# Patient Record
Sex: FEMALE | Race: BLACK OR AFRICAN AMERICAN | Employment: OTHER | URBAN - METROPOLITAN AREA
[De-identification: names, ages, dates, MRNs, and addresses within clinical notes are randomized per-mention and may not be internally consistent; named-entity substitution may affect disease eponyms.]

---

## 2017-01-03 RX ORDER — FUROSEMIDE 40 MG/1
TABLET ORAL
Qty: 90 TAB | Refills: 11 | Status: SHIPPED | OUTPATIENT
Start: 2017-01-03 | End: 2017-01-10 | Stop reason: SDUPTHER

## 2017-01-10 RX ORDER — ALLOPURINOL 300 MG/1
TABLET ORAL
Qty: 90 TAB | Refills: 3 | Status: SHIPPED | OUTPATIENT
Start: 2017-01-10 | End: 2018-01-23 | Stop reason: SDUPTHER

## 2017-01-10 RX ORDER — VALSARTAN 40 MG/1
TABLET ORAL
Qty: 90 TAB | Refills: 3 | Status: SHIPPED | OUTPATIENT
Start: 2017-01-10 | End: 2017-02-06

## 2017-01-10 RX ORDER — CARVEDILOL 6.25 MG/1
TABLET ORAL
Qty: 180 TAB | Refills: 3 | Status: SHIPPED | OUTPATIENT
Start: 2017-01-10 | End: 2017-02-07 | Stop reason: SDUPTHER

## 2017-01-10 RX ORDER — FLUTICASONE PROPIONATE AND SALMETEROL 250; 50 UG/1; UG/1
1 POWDER RESPIRATORY (INHALATION) 2 TIMES DAILY
Qty: 3 INHALER | Refills: 3 | Status: SHIPPED | OUTPATIENT
Start: 2017-01-10 | End: 2018-04-11 | Stop reason: SDUPTHER

## 2017-01-10 RX ORDER — ATORVASTATIN CALCIUM 10 MG/1
TABLET, FILM COATED ORAL
Qty: 90 TAB | Refills: 3 | Status: SHIPPED | OUTPATIENT
Start: 2017-01-10 | End: 2018-01-09 | Stop reason: SDUPTHER

## 2017-01-10 RX ORDER — FUROSEMIDE 40 MG/1
TABLET ORAL
Qty: 135 TAB | Refills: 3 | Status: SHIPPED | OUTPATIENT
Start: 2017-01-10 | End: 2017-01-18 | Stop reason: SDUPTHER

## 2017-01-10 RX ORDER — POTASSIUM CHLORIDE 750 MG/1
TABLET, FILM COATED, EXTENDED RELEASE ORAL
Qty: 90 TAB | Refills: 3 | Status: SHIPPED | OUTPATIENT
Start: 2017-01-10 | End: 2017-02-01

## 2017-01-19 RX ORDER — FUROSEMIDE 40 MG/1
TABLET ORAL
Qty: 270 TAB | Refills: 3 | Status: SHIPPED | OUTPATIENT
Start: 2017-01-19 | End: 2017-02-02

## 2017-01-24 ENCOUNTER — OFFICE VISIT (OUTPATIENT)
Dept: INTERNAL MEDICINE CLINIC | Age: 82
End: 2017-01-24

## 2017-01-24 VITALS
WEIGHT: 134.8 LBS | BODY MASS INDEX: 24.8 KG/M2 | SYSTOLIC BLOOD PRESSURE: 97 MMHG | OXYGEN SATURATION: 94 % | RESPIRATION RATE: 18 BRPM | TEMPERATURE: 94 F | HEIGHT: 62 IN | DIASTOLIC BLOOD PRESSURE: 64 MMHG | HEART RATE: 79 BPM

## 2017-01-24 DIAGNOSIS — R25.2 CRAMPING OF HANDS: ICD-10-CM

## 2017-01-24 DIAGNOSIS — R53.83 FATIGUE, UNSPECIFIED TYPE: ICD-10-CM

## 2017-01-24 DIAGNOSIS — R60.9 EDEMA, UNSPECIFIED TYPE: ICD-10-CM

## 2017-01-24 DIAGNOSIS — I50.22 CHRONIC SYSTOLIC CONGESTIVE HEART FAILURE (HCC): ICD-10-CM

## 2017-01-24 DIAGNOSIS — J45.909 REACTIVE AIRWAY DISEASE, UNSPECIFIED ASTHMA SEVERITY, UNCOMPLICATED: ICD-10-CM

## 2017-01-24 DIAGNOSIS — R53.81 PHYSICAL DECONDITIONING: ICD-10-CM

## 2017-01-24 DIAGNOSIS — I42.0 CARDIOMYOPATHY, DILATED, NONISCHEMIC (HCC): Primary | Chronic | ICD-10-CM

## 2017-01-24 DIAGNOSIS — I10 ESSENTIAL HYPERTENSION: ICD-10-CM

## 2017-01-24 DIAGNOSIS — E78.2 MIXED HYPERLIPIDEMIA: ICD-10-CM

## 2017-01-24 NOTE — MR AVS SNAPSHOT
Visit Information Date & Time Provider Department Dept. Phone Encounter #  
 1/24/2017 10:00 AM Bandar Garcia MD [facility] [phone] [id number] Follow-up Instructions Return in about 4 weeks (around 2/21/2017). Your Appointments 2/13/2017 11:15 AM  
6 MONTH with Kaci Elmore MD  
[facility] Gustavo Gupta) Appt Note: .; .  
 [address]  
[phone] [address]  
  
    
  
 1/25/2017  8:00 AM  
REMOTE OFFICE VISIT with [facility] Gustavo Gupta) Appt Note: NOT AN OFFICE VISIT - REMOTE BSC ICD  
 [address]  
[phone] [address] Upcoming Health Maintenance Date Due DTaP/Tdap/Td series (1 - Tdap) 3/17/1946 MEDICARE YEARLY EXAM 4/19/2017 GLAUCOMA SCREENING Q2Y 12/18/2017 Allergies as of 1/24/2017  Review Complete On: 1/24/2017 By: Davie Yan Severity Noted Reaction Type Reactions Codeine  05/21/2012   Side Effect Other (comments) \"made me disoriented\" per pt Current Immunizations  Reviewed on 6/29/2015 Name Date Influenza Vaccine 10/4/2014 Influenza Vaccine Split 10/22/2011 Pneumococcal Vaccine (Unspecified Type) 5/22/2007 Not reviewed this visit You Were Diagnosed With   
  
 Codes Comments Cardiomyopathy, dilated, nonischemic (HCC)    -  Primary ICD-10-CM: I42.9 ICD-9-CM: 425.4 Edema, unspecified type     ICD-10-CM: R60.9 ICD-9-CM: 256. 3 Chronic systolic congestive heart failure (HCC)     ICD-10-CM: I50.22 ICD-9-CM: 428.22, 428.0 Essential hypertension     ICD-10-CM: I10 
ICD-9-CM: 401.9 Mixed hyperlipidemia     ICD-10-CM: E78.2 ICD-9-CM: 272.2  Reactive airway disease, unspecified asthma severity, uncomplicated ICD-10-CM: L89.026 ICD-9-CM: 493.90 Fatigue, unspecified type     ICD-10-CM: R53.83 ICD-9-CM: 780.79 Cramping of hands     ICD-10-CM: R25.2 ICD-9-CM: 729.82 Vitals BP Pulse Temp Resp Height(growth percentile) Weight(growth percentile) 97/64 (BP 1 Location: Right arm, BP Patient Position: Sitting) 79 (!) 94 °F (34.4 °C) 18 5' 2\" (1.575 m) 134 lb 12.8 oz (61.1 kg) SpO2 BMI OB Status Smoking Status 94% 24.66 kg/m2 Postmenopausal Never Smoker BMI and BSA Data Body Mass Index Body Surface Area  
 24.66 kg/m 2 1.63 m 2 Preferred Pharmacy Pharmacy Name Phone [phone] Your Updated Medication List  
  
   
This list is accurate as of: 1/24/17 11:55 AM.  Always use your most recent med list.  
  
  
  
  
 albuterol 2.5 mg /3 mL (0.083 %) nebulizer solution Commonly known as:  PROVENTIL VENTOLIN  
3 mL by Nebulization route every four (4) hours as needed for Wheezing. allopurinol 300 mg tablet Commonly known as:  ZYLOPRIM  
TAKE ONE (1) TABLET(S) DAILY  
  
 atorvastatin 10 mg tablet Commonly known as:  LIPITOR  
TAKE ONE (1) TABLET(S) DAILY  
  
 carvedilol 6.25 mg tablet Commonly known as:  COREG  
TAKE ONE (1) TABLET(S) TWIC E DAILY WITH FOOD  
  
 co-enzyme Q-10 100 mg capsule Commonly known as:  CO Q-10 Take 100 mg by mouth daily. fluticasone-salmeterol 250-50 mcg/dose diskus inhaler Commonly known as:  ADVAIR Take 1 Puff by inhalation two (2) times a day. furosemide 40 mg tablet Commonly known as:  LASIX Take 1 and a half tablet bid HYDROcodone-acetaminophen 5-325 mg per tablet Commonly known as:  [address] Take 1 Tab by mouth every four (4) hours as needed. Max Daily Amount: 6 Tabs. IMBRUVICA 140 mg capsule Generic drug:  ibrutinib Take 3 Caps by mouth daily. loratadine 10 mg tablet Commonly known as:  Fabiola [place]  
 Take 10 mg by mouth.  
  
 multivitamin tablet Commonly known as:  ONE A DAY Take 1 Tab by mouth daily. OXYGEN-AIR DELIVERY SYSTEMS  
by Does Not Apply route. PERICO JET NEBULIZER BOWL  
by Does Not Apply route. potassium chloride SR 10 mEq tablet Commonly known as:  KLOR-CON 10  
TAKE 1 TABLET BY MOUTH JER Y. TYLENOL EXTRA STRENGTH 500 mg tablet Generic drug:  acetaminophen Take 500 mg by mouth every six (6) hours as needed for Pain.  
  
 valsartan 40 mg tablet Commonly known as:  DIOVAN  
TAKE ONE (1) TABLET(S) ONCE DAILY We Performed the Following BNP [id number] CPT(R)] CBC WITH AUTOMATED DIFF [46252 CPT(R)] METABOLIC PANEL, BASIC [74748 CPT(R)] Follow-up Instructions Return in about 4 weeks (around 2/21/2017). Introducing [facility]! Dear Padma Do: Thank you for requesting a [employer] account. Our records indicate that you have previously registered for a [employer] account but its currently inactive. Please call our [employer] support line at [phone]. Additional Information If you have questions, please visit the Frequently Asked Questions section of the [employer] website at [email or web address] Remember, [employer] is NOT to be used for urgent needs. For medical emergencies, dial 911. Now available from your iPhone and Android! Please provide this summary of care documentation to your next provider. Your primary care clinician is listed as Gail Dey. If you have any questions after today's visit, please call [phone].

## 2017-01-24 NOTE — PROGRESS NOTES
1. Have you been to the ER, urgent care clinic since your last visit? Hospitalized since your last visit? No    2. Have you seen or consulted any other health care providers outside of the [address] [place] [address] since your last visit? Include any pap smears or colon screening.  No

## 2017-01-24 NOTE — PROGRESS NOTES
[address] [facility]  [place] [address]  [address] [facility] [address]  Phone:  [phone]  Fax: [phone]       Chief Complaint   Patient presents with    Hypertension    Ankle swelling     patient complain of swelling in her right ankle patient states that she has been experiencing the swelling for 5 days patient states that she is not in any pain patient states that she doesn't remeber hitting her foot or ankle on anything   . SUBJECTIVE:    Lloyd Ibarra is a 80 y.o. female comes in for return visit stating that she has done well except noting a bit of swelling of her lower extremities. She has no other associated symptoms, specifically no dyspnea on exertion, orthopnea or PND. Her weight has been flat over the last six weeks. Most recently she went to [facility] to see her son and remained there for six weeks which is a bit longer than she likes. She is now deconditioned because she has not been walking as much as she did prior to leaving. She notes occasional cramping of her hands. Her gout has been reasonably stable. Current Outpatient Prescriptions   Medication Sig Dispense Refill    furosemide (LASIX) 40 mg tablet Take 1 and a half tablet bid 270 Tab 3    allopurinol (ZYLOPRIM) 300 mg tablet TAKE ONE (1) TABLET(S) DAILY 90 Tab 3    atorvastatin (LIPITOR) 10 mg tablet TAKE ONE (1) TABLET(S) DAILY 90 Tab 3    carvedilol (COREG) 6.25 mg tablet TAKE ONE (1) TABLET(S) TWIC E DAILY WITH FOOD 180 Tab 3    fluticasone-salmeterol (ADVAIR) 250-50 mcg/dose diskus inhaler Take 1 Puff by inhalation two (2) times a day. 3 Inhaler 3    potassium chloride SR (KLOR-CON 10) 10 mEq tablet TAKE 1 TABLET BY MOUTH JER Y. 90 Tab 3    valsartan (DIOVAN) 40 mg tablet TAKE ONE (1) TABLET(S) ONCE DAILY 90 Tab 3    loratadine (CLARITIN) 10 mg tablet Take 10 mg by mouth.  co-enzyme Q-10 (CO Q-10) 100 mg capsule Take 100 mg by mouth daily.       OXYGEN-AIR DELIVERY SYSTEMS by Does Not Apply route.  albuterol (PROVENTIL VENTOLIN) 2.5 mg /3 mL (0.083 %) nebulizer solution 3 mL by Nebulization route every four (4) hours as needed for Wheezing. 24 Each 11    NEBULIZER ACCESSORIES (PERICO JET NEBULIZER BOWL) by Does Not Apply route.  ibrutinib (IMBRUVICA) 140 mg cap Take 3 Caps by mouth daily.  acetaminophen (TYLENOL EXTRA STRENGTH) 500 mg tablet Take 500 mg by mouth every six (6) hours as needed for Pain.  multivitamin (ONE A DAY) tablet Take 1 Tab by mouth daily.  HYDROcodone-acetaminophen (NORCO) 5-325 mg per tablet Take 1 Tab by mouth every four (4) hours as needed. Max Daily Amount: 6 Tabs.  36 Tab 0     Past Medical History   Diagnosis Date    Asthma     Cancer ([facility] [address]      lymphoma    Congestive heart failure, unspecified     Heart failure (HCC)     Hypertension     Other ill-defined conditions(799.89)      heart murmur    Presence of biventricular automatic cardioverter/defibrillator (AICD) 7/2/2015     [email or web address] biventricular AICD implant    Stomach ulcer      Past Surgical History   Procedure Laterality Date    Hx hysterectomy      Hx other surgical       lymph node surgery    Hx tonsillectomy      Pr sinus surgery proc unlisted       Nasal polyps removed    Hx heent       cataracts removed    Pr egd transoral biopsy single/multiple  5/23/2012          Hx colonoscopy       Allergies   Allergen Reactions    Codeine Other (comments)     \"made me disoriented\" per pt         REVIEW OF SYSTEMS:  General: negative for - chills or fever  ENT: negative for - headaches, nasal congestion or tinnitus  Respiratory: negative for - cough, hemoptysis, shortness of breath or wheezing  Cardiovascular : negative for - chest pain, edema, palpitations or shortness of breath  Gastrointestinal: negative for - abdominal pain, blood in stools, heartburn or nausea/vomiting  Genito-Urinary: no dysuria, trouble voiding, or hematuria  Musculoskeletal: negative for - gait disturbance, joint pain, joint stiffness or joint swelling  Neurological: no TIA or stroke symptoms  Hematologic: no bruises, no bleeding, no swollen glands  Integument: no lumps, mole changes, nail changes or rash  Endocrine: no malaise/lethargy or unexpected weight changes      Social History     Social History    Marital status:      Spouse name: N/A    Number of children: 1    Years of education: 16+     Occupational History    retired teacher      Social History Main Topics    Smoking status: Never Smoker    Smokeless tobacco: Never Used    Alcohol use No    Drug use: No    Sexual activity: Not Asked     Other Topics Concern    None     Social History Narrative     Family History   Problem Relation Age of Onset    Heart Disease Mother     Stroke Father        OBJECTIVE:    Visit Vitals    BP 97/64 (BP 1 Location: Right arm, BP Patient Position: Sitting)    Pulse 79    Temp (!) 94 °F (34.4 °C)    Resp 18    Ht 5' 2\" (1.575 m)    Wt 134 lb 12.8 oz (61.1 kg)    SpO2 94%    BMI 24.66 kg/m2     CONSTITUTIONAL: well , well nourished, appears age appropriate  EYES: perrla, eom intact  ENMT:moist mucous membranes, pharynx clear  NECK: supple. Thyroid normal, no JVD  RESPIRATORY: Chest: clear to ascultation and percussion   CARDIOVASCULAR: Heart: regular rate and rhythm  GASTROINTESTINAL: Abdomen: soft, bowel sounds active  HEMATOLOGIC: no pathological lymph nodes palpated  MUSCULOSKELETAL: Extremities: trace edema, pulse 1+   INTEGUMENT: No unusual rashes or suspicious skin lesions noted. Nails appear normal.  NEUROLOGIC: non-focal exam   MENTAL STATUS: alert and oriented, appropriate affect      ASSESSMENT:  1. Cardiomyopathy, dilated, nonischemic ([address]    2. Edema, unspecified type    3. Chronic systolic congestive heart failure ([address]    4. Essential hypertension    5. Mixed hyperlipidemia    6.  Reactive airway disease, unspecified asthma severity, uncomplicated    7. Fatigue, unspecified type    8. Cramping of hands    9. Physical deconditioning        PLAN:    1. There is no overt sign of congestive heart failure. I am somewhat leery about increasing her diuretic at this point. If she develops any suggestive symptoms of heart failure she will call and appropriate adjustments will be made. 2. Her blood pressure is 95/70 which is a great pressure for her. This is acceptable given her low output state. 3. For the cramping in her hands she will use tonic water. 4. She will continue her statin as prescribed as is the case with her gout medication. 5. She is deconditioned and I will attempt to get physical therapy back in a home setting. Follow-up Disposition:  Return in about 4 weeks (around 2/21/2017).       Theodore Avitia MD

## 2017-01-25 ENCOUNTER — OFFICE VISIT (OUTPATIENT)
Dept: CARDIOLOGY CLINIC | Age: 82
End: 2017-01-25

## 2017-01-25 DIAGNOSIS — I44.7 LBBB (LEFT BUNDLE BRANCH BLOCK): ICD-10-CM

## 2017-01-25 DIAGNOSIS — I42.9 CARDIOMYOPATHY (HCC): ICD-10-CM

## 2017-01-25 DIAGNOSIS — I50.22 CHRONIC SYSTOLIC CONGESTIVE HEART FAILURE (HCC): ICD-10-CM

## 2017-01-25 DIAGNOSIS — Z95.810 PRESENCE OF BIVENTRICULAR AUTOMATIC CARDIOVERTER/DEFIBRILLATOR (AICD): Primary | ICD-10-CM

## 2017-01-25 LAB
BASOPHILS # BLD AUTO: 0.1 X10E3/UL (ref 0–0.2)
BASOPHILS NFR BLD AUTO: 2 %
BNP SERPL-MCNC: 291.1 PG/ML (ref 0–100)
BUN SERPL-MCNC: 29 MG/DL (ref 10–36)
BUN/CREAT SERPL: 20 (ref 11–26)
CALCIUM SERPL-MCNC: 8.8 MG/DL (ref 8.7–10.3)
CHLORIDE SERPL-SCNC: 103 MMOL/L (ref 96–106)
CO2 SERPL-SCNC: 24 MMOL/L (ref 18–29)
CREAT SERPL-MCNC: 1.47 MG/DL (ref 0.57–1)
EOSINOPHIL # BLD AUTO: 0.1 X10E3/UL (ref 0–0.4)
EOSINOPHIL NFR BLD AUTO: 3 %
ERYTHROCYTE [DISTWIDTH] IN BLOOD BY AUTOMATED COUNT: 16.4 % (ref 12.3–15.4)
GLUCOSE SERPL-MCNC: 94 MG/DL (ref 65–99)
HCT VFR BLD AUTO: 35.1 % (ref 34–46.6)
HGB BLD-MCNC: 11.7 G/DL (ref 11.1–15.9)
IMMATURE CELLS, 115398: ABNORMAL
LYMPHOCYTES # BLD AUTO: 1.7 X10E3/UL (ref 0.7–3.1)
LYMPHOCYTES NFR BLD AUTO: 44 %
MCH RBC QN AUTO: 31.3 PG (ref 26.6–33)
MCHC RBC AUTO-ENTMCNC: 33.3 G/DL (ref 31.5–35.7)
MCV RBC AUTO: 94 FL (ref 79–97)
METAMYELOCYTES NFR BLD: 11 %
MONOCYTES # BLD AUTO: 0.5 X10E3/UL (ref 0.1–0.9)
MONOCYTES NFR BLD AUTO: 13 %
MORPHOLOGY BLD-IMP: ABNORMAL
NEUTROPHILS # BLD AUTO: 1.1 X10E3/UL (ref 1.4–7)
NEUTROPHILS NFR BLD AUTO: 25 %
NEUTS BAND NFR BLD AUTO: 2 %
PLATELET # BLD AUTO: 145 X10E3/UL (ref 150–379)
POTASSIUM SERPL-SCNC: 4.2 MMOL/L (ref 3.5–5.2)
RBC # BLD AUTO: 3.74 X10E6/UL (ref 3.77–5.28)
SODIUM SERPL-SCNC: 142 MMOL/L (ref 134–144)
WBC # BLD AUTO: 3.9 X10E3/UL (ref 3.4–10.8)

## 2017-01-29 ENCOUNTER — HOME HEALTH ADMISSION (OUTPATIENT)
Dept: HOME HEALTH SERVICES | Facility: HOME HEALTH | Age: 82
End: 2017-01-29

## 2017-01-29 PROBLEM — R53.81 PHYSICAL DECONDITIONING: Status: ACTIVE | Noted: 2017-01-29

## 2017-02-01 ENCOUNTER — APPOINTMENT (OUTPATIENT)
Dept: GENERAL RADIOLOGY | Age: 82
DRG: 641 | End: 2017-02-01
Attending: EMERGENCY MEDICINE
Payer: MEDICARE

## 2017-02-01 ENCOUNTER — HOSPITAL ENCOUNTER (INPATIENT)
Age: 82
LOS: 2 days | Discharge: HOME | DRG: 641 | End: 2017-02-06
Attending: EMERGENCY MEDICINE | Admitting: INTERNAL MEDICINE
Payer: MEDICARE

## 2017-02-01 DIAGNOSIS — I95.1 ORTHOSTATIC HYPOTENSION: Primary | ICD-10-CM

## 2017-02-01 DIAGNOSIS — R06.09 DYSPNEA ON EXERTION: ICD-10-CM

## 2017-02-01 LAB
ALBUMIN SERPL BCP-MCNC: 2.6 G/DL (ref 3.5–5)
ALBUMIN/GLOB SERPL: 0.7 {RATIO} (ref 1.1–2.2)
ALP SERPL-CCNC: 68 U/L (ref 45–117)
ALT SERPL-CCNC: 9 U/L (ref 12–78)
ANION GAP BLD CALC-SCNC: 9 MMOL/L (ref 5–15)
APPEARANCE UR: CLEAR
AST SERPL W P-5'-P-CCNC: 20 U/L (ref 15–37)
BACTERIA URNS QL MICRO: NEGATIVE /HPF
BASOPHILS # BLD AUTO: 0 K/UL
BASOPHILS # BLD: 0 %
BILIRUB SERPL-MCNC: 0.6 MG/DL (ref 0.2–1)
BILIRUB UR QL: NEGATIVE
BNP SERPL-MCNC: 4851 PG/ML (ref 0–450)
BUN SERPL-MCNC: 30 MG/DL (ref 6–20)
BUN/CREAT SERPL: 22 (ref 12–20)
CALCIUM SERPL-MCNC: 8.1 MG/DL (ref 8.5–10.1)
CHLORIDE SERPL-SCNC: 100 MMOL/L (ref 97–108)
CK MB CFR SERPL CALC: NORMAL % (ref 0–2.5)
CK MB SERPL-MCNC: <1 NG/ML (ref 5–25)
CK SERPL-CCNC: 60 U/L (ref 26–192)
CO2 SERPL-SCNC: 28 MMOL/L (ref 21–32)
COLOR UR: ABNORMAL
CREAT SERPL-MCNC: 1.36 MG/DL (ref 0.55–1.02)
D DIMER PPP FEU-MCNC: 3.08 MG/L FEU (ref 0–0.65)
DIFFERENTIAL METHOD BLD: ABNORMAL
EOSINOPHIL # BLD: 0 K/UL
EOSINOPHIL NFR BLD: 1 %
EPITH CASTS URNS QL MICRO: ABNORMAL /LPF
ERYTHROCYTE [DISTWIDTH] IN BLOOD BY AUTOMATED COUNT: 15.4 % (ref 11.5–14.5)
FLUAV AG NPH QL IA: NEGATIVE
FLUBV AG NOSE QL IA: NEGATIVE
GLOBULIN SER CALC-MCNC: 3.5 G/DL (ref 2–4)
GLUCOSE SERPL-MCNC: 104 MG/DL (ref 65–100)
GLUCOSE UR STRIP.AUTO-MCNC: NEGATIVE MG/DL
HCT VFR BLD AUTO: 32.4 % (ref 35–47)
HGB BLD-MCNC: 10.9 G/DL (ref 11.5–16)
HGB UR QL STRIP: ABNORMAL
HYALINE CASTS URNS QL MICRO: ABNORMAL /LPF (ref 0–5)
KETONES UR QL STRIP.AUTO: NEGATIVE MG/DL
LEUKOCYTE ESTERASE UR QL STRIP.AUTO: NEGATIVE
LYMPHOCYTES # BLD AUTO: 51 %
LYMPHOCYTES # BLD: 2 K/UL
MAGNESIUM SERPL-MCNC: 2.6 MG/DL (ref 1.6–2.4)
MCH RBC QN AUTO: 31.1 PG (ref 26–34)
MCHC RBC AUTO-ENTMCNC: 33.6 G/DL (ref 30–36.5)
MCV RBC AUTO: 92.3 FL (ref 80–99)
MONOCYTES # BLD: 0.3 K/UL
MONOCYTES NFR BLD AUTO: 7 %
MYELOCYTES NFR BLD MANUAL: 1 %
NEUTS BAND NFR BLD MANUAL: 12 %
NEUTS SEG # BLD: 1.6 K/UL
NEUTS SEG NFR BLD AUTO: 28 %
NITRITE UR QL STRIP.AUTO: NEGATIVE
PH UR STRIP: 7.5 [PH] (ref 5–8)
PLATELET # BLD AUTO: 144 K/UL (ref 150–400)
POTASSIUM SERPL-SCNC: 4.3 MMOL/L (ref 3.5–5.1)
PROT SERPL-MCNC: 6.1 G/DL (ref 6.4–8.2)
PROT UR STRIP-MCNC: NEGATIVE MG/DL
RBC # BLD AUTO: 3.51 M/UL (ref 3.8–5.2)
RBC #/AREA URNS HPF: ABNORMAL /HPF (ref 0–5)
RBC MORPH BLD: ABNORMAL
SODIUM SERPL-SCNC: 137 MMOL/L (ref 136–145)
SP GR UR REFRACTOMETRY: 1.01 (ref 1–1.03)
TROPONIN I SERPL-MCNC: <0.04 NG/ML
UA: UC IF INDICATED,UAUC: ABNORMAL
UROBILINOGEN UR QL STRIP.AUTO: 1 EU/DL (ref 0.2–1)
WBC # BLD AUTO: 3.9 K/UL (ref 3.6–11)
WBC MORPH BLD: ABNORMAL
WBC URNS QL MICRO: ABNORMAL /HPF (ref 0–4)

## 2017-02-01 PROCEDURE — 96360 HYDRATION IV INFUSION INIT: CPT

## 2017-02-01 PROCEDURE — 83735 ASSAY OF MAGNESIUM: CPT | Performed by: EMERGENCY MEDICINE

## 2017-02-01 PROCEDURE — 96361 HYDRATE IV INFUSION ADD-ON: CPT

## 2017-02-01 PROCEDURE — 80053 COMPREHEN METABOLIC PANEL: CPT | Performed by: EMERGENCY MEDICINE

## 2017-02-01 PROCEDURE — 74011250637 HC RX REV CODE- 250/637: Performed by: EMERGENCY MEDICINE

## 2017-02-01 PROCEDURE — 81001 URINALYSIS AUTO W/SCOPE: CPT | Performed by: EMERGENCY MEDICINE

## 2017-02-01 PROCEDURE — 74011250636 HC RX REV CODE- 250/636: Performed by: EMERGENCY MEDICINE

## 2017-02-01 PROCEDURE — 85379 FIBRIN DEGRADATION QUANT: CPT | Performed by: EMERGENCY MEDICINE

## 2017-02-01 PROCEDURE — 93005 ELECTROCARDIOGRAM TRACING: CPT

## 2017-02-01 PROCEDURE — 99285 EMERGENCY DEPT VISIT HI MDM: CPT

## 2017-02-01 PROCEDURE — 36415 COLL VENOUS BLD VENIPUNCTURE: CPT | Performed by: EMERGENCY MEDICINE

## 2017-02-01 PROCEDURE — 87804 INFLUENZA ASSAY W/OPTIC: CPT | Performed by: EMERGENCY MEDICINE

## 2017-02-01 PROCEDURE — 71020 XR CHEST PA LAT: CPT

## 2017-02-01 PROCEDURE — 82550 ASSAY OF CK (CPK): CPT | Performed by: EMERGENCY MEDICINE

## 2017-02-01 PROCEDURE — 96372 THER/PROPH/DIAG INJ SC/IM: CPT

## 2017-02-01 PROCEDURE — 85025 COMPLETE CBC W/AUTO DIFF WBC: CPT | Performed by: EMERGENCY MEDICINE

## 2017-02-01 PROCEDURE — 84484 ASSAY OF TROPONIN QUANT: CPT | Performed by: EMERGENCY MEDICINE

## 2017-02-01 PROCEDURE — 83880 ASSAY OF NATRIURETIC PEPTIDE: CPT | Performed by: EMERGENCY MEDICINE

## 2017-02-01 RX ORDER — SODIUM CHLORIDE 0.9 % (FLUSH) 0.9 %
5-10 SYRINGE (ML) INJECTION EVERY 8 HOURS
Status: DISCONTINUED | OUTPATIENT
Start: 2017-02-01 | End: 2017-02-02

## 2017-02-01 RX ORDER — ACETAMINOPHEN 500 MG
500 TABLET ORAL
Status: COMPLETED | OUTPATIENT
Start: 2017-02-01 | End: 2017-02-01

## 2017-02-01 RX ORDER — SODIUM CHLORIDE 0.9 % (FLUSH) 0.9 %
5-10 SYRINGE (ML) INJECTION AS NEEDED
Status: DISCONTINUED | OUTPATIENT
Start: 2017-02-01 | End: 2017-02-02

## 2017-02-01 RX ORDER — ENOXAPARIN SODIUM 100 MG/ML
60 INJECTION SUBCUTANEOUS
Status: COMPLETED | OUTPATIENT
Start: 2017-02-01 | End: 2017-02-02

## 2017-02-01 RX ADMIN — SODIUM CHLORIDE 250 ML: 900 INJECTION, SOLUTION INTRAVENOUS at 19:37

## 2017-02-01 RX ADMIN — ACETAMINOPHEN 500 MG: 500 TABLET, FILM COATED ORAL at 23:55

## 2017-02-01 NOTE — Clinical Note
Patient Class[de-identified] Observation [475] Type of Bed: Telemetry [19] Reason for Observation: generalized fatigue Admitting Physician: Altaf Landry, [address] Attending Physician: Linnea Lindsey [[id number]] Diagnosis: generalized fatigue with malaise

## 2017-02-01 NOTE — IP AVS SNAPSHOT
[address] [facility] 
[phone] Patient: Alice Burton MRN: [id number] You are allergic to the following Allergen Reactions Codeine Other (comments) \"made me disoriented\" per pt Recent Documentation Height Weight Breastfeeding? BMI OB Status Smoking Status 1.575 m 63.5 kg No 25.6 kg/m2 Postmenopausal Never Smoker Emergency Contacts Name Discharge Info Relation Home Work Mobile Taye Campbell  Child [2] [id number] [place]  Other Relative [6] [phone] [place] nena Quintana   [phone] Seema Campbell  Child [2]   [phone] Declined,Declined DECLINED CAREGIVER [4] About your hospitalization You were admitted on:  February 2, 2017 You last received care in the:  [facility] 3 RENAL MEDICINE You were discharged on:  February 6, 2017 Unit phone number:  [phone] Why you were hospitalized Your primary diagnosis was:  Acute On Chronic Systolic (Congestive) Heart Failure (Hcc) Your diagnoses also included:  Anemia, Cardiomyopathy, Dilated, Nonischemic (Hcc), Fatigue, Lymphoma (Hcc), Lbbb (Left Bundle Branch Block), Giovanni (Obstructive Sleep Apnea), Nhl (Non-Hodgkin's Lymphoma) (Hcc), Presence Of Biventricular Automatic Cardioverter/Defibrillator (Aicd), Reactive Airway Disease, Sob (Shortness Of Breath), Acute Respiratory Insufficiency (Hcc), Htn (Hypertension), Pud (Peptic Ulcer Disease), Gastroesophageal Reflux Disease With Esophagitis, Thoracic Aortic Aneurysm Without Rupture (Hcc), Chf (Congestive Heart Failure) (Hcc) Providers Seen During Your Hospitalizations Provider Role Specialty Primary office phone Frank Herr MD Attending Provider Emergency Medicine [phone] Brigette Aguiar MD Attending Provider Internal Medicine [phone] Your Primary Care Physician (PCP) Primary Care Physician Office Phone Office Fax [address] [facility] [phone] Follow-up Information Follow up With Details Comments Contact Info Brianna Campos MD   [id number] [address] [address] 
[phone] Your Appointments Tuesday February 21, 2017  9:45 AM EST  
ESTABLISHED PATIENT with Hue Breaux MD  
[facility]) [address]  
[phone] Tuesday February 28, 2017 10:00 AM EST Any with Brianna Campos MD  
[address] [place] [address] and Primary Care [address] [facility]) Blaire Hensley [phone] Current Discharge Medication List  
  
CONTINUE these medications which have CHANGED Dose & Instructions Dispensing Information Comments Morning Noon Evening Bedtime  
 furosemide 40 mg tablet Commonly known as:  LASIX What changed:  how much to take Your next dose is: Today, Tomorrow Other:  _________ Dose:  40 mg Take 1 Tab by mouth two (2) times a day. Quantity:  60 Tab Refills:  11 CONTINUE these medications which have NOT CHANGED Dose & Instructions Dispensing Information Comments Morning Noon Evening Bedtime  
 albuterol 2.5 mg /3 mL (0.083 %) nebulizer solution Commonly known as:  PROVENTIL VENTOLIN Your next dose is: Today, Tomorrow Other:  _________ Dose:  2.5 mg  
3 mL by Nebulization route every four (4) hours as needed for Wheezing. Quantity:  24 Each Refills:  11  
     
   
   
   
  
 allopurinol 300 mg tablet Commonly known as:  Ana Lilia Marty Your next dose is: Today, Tomorrow Other:  _________ TAKE ONE (1) TABLET(S) DAILY Quantity:  90 Tab Refills:  3  
     
   
   
   
  
 atorvastatin 10 mg tablet Commonly known as:  LIPITOR Your next dose is: Today, Tomorrow Other:  _________ TAKE ONE (1) TABLET(S) DAILY Quantity:  90 Tab Refills:  3  
     
   
   
   
  
 carvedilol 6.25 mg tablet Commonly known as:  Phoebe Breed Your next dose is: Today, Tomorrow Other:  _________ TAKE ONE (1) TABLET(S) TWIC E DAILY WITH FOOD Quantity:  180 Tab Refills:  3  
     
   
   
   
  
 co-enzyme Q-10 100 mg capsule Commonly known as:  CO Q-10 Your next dose is: Today, Tomorrow Other:  _________ Dose:  100 mg Take 100 mg by mouth daily. Refills:  0  
     
   
   
   
  
 fluticasone-salmeterol 250-50 mcg/dose diskus inhaler Commonly known as:  ADVAIR Your next dose is: Today, Tomorrow Other:  _________ Dose:  1 Puff Take 1 Puff by inhalation two (2) times a day. Quantity:  3 Inhaler Refills:  3 IMBRUVICA 140 mg capsule Generic drug:  ibrutinib Your next dose is: Today, Tomorrow Other:  _________ Dose:  420 mg Take 420 mg by mouth five (5) days a week. 140 mg cap, takes 3 caps five days a week (none on Sunday or Wednesday) Refills:  0  
     
   
   
   
  
 loratadine 10 mg tablet Commonly known as:  Gab Cruise Your next dose is: Today, Tomorrow Other:  _________ Dose:  10 mg Take 10 mg by mouth daily as needed. Refills:  0  
     
   
   
   
  
 multivitamin tablet Commonly known as:  ONE A DAY Your next dose is: Today, Tomorrow Other:  _________ Dose:  1 Tab Take 1 Tab by mouth daily. Refills:  0 OXYGEN-AIR DELIVERY SYSTEMS Your next dose is: Today, Tomorrow Other:  _________  
   
   
 by Does Not Apply route. Refills:  0 PERICO JET NEBULIZER BOWL Your next dose is: Today, Tomorrow Other:  _________ by Does Not Apply route. Refills:  0  
     
   
   
   
  
 potassium chloride SR 10 mEq tablet Commonly known as:  KLOR-CON 10 Your next dose is: Today, Tomorrow Other:  _________ Dose:  10 mEq Take 10 mEq by mouth daily. Refills:  0  
     
   
   
   
  
 TYLENOL EXTRA STRENGTH 500 mg tablet Generic drug:  acetaminophen Your next dose is: Today, Tomorrow Other:  _________ Dose:  500 mg Take 500 mg by mouth every six (6) hours as needed for Pain. Refills:  0 STOP taking these medications   
 valsartan 40 mg tablet Commonly known as:  DIOVAN Where to Get Your Medications Information on where to get these meds will be given to you by the nurse or doctor. ! Ask your nurse or doctor about these medications  
  furosemide 40 mg tablet Discharge Instructions General Discharge Instructions Patient ID: 
Abdon Palacio [id number] 
14 y.o. 
3/17/1925 Patient Instructions The following personal items were collected during your admission and were returned to you. Take Home Medications What to do at [facility] Recommended diet: Cardiac Diet Recommended activity: Activity as tolerated Follow-up with Shanti Aponte MD  in 1 week. Information obtained by : 
I understand that if any problems occur once I am at home I am to contact my physician. I understand and acknowledge receipt of the instructions indicated above. Physician's or R.N.'s Signature                                                                  Date/Time Patient or Representative Signature                                                          Date/Time Discharge Instructions Attachments/References HEART FAILURE (ENGLISH) HEART FAILURE ZONES: GENERAL INFO (ENGLISH) HEART FAILURE: AVOIDING TRIGGERS (ENGLISH) HEART FAILURE: LIMITING SODIUM AND FLUIDS (ENGLISH) Discharge Orders None Introducing [facility]! Dear Dulce Maria Holliday: Thank you for requesting a [employer] account. Our records indicate that you already have an active [employer] account. You can access your account anytime at [email or web address] Did you know that you can access your hospital and ER discharge instructions at any time in [employer]? You can also review all of your test results from your hospital stay or ER visit. Additional Information If you have questions, please visit the Frequently Asked Questions section of the [employer] website at [email or web address] Remember, [employer] is NOT to be used for urgent needs. For medical emergencies, dial 911. Now available from your iPhone and Android! General Information Please provide this summary of care documentation to your next provider. Patient Signature:  ____________________________________________________________ Date:  ____________________________________________________________  
  
Surya End Provider Signature:  ____________________________________________________________ Date:  ____________________________________________________________ More Information Heart Failure: Care Instructions Your Care Instructions Heart failure occurs when your heart does not pump as much blood as the body needs. Failure does not mean that the heart has stopped pumping but rather that it is not pumping as well as it should. Over time, this causes fluid buildup in your lungs and other parts of your body.  Fluid buildup can cause shortness of breath, fatigue, swollen ankles, and other problems. By taking medicines regularly, reducing sodium (salt) in your diet, checking your weight every day, and making lifestyle changes, you can feel better and live longer. Follow-up care is a key part of your treatment and safety. Be sure to make and go to all appointments, and call your doctor if you are having problems. It's also a good idea to know your test results and keep a list of the medicines you take. How can you care for yourself at home? Medicines · Be safe with medicines. Take your medicines exactly as prescribed. Call your doctor if you think you are having a problem with your medicine. · Do not take any vitamins, over-the-counter medicine, or herbal products without talking to your doctor first. Thomes Manners not take ibuprofen (Advil or Motrin) and naproxen (Aleve) without talking to your doctor first. They could make your heart failure worse. · You may be taking some of the following medicine. ¨ Beta-blockers can slow heart rate, decrease blood pressure, and improve your condition. Taking a beta-blocker may lower your chance of needing to be hospitalized. ¨ Angiotensin-converting enzyme inhibitors (ACEIs) reduce the heart's workload, lower blood pressure, and reduce swelling. Taking an ACEI may lower your chance of needing to be hospitalized again. ¨ Angiotensin II receptor blockers (ARBs) work like ACEIs. Your doctor may prescribe them instead of ACEIs. ¨ Diuretics, also called water pills, reduce swelling. ¨ Potassium supplements replace this important mineral, which is sometimes lost with diuretics. ¨ Aspirin and other blood thinners prevent blood clots, which can cause a stroke or heart attack. You will get more details on the specific medicines your doctor prescribes. Diet · Your doctor may suggest that you limit sodium to 2,000 milligrams (mg) a day or less.  That is less than 1 teaspoon of salt a day, including all the salt you eat in cooking or in packaged foods. People get most of their sodium from processed foods. Fast food and restaurant meals also tend to be very high in sodium. · Ask your doctor how much liquid you can drink each day. You may have to limit liquids. Weight · Weigh yourself without clothing at the same time each day. Record your weight. Call your doctor if you gain more than 3 pounds in 2 to 3 days. A sudden weight gain may mean that your heart failure is getting worse. Activity level · Start light exercise (if your doctor says it is okay). Even if you can only do a small amount, exercise will help you get stronger, have more energy, and manage your weight and your stress. Walking is an easy way to get exercise. Start out by walking a little more than you did before. Bit by bit, increase the amount you walk. · When you exercise, watch for signs that your heart is working too hard. You are pushing yourself too hard if you cannot talk while you are exercising. If you become short of breath or dizzy or have chest pain, stop, sit down, and rest. 
· If you feel \"wiped out\" the day after you exercise, walk slower or for a shorter distance until you can work up to a better pace. · Get enough rest at night. Sleeping with 1 or 2 pillows under your upper body and head may help you breathe easier. Lifestyle changes · Do not smoke. Smoking can make a heart condition worse. If you need help quitting, talk to your doctor about stop-smoking programs and medicines. These can increase your chances of quitting for good. Quitting smoking may be the most important step you can take to protect your heart. · Limit alcohol to 2 drinks a day for men and 1 drink a day for women. Too much alcohol can cause health problems. · Avoid getting sick from colds and the flu. Get a pneumococcal vaccine shot.  If you have had one before, ask your doctor whether you need another dose. Get a flu shot each year. If you must be around people with colds or the flu, wash your hands often. When should you call for help? Call 911 if you have symptoms of sudden heart failure such as: 
· You have severe trouble breathing. · You cough up pink, foamy mucus. · You have a new irregular or rapid heartbeat. Call your doctor now or seek immediate medical care if: 
· You have new or increased shortness of breath. · You are dizzy or lightheaded, or you feel like you may faint. · You have sudden weight gain, such as 3 pounds or more in 2 to 3 days. · You have increased swelling in your legs, ankles, or feet. · You are suddenly so tired or weak that you cannot do your usual activities. Watch closely for changes in your health, and be sure to contact your doctor if: 
· You develop new symptoms. Where can you learn more? Go to http://camelia-bar.info/. Enter F631 in the search box to learn more about \"Heart Failure: Care Instructions. \" Current as of: January 27, 2016 Content Version: 11.1 © 6796-2459 [employer]. Care instructions adapted under license by [employer] (which disclaims liability or warranty for this information). If you have questions about a medical condition or this instruction, always ask your healthcare professional. [address] any warranty or liability for your use of this information. Learning About Heart Failure Zones What are heart failure zones? Heart failure zones give you an easy way to see changes in your heart failure symptoms. They also tell you when you need to get help. Check every day to see which zone you are in. Green zone. You are doing well. This is where you want to be. · Your weight is stable. This means it is not going up or down. · You breathe easily. · You are sleeping well. You are able to lie flat without shortness of breath. · You can do your usual activities. Yellow zone. Be careful. Your symptoms are changing. Call your doctor. · You have new or increased shortness of breath. · You are dizzy or lightheaded, or you feel like you may faint. · You have sudden weight gain, such as 3 pounds or more in 2 to 3 days. · You have increased swelling in your legs, ankles, or feet. · You are so tired or weak that you cannot do your usual activities. · You are not sleeping well. Shortness of breath wakes you up at night. You need extra pillows. Your doctor's name: ____________________________________________________________ Your doctor's contact information: _________________________________________________ Red zone. This is an emergency. Call 911. You have symptoms of sudden heart failure, such as: 
· You have severe trouble breathing. · You cough up pink, foamy mucus. · You have a new irregular or fast heartbeat. You have symptoms of a heart attack. These may include: · Chest pain or pressure, or a strange feeling in the chest. 
· Sweating. · Shortness of breath. · Nausea or vomiting. · Pain, pressure, or a strange feeling in the back, neck, jaw, or upper belly or in one or both shoulders or arms. · Lightheadedness or sudden weakness. · A fast or irregular heartbeat. If you have symptoms of a heart attack: After you call 911, the  may tell you to chew 1 adult-strength or 2 to 4 low-dose aspirin. Wait for an ambulance. Do not try to drive yourself. Follow-up care is a key part of your treatment and safety. Be sure to make and go to all appointments, and call your doctor if you are having problems. It's also a good idea to know your test results and keep a list of the medicines you take. Where can you learn more? Go to http://sunita.info/. Enter T174 in the search box to learn more about \"Learning About Heart Failure Zones. \" Current as of: January 27, 2016 Content Version: 11.1 © 8370-1740 [employer]. Care instructions adapted under license by [employer] (which disclaims liability or warranty for this information). If you have questions about a medical condition or this instruction, always ask your healthcare professional. [address] any warranty or liability for your use of this information. Avoiding Triggers With Heart Failure: Care Instructions Your Care Instructions Triggers are anything that make your heart failure flare up. A flare-up is also called \"sudden heart failure\" or \"acute heart failure. \" When you have a flare-up, fluid builds up in your lungs, and you have problems breathing. You might need to go to the hospital. By watching for changes in your condition and avoiding triggers, you can prevent heart failure flare-ups. Follow-up care is a key part of your treatment and safety. Be sure to make and go to all appointments, and call your doctor if you are having problems. It's also a good idea to know your test results and keep a list of the medicines you take. How can you care for yourself at home? Watch for changes in your weight and condition · Weigh yourself without clothing at the same time each day. Record your weight. Call your doctor if you gain 3 pounds or more in 2 to 3 days. A sudden weight gain may mean that your heart failure is getting worse. · Keep a daily record of your symptoms. Write down any changes in how you feel, such as new shortness of breath, cough, or problems eating. Also record if your ankles are more swollen than usual and if you have to urinate in the night more often. Note anything that you ate or did that could have triggered these changes. Limit sodium Sodium causes your body to hold on to water, making it harder for your heart to pump. People get most of their sodium from processed foods. Fast food and restaurant meals also tend to be very high in sodium. · Your doctor may suggest that you limit sodium to 2,000 milligrams (mg) a day or less. That is less than 1 teaspoon of salt a day, including all the salt you eat in cooking or in packaged foods. · Read food labels on cans and food packages. They tell you how much sodium you get in one serving. Check the serving size. If you eat more than one serving, you are getting more sodium. · Be aware that sodium can come in forms other than salt, including monosodium glutamate (MSG), sodium citrate, and sodium bicarbonate (baking soda). MSG is often added to Asian food. You can sometimes ask for food without MSG or salt. · Slowly reducing salt will help you adjust to the taste. Take the salt shaker off the table. · Flavor your food with garlic, lemon juice, onion, vinegar, herbs, and spices instead of salt. Do not use soy sauce, steak sauce, onion salt, garlic salt, mustard, or ketchup on your food, unless it is labeled \"low-sodium\" or \"low-salt. \" 
· Make your own salad dressings, sauces, and ketchup without adding salt. · Use fresh or frozen ingredients, instead of canned ones, whenever you can. Choose low-sodium canned goods. · Eat less processed food and food from restaurants, including fast food. Exercise as directed Moderate, regular exercise is very good for your heart. It improves your blood flow and helps control your weight. But too much exercise can stress your heart and cause a heart failure flare-up. · Check with your doctor before you start an exercise program. 
· Walking is an easy way to get exercise. Start out slowly. Gradually increase the length and pace of your walk. Swimming, riding a bike, and using a treadmill are also good forms of exercise. · When you exercise, watch for signs that your heart is working too hard. You are pushing yourself too hard if you cannot talk while you are exercising.  If you become short of breath or dizzy or have chest pain, stop, sit down, and rest. 
 · Do not exercise when you do not feel well. Take medicines correctly · Take your medicines exactly as prescribed. Call your doctor if you think you are having a problem with your medicine. · Make a list of all the medicines you take. Include those prescribed to you by other doctors and any over-the-counter medicines, vitamins, or supplements you take. Take this list with you when you go to any doctor. · Take your medicines at the same time every day. It may help you to post a list of all the medicines you take every day and what time of day you take them. · Make taking your medicine as simple as you can. Plan times to take your medicines when you are doing other things, such as eating a meal or getting ready for bed. This will make it easier to remember to take your medicines. · Get organized. Use helpful tools, such as daily or weekly pill containers. When should you call for help? Call 911 if you have symptoms of sudden heart failure such as: 
· You have severe trouble breathing. · You cough up pink, foamy mucus. · You have a new irregular or rapid heartbeat. Call your doctor now or seek immediate medical care if: 
· You have new or increased shortness of breath. · You are dizzy or lightheaded, or you feel like you may faint. · You have sudden weight gain, such as 3 pounds or more in 2 to 3 days. · You have increased swelling in your legs, ankles, or feet. · You are suddenly so tired or weak that you cannot do your usual activities. Watch closely for changes in your health, and be sure to contact your doctor if you develop new symptoms. Where can you learn more? Go to http://camelia-bar.info/. Enter P254 in the search box to learn more about \"Avoiding Triggers With Heart Failure: Care Instructions. \" Current as of: April 27, 2016 Content Version: 11.1 © 1250-7303 [employer].  Care instructions adapted under license by [facility] [address] (which disclaims liability or warranty for this information). If you have questions about a medical condition or this instruction, always ask your healthcare professional. [address] any warranty or liability for your use of this information. Limiting Sodium and Fluids With Heart Failure: Care Instructions Your Care Instructions Sodium causes your body to keep extra water, making it harder for your heart to pump. By limiting sodium, you will feel better and lower your risk of having to go to the hospital. 
People get most of their sodium from processed foods. Fast food and restaurant meals also tend to be very high in sodium. Your doctor may suggest that you limit sodium to 2,000 milligrams (mg) a day or less. That is less than 1 teaspoon of salt a day, including all the salt you eat in cooked or packaged foods. Usually, you have to limit the amount of liquids you drink only if your heart failure is severe. Limiting sodium alone often is enough to help your body get rid of extra fluids. However, your doctor may tell you to limit your fluid intake to a set amount each day. Follow-up care is a key part of your treatment and safety. Be sure to make and go to all appointments, and call your doctor if you are having problems. It's also a good idea to know your test results and keep a list of the medicines you take. How can you care for yourself at home? Read food labels · Read food labels on cans and food packages. The labels tell you how much sodium is in each serving. Make sure that you look at the serving size. If you eat more than the serving size, you have eaten more sodium than is listed for one serving. · Food labels also tell you the Percent Daily Value. If the Percent Daily Value says 50%, it means that you will get at least 50% of all the sodium you need for the entire day in one serving.  Choose products with low Percent Daily Values for sodium. · Be aware that sodium can come in forms other than salt, including monosodium glutamate (MSG), sodium citrate, and sodium bicarbonate (baking soda). MSG is often added to Asian food. You can sometimes ask for food without MSG or salt. Buy low-sodium foods · Buy foods that are labeled \"unsalted\" (no salt added), \"sodium-free\" (less than 5 mg of sodium per serving), or \"low-sodium\" (less than 140 mg of sodium per serving). A food labeled \"light sodium\" has less than half of the full-sodium version of that food. Foods labeled \"reduced-sodium\" may still have too much sodium. · Buy fresh vegetables or plain, frozen vegetables. Buy low-sodium versions of canned vegetables, soups, and other canned goods. Prepare low-sodium meals · Use less salt each day when cooking. Reducing salt in this way will help you adjust to the taste. Do not add salt after cooking. Take the salt shaker off the table. · Flavor your food with garlic, lemon juice, onion, vinegar, herbs, and spices instead of salt. Do not use soy sauce, steak sauce, onion salt, garlic salt, mustard, or ketchup on your food. · Make your own salad dressings, sauces, and ketchup without adding salt. · Use less salt (or none) when recipes call for it. You can often use half the salt a recipe calls for without losing flavor. Other dishes like rice, pasta, and grains do not need added salt. · Rinse canned vegetables. This removes somebut not allof the salt. · Avoid water that has a naturally high sodium content or that has been treated with water softeners, which add sodium. Call your local water company to find out the sodium content of your water supply. If you buy bottled water, read the label and choose a sodium-free brand. Avoid high-sodium foods, such as: 
· Smoked, cured, salted, and canned meat, fish, and poultry. · Ham, geronimo, hot dogs, and luncheon meats. · Regular, hard, and processed cheese and regular peanut butter. · Crackers with salted tops. · Frozen prepared meals. · Canned and dried soups, broths, and bouillon, unless labeled sodium-free or low-sodium. · Canned vegetables, unless labeled sodium-free or low-sodium. · Salted snack foods such as chips and pretzels. · [place] fries, pizza, tacos, and other fast foods. · Pickles, olives, ketchup, and other condiments, especially soy sauce, unless labeled sodium-free or low-sodium. If you cannot cook for yourself · Have family members or friends help you, or have someone cook low-sodium meals. · Check with your local senior nutrition program to find out where meals are served and whether they offer a low-sodium option. You can often find these programs through your local health department or hospital. 
· Have meals delivered to your home. Most [facility] have a Meals on [employer]. These programs provide one hot meal a day for older adults, delivered to their homes. Ask whether these meals are low-sodium. Let them know that you are on a low-sodium diet. Limiting fluid intake · Find a method that works for you. You might simply write down how much you drink every time you do. Some people keep a container filled with the amount of fluid allowed for that day. If they drink from a source other than the container, then they pour out that amount. · Measure your regular drinking glasses to find out how much fluid each one holds. Once you know this, you will not have to measure every time. · Besides water, milk, juices, and other drinks, some foods have a lot of fluid. Count any foods that will melt (such as ice cream or gelatin dessert) or liquid foods (such as soup) as part of your fluid intake for the day. Where can you learn more? Go to http://camelia-bar.info/. Enter A166 in the search box to learn more about \"Limiting Sodium and Fluids With Heart Failure: Care Instructions. \" 
 Current as of: January 27, 2016 Content Version: 11.1 © 6282-8193 [employer]. Care instructions adapted under license by [employer] (which disclaims liability or warranty for this information). If you have questions about a medical condition or this instruction, always ask your healthcare professional. [address] any warranty or liability for your use of this information.

## 2017-02-02 PROBLEM — R06.89 ACUTE RESPIRATORY INSUFFICIENCY: Status: ACTIVE | Noted: 2017-02-02

## 2017-02-02 PROBLEM — I71.20 THORACIC AORTIC ANEURYSM WITHOUT RUPTURE: Status: ACTIVE | Noted: 2017-02-02

## 2017-02-02 PROBLEM — K21.00 GASTROESOPHAGEAL REFLUX DISEASE WITH ESOPHAGITIS: Status: ACTIVE | Noted: 2017-02-02

## 2017-02-02 PROBLEM — R06.02 SOB (SHORTNESS OF BREATH): Status: ACTIVE | Noted: 2017-02-02

## 2017-02-02 PROBLEM — K27.9 PUD (PEPTIC ULCER DISEASE): Status: ACTIVE | Noted: 2017-02-02

## 2017-02-02 LAB
ATRIAL RATE: 80 BPM
BNP SERPL-MCNC: 415 PG/ML (ref 0–100)
CALCULATED P AXIS, ECG09: -14 DEGREES
CALCULATED R AXIS, ECG10: -138 DEGREES
CALCULATED T AXIS, ECG11: 35 DEGREES
DIAGNOSIS, 93000: NORMAL
P-R INTERVAL, ECG05: 148 MS
PATH REV BLD -IMP: NORMAL
Q-T INTERVAL, ECG07: 440 MS
QRS DURATION, ECG06: 168 MS
QTC CALCULATION (BEZET), ECG08: 507 MS
VENTRICULAR RATE, ECG03: 80 BPM

## 2017-02-02 PROCEDURE — 74011250637 HC RX REV CODE- 250/637: Performed by: INTERNAL MEDICINE

## 2017-02-02 PROCEDURE — 99218 HC RM OBSERVATION: CPT

## 2017-02-02 PROCEDURE — 74011000258 HC RX REV CODE- 258: Performed by: INTERNAL MEDICINE

## 2017-02-02 PROCEDURE — 74011250636 HC RX REV CODE- 250/636: Performed by: EMERGENCY MEDICINE

## 2017-02-02 PROCEDURE — 74011250636 HC RX REV CODE- 250/636: Performed by: INTERNAL MEDICINE

## 2017-02-02 PROCEDURE — 36415 COLL VENOUS BLD VENIPUNCTURE: CPT | Performed by: INTERNAL MEDICINE

## 2017-02-02 PROCEDURE — 83880 ASSAY OF NATRIURETIC PEPTIDE: CPT | Performed by: INTERNAL MEDICINE

## 2017-02-02 RX ORDER — ONDANSETRON 2 MG/ML
4 INJECTION INTRAMUSCULAR; INTRAVENOUS
Status: DISCONTINUED | OUTPATIENT
Start: 2017-02-02 | End: 2017-02-06 | Stop reason: HOSPADM

## 2017-02-02 RX ORDER — POTASSIUM CHLORIDE 750 MG/1
10 TABLET, FILM COATED, EXTENDED RELEASE ORAL DAILY
COMMUNITY
End: 2017-02-21 | Stop reason: ALTCHOICE

## 2017-02-02 RX ORDER — CARVEDILOL 6.25 MG/1
6.25 TABLET ORAL 2 TIMES DAILY WITH MEALS
Status: DISCONTINUED | OUTPATIENT
Start: 2017-02-02 | End: 2017-02-02

## 2017-02-02 RX ORDER — ATORVASTATIN CALCIUM 10 MG/1
10 TABLET, FILM COATED ORAL
Status: DISCONTINUED | OUTPATIENT
Start: 2017-02-02 | End: 2017-02-06 | Stop reason: HOSPADM

## 2017-02-02 RX ORDER — ACETAMINOPHEN 500 MG
500 TABLET ORAL
Status: DISCONTINUED | OUTPATIENT
Start: 2017-02-02 | End: 2017-02-02

## 2017-02-02 RX ORDER — FLUTICASONE PROPIONATE AND SALMETEROL 250; 50 UG/1; UG/1
1 POWDER RESPIRATORY (INHALATION) 2 TIMES DAILY
Status: DISCONTINUED | OUTPATIENT
Start: 2017-02-02 | End: 2017-02-06 | Stop reason: HOSPADM

## 2017-02-02 RX ORDER — SODIUM CHLORIDE 9 MG/ML
40 INJECTION, SOLUTION INTRAVENOUS CONTINUOUS
Status: DISCONTINUED | OUTPATIENT
Start: 2017-02-02 | End: 2017-02-03

## 2017-02-02 RX ORDER — SODIUM CHLORIDE 0.9 % (FLUSH) 0.9 %
5-10 SYRINGE (ML) INJECTION AS NEEDED
Status: DISCONTINUED | OUTPATIENT
Start: 2017-02-02 | End: 2017-02-06 | Stop reason: HOSPADM

## 2017-02-02 RX ORDER — ACETAMINOPHEN 325 MG/1
650 TABLET ORAL
Status: DISCONTINUED | OUTPATIENT
Start: 2017-02-02 | End: 2017-02-06 | Stop reason: HOSPADM

## 2017-02-02 RX ORDER — FUROSEMIDE 40 MG/1
60 TABLET ORAL 2 TIMES DAILY
Status: ON HOLD | COMMUNITY
End: 2017-02-06

## 2017-02-02 RX ORDER — ALBUTEROL SULFATE 0.83 MG/ML
2.5 SOLUTION RESPIRATORY (INHALATION)
Status: DISCONTINUED | OUTPATIENT
Start: 2017-02-02 | End: 2017-02-06 | Stop reason: HOSPADM

## 2017-02-02 RX ORDER — CARVEDILOL 3.12 MG/1
3.12 TABLET ORAL 2 TIMES DAILY WITH MEALS
Status: DISCONTINUED | OUTPATIENT
Start: 2017-02-03 | End: 2017-02-05

## 2017-02-02 RX ORDER — FLUTICASONE PROPIONATE AND SALMETEROL 250; 50 UG/1; UG/1
1 POWDER RESPIRATORY (INHALATION) 2 TIMES DAILY
Status: DISCONTINUED | OUTPATIENT
Start: 2017-02-02 | End: 2017-02-02

## 2017-02-02 RX ORDER — SODIUM CHLORIDE 0.9 % (FLUSH) 0.9 %
5-10 SYRINGE (ML) INJECTION EVERY 8 HOURS
Status: DISCONTINUED | OUTPATIENT
Start: 2017-02-02 | End: 2017-02-04

## 2017-02-02 RX ORDER — SODIUM CHLORIDE 450 MG/100ML
50 INJECTION, SOLUTION INTRAVENOUS CONTINUOUS
Status: DISCONTINUED | OUTPATIENT
Start: 2017-02-02 | End: 2017-02-02

## 2017-02-02 RX ORDER — ALLOPURINOL 300 MG/1
300 TABLET ORAL DAILY
Status: DISCONTINUED | OUTPATIENT
Start: 2017-02-02 | End: 2017-02-06 | Stop reason: HOSPADM

## 2017-02-02 RX ORDER — HEPARIN SODIUM 5000 [USP'U]/ML
5000 INJECTION, SOLUTION INTRAVENOUS; SUBCUTANEOUS EVERY 12 HOURS
Status: DISCONTINUED | OUTPATIENT
Start: 2017-02-03 | End: 2017-02-06 | Stop reason: HOSPADM

## 2017-02-02 RX ADMIN — ENOXAPARIN SODIUM 60 MG: 60 INJECTION SUBCUTANEOUS at 00:36

## 2017-02-02 RX ADMIN — SODIUM CHLORIDE 50 ML/HR: 450 INJECTION, SOLUTION INTRAVENOUS at 08:59

## 2017-02-02 RX ADMIN — ALLOPURINOL 300 MG: 300 TABLET ORAL at 16:11

## 2017-02-02 RX ADMIN — Medication 10 ML: at 13:24

## 2017-02-02 RX ADMIN — ATORVASTATIN CALCIUM 10 MG: 10 TABLET, FILM COATED ORAL at 21:01

## 2017-02-02 RX ADMIN — SODIUM CHLORIDE 75 ML/HR: 900 INJECTION, SOLUTION INTRAVENOUS at 13:23

## 2017-02-02 RX ADMIN — Medication 10 ML: at 21:01

## 2017-02-02 NOTE — PROGRESS NOTES
TRANSFER - IN REPORT:    Verbal report received from Julissa(name) on [facility]  being received from ER(unit) for routine progression of care      Report consisted of patients Situation, Background, Assessment and   Recommendations(SBAR). Information from the following report(s) Kardex, ED Summary, Intake/Output and Recent Results was reviewed with the receiving nurse. Opportunity for questions and clarification was provided. Assessment completed upon patients arrival to unit and care assumed.

## 2017-02-02 NOTE — ED NOTES
Assumed care of patient for shift change. Bedside verbal report received from [place], [address] [place] [address]. Patient alert and oriented x 4 and c/o being \"tired\". Paced rhythm on cardiac monitor with PACs and PVCs. O2 sats 97% on RA. VSS. Patient conversing on telephone. Call bell in reach. Will continue to monitor.

## 2017-02-02 NOTE — PROGRESS NOTES
Patient has two orders for continuous fluids. Patient just came from ER. Called ER doctor to clarify the order. He asked to called Dr. Whit Kirk. Called Dr. Keila Alcaraz office, no one picked up the phone so left him the voice message. Until than will follow Dr. Whit Kirk order for continuous fluids.  1/2 NS @ 50

## 2017-02-02 NOTE — ED NOTES
Bedside and Verbal shift change report given to Ralf Horta Aaron  (oncoming nurse) by Kandace Nation RN (offgoing nurse). Report included the following information SBAR, Kardex, ED Summary, Intake/Output, MAR and Recent Results.

## 2017-02-02 NOTE — ED NOTES
Pharmacist, Lisa Guardado, attempting to procure patient's chemotherapy medication at this time. Patient resting in position of comfort in NAD. VSS. PIV LAC intact. Bed assigned. Will call report.

## 2017-02-02 NOTE — ED NOTES
Bedside shift change report given to Patricia Kim (oncoming nurse) by Roland Perez (offgoing nurse). Report included the following information SBAR and ED Summary.

## 2017-02-02 NOTE — ED NOTES
Patient stating IV painful at this time. Suggested to remove IV and place another but patient refused.

## 2017-02-02 NOTE — ED NOTES
Bedside shift change report given to Agueda Conley  (oncoming nurse) by Leslie Duran (offgoing nurse). Report included the following information SBAR and ED Summary.

## 2017-02-02 NOTE — PROGRESS NOTES
Primary Nurse Lawson Rob RN and Portillo Small RN performed a dual skin assessment on this patient No impairment noted  Kimani score is 23

## 2017-02-02 NOTE — ED PROVIDER NOTES
HPI Comments: Agueda Sanz is a 80 y.o. female, pmhx significant for HTN, stomach ulcer, asthma, CHF, and lymphoma, who presents via EMS to the ED c/o gradually worsening SOB with exertion x 2 days. Pt states that she first started feeling bad \"over the holidays\" and has noticed she has been unable to walk on a treadmill, which she notes she does everyday. Pt additionally complains of a subjective fever and some leg swelling which has since subsided. Of note, pt traveled from [place] to [address] [place] [address] ~3 weeks ago. She denies symptoms after the trip. She denies any pain currently in the ED. Of note, pt is usually compliant with her medications, however she has not taken her HTN medication tonight. Pt specifically denies a productive cough, dizziness, blood in her stool, abdominal pain, or any history of kidney disease. PCP: Jesus Castillo MD    PSHx: Significant for hysterectmoy, tonsillectomy, nasal polyps removed  Social Hx: -tobacco, -EtOH, -Illicit Drugs    There are no other complaints, changes, or physical findings at this time. The history is provided by the patient and a relative.         Past Medical History:   Diagnosis Date    Asthma     Cancer ([facility] [address]      lymphoma    Congestive heart failure, unspecified     Heart failure (HCC)     Hypertension     Other ill-defined conditions(799.89)      heart murmur    Presence of biventricular automatic cardioverter/defibrillator (AICD) 7/2/2015     [employer] biventricular AICD implant    Stomach ulcer        Past Surgical History:   Procedure Laterality Date    Hx hysterectomy      Hx other surgical       lymph node surgery    Hx tonsillectomy      Pr sinus surgery proc unlisted       Nasal polyps removed    Hx heent       cataracts removed    Pr egd transoral biopsy single/multiple  5/23/2012          Hx colonoscopy           Family History:   Problem Relation Age of Onset    Heart Disease Mother     Stroke Father        Social History     Social History    Marital status:      Spouse name: N/A    Number of children: 1    Years of education: 16+     Occupational History    retired teacher      Social History Main Topics    Smoking status: Never Smoker    Smokeless tobacco: Never Used    Alcohol use No    Drug use: No    Sexual activity: Not on file     Other Topics Concern    Not on file     Social History Narrative         ALLERGIES: Codeine    Review of Systems   Constitutional: Positive for fever (subjective). HENT: Negative. Eyes: Negative. Respiratory: Positive for shortness of breath. Negative for cough. Cardiovascular: Positive for leg swelling (see HPI). Gastrointestinal: Negative. Negative for abdominal pain and blood in stool. Genitourinary: Negative. Musculoskeletal: Negative. Skin: Negative. Neurological: Negative. Negative for dizziness. Psychiatric/Behavioral: Negative. All other systems reviewed and are negative. Patient Vitals for the past 12 hrs:   Temp Pulse Resp BP SpO2   02/01/17 2122 - 77 - 95/58 -   02/01/17 2045 - 80 25 90/56 95 %   02/01/17 2030 - 86 17 93/60 97 %   02/01/17 1952 - - - - 97 %   02/01/17 1951 - - - 94/58 -   02/01/17 1859 98.8 °F (37.1 °C) 80 18 95/60 97 %       Physical Exam   Constitutional: She is oriented to person, place, and time. She appears well-developed and well-nourished. No distress. HENT:   Head: Normocephalic and atraumatic. Eyes: Conjunctivae are normal. Pupils are equal, round, and reactive to light. Neck: Normal range of motion. Neck supple. No JVD present. Cardiovascular: Normal rate, regular rhythm and normal pulses. PMI is not displaced. Exam reveals no gallop and no friction rub. No murmur heard. Pulmonary/Chest: Effort normal and breath sounds normal. No stridor. No respiratory distress. She has no decreased breath sounds. She has no wheezes. She has no rales. She exhibits no tenderness. Abdominal: Soft.  Bowel sounds are normal. She exhibits no distension and no mass. There is no tenderness. There is no rebound and no guarding. Musculoskeletal: Normal range of motion. Neurological: She is alert and oriented to person, place, and time. She has normal reflexes. She displays normal reflexes. No cranial nerve deficit. Coordination normal.   Skin: Skin is warm and dry. No rash noted. She is not diaphoretic. No erythema. No pallor. Nursing note and vitals reviewed. MDM  Number of Diagnoses or Management Options  Dyspnea on exertion:   Orthostatic hypotension:   Diagnosis management comments: DDx: PE, CHF, dehydration, adverse drug reaction, pneumonia    Significant cardio myopathy to the ER complaining of SOB. Also orthostatic by vitals. Will admit to hospitalist. Check D Dimer and possibly consider VQ scan. Amount and/or Complexity of Data Reviewed  Clinical lab tests: ordered and reviewed  Tests in the radiology section of CPT®: ordered and reviewed  Tests in the medicine section of CPT®: reviewed and ordered  Obtain history from someone other than the patient: yes (Cousin)  Review and summarize past medical records: yes  Discuss the patient with other providers: yes (Admitting PCP  )  Independent visualization of images, tracings, or specimens: yes    Risk of Complications, Morbidity, and/or Mortality  Presenting problems: moderate  Diagnostic procedures: moderate  Management options: moderate    Patient Progress  Patient progress: stable    ED Course       Procedures    EKG interpretation: 7:26 PMG  Rhythm: atrial-sesed ventricular paced rhythm; and regular .  Rate (approx.): 80; Axis: normal; AZ interval: normal; QRS interval: normal ; ST/T wave: normal  Written by Elaine Charles, ED Scribe, as dictated by Brandi Peacock MD.     CONSULT NOTE:   9:21 PM  Brandi Peaocck MD spoke with Dr. Tiffanie Peters,   Specialty: Admitting PCPs  Discussed pt's hx, disposition, and available diagnostic and imaging results. Reviewed care plans. Consultant agrees with plans as outlined. .   Written by ALEXANDER Tena, as dictated by Cesar Domínguez MD.    11:53 PM Dr Carmen Tobin recommends treating elevated d-dimer and will order V/Q in am       LABORATORY TESTS:  Recent Results (from the past 12 hour(s))   EKG, 12 LEAD, INITIAL    Collection Time: 02/01/17  7:26 PM   Result Value Ref Range    Ventricular Rate 80 BPM    Atrial Rate 80 BPM    P-R Interval 148 ms    QRS Duration 168 ms    Q-T Interval 440 ms    QTC Calculation (Bezet) 507 ms    Calculated P Axis -14 degrees    Calculated R Axis -138 degrees    Calculated T Axis 35 degrees    Diagnosis       Atrial-sensed ventricular-paced rhythm  When compared with ECG of 14-NOV-2015 08:37,  Vent. rate has increased BY   5 BPM     CBC WITH AUTOMATED DIFF    Collection Time: 02/01/17  7:35 PM   Result Value Ref Range    WBC 3.9 3.6 - 11.0 K/uL    RBC 3.51 (L) 3.80 - 5.20 M/uL    HGB 10.9 (L) 11.5 - 16.0 g/dL    HCT 32.4 (L) 35.0 - 47.0 %    MCV 92.3 80.0 - 99.0 FL    MCH 31.1 26.0 - 34.0 PG    MCHC 33.6 30.0 - 36.5 g/dL    RDW 15.4 (H) 11.5 - 14.5 %    PLATELET 177 (L) 957 - 400 K/uL    NEUTROPHILS 28 %    BAND NEUTROPHILS 12 %    LYMPHOCYTES 51 %    MONOCYTES 7 %    EOSINOPHILS 1 %    BASOPHILS 0 %    MYELOCYTES 1 %    ABS. NEUTROPHILS 1.6 K/UL    ABS. LYMPHOCYTES 2.0 K/UL    ABS. MONOCYTES 0.3 K/UL    ABS. EOSINOPHILS 0.0 K/UL    ABS.  BASOPHILS 0.0 K/UL    DF MANUAL      RBC COMMENTS NORMOCYTIC, NORMOCHROMIC      WBC COMMENTS PELGER/HUET/LIKE CELLS     METABOLIC PANEL, COMPREHENSIVE    Collection Time: 02/01/17  7:35 PM   Result Value Ref Range    Sodium 137 136 - 145 mmol/L    Potassium 4.3 3.5 - 5.1 mmol/L    Chloride 100 97 - 108 mmol/L    CO2 28 21 - 32 mmol/L    Anion gap 9 5 - 15 mmol/L    Glucose 104 (H) 65 - 100 mg/dL    BUN 30 (H) 6 - 20 MG/DL    Creatinine 1.36 (H) 0.55 - 1.02 MG/DL    BUN/Creatinine ratio 22 (H) 12 - 20      GFR est AA 44 (L) >60 ml/min/1.73m2    GFR est non-AA 36 (L) >60 ml/min/1.73m2    Calcium 8.1 (L) 8.5 - 10.1 MG/DL    Bilirubin, total 0.6 0.2 - 1.0 MG/DL    ALT (SGPT) 9 (L) 12 - 78 U/L    AST (SGOT) 20 15 - 37 U/L    Alk.  phosphatase 68 45 - 117 U/L    Protein, total 6.1 (L) 6.4 - 8.2 g/dL    Albumin 2.6 (L) 3.5 - 5.0 g/dL    Globulin 3.5 2.0 - 4.0 g/dL    A-G Ratio 0.7 (L) 1.1 - 2.2     INFLUENZA A & B AG (RAPID TEST)    Collection Time: 02/01/17  7:35 PM   Result Value Ref Range    Influenza A Antigen NEGATIVE  NEG      Influenza B Antigen NEGATIVE  NEG     PRO-BNP    Collection Time: 02/01/17  7:35 PM   Result Value Ref Range    NT pro-BNP 4851 (H) 0 - 450 PG/ML   MAGNESIUM    Collection Time: 02/01/17  7:35 PM   Result Value Ref Range    Magnesium 2.6 (H) 1.6 - 2.4 mg/dL   TROPONIN I    Collection Time: 02/01/17  7:35 PM   Result Value Ref Range    Troponin-I, Qt. <0.04 <0.05 ng/mL   CK W/ CKMB & INDEX    Collection Time: 02/01/17  7:35 PM   Result Value Ref Range    CK 60 26 - 192 U/L    CK - MB <1.0 <3.6 NG/ML    CK-MB Index CANNOT BE CALCULATED 0 - 2.5     D DIMER    Collection Time: 02/01/17  7:35 PM   Result Value Ref Range    D-dimer 3.08 (H) 0.00 - 0.65 mg/L FEU   URINALYSIS W/ REFLEX CULTURE    Collection Time: 02/01/17  8:18 PM   Result Value Ref Range    Color YELLOW/STRAW      Appearance CLEAR CLEAR      Specific gravity 1.011 1.003 - 1.030      pH (UA) 7.5 5.0 - 8.0      Protein NEGATIVE  NEG mg/dL    Glucose NEGATIVE  NEG mg/dL    Ketone NEGATIVE  NEG mg/dL    Bilirubin NEGATIVE  NEG      Blood MODERATE (A) NEG      Urobilinogen 1.0 0.2 - 1.0 EU/dL    Nitrites NEGATIVE  NEG      Leukocyte Esterase NEGATIVE  NEG      WBC 0-4 0 - 4 /hpf    RBC 10-20 0 - 5 /hpf    Epithelial cells FEW FEW /lpf    Bacteria NEGATIVE  NEG /hpf    UA:UC IF INDICATED CULTURE NOT INDICATED BY UA RESULT CNI      Hyaline cast 0-2 0 - 5 /lpf       IMAGING RESULTS:  CXR Results  (Last 48 hours)               02/01/17 1923  XR CHEST PA LAT Final result    Impression:  IMPRESSION:       Resolved right lung pneumonia. Minimal dependent changes at the lung bases. Cardiomegaly and aortic prominence. No acute intrathoracic disease. Narrative:  CLINICAL HISTORY: Dyspnea    INDICATION: Dyspnea       COMPARISON: 11/14/2015       FINDINGS:    PA and lateral views of the chest are obtained. The cardiopericardial silhouette is within normal limits. There is no pleural   effusion, pneumothorax or focal consolidation present. Thyromegaly. Enlarged   thoracic aorta with unfolding of the thoracic aorta. Cardiac pacer with pacer   leads in unchanged position. The right lung pneumonia has improved. MEDICATIONS GIVEN:  Medications   sodium chloride (NS) flush 5-10 mL (not administered)   sodium chloride (NS) flush 5-10 mL (not administered)   sodium chloride 0.9 % bolus infusion 250 mL (0 mL IntraVENous IV Completed 2/1/17 2037)       IMPRESSION:  1. Orthostatic hypotension    2. Dyspnea on exertion        PLAN: Admit to admitting PCP. Admission Note:  10:48 PM  Patient is being admitted to the hospital by Dr. Estrella Jorgensen. The results of their tests and reasons for their admission have been discussed with them and/or available family. They convey agreement and understanding for the need to be admitted and for their admission diagnosis. Written by Suri Mckay, ED Scribe, as dictated by Nkiolas Donovan MD.    Attestation: This note is prepared by Suri Mckay, acting as Scribe for Nikolas Donovan MD.    Nikolas Donovan MD: The scribe's documentation has been prepared under my direction and personally reviewed by me in its entirety. I confirm that the note above accurately reflects all work, treatment, procedures, and medical decision making performed by me.

## 2017-02-02 NOTE — ED NOTES
Assumed care of patient from [facility]  , introduced self to patient as primary nurse at this time. Updated patient on plan of care at this time. Pt has no other questions at this time. Bed in lowest position and locked. Side rails up x2 and call bell within reach.

## 2017-02-02 NOTE — PROGRESS NOTES
Pharmacy Medication Reconciliation     Recommendations/Findings: The following amendments were made to the patient's active medication list on file at [facility]:   1) Additions: potassium    2) Deletions: none    3) Changes: lasix (60 mg twice daily), imbruvica (140 mg cap, takes 3 caps five times a week - none on Sunday or Wednesday)    4) Imbruvica (not carried by [facility], have arranged for caretaker to deliver this medication for inpatient use, should be to hospital late afternoon/early evening of 2/2)    -Clarified PTA med list with medication list provided by assisted living facility  [facility] ([phone]). PTA medication list was corrected to the following:     Prior to Admission Medications   Prescriptions Last Dose Informant Patient Reported? Taking? NEBULIZER ACCESSORIES (PERICO JET NEBULIZER BOWL)   Yes No   Sig: by Does Not Apply route. OXYGEN-AIR DELIVERY SYSTEMS   Yes Yes   Sig: by Does Not Apply route. acetaminophen (TYLENOL EXTRA STRENGTH) 500 mg tablet   Yes No   Sig: Take 500 mg by mouth every six (6) hours as needed for Pain. albuterol (PROVENTIL VENTOLIN) 2.5 mg /3 mL (0.083 %) nebulizer solution   No Yes   Sig: 3 mL by Nebulization route every four (4) hours as needed for Wheezing. allopurinol (ZYLOPRIM) 300 mg tablet   No Yes   Sig: TAKE ONE (1) TABLET(S) DAILY   atorvastatin (LIPITOR) 10 mg tablet   No Yes   Sig: TAKE ONE (1) TABLET(S) DAILY   carvedilol (COREG) 6.25 mg tablet   No Yes   Sig: TAKE ONE (1) TABLET(S) TWIC E DAILY WITH FOOD   co-enzyme Q-10 (CO Q-10) 100 mg capsule   Yes Yes   Sig: Take 100 mg by mouth daily. fluticasone-salmeterol (ADVAIR) 250-50 mcg/dose diskus inhaler   No Yes   Sig: Take 1 Puff by inhalation two (2) times a day. furosemide (LASIX) 40 mg tablet   Yes Yes   Sig: Take 60 mg by mouth two (2) times a day. ibrutinib (IMBRUVICA) 140 mg cap 1/31/2017  Yes Yes   Sig: Take 420 mg by mouth five (5) days a week.  140 mg cap, takes 3 caps five days a week (none on Sunday or Wednesday)   loratadine (CLARITIN) 10 mg tablet   Yes Yes   Sig: Take 10 mg by mouth daily as needed. multivitamin (ONE A DAY) tablet   Yes Yes   Sig: Take 1 Tab by mouth daily. potassium chloride SR (KLOR-CON 10) 10 mEq tablet   Yes Yes   Sig: Take 10 mEq by mouth daily.    valsartan (DIOVAN) 40 mg tablet   No Yes   Sig: TAKE ONE (1) TABLET(S) ONCE DAILY      Facility-Administered Medications: None          Thank you,  Gael Nair, PHARMD

## 2017-02-02 NOTE — ED NOTES
Discussed discrepancy between maintenance fluids with Dr. Jane Monsivais who responded that Dr. Phelps Bending order for 0.45% NS infusion should be followed.

## 2017-02-02 NOTE — ED TRIAGE NOTES
Assumed care of patient. Pt arrived to ED via EMS with c/o increasing SOB x 1 day. Pt states that this morning she \"felt SOB but it doesn't feel bad now\". Patient denies cp/SOB/dizziness at this moment. Patient afebrile. Patient hypotensive on arrival. Pt reports recent plane trip to [place]. Patient in no apparent distress at this time. Rails up x 2. Call bell within reach. Plan of care discussed. Wheels locked and bed low.

## 2017-02-02 NOTE — ED NOTES
Patient at end of bed calling out. Patient states her arm hurts, no one is helping her, and she has been calling out for 30 minutes. Patient placed back in bed and Call bell given.

## 2017-02-02 NOTE — ED NOTES
TRANSFER - OUT REPORT:    Verbal report given to MAXIMUS Cartagena on Darryl Check  being transferred to Renal unit for routine progression of care       Report consisted of patients Situation, Background, Assessment and   Recommendations(SBAR). Information from the following report(s) SBAR, MAR, I/O, ED Summary, Cardiac Rhythm Paced, Recent Results was reviewed with the receiving nurse. Lines:   Peripheral IV 02/01/17 Left Forearm (Active)   Site Assessment Clean, dry, & intact 2/1/2017 11:45 PM   Phlebitis Assessment 0 2/1/2017 11:45 PM   Infiltration Assessment 0 2/1/2017 11:45 PM   Dressing Status Clean, dry, & intact 2/1/2017 11:45 PM        Opportunity for questions and clarification was provided.       Patient transported with:   Monitor  Registered Nurse

## 2017-02-03 ENCOUNTER — DOCUMENTATION ONLY (OUTPATIENT)
Dept: INTERNAL MEDICINE CLINIC | Age: 82
End: 2017-02-03

## 2017-02-03 LAB
ANION GAP BLD CALC-SCNC: 11 MMOL/L (ref 5–15)
BASOPHILS # BLD AUTO: 0 K/UL (ref 0–0.1)
BASOPHILS # BLD: 0 % (ref 0–1)
BLASTS NFR BLD: 0 %
BNP SERPL-MCNC: 5673 PG/ML (ref 0–450)
BUN SERPL-MCNC: 29 MG/DL (ref 6–20)
BUN/CREAT SERPL: 23 (ref 12–20)
CALCIUM SERPL-MCNC: 7.7 MG/DL (ref 8.5–10.1)
CHLORIDE SERPL-SCNC: 101 MMOL/L (ref 97–108)
CO2 SERPL-SCNC: 25 MMOL/L (ref 21–32)
CREAT SERPL-MCNC: 1.25 MG/DL (ref 0.55–1.02)
DIFFERENTIAL METHOD BLD: ABNORMAL
EOSINOPHIL # BLD: 0 K/UL (ref 0–0.4)
EOSINOPHIL NFR BLD: 0 % (ref 0–7)
ERYTHROCYTE [DISTWIDTH] IN BLOOD BY AUTOMATED COUNT: 15.1 % (ref 11.5–14.5)
GLUCOSE SERPL-MCNC: 95 MG/DL (ref 65–100)
HCT VFR BLD AUTO: 30.3 % (ref 35–47)
HGB BLD-MCNC: 10.3 G/DL (ref 11.5–16)
LYMPHOCYTES # BLD AUTO: 40 % (ref 12–49)
LYMPHOCYTES # BLD: 1.6 K/UL (ref 0.8–3.5)
MAGNESIUM SERPL-MCNC: 2.4 MG/DL (ref 1.6–2.4)
MANUAL DIFFERENTIAL PERFORMED BLD QL: ABNORMAL
MCH RBC QN AUTO: 31.1 PG (ref 26–34)
MCHC RBC AUTO-ENTMCNC: 34 G/DL (ref 30–36.5)
MCV RBC AUTO: 91.5 FL (ref 80–99)
METAMYELOCYTES NFR BLD MANUAL: 1 %
MONOCYTES # BLD: 0.4 K/UL (ref 0–1)
MONOCYTES NFR BLD AUTO: 10 % (ref 5–13)
MYELOCYTES NFR BLD MANUAL: 1 %
NEUTS BAND NFR BLD MANUAL: 6 % (ref 0–6)
NEUTS SEG # BLD: 2 K/UL (ref 1.8–8)
NEUTS SEG NFR BLD AUTO: 42 % (ref 32–75)
NRBC # BLD: 0 K/UL (ref 0–0.01)
NRBC BLD-RTO: 0 PER 100 WBC
PHOSPHATE SERPL-MCNC: 2.2 MG/DL (ref 2.6–4.7)
PLATELET # BLD AUTO: 159 K/UL (ref 150–400)
POTASSIUM SERPL-SCNC: 4.1 MMOL/L (ref 3.5–5.1)
PROMYELOCYTES NFR BLD MANUAL: 0 %
RBC # BLD AUTO: 3.31 M/UL (ref 3.8–5.2)
RBC MORPH BLD: ABNORMAL
SODIUM SERPL-SCNC: 137 MMOL/L (ref 136–145)
WBC # BLD AUTO: 4.1 K/UL (ref 3.6–11)
WBC MORPH BLD: ABNORMAL
WBC OTHER # BLD MANUAL: 0 10*3/UL

## 2017-02-03 PROCEDURE — 83735 ASSAY OF MAGNESIUM: CPT | Performed by: INTERNAL MEDICINE

## 2017-02-03 PROCEDURE — G8978 MOBILITY CURRENT STATUS: HCPCS | Performed by: PHYSICAL THERAPIST

## 2017-02-03 PROCEDURE — 99218 HC RM OBSERVATION: CPT

## 2017-02-03 PROCEDURE — 84100 ASSAY OF PHOSPHORUS: CPT | Performed by: INTERNAL MEDICINE

## 2017-02-03 PROCEDURE — 36415 COLL VENOUS BLD VENIPUNCTURE: CPT | Performed by: INTERNAL MEDICINE

## 2017-02-03 PROCEDURE — 74011250636 HC RX REV CODE- 250/636: Performed by: INTERNAL MEDICINE

## 2017-02-03 PROCEDURE — 93306 TTE W/DOPPLER COMPLETE: CPT

## 2017-02-03 PROCEDURE — G8980 MOBILITY D/C STATUS: HCPCS | Performed by: PHYSICAL THERAPIST

## 2017-02-03 PROCEDURE — 85027 COMPLETE CBC AUTOMATED: CPT | Performed by: INTERNAL MEDICINE

## 2017-02-03 PROCEDURE — 83880 ASSAY OF NATRIURETIC PEPTIDE: CPT | Performed by: INTERNAL MEDICINE

## 2017-02-03 PROCEDURE — 80048 BASIC METABOLIC PNL TOTAL CA: CPT | Performed by: INTERNAL MEDICINE

## 2017-02-03 PROCEDURE — G8979 MOBILITY GOAL STATUS: HCPCS | Performed by: PHYSICAL THERAPIST

## 2017-02-03 PROCEDURE — 97161 PT EVAL LOW COMPLEX 20 MIN: CPT | Performed by: PHYSICAL THERAPIST

## 2017-02-03 PROCEDURE — 74011250637 HC RX REV CODE- 250/637: Performed by: INTERNAL MEDICINE

## 2017-02-03 RX ADMIN — FLUTICASONE PROPIONATE AND SALMETEROL 1 PUFF: 50; 250 POWDER RESPIRATORY (INHALATION) at 21:43

## 2017-02-03 RX ADMIN — Medication 10 ML: at 05:49

## 2017-02-03 RX ADMIN — FLUTICASONE PROPIONATE AND SALMETEROL 1 PUFF: 50; 250 POWDER RESPIRATORY (INHALATION) at 08:34

## 2017-02-03 RX ADMIN — HEPARIN SODIUM 5000 UNITS: 5000 INJECTION, SOLUTION INTRAVENOUS; SUBCUTANEOUS at 03:31

## 2017-02-03 RX ADMIN — FLUTICASONE PROPIONATE AND SALMETEROL 1 PUFF: 50; 250 POWDER RESPIRATORY (INHALATION) at 00:14

## 2017-02-03 RX ADMIN — ACETAMINOPHEN 650 MG: 325 TABLET, FILM COATED ORAL at 01:57

## 2017-02-03 RX ADMIN — Medication 5 ML: at 21:41

## 2017-02-03 RX ADMIN — ALLOPURINOL 300 MG: 300 TABLET ORAL at 09:00

## 2017-02-03 RX ADMIN — Medication 5 ML: at 14:00

## 2017-02-03 RX ADMIN — HEPARIN SODIUM 5000 UNITS: 5000 INJECTION, SOLUTION INTRAVENOUS; SUBCUTANEOUS at 15:00

## 2017-02-03 RX ADMIN — ATORVASTATIN CALCIUM 10 MG: 10 TABLET, FILM COATED ORAL at 21:41

## 2017-02-03 NOTE — PROGRESS NOTES
General Daily Progress Note    Admit Date: 2/1/2017    Subjective:     Patient c/o fatigue. Current Facility-Administered Medications   Medication Dose Route Frequency    atorvastatin (LIPITOR) tablet 10 mg  10 mg Oral QHS    ibrutinib (IMBRUVICA) chemo capsule 420 mg  420 mg Oral DAILY    acetaminophen (TYLENOL) tablet 650 mg  650 mg Oral Q4H PRN    ondansetron (ZOFRAN) injection 4 mg  4 mg IntraVENous Q4H PRN    [START ON 2/3/2017] heparin (porcine) injection 5,000 Units  5,000 Units SubCUTAneous Q12H    0.9% sodium chloride infusion  50 mL/hr IntraVENous CONTINUOUS    sodium chloride (NS) flush 5-10 mL  5-10 mL IntraVENous Q8H    sodium chloride (NS) flush 5-10 mL  5-10 mL IntraVENous PRN    albuterol (PROVENTIL VENTOLIN) nebulizer solution 2.5 mg  2.5 mg Nebulization Q4H PRN    allopurinol (ZYLOPRIM) tablet 300 mg  300 mg Oral DAILY    fluticasone-salmeterol (ADVAIR) 250mcg-50mcg/puff  1 Puff Inhalation BID    [START ON 2/3/2017] carvedilol (COREG) tablet 3.125 mg  3.125 mg Oral BID WITH MEALS        Review of Systems  A comprehensive review of systems was negative.     Objective:     Patient Vitals for the past 24 hrs:   BP Temp Pulse Resp SpO2 Weight   02/02/17 2057 - - - - - 134 lb 9.6 oz (61.1 kg)   02/02/17 1936 105/63 98.4 °F (36.9 °C) 74 16 95 % -   02/02/17 1455 95/68 98.7 °F (37.1 °C) 77 18 96 % -   02/02/17 1200 102/59 - 83 25 96 % -   02/02/17 1001 100/57 - 85 28 95 % -   02/02/17 0845 111/75 - 89 24 96 % -   02/02/17 0730 115/63 - 84 29 95 % -   02/02/17 0645 108/75 - 77 19 97 % -   02/02/17 0630 106/69 - 78 29 97 % -   02/02/17 0615 113/78 - 77 21 97 % -   02/02/17 0600 112/74 - 76 14 92 % -   02/02/17 0545 100/64 - 70 25 93 % -   02/02/17 0530 95/64 - 71 23 95 % -   02/02/17 0515 97/62 - 80 16 97 % -   02/02/17 0430 96/56 - 71 17 96 % -   02/02/17 0400 (!) 87/57 - 75 20 94 % -   02/02/17 0300 (!) 88/60 - 78 26 94 % -   02/02/17 0245 98/53 - 77 23 96 % -   02/02/17 0030 125/55 - 82 23 95 % -   02/02/17 0015 105/59 - 86 14 94 % -        02/01 0701 - 02/02 1900  In: 120 [P.O.:120]  Out: -     Physical Exam:   Visit Vitals    /63 (BP 1 Location: Right arm, BP Patient Position: At rest)    Pulse 74    Temp 98.4 °F (36.9 °C)    Resp 16    Ht 5' 2\" (1.575 m)    Wt 134 lb 9.6 oz (61.1 kg)    SpO2 95%    Breastfeeding No    BMI 24.62 kg/m2     General appearance: alert, cooperative, no distress, appears stated age  Neck: supple, symmetrical, trachea midline, no adenopathy, thyroid: not enlarged, symmetric, no tenderness/mass/nodules, no carotid bruit and no JVD  Lungs: clear to auscultation bilaterally  Heart: regular rate and rhythm, S1, S2 normal, no murmur, click, rub or gallop  Extremities: extremities normal, atraumatic, no cyanosis or edema  Neurologic: Grossly normal        Data Review   Recent Results (from the past 24 hour(s))   BNP    Collection Time: 02/02/17  8:57 AM   Result Value Ref Range     (H) 0 - 100 pg/mL           Assessment:     Principal Problem:    Acute on chronic systolic (congestive) heart failure (HCC) (10/8/2015)    Active Problems:    Cardiomyopathy, dilated, 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 (8/4/2012)      NHL (non-Hodgkin's lymphoma) (HCC) (8/4/2012)      DILAN (obstructive sleep apnea) (8/4/2012)      Anemia (11/8/2014)      Fatigue (3/10/2015)      Reactive airway disease (3/23/2015)      HTN (hypertension) (6/29/2015)      LBBB (left bundle branch block) (7/1/2015)      Presence of biventricular automatic cardioverter/defibrillator (AICD) (7/2/2015)      Overview: [employer] biventricular AICD implant      Lymphoma ([facility] [address] (11/17/2015)      SOB (shortness of breath) (2/2/2017)      Acute respiratory insufficiency (HCC) (2/2/2017)      PUD (peptic ulcer disease) (2/2/2017)      Gastroesophageal reflux disease with esophagitis (2/2/2017)      Thoracic aortic aneurysm without rupture ([address] (2/2/2017)        Plan: 1. Pt has a low output state and appears a bit dry. Will gingerly hydrate carefully watching for failure. 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 Will have her oncologist review.

## 2017-02-03 NOTE — PROGRESS NOTES
General Daily Progress Note    Admit Date: 2/1/2017    Subjective:     Patient feels better. Current Facility-Administered Medications   Medication Dose Route Frequency    atorvastatin (LIPITOR) tablet 10 mg  10 mg Oral QHS    acetaminophen (TYLENOL) tablet 650 mg  650 mg Oral Q4H PRN    ondansetron (ZOFRAN) injection 4 mg  4 mg IntraVENous Q4H PRN    heparin (porcine) injection 5,000 Units  5,000 Units SubCUTAneous Q12H    0.9% sodium chloride infusion  40 mL/hr IntraVENous CONTINUOUS    sodium chloride (NS) flush 5-10 mL  5-10 mL IntraVENous Q8H    sodium chloride (NS) flush 5-10 mL  5-10 mL IntraVENous PRN    albuterol (PROVENTIL VENTOLIN) nebulizer solution 2.5 mg  2.5 mg Nebulization Q4H PRN    allopurinol (ZYLOPRIM) tablet 300 mg  300 mg Oral DAILY    fluticasone-salmeterol (ADVAIR) 250mcg-50mcg/puff  1 Puff Inhalation BID    carvedilol (COREG) tablet 3.125 mg  3.125 mg Oral BID WITH MEALS    ibrutinib (IMBRUVICA) chemo capsule 420 mg  420 mg Oral Once per day on Mon Tue Thu Fri Sat        Review of Systems  A comprehensive review of systems was negative.     Objective:     Patient Vitals for the past 24 hrs:   BP Temp Pulse Resp SpO2 Weight   02/03/17 0152 93/63 100.4 °F (38 °C) 88 16 95 % -   02/02/17 2312 135/76 98.3 °F (36.8 °C) 75 16 95 % -   02/02/17 2057 - - - - - 134 lb 9.6 oz (61.1 kg)   02/02/17 1936 105/63 98.4 °F (36.9 °C) 74 16 95 % -   02/02/17 1455 95/68 98.7 °F (37.1 °C) 77 18 96 % -   02/02/17 1200 102/59 - 83 25 96 % -   02/02/17 1001 100/57 - 85 28 95 % -   02/02/17 0845 111/75 - 89 24 96 % -        02/01 1901 - 02/03 0700  In: 120 [P.O.:120]  Out: -     Physical Exam:   Visit Vitals    BP 93/63 (BP 1 Location: Right arm, BP Patient Position: At rest)    Pulse 88    Temp 100.4 °F (38 °C)    Resp 16    Ht 5' 2\" (1.575 m)    Wt 134 lb 9.6 oz (61.1 kg)    SpO2 95%    Breastfeeding No    BMI 24.62 kg/m2     General appearance: alert, cooperative, no distress, appears stated age  Neck: supple, symmetrical, trachea midline, no adenopathy, thyroid: not enlarged, symmetric, no tenderness/mass/nodules and no JVD  Lungs: clear to auscultation bilaterally  Heart: regular rate and rhythm, S1, S2 normal, no murmur, click, rub or gallop  Abdomen: soft, non-tender.  Bowel sounds normal. No masses,  no organomegaly  Extremities: extremities normal, atraumatic, no cyanosis or edema  Neurologic: Grossly normal        Data Review   Recent Results (from the past 24 hour(s))   BNP    Collection Time: 02/02/17  8:57 AM   Result Value Ref Range     (H) 0 - 150 pg/mL   METABOLIC PANEL, BASIC    Collection Time: 02/03/17  1:51 AM   Result Value Ref Range    Sodium 137 136 - 145 mmol/L    Potassium 4.1 3.5 - 5.1 mmol/L    Chloride 101 97 - 108 mmol/L    CO2 25 21 - 32 mmol/L    Anion gap 11 5 - 15 mmol/L    Glucose 95 65 - 100 mg/dL    BUN 29 (H) 6 - 20 MG/DL    Creatinine 1.25 (H) 0.55 - 1.02 MG/DL    BUN/Creatinine ratio 23 (H) 12 - 20      GFR est AA 49 (L) >60 ml/min/1.73m2    GFR est non-AA 40 (L) >60 ml/min/1.73m2    Calcium 7.7 (L) 8.5 - 10.1 MG/DL   MAGNESIUM    Collection Time: 02/03/17  1:51 AM   Result Value Ref Range    Magnesium 2.4 1.6 - 2.4 mg/dL   PHOSPHORUS    Collection Time: 02/03/17  1:51 AM   Result Value Ref Range    Phosphorus 2.2 (L) 2.6 - 4.7 MG/DL   PRO-BNP    Collection Time: 02/03/17  1:51 AM   Result Value Ref Range    NT pro-BNP 5673 (H) 0 - 450 PG/ML   CBC WITH MANUAL DIFF    Collection Time: 02/03/17  1:51 AM   Result Value Ref Range    WBC 4.1 3.6 - 11.0 K/uL    RBC 3.31 (L) 3.80 - 5.20 M/uL    HGB 10.3 (L) 11.5 - 16.0 g/dL    HCT 30.3 (L) 35.0 - 47.0 %    MCV 91.5 80.0 - 99.0 FL    MCH 31.1 26.0 - 34.0 PG    MCHC 34.0 30.0 - 36.5 g/dL    RDW 15.1 (H) 11.5 - 14.5 %    PLATELET 275 933 - 819 K/uL    NEUTROPHILS 42 32 - 75 %    BAND NEUTROPHILS 6 0 - 6 %    LYMPHOCYTES 40 12 - 49 %    MONOCYTES 10 5 - 13 %    EOSINOPHILS 0 0 - 7 %    BASOPHILS 0 0 - 1 % METAMYELOCYTES 1 %    MYELOCYTES 1 (H) 0 %    PROMYELOCYTES 0 0 %    BLASTS 0 0 %    OTHER CELL 0 0      ABS. NEUTROPHILS 2.0 1.8 - 8.0 K/UL    ABS. LYMPHOCYTES 1.6 0.8 - 3.5 K/UL    ABS. MONOCYTES 0.4 0.0 - 1.0 K/UL    ABS. EOSINOPHILS 0.0 0.0 - 0.4 K/UL    ABS. 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 (8/4/2012)      NHL (non-Hodgkin's lymphoma) ([facility] [address] (8/4/2012)      DILAN (obstructive sleep apnea) (8/4/2012)      Anemia (11/8/2014)      Fatigue (3/10/2015)      Reactive airway disease (3/23/2015)      HTN (hypertension) (6/29/2015)      LBBB (left bundle branch block) (7/1/2015)      Presence of biventricular automatic cardioverter/defibrillator (AICD) (7/2/2015)      Overview: [employer] biventricular AICD implant      Lymphoma ([facility] [address] (11/17/2015)      SOB (shortness of breath) (2/2/2017)      Acute respiratory insufficiency (HCC) (2/2/2017)      PUD (peptic ulcer disease) (2/2/2017)      Gastroesophageal reflux disease with esophagitis (2/2/2017)      Thoracic aortic aneurysm without rupture ([facility] [address] (2/2/2017)        Plan: 1. She feels much better from. volume depletion with low output state from hydration. .Duration of repletion per . As expected gradual increase in BNP but no overt sign of failure. 2.Oncologic aspect will be addressed on an OP basis since not urgent.

## 2017-02-03 NOTE — PROGRESS NOTES
[address] [place] [address]       [phone]      Progress Note    Name: Alec Snow    3/17/1925  [id number]  2/3/2017 10:05 AM    Admit Date: 2/1/2017  Admit Diagnosis: generalized fatigue with malaise  Primary Care Eduardo Hutchison MD  Attending Provider: Luana Gonsalez MD  [facility]  Consulting Cardiologist: Yodit Lorenzo MD      Subjective:     Alec Snow is feeling much better. Sitting up in chair and smiling--much different from [facility] and fatigued appeared last evening. .     Review of Symptoms:    A comprehensive review of systems was negative except for that written in the HPI.     Current Facility-Administered Medications   Medication Dose Route Frequency    atorvastatin (LIPITOR) tablet 10 mg  10 mg Oral QHS    acetaminophen (TYLENOL) tablet 650 mg  650 mg Oral Q4H PRN    ondansetron (ZOFRAN) injection 4 mg  4 mg IntraVENous Q4H PRN    heparin (porcine) injection 5,000 Units  5,000 Units SubCUTAneous Q12H    0.9% sodium chloride infusion  40 mL/hr IntraVENous CONTINUOUS    sodium chloride (NS) flush 5-10 mL  5-10 mL IntraVENous Q8H    sodium chloride (NS) flush 5-10 mL  5-10 mL IntraVENous PRN    albuterol (PROVENTIL VENTOLIN) nebulizer solution 2.5 mg  2.5 mg Nebulization Q4H PRN    allopurinol (ZYLOPRIM) tablet 300 mg  300 mg Oral DAILY    fluticasone-salmeterol (ADVAIR) 250mcg-50mcg/puff  1 Puff Inhalation BID    carvedilol (COREG) tablet 3.125 mg  3.125 mg Oral BID WITH MEALS    ibrutinib (IMBRUVICA) chemo capsule 420 mg  420 mg Oral Once per day on Mon Tue Thu Fri Sat        Objective:      Visit Vitals    BP 96/59    Pulse 70    Temp 97.6 °F (36.4 °C)    Resp 16    Ht 5' 2\" (1.575 m)    Wt 134 lb 9.6 oz (61.1 kg)    SpO2 97%    Breastfeeding No    BMI 24.62 kg/m2       Physical Exam:    Visit Vitals    BP 96/59    Pulse 70    Temp 97.6 °F (36.4 °C)    Resp 16    Ht 5' 2\" (1.575 m)    Wt 134 lb 9.6 oz (61.1 kg)    SpO2 97%    Breastfeeding No    BMI 24.62 kg/m2     General Appearance:  Well developed, well nourished,alert and oriented x 3, and individual in no acute distress. Ears/Nose/Mouth/Throat:   Hearing grossly normal.         Neck: Supple. Chest:   Lungs clear to auscultation bilaterally. Cardiovascular:  Regular rate and rhythm, S1, S2 normal, no murmur. Abdomen:   Soft, non-tender, bowel sounds are active. Extremities: No edema bilaterally. Skin: Warm and dry.                Data Review:   Labs:    Recent Results (from the past 24 hour(s))   METABOLIC PANEL, BASIC    Collection Time: 02/03/17  1:51 AM   Result Value Ref Range    Sodium 137 136 - 145 mmol/L    Potassium 4.1 3.5 - 5.1 mmol/L    Chloride 101 97 - 108 mmol/L    CO2 25 21 - 32 mmol/L    Anion gap 11 5 - 15 mmol/L    Glucose 95 65 - 100 mg/dL    BUN 29 (H) 6 - 20 MG/DL    Creatinine 1.25 (H) 0.55 - 1.02 MG/DL    BUN/Creatinine ratio 23 (H) 12 - 20      GFR est AA 49 (L) >60 ml/min/1.73m2    GFR est non-AA 40 (L) >60 ml/min/1.73m2    Calcium 7.7 (L) 8.5 - 10.1 MG/DL   MAGNESIUM    Collection Time: 02/03/17  1:51 AM   Result Value Ref Range    Magnesium 2.4 1.6 - 2.4 mg/dL   PHOSPHORUS    Collection Time: 02/03/17  1:51 AM   Result Value Ref Range    Phosphorus 2.2 (L) 2.6 - 4.7 MG/DL   PRO-BNP    Collection Time: 02/03/17  1:51 AM   Result Value Ref Range    NT pro-BNP 5673 (H) 0 - 450 PG/ML   CBC WITH MANUAL DIFF    Collection Time: 02/03/17  1:51 AM   Result Value Ref Range    WBC 4.1 3.6 - 11.0 K/uL    RBC 3.31 (L) 3.80 - 5.20 M/uL    HGB 10.3 (L) 11.5 - 16.0 g/dL    HCT 30.3 (L) 35.0 - 47.0 %    MCV 91.5 80.0 - 99.0 FL    MCH 31.1 26.0 - 34.0 PG    MCHC 34.0 30.0 - 36.5 g/dL    RDW 15.1 (H) 11.5 - 14.5 %    PLATELET 564 693 - 230 K/uL    NEUTROPHILS 42 32 - 75 %    BAND NEUTROPHILS 6 0 - 6 %    LYMPHOCYTES 40 12 - 49 %    MONOCYTES 10 5 - 13 %    EOSINOPHILS 0 0 - 7 %    BASOPHILS 0 0 - 1 %    METAMYELOCYTES 1 %    MYELOCYTES 1 (H) 0 %    PROMYELOCYTES 0 0 %    BLASTS 0 0 %    OTHER CELL 0 0      ABS. NEUTROPHILS 2.0 1.8 - 8.0 K/UL    ABS. LYMPHOCYTES 1.6 0.8 - 3.5 K/UL    ABS. MONOCYTES 0.4 0.0 - 1.0 K/UL    ABS. EOSINOPHILS 0.0 0.0 - 0.4 K/UL    ABS. BASOPHILS 0.0 0.0 - 0.1 K/UL    DF MANUAL      RBC COMMENTS NORMOCYTIC, NORMOCHROMIC      WBC COMMENTS TOXIC GRANULATION      NRBC 0.0 0  WBC    ABSOLUTE NRBC 0.00 0.00 - 0.01 K/uL    DIFFERENTIAL MANUAL DIFFERENTIAL ORDERED         Telemetry: A-sensed V-pacing      Intake/Output Summary (Last 24 hours) at 02/03/17 1005  Last data filed at 02/02/17 1500   Gross per 24 hour   Intake              120 ml   Output                0 ml   Net              120 ml         Assessment:     Principal Problem:    Acute on chronic systolic (congestive) heart failure ([facility] [address] (10/8/2015)    Active Problems:    Cardiomyopathy, dilated, 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 (8/4/2012)      NHL (non-Hodgkin's lymphoma) ([address] (8/4/2012)      DILAN (obstructive sleep apnea) (8/4/2012)      Anemia (11/8/2014)      Fatigue (3/10/2015)      Reactive airway disease (3/23/2015)      HTN (hypertension) (6/29/2015)      LBBB (left bundle branch block) (7/1/2015)      Presence of biventricular automatic cardioverter/defibrillator (AICD) (7/2/2015)      Overview: [employer] biventricular AICD implant      Lymphoma ([address] (11/17/2015)      SOB (shortness of breath) (2/2/2017)      Acute respiratory insufficiency ([address] (2/2/2017)      PUD (peptic ulcer disease) (2/2/2017)      Gastroesophageal reflux disease with esophagitis (2/2/2017)      Thoracic aortic aneurysm without rupture ([address] (2/2/2017)        Plan:     Principal Problem:      Acute on chronic systolic (congestive) heart failure (HCC) (10/8/2015)--quite subtle. Takes only minimal change in intravascular volume and hence LV volume to precipitate acute decompensation of CSHF. Is feeling better.  SOB resolved ironically with gentle hydration suggesting slight hypovolemia in setting of severe cardiomyopathy/low CO syndrome. Pro-BNP slightly worse though likely due to gentle volume hydration. Will now KVO IV fluids. Await echo. If no change then no further evaluation or med change needed. Home in am?    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 Fatigue (3/10/2015)--better. HTN (hypertension) (6/29/2015)--now relatively hypotensive. Coreg dose reduced. May need to stop if BP continues in 's. Now 828 mmHg systolic. Presence of biventricular automatic cardioverter/defibrillator (AICD) (7/2/2015)--noted. Overview: [employer] biventricular AICD implant      SOB (shortness of breath) (2/2/2017)--resolved it seems. Acute respiratory insufficiency (HCC) (2/2/2017)--resolved. Thoracic aortic aneurysm without rupture ([address] (2/2/2017)--stable. Echo today.     Signed by: Vianca Voss MD

## 2017-02-03 NOTE — PROGRESS NOTES
Heart Failure Care Coordinator visited the patient in room. Provided introduction to self, and explanation of the Care Coordinator role. Patient was provided a copy of the [facility] letter.  Patient made aware of contact information should any questions arise before or after discharge.        Contact Information:    Mahlon Libman RN  BSN   Heart Failure Care Coordinator  O [phone]  [email or web address]

## 2017-02-03 NOTE — PROGRESS NOTES
Letter of Status Determination: Upgrade from Observation to Inpatient  Status           Pt Name:  Abdon Palacio   MR#  [id number]   [facility]#   [id number]   [address]  3252/01  @ [facility]   Admit date  2/1/2017  6:42 PM   Current Attending Physician  Shanti Aponte MD   Insurance  Payor: Griselda Reed / Plan: [place] MEDICARE PART A & B / Product Type: Medicare /       Principal diagnosis  Acute on chronic systolic (congestive) heart failure [facility])      Clinicals  80 y.o. y.o  female hospitalized with above diagnosis  She complained of feeling unwell with increasing and persistent shortness of breath. 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 She is noted to have blood pressure ranging in the high 70- through low 90s. It is expected that her complex clinical history , advanced age poses high risk of deterioration and she is expected to stay here in the hospital through two midnights. [facility]   Does NOT apply     STATUS DETERMINATION  This patient is at above jeff risk of deterioration based on documented presenting clinical data, comorbid conditions, high risk of adverse events and current acute care course. Ms. Abdon Palacio now meets Inpatient Admission status criteria in accordance with CMS regulation Section 43 .3. Specifically, due to medical necessity the patient's stay now exceeds Two Midnights. It is our judgment that this patient's hospitalization status should be upgraded from  OBSERVATION to INPATIENT status.                 Additional comments         Debbie Haley MD MPH FACP     Physician Advisor    [address]     [facility], [place] [address] [facility]  [phone]

## 2017-02-03 NOTE — PROGRESS NOTES
Bedside and Verbal shift change report given to Milo Howard (oncoming nurse) by Mart Mccarthy RN (offgoing nurse). Report included the following information Kardex, MAR and Recent Results. Zone Phone for oncoming shift:    Shift Summary:Resting quietly at this time    LDAs               Peripheral IV 02/01/17 Left Forearm (Active)   Site Assessment Clean, dry, & intact 2/2/2017  3:00 PM   Phlebitis Assessment 0 2/2/2017  3:00 PM   Infiltration Assessment 0 2/2/2017  3:00 PM   Dressing Status Clean, dry, & intact 2/2/2017  3:00 PM   Dressing Type Tape;Transparent 2/2/2017  3:00 PM   Hub Color/Line Status Blue;Capped 2/2/2017  3:00 PM                        Intake & Output   Date 02/02/17 0700 - 02/03/17 0659 02/03/17 0700 - 02/04/17 0659   Shift 6944-0161 1389-1166 24 Hour Total 7777-9102 1065-5662 24 Hour Total   I  N  T  A  K  E   P.O. 120  120         P. O. 120  120       Shift Total  (mL/kg) 120  (2)  120  (2)      O  U  T  P  U  T   Shift Total  (mL/kg)           120      Weight (kg) 61.2 61.1 61.1 61.1 61.1 61.1      Last Bowel Movement Last Bowel Movement Date: 02/01/17   Glucose Checks [] N/A  [] AC/HS  [] Q6  Concerns:   Nutrition Active Orders   Diet    DIET CARDIAC Regular       Consults []PT  []OT  []Speech  []Case Management   Cardiac Monitoring []N/A [x]Yes Expires:

## 2017-02-03 NOTE — PROGRESS NOTES
Pt admitted as observation-letter on chart. Pt lives in [place], has Medicare//generic, PCP Dr Arvind Garvey, full code, and anticipates d/c to home when stable without d/c needs. She declines HHC. Has an appointment with Dr Lili Vazquez 2/21 and Dr Arvind Garvey 2/28. Care Management Interventions  PCP Verified by CM:  Yes  Palliative Care Consult (Criteria: CHF and RRAT>21): No  Reason for No Palliative Care Consult: Patient declined palliative services at this time  Mode of Transport at Discharge: BLS  Transition of Care Consult (CM Consult): Discharge Planning  Current Support Network: Lives Alone  Confirm Follow Up Transport: Family  Plan discussed with Pt/Family/Caregiver: Yes  Freedom of Choice Offered: Yes  Discharge Location  Discharge Placement: Home

## 2017-02-03 NOTE — CONSULTS
[id number] [phone]          Name: Kenisha Melvin    3/17/1925  [id number]  2/2/2017 8:22 PM    Admit Date: 2/1/2017  Admit Diagnosis: generalized fatigue with malaise,SOB  Primary Care Danie Beauchamp MD  Attending Provider: Edie Rico MD  Primary Cardiologist:Soy KELSEY [facility]  Consulting Cardiologist:SAME    CC/REASON FOR CONSULT: SOB    Subjective:     Kenisha Melvin is a 80 y.o. 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 As such, has not been able to walk on treadmill. Denies chest pain,orthopnea,PND, wheezing, syncope,edema. CXR reveals no acute changes. EKG reveals NSR without acute changes. BNP mildly elevated to 451. Troponin normal.      Review of Symptoms:    A comprehensive review of systems was negative except for that written in the HPI. Previous treatment/evaluation includes echocardiogram, electrophysiology study, exercise treadmill test and cardiac catheterization .   Cardiac risk factors: dyslipidemia, hypertension, stress, post-menopausal.    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 I42.9    NHL (non-Hodgkin's lymphoma) (HCC) C85.90    DILAN (obstructive sleep apnea) G47.33    Rhinitis J31.0    Hypotension I95.9    Cardiomyopathy (HCC) I42.9    Anemia D64.9    Fatigue R53.83    Reactive airway disease P07.102    Systolic CHF, acute on chronic (HCC) I50.23    Acute respiratory failure with hypoxia (HCC) J96.01    Upper respiratory infection J06.9    HTN (hypertension) I10    Hyperlipidemia E78.5    Gout M10.9    LBBB (left bundle branch block) I44.7    Presence of biventricular automatic cardioverter/defibrillator (AICD) Z95.810    Acute on chronic systolic (congestive) heart failure (HCC) I50.23    Lymphoma (HCC) C85.90    Mild mitral regurgitation I34.0    Cramping of hands R25.2    Physical deconditioning R53.81    SOB (shortness of breath) R06.02    Acute respiratory insufficiency (HCC) R06.89    PUD (peptic ulcer disease) K27.9    Gastroesophageal reflux disease with esophagitis K21.0    Thoracic aortic aneurysm without rupture (HCC) I71.2     Past Medical History   Diagnosis Date    Asthma     Cancer ([facility])      lymphoma    Congestive heart failure, unspecified     Heart failure (HCC)     Hypertension     Other ill-defined conditions(799.89)      heart murmur    Presence of biventricular automatic cardioverter/defibrillator (AICD) 7/2/2015     [employer] biventricular AICD implant    Stomach ulcer      Past Surgical History   Procedure Laterality Date    Hx hysterectomy      Hx other surgical       lymph node surgery    Hx tonsillectomy      Pr sinus surgery proc unlisted       Nasal polyps removed    Hx heent       cataracts removed    Pr egd transoral biopsy single/multiple  5/23/2012          Hx colonoscopy       Current Facility-Administered Medications   Medication Dose Route Frequency    atorvastatin (LIPITOR) tablet 10 mg  10 mg Oral QHS    ibrutinib (IMBRUVICA) chemo capsule 420 mg  420 mg Oral DAILY    acetaminophen (TYLENOL) tablet 650 mg  650 mg Oral Q4H PRN    ondansetron (ZOFRAN) injection 4 mg  4 mg IntraVENous Q4H PRN    [START ON 2/3/2017] heparin (porcine) injection 5,000 Units  5,000 Units SubCUTAneous Q12H    0.9% sodium chloride infusion  75 mL/hr IntraVENous CONTINUOUS    sodium chloride (NS) flush 5-10 mL  5-10 mL IntraVENous Q8H    sodium chloride (NS) flush 5-10 mL  5-10 mL IntraVENous PRN    0.45% sodium chloride infusion  50 mL/hr IntraVENous CONTINUOUS    albuterol (PROVENTIL VENTOLIN) nebulizer solution 2.5 mg  2.5 mg Nebulization Q4H PRN    allopurinol (ZYLOPRIM) tablet 300 mg  300 mg Oral DAILY    carvedilol (COREG) tablet 6.25 mg  6.25 mg Oral BID WITH MEALS    fluticasone-salmeterol (ADVAIR) 250mcg-50mcg/puff  1 Puff Inhalation BID     Allergies   Allergen Reactions    Codeine Other (comments)     \"made me disoriented\" per pt      Family History   Problem Relation Age of Onset    Heart Disease Mother     Stroke Father       Social History     Social History    Marital status:      Spouse name: N/A    Number of children: 1    Years of education: 16+     Occupational History    retired teacher      Social History Main Topics    Smoking status: Never Smoker    Smokeless tobacco: Never Used    Alcohol use No    Drug use: No    Sexual activity: Not Asked     Other Topics Concern    None     Social History Narrative              Objective:      Physical Exam  Vitals:    02/02/17 1001 02/02/17 1200 02/02/17 1455 02/02/17 1936   BP: 100/57 102/59 95/68 105/63   Pulse: 85 83 77 74   Resp: 28 25 18 16   Temp:   98.7 °F (37.1 °C) 98.4 °F (36.9 °C)   SpO2: 95% 96% 96% 95%   Weight:       Height:            Visit Vitals    /63 (BP 1 Location: Right arm, BP Patient Position: At rest)    Pulse 74    Temp 98.4 °F (36.9 °C)    Resp 16    Ht 5' 2\" (1.575 m)    Wt 135 lb (61.2 kg)    SpO2 95%    Breastfeeding No    BMI 24.69 kg/m2     General Appearance:  Well developed, well nourished,alert and oriented x 3, and individual in no acute distress. Ears/Nose/Mouth/Throat:   Hearing grossly normal.         Neck: Supple. Chest:   Lungs clear to auscultation bilaterally. Cardiovascular:  Regular rate and rhythm, S1, S2 normal, no murmur. Abdomen:   Soft, non-tender, bowel sounds are active. Extremities: No edema bilaterally. Skin: Warm and dry.                Telemetry: [facility] pacing  ECG: A-sensed ventricular pacing  Echocardiogram: Not done    Data Review:   Labs:    Recent Results (from the past 24 hour(s))   BNP    Collection Time: 02/02/17  8:57 AM   Result Value Ref Range     (H) 0 - 100 pg/mL       Intake/Output Summary (Last 24 hours) at 02/02/17 2022  Last data filed at 02/02/17 1500   Gross per 24 hour   Intake              120 ml   Output                0 ml   Net              120 ml        Assessment:     Assessment:       Principal Problem:    Acute on chronic systolic (congestive) heart failure ([facility] [address] (10/8/2015)    Active Problems:    Cardiomyopathy, dilated, 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 (8/4/2012)      NHL (non-Hodgkin's lymphoma) ([facility] [address] (8/4/2012)      DILAN (obstructive sleep apnea) (8/4/2012)      Anemia (11/8/2014)      Fatigue (3/10/2015)      Reactive airway disease (3/23/2015)      HTN (hypertension) (6/29/2015)      LBBB (left bundle branch block) (7/1/2015)      Presence of biventricular automatic cardioverter/defibrillator (AICD) (7/2/2015)      Overview: [employer] biventricular AICD implant      Lymphoma ([facility] [address] (11/17/2015)      SOB (shortness of breath) (2/2/2017)      Acute respiratory insufficiency (HCC) (2/2/2017)      PUD (peptic ulcer disease) (2/2/2017)      Gastroesophageal reflux disease with esophagitis (2/2/2017)      Thoracic aortic aneurysm without rupture ([facility] [address] (2/2/2017)         Plan:     Principal Problem:      Acute on chronic systolic (congestive) heart failure (HCC) (10/8/2015)--subtle HF. Does not appear to be \"dry\". If so then also subtle in setting of NICM/ mild decompensation CSHF. Believe patient for 5 yrs has been tenuously at the peak of early steep decline down the [place]. Seems to have settled into low-cardiac output syndrome state. SBP running low not likely to tolerate further titration of preload and afterload vasodilating rx. In fact, will decrease Coreg dose now. Has BiV/AICD implanted.  Consider [facility] enrollment. Don't believe she has had [facility] evaluation. Will discuss. Don't see other options available except limited medical rx and comfort measures. Will probably not tolerate gentle hydration nor gentle diuresis well but worth a try--gentle hydration with diuretic close by. CKD will be an obstacle. Echo ordered. 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 NHL (non-Hodgkin's lymphoma) ([facility] [address] (8/4/2012)--? Element of immature cells      DILAN (obstructive sleep apnea) (8/4/2012)--hx of. Anemia (11/8/2014)--likely contributing to SOB. CKD worse? Fatigue (3/10/2015)--multifactorial.      Reactive airway disease (3/23/2015)--active and contributing to SOB. Ivone Line rx per Dr Veronica Pizano      HTN (hypertension) (6/29/2015)--relatively low now. Rx readjusted. Presence of biventricular automatic cardioverter/defibrillator (AICD) (7/2/2015)--no upgrade needed. Overview: [employer] biventricular AICD implant      Lymphoma ([facility] [address] (11/17/2015)--? Conversion to immature cells. SOB (shortness of breath) (2/2/2017)--due to CSDHF decompensation      PUD (peptic ulcer disease) (2/2/2017)--r/o acute bleed      Gastroesophageal reflux disease with esophagitis (2/2/2017)--on rx. Thoracic aortic aneurysm without rupture ([facility] [address] (2/2/2017)--apparently stable. Thank you very much for this referral. We appreciate the opportunity to participate in this patient's care. We will follow along with above stated plan.     MD Victorina Blanco MD

## 2017-02-03 NOTE — PROGRESS NOTES
Problem: Mobility Impaired (Adult and Pediatric)  Goal: *Acute Goals and Plan of Care (Insert Text)  PHYSICAL THERAPY EVALUATION/DISCHARGE  Patient: Marcos Ackerman (89 y.o. female)  Date: 2/3/2017  Primary Diagnosis: generalized fatigue with malaise  generalized fatigue with malaise        Precautions: falls     ASSESSMENT :  Based on the objective data described below, the patient presents with ability to perform all basic functional mobility skills at ambulatory level, including stairs, with stand by assistance to independence. Pt demonstrated occasional path deviations while ambulating, but no LOB that required external assistance. Standardized balance assessment indicated low risk for falls. Skilled physical therapy in the acute care setting is not indicated at this time. However, given that pt lives alone and only has companions for 4 hours, 5 days/week, recommend home health PT evaluation to assess safety in the home. PLAN :  Discharge Recommendations: Home Health  Further Equipment Recommendations for Discharge: none       SUBJECTIVE:   Patient stated I feel better but I'm still not doing everything that I want to do.       OBJECTIVE DATA SUMMARY:   HISTORY:    Past Medical History   Diagnosis Date    Asthma      Cancer ([facility] [address]         lymphoma    Congestive heart failure, unspecified      Heart failure ([address]      Hypertension      Other ill-defined conditions(799.89)         heart murmur    Presence of biventricular automatic cardioverter/defibrillator (AICD) 7/2/2015       [employer] biventricular AICD implant    Stomach ulcer       Past Surgical History   Procedure Laterality Date    Hx hysterectomy        Hx other surgical           lymph node surgery    Hx tonsillectomy        Pr sinus surgery proc unlisted           Nasal polyps removed    Hx heent           cataracts removed    Pr egd transoral biopsy single/multiple   5/23/2012            Hx colonoscopy         Prior Level of Function/Home Situation: Pt lives alone. Ambulated independently without AD, even walking 2 miles/day on the treadmill. Has  to assist with meals and bathing from 10-2. 5 days/week.   Personal factors and/or comorbidities impacting plan of care:      Home Situation  Home Environment: Private residence  # Steps to Enter: 4  Rails to Enter: Yes  Hand Rails : Left  One/Two Story Residence: One story  Living Alone: Yes  Support Systems: Other (comments), Friends \ neighbors, Family member(s) ( care)  Patient Expects to be Discharged to[de-identified] Private residence  Current DME Used/Available at Home: Cane, straight, Oxygen, portable     EXAMINATION/PRESENTATION/DECISION MAKING:   Critical Behavior:  Neurologic State: Alert, Appropriate for age  Orientation Level: Oriented X4  Cognition: Appropriate decision making, Appropriate for age attention/concentration, Appropriate safety awareness, Follows commands  Safety/Judgement: Awareness of environment, Insight into deficits  Skin:  intact  Strength:    Strength: Generally decreased, functional  Tone & Sensation:   Tone: Normal  Sensation: Intact  Range Of Motion:  AROM: Generally decreased, functional (decreased R shoulder elevation to about 80 degrees)  Coordination:  Coordination: Within functional limits     Functional Mobility:  Bed Mobility:  Supine to Sit: Independent  Transfers:  Sit to Stand: Independent  Stand to Sit: Independent  Balance:   Sitting: Intact  Standing: Impaired  Standing - Static: Good  Standing - Dynamic : Good (higher level standing balance deficits)  Ambulation/Gait Training:  Distance (ft): 300 Feet (ft)  Assistive Device: Gait belt  Ambulation - Level of Assistance: Stand-by asssistance  Gait Abnormalities: Path deviations  Base of Support: Narrowed  Speed/Lawanda: Pace decreased (<100 feet/min)  Step Length: Left shortened;Right shortened                            Stairs:  Number of Stairs Trained: 4 (x 2)  Stairs - Level of Assistance: Stand-by asssistance              Rail Use:  (right, then left)                Functional Measure:  Slaughter Balance Test:      Sitting to Standin  Standing Unsupported: 4  Sitting with Back Unsupported: 4  Standing to Sittin  Transfers: 4  Standing Unsupported with Eyes Closed: 3  Standing Unsupported with Feet Together: 4  Reach Forward with Outstretched Arm: 3   Object: 4  Turn to Look Over Shoulders: 4  Turn 360 Degrees: 2  Alternate Foot on Step/Stool: 2  Standing Unsupported One Foot in Front: 4  Stand on One Le  Total: 47             56=Maximum possible score;   0-20=High fall risk  21-40=Moderate fall risk   41-56=Low fall risk      Slaughter Balance Test and G-code impairment scale:  Percentage of Impairment CH     0%    CI     1-19% CJ     20-39% CK     40-59% CL     60-79% CM     80-99% CN      100%   Slaughter   Score 0-56 56 45-55 34-44 23-33 12-22 1-11 0            G codes: In compliance with CMSs Claims Based Outcome Reporting, the following G-code set was chosen for this patient based on their primary functional limitation being treated: The outcome measure chosen to determine the severity of the functional limitation was the Slaughter balance assessment with a score of 47/56 which was correlated with the impairment scale.       · Mobility - Walking and Moving Around:               - CURRENT STATUS:    CI - 1%-19% impaired, limited or restricted               - GOAL STATUS:           CI - 1%-19% impaired, limited or restricted               - D/C STATUS:                       CI - 1%-19% impaired, limited or restricted         Physical Therapy Evaluation Charge Determination   History Examination Presentation Decision-Making   MEDIUM  Complexity : 1-2 comorbidities / personal factors will impact the outcome/ POC  LOW Complexity : 1-2 Standardized tests and measures addressing body structure, function, activity limitation and / or participation in recreation  LOW Complexity : Stable, uncomplicated  LOW Complexity : FOTO score of       Based on the above components, the patient evaluation is determined to be of the following complexity level: LOW      Pain:  Pain Scale 1: Numeric (0 - 10)  Pain Intensity 1: 0     Activity Tolerance:   O2 sats after ambulating on RA briefly dropped to 92% (<10 seconds) before stabilizing at 94%  Please refer to the flowsheet for vital signs taken during this treatment. After treatment:   [X]   Patient left in no apparent distress sitting up in chair  [ ]   Patient left in no apparent distress in bed  [X]   Call bell left within reach  [X]   Nursing notified  [X]   Caregiver present  [ ]   Bed alarm activated      COMMUNICATION/EDUCATION:   Communication/Collaboration:  [X]   Fall prevention education was provided and the patient/caregiver indicated understanding. [X]   Patient/family have participated as able and agree with findings and recommendations. [ ]   Patient is unable to participate in plan of care at this time.   Findings and recommendations were discussed with: Registered Nurse     Thank you for this referral.  Mauricio Bibles Meter, PT   Time Calculation: 26 mins

## 2017-02-04 PROBLEM — I50.9 CHF (CONGESTIVE HEART FAILURE) (HCC): Status: ACTIVE | Noted: 2017-02-04

## 2017-02-04 LAB
ANION GAP BLD CALC-SCNC: 8 MMOL/L (ref 5–15)
BNP SERPL-MCNC: 177 PG/ML (ref 0–100)
BUN SERPL-MCNC: 37 MG/DL (ref 6–20)
BUN/CREAT SERPL: 29 (ref 12–20)
CALCIUM SERPL-MCNC: 8 MG/DL (ref 8.5–10.1)
CHLORIDE SERPL-SCNC: 101 MMOL/L (ref 97–108)
CO2 SERPL-SCNC: 25 MMOL/L (ref 21–32)
CREAT SERPL-MCNC: 1.29 MG/DL (ref 0.55–1.02)
GLUCOSE SERPL-MCNC: 96 MG/DL (ref 65–100)
POTASSIUM SERPL-SCNC: 3.9 MMOL/L (ref 3.5–5.1)
SODIUM SERPL-SCNC: 134 MMOL/L (ref 136–145)

## 2017-02-04 PROCEDURE — 74011250637 HC RX REV CODE- 250/637: Performed by: INTERNAL MEDICINE

## 2017-02-04 PROCEDURE — 83880 ASSAY OF NATRIURETIC PEPTIDE: CPT | Performed by: INTERNAL MEDICINE

## 2017-02-04 PROCEDURE — 99218 HC RM OBSERVATION: CPT

## 2017-02-04 PROCEDURE — 74011250636 HC RX REV CODE- 250/636: Performed by: INTERNAL MEDICINE

## 2017-02-04 PROCEDURE — 65660000000 HC RM CCU STEPDOWN

## 2017-02-04 PROCEDURE — 80048 BASIC METABOLIC PNL TOTAL CA: CPT | Performed by: INTERNAL MEDICINE

## 2017-02-04 PROCEDURE — 36415 COLL VENOUS BLD VENIPUNCTURE: CPT | Performed by: INTERNAL MEDICINE

## 2017-02-04 RX ADMIN — CARVEDILOL 3.12 MG: 3.12 TABLET, FILM COATED ORAL at 07:56

## 2017-02-04 RX ADMIN — FLUTICASONE PROPIONATE AND SALMETEROL 1 PUFF: 50; 250 POWDER RESPIRATORY (INHALATION) at 21:04

## 2017-02-04 RX ADMIN — HEPARIN SODIUM 5000 UNITS: 5000 INJECTION, SOLUTION INTRAVENOUS; SUBCUTANEOUS at 04:30

## 2017-02-04 RX ADMIN — HEPARIN SODIUM 5000 UNITS: 5000 INJECTION, SOLUTION INTRAVENOUS; SUBCUTANEOUS at 15:58

## 2017-02-04 RX ADMIN — Medication 5 ML: at 06:02

## 2017-02-04 RX ADMIN — ALLOPURINOL 300 MG: 300 TABLET ORAL at 08:00

## 2017-02-04 RX ADMIN — ATORVASTATIN CALCIUM 10 MG: 10 TABLET, FILM COATED ORAL at 21:04

## 2017-02-04 RX ADMIN — FLUTICASONE PROPIONATE AND SALMETEROL 1 PUFF: 50; 250 POWDER RESPIRATORY (INHALATION) at 08:00

## 2017-02-04 NOTE — PROGRESS NOTES
Cardiology Progress Note            [address] [place] [address], [address] [facility]  [phone]    2/4/2017 10:08 AM    Admit Date: 2/1/2017    Admit Diagnosis: generalized fatigue with malaise;generalized fatigue with m*    Subjective:     Agueda Sanz   denies chest pain    Visit Vitals    /65 (BP 1 Location: Left arm, BP Patient Position: Supine)    Pulse 70    Temp 97.7 °F (36.5 °C)    Resp 16    Ht 5' 2\" (1.575 m)    Wt 140 lb 3.2 oz (63.6 kg)    SpO2 95%    Breastfeeding No    BMI 25.64 kg/m2     Current Facility-Administered Medications   Medication Dose Route Frequency    atorvastatin (LIPITOR) tablet 10 mg  10 mg Oral QHS    acetaminophen (TYLENOL) tablet 650 mg  650 mg Oral Q4H PRN    ondansetron (ZOFRAN) injection 4 mg  4 mg IntraVENous Q4H PRN    heparin (porcine) injection 5,000 Units  5,000 Units SubCUTAneous Q12H    sodium chloride (NS) flush 5-10 mL  5-10 mL IntraVENous Q8H    sodium chloride (NS) flush 5-10 mL  5-10 mL IntraVENous PRN    albuterol (PROVENTIL VENTOLIN) nebulizer solution 2.5 mg  2.5 mg Nebulization Q4H PRN    allopurinol (ZYLOPRIM) tablet 300 mg  300 mg Oral DAILY    fluticasone-salmeterol (ADVAIR) 250mcg-50mcg/puff  1 Puff Inhalation BID    carvedilol (COREG) tablet 3.125 mg  3.125 mg Oral BID WITH MEALS    ibrutinib (IMBRUVICA) chemo capsule 420 mg  420 mg Oral Once per day on Mon Tue Thu Fri Sat         Objective:      Visit Vitals    /65 (BP 1 Location: Left arm, BP Patient Position: Supine)    Pulse 70    Temp 97.7 °F (36.5 °C)    Resp 16    Ht 5' 2\" (1.575 m)    Wt 140 lb 3.2 oz (63.6 kg)    SpO2 95%    Breastfeeding No    BMI 25.64 kg/m2       Physical Exam:  Abdomen: soft, non-tender  Extremities: extremities normal  Heart: regular rate and rhythm  Lungs: clear to auscultation bilaterally  Pulses: 2+ and symmetric    Data Review:   Labs:    Recent Labs      02/03/17   0151  02/01/17   1935   WBC  4.1  3.9   HGB  10.3*  10.9*   HCT 30.3*  32.4*   PLT  159  144*     Recent Labs      02/04/17   0432  02/03/17   0151  02/01/17   1935   NA  134*  137  137   K  3.9  4.1  4.3   CL  101  101  100   CO2  25  25  28   GLU  96  95  104*   BUN  37*  29*  30*   CREA  1.29*  1.25*  1.36*   CA  8.0*  7.7*  8.1*   MG   --   2.4  2.6*   PHOS   --   2.2*   --    ALB   --    --   2.6*   TBILI   --    --   0.6   SGOT   --    --   20   ALT   --    --   9*       Recent Labs      02/01/17   1935   TROIQ  <0.04   CPK  60   CKMB  <1.0       No intake or output data in the 24 hours ending 02/04/17 1008     Telemetry: v paced    Assessment:     Principal Problem:    Acute on chronic systolic (congestive) heart failure ([facility] [address] (10/8/2015)    Active Problems:    Cardiomyopathy, dilated, 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 (8/4/2012)      NHL (non-Hodgkin's lymphoma) ([facility] [address] (8/4/2012)      DILAN (obstructive sleep apnea) (8/4/2012)      Anemia (11/8/2014)      Fatigue (3/10/2015)      Reactive airway disease (3/23/2015)      HTN (hypertension) (6/29/2015)      LBBB (left bundle branch block) (7/1/2015)      Presence of biventricular automatic cardioverter/defibrillator (AICD) (7/2/2015)      Overview: [employer] biventricular AICD implant      Lymphoma ([facility] [address] (11/17/2015)      SOB (shortness of breath) (2/2/2017)      Acute respiratory insufficiency (HCC) (2/2/2017)      PUD (peptic ulcer disease) (2/2/2017)      Gastroesophageal reflux disease with esophagitis (2/2/2017)      Thoracic aortic aneurysm without rupture ([address] (2/2/2017)        Plan:     Paola Oppenheim is doing well. Her bp has been much better since yesterday evening on low dose coreg.  Ok for [employer] from a cardiac standpoint    Patricia Hale MD, [facility]    2/4/2017

## 2017-02-04 NOTE — PROGRESS NOTES
Bedside and Verbal shift change report given to Elisa FRANCE (oncoming nurse) by Cecilia Mays RN   (offgoing nurse). Report included the following information SBAR, Kardex and Recent Results. Zone Phone for oncoming shift:   [phone]    Shift Summary: up ad chong in room all day, down for ECHO    LDAs               Peripheral IV 02/01/17 Left Forearm (Active)   Site Assessment Clean, dry, & intact 2/3/2017  3:47 PM   Phlebitis Assessment 0 2/3/2017  3:47 PM   Infiltration Assessment 0 2/3/2017  3:47 PM   Dressing Status Clean, dry, & intact 2/3/2017  3:47 PM   Dressing Type Tape;Transparent 2/3/2017  3:47 PM   Hub Color/Line Status Blue;Capped 2/3/2017  3:47 PM                        Intake & Output   Date 02/02/17 1900 - 02/03/17 0659 02/03/17 0700 - 02/04/17 0659   Shift 1337-9703 24 Hour Total 8679-0592 3507-7121 24 Hour Total   I  N  T  A  K  E   P.O.  120         P. O.  120       Shift Total  (mL/kg)  120  (2)      O  U  T  P  U  T   Shift Total  (mL/kg)        NET  120      Weight (kg) 61.1 61.1 61.1 61.1 61.1      Last Bowel Movement Last Bowel Movement Date: 02/01/17   Glucose Checks [x] N/A  [] AC/HS  [] Q6  Concerns:   Nutrition Active Orders   Diet    DIET CARDIAC Regular       Consults []PT  []OT  []Speech  [x]Case Management   Cardiac Monitoring []N/A [x]Yes Expires:

## 2017-02-04 NOTE — PROGRESS NOTES
Hospital Progress Note    NAME:  Agueda Sanz   :   3/17/1925   MRN:  [id number]     Date/Time:  2017 11:43 AM    Plan:   1. Encourage activity  2.  Watch renal fct  Risk of Deterioration: Low  []           Moderate  [x]           High  []                 Assessment:   Principal Problem:    Acute on chronic systolic (congestive) heart failure ([facility] (10/8/2015) more comensated -will ck bnp and renal fct in am    Active Problems:    Cardiomyopathy, dilated, 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% since  (2012)      NHL (non-Hodgkin's lymphoma) ([facility] (2012)      DILAN (obstructive sleep apnea) (2012)      Anemia (2014)      Fatigue (3/10/2015)      Reactive airway disease (3/23/2015)      HTN (hypertension) (2015)      LBBB (left bundle branch block) (2015)      Presence of biventricular automatic cardioverter/defibrillator (AICD) (2015)      Overview: [employer] biventricular AICD implant      Lymphoma ([facility] (2015)      SOB (shortness of breath) (2017)      Acute respiratory insufficiency (HCC) (2017)      PUD (peptic ulcer disease) (2017)      Gastroesophageal reflux disease with esophagitis (2017)      Thoracic aortic aneurysm without rupture ([facility] (2017)      CHF (congestive heart failure) ([facility] (2017)          Subjective:     C/o of fatigue with activity  11 Point Review of Systems:   Negative except no cp    []            Unable to obtain ROS due to:       []            mental status change []            sedated []            intubated     Social History   Substance Use Topics    Smoking status: Never Smoker    Smokeless tobacco: Never Used    Alcohol use No     Medications reviewed:  Current Facility-Administered Medications   Medication Dose Route Frequency    atorvastatin (LIPITOR) tablet 10 mg  10 mg Oral QHS    acetaminophen (TYLENOL) tablet 650 mg  650 mg Oral Q4H PRN    ondansetron (ZOFRAN) injection 4 mg  4 mg IntraVENous Q4H PRN    heparin (porcine) injection 5,000 Units  5,000 Units SubCUTAneous Q12H    sodium chloride (NS) flush 5-10 mL  5-10 mL IntraVENous Q8H    sodium chloride (NS) flush 5-10 mL  5-10 mL IntraVENous PRN    albuterol (PROVENTIL VENTOLIN) nebulizer solution 2.5 mg  2.5 mg Nebulization Q4H PRN    allopurinol (ZYLOPRIM) tablet 300 mg  300 mg Oral DAILY    fluticasone-salmeterol (ADVAIR) 250mcg-50mcg/puff  1 Puff Inhalation BID    carvedilol (COREG) tablet 3.125 mg  3.125 mg Oral BID WITH MEALS    ibrutinib (IMBRUVICA) chemo capsule 420 mg  420 mg Oral Once per day on Mon Tue Thu Fri Sat        Objective:   Vitals:  Visit Vitals    /65 (BP 1 Location: Left arm, BP Patient Position: Supine)    Pulse 70    Temp 97.7 °F (36.5 °C)    Resp 16    Ht 5' 2\" (1.575 m)    Wt 140 lb 3.2 oz (63.6 kg)    SpO2 95%    Breastfeeding No    BMI 25.64 kg/m2     Temp (24hrs), Av.1 °F (36.7 °C), Min:97.7 °F (36.5 °C), Max:98.6 °F (37 °C)      O2 Device: Room air    Last 24hr Input/Output:  No intake or output data in the 24 hours ending 17 1143     PHYSICAL EXAM:  General:    Alert, cooperative, no distress, appears stated age. Head:   Normocephalic, without obvious abnormality, atraumatic. Eyes:   Conjunctivae/corneas clear. PERRLA. Lungs:   Clear to auscultation bilaterally. No Wheezing or Rhonchi. No rales. Chest wall:  No tenderness or deformity. No Accessory muscle use. Heart:   Regular rate and rhythm,  no murmur, rub or gallop. Abdomen:   Soft, non-tender. Not distended.   Bowel sounds normal. No masses        Lab Data Reviewed:    Recent Labs      17   01517   1935   WBC  4.1  3.9   HGB  10.3*  10.9*   HCT  30.3*  32.4*   PLT  159  144*     Recent Labs      17   0432  0217   NA  134*  137  137   K  3.9  4.1  4.3   CL  101  101  100   CO2  25  25  28   GLU  96  95  104*   BUN  37*  29*  30*   CREA  1.29*  1.25*  1.36*   CA  8.0*  7.7*  8.1*   MG   -- 2.4  2.6*   PHOS   --   2.2*   --    ALB   --    --   2.6*   TBILI   --    --   0.6   SGOT   --    --   20   ALT   --    --   9*         ___________________________________________________  ___________________________________________________    Attending Physician: Lang Arango MD

## 2017-02-04 NOTE — CARDIO/PULMONARY
Cardiopulmonary Rehab Nursing Entry:    Pt is CHF Bundle Pt    Chart reviewed and pt visited. Pt 79 yo admitted with viral URI, chronic systolic HF, old echo 54-04%, new echo pending. Never smoker. CardioPulm Rehab: Chart reviewed and pt visited for CHF teaching. The CHF teaching packet was provided. The pt also received information regarding the low sodium diet including the \"Salty Six\" hand-outs. Instruction given on s/s of CHF, checking weight every am and calling MD if weight is up 2-3 lbs in a day or 5 lbs in a week (or as directed by the physician), fluid/Na restrictions, s/s of worsening CHF and when to call MD.  Discussed the CHF \"zones\" and subsequent actions with pt. Reviewed activity as tolerated with frequent rest periods as needed, taking medications as prescribed, and the importance of follow up visits with physician. Smoking history assessed, never smoker. Pt verbalized that she avoids added table salt, checks her weight daily, uses a pillbox for medication compliance and normally walks on her treadmill every day. Encouraged patient to verbalize concerns/questions. Pt verbalized understanding.

## 2017-02-05 LAB
ANION GAP BLD CALC-SCNC: 7 MMOL/L (ref 5–15)
BASOPHILS # BLD AUTO: 0 K/UL (ref 0–0.1)
BASOPHILS # BLD: 0 % (ref 0–1)
BNP SERPL-MCNC: 192 PG/ML (ref 0–100)
BUN SERPL-MCNC: 36 MG/DL (ref 6–20)
BUN/CREAT SERPL: 30 (ref 12–20)
CALCIUM SERPL-MCNC: 8 MG/DL (ref 8.5–10.1)
CHLORIDE SERPL-SCNC: 106 MMOL/L (ref 97–108)
CO2 SERPL-SCNC: 24 MMOL/L (ref 21–32)
CREAT SERPL-MCNC: 1.19 MG/DL (ref 0.55–1.02)
DIFFERENTIAL METHOD BLD: ABNORMAL
EOSINOPHIL # BLD: 0 K/UL (ref 0–0.4)
EOSINOPHIL NFR BLD: 1 % (ref 0–7)
ERYTHROCYTE [DISTWIDTH] IN BLOOD BY AUTOMATED COUNT: 15.2 % (ref 11.5–14.5)
GLUCOSE SERPL-MCNC: 96 MG/DL (ref 65–100)
HCT VFR BLD AUTO: 28.3 % (ref 35–47)
HGB BLD-MCNC: 9.6 G/DL (ref 11.5–16)
LYMPHOCYTES # BLD AUTO: 46 % (ref 12–49)
LYMPHOCYTES # BLD: 1.7 K/UL (ref 0.8–3.5)
MCH RBC QN AUTO: 30.7 PG (ref 26–34)
MCHC RBC AUTO-ENTMCNC: 33.9 G/DL (ref 30–36.5)
MCV RBC AUTO: 90.4 FL (ref 80–99)
METAMYELOCYTES NFR BLD MANUAL: 11 %
MONOCYTES # BLD: 0.2 K/UL (ref 0–1)
MONOCYTES NFR BLD AUTO: 5 % (ref 5–13)
NEUTS BAND NFR BLD MANUAL: 11 % (ref 0–6)
NEUTS SEG # BLD: 1.4 K/UL (ref 1.8–8)
NEUTS SEG NFR BLD AUTO: 26 % (ref 32–75)
PLATELET # BLD AUTO: 186 K/UL (ref 150–400)
PLATELET COMMENTS,PCOM: ABNORMAL
POTASSIUM SERPL-SCNC: 4.1 MMOL/L (ref 3.5–5.1)
RBC # BLD AUTO: 3.13 M/UL (ref 3.8–5.2)
RBC MORPH BLD: ABNORMAL
SODIUM SERPL-SCNC: 137 MMOL/L (ref 136–145)
WBC # BLD AUTO: 3.7 K/UL (ref 3.6–11)
WBC MORPH BLD: ABNORMAL

## 2017-02-05 PROCEDURE — 65660000000 HC RM CCU STEPDOWN

## 2017-02-05 PROCEDURE — 74011250636 HC RX REV CODE- 250/636: Performed by: INTERNAL MEDICINE

## 2017-02-05 PROCEDURE — 85025 COMPLETE CBC W/AUTO DIFF WBC: CPT | Performed by: INTERNAL MEDICINE

## 2017-02-05 PROCEDURE — 83880 ASSAY OF NATRIURETIC PEPTIDE: CPT | Performed by: INTERNAL MEDICINE

## 2017-02-05 PROCEDURE — 36415 COLL VENOUS BLD VENIPUNCTURE: CPT | Performed by: INTERNAL MEDICINE

## 2017-02-05 PROCEDURE — 80048 BASIC METABOLIC PNL TOTAL CA: CPT | Performed by: INTERNAL MEDICINE

## 2017-02-05 PROCEDURE — 74011250637 HC RX REV CODE- 250/637: Performed by: INTERNAL MEDICINE

## 2017-02-05 RX ORDER — BISACODYL 5 MG
10 TABLET, DELAYED RELEASE (ENTERIC COATED) ORAL DAILY PRN
Status: DISCONTINUED | OUTPATIENT
Start: 2017-02-05 | End: 2017-02-06 | Stop reason: HOSPADM

## 2017-02-05 RX ORDER — POLYETHYLENE GLYCOL 3350 17 G/17G
17 POWDER, FOR SOLUTION ORAL 2 TIMES DAILY
Status: DISCONTINUED | OUTPATIENT
Start: 2017-02-05 | End: 2017-02-06 | Stop reason: HOSPADM

## 2017-02-05 RX ADMIN — FLUTICASONE PROPIONATE AND SALMETEROL 1 PUFF: 50; 250 POWDER RESPIRATORY (INHALATION) at 21:26

## 2017-02-05 RX ADMIN — HEPARIN SODIUM 5000 UNITS: 5000 INJECTION, SOLUTION INTRAVENOUS; SUBCUTANEOUS at 03:49

## 2017-02-05 RX ADMIN — HEPARIN SODIUM 5000 UNITS: 5000 INJECTION, SOLUTION INTRAVENOUS; SUBCUTANEOUS at 18:20

## 2017-02-05 RX ADMIN — ATORVASTATIN CALCIUM 10 MG: 10 TABLET, FILM COATED ORAL at 21:22

## 2017-02-05 RX ADMIN — FLUTICASONE PROPIONATE AND SALMETEROL 1 PUFF: 50; 250 POWDER RESPIRATORY (INHALATION) at 08:46

## 2017-02-05 RX ADMIN — ALLOPURINOL 300 MG: 300 TABLET ORAL at 08:43

## 2017-02-05 NOTE — PROGRESS NOTES
Bedside shift change report given to Gris Currie (oncoming nurse) by Ray Fitzgerald (offgoing nurse). Report included the following information SBAR, Kardex, Intake/Output, MAR and Recent Results. Zone Phone for oncoming shift:   [phone]    Shift Summary: Pt rested quietly through night. No c/o pain. LDAs                                   Intake & Output   Date 02/04/17 0700 - 02/05/17 0659 02/05/17 0700 - 02/06/17 0659   Shift 0700-1859 1900-0659 24 Hour Total 6374-7448 6427-0407 24 Hour Total   I  N  T  A  K  E   P.O.  240 240         P. O.  240 240       Shift Total  (mL/kg)  240  (3.8) 240  (3.8)      O  U  T  P  U  T   Urine  (mL/kg/hr)            Urine Occurrence(s)  1 x 1 x       Shift Total  (mL/kg)         NET  240 240      Weight (kg) 63.6 63.5 63.5 63.5 63.5 63.5      Last Bowel Movement Last Bowel Movement Date: 02/04/17   Glucose Checks [x] N/A  [] AC/HS  [] Q6  Concerns:   Nutrition Active Orders   Diet    DIET CARDIAC Regular       Consults []PT  []OT  []Speech  [x]Case Management   Cardiac Monitoring []N/A [x]Yes Expires:48 hrs

## 2017-02-05 NOTE — PROGRESS NOTES
Cardiology Progress Note            [address] [address]  [phone]    2/5/2017 11:32 AM    Admit Date: 2/1/2017    Admit Diagnosis: generalized fatigue with malaise;generalized fatigue with m*    Subjective:     Melania Cash   denies chest pain.     Visit Vitals    /58 (BP 1 Location: Left arm, BP Patient Position: Sitting)    Pulse 88    Temp 97.4 °F (36.3 °C)    Resp 18    Ht 5' 2\" (1.575 m)    Wt 140 lb (63.5 kg)    SpO2 98%    Breastfeeding No    BMI 25.61 kg/m2     Current Facility-Administered Medications   Medication Dose Route Frequency    atorvastatin (LIPITOR) tablet 10 mg  10 mg Oral QHS    acetaminophen (TYLENOL) tablet 650 mg  650 mg Oral Q4H PRN    ondansetron (ZOFRAN) injection 4 mg  4 mg IntraVENous Q4H PRN    heparin (porcine) injection 5,000 Units  5,000 Units SubCUTAneous Q12H    sodium chloride (NS) flush 5-10 mL  5-10 mL IntraVENous PRN    albuterol (PROVENTIL VENTOLIN) nebulizer solution 2.5 mg  2.5 mg Nebulization Q4H PRN    allopurinol (ZYLOPRIM) tablet 300 mg  300 mg Oral DAILY    fluticasone-salmeterol (ADVAIR) 250mcg-50mcg/puff  1 Puff Inhalation BID    ibrutinib (IMBRUVICA) chemo capsule 420 mg  420 mg Oral Once per day on Mon Tue Thu Fri Sat         Objective:      Visit Vitals    /58 (BP 1 Location: Left arm, BP Patient Position: Sitting)    Pulse 88    Temp 97.4 °F (36.3 °C)    Resp 18    Ht 5' 2\" (1.575 m)    Wt 140 lb (63.5 kg)    SpO2 98%    Breastfeeding No    BMI 25.61 kg/m2       Physical Exam:  Abdomen: soft, non-tender  Extremities: extremities normal  Heart: regular rate and rhythm  Lungs: clear to auscultation bilaterally  Pulses: 2+ and symmetric    Data Review:   Labs:    Recent Labs      02/05/17   0350  02/03/17   0151   WBC  3.7  4.1   HGB  9.6*  10.3*   HCT  28.3*  30.3*   PLT  186  159     Recent Labs      02/05/17   0350  02/04/17   0432  02/03/17   0151   NA  137  134*  137   K  4.1  3.9  4.1 CL  106  101  101   CO2  24  25  25   GLU  96  96  95   BUN  36*  37*  29*   CREA  1.19*  1.29*  1.25*   CA  8.0*  8.0*  7.7*   MG   --    --   2.4   PHOS   --    --   2.2*       No results for input(s): TROIQ, CPK, CKMB in the last 72 hours. Intake/Output Summary (Last 24 hours) at 02/05/17 1132  Last data filed at 02/05/17 1639   Gross per 24 hour   Intake              240 ml   Output                0 ml   Net              240 ml        Telemetry: paced    Echo - lev 20-25    Assessment:     Principal Problem:    Acute on chronic systolic (congestive) heart failure ([address] (10/8/2015)    Active Problems:    Cardiomyopathy, dilated, 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 (8/4/2012)      NHL (non-Hodgkin's lymphoma) ([address] (8/4/2012)      DILAN (obstructive sleep apnea) (8/4/2012)      Anemia (11/8/2014)      Fatigue (3/10/2015)      Reactive airway disease (3/23/2015)      HTN (hypertension) (6/29/2015)      LBBB (left bundle branch block) (7/1/2015)      Presence of biventricular automatic cardioverter/defibrillator (AICD) (7/2/2015)      Overview: [employer] biventricular AICD implant      Lymphoma ([address] (11/17/2015)      SOB (shortness of breath) (2/2/2017)      Acute respiratory insufficiency ([address] (2/2/2017)      PUD (peptic ulcer disease) (2/2/2017)      Gastroesophageal reflux disease with esophagitis (2/2/2017)      Thoracic aortic aneurysm without rupture ([address] (2/2/2017)      CHF (congestive heart failure) ([address] (2/4/2017)        Plan:     Hanna More 's lvef is relatively unchanged. She is having hypotension on coreg. Will dc coreg. Profound cardiomyopathy with poor prognosis. Encourage ambulation.  F/u with Dr Alfredo Stanley in 1-2 weeks    Marina Salazar MD, [facility]    2/5/2017

## 2017-02-05 NOTE — PROGRESS NOTES
Hospital Progress Note    NAME:  Jasen Yap   :   3/17/1925   MRN:  [id number]     Date/Time:  2017 11:43 AM    Plan:   1. Encourage activity  2.  Titrate coreg d/t hypotension  Risk of Deterioration: Low  []           Moderate  [x]           High  []                 Assessment:   Principal Problem:    Acute on chronic systolic (congestive) heart failure ([facility] (10/8/2015) more comensated -will ck bnp and renal fct in am    Active Problems:    Cardiomyopathy, dilated, 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% since  (2012)      NHL (non-Hodgkin's lymphoma) ([facility] (2012)      DILAN (obstructive sleep apnea) (2012)      Anemia (2014)      Fatigue (3/10/2015)      Reactive airway disease (3/23/2015)      HTN (hypertension) (2015)      LBBB (left bundle branch block) (2015)      Presence of biventricular automatic cardioverter/defibrillator (AICD) (2015)      Overview: [employer] biventricular AICD implant      Lymphoma ([facility] (2015)      SOB (shortness of breath) (2017)      Acute respiratory insufficiency (HCC) (2017)      PUD (peptic ulcer disease) (2017)      Gastroesophageal reflux disease with esophagitis (2017)      Thoracic aortic aneurysm without rupture ([facility] (2017)      CHF (congestive heart failure) ([facility] (2017)          Subjective:     C/o of fatigue and jst feels tired  11 Point Review of Systems:   Negative except no cp    []            Unable to obtain ROS due to:       []            mental status change []            sedated []            intubated     Social History   Substance Use Topics    Smoking status: Never Smoker    Smokeless tobacco: Never Used    Alcohol use No     Medications reviewed:  Current Facility-Administered Medications   Medication Dose Route Frequency    atorvastatin (LIPITOR) tablet 10 mg  10 mg Oral QHS    acetaminophen (TYLENOL) tablet 650 mg  650 mg Oral Q4H PRN    ondansetron (ZOFRAN) injection 4 mg  4 mg IntraVENous Q4H PRN    heparin (porcine) injection 5,000 Units  5,000 Units SubCUTAneous Q12H    sodium chloride (NS) flush 5-10 mL  5-10 mL IntraVENous PRN    albuterol (PROVENTIL VENTOLIN) nebulizer solution 2.5 mg  2.5 mg Nebulization Q4H PRN    allopurinol (ZYLOPRIM) tablet 300 mg  300 mg Oral DAILY    fluticasone-salmeterol (ADVAIR) 250mcg-50mcg/puff  1 Puff Inhalation BID    carvedilol (COREG) tablet 3.125 mg  3.125 mg Oral BID WITH MEALS    ibrutinib (IMBRUVICA) chemo capsule 420 mg  420 mg Oral Once per day on Mon Tue Thu Fri Sat        Objective:   Vitals:  Visit Vitals    BP (!) 85/58    Pulse 78    Temp 98.1 °F (36.7 °C)    Resp 18    Ht 5' 2\" (1.575 m)    Wt 140 lb (63.5 kg)    SpO2 97%    Breastfeeding No    BMI 25.61 kg/m2     Temp (24hrs), Av °F (36.7 °C), Min:97.8 °F (36.6 °C), Max:98.1 °F (36.7 °C)      O2 Device: Room air    Last 24hr Input/Output:    Intake/Output Summary (Last 24 hours) at 17 0724  Last data filed at 17 4610   Gross per 24 hour   Intake              240 ml   Output                0 ml   Net              240 ml        PHYSICAL EXAM:  General:    Alert, cooperative, no distress, appears stated age. Head:   Normocephalic, without obvious abnormality, atraumatic. Eyes:   Conjunctivae/corneas clear. PERRLA. Lungs:   Clear to auscultation bilaterally. No Wheezing or Rhonchi. No rales. Chest wall:  No tenderness or deformity. No Accessory muscle use. Heart:   Regular rate and rhythm,  no murmur, rub or gallop. Abdomen:   Soft, non-tender. Not distended.   Bowel sounds normal. No masses        Lab Data Reviewed:    Recent Labs      17   0350  17   0151   WBC  3.7  4.1   HGB  9.6*  10.3*   HCT  28.3*  30.3*   PLT  186  159     Recent Labs      17   0350  17   0432  17   0151   NA  137  134*  137   K  4.1  3.9  4.1   CL  106  101  101   CO2  24  25  25   GLU  96  96  95   BUN  36*  37*  29*   CREA  1.19*  1.29*  1.25* CA  8.0*  8.0*  7.7*   MG   --    --   2.4   PHOS   --    --   2.2*         ___________________________________________________  ___________________________________________________    Attending Physician: Ansley Christie MD

## 2017-02-06 VITALS
SYSTOLIC BLOOD PRESSURE: 104 MMHG | OXYGEN SATURATION: 96 % | HEIGHT: 62 IN | HEART RATE: 70 BPM | RESPIRATION RATE: 20 BRPM | BODY MASS INDEX: 25.76 KG/M2 | TEMPERATURE: 97.3 F | WEIGHT: 139.99 LBS | DIASTOLIC BLOOD PRESSURE: 65 MMHG

## 2017-02-06 PROCEDURE — 74011250637 HC RX REV CODE- 250/637: Performed by: INTERNAL MEDICINE

## 2017-02-06 RX ORDER — FUROSEMIDE 40 MG/1
40 TABLET ORAL 2 TIMES DAILY
Qty: 60 TAB | Refills: 11 | Status: SHIPPED | OUTPATIENT
Start: 2017-02-06 | End: 2017-02-17 | Stop reason: SDUPTHER

## 2017-02-06 RX ADMIN — ALLOPURINOL 300 MG: 300 TABLET ORAL at 10:15

## 2017-02-06 RX ADMIN — FLUTICASONE PROPIONATE AND SALMETEROL 1 PUFF: 50; 250 POWDER RESPIRATORY (INHALATION) at 10:18

## 2017-02-06 NOTE — DISCHARGE INSTRUCTIONS
General Discharge Instructions    Patient ID:  Dion Contreras  [id number]  80 y.o.  3/17/1925    Patient Instructions        The following personal items were collected during your admission and were returned to you. Take Home Medications           What to do at Home    Recommended diet: Cardiac Diet    Recommended activity: Activity as tolerated    Follow-up with Maria Fernanda Moe MD  in 1 week. Information obtained by :  I understand that if any problems occur once I am at home I am to contact my physician. I understand and acknowledge receipt of the instructions indicated above.                                                                                                                                            Physician's or R.N.'s Signature                                                                  Date/Time                                                                                                                                              Patient or Representative Signature                                                          Date/Time

## 2017-02-06 NOTE — PROGRESS NOTES
Pharmacy Medication Reconciliation      Recommendations/Findings: The following amendments were made to the patient's active medication list on file at [facility]:   1) Additions: None     2) Deletions:Furosemide - May need to DC KCl if patient has a reduced furosemide dose. 3) Changes: None       -Clarified PTA med list with medication list provided by assisted living facility  [facility] ([phone]). PTA medication list was corrected to the following:      Prior to Admission Medications   Prescriptions Last Dose Informant Patient Reported? Taking? NEBULIZER ACCESSORIES (PERICO JET NEBULIZER BOWL)     Yes No   Sig: by Does Not Apply route. OXYGEN-AIR DELIVERY SYSTEMS     Yes Yes   Sig: by Does Not Apply route. acetaminophen (TYLENOL EXTRA STRENGTH) 500 mg tablet     Yes No   Sig: Take 500 mg by mouth every six (6) hours as needed for Pain. albuterol (PROVENTIL VENTOLIN) 2.5 mg /3 mL (0.083 %) nebulizer solution     No Yes   Sig: 3 mL by Nebulization route every four (4) hours as needed for Wheezing. allopurinol (ZYLOPRIM) 300 mg tablet     No Yes   Sig: TAKE ONE (1) TABLET(S) DAILY   atorvastatin (LIPITOR) 10 mg tablet     No Yes   Sig: TAKE ONE (1) TABLET(S) DAILY   carvedilol (COREG) 6.25 mg tablet     No Yes   Sig: TAKE ONE (1) TABLET(S) TWIC E DAILY WITH FOOD   co-enzyme Q-10 (CO Q-10) 100 mg capsule     Yes Yes   Sig: Take 100 mg by mouth daily. fluticasone-salmeterol (ADVAIR) 250-50 mcg/dose diskus inhaler     No Yes   Sig: Take 1 Puff by inhalation two (2) times a day. furosemide (LASIX) 40 mg tablet     Yes Yes   Sig: Take 60 mg by mouth two (2) times a day. ibrutinib (IMBRUVICA) 140 mg cap 1/31/2017   Yes Yes   Sig: Take 420 mg by mouth five (5) days a week. 140 mg cap, takes 3 caps five days a week (none on Sunday or Wednesday)   loratadine (CLARITIN) 10 mg tablet     Yes Yes   Sig: Take 10 mg by mouth daily as needed.    multivitamin (ONE A DAY) tablet     Yes Yes   Sig: Take 1 Tab by mouth daily. potassium chloride SR (KLOR-CON 10) 10 mEq tablet     Yes Yes   Sig: Take 10 mEq by mouth daily.    valsartan (DIOVAN) 40 mg tablet     No Yes   Sig: TAKE ONE (1) TABLET(S) ONCE DAILY        Thank you,    Anastasiia Odell, PharmD, BCPS

## 2017-02-06 NOTE — PROGRESS NOTES
Bedside and Verbal shift change report given to Edgar Dey  (oncoming nurse) by Quillian Apley RN (offgoing nurse). Report included the following information SBAR, Kardex, Intake/Output, MAR, Recent Results and Med Rec Status.

## 2017-02-06 NOTE — PROGRESS NOTES
Pt refused  Blood draw,  Staff attempted a second time but she still refused.  Body weight  not done

## 2017-02-06 NOTE — DISCHARGE SUMMARY
[address] SUMMARY       Name:  Jany Warren   MR#:  [id number]   :  1925   Account #:  [de-identified]        Date of Adm:  2017       HISTORY OF PRESENT ILLNESS: The patient is a 80-year-old lady   who was doing well up until several days to a week prior to admission   when note was made of increasing weakness and fatigue to the point   where it is difficult for her to ambulate. She presented to the   emergency room and the most striking thing was a worsening prerenal   azotemia. Superimposed on this is a low output state related to severe   nonischemic congestive cardiomyopathy. It was felt that she was   probably volume depleted. In view of this, it was elected to admit her   for further evaluation and care. Additionally, she had some early forms on her differential, but no   blasts. She does have a history of lymphoma. PAST MEDICAL HISTORY, SOCIAL HISTORY, REVIEW OF   SYSTEMS, FAMILY HISTORY, PHYSICAL EXAMINATION:   As in admitting H and P.    LABORATORY VALUES: Initial hemoglobin was 10.3, white count 4.1,   MCV was 91.5, platelet count 419 K with the following differential: 42   segs, 6 bands, 40 lymphocytes, 10 monocytes. She additionally had 1   metamyelocyte and myelocyte. Abnormalities on the comprehensive   profile limited to BUN and creatinine 37 and 1.29 respectively. The   initial BNP was 415, white count with a ProBNP 4851, repeat 5673. At   time of discharge, the BNP was 192. Urinalysis 10-20 RBCs per high-  powered field. RADIOGRAPHS: Chest x-ray, cardiomegaly and aortic prominence   only. Echocardiogram revealed an ejection fraction of 20-25%. The right   ventricle was moderately dilated, a slight reduction in systolic function,   the right atrium moderately dilated, moderate to severe mitral   regurgitation. Moderate aortic regurgitation.     CONSULTATIONS: One consultation with Dr. Raghu Jackson. His   comments will be elaborated on in the hospital course. HOSPITAL COURSE: The patient was admitted and was hydrated for   the first 36 hours. With this, she felt significantly better. This confirmed   the fact that she is volume depletion with a component superimposed   on her low output state. She had no further intense weakness as noted   during her admission the remainder of the hospital stay. She did   develop mild hypotension, although she was asymptomatic at the time   with systolic pressures in the 80 range, which did indeed increase to   100 range prior to discharge. Specifically, it was as low as 85/58. One additional factor was the early forms noted on her differential   which might well represent an early leukemic transformation of her   lymphoma. Because this was not felt to be part of the current   problems, she will follow up with her medical oncologist as an   outpatient. FINAL DIAGNOSES   1. Volume depletion. 2. Severe congestive nonischemic cardiomyopathy. 3. Compensated congestive heart failure. 4. Low output state. 5. 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 6. Reactive airway disease. DISPOSITION: The patient will be discharged home ambulatory on a   regular, no-added salt diet. DISCHARGE MEDICATIONS INCLUDE THE FOLLOWING   1. Acetaminophen 650 q.4h. p.r.n.   2. Albuterol 2 puffs q.4h. p.r.n. and p.r.n. use of nebulizer. 3. Allopurinol 300 mg daily. 4. Atorvastatin 10 mg daily. 5. Carvedilol 6.25 mg b.i.d.   6. Advair 1 puff b.i.d.   7. Furosemide will be started 48 hours after discharge at 40 mg q.a.m.   8. Imbruvica 420 mg, to be addressed by Dr. Stanford Hair, 5 days a week. 9. Loratadine 10 mg daily. 10. Multivitamins. 11. Potassium chloride 10 mEq daily. 12. Valsartan will be held for now. She was formerly getting 40 mg   daily. She will return to the office in 1 week.      She will follow up with Dr. Stanford Hair within the next week, also. GILBERT Roth MD      LAB / NB   D:  02/06/2017   08:27   T:  02/06/2017   09:48   Job #:  [id number]

## 2017-02-06 NOTE — PROGRESS NOTES
Pt discharged to home, instructions reviewed with pt and pt's family,  copies given. Pt's telemetry removed, opportunity for questions provided. PT taken home by family.

## 2017-02-06 NOTE — PROGRESS NOTES
Bedside shift change report given to Hawa Castellon RN (oncoming nurse) by Sandra Jeronimo RN (offgoing nurse). Report included the following information SBAR, Kardex and Cardiac Rhythm . [facility] Phone for oncoming shift:   [phone]    Shift Summary: n/a    LDAs                                   Intake & Output   Date 02/04/17 1900 - 02/05/17 0659 02/05/17 0700 - 02/06/17 0659   Shift 5857-3304 24 Hour Total 8340-5361 8418-5038 24 Hour Total   I  N  T  A  K  E   P.O. 240 240 720  720      P. O. 240 240 720  720    Shift Total  (mL/kg) 240  (3.8) 240  (3.8) 720  (11.3)  720  (11.3)   O  U  T  P  U  T   Urine  (mL/kg/hr)           Urine Occurrence(s) 1 x 1 x 2 x  2 x    Shift Total  (mL/kg)         240 720  720   Weight (kg) 63.5 63.5 63.5 63.5 63.5      Last Bowel Movement Last Bowel Movement Date: 02/04/17   Glucose Checks [x] N/A  [] AC/HS  [] Q6  Concerns:   Nutrition Active Orders   Diet    DIET CARDIAC Regular       Consults []PT  []OT  []Speech  []Case Management   Cardiac Monitoring [x]N/A [x]Yes Expires:

## 2017-02-07 ENCOUNTER — OFFICE VISIT (OUTPATIENT)
Dept: CARDIOLOGY CLINIC | Age: 82
End: 2017-02-07

## 2017-02-07 ENCOUNTER — TELEPHONE (OUTPATIENT)
Dept: CARDIOLOGY CLINIC | Age: 82
End: 2017-02-07

## 2017-02-07 VITALS
SYSTOLIC BLOOD PRESSURE: 116 MMHG | WEIGHT: 137.4 LBS | BODY MASS INDEX: 25.28 KG/M2 | DIASTOLIC BLOOD PRESSURE: 71 MMHG | HEART RATE: 79 BPM | OXYGEN SATURATION: 97 % | HEIGHT: 62 IN

## 2017-02-07 DIAGNOSIS — Z95.810 PRESENCE OF BIVENTRICULAR AUTOMATIC CARDIOVERTER/DEFIBRILLATOR (AICD): ICD-10-CM

## 2017-02-07 DIAGNOSIS — I42.0 CARDIOMYOPATHY, DILATED, NONISCHEMIC (HCC): ICD-10-CM

## 2017-02-07 DIAGNOSIS — I10 ESSENTIAL HYPERTENSION: ICD-10-CM

## 2017-02-07 DIAGNOSIS — I50.22 CHRONIC SYSTOLIC (CONGESTIVE) HEART FAILURE (HCC): Primary | ICD-10-CM

## 2017-02-07 DIAGNOSIS — E78.2 MIXED HYPERLIPIDEMIA: ICD-10-CM

## 2017-02-07 RX ORDER — CARVEDILOL 3.12 MG/1
TABLET ORAL
Qty: 90 TAB | Refills: 3 | Status: SHIPPED | OUTPATIENT
Start: 2017-02-07 | End: 2017-02-20 | Stop reason: SDUPTHER

## 2017-02-07 RX ORDER — VALSARTAN 40 MG/1
TABLET ORAL
Refills: 3 | COMMUNITY
Start: 2017-01-20 | End: 2017-02-21 | Stop reason: ALTCHOICE

## 2017-02-07 NOTE — TELEPHONE ENCOUNTER
Called patient regarding scheduling her new patient appointment. Since there are conflicts with other doctors visits. She is scheduled for Tuesday February 14th at 930am.    Patient is aware of our location.

## 2017-02-07 NOTE — PROGRESS NOTES
[facility]   [address] [facility], [facility] [address]                                          NEW PATIENT HPI/FOLLOW-UP      NAME:  Jaleel Arroyo   :   3/17/1925   MRN:   [id number]   PCP:  Clarisa Arteaga MD           Subjective: The patient is a 80y.o. year old female  who returns for a routine follow-up. Since the last visit, patient was hospitalized at [facility] last week for acute on chronic HF sx with low BP and fragile/low cardiac output syndrome. Pt responded to gentle hydration and BP meds were decreased by half. Pt reports she is doing well but this morning c/o mild SOB and a fluttering sensation in her lower left chest. Palpitations resolved quickly. She denies chest pain, LE edema or JENKINS. She is sleeping with two pillows and O2 at night. She reports she can sometimes sleep flat but not since recent hospitalization. However, she states she sleeps comfortably and does not wake suddenly dyspneic. Denies change in exercise tolerance, medication intolerance, wheezing, syncope, dizziness or light headedness. Doing satisfactorily. Family is requesting a new prescription for Coreg 3.125 mg tablet as the 6.25 tablet they have is difficult to cut in half. Review of Systems  General: Pt denies excessive weight gain or loss. Pt is able to conduct ADL's. Respiratory: +shortness of breath, Denies JENKINS, wheezing or stridor.   Cardiovascular: +palpitations, Denies precordial pain,edema or PND  Gastrointestinal: Denies poor appetite, indigestion, abdominal pain or blood in stool  Peripheral vascular: Denies claudication, leg cramps  Neuropsychiatric: Denies paresthesias,tingling,numbness,anxiety,depression,fatigue  Musculoskeletal: Denies pain,tenderness, soreness,swelling      Past Medical History   Diagnosis Date    Asthma     Cancer ([facility] [address] [address]      lymphoma    Congestive heart failure, unspecified     Heart failure ([facility]     Hypertension     Other ill-defined conditions(799.89)      heart murmur    Presence of biventricular automatic cardioverter/defibrillator (AICD) 7/2/2015     [employer] biventricular AICD implant    Stomach ulcer      Patient Active Problem List    Diagnosis Date Noted    CHF (congestive heart failure) ([facility] 02/04/2017    SOB (shortness of breath) 02/02/2017    Acute respiratory insufficiency ([address] 02/02/2017    PUD (peptic ulcer disease) 02/02/2017    Gastroesophageal reflux disease with esophagitis 02/02/2017    Thoracic aortic aneurysm without rupture ([address] 02/02/2017    Physical deconditioning 01/29/2017    Cramping of hands 01/24/2017    Mild mitral regurgitation 01/22/2016    Lymphoma ([address] 11/17/2015    Acute on chronic systolic (congestive) heart failure ([address] 10/08/2015    Presence of biventricular automatic cardioverter/defibrillator (AICD) 07/02/2015    LBBB (left bundle branch block) 25/28/9222    Systolic CHF, acute on chronic ([address] 06/29/2015    Acute respiratory failure with hypoxia ([address] 06/29/2015    Upper respiratory infection 06/29/2015    HTN (hypertension) 06/29/2015    Hyperlipidemia 06/29/2015    Gout 06/29/2015    Reactive airway disease 03/23/2015    Fatigue 03/10/2015    Cardiomyopathy ([address] 11/08/2014    Anemia 11/08/2014    Hypotension 11/04/2014    Rhinitis 09/04/2014    Pulmonary edema 08/04/2012    Cardiomyopathy, dilated, 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 08/04/2012    NHL (non-Hodgkin's lymphoma) ([address] 08/04/2012    DILAN (obstructive sleep apnea) 08/04/2012    Abdominal pain 05/22/2012     Class: Acute    Weight loss 05/22/2012     Class: Acute      Past Surgical History   Procedure Laterality Date    Hx hysterectomy      Hx other surgical       lymph node surgery    Hx tonsillectomy      Pr sinus surgery proc unlisted       Nasal polyps removed    Hx heent       cataracts removed    Pr egd transoral biopsy single/multiple  5/23/2012          Hx colonoscopy Allergies   Allergen Reactions    Codeine Other (comments)     \"made me disoriented\" per pt      Family History   Problem Relation Age of Onset    Heart Disease Mother     Stroke Father       Social History     Social History    Marital status:      Spouse name: N/A    Number of children: 1    Years of education: 16+     Occupational History    retired teacher      Social History Main Topics    Smoking status: Never Smoker    Smokeless tobacco: Never Used    Alcohol use No    Drug use: No    Sexual activity: Not on file     Other Topics Concern    Not on file     Social History Narrative      Current Outpatient Prescriptions   Medication Sig    potassium chloride SR (KLOR-CON 10) 10 mEq tablet Take 10 mEq by mouth daily.  allopurinol (ZYLOPRIM) 300 mg tablet TAKE ONE (1) TABLET(S) DAILY    atorvastatin (LIPITOR) 10 mg tablet TAKE ONE (1) TABLET(S) DAILY    carvedilol (COREG) 6.25 mg tablet TAKE ONE (1) TABLET(S) TWIC E DAILY WITH FOOD    fluticasone-salmeterol (ADVAIR) 250-50 mcg/dose diskus inhaler Take 1 Puff by inhalation two (2) times a day.  loratadine (CLARITIN) 10 mg tablet Take 10 mg by mouth daily as needed.  co-enzyme Q-10 (CO Q-10) 100 mg capsule Take 100 mg by mouth daily.  OXYGEN-AIR DELIVERY SYSTEMS by Does Not Apply route.  albuterol (PROVENTIL VENTOLIN) 2.5 mg /3 mL (0.083 %) nebulizer solution 3 mL by Nebulization route every four (4) hours as needed for Wheezing.  ibrutinib (IMBRUVICA) 140 mg cap Take 420 mg by mouth five (5) days a week. 140 mg cap, takes 3 caps five days a week (none on Sunday or Wednesday)    acetaminophen (TYLENOL EXTRA STRENGTH) 500 mg tablet Take 500 mg by mouth every six (6) hours as needed for Pain.  multivitamin (ONE A DAY) tablet Take 1 Tab by mouth daily.  valsartan (DIOVAN) 40 mg tablet TAKE ONE TABLET BY MOUTH EVERY DAY    furosemide (LASIX) 40 mg tablet Take 1 Tab by mouth two (2) times a day.      No current facility-administered medications for this visit. I have reviewed the MAs notes, vitals, problem list, allergy list, medical history, family medical, social history and medications. Objective:     Physical Exam:     Vitals:    02/07/17 0959   BP: 116/71   Pulse: 79   SpO2: 97%   Weight: 137 lb 6.4 oz (62.3 kg)   Height: 5' 2\" (1.575 m)    Body mass index is 25.13 kg/(m^2). General: Well developed, in no acute distress. Pleasant affect. HEENT: No carotid bruits, no JVD, trach is midline. Heart:  Normal S1/S2 negative S3 or S4. Regular, no murmur, gallop or rub.   Respiratory: Clear bilaterally, no wheezing or rales  Abdomen:   Soft, non-tender, bowel sounds are active.   Extremities:  Scant BL ankle edema, normal cap refill, no cyanosis. Neuro: A&Ox3, speech clear, gait stable. Skin: Skin color is normal. No rashes or lesions. No diaphoresis. Vascular: 2+ pulses symmetric in all extremities      Data Review:       Cardiographics:    EKG: V-paced. Cardiology Labs:    Results for orders placed or performed during the hospital encounter of 02/01/17   EKG, 12 LEAD, INITIAL   Result Value Ref Range    Ventricular Rate 80 BPM    Atrial Rate 80 BPM    P-R Interval 148 ms    QRS Duration 168 ms    Q-T Interval 440 ms    QTC Calculation (Bezet) 507 ms    Calculated P Axis -14 degrees    Calculated R Axis -138 degrees    Calculated T Axis 35 degrees    Diagnosis       Atrial-sensed ventricular-paced rhythm  When compared with ECG of 14-NOV-2015 08:37,  Vent.  rate has increased BY   5 BPM  Confirmed by Tho Garcia ([id number]) on 2/2/2017 10:48:57 AM         Lab Results   Component Value Date/Time    Cholesterol, total 172 08/04/2012 03:15 PM    HDL Cholesterol 38 08/04/2012 03:15 PM    LDL, calculated 118.8 08/04/2012 03:15 PM    Triglyceride 76 08/04/2012 03:15 PM    CHOL/HDL Ratio 4.5 08/04/2012 03:15 PM       Lab Results   Component Value Date/Time    Sodium 137 02/05/2017 03:50 AM    Potassium 4.1 02/05/2017 03:50 AM    Chloride 106 02/05/2017 03:50 AM    CO2 24 02/05/2017 03:50 AM    Anion gap 7 02/05/2017 03:50 AM    Glucose 96 02/05/2017 03:50 AM    BUN 36 02/05/2017 03:50 AM    Creatinine 1.19 02/05/2017 03:50 AM    BUN/Creatinine ratio 30 02/05/2017 03:50 AM    GFR est AA 52 02/05/2017 03:50 AM    GFR est non-AA 43 02/05/2017 03:50 AM    Calcium 8.0 02/05/2017 03:50 AM    Bilirubin, total 0.6 02/01/2017 07:35 PM    AST (SGOT) 20 02/01/2017 07:35 PM    Alk. phosphatase 68 02/01/2017 07:35 PM    Protein, total 6.1 02/01/2017 07:35 PM    Albumin 2.6 02/01/2017 07:35 PM    Globulin 3.5 02/01/2017 07:35 PM    A-G Ratio 0.7 02/01/2017 07:35 PM    ALT (SGPT) 9 02/01/2017 07:35 PM          Assessment:       ICD-10-CM ICD-9-CM    1. Essential hypertension I10 401.9 AMB POC EKG ROUTINE W/ 12 LEADS, INTER & REP   2. Systolic CHF, acute on chronic (HCC) I50.23 428.23 AMB POC EKG ROUTINE W/ 12 LEADS, INTER & REP     428.0    3. SOB (shortness of breath) R06.02 786.05 AMB POC EKG ROUTINE W/ 12 LEADS, INTER & REP   4. 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 Palpitations R00.2 785.1          Discussion: Patient presents at this time stable from a cardiac perspective. Systolic heart failure compensated. Pleased with present status. Plan: 1. Continue same meds. Lipid profile and labs followed by Dr. Alyssa Rodriguez. -- New Rx for 3.125 mg Coreg tablet ordered per pt request    2. Encouraged to exercise to tolerance and follow low fat, low cholesterol, low sodium predominantly Plant-based (consider Mediterranean) diet. Call with questions or concerns. Will follow up any test results by phone and/or f/u here in office if needed. Phyllis Riser 3.Follow up: with heart failure clinic--scheduled-- and with us in 3 months    I have discussed the diagnosis with the patient and the intended plan as seen in the above orders.   The patient has received an after-visit summary and questions were answered concerning future plans. I have discussed any concerning medication side effects and warnings with the patient as well. Patient seen and examined. All pertinent data reviewed. I have reviewed detailed note as outlined by Hoda Alicea. Case discussed with Nursing/medical assistant staff and Hoda Alicea. Patient s/p hospitalization for AOCSHF and dehydration combined. Was gentle hydrated and diuresed. Feeling better. Here with Son and daughter-in-law from [place]. Will refer to [facility] for additional comments and Rx. Patient states she is 81 yo and is aware of her mortaility. Not interested in bold options--comfort options only. . Plans as outlined. Yung Castaneda PA-C  2/7/2017     Isaac BIRCH.  [address] [facility]

## 2017-02-08 ENCOUNTER — PATIENT OUTREACH (OUTPATIENT)
Dept: INTERNAL MEDICINE CLINIC | Age: 82
End: 2017-02-08

## 2017-02-08 NOTE — PROGRESS NOTES
[address] Sarah Discharge Follow-Up      Date/Time:  2017 1:50 PM    Patient listed on discharge Daily  report on 17. Patient discharged from [facility] for Generalized Fatigue and malaise  RRAT score: High Risk            26       Total Score        3 Relationship with PCP    4 More than 1 Admission in calendar year    19 Charlson Comorbidity Score        Criteria that do not apply:    Patient Living Status    Patient Length of Stay > 5    Patient Insurance is Medicare, Medicaid or Self Pay       High    Medical History:     Past Medical History   Diagnosis Date    Asthma     Cancer ([facility]      lymphoma    Congestive heart failure, unspecified     Heart failure ([facility]     Hypertension     Other ill-defined conditions(799.89)      heart murmur    Presence of biventricular automatic cardioverter/defibrillator (AICD) 2015     [employer] biventricular AICD implant    Stomach ulcer        Nurse Navigator(NN) contacted the patient by telephone to perform post hospital  discharge assessment. Verified  and address with patient as identifiers. Provided introduction to self, and explanation of the Nurses Navigator role. Diet:   Patient reports: Regular Diet    Activity:    Ad chong    Medication:   Performed medication reconciliation with patients DIL, and she verbalized  understanding of administration of home medications. There were no barriers to obtaining medications identified at this time. Support system:  Patient lives alone but has extended family and Spiritism members near by, son and DIL live in [place]. Advised patient has ADT, and also has a caregiver 7 days per week which prepares meals and helps with medications. Patient is still very active. Patient drives,and exercises on her treadmill. Discharge Instructions :  Reviewed discharge instructions with patients DIL, she  verbalizes understanding of discharge instructions and follow-up care. Red Flags:  Swelling  Shortness of breath  Chest pain  Fever> 100 more than a few days  Increased bruising   Incision site drainage               PCP/Specialist follow up: Patient scheduled to follow up with Debbie William MD on 2/13/17. Reviewed red flags with patient JEAN-PAUL, and she verbalizes understanding,  given an opportunity to ask questions. No other clinical/social/functional needs noted. The patients DIL agrees to contact the PCP office for questions related to their healthcare. The patients DIL expressed thanks, offered no additional questions and ended the call.

## 2017-02-13 ENCOUNTER — TELEPHONE (OUTPATIENT)
Dept: CARDIOLOGY CLINIC | Age: 82
End: 2017-02-13

## 2017-02-13 ENCOUNTER — OFFICE VISIT (OUTPATIENT)
Dept: INTERNAL MEDICINE CLINIC | Age: 82
End: 2017-02-13

## 2017-02-13 VITALS
OXYGEN SATURATION: 96 % | SYSTOLIC BLOOD PRESSURE: 114 MMHG | HEART RATE: 70 BPM | DIASTOLIC BLOOD PRESSURE: 70 MMHG | HEIGHT: 62 IN | BODY MASS INDEX: 25.69 KG/M2 | TEMPERATURE: 97.6 F | WEIGHT: 139.6 LBS

## 2017-02-13 DIAGNOSIS — I50.23 SYSTOLIC CHF, ACUTE ON CHRONIC (HCC): ICD-10-CM

## 2017-02-13 DIAGNOSIS — J30.0 VASOMOTOR RHINITIS: ICD-10-CM

## 2017-02-13 DIAGNOSIS — I42.0 CARDIOMYOPATHY, DILATED, NONISCHEMIC (HCC): Primary | Chronic | ICD-10-CM

## 2017-02-13 DIAGNOSIS — C85.90 NON-HODGKIN'S LYMPHOMA, UNSPECIFIED BODY REGION, UNSPECIFIED NON-HODGKIN LYMPHOMA TYPE (HCC): ICD-10-CM

## 2017-02-13 NOTE — TELEPHONE ENCOUNTER
Left voice message for patient reminding her of scheduled new patient appointment for Tuesday February 14th.

## 2017-02-13 NOTE — PROGRESS NOTES
[address] [facility]  [facility]  [address] [facility] [address]  Phone:  [phone]  Fax: [phone]       Chief Complaint   Patient presents with   [facility] Follow Up      Follow for CHF 2/1/17   . SUBJECTIVE:    Mendel Hill is a 80 y.o. female comes in for return visit having been hospitalized most recently for volume depletion with low output state. She was given IV's for a day and improved significantly. At the time of discharge her blood pressure reached a low in the 80's but began to maintain itself in the high 90's. I discharged her on Furosemide 40 mg. Her prior dose was 60 mg b.i.d. She was well compensated with the prior dose before the most recent events. The other change made was a reduction in her Carvedilol to 3.125 mg b.i.d.      I was also concerned because she had early forms on her differential.  She saw Dr. Porter Escobedo but this information was not conveyed to her. Specifically she had metamyelocyte and myelocytes but no blasts were noted. Her exertional tolerance has improved significantly since being home. She also has nasal congestion with frequent clearing of the throat but this is significantly better. Current Outpatient Prescriptions   Medication Sig Dispense Refill    allopurinol (ZYLOPRIM) 300 mg tablet TAKE ONE (1) TABLET(S) DAILY 90 Tab 3    atorvastatin (LIPITOR) 10 mg tablet TAKE ONE (1) TABLET(S) DAILY 90 Tab 3    fluticasone-salmeterol (ADVAIR) 250-50 mcg/dose diskus inhaler Take 1 Puff by inhalation two (2) times a day. 3 Inhaler 3    loratadine (CLARITIN) 10 mg tablet Take 10 mg by mouth daily as needed.  co-enzyme Q-10 (CO Q-10) 100 mg capsule Take 100 mg by mouth daily.  OXYGEN-AIR DELIVERY SYSTEMS by Does Not Apply route.  ibrutinib (IMBRUVICA) 140 mg cap Take 420 mg by mouth five (5) days a week.  140 mg cap, takes 3 caps five days a week (none on Sunday or Wednesday)      acetaminophen (TYLENOL EXTRA STRENGTH) 500 mg tablet Take 500 mg by mouth every six (6) hours as needed for Pain.  multivitamin (ONE A DAY) tablet Take 1 Tab by mouth daily.  furosemide (LASIX) 40 mg tablet Take 1 Tab by mouth daily. Take an additional 40 mg tab PO for a 2-4 lbs weight gain. Indications: PERIPHERAL EDEMA DUE TO CHRONIC HEART FAILURE 60 Tab 11    carvedilol (COREG) 3.125 mg tablet TAKE ONE (1) TABLET(S) TWIC E DAILY WITH FOOD  Indications: Chronic Heart Failure 180 Tab 3    albuterol (PROVENTIL VENTOLIN) 2.5 mg /3 mL (0.083 %) nebulizer solution 3 mL by Nebulization route every four (4) hours as needed for Wheezing. 100 Each 11    amoxicillin (AMOXIL) 500 mg capsule TAKE ONE CAPSULE BY MOUTH THREE TIMES A DAY  1    sacubitril-valsartan (ENTRESTO) 24 mg/26 mg tablet Take 1 Tab by mouth two (2) times a day. 60 Tab 2    sacubitril-valsartan (ENTRESTO) 24 mg/26 mg tablet Take 1 Tab by mouth two (2) times a day.  28 Tab 0     Past Medical History:   Diagnosis Date    Asthma     [facility] [address]     lymphoma    Congestive heart failure, unspecified     Heart failure (HCC)     Hypertension     Other ill-defined conditions(799.89)     heart murmur    Presence of biventricular automatic cardioverter/defibrillator (AICD) 7/2/2015    [employer] biventricular AICD implant    Stomach ulcer      Past Surgical History:   Procedure Laterality Date    HX COLONOSCOPY      HX HEENT      cataracts removed    HX HYSTERECTOMY      HX OTHER SURGICAL      lymph node surgery    HX TONSILLECTOMY      ME EGD TRANSORAL BIOPSY SINGLE/MULTIPLE  5/23/2012         SINUS SURGERY PROC UNLISTED      Nasal polyps removed     Allergies   Allergen Reactions    Codeine Other (comments)     \"made me disoriented\" per pt         REVIEW OF SYSTEMS:  General: negative for - chills or fever  ENT: negative for - headaches, nasal congestion or tinnitus  Respiratory: negative for - cough, hemoptysis, shortness of breath or wheezing  Cardiovascular : negative for - chest pain, edema, palpitations or shortness of breath  Gastrointestinal: negative for - abdominal pain, blood in stools, heartburn or nausea/vomiting  Genito-Urinary: no dysuria, trouble voiding, or hematuria  Musculoskeletal: negative for - gait disturbance, joint pain, joint stiffness or joint swelling  Neurological: no TIA or stroke symptoms  Hematologic: no bruises, no bleeding, no swollen glands  Integument: no lumps, mole changes, nail changes or rash  Endocrine: no malaise/lethargy or unexpected weight changes      Social History     Social History    Marital status:      Spouse name: N/A    Number of children: 1    Years of education: 16+     Occupational History    retired teacher      Social History Main Topics    Smoking status: Never Smoker    Smokeless tobacco: Never Used    Alcohol use No    Drug use: No    Sexual activity: No     Other Topics Concern    None     Social History Narrative     Family History   Problem Relation Age of Onset    Heart Disease Mother     Stroke Father        OBJECTIVE:    Visit Vitals    /70 (BP 1 Location: Left arm, BP Patient Position: Sitting)    Pulse 70    Temp 97.6 °F (36.4 °C) (Oral)    Ht 5' 2\" (1.575 m)    Wt 139 lb 9.6 oz (63.3 kg)    SpO2 96%    BMI 25.53 kg/m2     CONSTITUTIONAL: well , well nourished, appears age appropriate  EYES: perrla, eom intact  ENMT:moist mucous membranes, pharynx clear  NECK: supple. Thyroid normal,noJVD  RESPIRATORY: Chest: clear to ascultation and percussion   CARDIOVASCULAR: Heart: regular rate and rhythm  GASTROINTESTINAL: Abdomen: soft, bowel sounds active  HEMATOLOGIC: no pathological lymph nodes palpated  MUSCULOSKELETAL: Extremities: no edema, pulse 1+   INTEGUMENT: No unusual rashes or suspicious skin lesions noted. Nails appear normal.  NEUROLOGIC: non-focal exam   MENTAL STATUS: alert and oriented, appropriate affect      ASSESSMENT:  1.  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 Systolic CHF, acute on chronic (HCC)    3. Non-Hodgkin's lymphoma, unspecified body region, unspecified non-Hodgkin lymphoma type ([facility]    4. Vasomotor rhinitis        PLAN:    1. She appears to be in mild congestive heart failure today. She has neck veins and trace to 1+ edema which is new for the latter although there is an orthostatic component to this. I will resume her Furosemide at 40 mg b.i.d.   2. Her blood pressure today is 98/60. This is more than adequate to accommodate the change hopefully. 3. The information regarding her early forms is important for Dr. Gabriela Morales to have. This will be sent to her. 4. Her scratchy throat probably represents postnasal drip, nonviral.  Symptomatic treatment only. Follow-up Disposition:  Return in about 3 weeks (around 3/6/2017).       Adriane Potter MD

## 2017-02-13 NOTE — MR AVS SNAPSHOT
Visit Information Date & Time Provider Department Dept. Phone Encounter #  
 2/13/2017 10:45 AM Heather Plunkett [facility] [phone] [id number] Follow-up Instructions Return in about 3 weeks (around 3/6/2017). Your Appointments 2/14/2017  9:30 AM  
New Patient with Jorge Alberto Scott MD  
[address] [facility] ([facility]) [facility] [id number] [place] [address]  
  
    
 2/21/2017  9:45 AM  
ESTABLISHED PATIENT with Srinivas Wick MD  
[facility]) Appt Note: 6 MO. F/U  
 [address] [facility]  
[phone] [address]  
  
    
 2/28/2017 10:00 AM  
Any with Barbara Espinosa MD  
[address] [facility]) Appt Note: 1 month follow up  
 [address] [facility]  
  
   
 [address] [address]  
  
    
 5/9/2017 10:45 AM  
ESTABLISHED PATIENT with Srinivas Wick MD  
[facility]) Appt Note: 3 MO. F/U  
 [address] [place] [address]  
[phone] 4/26/2017  8:00 AM  
REMOTE OFFICE VISIT with [facility]) Appt Note: NOT AN OFFICE VISIT - REMOTE BSC ICD  
 [id number] [facility]  
[phone] [address] [facility] Upcoming Health Maintenance Date Due  
 MEDICARE YEARLY EXAM 4/19/2017 GLAUCOMA SCREENING Q2Y 12/18/2017 DTaP/Tdap/Td series (2 - Td) 2/13/2027 Allergies as of 2/13/2017  Review Complete On: 2/13/2017 By: Tung Blanca Severity Noted Reaction Type Reactions Codeine  05/21/2012   Side Effect Other (comments) \"made me disoriented\" per pt Current Immunizations  Reviewed on 6/29/2015 Name Date Influenza Vaccine 10/1/2016, 10/4/2014 Influenza Vaccine Split 10/22/2011 Pneumococcal Vaccine (Unspecified Type) 5/22/2007 Not reviewed this visit You Were Diagnosed With   
  
 Codes Comments Cardiomyopathy, dilated, nonischemic (HCC)    -  Primary ICD-10-CM: I42.9 ICD-9-CM: 425.4 Systolic CHF, acute on chronic (HCC)     ICD-10-CM: P52.02 ICD-9-CM: 428.23, 428.0 Non-Hodgkin's lymphoma, unspecified body region, unspecified non-Hodgkin lymphoma type ([facility]     ICD-10-CM: C85.90 ICD-9-CM: 202.80 Vasomotor rhinitis     ICD-10-CM: J30.0 ICD-9-CM: 477.9 Vitals BP Pulse Temp Height(growth percentile) Weight(growth percentile) SpO2  
 114/70 (BP 1 Location: Left arm, BP Patient Position: Sitting) 70 97.6 °F (36.4 °C) (Oral) 5' 2\" (1.575 m) 139 lb 9.6 oz (63.3 kg) 96% BMI OB Status Smoking Status 25.53 kg/m2 Postmenopausal Never Smoker Vitals History BMI and BSA Data Body Mass Index Body Surface Area 25.53 kg/m 2 1.66 m 2 Preferred Pharmacy Pharmacy Name Phone [address] [place] [phone] Your Updated Medication List  
  
   
This list is accurate as of: 2/13/17 12:44 PM.  Always use your most recent med list.  
  
  
  
  
 albuterol 2.5 mg /3 mL (0.083 %) nebulizer solution Commonly known as:  PROVENTIL VENTOLIN  
3 mL by Nebulization route every four (4) hours as needed for Wheezing. allopurinol 300 mg tablet Commonly known as:  ZYLOPRIM  
TAKE ONE (1) TABLET(S) DAILY  
  
 atorvastatin 10 mg tablet Commonly known as:  LIPITOR  
TAKE ONE (1) TABLET(S) DAILY  
  
 carvedilol 3.125 mg tablet Commonly known as:  COREG  
TAKE ONE (1) TABLET(S) TWIC E DAILY WITH FOOD  Indications: Chronic Heart Failure co-enzyme Q-10 100 mg capsule Commonly known as:  CO Q-10 Take 100 mg by mouth daily. fluticasone-salmeterol 250-50 mcg/dose diskus inhaler Commonly known as:  ADVAIR Take 1 Puff by inhalation two (2) times a day. furosemide 40 mg tablet Commonly known as:  LASIX Take 1 Tab by mouth two (2) times a day. IMBRUVICA 140 mg capsule Generic drug:  ibrutinib Take 420 mg by mouth five (5) days a week. 140 mg cap, takes 3 caps five days a week (none on Sunday or Wednesday)  
  
 loratadine 10 mg tablet Commonly known as:  Portia Orozco Take 10 mg by mouth daily as needed. multivitamin tablet Commonly known as:  ONE A DAY Take 1 Tab by mouth daily. OXYGEN-AIR DELIVERY SYSTEMS  
by Does Not Apply route. potassium chloride SR 10 mEq tablet Commonly known as:  KLOR-CON 10 Take 10 mEq by mouth daily. TYLENOL EXTRA STRENGTH 500 mg tablet Generic drug:  acetaminophen Take 500 mg by mouth every six (6) hours as needed for Pain.  
  
 valsartan 40 mg tablet Commonly known as:  DIOVAN  
TAKE ONE TABLET BY MOUTH EVERY DAY We Performed the Following METABOLIC PANEL, BASIC [86472 CPT(R)] Follow-up Instructions Return in about 3 weeks (around 3/6/2017). Introducing [facility] & HEALTH SERVICES! Dear Berny Panda: Thank you for requesting a [employer] account. Our records indicate that you already have an active [employer] account. You can access your account anytime at [email or web address] Did you know that you can access your hospital and ER discharge instructions at any time in [employer]? You can also review all of your test results from your hospital stay or ER visit. Additional Information If you have questions, please visit the Frequently Asked Questions section of the [employer] website at [email or web address] Remember, [employer] is NOT to be used for urgent needs. For medical emergencies, dial 911. Now available from your iPhone and Android! Please provide this summary of care documentation to your next provider. Your primary care clinician is listed as Gail Dey. If you have any questions after today's visit, please call [phone].

## 2017-02-14 ENCOUNTER — TELEPHONE (OUTPATIENT)
Dept: CARDIOLOGY CLINIC | Age: 82
End: 2017-02-14

## 2017-02-14 ENCOUNTER — OFFICE VISIT (OUTPATIENT)
Dept: CARDIOLOGY CLINIC | Age: 82
End: 2017-02-14

## 2017-02-14 VITALS
HEART RATE: 70 BPM | TEMPERATURE: 97.8 F | HEIGHT: 62 IN | WEIGHT: 138.8 LBS | OXYGEN SATURATION: 94 % | SYSTOLIC BLOOD PRESSURE: 106 MMHG | DIASTOLIC BLOOD PRESSURE: 70 MMHG | BODY MASS INDEX: 25.54 KG/M2 | RESPIRATION RATE: 18 BRPM

## 2017-02-14 DIAGNOSIS — I42.9 CARDIOMYOPATHY (HCC): ICD-10-CM

## 2017-02-14 DIAGNOSIS — I42.0 CARDIOMYOPATHY, DILATED, NONISCHEMIC (HCC): Primary | Chronic | ICD-10-CM

## 2017-02-14 LAB
BUN SERPL-MCNC: 23 MG/DL (ref 10–36)
BUN/CREAT SERPL: 23 (ref 11–26)
CALCIUM SERPL-MCNC: 8.8 MG/DL (ref 8.7–10.3)
CHLORIDE SERPL-SCNC: 104 MMOL/L (ref 96–106)
CO2 SERPL-SCNC: 23 MMOL/L (ref 18–29)
CREAT SERPL-MCNC: 0.98 MG/DL (ref 0.57–1)
GLUCOSE SERPL-MCNC: 86 MG/DL (ref 65–99)
POTASSIUM SERPL-SCNC: 4.7 MMOL/L (ref 3.5–5.2)
SODIUM SERPL-SCNC: 141 MMOL/L (ref 134–144)

## 2017-02-14 RX ORDER — AMOXICILLIN 500 MG/1
CAPSULE ORAL
Refills: 1 | COMMUNITY
Start: 2017-01-30 | End: 2017-04-20

## 2017-02-14 NOTE — PATIENT INSTRUCTIONS
1. Stop taking the potassium pill. 2. Keep fluid restriction under 2 liters that includes your tea, coffee, water, juice and ice. 3. Monitor salt intake- no table salt, monitor sodium  Through food labels  4. Start Entresto 24/26 mg twice daily. This medication can increase potassium, please refer to the list of foods rich in potassium. 5. Also monitor BP with this medication- monitor for signs for dizziness and lightheadedness. 6. We will need labs in 2 weeks. 7. Follow up in 3-4 weeks.

## 2017-02-14 NOTE — LETTER
2/14/2017 4:17 PM 
 
Patient:  Ector Willingham YOB: 1925 Date of Visit: 2/14/2017 Dear Barbara Espinosa MD 
[facility] [address] [place] [address] VIA In Basket Sriniavs Wick MD 
[address] [address] VIA In Basket 
 : Thank you for referring Ms. Kelleen Goodell to me for evaluation/treatment. Below are the relevant portions of my assessment and plan of care. HISTORY OF PRESENT ILLNESS 
 
HPI Ector Willingham is a 80 y.o. Female with a history of HTN, NICM (EF of 20-25% by Echo 2/3/17) s/p BiVICD 2015, hyperlipidemia, LBBB, gout, GERD, PUD, DILAN, Non-Hodkin's lymphoma and Reactive airway disease. She presents to the [facility] as a new patient for a consult. She was recently admitted to [facility] for ADHF. She was previously active and walked in the [facility] annually until 2013. She continues to travel and spent 6 weeks in [place] with her son and daughter-in-law. Prior to hospital admission, she noticed increased fatigue, edema, and dyspnea with exertion. She denies recurrent edema since hospital discharge on 2/4/17. However, she continues to complain of fatigue and dyspnea with moderate exertion. Past Medical History Diagnosis Date  Asthma  Cancer ([facility] lymphoma  Congestive heart failure, unspecified  Heart failure ([address]  Hypertension  Other ill-defined conditions(799.89)   
  heart murmur  Presence of biventricular automatic cardioverter/defibrillator (AICD) 7/2/2015 [employer] biventricular AICD implant  Stomach ulcer Past Surgical History Procedure Laterality Date  Hx hysterectomy  Hx other surgical    
  lymph node surgery  Hx tonsillectomy  Pr sinus surgery proc unlisted Nasal polyps removed  Hx heent    
  cataracts removed  Pr egd transoral biopsy single/multiple  5/23/2012  Hx colonoscopy Family History Problem Relation Age of Onset  Heart Disease Mother  Stroke Father Social History Social History  Marital status:  Spouse name: N/A  
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  
 Drug use: No  
 Sexual activity: No  
 
Other Topics Concern  Not on file Social History Narrative Current Outpatient Prescriptions on File Prior to Visit Medication Sig Dispense Refill  carvedilol (COREG) 3.125 mg tablet TAKE ONE (1) TABLET(S) TWIC E DAILY WITH FOOD  Indications: Chronic Heart Failure 90 Tab 3  furosemide (LASIX) 40 mg tablet Take 1 Tab by mouth two (2) times a day. 60 Tab 11  
 allopurinol (ZYLOPRIM) 300 mg tablet TAKE ONE (1) TABLET(S) DAILY 90 Tab 3  
 atorvastatin (LIPITOR) 10 mg tablet TAKE ONE (1) TABLET(S) DAILY 90 Tab 3  
 fluticasone-salmeterol (ADVAIR) 250-50 mcg/dose diskus inhaler Take 1 Puff by inhalation two (2) times a day. 3 Inhaler 3  
 loratadine (CLARITIN) 10 mg tablet Take 10 mg by mouth daily as needed.  co-enzyme Q-10 (CO Q-10) 100 mg capsule Take 100 mg by mouth daily.  OXYGEN-AIR DELIVERY SYSTEMS by Does Not Apply route.  albuterol (PROVENTIL VENTOLIN) 2.5 mg /3 mL (0.083 %) nebulizer solution 3 mL by Nebulization route every four (4) hours as needed for Wheezing. 24 Each 11  
 ibrutinib (IMBRUVICA) 140 mg cap Take 420 mg by mouth five (5) days a week. 140 mg cap, takes 3 caps five days a week (none on Sunday or Wednesday)  acetaminophen (TYLENOL EXTRA STRENGTH) 500 mg tablet Take 500 mg by mouth every six (6) hours as needed for Pain.  multivitamin (ONE A DAY) tablet Take 1 Tab by mouth daily.  valsartan (DIOVAN) 40 mg tablet TAKE ONE TABLET BY MOUTH EVERY DAY  3  
 potassium chloride SR (KLOR-CON 10) 10 mEq tablet Take 10 mEq by mouth daily. No current facility-administered medications on file prior to visit. Review of Systems Constitutional: Positive for malaise/fatigue. HENT: Negative. Eyes: Negative. Cardiovascular: Positive for leg swelling. Gastrointestinal: Negative. Genitourinary: Negative. Musculoskeletal: Negative. Skin: Negative. Neurological: Negative. Endo/Heme/Allergies: Negative. Psychiatric/Behavioral: Negative. Physical Exam  
Constitutional: She is oriented to person, place, and time. She appears well-developed and well-nourished. HENT:  
Head: Normocephalic and atraumatic. Eyes: EOM are normal. Pupils are equal, round, and reactive to light. Neck: Normal range of motion. Neck supple. Cardiovascular: Normal rate and regular rhythm. Exam reveals gallop. Murmur heard. Pulmonary/Chest: Effort normal and breath sounds normal.  
Abdominal: Soft. Bowel sounds are normal.  
Musculoskeletal: Normal range of motion. She exhibits edema. Neurological: She is alert and oriented to person, place, and time. Skin: Skin is warm and dry. Psychiatric: She has a normal mood and affect. Wt Readings from Last 3 Encounters:  
02/14/17 138 lb 12.8 oz (63 kg) 02/13/17 139 lb 9.6 oz (63.3 kg) 02/07/17 137 lb 6.4 oz (62.3 kg) Lab Results Component Value Date/Time Sodium 141 02/13/2017 12:35 PM  
 Potassium 4.7 02/13/2017 12:35 PM  
 Chloride 104 02/13/2017 12:35 PM  
 CO2 23 02/13/2017 12:35 PM  
 Anion gap 7 02/05/2017 03:50 AM  
 Glucose 86 02/13/2017 12:35 PM  
 BUN 23 02/13/2017 12:35 PM  
 Creatinine 0.98 02/13/2017 12:35 PM  
 BUN/Creatinine ratio 23 02/13/2017 12:35 PM  
 GFR est AA 58 02/13/2017 12:35 PM  
 GFR est non-AA 51 02/13/2017 12:35 PM  
 Calcium 8.8 02/13/2017 12:35 PM  
 
Lab Results Component Value Date/Time  WBC 3.7 02/05/2017 03:50 AM  
 HGB 9.6 02/05/2017 03:50 AM  
 HCT 28.3 02/05/2017 03:50 AM  
 PLATELET 610 36/88/6758 03:50 AM  
 MCV 90.4 02/05/2017 03:50 AM  
 
Visit Vitals  /70 (BP 1 Location: Left arm, BP Patient Position: Sitting)  Pulse 70  Temp 97.8 °F (36.6 °C) (Oral)  Resp 18  Ht 5' 2\" (1.575 m)  Wt 138 lb 12.8 oz (63 kg)  SpO2 94%  BMI 25.39 kg/m2 ASSESSMENT and PLAN 
 
NICM HFrEF 20-25% Stage C, NYHA class IIIb 1. Start Entresto 24/26 mg, 1 tablet  twice daily - samples given 2. Stop valsartan (diovan) 3. Stop K- dur - provided a list of high potassium foods to avoid 4. Continue with GDMT therapy - Coreg and lasix 5. Labs in one week with Dr Neymar Chaves. 6. Follow up in 3-4 weeks. Non-pharmacological intervention Reinforced heart failure management at home with monitoring fluid intake. Restricting fluid intake to less than two liters including water, ice, tea and coffee. Monitor salt/sodium intake. Food sources rich in potassium given to patient to avoid excessive intake of potassium rich foods while on Entresto to avoid hyperkalemia. Patient also counseled to monitor for symptoms of dizziness and lightheadedness. Monitor for hypotension. VT - s/p AICD No recent shocks DILAN - resolved No longer uses CPAP therapy Compliant with nocturnal home oxygen HLD - stable On lipitor and coenzyme Q10 Thank you for letting us see her with you, Valery Yun M.D. [facility] [address] 
[address] [address] Dept: [phone] Dept Fax: [phone] Loc: [phone] Loc Fax: [phone] 
24 hour VAD/HF Pager: [phone] If you have questions, please do not hesitate to call me. I look forward to following Ms. John Love along with you. Sincerely, Scottie Rogers MD

## 2017-02-14 NOTE — MR AVS SNAPSHOT
Visit Information Date & Time Provider Department Dept. Phone Encounter #  
 2/14/2017  9:30 AM Luis Bates MD [address] [id number] Your Appointments 2/21/2017  9:45 AM  
ESTABLISHED PATIENT with Junior Viveros MD  
[facility]) Appt Note: 6 MO. F/U  
 [address]  
[phone] [address]  
  
    
 2/28/2017 10:00 AM  
Any with Hyacinth Hammans, MD  
[address] [place] [address] and Primary Care [address]) Appt Note: 1 month follow up  
 [address] [place] [address]  
[phone]  
  
   
 [address] [place] [address]  
  
    
 3/14/2017  9:30 AM  
Follow Up with Luis Bates MD  
12[address] [facility] ([address]) [place] [address]  
[phone]  
  
   
 [address] [place] [address]  
  
    
 5/9/2017 10:45 AM  
ESTABLISHED PATIENT with Junior Viveros MD  
[facility]) Appt Note: 3 MO. F/U  
 [address]  
[phone] 4/26/2017  8:00 AM  
REMOTE OFFICE VISIT with [facility] [address]) Appt Note: NOT AN OFFICE VISIT - REMOTE BSC ICD  
 [address] [facility]  
[phone] [address] [facility] Upcoming Health Maintenance Date Due  
 MEDICARE YEARLY EXAM 4/19/2017 GLAUCOMA SCREENING Q2Y 12/18/2017 DTaP/Tdap/Td series (2 - Td) 2/13/2027 Allergies as of 2/14/2017  Review Complete On: 2/14/2017 By: Mariano Caceres LPN Severity Noted Reaction Type Reactions Codeine  05/21/2012   Side Effect Other (comments) \"made me disoriented\" per pt Current Immunizations  Reviewed on 6/29/2015 Name Date Influenza Vaccine 10/1/2016, 10/4/2014 Influenza Vaccine Split 10/22/2011 Pneumococcal Vaccine (Unspecified Type) 5/22/2007 Not reviewed this visit You Were Diagnosed With   
  
 Codes Comments Cardiomyopathy, dilated, nonischemic (HCC)    -  Primary ICD-10-CM: I42.9 ICD-9-CM: 425.4 Cardiomyopathy ([facility]     ICD-10-CM: I42.9 ICD-9-CM: 425.4 Vitals BP Pulse Temp Resp Height(growth percentile) Weight(growth percentile) 106/70 (BP 1 Location: Left arm, BP Patient Position: Sitting) 70 97.8 °F (36.6 °C) (Oral) 18 5' 2\" (1.575 m) 138 lb 12.8 oz (63 kg) SpO2 BMI OB Status Smoking Status 94% 25.39 kg/m2 Postmenopausal Never Smoker Vitals History BMI and BSA Data Body Mass Index Body Surface Area  
 25.39 kg/m 2 1.66 m 2 Preferred Pharmacy Pharmacy Name Phone [address] Your Updated Medication List  
  
   
This list is accurate as of: 2/14/17  3:59 PM.  Always use your most recent med list.  
  
  
  
  
 albuterol 2.5 mg /3 mL (0.083 %) nebulizer solution Commonly known as:  PROVENTIL VENTOLIN  
3 mL by Nebulization route every four (4) hours as needed for Wheezing. allopurinol 300 mg tablet Commonly known as:  ZYLOPRIM  
TAKE ONE (1) TABLET(S) DAILY  
  
 amoxicillin 500 mg capsule Commonly known as:  AMOXIL TAKE ONE CAPSULE BY MOUTH THREE TIMES A DAY  
  
 atorvastatin 10 mg tablet Commonly known as:  LIPITOR  
TAKE ONE (1) TABLET(S) DAILY  
  
 carvedilol 3.125 mg tablet Commonly known as:  COREG  
TAKE ONE (1) TABLET(S) TWIC E DAILY WITH FOOD  Indications: Chronic Heart Failure  
  
 co-enzyme Q-10 100 mg capsule Commonly known as:  CO Q-10 Take 100 mg by mouth daily. fluticasone-salmeterol 250-50 mcg/dose diskus inhaler Commonly known as:  ADVAIR  
 Take 1 Puff by inhalation two (2) times a day. furosemide 40 mg tablet Commonly known as:  LASIX Take 1 Tab by mouth two (2) times a day. IMBRUVICA 140 mg capsule Generic drug:  ibrutinib Take 420 mg by mouth five (5) days a week. 140 mg cap, takes 3 caps five days a week (none on Sunday or Wednesday)  
  
 loratadine 10 mg tablet Commonly known as:  Moran Fleeting Take 10 mg by mouth daily as needed. multivitamin tablet Commonly known as:  ONE A DAY Take 1 Tab by mouth daily. OXYGEN-AIR DELIVERY SYSTEMS  
by Does Not Apply route. potassium chloride SR 10 mEq tablet Commonly known as:  KLOR-CON 10 Take 10 mEq by mouth daily. * sacubitril-valsartan 24-26 mg tablet Commonly known as:  ENTRESTO Take 1 Tab by mouth two (2) times a day. * sacubitril-valsartan 24-26 mg tablet Commonly known as:  ENTRESTO Take 1 Tab by mouth two (2) times a day. TYLENOL EXTRA STRENGTH 500 mg tablet Generic drug:  acetaminophen Take 500 mg by mouth every six (6) hours as needed for Pain.  
  
 valsartan 40 mg tablet Commonly known as:  DIOVAN  
TAKE ONE TABLET BY MOUTH EVERY DAY  
  
 * Notice: This list has 2 medication(s) that are the same as other medications prescribed for you. Read the directions carefully, and ask your doctor or other care provider to review them with you. Prescriptions Sent to Pharmacy Refills  
 sacubitril-valsartan (ENTRESTO) 24 mg/26 mg tablet 2 Sig: Take 1 Tab by mouth two (2) times a day. Class: Normal  
 Pharmacy: [facility] [id number]  #: [phone] Route: Oral  
  
We Performed the Following EKG, 12 LEAD, INITIAL [22588 CPT(R)] Patient Instructions 1. Stop taking the potassium pill. 2. Keep fluid restriction under 2 liters that includes your tea, coffee, water, juice and ice. 3. Monitor salt intake- no table salt, monitor sodium  Through food labels 4. Start Entresto 24/26 mg twice daily. This medication can increase potassium, please refer to the list of foods rich in potassium. 5. Also monitor BP with this medication- monitor for signs for dizziness and lightheadedness. 6. We will need labs in 2 weeks. 7. Follow up in 3-4 weeks. Introducing [facility] & HEALTH SERVICES! Dear Dru Drake: Thank you for requesting a [employer] account. Our records indicate that you already have an active [employer] account. You can access your account anytime at [email or web address] Did you know that you can access your hospital and ER discharge instructions at any time in UZwan? You can also review all of your test results from your hospital stay or ER visit. Additional Information If you have questions, please visit the Frequently Asked Questions section of the [employer]wan website at [email or web address] Remember, UZwan is NOT to be used for urgent needs. For medical emergencies, dial 911. Now available from your iPhone and Android! Please provide this summary of care documentation to your next provider. Your primary care clinician is listed as Gail Dey. If you have any questions after today's visit, please call [phone].

## 2017-02-14 NOTE — COMMUNICATION BODY
HISTORY OF PRESENT ILLNESS    HPI  Crystal Arguello is a 80 y.o. Female with a history of HTN, NICM (EF of 20-25% by Echo 2/3/17) s/p BiVICD 2015, hyperlipidemia, LBBB, gout, GERD, PUD, DILAN, Non-Hodkin's lymphoma and Reactive airway disease. She presents to the [facility] as a new patient for a consult. She was recently admitted to [facility] for ADHF. She was previously active and walked in the 10 annually until 2013. She continues to travel and spent 6 weeks in [place] with her son and daughter-in-law. Prior to hospital admission, she noticed increased fatigue, edema, and dyspnea with exertion. She denies recurrent edema since hospital discharge on 2/4/17. However, she continues to complain of fatigue and dyspnea with moderate exertion.     Past Medical History   Diagnosis Date    Asthma     Cancer ([facility] [address]      lymphoma    Congestive heart failure, unspecified     Heart failure (HCC)     Hypertension     Other ill-defined conditions(799.89)      heart murmur    Presence of biventricular automatic cardioverter/defibrillator (AICD) 7/2/2015     [employer] biventricular AICD implant    Stomach ulcer      Past Surgical History   Procedure Laterality Date    Hx hysterectomy      Hx other surgical       lymph node surgery    Hx tonsillectomy      Pr sinus surgery proc unlisted       Nasal polyps removed    Hx heent       cataracts removed    Pr egd transoral biopsy single/multiple  5/23/2012          Hx colonoscopy       Family History   Problem Relation Age of Onset    Heart Disease Mother     Stroke Father      Social History     Social History    Marital status:      Spouse name: N/A    Number of children: 1    Years of education: 16+     Occupational History    retired teacher      Social History Main Topics    Smoking status: Never Smoker    Smokeless tobacco: Never Used    Alcohol use No    Drug use: No    Sexual activity: No     Other Topics Concern    Not on file     Social History Narrative     Current Outpatient Prescriptions on File Prior to Visit   Medication Sig Dispense Refill    carvedilol (COREG) 3.125 mg tablet TAKE ONE (1) TABLET(S) TWIC E DAILY WITH FOOD  Indications: Chronic Heart Failure 90 Tab 3    furosemide (LASIX) 40 mg tablet Take 1 Tab by mouth two (2) times a day. 60 Tab 11    allopurinol (ZYLOPRIM) 300 mg tablet TAKE ONE (1) TABLET(S) DAILY 90 Tab 3    atorvastatin (LIPITOR) 10 mg tablet TAKE ONE (1) TABLET(S) DAILY 90 Tab 3    fluticasone-salmeterol (ADVAIR) 250-50 mcg/dose diskus inhaler Take 1 Puff by inhalation two (2) times a day. 3 Inhaler 3    loratadine (CLARITIN) 10 mg tablet Take 10 mg by mouth daily as needed.  co-enzyme Q-10 (CO Q-10) 100 mg capsule Take 100 mg by mouth daily.  OXYGEN-AIR DELIVERY SYSTEMS by Does Not Apply route.  albuterol (PROVENTIL VENTOLIN) 2.5 mg /3 mL (0.083 %) nebulizer solution 3 mL by Nebulization route every four (4) hours as needed for Wheezing. 24 Each 11    ibrutinib (IMBRUVICA) 140 mg cap Take 420 mg by mouth five (5) days a week. 140 mg cap, takes 3 caps five days a week (none on Sunday or Wednesday)      acetaminophen (TYLENOL EXTRA STRENGTH) 500 mg tablet Take 500 mg by mouth every six (6) hours as needed for Pain.  multivitamin (ONE A DAY) tablet Take 1 Tab by mouth daily.  valsartan (DIOVAN) 40 mg tablet TAKE ONE TABLET BY MOUTH EVERY DAY  3    potassium chloride SR (KLOR-CON 10) 10 mEq tablet Take 10 mEq by mouth daily. No current facility-administered medications on file prior to visit. Review of Systems   Constitutional: Positive for malaise/fatigue. HENT: Negative. Eyes: Negative. Cardiovascular: Positive for leg swelling. Gastrointestinal: Negative. Genitourinary: Negative. Musculoskeletal: Negative. Skin: Negative. Neurological: Negative. Endo/Heme/Allergies: Negative. Psychiatric/Behavioral: Negative.         Physical Exam Constitutional: She is oriented to person, place, and time. She appears well-developed and well-nourished. HENT:   Head: Normocephalic and atraumatic. Eyes: EOM are normal. Pupils are equal, round, and reactive to light. Neck: Normal range of motion. Neck supple. Cardiovascular: Normal rate and regular rhythm. Exam reveals gallop. Murmur heard. Pulmonary/Chest: Effort normal and breath sounds normal.   Abdominal: Soft. Bowel sounds are normal.   Musculoskeletal: Normal range of motion. She exhibits edema. Neurological: She is alert and oriented to person, place, and time. Skin: Skin is warm and dry. Psychiatric: She has a normal mood and affect. Wt Readings from Last 3 Encounters:   02/14/17 138 lb 12.8 oz (63 kg)   02/13/17 139 lb 9.6 oz (63.3 kg)   02/07/17 137 lb 6.4 oz (62.3 kg)     Lab Results   Component Value Date/Time    Sodium 141 02/13/2017 12:35 PM    Potassium 4.7 02/13/2017 12:35 PM    Chloride 104 02/13/2017 12:35 PM    CO2 23 02/13/2017 12:35 PM    Anion gap 7 02/05/2017 03:50 AM    Glucose 86 02/13/2017 12:35 PM    BUN 23 02/13/2017 12:35 PM    Creatinine 0.98 02/13/2017 12:35 PM    BUN/Creatinine ratio 23 02/13/2017 12:35 PM    GFR est AA 58 02/13/2017 12:35 PM    GFR est non-AA 51 02/13/2017 12:35 PM    Calcium 8.8 02/13/2017 12:35 PM     Lab Results   Component Value Date/Time    WBC 3.7 02/05/2017 03:50 AM    HGB 9.6 02/05/2017 03:50 AM    HCT 28.3 02/05/2017 03:50 AM    PLATELET 489 39/44/0805 03:50 AM    MCV 90.4 02/05/2017 03:50 AM     Visit Vitals    /70 (BP 1 Location: Left arm, BP Patient Position: Sitting)    Pulse 70    Temp 97.8 °F (36.6 °C) (Oral)    Resp 18    Ht 5' 2\" (1.575 m)    Wt 138 lb 12.8 oz (63 kg)    SpO2 94%    BMI 25.39 kg/m2       ASSESSMENT and PLAN    NICM HFrEF 20-25% Stage C, NYHA class IIIb  1. Start Entresto 24/26 mg, 1 tablet  twice daily - samples given  2. Stop valsartan (diovan)  3.  Stop K- dur - provided a list of high potassium foods to avoid  4. Continue with GDMT therapy - Coreg and lasix  5. Labs in one week with Dr Gary Mercedes. 6. Follow up in 3-4 weeks. Non-pharmacological intervention       Reinforced heart failure management at home with monitoring fluid intake. Restricting fluid intake to less than two liters including water, ice, tea and coffee. Monitor salt/sodium intake. Food sources rich in potassium given to patient to avoid excessive intake of potassium rich foods while on Entresto to avoid hyperkalemia. Patient also counseled to monitor for symptoms of dizziness and lightheadedness. Monitor for hypotension. VT - s/p AICD  No recent shocks    DILAN - resolved  No longer uses CPAP therapy  Compliant with nocturnal home oxygen    HLD - stable  On lipitor and coenzyme Q10      Thank you for letting us see her with you,    Valery Ramirez M.D.  [address] [address]  Dept: [phone]  Dept Fax: [phone]: [phone] Fax: [phone]  24 hour VAD/HF Pager: [phone]

## 2017-02-14 NOTE — TELEPHONE ENCOUNTER
Please call patients daughter in call Reed at # [phone]    She wants to discuss medications that were prescribed today

## 2017-02-14 NOTE — PROGRESS NOTES
HISTORY OF PRESENT ILLNESS    HPI  Loc Diego is a 80 y.o. Female with a history of HTN, NICM (EF of 20-25% by Echo 2/3/17) s/p BiVICD 2015, hyperlipidemia, LBBB, gout, GERD, PUD, DILAN, Non-Hodkin's lymphoma and Reactive airway disease. She presents to the [facility] as a new patient for a consult. She was recently admitted to [facility] for ADHF. She was previously active and walked in the 10 annually until 2013. She continues to travel and spent 6 weeks in [place] with her son and daughter-in-law. Prior to hospital admission, she noticed increased fatigue, edema, and dyspnea with exertion. She denies recurrent edema since hospital discharge on 2/4/17. However, she continues to complain of fatigue and dyspnea with moderate exertion.     Past Medical History   Diagnosis Date    Asthma     Cancer ([facility] [address]      lymphoma    Congestive heart failure, unspecified     Heart failure (HCC)     Hypertension     Other ill-defined conditions(799.89)      heart murmur    Presence of biventricular automatic cardioverter/defibrillator (AICD) 7/2/2015     [employer] biventricular AICD implant    Stomach ulcer      Past Surgical History   Procedure Laterality Date    Hx hysterectomy      Hx other surgical       lymph node surgery    Hx tonsillectomy      Pr sinus surgery proc unlisted       Nasal polyps removed    Hx heent       cataracts removed    Pr egd transoral biopsy single/multiple  5/23/2012          Hx colonoscopy       Family History   Problem Relation Age of Onset    Heart Disease Mother     Stroke Father      Social History     Social History    Marital status:      Spouse name: N/A    Number of children: 1    Years of education: 16+     Occupational History    retired teacher      Social History Main Topics    Smoking status: Never Smoker    Smokeless tobacco: Never Used    Alcohol use No    Drug use: No    Sexual activity: No     Other Topics Concern    Not on file     Social History Narrative     Current Outpatient Prescriptions on File Prior to Visit   Medication Sig Dispense Refill    carvedilol (COREG) 3.125 mg tablet TAKE ONE (1) TABLET(S) TWIC E DAILY WITH FOOD  Indications: Chronic Heart Failure 90 Tab 3    furosemide (LASIX) 40 mg tablet Take 1 Tab by mouth two (2) times a day. 60 Tab 11    allopurinol (ZYLOPRIM) 300 mg tablet TAKE ONE (1) TABLET(S) DAILY 90 Tab 3    atorvastatin (LIPITOR) 10 mg tablet TAKE ONE (1) TABLET(S) DAILY 90 Tab 3    fluticasone-salmeterol (ADVAIR) 250-50 mcg/dose diskus inhaler Take 1 Puff by inhalation two (2) times a day. 3 Inhaler 3    loratadine (CLARITIN) 10 mg tablet Take 10 mg by mouth daily as needed.  co-enzyme Q-10 (CO Q-10) 100 mg capsule Take 100 mg by mouth daily.  OXYGEN-AIR DELIVERY SYSTEMS by Does Not Apply route.  albuterol (PROVENTIL VENTOLIN) 2.5 mg /3 mL (0.083 %) nebulizer solution 3 mL by Nebulization route every four (4) hours as needed for Wheezing. 24 Each 11    ibrutinib (IMBRUVICA) 140 mg cap Take 420 mg by mouth five (5) days a week. 140 mg cap, takes 3 caps five days a week (none on Sunday or Wednesday)      acetaminophen (TYLENOL EXTRA STRENGTH) 500 mg tablet Take 500 mg by mouth every six (6) hours as needed for Pain.  multivitamin (ONE A DAY) tablet Take 1 Tab by mouth daily.  valsartan (DIOVAN) 40 mg tablet TAKE ONE TABLET BY MOUTH EVERY DAY  3    potassium chloride SR (KLOR-CON 10) 10 mEq tablet Take 10 mEq by mouth daily. No current facility-administered medications on file prior to visit. Review of Systems   Constitutional: Positive for malaise/fatigue. HENT: Negative. Eyes: Negative. Cardiovascular: Positive for leg swelling. Gastrointestinal: Negative. Genitourinary: Negative. Musculoskeletal: Negative. Skin: Negative. Neurological: Negative. Endo/Heme/Allergies: Negative. Psychiatric/Behavioral: Negative.         Physical Exam Constitutional: She is oriented to person, place, and time. She appears well-developed and well-nourished. HENT:   Head: Normocephalic and atraumatic. Eyes: EOM are normal. Pupils are equal, round, and reactive to light. Neck: Normal range of motion. Neck supple. Cardiovascular: Normal rate and regular rhythm. Exam reveals gallop. Murmur heard. Pulmonary/Chest: Effort normal and breath sounds normal.   Abdominal: Soft. Bowel sounds are normal.   Musculoskeletal: Normal range of motion. She exhibits edema. Neurological: She is alert and oriented to person, place, and time. Skin: Skin is warm and dry. Psychiatric: She has a normal mood and affect. Wt Readings from Last 3 Encounters:   02/14/17 138 lb 12.8 oz (63 kg)   02/13/17 139 lb 9.6 oz (63.3 kg)   02/07/17 137 lb 6.4 oz (62.3 kg)     Lab Results   Component Value Date/Time    Sodium 141 02/13/2017 12:35 PM    Potassium 4.7 02/13/2017 12:35 PM    Chloride 104 02/13/2017 12:35 PM    CO2 23 02/13/2017 12:35 PM    Anion gap 7 02/05/2017 03:50 AM    Glucose 86 02/13/2017 12:35 PM    BUN 23 02/13/2017 12:35 PM    Creatinine 0.98 02/13/2017 12:35 PM    BUN/Creatinine ratio 23 02/13/2017 12:35 PM    GFR est AA 58 02/13/2017 12:35 PM    GFR est non-AA 51 02/13/2017 12:35 PM    Calcium 8.8 02/13/2017 12:35 PM     Lab Results   Component Value Date/Time    WBC 3.7 02/05/2017 03:50 AM    HGB 9.6 02/05/2017 03:50 AM    HCT 28.3 02/05/2017 03:50 AM    PLATELET 589 51/01/2235 03:50 AM    MCV 90.4 02/05/2017 03:50 AM     Visit Vitals    /70 (BP 1 Location: Left arm, BP Patient Position: Sitting)    Pulse 70    Temp 97.8 °F (36.6 °C) (Oral)    Resp 18    Ht 5' 2\" (1.575 m)    Wt 138 lb 12.8 oz (63 kg)    SpO2 94%    BMI 25.39 kg/m2       ASSESSMENT and PLAN    NICM HFrEF 20-25% Stage C, NYHA class IIIb  1. Start Entresto 24/26 mg, 1 tablet  twice daily - samples given  2. Stop valsartan (diovan)  3.  Stop K- dur. - provided a list of high potassium foods to avoid  4. Continue with GDMT therapy - Coreg and lasix  5. Labs in one week with Dr Neymar Chaves. 6. Follow up in 3-4 weeks. Non-pharmacological intervention       Reinforced heart failure management at home with monitoring fluid intake. Restricting fluid intake to less than two liters including water, ice, tea and coffee. Monitor salt/sodium intake. Food sources rich in potassium given to patient to avoid excessive intake of potassium rich foods while on Entresto to avoid hyperkalemia. Patient also counseled to monitor for symptoms of dizziness and lightheadedness. Monitor for hypotension. VT - s/p AICD  No recent shocks    DILAN - resolved  No longer uses CPAP therapy  Compliant with nocturnal home oxygen    HLD - stable  On lipitor and coenzyme Q10      Thank you for letting us see her with you,    Valery Yun M.D.  [place] [address]  Dept: [phone]  Dept Fax: [phone]: [phone] Fax: [phone]  24 hour VAD/HF Pager: [phone]

## 2017-02-15 ENCOUNTER — TELEPHONE (OUTPATIENT)
Dept: CARDIOLOGY CLINIC | Age: 82
End: 2017-02-15

## 2017-02-15 NOTE — TELEPHONE ENCOUNTER
Shreya with  [employer] is calling asking if the patient is to take Lasix 40 mg once a day or twice a day. Please advise.

## 2017-02-15 NOTE — TELEPHONE ENCOUNTER
Lacie Solomon was informed patient is to take Lasix 40 mg twice daily. Plan of care signed and faxed to [phone] with medication changes listed.

## 2017-02-15 NOTE — TELEPHONE ENCOUNTER
Spoke with patients daughter, Shyam Carney (HIPAA verified). Shyam Carney reports the patient does not want to make the recommended medication changes from her office visit yesterday until her PCP (Dr. Joaquin Austin) is in agreement with the plan. Shyam Carney states the patient has a home health nurse that has put a call in to Dr. Joaquin Austin to review the patients new medication plan.

## 2017-02-17 ENCOUNTER — TELEPHONE (OUTPATIENT)
Dept: CARDIOLOGY CLINIC | Age: 82
End: 2017-02-17

## 2017-02-17 RX ORDER — FUROSEMIDE 40 MG/1
40 TABLET ORAL DAILY
Qty: 60 TAB | Refills: 11
Start: 2017-02-17 | End: 2017-02-21 | Stop reason: SDUPTHER

## 2017-02-17 NOTE — TELEPHONE ENCOUNTER
Shreya with [facility] called to report patients blood pressure was checked twice today at 81/49. Patient started taking Entresto 24-26 mg yesterday morning. Star Porter reports the patient denies dizziness or any other sx. Star Porter can be reached at [phone].

## 2017-02-17 NOTE — TELEPHONE ENCOUNTER
Returned call to Akshat Gongora, with [facility]. Advised her to have patient reduce furosemide from 40 mg PO BID to 40 mg PO daily in an effort to normalize BP. Per Shreya's report, last BP was 100/53, and she remains asymptomatic. Asked her to notify [facility] if Ms. Campbell gains any weight, develops edema or dyspnea with decreased diuretic dose. She verbalizes understanding.

## 2017-02-20 DIAGNOSIS — I42.0 CARDIOMYOPATHY, DILATED, NONISCHEMIC (HCC): ICD-10-CM

## 2017-02-20 DIAGNOSIS — I10 ESSENTIAL HYPERTENSION: ICD-10-CM

## 2017-02-20 RX ORDER — ALBUTEROL SULFATE 0.83 MG/ML
2.5 SOLUTION RESPIRATORY (INHALATION)
Qty: 100 EACH | Refills: 11 | Status: SHIPPED | OUTPATIENT
Start: 2017-02-20 | End: 2018-05-15 | Stop reason: SDUPTHER

## 2017-02-20 RX ORDER — CARVEDILOL 3.12 MG/1
TABLET ORAL
Qty: 180 TAB | Refills: 3 | Status: SHIPPED | OUTPATIENT
Start: 2017-02-20 | End: 2018-04-03 | Stop reason: SDUPTHER

## 2017-02-21 ENCOUNTER — OFFICE VISIT (OUTPATIENT)
Dept: CARDIOLOGY CLINIC | Age: 82
End: 2017-02-21

## 2017-02-21 ENCOUNTER — TELEPHONE (OUTPATIENT)
Dept: CARDIOLOGY CLINIC | Age: 82
End: 2017-02-21

## 2017-02-21 VITALS
OXYGEN SATURATION: 95 % | HEART RATE: 66 BPM | DIASTOLIC BLOOD PRESSURE: 69 MMHG | BODY MASS INDEX: 24.44 KG/M2 | WEIGHT: 132.8 LBS | HEIGHT: 62 IN | SYSTOLIC BLOOD PRESSURE: 112 MMHG

## 2017-02-21 DIAGNOSIS — I50.42 CHRONIC COMBINED SYSTOLIC AND DIASTOLIC CONGESTIVE HEART FAILURE (HCC): Primary | ICD-10-CM

## 2017-02-21 DIAGNOSIS — E78.2 MIXED HYPERLIPIDEMIA: ICD-10-CM

## 2017-02-21 DIAGNOSIS — I42.0 CARDIOMYOPATHY, DILATED, NONISCHEMIC (HCC): ICD-10-CM

## 2017-02-21 DIAGNOSIS — I10 ESSENTIAL HYPERTENSION: ICD-10-CM

## 2017-02-21 DIAGNOSIS — Z95.810 PRESENCE OF BIVENTRICULAR AUTOMATIC CARDIOVERTER/DEFIBRILLATOR (AICD): ICD-10-CM

## 2017-02-21 RX ORDER — FUROSEMIDE 40 MG/1
40 TABLET ORAL DAILY
Qty: 60 TAB | Refills: 11 | Status: SHIPPED | OUTPATIENT
Start: 2017-02-21 | End: 2017-03-16 | Stop reason: SDUPTHER

## 2017-02-21 NOTE — PATIENT INSTRUCTIONS
-- Please get labs drawn before return visit with Dr. Kaitlin Goldsmith  -- We will see you for a follow up in 3 months       Limiting Sodium and Fluids With Heart Failure: Care Instructions  Your Care Instructions  Sodium causes your body to keep extra water, making it harder for your heart to pump. By limiting sodium, you will feel better and lower your risk of having to go to the hospital.  People get most of their sodium from processed foods. Fast food and restaurant meals also tend to be very high in sodium. Your doctor may suggest that you limit sodium to 2,000 milligrams (mg) a day or less. That is less than 1 teaspoon of salt a day, including all the salt you eat in cooked or packaged foods. Usually, you have to limit the amount of liquids you drink only if your heart failure is severe. Limiting sodium alone often is enough to help your body get rid of extra fluids. However, your doctor may tell you to limit your fluid intake to a set amount each day. Follow-up care is a key part of your treatment and safety. Be sure to make and go to all appointments, and call your doctor if you are having problems. It's also a good idea to know your test results and keep a list of the medicines you take. How can you care for yourself at home? Read food labels  · Read food labels on cans and food packages. The labels tell you how much sodium is in each serving. Make sure that you look at the serving size. If you eat more than the serving size, you have eaten more sodium than is listed for one serving. · Food labels also tell you the Percent Daily Value. If the Percent Daily Value says 50%, it means that you will get at least 50% of all the sodium you need for the entire day in one serving. Choose products with low Percent Daily Values for sodium. · Be aware that sodium can come in forms other than salt, including monosodium glutamate (MSG), sodium citrate, and sodium bicarbonate (baking soda). MSG is often added to Asian food. You can sometimes ask for food without MSG or salt. Buy low-sodium foods  · Buy foods that are labeled \"unsalted\" (no salt added), \"sodium-free\" (less than 5 mg of sodium per serving), or \"low-sodium\" (less than 140 mg of sodium per serving). A food labeled \"light sodium\" has less than half of the full-sodium version of that food. Foods labeled \"reduced-sodium\" may still have too much sodium. · Buy fresh vegetables or plain, frozen vegetables. Buy low-sodium versions of canned vegetables, soups, and other canned goods. Prepare low-sodium meals  · Use less salt each day when cooking. Reducing salt in this way will help you adjust to the taste. Do not add salt after cooking. Take the salt shaker off the table. · Flavor your food with garlic, lemon juice, onion, vinegar, herbs, and spices instead of salt. Do not use soy sauce, steak sauce, onion salt, garlic salt, mustard, or ketchup on your food. · Make your own salad dressings, sauces, and ketchup without adding salt. · Use less salt (or none) when recipes call for it. You can often use half the salt a recipe calls for without losing flavor. Other dishes like rice, pasta, and grains do not need added salt. · Rinse canned vegetables. This removes some--but not all--of the salt. · Avoid water that has a naturally high sodium content or that has been treated with water softeners, which add sodium. Call your local water company to find out the sodium content of your water supply. If you buy bottled water, read the label and choose a sodium-free brand. Avoid high-sodium foods, such as:  · Smoked, cured, salted, and canned meat, fish, and poultry. · Ham, geronimo, hot dogs, and luncheon meats. · Regular, hard, and processed cheese and regular peanut butter. · Crackers with salted tops. · Frozen prepared meals. · Canned and dried soups, broths, and bouillon, unless labeled sodium-free or low-sodium.   · Canned vegetables, unless labeled sodium-free or low-sodium. · Salted snack foods such as chips and pretzels. · [place] fries, pizza, tacos, and other fast foods. · Pickles, olives, ketchup, and other condiments, especially soy sauce, unless labeled sodium-free or low-sodium. If you cannot cook for yourself  · Have family members or friends help you, or have someone cook low-sodium meals. · Check with your local senior nutrition program to find out where meals are served and whether they offer a low-sodium option. You can often find these programs through your local health department or hospital.  · Have meals delivered to your home. Most [facility] have a Meals on [employer]. These programs provide one hot meal a day for older adults, delivered to their homes. Ask whether these meals are low-sodium. Let them know that you are on a low-sodium diet. Limiting fluid intake  · Find a method that works for you. You might simply write down how much you drink every time you do. Some people keep a container filled with the amount of fluid allowed for that day. If they drink from a source other than the container, then they pour out that amount. · Measure your regular drinking glasses to find out how much fluid each one holds. Once you know this, you will not have to measure every time. · Besides water, milk, juices, and other drinks, some foods have a lot of fluid. Count any foods that will melt (such as ice cream or gelatin dessert) or liquid foods (such as soup) as part of your fluid intake for the day. Where can you learn more? Go to http://camelia-bar.info/. Enter A166 in the search box to learn more about \"Limiting Sodium and Fluids With Heart Failure: Care Instructions. \"  Current as of: January 27, 2016  Content Version: 11.1  © 5054-2503 [employer], [employer]. Care instructions adapted under license by [employer] (which disclaims liability or warranty for this information).  If you have questions about a medical condition or this instruction, always ask your healthcare professional. [place] [address] any warranty or liability for your use of this information.

## 2017-02-21 NOTE — PROGRESS NOTES
[facility]   [address] [facility], [address] [place] [address] Road                                          NEW PATIENT HPI/FOLLOW-UP      NAME:  Ni Burnett   :   3/17/1925   MRN:   [id number]   PCP:  Jose Juan Leonard MD           Subjective: The patient is a 80y.o. year old female h/o AOCHF (EF 20-25%), NIDCM, s/p AICD, HTN, DILAN, Non-Hodgkin's Lymphoma,  who returns for a routine follow-up. Since the last visit, patient reports visit with Dr. Ami Vega at [facility]. She reports no new symptoms but has concerns over differing medication directions between PCP and [facility]. Pt has been started on Entresto for Advanced HF, is unsure what directions to follow for Lasix. She reports scant ankle edema; otherwise denies change in exercise tolerance, chest pain, medication intolerance, palpitations, shortness of breath, PND/orthopnea wheezing, sputum, syncope, dizziness or light headedness. Doing satisfactorily. Review of Systems  General: Pt denies excessive weight gain or loss. Pt is able to conduct ADL's. Respiratory: Denies shortness of breath, JENKINS, wheezing or stridor.   Cardiovascular: +edema Denies precordial pain, palpitations, or PND  Gastrointestinal: Denies poor appetite, indigestion, abdominal pain or blood in stool  Peripheral vascular: Denies claudication, leg cramps  Neuropsychiatric: Denies paresthesias,tingling,numbness,anxiety,depression,fatigue  Musculoskeletal: Denies pain,tenderness, soreness,swelling      Past Medical History   Diagnosis Date    Asthma     Cancer ([address]      lymphoma    Congestive heart failure, unspecified     Heart failure ([address]     Hypertension     Other ill-defined conditions(799.89)      heart murmur    Presence of biventricular automatic cardioverter/defibrillator (AICD) 2015     [employer] biventricular AICD implant    Stomach ulcer      Patient Active Problem List    Diagnosis Date Noted    CHF (congestive heart failure) ([address] 02/04/2017    SOB (shortness of breath) 02/02/2017    Acute respiratory insufficiency ([address] 02/02/2017    PUD (peptic ulcer disease) 02/02/2017    Gastroesophageal reflux disease with esophagitis 02/02/2017    Thoracic aortic aneurysm without rupture ([address] 02/02/2017    Physical deconditioning 01/29/2017    Cramping of hands 01/24/2017    Mild mitral regurgitation 01/22/2016    Lymphoma ([address] 11/17/2015    Chronic systolic (congestive) heart failure ([address] 10/08/2015    Presence of biventricular automatic cardioverter/defibrillator (AICD) 07/02/2015    LBBB (left bundle branch block) 97/84/7026    Systolic CHF, acute on chronic ([address] 06/29/2015    Acute respiratory failure with hypoxia ([facility] 06/29/2015    Upper respiratory infection 06/29/2015    HTN (hypertension) 06/29/2015    Hyperlipidemia 06/29/2015    Gout 06/29/2015    Reactive airway disease 03/23/2015    Fatigue 03/10/2015    Cardiomyopathy ([address] 11/08/2014    Anemia 11/08/2014    Hypotension 11/04/2014    Rhinitis 09/04/2014    Pulmonary edema 08/04/2012    Cardiomyopathy, dilated, 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 08/04/2012    NHL (non-Hodgkin's lymphoma) ([facility] 08/04/2012    DILAN (obstructive sleep apnea) 08/04/2012    Abdominal pain 05/22/2012     Class: Acute    Weight loss 05/22/2012     Class: Acute      Past Surgical History   Procedure Laterality Date    Hx hysterectomy      Hx other surgical       lymph node surgery    Hx tonsillectomy      Pr sinus surgery proc unlisted       Nasal polyps removed    Hx heent       cataracts removed    Pr egd transoral biopsy single/multiple  5/23/2012          Hx colonoscopy       Allergies   Allergen Reactions    Codeine Other (comments)     \"made me disoriented\" per pt      Family History   Problem Relation Age of Onset    Heart Disease Mother     Stroke Father       Social History     Social History    Marital status:      Spouse name: N/A    Number of children: 1    Years of education: 16+     Occupational History    retired teacher      Social History Main Topics    Smoking status: Never Smoker    Smokeless tobacco: Never Used    Alcohol use No    Drug use: No    Sexual activity: No     Other Topics Concern    Not on file     Social History Narrative      Current Outpatient Prescriptions   Medication Sig    carvedilol (COREG) 3.125 mg tablet TAKE ONE (1) TABLET(S) TWIC E DAILY WITH FOOD  Indications: Chronic Heart Failure    albuterol (PROVENTIL VENTOLIN) 2.5 mg /3 mL (0.083 %) nebulizer solution 3 mL by Nebulization route every four (4) hours as needed for Wheezing.  furosemide (LASIX) 40 mg tablet Take 1 Tab by mouth daily.  amoxicillin (AMOXIL) 500 mg capsule TAKE ONE CAPSULE BY MOUTH THREE TIMES A DAY    allopurinol (ZYLOPRIM) 300 mg tablet TAKE ONE (1) TABLET(S) DAILY    atorvastatin (LIPITOR) 10 mg tablet TAKE ONE (1) TABLET(S) DAILY    fluticasone-salmeterol (ADVAIR) 250-50 mcg/dose diskus inhaler Take 1 Puff by inhalation two (2) times a day.  loratadine (CLARITIN) 10 mg tablet Take 10 mg by mouth daily as needed.  co-enzyme Q-10 (CO Q-10) 100 mg capsule Take 100 mg by mouth daily.  OXYGEN-AIR DELIVERY SYSTEMS by Does Not Apply route.  ibrutinib (IMBRUVICA) 140 mg cap Take 420 mg by mouth five (5) days a week. 140 mg cap, takes 3 caps five days a week (none on Sunday or Wednesday)    acetaminophen (TYLENOL EXTRA STRENGTH) 500 mg tablet Take 500 mg by mouth every six (6) hours as needed for Pain.  multivitamin (ONE A DAY) tablet Take 1 Tab by mouth daily.  sacubitril-valsartan (ENTRESTO) 24 mg/26 mg tablet Take 1 Tab by mouth two (2) times a day.  sacubitril-valsartan (ENTRESTO) 24 mg/26 mg tablet Take 1 Tab by mouth two (2) times a day. No current facility-administered medications for this visit.          I have reviewed the MAs notes, vitals, problem list, allergy list, medical history, family medical, social history and medications. Objective:     Physical Exam:     Vitals:    02/21/17 0959   BP: 112/69   Pulse: 66   SpO2: 95%   Weight: 132 lb 12.8 oz (60.2 kg)   Height: 5' 2\" (1.575 m)    Body mass index is 24.29 kg/(m^2). General: Well developed, in no acute distress. HEENT: No carotid bruits, no JVD, trach is midline. Heart:  Normal S1/S2 negative S3 or S4. Regular, no murmur, gallop or rub.   Respiratory: Clear bilaterally, no wheezing or rales  Abdomen:   Soft, non-tender, bowel sounds are active.   Extremities:  No edema, normal cap refill, no cyanosis. Neuro: A&Ox3, speech clear, gait stable. Skin: Skin color is normal. No rashes or lesions. No diaphoresis. Vascular: 2+ pulses symmetric in all extremities        Data Review:       Cardiology Labs:    Results for orders placed or performed during the hospital encounter of 02/01/17   EKG, 12 LEAD, INITIAL   Result Value Ref Range    Ventricular Rate 80 BPM    Atrial Rate 80 BPM    P-R Interval 148 ms    QRS Duration 168 ms    Q-T Interval 440 ms    QTC Calculation (Bezet) 507 ms    Calculated P Axis -14 degrees    Calculated R Axis -138 degrees    Calculated T Axis 35 degrees    Diagnosis       Atrial-sensed ventricular-paced rhythm  When compared with ECG of 14-NOV-2015 08:37,  Vent.  rate has increased BY   5 BPM  Confirmed by Kristan Salter ([id number]) on 2/2/2017 10:48:57 AM         Lab Results   Component Value Date/Time    Cholesterol, total 172 08/04/2012 03:15 PM    HDL Cholesterol 38 08/04/2012 03:15 PM    LDL, calculated 118.8 08/04/2012 03:15 PM    Triglyceride 76 08/04/2012 03:15 PM    CHOL/HDL Ratio 4.5 08/04/2012 03:15 PM       Lab Results   Component Value Date/Time    Sodium 141 02/13/2017 12:35 PM    Potassium 4.7 02/13/2017 12:35 PM    Chloride 104 02/13/2017 12:35 PM    CO2 23 02/13/2017 12:35 PM    Anion gap 7 02/05/2017 03:50 AM    Glucose 86 02/13/2017 12:35 PM    BUN 23 02/13/2017 12:35 PM    Creatinine 0.98 02/13/2017 12:35 PM BUN/Creatinine ratio 23 02/13/2017 12:35 PM    GFR est AA 58 02/13/2017 12:35 PM    GFR est non-AA 51 02/13/2017 12:35 PM    Calcium 8.8 02/13/2017 12:35 PM    Bilirubin, total 0.6 02/01/2017 07:35 PM    AST (SGOT) 20 02/01/2017 07:35 PM    Alk. phosphatase 68 02/01/2017 07:35 PM    Protein, total 6.1 02/01/2017 07:35 PM    Albumin 2.6 02/01/2017 07:35 PM    Globulin 3.5 02/01/2017 07:35 PM    A-G Ratio 0.7 02/01/2017 07:35 PM    ALT (SGPT) 9 02/01/2017 07:35 PM      ECHO 2/2017    SUMMARY:  Left ventricle: The ventricle was moderately to severely dilated. Systolic  function was markedly reduced. Ejection fraction was estimated in the  range of 20 % to 25 %. Suboptimal endocardial visualization limits wall  motion analysis. There was diffuse hypokinesis. Wall thickness was at the  upper limits of normal.    Right ventricle: The ventricle was moderately dilated. Systolic function  was reduced. Left atrium: The atrium was mildly to moderately dilated. Right atrium: The atrium was mildly to moderately dilated. Mitral valve: There was moderate to severe regurgitation. Aortic valve: There was moderate regurgitation. Pericardium: A trivial pericardial effusion was identified. INDICATIONS: Congestive heart failure. PROCEDURE: This was a routine study. The study included complete 2D  imaging, M-mode, complete spectral Doppler, and color Doppler. Systolic  blood pressure was 93 mmHg, at the start of the study. Diastolic blood  pressure was 63 mmHg, at the start of the study. Image quality was  adequate. LEFT VENTRICLE: The ventricle was moderately to severely dilated. Systolic  function was markedly reduced. Ejection fraction was estimated in the  range of 20 % to 25 %. Suboptimal endocardial visualization limits wall  motion analysis. There was diffuse hypokinesis. Wall thickness was at the  upper limits of normal.    RIGHT VENTRICLE: The ventricle was moderately dilated.  Systolic function  was reduced. Wall thickness was normal. A pacing wire was present. LEFT ATRIUM: The atrium was mildly to moderately dilated. RIGHT ATRIUM: The atrium was mildly to moderately dilated. A pacing wire  was present. MITRAL VALVE: Normal valve structure. There was normal leaflet separation. DOPPLER: There was no evidence for stenosis. There was moderate to severe  regurgitation. AORTIC VALVE: The valve was probably trileaflet. Leaflets exhibited mildly  increased thickness, calcification, and mildly reduced cuspal separation. DOPPLER: There was no stenosis. There was moderate regurgitation. TRICUSPID VALVE: Normal valve structure. There was normal leaflet  separation. DOPPLER: There was no evidence for tricuspid stenosis. There  was trivial regurgitation. PULMONIC VALVE: Not well visualized. AORTA: The root exhibited normal size. PERICARDIUM: A trivial pericardial effusion was identified. Assessment:       ICD-10-CM ICD-9-CM    1. Chronic combined systolic and diastolic congestive heart failure (HCC) T84.71 007.38 METABOLIC PANEL, COMPREHENSIVE     428.0 MAGNESIUM      furosemide (LASIX) 40 mg tablet   2. 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 Essential hypertension H42 525.8 METABOLIC PANEL, COMPREHENSIVE      MAGNESIUM      furosemide (LASIX) 40 mg tablet   4. Presence of biventricular automatic cardioverter/defibrillator (AICD) K19.093 V83.14 METABOLIC PANEL, COMPREHENSIVE      MAGNESIUM      furosemide (LASIX) 40 mg tablet   5. Mixed hyperlipidemia Q24.8 914.3 METABOLIC PANEL, COMPREHENSIVE      MAGNESIUM      furosemide (LASIX) 40 mg tablet         Discussion: Patient presents at this time stable, improved from a cardiac perspective. Pleased with present status. Plan: 1. Continue same meds.     -- Take lasix 40 mg every day rather than BID; take an additional 40 mg tab PO for a 2-4 lbs weight gain   -- Labs drawn before follow up with Dr. Ralf Jorge    2. Encouraged to exercise to tolerance,  and follow low fat, low cholesterol, low sodium predominantly Plant-based (consider Mediterranean) diet. 3.Follow up: 3 months   -- Call with questions or concerns. Will follow up any test results by phone and/or f/u here in office if needed. .      I have discussed the diagnosis with the patient and the intended plan as seen in the above orders. The patient has received an after-visit summary and questions were answered concerning future plans. I have discussed any concerning medication side effects and warnings with the patient as well. Patient seen and examined. All pertinent data reviewed. I have reviewed detailed note as outlined by Felicita Barber. Case discussed with Nursing/medical assistant staff and Felicita Barber. Patient presents after evaluation at [facility]. Started on Derrek and tolerating. Here with [facility]. Has f/u with [facility] in 3-4 weeks and f/u with PCP soon. Plans as outlined.     Mattie Garcia PA-C  2/21/2017     GUILLERMO Wells

## 2017-02-21 NOTE — MR AVS SNAPSHOT
Visit Information Date & Time Provider Department Dept. Phone Encounter #  
 2/21/2017  9:45 AM MD [facility] 1 [id number] Your Appointments 2/28/2017 10:00 AM  
Any with Herber Kwong MD  
[address] [place] [address] and Primary Care [facility]) Appt Note: 1 month follow up  
 [address]  
[phone]  
  
   
 [address] [id number]  
  
    
 3/14/2017  9:30 AM  
Follow Up with Rebecca Cerrato MD  
[facility] [place] [address] [facility] [address]  
[phone]  
  
   
 [address] [facility] [address] [facility] [address]  
  
    
 5/9/2017 10:45 AM  
ESTABLISHED PATIENT with MD [facility]) Appt Note: 3 MO. F/U  
 [address] [place] [address]  
[phone] [address]  
  
    
  
 4/26/2017  8:00 AM  
REMOTE OFFICE VISIT with [facility]) Appt Note: NOT AN OFFICE VISIT - REMOTE BSC ICD  
 [address] [facility] [place]  
[phone] [address] [facility] Upcoming Health Maintenance Date Due  
 MEDICARE YEARLY EXAM 4/19/2017 GLAUCOMA SCREENING Q2Y 12/18/2017 DTaP/Tdap/Td series (2 - Td) 2/13/2027 Allergies as of 2/21/2017  Review Complete On: 2/14/2017 By: Rebecca Cerrato MD  
  
 Severity Noted Reaction Type Reactions Codeine  05/21/2012   Side Effect Other (comments) \"made me disoriented\" per pt Current Immunizations  Reviewed on 6/29/2015 Name Date Influenza Vaccine 10/1/2016, 10/4/2014 Influenza Vaccine Split 10/22/2011 Pneumococcal Vaccine (Unspecified Type) 5/22/2007 Not reviewed this visit You Were Diagnosed With   
  
 Codes Comments Chronic combined systolic and diastolic congestive heart failure (HCC)    -  Primary ICD-10-CM: I50.42 
ICD-9-CM: 428.42, 428.0 Cardiomyopathy, dilated, nonischemic (HCC)     ICD-10-CM: I42.9 ICD-9-CM: 425.4 Essential hypertension     ICD-10-CM: I10 
ICD-9-CM: 401.9 Presence of biventricular automatic cardioverter/defibrillator (AICD)     ICD-10-CM: Z95.810 ICD-9-CM: V45.02 Mixed hyperlipidemia     ICD-10-CM: E78.2 ICD-9-CM: 272.2 Vitals BP Pulse Height(growth percentile) Weight(growth percentile) SpO2 BMI  
 112/69 66 5' 2\" (1.575 m) 132 lb 12.8 oz (60.2 kg) 95% 24.29 kg/m2 OB Status Smoking Status Postmenopausal Never Smoker Vitals History BMI and BSA Data Body Mass Index Body Surface Area  
 24.29 kg/m 2 1.62 m 2 Preferred Pharmacy Pharmacy Name Phone [address] Your Updated Medication List  
  
   
This list is accurate as of: 2/21/17 10:54 AM.  Always use your most recent med list.  
  
  
  
  
 albuterol 2.5 mg /3 mL (0.083 %) nebulizer solution Commonly known as:  PROVENTIL VENTOLIN  
3 mL by Nebulization route every four (4) hours as needed for Wheezing. allopurinol 300 mg tablet Commonly known as:  ZYLOPRIM  
TAKE ONE (1) TABLET(S) DAILY  
  
 amoxicillin 500 mg capsule Commonly known as:  AMOXIL TAKE ONE CAPSULE BY MOUTH THREE TIMES A DAY  
  
 atorvastatin 10 mg tablet Commonly known as:  LIPITOR  
TAKE ONE (1) TABLET(S) DAILY  
  
 carvedilol 3.125 mg tablet Commonly known as:  COREG  
TAKE ONE (1) TABLET(S) TWIC E DAILY WITH FOOD  Indications: Chronic Heart Failure  
  
 co-enzyme Q-10 100 mg capsule Commonly known as:  CO Q-10 Take 100 mg by mouth daily. fluticasone-salmeterol 250-50 mcg/dose diskus inhaler Commonly known as:  ADVAIR  
 Take 1 Puff by inhalation two (2) times a day. furosemide 40 mg tablet Commonly known as:  LASIX Take 1 Tab by mouth daily. Take an additional 40 mg tab PO for a 2-4 lbs weight gain. Indications: PERIPHERAL EDEMA DUE TO CHRONIC HEART FAILURE IMBRUVICA 140 mg capsule Generic drug:  ibrutinib Take 420 mg by mouth five (5) days a week. 140 mg cap, takes 3 caps five days a week (none on Sunday or Wednesday)  
  
 loratadine 10 mg tablet Commonly known as:  Alanda Ganong Take 10 mg by mouth daily as needed. multivitamin tablet Commonly known as:  ONE A DAY Take 1 Tab by mouth daily. OXYGEN-AIR DELIVERY SYSTEMS  
by Does Not Apply route. * sacubitril-valsartan 24-26 mg tablet Commonly known as:  ENTRESTO Take 1 Tab by mouth two (2) times a day. * sacubitril-valsartan 24-26 mg tablet Commonly known as:  ENTRESTO Take 1 Tab by mouth two (2) times a day. TYLENOL EXTRA STRENGTH 500 mg tablet Generic drug:  acetaminophen Take 500 mg by mouth every six (6) hours as needed for Pain. * Notice: This list has 2 medication(s) that are the same as other medications prescribed for you. Read the directions carefully, and ask your doctor or other care provider to review them with you. Prescriptions Printed Refills  
 furosemide (LASIX) 40 mg tablet 11 Sig: Take 1 Tab by mouth daily. Take an additional 40 mg tab PO for a 2-4 lbs weight gain. Indications: PERIPHERAL EDEMA DUE TO CHRONIC HEART FAILURE Class: Print Route: Oral  
  
We Performed the Following MAGNESIUM [id number] CPT(R)] METABOLIC PANEL, COMPREHENSIVE [12393 CPT(R)] Patient Instructions -- Please get labs drawn before return visit with Dr. Kaitlin Goldsmith 
-- We will see you for a follow up in 3 months Limiting Sodium and Fluids With Heart Failure: Care Instructions Your Care Instructions Sodium causes your body to keep extra water, making it harder for your heart to pump. By limiting sodium, you will feel better and lower your risk of having to go to the hospital. 
People get most of their sodium from processed foods. Fast food and restaurant meals also tend to be very high in sodium. Your doctor may suggest that you limit sodium to 2,000 milligrams (mg) a day or less. That is less than 1 teaspoon of salt a day, including all the salt you eat in cooked or packaged foods. Usually, you have to limit the amount of liquids you drink only if your heart failure is severe. Limiting sodium alone often is enough to help your body get rid of extra fluids. However, your doctor may tell you to limit your fluid intake to a set amount each day. Follow-up care is a key part of your treatment and safety. Be sure to make and go to all appointments, and call your doctor if you are having problems. It's also a good idea to know your test results and keep a list of the medicines you take. How can you care for yourself at home? Read food labels · Read food labels on cans and food packages. The labels tell you how much sodium is in each serving. Make sure that you look at the serving size. If you eat more than the serving size, you have eaten more sodium than is listed for one serving. · Food labels also tell you the Percent Daily Value. If the Percent Daily Value says 50%, it means that you will get at least 50% of all the sodium you need for the entire day in one serving. Choose products with low Percent Daily Values for sodium. · Be aware that sodium can come in forms other than salt, including monosodium glutamate (MSG), sodium citrate, and sodium bicarbonate (baking soda). MSG is often added to Asian food. You can sometimes ask for food without MSG or salt. Buy low-sodium foods · Buy foods that are labeled \"unsalted\" (no salt added), \"sodium-free\" (less than 5 mg of sodium per serving), or \"low-sodium\" (less than 140 mg of sodium per serving). A food labeled \"light sodium\" has less than half of the full-sodium version of that food. Foods labeled \"reduced-sodium\" may still have too much sodium. · Buy fresh vegetables or plain, frozen vegetables. Buy low-sodium versions of canned vegetables, soups, and other canned goods. Prepare low-sodium meals · Use less salt each day when cooking. Reducing salt in this way will help you adjust to the taste. Do not add salt after cooking. Take the salt shaker off the table. · Flavor your food with garlic, lemon juice, onion, vinegar, herbs, and spices instead of salt. Do not use soy sauce, steak sauce, onion salt, garlic salt, mustard, or ketchup on your food. · Make your own salad dressings, sauces, and ketchup without adding salt. · Use less salt (or none) when recipes call for it. You can often use half the salt a recipe calls for without losing flavor. Other dishes like rice, pasta, and grains do not need added salt. · Rinse canned vegetables. This removes somebut not allof the salt. · Avoid water that has a naturally high sodium content or that has been treated with water softeners, which add sodium. Call your local water company to find out the sodium content of your water supply. If you buy bottled water, read the label and choose a sodium-free brand. Avoid high-sodium foods, such as: 
· Smoked, cured, salted, and canned meat, fish, and poultry. · Ham, geronimo, hot dogs, and luncheon meats. · Regular, hard, and processed cheese and regular peanut butter. · Crackers with salted tops. · Frozen prepared meals. · Canned and dried soups, broths, and bouillon, unless labeled sodium-free or low-sodium. · Canned vegetables, unless labeled sodium-free or low-sodium. · Salted snack foods such as chips and pretzels. · [place] fries, pizza, tacos, and other fast foods. · Pickles, olives, ketchup, and other condiments, especially soy sauce, unless labeled sodium-free or low-sodium. If you cannot cook for yourself · Have family members or friends help you, or have someone cook low-sodium meals. · Check with your local senior nutrition program to find out where meals are served and whether they offer a low-sodium option. You can often find these programs through your local health department or hospital. 
· Have meals delivered to your home. Most [facility] have a Meals on CORRINE Melo. These programs provide one hot meal a day for older adults, delivered to their homes. Ask whether these meals are low-sodium. Let them know that you are on a low-sodium diet. Limiting fluid intake · Find a method that works for you. You might simply write down how much you drink every time you do. Some people keep a container filled with the amount of fluid allowed for that day. If they drink from a source other than the container, then they pour out that amount. · Measure your regular drinking glasses to find out how much fluid each one holds. Once you know this, you will not have to measure every time. · Besides water, milk, juices, and other drinks, some foods have a lot of fluid. Count any foods that will melt (such as ice cream or gelatin dessert) or liquid foods (such as soup) as part of your fluid intake for the day. Where can you learn more? Go to http://camelia-bar.info/. Enter A166 in the search box to learn more about \"Limiting Sodium and Fluids With Heart Failure: Care Instructions. \" Current as of: January 27, 2016 Content Version: 11.1 © 4511-4838 [employer]. Care instructions adapted under license by [employer] (which disclaims liability or warranty for this information). If you have questions about a medical condition or this instruction, always ask your healthcare professional. [place] [address] any warranty or liability for your use of this information. Introducing [facility] [place] [facility]! Dear Vikas Vazquez: Thank you for requesting a Weiju account. Our records indicate that you already have an active Weiju account. You can access your account anytime at [email or web address] [email or web address] Did you know that you can access your hospital and ER discharge instructions at any time in Weiju? You can also review all of your test results from your hospital stay or ER visit. Additional Information If you have questions, please visit the Frequently Asked Questions section of the Weiju website at [email or web address] [email or web address] Remember, Weiju is NOT to be used for urgent needs. For medical emergencies, dial 911. Now available from your iPhone and Android! Please provide this summary of care documentation to your next provider. Your primary care clinician is listed as Gail Dey. If you have any questions after today's visit, please call [phone].

## 2017-02-21 NOTE — TELEPHONE ENCOUNTER
Received a call from Lynette, with [facility] wanting to know what antihistamine patient could take. Lynette, was instructed to have patient take Claritin. Per. Dr. Julianna Sylvester.

## 2017-03-01 ENCOUNTER — TELEPHONE (OUTPATIENT)
Dept: CARDIOLOGY CLINIC | Age: 82
End: 2017-03-01

## 2017-03-01 NOTE — TELEPHONE ENCOUNTER
Spoke with Kelle Kennedy, pts nurse, she states they have not received mail order of Iris aLw - apparently it is still being authorized - and that the patient will be out of medicaiton on Friday. Told her we would leave samples at the  and will look into assisting with the prior authorization of the medications. Kelle Kennedy said a caregiver will be buy tomorrow to  the medication.

## 2017-03-02 ENCOUNTER — TELEPHONE (OUTPATIENT)
Dept: CARDIOLOGY CLINIC | Age: 82
End: 2017-03-02

## 2017-03-02 NOTE — TELEPHONE ENCOUNTER
Received a call from patients daughter regarding medication of Entresto. She stated that patient is still waiting for prior auth.     Please call her back when you can today

## 2017-03-07 ENCOUNTER — HOSPITAL ENCOUNTER (OUTPATIENT)
Dept: LAB | Age: 82
Discharge: HOME | End: 2017-03-07
Payer: MEDICARE

## 2017-03-07 PROCEDURE — 36415 COLL VENOUS BLD VENIPUNCTURE: CPT

## 2017-03-07 PROCEDURE — 80053 COMPREHEN METABOLIC PANEL: CPT

## 2017-03-07 PROCEDURE — 83735 ASSAY OF MAGNESIUM: CPT

## 2017-03-08 LAB
ALBUMIN SERPL-MCNC: 3.6 G/DL (ref 3.2–4.6)
ALBUMIN/GLOB SERPL: 1.6 {RATIO} (ref 1.1–2.5)
ALP SERPL-CCNC: 76 IU/L (ref 39–117)
ALT SERPL-CCNC: 10 IU/L (ref 0–32)
AST SERPL-CCNC: 20 IU/L (ref 0–40)
BILIRUB SERPL-MCNC: 0.5 MG/DL (ref 0–1.2)
BUN SERPL-MCNC: 22 MG/DL (ref 10–36)
BUN/CREAT SERPL: 16 (ref 11–26)
CALCIUM SERPL-MCNC: 9 MG/DL (ref 8.7–10.3)
CHLORIDE SERPL-SCNC: 101 MMOL/L (ref 96–106)
CO2 SERPL-SCNC: 24 MMOL/L (ref 18–29)
CREAT SERPL-MCNC: 1.35 MG/DL (ref 0.57–1)
GLOBULIN SER CALC-MCNC: 2.3 G/DL (ref 1.5–4.5)
GLUCOSE SERPL-MCNC: 104 MG/DL (ref 65–99)
MAGNESIUM SERPL-MCNC: 2.4 MG/DL (ref 1.6–2.3)
POTASSIUM SERPL-SCNC: 4.1 MMOL/L (ref 3.5–5.2)
PROT SERPL-MCNC: 5.9 G/DL (ref 6–8.5)
SODIUM SERPL-SCNC: 142 MMOL/L (ref 134–144)

## 2017-03-09 ENCOUNTER — TELEPHONE (OUTPATIENT)
Dept: CARDIOLOGY CLINIC | Age: 82
End: 2017-03-09

## 2017-03-09 NOTE — TELEPHONE ENCOUNTER
Patient was contacted per. Dr. Alexi Moore, letting her know that her labs were ok, except her kidney function was slightly decreased, and that we will follow up with BMP in one  month. Patient verbalized understanding.

## 2017-03-13 ENCOUNTER — TELEPHONE (OUTPATIENT)
Dept: CARDIOLOGY CLINIC | Age: 82
End: 2017-03-13

## 2017-03-14 ENCOUNTER — TELEPHONE (OUTPATIENT)
Dept: CARDIOLOGY CLINIC | Age: 82
End: 2017-03-14

## 2017-03-15 ENCOUNTER — TELEPHONE (OUTPATIENT)
Dept: CARDIOLOGY CLINIC | Age: 82
End: 2017-03-15

## 2017-03-16 ENCOUNTER — OFFICE VISIT (OUTPATIENT)
Dept: CARDIOLOGY CLINIC | Age: 82
End: 2017-03-16

## 2017-03-16 VITALS
BODY MASS INDEX: 24.95 KG/M2 | WEIGHT: 135.6 LBS | TEMPERATURE: 97.5 F | OXYGEN SATURATION: 97 % | HEART RATE: 68 BPM | SYSTOLIC BLOOD PRESSURE: 102 MMHG | RESPIRATION RATE: 16 BRPM | HEIGHT: 62 IN | DIASTOLIC BLOOD PRESSURE: 62 MMHG

## 2017-03-16 DIAGNOSIS — E78.2 MIXED HYPERLIPIDEMIA: ICD-10-CM

## 2017-03-16 DIAGNOSIS — I42.0 CARDIOMYOPATHY, DILATED, NONISCHEMIC (HCC): ICD-10-CM

## 2017-03-16 DIAGNOSIS — Z95.810 PRESENCE OF BIVENTRICULAR AUTOMATIC CARDIOVERTER/DEFIBRILLATOR (AICD): ICD-10-CM

## 2017-03-16 DIAGNOSIS — I50.42 CHRONIC COMBINED SYSTOLIC AND DIASTOLIC CONGESTIVE HEART FAILURE (HCC): ICD-10-CM

## 2017-03-16 DIAGNOSIS — I10 ESSENTIAL HYPERTENSION: ICD-10-CM

## 2017-03-16 RX ORDER — FUROSEMIDE 40 MG/1
40 TABLET ORAL AS NEEDED
Qty: 60 TAB | Refills: 11 | Status: SHIPPED | OUTPATIENT
Start: 2017-03-16 | End: 2017-04-22

## 2017-03-16 NOTE — PATIENT INSTRUCTIONS
1.  Stop Lasix every day, only take it if your weight increases or develop shortness of breath    2. Increase Entresto to 49/51 1 tablet twice a day - may take 2 tablets twice a day until that prescription is out and then fill the new prescription    3. Continue all other medications. 4.  Continue w/ daily weights and notify our office if your weight is increasing by more than 2 lbs a day    5. Check blood work in 2 weeks    6.   Eat cake and pie on your birthday!!    7.  Return to clinic in 4 -6 weeks

## 2017-03-16 NOTE — PROGRESS NOTES
HISTORY OF PRESENT ILLNESS  Jaleel Arroyo is a 80 y.o. AA, F w/ PMH of HTN, NICM (EF of 20-25% by Echo 2/3/17). MYHA class II, s/p BiVICD 2015, hyperlipidemia, LBBB, gout, GERD, PUD, DILAN, Non-Hodkin's lymphoma and Reactive airway disease who presents today for follow up. Pt reports she is feeling much better than her last visit. She is having a birthday party tomorrow and is excited about this. She does not go to the bathroom a lot after taking the Lasix. She does not feel like she is holding on to fluid right now. She is c/o postnasal drip and allergy issues. She is very active and is feeling overall well. She is performing daily weights and watching her sodium consumption. Pt sleeps propped up and hasn't tried to lay flat. She is compliant with all her medications.     Pt c/o palpitations w/ exertion, cough, postnasal drip,  RLL edema    Pt denies fatigue, fevers, chills, lightheadedness, dizziness, syncope, N/V, changes to appetite, chest pain, SOB, depression or anxiety    Past Medical History:   Diagnosis Date    Asthma     Cancer ([facility] [address]     lymphoma    Congestive heart failure, unspecified     Heart failure ([address]     Hypertension     Other ill-defined conditions(799.89)     heart murmur    Presence of biventricular automatic cardioverter/defibrillator (AICD) 7/2/2015    [employer] biventricular AICD implant    Stomach ulcer      Past Surgical History:   Procedure Laterality Date    HX COLONOSCOPY      HX HEENT      cataracts removed    HX HYSTERECTOMY      HX OTHER SURGICAL      lymph node surgery    HX TONSILLECTOMY      NE EGD TRANSORAL BIOPSY SINGLE/MULTIPLE  5/23/2012         SINUS SURGERY PROC UNLISTED      Nasal polyps removed     Family History   Problem Relation Age of Onset    Heart Disease Mother     Stroke Father      Social History     Social History    Marital status:      Spouse name: N/A    Number of children: 1    Years of education: 16+     Occupational History    retired teacher      Social History Main Topics    Smoking status: Never Smoker    Smokeless tobacco: Never Used    Alcohol use No    Drug use: No    Sexual activity: No     Other Topics Concern    Not on file     Social History Narrative     Current Outpatient Prescriptions on File Prior to Visit   Medication Sig Dispense Refill    furosemide (LASIX) 40 mg tablet Take 1 Tab by mouth daily. Take an additional 40 mg tab PO for a 2-4 lbs weight gain. Indications: PERIPHERAL EDEMA DUE TO CHRONIC HEART FAILURE 60 Tab 11    carvedilol (COREG) 3.125 mg tablet TAKE ONE (1) TABLET(S) TWIC E DAILY WITH FOOD  Indications: Chronic Heart Failure 180 Tab 3    albuterol (PROVENTIL VENTOLIN) 2.5 mg /3 mL (0.083 %) nebulizer solution 3 mL by Nebulization route every four (4) hours as needed for Wheezing. 100 Each 11    amoxicillin (AMOXIL) 500 mg capsule TAKE ONE CAPSULE BY MOUTH THREE TIMES A DAY  1    sacubitril-valsartan (ENTRESTO) 24 mg/26 mg tablet Take 1 Tab by mouth two (2) times a day. 60 Tab 2    allopurinol (ZYLOPRIM) 300 mg tablet TAKE ONE (1) TABLET(S) DAILY 90 Tab 3    atorvastatin (LIPITOR) 10 mg tablet TAKE ONE (1) TABLET(S) DAILY 90 Tab 3    fluticasone-salmeterol (ADVAIR) 250-50 mcg/dose diskus inhaler Take 1 Puff by inhalation two (2) times a day. 3 Inhaler 3    loratadine (CLARITIN) 10 mg tablet Take 10 mg by mouth daily as needed.  co-enzyme Q-10 (CO Q-10) 100 mg capsule Take 100 mg by mouth daily.  OXYGEN-AIR DELIVERY SYSTEMS by Does Not Apply route.  ibrutinib (IMBRUVICA) 140 mg cap Take 420 mg by mouth five (5) days a week. 140 mg cap, takes 3 caps five days a week (none on Sunday or Wednesday)      acetaminophen (TYLENOL EXTRA STRENGTH) 500 mg tablet Take 500 mg by mouth every six (6) hours as needed for Pain.  multivitamin (ONE A DAY) tablet Take 1 Tab by mouth daily.       sacubitril-valsartan (ENTRESTO) 24 mg/26 mg tablet Take 1 Tab by mouth two (2) times a day. 28 Tab 0     No current facility-administered medications on file prior to visit. Physical Exam   Gen:  WA, WN, NAD  HEENT:  EOMI  Neck:  (-) JVD  CVS:  S1/S2. Pul:  CTA b/l (-) r,r,w  Abd:  Soft, NT,ND  Ext:  Trace Rll edema, (-) LLL edema  Neuro:  No obvious deficits    Wt Readings from Last 3 Encounters:   03/16/17 135 lb 9.6 oz (61.5 kg)   02/21/17 132 lb 12.8 oz (60.2 kg)   02/14/17 138 lb 12.8 oz (63 kg)     Lab Results   Component Value Date/Time    Sodium 142 03/07/2017 11:08 AM    Potassium 4.1 03/07/2017 11:08 AM    Chloride 101 03/07/2017 11:08 AM    CO2 24 03/07/2017 11:08 AM    Anion gap 7 02/05/2017 03:50 AM    Glucose 104 03/07/2017 11:08 AM    BUN 22 03/07/2017 11:08 AM    Creatinine 1.35 03/07/2017 11:08 AM    BUN/Creatinine ratio 16 03/07/2017 11:08 AM    GFR est AA 40 03/07/2017 11:08 AM    GFR est non-AA 34 03/07/2017 11:08 AM    Calcium 9.0 03/07/2017 11:08 AM     Lab Results   Component Value Date/Time    WBC 3.7 02/05/2017 03:50 AM    HGB 9.6 02/05/2017 03:50 AM    HCT 28.3 02/05/2017 03:50 AM    PLATELET 232 05/37/7458 03:50 AM    MCV 90.4 02/05/2017 03:50 AM     Visit Vitals    /62 (BP 1 Location: Left arm, BP Patient Position: Sitting)    Pulse 68    Temp 97.5 °F (36.4 °C) (Oral)    Resp 16    Ht 5' 2\" (1.575 m)    Wt 135 lb 9.6 oz (61.5 kg)    SpO2 97%    BMI 24.8 kg/m2       ASSESSMENT and PLAN    NICM HFrEF 20-25% NYHA, class II  1. Patient is tolerating Entresto 24/26mg at this time, will increase Entresto to 49/51 1 tablet po BID to reach OMM. samples provided  2. Stopped daily Lasix, pt only to use prn  3. Pt to continue Coreg  4. Continue daily weights, she is to notify the office if her weight starts increasing off the Lasix  5. Reviewed importance of sodium and fluid restriction  6. Return to clinic in 4-6 weeks  7. Check labs in 2 weeks    VT - s/p AICD - stable    DILAN -  Pt.  To continue nocturnal home oxygen    HLD - continue Lipitor and CoQ10    Pt seen under the direct supervision of Dr. Ishan Kirby    Thank you for letting us see her with you,      Anastasiya Mendoza PA-C    [address] [place] [address]  Dept: [phone]  Dept Fax: [phone]: [phone] Fax: [phone]  24 hour VAD/HF Pager: [phone]

## 2017-03-16 NOTE — LETTER
3/16/2017 7:41 PM 
 
Patient:  Loc Diego YOB: 1925 Date of Visit: 3/16/2017 Dear Andrea Villegas MD 
[facility] [address] [facility] [address] VIA In Basket Carmen Meza MD 
[address] [address] VIA In Basket 
 : Thank you for referring Ms. Sherryle Arthur to me for evaluation/treatment. Below are the relevant portions of my assessment and plan of care. HISTORY OF PRESENT ILLNESS Loc Diego is a 80 y.o. AA, F w/ PMH of HTN, NICM (EF of 20-25% by Echo 2/3/17). MYHA class II, s/p BiVICD 2015, hyperlipidemia, LBBB, gout, GERD, PUD, DILAN, Non-Hodkin's lymphoma and Reactive airway disease who presents today for follow up. Pt reports she is feeling much better than her last visit. She is having a birthday party tomorrow and is excited about this. She does not go to the bathroom a lot after taking the Lasix. She does not feel like she is holding on to fluid right now. She is c/o postnasal drip and allergy issues. She is very active and is feeling overall well. She is performing daily weights and watching her sodium consumption. Pt sleeps propped up and hasn't tried to lay flat. She is compliant with all her medications. Pt c/o palpitations w/ exertion, cough, postnasal drip,  RLL edema Pt denies fatigue, fevers, chills, lightheadedness, dizziness, syncope, N/V, changes to appetite, chest pain, SOB, depression or anxiety Past Medical History:  
Diagnosis Date  Asthma  Cancer ([address] lymphoma  Congestive heart failure, unspecified  Heart failure ([address]  Hypertension  Other ill-defined conditions(799.89)   
 heart murmur  Presence of biventricular automatic cardioverter/defibrillator (AICD) 7/2/2015 [employer] biventricular AICD implant  Stomach ulcer Past Surgical History:  
Procedure Laterality Date  HX COLONOSCOPY    
 HX HEENT    
 cataracts removed  HX HYSTERECTOMY  HX OTHER SURGICAL    
 lymph node surgery  HX TONSILLECTOMY  [place] EGD TRANSORAL BIOPSY SINGLE/MULTIPLE  5/23/2012  
    
 SINUS SURGERY PROC UNLISTED Nasal polyps removed Family History Problem Relation Age of Onset  Heart Disease Mother  Stroke Father Social History Social History  Marital status:  Spouse name: N/A  
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  
 Drug use: No  
 Sexual activity: No  
 
Other Topics Concern  Not on file Social History Narrative Current Outpatient Prescriptions on File Prior to Visit Medication Sig Dispense Refill  furosemide (LASIX) 40 mg tablet Take 1 Tab by mouth daily. Take an additional 40 mg tab PO for a 2-4 lbs weight gain. Indications: PERIPHERAL EDEMA DUE TO CHRONIC HEART FAILURE 60 Tab 11  
 carvedilol (COREG) 3.125 mg tablet TAKE ONE (1) TABLET(S) TWIC E DAILY WITH FOOD  Indications: Chronic Heart Failure 180 Tab 3  
 albuterol (PROVENTIL VENTOLIN) 2.5 mg /3 mL (0.083 %) nebulizer solution 3 mL by Nebulization route every four (4) hours as needed for Wheezing. 100 Each 11  
 amoxicillin (AMOXIL) 500 mg capsule TAKE ONE CAPSULE BY MOUTH THREE TIMES A DAY  1  
 sacubitril-valsartan (ENTRESTO) 24 mg/26 mg tablet Take 1 Tab by mouth two (2) times a day. 60 Tab 2  
 allopurinol (ZYLOPRIM) 300 mg tablet TAKE ONE (1) TABLET(S) DAILY 90 Tab 3  
 atorvastatin (LIPITOR) 10 mg tablet TAKE ONE (1) TABLET(S) DAILY 90 Tab 3  
 fluticasone-salmeterol (ADVAIR) 250-50 mcg/dose diskus inhaler Take 1 Puff by inhalation two (2) times a day. 3 Inhaler 3  
 loratadine (CLARITIN) 10 mg tablet Take 10 mg by mouth daily as needed.  co-enzyme Q-10 (CO Q-10) 100 mg capsule Take 100 mg by mouth daily.  OXYGEN-AIR DELIVERY SYSTEMS by Does Not Apply route.  ibrutinib (IMBRUVICA) 140 mg cap Take 420 mg by mouth five (5) days a week. 140 mg cap, takes 3 caps five days a week (none on Sunday or Wednesday)  acetaminophen (TYLENOL EXTRA STRENGTH) 500 mg tablet Take 500 mg by mouth every six (6) hours as needed for Pain.  multivitamin (ONE A DAY) tablet Take 1 Tab by mouth daily.  sacubitril-valsartan (ENTRESTO) 24 mg/26 mg tablet Take 1 Tab by mouth two (2) times a day. 28 Tab 0 No current facility-administered medications on file prior to visit. Physical Exam  
Gen:  WA, WN, NAD HEENT:  EOMI Neck:  (-) JVD 
CVS:  S1/S2. Pul:  CTA b/l (-) r,r,w 
Abd:  Soft, NT,ND Ext:  Trace Rll edema, (-) LLL edema Neuro:  No obvious deficits Wt Readings from Last 3 Encounters:  
03/16/17 135 lb 9.6 oz (61.5 kg) 02/21/17 132 lb 12.8 oz (60.2 kg) 02/14/17 138 lb 12.8 oz (63 kg) Lab Results Component Value Date/Time Sodium 142 03/07/2017 11:08 AM  
 Potassium 4.1 03/07/2017 11:08 AM  
 Chloride 101 03/07/2017 11:08 AM  
 CO2 24 03/07/2017 11:08 AM  
 Anion gap 7 02/05/2017 03:50 AM  
 Glucose 104 03/07/2017 11:08 AM  
 BUN 22 03/07/2017 11:08 AM  
 Creatinine 1.35 03/07/2017 11:08 AM  
 BUN/Creatinine ratio 16 03/07/2017 11:08 AM  
 GFR est AA 40 03/07/2017 11:08 AM  
 GFR est non-AA 34 03/07/2017 11:08 AM  
 Calcium 9.0 03/07/2017 11:08 AM  
 
Lab Results Component Value Date/Time WBC 3.7 02/05/2017 03:50 AM  
 HGB 9.6 02/05/2017 03:50 AM  
 HCT 28.3 02/05/2017 03:50 AM  
 PLATELET 170 51/01/6885 03:50 AM  
 MCV 90.4 02/05/2017 03:50 AM  
 
Visit Vitals  /62 (BP 1 Location: Left arm, BP Patient Position: Sitting)  Pulse 68  Temp 97.5 °F (36.4 °C) (Oral)  Resp 16  
 Ht 5' 2\" (1.575 m)  Wt 135 lb 9.6 oz (61.5 kg)  SpO2 97%  BMI 24.8 kg/m2 ASSESSMENT and PLAN 
 
NICM HFrEF 20-25% NYHA, class II 
 1. Increased Entresto 49/51 1 tablet po BID, samples provided 2. Stopped daily Lasix, pt only to use prn 3. Pt to continue Coreg 4. Continue daily weights, she is to notify the office if her weight starts increasing off the Lasix 5. Reviewed importance of sodium and fluid restriction 6. Return to clinic in 4-6 weeks 7. Check labs in 2 weeks VT - s/p AICD - stable DILAN -  Pt. To continue nocturnal home oxygen HLD - continue Lipitor and CoQ10 Pt seen under the direct supervision of Dr. Susy Hernandez Thank you for letting us see her with you, Alan Potts PA-C 
 
[address] [place] [address] Dept: [phone] Dept Fax: [phone] Loc: [phone] Loc Fax: [phone] 
24 hour VAD/HF Pager: [phone] If you have questions, please do not hesitate to call me. I look forward to following Ms. Max Ritchie along with you. Sincerely, Daniella Santoyo MD

## 2017-03-16 NOTE — MR AVS SNAPSHOT
Visit Information Date & Time Provider Department Dept. Phone Encounter #  
 3/16/2017  1:00 PM Jorge Alberto Scott MD [address] [id number] Your Appointments 4/11/2017 10:45 AM  
Any with Barbara Espinosa MD  
[address] [place] [address] and Primary Care [facility]) Appt Note: 1 month follow up; r/s  
 [address]  
  
   
 [address] [phone]  
  
    
 5/9/2017 10:45 AM  
ESTABLISHED PATIENT with MD [facility]) Appt Note: 3 MO. F/U  
 [address]  
[phone] [address] 5/23/2017 10:00 AM  
ESTABLISHED PATIENT with Srinivas Wick MD  
[place]) Appt Note: 3 MO. F/U  
 [address] [facility]  
[phone] 4/26/2017  8:00 AM  
REMOTE OFFICE VISIT with [facility]) Appt Note: NOT AN OFFICE VISIT - REMOTE BSC ICD  
 [address]  
[phone] [address] Upcoming Health Maintenance Date Due  
 MEDICARE YEARLY EXAM 4/19/2017 GLAUCOMA SCREENING Q2Y 12/18/2017 DTaP/Tdap/Td series (2 - Td) 2/13/2027 Allergies as of 3/16/2017  Review Complete On: 3/16/2017 By: Toñito Garza LPN Severity Noted Reaction Type Reactions Codeine  05/21/2012   Side Effect Other (comments) \"made me disoriented\" per pt Current Immunizations  Reviewed on 6/29/2015 Name Date Influenza Vaccine 10/1/2016, 10/4/2014 Influenza Vaccine Split 10/22/2011 Pneumococcal Vaccine (Unspecified Type) 5/22/2007 Not reviewed this visit You Were Diagnosed With   
  
 Codes Comments Chronic combined systolic and diastolic congestive heart failure (HCC)     ICD-10-CM: I50.42 
ICD-9-CM: 428.42, 428.0 Cardiomyopathy, dilated, nonischemic (HCC)     ICD-10-CM: I42.9 ICD-9-CM: 425.4 Essential hypertension     ICD-10-CM: I10 
ICD-9-CM: 401.9 Presence of biventricular automatic cardioverter/defibrillator (AICD)     ICD-10-CM: Z95.810 ICD-9-CM: V45.02 Mixed hyperlipidemia     ICD-10-CM: E78.2 ICD-9-CM: 272.2 Vitals BP Pulse Temp Resp Height(growth percentile) Weight(growth percentile) 102/62 (BP 1 Location: Left arm, BP Patient Position: Sitting) 68 97.5 °F (36.4 °C) (Oral) 16 5' 2\" (1.575 m) 135 lb 9.6 oz (61.5 kg) SpO2 BMI OB Status Smoking Status 97% 24.8 kg/m2 Postmenopausal Never Smoker BMI and BSA Data Body Mass Index Body Surface Area  
 24.8 kg/m 2 1.64 m 2 Preferred Pharmacy Pharmacy Name Phone [address] Your Updated Medication List  
  
   
This list is accurate as of: 3/16/17  2:33 PM.  Always use your most recent med list.  
  
  
  
  
 albuterol 2.5 mg /3 mL (0.083 %) nebulizer solution Commonly known as:  PROVENTIL VENTOLIN  
3 mL by Nebulization route every four (4) hours as needed for Wheezing. allopurinol 300 mg tablet Commonly known as:  ZYLOPRIM  
TAKE ONE (1) TABLET(S) DAILY  
  
 amoxicillin 500 mg capsule Commonly known as:  AMOXIL TAKE ONE CAPSULE BY MOUTH THREE TIMES A DAY  
  
 atorvastatin 10 mg tablet Commonly known as:  LIPITOR  
TAKE ONE (1) TABLET(S) DAILY  
  
 carvedilol 3.125 mg tablet Commonly known as:  COREG  
TAKE ONE (1) TABLET(S) TWIC E DAILY WITH FOOD  Indications: Chronic Heart Failure  
  
 co-enzyme Q-10 100 mg capsule Commonly known as:  CO Q-10 Take 100 mg by mouth daily. fluticasone-salmeterol 250-50 mcg/dose diskus inhaler Commonly known as:  ADVAIR Take 1 Puff by inhalation two (2) times a day. furosemide 40 mg tablet Commonly known as:  LASIX Take 1 Tab by mouth as needed. Take 40 mg tab PO for a 2-4 lbs weight gain. Indications: PERIPHERAL EDEMA DUE TO CHRONIC HEART FAILURE IMBRUVICA 140 mg capsule Generic drug:  ibrutinib Take 420 mg by mouth five (5) days a week. 140 mg cap, takes 3 caps five days a week (none on Sunday or Wednesday)  
  
 loratadine 10 mg tablet Commonly known as:  Vickki Mazin Take 10 mg by mouth daily as needed. multivitamin tablet Commonly known as:  ONE A DAY Take 1 Tab by mouth daily. OXYGEN-AIR DELIVERY SYSTEMS  
by Does Not Apply route. * sacubitril-valsartan 24-26 mg tablet Commonly known as:  ENTRESTO Take 1 Tab by mouth two (2) times a day. * sacubitril-valsartan 49-51 mg Tab tablet Commonly known as:  ENTRESTO Take 1 Tab by mouth two (2) times a day. TYLENOL EXTRA STRENGTH 500 mg tablet Generic drug:  acetaminophen Take 500 mg by mouth every six (6) hours as needed for Pain. * Notice: This list has 2 medication(s) that are the same as other medications prescribed for you. Read the directions carefully, and ask your doctor or other care provider to review them with you. Prescriptions Sent to Pharmacy Refills  
 furosemide (LASIX) 40 mg tablet 11 Sig: Take 1 Tab by mouth as needed. Take 40 mg tab PO for a 2-4 lbs weight gain. Indications: PERIPHERAL EDEMA DUE TO CHRONIC HEART FAILURE Class: Normal  
 Pharmacy: [facility] [id number] Ph #: [phone] Route: Oral  
 sacubitril-valsartan (ENTRESTO) 49 mg/51 mg tablet 3 Sig: Take 1 Tab by mouth two (2) times a day. Class: Normal  
 Pharmacy: [facility] [id number] Ph #: [phone] Route: Oral  
  
Patient Instructions 1.  Stop Lasix every day, only take it if your weight increases or develop shortness of breath 2. Increase Entresto to 49/51 1 tablet twice a day - may take 2 tablets twice a day until that prescription is out and then fill the new prescription 3. Continue all other medications. 4.  Continue w/ daily weights and notify our office if your weight is increasing by more than 2 lbs a day 5. Check blood work in 2 weeks 6. Eat cake and pie on your birthday!! 
 
7.  Return to clinic in 4 -6 weeks Introducing \[facility]\"" & HEALTH SERVICES! Dear Brunilda Pillai: Thank you for requesting a [employer] account. Our records indicate that you already have an active [employer] account. You can access your account anytime at [email or web address]. [employer] Did you know that you can access your hospital and ER discharge instructions at any time in [employer]? You can also review all of your test results from your hospital stay or ER visit. Additional Information If you have questions, please visit the Frequently Asked Questions section of the [employer] website at https://[employer]. [employer]/. Remember, [employer] is NOT to be used for urgent needs. For medical emergencies, dial 911. Now available from your iPhone and Android! Please provide this summary of care documentation to your next provider. Your primary care clinician is listed as Gail Dey. If you have any questions after today's visit, please call [phone].

## 2017-03-16 NOTE — COMMUNICATION BODY
HISTORY OF PRESENT ILLNESS  Jabier Powell is a 80 y.o. AA, F w/ PMH of HTN, NICM (EF of 20-25% by Echo 2/3/17). MYHA class II, s/p BiVICD 2015, hyperlipidemia, LBBB, gout, GERD, PUD, DILAN, Non-Hodkin's lymphoma and Reactive airway disease who presents today for follow up. Pt reports she is feeling much better than her last visit. She is having a birthday party tomorrow and is excited about this. She does not go to the bathroom a lot after taking the Lasix. She does not feel like she is holding on to fluid right now. She is c/o postnasal drip and allergy issues. She is very active and is feeling overall well. She is performing daily weights and watching her sodium consumption. Pt sleeps propped up and hasn't tried to lay flat. She is compliant with all her medications.     Pt c/o palpitations w/ exertion, cough, postnasal drip,  RLL edema    Pt denies fatigue, fevers, chills, lightheadedness, dizziness, syncope, N/V, changes to appetite, chest pain, SOB, depression or anxiety    Past Medical History:   Diagnosis Date    Asthma     Cancer ([address]     lymphoma    Congestive heart failure, unspecified     Heart failure ([address]     Hypertension     Other ill-defined conditions(799.89)     heart murmur    Presence of biventricular automatic cardioverter/defibrillator (AICD) 7/2/2015    [employer] biventricular AICD implant    Stomach ulcer      Past Surgical History:   Procedure Laterality Date    HX COLONOSCOPY      HX HEENT      cataracts removed    HX HYSTERECTOMY      HX OTHER SURGICAL      lymph node surgery    HX TONSILLECTOMY      [place] EGD TRANSORAL BIOPSY SINGLE/MULTIPLE  5/23/2012         SINUS SURGERY PROC UNLISTED      Nasal polyps removed     Family History   Problem Relation Age of Onset    Heart Disease Mother     Stroke Father      Social History     Social History    Marital status:      Spouse name: N/A    Number of children: 1    Years of education: 16+     Occupational History    retired teacher      Social History Main Topics    Smoking status: Never Smoker    Smokeless tobacco: Never Used    Alcohol use No    Drug use: No    Sexual activity: No     Other Topics Concern    Not on file     Social History Narrative     Current Outpatient Prescriptions on File Prior to Visit   Medication Sig Dispense Refill    furosemide (LASIX) 40 mg tablet Take 1 Tab by mouth daily. Take an additional 40 mg tab PO for a 2-4 lbs weight gain. Indications: PERIPHERAL EDEMA DUE TO CHRONIC HEART FAILURE 60 Tab 11    carvedilol (COREG) 3.125 mg tablet TAKE ONE (1) TABLET(S) TWIC E DAILY WITH FOOD  Indications: Chronic Heart Failure 180 Tab 3    albuterol (PROVENTIL VENTOLIN) 2.5 mg /3 mL (0.083 %) nebulizer solution 3 mL by Nebulization route every four (4) hours as needed for Wheezing. 100 Each 11    amoxicillin (AMOXIL) 500 mg capsule TAKE ONE CAPSULE BY MOUTH THREE TIMES A DAY  1    sacubitril-valsartan (ENTRESTO) 24 mg/26 mg tablet Take 1 Tab by mouth two (2) times a day. 60 Tab 2    allopurinol (ZYLOPRIM) 300 mg tablet TAKE ONE (1) TABLET(S) DAILY 90 Tab 3    atorvastatin (LIPITOR) 10 mg tablet TAKE ONE (1) TABLET(S) DAILY 90 Tab 3    fluticasone-salmeterol (ADVAIR) 250-50 mcg/dose diskus inhaler Take 1 Puff by inhalation two (2) times a day. 3 Inhaler 3    loratadine (CLARITIN) 10 mg tablet Take 10 mg by mouth daily as needed.  co-enzyme Q-10 (CO Q-10) 100 mg capsule Take 100 mg by mouth daily.  OXYGEN-AIR DELIVERY SYSTEMS by Does Not Apply route.  ibrutinib (IMBRUVICA) 140 mg cap Take 420 mg by mouth five (5) days a week. 140 mg cap, takes 3 caps five days a week (none on Sunday or Wednesday)      acetaminophen (TYLENOL EXTRA STRENGTH) 500 mg tablet Take 500 mg by mouth every six (6) hours as needed for Pain.  multivitamin (ONE A DAY) tablet Take 1 Tab by mouth daily.       sacubitril-valsartan (ENTRESTO) 24 mg/26 mg tablet Take 1 Tab by mouth two (2) times a day. 28 Tab 0     No current facility-administered medications on file prior to visit. Physical Exam   Gen:  WA, WN, NAD  HEENT:  EOMI  Neck:  (-) JVD  CVS:  S1/S2. Pul:  CTA b/l (-) r,r,w  Abd:  Soft, NT,ND  Ext:  Trace Rll edema, (-) LLL edema  Neuro:  No obvious deficits    Wt Readings from Last 3 Encounters:   03/16/17 135 lb 9.6 oz (61.5 kg)   02/21/17 132 lb 12.8 oz (60.2 kg)   02/14/17 138 lb 12.8 oz (63 kg)     Lab Results   Component Value Date/Time    Sodium 142 03/07/2017 11:08 AM    Potassium 4.1 03/07/2017 11:08 AM    Chloride 101 03/07/2017 11:08 AM    CO2 24 03/07/2017 11:08 AM    Anion gap 7 02/05/2017 03:50 AM    Glucose 104 03/07/2017 11:08 AM    BUN 22 03/07/2017 11:08 AM    Creatinine 1.35 03/07/2017 11:08 AM    BUN/Creatinine ratio 16 03/07/2017 11:08 AM    GFR est AA 40 03/07/2017 11:08 AM    GFR est non-AA 34 03/07/2017 11:08 AM    Calcium 9.0 03/07/2017 11:08 AM     Lab Results   Component Value Date/Time    WBC 3.7 02/05/2017 03:50 AM    HGB 9.6 02/05/2017 03:50 AM    HCT 28.3 02/05/2017 03:50 AM    PLATELET 871 98/39/2114 03:50 AM    MCV 90.4 02/05/2017 03:50 AM     Visit Vitals    /62 (BP 1 Location: Left arm, BP Patient Position: Sitting)    Pulse 68    Temp 97.5 °F (36.4 °C) (Oral)    Resp 16    Ht 5' 2\" (1.575 m)    Wt 135 lb 9.6 oz (61.5 kg)    SpO2 97%    BMI 24.8 kg/m2       ASSESSMENT and PLAN    NICM HFrEF 20-25% NYHA, class II  1. Increased Entresto 49/51 1 tablet po BID, samples provided  2. Stopped daily Lasix, pt only to use prn  3. Pt to continue Coreg  4. Continue daily weights, she is to notify the office if her weight starts increasing off the Lasix  5. Reviewed importance of sodium and fluid restriction  6. Return to clinic in 4-6 weeks  7. Check labs in 2 weeks    VT - s/p AICD - stable    DILAN -  Pt.  To continue nocturnal home oxygen    HLD - continue Lipitor and CoQ10    Pt seen under the direct supervision of Dr. Christina Patel    Thank you for letting us see her with you,      Tigre Ryder PA-C    [address] [place] [address]  Dept: [phone]  Dept Fax: [phone]: [phone] Fax: [phone]  24 hour VAD/HF Pager: [phone]

## 2017-03-24 ENCOUNTER — TELEPHONE (OUTPATIENT)
Dept: CARDIOLOGY CLINIC | Age: 82
End: 2017-03-24

## 2017-03-24 NOTE — TELEPHONE ENCOUNTER
Received a voice message from pharmacy needing a return call to discuss medication.     Pharmacy can be reached at ! [phone]

## 2017-03-27 DIAGNOSIS — I42.0 CARDIOMYOPATHY, DILATED, NONISCHEMIC (HCC): Primary | Chronic | ICD-10-CM

## 2017-03-27 DIAGNOSIS — I42.9 CARDIOMYOPATHY (HCC): ICD-10-CM

## 2017-03-27 DIAGNOSIS — I50.22 CHRONIC SYSTOLIC (CONGESTIVE) HEART FAILURE (HCC): ICD-10-CM

## 2017-03-29 ENCOUNTER — TELEPHONE (OUTPATIENT)
Dept: CARDIOLOGY CLINIC | Age: 82
End: 2017-03-29

## 2017-03-29 LAB
BUN SERPL-MCNC: 22 MG/DL (ref 10–36)
BUN/CREAT SERPL: 19 (ref 11–26)
CALCIUM SERPL-MCNC: 9 MG/DL (ref 8.7–10.3)
CHLORIDE SERPL-SCNC: 104 MMOL/L (ref 96–106)
CO2 SERPL-SCNC: 24 MMOL/L (ref 18–29)
CREAT SERPL-MCNC: 1.15 MG/DL (ref 0.57–1)
GLUCOSE SERPL-MCNC: 88 MG/DL (ref 65–99)
MAGNESIUM SERPL-MCNC: 2.2 MG/DL (ref 1.6–2.3)
NT-PROBNP SERPL-MCNC: 2525 PG/ML (ref 0–738)
POTASSIUM SERPL-SCNC: 4.4 MMOL/L (ref 3.5–5.2)
SODIUM SERPL-SCNC: 142 MMOL/L (ref 134–144)

## 2017-03-29 NOTE — TELEPHONE ENCOUNTER
PA dept awaiting supporting Clinical information to process new PA for 49/51 Entresto. Fax# [phone].  Awaiting Plan from NP.

## 2017-03-29 NOTE — TELEPHONE ENCOUNTER
Called pt to review results of labwork. She reports she feels well on the higher dose of Entresto and is not experiencing any lightheadedness or dizziness. She reports her BP has been good. She has noticedsome blurry vision first thing in the morning but that is her only c/o. I discussed this with Dr. Rudy Pop and he does not feel there is any relationship b/w her vision and the higher Entresto dose. Reminded her of her f/u appt. W/ Dr. Nancy Baraohna on 4/20/17 at 1:00pm.  Asked her to notify us if she was having any issues. .    Lab Results   Component Value Date/Time    Sodium 142 03/28/2017 12:12 PM    Potassium 4.4 03/28/2017 12:12 PM    Chloride 104 03/28/2017 12:12 PM    CO2 24 03/28/2017 12:12 PM    Anion gap 7 02/05/2017 03:50 AM    Glucose 88 03/28/2017 12:12 PM    BUN 22 03/28/2017 12:12 PM    Creatinine 1.15 03/28/2017 12:12 PM    BUN/Creatinine ratio 19 03/28/2017 12:12 PM    GFR est AA 48 03/28/2017 12:12 PM    GFR est non-AA 41 03/28/2017 12:12 PM    Calcium 9.0 03/28/2017 12:12 PM

## 2017-03-31 ENCOUNTER — TELEPHONE (OUTPATIENT)
Dept: CARDIOLOGY CLINIC | Age: 82
End: 2017-03-31

## 2017-03-31 ENCOUNTER — DOCUMENTATION ONLY (OUTPATIENT)
Dept: CARDIOLOGY CLINIC | Age: 82
End: 2017-03-31

## 2017-03-31 NOTE — TELEPHONE ENCOUNTER
Spoke with patient's daughter in law about PA for [employer], will follow up with insurance company and [employer]

## 2017-03-31 NOTE — PROGRESS NOTES
Called to obtain prior authorization form for entresto 49/51 ([phone]) and per the representative the prior authorization form will be faxed to [facility] office today. Informed LVAD coordinator, Byron Arroyo.     Sekou Cerda, MSW, LCSW    Clinical    Swapnil [address]

## 2017-03-31 NOTE — TELEPHONE ENCOUNTER
Received phone call from patients daughter regarding prior auth on Entresto.     Please return her call to #[phone]

## 2017-04-03 ENCOUNTER — DOCUMENTATION ONLY (OUTPATIENT)
Dept: CARDIOLOGY CLINIC | Age: 82
End: 2017-04-03

## 2017-04-03 ENCOUNTER — TELEPHONE (OUTPATIENT)
Dept: CARDIOLOGY CLINIC | Age: 82
End: 2017-04-03

## 2017-04-03 NOTE — TELEPHONE ENCOUNTER
Please call [facility] back regarding patient.     She can be reached at either #[phone] or cell # [phone]

## 2017-04-04 ENCOUNTER — TELEPHONE (OUTPATIENT)
Dept: CARDIOLOGY CLINIC | Age: 82
End: 2017-04-04

## 2017-04-04 NOTE — TELEPHONE ENCOUNTER
Last order for lasix was stopped and informed to only give if patient gained 2-4lbs or developed SOB. Patient weight increased  from 135.6 to 138.8       Please call [address]  back regarding patient.

## 2017-04-04 NOTE — TELEPHONE ENCOUNTER
Spoke with Mely Costello, will have patient take Lasix 40mg x 2 days and call back on Friday with current weight.

## 2017-04-07 ENCOUNTER — TELEPHONE (OUTPATIENT)
Dept: CARDIOLOGY CLINIC | Age: 82
End: 2017-04-07

## 2017-04-07 NOTE — TELEPHONE ENCOUNTER
Spoke with Prescilla Gosselin, will keep current order for lasix and give prn for weight over 137lbs. They will call the office before giving the dose as well.

## 2017-04-07 NOTE — TELEPHONE ENCOUNTER
Patient took 40 mg of lasix on Wednesday and Thursday    Weight was - Wednesday 138.3, Thursday 137.5, Friday 135.2      Please call Alison Mendez back if needed

## 2017-04-08 NOTE — PROGRESS NOTES
Patient was admitted for dehydration. She has a cardiomyopathy and was not formally in overt heart failure during this admission.   Katia Ocampo

## 2017-04-11 ENCOUNTER — OFFICE VISIT (OUTPATIENT)
Dept: INTERNAL MEDICINE CLINIC | Age: 82
End: 2017-04-11

## 2017-04-11 VITALS
RESPIRATION RATE: 14 BRPM | HEART RATE: 73 BPM | SYSTOLIC BLOOD PRESSURE: 123 MMHG | DIASTOLIC BLOOD PRESSURE: 80 MMHG | BODY MASS INDEX: 25.58 KG/M2 | TEMPERATURE: 98.2 F | HEIGHT: 62 IN | OXYGEN SATURATION: 96 % | WEIGHT: 139 LBS

## 2017-04-11 DIAGNOSIS — C85.90 NON-HODGKIN'S LYMPHOMA, UNSPECIFIED BODY REGION, UNSPECIFIED NON-HODGKIN LYMPHOMA TYPE (HCC): ICD-10-CM

## 2017-04-11 DIAGNOSIS — I42.0 CARDIOMYOPATHY, DILATED, NONISCHEMIC (HCC): Primary | Chronic | ICD-10-CM

## 2017-04-11 DIAGNOSIS — I10 ESSENTIAL HYPERTENSION: ICD-10-CM

## 2017-04-11 DIAGNOSIS — M10.9 GOUT, UNSPECIFIED CAUSE, UNSPECIFIED CHRONICITY, UNSPECIFIED SITE: ICD-10-CM

## 2017-04-11 DIAGNOSIS — J45.909 REACTIVE AIRWAY DISEASE, UNSPECIFIED ASTHMA SEVERITY, UNCOMPLICATED: ICD-10-CM

## 2017-04-11 NOTE — MR AVS SNAPSHOT
Visit Information Date & Time Provider Department Dept. Phone Encounter #  
 4/11/2017 10:45 AM Lynn SunHeather caban [facility] [phone] [id number] Your Appointments 4/20/2017  1:00 PM  
Follow Up with Dominga Pastrana MD  
[address] [facility] ([facility]) [place] [address]  
[phone]  
  
   
 [address] [place] [address]  
  
    
 5/9/2017 10:45 AM  
ESTABLISHED PATIENT with Stacie Swan MD  
[facility]) Appt Note: 3 MO. F/U  
 [address]  
[phone] [address]  
  
    
  
 4/26/2017  8:00 AM  
REMOTE OFFICE VISIT with [facility]) Appt Note: NOT AN OFFICE VISIT - REMOTE BSC ICD  
 [id number] [facility]  
[phone] [id number] [facility] Upcoming Health Maintenance Date Due  
 MEDICARE YEARLY EXAM 4/19/2017 GLAUCOMA SCREENING Q2Y 12/18/2017 DTaP/Tdap/Td series (2 - Td) 2/13/2027 Allergies as of 4/11/2017  Review Complete On: 4/11/2017 By: Sharri Francis Severity Noted Reaction Type Reactions Codeine  05/21/2012   Side Effect Other (comments) \"made me disoriented\" per pt Current Immunizations  Reviewed on 6/29/2015 Name Date Influenza Vaccine 10/1/2016, 10/4/2014 Influenza Vaccine Split 10/22/2011 Pneumococcal Vaccine (Unspecified Type) 5/22/2007 Not reviewed this visit You Were Diagnosed With   
  
 Codes Comments Cardiomyopathy, dilated, nonischemic (HCC)    -  Primary ICD-10-CM: I42.9 ICD-9-CM: 425.4 Non-Hodgkin's lymphoma, unspecified body region, unspecified non-Hodgkin lymphoma type     ICD-10-CM: C85.90 ICD-9-CM: 202.80 Essential hypertension     ICD-10-CM: I10 
ICD-9-CM: 401.9 Gout, unspecified cause, unspecified chronicity, unspecified site     ICD-10-CM: M10.9 ICD-9-CM: 274.9 Reactive airway disease, unspecified asthma severity, uncomplicated     EWL-73-UB: J45.909 ICD-9-CM: 493.90 Vitals BP Pulse Temp Resp Height(growth percentile) Weight(growth percentile) 123/80 73 98.2 °F (36.8 °C) 14 5' 2\" (1.575 m) 139 lb (63 kg) SpO2 BMI OB Status Smoking Status 96% 25.42 kg/m2 Postmenopausal Never Smoker BMI and BSA Data Body Mass Index Body Surface Area  
 25.42 kg/m 2 1.66 m 2 Preferred Pharmacy Pharmacy Name Phone [address] Your Updated Medication List  
  
   
This list is accurate as of: 4/11/17 12:45 PM.  Always use your most recent med list.  
  
  
  
  
 albuterol 2.5 mg /3 mL (0.083 %) nebulizer solution Commonly known as:  PROVENTIL VENTOLIN  
3 mL by Nebulization route every four (4) hours as needed for Wheezing. allopurinol 300 mg tablet Commonly known as:  ZYLOPRIM  
TAKE ONE (1) TABLET(S) DAILY  
  
 amoxicillin 500 mg capsule Commonly known as:  AMOXIL TAKE ONE CAPSULE BY MOUTH THREE TIMES A DAY  
  
 atorvastatin 10 mg tablet Commonly known as:  LIPITOR  
TAKE ONE (1) TABLET(S) DAILY  
  
 carvedilol 3.125 mg tablet Commonly known as:  COREG  
TAKE ONE (1) TABLET(S) TWIC E DAILY WITH FOOD  Indications: Chronic Heart Failure  
  
 co-enzyme Q-10 100 mg capsule Commonly known as:  CO Q-10 Take 100 mg by mouth daily. fluticasone-salmeterol 250-50 mcg/dose diskus inhaler Commonly known as:  ADVAIR Take 1 Puff by inhalation two (2) times a day. furosemide 40 mg tablet Commonly known as:  LASIX Take 1 Tab by mouth as needed. Take 40 mg tab PO for a 2-4 lbs weight gain.   Indications: PERIPHERAL EDEMA DUE TO CHRONIC HEART FAILURE  
  
 IMBRUVICA 140 mg capsule Generic drug:  ibrutinib Take 420 mg by mouth five (5) days a week. 140 mg cap, takes 3 caps five days a week (none on Sunday or Wednesday)  
  
 loratadine 10 mg tablet Commonly known as:  [place] Meo Take 10 mg by mouth daily as needed. multivitamin tablet Commonly known as:  ONE A DAY Take 1 Tab by mouth daily. OXYGEN-AIR DELIVERY SYSTEMS  
by Does Not Apply route. * sacubitril-valsartan 24-26 mg tablet Commonly known as:  ENTRESTO Take 1 Tab by mouth two (2) times a day. * sacubitril-valsartan 49-51 mg Tab tablet Commonly known as:  ENTRESTO Take 1 Tab by mouth two (2) times a day. TYLENOL EXTRA STRENGTH 500 mg tablet Generic drug:  acetaminophen Take 500 mg by mouth every six (6) hours as needed for Pain. * Notice: This list has 2 medication(s) that are the same as other medications prescribed for you. Read the directions carefully, and ask your doctor or other care provider to review them with you. To-Do List   
 04/25/2017 12:30 PM  
  Appointment with [address] [facility] at [address] [place] [address] ([phone]) Shower or bathe using soap and water. Do not use deodorant, powder, perfumes, or lotion the day of your exam.  If your prior mammograms were not performed at [facility] 6 please bring films with you or forward prior images 2 days before your procedure. Check in at registration 15min before your appointment time unless you were instructed to do otherwise. A script is not necessary, but if you have one, please bring it on the day of the mammogram or have it faxed to the department. [facility] [phone] [facility]  [phone] [facility] [address] [place]  [phone] [facility] [phone] [facility] [address] [facility] Courtney Pineda [phone] [facility] [place] [facility]! Dear Maria Antoina Quintero: Thank you for requesting a [employer] account. Our records indicate that you already have an active [employer] account.   You can access your account anytime at [email or web address] Did you know that you can access your hospital and ER discharge instructions at any time in [employer]? You can also review all of your test results from your hospital stay or ER visit. Additional Information If you have questions, please visit the Frequently Asked Questions section of the [employer] website at [email or web address] Remember, [employer] is NOT to be used for urgent needs. For medical emergencies, dial 911. Now available from your iPhone and Android! Please provide this summary of care documentation to your next provider. Your primary care clinician is listed as Gail Dey. If you have any questions after today's visit, please call [phone].

## 2017-04-11 NOTE — PROGRESS NOTES
Sid Rebeca is a 80 y.o. female  Chief Complaint   Patient presents with    CHF     1 month follow up    Ankle swelling       PHQ 2 / 9, over the last two weeks 4/11/2017   Little interest or pleasure in doing things Not at all   Feeling down, depressed or hopeless Not at all   Total Score PHQ 2 0

## 2017-04-11 NOTE — PROGRESS NOTES
[address] [facility]  [place] [address]  [address] [place] [address]  Phone:  [phone]  Fax: [phone]       Chief Complaint   Patient presents with    CHF     1 month follow up    Ankle swelling   . SUBJECTIVE:    Paty Umaña is a 80 y.o. female comes in stating that she is doing fairly well. She denies any major dyspnea on exertion. Specifically, she is able to do her daily physical activity of walking on the treadmill for at least 1-1.5 miles. Her Lasix was most recently discontinued and Reginold Soho is being titrated upward. She is supposed to be seeing a heart failure specialist, but her contact has not been there to the extent that I would be most comfortable with. The most contact has been made by the physician assistant/nurse practitioner. Her gout has been reasonably stable. She remains on her statin in view of her increased cardiovascular risk, although, her cardiac catheterization was entirely normal.      She continues to follow up with her medical oncologist, Dr. Jhonathan Duran, in view of the potential possibility of a leukemic transformation. Current Outpatient Prescriptions   Medication Sig Dispense Refill    furosemide (LASIX) 40 mg tablet Take 1 Tab by mouth as needed. Take 40 mg tab PO for a 2-4 lbs weight gain. Indications: PERIPHERAL EDEMA DUE TO CHRONIC HEART FAILURE 60 Tab 11    sacubitril-valsartan (ENTRESTO) 49 mg/51 mg tablet Take 1 Tab by mouth two (2) times a day. 60 Tab 3    carvedilol (COREG) 3.125 mg tablet TAKE ONE (1) TABLET(S) TWIC E DAILY WITH FOOD  Indications: Chronic Heart Failure 180 Tab 3    albuterol (PROVENTIL VENTOLIN) 2.5 mg /3 mL (0.083 %) nebulizer solution 3 mL by Nebulization route every four (4) hours as needed for Wheezing.  100 Each 11    allopurinol (ZYLOPRIM) 300 mg tablet TAKE ONE (1) TABLET(S) DAILY 90 Tab 3    atorvastatin (LIPITOR) 10 mg tablet TAKE ONE (1) TABLET(S) DAILY 90 Tab 3  fluticasone-salmeterol (ADVAIR) 250-50 mcg/dose diskus inhaler Take 1 Puff by inhalation two (2) times a day. 3 Inhaler 3    loratadine (CLARITIN) 10 mg tablet Take 10 mg by mouth daily as needed.  co-enzyme Q-10 (CO Q-10) 100 mg capsule Take 100 mg by mouth daily.  OXYGEN-AIR DELIVERY SYSTEMS by Does Not Apply route.  ibrutinib (IMBRUVICA) 140 mg cap Take 420 mg by mouth five (5) days a week. 140 mg cap, takes 3 caps five days a week (none on Sunday or Wednesday)      acetaminophen (TYLENOL EXTRA STRENGTH) 500 mg tablet Take 500 mg by mouth every six (6) hours as needed for Pain.  multivitamin (ONE A DAY) tablet Take 1 Tab by mouth daily.  amoxicillin (AMOXIL) 500 mg capsule TAKE ONE CAPSULE BY MOUTH THREE TIMES A DAY  1    sacubitril-valsartan (ENTRESTO) 24 mg/26 mg tablet Take 1 Tab by mouth two (2) times a day.  28 Tab 0     Past Medical History:   Diagnosis Date    Asthma     Cancer ([facility] [address]     lymphoma    Congestive heart failure, unspecified     Heart failure (HCC)     Hypertension     Other ill-defined conditions     heart murmur    Presence of biventricular automatic cardioverter/defibrillator (AICD) 7/2/2015    [employer] biventricular AICD implant    Stomach ulcer      Past Surgical History:   Procedure Laterality Date    HX COLONOSCOPY      HX HEENT      cataracts removed    HX HYSTERECTOMY      HX OTHER SURGICAL      lymph node surgery    HX TONSILLECTOMY      MO EGD TRANSORAL BIOPSY SINGLE/MULTIPLE  5/23/2012         SINUS SURGERY PROC UNLISTED      Nasal polyps removed     Allergies   Allergen Reactions    Codeine Other (comments)     \"made me disoriented\" per pt         REVIEW OF SYSTEMS:  General: negative for - chills or fever  ENT: negative for - headaches, nasal congestion or tinnitus  Respiratory: negative for - cough, hemoptysis, shortness of breath or wheezing  Cardiovascular : negative for - chest pain, edema, palpitations or shortness of breath  Gastrointestinal: negative for - abdominal pain, blood in stools, heartburn or nausea/vomiting  Genito-Urinary: no dysuria, trouble voiding, or hematuria  Musculoskeletal: negative for - gait disturbance, joint pain, joint stiffness or joint swelling  Neurological: no TIA or stroke symptoms  Hematologic: no bruises, no bleeding, no swollen glands  Integument: no lumps, mole changes, nail changes or rash  Endocrine: no malaise/lethargy or unexpected weight changes      Social History     Social History    Marital status:      Spouse name: N/A    Number of children: 1    Years of education: 16+     Occupational History    retired teacher      Social History Main Topics    Smoking status: Never Smoker    Smokeless tobacco: Never Used    Alcohol use No    Drug use: No    Sexual activity: No     Other Topics Concern    None     Social History Narrative     Family History   Problem Relation Age of Onset    Heart Disease Mother     Stroke Father        OBJECTIVE:    Visit Vitals    /80    Pulse 73    Temp 98.2 °F (36.8 °C)    Resp 14    Ht 5' 2\" (1.575 m)    Wt 139 lb (63 kg)    SpO2 96%    BMI 25.42 kg/m2     CONSTITUTIONAL: well , well nourished, appears age appropriate  EYES: perrla, eom intact  ENMT:moist mucous membranes, pharynx clear  NECK: supple. Thyroid normal  RESPIRATORY: Chest: clear to ascultation and percussion   CARDIOVASCULAR: Heart: regular rate and rhythm  GASTROINTESTINAL: Abdomen: soft, bowel sounds active  HEMATOLOGIC: no pathological lymph nodes palpated  MUSCULOSKELETAL: Extremities: no edema, pulse 1+   INTEGUMENT: No unusual rashes or suspicious skin lesions noted. Nails appear normal.  NEUROLOGIC: non-focal exam   MENTAL STATUS: alert and oriented, appropriate affect      ASSESSMENT:  1. 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 Non-Hodgkin's lymphoma, unspecified body region, unspecified non-Hodgkin lymphoma type    3. Essential hypertension    4. Gout, unspecified cause, unspecified chronicity, unspecified site    5. Reactive airway disease, unspecified asthma severity, uncomplicated        PLAN:   Dictation on: 04/11/2017 12:37 PM by: Arlette Keller         Follow-up Disposition:  Return in about 3 months (around 7/11/2017).       Colten Mccollum MD

## 2017-04-16 NOTE — PROGRESS NOTES
comes in for followup. Apparently, Dr. Rodney Floyd referred the patient to the [facility], a Dr. Alysha Saunders. Unfortunately, she has not seen the cardiologist and is only seeing nurse practitioners, which is a bit disconcerting. She is apparently on Entresto currently. She is on no diuretic. She feels well in the sense that she is not short of breath. She is able to exercise on a fairly consistent basis. Her gout is reasonably stable. She remains on her statin in view of her increased cardiovascular risk. Her cardiac catheterization revealed essentially normal coronary arteries.

## 2017-04-19 ENCOUNTER — TELEPHONE (OUTPATIENT)
Dept: CARDIOLOGY CLINIC | Age: 82
End: 2017-04-19

## 2017-04-20 ENCOUNTER — HOSPITAL ENCOUNTER (INPATIENT)
Age: 82
LOS: 2 days | Discharge: HOME | DRG: 291 | End: 2017-04-22
Attending: EMERGENCY MEDICINE | Admitting: INTERNAL MEDICINE
Payer: MEDICARE

## 2017-04-20 ENCOUNTER — APPOINTMENT (OUTPATIENT)
Dept: GENERAL RADIOLOGY | Age: 82
DRG: 291 | End: 2017-04-20
Attending: EMERGENCY MEDICINE
Payer: MEDICARE

## 2017-04-20 DIAGNOSIS — I50.23 ACUTE ON CHRONIC SYSTOLIC CONGESTIVE HEART FAILURE (HCC): ICD-10-CM

## 2017-04-20 DIAGNOSIS — J81.0 ACUTE PULMONARY EDEMA (HCC): Primary | ICD-10-CM

## 2017-04-20 DIAGNOSIS — I50.43 ACUTE ON CHRONIC COMBINED SYSTOLIC AND DIASTOLIC CONGESTIVE HEART FAILURE (HCC): ICD-10-CM

## 2017-04-20 LAB
ALBUMIN SERPL BCP-MCNC: 3.2 G/DL (ref 3.5–5)
ALBUMIN/GLOB SERPL: 1.1 {RATIO} (ref 1.1–2.2)
ALP SERPL-CCNC: 95 U/L (ref 45–117)
ALT SERPL-CCNC: 38 U/L (ref 12–78)
ANION GAP BLD CALC-SCNC: 8 MMOL/L (ref 5–15)
APPEARANCE UR: CLEAR
AST SERPL W P-5'-P-CCNC: 54 U/L (ref 15–37)
ATRIAL RATE: 76 BPM
BACTERIA URNS QL MICRO: NEGATIVE /HPF
BASE EXCESS BLDV CALC-SCNC: 3.1 MMOL/L
BASOPHILS # BLD AUTO: 0 K/UL (ref 0–0.1)
BASOPHILS # BLD: 1 % (ref 0–1)
BDY SITE: ABNORMAL
BILIRUB SERPL-MCNC: 0.7 MG/DL (ref 0.2–1)
BILIRUB UR QL: NEGATIVE
BNP SERPL-MCNC: 6161 PG/ML (ref 0–450)
BREATHS.SPONTANEOUS ON VENT: 25
BUN SERPL-MCNC: 16 MG/DL (ref 6–20)
BUN/CREAT SERPL: 13 (ref 12–20)
CALCIUM SERPL-MCNC: 8.6 MG/DL (ref 8.5–10.1)
CALCULATED R AXIS, ECG10: -150 DEGREES
CALCULATED T AXIS, ECG11: 12 DEGREES
CHLORIDE SERPL-SCNC: 109 MMOL/L (ref 97–108)
CK SERPL-CCNC: 62 U/L (ref 26–192)
CK SERPL-CCNC: 68 U/L (ref 26–192)
CO2 SERPL-SCNC: 27 MMOL/L (ref 21–32)
COLOR UR: ABNORMAL
CREAT SERPL-MCNC: 1.19 MG/DL (ref 0.55–1.02)
DIAGNOSIS, 93000: NORMAL
EOSINOPHIL # BLD: 0.1 K/UL (ref 0–0.4)
EOSINOPHIL NFR BLD: 2 % (ref 0–7)
EPAP/CPAP/PEEP, PAPEEP: 6
EPITH CASTS URNS QL MICRO: ABNORMAL /LPF
ERYTHROCYTE [DISTWIDTH] IN BLOOD BY AUTOMATED COUNT: 17.4 % (ref 11.5–14.5)
FIO2 ON VENT: 40 %
GAS FLOW.O2 SETTING OXYMISER: 4 L/MIN
GLOBULIN SER CALC-MCNC: 3 G/DL (ref 2–4)
GLUCOSE SERPL-MCNC: 117 MG/DL (ref 65–100)
GLUCOSE UR STRIP.AUTO-MCNC: NEGATIVE MG/DL
HCO3 BLDV-SCNC: 29 MMOL/L (ref 23–28)
HCT VFR BLD AUTO: 36.7 % (ref 35–47)
HGB BLD-MCNC: 11.9 G/DL (ref 11.5–16)
HGB UR QL STRIP: ABNORMAL
HYALINE CASTS URNS QL MICRO: ABNORMAL /LPF (ref 0–5)
IPAP/PIP, IPAPIP: 14
KETONES UR QL STRIP.AUTO: NEGATIVE MG/DL
LACTATE SERPL-SCNC: 0.9 MMOL/L (ref 0.4–2)
LEUKOCYTE ESTERASE UR QL STRIP.AUTO: ABNORMAL
LYMPHOCYTES # BLD AUTO: 39 % (ref 12–49)
LYMPHOCYTES # BLD: 1.7 K/UL (ref 0.8–3.5)
MCH RBC QN AUTO: 30.7 PG (ref 26–34)
MCHC RBC AUTO-ENTMCNC: 32.4 G/DL (ref 30–36.5)
MCV RBC AUTO: 94.8 FL (ref 80–99)
MONOCYTES # BLD: 0.2 K/UL (ref 0–1)
MONOCYTES NFR BLD AUTO: 5 % (ref 5–13)
NEUTS SEG # BLD: 2.3 K/UL (ref 1.8–8)
NEUTS SEG NFR BLD AUTO: 53 % (ref 32–75)
NITRITE UR QL STRIP.AUTO: NEGATIVE
P-R INTERVAL, ECG05: 172 MS
PCO2 BLDV: 48 MMHG (ref 41–51)
PH BLDV: 7.4 [PH] (ref 7.32–7.42)
PH UR STRIP: 7 [PH] (ref 5–8)
PLATELET # BLD AUTO: 121 K/UL (ref 150–400)
PO2 BLDV: 59 MMHG (ref 25–40)
POTASSIUM SERPL-SCNC: 4.1 MMOL/L (ref 3.5–5.1)
PROT SERPL-MCNC: 6.2 G/DL (ref 6.4–8.2)
PROT UR STRIP-MCNC: NEGATIVE MG/DL
Q-T INTERVAL, ECG07: 504 MS
QRS DURATION, ECG06: 190 MS
QTC CALCULATION (BEZET), ECG08: 567 MS
RBC # BLD AUTO: 3.87 M/UL (ref 3.8–5.2)
RBC #/AREA URNS HPF: ABNORMAL /HPF (ref 0–5)
SAO2 % BLDV: 90 % (ref 65–88)
SAO2% DEVICE SAO2% SENSOR NAME: ABNORMAL
SODIUM SERPL-SCNC: 144 MMOL/L (ref 136–145)
SP GR UR REFRACTOMETRY: 1.01 (ref 1–1.03)
SPECIMEN SITE: ABNORMAL
TROPONIN I SERPL-MCNC: 0.05 NG/ML
TROPONIN I SERPL-MCNC: 0.09 NG/ML
UROBILINOGEN UR QL STRIP.AUTO: 0.2 EU/DL (ref 0.2–1)
VENTILATION MODE VENT: ABNORMAL
VENTRICULAR RATE, ECG03: 76 BPM
WBC # BLD AUTO: 4.3 K/UL (ref 3.6–11)
WBC URNS QL MICRO: ABNORMAL /HPF (ref 0–4)

## 2017-04-20 PROCEDURE — 80053 COMPREHEN METABOLIC PANEL: CPT

## 2017-04-20 PROCEDURE — 74011250637 HC RX REV CODE- 250/637: Performed by: INTERNAL MEDICINE

## 2017-04-20 PROCEDURE — 81001 URINALYSIS AUTO W/SCOPE: CPT

## 2017-04-20 PROCEDURE — 94660 CPAP INITIATION&MGMT: CPT

## 2017-04-20 PROCEDURE — 36415 COLL VENOUS BLD VENIPUNCTURE: CPT

## 2017-04-20 PROCEDURE — 85025 COMPLETE CBC W/AUTO DIFF WBC: CPT

## 2017-04-20 PROCEDURE — 93005 ELECTROCARDIOGRAM TRACING: CPT

## 2017-04-20 PROCEDURE — 84484 ASSAY OF TROPONIN QUANT: CPT

## 2017-04-20 PROCEDURE — 94761 N-INVAS EAR/PLS OXIMETRY MLT: CPT

## 2017-04-20 PROCEDURE — 74011250636 HC RX REV CODE- 250/636: Performed by: INTERNAL MEDICINE

## 2017-04-20 PROCEDURE — 71010 XR CHEST PORT: CPT

## 2017-04-20 PROCEDURE — 83605 ASSAY OF LACTIC ACID: CPT

## 2017-04-20 PROCEDURE — 82803 BLOOD GASES ANY COMBINATION: CPT

## 2017-04-20 PROCEDURE — 82550 ASSAY OF CK (CPK): CPT

## 2017-04-20 PROCEDURE — 83880 ASSAY OF NATRIURETIC PEPTIDE: CPT

## 2017-04-20 PROCEDURE — 65660000000 HC RM CCU STEPDOWN

## 2017-04-20 PROCEDURE — 99285 EMERGENCY DEPT VISIT HI MDM: CPT

## 2017-04-20 RX ORDER — ACETAMINOPHEN 325 MG/1
650 TABLET ORAL
Status: DISCONTINUED | OUTPATIENT
Start: 2017-04-20 | End: 2017-04-22 | Stop reason: HOSPADM

## 2017-04-20 RX ORDER — IPRATROPIUM BROMIDE AND ALBUTEROL SULFATE 2.5; .5 MG/3ML; MG/3ML
3 SOLUTION RESPIRATORY (INHALATION)
Status: DISCONTINUED | OUTPATIENT
Start: 2017-04-20 | End: 2017-04-22 | Stop reason: HOSPADM

## 2017-04-20 RX ORDER — ATORVASTATIN CALCIUM 10 MG/1
10 TABLET, FILM COATED ORAL
Status: DISCONTINUED | OUTPATIENT
Start: 2017-04-20 | End: 2017-04-22 | Stop reason: HOSPADM

## 2017-04-20 RX ORDER — ONDANSETRON 2 MG/ML
4 INJECTION INTRAMUSCULAR; INTRAVENOUS
Status: DISCONTINUED | OUTPATIENT
Start: 2017-04-20 | End: 2017-04-22 | Stop reason: HOSPADM

## 2017-04-20 RX ORDER — SODIUM CHLORIDE 0.9 % (FLUSH) 0.9 %
5-10 SYRINGE (ML) INJECTION EVERY 8 HOURS
Status: DISCONTINUED | OUTPATIENT
Start: 2017-04-20 | End: 2017-04-22 | Stop reason: HOSPADM

## 2017-04-20 RX ORDER — CARVEDILOL 3.12 MG/1
3.12 TABLET ORAL 2 TIMES DAILY WITH MEALS
Status: DISCONTINUED | OUTPATIENT
Start: 2017-04-20 | End: 2017-04-22 | Stop reason: HOSPADM

## 2017-04-20 RX ORDER — ENOXAPARIN SODIUM 100 MG/ML
30 INJECTION SUBCUTANEOUS EVERY 24 HOURS
Status: DISCONTINUED | OUTPATIENT
Start: 2017-04-20 | End: 2017-04-22 | Stop reason: HOSPADM

## 2017-04-20 RX ORDER — FLUTICASONE FUROATE AND VILANTEROL 100; 25 UG/1; UG/1
1 POWDER RESPIRATORY (INHALATION) DAILY
Status: DISCONTINUED | OUTPATIENT
Start: 2017-04-21 | End: 2017-04-22 | Stop reason: HOSPADM

## 2017-04-20 RX ORDER — FUROSEMIDE 10 MG/ML
40 INJECTION INTRAMUSCULAR; INTRAVENOUS EVERY 12 HOURS
Status: DISCONTINUED | OUTPATIENT
Start: 2017-04-20 | End: 2017-04-21

## 2017-04-20 RX ORDER — SODIUM CHLORIDE 0.9 % (FLUSH) 0.9 %
5-10 SYRINGE (ML) INJECTION AS NEEDED
Status: DISCONTINUED | OUTPATIENT
Start: 2017-04-20 | End: 2017-04-22 | Stop reason: HOSPADM

## 2017-04-20 RX ADMIN — Medication 10 ML: at 21:24

## 2017-04-20 RX ADMIN — FUROSEMIDE 40 MG: 10 INJECTION, SOLUTION INTRAMUSCULAR; INTRAVENOUS at 13:47

## 2017-04-20 RX ADMIN — FUROSEMIDE 40 MG: 10 INJECTION, SOLUTION INTRAMUSCULAR; INTRAVENOUS at 21:23

## 2017-04-20 RX ADMIN — ENOXAPARIN SODIUM 30 MG: 30 INJECTION SUBCUTANEOUS at 13:47

## 2017-04-20 RX ADMIN — Medication 10 ML: at 17:22

## 2017-04-20 RX ADMIN — ATORVASTATIN CALCIUM 10 MG: 10 TABLET, FILM COATED ORAL at 21:24

## 2017-04-20 NOTE — ED NOTES
Pt brought in via ems. Was in respiratory distress room air sats 70s and coughing bubbles reported by ems. On arrival was placed on bipap by respiratory therapist. Seen by physician. o2 sats now 100, lungs bilateral crackles.   States she feels better

## 2017-04-20 NOTE — IP AVS SNAPSHOT
Current Discharge Medication List  
  
START taking these medications Dose & Instructions Dispensing Information Comments Morning Noon Evening Bedtime  
 potassium chloride SR 10 mEq tablet Commonly known as:  KLOR-CON 10 Your last dose was: Your next dose is:    
   
   
 Dose:  10 mEq Take 1 Tab by mouth daily. Quantity:  30 Tab Refills:  11 CONTINUE these medications which have CHANGED Dose & Instructions Dispensing Information Comments Morning Noon Evening Bedtime  
 furosemide 20 mg tablet Commonly known as:  LASIX What changed:   
- medication strength 
- how much to take - when to take this 
- reasons to take this 
- additional instructions Your last dose was: Your next dose is:    
   
   
 Dose:  20 mg Take 1 Tab by mouth daily. Quantity:  30 Tab Refills:  11 CONTINUE these medications which have NOT CHANGED Dose & Instructions Dispensing Information Comments Morning Noon Evening Bedtime  
 albuterol 2.5 mg /3 mL (0.083 %) nebulizer solution Commonly known as:  PROVENTIL VENTOLIN Your last dose was: Your next dose is:    
   
   
 Dose:  2.5 mg  
3 mL by Nebulization route every four (4) hours as needed for Wheezing. Quantity:  100 Each Refills:  11  
     
   
   
   
  
 allopurinol 300 mg tablet Commonly known as:  Refugia Creamer Your last dose was: Your next dose is: TAKE ONE (1) TABLET(S) DAILY Quantity:  90 Tab Refills:  3  
     
   
   
   
  
 atorvastatin 10 mg tablet Commonly known as:  LIPITOR Your last dose was: Your next dose is: TAKE ONE (1) TABLET(S) DAILY Quantity:  90 Tab Refills:  3  
     
   
   
   
  
 carvedilol 3.125 mg tablet Commonly known as:  Mark Flatten Your last dose was: Your next dose is: TAKE ONE (1) TABLET(S) TWIC E DAILY WITH FOOD  Indications: Chronic Heart Failure Quantity:  180 Tab Refills:  3  
     
   
   
   
  
 co-enzyme Q-10 100 mg capsule Commonly known as:  CO Q-10 Your last dose was: Your next dose is:    
   
   
 Dose:  100 mg Take 100 mg by mouth daily. Refills:  0  
     
   
   
   
  
 fluticasone-salmeterol 250-50 mcg/dose diskus inhaler Commonly known as:  ADVAIR Your last dose was: Your next dose is:    
   
   
 Dose:  1 Puff Take 1 Puff by inhalation two (2) times a day. Quantity:  3 Inhaler Refills:  3 IMBRUVICA 140 mg capsule Generic drug:  ibrutinib Your last dose was: Your next dose is:    
   
   
 Dose:  420 mg Take 420 mg by mouth five (5) days a week. 140 mg cap, takes 3 caps five days a week (none on Sunday or Wednesday) Refills:  0  
     
   
   
   
  
 loratadine 10 mg tablet Commonly known as:  Keiry Pineda Your last dose was: Your next dose is:    
   
   
 Dose:  10 mg Take 10 mg by mouth daily as needed. Refills:  0  
     
   
   
   
  
 multivitamin tablet Commonly known as:  ONE A DAY Your last dose was: Your next dose is:    
   
   
 Dose:  1 Tab Take 1 Tab by mouth daily. Refills:  0 OXYGEN-AIR DELIVERY SYSTEMS Your last dose was: Your next dose is:    
   
   
 by Does Not Apply route. Refills:  0  
     
   
   
   
  
 sacubitril-valsartan 49-51 mg Tab tablet Commonly known as:  ENTRESTO Your last dose was: Your next dose is:    
   
   
 Dose:  1 Tab Take 1 Tab by mouth two (2) times a day. Quantity:  60 Tab Refills:  3  
     
   
   
   
  
 TYLENOL EXTRA STRENGTH 500 mg tablet Generic drug:  acetaminophen Your last dose was: Your next dose is:    
   
   
 Dose:  500 mg Take 500 mg by mouth every six (6) hours as needed for Pain. Refills:  0 Where to Get Your Medications These medications were sent to [facility] [address]  [address] [place] [address] Phone:  [phone]  
  furosemide 20 mg tablet  
 potassium chloride SR 10 mEq tablet

## 2017-04-20 NOTE — IP AVS SNAPSHOT
[address] [facility] 
[phone] Patient: Dion Contreras MRN: [id number] URN:[id number] You are allergic to the following Allergen Reactions Codeine Other (comments) \"made me disoriented\" per pt Recent Documentation Height Weight BMI OB Status Smoking Status 1.575 m 58.6 kg 23.61 kg/m2 Postmenopausal Never Smoker Emergency Contacts Name Discharge Info Relation Home Work Mobile Taye Campbell  Child [2] [id number] Berta Harrell  Other Relative [6] [phone] Ari Hopper   [phone] Seema Campbell  Child [2]   [phone] Declined,Declined DECLINED CAREGIVER [4] About your hospitalization You were admitted on:  April 20, 2017 You last received care in the:  [facility] CARDIOPULMONARY CARE You were discharged on:  April 22, 2017 Unit phone number:  [phone] Why you were hospitalized Your primary diagnosis was:  Not on File Your diagnoses also included:  Chf (Congestive Heart Failure) (Hcc), Htn (Hypertension), Hyperlipidemia, Presence Of Biventricular Automatic Cardioverter/Defibrillator (Aicd), Hypokalemia Providers Seen During Your Hospitalizations Provider Role Specialty Primary office phone Ashish Celestin. MD Leticia Attending Provider Emergency Medicine [phone] Maria Fernanda Moe MD Attending Provider Internal Medicine [phone] Your Primary Care Physician (PCP) Primary Care Physician Office Phone Office Fax 104 Abhi Purvis [facility] [phone] Follow-up Information Follow up With Details Comments Contact Info Maria Fernanda Moe MD Go on 5/1/2017 Hospital follow up at 11:45 am.  [facility] [address] [address] 
[phone]  [facility] on 4/28/2017 Hospital follow up at 2:00 pm.  [facility] 
[address] [facility]. 
 1st floor [address] 
[phone] 900 [facility] to [address] [facility] [address] 
[phone] Your Appointments Tuesday April 25, 2017 12:30 PM EDT  
[place] MAMMO SCREENING with ED [facility] 1 [facility] [address]) [address] [facility]  
[phone] Shower or bathe using soap and water. Do not use deodorant, powder, perfumes, or lotion the day of your exam.  If your prior mammograms were not performed at [facility] 6 please bring films with you or forward prior images 2 days before your procedure. Check in at registration 15min before your appointment time unless you were instructed to do otherwise. A script is not necessary, but if you have one, please bring it on the day of the mammogram or have it faxed to the department. [facility] [phone] [facility] [place]  [phone] [facility] [address] [place]  [phone] [facility] [phone] [facility] 2874 [facility] [phone] Patient should report to outpatient registration (Medical Office Building One) 30 minutes prior to the appointment time unless instructed otherwise. Wednesday April 26, 2017  8:00 AM EDT  
REMOTE OFFICE VISIT with [facility]) [address] [facility]  
[phone] Friday April 28, 2017  2:00 PM EDT  
ESTABLISHED PATIENT with Jose Haas MD  
[facility]) [address] [place] [address]  
[phone] Monday May 01, 2017 11:45 AM EDT Any with Jigna Medley MD  
[address] [place] [address] and Primary Care [facility]) [address] [address]  
[phone]  Tuesday May 09, 2017 10:45 AM EDT  
ESTABLISHED PATIENT with Jose Haas MD  
 [address]  [facility]) [address]  
[phone] Current Discharge Medication List  
  
START taking these medications Dose & Instructions Dispensing Information Comments Morning Noon Evening Bedtime  
 potassium chloride SR 10 mEq tablet Commonly known as:  KLOR-CON 10 Your last dose was: Your next dose is:    
   
   
 Dose:  10 mEq Take 1 Tab by mouth daily. Quantity:  30 Tab Refills:  11 CONTINUE these medications which have CHANGED Dose & Instructions Dispensing Information Comments Morning Noon Evening Bedtime  
 furosemide 20 mg tablet Commonly known as:  LASIX What changed:   
- medication strength 
- how much to take - when to take this 
- reasons to take this 
- additional instructions Your last dose was: Your next dose is:    
   
   
 Dose:  20 mg Take 1 Tab by mouth daily. Quantity:  30 Tab Refills:  11 CONTINUE these medications which have NOT CHANGED Dose & Instructions Dispensing Information Comments Morning Noon Evening Bedtime  
 albuterol 2.5 mg /3 mL (0.083 %) nebulizer solution Commonly known as:  PROVENTIL VENTOLIN Your last dose was: Your next dose is:    
   
   
 Dose:  2.5 mg  
3 mL by Nebulization route every four (4) hours as needed for Wheezing. Quantity:  100 Each Refills:  11  
     
   
   
   
  
 allopurinol 300 mg tablet Commonly known as:  [place] Your last dose was: Your next dose is: TAKE ONE (1) TABLET(S) DAILY Quantity:  90 Tab Refills:  3  
     
   
   
   
  
 atorvastatin 10 mg tablet Commonly known as:  LIPITOR Your last dose was: Your next dose is: TAKE ONE (1) TABLET(S) DAILY Quantity:  90 Tab Refills:  3 carvedilol 3.125 mg tablet Commonly known as:  Adeilna Stable Your last dose was: Your next dose is: TAKE ONE (1) TABLET(S) TWIC E DAILY WITH FOOD  Indications: Chronic Heart Failure Quantity:  180 Tab Refills:  3  
     
   
   
   
  
 co-enzyme Q-10 100 mg capsule Commonly known as:  CO Q-10 Your last dose was: Your next dose is:    
   
   
 Dose:  100 mg Take 100 mg by mouth daily. Refills:  0  
     
   
   
   
  
 fluticasone-salmeterol 250-50 mcg/dose diskus inhaler Commonly known as:  ADVAIR Your last dose was: Your next dose is:    
   
   
 Dose:  1 Puff Take 1 Puff by inhalation two (2) times a day. Quantity:  3 Inhaler Refills:  3 IMBRUVICA 140 mg capsule Generic drug:  ibrutinib Your last dose was: Your next dose is:    
   
   
 Dose:  420 mg Take 420 mg by mouth five (5) days a week. 140 mg cap, takes 3 caps five days a week (none on Sunday or Wednesday) Refills:  0  
     
   
   
   
  
 loratadine 10 mg tablet Commonly known as:  Renata Nolan Your last dose was: Your next dose is:    
   
   
 Dose:  10 mg Take 10 mg by mouth daily as needed. Refills:  0  
     
   
   
   
  
 multivitamin tablet Commonly known as:  ONE A DAY Your last dose was: Your next dose is:    
   
   
 Dose:  1 Tab Take 1 Tab by mouth daily. Refills:  0 OXYGEN-AIR DELIVERY SYSTEMS Your last dose was: Your next dose is:    
   
   
 by Does Not Apply route. Refills:  0  
     
   
   
   
  
 sacubitril-valsartan 49-51 mg Tab tablet Commonly known as:  ENTRESTO Your last dose was: Your next dose is:    
   
   
 Dose:  1 Tab Take 1 Tab by mouth two (2) times a day. Quantity:  60 Tab Refills:  3  
     
   
   
   
  
 TYLENOL EXTRA STRENGTH 500 mg tablet Generic drug:  acetaminophen Your last dose was: Your next dose is:    
   
   
 Dose:  500 mg Take 500 mg by mouth every six (6) hours as needed for Pain. Refills:  0 Where to Get Your Medications These medications were sent to [facility] [address]  [address] [place]la [place] [address] Phone:  [phone]  
  furosemide 20 mg tablet  
 potassium chloride SR 10 mEq tablet Discharge Instructions General Discharge Instructions Patient ID: 
Sid Jose [id number] 
79 y.o. 
3/17/1925 Patient Instructions The following personal items were collected during your admission and were returned to you. Take Home Medications What to do at [facility] Recommended diet: Cardiac Diet Recommended activity: Activity as tolerated Follow-up with Jaswinder Valente MD  in 1 week. Information obtained by : 
I understand that if any problems occur once I am at home I am to contact my physician. I understand and acknowledge receipt of the instructions indicated above. Physician's or R.N.'s Signature                                                                  Date/Time Patient or Representative Signature                                                          Date/Time Discharge Orders None ACO Transitions of Care Introducing [employer] [address] Sandoval offers a voluntary care coordination program to provide high quality service and care to [facility] fee-for-service beneficiaries.   
 
Teodoro Goldberg was designed to help you enhance your health and well-being through the following services: ? Transitions of Care  support for individuals who are transitioning from one care setting to another (example: Hospital to home). ? Chronic and Complex Care Coordination  support for individuals and caregivers of those with serious or chronic illnesses or with more than one chronic (ongoing) condition and those who take a number of different medications. If you meet specific medical criteria, a [address] [place] [address] Rd may call you directly to coordinate your care with your primary care physician and your other care providers. For questions about the [facility]3] programs, please, contact your physicians office. For general questions or additional information about Accountable Care Organizations: 
Please visit www.medicare.gov/acos. html or call 1-800-MEDICARE (2-106.971.9577) TTY users should call 1-444.573.3487. Introducing [facility]! Dear Callie Peña: Thank you for requesting a [employer] account. Our records indicate that you already have an active [employer] account. You can access your account anytime at [email or web address] Did you know that you can access your hospital and ER discharge instructions at any time in [employer]? You can also review all of your test results from your hospital stay or ER visit. Additional Information If you have questions, please visit the Frequently Asked Questions section of the [employer] website at [email or web address] Remember, [employer] is NOT to be used for urgent needs. For medical emergencies, dial 911. Now available from your iPhone and Android! General Information Please provide this summary of care documentation to your next provider. Patient Signature:  ____________________________________________________________  Date:  ____________________________________________________________  
  
Anita Edmond    
    
 Provider Signature:  ____________________________________________________________ Date:  ____________________________________________________________

## 2017-04-20 NOTE — CONSULTS
[address] [facility] [address] [facility]     Date of  Admission: 4/20/2017  9:15 AM     Admission type:Emergency    Consult for: Systolic HF exacerbation  Consult by:Vannesa     Subjective:     Dion Contreras is a 80 y.o. female admitted for CHF (congestive heart failure) ([facility] [address] Known hx of systolic HF with EF 16%, followed by Dr. Yumiko Owen BS [facility]. Admitted with c/o increased SOB, and weight. Within last month Entrestro dose increased and lasix dose set to prn with parameters for weight changes. On arrival required BIPAP due to hypoxia. CXray with edema. In ED patient received IV lasix and nitro paste, diuresed > 1L so far, and is feeling much better. Patient denies CP, dizziness/lightheadedness. Previous treatment/evaluation includes echocardiogram, cardiac catheterization and BIVICD .  Cardiac risk factors: hypertension, post-menopausal.      Patient Active Problem List    Diagnosis Date Noted    CHF (congestive heart failure) ([facility] 02/04/2017    SOB (shortness of breath) 02/02/2017    Acute respiratory insufficiency ([address] 02/02/2017    PUD (peptic ulcer disease) 02/02/2017    Gastroesophageal reflux disease with esophagitis 02/02/2017    Thoracic aortic aneurysm without rupture ([address] 02/02/2017    Physical deconditioning 01/29/2017    Cramping of hands 01/24/2017    Mild mitral regurgitation 01/22/2016    Lymphoma ([address] 11/17/2015    Chronic systolic (congestive) heart failure ([address] 10/08/2015    Presence of biventricular automatic cardioverter/defibrillator (AICD) 07/02/2015    LBBB (left bundle branch block) 27/13/1350    Systolic CHF, acute on chronic ([address] 06/29/2015    Acute respiratory failure with hypoxia ([address] 06/29/2015    Upper respiratory infection 06/29/2015    HTN (hypertension) 06/29/2015    Hyperlipidemia 06/29/2015    Gout 06/29/2015    Reactive airway disease 03/23/2015    Fatigue 03/10/2015    Cardiomyopathy ([facility] 11/08/2014    Anemia 11/08/2014    Hypotension 11/04/2014    Rhinitis 09/04/2014    Pulmonary edema 08/04/2012    Cardiomyopathy, dilated, 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 08/04/2012    NHL (non-Hodgkin's lymphoma) ([facility] 08/04/2012    DILAN (obstructive sleep apnea) 08/04/2012    Abdominal pain 05/22/2012     Class: Acute    Weight loss 05/22/2012     Class: Acute      Eleuterio Payne MD  Past Medical History:   Diagnosis Date    Asthma     Cancer [facility])     lymphoma    Congestive heart failure, unspecified     Heart failure ([facility]     Hypertension     Other ill-defined conditions     heart murmur    Presence of biventricular automatic cardioverter/defibrillator (AICD) 7/2/2015    [employer] college internation [employer] biventricular AICD implant    Stomach ulcer       Social History     Social History    Marital status:      Spouse name: N/A    Number of children: 1    Years of education: 16+     Occupational History    retired teacher      Social History Main Topics    Smoking status: Never Smoker    Smokeless tobacco: Never Used    Alcohol use No    Drug use: No    Sexual activity: No     Other Topics Concern    None     Social History Narrative     Allergies   Allergen Reactions    Codeine Other (comments)     \"made me disoriented\" per pt      Family History   Problem Relation Age of Onset    Heart Disease Mother     Stroke Father       Current Facility-Administered Medications   Medication Dose Route Frequency    furosemide (LASIX) injection 40 mg  40 mg IntraVENous Q12H    sodium chloride (NS) flush 5-10 mL  5-10 mL IntraVENous Q8H    sodium chloride (NS) flush 5-10 mL  5-10 mL IntraVENous PRN    acetaminophen (TYLENOL) tablet 650 mg  650 mg Oral Q4H PRN    ondansetron (ZOFRAN) injection 4 mg  4 mg IntraVENous Q4H PRN    enoxaparin (LOVENOX) injection 30 mg  30 mg SubCUTAneous Q24H    atorvastatin (LIPITOR) tablet 10 mg  10 mg Oral QHS    carvedilol (COREG) tablet 3.125 mg  3.125 mg Oral BID WITH MEALS    [START ON 4/21/2017] fluticasone-vilanterol (BREO ELLIPTA) 100mcg-25mcg/puff  1 Puff Inhalation DAILY    albuterol-ipratropium (DUO-NEB) 2.5 MG-0.5 MG/3 ML  3 mL Nebulization Q4H PRN        Review of Symptoms: PTA   Constitutional: negative  Eyes: negative   Ears, nose, mouth, throat, and face: negative  Respiratory: JENKINS   Cardiovascular: negative   Gastrointestinal: negative  Genitourinary:negative   Musculoskeletal:negative   Neurological: negative   Endocrine: negative          Objective:      Visit Vitals    /64    Pulse 78    Temp 98.1 °F (36.7 °C)    Resp 18    Ht 5' 2\" (1.575 m)    Wt 130 lb (59 kg)    SpO2 96%    BMI 23.78 kg/m2       Physical:   General: WNWD elderly AAF in no acute distress  Heart: RRR, no m/S3/JVD, no carotid bruits   Lungs: very slight RLL basilar rales  Abdomen: Soft, +BS, NTND   Extremities: LE rogerio +DP/PT, trace edema   Neurologic: Grossly normal    Data Review:   Recent Labs      04/20/17   0940   WBC  4.3   HGB  11.9   HCT  36.7   PLT  121*     Recent Labs      04/20/17   0940   NA  144   K  4.1   CL  109*   CO2  27   GLU  117*   BUN  16   CREA  1.19*   CA  8.6   ALB  3.2*   TBILI  0.7   SGOT  54*   ALT  38       Recent Labs      04/20/17   1431  04/20/17   0940   TROIQ  0.09*  0.05*         Intake/Output Summary (Last 24 hours) at 04/20/17 2015  Last data filed at 04/20/17 1224   Gross per 24 hour   Intake                0 ml   Output             1100 ml   Net            -1100 ml        Cardiographics    Telemetry: AV paced  ECG: AV paced   Echocardiogram: 2/2017 EF 20-25%  CXRAY:moderate pulmonary edema right greater than  left. There are pleural effusions and subpleural edema in the fissures. These  findings suggest congestive heart failure.            Assessment:       Active Problems:    HTN (hypertension) (6/29/2015)      Hyperlipidemia (6/29/2015)      Presence of biventricular automatic cardioverter/defibrillator (AICD) (7/2/2015)      Overview: [employer] biventricular AICD implant      CHF (congestive heart failure) ([facility] [address] (2/4/2017)         Plan:     Acuteon Chronic systolic HF exacerbation:    Agree with diuresis, monitoring I/Os, Labs, daily weights  Continue Coreg, restart Entresto in the AM if renal function stable. Assist patient with understanding the \"prn\" use of lasix. Hopefully overnight diuresing, then home in AM with f/u to HF clinic next week. Hyperlipidemia:  Continue statin    Thank you for consulting [facility]    Luis Gonzalez MD     Patient seen and examined with nurse practitioner Kirsten Ring. I agree with the assessment and plan as noted.

## 2017-04-20 NOTE — H&P
Hospitalist Admission Note    NAME: Paty Umaña   :  3/17/1925   MRN:  [id number]     Date/Time:  2017 12:42 PM    Patient PCP: Kristan Fontenot MD  ________________________________________________________________________    My assessment of this patient's clinical condition and my plan of care is as follows. Assessment / Plan:    Acute on chronic respiratory failure (baseline 2LNC at night) due to   Acute on chronic systolic HF  Valvular heart disease with mod to severe MR / mod AI  S/P AICD  -CXR consistent with CHF, pro BNP >6000, exam with rales and pedal edema, her lasix was recently changed to prn, weight up to over 137  -weaning off BIPAP to NC oxygen   -start IV lasix 40mg IV q12. Continue coreg  -hold entresto due to acute CHF  -consult her cardiologist    Asthma?  -continue advair with nebs prn    NHL  -restart ibrutinib when stable        Code Status:   DNR  Surrogate Decision Maker:  SonFrancesca    DVT Prophylaxis:   lovenox  GI Prophylaxis: not indicated    Baseline: . Lives alone. Subjective:   CHIEF COMPLAINT:   SOB    HISTORY OF PRESENT ILLNESS:     Jennette Ormond is a 80 y.o.  female with a history of systolic CHF, AICD and lymphoma who presents with acute onset of SOB. Patient has been following with the CHF clinic and her lasix dose was recently decreased to prn for weight >137. This morning she woke up feeling a bit short winded and noted her weight to be just slightly over 137. She has a scheduled appointment with the CHF clinic today and as she was getting herself ready, she developed worsening SOB and so she called for EMS. She was noted with oxygen sat 70% on arrival and NIPPV was initiated en route to the ER. CXR demonstrated CHF pattern and so IV lasix 80mg and Nitropaste was initiated. We were asked to admit for work up and evaluation of the above problems.      Past Medical History:   Diagnosis Date    Asthma     Cancer ([facility] [address]     lymphoma    Congestive heart failure, unspecified     Heart failure (HCC)     Hypertension     Other ill-defined conditions     heart murmur    Presence of biventricular automatic cardioverter/defibrillator (AICD) 7/2/2015    [employer] biventricular AICD implant    Stomach ulcer         Past Surgical History:   Procedure Laterality Date    HX COLONOSCOPY      HX HEENT      cataracts removed    HX HYSTERECTOMY      HX OTHER SURGICAL      lymph node surgery    HX TONSILLECTOMY      [place] EGD TRANSORAL BIOPSY SINGLE/MULTIPLE  5/23/2012         SINUS SURGERY PROC UNLISTED      Nasal polyps removed       Social History   Substance Use Topics    Smoking status: Never Smoker    Smokeless tobacco: Never Used    Alcohol use No        Family History   Problem Relation Age of Onset    Heart Disease Mother     Stroke Father      Allergies   Allergen Reactions    Codeine Other (comments)     \"made me disoriented\" per pt        Prior to Admission medications    Medication Sig Start Date End Date Taking? Authorizing Provider   furosemide (LASIX) 40 mg tablet Take 1 Tab by mouth as needed. Take 40 mg tab PO for a 2-4 lbs weight gain. Indications: PERIPHERAL EDEMA DUE TO CHRONIC HEART FAILURE 8/03/03  Yes YING Combs   sacubitril-valsartan (ENTRESTO) 49 mg/51 mg tablet Take 1 Tab by mouth two (2) times a day. 9/91/86  Yes YING Combs   carvedilol (COREG) 3.125 mg tablet TAKE ONE (1) TABLET(S) TWIC E DAILY WITH FOOD  Indications: Chronic Heart Failure 2/20/17  Yes Karina Cortez PA-C   allopurinol (ZYLOPRIM) 300 mg tablet TAKE ONE (1) TABLET(S) DAILY 1/10/17  Yes Austin Underwood MD   atorvastatin (LIPITOR) 10 mg tablet TAKE ONE (1) TABLET(S) DAILY 1/10/17  Yes Austin Underwood MD   fluticasone-salmeterol (ADVAIR) 250-50 mcg/dose diskus inhaler Take 1 Puff by inhalation two (2) times a day.  1/10/17  Yes Austin Underwood MD   loratadine (CLARITIN) 10 mg tablet Take 10 mg by mouth daily as needed. Yes Historical Provider   co-enzyme Q-10 (CO Q-10) 100 mg capsule Take 100 mg by mouth daily. Yes Historical Provider   OXYGEN-AIR DELIVERY SYSTEMS by Does Not Apply route. Yes Historical Provider   ibrutinib (IMBRUVICA) 140 mg cap Take 420 mg by mouth five (5) days a week. 140 mg cap, takes 3 caps five days a week (none on Sunday or Wednesday)   Yes Historical Provider   acetaminophen (TYLENOL EXTRA STRENGTH) 500 mg tablet Take 500 mg by mouth every six (6) hours as needed for Pain. Yes Historical Provider   multivitamin (ONE A DAY) tablet Take 1 Tab by mouth daily.    Yes Historical Provider   albuterol (PROVENTIL VENTOLIN) 2.5 mg /3 mL (0.083 %) nebulizer solution 3 mL by Nebulization route every four (4) hours as needed for Wheezing. 2/20/17   Robert Mathias MD       REVIEW OF SYSTEMS:       Total of 12 systems reviewed as follows:       POSITIVE= underlined text  Negative = text not underlined  General:  fever, chills, sweats, generalized weakness, weight loss/gain,      loss of appetite   Eyes:    blurred vision, eye pain, loss of vision, double vision  ENT:    rhinorrhea, pharyngitis   Respiratory:   cough, sputum production, SOB, JENKINS, wheezing, pleuritic pain   Cardiology:   chest pain, palpitations, orthopnea, PND, edema, syncope   Gastrointestinal:  abdominal pain , N/V, diarrhea, dysphagia, constipation, bleeding   Genitourinary:  frequency, urgency, dysuria, hematuria, incontinence   Muskuloskeletal :  arthralgia, myalgia, back pain  Hematology:  easy bruising, nose or gum bleeding, lymphadenopathy   Dermatological: rash, ulceration, pruritis, color change / jaundice  Endocrine:   hot flashes or polydipsia   Neurological:  headache, dizziness, confusion, focal weakness, paresthesia,     Speech difficulties, memory loss, gait difficulty  Psychological: Feelings of anxiety, depression, agitation    Objective:   VITALS:    Visit Vitals    /74    Pulse 71    Temp 97.4 °F (36.3 °C)    Resp 16    Ht 5' 2\" (1.575 m)    Wt 59 kg (130 lb)    SpO2 (!) 83%    BMI 23.78 kg/m2       PHYSICAL EXAM:    General:    Alert, cooperative, no distress, appears stated age. HEENT: Atraumatic, anicteric sclerae, pink conjunctivae     No oral ulcers, mucosa moist, throat clear, dentition fair  Neck:  Supple, symmetrical,  thyroid: non tender  Lungs:   Basal rales, no wheezing  Chest wall:  No tenderness  No Accessory muscle use. Heart:   Regular  rhythm,  No  murmur   1+ pitting edema  Abdomen:   Soft, non-tender. Not distended. Bowel sounds normal  Extremities: No cyanosis. No clubbing  Capillary refill normal,  Radial pulse 2+  Skin:     Not pale. Not Jaundiced  No rashes   Psych:  Good insight. Not depressed. Not anxious or agitated. Neurologic: EOMs intact. No facial asymmetry. No aphasia or slurred speech. Symmetrical strength, Sensation grossly intact. Alert and oriented X 4.     _______________________________________________________________________  Care Plan discussed with:    Comments   Patient x    Family      RN     Care Manager                    Consultant:      _______________________________________________________________________  Expected  Disposition:   Home with Family    HH/PT/OT/RN x   SNF/LTC    RACHELLE    ________________________________________________________________________  TOTAL TIME: 54   Minutes    Critical Care Provided     Minutes non procedure based      Comments    x Reviewed previous records   >50% of visit spent in counseling and coordination of care  Discussion with patient and/or family and questions answered       Given the patient's current clinical presentation, I have a high level of concern for decompensation if discharged from the ED. Complex decision making was performed which includes reviewing the patient's available past medical records, laboratory results, and Xray films.  I have also directly communicated my plan and discussed this case with the involved ED physician.     ____________________________________________________________________  Dasha Rebolledo MD    Procedures: see electronic medical records for all procedures/Xrays and details which were not copied into this note but were reviewed prior to creation of Plan. LAB DATA REVIEWED:    Recent Results (from the past 24 hour(s))   CBC WITH AUTOMATED DIFF    Collection Time: 04/20/17  9:40 AM   Result Value Ref Range    WBC 4.3 3.6 - 11.0 K/uL    RBC 3.87 3.80 - 5.20 M/uL    HGB 11.9 11.5 - 16.0 g/dL    HCT 36.7 35.0 - 47.0 %    MCV 94.8 80.0 - 99.0 FL    MCH 30.7 26.0 - 34.0 PG    MCHC 32.4 30.0 - 36.5 g/dL    RDW 17.4 (H) 11.5 - 14.5 %    PLATELET 522 (L) 137 - 400 K/uL    NEUTROPHILS 53 32 - 75 %    LYMPHOCYTES 39 12 - 49 %    MONOCYTES 5 5 - 13 %    EOSINOPHILS 2 0 - 7 %    BASOPHILS 1 0 - 1 %    ABS. NEUTROPHILS 2.3 1.8 - 8.0 K/UL    ABS. LYMPHOCYTES 1.7 0.8 - 3.5 K/UL    ABS. MONOCYTES 0.2 0.0 - 1.0 K/UL    ABS. EOSINOPHILS 0.1 0.0 - 0.4 K/UL    ABS. BASOPHILS 0.0 0.0 - 0.1 K/UL   METABOLIC PANEL, COMPREHENSIVE    Collection Time: 04/20/17  9:40 AM   Result Value Ref Range    Sodium 144 136 - 145 mmol/L    Potassium 4.1 3.5 - 5.1 mmol/L    Chloride 109 (H) 97 - 108 mmol/L    CO2 27 21 - 32 mmol/L    Anion gap 8 5 - 15 mmol/L    Glucose 117 (H) 65 - 100 mg/dL    BUN 16 6 - 20 MG/DL    Creatinine 1.19 (H) 0.55 - 1.02 MG/DL    BUN/Creatinine ratio 13 12 - 20      GFR est AA 51 (L) >60 ml/min/1.73m2    GFR est non-AA 42 (L) >60 ml/min/1.73m2    Calcium 8.6 8.5 - 10.1 MG/DL    Bilirubin, total 0.7 0.2 - 1.0 MG/DL    ALT (SGPT) 38 12 - 78 U/L    AST (SGOT) 54 (H) 15 - 37 U/L    Alk.  phosphatase 95 45 - 117 U/L    Protein, total 6.2 (L) 6.4 - 8.2 g/dL    Albumin 3.2 (L) 3.5 - 5.0 g/dL    Globulin 3.0 2.0 - 4.0 g/dL    A-G Ratio 1.1 1.1 - 2.2     URINALYSIS W/ RFLX MICROSCOPIC    Collection Time: 04/20/17  9:40 AM   Result Value Ref Range    Color YELLOW/STRAW Appearance CLEAR CLEAR      Specific gravity 1.007 1.003 - 1.030      pH (UA) 7.0 5.0 - 8.0      Protein NEGATIVE  NEG mg/dL    Glucose NEGATIVE  NEG mg/dL    Ketone NEGATIVE  NEG mg/dL    Bilirubin NEGATIVE  NEG      Blood SMALL (A) NEG      Urobilinogen 0.2 0.2 - 1.0 EU/dL    Nitrites NEGATIVE  NEG      Leukocyte Esterase TRACE (A) NEG      WBC 0-4 0 - 4 /hpf    RBC 0-5 0 - 5 /hpf    Epithelial cells FEW FEW /lpf    Bacteria NEGATIVE  NEG /hpf    Hyaline cast 0-2 0 - 5 /lpf   CK W/ REFLX CKMB    Collection Time: 04/20/17  9:40 AM   Result Value Ref Range    CK 68 26 - 192 U/L   TROPONIN I    Collection Time: 04/20/17  9:40 AM   Result Value Ref Range    Troponin-I, Qt. 0.05 (H) <0.05 ng/mL   PRO-BNP    Collection Time: 04/20/17  9:40 AM   Result Value Ref Range    NT pro-BNP 6161 (H) 0 - 450 PG/ML   LACTIC ACID, PLASMA    Collection Time: 04/20/17  9:40 AM   Result Value Ref Range    Lactic acid 0.9 0.4 - 2.0 MMOL/L   EKG, 12 LEAD, INITIAL    Collection Time: 04/20/17  9:49 AM   Result Value Ref Range    Ventricular Rate 76 BPM    Atrial Rate 76 BPM    P-R Interval 172 ms    QRS Duration 190 ms    Q-T Interval 504 ms    QTC Calculation (Bezet) 567 ms    Calculated R Axis -150 degrees    Calculated T Axis 12 degrees    Diagnosis       AV dual-paced rhythm with occasional ventricular-paced complexes and with   occasional premature ventricular complexes  Biventricular pacemaker detected  When compared with ECG of 01-FEB-2017 19:26,  premature ventricular complexes are now present  Vent.  rate has decreased BY   4 BPM     VENOUS BLOOD GAS    Collection Time: 04/20/17 10:52 AM   Result Value Ref Range    VENOUS PH 7.40 7.32 - 7.42      VENOUS PCO2 48 41 - 51 mmHg    VENOUS PO2 59 (H) 25 - 40 mmHg    VENOUS O2 SATURATION 90 (H) 65 - 88 %    VENOUS BICARBONATE 29 (H) 23 - 28 mmol/L    VENOUS BASE EXCESS 3.1 mmol/L    O2 METHOD BIPAP      FIO2 40 %    MODE BIPAP      SET RATE 4      SPONTANEOUS RATE 25.0      IPAP/PIP 14.0 EPAP/CPAP/PEEP 6.0      Sample source VENOUS      SITE OTHER

## 2017-04-20 NOTE — ED PROVIDER NOTES
HPI Comments: 80 y.o. female with PMhx of NICM and Lymphoma who presents via EMS to [id number] [facility] ED with cc of SOB that began this morning. Pt states she felt well last night. EMS noted hypoxia to 70's on their arrival. She was placed on NIPPV, given 80 lasix, 1 inc of nitropaste and has much improved in her WOB. Pt has been on NIPPV in the past for the same. She has cough without production of sputum and no fever or chills. Pt denies CP, hx of DVT/PE, nausea, abdominal pain. She recently had a change in her lasix regimen which she is to take if she gains a couple of pounds. She states she gained 1 pound since yesterday. Social: tobacco in early 19's during college but none since. Family at bedside.            Past Medical History:   Diagnosis Date    Asthma     Cancer ([facility] [address]     lymphoma    Congestive heart failure, unspecified     Heart failure (HCC)     Hypertension     Other ill-defined conditions     heart murmur    Presence of biventricular automatic cardioverter/defibrillator (AICD) 7/2/2015    [employer] biventricular AICD implant    Stomach ulcer        Past Surgical History:   Procedure Laterality Date    HX COLONOSCOPY      HX HEENT      cataracts removed    HX HYSTERECTOMY      HX OTHER SURGICAL      lymph node surgery    HX TONSILLECTOMY      [place] EGD TRANSORAL BIOPSY SINGLE/MULTIPLE  5/23/2012         SINUS SURGERY PROC UNLISTED      Nasal polyps removed         Family History:   Problem Relation Age of Onset    Heart Disease Mother     Stroke Father        Social History     Social History    Marital status:      Spouse name: N/A    Number of children: 1    Years of education: 16+     Occupational History    retired teacher      Social History Main Topics    Smoking status: Never Smoker    Smokeless tobacco: Never Used    Alcohol use No    Drug use: No    Sexual activity: No     Other Topics Concern    Not on file     Social History Narrative         ALLERGIES: Codeine    Review of Systems   Constitutional: Negative. HENT: Negative. Respiratory: Positive for shortness of breath. Cardiovascular: Negative. Gastrointestinal: Negative. Genitourinary: Negative. Musculoskeletal: Negative. Skin: Negative. Neurological: Negative. Hematological: Negative. Psychiatric/Behavioral: Negative. Vitals:    04/20/17 0915   SpO2: 96%            Physical Exam   Constitutional: She is oriented to person, place, and time. She appears well-developed and well-nourished. Elderly female in moderate respiratory distress. HENT:   Head: Normocephalic and atraumatic. Mouth/Throat: Oropharynx is clear and moist.   Eyes: Conjunctivae and EOM are normal.   Neck: Normal range of motion. Neck supple. Unable to determine JVD   Cardiovascular: Normal rate, regular rhythm and normal heart sounds. Exam reveals no friction rub. No murmur heard. Pulmonary/Chest: She has rales. She exhibits no tenderness. Diffuse inspiratory and expiratory rales in posterior lung fields up to midway of back. Occasional wheeze appreciated. Mild inc WOB on bipap. Speaking one to two word sentences. Abdominal: Soft. Bowel sounds are normal. She exhibits no distension. There is no tenderness. There is no rebound. Musculoskeletal: She exhibits edema. She exhibits no tenderness or deformity. Neurological: She is alert and oriented to person, place, and time. No cranial nerve deficit. Skin: Skin is warm and dry. She is not diaphoretic. Psychiatric: She has a normal mood and affect. Nursing note and vitals reviewed.        MDM  Number of Diagnoses or Management Options  Acute on chronic combined systolic and diastolic congestive heart failure ([address]):   Acute on chronic systolic congestive heart failure ([address]):   Acute pulmonary edema [facility]):   Diagnosis management comments: DDx: ACS, CHF exacerbation, PNA    79 yo F with PMHx of NICM presenting with suspected CHF exacerbation. Will obtain CBC, CMP, Troponin, BNP, UA, CXR, EKG. VS currently stable and without evidnece of sepsis. ED Course       Procedures  10:00 Improving, speaking clearly. Diuresing due to lasix given by medics. 10:20 Consult Note  Cara Red MD spoke with Dr. Titus Holstein,   Specialty: Hospitalist  Discussed pt's hx, disposition, and available diagnostic and imaging results. Reviewed care plans. Admission for PCU.      11:00AM continues to improve. VS remain stable. Awaits admission. CRITICAL CARE NOTE :  10:41 AM    IMPENDING DETERIORATION -Airway, Respiratory and Cardiovascular    ASSOCIATED RISK FACTORS - Hypotension, Shock, Hypoxia and Dysrhythmia    MANAGEMENT- Bedside Assessment and Supervision of Care    INTERPRETATION -  Xrays, ECG and Blood Pressure    INTERVENTIONS - hemodynamic mngmt and vent mngmt    CASE REVIEW - Hospitalist, Nursing and Family    TREATMENT RESPONSE -Improved    PERFORMED BY - Self and Resident    NOTES   :  I have spent 60 minutes of critical care time involved in lab review, consultations with specialist, family decision- making, bedside attention and documentation. During this entire length of time I was immediately available to the patient . Jennifer Villa.  MD Leticia     LABORATORY TESTS:  Recent Results (from the past 12 hour(s))   METABOLIC PANEL, BASIC    Collection Time: 04/21/17  2:34 AM   Result Value Ref Range    Sodium 143 136 - 145 mmol/L    Potassium 3.1 (L) 3.5 - 5.1 mmol/L    Chloride 105 97 - 108 mmol/L    CO2 31 21 - 32 mmol/L    Anion gap 7 5 - 15 mmol/L    Glucose 90 65 - 100 mg/dL    BUN 19 6 - 20 MG/DL    Creatinine 1.07 (H) 0.55 - 1.02 MG/DL    BUN/Creatinine ratio 18 12 - 20      GFR est AA 58 (L) >60 ml/min/1.73m2    GFR est non-AA 48 (L) >60 ml/min/1.73m2    Calcium 8.1 (L) 8.5 - 10.1 MG/DL   MAGNESIUM    Collection Time: 04/21/17  2:34 AM   Result Value Ref Range    Magnesium 1.9 1.6 - 2.4 mg/dL       IMAGING RESULTS:  CXR Results  (Last 48 hours) 04/20/17 0941  XR CHEST PORT Final result    Impression:  IMPRESSION:   1. Findings are consistent with moderate pulmonary edema right greater than   left. There are pleural effusions and subpleural edema in the fissures. These   findings suggest congestive heart failure. Narrative:  EXAM:  XR CHEST PORT       INDICATION:  SOB, respiratory distress coughing lung crackles       COMPARISON:  February 1, 2017       FINDINGS: A portable AP radiograph of the chest was obtained at 0934 hours. The   patient is on a cardiac monitor. There is cardiomegaly and a tortuous aorta. Pacemaker remains in place. The lungs demonstrate low lung volumes. There is moderate bilateral pulmonary   edema right greater than left and small pleural effusions. Follow-up to   resolution is recommended. MEDICATIONS GIVEN:  Medications   furosemide (LASIX) injection 40 mg (40 mg IntraVENous Given 4/20/17 2123)   sodium chloride (NS) flush 5-10 mL (10 mL IntraVENous Given 4/21/17 0624)   sodium chloride (NS) flush 5-10 mL (not administered)   acetaminophen (TYLENOL) tablet 650 mg (not administered)   ondansetron (ZOFRAN) injection 4 mg (not administered)   enoxaparin (LOVENOX) injection 30 mg (30 mg SubCUTAneous Given 4/20/17 1347)   atorvastatin (LIPITOR) tablet 10 mg (10 mg Oral Given 4/20/17 2124)   carvedilol (COREG) tablet 3.125 mg (0 mg Oral Held 4/20/17 1900)   fluticasone-vilanterol (BREO ELLIPTA) 100mcg-25mcg/puff (not administered)   albuterol-ipratropium (DUO-NEB) 2.5 MG-0.5 MG/3 ML (not administered)       IMPRESSION:  1. Acute pulmonary edema (HCC)    2. Acute on chronic combined systolic and diastolic congestive heart failure ([address]    3. Acute on chronic systolic congestive heart failure ([address]        PLAN:  1.  ADMIT HOSPITALIST

## 2017-04-20 NOTE — PROGRESS NOTES
TRANSFER - IN REPORT:    Verbal report received from (name) on Paty Umaña  being received from ED(unit) for routine progression of care      Report consisted of patients Situation, Background, Assessment and   Recommendations(SBAR). Information from the following report(s) SBAR was reviewed with the receiving nurse. Opportunity for questions and clarification was provided. Assessment completed upon patients arrival to unit and care assumed.          Primary Nurse Bill Sutton and Trisha Rodriges, RN performed a dual skin assessment on this patient No impairment noted  Kimani score is 22

## 2017-04-21 ENCOUNTER — HOME HEALTH ADMISSION (OUTPATIENT)
Dept: HOME HEALTH SERVICES | Facility: HOME HEALTH | Age: 82
End: 2017-04-21

## 2017-04-21 PROBLEM — E87.6 HYPOKALEMIA: Status: ACTIVE | Noted: 2017-04-21

## 2017-04-21 LAB
ANION GAP BLD CALC-SCNC: 7 MMOL/L (ref 5–15)
BUN SERPL-MCNC: 19 MG/DL (ref 6–20)
BUN/CREAT SERPL: 18 (ref 12–20)
CALCIUM SERPL-MCNC: 8.1 MG/DL (ref 8.5–10.1)
CHLORIDE SERPL-SCNC: 105 MMOL/L (ref 97–108)
CO2 SERPL-SCNC: 31 MMOL/L (ref 21–32)
CREAT SERPL-MCNC: 1.07 MG/DL (ref 0.55–1.02)
GLUCOSE SERPL-MCNC: 90 MG/DL (ref 65–100)
MAGNESIUM SERPL-MCNC: 1.9 MG/DL (ref 1.6–2.4)
POTASSIUM SERPL-SCNC: 3.1 MMOL/L (ref 3.5–5.1)
SODIUM SERPL-SCNC: 143 MMOL/L (ref 136–145)

## 2017-04-21 PROCEDURE — 80048 BASIC METABOLIC PNL TOTAL CA: CPT | Performed by: INTERNAL MEDICINE

## 2017-04-21 PROCEDURE — 83735 ASSAY OF MAGNESIUM: CPT | Performed by: INTERNAL MEDICINE

## 2017-04-21 PROCEDURE — 74011250636 HC RX REV CODE- 250/636: Performed by: INTERNAL MEDICINE

## 2017-04-21 PROCEDURE — 74011250637 HC RX REV CODE- 250/637: Performed by: NURSE PRACTITIONER

## 2017-04-21 PROCEDURE — 36415 COLL VENOUS BLD VENIPUNCTURE: CPT | Performed by: INTERNAL MEDICINE

## 2017-04-21 PROCEDURE — 74011250637 HC RX REV CODE- 250/637: Performed by: INTERNAL MEDICINE

## 2017-04-21 PROCEDURE — 74011250636 HC RX REV CODE- 250/636: Performed by: NURSE PRACTITIONER

## 2017-04-21 PROCEDURE — 65660000000 HC RM CCU STEPDOWN

## 2017-04-21 RX ORDER — FUROSEMIDE 10 MG/ML
20 INJECTION INTRAMUSCULAR; INTRAVENOUS ONCE
Status: COMPLETED | OUTPATIENT
Start: 2017-04-21 | End: 2017-04-21

## 2017-04-21 RX ORDER — POTASSIUM CHLORIDE 750 MG/1
20 TABLET, FILM COATED, EXTENDED RELEASE ORAL 3 TIMES DAILY
Status: COMPLETED | OUTPATIENT
Start: 2017-04-21 | End: 2017-04-21

## 2017-04-21 RX ORDER — POTASSIUM CHLORIDE 750 MG/1
20 TABLET, FILM COATED, EXTENDED RELEASE ORAL DAILY
Status: DISCONTINUED | OUTPATIENT
Start: 2017-04-22 | End: 2017-04-22 | Stop reason: HOSPADM

## 2017-04-21 RX ORDER — FUROSEMIDE 20 MG/1
20 TABLET ORAL EVERY OTHER DAY
Status: DISCONTINUED | OUTPATIENT
Start: 2017-04-22 | End: 2017-04-22

## 2017-04-21 RX ADMIN — CARVEDILOL 3.12 MG: 3.12 TABLET, FILM COATED ORAL at 08:07

## 2017-04-21 RX ADMIN — FUROSEMIDE 20 MG: 10 INJECTION, SOLUTION INTRAMUSCULAR; INTRAVENOUS at 17:47

## 2017-04-21 RX ADMIN — Medication 10 ML: at 22:40

## 2017-04-21 RX ADMIN — POTASSIUM CHLORIDE 20 MEQ: 750 TABLET, FILM COATED, EXTENDED RELEASE ORAL at 22:39

## 2017-04-21 RX ADMIN — Medication 10 ML: at 06:24

## 2017-04-21 RX ADMIN — ENOXAPARIN SODIUM 30 MG: 30 INJECTION SUBCUTANEOUS at 13:22

## 2017-04-21 RX ADMIN — ATORVASTATIN CALCIUM 10 MG: 10 TABLET, FILM COATED ORAL at 22:39

## 2017-04-21 RX ADMIN — FUROSEMIDE 40 MG: 10 INJECTION, SOLUTION INTRAMUSCULAR; INTRAVENOUS at 08:06

## 2017-04-21 RX ADMIN — SACUBITRIL AND VALSARTAN 1 TABLET: 49; 51 TABLET, FILM COATED ORAL at 18:07

## 2017-04-21 RX ADMIN — Medication 10 ML: at 13:22

## 2017-04-21 RX ADMIN — POTASSIUM CHLORIDE 20 MEQ: 750 TABLET, FILM COATED, EXTENDED RELEASE ORAL at 09:28

## 2017-04-21 RX ADMIN — POTASSIUM CHLORIDE 20 MEQ: 750 TABLET, FILM COATED, EXTENDED RELEASE ORAL at 17:47

## 2017-04-21 RX ADMIN — FLUTICASONE FUROATE AND VILANTEROL TRIFENATATE 1 PUFF: 100; 25 POWDER RESPIRATORY (INHALATION) at 09:30

## 2017-04-21 RX ADMIN — CARVEDILOL 3.12 MG: 3.12 TABLET, FILM COATED ORAL at 17:47

## 2017-04-21 RX ADMIN — SACUBITRIL AND VALSARTAN 1 TABLET: 49; 51 TABLET, FILM COATED ORAL at 13:22

## 2017-04-21 NOTE — PROGRESS NOTES
[facility] RANDAR, Adrian and MAR was discussed.     Armando Lawson RN

## 2017-04-21 NOTE — PROGRESS NOTES
Care Management:    Patient admitted with CHF. She is a bundle patient. She is active with Dr Derrell Plascencia, LUIS CARLOS Ibrahim and Dr Miles Camp. She has companions daily from [employer]. Patient is very active and is involved with her sorority and her [employer] and they are very supportive of her. Hospital to Home at discharge and we are scheduling follow up with PCP and Cardiology. Care Management Interventions  PCP Verified by CM: Yes  Mode of Transport at Discharge: Other (see comment) (friend or  service)  Current Support Network: Lives Alone (Lives alone and has companions seven days a week. Home is single story and she has a cane, nebulizer and 02. )  Confirm Follow Up Transport: Family (Family, friends or  serivce.  Patient still drives just a little. )  Plan discussed with Pt/Family/Caregiver: Yes  Discharge Location  Discharge Placement:  [facility] to Home)     Dorie Aguirre [facility]m [phone]

## 2017-04-21 NOTE — CARDIO/PULMONARY
CP REHAB NOTE    Chart Review: Patient admitted for CHF. Increasing SOB and weight gain over the last month. Medical History: CHF, HTN, AICD  EF 20-25%, Echo 2/3/2017  Non Smoker    Patient seen by CP Rehab in February 2017. Patient is also seen by Dr. Darek Fletcher at [facility] HF clinic. Met with patient for CHF teaching. Patient performs a daily weight and has parameters she follows for weight gain that were set by Dr. Darek Fletcher. Patient is to take PRN lasix if her weight goes to 137 pounds. Patient did get to 137 pounds but was so short of breath she had to call EMS. Stressed with patient that she discuss the events of this admission with Dr. Darek Fletcher as her weight parameter may need to be adjusted. Patient eats low sodium, has caretakers who prepare her pill box, and walks on her treadmill 30-40 minutes daily. Patient was unsure what pills she take so it was suggested at discharge she get a copy of her medications to show caretakers and sit with them the next time they fill her pill box. Patient follows all guidelines. Encouraged to continue all the great work she is doing at home.

## 2017-04-21 NOTE — PROGRESS NOTES
[address] SHIFT NURSING NOTE    Bedside shift change report given to Zain Garcia  RN (oncoming nurse) by Jacquelnie Cochran RN (offgoing nurse). Report included the following information SBAR, Procedure Summary, Recent Results, Med Rec Status and Cardiac Rhythm Paced. SHIFT SUMMARY: Patient comfortable throughout shift. Receiving Lasix with no adverse reactions noted. BMP and Mag collected as ordered.        Admission Date 4/20/2017   Admission Diagnosis CHF (congestive heart failure) ([facility] [address]   Consults IP CONSULT TO CARDIOLOGY        Consults   [] PT   [] OT   [] Speech   [] Palliative      [] Hospice    [] Case Management   [] None   Cardiac Monitoring   [x] Yes   [] No     Antibiotics   [] Yes   [x] No   GI Prophylaxis  (Ex: Protonix, Pepcid, etc,.)   [] Yes   [x] No          DVT Prophylaxis   SCDs:             Hilton stockings:         [x] Medication (Ex: Lovenox, Eliquis, Brilinta, Coumadin,  Heparin, etc..)   [] Contraindicated   [] No VTE needed       Urinary Catheter             LDAs               Peripheral IV 04/20/17 Right Antecubital (Active)   Site Assessment Clean, dry, & intact 4/20/2017  7:40 PM   Phlebitis Assessment 0 4/20/2017  7:40 PM   Infiltration Assessment 0 4/20/2017  7:40 PM   Dressing Status Clean, dry, & intact 4/20/2017  7:40 PM   Dressing Type Transparent 4/20/2017  7:40 PM   Hub Color/Line Status Pink;Flushed 4/20/2017  7:40 PM       Peripheral IV 04/20/17 (Active)   Site Assessment Clean, dry, & intact 4/20/2017  7:40 PM   Phlebitis Assessment 0 4/20/2017  7:40 PM   Infiltration Assessment 0 4/20/2017  7:40 PM   Dressing Status Clean, dry, & intact 4/20/2017  7:40 PM   Dressing Type Transparent 4/20/2017  7:40 PM   Hub Color/Line Status Blue;Flushed 4/20/2017  7:40 PM                      I/Os   Intake/Output Summary (Last 24 hours) at 04/20/17 6555  Last data filed at 04/20/17 2250   Gross per 24 hour   Intake                0 ml   Output             2000 ml   Net            -2000 ml Activity Level Activity Level: Up ad chong     Activity Assistance: No assistance needed   Diet Active Orders   Diet    DIET CARDIAC Regular      Purposeful Rounding every 1-2 hour? [x] Yes    Milvia Score  Total Score: 1   Bed Alarm (If score 3 or >)   [] Yes    [] Refused (See signed refusal form in chart)   Kimani Score  Kimani Score: 21       Kimani Score (if score 14 or less)   [] PMT consult   [] Nutrition consult   [] Wound Care consult      []  Specialty bed         Influenza Vaccine Received Flu Vaccine for Current Season (usually Sept-March): Not Flu Season               Needs prior to discharge:   Home O2 required:    [] Yes   [x] No     If yes, how much O2 required?     Other:    Last Bowel Movement Date: 04/19/17   Readmission Risk Assessment Tool Score High Risk            31       Total Score        3 Relationship with PCP    4 More than 1 Admission in calendar year    5 Patient Insurance is Medicare, Medicaid or Self Pay    19 Charlson Comorbidity Score        Criteria that do not apply:    Patient Living Status    Patient Length of Stay > 5       Expected Length of Stay - - -   Actual Length of Stay 0

## 2017-04-21 NOTE — PROGRESS NOTES
General Daily Progress Note    Admit Date: 4/20/2017    Subjective:     Patient has no complaint . Current Facility-Administered Medications   Medication Dose Route Frequency    potassium chloride SR (KLOR-CON 10) tablet 20 mEq  20 mEq Oral TID    [START ON 4/22/2017] potassium chloride SR (KLOR-CON 10) tablet 20 mEq  20 mEq Oral DAILY    furosemide (LASIX) injection 40 mg  40 mg IntraVENous Q12H    sodium chloride (NS) flush 5-10 mL  5-10 mL IntraVENous Q8H    sodium chloride (NS) flush 5-10 mL  5-10 mL IntraVENous PRN    acetaminophen (TYLENOL) tablet 650 mg  650 mg Oral Q4H PRN    ondansetron (ZOFRAN) injection 4 mg  4 mg IntraVENous Q4H PRN    enoxaparin (LOVENOX) injection 30 mg  30 mg SubCUTAneous Q24H    atorvastatin (LIPITOR) tablet 10 mg  10 mg Oral QHS    carvedilol (COREG) tablet 3.125 mg  3.125 mg Oral BID WITH MEALS    fluticasone-vilanterol (BREO ELLIPTA) 100mcg-25mcg/puff  1 Puff Inhalation DAILY    albuterol-ipratropium (DUO-NEB) 2.5 MG-0.5 MG/3 ML  3 mL Nebulization Q4H PRN        Review of Systems  A comprehensive review of systems was negative.     Objective:     Patient Vitals for the past 24 hrs:   BP Temp Pulse Resp SpO2 Height Weight   04/21/17 0230 111/71 98.1 °F (36.7 °C) 76 17 93 % - 139 lb 8.8 oz (63.3 kg)   04/20/17 2338 97/62 98.2 °F (36.8 °C) 71 18 96 % - -   04/20/17 1930 100/64 98.1 °F (36.7 °C) 78 18 96 % - -   04/20/17 1527 107/78 97.2 °F (36.2 °C) 74 16 97 % - -   04/20/17 1430 108/58 - 74 21 95 % - -   04/20/17 1400 118/70 - 72 19 95 % - -   04/20/17 1330 - - 78 19 98 % - -   04/20/17 1300 109/67 - 74 20 97 % - -   04/20/17 1242 - - - - 93 % - -   04/20/17 1230 102/77 - 73 20 96 % - -   04/20/17 1200 112/73 - 74 20 98 % - -   04/20/17 1145 116/74 - 71 16 (!) 83 % - -   04/20/17 1130 111/74 - 74 19 (!) 87 % - -   04/20/17 1115 110/68 - 71 15 99 % - -   04/20/17 1100 104/71 - 74 19 97 % - -   04/20/17 1045 102/69 - 73 23 98 % - -   04/20/17 1030 99/68 - 71 18 99 % - - 04/20/17 1029 - - - - 99 % - -   04/20/17 0945 110/74 - 72 20 100 % - -   04/20/17 0933 - - - - 100 % - -   04/20/17 0930 118/73 - 86 26 99 % - -   04/20/17 0925 122/79 97.4 °F (36.3 °C) 90 20 - 5' 2\" (1.575 m) 130 lb (59 kg)   04/20/17 0918 122/79 - - - 100 % - -   04/20/17 0915 - - - - 96 % - -        04/19 1901 - 04/21 0700  In: 540 [P.O.:540]  Out: 2900 [Urine:2900]    Physical Exam:   Visit Vitals    /71    Pulse 76    Temp 98.1 °F (36.7 °C)    Resp 17    Ht 5' 2\" (1.575 m)    Wt 139 lb 8.8 oz (63.3 kg)    SpO2 93%    BMI 25.52 kg/m2     General appearance: alert, cooperative, no distress, appears stated age  Neck: supple, symmetrical, trachea midline, no adenopathy, thyroid: not enlarged, symmetric, no tenderness/mass/nodules, no carotid bruit and no JVD  Lungs: rales R base, L base  Heart: regular rate and rhythm, S1, S2 normal, no murmur, click, rub or gallop  Abdomen: soft, non-tender. Bowel sounds normal. No masses,  no organomegaly  Extremities: extremities normal, atraumatic, no cyanosis or edema        Data Review   Recent Results (from the past 24 hour(s))   CBC WITH AUTOMATED DIFF    Collection Time: 04/20/17  9:40 AM   Result Value Ref Range    WBC 4.3 3.6 - 11.0 K/uL    RBC 3.87 3.80 - 5.20 M/uL    HGB 11.9 11.5 - 16.0 g/dL    HCT 36.7 35.0 - 47.0 %    MCV 94.8 80.0 - 99.0 FL    MCH 30.7 26.0 - 34.0 PG    MCHC 32.4 30.0 - 36.5 g/dL    RDW 17.4 (H) 11.5 - 14.5 %    PLATELET 225 (L) 585 - 400 K/uL    NEUTROPHILS 53 32 - 75 %    LYMPHOCYTES 39 12 - 49 %    MONOCYTES 5 5 - 13 %    EOSINOPHILS 2 0 - 7 %    BASOPHILS 1 0 - 1 %    ABS. NEUTROPHILS 2.3 1.8 - 8.0 K/UL    ABS. LYMPHOCYTES 1.7 0.8 - 3.5 K/UL    ABS. MONOCYTES 0.2 0.0 - 1.0 K/UL    ABS. EOSINOPHILS 0.1 0.0 - 0.4 K/UL    ABS.  BASOPHILS 0.0 0.0 - 0.1 K/UL   METABOLIC PANEL, COMPREHENSIVE    Collection Time: 04/20/17  9:40 AM   Result Value Ref Range    Sodium 144 136 - 145 mmol/L    Potassium 4.1 3.5 - 5.1 mmol/L    Chloride 109 (H) 97 - 108 mmol/L    CO2 27 21 - 32 mmol/L    Anion gap 8 5 - 15 mmol/L    Glucose 117 (H) 65 - 100 mg/dL    BUN 16 6 - 20 MG/DL    Creatinine 1.19 (H) 0.55 - 1.02 MG/DL    BUN/Creatinine ratio 13 12 - 20      GFR est AA 51 (L) >60 ml/min/1.73m2    GFR est non-AA 42 (L) >60 ml/min/1.73m2    Calcium 8.6 8.5 - 10.1 MG/DL    Bilirubin, total 0.7 0.2 - 1.0 MG/DL    ALT (SGPT) 38 12 - 78 U/L    AST (SGOT) 54 (H) 15 - 37 U/L    Alk.  phosphatase 95 45 - 117 U/L    Protein, total 6.2 (L) 6.4 - 8.2 g/dL    Albumin 3.2 (L) 3.5 - 5.0 g/dL    Globulin 3.0 2.0 - 4.0 g/dL    A-G Ratio 1.1 1.1 - 2.2     URINALYSIS W/ RFLX MICROSCOPIC    Collection Time: 04/20/17  9:40 AM   Result Value Ref Range    Color YELLOW/STRAW      Appearance CLEAR CLEAR      Specific gravity 1.007 1.003 - 1.030      pH (UA) 7.0 5.0 - 8.0      Protein NEGATIVE  NEG mg/dL    Glucose NEGATIVE  NEG mg/dL    Ketone NEGATIVE  NEG mg/dL    Bilirubin NEGATIVE  NEG      Blood SMALL (A) NEG      Urobilinogen 0.2 0.2 - 1.0 EU/dL    Nitrites NEGATIVE  NEG      Leukocyte Esterase TRACE (A) NEG      WBC 0-4 0 - 4 /hpf    RBC 0-5 0 - 5 /hpf    Epithelial cells FEW FEW /lpf    Bacteria NEGATIVE  NEG /hpf    Hyaline cast 0-2 0 - 5 /lpf   CK W/ REFLX CKMB    Collection Time: 04/20/17  9:40 AM   Result Value Ref Range    CK 68 26 - 192 U/L   TROPONIN I    Collection Time: 04/20/17  9:40 AM   Result Value Ref Range    Troponin-I, Qt. 0.05 (H) <0.05 ng/mL   PRO-BNP    Collection Time: 04/20/17  9:40 AM   Result Value Ref Range    NT pro-BNP 6161 (H) 0 - 450 PG/ML   LACTIC ACID, PLASMA    Collection Time: 04/20/17  9:40 AM   Result Value Ref Range    Lactic acid 0.9 0.4 - 2.0 MMOL/L   EKG, 12 LEAD, INITIAL    Collection Time: 04/20/17  9:49 AM   Result Value Ref Range    Ventricular Rate 76 BPM    Atrial Rate 76 BPM    P-R Interval 172 ms    QRS Duration 190 ms    Q-T Interval 504 ms    QTC Calculation (Bezet) 567 ms    Calculated R Axis -150 degrees    Calculated T Axis 12 degrees    Diagnosis       AV dual-paced rhythm with occasional ventricular-paced complexes and with   occasional premature ventricular complexes  When compared with ECG of 01-FEB-2017 19:26,  premature ventricular complexes are now present  Vent.  rate has decreased BY   4 BPM  Confirmed by Bee Modi ([id number]) on 4/20/2017 7:32:54 PM     VENOUS BLOOD GAS    Collection Time: 04/20/17 10:52 AM   Result Value Ref Range    VENOUS PH 7.40 7.32 - 7.42      VENOUS PCO2 48 41 - 51 mmHg    VENOUS PO2 59 (H) 25 - 40 mmHg    VENOUS O2 SATURATION 90 (H) 65 - 88 %    VENOUS BICARBONATE 29 (H) 23 - 28 mmol/L    VENOUS BASE EXCESS 3.1 mmol/L    O2 METHOD BIPAP      FIO2 40 %    MODE BIPAP      SET RATE 4      SPONTANEOUS RATE 25.0      IPAP/PIP 14.0      EPAP/CPAP/PEEP 6.0      Sample source VENOUS      SITE OTHER     CK W/ REFLX CKMB    Collection Time: 04/20/17  2:31 PM   Result Value Ref Range    CK 62 26 - 192 U/L   TROPONIN I    Collection Time: 04/20/17  2:31 PM   Result Value Ref Range    Troponin-I, Qt. 0.09 (H) <7.74 ng/mL   METABOLIC PANEL, BASIC    Collection Time: 04/21/17  2:34 AM   Result Value Ref Range    Sodium 143 136 - 145 mmol/L    Potassium 3.1 (L) 3.5 - 5.1 mmol/L    Chloride 105 97 - 108 mmol/L    CO2 31 21 - 32 mmol/L    Anion gap 7 5 - 15 mmol/L    Glucose 90 65 - 100 mg/dL    BUN 19 6 - 20 MG/DL    Creatinine 1.07 (H) 0.55 - 1.02 MG/DL    BUN/Creatinine ratio 18 12 - 20      GFR est AA 58 (L) >60 ml/min/1.73m2    GFR est non-AA 48 (L) >60 ml/min/1.73m2    Calcium 8.1 (L) 8.5 - 10.1 MG/DL   MAGNESIUM    Collection Time: 04/21/17  2:34 AM   Result Value Ref Range    Magnesium 1.9 1.6 - 2.4 mg/dL           Assessment:     Active Problems:    HTN (hypertension) (6/29/2015)      Hyperlipidemia (6/29/2015)      Presence of biventricular automatic cardioverter/defibrillator (AICD) (7/2/2015)      Overview: [employer] biventricular AICD implant      CHF (congestive heart failure) ([facility] [address] (2/4/2017)        Plan: 1. Cont treatment for CHF. D/C tomorrow if all goes well. Would pt benefit from daily diuretic usage?

## 2017-04-21 NOTE — PROGRESS NOTES
[id number] [address]  [phone]      Cardiology Progress Note      4/21/2017 10 AM    Admit Date: 4/20/2017    Admit Diagnosis:   CHF (congestive heart failure) ([facility] [address]    Subjective:     Marlena Musa is a 80 y.o. female who was admitted for a heart failure exacerbation. Overnight events:  -VSS  -unable to compare weights in system due to inaccuracy  -I/O incomplete documentation, but shows negative 2.5 L  -K low; slight improvement in creatinine  -Ms. Verner Dubois states she is feel great today. She states she was able to shower/wash up and walk around her room without JENKINS. She denies chest pain or palpitations.       Visit Vitals    /68 (BP 1 Location: Left arm, BP Patient Position: Sitting)    Pulse 84    Temp 97.3 °F (36.3 °C)    Resp 18    Ht 5' 2\" (1.575 m)    Wt 63.3 kg (139 lb 8.8 oz)    SpO2 97%    BMI 25.52 kg/m2       Current Facility-Administered Medications   Medication Dose Route Frequency    potassium chloride SR (KLOR-CON 10) tablet 20 mEq  20 mEq Oral TID    [START ON 4/22/2017] potassium chloride SR (KLOR-CON 10) tablet 20 mEq  20 mEq Oral DAILY    sacubitril-valsartan (ENTRESTO) 49-51 mg tablet 1 Tab  1 Tab Oral BID    furosemide (LASIX) injection 40 mg  40 mg IntraVENous Q12H    sodium chloride (NS) flush 5-10 mL  5-10 mL IntraVENous Q8H    sodium chloride (NS) flush 5-10 mL  5-10 mL IntraVENous PRN    acetaminophen (TYLENOL) tablet 650 mg  650 mg Oral Q4H PRN    ondansetron (ZOFRAN) injection 4 mg  4 mg IntraVENous Q4H PRN    enoxaparin (LOVENOX) injection 30 mg  30 mg SubCUTAneous Q24H    atorvastatin (LIPITOR) tablet 10 mg  10 mg Oral QHS    carvedilol (COREG) tablet 3.125 mg  3.125 mg Oral BID WITH MEALS    fluticasone-vilanterol (BREO ELLIPTA) 100mcg-25mcg/puff  1 Puff Inhalation DAILY    albuterol-ipratropium (DUO-NEB) 2.5 MG-0.5 MG/3 ML  3 mL Nebulization Q4H PRN       Objective:      Physical Exam:  General Appearance:  pleasant AAF sitting in chair in NAD   Chest:   Clear  Cardiovascular:  Regular rate and rhythm, no murmur.   Abdomen:   Soft, non-tender, bowel sounds are active.   Extremities: palpable distal pulses; no edema   Skin:  Warm and dry.     Data Review:   Recent Labs      04/20/17   0940   WBC  4.3   HGB  11.9   HCT  36.7   PLT  121*     Recent Labs      04/21/17   0234  04/20/17   0940   NA  143  144   K  3.1*  4.1   CL  105  109*   CO2  31  27   GLU  90  117*   BUN  19  16   CREA  1.07*  1.19*   CA  8.1*  8.6   MG  1.9   --    ALB   --   3.2*   TBILI   --   0.7   SGOT   --   54*   ALT   --   38       Recent Labs      04/20/17   1431  04/20/17   0940   TROIQ  0.09*  0.05*         Intake/Output Summary (Last 24 hours) at 04/21/17 1138  Last data filed at 04/21/17 0708   Gross per 24 hour   Intake              540 ml   Output             2700 ml   Net            -2160 ml        Telemetry: AV paced  ECG: AV paced   Echocardiogram: 2/2017 EF 20-25%  CXRAY:moderate pulmonary edema right greater than  left. There are pleural effusions and subpleural edema in the fissures. These  findings suggest congestive heart failure. Assessment:     Active Problems:    HTN (hypertension) (6/29/2015)      Hyperlipidemia (6/29/2015)      Presence of biventricular automatic cardioverter/defibrillator (AICD) (7/2/2015)      Overview: [employer] biventricular AICD implant      CHF (congestive heart failure) ([facility] [address] (2/4/2017)      Hypokalemia (4/21/2017)        Plan:     Acute on chronic systolic heart failure s/p AICD: quick response to diuretics. Patient states her weights have been very consistent at home, but that she did notice increased swelling in her legs prior to her admission. Describes her baseline to be NYHA class II symptoms. She has assistance with her medications at home, and that may be partially why she did not take her PRN lasix at home.     · Restarted home entresto this morning: continue BB  · Since patient has difficulty with PRN dosing of lasix, would benefit from scheduled dosing. Patient generally well compensated at home, so would recommend starting 20mg PO EVERY OTHER DAY with close follow-up for titration. Patient already has a follow-up appointment next week with Dr. Jg Reyes, and also needs follow-up with Dr. Marianna Sacks  · With additional of scheduled lasix, suggest scheduled potassium as well: 10meq KCL every other day with the lasix to prevent further hypokalemia     HTN history: patient has low stable BP on current home medications    HLD:  · Continue home statin    Hypokalemia:  · Repletion ordered   · Recommendations with home dosing with lasix as above      Patient is stable for discharge today from cardiology perspective. Already has follow-up with Dr. Jg Reyes, but still needs follow-up with Dr. Marianna Sacks.          Maame Nye NP  DNP, RN, AGACNP-BC

## 2017-04-22 VITALS
HEART RATE: 67 BPM | SYSTOLIC BLOOD PRESSURE: 104 MMHG | TEMPERATURE: 98 F | RESPIRATION RATE: 16 BRPM | HEIGHT: 62 IN | WEIGHT: 129.1 LBS | OXYGEN SATURATION: 95 % | BODY MASS INDEX: 23.76 KG/M2 | DIASTOLIC BLOOD PRESSURE: 72 MMHG

## 2017-04-22 LAB
ANION GAP BLD CALC-SCNC: 10 MMOL/L (ref 5–15)
BUN SERPL-MCNC: 26 MG/DL (ref 6–20)
BUN/CREAT SERPL: 25 (ref 12–20)
CALCIUM SERPL-MCNC: 8.3 MG/DL (ref 8.5–10.1)
CHLORIDE SERPL-SCNC: 108 MMOL/L (ref 97–108)
CO2 SERPL-SCNC: 28 MMOL/L (ref 21–32)
CREAT SERPL-MCNC: 1.03 MG/DL (ref 0.55–1.02)
GLUCOSE SERPL-MCNC: 78 MG/DL (ref 65–100)
POTASSIUM SERPL-SCNC: 3.7 MMOL/L (ref 3.5–5.1)
SODIUM SERPL-SCNC: 146 MMOL/L (ref 136–145)

## 2017-04-22 PROCEDURE — 74011250637 HC RX REV CODE- 250/637: Performed by: NURSE PRACTITIONER

## 2017-04-22 PROCEDURE — 74011250637 HC RX REV CODE- 250/637: Performed by: INTERNAL MEDICINE

## 2017-04-22 PROCEDURE — 36415 COLL VENOUS BLD VENIPUNCTURE: CPT | Performed by: INTERNAL MEDICINE

## 2017-04-22 PROCEDURE — 80048 BASIC METABOLIC PNL TOTAL CA: CPT | Performed by: INTERNAL MEDICINE

## 2017-04-22 PROCEDURE — 77010033678 HC OXYGEN DAILY

## 2017-04-22 RX ORDER — POTASSIUM CHLORIDE 750 MG/1
10 TABLET, FILM COATED, EXTENDED RELEASE ORAL DAILY
Qty: 30 TAB | Refills: 11 | Status: SHIPPED | OUTPATIENT
Start: 2017-04-22 | End: 2018-07-23 | Stop reason: SDUPTHER

## 2017-04-22 RX ORDER — FUROSEMIDE 20 MG/1
20 TABLET ORAL DAILY
Status: DISCONTINUED | OUTPATIENT
Start: 2017-04-22 | End: 2017-04-22 | Stop reason: HOSPADM

## 2017-04-22 RX ORDER — FUROSEMIDE 20 MG/1
20 TABLET ORAL DAILY
Qty: 30 TAB | Refills: 11 | Status: SHIPPED | OUTPATIENT
Start: 2017-04-22 | End: 2018-04-03 | Stop reason: SDUPTHER

## 2017-04-22 RX ADMIN — POTASSIUM CHLORIDE 20 MEQ: 750 TABLET, FILM COATED, EXTENDED RELEASE ORAL at 08:46

## 2017-04-22 RX ADMIN — SACUBITRIL AND VALSARTAN 1 TABLET: 49; 51 TABLET, FILM COATED ORAL at 08:47

## 2017-04-22 RX ADMIN — CARVEDILOL 3.12 MG: 3.12 TABLET, FILM COATED ORAL at 08:46

## 2017-04-22 RX ADMIN — FUROSEMIDE 20 MG: 20 TABLET ORAL at 08:46

## 2017-04-22 RX ADMIN — FLUTICASONE FUROATE AND VILANTEROL TRIFENATATE 1 PUFF: 100; 25 POWDER RESPIRATORY (INHALATION) at 08:46

## 2017-04-22 RX ADMIN — Medication 10 ML: at 06:39

## 2017-04-22 NOTE — DISCHARGE SUMMARY
[address] SUMMARY       Name:  Keely Price   MR#:  [id number]   :  1925   Account #:  [de-identified]        Date of Adm:  2017       HISTORY OF PRESENT ILLNESS: This patient is a 66-year-old lady   who was doing well up until the day of admission. She suddenly   became short of breath. She has a history of congestive heart failure   with nonischemic cardiomyopathy with an ejection fraction of 20% to   25%. She has been followed by the congestive heart failure clinic and   her diuretic was discontinued to be taken on a p.r.n. basis. Historically,   the patient had been on a diuretic for years. In view of her acute congestive heart failure, we elected to admit her   for further evaluation here. Past medical history, social history, review of systems, family history,   physical examination is as in admitting H and P.     LABORATORY VALUES: Admission hemoglobin 11.9, white count 4.3,   MCV 94.8, platelet count 513, Rep differential 53 segs, 39 lymphocytes,   5 monocytes, 2 eosinophils and 1 basophil. Urinalysis is normal.    comprehensive profile on admission with BUN and creatinine 16 and   1.19, respectively. ProBNP of 6161. Discharge BUN and creatinine   were 26 and 1.03. Chest x-ray moderate pulmonary edema, right greater than left. Pleural   effusion, pleural edema. CONSULTATIONS: Consultation with Cardiology, Dr. Gael Gore with   comments to be elaborated in hospital course. HOSPITAL COURSE: The patient was admitted and placed back on a   diuretic. With this, she improved significantly, such that she was pretty   much back to baseline at the time of discharge. Hospital course was   other uneventful. Dr. Gael Gore concurred with the treatment regimen. FINAL DIAGNOSES    1. Biventricular congestive heart failure.     2. 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 3. 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 4. Hypertension by history. 5. History of reactive airway disease. DISPOSITION: The patient will be discharged home ambulatory on a   no added salt diet. DISCHARGE MEDICATIONS: Include the following    1. Acetaminophen 650 q.4h. p.r.n.   2. Albuterol via nebulizer q.4h. p.r.n.    3. Allopurinol 300mg . 4. Carvedilol 3.125 mg b.i.d.     5. Advair 50/250, 1 puff b.i.d.   6. Furosemide 20 q.a.m.    7. Imbruvica 420 mg 5 days a week from Dr. Arsen Moreau. 8. Claritin 10 mg daily. 9. KCL 10 mEq q.a.m. 10. Suan Persons will be resumed by the cardiologist as an outpatient. MD Tracy Loving / Dulce Maria Kennedy   D:  04/22/2017   08:58   T:  04/22/2017   11:50   Job #:  [id number]

## 2017-04-22 NOTE — PROGRESS NOTES
[facility] SHIFT NURSING NOTE    Bedside and Verbal shift change report given to [id number] GIFTY Barnett (oncoming nurse) by Anita Hamilton (offgoing nurse). Report included the following information SBAR. SHIFT SUMMARY:         Admission Date 4/20/2017   Admission Diagnosis CHF (congestive heart failure) ([facility] [address]   Consults IP CONSULT TO CARDIOLOGY        Consults   [] PT   [] OT   [] Speech   [] Palliative      [] Hospice    [] Case Management   [] None   Cardiac Monitoring   [] Yes   [] No     Antibiotics   [] Yes   [] No   GI Prophylaxis  (Ex: Protonix, Pepcid, etc,.)   [] Yes   [] No          DVT Prophylaxis   SCDs:             Hilton stockings:         [x] Medication (Ex: Lovenox, Eliquis, Brilinta, Coumadin,  Heparin, etc..)   [] Contraindicated   [] No VTE needed       Urinary Catheter             LDAs               Peripheral IV 04/20/17 Right Antecubital (Active)   Site Assessment Clean, dry, & intact 4/21/2017  8:08 PM   Phlebitis Assessment 0 4/21/2017  8:08 PM   Infiltration Assessment 0 4/21/2017  8:08 PM   Dressing Status Clean, dry, & intact 4/21/2017  8:08 PM   Dressing Type Transparent;Tape 4/21/2017  8:08 PM   Hub Color/Line Status Pink;Capped 4/21/2017  8:08 PM       Peripheral IV 04/20/17 (Active)   Site Assessment Clean, dry, & intact 4/21/2017  8:09 PM   Phlebitis Assessment 0 4/21/2017  8:09 PM   Infiltration Assessment 0 4/21/2017  8:09 PM   Dressing Status Clean, dry, & intact 4/21/2017  8:09 PM   Dressing Type Transparent;Tape 4/21/2017  8:09 PM   Hub Color/Line Status Blue;Capped 4/21/2017  8:09 PM                      I/Os   Intake/Output Summary (Last 24 hours) at 04/21/17 2011  Last data filed at 04/21/17 1833   Gross per 24 hour   Intake              560 ml   Output             2100 ml   Net            -1540 ml         Activity Level Activity Level: Up ad chong     Activity Assistance: No assistance needed   Diet Active Orders   Diet    DIET CARDIAC Regular      Purposeful Rounding every 1-2 hour?    [x] Yes Milvia Score  Total Score: 1   Bed Alarm (If score 3 or >)   [] Yes    [] Refused (See signed refusal form in chart)   Kimani Score  Kimani Score: 21       Kimani Score (if score 14 or less)   [] PMT consult   [] Nutrition consult   [] Wound Care consult      []  Specialty bed         Influenza Vaccine Received Flu Vaccine for Current Season (usually Sept-March): Not Flu Season               Needs prior to discharge:   Home O2 required:    [x] Yes  Prn at home   [] No     If yes, how much O2 required?     Other:    Last Bowel Movement Date: 04/19/17   Readmission Risk Assessment Tool Score High Risk            31       Total Score        3 Relationship with PCP    4 More than 1 Admission in calendar year    5 Patient Insurance is Medicare, Medicaid or Self Pay    19 Charlson Comorbidity Score        Criteria that do not apply:    Patient Living Status    Patient Length of Stay > 5       Expected Length of Stay 4d 14h   Actual Length of Stay 1

## 2017-04-22 NOTE — DISCHARGE INSTRUCTIONS
General Discharge Instructions    Patient ID:  Brittani Marion  [id number]  80 y.o.  3/17/1925    Patient Instructions        The following personal items were collected during your admission and were returned to you. Take Home Medications           What to do at Home    Recommended diet: Cardiac Diet    Recommended activity: Activity as tolerated    Follow-up with Lowell Elias MD  in 1 week. Information obtained by :  I understand that if any problems occur once I am at home I am to contact my physician. I understand and acknowledge receipt of the instructions indicated above.                                                                                                                                            Physician's or R.N.'s Signature                                                                  Date/Time                                                                                                                                              Patient or Representative Signature                                                          Date/Time

## 2017-04-22 NOTE — PROGRESS NOTES
Bedside and Verbal shift change report given to Misty Anthony RN   (oncoming nurse) by Zack Head RN (offgoing nurse). Report included the following information SBAR, Kardex, ED Summary, Intake/Output, MAR, Accordion and Recent Results. 8:21 AM  Called  [facility] to arrange transport. Time will be around 10:30 for discharge, spoke with [facility]. 8:31 AM   Paged Dr. Estefani Swift regarding prescriptions for discharge. 10:20 AM  Discharge instructions discussed with patient and caregiver, opportunity for questions given. Telemetry and PIV removed.

## 2017-04-23 ENCOUNTER — HOME CARE VISIT (OUTPATIENT)
Dept: HOME HEALTH SERVICES | Facility: HOME HEALTH | Age: 82
End: 2017-04-23

## 2017-04-24 ENCOUNTER — HOME CARE VISIT (OUTPATIENT)
Dept: SCHEDULING | Facility: HOME HEALTH | Age: 82
End: 2017-04-24

## 2017-04-24 PROCEDURE — G0299 HHS/HOSPICE OF RN EA 15 MIN: HCPCS

## 2017-04-25 ENCOUNTER — PATIENT OUTREACH (OUTPATIENT)
Dept: INTERNAL MEDICINE CLINIC | Age: 82
End: 2017-04-25

## 2017-04-25 ENCOUNTER — HOSPITAL ENCOUNTER (OUTPATIENT)
Dept: MAMMOGRAPHY | Age: 82
Discharge: HOME | End: 2017-04-25
Attending: OBSTETRICS & GYNECOLOGY
Payer: MEDICARE

## 2017-04-25 DIAGNOSIS — Z12.31 VISIT FOR SCREENING MAMMOGRAM: ICD-10-CM

## 2017-04-25 PROCEDURE — 77067 SCR MAMMO BI INCL CAD: CPT

## 2017-04-25 NOTE — PROGRESS NOTES
NNTOCIP    Patient on Daily Census Discharge report dated 4/24/2017. Patient @ [id number] IP: 4/20-4/22/2017:  CHF. Left message on voicemail. Martha Walls f/u with HF NN for f/u inquiry. Will attempt to contact again. Need to complete post-discharge assessment. Admit Date: 4/20/2017  Admit Diagnosis:   CHF (congestive heart failure) ([facility])     Subjective:      Feng Jones is a 80 y.o. female who was admitted for a heart failure exacerbation.     Overnight events:  -VSS  -unable to compare weights in system due to inaccuracy  -I/O incomplete documentation, but shows negative 2.5 L  -K low; slight improvement in creatinine  -Ms. Lyric Giron states she is feel great today. She states she was able to shower/wash up and walk around her room without JENKINS.  She denies chest pain or palpitations.

## 2017-04-25 NOTE — PROGRESS NOTES
[facility] Discharge Follow-Up      Date/Time:  4/25/2017 2:09 PM    Patient listed on discharge [address] report on 4/22/2017. Patient discharged from [facility] for Acute Pulmonary Edema. RRAT score: High Risk            26       Total Score        3 Relationship with PCP    4 More than 1 Admission in calendar year    19 Charlson Comorbidity Score        Criteria that do not apply:    Patient Living Status    Patient Length of Stay > 5    Patient Insurance is Medicare, Medicaid or Self Pay     High    Medical History:     Past Medical History:   Diagnosis Date    Asthma     Cancer ([facility] [address]     lymphoma    Congestive heart failure, unspecified     Heart failure ([facility] [address]     Hypertension     Other ill-defined conditions     heart murmur    Presence of biventricular automatic cardioverter/defibrillator (AICD) 7/2/2015    ad[employer]q biventricular AICD implant    Stomach ulcer    Labs:    Results for Ari Hinson (MRN [id number]) as of 4/25/2017 14:13   Ref. Range 4/20/2017 09:40   Chloride Latest Ref Range: 97 - 108 mmol/L 109 (H)   CO2 Latest Ref Range: 21 - 32 mmol/L 27   Anion gap Latest Ref Range: 5 - 15 mmol/L 8   Glucose Latest Ref Range: 65 - 100 mg/dL 117 (H)   BUN Latest Ref Range: 6 - 20 MG/DL 16   Creatinine Latest Ref Range: 0.55 - 1.02 MG/DL 1.19 (H)   BUN/Creatinine ratio Latest Ref Range: 12 - 20   13   Calcium Latest Ref Range: 8.5 - 10.1 MG/DL 8.6   GFR est non-AA Latest Ref Range: >60 ml/min/1.73m2 42 (L)   GFR est AA Latest Ref Range: >60 ml/min/1.73m2 51 (L)   Bilirubin, total Latest Ref Range: 0.2 - 1.0 MG/DL 0.7   Protein, total Latest Ref Range: 6.4 - 8.2 g/dL 6.2 (L)   Albumin Latest Ref Range: 3.5 - 5.0 g/dL 3.2 (L)   Globulin Latest Ref Range: 2.0 - 4.0 g/dL 3.0   A-G Ratio Latest Ref Range: 1.1 - 2.2   1.1   ALT Latest Ref Range: 12 - 78 U/L 38   AST Latest Ref Range: 15 - 37 U/L 54 (H)   Alk.  phosphatase Latest Ref Range: 45 - 117 U/L 95   Lactic acid Latest Ref Range: 0.4 - 2.0 MMOL/L 0.9   CK Latest Ref Range: 26 - 192 U/L 68   Troponin-I, Qt. Latest Ref Range: <0.05 ng/mL 0.05 (H)   NT pro-BNP Latest Ref Range: 0 - 450 PG/ML 6161 (H)     Nurse Navigator(NN) contacted the patient by telephone to perform post [address] discharge assessment. Verified  and address with patient as identifiers. Provided introduction to self, and explanation of the Nurses Navigator role. Diet:   Patient reports: Cardiac Diet    Activity:    Patient reports: mostly moving around the house    Medication:   Performed medication reconciliation with patient, and patient verbalizes understanding of administration of home medications. There were no barriers to obtaining medications identified at this time. Support system:  patient and adult caretaker    Discharge Instructions :  Reviewed discharge instructions with patient. Patient verbalizes understanding of discharge instructions and follow-up care. Red Flags:  SOB. Chest Pain. Advance Care Planning:   Patient was offered the opportunity to discuss advance care planning:  yes     Does patient have an Advance Directive:  yes   If no, did you provide information on Caring Connections? N/A     PCP/Specialist follow up: Patient scheduled to follow up with Chio Ferrer MD on Thursday, 2017  11:30AM.  Reviewed red flags with patient, and patient verbalizes understanding. Patient given an opportunity to ask questions. No other clinical/social/functional needs noted. The patient agrees to contact the PCP office for questions related to their healthcare. The patient expressed thanks, offered no additional questions and ended the call. Case management plan: Attempt to contact the patient by telephone or during office visit within the next 7-10 days. Will continue to follow as necessary for the next 30 days.  Will reassess for case management needs prior to discharge from case management service on or about 30 days.

## 2017-04-26 ENCOUNTER — OFFICE VISIT (OUTPATIENT)
Dept: CARDIOLOGY CLINIC | Age: 82
End: 2017-04-26

## 2017-04-26 DIAGNOSIS — Z95.810 PRESENCE OF BIVENTRICULAR AUTOMATIC CARDIOVERTER/DEFIBRILLATOR (AICD): Primary | ICD-10-CM

## 2017-04-26 DIAGNOSIS — I44.7 LBBB (LEFT BUNDLE BRANCH BLOCK): ICD-10-CM

## 2017-04-26 DIAGNOSIS — I50.22 CHRONIC SYSTOLIC (CONGESTIVE) HEART FAILURE (HCC): ICD-10-CM

## 2017-04-26 DIAGNOSIS — I50.23 SYSTOLIC CHF, ACUTE ON CHRONIC (HCC): ICD-10-CM

## 2017-04-27 ENCOUNTER — PATIENT OUTREACH (OUTPATIENT)
Dept: INTERNAL MEDICINE CLINIC | Age: 82
End: 2017-04-27

## 2017-04-27 ENCOUNTER — TELEPHONE (OUTPATIENT)
Dept: CARDIOLOGY CLINIC | Age: 82
End: 2017-04-27

## 2017-04-27 ENCOUNTER — OFFICE VISIT (OUTPATIENT)
Dept: INTERNAL MEDICINE CLINIC | Age: 82
End: 2017-04-27

## 2017-04-27 VITALS
RESPIRATION RATE: 18 BRPM | TEMPERATURE: 97.9 F | HEART RATE: 69 BPM | DIASTOLIC BLOOD PRESSURE: 69 MMHG | HEIGHT: 62 IN | OXYGEN SATURATION: 95 % | BODY MASS INDEX: 24.59 KG/M2 | WEIGHT: 133.6 LBS | SYSTOLIC BLOOD PRESSURE: 107 MMHG

## 2017-04-27 DIAGNOSIS — I50.23 SYSTOLIC CHF, ACUTE ON CHRONIC (HCC): Primary | ICD-10-CM

## 2017-04-27 DIAGNOSIS — I42.0 CARDIOMYOPATHY, DILATED, NONISCHEMIC (HCC): Chronic | ICD-10-CM

## 2017-04-27 DIAGNOSIS — C85.90 NON-HODGKIN'S LYMPHOMA, UNSPECIFIED BODY REGION, UNSPECIFIED NON-HODGKIN LYMPHOMA TYPE (HCC): ICD-10-CM

## 2017-04-27 NOTE — PROGRESS NOTES
Chief Complaint   Patient presents with   [facility] Follow Up     shortness of breath(congestive heart failure)   1. Have you been to the ER, urgent care clinic since your last visit? Hospitalized since your last visit? Yes Reason for visit: shortness of breath(congestive heart failure)    2. Have you seen or consulted any other health care providers outside of the [facility] since your last visit? Include any pap smears or colon screening.  No

## 2017-04-27 NOTE — PROGRESS NOTES
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 Denied SOB, Per Cardiologist Weston:  h/o AOCHF (EF 20-25%). ED Labs:  LABORATORY VALUES: Admission hemoglobin 11.9, white count 4.3,   MCV 94.8, platelet count 473,  differential 53 segs, 39 lymphocytes,   5 monocytes, 2 eosinophils and 1 basophil. Urinalysis is normal.    comprehensive profile on admission with BUN and creatinine 16 and   1.19, respectively. ProBNP of 6161. Discharge BUN and creatinine   were 26 and 1.03. Labs ordered today:  BNP, Met Panel, Basic. Palliative Care: The patient does not need Palliative Care at this time. Patient drives self to appointments, etc. Patient does down home care and has sufficient support if needed. Patient's Interest:  Patient states she is feeling better. Ambulatory w/o walking assistance. States Intake is well. Enjoys [employer]. Patient has bounced back very well for the beginning 90 day goal.      Patient adheres to a HH diet. Patient has participated in [employer]. Active Goal:  Patient will understand Red Flags post discharge. Patient will call/schedule appointment with PCP at the beginning of any signs of SOB/Cough/JENKINS/Ankle Swell. Patient will adhere to low salt renal diet. Patient     NN will consult with PCP if to come in before 3 months-Next PCP appointment July.

## 2017-04-27 NOTE — MR AVS SNAPSHOT
Visit Information Date & Time Provider Department Dept. Phone Encounter #  
 4/27/2017 11:00 AM Maria Fernanda Moe MD [facility] [phone] Your Appointments 5/1/2017 11:45 AM  
Any with Maria Fernanda Moe MD  
[address] [place] [address] and Primary Care [address]) Appt Note: f/u from hospital  
 Blaire Hensley 90 [address] [facility]  
  
   
 Blaire Hensley 90 [id number]  
  
    
 5/9/2017 10:45 AM  
ESTABLISHED PATIENT with Elias Alex MD  
[facility]) Appt Note: 3 MO. F/U  
 [address]  
[phone] [address] 5/16/2017 10:00 AM  
Follow Up with Meagan Jimenez MD  
[address] [facility] ([address]) [facility] [address]  
[phone]  
  
   
 [facility] [address] [address]  
  
    
 7/11/2017 11:00 AM  
Any with Maria Fernanda Moe MD  
[address] [place] [address] and Primary Care [facility]) Appt Note: 3 month f/u  
 [address] [facility]  
[phone]  
  
    
  
 7/26/2017  8:00 AM  
REMOTE OFFICE VISIT with [facility] [address] [facility]) Appt Note: NOT AN OFFICE VISIT - BSC BIVICD REMOTE SEND  
 [address] [facility] [address]  
[phone] [address] [facility] [address] Upcoming Health Maintenance Date Due  
 MEDICARE YEARLY EXAM 4/19/2017 GLAUCOMA SCREENING Q2Y 12/18/2017 DTaP/Tdap/Td series (2 - Td) 2/13/2027 Allergies as of 4/27/2017  Review Complete On: 4/27/2017 By: Julieta Toribio Severity Noted Reaction Type Reactions Codeine  05/21/2012   Side Effect Other (comments) \"made me disoriented\" per pt Current Immunizations  Reviewed on 6/29/2015 Name Date Influenza Vaccine 10/1/2016, 10/4/2014 Influenza Vaccine Split 10/22/2011 Pneumococcal Vaccine (Unspecified Type) 5/22/2007 Not reviewed this visit You Were Diagnosed With   
  
 Codes Comments Systolic CHF, acute on chronic (HCC)    -  Primary ICD-10-CM: W50.37 ICD-9-CM: 428.23, 428.0 Cardiomyopathy, dilated, nonischemic (HCC)     ICD-10-CM: I42.9 ICD-9-CM: 425.4 Non-Hodgkin's lymphoma, unspecified body region, unspecified non-Hodgkin lymphoma type     ICD-10-CM: C85.90 ICD-9-CM: 202.80 Vitals BP Pulse Temp Resp Height(growth percentile) Weight(growth percentile) 107/69 (BP 1 Location: Left arm, BP Patient Position: Sitting) 69 97.9 °F (36.6 °C) (Oral) 18 5' 2\" (1.575 m) 133 lb 9.6 oz (60.6 kg) SpO2 BMI OB Status Smoking Status 95% 24.44 kg/m2 Postmenopausal Never Smoker BMI and BSA Data Body Mass Index Body Surface Area  
 24.44 kg/m 2 1.63 m 2 Preferred Pharmacy Pharmacy Name Phone [phone] [place] [address] Your Updated Medication List  
  
   
This list is accurate as of: 4/27/17  2:03 PM.  Always use your most recent med list.  
  
  
  
  
 albuterol 2.5 mg /3 mL (0.083 %) nebulizer solution Commonly known as:  PROVENTIL VENTOLIN  
3 mL by Nebulization route every four (4) hours as needed for Wheezing. allopurinol 300 mg tablet Commonly known as:  ZYLOPRIM  
TAKE ONE (1) TABLET(S) DAILY  
  
 atorvastatin 10 mg tablet Commonly known as:  LIPITOR  
TAKE ONE (1) TABLET(S) DAILY  
  
 carvedilol 3.125 mg tablet Commonly known as:  COREG  
TAKE ONE (1) TABLET(S) TWIC E DAILY WITH FOOD  Indications: Chronic Heart Failure  
  
 co-enzyme Q-10 100 mg capsule Commonly known as:  CO Q-10 Take 100 mg by mouth daily. fluticasone-salmeterol 250-50 mcg/dose diskus inhaler Commonly known as:  ADVAIR  
 Take 1 Puff by inhalation two (2) times a day. furosemide 20 mg tablet Commonly known as:  LASIX Take 1 Tab by mouth daily. IMBRUVICA 140 mg capsule Generic drug:  ibrutinib Take 420 mg by mouth five (5) days a week. 140 mg cap, takes 3 caps five days a week (none on Sunday or Wednesday)  
  
 loratadine 10 mg tablet Commonly known as:  Gab Cruise Take 10 mg by mouth daily as needed. magnesium oxide 400 mg Cap Take 1 Cap by mouth daily. multivitamin tablet Commonly known as:  ONE A DAY Take 1 Tab by mouth daily. OXYGEN-AIR DELIVERY SYSTEMS  
2 L by Does Not Apply route nightly. potassium chloride SR 10 mEq tablet Commonly known as:  KLOR-CON 10 Take 1 Tab by mouth daily. sacubitril-valsartan 49-51 mg Tab tablet Commonly known as:  ENTRESTO Take 1 Tab by mouth two (2) times a day. TYLENOL EXTRA STRENGTH 500 mg tablet Generic drug:  acetaminophen Take 500 mg by mouth every six (6) hours as needed for Pain. VITAMIN D3 1,000 unit Cap Generic drug:  cholecalciferol Take 1,000 Units by mouth daily. Introducing [facility] & HEALTH SERVICES! Dear Arnav: Thank you for requesting a [employer] account. Our records indicate that you already have an active [employer] account. You can access your account anytime at [email or web address] Did you know that you can access your hospital and ER discharge instructions at any time in [employer]? You can also review all of your test results from your hospital stay or ER visit. Additional Information If you have questions, please visit the Frequently Asked Questions section of the [employer] website at [email or web address] Remember, [employer] is NOT to be used for urgent needs. For medical emergencies, dial 911. Now available from your iPhone and Android! Please provide this summary of care documentation to your next provider. Your primary care clinician is listed as Gail Dey. If you have any questions after today's visit, please call [phone].

## 2017-04-27 NOTE — PROGRESS NOTES
Chief Complaint   Patient presents with   [facility] Follow Up     shortness of breath(congestive heart failure)   . SUBECTIVE:    Alice Burton is a 80 y.o. female comes in for follow-up of her congestive heart failure. She had a flare and was restarted on her maintenance dose of diuretic, specifically Furosemide 20 mg daily. With this she states that she feels great with no shortness of breath. She is able to exercise a bit better. She remains on all of her other cardioactive medications with the exception of Entresto. Current Outpatient Prescriptions   Medication Sig Dispense Refill    magnesium oxide 400 mg cap Take 1 Cap by mouth daily.  cholecalciferol (VITAMIN D3) 1,000 unit cap Take 1,000 Units by mouth daily.  furosemide (LASIX) 20 mg tablet Take 1 Tab by mouth daily. 30 Tab 11    potassium chloride SR (KLOR-CON 10) 10 mEq tablet Take 1 Tab by mouth daily. 30 Tab 11    sacubitril-valsartan (ENTRESTO) 49 mg/51 mg tablet Take 1 Tab by mouth two (2) times a day. 60 Tab 3    carvedilol (COREG) 3.125 mg tablet TAKE ONE (1) TABLET(S) TWIC E DAILY WITH FOOD  Indications: Chronic Heart Failure 180 Tab 3    albuterol (PROVENTIL VENTOLIN) 2.5 mg /3 mL (0.083 %) nebulizer solution 3 mL by Nebulization route every four (4) hours as needed for Wheezing. 100 Each 11    allopurinol (ZYLOPRIM) 300 mg tablet TAKE ONE (1) TABLET(S) DAILY 90 Tab 3    atorvastatin (LIPITOR) 10 mg tablet TAKE ONE (1) TABLET(S) DAILY 90 Tab 3    fluticasone-salmeterol (ADVAIR) 250-50 mcg/dose diskus inhaler Take 1 Puff by inhalation two (2) times a day. 3 Inhaler 3    loratadine (CLARITIN) 10 mg tablet Take 10 mg by mouth daily as needed.  co-enzyme Q-10 (CO Q-10) 100 mg capsule Take 100 mg by mouth daily.  OXYGEN-AIR DELIVERY SYSTEMS 2 L by Does Not Apply route nightly.  ibrutinib (IMBRUVICA) 140 mg cap Take 420 mg by mouth five (5) days a week.  140 mg cap, takes 3 caps five days a week (none on Sunday or Wednesday)      acetaminophen (TYLENOL EXTRA STRENGTH) 500 mg tablet Take 500 mg by mouth every six (6) hours as needed for Pain.  multivitamin (ONE A DAY) tablet Take 1 Tab by mouth daily.        Past Medical History:   Diagnosis Date    Asthma     Cancer ([facility] [address]     lymphoma    Congestive heart failure, unspecified     Heart failure (HCC)     Hypertension     Other ill-defined conditions     heart murmur    Presence of biventricular automatic cardioverter/defibrillator (AICD) 7/2/2015    [employer] biventricular AICD implant    Stomach ulcer      Past Surgical History:   Procedure Laterality Date    HX BREAST BIOPSY Bilateral     40 years ago    HX COLONOSCOPY      HX HEENT      cataracts removed    HX HYSTERECTOMY      HX OTHER SURGICAL      lymph node surgery    HX TONSILLECTOMY      WY EGD TRANSORAL BIOPSY SINGLE/MULTIPLE  5/23/2012         SINUS SURGERY PROC UNLISTED      Nasal polyps removed     Allergies   Allergen Reactions    Codeine Other (comments)     \"made me disoriented\" per pt       REVIEW OF SYSTEMS:  Review of Systems - Negative except   ENT ROS: negative for - headaches, hearing change, nasal congestion, oral lesions, tinnitus, visual changes or   Respiratory ROS: no cough, shortness of breath, or wheezing  Cardiovascular ROS: no chest pain or dyspnea on exertion  Gastrointestinal ROS: no abdominal pain, change in bowel habits, or black or blood  Genito-Urinary ROS: no dysuria, trouble voiding, or hematuria  Musculoskeletal ROS: negative  Neurological ROS: no TIA or stroke symptoms      Social History     Social History    Marital status:      Spouse name: N/A    Number of children: 1    Years of education: 16+     Occupational History    retired teacher      Social History Main Topics    Smoking status: Never Smoker    Smokeless tobacco: Never Used    Alcohol use No    Drug use: No    Sexual activity: No     Other Topics Concern  None     Social History Narrative   r  Family History   Problem Relation Age of Onset    Heart Disease Mother     Stroke Father        OBJECTIVE:  Visit Vitals    /69 (BP 1 Location: Left arm, BP Patient Position: Sitting)    Pulse 69    Temp 97.9 °F (36.6 °C) (Oral)    Resp 18    Ht 5' 2\" (1.575 m)    Wt 133 lb 9.6 oz (60.6 kg)    SpO2 95%    BMI 24.44 kg/m2     ENT: perrla,  eom intact  NECK: supple. Thyroid normal, no JVD  CHEST: clear to ascultation and percussion   HEART: regular rate and rhythm  ABD: soft, bowel sounds active,   EXTREMITIES: no edema, pulse 1+  INTEGUMENT: clear      ASSESSMENT:  1. Systolic CHF, acute on chronic (HCC)    2. 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 Non-Hodgkin's lymphoma, unspecified body region, unspecified non-Hodgkin lymphoma type        PLAN:    1. She appears to be well compensated. She will follow up with Cardiology and they can make adjustments as needed. Her blood pressure standing is 100/50. BMP will be checked today. 2. She will follow up with Dr. 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 Follow-up Disposition:  Return if symptoms worsen or fail to improve.       Jamila Espino MD

## 2017-04-28 ENCOUNTER — PATIENT OUTREACH (OUTPATIENT)
Dept: INTERNAL MEDICINE CLINIC | Age: 82
End: 2017-04-28

## 2017-04-28 LAB
BUN SERPL-MCNC: 26 MG/DL (ref 10–36)
BUN/CREAT SERPL: 21 (ref 12–28)
CALCIUM SERPL-MCNC: 8.9 MG/DL (ref 8.7–10.3)
CHLORIDE SERPL-SCNC: 105 MMOL/L (ref 96–106)
CO2 SERPL-SCNC: 22 MMOL/L (ref 18–29)
CREAT SERPL-MCNC: 1.22 MG/DL (ref 0.57–1)
GLUCOSE SERPL-MCNC: 87 MG/DL (ref 65–99)
POTASSIUM SERPL-SCNC: 4.3 MMOL/L (ref 3.5–5.2)
SODIUM SERPL-SCNC: 143 MMOL/L (ref 134–144)

## 2017-05-09 ENCOUNTER — OFFICE VISIT (OUTPATIENT)
Dept: CARDIOLOGY CLINIC | Age: 82
End: 2017-05-09

## 2017-05-09 VITALS
OXYGEN SATURATION: 96 % | BODY MASS INDEX: 25.03 KG/M2 | HEIGHT: 62 IN | SYSTOLIC BLOOD PRESSURE: 108 MMHG | HEART RATE: 55 BPM | WEIGHT: 136 LBS | DIASTOLIC BLOOD PRESSURE: 69 MMHG

## 2017-05-09 DIAGNOSIS — I71.20 THORACIC AORTIC ANEURYSM WITHOUT RUPTURE: ICD-10-CM

## 2017-05-09 DIAGNOSIS — I42.0 CARDIOMYOPATHY, DILATED, NONISCHEMIC (HCC): ICD-10-CM

## 2017-05-09 DIAGNOSIS — I34.0 MILD MITRAL REGURGITATION: ICD-10-CM

## 2017-05-09 DIAGNOSIS — I50.23 SYSTOLIC CHF, ACUTE ON CHRONIC (HCC): ICD-10-CM

## 2017-05-09 DIAGNOSIS — I48.91 ATRIAL FIBRILLATION, UNSPECIFIED TYPE (HCC): Primary | ICD-10-CM

## 2017-05-09 DIAGNOSIS — I10 ESSENTIAL HYPERTENSION: ICD-10-CM

## 2017-05-09 DIAGNOSIS — G47.33 OSA (OBSTRUCTIVE SLEEP APNEA): ICD-10-CM

## 2017-05-09 DIAGNOSIS — Z95.810 PRESENCE OF BIVENTRICULAR AUTOMATIC CARDIOVERTER/DEFIBRILLATOR (AICD): ICD-10-CM

## 2017-05-09 NOTE — PATIENT INSTRUCTIONS
-- We are going to start you on a medication called Eliquis  -- This is a blood thinner  -- Your heart has an irregular rhythm called A-fib  -- This can lead to blood clots, which is why it is important to protect you with a blood thinner    -- Please make a follow up visit in 3 months      Atrial Fibrillation: Care Instructions  Your Care Instructions    Atrial fibrillation is an irregular and often fast heartbeat. Treating this condition is important for several reasons. It can cause blood clots, which can travel from your heart to your brain and cause a stroke. If you have a fast heartbeat, you may feel lightheaded, dizzy, and weak. An irregular heartbeat can also increase your risk for heart failure. Atrial fibrillation is often the result of another heart condition, such as high blood pressure or coronary artery disease. Making changes to improve your heart condition will help you stay healthy and active. Follow-up care is a key part of your treatment and safety. Be sure to make and go to all appointments, and call your doctor if you are having problems. It's also a good idea to know your test results and keep a list of the medicines you take. How can you care for yourself at home? Medicines  · Take your medicines exactly as prescribed. Call your doctor if you think you are having a problem with your medicine. You will get more details on the specific medicines your doctor prescribes. · If your doctor has given you a blood thinner to prevent a stroke, be sure you get instructions about how to take your medicine safely. Blood thinners can cause serious bleeding problems. · Do not take any vitamins, over-the-counter drugs, or herbal products without talking to your doctor first.  Lifestyle changes  · Do not smoke. Smoking can increase your chance of a stroke and heart attack. If you need help quitting, talk to your doctor about stop-smoking programs and medicines.  These can increase your chances of quitting for good. · Eat a heart-healthy diet. · Stay at a healthy weight. Lose weight if you need to. · Limit alcohol to 2 drinks a day for men and 1 drink a day for women. Too much alcohol can cause health problems. · Avoid colds and flu. Get a pneumococcal vaccine shot. If you have had one before, ask your doctor whether you need another dose. Get a flu shot every year. If you must be around people with colds or flu, wash your hands often. Activity  · If your doctor recommends it, get more exercise. Walking is a good choice. Bit by bit, increase the amount you walk every day. Try for at least 30 minutes on most days of the week. You also may want to swim, bike, or do other activities. Your doctor may suggest that you join a cardiac rehabilitation program so that you can have help increasing your physical activity safely. · Start light exercise if your doctor says it is okay. Even a small amount will help you get stronger, have more energy, and manage stress. Walking is an easy way to get exercise. Start out by walking a little more than you did in the hospital. Gradually increase the amount you walk. · When you exercise, watch for signs that your heart is working too hard. You are pushing too hard if you cannot talk while you are exercising. If you become short of breath or dizzy or have chest pain, sit down and rest immediately. · Check your pulse regularly. Place two fingers on the artery at the palm side of your wrist, in line with your thumb. If your heartbeat seems uneven or fast, talk to your doctor. When should you call for help? Call 911 anytime you think you may need emergency care. For example, call if:  · You have symptoms of a heart attack. These may include:  ¨ Chest pain or pressure, or a strange feeling in the chest.  ¨ Sweating. ¨ Shortness of breath. ¨ Nausea or vomiting.   ¨ Pain, pressure, or a strange feeling in the back, neck, jaw, or upper belly or in one or both shoulders or arms.  ¨ Lightheadedness or sudden weakness. ¨ A fast or irregular heartbeat. After you call 911, the  may tell you to chew 1 adult-strength or 2 to 4 low-dose aspirin. Wait for an ambulance. Do not try to drive yourself. · You have symptoms of a stroke. These may include:  ¨ Sudden numbness, tingling, weakness, or loss of movement in your face, arm, or leg, especially on only one side of your body. ¨ Sudden vision changes. ¨ Sudden trouble speaking. ¨ Sudden confusion or trouble understanding simple statements. ¨ Sudden problems with walking or balance. ¨ A sudden, severe headache that is different from past headaches. · You passed out (lost consciousness). Call your doctor now or seek immediate medical care if:  · You have new or increased shortness of breath. · You feel dizzy or lightheaded, or you feel like you may faint. · Your heart rate becomes irregular. · You can feel your heart flutter in your chest or skip heartbeats. Tell your doctor if these symptoms are new or worse. Watch closely for changes in your health, and be sure to contact your doctor if you have any problems. Where can you learn more? Go to http://camelia-bar.info/. Enter U020 in the search box to learn more about \"Atrial Fibrillation: Care Instructions. \"  Current as of: January 27, 2016  Content Version: 11.2  © 6442-1479 [employer]. Care instructions adapted under license by [employer] (which disclaims liability or warranty for this information). If you have questions about a medical condition or this instruction, always ask your healthcare professional. [place] [address] any warranty or liability for your use of this information.

## 2017-05-09 NOTE — PROGRESS NOTES
[facility]   [address] [facility], [address] [place] [address]                                          NEW PATIENT HPI/FOLLOW-UP      NAME:  Abdon Palacio   :   3/17/1925   MRN:   [id number]   PCP:  Shanti Aponte MD           Subjective: The patient is a 80y.o. year old female h/o non-ischemic, dilated cardiomyopathy, HTN, CHF, MR, s/p PM who returns for a routine follow-up. Since the last visit, patient reports a brief ER visit in which her Lasix was decreased to 20 mg. She is doing well since this adjustment and remains on Entresto. She has follow up with Dr. Marianela Diego next week in [facility]. No new symptoms. Denies change in exercise tolerance, chest pain, edema, medication intolerance, palpitations, shortness of breath, PND/orthopnea wheezing, sputum, syncope, dizziness or light headedness. Doing satisfactorily. Review of Systems  General: Pt denies excessive weight gain or loss. Pt is able to conduct ADL's. Respiratory: Denies shortness of breath, JENKINS, wheezing or stridor.   Cardiovascular: Denies precordial pain, palpitations, edema or PND  Gastrointestinal: Denies poor appetite, indigestion, abdominal pain or blood in stool  Peripheral vascular: Denies claudication, leg cramps  Neuropsychiatric: Denies paresthesias,tingling,numbness,anxiety,depression,fatigue  Musculoskeletal: Denies pain,tenderness, soreness,swelling      Past Medical History:   Diagnosis Date    Asthma     Cancer ([address]     lymphoma    Congestive heart failure, unspecified     Heart failure ([address]     Hypertension     Other ill-defined conditions     heart murmur    Presence of biventricular automatic cardioverter/defibrillator (AICD) 2015    [employer] biventricular AICD implant    Stomach ulcer      Patient Active Problem List    Diagnosis Date Noted    Hypokalemia 2017    CHF (congestive heart failure) ([address] 2017    SOB (shortness of breath) 2017    Acute respiratory insufficiency ([facility] 02/02/2017    PUD (peptic ulcer disease) 02/02/2017    Gastroesophageal reflux disease with esophagitis 02/02/2017    Thoracic aortic aneurysm without rupture ([facility] 02/02/2017    Physical deconditioning 01/29/2017    Cramping of hands 01/24/2017    Mild mitral regurgitation 01/22/2016    Lymphoma ([facility] 11/17/2015    Chronic systolic (congestive) heart failure ([address] 10/08/2015    Presence of biventricular automatic cardioverter/defibrillator (AICD) 07/02/2015    LBBB (left bundle branch block) 50/21/7073    Systolic CHF, acute on chronic ([facility] 06/29/2015    Acute respiratory failure with hypoxia ([facility] 06/29/2015    Upper respiratory infection 06/29/2015    HTN (hypertension) 06/29/2015    Hyperlipidemia 06/29/2015    Gout 06/29/2015    Reactive airway disease 03/23/2015    Fatigue 03/10/2015    Cardiomyopathy ([facility] 11/08/2014    Anemia 11/08/2014    Hypotension 11/04/2014    Rhinitis 09/04/2014    Pulmonary edema 08/04/2012    Cardiomyopathy, dilated, 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 08/04/2012    NHL (non-Hodgkin's lymphoma) ([facility] 08/04/2012    DILAN (obstructive sleep apnea) 08/04/2012    Abdominal pain 05/22/2012     Class: Acute    Weight loss 05/22/2012     Class: Acute      Past Surgical History:   Procedure Laterality Date    HX BREAST BIOPSY Bilateral     40 years ago    HX COLONOSCOPY      HX HEENT      cataracts removed    HX HYSTERECTOMY      HX OTHER SURGICAL      lymph node surgery    HX TONSILLECTOMY      [place] EGD TRANSORAL BIOPSY SINGLE/MULTIPLE  5/23/2012         SINUS SURGERY PROC UNLISTED      Nasal polyps removed     Allergies   Allergen Reactions    Codeine Other (comments)     \"made me disoriented\" per pt      Family History   Problem Relation Age of Onset    Heart Disease Mother     Stroke Father       Social History     Social History    Marital status:      Spouse name: N/A    Number of children: 1    Years of education: 16+     Occupational History    retired teacher      Social History Main Topics    Smoking status: Never Smoker    Smokeless tobacco: Never Used    Alcohol use No    Drug use: No    Sexual activity: No     Other Topics Concern    Not on file     Social History Narrative      Current Outpatient Prescriptions   Medication Sig    apixaban (ELIQUIS) 2.5 mg tablet Take 1 Tab by mouth two (2) times a day. Indications: PREVENT THROMBOEMBOLISM IN CHRONIC ATRIAL FIBRILLATION    magnesium oxide 400 mg cap Take 1 Cap by mouth daily.  cholecalciferol (VITAMIN D3) 1,000 unit cap Take 1,000 Units by mouth daily.  furosemide (LASIX) 20 mg tablet Take 1 Tab by mouth daily.  potassium chloride SR (KLOR-CON 10) 10 mEq tablet Take 1 Tab by mouth daily.  sacubitril-valsartan (ENTRESTO) 49 mg/51 mg tablet Take 1 Tab by mouth two (2) times a day.  carvedilol (COREG) 3.125 mg tablet TAKE ONE (1) TABLET(S) TWIC E DAILY WITH FOOD  Indications: Chronic Heart Failure    albuterol (PROVENTIL VENTOLIN) 2.5 mg /3 mL (0.083 %) nebulizer solution 3 mL by Nebulization route every four (4) hours as needed for Wheezing.  allopurinol (ZYLOPRIM) 300 mg tablet TAKE ONE (1) TABLET(S) DAILY    atorvastatin (LIPITOR) 10 mg tablet TAKE ONE (1) TABLET(S) DAILY    fluticasone-salmeterol (ADVAIR) 250-50 mcg/dose diskus inhaler Take 1 Puff by inhalation two (2) times a day.  loratadine (CLARITIN) 10 mg tablet Take 10 mg by mouth daily as needed.  co-enzyme Q-10 (CO Q-10) 100 mg capsule Take 100 mg by mouth daily.  OXYGEN-AIR DELIVERY SYSTEMS 2 L by Does Not Apply route nightly.  ibrutinib (IMBRUVICA) 140 mg cap Take 420 mg by mouth five (5) days a week. 140 mg cap, takes 3 caps five days a week (none on Sunday or Wednesday)    acetaminophen (TYLENOL EXTRA STRENGTH) 500 mg tablet Take 500 mg by mouth every six (6) hours as needed for Pain.  multivitamin (ONE A DAY) tablet Take 1 Tab by mouth daily.      No current facility-administered medications for this visit. I have reviewed the MAs notes, vitals, problem list, allergy list, medical history, family medical, social history and medications. Objective:     Physical Exam:     Vitals:    05/09/17 1120   BP: 108/69   Pulse: (!) 55   SpO2: 96%   Weight: 136 lb (61.7 kg)   Height: 5' 2\" (1.575 m)    Body mass index is 24.87 kg/(m^2). General: WDWN elderly female, in no acute distress. Pleasant affect. HEENT: No carotid bruits, no JVD, trach is midline. Heart:  Normal S1/S2 negative S3 or S4. Irregular rhythm with palpable heaves, +mid-systolic murmur, but no gallop or rub.   Respiratory: Clear bilaterally, no wheezing or rales  Abdomen:   Soft, non-tender, bowel sounds are active.   Extremities:  No edema, normal cap refill, no cyanosis. Neuro: A&Ox3, speech clear, gait stable. Skin: Skin color is normal. No rashes or lesions. No diaphoresis. Vascular: 2+ pulses symmetric in all extremities        Data Review:       Cardiographics:    EKG: V-paced rhythm, with underlying A-Fib    Cardiology Labs:    Results for orders placed or performed during the hospital encounter of 04/20/17   EKG, 12 LEAD, INITIAL   Result Value Ref Range    Ventricular Rate 76 BPM    Atrial Rate 76 BPM    P-R Interval 172 ms    QRS Duration 190 ms    Q-T Interval 504 ms    QTC Calculation (Bezet) 567 ms    Calculated R Axis -150 degrees    Calculated T Axis 12 degrees    Diagnosis       AV dual-paced rhythm with occasional ventricular-paced complexes and with   occasional premature ventricular complexes  When compared with ECG of 01-FEB-2017 19:26,  premature ventricular complexes are now present  Vent.  rate has decreased BY   4 BPM  Confirmed by Judyth Jennifer ([id number]) on 4/20/2017 7:32:54 PM         Lab Results   Component Value Date/Time    Cholesterol, total 172 08/04/2012 03:15 PM    HDL Cholesterol 38 08/04/2012 03:15 PM    LDL, calculated 118.8 08/04/2012 03:15 PM Triglyceride 76 08/04/2012 03:15 PM    CHOL/HDL Ratio 4.5 08/04/2012 03:15 PM       Lab Results   Component Value Date/Time    Sodium 143 04/27/2017 02:25 PM    Potassium 4.3 04/27/2017 02:25 PM    Chloride 105 04/27/2017 02:25 PM    CO2 22 04/27/2017 02:25 PM    Anion gap 10 04/22/2017 03:51 AM    Glucose 87 04/27/2017 02:25 PM    BUN 26 04/27/2017 02:25 PM    Creatinine 1.22 04/27/2017 02:25 PM    BUN/Creatinine ratio 21 04/27/2017 02:25 PM    GFR est AA 44 04/27/2017 02:25 PM    GFR est non-AA 39 04/27/2017 02:25 PM    Calcium 8.9 04/27/2017 02:25 PM    Bilirubin, total 0.7 04/20/2017 09:40 AM    AST (SGOT) 54 04/20/2017 09:40 AM    Alk. phosphatase 95 04/20/2017 09:40 AM    Protein, total 6.2 04/20/2017 09:40 AM    Albumin 3.2 04/20/2017 09:40 AM    Globulin 3.0 04/20/2017 09:40 AM    A-G Ratio 1.1 04/20/2017 09:40 AM    ALT (SGPT) 38 04/20/2017 09:40 AM          Assessment:       ICD-10-CM ICD-9-CM    1. Atrial fibrillation, unspecified type (HCC)--appears to be persistent since 2/14/17--new onset? I48.91 427.31 apixaban (ELIQUIS) 2.5 mg tablet   2. Systolic CHF, chronic (HCC) I50.23 428.23 AMB POC EKG ROUTINE W/ 12 LEADS, INTER & REP     428.0 apixaban (ELIQUIS) 2.5 mg tablet   3. Essential hypertension I10 401.9 AMB POC EKG ROUTINE W/ 12 LEADS, INTER & REP      apixaban (ELIQUIS) 2.5 mg tablet   4. Mild mitral regurgitation I34.0 424.0 apixaban (ELIQUIS) 2.5 mg tablet   5. Cardiomyopathy, dilated, 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 I42.9 425.4 apixaban (ELIQUIS) 2.5 mg tablet   6. Thoracic aortic aneurysm without rupture (HCC) I71.2 441.2    7. Presence of biventricular automatic cardioverter/defibrillator (AICD) Z95.810 V45.02    8. DILAN (obstructive sleep apnea) G47.33 327.23          Discussion: Patient presents at this time with paced rhythm and apparent new-onset underlying A-fib. Will protect with eliquis low dose. Is tolerating Entresto and reduced Lasix well. Pleased with present status.       Plan: 1.Continue same meds. Lipid profile and labs followed by PCP and [facility].   -- START Eliquis 2.5 mg BID    2. Encouraged to exercise to tolerance, and follow low fat, low cholesterol, low sodium predominantly Plant-based (consider Mediterranean) diet. -- Call with questions or concerns. -- Will follow up any test results by phone and/or f/u here in office if needed. 3.Follow up: 3 months    I have discussed the diagnosis with the patient and the intended plan as seen in the above orders. The patient has received an after-visit summary and questions were answered concerning future plans. I have discussed any concerning medication side effects and warnings with the patient as well. Patient seen and examined. All pertinent data reviewed. I have reviewed detailed note as outlined by Laurence Thrasher. Case discussed with Nursing/medical assistant staff and Laurence Thrasher. Patient presents with resolved recurrent episode of AOCSHF. Dose of Lasix ? Added. Looks to be in new onset atrial fib underlying paced rhythm at least since 2/14/17. States she has had some blleding from gums being followed by Dentist. If more gum bleeding then stop Eliquis. Plans as outlined.       Martin Rutherford PA-C  5/9/2017    GUILLERMO Atkins

## 2017-05-11 ENCOUNTER — TELEPHONE (OUTPATIENT)
Dept: CARDIOLOGY CLINIC | Age: 82
End: 2017-05-11

## 2017-05-11 DIAGNOSIS — I48.91 ATRIAL FIBRILLATION, UNSPECIFIED TYPE (HCC): ICD-10-CM

## 2017-05-11 DIAGNOSIS — I42.0 CARDIOMYOPATHY, DILATED, NONISCHEMIC (HCC): ICD-10-CM

## 2017-05-11 DIAGNOSIS — I10 ESSENTIAL HYPERTENSION: ICD-10-CM

## 2017-05-11 DIAGNOSIS — I34.0 MILD MITRAL REGURGITATION: ICD-10-CM

## 2017-05-11 DIAGNOSIS — I50.23 SYSTOLIC CHF, ACUTE ON CHRONIC (HCC): ICD-10-CM

## 2017-05-11 NOTE — TELEPHONE ENCOUNTER
Voice message received from daughter requesting a call back to discuss new medication prescribed by Dr. Tristin Martins can be reached at #[phone]

## 2017-05-11 NOTE — TELEPHONE ENCOUNTER
Returned call to patient's daughter, Chriss Paz, who wanted to make sure it was OK for Mrs Kandace Kapoor to be taking Eliquis. Reviewed the risks/benefits of AC with chronic afib. Chriss Paz is concerned re: Mrs Kandace Kapoor' history of gum bleeding, but advised her that the benefits outweigh the risks, and that she should begin taking the Eliquis. She verbalizes understanding, and will have it filled.

## 2017-05-12 ENCOUNTER — PATIENT OUTREACH (OUTPATIENT)
Dept: INTERNAL MEDICINE CLINIC | Age: 82
End: 2017-05-12

## 2017-05-15 ENCOUNTER — TELEPHONE (OUTPATIENT)
Dept: CARDIOLOGY CLINIC | Age: 82
End: 2017-05-15

## 2017-05-15 NOTE — TELEPHONE ENCOUNTER
Telephone Call RE:  Appointment reminder     Outcome:     [x] Patient confirmed appointment   [] Patient rescheduled appointment for    [] Unable to reach   [] Left message              [] Other:       Ivone Regina

## 2017-05-16 ENCOUNTER — OFFICE VISIT (OUTPATIENT)
Dept: CARDIOLOGY CLINIC | Age: 82
End: 2017-05-16

## 2017-05-16 VITALS
HEIGHT: 62 IN | WEIGHT: 134.8 LBS | RESPIRATION RATE: 16 BRPM | HEART RATE: 70 BPM | SYSTOLIC BLOOD PRESSURE: 100 MMHG | DIASTOLIC BLOOD PRESSURE: 60 MMHG | TEMPERATURE: 97.7 F | OXYGEN SATURATION: 97 % | BODY MASS INDEX: 24.8 KG/M2

## 2017-05-16 DIAGNOSIS — I44.7 LBBB (LEFT BUNDLE BRANCH BLOCK): ICD-10-CM

## 2017-05-16 DIAGNOSIS — I10 ESSENTIAL HYPERTENSION: ICD-10-CM

## 2017-05-16 DIAGNOSIS — I42.0 CARDIOMYOPATHY, DILATED, NONISCHEMIC (HCC): Primary | Chronic | ICD-10-CM

## 2017-05-16 DIAGNOSIS — I50.22 CHRONIC SYSTOLIC (CONGESTIVE) HEART FAILURE (HCC): ICD-10-CM

## 2017-05-16 DIAGNOSIS — K21.00 GASTROESOPHAGEAL REFLUX DISEASE WITH ESOPHAGITIS: ICD-10-CM

## 2017-05-16 NOTE — LETTER
5/16/2017 10:52 AM 
 
Patient:  Stephanie Coronado YOB: 1925 Date of Visit: 5/16/2017 Dear Keisha Huggins MD 
[facility] [address] [place] [id number] VIA In Basket Howie Govea MD 
[address] [address] VIA In Basket 
 : Thank you for referring Ms. Jose Carlos Chang to me for evaluation/treatment. Below are the relevant portions of my assessment and plan of care. HISTORY OF PRESENT ILLNESS Stephanie Coronado is a 80 y.o. AA, F w/ PMH of HTN, NICM (EF of 20-25% by Echo 2/3/17). MYHA class II, s/p BiVICD 2015, hyperlipidemia, LBBB, gout, GERD, PUD, DILAN, Non-Hodkin's lymphoma and Reactive airway disease who presents today for follow up. Pt reports she is feeling well, she had a brief hospital stay for volume overload a few weeks ago at [facility], her diuretics were increased to daily. She does not feel like she is holding on to fluid right now. She is c/o postnasal drip and allergy issues. She is very active and has been able to continue her treadmill walking 1-2 miles 3-4 days a week. She is performing daily weights and watching her sodium consumption. She is compliant with all her medications. Pt c/o some edema in her legs, min bleeding from her gums but improved since dental visit, some JENKINS with moderate exertion, intermittent chest pain with moderate activity that is relieved with rest. Early satiety. Pt denies HA, fatigue, fevers, chills, lightheadedness, dizziness, syncope, N/V/ constipation, changes to appetite, chest pain, SOB, depression or anxiety Past Medical History:  
Diagnosis Date  Asthma  Cancer ([address] lymphoma  Congestive heart failure, unspecified  Heart failure ([address]  Hypertension  Other ill-defined conditions   
 heart murmur  Presence of biventricular automatic cardioverter/defibrillator (AICD) 7/2/2015 [employer] biventricular AICD implant  Stomach ulcer Past Surgical History:  
Procedure Laterality Date  HX BREAST BIOPSY Bilateral   
 40 years ago  HX COLONOSCOPY    
 HX HEENT    
 cataracts removed  HX HYSTERECTOMY  HX OTHER SURGICAL    
 lymph node surgery  HX TONSILLECTOMY  [place] EGD TRANSORAL BIOPSY SINGLE/MULTIPLE  5/23/2012  
    
 SINUS SURGERY PROC UNLISTED Nasal polyps removed Family History Problem Relation Age of Onset  Heart Disease Mother  Stroke Father Social History Social History  Marital status:  Spouse name: N/A  
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  
 Drug use: No  
 Sexual activity: No  
 
Other Topics Concern  Not on file Social History Narrative Current Outpatient Prescriptions on File Prior to Visit Medication Sig Dispense Refill  apixaban (ELIQUIS) 2.5 mg tablet Take 1 Tab by mouth two (2) times a day. Indications: PREVENT THROMBOEMBOLISM IN CHRONIC ATRIAL FIBRILLATION 180 Tab 3  
 magnesium oxide 400 mg cap Take 1 Cap by mouth daily.  cholecalciferol (VITAMIN D3) 1,000 unit cap Take 1,000 Units by mouth daily.  furosemide (LASIX) 20 mg tablet Take 1 Tab by mouth daily. 30 Tab 11  
 potassium chloride SR (KLOR-CON 10) 10 mEq tablet Take 1 Tab by mouth daily. 30 Tab 11  
 sacubitril-valsartan (ENTRESTO) 49 mg/51 mg tablet Take 1 Tab by mouth two (2) times a day. 60 Tab 3  carvedilol (COREG) 3.125 mg tablet TAKE ONE (1) TABLET(S) TWIC E DAILY WITH FOOD  Indications: Chronic Heart Failure 180 Tab 3  
 albuterol (PROVENTIL VENTOLIN) 2.5 mg /3 mL (0.083 %) nebulizer solution 3 mL by Nebulization route every four (4) hours as needed for Wheezing.  100 Each 11  
 allopurinol (ZYLOPRIM) 300 mg tablet TAKE ONE (1) TABLET(S) DAILY 90 Tab 3  
  atorvastatin (LIPITOR) 10 mg tablet TAKE ONE (1) TABLET(S) DAILY 90 Tab 3  
 fluticasone-salmeterol (ADVAIR) 250-50 mcg/dose diskus inhaler Take 1 Puff by inhalation two (2) times a day. 3 Inhaler 3  
 loratadine (CLARITIN) 10 mg tablet Take 10 mg by mouth daily as needed.  co-enzyme Q-10 (CO Q-10) 100 mg capsule Take 100 mg by mouth daily.  OXYGEN-AIR DELIVERY SYSTEMS 2 L by Does Not Apply route nightly.  ibrutinib (IMBRUVICA) 140 mg cap Take 420 mg by mouth five (5) days a week. 140 mg cap, takes 3 caps five days a week (none on Sunday or Wednesday)  acetaminophen (TYLENOL EXTRA STRENGTH) 500 mg tablet Take 500 mg by mouth every six (6) hours as needed for Pain.  multivitamin (ONE A DAY) tablet Take 1 Tab by mouth daily. No current facility-administered medications on file prior to visit. Physical Exam  
Gen:  WA, WN, NAD HEENT:  EOMI Neck:  (-) JVD 
CVS:  S1/S2. Pul:  CTA b/l (-) r,r,w 
Abd:  Soft, NT,ND Ext:  Trace BLE edema Neuro:  No obvious deficits Wt Readings from Last 3 Encounters:  
05/16/17 134 lb 12.8 oz (61.1 kg) 05/09/17 136 lb (61.7 kg) 04/27/17 133 lb 9.6 oz (60.6 kg) Lab Results Component Value Date/Time Sodium 143 04/27/2017 02:25 PM  
 Potassium 4.3 04/27/2017 02:25 PM  
 Chloride 105 04/27/2017 02:25 PM  
 CO2 22 04/27/2017 02:25 PM  
 Anion gap 10 04/22/2017 03:51 AM  
 Glucose 87 04/27/2017 02:25 PM  
 BUN 26 04/27/2017 02:25 PM  
 Creatinine 1.22 04/27/2017 02:25 PM  
 BUN/Creatinine ratio 21 04/27/2017 02:25 PM  
 GFR est AA 44 04/27/2017 02:25 PM  
 GFR est non-AA 39 04/27/2017 02:25 PM  
 Calcium 8.9 04/27/2017 02:25 PM  
 
Lab Results Component Value Date/Time WBC 4.3 04/20/2017 09:40 AM  
 HGB 11.9 04/20/2017 09:40 AM  
 HCT 36.7 04/20/2017 09:40 AM  
 PLATELET 645 55/83/8744 09:40 AM  
 MCV 94.8 04/20/2017 09:40 AM  
 
Visit Vitals  /60 (BP 1 Location: Right arm, BP Patient Position: Sitting)  Pulse 70  Temp 97.7 °F (36.5 °C)  Resp 16  
 Ht 5' 2\" (1.575 m)  Wt 134 lb 12.8 oz (61.1 kg)  SpO2 97%  BMI 24.66 kg/m2 ASSESSMENT and PLAN 
 
NICM HFrEF 20-25% NYHA, class II 1. Patient is tolerating Entresto  49/51mg 1 tablet po BID 2. Cont lasix 20mg daily 3. Continue Coreg 4. Continue daily weights, she is to notify the office if her weight starts increasing 5. Reviewed importance of sodium and fluid restriction 6. Return to clinic in 3 weeks 7. Labs today VT - s/p AICD - stable New onset Afib:   
Cont Eliquis for now, will let Dr. Danyelle Salas know if she notices any uncontrolled bleeding Follow with Dr. Danyelle Salas DILAN -  Pt. To continue nocturnal home oxygen HLD - continue Lipitor and CoQ10 Thank you for letting us see her with you, Chris Orr, NP 
 
[address] [place] [address] Dept: [phone] Dept Fax: [phone] Loc: [phone] Loc Fax: [phone] 
24 hour VAD/HF Pager: [phone] If you have questions, please do not hesitate to call me. I look forward to following Ms. Munozjanes Denis along with you. Sincerely, Gualberto Roa MD

## 2017-05-16 NOTE — COMMUNICATION BODY
HISTORY OF PRESENT ILLNESS  Jayla Pandya is a 80 y.o. AA, F w/ PMH of HTN, NICM (EF of 20-25% by Echo 2/3/17). MYHA class II, s/p BiVICD 2015, hyperlipidemia, LBBB, gout, GERD, PUD, DILAN, Non-Hodkin's lymphoma and Reactive airway disease who presents today for follow up. Pt reports she is feeling well, she had a brief hospital stay for volume overload a few weeks ago at [facility], her diuretics were increased to daily. She does not feel like she is holding on to fluid right now. She is c/o postnasal drip and allergy issues. She is very active and has been able to continue her treadmill walking 1-2 miles 3-4 days a week. She is performing daily weights and watching her sodium consumption. She is compliant with all her medications. Pt c/o some edema in her legs, min bleeding from her gums but improved since dental visit, some JENKINS with moderate exertion, intermittent chest pain with moderate activity that is relieved with rest. Early satiety.       Pt denies HA, fatigue, fevers, chills, lightheadedness, dizziness, syncope, N/V/ constipation, changes to appetite, chest pain, SOB, depression or anxiety    Past Medical History:   Diagnosis Date    Asthma     Cancer ([address]     lymphoma    Congestive heart failure, unspecified     Heart failure ([address]     Hypertension     Other ill-defined conditions     heart murmur    Presence of biventricular automatic cardioverter/defibrillator (AICD) 7/2/2015    [employer] biventricular AICD implant    Stomach ulcer      Past Surgical History:   Procedure Laterality Date    HX BREAST BIOPSY Bilateral     40 years ago    HX COLONOSCOPY      HX HEENT      cataracts removed    HX HYSTERECTOMY      HX OTHER SURGICAL      lymph node surgery    HX TONSILLECTOMY      [place] EGD TRANSORAL BIOPSY SINGLE/MULTIPLE  5/23/2012         SINUS SURGERY PROC UNLISTED      Nasal polyps removed     Family History   Problem Relation Age of Onset    Heart Disease Mother     Stroke Father      Social History     Social History    Marital status:      Spouse name: N/A    Number of children: 1    Years of education: 16+     Occupational History    retired teacher      Social History Main Topics    Smoking status: Never Smoker    Smokeless tobacco: Never Used    Alcohol use No    Drug use: No    Sexual activity: No     Other Topics Concern    Not on file     Social History Narrative     Current Outpatient Prescriptions on File Prior to Visit   Medication Sig Dispense Refill    apixaban (ELIQUIS) 2.5 mg tablet Take 1 Tab by mouth two (2) times a day. Indications: PREVENT THROMBOEMBOLISM IN CHRONIC ATRIAL FIBRILLATION 180 Tab 3    magnesium oxide 400 mg cap Take 1 Cap by mouth daily.  cholecalciferol (VITAMIN D3) 1,000 unit cap Take 1,000 Units by mouth daily.  furosemide (LASIX) 20 mg tablet Take 1 Tab by mouth daily. 30 Tab 11    potassium chloride SR (KLOR-CON 10) 10 mEq tablet Take 1 Tab by mouth daily. 30 Tab 11    sacubitril-valsartan (ENTRESTO) 49 mg/51 mg tablet Take 1 Tab by mouth two (2) times a day. 60 Tab 3    carvedilol (COREG) 3.125 mg tablet TAKE ONE (1) TABLET(S) TWIC E DAILY WITH FOOD  Indications: Chronic Heart Failure 180 Tab 3    albuterol (PROVENTIL VENTOLIN) 2.5 mg /3 mL (0.083 %) nebulizer solution 3 mL by Nebulization route every four (4) hours as needed for Wheezing. 100 Each 11    allopurinol (ZYLOPRIM) 300 mg tablet TAKE ONE (1) TABLET(S) DAILY 90 Tab 3    atorvastatin (LIPITOR) 10 mg tablet TAKE ONE (1) TABLET(S) DAILY 90 Tab 3    fluticasone-salmeterol (ADVAIR) 250-50 mcg/dose diskus inhaler Take 1 Puff by inhalation two (2) times a day. 3 Inhaler 3    loratadine (CLARITIN) 10 mg tablet Take 10 mg by mouth daily as needed.  co-enzyme Q-10 (CO Q-10) 100 mg capsule Take 100 mg by mouth daily.  OXYGEN-AIR DELIVERY SYSTEMS 2 L by Does Not Apply route nightly.       ibrutinib (IMBRUVICA) 140 mg cap Take 420 mg by mouth five (5) days a week. 140 mg cap, takes 3 caps five days a week (none on Sunday or Wednesday)      acetaminophen (TYLENOL EXTRA STRENGTH) 500 mg tablet Take 500 mg by mouth every six (6) hours as needed for Pain.  multivitamin (ONE A DAY) tablet Take 1 Tab by mouth daily. No current facility-administered medications on file prior to visit. Physical Exam   Gen:  WA, WN, NAD  HEENT:  EOMI  Neck:  (-) JVD  CVS:  S1/S2. Pul:  CTA b/l (-) r,r,w  Abd:  Soft, NT,ND  Ext:  Trace BLE edema  Neuro:  No obvious deficits    Wt Readings from Last 3 Encounters:   05/16/17 134 lb 12.8 oz (61.1 kg)   05/09/17 136 lb (61.7 kg)   04/27/17 133 lb 9.6 oz (60.6 kg)     Lab Results   Component Value Date/Time    Sodium 143 04/27/2017 02:25 PM    Potassium 4.3 04/27/2017 02:25 PM    Chloride 105 04/27/2017 02:25 PM    CO2 22 04/27/2017 02:25 PM    Anion gap 10 04/22/2017 03:51 AM    Glucose 87 04/27/2017 02:25 PM    BUN 26 04/27/2017 02:25 PM    Creatinine 1.22 04/27/2017 02:25 PM    BUN/Creatinine ratio 21 04/27/2017 02:25 PM    GFR est AA 44 04/27/2017 02:25 PM    GFR est non-AA 39 04/27/2017 02:25 PM    Calcium 8.9 04/27/2017 02:25 PM     Lab Results   Component Value Date/Time    WBC 4.3 04/20/2017 09:40 AM    HGB 11.9 04/20/2017 09:40 AM    HCT 36.7 04/20/2017 09:40 AM    PLATELET 556 79/16/3847 09:40 AM    MCV 94.8 04/20/2017 09:40 AM     Visit Vitals    /60 (BP 1 Location: Right arm, BP Patient Position: Sitting)    Pulse 70    Temp 97.7 °F (36.5 °C)    Resp 16    Ht 5' 2\" (1.575 m)    Wt 134 lb 12.8 oz (61.1 kg)    SpO2 97%    BMI 24.66 kg/m2       ASSESSMENT and PLAN    NICM HFrEF 20-25% NYHA, class II  1. Patient is tolerating Entresto  49/51mg 1 tablet po BID  2. Cont lasix 20mg daily  3. Continue Coreg  4. Continue daily weights, she is to notify the office if her weight starts increasing   5. Reviewed importance of sodium and fluid restriction  6. Return to clinic in 3 weeks  7.  Labs today    VT - s/p AICD - stable    New onset Afib:    Cont Eliquis for now, will let Dr. Raymond Mckeon know if she notices any uncontrolled bleeding  Follow with Dr. Raymond Mckeon     DILAN -  Pt.  To continue nocturnal home oxygen    HLD - continue Lipitor and CoQ10      Thank you for letting us see her with you,      Brenda Goodwin, NP    [address] [place] [address]  Dept: [phone]  Dept Fax: [phone]: [phone] Fax: [phone]  24 hour VAD/HF Pager: [phone]

## 2017-05-16 NOTE — PATIENT INSTRUCTIONS
No changes to medications today    Follow up in 3 months    We will call you with your lab results tomorrow    Please cont to be as active as tolerated and keep up the good work!

## 2017-05-16 NOTE — PROGRESS NOTES
HISTORY OF PRESENT ILLNESS  Feng Jones is a 80 y.o. AA, F w/ PMH of HTN, NICM (EF of 20-25% by Echo 2/3/17). MYHA class II, s/p BiVICD 2015, hyperlipidemia, LBBB, gout, GERD, PUD, DILAN, Non-Hodkin's lymphoma and Reactive airway disease who presents today for follow up. Pt reports she is feeling well, she had a brief hospital stay for volume overload a few weeks ago at [facility], her diuretics were increased to daily. She does not feel like she is holding on to fluid right now. She is c/o postnasal drip and allergy issues. She is very active and has been able to continue her treadmill walking 1-2 miles 3-4 days a week. She is performing daily weights and watching her sodium consumption. She is compliant with all her medications. Pt c/o some edema in her legs, min bleeding from her gums but improved since dental visit, some JENKINS with moderate exertion, intermittent chest pain with moderate activity that is relieved with rest. Early satiety.       Pt denies HA, fatigue, fevers, chills, lightheadedness, dizziness, syncope, N/V/ constipation, changes to appetite, chest pain, SOB, depression or anxiety    Past Medical History:   Diagnosis Date    Asthma     Cancer ([address]     lymphoma    Congestive heart failure, unspecified     Heart failure ([address]     Hypertension     Other ill-defined conditions     heart murmur    Presence of biventricular automatic cardioverter/defibrillator (AICD) 7/2/2015    [employer] biventricular AICD implant    Stomach ulcer      Past Surgical History:   Procedure Laterality Date    HX BREAST BIOPSY Bilateral     40 years ago    HX COLONOSCOPY      HX HEENT      cataracts removed    HX HYSTERECTOMY      HX OTHER SURGICAL      lymph node surgery    HX TONSILLECTOMY      [place] EGD TRANSORAL BIOPSY SINGLE/MULTIPLE  5/23/2012         SINUS SURGERY PROC UNLISTED      Nasal polyps removed     Family History   Problem Relation Age of Onset    Heart Disease Mother     Stroke Father      Social History     Social History    Marital status:      Spouse name: N/A    Number of children: 1    Years of education: 16+     Occupational History    retired teacher      Social History Main Topics    Smoking status: Never Smoker    Smokeless tobacco: Never Used    Alcohol use No    Drug use: No    Sexual activity: No     Other Topics Concern    Not on file     Social History Narrative     Current Outpatient Prescriptions on File Prior to Visit   Medication Sig Dispense Refill    apixaban (ELIQUIS) 2.5 mg tablet Take 1 Tab by mouth two (2) times a day. Indications: PREVENT THROMBOEMBOLISM IN CHRONIC ATRIAL FIBRILLATION 180 Tab 3    magnesium oxide 400 mg cap Take 1 Cap by mouth daily.  cholecalciferol (VITAMIN D3) 1,000 unit cap Take 1,000 Units by mouth daily.  furosemide (LASIX) 20 mg tablet Take 1 Tab by mouth daily. 30 Tab 11    potassium chloride SR (KLOR-CON 10) 10 mEq tablet Take 1 Tab by mouth daily. 30 Tab 11    sacubitril-valsartan (ENTRESTO) 49 mg/51 mg tablet Take 1 Tab by mouth two (2) times a day. 60 Tab 3    carvedilol (COREG) 3.125 mg tablet TAKE ONE (1) TABLET(S) TWIC E DAILY WITH FOOD  Indications: Chronic Heart Failure 180 Tab 3    albuterol (PROVENTIL VENTOLIN) 2.5 mg /3 mL (0.083 %) nebulizer solution 3 mL by Nebulization route every four (4) hours as needed for Wheezing. 100 Each 11    allopurinol (ZYLOPRIM) 300 mg tablet TAKE ONE (1) TABLET(S) DAILY 90 Tab 3    atorvastatin (LIPITOR) 10 mg tablet TAKE ONE (1) TABLET(S) DAILY 90 Tab 3    fluticasone-salmeterol (ADVAIR) 250-50 mcg/dose diskus inhaler Take 1 Puff by inhalation two (2) times a day. 3 Inhaler 3    loratadine (CLARITIN) 10 mg tablet Take 10 mg by mouth daily as needed.  co-enzyme Q-10 (CO Q-10) 100 mg capsule Take 100 mg by mouth daily.  OXYGEN-AIR DELIVERY SYSTEMS 2 L by Does Not Apply route nightly.       ibrutinib (IMBRUVICA) 140 mg cap Take 420 mg by mouth five (5) days a week. 140 mg cap, takes 3 caps five days a week (none on Sunday or Wednesday)      acetaminophen (TYLENOL EXTRA STRENGTH) 500 mg tablet Take 500 mg by mouth every six (6) hours as needed for Pain.  multivitamin (ONE A DAY) tablet Take 1 Tab by mouth daily. No current facility-administered medications on file prior to visit. Physical Exam   Gen:  WA, WN, NAD  HEENT:  EOMI  Neck:  (-) JVD  CVS:  S1/S2. Pul:  CTA b/l (-) r,r,w  Abd:  Soft, NT,ND  Ext:  Trace BLE edema  Neuro:  No obvious deficits    Wt Readings from Last 3 Encounters:   05/16/17 134 lb 12.8 oz (61.1 kg)   05/09/17 136 lb (61.7 kg)   04/27/17 133 lb 9.6 oz (60.6 kg)     Lab Results   Component Value Date/Time    Sodium 143 04/27/2017 02:25 PM    Potassium 4.3 04/27/2017 02:25 PM    Chloride 105 04/27/2017 02:25 PM    CO2 22 04/27/2017 02:25 PM    Anion gap 10 04/22/2017 03:51 AM    Glucose 87 04/27/2017 02:25 PM    BUN 26 04/27/2017 02:25 PM    Creatinine 1.22 04/27/2017 02:25 PM    BUN/Creatinine ratio 21 04/27/2017 02:25 PM    GFR est AA 44 04/27/2017 02:25 PM    GFR est non-AA 39 04/27/2017 02:25 PM    Calcium 8.9 04/27/2017 02:25 PM     Lab Results   Component Value Date/Time    WBC 4.3 04/20/2017 09:40 AM    HGB 11.9 04/20/2017 09:40 AM    HCT 36.7 04/20/2017 09:40 AM    PLATELET 335 47/51/3898 09:40 AM    MCV 94.8 04/20/2017 09:40 AM     Visit Vitals    /60 (BP 1 Location: Right arm, BP Patient Position: Sitting)    Pulse 70    Temp 97.7 °F (36.5 °C)    Resp 16    Ht 5' 2\" (1.575 m)    Wt 134 lb 12.8 oz (61.1 kg)    SpO2 97%    BMI 24.66 kg/m2       ASSESSMENT and PLAN    NICM HFrEF 20-25% NYHA, class II  1. Patient is tolerating Entresto  49/51mg 1 tablet po BID  2. Cont lasix 20mg daily  3. Continue Coreg  4. Continue daily weights, she is to notify the office if her weight starts increasing   5. Reviewed importance of sodium and fluid restriction  6. Return to clinic in 3 weeks  7.  Labs today    VT - s/p AICD - stable    New onset Afib:    Cont Eliquis for now, will let Dr. Marylene Silvas know if she notices any uncontrolled bleeding  Follow with Dr. Marylene Silvas     DILAN -  Pt.  To continue nocturnal home oxygen    HLD - continue Lipitor and CoQ10      Thank you for letting us see her with you,      Michael Hurtado, ABI    [address] [place] [address]  Dept: [phone]  Dept Fax: [phone]: [phone] Fax: [phone]  24 hour VAD/HF Pager: [phone]

## 2017-05-16 NOTE — MR AVS SNAPSHOT
Visit Information Date & Time Provider Department Dept. Phone Encounter #  
 5/16/2017  9:30 AM Neil Zaragoza MD [address] [id number] Follow-up Instructions Return in about 3 months (around 8/16/2017). Your Appointments 7/11/2017 11:00 AM  
Any with Ginger Alert, MD  
[facility]) Appt Note: 3 month f/u  
 [address]  
  
   
 [phone] [id number]  
  
    
 8/8/2017 10:45 AM  
ESTABLISHED PATIENT with Starlene Paget, MD [facility]) Appt Note: 3 MO. F/U  
 [address]  
[phone] [address]  
  
    
  
 7/26/2017  8:00 AM  
REMOTE OFFICE VISIT with [facility]) Appt Note: NOT AN OFFICE VISIT - BSC BIVICD REMOTE SEND  
 [address] [facility]  
[phone] [address] Upcoming Health Maintenance Date Due INFLUENZA AGE 9 TO ADULT 8/1/2017 GLAUCOMA SCREENING Q2Y 12/18/2017 MEDICARE YEARLY EXAM 4/28/2018 DTaP/Tdap/Td series (2 - Td) 2/13/2027 Allergies as of 5/16/2017  Review Complete On: 5/16/2017 By: Sandhya Vasquez NP Severity Noted Reaction Type Reactions Codeine  05/21/2012   Side Effect Other (comments) \"made me disoriented\" per pt Current Immunizations  Reviewed on 6/29/2015 Name Date Influenza Vaccine 10/1/2016, 10/4/2014 Influenza Vaccine Split 10/22/2011 Pneumococcal Vaccine (Unspecified Type) 5/22/2007 Not reviewed this visit You Were Diagnosed With   
  
 Codes Comments Cardiomyopathy, dilated, nonischemic (HCC)    -  Primary ICD-10-CM: I42.9 ICD-9-CM: 425.4 Chronic systolic (congestive) heart failure (HCC)     ICD-10-CM: I50.22 ICD-9-CM: 428.22, 428.0 LBBB (left bundle branch block)     ICD-10-CM: I44.7 ICD-9-CM: 426.3 Essential hypertension     ICD-10-CM: I10 
ICD-9-CM: 401.9 Gastroesophageal reflux disease with esophagitis     ICD-10-CM: K21.0 ICD-9-CM: 530.11 Vitals BP Pulse Temp Resp Height(growth percentile) Weight(growth percentile) 100/60 (BP 1 Location: Right arm, BP Patient Position: Sitting) 70 97.7 °F (36.5 °C) 16 5' 2\" (1.575 m) 134 lb 12.8 oz (61.1 kg) SpO2 BMI OB Status Smoking Status 97% 24.66 kg/m2 Postmenopausal Never Smoker BMI and BSA Data Body Mass Index Body Surface Area  
 24.66 kg/m 2 1.63 m 2 Preferred Pharmacy Pharmacy Name Phone [address] Your Updated Medication List  
  
   
This list is accurate as of: 5/16/17 10:49 AM.  Always use your most recent med list.  
  
  
  
  
 albuterol 2.5 mg /3 mL (0.083 %) nebulizer solution Commonly known as:  PROVENTIL VENTOLIN  
3 mL by Nebulization route every four (4) hours as needed for Wheezing. allopurinol 300 mg tablet Commonly known as:  ZYLOPRIM  
TAKE ONE (1) TABLET(S) DAILY  
  
 apixaban 2.5 mg tablet Commonly known as:  Bedelia Doll Take 1 Tab by mouth two (2) times a day. Indications: PREVENT THROMBOEMBOLISM IN CHRONIC ATRIAL FIBRILLATION  
  
 atorvastatin 10 mg tablet Commonly known as:  LIPITOR  
TAKE ONE (1) TABLET(S) DAILY  
  
 carvedilol 3.125 mg tablet Commonly known as:  COREG  
TAKE ONE (1) TABLET(S) TWIC E DAILY WITH FOOD  Indications: Chronic Heart Failure  
  
 co-enzyme Q-10 100 mg capsule Commonly known as:  CO Q-10 Take 100 mg by mouth daily. fluticasone-salmeterol 250-50 mcg/dose diskus inhaler Commonly known as:  ADVAIR Take 1 Puff by inhalation two (2) times a day. furosemide 20 mg tablet Commonly known as:  LASIX Take 1 Tab by mouth daily. IMBRUVICA 140 mg capsule Generic drug:  ibrutinib Take 420 mg by mouth five (5) days a week. 140 mg cap, takes 3 caps five days a week (none on Sunday or Wednesday)  
  
 loratadine 10 mg tablet Commonly known as:  Sania [place] Take 10 mg by mouth daily as needed. magnesium oxide 400 mg Cap Take 1 Cap by mouth daily. multivitamin tablet Commonly known as:  ONE A DAY Take 1 Tab by mouth daily. OXYGEN-AIR DELIVERY SYSTEMS  
2 L by Does Not Apply route nightly. potassium chloride SR 10 mEq tablet Commonly known as:  KLOR-CON 10 Take 1 Tab by mouth daily. sacubitril-valsartan 49-51 mg Tab tablet Commonly known as:  ENTRESTO Take 1 Tab by mouth two (2) times a day. TYLENOL EXTRA STRENGTH 500 mg tablet Generic drug:  acetaminophen Take 500 mg by mouth every six (6) hours as needed for Pain. VITAMIN D3 1,000 unit Cap Generic drug:  cholecalciferol Take 1,000 Units by mouth daily. We Performed the Following METABOLIC PANEL, BASIC [07736 CPT(R)] Follow-up Instructions Return in about 3 months (around 8/16/2017). Patient Instructions No changes to medications today Follow up in 3 months We will call you with your lab results tomorrow Please cont to be as active as tolerated and keep up the good work! [facility]! Dear Stephane Goncalves: Thank you for requesting a [employer] account. Our records indicate that you already have an active [employer] account. You can access your account anytime at [email or web address] Did you know that you can access your hospital and ER discharge instructions at any time in [employer]? You can also review all of your test results from your hospital stay or ER visit. Additional Information If you have questions, please visit the Frequently Asked Questions section of the [employer] To Get Approved For website at [email or web address] Remember, [employer] To Get Appro[employer] For is NOT to be used for urgent needs. For medical emergencies, dial 911. Now available from your iPhone and Android! Please provide this summary of care documentation to your next provider. Your primary care clinician is listed as Gail Dey. If you have any questions after today's visit, please call [phone].

## 2017-05-17 LAB
BUN SERPL-MCNC: 20 MG/DL (ref 10–36)
BUN/CREAT SERPL: 20 (ref 12–28)
CALCIUM SERPL-MCNC: 8.9 MG/DL (ref 8.7–10.3)
CHLORIDE SERPL-SCNC: 103 MMOL/L (ref 96–106)
CO2 SERPL-SCNC: 25 MMOL/L (ref 18–29)
CREAT SERPL-MCNC: 0.99 MG/DL (ref 0.57–1)
GLUCOSE SERPL-MCNC: 102 MG/DL (ref 65–99)
POTASSIUM SERPL-SCNC: 4.3 MMOL/L (ref 3.5–5.2)
SODIUM SERPL-SCNC: 141 MMOL/L (ref 134–144)

## 2017-05-19 ENCOUNTER — PATIENT OUTREACH (OUTPATIENT)
Dept: INTERNAL MEDICINE CLINIC | Age: 82
End: 2017-05-19

## 2017-05-19 NOTE — PROGRESS NOTES
NNTOCIP:  F/u CHF    Disease Path: Patient stated today; stated she is fine except for slightly swollen ankles eachmorning upon rising. Denied SOB  Per Cardiologist Gallo Winkler: h/o AOCHF (EF 20-25%).       Labs:  5/16:  Results for Craig Vasquez (MRN [id number]) as of 5/19/2017 10:02   Ref. Range 5/16/2017 00:00   Sodium Latest Ref Range: 134 - 144 mmol/L 141   Potassium Latest Ref Range: 3.5 - 5.2 mmol/L 4.3   Chloride Latest Ref Range: 96 - 106 mmol/L 103   CO2 Latest Ref Range: 18 - 29 mmol/L 25   Glucose Latest Ref Range: 65 - 99 mg/dL 102 (H)   BUN Latest Ref Range: 10 - 36 mg/dL 20   Creatinine Latest Ref Range: 0.57 - 1.00 mg/dL 0.99   BUN/Creatinine ratio Latest Ref Range: 12 - 28  20   Calcium Latest Ref Range: 8.7 - 10.3 mg/dL 8.9   GFR est non-AA Latest Ref Range: >59 mL/min/1.73 50 (L)   GFR est AA Latest Ref Range: >59 mL/min/1.73 57 (L)        Palliative Care: The patient does not need Palliative Care at this time. Patient drives self to appointments, etc. Patient does down home care-[employer];  has sufficient support.       Patient's Interest: Patient states feeling well. Ambulatory w/o walking assistance. Headed today to an [address] [place] [address]. States Intake is well-with no salt added. NN reminded that fresh food has less salt then resturant prepared food. Patient still enjoys [employer]. Patient has bounced back very well for the 90 day goal.      Patient adheres to a HH diet. States her Fluid intake is within her 32 oz per day.       Patient has participated in HF Clinic.      Active Goal: Patient understand well the Red Flags given @ discharge. Patient will call/schedule appointment with PCP at the beginning of any signs of SOB/Cough/JENKINS/Ankle Swell. NN will consult w/ MD regarding the slight ankle swell during the night.       Patient will adhere to low salt renal diet.  Patient      NN will consult with PCP if to come in before 3 months-Next PCP appointment July.

## 2017-05-30 ENCOUNTER — TELEPHONE (OUTPATIENT)
Dept: CARDIOLOGY CLINIC | Age: 82
End: 2017-05-30

## 2017-05-30 NOTE — TELEPHONE ENCOUNTER
Patient identified with 2 identifiers  Home Health Nurse calling because patient held Lasix last Friday and Saturday per Paresh Soto due to low blood pressure, now with increasing shortness of breath today, weight yesterday 133.8, today, 135.4, /58 today. Per Advice of Paresh Soto, will give extra dose of lasix 20 mg po, nurse will follow up with patient by phone tomorrow and let us know how she is feeling.

## 2017-05-31 ENCOUNTER — PATIENT OUTREACH (OUTPATIENT)
Dept: INTERNAL MEDICINE CLINIC | Age: 82
End: 2017-05-31

## 2017-05-31 NOTE — PROGRESS NOTES
Heart Failure Follow Up Call    Bundle Start Date: 4/20/2017  Bundle End Date: 6/1/2017  biventricular automatic cardioverter/defibrillator (AICD)  EF: (EF of 20-25% by Echo 2/3/17). MYHA class II, s/p BiVICD 2015    Type of HF:   Diastolic & Systolic combined  Disposition of patient:  Home    Current Heart Failure Medications:    ACE/ARB:   Beta blocker: Coreg 3.125mg PO   Asprin/antiplatelet/anticoagulation:   Statin: atorvastatin   Diuretic: Lasix 40mg PO daily   Vasodilators/anti-arrhythmic:    Sacubitril/valsartan: Entresto 24mg/26mg PO daily  [email or web address]  IMBRUVICA  Daily Weights:  Stated she entertained over the holiday. (Evaluate a weight gain/loss of 2 pounds in 24 hours 3 pounds in 3 days or 4 pounds in one week). Date Weight Comments   5/29 133.8 water   5/30 135.4 water   5/31 135.3                          ACP: yes    Palliative consult while IP: no    HH Services/Tele Monitoring: HH--Note: 5/30/2017: 12:50PM:   Home Health Nurse calling because patient held Lasix last Friday and Saturday per Arturo Llanes due to low blood pressure, now with increasing shortness of breath today, weight yesterday 133.8, today, 135.4, /58 today. Per Advice of Arturo Llanes, will give extra dose of lasix 20 mg po, nurse will follow up with patient by phone tomorrow and let us know how she is feeling. Signs/Symptoms:  Ankle swell/pulmonary congestion/  Follow up appointment w/ Cardiology (3-5 days): 5/16:  Return to clinic in 3 weeks (Approx Dustin 6)   Follow up appointment with PCP (within 14 days): 6/1/2017-Ankle Swell/Cough/insect bite on Rt. Lower Leg. Note:  Patient should be seen by cardiology or PCP monthly. Patient education completed during this call :  1). The difference between seeing PCP & Patient First.    2):  Examination of Na+ in dinner planned for the evening. Goals      Attends follow-up appointments as directed.             Pt will call to office to schedule f/u with PCP once has f/u apt set with Dr Cristina Trotter.      [facility] Understands red flags post discharge. Pt will contact surgeon's office with any swelling, temp > 100 x few days, bruising, site discharge.

## 2017-06-01 ENCOUNTER — OFFICE VISIT (OUTPATIENT)
Dept: INTERNAL MEDICINE CLINIC | Age: 82
End: 2017-06-01

## 2017-06-01 VITALS
OXYGEN SATURATION: 95 % | TEMPERATURE: 97.4 F | BODY MASS INDEX: 25.19 KG/M2 | HEART RATE: 71 BPM | HEIGHT: 62 IN | DIASTOLIC BLOOD PRESSURE: 75 MMHG | RESPIRATION RATE: 16 BRPM | WEIGHT: 136.9 LBS | SYSTOLIC BLOOD PRESSURE: 106 MMHG

## 2017-06-01 DIAGNOSIS — C85.90 NON-HODGKIN'S LYMPHOMA, UNSPECIFIED BODY REGION, UNSPECIFIED NON-HODGKIN LYMPHOMA TYPE (HCC): ICD-10-CM

## 2017-06-01 DIAGNOSIS — E78.2 MIXED HYPERLIPIDEMIA: ICD-10-CM
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
## 2017-06-01 DIAGNOSIS — I50.43 ACUTE ON CHRONIC COMBINED SYSTOLIC AND DIASTOLIC CONGESTIVE HEART FAILURE (HCC): ICD-10-CM

## 2017-06-01 DIAGNOSIS — I42.0 CARDIOMYOPATHY, DILATED, NONISCHEMIC (HCC): Primary | Chronic | ICD-10-CM

## 2017-06-01 RX ORDER — CEPHALEXIN 250 MG/1
250 CAPSULE ORAL 3 TIMES DAILY
Qty: 21 CAP | Refills: 0 | Status: SHIPPED | OUTPATIENT
Start: 2017-06-01 | End: 2017-06-06 | Stop reason: ALTCHOICE

## 2017-06-01 NOTE — PROGRESS NOTES
[address] [facility]  [place] [address]  [address] [place] [address]  Phone:  [phone]  Fax: [phone]       Chief Complaint   Patient presents with    Skin Problem     patient has visible redness on her right leg. patient states that the area started off small,and it was warm to the touch. .      SUBJECTIVE:    Mehreen Chung is a 80 y.o. female comes in for a followup. She saw Dr. Rodrigo Meadows the first part of May and was started on Eliquis. She states that she has been doing well other than noting increasing shortness of breath of late. Her weight has actually been increasing. She is taking furosemide 20 mg q.d. She also noted a tender spot on the proximal distal right leg. It appeared to be a bruise, but has progressed to an erythematous raised lesion. She followed up with Dr. Scott Goss and nothing new or different has been done. 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 Her reactive airway disease has been reasonably stable. This oftentimes exacerbates when her congestive heart failure worsens. She has been using a nebulizer on an as-needed basis. Current Outpatient Prescriptions   Medication Sig Dispense Refill    apixaban (ELIQUIS) 2.5 mg tablet Take 1 Tab by mouth two (2) times a day. Indications: PREVENT THROMBOEMBOLISM IN CHRONIC ATRIAL FIBRILLATION 180 Tab 3    magnesium oxide 400 mg cap Take 1 Cap by mouth daily.  cholecalciferol (VITAMIN D3) 1,000 unit cap Take 1,000 Units by mouth daily.  furosemide (LASIX) 20 mg tablet Take 1 Tab by mouth daily. (Patient taking differently: Take 40 mg by mouth two (2) times a day.) 30 Tab 11    potassium chloride SR (KLOR-CON 10) 10 mEq tablet Take 1 Tab by mouth daily. 30 Tab 11    sacubitril-valsartan (ENTRESTO) 49 mg/51 mg tablet Take 1 Tab by mouth two (2) times a day.  60 Tab 3    carvedilol (COREG) 3.125 mg tablet TAKE ONE (1) TABLET(S) TWIC E DAILY WITH FOOD  Indications: Chronic Heart Failure 180 Tab 3    albuterol (PROVENTIL VENTOLIN) 2.5 mg /3 mL (0.083 %) nebulizer solution 3 mL by Nebulization route every four (4) hours as needed for Wheezing. 100 Each 11    allopurinol (ZYLOPRIM) 300 mg tablet TAKE ONE (1) TABLET(S) DAILY 90 Tab 3    atorvastatin (LIPITOR) 10 mg tablet TAKE ONE (1) TABLET(S) DAILY 90 Tab 3    fluticasone-salmeterol (ADVAIR) 250-50 mcg/dose diskus inhaler Take 1 Puff by inhalation two (2) times a day. 3 Inhaler 3    loratadine (CLARITIN) 10 mg tablet Take 10 mg by mouth daily as needed.  co-enzyme Q-10 (CO Q-10) 100 mg capsule Take 100 mg by mouth daily.  OXYGEN-AIR DELIVERY SYSTEMS 2 L by Does Not Apply route nightly.  acetaminophen (TYLENOL EXTRA STRENGTH) 500 mg tablet Take 500 mg by mouth every six (6) hours as needed for Pain.  multivitamin (ONE A DAY) tablet Take 1 Tab by mouth daily.  cephALEXin (KEFLEX) 250 mg capsule Take 500 mg by mouth three (3) times daily. FOR 7 DAYS      ibrutinib (IMBRUVICA) 140 mg cap Take 420 mg by mouth five (5) days a week.  140 mg cap, takes 3 caps five days a week (none on Sunday or Wednesday)       Past Medical History:   Diagnosis Date    Asthma     Cancer [facility] [address]     lymphoma    Congestive heart failure, unspecified     Heart failure ([facility] [address]     Hypertension     Other ill-defined conditions     heart murmur    Presence of biventricular automatic cardioverter/defibrillator (AICD) 7/2/2015    [employer] biventricular AICD implant    Stomach ulcer      Past Surgical History:   Procedure Laterality Date    HX BREAST BIOPSY Bilateral     40 years ago    HX COLONOSCOPY      HX HEENT      cataracts removed    HX HYSTERECTOMY      HX OTHER SURGICAL      lymph node surgery    HX TONSILLECTOMY      SD EGD TRANSORAL BIOPSY SINGLE/MULTIPLE  5/23/2012         SINUS SURGERY PROC UNLISTED      Nasal polyps removed     Allergies   Allergen Reactions    Codeine Other (comments)     \"made me disoriented\" per pt         REVIEW OF SYSTEMS:  General: negative for - chills or fever  ENT: negative for - headaches, nasal congestion or tinnitus  Respiratory: negative for - cough, hemoptysis, shortness of breath or wheezing  Cardiovascular : negative for - chest pain, edema, palpitations or shortness of breath  Gastrointestinal: negative for - abdominal pain, blood in stools, heartburn or nausea/vomiting  Genito-Urinary: no dysuria, trouble voiding, or hematuria  Musculoskeletal: negative for - gait disturbance, joint pain, joint stiffness or joint swelling  Neurological: no TIA or stroke symptoms  Hematologic: no bruises, no bleeding, no swollen glands  Integument: no lumps, mole changes, nail changes or rash  Endocrine: no malaise/lethargy or unexpected weight changes      Social History     Social History    Marital status:      Spouse name: N/A    Number of children: 1    Years of education: 16+     Occupational History    retired teacher      Social History Main Topics    Smoking status: Never Smoker    Smokeless tobacco: Never Used    Alcohol use No    Drug use: No    Sexual activity: No     Other Topics Concern    None     Social History Narrative     Family History   Problem Relation Age of Onset    Heart Disease Mother     Stroke Father        OBJECTIVE:    Visit Vitals    /75 (BP 1 Location: Right arm, BP Patient Position: Sitting)    Pulse 71    Temp 97.4 °F (36.3 °C) (Oral)    Resp 16    Ht 5' 2\" (1.575 m)    Wt 136 lb 14.4 oz (62.1 kg)    SpO2 95%    BMI 25.04 kg/m2     CONSTITUTIONAL: well , well nourished, appears age appropriate  EYES: perrla, eom intact  ENMT:moist mucous membranes, pharynx clear  NECK: supple.  Thyroid normal  RESPIRATORY: Chest: clear to ascultation and percussion   CARDIOVASCULAR: Heart: regular rate and rhythm  GASTROINTESTINAL: Abdomen: soft, bowel sounds active  HEMATOLOGIC: no pathological lymph nodes palpated  MUSCULOSKELETAL: Extremities: no edema, pulse 1+   INTEGUMENT: No unusual rashes or suspicious skin lesions noted. Nails appear normal.  NEUROLOGIC: non-focal exam   MENTAL STATUS: alert and oriented, appropriate affect      ASSESSMENT:  1. 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 Acute on chronic combined systolic and diastolic congestive heart failure ([address]    3. Mixed hyperlipidemia    4. Insect bite, initial encounter    5. Non-Hodgkin's lymphoma, unspecified body region, unspecified non-Hodgkin lymphoma type        PLAN:    1. The patients congestive heart failure appears to be a bit worse. I will increase her furosemide to 20 mg b.i.d. She will return to the office in the next one to two weeks for followup. 2. Her blood pressure is adequate such that when she is sitting, her systolic is in the 506-985 range. Standing, systolic actually goes up to the 110-120 range. This increase should be tolerated as far as her diuretic is concerned. 3. The lesion on her right leg suggests an insect bite. It looks rather angry. Treatment with an antibiotic will be given empirically. 4. Her hematologic status appears to be reasonably stable at this point. She will see Dr. Vicki Clement in the near future. Follow-up Disposition:  Return in about 4 weeks (around 6/29/2017).       La Nena Roper MD

## 2017-06-01 NOTE — MR AVS SNAPSHOT
Visit Information Date & Time Provider Department Dept. Phone Encounter #  
 6/1/2017 10:45 AM Elisha Moritz, [address] [facility] [phone] [id number] Your Appointments 7/11/2017 11:00 AM  
Any with Elisha Moritz, MD  
[address] [facility]) Appt Note: 3 month f/u  
 [address] [place]  
  
   
 [address] [address]  
  
    
 8/8/2017 10:45 AM  
ESTABLISHED PATIENT with Luis Weinstein MD  
[facility]) Appt Note: 3 MO. F/U  
 [address]  
[phone] [address] [place] [address]  
  
    
 8/14/2017 10:00 AM  
Follow Up with Tony Villa MD  
[address] [facility] ([facility]) [place] [address]  
[phone]  
  
   
 [address] [place] [address]  
  
    
  
 7/26/2017  8:00 AM  
REMOTE OFFICE VISIT with [facility]) Appt Note: NOT AN OFFICE VISIT - BSC BIVICD REMOTE SEND  
 [address] [facility]  
[phone] [address] [facility] Upcoming Health Maintenance Date Due INFLUENZA AGE 9 TO ADULT 8/1/2017 GLAUCOMA SCREENING Q2Y 12/18/2017 MEDICARE YEARLY EXAM 4/28/2018 DTaP/Tdap/Td series (2 - Td) 2/13/2027 Allergies as of 6/1/2017  Review Complete On: 6/1/2017 By: Rosemary Viveros Severity Noted Reaction Type Reactions Codeine  05/21/2012   Side Effect Other (comments) \"made me disoriented\" per pt Current Immunizations  Reviewed on 6/29/2015 Name Date Influenza Vaccine 10/1/2016, 10/4/2014 Influenza Vaccine Split 10/22/2011 Pneumococcal Vaccine (Unspecified Type) 5/22/2007 Not reviewed this visit You Were Diagnosed With   
  
 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 Sami Carlos ICD-9-CM: 919.4, E906.4 Non-Hodgkin's lymphoma, unspecified body region, unspecified non-Hodgkin lymphoma type     ICD-10-CM: C85.90 ICD-9-CM: 202.80 Vitals BP Pulse Temp Resp Height(growth percentile) Weight(growth percentile) 106/75 (BP 1 Location: Right arm, BP Patient Position: Sitting) 71 97.4 °F (36.3 °C) (Oral) 16 5' 2\" (1.575 m) 136 lb 14.4 oz (62.1 kg) SpO2 BMI OB Status Smoking Status 95% 25.04 kg/m2 Postmenopausal Never Smoker BMI and BSA Data Body Mass Index Body Surface Area 25.04 kg/m 2 1.65 m 2 Preferred Pharmacy Pharmacy Name Phone [address] [place] [phone] [address] Your Updated Medication List  
  
   
This list is accurate as of: 6/1/17  1:16 PM.  Always use your most recent med list.  
  
  
  
  
 albuterol 2.5 mg /3 mL (0.083 %) nebulizer solution Commonly known as:  PROVENTIL VENTOLIN  
3 mL by Nebulization route every four (4) hours as needed for Wheezing. allopurinol 300 mg tablet Commonly known as:  ZYLOPRIM  
TAKE ONE (1) TABLET(S) DAILY  
  
 apixaban 2.5 mg tablet Commonly known as:  Doren Patrick Take 1 Tab by mouth two (2) times a day. Indications: PREVENT THROMBOEMBOLISM IN CHRONIC ATRIAL FIBRILLATION  
  
 atorvastatin 10 mg tablet Commonly known as:  LIPITOR  
TAKE ONE (1) TABLET(S) DAILY  
  
 carvedilol 3.125 mg tablet Commonly known as:  COREG  
TAKE ONE (1) TABLET(S) TWIC E DAILY WITH FOOD  Indications: Chronic Heart Failure cephALEXin 250 mg capsule Commonly known as:  Renée Clonts Take 1 Cap by mouth three (3) times daily for 7 days. co-enzyme Q-10 100 mg capsule Commonly known as:  CO Q-10 Take 100 mg by mouth daily. fluticasone-salmeterol 250-50 mcg/dose diskus inhaler Commonly known as:  ADVAIR Take 1 Puff by inhalation two (2) times a day. furosemide 20 mg tablet Commonly known as:  LASIX Take 1 Tab by mouth daily. IMBRUVICA 140 mg capsule Generic drug:  ibrutinib Take 420 mg by mouth five (5) days a week. 140 mg cap, takes 3 caps five days a week (none on Sunday or Wednesday)  
  
 loratadine 10 mg tablet Commonly known as:  Hunter Moulder Take 10 mg by mouth daily as needed. magnesium oxide 400 mg Cap Take 1 Cap by mouth daily. multivitamin tablet Commonly known as:  ONE A DAY Take 1 Tab by mouth daily. OXYGEN-AIR DELIVERY SYSTEMS  
2 L by Does Not Apply route nightly. potassium chloride SR 10 mEq tablet Commonly known as:  KLOR-CON 10 Take 1 Tab by mouth daily. sacubitril-valsartan 49-51 mg Tab tablet Commonly known as:  ENTRESTO Take 1 Tab by mouth two (2) times a day. TYLENOL EXTRA STRENGTH 500 mg tablet Generic drug:  acetaminophen Take 500 mg by mouth every six (6) hours as needed for Pain. VITAMIN D3 1,000 unit Cap Generic drug:  cholecalciferol Take 1,000 Units by mouth daily. Prescriptions Sent to Pharmacy Refills  
 cephALEXin (KEFLEX) 250 mg capsule 0 Sig: Take 1 Cap by mouth three (3) times daily for 7 days. Class: Normal  
 Pharmacy: [facility] [id number] Ph #: [phone] Route: Oral  
  
We Performed the Following METABOLIC PANEL, BASIC [76249 CPT(R)] Introducing [facility] & HEALTH SERVICES! Dear Yong Richter: Thank you for requesting a [employer] account. Our records indicate that you already have an active [employer]ienc[employer] account.   You can access your account anytime at [email or web address] Did you know that you can access your hospital and ER discharge instructions at any time in Wrightspeed? You can also review all of your test results from your hospital stay or ER visit. Additional Information If you have questions, please visit the Frequently Asked Questions section of the Wrightspeed website at [email or web address] Remember, Wrightspeed is NOT to be used for urgent needs. For medical emergencies, dial 911. Now available from your iPhone and Android! Please provide this summary of care documentation to your next provider. Your primary care clinician is listed as Gail Dey. If you have any questions after today's visit, please call [phone].

## 2017-06-01 NOTE — PROGRESS NOTES
Chief Complaint   Patient presents with    Skin Problem     patient has visible redness on her right leg. patient states that the area started off small,and it was warm to the touch. 1. Have you been to the ER, urgent care clinic since your last visit? Hospitalized since your last visit? No    2. Have you seen or consulted any other health care providers outside of the [facility] since your last visit? Include any pap smears or colon screening.  No

## 2017-06-02 LAB
BUN SERPL-MCNC: 16 MG/DL (ref 10–36)
BUN/CREAT SERPL: 16 (ref 12–28)
CALCIUM SERPL-MCNC: 8.8 MG/DL (ref 8.7–10.3)
CHLORIDE SERPL-SCNC: 100 MMOL/L (ref 96–106)
CO2 SERPL-SCNC: 25 MMOL/L (ref 18–29)
CREAT SERPL-MCNC: 1.02 MG/DL (ref 0.57–1)
GLUCOSE SERPL-MCNC: 80 MG/DL (ref 65–99)
POTASSIUM SERPL-SCNC: 4.4 MMOL/L (ref 3.5–5.2)
SODIUM SERPL-SCNC: 140 MMOL/L (ref 134–144)

## 2017-06-06 ENCOUNTER — OFFICE VISIT (OUTPATIENT)
Dept: CARDIOLOGY CLINIC | Age: 82
End: 2017-06-06

## 2017-06-06 VITALS
HEART RATE: 74 BPM | OXYGEN SATURATION: 95 % | SYSTOLIC BLOOD PRESSURE: 113 MMHG | WEIGHT: 134.6 LBS | DIASTOLIC BLOOD PRESSURE: 63 MMHG | HEIGHT: 62 IN | BODY MASS INDEX: 24.77 KG/M2

## 2017-06-06 DIAGNOSIS — I42.0 CARDIOMYOPATHY, DILATED, NONISCHEMIC (HCC): ICD-10-CM

## 2017-06-06 DIAGNOSIS — G47.33 OSA (OBSTRUCTIVE SLEEP APNEA): ICD-10-CM

## 2017-06-06 DIAGNOSIS — I10 ESSENTIAL HYPERTENSION: ICD-10-CM

## 2017-06-06 DIAGNOSIS — E78.2 MIXED HYPERLIPIDEMIA: ICD-10-CM

## 2017-06-06 DIAGNOSIS — I44.7 LBBB (LEFT BUNDLE BRANCH BLOCK): ICD-10-CM

## 2017-06-06 DIAGNOSIS — R60.0 BILATERAL LEG EDEMA: ICD-10-CM

## 2017-06-06 DIAGNOSIS — I50.22 CHRONIC SYSTOLIC (CONGESTIVE) HEART FAILURE (HCC): Primary | ICD-10-CM

## 2017-06-06 RX ORDER — CEPHALEXIN 250 MG/1
500 CAPSULE ORAL 3 TIMES DAILY
COMMUNITY
End: 2017-10-03 | Stop reason: ALTCHOICE

## 2017-06-06 NOTE — MR AVS SNAPSHOT
Visit Information Date & Time Provider Department Dept. Phone Encounter #  
 6/6/2017  9:55 AM Rod Velasquez MD [address] [facility] at [place] [address] [id number] Your Appointments 6/15/2017 10:45 AM  
Any with Ankita Gaxiola MD  
[address] [facility]) Appt Note: 2 week follow up  
 [address] [facility] [place]  
  
   
 [address] [address]  
  
    
 7/11/2017 11:00 AM  
Any with Ankita Gaxiola MD  
[address] [facility]) Appt Note: 3 month f/u  
 [address] [facility]  
[phone]  
  
    
 8/8/2017 10:45 AM  
ESTABLISHED PATIENT with Rod Velasquez MD  
[address] [facility]) Appt Note: 3 MO. F/U  
 [address] [facility]  
[phone] [address] [place] [address]  
  
    
 8/14/2017 10:00 AM  
Follow Up with José Bower MD  
[address] [facility] ([address]) [facility] [address] [facility] [address]  
[phone]  
  
   
 [address] [address]  
  
    
  
 7/26/2017  8:00 AM  
REMOTE OFFICE VISIT with [facility]) Appt Note: NOT AN OFFICE VISIT - BSC BIVD REMOTE SEND  
 [address] [facility]  
[phone] [address] [facility] Upcoming Health Maintenance Date Due INFLUENZA AGE 9 TO ADULT 8/1/2017 GLAUCOMA SCREENING Q2Y 12/18/2017 MEDICARE YEARLY EXAM 4/28/2018 DTaP/Tdap/Td series (2 - Td) 2/13/2027 Allergies as of 6/6/2017  Review Complete On: 6/1/2017 By: Josephine Net Severity Noted Reaction Type Reactions Codeine  05/21/2012   Side Effect Other (comments) \"made me disoriented\" per pt Current Immunizations  Reviewed on 6/29/2015 Name Date Influenza Vaccine 10/1/2016, 10/4/2014 Influenza Vaccine Split 10/22/2011 Pneumococcal Vaccine (Unspecified Type) 5/22/2007 Not reviewed this visit You Were Diagnosed With   
  
 Codes Comments Chronic systolic (congestive) heart failure (HCC)    -  Primary ICD-10-CM: W97.69 ICD-9-CM: 428.22, 428.0 Cardiomyopathy, dilated, nonischemic (HCC)     ICD-10-CM: I42.9 ICD-9-CM: 425.4 LBBB (left bundle branch block)     ICD-10-CM: I44.7 ICD-9-CM: 426.3 Essential hypertension     ICD-10-CM: I10 
ICD-9-CM: 401.9 Mixed hyperlipidemia     ICD-10-CM: E78.2 ICD-9-CM: 272.2 Vitals BP Pulse Height(growth percentile) Weight(growth percentile) SpO2 BMI  
 113/63 74 5' 2\" (1.575 m) 134 lb 9.6 oz (61.1 kg) 95% 24.62 kg/m2 OB Status Smoking Status Postmenopausal Never Smoker BMI and BSA Data Body Mass Index Body Surface Area  
 24.62 kg/m 2 1.63 m 2 Preferred Pharmacy Pharmacy Name Phone [address] [place] [phone] [place] [address] Your Updated Medication List  
  
   
This list is accurate as of: 6/6/17 11:07 AM.  Always use your most recent med list.  
  
  
  
  
 albuterol 2.5 mg /3 mL (0.083 %) nebulizer solution Commonly known as:  PROVENTIL VENTOLIN  
3 mL by Nebulization route every four (4) hours as needed for Wheezing. allopurinol 300 mg tablet Commonly known as:  ZYLOPRIM  
TAKE ONE (1) TABLET(S) DAILY  
  
 apixaban 2.5 mg tablet Commonly known as:  Maosn Purchase Take 1 Tab by mouth two (2) times a day. Indications: PREVENT THROMBOEMBOLISM IN CHRONIC ATRIAL FIBRILLATION  
  
 atorvastatin 10 mg tablet Commonly known as:  LIPITOR  
TAKE ONE (1) TABLET(S) DAILY  
  
 carvedilol 3.125 mg tablet Commonly known as:  COREG  
TAKE ONE (1) TABLET(S) TWIC E DAILY WITH FOOD  Indications: Chronic Heart Failure  
  
 cephALEXin 250 mg capsule Commonly known as:  Deward Curly Take 500 mg by mouth three (3) times daily. FOR 7 DAYS  
  
 co-enzyme Q-10 100 mg capsule Commonly known as:  CO Q-10 Take 100 mg by mouth daily. fluticasone-salmeterol 250-50 mcg/dose diskus inhaler Commonly known as:  ADVAIR Take 1 Puff by inhalation two (2) times a day. furosemide 20 mg tablet Commonly known as:  LASIX Take 1 Tab by mouth daily. IMBRUVICA 140 mg capsule Generic drug:  ibrutinib Take 420 mg by mouth five (5) days a week. 140 mg cap, takes 3 caps five days a week (none on Sunday or Wednesday)  
  
 loratadine 10 mg tablet Commonly known as:  Emiliano Timo Take 10 mg by mouth daily as needed. magnesium oxide 400 mg Cap Take 1 Cap by mouth daily. multivitamin tablet Commonly known as:  ONE A DAY Take 1 Tab by mouth daily. OXYGEN-AIR DELIVERY SYSTEMS  
2 L by Does Not Apply route nightly. potassium chloride SR 10 mEq tablet Commonly known as:  KLOR-CON 10 Take 1 Tab by mouth daily. sacubitril-valsartan 49-51 mg Tab tablet Commonly known as:  ENTRESTO Take 1 Tab by mouth two (2) times a day. TYLENOL EXTRA STRENGTH 500 mg tablet Generic drug:  acetaminophen Take 500 mg by mouth every six (6) hours as needed for Pain. VITAMIN D3 1,000 unit Cap Generic drug:  cholecalciferol Take 1,000 Units by mouth daily. We Performed the Following METABOLIC PANEL, BASIC [14051 CPT(R)] To-Do List   
 08/08/2017 ECHO:  2D ECHO COMPLETE ADULT (TTE) W OR WO CONTR Patient Instructions   
-- Your kidney function looks good so we can safely increase the Lasix dose -- Please take Lasix 40 mg twice a day for 3 days -- Then return to 20 mg twice a day -- Please call us in 1 week to let us know how the fluid is doing 
-- We will get labs drawn in 2 weeks -- Echo will be scheduled for August 
 
 Leg and Ankle Edema: Care Instructions Your Care Instructions Swelling in the legs, ankles, and feet is called edema. It is common after you sit or stand for a while. Long plane flights or car rides often cause swelling in the legs and feet. You may also have swelling if you have to stand for long periods of time at your job. Problems with the veins in the legs (varicose veins) and changes in hormones can also cause swelling. Sometimes the swelling in the ankles and feet is caused by a more serious problem, such as heart failure, infection, blood clots, or liver or kidney disease. Follow-up care is a key part of your treatment and safety. Be sure to make and go to all appointments, and call your doctor if you are having problems. Its also a good idea to know your test results and keep a list of the medicines you take. How can you care for yourself at home? · If your doctor gave you medicine, take it as prescribed. Call your doctor if you think you are having a problem with your medicine. · Whenever you are resting, raise your legs up. Try to keep the swollen area higher than the level of your heart. · Take breaks from standing or sitting in one position. ¨ Walk around to increase the blood flow in your lower legs. ¨ Move your feet and ankles often while you stand, or tighten and relax your leg muscles. · Wear support stockings. Put them on in the morning, before swelling gets worse. · Eat a balanced diet. Lose weight if you need to. · Limit the amount of salt (sodium) in your diet. Salt holds fluid in the body and may increase swelling. When should you call for help? Call 911 anytime you think you may need emergency care. For example, call if: 
· You have symptoms of a blood clot in your lung (called a pulmonary embolism). These may include: 
¨ Sudden chest pain. ¨ Trouble breathing. ¨ Coughing up blood. Call your doctor now or seek immediate medical care if: · You have signs of a blood clot, such as: 
¨ Pain in your calf, back of the knee, thigh, or groin. ¨ Redness and swelling in your leg or groin. · You have symptoms of infection, such as: 
¨ Increased pain, swelling, warmth, or redness. ¨ Red streaks or pus. ¨ A fever. Watch closely for changes in your health, and be sure to contact your doctor if: 
· Your swelling is getting worse. · You have new or worsening pain in your legs. · You do not get better as expected. Where can you learn more? Go to http://camelia-bar.info/. Enter Y955 in the search box to learn more about \"Leg and Ankle Edema: Care Instructions. \" Current as of: May 27, 2016 Content Version: 11.2 © 7597-4175 Klick2Contact. Care instructions adapted under license by HowGood (which disclaims liability or warranty for this information). If you have questions about a medical condition or this instruction, always ask your healthcare professional. [place] [address] any warranty or liability for your use of this information. Introducing [facility] & HEALTH SERVICES! Dear Gabriella Potts: Thank you for requesting a [email or web address] account. Our records indicate that you already have an active [email or web address] account. You can access your account anytime at [email or web address] Did you know that you can access your hospital and ER discharge instructions at any time in [email or web address]? You can also review all of your test results from your hospital stay or ER visit. Additional Information If you have questions, please visit the Frequently Asked Questions section of the [email or web address] website at [email or web address] Remember, [email or web address] is NOT to be used for urgent needs. For medical emergencies, dial 911. Now available from your iPhone and Android! Please provide this summary of care documentation to your next provider. Your primary care clinician is listed as Gail Dey. If you have any questions after today's visit, please call [phone].

## 2017-06-06 NOTE — PATIENT INSTRUCTIONS
-- Your kidney function looks good so we can safely increase the Lasix dose  -- Please take Lasix 40 mg twice a day for 3 days  -- Then return to 20 mg twice a day  -- Please call us in 1 week to let us know how the fluid is doing  -- We will get labs drawn in 2 weeks  -- Echo will be scheduled for August    Leg and Ankle Edema: Care Instructions  Your Care Instructions  Swelling in the legs, ankles, and feet is called edema. It is common after you sit or stand for a while. Long plane flights or car rides often cause swelling in the legs and feet. You may also have swelling if you have to stand for long periods of time at your job. Problems with the veins in the legs (varicose veins) and changes in hormones can also cause swelling. Sometimes the swelling in the ankles and feet is caused by a more serious problem, such as heart failure, infection, blood clots, or liver or kidney disease. Follow-up care is a key part of your treatment and safety. Be sure to make and go to all appointments, and call your doctor if you are having problems. Its also a good idea to know your test results and keep a list of the medicines you take. How can you care for yourself at home? · If your doctor gave you medicine, take it as prescribed. Call your doctor if you think you are having a problem with your medicine. · Whenever you are resting, raise your legs up. Try to keep the swollen area higher than the level of your heart. · Take breaks from standing or sitting in one position. ¨ Walk around to increase the blood flow in your lower legs. ¨ Move your feet and ankles often while you stand, or tighten and relax your leg muscles. · Wear support stockings. Put them on in the morning, before swelling gets worse. · Eat a balanced diet. Lose weight if you need to. · Limit the amount of salt (sodium) in your diet. Salt holds fluid in the body and may increase swelling. When should you call for help?   Call 911 anytime you think you may need emergency care. For example, call if:  · You have symptoms of a blood clot in your lung (called a pulmonary embolism). These may include:  ¨ Sudden chest pain. ¨ Trouble breathing. ¨ Coughing up blood. Call your doctor now or seek immediate medical care if:  · You have signs of a blood clot, such as:  ¨ Pain in your calf, back of the knee, thigh, or groin. ¨ Redness and swelling in your leg or groin. · You have symptoms of infection, such as:  ¨ Increased pain, swelling, warmth, or redness. ¨ Red streaks or pus. ¨ A fever. Watch closely for changes in your health, and be sure to contact your doctor if:  · Your swelling is getting worse. · You have new or worsening pain in your legs. · You do not get better as expected. Where can you learn more? Go to http://camelia-bar.info/. Enter C625 in the search box to learn more about \"Leg and Ankle Edema: Care Instructions. \"  Current as of: May 27, 2016  Content Version: 11.2  © 2162-6149 [employer]. Care instructions adapted under license by [employer] (which disclaims liability or warranty for this information). If you have questions about a medical condition or this instruction, always ask your healthcare professional. [address] any warranty or liability for your use of this information.

## 2017-06-06 NOTE — PROGRESS NOTES
[facility]   [address] [facility], [address] [place] [address]                                          NEW PATIENT HPI/FOLLOW-UP      NAME:  Jasen Yap   :   3/17/1925   MRN:   [id number]   PCP:  Diamante Gallo MD           Subjective: The patient is a 80y.o. 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 Since the last visit, patient reports BLE edema for which she has seen PCP. He increased Lasix to 20 mg BID and advised follow up with our office. Denies change in exercise tolerance -- pt tolerates daily treadmill regimen, walking ~2 miles without chest pain, palpitations, JENKINS/shortness of breath. She has stable 2-pillow orthopnea -- unchanged for several months. No wheezing, sputum, syncope, dizziness or light headedness. Doing satisfactorily. Review of Systems  General: Pt denies excessive weight gain or loss. Pt is able to conduct ADL's. Respiratory: Denies shortness of breath, JENKINS, wheezing or stridor.   Cardiovascular: +edema Denies precordial pain, palpitations, or PND  Gastrointestinal: Denies poor appetite, indigestion, abdominal pain or blood in stool  Peripheral vascular: Denies claudication, leg cramps  Neuropsychiatric: Denies paresthesias,tingling,numbness,anxiety,depression,fatigue  Musculoskeletal: Denies pain,tenderness, soreness,swelling      Past Medical History:   Diagnosis Date    Asthma     Cancer ([address]     lymphoma    Congestive heart failure, unspecified     Heart failure ([address]     Hypertension     Other ill-defined conditions     heart murmur    Presence of biventricular automatic cardioverter/defibrillator (AICD) 2015    [employer] biventricular AICD implant    Stomach ulcer      Patient Active Problem List    Diagnosis Date Noted    Hypokalemia 2017    CHF (congestive heart failure) ([address] 2017    SOB (shortness of breath) 02/02/2017    Acute respiratory insufficiency ([address] 02/02/2017    PUD (peptic ulcer disease) 02/02/2017    Gastroesophageal reflux disease with esophagitis 02/02/2017    Thoracic aortic aneurysm without rupture ([address] 02/02/2017    Physical deconditioning 01/29/2017    Cramping of hands 01/24/2017    Mild mitral regurgitation 01/22/2016    Lymphoma ([address] 11/17/2015    Chronic systolic (congestive) heart failure ([address] 10/08/2015    Presence of biventricular automatic cardioverter/defibrillator (AICD) 07/02/2015    LBBB (left bundle branch block) 76/45/0020    Systolic CHF, acute on chronic ([address] 06/29/2015    Acute respiratory failure with hypoxia ([facility] 06/29/2015    Upper respiratory infection 06/29/2015    HTN (hypertension) 06/29/2015    Hyperlipidemia 06/29/2015    Gout 06/29/2015    Reactive airway disease 03/23/2015    Fatigue 03/10/2015    Cardiomyopathy ([address] 11/08/2014    Anemia 11/08/2014    Hypotension 11/04/2014    Rhinitis 09/04/2014    Pulmonary edema 08/04/2012    Cardiomyopathy, dilated, 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 08/04/2012    NHL (non-Hodgkin's lymphoma) ([facility] 08/04/2012    DILAN (obstructive sleep apnea) 08/04/2012    Abdominal pain 05/22/2012     Class: Acute    Weight loss 05/22/2012     Class: Acute      Past Surgical History:   Procedure Laterality Date    HX BREAST BIOPSY Bilateral     40 years ago    HX COLONOSCOPY      HX HEENT      cataracts removed    HX HYSTERECTOMY      HX OTHER SURGICAL      lymph node surgery    HX TONSILLECTOMY      [place] EGD TRANSORAL BIOPSY SINGLE/MULTIPLE  5/23/2012         SINUS SURGERY PROC UNLISTED      Nasal polyps removed     Allergies   Allergen Reactions    Codeine Other (comments)     \"made me disoriented\" per pt      Family History   Problem Relation Age of Onset    Heart Disease Mother     Stroke Father       Social History     Social History    Marital status:      Spouse name: N/A    Number of children: 1    Years of education: 16+     Occupational History    retired teacher      Social History Main Topics    Smoking status: Never Smoker    Smokeless tobacco: Never Used    Alcohol use No    Drug use: No    Sexual activity: No     Other Topics Concern    Not on file     Social History Narrative      Current Outpatient Prescriptions   Medication Sig    cephALEXin (KEFLEX) 250 mg capsule Take 500 mg by mouth three (3) times daily. FOR 7 DAYS    apixaban (ELIQUIS) 2.5 mg tablet Take 1 Tab by mouth two (2) times a day. Indications: PREVENT THROMBOEMBOLISM IN CHRONIC ATRIAL FIBRILLATION    magnesium oxide 400 mg cap Take 1 Cap by mouth daily.  cholecalciferol (VITAMIN D3) 1,000 unit cap Take 1,000 Units by mouth daily.  furosemide (LASIX) 20 mg tablet Take 1 Tab by mouth daily.  potassium chloride SR (KLOR-CON 10) 10 mEq tablet Take 1 Tab by mouth daily.  sacubitril-valsartan (ENTRESTO) 49 mg/51 mg tablet Take 1 Tab by mouth two (2) times a day.  carvedilol (COREG) 3.125 mg tablet TAKE ONE (1) TABLET(S) TWIC E DAILY WITH FOOD  Indications: Chronic Heart Failure    albuterol (PROVENTIL VENTOLIN) 2.5 mg /3 mL (0.083 %) nebulizer solution 3 mL by Nebulization route every four (4) hours as needed for Wheezing.  allopurinol (ZYLOPRIM) 300 mg tablet TAKE ONE (1) TABLET(S) DAILY    atorvastatin (LIPITOR) 10 mg tablet TAKE ONE (1) TABLET(S) DAILY    fluticasone-salmeterol (ADVAIR) 250-50 mcg/dose diskus inhaler Take 1 Puff by inhalation two (2) times a day.  loratadine (CLARITIN) 10 mg tablet Take 10 mg by mouth daily as needed.  co-enzyme Q-10 (CO Q-10) 100 mg capsule Take 100 mg by mouth daily.  OXYGEN-AIR DELIVERY SYSTEMS 2 L by Does Not Apply route nightly.  ibrutinib (IMBRUVICA) 140 mg cap Take 420 mg by mouth five (5) days a week.  140 mg cap, takes 3 caps five days a week (none on Sunday or Wednesday)    acetaminophen (TYLENOL EXTRA STRENGTH) 500 mg tablet Take 500 mg by mouth every six (6) hours as needed for Pain.  multivitamin (ONE A DAY) tablet Take 1 Tab by mouth daily. No current facility-administered medications for this visit. I have reviewed the MAs notes, vitals, problem list, allergy list, medical history, family medical, social history and medications. Objective:     Physical Exam:     Vitals:    06/06/17 1000   BP: 113/63   Pulse: 74   SpO2: 95%   Weight: 134 lb 9.6 oz (61.1 kg)   Height: 5' 2\" (1.575 m)    Body mass index is 24.62 kg/(m^2). General: WDWN, in no acute distress. HEENT: No carotid bruits, no JVD, trach is midline. Heart:  Normal S1/S2 negative S3 or S4. Regular, systolic murmur best heard at aortic focus, No gallop or rub.   Respiratory: Clear bilaterally, no wheezing or rales  Abdomen:   Soft, non-tender, bowel sounds are active.   Extremities:  2+ BLE pitting edema to proximal tibia, normal cap refill, no cyanosis. Neuro: A&Ox3, speech clear, gait stable. Skin: Skin color is normal. No rashes or lesions. No diaphoresis. Vascular: 2+ pulses symmetric in all extremities      Data Review:       Cardiographics:    EKG: None today    Cardiology Labs:    Results for orders placed or performed during the hospital encounter of 04/20/17   EKG, 12 LEAD, INITIAL   Result Value Ref Range    Ventricular Rate 76 BPM    Atrial Rate 76 BPM    P-R Interval 172 ms    QRS Duration 190 ms    Q-T Interval 504 ms    QTC Calculation (Bezet) 567 ms    Calculated R Axis -150 degrees    Calculated T Axis 12 degrees    Diagnosis       AV dual-paced rhythm with occasional ventricular-paced complexes and with   occasional premature ventricular complexes  When compared with ECG of 01-FEB-2017 19:26,  premature ventricular complexes are now present  Vent.  rate has decreased BY   4 BPM  Confirmed by Chandrakant Feeling ([id number]) on 4/20/2017 7:32:54 PM         Lab Results   Component Value Date/Time    Cholesterol, total 172 08/04/2012 03:15 PM    HDL Cholesterol 38 08/04/2012 03:15 PM    LDL, calculated 118.8 08/04/2012 03:15 PM    Triglyceride 76 08/04/2012 03:15 PM    CHOL/HDL Ratio 4.5 08/04/2012 03:15 PM       Lab Results   Component Value Date/Time    Sodium 140 06/01/2017 12:57 PM    Potassium 4.4 06/01/2017 12:57 PM    Chloride 100 06/01/2017 12:57 PM    CO2 25 06/01/2017 12:57 PM    Anion gap 10 04/22/2017 03:51 AM    Glucose 80 06/01/2017 12:57 PM    BUN 16 06/01/2017 12:57 PM    Creatinine 1.02 06/01/2017 12:57 PM    BUN/Creatinine ratio 16 06/01/2017 12:57 PM    GFR est AA 55 06/01/2017 12:57 PM    GFR est non-AA 48 06/01/2017 12:57 PM    Calcium 8.8 06/01/2017 12:57 PM    Bilirubin, total 0.7 04/20/2017 09:40 AM    AST (SGOT) 54 04/20/2017 09:40 AM    Alk. phosphatase 95 04/20/2017 09:40 AM    Protein, total 6.2 04/20/2017 09:40 AM    Albumin 3.2 04/20/2017 09:40 AM    Globulin 3.0 04/20/2017 09:40 AM    A-G Ratio 1.1 04/20/2017 09:40 AM    ALT (SGPT) 38 04/20/2017 09:40 AM          Assessment:       ICD-10-CM ICD-9-CM    1. Chronic systolic (congestive) heart failure (HCC) I50.22 428.22      428.0    2. 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 LBBB (left bundle branch block) I44.7 426.3    4. Essential hypertension I10 401.9    5. Mixed hyperlipidemia E78.2 272.2          Discussion: Patient presents at this time stable from a cardiac perspective with BLE and stable 2 pillow orthopnea. Pleased with present status. Will watch closely. Repeat echo in 2 months. Plan:     1. Increase Lasix to 40 mg BID x 3 days then return to 40 mg every day  dosing   -- Please call the office in 1 week to discuss fluid status   -- Lab work (BMP) in 2 weeks    2. Encouraged to exercise to tolerance and follow low fat, low cholesterol, low sodium predominantly Plant-based (consider Mediterranean) diet. Call with questions or concerns.  Will follow up any test results by phone and/or f/u here in office if needed. Tenisha Mike 3.Follow up: 3 months, with echo repeat in August.    I have discussed the diagnosis with the patient and the intended plan as seen in the above orders. The patient has received an after-visit summary and questions were answered concerning future plans. I have discussed any concerning medication side effects and warnings with the patient as well. Patient seen and examined. All pertinent data reviewed. I have reviewed detailed note as outlined by Regino Patterson. Case discussed with Nursing/medical assistant staff and Regino Patterson. Patient presents with worsening lower leg edema since being on Entresto dose increased. I suspect will need increase in Lasix dose and start of SPIRONOLACTONE( if serum K will allow) and pressure stocking. Will call with Lasix response in 1-2 weeks. Echo in 8/17. F/U in 9/17. Sees [facility] in few weeks. Plans as outlined.       Homa Childress PA-C  6/6/2017     GUILLERMO Velasquez

## 2017-06-15 ENCOUNTER — PATIENT OUTREACH (OUTPATIENT)
Dept: INTERNAL MEDICINE CLINIC | Age: 82
End: 2017-06-15

## 2017-06-15 ENCOUNTER — OFFICE VISIT (OUTPATIENT)
Dept: INTERNAL MEDICINE CLINIC | Age: 82
End: 2017-06-15

## 2017-06-15 VITALS
RESPIRATION RATE: 16 BRPM | HEIGHT: 62 IN | SYSTOLIC BLOOD PRESSURE: 99 MMHG | TEMPERATURE: 97.7 F | WEIGHT: 135.3 LBS | OXYGEN SATURATION: 92 % | DIASTOLIC BLOOD PRESSURE: 63 MMHG | HEART RATE: 74 BPM | BODY MASS INDEX: 24.9 KG/M2

## 2017-06-15 DIAGNOSIS — I10 ESSENTIAL HYPERTENSION: ICD-10-CM

## 2017-06-15 DIAGNOSIS — I42.0 CARDIOMYOPATHY, DILATED, NONISCHEMIC (HCC): Primary | Chronic | ICD-10-CM

## 2017-06-15 DIAGNOSIS — C85.91 NON-HODGKIN LYMPHOMA OF LYMPH NODES OF NECK, UNSPECIFIED NON-HODGKIN LYMPHOMA TYPE (HCC): ICD-10-CM

## 2017-06-15 DIAGNOSIS — M10.9 GOUT, UNSPECIFIED CAUSE, UNSPECIFIED CHRONICITY, UNSPECIFIED SITE: ICD-10-CM

## 2017-06-15 DIAGNOSIS — Z00.00 ROUTINE GENERAL MEDICAL EXAMINATION AT A HEALTH CARE FACILITY: ICD-10-CM

## 2017-06-15 DIAGNOSIS — E78.2 MIXED HYPERLIPIDEMIA: ICD-10-CM

## 2017-06-15 NOTE — PROGRESS NOTES
[address] [facility]  [place] [address]  [address] [place] [address]  Phone:  [phone]  Fax: [phone]       Chief Complaint   Patient presents with    Annual Wellness Visit     SPIDER BITE AND VISIT WITH DR. ARGUELLO, AND CHECK GUMS ON LEFT SIDE   . SUBJECTIVE:    Ofe Sprague is a 80 y.o. female comes in for return visit stating that she feels great. She is actively followed by Dr. Jarrett Sears in the congestive heart failure clinic. He increased her Lasix to 40 mg b.i.d for three days and then resume the 20 mg b.i.d thereafter. She denies any dyspnea on exertion, orthopnea or PND or ankle swelling. Her exertional tolerance is pretty much back up to where it previously was. 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 Current Outpatient Prescriptions   Medication Sig Dispense Refill    cephALEXin (KEFLEX) 250 mg capsule Take 500 mg by mouth three (3) times daily. FOR 7 DAYS      apixaban (ELIQUIS) 2.5 mg tablet Take 1 Tab by mouth two (2) times a day. Indications: PREVENT THROMBOEMBOLISM IN CHRONIC ATRIAL FIBRILLATION 180 Tab 3    magnesium oxide 400 mg cap Take 1 Cap by mouth daily.  furosemide (LASIX) 20 mg tablet Take 1 Tab by mouth daily. 30 Tab 11    potassium chloride SR (KLOR-CON 10) 10 mEq tablet Take 1 Tab by mouth daily. 30 Tab 11    sacubitril-valsartan (ENTRESTO) 49 mg/51 mg tablet Take 1 Tab by mouth two (2) times a day. 60 Tab 3    carvedilol (COREG) 3.125 mg tablet TAKE ONE (1) TABLET(S) TWIC E DAILY WITH FOOD  Indications: Chronic Heart Failure 180 Tab 3    allopurinol (ZYLOPRIM) 300 mg tablet TAKE ONE (1) TABLET(S) DAILY 90 Tab 3    atorvastatin (LIPITOR) 10 mg tablet TAKE ONE (1) TABLET(S) DAILY 90 Tab 3    fluticasone-salmeterol (ADVAIR) 250-50 mcg/dose diskus inhaler Take 1 Puff by inhalation two (2) times a day.  3 Inhaler 3    loratadine (CLARITIN) 10 mg tablet Take 10 mg by mouth daily as needed.  co-enzyme Q-10 (CO Q-10) 100 mg capsule Take 100 mg by mouth daily.  OXYGEN-AIR DELIVERY SYSTEMS 2 L by Does Not Apply route nightly.  ibrutinib (IMBRUVICA) 140 mg cap Take 420 mg by mouth five (5) days a week. 140 mg cap, takes 3 caps five days a week (none on Sunday or Wednesday)      acetaminophen (TYLENOL EXTRA STRENGTH) 500 mg tablet Take 500 mg by mouth every six (6) hours as needed for Pain.  multivitamin (ONE A DAY) tablet Take 1 Tab by mouth daily.  cholecalciferol (VITAMIN D3) 1,000 unit cap Take 1,000 Units by mouth daily.  albuterol (PROVENTIL VENTOLIN) 2.5 mg /3 mL (0.083 %) nebulizer solution 3 mL by Nebulization route every four (4) hours as needed for Wheezing.  100 Each 11     Past Medical History:   Diagnosis Date    Asthma     Cancer ([facility] [address]     lymphoma    Congestive heart failure, unspecified     Heart failure (HCC)     Hypertension     Other ill-defined conditions     heart murmur    Presence of biventricular automatic cardioverter/defibrillator (AICD) 7/2/2015    RxEye biventricular AICD implant    Stomach ulcer      Past Surgical History:   Procedure Laterality Date    HX BREAST BIOPSY Bilateral     40 years ago    HX COLONOSCOPY      HX HEENT      cataracts removed    HX HYSTERECTOMY      HX OTHER SURGICAL      lymph node surgery    HX TONSILLECTOMY      GA EGD TRANSORAL BIOPSY SINGLE/MULTIPLE  5/23/2012         SINUS SURGERY PROC UNLISTED      Nasal polyps removed     Allergies   Allergen Reactions    Codeine Other (comments)     \"made me disoriented\" per pt         REVIEW OF SYSTEMS:  General: negative for - chills or fever  ENT: negative for - headaches, nasal congestion or tinnitus  Respiratory: negative for - cough, hemoptysis, shortness of breath or wheezing  Cardiovascular : negative for - chest pain, edema, palpitations or shortness of breath  Gastrointestinal: negative for - abdominal pain, blood in stools, heartburn or nausea/vomiting  Genito-Urinary: no dysuria, trouble voiding, or hematuria  Musculoskeletal: negative for - gait disturbance, joint pain, joint stiffness or joint swelling  Neurological: no TIA or stroke symptoms  Hematologic: no bruises, no bleeding, no swollen glands  Integument: no lumps, mole changes, nail changes or rash  Endocrine: no malaise/lethargy or unexpected weight changes      Social History     Social History    Marital status:      Spouse name: N/A    Number of children: 1    Years of education: 16+     Occupational History    retired teacher      Social History Main Topics    Smoking status: Never Smoker    Smokeless tobacco: Never Used    Alcohol use No    Drug use: No    Sexual activity: No     Other Topics Concern    None     Social History Narrative     Family History   Problem Relation Age of Onset    Heart Disease Mother     Stroke Father        OBJECTIVE:    Visit Vitals    BP 99/63 (BP 1 Location: Right arm, BP Patient Position: Sitting)    Pulse 74    Temp 97.7 °F (36.5 °C) (Oral)    Resp 16    Ht 5' 2\" (1.575 m)    Wt 135 lb 4.8 oz (61.4 kg)    SpO2 92%    BMI 24.75 kg/m2     CONSTITUTIONAL: well , well nourished, appears age appropriate  EYES: perrla, eom intact  ENMT:moist mucous membranes, pharynx clear  NECK: supple. Thyroid normal  RESPIRATORY: Chest: clear to ascultation and percussion   CARDIOVASCULAR: Heart: regular rate and rhythm  GASTROINTESTINAL: Abdomen: soft, bowel sounds active  HEMATOLOGIC: no pathological lymph nodes palpated  MUSCULOSKELETAL: Extremities: no edema, pulse 1+   INTEGUMENT: No unusual rashes or suspicious skin lesions noted. Nails appear normal.  NEUROLOGIC: non-focal exam   MENTAL STATUS: alert and oriented, appropriate affect      ASSESSMENT:  1. 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 Essential hypertension    4. Mixed hyperlipidemia    5. Gout, unspecified cause, unspecified chronicity, unspecified site    6. Routine general medical examination at a health care facility        PLAN:    1. She appears to be well compensated from a cardiac perspective. There are no overt signs of congestive heart failure today. 2. Her blood pressure continues to be on the low end but it is indeed acceptable. The sitting pressures is about 90/70 and when she stands the systolic actually goes up to 100/75. This suggests that she is not having much in the way of reductio in intravascular problem with the increase in diuretic. 3. She will follow-up with Dr. Melba Abel regarding her lymphoma. 4. She will continue her Allopurinol for her gout which has not flared up of late. 5. She will also continue her statin in view of her increased cardiovascular risk. Follow-up Disposition: Not on File      Ria Martins MD    This is a Subsequent Medicare Annual Wellness Visit providing Personalized Prevention Plan Services (PPPS) (Performed 12 months after initial AWV and PPPS )    I have reviewed the patient's medical history in detail and updated the computerized patient record.      History     Past Medical History:   Diagnosis Date    Asthma     Cancer ([facility] [address]     lymphoma    Congestive heart failure, unspecified     Heart failure (HCC)     Hypertension     Other ill-defined conditions     heart murmur    Presence of biventricular automatic cardioverter/defibrillator (AICD) 7/2/2015    [employer] biventricular AICD implant    Stomach ulcer       Past Surgical History:   Procedure Laterality Date    HX BREAST BIOPSY Bilateral     40 years ago    HX COLONOSCOPY      HX HEENT      cataracts removed    HX HYSTERECTOMY      HX OTHER SURGICAL      lymph node surgery    HX TONSILLECTOMY      MA EGD TRANSORAL BIOPSY SINGLE/MULTIPLE  5/23/2012         SINUS SURGERY PROC UNLISTED Nasal polyps removed     Current Outpatient Prescriptions   Medication Sig Dispense Refill    cephALEXin (KEFLEX) 250 mg capsule Take 500 mg by mouth three (3) times daily. FOR 7 DAYS      apixaban (ELIQUIS) 2.5 mg tablet Take 1 Tab by mouth two (2) times a day. Indications: PREVENT THROMBOEMBOLISM IN CHRONIC ATRIAL FIBRILLATION 180 Tab 3    magnesium oxide 400 mg cap Take 1 Cap by mouth daily.  furosemide (LASIX) 20 mg tablet Take 1 Tab by mouth daily. 30 Tab 11    potassium chloride SR (KLOR-CON 10) 10 mEq tablet Take 1 Tab by mouth daily. 30 Tab 11    sacubitril-valsartan (ENTRESTO) 49 mg/51 mg tablet Take 1 Tab by mouth two (2) times a day. 60 Tab 3    carvedilol (COREG) 3.125 mg tablet TAKE ONE (1) TABLET(S) TWIC E DAILY WITH FOOD  Indications: Chronic Heart Failure 180 Tab 3    allopurinol (ZYLOPRIM) 300 mg tablet TAKE ONE (1) TABLET(S) DAILY 90 Tab 3    atorvastatin (LIPITOR) 10 mg tablet TAKE ONE (1) TABLET(S) DAILY 90 Tab 3    fluticasone-salmeterol (ADVAIR) 250-50 mcg/dose diskus inhaler Take 1 Puff by inhalation two (2) times a day. 3 Inhaler 3    loratadine (CLARITIN) 10 mg tablet Take 10 mg by mouth daily as needed.  co-enzyme Q-10 (CO Q-10) 100 mg capsule Take 100 mg by mouth daily.  OXYGEN-AIR DELIVERY SYSTEMS 2 L by Does Not Apply route nightly.  ibrutinib (IMBRUVICA) 140 mg cap Take 420 mg by mouth five (5) days a week. 140 mg cap, takes 3 caps five days a week (none on Sunday or Wednesday)      acetaminophen (TYLENOL EXTRA STRENGTH) 500 mg tablet Take 500 mg by mouth every six (6) hours as needed for Pain.  multivitamin (ONE A DAY) tablet Take 1 Tab by mouth daily.  cholecalciferol (VITAMIN D3) 1,000 unit cap Take 1,000 Units by mouth daily.  albuterol (PROVENTIL VENTOLIN) 2.5 mg /3 mL (0.083 %) nebulizer solution 3 mL by Nebulization route every four (4) hours as needed for Wheezing.  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 I42.9    NHL (non-Hodgkin's lymphoma) (HCC) C85.90    DILAN (obstructive sleep apnea) G47.33    Rhinitis J31.0    Hypotension I95.9    Cardiomyopathy (HCC) I42.9    Anemia D64.9    Fatigue R53.83    Reactive airway disease S93.058    Systolic CHF, acute on chronic (HCC) I50.23    Acute respiratory failure with hypoxia (HCC) J96.01    Upper respiratory infection J06.9    HTN (hypertension) I10    Hyperlipidemia E78.5    Gout M10.9    LBBB (left bundle branch block) I44.7    Presence of biventricular automatic cardioverter/defibrillator (AICD) Z95.810    Chronic systolic (congestive) heart failure (HCC) I50.22    Lymphoma (HCC) C85.90    Mild mitral regurgitation I34.0    Cramping of hands R25.2    Physical deconditioning R53.81    SOB (shortness of breath) R06.02    Acute respiratory insufficiency (HCC) R06.89    PUD (peptic ulcer disease) K27.9    Gastroesophageal reflux disease with esophagitis K21.0    Thoracic aortic aneurysm without rupture (HCC) I71.2    CHF (congestive heart failure) (HCC) I50.9    Hypokalemia E87.6    Bilateral leg edema R60.0       Depression Risk Factor Screening:     PHQ over the last two weeks 6/15/2017   Little interest or pleasure in doing things Not at all   Feeling down, depressed or hopeless Not at all   Total Score PHQ 2 0     Alcohol Risk Factor Screening: On any occasion during the past 3 months, have you had more than 3 drinks containing alcohol? No    Do you average more than 7 drinks per week? No      Functional Ability and Level of Safety:     Hearing Loss   none    Activities of Daily Living   Self-care. Requires assistance with: no ADLs    Fall Risk     Fall Risk Assessment, last 12 mths 6/15/2017   Able to walk? Yes   Fall in past 12 months? No     Abuse Screen   Patient is not abused    Review of Systems   A comprehensive review of systems was negative. Physical Examination     Evaluation of Cognitive Function:  Mood/affect:  neutral  Appearance: age appropriate  Family member/caregiver input: na    Visit Vitals    BP 99/63 (BP 1 Location: Right arm, BP Patient Position: Sitting)    Pulse 74    Temp 97.7 °F (36.5 °C) (Oral)    Resp 16    Ht 5' 2\" (1.575 m)    Wt 135 lb 4.8 oz (61.4 kg)    SpO2 92%    BMI 24.75 kg/m2     General appearance: alert, cooperative, no distress, appears stated age  Neck: supple, symmetrical, trachea midline, no adenopathy, thyroid: not enlarged, symmetric, no tenderness/mass/nodules, no carotid bruit and no JVD  Lungs: clear to auscultation bilaterally  Heart: regular rate and rhythm, S1, S2 normal, no murmur, click, rub or gallop  Abdomen: soft, non-tender. Bowel sounds normal. No masses,  no organomegaly  Extremities: extremities normal, atraumatic, no cyanosis or edema  Neurologic: Grossly normal    Patient Care Team:  Kay Oconnor MD as PCP - General (Internal Medicine)  Aleja Catalan MD (Cardiology)  Yomaira Auguste MD as Physician (Cardiology)  Rosangela Hugo LPN as Transitional Nurse Navigator  Amparo Jacobs RN as Ambulatory Care Navigator    Advice/Referrals/Counseling   Education and counseling provided:  Are appropriate based on today's review and evaluation      Assessment/Plan       ICD-10-CM ICD-9-CM    1. 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 Essential hypertension I10 401.9    4.  Mixed hyperlipidemia E78.2 272.2    5. Gout, unspecified cause, unspecified chronicity, unspecified site M10.9 274.9    6. Routine general medical examination at a health care facility Z00.00 V70.0    .

## 2017-06-15 NOTE — PROGRESS NOTES
[address] KEVIN Miller 2nd 30 day f/u:  CHF:  Transition to Complex Case Management. Reviewed red flags for CHF (see below) and patient understands when to seek medical attention from PCP. Red Flags:  Weight gain, Shortness of breath, Shortness of breath when lying flat, Chest Pain, Swelling in Ankles or Feet, Abdominal Pain/Bloating, Feeling of Impending Doom    Wt 132.7 today. Minimal swelling of Rt ankle. Insect bite area somewhat remains as semi-reddened & somewhat hard. Denied medication compliance problems. Patient attended with nurse today for Annual [facility] visit. Late entry:  MD office note;    ASSESSMENT:  1. 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 Essential hypertension    4. Mixed hyperlipidemia    5. Gout, unspecified cause, unspecified chronicity, unspecified site     ASSESSMENT:  1. 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 Essential hypertension    4. Mixed hyperlipidemia    5. Gout, unspecified cause, unspecified chronicity, unspecified site     PLAN:     1. She appears to be well compensated from a cardiac perspective. There are no overt signs of congestive heart failure today. 2. Her blood pressure continues to be on the low end but it is indeed acceptable. The sitting pressures is about 90/70 and when she stands the systolic actually goes up to 100/75. This suggests that she is not having much in the way of reductio in intravascular problem with the increase in diuretic. 3. She will follow-up with Dr. Mk Leon regarding her lymphoma. 4. She will continue her Allopurinol for her gout which has not flared up of late. 5. She will also continue her statin in view of her increased cardiovascular risk.    PLAN:  6.  She appears to be well compensated from a cardiac perspective. There are no overt signs of congestive heart failure today. 7. Her blood pressure continues to be on the low end but it is indeed acceptable. The sitting pressures is about 90/70 and when she stands the systolic actually goes up to 100/75. This suggests that she is not having much in the way of reductio in intravascular problem with the increase in diuretic. 8. She will follow-up with Dr. Maggie Gomez regarding her lymphoma. 9. She will continue her Allopurinol for her gout which has not flared up of late. 10.  She will also continue her statin in view of her increased cardiovascular risk.

## 2017-06-15 NOTE — MR AVS SNAPSHOT
Visit Information Date & Time Provider Department Dept. Phone Encounter #  
 6/15/2017 10:45 AM Heather Posadas [facility] Sports Medicine and [address] [id number] Your Appointments 7/11/2017 11:00 AM  
Any with Brigette Aguiar MD  
[address] [place] [address] and Primary Care [address]) Appt Note: 3 month f/u  
 [address] [facility]  
  
   
 [address] [id number]  
  
    
 8/14/2017 10:00 AM  
Follow Up with José Miguel Bran MD  
[address] ([address]) [facility] [address]  
[phone]  
  
   
 [address] [place] [address]  
  
    
 9/5/2017 10:00 AM  
ESTABLISHED PATIENT with Federico Graham MD  
[facility]) Appt Note: 3 MO. F/U; 23 month f/u  
 [address]  
[phone] [address]  
  
    
  
 7/26/2017  8:00 AM  
REMOTE OFFICE VISIT with [facility] [address] [facility]) Appt Note: NOT AN OFFICE VISIT - BSC BIVICD REMOTE SEND  
 [address] [facility]  
[phone] [address] [place] [address] Upcoming Health Maintenance Date Due INFLUENZA AGE 9 TO ADULT 8/1/2017 GLAUCOMA SCREENING Q2Y 12/18/2017 MEDICARE YEARLY EXAM 4/28/2018 DTaP/Tdap/Td series (2 - Td) 2/13/2027 Allergies as of 6/15/2017  Review Complete On: 6/15/2017 By: Farhan Xie Severity Noted Reaction Type Reactions Codeine  05/21/2012   Side Effect Other (comments) \"made me disoriented\" per pt Current Immunizations  Reviewed on 6/29/2015 Name Date Influenza Vaccine 10/1/2016, 10/4/2014 Influenza Vaccine Split 10/22/2011 Pneumococcal Vaccine (Unspecified Type) 5/22/2007 Not reviewed this visit Vitals BP Pulse Temp Resp Height(growth percentile) Weight(growth percentile) 99/63 (BP 1 Location: Right arm, BP Patient Position: Sitting) 74 97.7 °F (36.5 °C) (Oral) 16 5' 2\" (1.575 m) 135 lb 4.8 oz (61.4 kg) SpO2 BMI OB Status Smoking Status 92% 24.75 kg/m2 Postmenopausal Never Smoker Vitals History BMI and BSA Data Body Mass Index Body Surface Area 24.75 kg/m 2 1.64 m 2 Preferred Pharmacy Pharmacy Name Phone [facility] [address] [phone] Your Updated Medication List  
  
   
This list is accurate as of: 6/15/17 12:35 PM.  Always use your most recent med list.  
  
  
  
  
 albuterol 2.5 mg /3 mL (0.083 %) nebulizer solution Commonly known as:  PROVENTIL VENTOLIN  
3 mL by Nebulization route every four (4) hours as needed for Wheezing. allopurinol 300 mg tablet Commonly known as:  ZYLOPRIM  
TAKE ONE (1) TABLET(S) DAILY  
  
 apixaban 2.5 mg tablet Commonly known as:  Mason Purchase Take 1 Tab by mouth two (2) times a day. Indications: PREVENT THROMBOEMBOLISM IN CHRONIC ATRIAL FIBRILLATION  
  
 atorvastatin 10 mg tablet Commonly known as:  LIPITOR  
TAKE ONE (1) TABLET(S) DAILY  
  
 carvedilol 3.125 mg tablet Commonly known as:  COREG  
TAKE ONE (1) TABLET(S) TWIC E DAILY WITH FOOD  Indications: Chronic Heart Failure  
  
 cephALEXin 250 mg capsule Commonly known as:  Colby Wick Take 500 mg by mouth three (3) times daily. FOR 7 DAYS  
  
 co-enzyme Q-10 100 mg capsule Commonly known as:  CO Q-10 Take 100 mg by mouth daily. fluticasone-salmeterol 250-50 mcg/dose diskus inhaler Commonly known as:  ADVAIR Take 1 Puff by inhalation two (2) times a day. furosemide 20 mg tablet Commonly known as:  LASIX Take 1 Tab by mouth daily. IMBRUVICA 140 mg capsule Generic drug:  ibrutinib Take 420 mg by mouth five (5) days a week. 140 mg cap, takes 3 caps five days a week (none on Sunday or Wednesday)  
  
 loratadine 10 mg tablet Commonly known as:  [place] Courts Take 10 mg by mouth daily as needed. magnesium oxide 400 mg Cap Take 1 Cap by mouth daily. multivitamin tablet Commonly known as:  ONE A DAY Take 1 Tab by mouth daily. OXYGEN-AIR DELIVERY SYSTEMS  
2 L by Does Not Apply route nightly. potassium chloride SR 10 mEq tablet Commonly known as:  KLOR-CON 10 Take 1 Tab by mouth daily. sacubitril-valsartan 49-51 mg Tab tablet Commonly known as:  ENTRESTO Take 1 Tab by mouth two (2) times a day. TYLENOL EXTRA STRENGTH 500 mg tablet Generic drug:  acetaminophen Take 500 mg by mouth every six (6) hours as needed for Pain. VITAMIN D3 1,000 unit Cap Generic drug:  cholecalciferol Take 1,000 Units by mouth daily. To-Do List   
 08/08/2017 10:30 AM  
  Appointment with [facility] ECHO MACHINE; EKG HOLTER ROOM [facility] at [facility] NON-INVASIVE [address] [place] [address] ([phone]) Please be prepared to remove everything from the waist up and put on a gown. Introducing [facility]! Dear Li Edge: Thank you for requesting a [employer] account. Our records indicate that you already have an active [employer] account. You can access your account anytime at [email or web address] Did you know that you can access your hospital and ER discharge instructions at any time in [employer]? You can also review all of your test results from your hospital stay or ER visit. Additional Information If you have questions, please visit the Frequently Asked Questions section of the [employer] website at [email or web address] Remember, [employer] is NOT to be used for urgent needs. For medical emergencies, dial 911. Now available from your iPhone and Android! Please provide this summary of care documentation to your next provider. Your primary care clinician is listed as Gail Dey. If you have any questions after today's visit, please call [phone].

## 2017-06-15 NOTE — PROGRESS NOTES
Chief Complaint   Patient presents with   [facility] Annual Wellness Visit     SPIDER BITE AND VISIT WITH DR. ARGUELLO, AND CHECK GUMS ON LEFT SIDE   1. Have you been to the ER, urgent care clinic since your last visit? Hospitalized since your last visit? No    2. Have you seen or consulted any other health care providers outside of the [address] [place] [address] since your last visit? Include any pap smears or colon screening.  No

## 2017-06-21 ENCOUNTER — HOSPITAL ENCOUNTER (OUTPATIENT)
Dept: LAB | Age: 82
Discharge: HOME | End: 2017-06-21
Payer: MEDICARE

## 2017-06-21 PROCEDURE — 80048 BASIC METABOLIC PNL TOTAL CA: CPT

## 2017-06-21 PROCEDURE — 36415 COLL VENOUS BLD VENIPUNCTURE: CPT

## 2017-06-22 ENCOUNTER — PATIENT OUTREACH (OUTPATIENT)
Dept: CARDIOLOGY CLINIC | Age: 82
End: 2017-06-22

## 2017-06-22 VITALS — BODY MASS INDEX: 24.31 KG/M2 | WEIGHT: 132.9 LBS

## 2017-06-22 LAB
BUN SERPL-MCNC: 23 MG/DL (ref 10–36)
BUN/CREAT SERPL: 19 (ref 12–28)
CALCIUM SERPL-MCNC: 9 MG/DL (ref 8.7–10.3)
CHLORIDE SERPL-SCNC: 101 MMOL/L (ref 96–106)
CO2 SERPL-SCNC: 26 MMOL/L (ref 18–29)
CREAT SERPL-MCNC: 1.22 MG/DL (ref 0.57–1)
GLUCOSE SERPL-MCNC: 89 MG/DL (ref 65–99)
POTASSIUM SERPL-SCNC: 4.4 MMOL/L (ref 3.5–5.2)
SODIUM SERPL-SCNC: 141 MMOL/L (ref 134–144)

## 2017-06-22 NOTE — PROGRESS NOTES
Spoke to patient in regards to weekly weight check. Patient states she is doing well, currently happy has friends over and feeling good. Patient states she is wearing her compressions stockings as ordered. Patient denies any edema in hands, feet and ankles and shortness of breath. Patient weight today 132.9lb down from yesterday 133.3lb    Will continue to monitor weekly,  Gave patient my name and number and advised to call if any questions or concerns.

## 2017-06-23 ENCOUNTER — TELEPHONE (OUTPATIENT)
Dept: INTERNAL MEDICINE CLINIC | Age: 82
End: 2017-06-23

## 2017-06-23 NOTE — TELEPHONE ENCOUNTER
PATIENT CAREGIVER CALLED STATING PATIENT HAD GAINED 2LBS . PER DR. MATA PATIENT CAN HAVE 1 XTRA DOSE OF FUROSEMIDE 20MG  TODAY ONLY.

## 2017-06-27 ENCOUNTER — PATIENT OUTREACH (OUTPATIENT)
Dept: INTERNAL MEDICINE CLINIC | Age: 82
End: 2017-06-27

## 2017-06-27 ENCOUNTER — OFFICE VISIT (OUTPATIENT)
Dept: INTERNAL MEDICINE CLINIC | Age: 82
End: 2017-06-27

## 2017-06-27 VITALS
WEIGHT: 136.5 LBS | SYSTOLIC BLOOD PRESSURE: 106 MMHG | BODY MASS INDEX: 25.12 KG/M2 | TEMPERATURE: 97.5 F | HEIGHT: 62 IN | RESPIRATION RATE: 14 BRPM | HEART RATE: 72 BPM | DIASTOLIC BLOOD PRESSURE: 68 MMHG | OXYGEN SATURATION: 93 %

## 2017-06-27 DIAGNOSIS — I50.43 ACUTE ON CHRONIC COMBINED SYSTOLIC AND DIASTOLIC CONGESTIVE HEART FAILURE (HCC): Primary | ICD-10-CM

## 2017-06-27 DIAGNOSIS — I42.0 CARDIOMYOPATHY, DILATED, NONISCHEMIC (HCC): Chronic | ICD-10-CM

## 2017-06-27 DIAGNOSIS — C85.91 NON-HODGKIN LYMPHOMA OF LYMPH NODES OF NECK, UNSPECIFIED NON-HODGKIN LYMPHOMA TYPE (HCC): ICD-10-CM

## 2017-06-27 DIAGNOSIS — E78.2 MIXED HYPERLIPIDEMIA: ICD-10-CM

## 2017-06-27 DIAGNOSIS — I10 ESSENTIAL HYPERTENSION: ICD-10-CM

## 2017-06-27 NOTE — PROGRESS NOTES
Chief Complaint   Patient presents with    Shortness of Breath     patient states that she had a hard time breathing this morning when she woke up. she also states that she has had to use her nebulizer this morning. patient states that she has had a cough for some time now. 1. Have you been to the ER, urgent care clinic since your last visit? Hospitalized since your last visit? No    2. Have you seen or consulted any other health care providers outside of the [address] [place] [address] since your last visit? Include any pap smears or colon screening.  No

## 2017-06-27 NOTE — PROGRESS NOTES
[address] [facility]  [place] [address]  [address]  [place] [address]  Phone:  [phone]  Fax: [phone]       Chief Complaint   Patient presents with    Shortness of Breath     patient states that she had a hard time breathing this morning when she woke up. she also states that she has had to use her nebulizer this morning. patient states that she has had a cough for some time now. .      SUBJECTIVE:    Allen Jarrett is a 80 y.o. female comes in for return visit because her weight increased an additional two pounds from last week. She has noted in the last 24 hours a bit more shortness of breath. She has a nonischemic congestive cardiomyopathy. She has had episodic atrial fibrillation and as a result is taking Apixaban. Her blood pressure was formally a problem but now it has become much less of a problem because of her low output state. She remains on her statin as prescribed in view of her increased cardiovascular risk. Current Outpatient Prescriptions   Medication Sig Dispense Refill    cephALEXin (KEFLEX) 250 mg capsule Take 500 mg by mouth three (3) times daily. FOR 7 DAYS      apixaban (ELIQUIS) 2.5 mg tablet Take 1 Tab by mouth two (2) times a day. Indications: PREVENT THROMBOEMBOLISM IN CHRONIC ATRIAL FIBRILLATION 180 Tab 3    magnesium oxide 400 mg cap Take 1 Cap by mouth daily.  cholecalciferol (VITAMIN D3) 1,000 unit cap Take 1,000 Units by mouth daily.  furosemide (LASIX) 20 mg tablet Take 1 Tab by mouth daily. (Patient taking differently: Take 40 mg by mouth two (2) times a day.) 30 Tab 11    potassium chloride SR (KLOR-CON 10) 10 mEq tablet Take 1 Tab by mouth daily. 30 Tab 11    sacubitril-valsartan (ENTRESTO) 49 mg/51 mg tablet Take 1 Tab by mouth two (2) times a day.  60 Tab 3    carvedilol (COREG) 3.125 mg tablet TAKE ONE (1) TABLET(S) TWIC E DAILY WITH FOOD  Indications: Chronic Heart Failure 180 Tab 3    albuterol (PROVENTIL VENTOLIN) 2.5 mg /3 mL (0.083 %) nebulizer solution 3 mL by Nebulization route every four (4) hours as needed for Wheezing. 100 Each 11    allopurinol (ZYLOPRIM) 300 mg tablet TAKE ONE (1) TABLET(S) DAILY 90 Tab 3    atorvastatin (LIPITOR) 10 mg tablet TAKE ONE (1) TABLET(S) DAILY 90 Tab 3    fluticasone-salmeterol (ADVAIR) 250-50 mcg/dose diskus inhaler Take 1 Puff by inhalation two (2) times a day. 3 Inhaler 3    loratadine (CLARITIN) 10 mg tablet Take 10 mg by mouth daily as needed.  co-enzyme Q-10 (CO Q-10) 100 mg capsule Take 100 mg by mouth daily.  OXYGEN-AIR DELIVERY SYSTEMS 2 L by Does Not Apply route nightly.  ibrutinib (IMBRUVICA) 140 mg cap Take 420 mg by mouth five (5) days a week. 140 mg cap, takes 3 caps five days a week (none on Sunday or Wednesday)      acetaminophen (TYLENOL EXTRA STRENGTH) 500 mg tablet Take 500 mg by mouth every six (6) hours as needed for Pain.  multivitamin (ONE A DAY) tablet Take 1 Tab by mouth daily.        Past Medical History:   Diagnosis Date    Asthma     Cancer ([facility]     lymphoma    Congestive heart failure, unspecified     Heart failure (HCC)     Hypertension     Other ill-defined conditions     heart murmur    Presence of biventricular automatic cardioverter/defibrillator (AICD) 7/2/2015    [employer] [place] biventricular AICD implant    Stomach ulcer      Past Surgical History:   Procedure Laterality Date    HX BREAST BIOPSY Bilateral     40 years ago    HX COLONOSCOPY      HX HEENT      cataracts removed    HX HYSTERECTOMY      HX OTHER SURGICAL      lymph node surgery    HX TONSILLECTOMY      WI EGD TRANSORAL BIOPSY SINGLE/MULTIPLE  5/23/2012         SINUS SURGERY PROC UNLISTED      Nasal polyps removed     Allergies   Allergen Reactions    Codeine Other (comments)     \"made me disoriented\" per pt         REVIEW OF SYSTEMS:  General: negative for - chills or fever  ENT: negative for - headaches, nasal congestion or tinnitus  Respiratory: negative for - cough, hemoptysis, shortness of breath or wheezing  Cardiovascular : negative for - chest pain, edema, palpitations or shortness of breath  Gastrointestinal: negative for - abdominal pain, blood in stools, heartburn or nausea/vomiting  Genito-Urinary: no dysuria, trouble voiding, or hematuria  Musculoskeletal: negative for - gait disturbance, joint pain, joint stiffness or joint swelling  Neurological: no TIA or stroke symptoms  Hematologic: no bruises, no bleeding, no swollen glands  Integument: no lumps, mole changes, nail changes or rash  Endocrine: no malaise/lethargy or unexpected weight changes      Social History     Social History    Marital status:      Spouse name: N/A    Number of children: 1    Years of education: 16+     Occupational History    retired teacher      Social History Main Topics    Smoking status: Never Smoker    Smokeless tobacco: Never Used    Alcohol use No    Drug use: No    Sexual activity: No     Other Topics Concern    None     Social History Narrative     Family History   Problem Relation Age of Onset    Heart Disease Mother     Stroke Father        OBJECTIVE:    Visit Vitals    /68 (BP 1 Location: Left arm, BP Patient Position: Sitting)    Pulse 72    Temp 97.5 °F (36.4 °C) (Oral)    Resp 14    Ht 5' 2\" (1.575 m)    Wt 136 lb 8 oz (61.9 kg)    SpO2 93%    BMI 24.97 kg/m2     CONSTITUTIONAL: well , well nourished, appears age appropriate  EYES: perrla, eom intact  ENMT:moist mucous membranes, pharynx clear  NECK: supple. Thyroid normal, increased JVD to angle of jaw  RESPIRATORY: Chest: Bibasilar rales  CARDIOVASCULAR: Heart: regular rate and rhythm  GASTROINTESTINAL: Abdomen: soft, bowel sounds active  HEMATOLOGIC: no pathological lymph nodes palpated  MUSCULOSKELETAL: Extremities: no edema, pulse 1+   INTEGUMENT: No unusual rashes or suspicious skin lesions noted.  Nails appear normal.  NEUROLOGIC: non-focal exam   MENTAL STATUS: alert and oriented, appropriate affect      ASSESSMENT:  1. Acute on chronic combined systolic and diastolic congestive heart failure (HCC)    2. 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 Essential hypertension    5. Mixed hyperlipidemia        PLAN:    1. The patient is indeed in heart failure today. I will increase her Furosemide to 40 mg b.i.d. She will return to the office in the next two weeks. 2. Blood pressure today is 100/60 sitting, and standing 95/70. This is excellent under the circumstances, particularly in the context of her low output state. 3. She is not wheezing at this point. She has access to her aerosolized bronchodilators if need be. 4. Her CLL is quite stable. She sees Dr. Jemima Gonzalez for this. 5. Finally, she remains on her statin in view of her increased cardiovascular risk. Follow-up Disposition:  Return in about 2 weeks (around 7/11/2017).       Ria Martins MD

## 2017-06-27 NOTE — MR AVS SNAPSHOT
Visit Information Date & Time Provider Department Dept. Phone Encounter #  
 6/27/2017  2:30 PM Jose Tran MD [facility] [phone] [id number] Your Appointments 8/14/2017 10:00 AM  
Follow Up with Braden Ayon MD  
[address] [facility] ([address]) [facility] [address] [facility] [address] [address]  
[phone]  
  
   
 [address] [place] [address]  
  
    
 8/22/2017 11:45 AM  
Any with Jose Tran MD  
[address] [place] [address] and Primary Care [address]) Appt Note: 2 month f/u  
 [address] [facility]  
  
   
 [address] [id number]  
  
    
 9/5/2017 10:00 AM  
ESTABLISHED PATIENT with MD [facility]) Appt Note: 3 MO. F/U; 23 month f/u  
 [address]  
[phone] [address] [place] [address]  
  
    
  
 7/26/2017  8:00 AM  
REMOTE OFFICE VISIT with [facility] [address]) Appt Note: NOT AN OFFICE VISIT - BSC BIVICD REMOTE SEND  
 [address] [facility]  
[phone] [address] [facility] Upcoming Health Maintenance Date Due INFLUENZA AGE 9 TO ADULT 8/1/2017 GLAUCOMA SCREENING Q2Y 12/18/2017 MEDICARE YEARLY EXAM 6/16/2018 DTaP/Tdap/Td series (2 - Td) 2/13/2027 Allergies as of 6/27/2017  Review Complete On: 6/27/2017 By: Glenn Tineo Severity Noted Reaction Type Reactions Codeine  05/21/2012   Side Effect Other (comments) \"made me disoriented\" per pt Current Immunizations  Reviewed on 6/29/2015 Name Date Influenza Vaccine 10/1/2016, 10/4/2014 Influenza Vaccine Split 10/22/2011 Pneumococcal Vaccine (Unspecified Type) 5/22/2007 Not reviewed this visit Vitals BP Pulse Temp Resp Height(growth percentile) Weight(growth percentile) 106/68 (BP 1 Location: Left arm, BP Patient Position: Sitting) 72 97.5 °F (36.4 °C) (Oral) 14 5' 2\" (1.575 m) 136 lb 8 oz (61.9 kg) SpO2 BMI OB Status Smoking Status 93% 24.97 kg/m2 Postmenopausal Never Smoker BMI and BSA Data Body Mass Index Body Surface Area 24.97 kg/m 2 1.65 m 2 Preferred Pharmacy Pharmacy Name Phone [address] [place] [phone] Your Updated Medication List  
  
   
This list is accurate as of: 6/27/17  3:22 PM.  Always use your most recent med list.  
  
  
  
  
 albuterol 2.5 mg /3 mL (0.083 %) nebulizer solution Commonly known as:  PROVENTIL VENTOLIN  
3 mL by Nebulization route every four (4) hours as needed for Wheezing. allopurinol 300 mg tablet Commonly known as:  ZYLOPRIM  
TAKE ONE (1) TABLET(S) DAILY  
  
 apixaban 2.5 mg tablet Commonly known as:  Loras Kail Take 1 Tab by mouth two (2) times a day. Indications: PREVENT THROMBOEMBOLISM IN CHRONIC ATRIAL FIBRILLATION  
  
 atorvastatin 10 mg tablet Commonly known as:  LIPITOR  
TAKE ONE (1) TABLET(S) DAILY  
  
 carvedilol 3.125 mg tablet Commonly known as:  COREG  
TAKE ONE (1) TABLET(S) TWIC E DAILY WITH FOOD  Indications: Chronic Heart Failure  
  
 cephALEXin 250 mg capsule Commonly known as:  Darletta Duran Take 500 mg by mouth three (3) times daily. FOR 7 DAYS  
  
 co-enzyme Q-10 100 mg capsule Commonly known as:  CO Q-10 Take 100 mg by mouth daily. fluticasone-salmeterol 250-50 mcg/dose diskus inhaler Commonly known as:  ADVAIR Take 1 Puff by inhalation two (2) times a day. furosemide 20 mg tablet Commonly known as:  LASIX Take 1 Tab by mouth daily. IMBRUVICA 140 mg capsule Generic drug:  ibrutinib Take 420 mg by mouth five (5) days a week. 140 mg cap, takes 3 caps five days a week (none on Sunday or Wednesday)  
  
 loratadine 10 mg tablet Commonly known as:  Eston Deacon Take 10 mg by mouth daily as needed. magnesium oxide 400 mg Cap Take 1 Cap by mouth daily. multivitamin tablet Commonly known as:  ONE A DAY Take 1 Tab by mouth daily. OXYGEN-AIR DELIVERY SYSTEMS  
2 L by Does Not Apply route nightly. potassium chloride SR 10 mEq tablet Commonly known as:  KLOR-CON 10 Take 1 Tab by mouth daily. sacubitril-valsartan 49-51 mg Tab tablet Commonly known as:  ENTRESTO Take 1 Tab by mouth two (2) times a day. TYLENOL EXTRA STRENGTH 500 mg tablet Generic drug:  acetaminophen Take 500 mg by mouth every six (6) hours as needed for Pain. VITAMIN D3 1,000 unit Cap Generic drug:  cholecalciferol Take 1,000 Units by mouth daily. To-Do List   
 08/08/2017 10:30 AM  
  Appointment with [facility] ECHO MACHINE; EKG HOLTER ROOM [facility] at [facility] NON-INVASIVE [address] [place] [address] ([phone]) Please be prepared to remove everything from the waist up and put on a gown. Introducing [facility] & HEALTH SERVICES! Dear Katina Kimball: Thank you for requesting a [employer] account. Our records indicate that you already have an active [employer] account. You can access your account anytime at [email or web address] Did you know that you can access your hospital and ER discharge instructions at any time in [employer]? You can also review all of your test results from your hospital stay or ER visit. Additional Information If you have questions, please visit the Frequently Asked Questions section of the [employer] website at [email or web address] Remember, [employer] is NOT to be used for urgent needs. For medical emergencies, dial 911. Now available from your iPhone and Android! Please provide this summary of care documentation to your next provider. Your primary care clinician is listed as Gail Dey. If you have any questions after today's visit, please call [phone].

## 2017-06-27 NOTE — PROGRESS NOTES
NN TOCIP:  F/u:  Office Appointment    Acute on chronic combined systolic and diastolic congestive heart failure     Shortness of Breath        patient states that she had a hard time breathing this morning when she woke up. she also states that she has had to use her nebulizer this morning. patient states that she has had a cough for some time now. /68 (BP 1 Location: Left arm, BP Patient Position: Sitting)     Pulse 72    Temp 97.5 °F (36.4 °C) (Oral)    Resp 14    Ht 5' 2\" (1.575 m)    Wt 136 lb 8 oz (61.9 kg)    SpO2 93%     Plan:  Lasix dose increased to whole tab; patient had been taking 1/2 tab. Encouraged to drink approved HF amount of waterr.

## 2017-07-07 ENCOUNTER — PATIENT OUTREACH (OUTPATIENT)
Dept: INTERNAL MEDICINE CLINIC | Age: 82
End: 2017-07-07

## 2017-07-07 NOTE — PROGRESS NOTES
NNTOCED HF Call F/U    NN contacted Patient via telephone as f/u Medications. Patient stated she is feeling wonderful today-in the process of planning [employer] at home on 7/19/2017--    Patient stated currently:    Lasix taking 2 20mg tabs BID  KCL SR  10mg daily    Wts:  128.2 today  128.4  yesterday   129.9  Wed. BP: 106/68    Denies having to go to [facility] frequently. Denies excess Na+ intake. States she agrees and will look for the call for Cardiologist to bring her back in - consult with HF Team.  . Upcoming Appts:     Echocardiogram 8/8   Cardiologist  9/5; stated  Dr. Jai Rey told her he had to bring her back  regarding difficulty breathing due to 9/5 being too far out; so scheduled for  appt for 7/11-to ask Dr. Jai Rey to get labs on 7/11/2017. Cardiologist agreed to bring back in BP/BNP to avoid ANDREW. Aug 26-remote scheduled. Will continue to f/u.

## 2017-07-11 ENCOUNTER — OFFICE VISIT (OUTPATIENT)
Dept: INTERNAL MEDICINE CLINIC | Age: 82
End: 2017-07-11

## 2017-07-11 VITALS
TEMPERATURE: 97.5 F | WEIGHT: 129 LBS | DIASTOLIC BLOOD PRESSURE: 60 MMHG | RESPIRATION RATE: 16 BRPM | SYSTOLIC BLOOD PRESSURE: 92 MMHG | OXYGEN SATURATION: 96 % | BODY MASS INDEX: 23.74 KG/M2 | HEIGHT: 62 IN | HEART RATE: 71 BPM

## 2017-07-11 DIAGNOSIS — I95.9 HYPOTENSION, UNSPECIFIED HYPOTENSION TYPE: ICD-10-CM

## 2017-07-11 DIAGNOSIS — I50.22 CHRONIC SYSTOLIC (CONGESTIVE) HEART FAILURE (HCC): ICD-10-CM

## 2017-07-11 DIAGNOSIS — I42.0 CARDIOMYOPATHY, DILATED, NONISCHEMIC (HCC): Primary | Chronic | ICD-10-CM

## 2017-07-11 DIAGNOSIS — C85.91 NON-HODGKIN LYMPHOMA OF LYMPH NODES OF NECK, UNSPECIFIED NON-HODGKIN LYMPHOMA TYPE (HCC): ICD-10-CM

## 2017-07-11 NOTE — PROGRESS NOTES
Chief Complaint   Patient presents with    CHF     2 week follow up      1. Have you been to the ER, urgent care clinic since your last visit? Hospitalized since your last visit? No    2. Have you seen or consulted any other health care providers outside of the [address] [place] [address] since your last visit? Include any pap smears or colon screening.  No

## 2017-07-11 NOTE — MR AVS SNAPSHOT
Visit Information Date & Time Provider Department Dept. Phone Encounter #  
 7/11/2017 10:45 AM Heather Franco 80 [facility] [phone] [id number] Your Appointments 8/14/2017 10:00 AM  
Follow Up with Hector Li MD  
[address] ([facility]) [facility] [address] [facility] [address]  
[phone]  
  
   
 [address] [address]  
  
    
 8/22/2017 11:45 AM  
Any with Angel Retana MD  
[address] [facility] and Primary Care [facility]) Appt Note: 2 month f/u  
 [address] [facility] [place]  
  
   
 [address] [phone]  
  
    
 9/5/2017 10:00 AM  
ESTABLISHED PATIENT with Krysta Garcia MD  
[address] [facility]) Appt Note: 3 MO. F/U; 23 month f/u  
 [address] [place] [address]  
[phone] [address] [place] [address]  
  
    
  
 7/26/2017  8:00 AM  
REMOTE OFFICE VISIT with [facility]) Appt Note: NOT AN OFFICE VISIT - BSC BIVD REMOTE SEND  
 [address] [facility]  
[phone] [address] [facility] Upcoming Health Maintenance Date Due INFLUENZA AGE 9 TO ADULT 8/1/2017 GLAUCOMA SCREENING Q2Y 12/18/2017 MEDICARE YEARLY EXAM 6/16/2018 DTaP/Tdap/Td series (2 - Td) 2/13/2027 Allergies as of 7/11/2017  Review Complete On: 7/4/2017 By: Angel Retana MD  
  
 Severity Noted Reaction Type Reactions Codeine  05/21/2012   Side Effect Other (comments) \"made me disoriented\" per pt Current Immunizations  Reviewed on 6/29/2015 Name Date Influenza Vaccine 10/1/2016, 10/4/2014 Influenza Vaccine Split 10/22/2011 Pneumococcal Vaccine (Unspecified Type) 5/22/2007 Not reviewed this visit Vitals BP Pulse Temp Resp Height(growth percentile) Weight(growth percentile) 92/60 (BP 1 Location: Right arm, BP Patient Position: Sitting) 71 97.5 °F (36.4 °C) (Oral) 16 5' 2\" (1.575 m) 129 lb (58.5 kg) SpO2 BMI OB Status Smoking Status 96% 23.59 kg/m2 Postmenopausal Never Smoker Vitals History BMI and BSA Data Body Mass Index Body Surface Area  
 23.59 kg/m 2 1.6 m 2 Preferred Pharmacy Pharmacy Name Phone [address] [facility] [address] Your Updated Medication List  
  
   
This list is accurate as of: 7/11/17  1:18 PM.  Always use your most recent med list.  
  
  
  
  
 albuterol 2.5 mg /3 mL (0.083 %) nebulizer solution Commonly known as:  PROVENTIL VENTOLIN  
3 mL by Nebulization route every four (4) hours as needed for Wheezing. allopurinol 300 mg tablet Commonly known as:  ZYLOPRIM  
TAKE ONE (1) TABLET(S) DAILY  
  
 apixaban 2.5 mg tablet Commonly known as:  Jacelyn Familia Take 1 Tab by mouth two (2) times a day. Indications: PREVENT THROMBOEMBOLISM IN CHRONIC ATRIAL FIBRILLATION  
  
 atorvastatin 10 mg tablet Commonly known as:  LIPITOR  
TAKE ONE (1) TABLET(S) DAILY  
  
 carvedilol 3.125 mg tablet Commonly known as:  COREG  
TAKE ONE (1) TABLET(S) TWIC E DAILY WITH FOOD  Indications: Chronic Heart Failure  
  
 cephALEXin 250 mg capsule Commonly known as:  Evangelina Pucker Take 500 mg by mouth three (3) times daily. FOR 7 DAYS  
  
 co-enzyme Q-10 100 mg capsule Commonly known as:  CO Q-10 Take 100 mg by mouth daily. fluticasone-salmeterol 250-50 mcg/dose diskus inhaler Commonly known as:  ADVAIR Take 1 Puff by inhalation two (2) times a day. furosemide 20 mg tablet Commonly known as:  LASIX Take 1 Tab by mouth daily. IMBRUVICA 140 mg capsule Generic drug:  ibrutinib Take 420 mg by mouth five (5) days a week. 140 mg cap, takes 3 caps five days a week (none on Sunday or Wednesday)  
  
 loratadine 10 mg tablet Commonly known as:  Sharilyn Feil Take 10 mg by mouth daily as needed. magnesium oxide 400 mg Cap Take 1 Cap by mouth daily. multivitamin tablet Commonly known as:  ONE A DAY Take 1 Tab by mouth daily. OXYGEN-AIR DELIVERY SYSTEMS  
2 L by Does Not Apply route nightly. potassium chloride SR 10 mEq tablet Commonly known as:  KLOR-CON 10 Take 1 Tab by mouth daily. sacubitril-valsartan 49-51 mg Tab tablet Commonly known as:  ENTRESTO Take 1 Tab by mouth two (2) times a day. TYLENOL EXTRA STRENGTH 500 mg tablet Generic drug:  acetaminophen Take 500 mg by mouth every six (6) hours as needed for Pain. VITAMIN D3 1,000 unit Cap Generic drug:  cholecalciferol Take 1,000 Units by mouth daily. To-Do List   
 08/08/2017 10:30 AM  
  Appointment with [facility] ECHO MACHINE; EKG HOLTER ROOM [facility] at [facility] NON-INVASIVE [address] [place] [address] ([phone]) Please be prepared to remove everything from the waist up and put on a gown. Introducing [facility] & HEALTH SERVICES! Dear Natty Rodriguez: Thank you for requesting a [employer] account. Our records indicate that you already have an active [employer] account. You can access your account anytime at [email or web address] Did you know that you can access your hospital and ER discharge instructions at any time in [employer]? You can also review all of your test results from your hospital stay or ER visit. Additional Information If you have questions, please visit the Frequently Asked Questions section of the [employer] website at [email or web address] Remember, [employer] is NOT to be used for urgent needs. For medical emergencies, dial 911. Now available from your iPhone and Android! Please provide this summary of care documentation to your next provider. Your primary care clinician is listed as Gail Dey. If you have any questions after today's visit, please call [phone].

## 2017-07-12 NOTE — PROGRESS NOTES
Chief Complaint   Patient presents with    CHF     2 week follow up    . SUBECTIVE:    Fortunato Sandoval is a 80 y.o. female comes in for return visit primarily for her congestive heart failure. Since the diuretic was increased she feels much better. She denies any dyspnea on exertion orthopnea , orthostatic dizziness or weakness. She is lost about 6-8 pounds since her last visit. Current Outpatient Prescriptions   Medication Sig Dispense Refill    cephALEXin (KEFLEX) 250 mg capsule Take 500 mg by mouth three (3) times daily. FOR 7 DAYS      apixaban (ELIQUIS) 2.5 mg tablet Take 1 Tab by mouth two (2) times a day. Indications: PREVENT THROMBOEMBOLISM IN CHRONIC ATRIAL FIBRILLATION 180 Tab 3    magnesium oxide 400 mg cap Take 1 Cap by mouth daily.  cholecalciferol (VITAMIN D3) 1,000 unit cap Take 1,000 Units by mouth daily.  furosemide (LASIX) 20 mg tablet Take 1 Tab by mouth daily. (Patient taking differently: Take 40 mg by mouth two (2) times a day.) 30 Tab 11    potassium chloride SR (KLOR-CON 10) 10 mEq tablet Take 1 Tab by mouth daily. 30 Tab 11    carvedilol (COREG) 3.125 mg tablet TAKE ONE (1) TABLET(S) TWIC E DAILY WITH FOOD  Indications: Chronic Heart Failure 180 Tab 3    albuterol (PROVENTIL VENTOLIN) 2.5 mg /3 mL (0.083 %) nebulizer solution 3 mL by Nebulization route every four (4) hours as needed for Wheezing. 100 Each 11    allopurinol (ZYLOPRIM) 300 mg tablet TAKE ONE (1) TABLET(S) DAILY 90 Tab 3    atorvastatin (LIPITOR) 10 mg tablet TAKE ONE (1) TABLET(S) DAILY 90 Tab 3    fluticasone-salmeterol (ADVAIR) 250-50 mcg/dose diskus inhaler Take 1 Puff by inhalation two (2) times a day. 3 Inhaler 3    loratadine (CLARITIN) 10 mg tablet Take 10 mg by mouth daily as needed.  co-enzyme Q-10 (CO Q-10) 100 mg capsule Take 100 mg by mouth daily.  OXYGEN-AIR DELIVERY SYSTEMS 2 L by Does Not Apply route nightly.       ibrutinib (IMBRUVICA) 140 mg cap Take 420 mg by mouth five (5) days a week. 140 mg cap, takes 3 caps five days a week (none on Sunday or Wednesday)      acetaminophen (TYLENOL EXTRA STRENGTH) 500 mg tablet Take 500 mg by mouth every six (6) hours as needed for Pain.  multivitamin (ONE A DAY) tablet Take 1 Tab by mouth daily.  sacubitril-valsartan (ENTRESTO) 49 mg/51 mg tablet Take 1 Tab by mouth two (2) times a day.  180 Tab 3     Past Medical History:   Diagnosis Date    Asthma     Cancer ([address]     lymphoma    Congestive heart failure, unspecified     Heart failure (HCC)     Hypertension     Other ill-defined conditions     heart murmur    Presence of biventricular automatic cardioverter/defibrillator (AICD) 7/2/2015    [employer] biventricular AICD implant    Stomach ulcer      Past Surgical History:   Procedure Laterality Date    HX BREAST BIOPSY Bilateral     40 years ago    HX COLONOSCOPY      HX HEENT      cataracts removed    HX HYSTERECTOMY      HX OTHER SURGICAL      lymph node surgery    HX TONSILLECTOMY      DC EGD TRANSORAL BIOPSY SINGLE/MULTIPLE  5/23/2012         SINUS SURGERY PROC UNLISTED      Nasal polyps removed     Allergies   Allergen Reactions    Codeine Other (comments)     \"made me disoriented\" per pt       REVIEW OF SYSTEMS:  Review of Systems - Negative except   ENT ROS: negative for - headaches, hearing change, nasal congestion, oral lesions, tinnitus, visual changes or   Respiratory ROS: no cough, shortness of breath, or wheezing  Cardiovascular ROS: no chest pain or dyspnea on exertion  Gastrointestinal ROS: no abdominal pain, change in bowel habits, or black or blood  Genito-Urinary ROS: no dysuria, trouble voiding, or hematuria  Musculoskeletal ROS: negative  Neurological ROS: no TIA or stroke symptoms      Social History     Social History    Marital status:      Spouse name: N/A    Number of children: 1    Years of education: 16+     Occupational History    retired teacher      Social History Main Topics    Smoking status: Never Smoker    Smokeless tobacco: Never Used    Alcohol use No    Drug use: No    Sexual activity: No     Other Topics Concern    None     Social History Narrative   r  Family History   Problem Relation Age of Onset    Heart Disease Mother     Stroke Father        OBJECTIVE:  Visit Vitals    BP 92/60 (BP 1 Location: Right arm, BP Patient Position: Sitting)    Pulse 71    Temp 97.5 °F (36.4 °C) (Oral)    Resp 16    Ht 5' 2\" (1.575 m)    Wt 129 lb (58.5 kg)    SpO2 96%    BMI 23.59 kg/m2     ENT: perrla,  eom intact  NECK: supple. Thyroid normal, no JVD  CHEST: clear to ascultation and percussion   HEART: regular rate and rhythm  ABD: soft, bowel sounds active,   EXTREMITIES: no edema, pulse 1+  INTEGUMENT: clear      ASSESSMENT:  1. 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 Chronic systolic (congestive) heart failure ([address]    3. 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 Hypotension, unspecified hypotension type        PLAN:  1. She is well compensated at this point. She has a low output state but is doing well with this. She continues to exercise on a regular basis. Her systolic pressure sitting was 85 with standing 90/70. BMP will be checked today. 2.  She will follow-up with Dr. Luz Nguyen in the next several weeks. 3.  She will also follow with Dr. 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 4.  Finally she plans to see her son in [place] in the next month. .    Follow-up Disposition:  Return in about 4 weeks (around 8/8/2017).       Mikal Glass MD

## 2017-07-13 LAB
BUN SERPL-MCNC: 40 MG/DL (ref 10–36)
BUN/CREAT SERPL: 36 (ref 12–28)
CALCIUM SERPL-MCNC: 9.2 MG/DL (ref 8.7–10.3)
CHLORIDE SERPL-SCNC: 101 MMOL/L (ref 96–106)
CO2 SERPL-SCNC: 26 MMOL/L (ref 18–29)
CREAT SERPL-MCNC: 1.12 MG/DL (ref 0.57–1)
GLUCOSE SERPL-MCNC: 78 MG/DL (ref 65–99)
POTASSIUM SERPL-SCNC: 4.4 MMOL/L (ref 3.5–5.2)
SODIUM SERPL-SCNC: 143 MMOL/L (ref 134–144)

## 2017-07-14 ENCOUNTER — PATIENT OUTREACH (OUTPATIENT)
Dept: INTERNAL MEDICINE CLINIC | Age: 82
End: 2017-07-14

## 2017-07-17 NOTE — PROGRESS NOTES
NN Complex Case Management-HF f/u  Patient seen during office visit. Note PCP assessment below. Ankle swell appears absent. Patient wearing [employer]style Hosiery. Patient states she feels well. Patient states she is working diligently in preparation for a [employer] on Wednesday, July 19 12-2. NN given a formal invitation from patient to attend. LOV:  80 y.o. female comes in for return visit primarily for her congestive heart failure. Since the diuretic was increased she feels much better. She denies any dyspnea on exertion orthopnea , orthostatic dizziness or weakness. She is lost about 6-8 pounds since her last visit. BP 92/60 (BP 1 Location: Right arm, BP Patient Position: Sitting)     Pulse 71    Temp 97.5 °F (36.4 °C) (Oral)    Resp 16    Ht 5' 2\" (1.575 m)    Wt 129 lb (58.5 kg)    SpO2 96%    BMI 23.59 kg/m2     ASSESSMENT:  1. 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 Chronic systolic (congestive) heart failure ([facility] [facility]    3. 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 Hypotension, unspecified hypotension type          PLAN:  1. She is well compensated at this point. She has a low output state but is doing well with this. She continues to exercise on a regular basis. Her systolic pressure sitting was 85 with standing 90/70. BMP will be checked today. 2.  She will follow-up with Dr. Kolby Tijerina in the next several weeks. 3.  She will also follow with Dr. 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 4.  Finally she plans to see her [place] in [place] in the next month. Zofia Brito

## 2017-07-17 NOTE — PROGRESS NOTES
NN Complex Case Management:  HF f/u    Heart Failure Patient presented: Wt:  129. Next PCP Appointment:  Aug 22, 2017. LOV:  July 11. Last Cardiology Office Visit:  6/6/2017. Cardiology NN to schedule office visit for Cardiologist.  K+/Kidney Function to be noted. Recommendation:  Seen q 2 weeks until stable in light of BUN 40 & Creat elev may indicate fluid overload. Recommendation:  Needs Entresto. Labs:    Results for Janeth Canales (MRN [id number]) as of 7/17/2017 15:12   Ref. Range 6/21/2017 11:45 7/12/2017 13:31   BUN Latest Ref Range: 10 - 36 mg/dL 23 40 (H)   Creatinine Latest Ref Range: 0.57 - 1.00 mg/dL 1.22 (H) 1.12 (H)   BUN/Creatinine ratio Latest Ref Range: 12 - 28  19 36 (H)   Calcium Latest Ref Range: 8.7 - 10.3 mg/dL 9.0 9.2   GFR est non-AA Latest Ref Range: >59 mL/min/1.73 39 (L) 43 (L)   GFR est AA Latest Ref Range: >59 mL/min/1.73 44 (L) 49 (L)     Patient to be released off HF Bundle 7/22/2017. NN will continue to f/u.

## 2017-07-19 ENCOUNTER — PATIENT OUTREACH (OUTPATIENT)
Dept: INTERNAL MEDICINE CLINIC | Age: 82
End: 2017-07-19

## 2017-07-20 NOTE — PROGRESS NOTES
At Home Visit done with patient. Breakfast-very small servings-egg, carrot, mandarin oranges & water-no-low Na+ intake. .    Patient meticulous in pill taking with 1 glass of water with breakfast.      Ankles free from edema; patient wearing [employer] style hosery. Denied JENKINS, SOB, dizziness, etc.  Patient sitting frequently, though very active today in preparation for 3 different groups for luncheons including [employer] lasting into the evening.  [employer] present & assisting with preparations but left at 12:30. Wt 127.6  (-2.4 lbs from LOV 7/11/2017)  /56  6:00PM Call f/u done with patient; stated she is fine. Denied SOB, JENKINS, etc.  Advised to go to bed early tonight after such a long day entertaining. NN w/ f/u in AM.   NN to f/u with Cardiology Nurse Appt.

## 2017-07-21 ENCOUNTER — PATIENT OUTREACH (OUTPATIENT)
Dept: INTERNAL MEDICINE CLINIC | Age: 82
End: 2017-07-21

## 2017-07-21 ENCOUNTER — TELEPHONE (OUTPATIENT)
Dept: CARDIOLOGY CLINIC | Age: 82
End: 2017-07-21

## 2017-07-21 NOTE — TELEPHONE ENCOUNTER
Faby from [address] [place] [address] called regarding patient. Her family will be taking her on vacation for six weeks. Faby would like patient to be seen earlier prior to August 2nd.

## 2017-07-23 NOTE — PROGRESS NOTES
See me ASAP regarding prerenal azotemia.---------- tell patient to continue the furosemide 1 tablet every morning and a half a tablet in the evening

## 2017-07-24 ENCOUNTER — TELEPHONE (OUTPATIENT)
Dept: INTERNAL MEDICINE CLINIC | Age: 82
End: 2017-07-24

## 2017-07-24 NOTE — TELEPHONE ENCOUNTER
Patient daughter n law notified by phone and ask to have her take furosemide 20mg 1 tab qam and 1/2 tab qhs.she is ask to increase her fluid intake. She states patient will be going to [facility] until September.

## 2017-07-26 ENCOUNTER — OFFICE VISIT (OUTPATIENT)
Dept: CARDIOLOGY CLINIC | Age: 82
End: 2017-07-26

## 2017-07-26 DIAGNOSIS — I44.7 LBBB (LEFT BUNDLE BRANCH BLOCK): ICD-10-CM

## 2017-07-26 DIAGNOSIS — I50.23 SYSTOLIC CHF, ACUTE ON CHRONIC (HCC): ICD-10-CM

## 2017-07-26 DIAGNOSIS — I42.9 CARDIOMYOPATHY, UNSPECIFIED TYPE (HCC): ICD-10-CM

## 2017-07-26 DIAGNOSIS — Z95.810 PRESENCE OF BIVENTRICULAR AUTOMATIC CARDIOVERTER/DEFIBRILLATOR (AICD): Primary | ICD-10-CM

## 2017-07-28 ENCOUNTER — HOSPITAL ENCOUNTER (OUTPATIENT)
Dept: NON INVASIVE DIAGNOSTICS | Age: 82
Discharge: HOME | End: 2017-07-28
Attending: PHYSICIAN ASSISTANT
Payer: MEDICARE

## 2017-07-28 ENCOUNTER — PATIENT OUTREACH (OUTPATIENT)
Dept: INTERNAL MEDICINE CLINIC | Age: 82
End: 2017-07-28

## 2017-07-28 ENCOUNTER — TELEPHONE (OUTPATIENT)
Dept: CARDIOLOGY CLINIC | Age: 82
End: 2017-07-28

## 2017-07-28 DIAGNOSIS — I10 ESSENTIAL HYPERTENSION: ICD-10-CM

## 2017-07-28 DIAGNOSIS — I44.7 LBBB (LEFT BUNDLE BRANCH BLOCK): ICD-10-CM

## 2017-07-28 DIAGNOSIS — I50.22 CHRONIC SYSTOLIC (CONGESTIVE) HEART FAILURE (HCC): ICD-10-CM

## 2017-07-28 DIAGNOSIS — E78.2 MIXED HYPERLIPIDEMIA: ICD-10-CM

## 2017-07-28 DIAGNOSIS — I42.0 CARDIOMYOPATHY, DILATED, NONISCHEMIC (HCC): ICD-10-CM

## 2017-07-28 PROCEDURE — 93306 TTE W/DOPPLER COMPLETE: CPT

## 2017-07-28 NOTE — TELEPHONE ENCOUNTER
Telephone Call RE:  Appointment reminder     Outcome:     [x] Patient confirmed appointment   [] Patient rescheduled appointment for    [] Unable to reach   [] Left message              [] Other:     Patient might not be able to make this appointment.   She will call us on Monday either way      Sanjay Quiroz

## 2017-08-04 ENCOUNTER — TELEPHONE (OUTPATIENT)
Dept: CARDIOLOGY CLINIC | Age: 82
End: 2017-08-04

## 2017-08-04 NOTE — TELEPHONE ENCOUNTER
Made an attempt to contact patient regarding Echo results, but was unable to reach her. Left message for patient to contact office to discuss.

## 2017-08-08 ENCOUNTER — TELEPHONE (OUTPATIENT)
Dept: CARDIOLOGY CLINIC | Age: 82
End: 2017-08-08

## 2017-08-08 NOTE — TELEPHONE ENCOUNTER
Made a second attempt to contact patient regarding Echo., results but was unable to reach her. Left message for patient to contact office to discuss.

## 2017-09-13 ENCOUNTER — TELEPHONE (OUTPATIENT)
Dept: CARDIOLOGY CLINIC | Age: 82
End: 2017-09-13

## 2017-09-13 NOTE — TELEPHONE ENCOUNTER
Rescheduled patient's appointment per [facility] . Appointment is now scheduled for Tuesday October 10th.

## 2017-09-18 ENCOUNTER — TELEPHONE (OUTPATIENT)
Dept: CARDIOLOGY CLINIC | Age: 82
End: 2017-09-18

## 2017-09-18 NOTE — TELEPHONE ENCOUNTER
Returned call to patients caregiver Faby. Informing her that another echo is not needed at this time.     Follow up is scheduled for patient on October 10th

## 2017-09-26 ENCOUNTER — OFFICE VISIT (OUTPATIENT)
Dept: INTERNAL MEDICINE CLINIC | Age: 82
End: 2017-09-26

## 2017-09-26 VITALS
OXYGEN SATURATION: 98 % | RESPIRATION RATE: 16 BRPM | DIASTOLIC BLOOD PRESSURE: 72 MMHG | TEMPERATURE: 98.1 F | HEIGHT: 62 IN | SYSTOLIC BLOOD PRESSURE: 106 MMHG | BODY MASS INDEX: 23.79 KG/M2 | WEIGHT: 129.3 LBS | HEART RATE: 70 BPM

## 2017-09-26 DIAGNOSIS — E78.5 DYSLIPIDEMIA: ICD-10-CM

## 2017-09-26 DIAGNOSIS — Z23 ENCOUNTER FOR IMMUNIZATION: ICD-10-CM

## 2017-09-26 DIAGNOSIS — I95.9 HYPOTENSION, UNSPECIFIED HYPOTENSION TYPE: ICD-10-CM

## 2017-09-26 DIAGNOSIS — C85.91 NON-HODGKIN LYMPHOMA OF LYMPH NODES OF NECK, UNSPECIFIED NON-HODGKIN LYMPHOMA TYPE (HCC): ICD-10-CM

## 2017-09-26 DIAGNOSIS — I42.0 CARDIOMYOPATHY, DILATED, NONISCHEMIC (HCC): Primary | Chronic | ICD-10-CM

## 2017-09-26 NOTE — PATIENT INSTRUCTIONS
Vaccine Information Statement    Influenza (Flu) Vaccine (Inactivated or Recombinant): What you need to know    Many Vaccine Information Statements are available in Greek and other languages. See www.immunize.org/vis  Hojas de Información Sobre Vacunas están disponibles en Español y en muchos otros idiomas. Visite www.immunize.org/vis    1. Why get vaccinated? Influenza (flu) is a contagious disease that spreads around the [place] every year, usually between October and May. Flu is caused by influenza viruses, and is spread mainly by coughing, sneezing, and close contact. Anyone can get flu. Flu strikes suddenly and can last several days. Symptoms vary by age, but can include:   fever/chills   sore throat   muscle aches   fatigue   cough   headache    runny or stuffy nose    Flu can also lead to pneumonia and blood infections, and cause diarrhea and seizures in children. If you have a medical condition, such as heart or lung disease, flu can make it worse. Flu is more dangerous for some people. Infants and young children, people 72years of age and older, pregnant women, and people with certain health conditions or a weakened immune system are at greatest risk. Each year thousands of people in the [facility] die from flu, and many more are hospitalized. Flu vaccine can:   keep you from getting flu,   make flu less severe if you do get it, and   keep you from spreading flu to your family and other people. 2. Inactivated and recombinant flu vaccines    A dose of flu vaccine is recommended every flu season. Children 6 months through 6years of age may need two doses during the same flu season. Everyone else needs only one dose each flu season.        Some inactivated flu vaccines contain a very small amount of a mercury-based preservative called thimerosal. Studies have not shown thimerosal in vaccines to be harmful, but flu vaccines that do not contain thimerosal are available. There is no live flu virus in flu shots. They cannot cause the flu. There are many flu viruses, and they are always changing. Each year a new flu vaccine is made to protect against three or four viruses that are likely to cause disease in the upcoming flu season. But even when the vaccine doesnt exactly match these viruses, it may still provide some protection    Flu vaccine cannot prevent:   flu that is caused by a virus not covered by the vaccine, or   illnesses that look like flu but are not. It takes about 2 weeks for protection to develop after vaccination, and protection lasts through the flu season. 3. Some people should not get this vaccine    Tell the person who is giving you the vaccine:     If you have any severe, life-threatening allergies. If you ever had a life-threatening allergic reaction after a dose of flu vaccine, or have a severe allergy to any part of this vaccine, you may be advised not to get vaccinated. Most, but not all, types of flu vaccine contain a small amount of egg protein.  If you ever had Guillain-Barré Syndrome (also called GBS). Some people with a history of GBS should not get this vaccine. This should be discussed with your doctor.  If you are not feeling well. It is usually okay to get flu vaccine when you have a mild illness, but you might be asked to come back when you feel better. 4. Risks of a vaccine reaction    With any medicine, including vaccines, there is a chance of reactions. These are usually mild and go away on their own, but serious reactions are also possible. Most people who get a flu shot do not have any problems with it.      Minor problems following a flu shot include:    soreness, redness, or swelling where the shot was given     hoarseness   sore, red or itchy eyes   cough   fever   aches   headache   itching   fatigue  If these problems occur, they usually begin soon after the shot and last 1 or 2 days. More serious problems following a flu shot can include the following:     There may be a small increased risk of Guillain-Barré Syndrome (GBS) after inactivated flu vaccine. This risk has been estimated at 1 or 2 additional cases per million people vaccinated. This is much lower than the risk of severe complications from flu, which can be prevented by flu vaccine.  Young children who get the flu shot along with pneumococcal vaccine (PCV13) and/or DTaP vaccine at the same time might be slightly more likely to have a seizure caused by fever. Ask your doctor for more information. Tell your doctor if a child who is getting flu vaccine has ever had a seizure. Problems that could happen after any injected vaccine:      People sometimes faint after a medical procedure, including vaccination. Sitting or lying down for about 15 minutes can help prevent fainting, and injuries caused by a fall. Tell your doctor if you feel dizzy, or have vision changes or ringing in the ears.  Some people get severe pain in the shoulder and have difficulty moving the arm where a shot was given. This happens very rarely.  Any medication can cause a severe allergic reaction. Such reactions from a vaccine are very rare, estimated at about 1 in a million doses, and would happen within a few minutes to a few hours after the vaccination. As with any medicine, there is a very remote chance of a vaccine causing a serious injury or death. The safety of vaccines is always being monitored. For more information, visit: www.cdc.gov/vaccinesafety/    5. What if there is a serious reaction? What should I look for?  Look for anything that concerns you, such as signs of a severe allergic reaction, very high fever, or unusual behavior.     Signs of a severe allergic reaction can include hives, swelling of the face and throat, difficulty breathing, a fast heartbeat, dizziness, and weakness  usually within a few minutes to a few hours after the vaccination. What should I do?  If you think it is a severe allergic reaction or other emergency that cant wait, call 9-1-1 and get the person to the nearest hospital. Otherwise, call your doctor.  Reactions should be reported to the Vaccine Adverse Event Reporting System (VAERS). Your doctor should file this report, or you can do it yourself through  the VAERS web site at www.vaers. [facility].gov, or by calling 0-677.887.4886. VAERS does not give medical advice. 6. The National Vaccine Injury Compensation Program    The [facility] Vaccine Injury Compensation Program (VICP) is a federal program that was created to compensate people who may have been injured by certain vaccines. Persons who believe they may have been injured by a vaccine can learn about the program and about filing a claim by calling 8-612.850.9957 or visiting the [employer] website at www.[facility].gov/vaccinecompensation. There is a time limit to file a claim for compensation. 7. How can I learn more?  Ask your healthcare provider. He or she can give you the vaccine package insert or suggest other sources of information.  Call your local or state health department.  Contact the Centers for Disease Control and Prevention (CDC):  - Call 1-314.934.1206 (1-800-CDC-INFO) or  - Visit CDCs website at www.cdc.gov/flu    Vaccine Information Statement   Inactivated Influenza Vaccine   8/7/2015  [address] EDGARD[address] Peck 560XK-90    Department of Health and Human Services  Centers for Disease Control and Prevention    Office Use Only

## 2017-09-26 NOTE — MR AVS SNAPSHOT
Visit Information Date & Time Provider Department Dept. Phone Encounter #  
 9/26/2017 12:45 PM Heather Hillman [facility] [phone] [id number] Your Appointments 10/3/2017 10:30 AM  
Follow Up with Claude Hope MD  
[address] [facility] ([address]) [facility] [address]  
[phone]  
  
   
 [address] [address]  
  
    
 10/3/2017 11:15 AM  
ESTABLISHED PATIENT with Lonnie Gonzalez MD  
[facility]) Appt Note: 3 MO. F/U  
 [address]  
[phone] [address]  
  
    
  
 10/25/2017  8:00 AM  
REMOTE OFFICE VISIT with [facility] [address]) Appt Note: NOT AN OFFICE VISIT - REMOTE BSC ICD  
 [address]  
[phone] [address] Upcoming Health Maintenance Date Due INFLUENZA AGE 9 TO ADULT 8/1/2017 GLAUCOMA SCREENING Q2Y 12/18/2017 MEDICARE YEARLY EXAM 6/16/2018 DTaP/Tdap/Td series (2 - Td) 2/13/2027 Allergies as of 9/26/2017  Review Complete On: 9/26/2017 By: Gabrielle Valencia Severity Noted Reaction Type Reactions Codeine  05/21/2012   Side Effect Other (comments) \"made me disoriented\" per pt Current Immunizations  Reviewed on 6/29/2015 Name Date Influenza High Dose Vaccine PF  Incomplete Influenza Vaccine 10/1/2016, 10/4/2014 Influenza Vaccine Split 10/22/2011 ZZZ-RETIRED (DO NOT USE) Pneumococcal Vaccine (Unspecified Type) 5/22/2007 Not reviewed this visit You Were Diagnosed With   
  
 Codes Comments Encounter for immunization    -  Primary ICD-10-CM: F99 ICD-9-CM: V03.89 Vitals BP Pulse Temp Resp Height(growth percentile) Weight(growth percentile) 106/72 (BP 1 Location: Left arm, BP Patient Position: Sitting) 70 98.1 °F (36.7 °C) (Oral) 16 5' 2\" (1.575 m) 129 lb 4.8 oz (58.7 kg) SpO2 BMI OB Status Smoking Status 98% 23.65 kg/m2 Postmenopausal Never Smoker Vitals History BMI and BSA Data Body Mass Index Body Surface Area  
 23.65 kg/m 2 1.6 m 2 Preferred Pharmacy Pharmacy Name Phone [address] [facility] [phone] Your Updated Medication List  
  
   
This list is accurate as of: 9/26/17  3:10 PM.  Always use your most recent med list.  
  
  
  
  
 albuterol 2.5 mg /3 mL (0.083 %) nebulizer solution Commonly known as:  PROVENTIL VENTOLIN  
3 mL by Nebulization route every four (4) hours as needed for Wheezing. allopurinol 300 mg tablet Commonly known as:  ZYLOPRIM  
TAKE ONE (1) TABLET(S) DAILY  
  
 apixaban 2.5 mg tablet Commonly known as:  Catracho Bogaert Take 1 Tab by mouth two (2) times a day. Indications: PREVENT THROMBOEMBOLISM IN CHRONIC ATRIAL FIBRILLATION  
  
 atorvastatin 10 mg tablet Commonly known as:  LIPITOR  
TAKE ONE (1) TABLET(S) DAILY  
  
 carvedilol 3.125 mg tablet Commonly known as:  COREG  
TAKE ONE (1) TABLET(S) TWIC E DAILY WITH FOOD  Indications: Chronic Heart Failure  
  
 cephALEXin 250 mg capsule Commonly known as:  Thedore Peal Take 500 mg by mouth three (3) times daily. FOR 7 DAYS  
  
 co-enzyme Q-10 100 mg capsule Commonly known as:  CO Q-10 Take 100 mg by mouth daily. fluticasone-salmeterol 250-50 mcg/dose diskus inhaler Commonly known as:  ADVAIR Take 1 Puff by inhalation two (2) times a day. furosemide 20 mg tablet Commonly known as:  LASIX Take 1 Tab by mouth daily. IMBRUVICA 140 mg capsule Generic drug:  ibrutinib Take 420 mg by mouth five (5) days a week.  140 mg cap, takes 3 caps five days a week (none on Sunday or Wednesday)  
  
 loratadine 10 mg tablet Commonly known as:  Verita Thalia Take 10 mg by mouth daily as needed. magnesium oxide 400 mg Cap Take 1 Cap by mouth daily. multivitamin tablet Commonly known as:  ONE A DAY Take 1 Tab by mouth daily. OXYGEN-AIR DELIVERY SYSTEMS  
2 L by Does Not Apply route nightly. potassium chloride SR 10 mEq tablet Commonly known as:  KLOR-CON 10 Take 1 Tab by mouth daily. sacubitril-valsartan 49-51 mg Tab tablet Commonly known as:  ENTRESTO Take 1 Tab by mouth two (2) times a day. TYLENOL EXTRA STRENGTH 500 mg tablet Generic drug:  acetaminophen Take 500 mg by mouth every six (6) hours as needed for Pain. VITAMIN D3 1,000 unit Cap Generic drug:  cholecalciferol Take 1,000 Units by mouth daily. We Performed the Following INFLUENZA VIRUS VACCINE, HIGH DOSE SEASONAL, PRESERVATIVE FREE [24901 CPT(R)] MA IMMUNIZ ADMIN,1 SINGLE/COMB VAC/TOXOID [id number] CPT(R)] Patient Instructions Vaccine Information Statement Influenza (Flu) Vaccine (Inactivated or Recombinant): What you need to know Many Vaccine Information Statements are available in [place]ic and other languages. See www.immunize.org/vis Hojas de Información Sobre Vacunas están disponibles en Español y en muchos otros idiomas. Visite www.immunize.org/vis 1. Why get vaccinated? Influenza (flu) is a contagious disease that spreads around the [place] every year, usually between October and May. Flu is caused by influenza viruses, and is spread mainly by coughing, sneezing, and close contact. Anyone can get flu. Flu strikes suddenly and can last several days. Symptoms vary by age, but can include: 
 fever/chills  sore throat  muscle aches  fatigue  cough  headache  runny or stuffy nose Flu can also lead to pneumonia and blood infections, and cause diarrhea and seizures in children. If you have a medical condition, such as heart or lung disease, flu can make it worse. Flu is more dangerous for some people. Infants and young children, people 72years of age and older, pregnant women, and people with certain health conditions or a weakened immune system are at greatest risk. Each year thousands of people in the [facility] die from flu, and many more are hospitalized. Flu vaccine can: 
 keep you from getting flu, 
 make flu less severe if you do get it, and 
 keep you from spreading flu to your family and other people. 2. Inactivated and recombinant flu vaccines A dose of flu vaccine is recommended every flu season. Children 6 months through 6years of age may need two doses during the same flu season. Everyone else needs only one dose each flu season. Some inactivated flu vaccines contain a very small amount of a mercury-based preservative called thimerosal. Studies have not shown thimerosal in vaccines to be harmful, but flu vaccines that do not contain thimerosal are available. There is no live flu virus in flu shots. They cannot cause the flu. There are many flu viruses, and they are always changing. Each year a new flu vaccine is made to protect against three or four viruses that are likely to cause disease in the upcoming flu season. But even when the vaccine doesnt exactly match these viruses, it may still provide some protection Flu vaccine cannot prevent: 
 flu that is caused by a virus not covered by the vaccine, or 
 illnesses that look like flu but are not. It takes about 2 weeks for protection to develop after vaccination, and protection lasts through the flu season. 3. Some people should not get this vaccine Tell the person who is giving you the vaccine:  If you have any severe, life-threatening allergies.    
If you ever had a life-threatening allergic reaction after a dose of flu vaccine, or have a severe allergy to any part of this vaccine, you may be advised not to get vaccinated. Most, but not all, types of flu vaccine contain a small amount of egg protein.  If you ever had Guillain-Barré Syndrome (also called GBS). Some people with a history of GBS should not get this vaccine. This should be discussed with your doctor.  If you are not feeling well. It is usually okay to get flu vaccine when you have a mild illness, but you might be asked to come back when you feel better. 4. Risks of a vaccine reaction With any medicine, including vaccines, there is a chance of reactions. These are usually mild and go away on their own, but serious reactions are also possible. Most people who get a flu shot do not have any problems with it. Minor problems following a flu shot include:  
 soreness, redness, or swelling where the shot was given  hoarseness  sore, red or itchy eyes  cough  fever  aches  headache  itching  fatigue If these problems occur, they usually begin soon after the shot and last 1 or 2 days. More serious problems following a flu shot can include the following:  There may be a small increased risk of Guillain-Barré Syndrome (GBS) after inactivated flu vaccine. This risk has been estimated at 1 or 2 additional cases per million people vaccinated. This is much lower than the risk of severe complications from flu, which can be prevented by flu vaccine.  Young children who get the flu shot along with pneumococcal vaccine (PCV13) and/or DTaP vaccine at the same time might be slightly more likely to have a seizure caused by fever. Ask your doctor for more information. Tell your doctor if a child who is getting flu vaccine has ever had a seizure. Problems that could happen after any injected vaccine:  People sometimes faint after a medical procedure, including vaccination. Sitting or lying down for about 15 minutes can help prevent fainting, and injuries caused by a fall. Tell your doctor if you feel dizzy, or have vision changes or ringing in the ears.  Some people get severe pain in the shoulder and have difficulty moving the arm where a shot was given. This happens very rarely.  Any medication can cause a severe allergic reaction. Such reactions from a vaccine are very rare, estimated at about 1 in a million doses, and would happen within a few minutes to a few hours after the vaccination. As with any medicine, there is a very remote chance of a vaccine causing a serious injury or death. The safety of vaccines is always being monitored. For more information, visit: www.cdc.gov/vaccinesafety/ 
 
 
The [facility] Vaccine Injury Compensation Program (VICP) is a federal program that was created to compensate people who may have been injured by certain vaccines.  
 
Persons who believe they may have been injured by a vaccine can learn about the program and about filing a claim by calling 7-200.931.9422 or visiting the [address] website at www.[facility].gov/vaccinecompensation. There is a time limit to file a claim for compensation. 7. How can I learn more?  Ask your healthcare provider. He or she can give you the vaccine package insert or suggest other sources of information.  Call your local or state health department.  Contact the Centers for Disease Control and Prevention (CDC): 
- Call 4-724.807.6504 (1-800-CDC-INFO) or 
- Visit CDCs website at www.cdc.gov/flu Vaccine Information Statement Inactivated Influenza Vaccine 8/7/2015 
42 UBill Mcgrath [id number] [facility] and [place] Centers for Disease Control and Prevention Office Use Only [facility]! Dear Rebecca Begum: Thank you for requesting a [employer] account. Our records indicate that you already have an active [email or web address] account. You can access your account anytime at [email or web address] Did you know that you can access your hospital and ER discharge instructions at any time in [email or web address]? You can also review all of your test results from your hospital stay or ER visit. Additional Information If you have questions, please visit the Frequently Asked Questions section of the [email or web address] website at [email or web address] Remember, [email or web address] is NOT to be used for urgent needs. For medical emergencies, dial 911. Now available from your iPhone and Android! Please provide this summary of care documentation to your next provider. Your primary care clinician is listed as Gail Dey. If you have any questions after today's visit, please call [phone].

## 2017-09-26 NOTE — PROGRESS NOTES
Chief Complaint   Patient presents with    CHF     routine follow up      1. Have you been to the ER, urgent care clinic since your last visit? Hospitalized since your last visit? No    2. Have you seen or consulted any other health care providers outside of the [address] [place] [address] since your last visit? Include any pap smears or colon screening.  No

## 2017-10-01 NOTE — PROGRESS NOTES
[facility]  [facility]  [address] [place] [address]  Phone:  [phone]  Fax: [phone]       Chief Complaint   Patient presents with    [facility] CHF     routine follow up    . SUBJECTIVE:    Thania Hester is a 80 y.o. female Comes in for return visit stating that she has done remarkably well. She visited her son in [place], where she remained for two months and had no cardiopulmonary symptoms. She is exercising on a consistent basis currently. Her dose of Lasix is 40 mg in the morning, 20 mg in the afternoon with an additional 20 mg depending on weight increase. With this regimen, as well as her other cardioactive medications, she has done quite well. She remains on Apixaban in view of her history of paroxysmal atrial fib. Her blood pressure has been reasonable under the circumstances. With the cardioactive medications, blood pressure has often times been on the low side, and on occasion she has had symptomatic dizziness, which has not been the case of late. Finally, she remains on statin in view of her increased cardiovascular risk. Her cardiomyopathy is non-ischemic, however. Current Outpatient Prescriptions   Medication Sig Dispense Refill    sacubitril-valsartan (ENTRESTO) 49 mg/51 mg tablet Take 1 Tab by mouth two (2) times a day. 180 Tab 3    apixaban (ELIQUIS) 2.5 mg tablet Take 1 Tab by mouth two (2) times a day. Indications: PREVENT THROMBOEMBOLISM IN CHRONIC ATRIAL FIBRILLATION 180 Tab 3    magnesium oxide 400 mg cap Take 1 Cap by mouth daily.  cholecalciferol (VITAMIN D3) 1,000 unit cap Take 1,000 Units by mouth daily.  furosemide (LASIX) 20 mg tablet Take 1 Tab by mouth daily. (Patient taking differently: Take 40 mg by mouth two (2) times a day.) 30 Tab 11    potassium chloride SR (KLOR-CON 10) 10 mEq tablet Take 1 Tab by mouth daily.  30 Tab 11    carvedilol (COREG) 3.125 mg tablet TAKE ONE (1) TABLET(S) TWIC E DAILY WITH FOOD  Indications: Chronic Heart Failure 180 Tab 3    albuterol (PROVENTIL VENTOLIN) 2.5 mg /3 mL (0.083 %) nebulizer solution 3 mL by Nebulization route every four (4) hours as needed for Wheezing. 100 Each 11    allopurinol (ZYLOPRIM) 300 mg tablet TAKE ONE (1) TABLET(S) DAILY 90 Tab 3    atorvastatin (LIPITOR) 10 mg tablet TAKE ONE (1) TABLET(S) DAILY 90 Tab 3    fluticasone-salmeterol (ADVAIR) 250-50 mcg/dose diskus inhaler Take 1 Puff by inhalation two (2) times a day. 3 Inhaler 3    loratadine (CLARITIN) 10 mg tablet Take 10 mg by mouth daily as needed.  co-enzyme Q-10 (CO Q-10) 100 mg capsule Take 100 mg by mouth daily.  OXYGEN-AIR DELIVERY SYSTEMS 2 L by Does Not Apply route nightly.  ibrutinib (IMBRUVICA) 140 mg cap Take 420 mg by mouth five (5) days a week. 140 mg cap, takes 3 caps five days a week (none on Sunday or Wednesday)      acetaminophen (TYLENOL EXTRA STRENGTH) 500 mg tablet Take 500 mg by mouth every six (6) hours as needed for Pain.  multivitamin (ONE A DAY) tablet Take 1 Tab by mouth daily.  cephALEXin (KEFLEX) 250 mg capsule Take 500 mg by mouth three (3) times daily.  FOR 7 DAYS       Past Medical History:   Diagnosis Date    Asthma     Cancer ([facility] [address]     lymphoma    Congestive heart failure, unspecified     Heart failure (HCC)     Hypertension     Other ill-defined conditions     heart murmur    Presence of biventricular automatic cardioverter/defibrillator (AICD) 7/2/2015    [employer] biventricular AICD implant    Stomach ulcer      Past Surgical History:   Procedure Laterality Date    HX BREAST BIOPSY Bilateral     40 years ago    HX COLONOSCOPY      HX HEENT      cataracts removed    HX HYSTERECTOMY      HX OTHER SURGICAL      lymph node surgery    HX TONSILLECTOMY      SC EGD TRANSORAL BIOPSY SINGLE/MULTIPLE  5/23/2012         SINUS SURGERY PROC UNLISTED      Nasal polyps removed     Allergies   Allergen Reactions    Codeine Other (comments)     \"made me disoriented\" per pt         REVIEW OF SYSTEMS:  General: negative for - chills or fever  ENT: negative for - headaches, nasal congestion or tinnitus  Respiratory: negative for - cough, hemoptysis, shortness of breath or wheezing  Cardiovascular : negative for - chest pain, edema, palpitations or shortness of breath  Gastrointestinal: negative for - abdominal pain, blood in stools, heartburn or nausea/vomiting  Genito-Urinary: no dysuria, trouble voiding, or hematuria  Musculoskeletal: negative for - gait disturbance, joint pain, joint stiffness or joint swelling  Neurological: no TIA or stroke symptoms  Hematologic: no bruises, no bleeding, no swollen glands  Integument: no lumps, mole changes, nail changes or rash  Endocrine: no malaise/lethargy or unexpected weight changes      Social History     Social History    Marital status:      Spouse name: N/A    Number of children: 1    Years of education: 16+     Occupational History    retired teacher      Social History Main Topics    Smoking status: Never Smoker    Smokeless tobacco: Never Used    Alcohol use No    Drug use: No    Sexual activity: No     Other Topics Concern    None     Social History Narrative     Family History   Problem Relation Age of Onset    Heart Disease Mother     Stroke Father        OBJECTIVE:    Visit Vitals    /72 (BP 1 Location: Left arm, BP Patient Position: Sitting)    Pulse 70    Temp 98.1 °F (36.7 °C) (Oral)    Resp 16    Ht 5' 2\" (1.575 m)    Wt 129 lb 4.8 oz (58.7 kg)    SpO2 98%    BMI 23.65 kg/m2     CONSTITUTIONAL: well , well nourished, appears age appropriate  EYES: perrla, eom intact  ENMT:moist mucous membranes, pharynx clear  NECK: supple.  Thyroid normal  RESPIRATORY: Chest: clear to ascultation and percussion   CARDIOVASCULAR: Heart: regular rate and rhythm  GASTROINTESTINAL: Abdomen: soft, bowel sounds active  HEMATOLOGIC: no pathological lymph nodes palpated  MUSCULOSKELETAL: Extremities: no edema, pulse 1+   INTEGUMENT: No unusual rashes or suspicious skin lesions noted. Nails appear normal.  NEUROLOGIC: non-focal exam   MENTAL STATUS: alert and oriented, appropriate affect      ASSESSMENT:  1. 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 Hypotension, unspecified hypotension type    3. 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 Dyslipidemia    5. Encounter for immunization        PLAN:    1. The patient appears to be well compensated from her cardiomyopathy. There are no overt signs of congestive heart failure including no JVD today. his is unusual.   2. Her blood pressure is 96/70, both standing and sitting, which is great. Her volume status is quite stable. 3. She will follow up with her hematologist, Dr. Mohsen Luo for a non-Hodgkin's lymphoma where there has been potential leukemic transformation. 4. She will continue statin as prescribed in view of her significantly increased cardiovascular risk. .        . Orders Placed This Encounter    Influenza virus vaccine ([address]) 65 years and older    METABOLIC PANEL, BASIC         Follow-up Disposition:  Return in about 3 months (around 12/26/2017).       Rylie Enrique MD

## 2017-10-02 ENCOUNTER — TELEPHONE (OUTPATIENT)
Dept: CARDIOLOGY CLINIC | Age: 82
End: 2017-10-02

## 2017-10-02 NOTE — TELEPHONE ENCOUNTER
Spoke with [address] regarding patients next appointment.    Patient is confirmed for Tuesday October 10 th at 10:30am

## 2017-10-03 ENCOUNTER — OFFICE VISIT (OUTPATIENT)
Dept: CARDIOLOGY CLINIC | Age: 82
End: 2017-10-03

## 2017-10-03 VITALS
WEIGHT: 132 LBS | SYSTOLIC BLOOD PRESSURE: 106 MMHG | BODY MASS INDEX: 24.29 KG/M2 | HEIGHT: 62 IN | DIASTOLIC BLOOD PRESSURE: 61 MMHG

## 2017-10-03 DIAGNOSIS — I42.0 CARDIOMYOPATHY, DILATED, NONISCHEMIC (HCC): Chronic | ICD-10-CM

## 2017-10-03 DIAGNOSIS — K21.00 GASTROESOPHAGEAL REFLUX DISEASE WITH ESOPHAGITIS: ICD-10-CM

## 2017-10-03 DIAGNOSIS — R06.02 SOB (SHORTNESS OF BREATH): ICD-10-CM

## 2017-10-03 DIAGNOSIS — I44.7 LBBB (LEFT BUNDLE BRANCH BLOCK): ICD-10-CM

## 2017-10-03 DIAGNOSIS — E78.2 MIXED HYPERLIPIDEMIA: ICD-10-CM

## 2017-10-03 DIAGNOSIS — I50.22 CHRONIC SYSTOLIC CONGESTIVE HEART FAILURE (HCC): Primary | ICD-10-CM

## 2017-10-03 DIAGNOSIS — G47.33 OSA (OBSTRUCTIVE SLEEP APNEA): ICD-10-CM

## 2017-10-03 DIAGNOSIS — Z95.810 PRESENCE OF BIVENTRICULAR AUTOMATIC CARDIOVERTER/DEFIBRILLATOR (AICD): ICD-10-CM

## 2017-10-03 DIAGNOSIS — I71.20 THORACIC AORTIC ANEURYSM WITHOUT RUPTURE: ICD-10-CM

## 2017-10-03 LAB
BUN SERPL-MCNC: 23 MG/DL (ref 10–36)
BUN/CREAT SERPL: 17 (ref 12–28)
CALCIUM SERPL-MCNC: 9.3 MG/DL (ref 8.7–10.3)
CHLORIDE SERPL-SCNC: 108 MMOL/L (ref 96–106)
CO2 SERPL-SCNC: 27 MMOL/L (ref 18–29)
CREAT SERPL-MCNC: 1.38 MG/DL (ref 0.57–1)
GLUCOSE SERPL-MCNC: 87 MG/DL (ref 65–99)
POTASSIUM SERPL-SCNC: 4.7 MMOL/L (ref 3.5–5.2)
SODIUM SERPL-SCNC: 148 MMOL/L (ref 134–144)

## 2017-10-03 RX ORDER — CHLORHEXIDINE GLUCONATE 1.2 MG/ML
RINSE ORAL
Refills: 4 | COMMUNITY
Start: 2017-06-27 | End: 2017-10-03 | Stop reason: ALTCHOICE

## 2017-10-03 NOTE — MR AVS SNAPSHOT
Visit Information Date & Time Provider Department Dept. Phone Encounter #  
 10/3/2017 11:15 AM Mika Doty MD [facility] 1 [id number] Your Appointments 12/21/2017  9:15 AM  
Any with Theodore Avitia MD  
[address] [facility]) Appt Note: follow up  
 Blaire Monroy 1 [facility] Julisa Hensley [phone]  
  
    
  
 10/25/2017  8:00 AM  
REMOTE OFFICE VISIT with [facility] Appt Note: NOT AN OFFICE VISIT - REMOTE [facility] ICD  
 [address]  
[phone] [address] Upcoming Health Maintenance Date Due  
 GLAUCOMA SCREENING Q2Y 12/18/2017 MEDICARE YEARLY EXAM 6/16/2018 DTaP/Tdap/Td series (2 - Td) 2/13/2027 Allergies as of 10/3/2017  Review Complete On: 9/30/2017 By: Theodore Avitia MD  
  
 Severity Noted Reaction Type Reactions Codeine  05/21/2012   Side Effect Other (comments) \"made me disoriented\" per pt Current Immunizations  Reviewed on 6/29/2015 Name Date Influenza High Dose Vaccine PF 9/27/2017 Influenza Vaccine 10/1/2016, 10/4/2014 Influenza Vaccine Split 10/22/2011 ZZZ-RETIRED (DO NOT USE) Pneumococcal Vaccine (Unspecified Type) 5/22/2007 Not reviewed this visit You Were Diagnosed With   
  
 Codes Comments Cardiomyopathy, unspecified type ([facility]    -  Primary ICD-10-CM: I42.9 ICD-9-CM: 425.4 Systolic CHF, acute on chronic (HCC)     ICD-10-CM: J73.01 ICD-9-CM: 428.23, 428.0 Vitals BP Height(growth percentile) Weight(growth percentile) BMI OB Status Smoking Status 106/61 5' 2\" (1.575 m) 132 lb (59.9 kg) 24.14 kg/m2 Postmenopausal Never Smoker Vitals History BMI and BSA Data Body Mass Index Body Surface Area 24.14 kg/m 2 1.62 m 2 Preferred Pharmacy Pharmacy Name Phone [phone] Your Updated Medication List  
  
   
This list is accurate as of: 10/3/17 12:30 PM.  Always use your most recent med list.  
  
  
  
  
 albuterol 2.5 mg /3 mL (0.083 %) nebulizer solution Commonly known as:  PROVENTIL VENTOLIN  
3 mL by Nebulization route every four (4) hours as needed for Wheezing. allopurinol 300 mg tablet Commonly known as:  ZYLOPRIM  
TAKE ONE (1) TABLET(S) DAILY  
  
 apixaban 2.5 mg tablet Commonly known as:  Timmothy [place] Take 1 Tab by mouth two (2) times a day. Indications: PREVENT THROMBOEMBOLISM IN CHRONIC ATRIAL FIBRILLATION  
  
 atorvastatin 10 mg tablet Commonly known as:  LIPITOR  
TAKE ONE (1) TABLET(S) DAILY  
  
 carvedilol 3.125 mg tablet Commonly known as:  COREG  
TAKE ONE (1) TABLET(S) TWIC E DAILY WITH FOOD  Indications: Chronic Heart Failure  
  
 co-enzyme Q-10 100 mg capsule Commonly known as:  CO Q-10 Take 100 mg by mouth daily. fluticasone-salmeterol 250-50 mcg/dose diskus inhaler Commonly known as:  ADVAIR Take 1 Puff by inhalation two (2) times a day. furosemide 20 mg tablet Commonly known as:  LASIX Take 1 Tab by mouth daily. IMBRUVICA 140 mg capsule Generic drug:  ibrutinib Take 420 mg by mouth five (5) days a week. 140 mg cap, takes 3 caps five days a week (none on Sunday or Wednesday)  
  
 loratadine 10 mg tablet Commonly known as:  Vickki Mazin Take 10 mg by mouth daily as needed. magnesium oxide 400 mg Cap Take 1 Cap by mouth daily. multivitamin tablet Commonly known as:  ONE A DAY Take 1 Tab by mouth daily. OXYGEN-AIR DELIVERY SYSTEMS  
2 L by Does Not Apply route nightly. potassium chloride SR 10 mEq tablet Commonly known as:  KLOR-CON 10 Take 1 Tab by mouth daily. sacubitril-valsartan 49-51 mg Tab tablet Commonly known as:  ENTRESTO Take 1 Tab by mouth two (2) times a day. TYLENOL EXTRA STRENGTH 500 mg tablet Generic drug:  acetaminophen Take 500 mg by mouth every six (6) hours as needed for Pain. VITAMIN D3 1,000 unit Cap Generic drug:  cholecalciferol Take 1,000 Units by mouth daily. We Performed the Following AMB POC EKG ROUTINE W/ 12 LEADS, INTER & REP [59068 CPT(R)] Introducing [facility]! Dear Rosaline Gaston: Thank you for requesting a [employer] account. Our records indicate that you already have an active [employer] account. You can access your account anytime at [email or web address] Did you know that you can access your hospital and ER discharge instructions at any time in [employer]? You can also review all of your test results from your hospital stay or ER visit. Additional Information If you have questions, please visit the Frequently Asked Questions section of the [employer] website at [email or web address] Remember, [employer] is NOT to be used for urgent needs. For medical emergencies, dial 911. Now available from your iPhone and Android! Please provide this summary of care documentation to your next provider. Your primary care clinician is listed as Gail Dey. If you have any questions after today's visit, please call [phone].

## 2017-10-03 NOTE — PROGRESS NOTES
[place]   [address] [facility], [address] [place] [address] Road                                          NEW PATIENT HPI/FOLLOW-UP      NAME:  Izabella Horton   :   3/17/1925   MRN:   [id number]   PCP:  Chandler Ivy MD           Subjective: The patient is a 80y.o. 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 Since the last visit, patient reports she has just returned from an extended vacation to visit her son on [de-identified] in the [facility] for two months. Returned 1 week ago. Notes some additional fatigue in getting back to her routine here in [facility], but states this is almost completely resolved. Notes a cough since taking Barium yesterday for CT study. No other complaints. She is wearing compression socks and has had no issues with swelling. Is sleeping with a couple pillows - she notes this is her baseline. She follows up with Dr. Mika Oates office next week. Denies change in exercise tolerance, chest pain, edema, medication intolerance, palpitations, shortness of breath, PND/orthopnea wheezing, sputum, syncope, dizziness or light headedness. Doing satisfactorily. Review of Systems  General: Pt denies excessive weight gain or loss. Pt is able to conduct ADL's. Respiratory: Denies shortness of breath, JENKINS, wheezing or stridor.   Cardiovascular: Denies precordial pain, palpitations, edema or PND  Gastrointestinal: Denies poor appetite, indigestion, abdominal pain or blood in stool  Peripheral vascular: Denies claudication, leg cramps  Neuropsychiatric: Denies paresthesias,tingling,numbness,anxiety,depression,fatigue  Musculoskeletal: Denies pain,tenderness, soreness,swelling      Past Medical History:   Diagnosis Date    Asthma     Cancer ([facility]     lymphoma    Congestive heart failure, unspecified     Heart failure ([facility]     Hypertension     Other ill-defined conditions(799.89)     heart murmur    Presence of biventricular automatic cardioverter/defibrillator (AICD) 7/2/2015    [employer] biventricular AICD implant    Stomach ulcer      Patient Active Problem List    Diagnosis Date Noted    Bilateral leg edema 06/06/2017    Hypokalemia 04/21/2017    CHF (congestive heart failure) ([address] 02/04/2017    SOB (shortness of breath) 02/02/2017    Acute respiratory insufficiency 02/02/2017    PUD (peptic ulcer disease) 02/02/2017    Gastroesophageal reflux disease with esophagitis 02/02/2017    Thoracic aortic aneurysm without rupture ([address] 02/02/2017    Physical deconditioning 01/29/2017    Cramping of hands 01/24/2017    Mild mitral regurgitation 01/22/2016    Lymphoma ([address] 11/17/2015    Chronic systolic (congestive) heart failure 10/08/2015    Presence of biventricular automatic cardioverter/defibrillator (AICD) 07/02/2015    LBBB (left bundle branch block) 06/61/4942    Systolic CHF, acute on chronic ([address] 06/29/2015    Acute respiratory failure with hypoxia ([address] 06/29/2015    Upper respiratory infection 06/29/2015    HTN (hypertension) 06/29/2015    Hyperlipidemia 06/29/2015    Gout 06/29/2015    Reactive airway disease 03/23/2015    Fatigue 03/10/2015    Cardiomyopathy ([address] 11/08/2014    Anemia 11/08/2014    Hypotension 11/04/2014    Rhinitis 09/04/2014    Pulmonary edema 08/04/2012    Cardiomyopathy, dilated, 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 08/04/2012    NHL (non-Hodgkin's lymphoma) ([address] 08/04/2012    DILAN (obstructive sleep apnea) 08/04/2012    Abdominal pain 05/22/2012     Class: Acute    Weight loss 05/22/2012     Class: Acute      Past Surgical History:   Procedure Laterality Date    HX BREAST BIOPSY Bilateral     40 years ago    HX COLONOSCOPY      HX HEENT      cataracts removed    HX HYSTERECTOMY      HX OTHER SURGICAL      lymph node surgery    HX TONSILLECTOMY      NV EGD TRANSORAL BIOPSY SINGLE/MULTIPLE  5/23/2012         SINUS SURGERY PROC UNLISTED      Nasal polyps removed     Allergies   Allergen Reactions    Codeine Other (comments)     \"made me disoriented\" per pt      Family History   Problem Relation Age of Onset    Heart Disease Mother     Stroke Father       Social History     Social History    Marital status:      Spouse name: N/A    Number of children: 1    Years of education: 16+     Occupational History    retired teacher      Social History Main Topics    Smoking status: Never Smoker    Smokeless tobacco: Never Used    Alcohol use No    Drug use: No    Sexual activity: No     Other Topics Concern    Not on file     Social History Narrative      Current Outpatient Prescriptions   Medication Sig    sacubitril-valsartan (ENTRESTO) 49 mg/51 mg tablet Take 1 Tab by mouth two (2) times a day.  apixaban (ELIQUIS) 2.5 mg tablet Take 1 Tab by mouth two (2) times a day. Indications: PREVENT THROMBOEMBOLISM IN CHRONIC ATRIAL FIBRILLATION    magnesium oxide 400 mg cap Take 1 Cap by mouth daily.  cholecalciferol (VITAMIN D3) 1,000 unit cap Take 1,000 Units by mouth daily.  furosemide (LASIX) 20 mg tablet Take 1 Tab by mouth daily. (Patient taking differently: Take 40 mg by mouth two (2) times a day.)    potassium chloride SR (KLOR-CON 10) 10 mEq tablet Take 1 Tab by mouth daily.  carvedilol (COREG) 3.125 mg tablet TAKE ONE (1) TABLET(S) TWIC E DAILY WITH FOOD  Indications: Chronic Heart Failure    albuterol (PROVENTIL VENTOLIN) 2.5 mg /3 mL (0.083 %) nebulizer solution 3 mL by Nebulization route every four (4) hours as needed for Wheezing.  allopurinol (ZYLOPRIM) 300 mg tablet TAKE ONE (1) TABLET(S) DAILY    atorvastatin (LIPITOR) 10 mg tablet TAKE ONE (1) TABLET(S) DAILY    fluticasone-salmeterol (ADVAIR) 250-50 mcg/dose diskus inhaler Take 1 Puff by inhalation two (2) times a day.  loratadine (CLARITIN) 10 mg tablet Take 10 mg by mouth daily as needed.  co-enzyme Q-10 (CO Q-10) 100 mg capsule Take 100 mg by mouth daily.  OXYGEN-AIR DELIVERY SYSTEMS 2 L by Does Not Apply route nightly.  ibrutinib (IMBRUVICA) 140 mg cap Take 420 mg by mouth five (5) days a week. 140 mg cap, takes 3 caps five days a week (none on Sunday or Wednesday)    acetaminophen (TYLENOL EXTRA STRENGTH) 500 mg tablet Take 500 mg by mouth every six (6) hours as needed for Pain.  multivitamin (ONE A DAY) tablet Take 1 Tab by mouth daily. No current facility-administered medications for this visit. I have reviewed the MAs notes, vitals, problem list, allergy list, medical history, family medical, social history and medications. Objective:     Physical Exam:     Vitals:    10/03/17 1121   BP: 106/61   Weight: 132 lb (59.9 kg)   Height: 5' 2\" (1.575 m)    Body mass index is 24.14 kg/(m^2). General: WDWN elderly female, in no acute distress. Pleasant affect. HEENT: No carotid bruits, no JVD, trach is midline. Heart:  Normal S1/S2 negative S3 or S4. Irregular, systolic murmur best heard at aortic focus, No gallop or rub.    Respiratory: Clear bilaterally, no wheezing or rales  Abdomen:   Soft, non-tender, bowel sounds are active.   Extremities:  No edema, normal cap refill, no cyanosis. Neuro: A&Ox3, speech clear, gait stable. Skin: Skin color is normal. No rashes or lesions. No diaphoresis.   Vascular: 2+ pulses symmetric in all extremities      Data Review:       Cardiographics:    EKG: Irregular v-paced rhythm    Cardiology Labs:    Results for orders placed or performed during the hospital encounter of 04/20/17   EKG, 12 LEAD, INITIAL   Result Value Ref Range    Ventricular Rate 76 BPM    Atrial Rate 76 BPM    P-R Interval 172 ms    QRS Duration 190 ms    Q-T Interval 504 ms    QTC Calculation (Bezet) 567 ms    Calculated R Axis -150 degrees    Calculated T Axis 12 degrees    Diagnosis       AV dual-paced rhythm with occasional ventricular-paced complexes and with   occasional premature ventricular complexes  When compared with ECG of 01-FEB-2017 19:26,  premature ventricular complexes are now present  Vent. rate has decreased BY   4 BPM  Confirmed by Gordon Cano ([id number]) on 4/20/2017 7:32:54 PM         Lab Results   Component Value Date/Time    Cholesterol, total 172 08/04/2012 03:15 PM    HDL Cholesterol 38 08/04/2012 03:15 PM    LDL, calculated 118.8 08/04/2012 03:15 PM    Triglyceride 76 08/04/2012 03:15 PM    CHOL/HDL Ratio 4.5 08/04/2012 03:15 PM       Lab Results   Component Value Date/Time    Sodium 148 09/29/2017 05:56 PM    Potassium 4.7 09/29/2017 05:56 PM    Chloride 108 09/29/2017 05:56 PM    CO2 27 09/29/2017 05:56 PM    Anion gap 10 04/22/2017 03:51 AM    Glucose 87 09/29/2017 05:56 PM    BUN 23 09/29/2017 05:56 PM    Creatinine 1.38 09/29/2017 05:56 PM    BUN/Creatinine ratio 17 09/29/2017 05:56 PM    GFR est AA 38 09/29/2017 05:56 PM    GFR est non-AA 33 09/29/2017 05:56 PM    Calcium 9.3 09/29/2017 05:56 PM    Bilirubin, total 0.7 04/20/2017 09:40 AM    AST (SGOT) 54 04/20/2017 09:40 AM    Alk. phosphatase 95 04/20/2017 09:40 AM    Protein, total 6.2 04/20/2017 09:40 AM    Albumin 3.2 04/20/2017 09:40 AM    Globulin 3.0 04/20/2017 09:40 AM    A-G Ratio 1.1 04/20/2017 09:40 AM    ALT (SGPT) 38 04/20/2017 09:40 AM          Assessment:       ICD-10-CM ICD-9-CM    1. Chronic systolic congestive heart failure (HCC) I50.22 428.22 AMB POC EKG ROUTINE W/ 12 LEADS, INTER & REP     428.0    2. Cardiomyopathy, dilated, 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 I42.9 425.4 AMB POC EKG ROUTINE W/ 12 LEADS, INTER & REP   3. LBBB (left bundle branch block) I44.7 426.3    4. Mixed hyperlipidemia E78.2 272.2    5. SOB (shortness of breath) R06.02 786.05    6. Gastroesophageal reflux disease with esophagitis K21.0 530.11    7. Presence of biventricular automatic cardioverter/defibrillator (AICD) Z95.810 V45.02    8. DILAN (obstructive sleep apnea) G47.33 327.23    9. Thoracic aortic aneurysm without rupture ([facility] I71.2 441. 2          Discussion: Patient presents at this time stable from a cardiac perspective. Well compensated NYHA Class III Heart failure. Plan: 1. Continue same meds. 2.Encouraged to exercise to tolerance,  and follow low fat, low cholesterol, low sodium predominantly Plant-based (consider Mediterranean) diet. -- Call with questions or concerns. 3.Follow up: 3 months    I have discussed the diagnosis with the patient and the intended plan as seen in the above orders. The patient has received an after-visit summary and questions were answered concerning future plans. I have discussed any concerning medication side effects and warnings with the patient as well. Patient seen and examined. All pertinent data reviewed. I have reviewed detailed note as outlined by Jesus Halima. Case discussed with Nursing/medical assistant staff and Jesus Force. Patient with compensated CSHF/Class 3. Followed closely by [facility]--appreciated. Plans as outlined.       Minerva Cardozo PA-C  10/3/2017     GUILLERMO Wooten Sine

## 2017-10-09 ENCOUNTER — TELEPHONE (OUTPATIENT)
Dept: CARDIOLOGY CLINIC | Age: 82
End: 2017-10-09

## 2017-10-10 ENCOUNTER — OFFICE VISIT (OUTPATIENT)
Dept: CARDIOLOGY CLINIC | Age: 82
End: 2017-10-10

## 2017-10-10 VITALS
WEIGHT: 130.8 LBS | HEIGHT: 62 IN | HEART RATE: 67 BPM | RESPIRATION RATE: 20 BRPM | BODY MASS INDEX: 24.07 KG/M2 | SYSTOLIC BLOOD PRESSURE: 94 MMHG | TEMPERATURE: 97.7 F | OXYGEN SATURATION: 98 % | DIASTOLIC BLOOD PRESSURE: 52 MMHG

## 2017-10-10 DIAGNOSIS — I42.0 CARDIOMYOPATHY, DILATED, NONISCHEMIC (HCC): Primary | Chronic | ICD-10-CM

## 2017-10-10 DIAGNOSIS — I10 ESSENTIAL HYPERTENSION: ICD-10-CM

## 2017-10-10 NOTE — PATIENT INSTRUCTIONS
No changes to medications today    Labs in the beginning of November    You can take the Mucinex without the D    Follow up in 2 months    Please keep an eye on your cough, if you develop a fever call your PCP.

## 2017-10-10 NOTE — COMMUNICATION BODY
HISTORY OF PRESENT ILLNESS  Kiet Charles is a 80 y.o. AA, F w/ PMH of HTN, NICM (EF of 20-25% by Echo 2/3/17). MYHA class II, s/p BiVICD 2015, hyperlipidemia, LBBB, gout, GERD, PUD, DILAN, Non-Hodkin's lymphoma and Reactive airway disease who presents today for follow up. Pt reports she is feeling well, just returned from a 2 month trip to the [address] [place] [address] with her son. She states she was able to do all of the planned activities, didn't gain any weight while traveling. Took all of her medications. She does have a productive cough with clear sputum. She is very active and has been able to continue her treadmill walking 1-2 miles 3-4 days a week. She is performing daily weights and watching her sodium consumption. Pt c/o some JENKINS with moderate exertion, cough, early saiety but this is not changed for her.      Pt denies HA, fatigue, fevers, chills, lightheadedness, dizziness, syncope, N/V/ constipation, changes to appetite, chest pain, SOB, depression or anxiety    Past Medical History:   Diagnosis Date    Asthma     Cancer ([address]     lymphoma    Congestive heart failure, unspecified     Heart failure ([address]     Hypertension     Other ill-defined conditions(799.89)     heart murmur    Presence of biventricular automatic cardioverter/defibrillator (AICD) 7/2/2015    [employer] biventricular AICD implant    Stomach ulcer      Past Surgical History:   Procedure Laterality Date    HX BREAST BIOPSY Bilateral     40 years ago    HX COLONOSCOPY      HX HEENT      cataracts removed    HX HYSTERECTOMY      HX OTHER SURGICAL      lymph node surgery    HX TONSILLECTOMY      [place] EGD TRANSORAL BIOPSY SINGLE/MULTIPLE  5/23/2012         SINUS SURGERY PROC UNLISTED      Nasal polyps removed     Family History   Problem Relation Age of Onset    Heart Disease Mother     Stroke Father      Social History     Social History    Marital status:      Spouse name: N/A    Number of children: 1    Years of education: 16+     Occupational History    retired teacher      Social History Main Topics    Smoking status: Never Smoker    Smokeless tobacco: Never Used    Alcohol use No    Drug use: No    Sexual activity: No     Other Topics Concern    Not on file     Social History Narrative     Current Outpatient Prescriptions on File Prior to Visit   Medication Sig Dispense Refill    sacubitril-valsartan (ENTRESTO) 49 mg/51 mg tablet Take 1 Tab by mouth two (2) times a day. 180 Tab 3    apixaban (ELIQUIS) 2.5 mg tablet Take 1 Tab by mouth two (2) times a day. Indications: PREVENT THROMBOEMBOLISM IN CHRONIC ATRIAL FIBRILLATION 180 Tab 3    magnesium oxide 400 mg cap Take 1 Cap by mouth daily.  cholecalciferol (VITAMIN D3) 1,000 unit cap Take 1,000 Units by mouth daily.  furosemide (LASIX) 20 mg tablet Take 1 Tab by mouth daily. (Patient taking differently: Take 40 mg by mouth two (2) times a day.) 30 Tab 11    potassium chloride SR (KLOR-CON 10) 10 mEq tablet Take 1 Tab by mouth daily. 30 Tab 11    carvedilol (COREG) 3.125 mg tablet TAKE ONE (1) TABLET(S) TWIC E DAILY WITH FOOD  Indications: Chronic Heart Failure 180 Tab 3    albuterol (PROVENTIL VENTOLIN) 2.5 mg /3 mL (0.083 %) nebulizer solution 3 mL by Nebulization route every four (4) hours as needed for Wheezing. 100 Each 11    allopurinol (ZYLOPRIM) 300 mg tablet TAKE ONE (1) TABLET(S) DAILY 90 Tab 3    atorvastatin (LIPITOR) 10 mg tablet TAKE ONE (1) TABLET(S) DAILY 90 Tab 3    fluticasone-salmeterol (ADVAIR) 250-50 mcg/dose diskus inhaler Take 1 Puff by inhalation two (2) times a day. 3 Inhaler 3    loratadine (CLARITIN) 10 mg tablet Take 10 mg by mouth daily as needed.  co-enzyme Q-10 (CO Q-10) 100 mg capsule Take 100 mg by mouth daily.  OXYGEN-AIR DELIVERY SYSTEMS 2 L by Does Not Apply route nightly.  ibrutinib (IMBRUVICA) 140 mg cap Take 420 mg by mouth five (5) days a week.  140 mg cap, takes 3 caps five days a week (none on Sunday or Wednesday)      acetaminophen (TYLENOL EXTRA STRENGTH) 500 mg tablet Take 500 mg by mouth every six (6) hours as needed for Pain.  multivitamin (ONE A DAY) tablet Take 1 Tab by mouth daily. No current facility-administered medications on file prior to visit. Physical Exam   Gen:  WA, WN, NAD  HEENT:  EOMI  Neck:  (-) JVD  CVS:  S1/S2. Pul:  CTA b/l (-) r,r,w  Abd:  Soft, NT,ND  Ext:  Trace BLE edema  Neuro:  No obvious deficits    Wt Readings from Last 3 Encounters:   10/10/17 130 lb 12.8 oz (59.3 kg)   10/03/17 132 lb (59.9 kg)   09/26/17 129 lb 4.8 oz (58.7 kg)     Lab Results   Component Value Date/Time    Sodium 148 09/29/2017 05:56 PM    Potassium 4.7 09/29/2017 05:56 PM    Chloride 108 09/29/2017 05:56 PM    CO2 27 09/29/2017 05:56 PM    Anion gap 10 04/22/2017 03:51 AM    Glucose 87 09/29/2017 05:56 PM    BUN 23 09/29/2017 05:56 PM    Creatinine 1.38 09/29/2017 05:56 PM    BUN/Creatinine ratio 17 09/29/2017 05:56 PM    GFR est AA 38 09/29/2017 05:56 PM    GFR est non-AA 33 09/29/2017 05:56 PM    Calcium 9.3 09/29/2017 05:56 PM     Lab Results   Component Value Date/Time    WBC 4.3 04/20/2017 09:40 AM    HGB 11.9 04/20/2017 09:40 AM    HCT 36.7 04/20/2017 09:40 AM    PLATELET 815 81/91/5743 09:40 AM    MCV 94.8 04/20/2017 09:40 AM     Visit Vitals    BP 94/52 (BP 1 Location: Left arm, BP Patient Position: Sitting)    Pulse 67    Temp 97.7 °F (36.5 °C) (Oral)    Resp 20    Ht 5' 2\" (1.575 m)    Wt 130 lb 12.8 oz (59.3 kg)    SpO2 98%    BMI 23.92 kg/m2       ASSESSMENT and PLAN    NICM HFrEF 20-25% NYHA, class II  1. Cont Entresto  49/51mg 1 tablet po BID- cannot uptitrate due to BP  2. Cont lasix 20mg daily  3. Continue Coreg  4. Continue daily weights  5. Reviewed importance of sodium and fluid restriction  6. Return to clinic in 2 months    7.  Labs in 1 month     VT - s/p AICD - stable    New onset Afib:    Cont Eliquis   Follow with Dr. Tubbs Samples DILAN -  Pt.  To continue nocturnal home oxygen    HLD - continue Lipitor and CoQ10      Thank you for letting us see her with you,      Shawn Church, ABI    [address] [place] [address]  Dept: [phone]  Dept Fax: [phone]: [phone] Fax: [phone]  24 hour VAD/HF Pager: [phone]

## 2017-10-10 NOTE — MR AVS SNAPSHOT
Visit Information Date & Time Provider Department Dept. Phone Encounter #  
 10/10/2017 10:30 AM Angel Veliz MD [address] [id number] Your Appointments 12/21/2017  9:15 AM  
Any with Genesis Tay MD  
[address] [place] [address] and Primary Care [facility]) Appt Note: follow up  
 Blaire Monroy [address] [facility] [place]  
  
   
 [address] [phone]  
  
    
 4/3/2018 10:45 PM  
ESTABLISHED PATIENT with MD Gabriel Polanco [facility]) Appt Note: 6 MO. F/U  
 [address]  
[phone] [address]  
  
    
  
 10/25/2017  8:00 AM  
REMOTE OFFICE VISIT with [facility]) Appt Note: NOT AN OFFICE VISIT - REMOTE BSC ICD  
 [address]  
[phone] [address] Upcoming Health Maintenance Date Due  
 GLAUCOMA SCREENING Q2Y 12/18/2017 MEDICARE YEARLY EXAM 6/16/2018 DTaP/Tdap/Td series (2 - Td) 2/13/2027 Allergies as of 10/10/2017  Review Complete On: 10/10/2017 By: Haley Fletcher Severity Noted Reaction Type Reactions Codeine  05/21/2012   Side Effect Other (comments) \"made me disoriented\" per pt Current Immunizations  Reviewed on 6/29/2015 Name Date Influenza High Dose Vaccine PF 9/27/2017 Influenza Vaccine 10/1/2016, 10/4/2014 Influenza Vaccine Split 10/22/2011 ZZZ-RETIRED (DO NOT USE) Pneumococcal Vaccine (Unspecified Type) 5/22/2007 Not reviewed this visit You Were Diagnosed With   
  
 Codes Comments Cardiomyopathy, dilated, nonischemic (HCC)    -  Primary ICD-10-CM: I42.9 ICD-9-CM: 425.4 Essential hypertension     ICD-10-CM: I10 
ICD-9-CM: 401.9 Vitals BP Pulse Temp Resp Height(growth percentile) Weight(growth percentile) 94/52 (BP 1 Location: Left arm, BP Patient Position: Sitting) 67 97.7 °F (36.5 °C) (Oral) 20 5' 2\" (1.575 m) 130 lb 12.8 oz (59.3 kg) SpO2 BMI OB Status Smoking Status 98% 23.92 kg/m2 Postmenopausal Never Smoker Vitals History BMI and BSA Data Body Mass Index Body Surface Area  
 23.92 kg/m 2 1.61 m 2 Preferred Pharmacy Pharmacy Name Phone 69 [facility] [address]18 [address]69 [address] Your Updated Medication List  
  
   
This list is accurate as of: 10/10/17 11:23 AM.  Always use your most recent med list.  
  
  
  
  
 albuterol 2.5 mg /3 mL (0.083 %) nebulizer solution Commonly known as:  PROVENTIL VENTOLIN  
3 mL by Nebulization route every four (4) hours as needed for Wheezing. allopurinol 300 mg tablet Commonly known as:  ZYLOPRIM  
TAKE ONE (1) TABLET(S) DAILY  
  
 apixaban 2.5 mg tablet Commonly known as:  Tiffani Minus Take 1 Tab by mouth two (2) times a day. Indications: PREVENT THROMBOEMBOLISM IN CHRONIC ATRIAL FIBRILLATION  
  
 atorvastatin 10 mg tablet Commonly known as:  LIPITOR  
TAKE ONE (1) TABLET(S) DAILY  
  
 carvedilol 3.125 mg tablet Commonly known as:  COREG  
TAKE ONE (1) TABLET(S) TWIC E DAILY WITH FOOD  Indications: Chronic Heart Failure  
  
 co-enzyme Q-10 100 mg capsule Commonly known as:  CO Q-10 Take 100 mg by mouth daily. fluticasone-salmeterol 250-50 mcg/dose diskus inhaler Commonly known as:  ADVAIR Take 1 Puff by inhalation two (2) times a day. furosemide 20 mg tablet Commonly known as:  LASIX Take 1 Tab by mouth daily. IMBRUVICA 140 mg capsule Generic drug:  ibrutinib Take 420 mg by mouth five (5) days a week. 140 mg cap, takes 3 caps five days a week (none on Sunday or Wednesday)  
  
 loratadine 10 mg tablet Commonly known as:  Tecumseh Saba  
 Take 10 mg by mouth daily as needed. magnesium oxide 400 mg Cap Take 1 Cap by mouth daily. multivitamin tablet Commonly known as:  ONE A DAY Take 1 Tab by mouth daily. OXYGEN-AIR DELIVERY SYSTEMS  
2 L by Does Not Apply route nightly. potassium chloride SR 10 mEq tablet Commonly known as:  KLOR-CON 10 Take 1 Tab by mouth daily. sacubitril-valsartan 49-51 mg Tab tablet Commonly known as:  ENTRESTO Take 1 Tab by mouth two (2) times a day. TYLENOL EXTRA STRENGTH 500 mg tablet Generic drug:  acetaminophen Take 500 mg by mouth every six (6) hours as needed for Pain. VITAMIN D3 1,000 unit Cap Generic drug:  cholecalciferol Take 1,000 Units by mouth daily. We Performed the Following METABOLIC PANEL, COMPREHENSIVE [21120 CPT(R)] NT-PRO BNP [id number] CPT(R)] Patient Instructions No changes to medications today Labs in the beginning of November You can take the Mucinex without the D Follow up in 2 months Please keep an eye on your cough, if you develop a fever call your PCP. Introducing hospitals & HEALTH SERVICES! Dear Scotty Marie: Thank you for requesting a Sapho account. Our records indicate that you already have an active Sapho account. You can access your account anytime at [email or web address] Did you know that you can access your hospital and ER discharge instructions at any time in Sapho? You can also review all of your test results from your hospital stay or ER visit. Additional Information If you have questions, please visit the Frequently Asked Questions section of the Sapho website at [email or web address] Remember, Sapho is NOT to be used for urgent needs. For medical emergencies, dial 911. Now available from your iPhone and Android! Please provide this summary of care documentation to your next provider. Your primary care clinician is listed as Gail Dey. If you have any questions after today's visit, please call [phone].

## 2017-10-10 NOTE — LETTER
10/10/2017 11:39 AM 
 
Patient:  Jabier Powell YOB: 1925 Date of Visit: 10/10/2017 Dear Angel Workman MD 
[facility] [address] [facility] 7 [address] VIA In Basket Norma Singleton MD 
[address] [facility] [address] [address] VIA In Basket 
 : Thank you for referring Ms. Josseline Patel to me for evaluation/treatment. Below are the relevant portions of my assessment and plan of care. HISTORY OF PRESENT ILLNESS Jabier Powell is a 80 y.o. AA, F w/ PMH of HTN, NICM (EF of 20-25% by Echo 2/3/17). MYHA class II, s/p BiVICD 2015, hyperlipidemia, LBBB, gout, GERD, PUD, DILAN, Non-Hodkin's lymphoma and Reactive airway disease who presents today for follow up. Pt reports she is feeling well, just returned from a 2 month trip to the [address] [place] [address] with her son. She states she was able to do all of the planned activities, didn't gain any weight while traveling. Took all of her medications. She does have a productive cough with clear sputum. She is very active and has been able to continue her treadmill walking 1-2 miles 3-4 days a week. She is performing daily weights and watching her sodium consumption. Pt c/o some JENKINS with moderate exertion, cough, early saiety but this is not changed for her. Pt denies HA, fatigue, fevers, chills, lightheadedness, dizziness, syncope, N/V/ constipation, changes to appetite, chest pain, SOB, depression or anxiety Past Medical History:  
Diagnosis Date  Asthma  Cancer ([address] lymphoma  Congestive heart failure, unspecified  Heart failure ([address]  Hypertension  Other ill-defined conditions(799.89)   
 heart murmur  Presence of biventricular automatic cardioverter/defibrillator (AICD) 7/2/2015 [employer] biventricular AICD implant  Stomach ulcer Past Surgical History:  
Procedure Laterality Date  HX BREAST BIOPSY Bilateral   
 40 years ago  HX COLONOSCOPY    
 HX HEENT    
 cataracts removed  HX HYSTERECTOMY  HX OTHER SURGICAL    
 lymph node surgery  HX TONSILLECTOMY  NE EGD TRANSORAL BIOPSY SINGLE/MULTIPLE  5/23/2012  
    
 SINUS SURGERY PROC UNLISTED Nasal polyps removed Family History Problem Relation Age of Onset  Heart Disease Mother  Stroke Father Social History Social History  Marital status:  Spouse name: N/A  
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  
 Drug use: No  
 Sexual activity: No  
 
Other Topics Concern  Not on file Social History Narrative Current Outpatient Prescriptions on File Prior to Visit Medication Sig Dispense Refill  sacubitril-valsartan (ENTRESTO) 49 mg/51 mg tablet Take 1 Tab by mouth two (2) times a day. 180 Tab 3  
 apixaban (ELIQUIS) 2.5 mg tablet Take 1 Tab by mouth two (2) times a day. Indications: PREVENT THROMBOEMBOLISM IN CHRONIC ATRIAL FIBRILLATION 180 Tab 3  
 magnesium oxide 400 mg cap Take 1 Cap by mouth daily.  cholecalciferol (VITAMIN D3) 1,000 unit cap Take 1,000 Units by mouth daily.  furosemide (LASIX) 20 mg tablet Take 1 Tab by mouth daily. (Patient taking differently: Take 40 mg by mouth two (2) times a day.) 30 Tab 11  
 potassium chloride SR (KLOR-CON 10) 10 mEq tablet Take 1 Tab by mouth daily. 30 Tab 11  
 carvedilol (COREG) 3.125 mg tablet TAKE ONE (1) TABLET(S) TWIC E DAILY WITH FOOD  Indications: Chronic Heart Failure 180 Tab 3  
 albuterol (PROVENTIL VENTOLIN) 2.5 mg /3 mL (0.083 %) nebulizer solution 3 mL by Nebulization route every four (4) hours as needed for Wheezing.  100 Each 11  
 allopurinol (ZYLOPRIM) 300 mg tablet TAKE ONE (1) TABLET(S) DAILY 90 Tab 3  
 atorvastatin (LIPITOR) 10 mg tablet TAKE ONE (1) TABLET(S) DAILY 90 Tab 3  
  fluticasone-salmeterol (ADVAIR) 250-50 mcg/dose diskus inhaler Take 1 Puff by inhalation two (2) times a day. 3 Inhaler 3  
 loratadine (CLARITIN) 10 mg tablet Take 10 mg by mouth daily as needed.  co-enzyme Q-10 (CO Q-10) 100 mg capsule Take 100 mg by mouth daily.  OXYGEN-AIR DELIVERY SYSTEMS 2 L by Does Not Apply route nightly.  ibrutinib (IMBRUVICA) 140 mg cap Take 420 mg by mouth five (5) days a week. 140 mg cap, takes 3 caps five days a week (none on Sunday or Wednesday)  acetaminophen (TYLENOL EXTRA STRENGTH) 500 mg tablet Take 500 mg by mouth every six (6) hours as needed for Pain.  multivitamin (ONE A DAY) tablet Take 1 Tab by mouth daily. No current facility-administered medications on file prior to visit. Physical Exam  
Gen:  WA, WN, NAD HEENT:  EOMI Neck:  (-) JVD 
CVS:  S1/S2. Pul:  CTA b/l (-) r,r,w 
Abd:  Soft, NT,ND Ext:  Trace BLE edema Neuro:  No obvious deficits Wt Readings from Last 3 Encounters:  
10/10/17 130 lb 12.8 oz (59.3 kg) 10/03/17 132 lb (59.9 kg) 09/26/17 129 lb 4.8 oz (58.7 kg) Lab Results Component Value Date/Time Sodium 148 09/29/2017 05:56 PM  
 Potassium 4.7 09/29/2017 05:56 PM  
 Chloride 108 09/29/2017 05:56 PM  
 CO2 27 09/29/2017 05:56 PM  
 Anion gap 10 04/22/2017 03:51 AM  
 Glucose 87 09/29/2017 05:56 PM  
 BUN 23 09/29/2017 05:56 PM  
 Creatinine 1.38 09/29/2017 05:56 PM  
 BUN/Creatinine ratio 17 09/29/2017 05:56 PM  
 GFR est AA 38 09/29/2017 05:56 PM  
 GFR est non-AA 33 09/29/2017 05:56 PM  
 Calcium 9.3 09/29/2017 05:56 PM  
 
Lab Results Component Value Date/Time WBC 4.3 04/20/2017 09:40 AM  
 HGB 11.9 04/20/2017 09:40 AM  
 HCT 36.7 04/20/2017 09:40 AM  
 PLATELET 513 40/11/0635 09:40 AM  
 MCV 94.8 04/20/2017 09:40 AM  
 
Visit Vitals  BP 94/52 (BP 1 Location: Left arm, BP Patient Position: Sitting)  Pulse 67  Temp 97.7 °F (36.5 °C) (Oral)  Resp 20  
 Ht 5' 2\" (1.575 m)  Wt 130 lb 12.8 oz (59.3 kg)  SpO2 98%  BMI 23.92 kg/m2 ASSESSMENT and PLAN 
 
NICM HFrEF 20-25% NYHA, class II 1. Cont Entresto  49/51mg 1 tablet po BID- cannot uptitrate due to BP 2. Cont lasix 20mg daily 3. Continue Coreg 4. Continue daily weights 5. Reviewed importance of sodium and fluid restriction 6. Return to clinic in 2 months 7. Labs in 1 month VT - s/p AICD - stable New onset Afib:   
Cont Eliquis Follow with Dr. Miko Macario DILAN -  Pt. To continue nocturnal home oxygen HLD - continue Lipitor and CoQ10 Thank you for letting us see her with you, Curtis Quan NP 
 
[address] [place] [address] Dept: [phone] Dept Fax: [phone] Loc: [phone] Loc Fax: [phone] 
24 hour VAD/HF Pager: [phone] If you have questions, please do not hesitate to call me. I look forward to following Ms. Maryjane Salcido along with you. Sincerely, Prince Derek MD

## 2017-10-10 NOTE — PROGRESS NOTES
HISTORY OF PRESENT ILLNESS  Leisa Macario is a 80 y.o. AA, F w/ PMH of HTN, NICM (EF of 20-25% by Echo 2/3/17). MYHA class II, s/p BiVICD 2015, hyperlipidemia, LBBB, gout, GERD, PUD, DILAN, Non-Hodkin's lymphoma and Reactive airway disease who presents today for follow up. Pt reports she is feeling well, just returned from a 2 month trip to the [address] [place] [address] with her son. She states she was able to do all of the planned activities, didn't gain any weight while traveling. Took all of her medications. She does have a productive cough with clear sputum. She is very active and has been able to continue her treadmill walking 1-2 miles 3-4 days a week. She is performing daily weights and watching her sodium consumption. Pt c/o some JENKINS with moderate exertion, cough, early saiety but this is not changed for her.      Pt denies HA, fatigue, fevers, chills, lightheadedness, dizziness, syncope, N/V/ constipation, changes to appetite, chest pain, SOB, depression or anxiety    Past Medical History:   Diagnosis Date    Asthma     Cancer ([facility]     lymphoma    Congestive heart failure, unspecified     Heart failure ([address]     Hypertension     Other ill-defined conditions(799.89)     heart murmur    Presence of biventricular automatic cardioverter/defibrillator (AICD) 7/2/2015    [employer] biventricular AICD implant    Stomach ulcer      Past Surgical History:   Procedure Laterality Date    HX BREAST BIOPSY Bilateral     40 years ago    HX COLONOSCOPY      HX HEENT      cataracts removed    HX HYSTERECTOMY      HX OTHER SURGICAL      lymph node surgery    HX TONSILLECTOMY      CO EGD TRANSORAL BIOPSY SINGLE/MULTIPLE  5/23/2012         SINUS SURGERY PROC UNLISTED      Nasal polyps removed     Family History   Problem Relation Age of Onset    Heart Disease Mother     Stroke Father      Social History     Social History    Marital status:      Spouse name: N/A    Number of children: 1    Years of education: 16+     Occupational History    retired teacher      Social History Main Topics    Smoking status: Never Smoker    Smokeless tobacco: Never Used    Alcohol use No    Drug use: No    Sexual activity: No     Other Topics Concern    Not on file     Social History Narrative     Current Outpatient Prescriptions on File Prior to Visit   Medication Sig Dispense Refill    sacubitril-valsartan (ENTRESTO) 49 mg/51 mg tablet Take 1 Tab by mouth two (2) times a day. 180 Tab 3    apixaban (ELIQUIS) 2.5 mg tablet Take 1 Tab by mouth two (2) times a day. Indications: PREVENT THROMBOEMBOLISM IN CHRONIC ATRIAL FIBRILLATION 180 Tab 3    magnesium oxide 400 mg cap Take 1 Cap by mouth daily.  cholecalciferol (VITAMIN D3) 1,000 unit cap Take 1,000 Units by mouth daily.  furosemide (LASIX) 20 mg tablet Take 1 Tab by mouth daily. (Patient taking differently: Take 40 mg by mouth two (2) times a day.) 30 Tab 11    potassium chloride SR (KLOR-CON 10) 10 mEq tablet Take 1 Tab by mouth daily. 30 Tab 11    carvedilol (COREG) 3.125 mg tablet TAKE ONE (1) TABLET(S) TWIC E DAILY WITH FOOD  Indications: Chronic Heart Failure 180 Tab 3    albuterol (PROVENTIL VENTOLIN) 2.5 mg /3 mL (0.083 %) nebulizer solution 3 mL by Nebulization route every four (4) hours as needed for Wheezing. 100 Each 11    allopurinol (ZYLOPRIM) 300 mg tablet TAKE ONE (1) TABLET(S) DAILY 90 Tab 3    atorvastatin (LIPITOR) 10 mg tablet TAKE ONE (1) TABLET(S) DAILY 90 Tab 3    fluticasone-salmeterol (ADVAIR) 250-50 mcg/dose diskus inhaler Take 1 Puff by inhalation two (2) times a day. 3 Inhaler 3    loratadine (CLARITIN) 10 mg tablet Take 10 mg by mouth daily as needed.  co-enzyme Q-10 (CO Q-10) 100 mg capsule Take 100 mg by mouth daily.  OXYGEN-AIR DELIVERY SYSTEMS 2 L by Does Not Apply route nightly.  ibrutinib (IMBRUVICA) 140 mg cap Take 420 mg by mouth five (5) days a week.  140 mg cap, takes 3 caps five days a week (none on Sunday or Wednesday)      acetaminophen (TYLENOL EXTRA STRENGTH) 500 mg tablet Take 500 mg by mouth every six (6) hours as needed for Pain.  multivitamin (ONE A DAY) tablet Take 1 Tab by mouth daily. No current facility-administered medications on file prior to visit. Physical Exam   Gen:  WA, WN, NAD  HEENT:  EOMI  Neck:  (-) JVD  CVS:  S1/S2. Pul:  CTA b/l (-) r,r,w  Abd:  Soft, NT,ND  Ext:  Trace BLE edema  Neuro:  No obvious deficits    Wt Readings from Last 3 Encounters:   10/10/17 130 lb 12.8 oz (59.3 kg)   10/03/17 132 lb (59.9 kg)   09/26/17 129 lb 4.8 oz (58.7 kg)     Lab Results   Component Value Date/Time    Sodium 148 09/29/2017 05:56 PM    Potassium 4.7 09/29/2017 05:56 PM    Chloride 108 09/29/2017 05:56 PM    CO2 27 09/29/2017 05:56 PM    Anion gap 10 04/22/2017 03:51 AM    Glucose 87 09/29/2017 05:56 PM    BUN 23 09/29/2017 05:56 PM    Creatinine 1.38 09/29/2017 05:56 PM    BUN/Creatinine ratio 17 09/29/2017 05:56 PM    GFR est AA 38 09/29/2017 05:56 PM    GFR est non-AA 33 09/29/2017 05:56 PM    Calcium 9.3 09/29/2017 05:56 PM     Lab Results   Component Value Date/Time    WBC 4.3 04/20/2017 09:40 AM    HGB 11.9 04/20/2017 09:40 AM    HCT 36.7 04/20/2017 09:40 AM    PLATELET 367 77/41/5489 09:40 AM    MCV 94.8 04/20/2017 09:40 AM     Visit Vitals    BP 94/52 (BP 1 Location: Left arm, BP Patient Position: Sitting)    Pulse 67    Temp 97.7 °F (36.5 °C) (Oral)    Resp 20    Ht 5' 2\" (1.575 m)    Wt 130 lb 12.8 oz (59.3 kg)    SpO2 98%    BMI 23.92 kg/m2       ASSESSMENT and PLAN    NICM HFrEF 20-25% NYHA, class II  1. Cont Entresto  49/51mg 1 tablet po BID- cannot uptitrate due to BP  2. Cont lasix 20mg daily  3. Continue Coreg  4. Continue daily weights  5. Reviewed importance of sodium and fluid restriction  6. Return to clinic in 2 months    7.  Labs in 1 month     VT - s/p AICD - stable    New onset Afib:    Cont Eliquis   Follow with Dr. Zeferino Mendenhall DILAN -  Pt.  To continue nocturnal home oxygen    HLD - continue Lipitor and CoQ10      Thank you for letting us see her with you,      Ravi Handley, NP    [address] [place] [address]  Dept: [phone]  Dept Fax: [phone]: [phone] Fax: [phone]  24 hour VAD/HF Pager: [phone]

## 2017-10-20 ENCOUNTER — OFFICE VISIT (OUTPATIENT)
Dept: INTERNAL MEDICINE CLINIC | Age: 82
End: 2017-10-20

## 2017-10-20 VITALS
HEART RATE: 89 BPM | RESPIRATION RATE: 16 BRPM | OXYGEN SATURATION: 97 % | HEIGHT: 62 IN | BODY MASS INDEX: 23.9 KG/M2 | DIASTOLIC BLOOD PRESSURE: 60 MMHG | SYSTOLIC BLOOD PRESSURE: 95 MMHG | WEIGHT: 129.9 LBS | TEMPERATURE: 97.7 F

## 2017-10-20 DIAGNOSIS — I42.0 CARDIOMYOPATHY, DILATED, NONISCHEMIC (HCC): Chronic | ICD-10-CM

## 2017-10-20 DIAGNOSIS — I95.9 HYPOTENSION, UNSPECIFIED HYPOTENSION TYPE: ICD-10-CM

## 2017-10-20 DIAGNOSIS — J45.40 MODERATE PERSISTENT REACTIVE AIRWAY DISEASE WITHOUT COMPLICATION: Primary | ICD-10-CM

## 2017-10-20 DIAGNOSIS — I48.0 PAROXYSMAL ATRIAL FIBRILLATION (HCC): ICD-10-CM

## 2017-10-20 DIAGNOSIS — C85.91 NON-HODGKIN LYMPHOMA OF LYMPH NODES OF NECK, UNSPECIFIED NON-HODGKIN LYMPHOMA TYPE (HCC): ICD-10-CM

## 2017-10-20 NOTE — MR AVS SNAPSHOT
Visit Information Date & Time Provider Department Dept. Phone Encounter #  
 10/20/2017 12:30 PM Theodore Avitia MD [facility] [id number] Your Appointments 11/8/2017  1:00 PM  
Follow Up with Joe Huynh MD  
[address] ([address]) [facility] [address]  
[phone]  
  
   
 [address] [address]  
  
    
 12/21/2017  9:15 AM  
Any with Theodore Avitia MD  
[address] [place] [address] and Primary Care [address] [facility]) Appt Note: follow up  
 [phone]  
  
   
 [address] [phone]  
  
    
 4/3/2018 10:45 PM  
ESTABLISHED PATIENT with Mika Doty MD  
[facility]) Appt Note: 6 MO. F/U  
 [address]  
[phone] [address]  
  
    
  
 10/25/2017  8:00 AM  
REMOTE OFFICE VISIT with [facility]) Appt Note: NOT AN OFFICE VISIT - REMOTE BSC ICD  
 [address]  
[phone] [address] Upcoming Health Maintenance Date Due  
 GLAUCOMA SCREENING Q2Y 12/18/2017 MEDICARE YEARLY EXAM 6/16/2018 DTaP/Tdap/Td series (2 - Td) 2/13/2027 Allergies as of 10/20/2017  Review Complete On: 10/20/2017 By: Heather Boogie Severity Noted Reaction Type Reactions Codeine  05/21/2012   Side Effect Other (comments) \"made me disoriented\" per pt Current Immunizations  Reviewed on 6/29/2015 Name Date Influenza High Dose Vaccine PF 9/27/2017 Influenza Vaccine 10/1/2016, 10/4/2014 Influenza Vaccine Split 10/22/2011 ZZZ-RETIRED (DO NOT USE) Pneumococcal Vaccine (Unspecified Type) 5/22/2007 Not reviewed this visit Vitals BP Pulse Temp Resp Height(growth percentile) Weight(growth percentile) 95/60 (BP 1 Location: Right arm, BP Patient Position: Sitting) 89 97.7 °F (36.5 °C) (Oral) 16 5' 2\" (1.575 m) 129 lb 14.4 oz (58.9 kg) SpO2 BMI OB Status Smoking Status 97% 23.76 kg/m2 Postmenopausal Never Smoker Vitals History BMI and BSA Data Body Mass Index Body Surface Area  
 23.76 kg/m 2 1.61 m 2 Preferred Pharmacy Pharmacy Name Phone 69 [facility] 5730 [address] Your Updated Medication List  
  
   
This list is accurate as of: 10/20/17  2:36 PM.  Always use your most recent med list.  
  
  
  
  
 albuterol 2.5 mg /3 mL (0.083 %) nebulizer solution Commonly known as:  PROVENTIL VENTOLIN  
3 mL by Nebulization route every four (4) hours as needed for Wheezing. allopurinol 300 mg tablet Commonly known as:  ZYLOPRIM  
TAKE ONE (1) TABLET(S) DAILY  
  
 apixaban 2.5 mg tablet Commonly known as:  Elvia Loll Take 1 Tab by mouth two (2) times a day. Indications: PREVENT THROMBOEMBOLISM IN CHRONIC ATRIAL FIBRILLATION  
  
 atorvastatin 10 mg tablet Commonly known as:  LIPITOR  
TAKE ONE (1) TABLET(S) DAILY  
  
 carvedilol 3.125 mg tablet Commonly known as:  COREG  
TAKE ONE (1) TABLET(S) TWIC E DAILY WITH FOOD  Indications: Chronic Heart Failure  
  
 co-enzyme Q-10 100 mg capsule Commonly known as:  CO Q-10 Take 100 mg by mouth daily. fluticasone-salmeterol 250-50 mcg/dose diskus inhaler Commonly known as:  ADVAIR Take 1 Puff by inhalation two (2) times a day. furosemide 20 mg tablet Commonly known as:  LASIX Take 1 Tab by mouth daily. IMBRUVICA 140 mg capsule Generic drug:  ibrutinib Take 420 mg by mouth five (5) days a week.  140 mg cap, takes 3 caps five days a week (none on Sunday or Wednesday)  
  
 loratadine 10 mg tablet Commonly known as:  Jearline Ania Take 10 mg by mouth daily as needed. magnesium oxide 400 mg Cap Take 1 Cap by mouth daily. multivitamin tablet Commonly known as:  ONE A DAY Take 1 Tab by mouth daily. OXYGEN-AIR DELIVERY SYSTEMS  
2 L by Does Not Apply route nightly. potassium chloride SR 10 mEq tablet Commonly known as:  KLOR-CON 10 Take 1 Tab by mouth daily. sacubitril-valsartan 49-51 mg Tab tablet Commonly known as:  ENTRESTO Take 1 Tab by mouth two (2) times a day. TYLENOL EXTRA STRENGTH 500 mg tablet Generic drug:  acetaminophen Take 500 mg by mouth every six (6) hours as needed for Pain. VITAMIN D3 1,000 unit Cap Generic drug:  cholecalciferol Take 1,000 Units by mouth daily. Introducing [facility] & HEALTH SERVICES! Dear Romayne Eng: Thank you for requesting a Kovio account. Our records indicate that you already have an active Kovio account. You can access your account anytime at [email or web address] Did you know that you can access your hospital and ER discharge instructions at any time in Kovio? You can also review all of your test results from your hospital stay or ER visit. Additional Information If you have questions, please visit the Frequently Asked Questions section of the Kovio website at [email or web address] Remember, Kovio is NOT to be used for urgent needs. For medical emergencies, dial 911. Now available from your iPhone and Android! Please provide this summary of care documentation to your next provider. Your primary care clinician is listed as Gail Dey. If you have any questions after today's visit, please call [phone].

## 2017-10-21 PROBLEM — I48.0 PAROXYSMAL ATRIAL FIBRILLATION (HCC): Status: ACTIVE | Noted: 2017-10-21

## 2017-10-21 NOTE — PROGRESS NOTES
[address] [facility]  [place] [address]  [address] [place] [id number]  Phone:  [phone]  Fax: [phone]       Chief Complaint   Patient presents with    Cough     patient complains of \"dry\" cough x 8to10 days. .      SUBJECTIVE:    Nolberto Landry is a 80 y.o. female Comes in for return visit complaining of persistent cough starting about three to four days ago. This is a hacky cough, which is nonproductive. There is no increasing shortness of breath, orthopnea, PND. Her weight has been stably also. She has an existing cardiomyopathy, non-ischemic, which has been reasonably stable. Blood pressure has been adequate. A non-Hodgkin's lymphoma has been reasonably stable. 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 Finally, the patient has had paroxysmal atrial fibrillation, but weight control has been excellent, and she remains on her anticonvulsant. Current Outpatient Prescriptions   Medication Sig Dispense Refill    predniSONE (DELTASONE) 20 mg tablet Take 1 Tab by mouth three (3) times daily (with meals). 21 Tab 0    sacubitril-valsartan (ENTRESTO) 49 mg/51 mg tablet Take 1 Tab by mouth two (2) times a day. 180 Tab 3    apixaban (ELIQUIS) 2.5 mg tablet Take 1 Tab by mouth two (2) times a day. Indications: PREVENT THROMBOEMBOLISM IN CHRONIC ATRIAL FIBRILLATION 180 Tab 3    cholecalciferol (VITAMIN D3) 1,000 unit cap Take 1,000 Units by mouth daily.  furosemide (LASIX) 20 mg tablet Take 1 Tab by mouth daily. (Patient taking differently: Take 40 mg by mouth two (2) times a day.) 30 Tab 11    potassium chloride SR (KLOR-CON 10) 10 mEq tablet Take 1 Tab by mouth daily.  30 Tab 11    carvedilol (COREG) 3.125 mg tablet TAKE ONE (1) TABLET(S) TWIC E DAILY WITH FOOD  Indications: Chronic Heart Failure 180 Tab 3    albuterol (PROVENTIL VENTOLIN) 2.5 mg /3 mL (0.083 %) nebulizer solution 3 mL by Nebulization route every four (4) hours as needed for Wheezing. 100 Each 11    allopurinol (ZYLOPRIM) 300 mg tablet TAKE ONE (1) TABLET(S) DAILY 90 Tab 3    atorvastatin (LIPITOR) 10 mg tablet TAKE ONE (1) TABLET(S) DAILY 90 Tab 3    fluticasone-salmeterol (ADVAIR) 250-50 mcg/dose diskus inhaler Take 1 Puff by inhalation two (2) times a day. 3 Inhaler 3    co-enzyme Q-10 (CO Q-10) 100 mg capsule Take 100 mg by mouth daily.  ibrutinib (IMBRUVICA) 140 mg cap Take 420 mg by mouth five (5) days a week. 140 mg cap, takes 3 caps five days a week (none on Sunday or Wednesday)      acetaminophen (TYLENOL EXTRA STRENGTH) 500 mg tablet Take 500 mg by mouth every six (6) hours as needed for Pain.  multivitamin (ONE A DAY) tablet Take 1 Tab by mouth daily.  magnesium oxide 400 mg cap Take 1 Cap by mouth daily.  loratadine (CLARITIN) 10 mg tablet Take 10 mg by mouth daily as needed.  OXYGEN-AIR DELIVERY SYSTEMS 2 L by Does Not Apply route nightly.        Past Medical History:   Diagnosis Date    Asthma     Cancer ([facility] [address]     lymphoma    Congestive heart failure, unspecified     Heart failure (HCC)     Hypertension     Other ill-defined conditions(799.89)     heart murmur    Presence of biventricular automatic cardioverter/defibrillator (AICD) 7/2/2015    [employer] biventricular AICD implant    Stomach ulcer      Past Surgical History:   Procedure Laterality Date    HX BREAST BIOPSY Bilateral     40 years ago    HX COLONOSCOPY      HX HEENT      cataracts removed    HX HYSTERECTOMY      HX OTHER SURGICAL      lymph node surgery    HX TONSILLECTOMY      IN EGD TRANSORAL BIOPSY SINGLE/MULTIPLE  5/23/2012         SINUS SURGERY PROC UNLISTED      Nasal polyps removed     Allergies   Allergen Reactions    Codeine Other (comments)     \"made me disoriented\" per pt         REVIEW OF SYSTEMS:  General: negative for - chills or fever  ENT: negative for - headaches, nasal congestion or tinnitus  Respiratory: negative for - cough, hemoptysis, shortness of breath or wheezing  Cardiovascular : negative for - chest pain, edema, palpitations or shortness of breath  Gastrointestinal: negative for - abdominal pain, blood in stools, heartburn or nausea/vomiting  Genito-Urinary: no dysuria, trouble voiding, or hematuria  Musculoskeletal: negative for - gait disturbance, joint pain, joint stiffness or joint swelling  Neurological: no TIA or stroke symptoms  Hematologic: no bruises, no bleeding, no swollen glands  Integument: no lumps, mole changes, nail changes or rash  Endocrine: no malaise/lethargy or unexpected weight changes      Social History     Social History    Marital status:      Spouse name: N/A    Number of children: 1    Years of education: 16+     Occupational History    retired teacher      Social History Main Topics    Smoking status: Never Smoker    Smokeless tobacco: Never Used    Alcohol use No    Drug use: No    Sexual activity: No     Other Topics Concern    None     Social History Narrative     Family History   Problem Relation Age of Onset    Heart Disease Mother     Stroke Father        OBJECTIVE:    Visit Vitals    BP 95/60 (BP 1 Location: Right arm, BP Patient Position: Sitting)    Pulse 89    Temp 97.7 °F (36.5 °C) (Oral)    Resp 16    Ht 5' 2\" (1.575 m)    Wt 129 lb 14.4 oz (58.9 kg)    SpO2 97%    BMI 23.76 kg/m2     CONSTITUTIONAL: well , well nourished, appears age appropriate  EYES: perrla, eom intact  ENMT:moist mucous membranes, pharynx clear  NECK: supple. Thyroid normal, JVD increased 2 cm  RESPIRATORY: Chest: Bilateral fine expiratory rhonchi  CARDIOVASCULAR: Heart: regular rate and rhythm  GASTROINTESTINAL: Abdomen: soft, bowel sounds active  HEMATOLOGIC: no pathological lymph nodes palpated  MUSCULOSKELETAL: Extremities: no edema, pulse 1+   INTEGUMENT: No unusual rashes or suspicious skin lesions noted.  Nails appear normal.  NEUROLOGIC: non-focal exam   MENTAL STATUS: alert and oriented, appropriate affect      ASSESSMENT:  1. Moderate persistent reactive airway disease without complication    2. 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 Hypotension, unspecified hypotension type    4. Paroxysmal atrial fibrillation (HCC)    5. 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 The patient appears to have a flare of her acute reactive airway disease, not complicated by cardiac decompensation. She will continue the Advair, but has to use on a regular basis, which she had not been doing, at one puff twice a day, along with use of her Albuterol nebulizer four times a day and Prednisone 20 mg t.i.d., p.c for seven days. 2. Her heart failure appears to be well compensated. Her neck veins are slightly elevated as compared to her last visit, but her weight has been extremely stable. No adjustment in her Furosemide for now. 3. Blood pressure is adequate and stable at her current level effecting her low output state. 4. Her heart rhythm is quite regular today. 5. She will continue to follow up with her non-Hodgkin's lymphoma. No obvious palpable lymph nodes are currently present. Follow-up Disposition:  Return keep old apt.       Genesis Tay MD

## 2017-10-23 RX ORDER — PREDNISONE 20 MG/1
20 TABLET ORAL
Qty: 21 TAB | Refills: 0 | Status: SHIPPED | OUTPATIENT
Start: 2017-10-23 | End: 2017-11-07 | Stop reason: ALTCHOICE

## 2017-10-25 ENCOUNTER — OFFICE VISIT (OUTPATIENT)
Dept: CARDIOLOGY CLINIC | Age: 82
End: 2017-10-25

## 2017-10-25 DIAGNOSIS — I42.0 CARDIOMYOPATHY, DILATED, NONISCHEMIC (HCC): Chronic | ICD-10-CM

## 2017-10-25 DIAGNOSIS — Z95.810 PRESENCE OF BIVENTRICULAR AUTOMATIC CARDIOVERTER/DEFIBRILLATOR (AICD): Primary | ICD-10-CM

## 2017-10-25 DIAGNOSIS — I48.0 PAROXYSMAL ATRIAL FIBRILLATION (HCC): ICD-10-CM

## 2017-10-25 DIAGNOSIS — I50.23 SYSTOLIC CHF, ACUTE ON CHRONIC (HCC): ICD-10-CM

## 2017-10-25 DIAGNOSIS — I42.9 CARDIOMYOPATHY, UNSPECIFIED TYPE (HCC): ICD-10-CM

## 2017-10-25 NOTE — LETTER
10/27/2017 2:18 PM 
 
Ms. Mendel Hill [address] [address] Dear Ms. Campbell: You are due for your annual device check and visit in the office with either Dr. Lucia Walter or his nurse practitioner, Waylon Majano. Please call our office at [phone] and schedule a follow up appointment for your continued care. Sincerely, Riccardo Moritz, RN [address] [place] [address] Cardiology

## 2017-10-27 NOTE — PROGRESS NOTES
See device report - BSC BiV ICD remote send, next remote send in 3 months. Due for annual visit, sent reminder letter to call and schedule annual appt.

## 2017-11-06 ENCOUNTER — TELEPHONE (OUTPATIENT)
Dept: CARDIOLOGY CLINIC | Age: 82
End: 2017-11-06

## 2017-11-06 NOTE — TELEPHONE ENCOUNTER
Spoke with Faby to move appointment . Patient is scheduled on Tuesday November 7th at 11am with Dr. Daniella Givens.

## 2017-11-07 ENCOUNTER — OFFICE VISIT (OUTPATIENT)
Dept: CARDIOLOGY CLINIC | Age: 82
End: 2017-11-07

## 2017-11-07 VITALS
HEART RATE: 52 BPM | DIASTOLIC BLOOD PRESSURE: 50 MMHG | OXYGEN SATURATION: 95 % | TEMPERATURE: 97.9 F | HEIGHT: 62 IN | BODY MASS INDEX: 24.07 KG/M2 | RESPIRATION RATE: 20 BRPM | WEIGHT: 130.8 LBS | SYSTOLIC BLOOD PRESSURE: 84 MMHG

## 2017-11-07 DIAGNOSIS — I42.0 DILATED CARDIOMYOPATHY (HCC): Primary | ICD-10-CM

## 2017-11-07 DIAGNOSIS — I50.22 CHRONIC SYSTOLIC CONGESTIVE HEART FAILURE (HCC): ICD-10-CM

## 2017-11-07 RX ORDER — PREDNISONE 20 MG/1
TABLET ORAL
Refills: 0 | COMMUNITY
Start: 2017-10-23 | End: 2017-11-07

## 2017-11-07 NOTE — MR AVS SNAPSHOT
Visit Information Date & Time Provider Department Dept. Phone Encounter #  
 11/7/2017 11:00 AM Jose Wu MD [address] [id number] Your Appointments 12/21/2017  9:15 AM  
Any with Cherri Andujar MD  
[address] [place] [address] and Primary Care [facility]) Appt Note: follow up  
 Blaire Monroy [facility] [place] [phone]  
  
    
 4/3/2018 10:45 PM  
ESTABLISHED PATIENT with MD [facility]) Appt Note: 6 MO. F/U  
 [address]  
[phone] [address] Upcoming Health Maintenance Date Due  
 GLAUCOMA SCREENING Q2Y 12/18/2017 MEDICARE YEARLY EXAM 6/16/2018 DTaP/Tdap/Td series (2 - Td) 2/13/2027 Allergies as of 11/7/2017  Review Complete On: 11/7/2017 By: Jose Wu MD  
  
 Severity Noted Reaction Type Reactions Codeine  05/21/2012   Side Effect Other (comments) \"made me disoriented\" per pt Current Immunizations  Reviewed on 6/29/2015 Name Date Influenza High Dose Vaccine PF 9/27/2017 Influenza Vaccine 10/1/2016, 10/4/2014 Influenza Vaccine Split 10/22/2011 ZZZ-RETIRED (DO NOT USE) Pneumococcal Vaccine (Unspecified Type) 5/22/2007 Not reviewed this visit You Were Diagnosed With   
  
 Codes Comments Dilated cardiomyopathy ([facility]    -  Primary ICD-10-CM: I42.0 ICD-9-CM: 425. 4 Chronic systolic congestive heart failure (HCC)     ICD-10-CM: I50.22 ICD-9-CM: 428.22, 428.0 Vitals BP Pulse Temp Resp Height(growth percentile) Weight(growth percentile) (!) 84/50 (BP 1 Location: Left arm, BP Patient Position: Sitting) (!) 52 97.9 °F (36.6 °C) (Oral) 20 5' 2\" (1.575 m) 130 lb 12.8 oz (59.3 kg) SpO2 BMI OB Status Smoking Status 95% 23.92 kg/m2 Postmenopausal Never Smoker Vitals History BMI and BSA Data Body Mass Index Body Surface Area  
 23.92 kg/m 2 1.61 m 2 Preferred Pharmacy Pharmacy Name Phone [address], [place] [address] Your Updated Medication List  
  
   
This list is accurate as of: 11/7/17 12:15 PM.  Always use your most recent med list.  
  
  
  
  
 albuterol 2.5 mg /3 mL (0.083 %) nebulizer solution Commonly known as:  PROVENTIL VENTOLIN  
3 mL by Nebulization route every four (4) hours as needed for Wheezing. allopurinol 300 mg tablet Commonly known as:  ZYLOPRIM  
TAKE ONE (1) TABLET(S) DAILY  
  
 apixaban 2.5 mg tablet Commonly known as:  Daniela Harsha Take 1 Tab by mouth two (2) times a day. Indications: PREVENT THROMBOEMBOLISM IN CHRONIC ATRIAL FIBRILLATION  
  
 atorvastatin 10 mg tablet Commonly known as:  LIPITOR  
TAKE ONE (1) TABLET(S) DAILY  
  
 carvedilol 3.125 mg tablet Commonly known as:  COREG  
TAKE ONE (1) TABLET(S) TWIC E DAILY WITH FOOD  Indications: Chronic Heart Failure  
  
 co-enzyme Q-10 100 mg capsule Commonly known as:  CO Q-10 Take 100 mg by mouth daily. fluticasone-salmeterol 250-50 mcg/dose diskus inhaler Commonly known as:  ADVAIR Take 1 Puff by inhalation two (2) times a day. furosemide 20 mg tablet Commonly known as:  LASIX Take 1 Tab by mouth daily. IMBRUVICA 140 mg capsule Generic drug:  ibrutinib Take 420 mg by mouth five (5) days a week. 140 mg cap, takes 3 caps five days a week (none on Sunday or Wednesday)  
  
 loratadine 10 mg tablet Commonly known as:  Eric Sor Take 10 mg by mouth daily as needed. magnesium oxide 400 mg Cap Take 1 Cap by mouth daily. multivitamin tablet Commonly known as:  ONE A DAY Take 1 Tab by mouth daily. OXYGEN-AIR DELIVERY SYSTEMS  
2 L by Does Not Apply route nightly. potassium chloride SR 10 mEq tablet Commonly known as:  KLOR-CON 10 Take 1 Tab by mouth daily. sacubitril-valsartan 49-51 mg Tab tablet Commonly known as:  ENTRESTO Take 1 Tab by mouth two (2) times a day. TYLENOL EXTRA STRENGTH 500 mg tablet Generic drug:  acetaminophen Take 500 mg by mouth every six (6) hours as needed for Pain. VITAMIN D3 1,000 unit Cap Generic drug:  cholecalciferol Take 1,000 Units by mouth daily. To-Do List   
 11/07/2017 ECHO:  2D ECHO COMPLETE ADULT (TTE) W OR WO CONTR Introducing [address]! Dear [facility]: Thank you for requesting a [employer] account. Our records indicate that you already have an active [employer] account. You can access your account anytime at [email or web address] Did you know that you can access your hospital and ER discharge instructions at any time in [employer]? You can also review all of your test results from your hospital stay or ER visit. Additional Information If you have questions, please visit the Frequently Asked Questions section of the [employer] website at [email or web address] Remember, [employer] is NOT to be used for urgent needs. For medical emergencies, dial 911. Now available from your iPhone and Android! Please provide this summary of care documentation to your next provider. Your primary care clinician is listed as Gail Christen . If you have any questions after today's visit, please call [phone].

## 2017-11-07 NOTE — PROGRESS NOTES
HISTORY OF PRESENT ILLNESS  Jaleel Arroyo is a 80 y.o. AA, F w/ PMH of HTN, NICM (EF of 20-25% by Echo 2/3/17). MYHA class II, s/p BiVICD 2015, hyperlipidemia, LBBB, gout, GERD, PUD, DILAN, Non-Hodkin's lymphoma and Reactive airway disease who presents today for follow up. Pt reports she is feeling well, just returned from a 2 month trip to the [address] [place] [address] with her son. She states she was able to do all of the planned activities, didn't gain any weight while traveling. Took all of her medications. She does have a productive cough with clear sputum. She is very active and has been able to continue her treadmill walking 1-2 miles 3-4 days a week. She is performing daily weights and watching her sodium consumption. Pt c/o some JENKINS with moderate exertion, cough, early saiety but this is not changed for her. She continues to walk on her treadmill daily.     Pt denies HA, fatigue, fevers, chills, lightheadedness, dizziness, syncope, N/V/ constipation, changes to appetite, chest pain, SOB, depression or anxiety    Past Medical History:   Diagnosis Date    Asthma     Cancer ([facility] [address]     lymphoma    Congestive heart failure, unspecified     Heart failure ([address]     Hypertension     Other ill-defined conditions(799.89)     heart murmur    Presence of biventricular automatic cardioverter/defibrillator (AICD) 7/2/2015    [employer] biventricular AICD implant    Stomach ulcer      Past Surgical History:   Procedure Laterality Date    HX BREAST BIOPSY Bilateral     40 years ago    HX COLONOSCOPY      HX HEENT      cataracts removed    HX HYSTERECTOMY      HX OTHER SURGICAL      lymph node surgery    HX TONSILLECTOMY      SD EGD TRANSORAL BIOPSY SINGLE/MULTIPLE  5/23/2012         SINUS SURGERY PROC UNLISTED      Nasal polyps removed     Family History   Problem Relation Age of Onset    Heart Disease Mother     Stroke Father      Social History     Social History    Marital status:  Spouse name: N/A    Number of children: 1    Years of education: 16+     Occupational History    retired teacher      Social History Main Topics    Smoking status: Never Smoker    Smokeless tobacco: Never Used    Alcohol use No    Drug use: No    Sexual activity: No     Other Topics Concern    Not on file     Social History Narrative     Current Outpatient Prescriptions on File Prior to Visit   Medication Sig Dispense Refill    sacubitril-valsartan (ENTRESTO) 49 mg/51 mg tablet Take 1 Tab by mouth two (2) times a day. 180 Tab 3    apixaban (ELIQUIS) 2.5 mg tablet Take 1 Tab by mouth two (2) times a day. Indications: PREVENT THROMBOEMBOLISM IN CHRONIC ATRIAL FIBRILLATION 180 Tab 3    magnesium oxide 400 mg cap Take 1 Cap by mouth daily.  cholecalciferol (VITAMIN D3) 1,000 unit cap Take 1,000 Units by mouth daily.  furosemide (LASIX) 20 mg tablet Take 1 Tab by mouth daily. (Patient taking differently: Take 40 mg by mouth two (2) times a day.) 30 Tab 11    potassium chloride SR (KLOR-CON 10) 10 mEq tablet Take 1 Tab by mouth daily. 30 Tab 11    carvedilol (COREG) 3.125 mg tablet TAKE ONE (1) TABLET(S) TWIC E DAILY WITH FOOD  Indications: Chronic Heart Failure 180 Tab 3    albuterol (PROVENTIL VENTOLIN) 2.5 mg /3 mL (0.083 %) nebulizer solution 3 mL by Nebulization route every four (4) hours as needed for Wheezing. 100 Each 11    allopurinol (ZYLOPRIM) 300 mg tablet TAKE ONE (1) TABLET(S) DAILY 90 Tab 3    atorvastatin (LIPITOR) 10 mg tablet TAKE ONE (1) TABLET(S) DAILY 90 Tab 3    fluticasone-salmeterol (ADVAIR) 250-50 mcg/dose diskus inhaler Take 1 Puff by inhalation two (2) times a day. 3 Inhaler 3    loratadine (CLARITIN) 10 mg tablet Take 10 mg by mouth daily as needed.  co-enzyme Q-10 (CO Q-10) 100 mg capsule Take 100 mg by mouth daily.  OXYGEN-AIR DELIVERY SYSTEMS 2 L by Does Not Apply route nightly.       ibrutinib (IMBRUVICA) 140 mg cap Take 420 mg by mouth five (5) days a week. 140 mg cap, takes 3 caps five days a week (none on Sunday or Wednesday)      acetaminophen (TYLENOL EXTRA STRENGTH) 500 mg tablet Take 500 mg by mouth every six (6) hours as needed for Pain.  multivitamin (ONE A DAY) tablet Take 1 Tab by mouth daily. No current facility-administered medications on file prior to visit. Physical Exam   Gen:  WA, WN, NAD  HEENT:  EOMI  Neck:  (-) JVD  CVS:  S1/S2. Pul:  CTA b/l (-) r,r,w  Abd:  Soft, NT,ND  Ext:  Trace BLE edema  Neuro:  No obvious deficits    Wt Readings from Last 3 Encounters:   11/07/17 130 lb 12.8 oz (59.3 kg)   10/20/17 129 lb 14.4 oz (58.9 kg)   10/10/17 130 lb 12.8 oz (59.3 kg)     Lab Results   Component Value Date/Time    Sodium 148 09/29/2017 05:56 PM    Potassium 4.7 09/29/2017 05:56 PM    Chloride 108 09/29/2017 05:56 PM    CO2 27 09/29/2017 05:56 PM    Anion gap 10 04/22/2017 03:51 AM    Glucose 87 09/29/2017 05:56 PM    BUN 23 09/29/2017 05:56 PM    Creatinine 1.38 09/29/2017 05:56 PM    BUN/Creatinine ratio 17 09/29/2017 05:56 PM    GFR est AA 38 09/29/2017 05:56 PM    GFR est non-AA 33 09/29/2017 05:56 PM    Calcium 9.3 09/29/2017 05:56 PM     Lab Results   Component Value Date/Time    WBC 4.3 04/20/2017 09:40 AM    HGB 11.9 04/20/2017 09:40 AM    HCT 36.7 04/20/2017 09:40 AM    PLATELET 092 15/38/8737 09:40 AM    MCV 94.8 04/20/2017 09:40 AM     Visit Vitals    BP (!) 84/50 (BP 1 Location: Left arm, BP Patient Position: Sitting)    Pulse (!) 52    Temp 97.9 °F (36.6 °C) (Oral)    Resp 20    Ht 5' 2\" (1.575 m)    Wt 130 lb 12.8 oz (59.3 kg)    SpO2 95%    BMI 23.92 kg/m2       ASSESSMENT and PLAN    NICM HFrEF 20-25% NYHA, class II  1. Cont Entresto  49/51mg 1 tablet po BID- cannot uptitrate due to BP  2. Cont lasix 20mg daily  3. Continue Coreg  4. Continue daily weights  5. Reviewed importance of sodium and fluid restriction  6.   Return to clinic in 2 months    7. Echocardiogram in 2 months    VT - s/p AICD - stable    New onset Afib:    Cont Eliquis   Follow with Dr. Neymar Chaves     DILAN -  Pt.  To continue nocturnal home oxygen    HLD - continue Lipitor and CoQ10      Thank you for letting us see her with you,      Scottie Rogers MD    [address] [place] [address]  Dept: [phone]  Dept Fax: [phone]: [phone] Fax: [phone]  24 hour VAD/HF Pager: [phone]

## 2017-11-07 NOTE — PATIENT INSTRUCTIONS
1. Continue current medications  2. Echocardiogram in 2 months  3.  Follow up in the [facility] in 2 months

## 2017-11-07 NOTE — LETTER
11/7/2017 12:18 PM 
 
Patient:  Jaleel Arroyo YOB: 1925 Date of Visit: 11/7/2017 Dear Clarisa Arteaga MD 
[facility] [address] [facility] 7 [address] VIA In Basket Avelina Ohara MD 
[id number] [facility] [address] [address] VIA In Basket Neel Odell RN [facility] [address] [facility] [address] [address] VIA In Basket 
 : Thank you for referring Ms. Jelani Hernandez to me for evaluation/treatment. Below are the relevant portions of my assessment and plan of care. HISTORY OF PRESENT ILLNESS Jaleel Arroyo is a 80 y.o. AA, F w/ PMH of HTN, NICM (EF of 20-25% by Echo 2/3/17). MYHA class II, s/p BiVICD 2015, hyperlipidemia, LBBB, gout, GERD, PUD, DILAN, Non-Hodkin's lymphoma and Reactive airway disease who presents today for follow up. Pt reports she is feeling well, just returned from a 2 month trip to the [address] [place] [address] with her son. She states she was able to do all of the planned activities, didn't gain any weight while traveling. Took all of her medications. She does have a productive cough with clear sputum. She is very active and has been able to continue her treadmill walking 1-2 miles 3-4 days a week. She is performing daily weights and watching her sodium consumption. Pt c/o some JENKINS with moderate exertion, cough, early saiety but this is not changed for her. She continues to walk on her treadmill daily. Pt denies HA, fatigue, fevers, chills, lightheadedness, dizziness, syncope, N/V/ constipation, changes to appetite, chest pain, SOB, depression or anxiety Past Medical History:  
Diagnosis Date  Asthma  Cancer ([address] lymphoma  Congestive heart failure, unspecified  Heart failure ([address]  Hypertension  Other ill-defined conditions(799.89)   
 heart murmur  Presence of biventricular automatic cardioverter/defibrillator (AICD) 7/2/2015 [employer] biventricular AICD implant  Stomach ulcer Past Surgical History:  
Procedure Laterality Date  HX BREAST BIOPSY Bilateral   
 40 years ago  HX COLONOSCOPY    
 HX HEENT    
 cataracts removed  HX HYSTERECTOMY  HX OTHER SURGICAL    
 lymph node surgery  HX TONSILLECTOMY  DC EGD TRANSORAL BIOPSY SINGLE/MULTIPLE  5/23/2012  
    
 SINUS SURGERY PROC UNLISTED Nasal polyps removed Family History Problem Relation Age of Onset  Heart Disease Mother  Stroke Father Social History Social History  Marital status:  Spouse name: N/A  
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  
 Drug use: No  
 Sexual activity: No  
 
Other Topics Concern  Not on file Social History Narrative Current Outpatient Prescriptions on File Prior to Visit Medication Sig Dispense Refill  sacubitril-valsartan (ENTRESTO) 49 mg/51 mg tablet Take 1 Tab by mouth two (2) times a day. 180 Tab 3  
 apixaban (ELIQUIS) 2.5 mg tablet Take 1 Tab by mouth two (2) times a day. Indications: PREVENT THROMBOEMBOLISM IN CHRONIC ATRIAL FIBRILLATION 180 Tab 3  
 magnesium oxide 400 mg cap Take 1 Cap by mouth daily.  cholecalciferol (VITAMIN D3) 1,000 unit cap Take 1,000 Units by mouth daily.  furosemide (LASIX) 20 mg tablet Take 1 Tab by mouth daily. (Patient taking differently: Take 40 mg by mouth two (2) times a day.) 30 Tab 11  
 potassium chloride SR (KLOR-CON 10) 10 mEq tablet Take 1 Tab by mouth daily.  30 Tab 11  
 carvedilol (COREG) 3.125 mg tablet TAKE ONE (1) TABLET(S) TWIC E DAILY WITH FOOD  Indications: Chronic Heart Failure 180 Tab 3  
 albuterol (PROVENTIL VENTOLIN) 2.5 mg /3 mL (0.083 %) nebulizer solution 3 mL by Nebulization route every four (4) hours as needed for Wheezing. 100 Each 11  
 allopurinol (ZYLOPRIM) 300 mg tablet TAKE ONE (1) TABLET(S) DAILY 90 Tab 3  
 atorvastatin (LIPITOR) 10 mg tablet TAKE ONE (1) TABLET(S) DAILY 90 Tab 3  
 fluticasone-salmeterol (ADVAIR) 250-50 mcg/dose diskus inhaler Take 1 Puff by inhalation two (2) times a day. 3 Inhaler 3  
 loratadine (CLARITIN) 10 mg tablet Take 10 mg by mouth daily as needed.  co-enzyme Q-10 (CO Q-10) 100 mg capsule Take 100 mg by mouth daily.  OXYGEN-AIR DELIVERY SYSTEMS 2 L by Does Not Apply route nightly.  ibrutinib (IMBRUVICA) 140 mg cap Take 420 mg by mouth five (5) days a week. 140 mg cap, takes 3 caps five days a week (none on Sunday or Wednesday)  acetaminophen (TYLENOL EXTRA STRENGTH) 500 mg tablet Take 500 mg by mouth every six (6) hours as needed for Pain.  multivitamin (ONE A DAY) tablet Take 1 Tab by mouth daily. No current facility-administered medications on file prior to visit. Physical Exam  
Gen:  WA, WN, NAD HEENT:  EOMI Neck:  (-) JVD 
CVS:  S1/S2. Pul:  CTA b/l (-) r,r,w 
Abd:  Soft, NT,ND Ext:  Trace BLE edema Neuro:  No obvious deficits Wt Readings from Last 3 Encounters:  
11/07/17 130 lb 12.8 oz (59.3 kg) 10/20/17 129 lb 14.4 oz (58.9 kg) 10/10/17 130 lb 12.8 oz (59.3 kg) Lab Results Component Value Date/Time Sodium 148 09/29/2017 05:56 PM  
 Potassium 4.7 09/29/2017 05:56 PM  
 Chloride 108 09/29/2017 05:56 PM  
 CO2 27 09/29/2017 05:56 PM  
 Anion gap 10 04/22/2017 03:51 AM  
 Glucose 87 09/29/2017 05:56 PM  
 BUN 23 09/29/2017 05:56 PM  
 Creatinine 1.38 09/29/2017 05:56 PM  
 BUN/Creatinine ratio 17 09/29/2017 05:56 PM  
 GFR est AA 38 09/29/2017 05:56 PM  
 GFR est non-AA 33 09/29/2017 05:56 PM  
 Calcium 9.3 09/29/2017 05:56 PM  
 
Lab Results Component Value Date/Time  WBC 4.3 04/20/2017 09:40 AM  
 HGB 11.9 04/20/2017 09:40 AM  
 HCT 36.7 04/20/2017 09:40 AM  
 PLATELET 105 12/32/3194 09:40 AM  
 MCV 94.8 04/20/2017 09:40 AM  
 
Visit Vitals  BP (!) 84/50 (BP 1 Location: Left arm, BP Patient Position: Sitting)  Pulse (!) 52  Temp 97.9 °F (36.6 °C) (Oral)  Resp 20  
 Ht 5' 2\" (1.575 m)  Wt 130 lb 12.8 oz (59.3 kg)  SpO2 95%  BMI 23.92 kg/m2 ASSESSMENT and PLAN 
 
NICM HFrEF 20-25% NYHA, class II 1. Cont Entresto  49/51mg 1 tablet po BID- cannot uptitrate due to BP 2. Cont lasix 20mg daily 3. Continue Coreg 4. Continue daily weights 5. Reviewed importance of sodium and fluid restriction 6. Return to clinic in 2 months 7. Echocardiogram in 2 months VT - s/p AICD - stable New onset Afib:   
Cont Eliquis Follow with Dr. Luis Antonio Chamberlain DILAN -  Pt. To continue nocturnal home oxygen HLD - continue Lipitor and CoQ10 Thank you for letting us see her with you, Nelson Patrick MD 
 
[address] [place] [address] [address] [facility] 7 [address] Dept: [phone] Dept Fax: [phone] Loc: [phone] Loc Fax: [phone] 
24 hour VAD/HF Pager: [phone] If you have questions, please do not hesitate to call me. I look forward to following Ms. Javon Mcguire along with you. Sincerely, Nelson Patrick MD

## 2017-11-07 NOTE — COMMUNICATION BODY
HISTORY OF PRESENT ILLNESS  Roberto Valverde is a 80 y.o. AA, F w/ PMH of HTN, NICM (EF of 20-25% by Echo 2/3/17). MYHA class II, s/p BiVICD 2015, hyperlipidemia, LBBB, gout, GERD, PUD, DILAN, Non-Hodkin's lymphoma and Reactive airway disease who presents today for follow up. Pt reports she is feeling well, just returned from a 2 month trip to the [address] [place] [address] with her son. She states she was able to do all of the planned activities, didn't gain any weight while traveling. Took all of her medications. She does have a productive cough with clear sputum. She is very active and has been able to continue her treadmill walking 1-2 miles 3-4 days a week. She is performing daily weights and watching her sodium consumption. Pt c/o some JENKINS with moderate exertion, cough, early saiety but this is not changed for her. She continues to walk on her treadmill daily.     Pt denies HA, fatigue, fevers, chills, lightheadedness, dizziness, syncope, N/V/ constipation, changes to appetite, chest pain, SOB, depression or anxiety    Past Medical History:   Diagnosis Date    Asthma     Cancer ([facility]     lymphoma    Congestive heart failure, unspecified     Heart failure ([address]     Hypertension     Other ill-defined conditions(799.89)     heart murmur    Presence of biventricular automatic cardioverter/defibrillator (AICD) 7/2/2015    [employer] biventricular AICD implant    Stomach ulcer      Past Surgical History:   Procedure Laterality Date    HX BREAST BIOPSY Bilateral     40 years ago    HX COLONOSCOPY      HX HEENT      cataracts removed    HX HYSTERECTOMY      HX OTHER SURGICAL      lymph node surgery    HX TONSILLECTOMY      NC EGD TRANSORAL BIOPSY SINGLE/MULTIPLE  5/23/2012         SINUS SURGERY PROC UNLISTED      Nasal polyps removed     Family History   Problem Relation Age of Onset    Heart Disease Mother     Stroke Father      Social History     Social History    Marital status:  Spouse name: N/A    Number of children: 1    Years of education: 16+     Occupational History    retired teacher      Social History Main Topics    Smoking status: Never Smoker    Smokeless tobacco: Never Used    Alcohol use No    Drug use: No    Sexual activity: No     Other Topics Concern    Not on file     Social History Narrative     Current Outpatient Prescriptions on File Prior to Visit   Medication Sig Dispense Refill    sacubitril-valsartan (ENTRESTO) 49 mg/51 mg tablet Take 1 Tab by mouth two (2) times a day. 180 Tab 3    apixaban (ELIQUIS) 2.5 mg tablet Take 1 Tab by mouth two (2) times a day. Indications: PREVENT THROMBOEMBOLISM IN CHRONIC ATRIAL FIBRILLATION 180 Tab 3    magnesium oxide 400 mg cap Take 1 Cap by mouth daily.  cholecalciferol (VITAMIN D3) 1,000 unit cap Take 1,000 Units by mouth daily.  furosemide (LASIX) 20 mg tablet Take 1 Tab by mouth daily. (Patient taking differently: Take 40 mg by mouth two (2) times a day.) 30 Tab 11    potassium chloride SR (KLOR-CON 10) 10 mEq tablet Take 1 Tab by mouth daily. 30 Tab 11    carvedilol (COREG) 3.125 mg tablet TAKE ONE (1) TABLET(S) TWIC E DAILY WITH FOOD  Indications: Chronic Heart Failure 180 Tab 3    albuterol (PROVENTIL VENTOLIN) 2.5 mg /3 mL (0.083 %) nebulizer solution 3 mL by Nebulization route every four (4) hours as needed for Wheezing. 100 Each 11    allopurinol (ZYLOPRIM) 300 mg tablet TAKE ONE (1) TABLET(S) DAILY 90 Tab 3    atorvastatin (LIPITOR) 10 mg tablet TAKE ONE (1) TABLET(S) DAILY 90 Tab 3    fluticasone-salmeterol (ADVAIR) 250-50 mcg/dose diskus inhaler Take 1 Puff by inhalation two (2) times a day. 3 Inhaler 3    loratadine (CLARITIN) 10 mg tablet Take 10 mg by mouth daily as needed.  co-enzyme Q-10 (CO Q-10) 100 mg capsule Take 100 mg by mouth daily.  OXYGEN-AIR DELIVERY SYSTEMS 2 L by Does Not Apply route nightly.       ibrutinib (IMBRUVICA) 140 mg cap Take 420 mg by mouth five (5) days a week. 140 mg cap, takes 3 caps five days a week (none on Sunday or Wednesday)      acetaminophen (TYLENOL EXTRA STRENGTH) 500 mg tablet Take 500 mg by mouth every six (6) hours as needed for Pain.  multivitamin (ONE A DAY) tablet Take 1 Tab by mouth daily. No current facility-administered medications on file prior to visit. Physical Exam   Gen:  WA, WN, NAD  HEENT:  EOMI  Neck:  (-) JVD  CVS:  S1/S2. Pul:  CTA b/l (-) r,r,w  Abd:  Soft, NT,ND  Ext:  Trace BLE edema  Neuro:  No obvious deficits    Wt Readings from Last 3 Encounters:   11/07/17 130 lb 12.8 oz (59.3 kg)   10/20/17 129 lb 14.4 oz (58.9 kg)   10/10/17 130 lb 12.8 oz (59.3 kg)     Lab Results   Component Value Date/Time    Sodium 148 09/29/2017 05:56 PM    Potassium 4.7 09/29/2017 05:56 PM    Chloride 108 09/29/2017 05:56 PM    CO2 27 09/29/2017 05:56 PM    Anion gap 10 04/22/2017 03:51 AM    Glucose 87 09/29/2017 05:56 PM    BUN 23 09/29/2017 05:56 PM    Creatinine 1.38 09/29/2017 05:56 PM    BUN/Creatinine ratio 17 09/29/2017 05:56 PM    GFR est AA 38 09/29/2017 05:56 PM    GFR est non-AA 33 09/29/2017 05:56 PM    Calcium 9.3 09/29/2017 05:56 PM     Lab Results   Component Value Date/Time    WBC 4.3 04/20/2017 09:40 AM    HGB 11.9 04/20/2017 09:40 AM    HCT 36.7 04/20/2017 09:40 AM    PLATELET 161 76/63/4741 09:40 AM    MCV 94.8 04/20/2017 09:40 AM     Visit Vitals    BP (!) 84/50 (BP 1 Location: Left arm, BP Patient Position: Sitting)    Pulse (!) 52    Temp 97.9 °F (36.6 °C) (Oral)    Resp 20    Ht 5' 2\" (1.575 m)    Wt 130 lb 12.8 oz (59.3 kg)    SpO2 95%    BMI 23.92 kg/m2       ASSESSMENT and PLAN    NICM HFrEF 20-25% NYHA, class II  1. Cont Entresto  49/51mg 1 tablet po BID- cannot uptitrate due to BP  2. Cont lasix 20mg daily  3. Continue Coreg  4. Continue daily weights  5. Reviewed importance of sodium and fluid restriction  6.   Return to clinic in 2 months    7. Echocardiogram in 2 months    VT - s/p AICD - stable    New onset Afib:    Cont Eliquis   Follow with Dr. Abdon Siegel     DILAN -  Pt.  To continue nocturnal home oxygen    HLD - continue Lipitor and CoQ10      Thank you for letting us see her with you,      Charito Pruett MD    [address] [place] [address]  Dept: [phone]  Dept Fax: [phone]: [phone] Fax: [phone]  24 hour VAD/HF Pager: [phone]

## 2017-12-11 ENCOUNTER — PATIENT OUTREACH (OUTPATIENT)
Dept: INTERNAL MEDICINE CLINIC | Age: 82
End: 2017-12-11

## 2017-12-11 NOTE — PROGRESS NOTES
NN CM: [address] with patient regarding Health Support. Patient denied leg/ankle swell. Denied medication problems. Patient states she has been to the [address] [place] [address] since last coming to the office. Patient states she is doing very well. Weight:  Patient states she only goes 2 lbs and if so, sets to focus on weight and/or fluid loss. Patient states diet is fine; no complications. Patient states she has an upcoming Cardiology Appt this week. Patient agreed to contact PCP if questions or needs regarding health. Goals Addressed             Most Recent     Attends follow-up appointments as directed. On track (12/11/2017)             Pt will call to office to schedule f/u with PCP once has f/u apt set with Dr Raheem Hough.        Date completion  [address] [facility] Cardiology Appt.   Emelia Warren

## 2017-12-14 ENCOUNTER — OFFICE VISIT (OUTPATIENT)
Dept: CARDIOLOGY CLINIC | Age: 82
End: 2017-12-14

## 2017-12-14 ENCOUNTER — CLINICAL SUPPORT (OUTPATIENT)
Dept: CARDIOLOGY CLINIC | Age: 82
End: 2017-12-14

## 2017-12-14 VITALS
WEIGHT: 137.6 LBS | HEIGHT: 62 IN | DIASTOLIC BLOOD PRESSURE: 52 MMHG | RESPIRATION RATE: 16 BRPM | OXYGEN SATURATION: 99 % | SYSTOLIC BLOOD PRESSURE: 80 MMHG | HEART RATE: 54 BPM | BODY MASS INDEX: 25.32 KG/M2

## 2017-12-14 DIAGNOSIS — Z95.810 PRESENCE OF BIVENTRICULAR AUTOMATIC CARDIOVERTER/DEFIBRILLATOR (AICD): Primary | ICD-10-CM

## 2017-12-14 DIAGNOSIS — I10 ESSENTIAL HYPERTENSION: ICD-10-CM

## 2017-12-14 DIAGNOSIS — I42.0 CARDIOMYOPATHY, DILATED, NONISCHEMIC (HCC): Chronic | ICD-10-CM

## 2017-12-14 DIAGNOSIS — I44.7 LBBB (LEFT BUNDLE BRANCH BLOCK): ICD-10-CM

## 2017-12-14 DIAGNOSIS — I48.0 PAROXYSMAL ATRIAL FIBRILLATION (HCC): Primary | ICD-10-CM

## 2017-12-14 DIAGNOSIS — I42.0 DILATED CARDIOMYOPATHY (HCC): ICD-10-CM

## 2017-12-14 DIAGNOSIS — I50.43 ACUTE ON CHRONIC COMBINED SYSTOLIC AND DIASTOLIC CONGESTIVE HEART FAILURE (HCC): ICD-10-CM

## 2017-12-14 DIAGNOSIS — I50.23 SYSTOLIC CHF, ACUTE ON CHRONIC (HCC): ICD-10-CM

## 2017-12-14 NOTE — PROGRESS NOTES
1. Have you been to the ER, urgent care clinic since your last visit? Hospitalized since your last visit? Yes, at [facility] ON 4/20/17 for CHF    2. Have you seen or consulted any other health care providers outside of the [address] [place] [address] since your last visit? Include any pap smears or colon screening.  Yes, eye doctor at [address] [place] [address] Complaint   Patient presents with    Irregular Heart Beat     annual f/u

## 2017-12-14 NOTE — PROGRESS NOTES
See device report - [employer] BiV ICD in office device check, next check due annually - on remote home monitoring Q 3 months.

## 2017-12-14 NOTE — PROGRESS NOTES
Subjective:      Sharlene Christianson is a 80 y.o. female is here for device follow up. She is doing well. The patient denies chest pain/ shortness of breath, orthopnea, PND, LE edema, palpitations, syncope, presyncope or fatigue.        Patient Active Problem List    Diagnosis Date Noted    Paroxysmal atrial fibrillation ([address] 10/21/2017    Bilateral leg edema 06/06/2017    Hypokalemia 04/21/2017    CHF (congestive heart failure) ([address] 02/04/2017    SOB (shortness of breath) 02/02/2017    Acute respiratory insufficiency 02/02/2017    PUD (peptic ulcer disease) 02/02/2017    Gastroesophageal reflux disease with esophagitis 02/02/2017    Thoracic aortic aneurysm without rupture ([address] 02/02/2017    Physical deconditioning 01/29/2017    Cramping of hands 01/24/2017    Mild mitral regurgitation 01/22/2016    Lymphoma ([address] 11/17/2015    Chronic systolic congestive heart failure ([address] 10/08/2015    Presence of biventricular automatic cardioverter/defibrillator (AICD) 07/02/2015    LBBB (left bundle branch block) 49/00/7265    Systolic CHF, acute on chronic ([address] 06/29/2015    Acute respiratory failure with hypoxia ([address] 06/29/2015    Upper respiratory infection 06/29/2015    HTN (hypertension) 06/29/2015    Hyperlipidemia 06/29/2015    Gout 06/29/2015    Reactive airway disease 03/23/2015    Fatigue 03/10/2015    Cardiomyopathy ([address] 11/08/2014    Anemia 11/08/2014    Hypotension 11/04/2014    Rhinitis 09/04/2014    Pulmonary edema 08/04/2012    Cardiomyopathy, dilated, 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 08/04/2012    NHL (non-Hodgkin's lymphoma) ([address] 08/04/2012    DILAN (obstructive sleep apnea) 08/04/2012    Abdominal pain 05/22/2012     Class: Acute    Weight loss 05/22/2012     Class: Acute      Cindy Peterson MD  Past Medical History:   Diagnosis Date    Asthma     Cancer [facility])     lymphoma    Congestive heart failure, unspecified     Heart failure ([address]     Hypertension  Other ill-defined conditions(799.89)     heart murmur    Presence of biventricular automatic cardioverter/defibrillator (AICD) 7/2/2015    [employer] biventricular AICD implant    Stomach ulcer       Past Surgical History:   Procedure Laterality Date    HX BREAST BIOPSY Bilateral     40 years ago    HX COLONOSCOPY      HX HEENT      cataracts removed    HX HYSTERECTOMY      HX OTHER SURGICAL      lymph node surgery    HX TONSILLECTOMY      MS EGD TRANSORAL BIOPSY SINGLE/MULTIPLE  5/23/2012         SINUS SURGERY PROC UNLISTED      Nasal polyps removed     Allergies   Allergen Reactions    Codeine Other (comments)     \"made me disoriented\" per pt      Family History   Problem Relation Age of Onset    Heart Disease Mother     Stroke Father     negative for cardiac disease  Social History     Social History    Marital status:      Spouse name: N/A    Number of children: 1    Years of education: 16+     Occupational History    retired teacher      Social History Main Topics    Smoking status: Never Smoker    Smokeless tobacco: Never Used    Alcohol use No    Drug use: No    Sexual activity: No     Other Topics Concern    None     Social History Narrative     Current Outpatient Prescriptions   Medication Sig    apixaban (ELIQUIS) 2.5 mg tablet Take 1 Tab by mouth two (2) times a day. Indications: PREVENT THROMBOEMBOLISM IN CHRONIC ATRIAL FIBRILLATION    sacubitril-valsartan (ENTRESTO) 49 mg/51 mg tablet Take 1 Tab by mouth two (2) times a day.  magnesium oxide 400 mg cap Take 1 Cap by mouth daily.  cholecalciferol (VITAMIN D3) 1,000 unit cap Take 1,000 Units by mouth daily.  furosemide (LASIX) 20 mg tablet Take 1 Tab by mouth daily. (Patient taking differently: Take 20 mg by mouth two (2) times a day.)    potassium chloride SR (KLOR-CON 10) 10 mEq tablet Take 1 Tab by mouth daily.     carvedilol (COREG) 3.125 mg tablet TAKE ONE (1) TABLET(S) TWIC E DAILY WITH FOOD Indications: Chronic Heart Failure    albuterol (PROVENTIL VENTOLIN) 2.5 mg /3 mL (0.083 %) nebulizer solution 3 mL by Nebulization route every four (4) hours as needed for Wheezing.  allopurinol (ZYLOPRIM) 300 mg tablet TAKE ONE (1) TABLET(S) DAILY    loratadine (CLARITIN) 10 mg tablet Take 10 mg by mouth daily as needed.  co-enzyme Q-10 (CO Q-10) 100 mg capsule Take 100 mg by mouth daily.  OXYGEN-AIR DELIVERY SYSTEMS 2 L by Does Not Apply route nightly.  multivitamin (ONE A DAY) tablet Take 1 Tab by mouth daily.  atorvastatin (LIPITOR) 10 mg tablet TAKE ONE (1) TABLET(S) DAILY    fluticasone-salmeterol (ADVAIR) 250-50 mcg/dose diskus inhaler Take 1 Puff by inhalation two (2) times a day.  ibrutinib (IMBRUVICA) 140 mg cap Take 420 mg by mouth five (5) days a week. 140 mg cap, takes 3 caps five days a week (none on Sunday or Wednesday)    acetaminophen (TYLENOL EXTRA STRENGTH) 500 mg tablet Take 500 mg by mouth every six (6) hours as needed for Pain. No current facility-administered medications for this visit. Vitals:    12/14/17 1410   BP: (!) 80/52   Pulse: (!) 54   Resp: 16   SpO2: 99%   Weight: 137 lb 9.6 oz (62.4 kg)   Height: 5' 2\" (1.575 m)       I have reviewed the nurses notes, vitals, problem list, allergy list, medical history, family, social history and medications. Review of Symptoms:    General: Pt denies excessive weight gain or loss. Pt is able to conduct ADL's  HEENT: Denies blurred vision, headaches, epistaxis and difficulty swallowing. Respiratory: Denies shortness of breath, JENKINS, wheezing or stridor.   Cardiovascular: Denies precordial pain, palpitations, edema or PND  Gastrointestinal: Denies poor appetite, indigestion, abdominal pain or blood in stool  Urinary: Denies dysuria, pyuria  Musculoskeletal: Denies pain or swelling from muscles or joints  Neurologic: Denies tremor, paresthesias, or sensory motor disturbance  Skin: Denies rash, itching or texture change. Psych: Denies depression      Physical Exam:      General: Well developed, in no acute distress. HEENT: Eyes - PERRL, no jvd  Heart:  +murmur, Normal S1/S2 negative S3 or S4. Regular, no murmur, gallop or rub.   Respiratory: +bibasiler crackles  Abdomen:   Soft, non-tender, bowel sounds are active.   Extremities:  No edema, normal cap refill, no cyanosis. Musculoskeletal: No clubbing  Neuro: A&Ox3, speech clear, gait stable. Skin: Skin color is normal. No rashes or lesions. Non diaphoretic  Vascular: 2+ pulses symmetric in all extremities    Cardiographics    Ekg: BIV pacing  BSC BIV ICD: 52% Ap, 96% RVP, 95% LVP      Results for orders placed or performed during the hospital encounter of 04/20/17   EKG, 12 LEAD, INITIAL   Result Value Ref Range    Ventricular Rate 76 BPM    Atrial Rate 76 BPM    P-R Interval 172 ms    QRS Duration 190 ms    Q-T Interval 504 ms    QTC Calculation (Bezet) 567 ms    Calculated R Axis -150 degrees    Calculated T Axis 12 degrees    Diagnosis       AV dual-paced rhythm with occasional ventricular-paced complexes and with   occasional premature ventricular complexes  When compared with ECG of 01-FEB-2017 19:26,  premature ventricular complexes are now present  Vent.  rate has decreased BY   4 BPM  Confirmed by Elsy Monroy ([id number]) on 4/20/2017 7:32:54 PM           Lab Results   Component Value Date/Time    WBC 4.3 04/20/2017 09:40 AM    HGB 11.9 04/20/2017 09:40 AM    HCT 36.7 04/20/2017 09:40 AM    PLATELET 206 28/63/8316 09:40 AM    MCV 94.8 04/20/2017 09:40 AM      Lab Results   Component Value Date/Time    Sodium 148 09/29/2017 05:56 PM    Potassium 4.7 09/29/2017 05:56 PM    Chloride 108 09/29/2017 05:56 PM    CO2 27 09/29/2017 05:56 PM    Anion gap 10 04/22/2017 03:51 AM    Glucose 87 09/29/2017 05:56 PM    BUN 23 09/29/2017 05:56 PM    Creatinine 1.38 09/29/2017 05:56 PM    BUN/Creatinine ratio 17 09/29/2017 05:56 PM    GFR est AA 38 09/29/2017 05:56 PM    GFR est non-AA 33 09/29/2017 05:56 PM    Calcium 9.3 09/29/2017 05:56 PM    Bilirubin, total 0.7 04/20/2017 09:40 AM    AST (SGOT) 54 04/20/2017 09:40 AM    Alk. phosphatase 95 04/20/2017 09:40 AM    Protein, total 6.2 04/20/2017 09:40 AM    Albumin 3.2 04/20/2017 09:40 AM    Globulin 3.0 04/20/2017 09:40 AM    A-G Ratio 1.1 04/20/2017 09:40 AM    ALT (SGPT) 38 04/20/2017 09:40 AM         Assessment:     Assessment:        ICD-10-CM ICD-9-CM    1. Paroxysmal atrial fibrillation (HCC) I48.0 427.31 AMB POC EKG ROUTINE W/ 12 LEADS, INTER & REP   2. 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 LBBB (left bundle branch block) I44.7 426.3    4. Essential hypertension I10 401.9    5. Acute on chronic combined systolic and diastolic congestive heart failure (HCC) I50.43 428.43      428.0      Orders Placed This Encounter    AMB POC EKG ROUTINE W/ 12 LEADS, INTER & REP     Order Specific Question:   Reason for Exam:     Answer:   Routine        Plan:   Ms. Vanita Gregory is here for device follow up. She is feeling well and denies cardiac complaints. She was admitted to [facility] during April of this year with heart failure and is now followed by the [facility]. EKG demonstrates v pacing and her device interrogation shows normal functioning with BIV pacing, 52% AP, no events. Last echocardiogram in 7/2017 demonstrated an EF of 15%. Blood pressure has been trending hypotensive due to current HF medication regimen. Continue current medical therapy and device checks per device clinic. Follow up in one year. Continue medical management for LBBB, HF. Thank you for allowing me to participate in Sharlene Christianson 's care.     Shauna Stout NP

## 2017-12-21 ENCOUNTER — OFFICE VISIT (OUTPATIENT)
Dept: INTERNAL MEDICINE CLINIC | Age: 82
End: 2017-12-21

## 2017-12-21 VITALS
SYSTOLIC BLOOD PRESSURE: 99 MMHG | OXYGEN SATURATION: 98 % | TEMPERATURE: 97.5 F | DIASTOLIC BLOOD PRESSURE: 64 MMHG | BODY MASS INDEX: 24.13 KG/M2 | RESPIRATION RATE: 14 BRPM | HEIGHT: 62 IN | WEIGHT: 131.1 LBS | HEART RATE: 71 BPM

## 2017-12-21 DIAGNOSIS — R53.81 PHYSICAL DECONDITIONING: ICD-10-CM

## 2017-12-21 DIAGNOSIS — C85.91 NON-HODGKIN LYMPHOMA OF LYMPH NODES OF NECK, UNSPECIFIED NON-HODGKIN LYMPHOMA TYPE (HCC): ICD-10-CM

## 2017-12-21 DIAGNOSIS — E78.5 DYSLIPIDEMIA: ICD-10-CM

## 2017-12-21 DIAGNOSIS — L85.3 XEROSIS OF SKIN: ICD-10-CM

## 2017-12-21 DIAGNOSIS — J45.40 MODERATE PERSISTENT REACTIVE AIRWAY DISEASE WITHOUT COMPLICATION: ICD-10-CM

## 2017-12-21 DIAGNOSIS — I10 ESSENTIAL HYPERTENSION: ICD-10-CM

## 2017-12-21 DIAGNOSIS — I42.0 CARDIOMYOPATHY, DILATED, NONISCHEMIC (HCC): Primary | Chronic | ICD-10-CM

## 2017-12-21 NOTE — MR AVS SNAPSHOT
Visit Information Date & Time Provider Department Dept. Phone Encounter #  
 12/21/2017  9:15 AM Maikel Ibrahim [facility] [address] [facility] [phone] [id number] Follow-up Instructions Return in about 3 months (around 3/21/2018). Your Appointments 1/9/2018  1:00 PM  
Follow Up with Caitlin Solorio MD  
[address] [facility] ([address]) [place] [facility] [address] [place] [address]  
  
    
 4/3/2018 10:45 PM  
ESTABLISHED PATIENT with Karla Fagan MD  
[facility] at [facility]) Appt Note: 6 MO. F/U  
 [address] [place] [address]  
[phone] [address]  
  
    
  
 1/24/2018  8:00 AM  
REMOTE OFFICE VISIT with [facility] [address]) Appt Note: NOT AN OFFICE VISIT - remote bsc icd  
 [id number] [facility]  
[phone] [address] [facility] Upcoming Health Maintenance Date Due  
 GLAUCOMA SCREENING Q2Y 12/18/2017 MEDICARE YEARLY EXAM 6/16/2018 DTaP/Tdap/Td series (2 - Td) 2/13/2027 Allergies as of 12/21/2017  Review Complete On: 12/21/2017 By: Lane Khan Severity Noted Reaction Type Reactions Codeine  05/21/2012   Side Effect Other (comments) \"made me disoriented\" per pt Current Immunizations  Reviewed on 6/29/2015 Name Date Influenza High Dose Vaccine PF 9/27/2017 Influenza Vaccine 10/1/2016, 10/4/2014 Influenza Vaccine Split 10/22/2011 ZZZ-RETIRED (DO NOT USE) Pneumococcal Vaccine (Unspecified Type) 5/22/2007 Not reviewed this visit You Were Diagnosed With   
  
 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 
ICD-9-CM: 202.81 Essential hypertension     ICD-10-CM: I10 
ICD-9-CM: 401.9 Moderate persistent reactive airway disease without complication     XOI-07-SC: J45.40 ICD-9-CM: 493.90 Xerosis of skin     ICD-10-CM: L85.3 ICD-9-CM: 706.8 Physical deconditioning     ICD-10-CM: R53.81 ICD-9-CM: 799.3 Dyslipidemia     ICD-10-CM: E78.5 ICD-9-CM: 272.4 Vitals BP Pulse Temp Resp Height(growth percentile) Weight(growth percentile) 99/64 (BP 1 Location: Right arm, BP Patient Position: Sitting) 71 97.5 °F (36.4 °C) (Oral) 14 5' 2\" (1.575 m) 131 lb 1.6 oz (59.5 kg) SpO2 BMI OB Status Smoking Status 98% 23.98 kg/m2 Postmenopausal Never Smoker Vitals History BMI and BSA Data Body Mass Index Body Surface Area  
 23.98 kg/m 2 1.61 m 2 Preferred Pharmacy Pharmacy Name Phone [id number] [place] [id number] [facility] [phone] Your Updated Medication List  
  
   
This list is accurate as of: 12/21/17 11:11 AM.  Always use your most recent med list.  
  
  
  
  
 albuterol 2.5 mg /3 mL (0.083 %) nebulizer solution Commonly known as:  PROVENTIL VENTOLIN  
3 mL by Nebulization route every four (4) hours as needed for Wheezing. allopurinol 300 mg tablet Commonly known as:  ZYLOPRIM  
TAKE ONE (1) TABLET(S) DAILY  
  
 apixaban 2.5 mg tablet Commonly known as:  Lucas Reels Take 1 Tab by mouth two (2) times a day. Indications: PREVENT THROMBOEMBOLISM IN CHRONIC ATRIAL FIBRILLATION  
  
 atorvastatin 10 mg tablet Commonly known as:  LIPITOR  
TAKE ONE (1) TABLET(S) DAILY  
  
 carvedilol 3.125 mg tablet Commonly known as:  COREG  
TAKE ONE (1) TABLET(S) TWIC E DAILY WITH FOOD  Indications: Chronic Heart Failure  
  
 co-enzyme Q-10 100 mg capsule Commonly known as:  CO Q-10  
 Take 100 mg by mouth daily. fluticasone-salmeterol 250-50 mcg/dose diskus inhaler Commonly known as:  ADVAIR Take 1 Puff by inhalation two (2) times a day. furosemide 20 mg tablet Commonly known as:  LASIX Take 1 Tab by mouth daily. IMBRUVICA 140 mg capsule Generic drug:  ibrutinib Take 420 mg by mouth five (5) days a week. 140 mg cap, takes 3 caps five days a week (none on Sunday or Wednesday)  
  
 loratadine 10 mg tablet Commonly known as:  Sublimity Saba Take 10 mg by mouth daily as needed. magnesium oxide 400 mg Cap Take 1 Cap by mouth daily. multivitamin tablet Commonly known as:  ONE A DAY Take 1 Tab by mouth daily. OXYGEN-AIR DELIVERY SYSTEMS  
2 L by Does Not Apply route nightly. potassium chloride SR 10 mEq tablet Commonly known as:  KLOR-CON 10 Take 1 Tab by mouth daily. sacubitril-valsartan 49-51 mg Tab tablet Commonly known as:  ENTRESTO Take 1 Tab by mouth two (2) times a day. TYLENOL EXTRA STRENGTH 500 mg tablet Generic drug:  acetaminophen Take 500 mg by mouth every six (6) hours as needed for Pain. VITAMIN D3 1,000 unit Cap Generic drug:  cholecalciferol Take 1,000 Units by mouth daily. We Performed the Following APOLIPOPROTEIN B S1001782 CPT(R)] METABOLIC PANEL, BASIC [42848 CPT(R)] TSH 3RD GENERATION [30663 CPT(R)] Follow-up Instructions Return in about 3 months (around 3/21/2018). To-Do List   
 01/09/2018 11:30 AM  
(Arrive by 11:00 AM) Appointment with ECHO [facility] at [facility] NON-INVASIVE CARD ([phone]) Please be prepared to remove everything from the waist up and put on a gown. Introducing [facility]! Dear Israel Floyd: Thank you for requesting a [facility] account. Our records indicate that you already have an active [facility] account. You can access your account anytime at [email or web address] Did you know that you can access your hospital and ER discharge instructions at any time in [employer]? You can also review all of your test results from your hospital stay or ER visit. Additional Information If you have questions, please visit the Frequently Asked Questions section of the [employer] website at [email or web address] Remember, [employer] is NOT to be used for urgent needs. For medical emergencies, dial 911. Now available from your iPhone and Android! Please provide this summary of care documentation to your next provider. Your primary care clinician is listed as Gail Dey. If you have any questions after today's visit, please call [phone].

## 2017-12-21 NOTE — PROGRESS NOTES
Chief Complaint   Patient presents with    Hypotension     follow up      1. Have you been to the ER, urgent care clinic since your last visit? Hospitalized since your last visit? No    2. Have you seen or consulted any other health care providers outside of the [address] [place] [address] since your last visit? Include any pap smears or colon screening.  No

## 2017-12-21 NOTE — PROGRESS NOTES
[address] [facility]  [facility]  [address] [place] [id number]  Phone:  [phone]  Fax: [phone]       Chief Complaint   Patient presents with    Hypotension     follow up    . SUBJECTIVE:    Loc Diego is a 80 y.o. female   Comes in for a return visit stating that she is doing well. She denies any dyspnea on exertion, orthopnea, PND. She has not had any dizzy or syncopal episodes either. There have been no changes made in her medications made of late. She does complain of itching in her back. She has not had any wheezing either. She remains quite active. Finally, she follows up with her medical oncologist for her lymphoma/leukemia. MedDATA/gwo            Current Outpatient Prescriptions   Medication Sig Dispense Refill    apixaban (ELIQUIS) 2.5 mg tablet Take 1 Tab by mouth two (2) times a day. Indications: PREVENT THROMBOEMBOLISM IN CHRONIC ATRIAL FIBRILLATION 14 Tab 6    sacubitril-valsartan (ENTRESTO) 49 mg/51 mg tablet Take 1 Tab by mouth two (2) times a day. 180 Tab 3    magnesium oxide 400 mg cap Take 1 Cap by mouth daily.  cholecalciferol (VITAMIN D3) 1,000 unit cap Take 1,000 Units by mouth daily.  furosemide (LASIX) 20 mg tablet Take 1 Tab by mouth daily. (Patient taking differently: Take 20 mg by mouth two (2) times a day.) 30 Tab 11    potassium chloride SR (KLOR-CON 10) 10 mEq tablet Take 1 Tab by mouth daily. 30 Tab 11    carvedilol (COREG) 3.125 mg tablet TAKE ONE (1) TABLET(S) TWIC E DAILY WITH FOOD  Indications: Chronic Heart Failure 180 Tab 3    albuterol (PROVENTIL VENTOLIN) 2.5 mg /3 mL (0.083 %) nebulizer solution 3 mL by Nebulization route every four (4) hours as needed for Wheezing.  100 Each 11    allopurinol (ZYLOPRIM) 300 mg tablet TAKE ONE (1) TABLET(S) DAILY 90 Tab 3    atorvastatin (LIPITOR) 10 mg tablet TAKE ONE (1) TABLET(S) DAILY 90 Tab 3    fluticasone-salmeterol (ADVAIR) 250-50 mcg/dose diskus inhaler Take 1 Puff by inhalation two (2) times a day. 3 Inhaler 3    loratadine (CLARITIN) 10 mg tablet Take 10 mg by mouth daily as needed.  co-enzyme Q-10 (CO Q-10) 100 mg capsule Take 100 mg by mouth daily.  OXYGEN-AIR DELIVERY SYSTEMS 2 L by Does Not Apply route nightly.  ibrutinib (IMBRUVICA) 140 mg cap Take 420 mg by mouth five (5) days a week. 140 mg cap, takes 3 caps five days a week (none on Sunday or Wednesday)      acetaminophen (TYLENOL EXTRA STRENGTH) 500 mg tablet Take 500 mg by mouth every six (6) hours as needed for Pain.  multivitamin (ONE A DAY) tablet Take 1 Tab by mouth daily.        Past Medical History:   Diagnosis Date    Asthma     Cancer ([facility]     lymphoma    Congestive heart failure, unspecified     Heart failure (HCC)     Hypertension     Other ill-defined conditions(799.89)     heart murmur    Presence of biventricular automatic cardioverter/defibrillator (AICD) 7/2/2015    [employer] biventricular AICD implant    Stomach ulcer      Past Surgical History:   Procedure Laterality Date    HX BREAST BIOPSY Bilateral     40 years ago    HX COLONOSCOPY      HX HEENT      cataracts removed    HX HYSTERECTOMY      HX OTHER SURGICAL      lymph node surgery    HX TONSILLECTOMY      UT EGD TRANSORAL BIOPSY SINGLE/MULTIPLE  5/23/2012         SINUS SURGERY PROC UNLISTED      Nasal polyps removed     Allergies   Allergen Reactions    Codeine Other (comments)     \"made me disoriented\" per pt         REVIEW OF SYSTEMS:  General: negative for - chills or fever  ENT: negative for - headaches, nasal congestion or tinnitus  Respiratory: negative for - cough, hemoptysis, shortness of breath or wheezing  Cardiovascular : negative for - chest pain, edema, palpitations or shortness of breath  Gastrointestinal: negative for - abdominal pain, blood in stools, heartburn or nausea/vomiting  Genito-Urinary: no dysuria, trouble voiding, or hematuria  Musculoskeletal: negative for - gait disturbance, joint pain, joint stiffness or joint swelling  Neurological: no TIA or stroke symptoms  Hematologic: no bruises, no bleeding, no swollen glands  Integument: no lumps, mole changes, nail changes or rash  Endocrine: no malaise/lethargy or unexpected weight changes      Social History     Social History    Marital status:      Spouse name: N/A    Number of children: 1    Years of education: 16+     Occupational History    retired teacher      Social History Main Topics    Smoking status: Never Smoker    Smokeless tobacco: Never Used    Alcohol use No    Drug use: No    Sexual activity: No     Other Topics Concern    None     Social History Narrative     Family History   Problem Relation Age of Onset    Heart Disease Mother     Stroke Father        OBJECTIVE:    Visit Vitals    BP 99/64 (BP 1 Location: Right arm, BP Patient Position: Sitting)    Pulse 71    Temp 97.5 °F (36.4 °C) (Oral)    Resp 14    Ht 5' 2\" (1.575 m)    Wt 131 lb 1.6 oz (59.5 kg)    SpO2 98%    BMI 23.98 kg/m2     CONSTITUTIONAL: well , well nourished, appears age appropriate  EYES: perrla, eom intact  ENMT:moist mucous membranes, pharynx clear  NECK: supple. Thyroid normal,no JVD  RESPIRATORY: Chest: clear to ascultation and percussion   CARDIOVASCULAR: Heart: regular rate and rhythm  GASTROINTESTINAL: Abdomen: soft, bowel sounds active  HEMATOLOGIC: no pathological lymph nodes palpated  MUSCULOSKELETAL: Extremities: no edema, pulse 1+   INTEGUMENT: No unusual rashes or suspicious skin lesions noted. Nails appear normal.  NEUROLOGIC: non-focal exam   MENTAL STATUS: alert and oriented, appropriate affect      ASSESSMENT:  1. 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 Essential hypertension    4.  Moderate persistent reactive airway disease without complication    5. Xerosis of skin    6. Physical deconditioning    7. Dyslipidemia        PLAN:    1. She is well compensated from a cardiac perspective. There are no overt signs of congestive heart failure. She had follow up with cardiology. 2. She has had no bronchospastic flares. She does have prn use of her nebulizer but has not used this of late. 3. Deconditioning continues to be a problem but she is improving this because she is exercising on a regular basis. 4. She has dry skin leading to the itching of her back. I suggested Vaseline. 5. She follows up with her medical oncologist for her lymphoma/leukemia. She is on chemotherapy. 6. Blood pressure is excellent today with no orthostatic changes. MedDATA/gwo           Follow-up Disposition:  Return in about 3 months (around 3/21/2018).       Michela De La Fuente MD

## 2017-12-22 LAB
APO B SERPL-MCNC: 86 MG/DL (ref 54–133)
BUN SERPL-MCNC: 27 MG/DL (ref 10–36)
BUN/CREAT SERPL: 22 (ref 12–28)
CALCIUM SERPL-MCNC: 8.9 MG/DL (ref 8.7–10.3)
CHLORIDE SERPL-SCNC: 101 MMOL/L (ref 96–106)
CO2 SERPL-SCNC: 28 MMOL/L (ref 18–29)
CREAT SERPL-MCNC: 1.25 MG/DL (ref 0.57–1)
GLUCOSE SERPL-MCNC: 83 MG/DL (ref 65–99)
POTASSIUM SERPL-SCNC: 4.1 MMOL/L (ref 3.5–5.2)
SODIUM SERPL-SCNC: 142 MMOL/L (ref 134–144)
TSH SERPL DL<=0.005 MIU/L-ACNC: 9.63 UIU/ML (ref 0.45–4.5)

## 2017-12-27 ENCOUNTER — PATIENT OUTREACH (OUTPATIENT)
Dept: INTERNAL MEDICINE CLINIC | Age: 82
End: 2017-12-27

## 2017-12-27 NOTE — PROGRESS NOTES
[address] HF f/u:      12/27/2017:  Fax received from [facility] Sujata Martinez. Skilled Plan of Care Form signed by PCP. Faxed back by NN today. PLan:  silled Plan of Care:  Fill automated pill dispenser q 2 weeks and PRN. Goal:  To maintain medication compliance. Office Visit:  2/21/2017:  Seen by PCP today. PCP note:  SUBJECTIVE:     Rina Powers is a 80 y.o. female  Comes in for a return visit stating that she is doing well. She denies any dyspnea on exertion, orthopnea, PND.   Mallory Osborne has not had any dizzy or syncopal episodes either. There have been no changes made in her medications made of late.    She does complain of itching in her back.   Mallory Osborne has not had any wheezing either.    She remains quite active. Finally, she follows up with her medical oncologist for her lymphoma/leukemia. MedDATA/gwo     Goals Addressed             Most Recent     Attends follow-up appointments as directed. On track (12/27/2017)             Pt will call to office to schedule f/u with PCP once has f/u apt set with Dr Pastor Miranda Understands red flags post discharge. On track (12/27/2017)             Pt will contact surgeon's office with any swelling, temp > 100 x few days, bruising, site discharge. Patient scheduled and attended PCP appointment 12/21/2017. Cardiologist  12/14/2017. Medication reconciliation done with PCP during office visit. Understood med list; when to contact PCP; notable signs & symptoms for edema of extremities & lung-SOB, etc.     12/14/2017:  Cardiology Note:    Plan:   Ms. Kay Trotter is here for device follow up. She is feeling well and denies cardiac complaints. She was admitted to [id number] [place] [address] during April of this year with heart failure and is now followed by the [facility] HF clinic. EKG demonstrates v pacing and her device interrogation shows normal functioning with BIV pacing, 52% AP, no events. Last echocardiogram in 7/2017 demonstrated an EF of 15%.  Blood pressure has been trending hypotensive due to current HF medication regimen. Continue current medical therapy and device checks per device clinic. Follow up in one year. Continue medical management for LBBB, HF.      NN  PLan:  30 day f/u for edema free of lower extremities. Medication compliance regarding lasix.     Goal completion:  1/10/2018

## 2017-12-29 RX ORDER — LEVOTHYROXINE SODIUM 25 UG/1
25 TABLET ORAL
Qty: 90 TAB | Refills: 3 | Status: SHIPPED | OUTPATIENT
Start: 2017-12-29 | End: 2018-12-11 | Stop reason: SDUPTHER

## 2017-12-29 NOTE — TELEPHONE ENCOUNTER
PATIENT DAUGHTER IN LAW NOTIFIED OF THE NEED TO START LEVOTHYROXINE 25MCG 1 TAB QAM ON EMPTY STOMACH.

## 2018-01-08 ENCOUNTER — TELEPHONE (OUTPATIENT)
Dept: CARDIOLOGY CLINIC | Age: 83
End: 2018-01-08

## 2018-01-08 NOTE — TELEPHONE ENCOUNTER
Telephone Call RE:  Appointment reminder     Outcome:     [x] Patient confirmed appointment   [] Patient rescheduled appointment for    [] Unable to reach   [] Left message              [] Other:       Yolanda Joshua

## 2018-01-09 ENCOUNTER — HOSPITAL ENCOUNTER (OUTPATIENT)
Dept: NON INVASIVE DIAGNOSTICS | Age: 83
Discharge: HOME | End: 2018-01-09
Attending: INTERNAL MEDICINE
Payer: MEDICARE

## 2018-01-09 ENCOUNTER — OFFICE VISIT (OUTPATIENT)
Dept: CARDIOLOGY CLINIC | Age: 83
End: 2018-01-09

## 2018-01-09 VITALS
BODY MASS INDEX: 24.33 KG/M2 | HEART RATE: 76 BPM | RESPIRATION RATE: 20 BRPM | SYSTOLIC BLOOD PRESSURE: 102 MMHG | OXYGEN SATURATION: 98 % | WEIGHT: 132.2 LBS | TEMPERATURE: 97.9 F | HEIGHT: 62 IN | DIASTOLIC BLOOD PRESSURE: 60 MMHG

## 2018-01-09 DIAGNOSIS — I48.0 PAROXYSMAL ATRIAL FIBRILLATION (HCC): ICD-10-CM

## 2018-01-09 DIAGNOSIS — I50.22 CHRONIC SYSTOLIC CONGESTIVE HEART FAILURE (HCC): ICD-10-CM

## 2018-01-09 DIAGNOSIS — I44.7 LBBB (LEFT BUNDLE BRANCH BLOCK): ICD-10-CM

## 2018-01-09 DIAGNOSIS — I34.0 NON-RHEUMATIC MITRAL REGURGITATION: Chronic | ICD-10-CM

## 2018-01-09 DIAGNOSIS — I42.0 DILATED CARDIOMYOPATHY (HCC): ICD-10-CM

## 2018-01-09 DIAGNOSIS — I42.0 CARDIOMYOPATHY, DILATED, NONISCHEMIC (HCC): Primary | Chronic | ICD-10-CM

## 2018-01-09 DIAGNOSIS — Z95.810 PRESENCE OF BIVENTRICULAR AUTOMATIC CARDIOVERTER/DEFIBRILLATOR (AICD): ICD-10-CM

## 2018-01-09 DIAGNOSIS — N18.30 CKD (CHRONIC KIDNEY DISEASE) STAGE 3, GFR 30-59 ML/MIN (HCC): Chronic | ICD-10-CM

## 2018-01-09 DIAGNOSIS — I50.23 SYSTOLIC CHF, ACUTE ON CHRONIC (HCC): ICD-10-CM

## 2018-01-09 PROCEDURE — 93306 TTE W/DOPPLER COMPLETE: CPT

## 2018-01-09 NOTE — PROGRESS NOTES
[facility] Clinic Note      DOS:  1/10/2018  REFERRING PROVIDER: Dr. Humberto Kinney: Hector Orantes MD  PRIMARY CARDIOLOGIST: Dr. Mayito Flethcer  ELECTROPHYSIOLOGIST: Dr Tony Diaz / Plan:   1. NICM, HFrEF 20-25%, NYHA class II   Continue entresto 49/51- unable to uptitrate 2/2 BP and AI   Cont lasix 20 mg daily and prn- she uses appropriately   Cont coreg   Cont daily wts,  Low NA diet   Follow up 3 months     2. SCD px AICD- followed by Dr Shanti Johnson    3. Chronic Afib- V paced - followed by Dr Homero Smart coreg, Eliquis     4. MR/AI- stable by echo   Continue to monitor annually and prn sx     5. DILAN   Continue nocturnal Home O2  6. HTN - BP OK     7. CKD- per Dr Mushtaq Sarkar    8. XOL-     On lipitor and CoQ 10   Per Dr Mayito Fletcher         Thank you for allowing me to participate in the care of this extraordinarily delightful lady. Please do not hesitate to call with any questions. Julissa Yañez  RN, ACNP-BC, [facility]      HPI: 80y.o. year old female with a history of NICM with chronic HFrEF- NYHA class II s/p BiV AICD In the setting of HTN complicated by valvular heart disease (AI/MR), persistent afib (Eliquis), LBBB, CKD-III, GERD, DILAN,  non-Hodgkins lymphoma and RAD who presents for follow up of her chronic HF    She was last seen 11/17/17. Her  is with her today from [facility]       She is generally active and Walks 1 1/2 hours 5 days/week on a treadmill. She notes some JENKINS w/ moderate exertion and  Bendopnea. She has occasional dry cough and wheezing for which she takes extra lasix. Chronic 2-3 pillow orthopnea. Her energy level is stable and she naps most days. She wears compression stocking. Follows daily wts and limits her sodium. Denies exertional chest pain, JENKINS, palpitations, no AICD shocks. No lightheadedness syncope, near syncope   Chronic LE joe better with her compression stockings.          She had a spell of volume overload in December after  visiting her son. She had gained 8 pounds. Her diuretics were increased for a 3 days and prednisone pack. Weight stable at home 130-135  /65     She lives alone and has a care giver through an agency. She is a retired teacher.  Her one  son   lives in [place] [address]  ECHO 2/3/17: LV dilated, EF 20-25%, mild LVH, RV mod dilated, mild- mod MELANIA, mod-severe MR, mod AI  ECHO 7/29/17: EF 15%, severe DHK, reduced RVEF, RVH, RVSP 35 mmHg   Mild LAE, mod-marked MAC, mod-severe MR (2+), AO root dilated,  ECHO 1/10/2018: LV dilated, EF 25%, severe DHK, LVH, LAE, mild-mod MAC, mod-severe MR, mild-mod AI, mild TR        BiVAICD 7/2015: [employer]    EKG: underlying afib V paced   History:  Past Medical History:   Diagnosis Date    Asthma     Cancer ([facility] [address]     lymphoma    Congestive heart failure, unspecified     Heart failure ([facility] [address]     Hypertension     Mitral valvular regurgitation 1/22/2016    Mitral valvular regurgitation 1/22/2016    ECHO 2/3/17: LV dilated, EF 20-25%, mild LVH, RV mod dilated, mild- mod MELANIA, mod-severe MR, mod AI ECHO 7/29/17: EF 15%, severe DHK, reduced RVEF, RVH, RVSP 35 mmHg  Mild LAE, mod-marked MA fibrosis, mod-severe MR (2+), AO root dilated,      Presence of biventricular automatic cardioverter/defibrillator (AICD) 7/2/2015    [employer] biventricular AICD implant    Stomach ulcer      Past Surgical History:   Procedure Laterality Date    HX BREAST BIOPSY Bilateral     40 years ago    HX COLONOSCOPY      HX HEENT      cataracts removed    HX HYSTERECTOMY      HX OTHER SURGICAL      lymph node surgery    HX TONSILLECTOMY      TX EGD TRANSORAL BIOPSY SINGLE/MULTIPLE  5/23/2012         SINUS SURGERY PROC UNLISTED      Nasal polyps removed     Social History     Social History    Marital status:      Spouse name: N/A    Number of children: 1    Years of education: 16+     Occupational History    retired teacher Social History Main Topics    Smoking status: Never Smoker    Smokeless tobacco: Never Used    Alcohol use No    Drug use: No    Sexual activity: No     Other Topics Concern    Not on file     Social History Narrative     Family History   Problem Relation Age of Onset    Heart Disease Mother     Stroke Father        Current Medications:   Current Outpatient Prescriptions   Medication Sig Dispense Refill    apixaban (ELIQUIS) 2.5 mg tablet Take 1 Tab by mouth two (2) times a day. Indications: PREVENT THROMBOEMBOLISM IN CHRONIC ATRIAL FIBRILLATION 14 Tab 6    sacubitril-valsartan (ENTRESTO) 49 mg/51 mg tablet Take 1 Tab by mouth two (2) times a day. 180 Tab 3    magnesium oxide 400 mg cap Take 1 Cap by mouth daily.  cholecalciferol (VITAMIN D3) 1,000 unit cap Take 1,000 Units by mouth daily.  furosemide (LASIX) 20 mg tablet Take 1 Tab by mouth daily. (Patient taking differently: Take 20 mg by mouth two (2) times a day.) 30 Tab 11    potassium chloride SR (KLOR-CON 10) 10 mEq tablet Take 1 Tab by mouth daily. 30 Tab 11    carvedilol (COREG) 3.125 mg tablet TAKE ONE (1) TABLET(S) TWIC E DAILY WITH FOOD  Indications: Chronic Heart Failure 180 Tab 3    albuterol (PROVENTIL VENTOLIN) 2.5 mg /3 mL (0.083 %) nebulizer solution 3 mL by Nebulization route every four (4) hours as needed for Wheezing. 100 Each 11    allopurinol (ZYLOPRIM) 300 mg tablet TAKE ONE (1) TABLET(S) DAILY 90 Tab 3    atorvastatin (LIPITOR) 10 mg tablet TAKE ONE (1) TABLET(S) DAILY 90 Tab 3    fluticasone-salmeterol (ADVAIR) 250-50 mcg/dose diskus inhaler Take 1 Puff by inhalation two (2) times a day. 3 Inhaler 3    loratadine (CLARITIN) 10 mg tablet Take 10 mg by mouth daily as needed.  co-enzyme Q-10 (CO Q-10) 100 mg capsule Take 100 mg by mouth daily.  OXYGEN-AIR DELIVERY SYSTEMS 2 L by Does Not Apply route nightly.  ibrutinib (IMBRUVICA) 140 mg cap Take 420 mg by mouth five (5) days a week.  140 mg cap, takes 3 caps five days a week (none on Sunday or Wednesday)      acetaminophen (TYLENOL EXTRA STRENGTH) 500 mg tablet Take 500 mg by mouth every six (6) hours as needed for Pain.  multivitamin (ONE A DAY) tablet Take 1 Tab by mouth daily.  levothyroxine (SYNTHROID) 25 mcg tablet Take 1 Tab by mouth Daily (before breakfast). 90 Tab 3       Allergies: Allergies   Allergen Reactions    Codeine Other (comments)     \"made me disoriented\" per pt       ROS:    General:  positive for  - recent URI   negative for - chills or fatigue  HEENT: negative for sore throat- better   Pulmonary: positive for - wheezing off and on, if persistent she takes prn lasix  negative for - cough, hemoptysis or pleuritic pain  Cardiac: negative for chest pain, palpitations, irregular heart beats, near-syncope, syncope  GI:  no abdominal pain, change in bowel habits, or black or bloody stools  negative for - abdominal pain, constipation, diarrhea, heartburn, hematemesis, melena or nausea/vomiting  Musculo: negative  negative for - joint pain or muscle pain  Neuro: negative for - dizziness, gait disturbance, headaches, seizures or speech problems  Skin:  negative for - rash  Allergies: REMINDER:DOCUMENT ALLERGIES IN ALLERGY ACTIVITY  Psych: negative for - anxiety or sleep disturbances      Physical Exam:   Vitals:    Visit Vitals    /60 (BP 1 Location: Left arm, BP Patient Position: Sitting)    Pulse 76    Temp 97.9 °F (36.6 °C) (Oral)    Resp 20    Ht 5' 2\" (1.575 m)    Wt 132 lb 3.2 oz (60 kg)    SpO2 98%    BMI 24.18 kg/m2         General: AAOx3 cooperative, very spry, no acute distress. Well developed NAD   HEENT: Atraumatic. Pink and moist.  Anicteric sclerae. Neck: Supple, no adenopoathy. Lungs: CTA bilaterally. No wheezing/rhonchi/rales. Heart[de-identified] PMI: laterally displaced,,  AICD:  Regular rhythm, S1, S2 present, 3/6 systolic murmur apex-> axilla, 2/6 diastolic rumble murmur  RUSB,  no rubs, no gallops.  JVD 16 cm + J wave, (-) HJR . No carotid bruits. Abdomen: Soft, non-distended, non-tender. + Bowel sounds. No bruits. Extremities: No edema, no clubbing, no cyanosis. No calf tenderness  Neurologic: Grossly intact. Alert and oriented X 3. No acute neurological distress. Skin: intact, no wounds, ecchymosis, bruising, rashes   Psych: Good insight. Not anxious nor agitated. Recent Labs:     Lab Results   Component Value Date/Time    WBC 4.3 04/20/2017 09:40 AM    HGB 11.9 04/20/2017 09:40 AM    HCT 36.7 04/20/2017 09:40 AM    PLATELET 943 77/08/0153 09:40 AM    MCV 94.8 04/20/2017 09:40 AM     Lab Results   Component Value Date/Time    GFR est non-AA 37 12/21/2017 11:03 AM    GFRNA, POC 50 04/09/2012 11:28 AM    GFR est AA 43 12/21/2017 11:03 AM    GFRAA, POC >60 04/09/2012 11:28 AM    Creatinine 1.25 12/21/2017 11:03 AM    Creatinine (POC) 1.1 04/09/2012 11:28 AM    BUN 27 12/21/2017 11:03 AM    Sodium 142 12/21/2017 11:03 AM    Potassium 4.1 12/21/2017 11:03 AM    Chloride 101 12/21/2017 11:03 AM    CO2 28 12/21/2017 11:03 AM    Magnesium 1.9 04/21/2017 02:34 AM    Phosphorus 2.2 02/03/2017 01:51 AM     Lab Results   Component Value Date/Time    TSH 9.630 12/21/2017 11:03 AM      Lab Results   Component Value Date/Time    Sodium 142 12/21/2017 11:03 AM    Potassium 4.1 12/21/2017 11:03 AM    Chloride 101 12/21/2017 11:03 AM    CO2 28 12/21/2017 11:03 AM    Anion gap 10 04/22/2017 03:51 AM    Glucose 83 12/21/2017 11:03 AM    BUN 27 12/21/2017 11:03 AM    Creatinine 1.25 12/21/2017 11:03 AM    BUN/Creatinine ratio 22 12/21/2017 11:03 AM    GFR est AA 43 12/21/2017 11:03 AM    GFR est non-AA 37 12/21/2017 11:03 AM    Calcium 8.9 12/21/2017 11:03 AM    Bilirubin, total 0.7 04/20/2017 09:40 AM    ALT (SGPT) 38 04/20/2017 09:40 AM    AST (SGOT) 54 04/20/2017 09:40 AM    Alk.  phosphatase 95 04/20/2017 09:40 AM    Protein, total 6.2 04/20/2017 09:40 AM    Albumin 3.2 04/20/2017 09:40 AM    Globulin 3.0 04/20/2017 09:40 AM    A-G Ratio 1.1 04/20/2017 09:40 AM               Julissa Yañez RN, ACNP-BC, [address] [place] [address] [phone]  24 hour VAD/HF Pager: [phone]   Future Appointments  Date Time Provider Amanda Ashlie   1/24/2018 8:00 AM [address] [facility]   3/22/2018 11:30 AM Hector Orantes MD [address] [facility]   4/3/2018 10:45 PM Douglas Schneider MD [address] [place] [address]

## 2018-01-09 NOTE — LETTER
1/10/2018 9:10 AM 
 
Patient:  Noe Henderson YOB: 1925 Date of Visit: 1/9/2018 Dear Cuauhtemoc Aldridge MD 
[address] [address] [address] VIA In Basket Dirk Perkins MD 
[address] [facility] [address] [address] VIA In Basket Roslyn Kern MD 
[address] [facility] [address] [address] VIA In Basket 
 : Thank you for referring Ms. Aneta Tinoco to me for evaluation/treatment. Below are the relevant portions of my assessment and plan of care. [place] Note DOS:  1/10/2018 REFERRING PROVIDER: Dr. Betina Pack PRIMARY CARE PHYSICIAN: Cuauhtemoc Aldridge MD 
PRIMARY CARDIOLOGIST: Dr. Betina Pack ELECTROPHYSIOLOGIST: Dr Worthy Apo / Plan: 1. NICM, HFrEF 20-25%, NYHA class II Continue entresto 49/51- unable to uptitrate 2/2 BP and AI Cont lasix 20 mg daily and prn- she uses appropriately Cont coreg Cont daily wts,  Low NA diet Follow up 3 months 2. SCD px AICD- followed by Dr Meli Argueta 
 
3. Chronic Afib- V paced - followed by Dr Betina Pack Cont coreg, Eliquis 4. MR/AI- stable by echo Continue to monitor annually and prn sx 5. DILAN Continue nocturnal Home O2 
5. HTN - BP OK 6. XOL- On lipitor and CoQ 10 Per Dr Betina Pack Thank you for allowing me to participate in the care of this extraordinarily delightful lady. Please do not hesitate to call with any questions. Julissa Hightower  RN, ACNP-BC, [facility] 
 
 
HPI: 80y.o. 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 Her  is with her today from [facility] She is generally active and Walks 1 1/2 hours 5 days/week on a treadmill. She notes some JENKINS w/ moderate exertion and  Bendopnea. She has occasional dry cough and wheezing for which she takes extra lasix. Chronic 2-3 pillow orthopnea. Her energy level is stable and she naps most days. She wears compression stocking. Follows daily wts and limits her sodium. Denies exertional chest pain, JENKINS, palpitations, no AICD shocks. No lightheadedness syncope, near syncope Chronic LE joe better with her compression stockings. She had a spell of volume overload in December after  visiting her son. She had gained 8 pounds. Her diuretics were increased for a 3 days and prednisone pack. Weight stable at home 130-135 /65 She lives alone and has a care giver through an agency. Her one  son   lives in [place] CARDIAC EVALUATION 
ECHO 2/3/17: LV dilated, EF 20-25%, mild LVH, RV mod dilated, mild- mod MELANIA, mod-severe MR, mod AI 
ECHO 7/29/17: EF 15%, severe DHK, reduced RVEF, RVH, RVSP 35 mmHg Mild LAE, mod-marked MAC, mod-severe MR (2+), AO root dilated, ECHO 1/10/2018: LV dilated, EF 25%, severe DHK, LVH, LAE, mild-mod MAC, mod-severe MR, mild-mod AI, mild TR BiVAICD 7/2015: [facility] EKG: Afib underlying with ventricular paced History: 
Past Medical History:  
Diagnosis Date  Asthma  Cancer ([address] lymphoma  Congestive heart failure, unspecified  Heart failure ([address]  Hypertension  Mitral valvular regurgitation 1/22/2016  Mitral valvular regurgitation 1/22/2016 ECHO 2/3/17: LV dilated, EF 20-25%, mild LVH, RV mod dilated, mild- mod MELANIA, mod-severe MR, mod AI ECHO 7/29/17: EF 15%, severe DHK, reduced RVEF, RVH, RVSP 35 mmHg  Mild LAE, mod-marked MA fibrosis, mod-severe MR (2+), AO root dilated,    
 Presence of biventricular automatic cardioverter/defibrillator (AICD) 7/2/2015 [employer] biventricular AICD implant  Stomach ulcer Past Surgical History:  
Procedure Laterality Date  HX BREAST BIOPSY Bilateral   
 40 years ago  HX COLONOSCOPY    
 HX HEENT    
 cataracts removed  HX HYSTERECTOMY  HX OTHER SURGICAL    
 lymph node surgery  HX TONSILLECTOMY  OK EGD TRANSORAL BIOPSY SINGLE/MULTIPLE  5/23/2012  
    
 SINUS SURGERY PROC UNLISTED Nasal polyps removed Social History Social History  Marital status:  Spouse name: N/A  
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  
 Drug use: No  
 Sexual activity: No  
 
Other Topics Concern  Not on file Social History Narrative Family History Problem Relation Age of Onset  Heart Disease Mother  Stroke Father Current Medications:  
Current Outpatient Prescriptions Medication Sig Dispense Refill  apixaban (ELIQUIS) 2.5 mg tablet Take 1 Tab by mouth two (2) times a day. Indications: PREVENT THROMBOEMBOLISM IN CHRONIC ATRIAL FIBRILLATION 14 Tab 6  
 sacubitril-valsartan (ENTRESTO) 49 mg/51 mg tablet Take 1 Tab by mouth two (2) times a day. 180 Tab 3  
 magnesium oxide 400 mg cap Take 1 Cap by mouth daily.  cholecalciferol (VITAMIN D3) 1,000 unit cap Take 1,000 Units by mouth daily.  furosemide (LASIX) 20 mg tablet Take 1 Tab by mouth daily. (Patient taking differently: Take 20 mg by mouth two (2) times a day.) 30 Tab 11  
 potassium chloride SR (KLOR-CON 10) 10 mEq tablet Take 1 Tab by mouth daily. 30 Tab 11  
 carvedilol (COREG) 3.125 mg tablet TAKE ONE (1) TABLET(S) TWIC E DAILY WITH FOOD  Indications: Chronic Heart Failure 180 Tab 3  
 albuterol (PROVENTIL VENTOLIN) 2.5 mg /3 mL (0.083 %) nebulizer solution 3 mL by Nebulization route every four (4) hours as needed for Wheezing.  100 Each 11  
  allopurinol (ZYLOPRIM) 300 mg tablet TAKE ONE (1) TABLET(S) DAILY 90 Tab 3  
 atorvastatin (LIPITOR) 10 mg tablet TAKE ONE (1) TABLET(S) DAILY 90 Tab 3  
 fluticasone-salmeterol (ADVAIR) 250-50 mcg/dose diskus inhaler Take 1 Puff by inhalation two (2) times a day. 3 Inhaler 3  
 loratadine (CLARITIN) 10 mg tablet Take 10 mg by mouth daily as needed.  co-enzyme Q-10 (CO Q-10) 100 mg capsule Take 100 mg by mouth daily.  OXYGEN-AIR DELIVERY SYSTEMS 2 L by Does Not Apply route nightly.  ibrutinib (IMBRUVICA) 140 mg cap Take 420 mg by mouth five (5) days a week. 140 mg cap, takes 3 caps five days a week (none on Sunday or Wednesday)  acetaminophen (TYLENOL EXTRA STRENGTH) 500 mg tablet Take 500 mg by mouth every six (6) hours as needed for Pain.  multivitamin (ONE A DAY) tablet Take 1 Tab by mouth daily.  levothyroxine (SYNTHROID) 25 mcg tablet Take 1 Tab by mouth Daily (before breakfast). 90 Tab 3 Allergies: Allergies Allergen Reactions  Codeine Other (comments) \"made me disoriented\" per pt ROS:   
General:  positive for  - recent URI  
negative for - chills or fatigue HEENT: negative for sore throat- better Pulmonary: positive for - wheezing off and on, if persistent she takes prn lasix 
negative for - cough, hemoptysis or pleuritic pain 
Cardiac: negative for chest pain, palpitations, irregular heart beats, near-syncope, syncope GI:  no abdominal pain, change in bowel habits, or black or bloody stools 
negative for - abdominal pain, constipation, diarrhea, heartburn, hematemesis, melena or nausea/vomiting Musculo: negative 
negative for - joint pain or muscle pain Neuro: negative for - dizziness, gait disturbance, headaches, seizures or speech problems Skin:  negative for - rash Allergies: REMINDER:DOCUMENT ALLERGIES IN ALLERGY ACTIVITY Psych: negative for - anxiety or sleep disturbances Physical Exam:  
Vitals:   
Visit Vitals  /60 (BP 1 Location: Left arm, BP Patient Position: Sitting)  Pulse 76  Temp 97.9 °F (36.6 °C) (Oral)  Resp 20  
 Ht 5' 2\" (1.575 m)  Wt 132 lb 3.2 oz (60 kg)  SpO2 98%  BMI 24.18 kg/m2 General: AAOx3 cooperative, very spry, no acute distress. Well developed NAD HEENT: Atraumatic. Pink and moist.  Anicteric sclerae. Neck: Supple, no adenopoathy. Lungs: CTA bilaterally. No wheezing/rhonchi/rales. Heart[de-identified] PMI: laterally displaced,,  AICD:  Regular rhythm, S1, S2 present, 3/6 systolic murmur apex-> axilla, 2/6 diastolic rumble murmur  RUSB,  no rubs, no gallops. JVD 16  cm + J wave, (-) HJR . No carotid bruits. Abdomen: Soft, non-distended, non-tender. + Bowel sounds. No bruits. Extremities: No edema, no clubbing, no cyanosis. No calf tenderness Neurologic: Grossly intact. Alert and oriented X 3. No acute neurological distress. Skin: intact, no wounds, ecchymosis, bruising, rashes Psych: Good insight. Not anxious nor agitated. Recent Labs:  
 
Lab Results Component Value Date/Time WBC 4.3 04/20/2017 09:40 AM  
 HGB 11.9 04/20/2017 09:40 AM  
 HCT 36.7 04/20/2017 09:40 AM  
 PLATELET 689 31/71/1076 09:40 AM  
 MCV 94.8 04/20/2017 09:40 AM  
 
Lab Results Component Value Date/Time GFR est non-AA 37 12/21/2017 11:03 AM  
 GFRNA, POC 50 04/09/2012 11:28 AM  
 GFR est AA 43 12/21/2017 11:03 AM  
 GFRAA, POC >60 04/09/2012 11:28 AM  
 Creatinine 1.25 12/21/2017 11:03 AM  
 Creatinine (POC) 1.1 04/09/2012 11:28 AM  
 BUN 27 12/21/2017 11:03 AM  
 Sodium 142 12/21/2017 11:03 AM  
 Potassium 4.1 12/21/2017 11:03 AM  
 Chloride 101 12/21/2017 11:03 AM  
 CO2 28 12/21/2017 11:03 AM  
 Magnesium 1.9 04/21/2017 02:34 AM  
 Phosphorus 2.2 02/03/2017 01:51 AM  
 
Lab Results Component Value Date/Time TSH 9.630 12/21/2017 11:03 AM  
  
Lab Results Component Value Date/Time  Sodium 142 12/21/2017 11:03 AM  
 Potassium 4.1 12/21/2017 11:03 AM  
 Chloride 101 12/21/2017 11:03 AM  
 CO2 28 12/21/2017 11:03 AM  
 Anion gap 10 04/22/2017 03:51 AM  
 Glucose 83 12/21/2017 11:03 AM  
 BUN 27 12/21/2017 11:03 AM  
 Creatinine 1.25 12/21/2017 11:03 AM  
 BUN/Creatinine ratio 22 12/21/2017 11:03 AM  
 GFR est AA 43 12/21/2017 11:03 AM  
 GFR est non-AA 37 12/21/2017 11:03 AM  
 Calcium 8.9 12/21/2017 11:03 AM  
 Bilirubin, total 0.7 04/20/2017 09:40 AM  
 ALT (SGPT) 38 04/20/2017 09:40 AM  
 AST (SGOT) 54 04/20/2017 09:40 AM  
 Alk. phosphatase 95 04/20/2017 09:40 AM  
 Protein, total 6.2 04/20/2017 09:40 AM  
 Albumin 3.2 04/20/2017 09:40 AM  
 Globulin 3.0 04/20/2017 09:40 AM  
 A-G Ratio 1.1 04/20/2017 09:40 AM  
  
  
 
 
 
Julissa Nesbitt, RN, ACNP-BC, [facility] [address] [facility] 7 [address] [phone] 
29 hour VAD/HF Pager: [phone] Future Appointments Date Time Provider Amanda Dailey 1/24/2018 8:00 AM REMOTE_RCAM RCAMB WERNER SCHED  
3/22/2018 11:30 AM Katherine Leigh MD [facility] MAIN WERNER SCHED  
4/3/2018 10:45 PM Valerie Ly MD [address] [place] [address] Note DOS:  1/10/2018 REFERRING PROVIDER: Dr. Keshia Gray PRIMARY CARE PHYSICIAN: Katherine Leigh MD 
PRIMARY CARDIOLOGIST: Dr. Keshia Gray ELECTROPHYSIOLOGIST: Dr Clarise Hodgkins / Plan: 1. NICM, HFrEF 20-25%, NYHA class II Continue entresto 49/51- unable to uptitrate 2/2 BP and AI Cont lasix 20 mg daily and prn- she uses appropriately Cont coreg Cont daily wts,  Low NA diet Follow up 3 months 2. SCD px AICD- followed by Dr Adonna Saint 
 
3. Chronic Afib- V paced - followed by Dr Keshia Gray Cont coreg, Eliquis 4. MR/AI- stable by echo Continue to monitor annually and prn sx 5. DILAN Continue nocturnal Home O2 
6. HTN - BP OK 7. CKD- per Dr Cat Alamo 8. XOL- On lipitor and CoQ 10 Per Dr Keshia Gray Thank you for allowing me to participate in the care of this extraordinarily delightful lady. Please do not hesitate to call with any questions. Julissa Izquierdo  RN, [facility], [facility] 
 
 
HPI: 80y.o. year old female with a history of NICM with chronic HFrEF- NYHA class II s/p BiV AICD In the setting of HTN complicated by valvular heart disease (AI/MR), persistent afib (Eliquis), LBBB, CKD-III, GERD, DILAN,  non-Hodgkins lymphoma and RAD who presents for follow up of her chronic HF She was last seen 11/17/17. Her  is with her today from [place] She is generally active and Walks 1 1/2 hours 5 days/week on a treadmill. She notes some JENKINS w/ moderate exertion and  Bendopnea. She has occasional dry cough and wheezing for which she takes extra lasix. Chronic 2-3 pillow orthopnea. Her energy level is stable and she naps most days. She wears compression stocking. Follows daily wts and limits her sodium. Denies exertional chest pain, JENKINS, palpitations, no AICD shocks. No lightheadedness syncope, near syncope Chronic LE joe better with her compression stockings. She had a spell of volume overload in December after  visiting her son. She had gained 8 pounds. Her diuretics were increased for a 3 days and prednisone pack. Weight stable at home 130-135 /65 She lives alone and has a care giver through an agency. Her one  son   lives in [place] CARDIAC EVALUATION 
ECHO 2/3/17: LV dilated, EF 20-25%, mild LVH, RV mod dilated, mild- mod MELANIA, mod-severe MR, mod AI 
ECHO 7/29/17: EF 15%, severe DHK, reduced RVEF, RVH, RVSP 35 mmHg Mild LAE, mod-marked MAC, mod-severe MR (2+), AO root dilated, ECHO 1/10/2018: LV dilated, EF 25%, severe DHK, LVH, LAE, mild-mod MAC, mod-severe MR, mild-mod AI, mild TR BiVAICD 7/2015: [employer] EKG: underlying afib V paced History: 
Past Medical History:  
Diagnosis Date  Asthma  Cancer ([facility] lymphoma  Congestive heart failure, unspecified  Heart failure ([facility]  Hypertension  Mitral valvular regurgitation 1/22/2016  Mitral valvular regurgitation 1/22/2016 ECHO 2/3/17: LV dilated, EF 20-25%, mild LVH, RV mod dilated, mild- mod MELANIA, mod-severe MR, mod AI ECHO 7/29/17: EF 15%, severe DHK, reduced RVEF, RVH, RVSP 35 mmHg  Mild LAE, mod-marked MA fibrosis, mod-severe MR (2+), AO root dilated,    
 Presence of biventricular automatic cardioverter/defibrillator (AICD) 7/2/2015 [employer] biventricular AICD implant  Stomach ulcer Past Surgical History:  
Procedure Laterality Date  HX BREAST BIOPSY Bilateral   
 40 years ago  HX COLONOSCOPY    
 HX HEENT    
 cataracts removed  HX HYSTERECTOMY  HX OTHER SURGICAL    
 lymph node surgery  HX TONSILLECTOMY  [place] EGD TRANSORAL BIOPSY SINGLE/MULTIPLE  5/23/2012  
    
 SINUS SURGERY PROC UNLISTED Nasal polyps removed Social History Social History  Marital status:  Spouse name: N/A  
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  
 Drug use: No  
 Sexual activity: No  
 
Other Topics Concern  Not on file Social History Narrative Family History Problem Relation Age of Onset  Heart Disease Mother  Stroke Father Current Medications:  
Current Outpatient Prescriptions Medication Sig Dispense Refill  apixaban (ELIQUIS) 2.5 mg tablet Take 1 Tab by mouth two (2) times a day. Indications: PREVENT THROMBOEMBOLISM IN CHRONIC ATRIAL FIBRILLATION 14 Tab 6  
 sacubitril-valsartan (ENTRESTO) 49 mg/51 mg tablet Take 1 Tab by mouth two (2) times a day. 180 Tab 3  
 magnesium oxide 400 mg cap Take 1 Cap by mouth daily.  cholecalciferol (VITAMIN D3) 1,000 unit cap Take 1,000 Units by mouth daily.  furosemide (LASIX) 20 mg tablet Take 1 Tab by mouth daily. (Patient taking differently: Take 20 mg by mouth two (2) times a day.) 30 Tab 11  
 potassium chloride SR (KLOR-CON 10) 10 mEq tablet Take 1 Tab by mouth daily. 30 Tab 11  
 carvedilol (COREG) 3.125 mg tablet TAKE ONE (1) TABLET(S) TWIC E DAILY WITH FOOD  Indications: Chronic Heart Failure 180 Tab 3  
 albuterol (PROVENTIL VENTOLIN) 2.5 mg /3 mL (0.083 %) nebulizer solution 3 mL by Nebulization route every four (4) hours as needed for Wheezing. 100 Each 11  
 allopurinol (ZYLOPRIM) 300 mg tablet TAKE ONE (1) TABLET(S) DAILY 90 Tab 3  
 atorvastatin (LIPITOR) 10 mg tablet TAKE ONE (1) TABLET(S) DAILY 90 Tab 3  
 fluticasone-salmeterol (ADVAIR) 250-50 mcg/dose diskus inhaler Take 1 Puff by inhalation two (2) times a day. 3 Inhaler 3  
 loratadine (CLARITIN) 10 mg tablet Take 10 mg by mouth daily as needed.  co-enzyme Q-10 (CO Q-10) 100 mg capsule Take 100 mg by mouth daily.  OXYGEN-AIR DELIVERY SYSTEMS 2 L by Does Not Apply route nightly.  ibrutinib (IMBRUVICA) 140 mg cap Take 420 mg by mouth five (5) days a week. 140 mg cap, takes 3 caps five days a week (none on Sunday or Wednesday)  acetaminophen (TYLENOL EXTRA STRENGTH) 500 mg tablet Take 500 mg by mouth every six (6) hours as needed for Pain.  multivitamin (ONE A DAY) tablet Take 1 Tab by mouth daily.  levothyroxine (SYNTHROID) 25 mcg tablet Take 1 Tab by mouth Daily (before breakfast). 90 Tab 3 Allergies: Allergies Allergen Reactions  Codeine Other (comments) \"made me disoriented\" per pt ROS:   
General:  positive for  - recent URI  
negative for - chills or fatigue HEENT: negative for sore throat- better Pulmonary: positive for - wheezing off and on, if persistent she takes prn lasix 
negative for - cough, hemoptysis or pleuritic pain 
Cardiac: negative for chest pain, palpitations, irregular heart beats, near-syncope, syncope GI:  no abdominal pain, change in bowel habits, or black or bloody stools 
negative for - abdominal pain, constipation, diarrhea, heartburn, hematemesis, melena or nausea/vomiting Musculo: negative 
negative for - joint pain or muscle pain Neuro: negative for - dizziness, gait disturbance, headaches, seizures or speech problems Skin:  negative for - rash Allergies: REMINDER:DOCUMENT ALLERGIES IN ALLERGY ACTIVITY Psych: negative for - anxiety or sleep disturbances Physical Exam:  
Vitals:   
Visit Vitals  /60 (BP 1 Location: Left arm, BP Patient Position: Sitting)  Pulse 76  Temp 97.9 °F (36.6 °C) (Oral)  Resp 20  
 Ht 5' 2\" (1.575 m)  Wt 132 lb 3.2 oz (60 kg)  SpO2 98%  BMI 24.18 kg/m2 General: AAOx3 cooperative, very spry, no acute distress. Well developed NAD HEENT: Atraumatic. Pink and moist.  Anicteric sclerae. Neck: Supple, no adenopoathy. Lungs: CTA bilaterally. No wheezing/rhonchi/rales. Heart[de-identified] PMI: laterally displaced,,  AICD:  Regular rhythm, S1, S2 present, 3/6 systolic murmur apex-> axilla, 2/6 diastolic rumble murmur  RUSB,  no rubs, no gallops. JVD 16  cm + J wave, (-) HJR . No carotid bruits. Abdomen: Soft, non-distended, non-tender. + Bowel sounds. No bruits. Extremities: No edema, no clubbing, no cyanosis. No calf tenderness Neurologic: Grossly intact. Alert and oriented X 3. No acute neurological distress. Skin: intact, no wounds, ecchymosis, bruising, rashes Psych: Good insight. Not anxious nor agitated. Recent Labs:  
 
Lab Results Component Value Date/Time WBC 4.3 04/20/2017 09:40 AM  
 HGB 11.9 04/20/2017 09:40 AM  
 HCT 36.7 04/20/2017 09:40 AM  
 PLATELET 386 44/07/7504 09:40 AM  
 MCV 94.8 04/20/2017 09:40 AM  
 
Lab Results Component Value Date/Time  GFR est non-AA 37 12/21/2017 11:03 AM  
 GFRNA, POC 50 04/09/2012 11:28 AM  
 GFR est AA 43 12/21/2017 11:03 AM  
 GFRAA, POC >60 04/09/2012 11:28 AM  
 Creatinine 1.25 12/21/2017 11:03 AM  
 Creatinine (POC) 1.1 04/09/2012 11:28 AM  
 BUN 27 12/21/2017 11:03 AM  
 Sodium 142 12/21/2017 11:03 AM  
 Potassium 4.1 12/21/2017 11:03 AM  
 Chloride 101 12/21/2017 11:03 AM  
 CO2 28 12/21/2017 11:03 AM  
 Magnesium 1.9 04/21/2017 02:34 AM  
 Phosphorus 2.2 02/03/2017 01:51 AM  
 
Lab Results Component Value Date/Time TSH 9.630 12/21/2017 11:03 AM  
  
Lab Results Component Value Date/Time Sodium 142 12/21/2017 11:03 AM  
 Potassium 4.1 12/21/2017 11:03 AM  
 Chloride 101 12/21/2017 11:03 AM  
 CO2 28 12/21/2017 11:03 AM  
 Anion gap 10 04/22/2017 03:51 AM  
 Glucose 83 12/21/2017 11:03 AM  
 BUN 27 12/21/2017 11:03 AM  
 Creatinine 1.25 12/21/2017 11:03 AM  
 BUN/Creatinine ratio 22 12/21/2017 11:03 AM  
 GFR est AA 43 12/21/2017 11:03 AM  
 GFR est non-AA 37 12/21/2017 11:03 AM  
 Calcium 8.9 12/21/2017 11:03 AM  
 Bilirubin, total 0.7 04/20/2017 09:40 AM  
 ALT (SGPT) 38 04/20/2017 09:40 AM  
 AST (SGOT) 54 04/20/2017 09:40 AM  
 Alk. phosphatase 95 04/20/2017 09:40 AM  
 Protein, total 6.2 04/20/2017 09:40 AM  
 Albumin 3.2 04/20/2017 09:40 AM  
 Globulin 3.0 04/20/2017 09:40 AM  
 A-G Ratio 1.1 04/20/2017 09:40 AM  
  
  
 
 
 
Julissa Mortensen, RN, ACNP-BC, AACC [facility] [address] [facility] [place] [address] [address] [phone] 
83 hour VAD/HF Pager: [phone] Future Appointments Date Time Provider Amanda Dailey 1/24/2018 8:00 AM REMOTE_RCAM RC[facility]B [facility]ER SCHED  
3/22/2018 11:30 AM Farideh Meléndez MD [facility] MAIN WERNER SCHED  
4/3/2018 10:45 PM Jeferson Avilez MD [address] If you have questions, please do not hesitate to call me. I look forward to following Ms. Ciro Bk along with you. Sincerely, Fiordaliza Og MD

## 2018-01-09 NOTE — MR AVS SNAPSHOT
Visit Information Date & Time Provider Department Dept. Phone Encounter #  
 1/9/2018  1:00 PM Ladonna Loco MD [address] [id number] Follow-up Instructions Return in about 3 months (around 4/9/2018). Your Appointments 3/22/2018 11:30 AM  
Any with Jessica Arizmendi MD  
[address] [place] [address] and Primary Care [address]) Appt Note: 3 month f/u  
 [address] [place]  
  
   
 [address] [id number]  
  
    
 4/3/2018 10:45 PM  
ESTABLISHED PATIENT with Isiah Canales MD  
[facility]) Appt Note: 6 MO. F/U  
 [address]  
[phone] [address]  
  
    
  
 1/24/2018  8:00 AM  
REMOTE OFFICE VISIT with [facility] [address] [facility]) Appt Note: NOT AN OFFICE VISIT - remote bsc icd  
 [address] [facility] [address]  
[phone] [address] [facility] [address] Upcoming Health Maintenance Date Due  
 GLAUCOMA SCREENING Q2Y 12/18/2017 MEDICARE YEARLY EXAM 6/16/2018 DTaP/Tdap/Td series (2 - Td) 2/13/2027 Allergies as of 1/9/2018  Review Complete On: 1/9/2018 By: Ann Soto Severity Noted Reaction Type Reactions Codeine  05/21/2012   Side Effect Other (comments) \"made me disoriented\" per pt Current Immunizations  Reviewed on 6/29/2015 Name Date Influenza High Dose Vaccine PF 9/27/2017 Influenza Vaccine 10/1/2016, 10/4/2014 Influenza Vaccine Split 10/22/2011 ZZZ-RETIRED (DO NOT USE) Pneumococcal Vaccine (Unspecified Type) 5/22/2007 Not reviewed this visit You Were Diagnosed With   
  
 Codes Comments Systolic CHF, acute on chronic (HCC)     ICD-10-CM: N42.01 ICD-9-CM: 428.23, 428.0 Non-rheumatic mitral regurgitation     ICD-10-CM: I34.0 ICD-9-CM: 424.0 Vitals BP Pulse Temp Resp Height(growth percentile) Weight(growth percentile) 102/60 (BP 1 Location: Left arm, BP Patient Position: Sitting) 76 97.9 °F (36.6 °C) (Oral) 20 5' 2\" (1.575 m) 132 lb 3.2 oz (60 kg) SpO2 BMI OB Status Smoking Status 98% 24.18 kg/m2 Postmenopausal Never Smoker Vitals History BMI and BSA Data Body Mass Index Body Surface Area  
 24.18 kg/m 2 1.62 m 2 Preferred Pharmacy Pharmacy Name Phone [address] [phone] Your Updated Medication List  
  
   
This list is accurate as of: 1/9/18  2:47 PM.  Always use your most recent med list.  
  
  
  
  
 albuterol 2.5 mg /3 mL (0.083 %) nebulizer solution Commonly known as:  PROVENTIL VENTOLIN  
3 mL by Nebulization route every four (4) hours as needed for Wheezing. allopurinol 300 mg tablet Commonly known as:  ZYLOPRIM  
TAKE ONE (1) TABLET(S) DAILY  
  
 apixaban 2.5 mg tablet Commonly known as:  Carolanne Frandy Take 1 Tab by mouth two (2) times a day. Indications: PREVENT THROMBOEMBOLISM IN CHRONIC ATRIAL FIBRILLATION  
  
 atorvastatin 10 mg tablet Commonly known as:  LIPITOR  
TAKE ONE (1) TABLET(S) DAILY  
  
 carvedilol 3.125 mg tablet Commonly known as:  COREG  
TAKE ONE (1) TABLET(S) TWIC E DAILY WITH FOOD  Indications: Chronic Heart Failure  
  
 co-enzyme Q-10 100 mg capsule Commonly known as:  CO Q-10 Take 100 mg by mouth daily. fluticasone-salmeterol 250-50 mcg/dose diskus inhaler Commonly known as:  ADVAIR Take 1 Puff by inhalation two (2) times a day. furosemide 20 mg tablet Commonly known as:  LASIX Take 1 Tab by mouth daily. IMBRUVICA 140 mg capsule Generic drug:  ibrutinib Take 420 mg by mouth five (5) days a week. 140 mg cap, takes 3 caps five days a week (none on Sunday or Wednesday) levothyroxine 25 mcg tablet Commonly known as:  SYNTHROID Take 1 Tab by mouth Daily (before breakfast). loratadine 10 mg tablet Commonly known as:  Thermon Marker Take 10 mg by mouth daily as needed. magnesium oxide 400 mg Cap Take 1 Cap by mouth daily. multivitamin tablet Commonly known as:  ONE A DAY Take 1 Tab by mouth daily. OXYGEN-AIR DELIVERY SYSTEMS  
2 L by Does Not Apply route nightly. potassium chloride SR 10 mEq tablet Commonly known as:  KLOR-CON 10 Take 1 Tab by mouth daily. sacubitril-valsartan 49-51 mg Tab tablet Commonly known as:  ENTRESTO Take 1 Tab by mouth two (2) times a day. TYLENOL EXTRA STRENGTH 500 mg tablet Generic drug:  acetaminophen Take 500 mg by mouth every six (6) hours as needed for Pain. VITAMIN D3 1,000 unit Cap Generic drug:  cholecalciferol Take 1,000 Units by mouth daily. We Performed the Following METABOLIC PANEL, BASIC [04661 CPT(R)] NT-PRO BNP [id number] CPT(R)] Follow-up Instructions Return in about 3 months (around 4/9/2018). Patient Instructions 1. Continue same medications 2. Take extra lasix if weight increases > 3/ pounds in 2 days 3. See Dr Abbie Huff and Gonzalez Waldron alternatively with us. See us in 3 months 4. Continue daily weights (in the morning, after passing your urine). Notify HF team of overnight weight gains > 2 lbs or weekly >5 lbs or if any of the following Sx. Continue to limit sodium intake & monitor your fluid intake (not to exceed 2L per day). 5. Labs today 6. Echo pending today Introducing [facility]! Dear Praful Weiss: Thank you for requesting a GeeYee account. Our records indicate that you already have an active GeeYee account. You can access your account anytime at [email or web address] Did you know that you can access your hospital and ER discharge instructions at any time in Into The Gl[employer]? You can also review all of your test results from your hospital stay or ER visit. Additional Information If you have questions, please visit the Frequently Asked Questions section of the Into The Gloss website at [email or web address] Remember, Into The Gloss is NOT to be used for urgent needs. For medical emergencies, dial 911. Now available from your iPhone and Android! Please provide this summary of care documentation to your next provider. Your primary care clinician is listed as Gail Dey. If you have any questions after today's visit, please call [phone].

## 2018-01-10 ENCOUNTER — TELEPHONE (OUTPATIENT)
Dept: CARDIOLOGY CLINIC | Age: 83
End: 2018-01-10

## 2018-01-10 PROBLEM — N18.30 CKD (CHRONIC KIDNEY DISEASE) STAGE 3, GFR 30-59 ML/MIN (HCC): Chronic | Status: ACTIVE | Noted: 2018-01-10

## 2018-01-10 LAB
BUN SERPL-MCNC: 28 MG/DL (ref 10–36)
BUN/CREAT SERPL: 28 (ref 12–28)
CALCIUM SERPL-MCNC: 8.9 MG/DL (ref 8.7–10.3)
CHLORIDE SERPL-SCNC: 101 MMOL/L (ref 96–106)
CO2 SERPL-SCNC: 27 MMOL/L (ref 18–29)
CREAT SERPL-MCNC: 1.01 MG/DL (ref 0.57–1)
GLUCOSE SERPL-MCNC: 81 MG/DL (ref 65–99)
NT-PROBNP SERPL-MCNC: 2577 PG/ML (ref 0–738)
POTASSIUM SERPL-SCNC: 4.4 MMOL/L (ref 3.5–5.2)
SODIUM SERPL-SCNC: 141 MMOL/L (ref 134–144)

## 2018-01-10 RX ORDER — ATORVASTATIN CALCIUM 10 MG/1
TABLET, FILM COATED ORAL
Qty: 90 TAB | Refills: 3 | Status: SHIPPED | OUTPATIENT
Start: 2018-01-10 | End: 2019-01-08 | Stop reason: SDUPTHER

## 2018-01-10 NOTE — COMMUNICATION BODY
[facility] Note      DOS:  1/10/2018  REFERRING PROVIDER: Dr. Humberto Kinney: Hector Orantes MD  PRIMARY CARDIOLOGIST: Dr. Mayito Fletcher  ELECTROPHYSIOLOGIST: Dr Tony Diaz / Plan:   1. NICM, HFrEF 20-25%, NYHA class II   Continue entresto 49/51- unable to uptitrate 2/2 BP and AI   Cont lasix 20 mg daily and prn- she uses appropriately   Cont coreg   Cont daily wts,  Low NA diet   Follow up 3 months     2. SCD px AICD- followed by Dr Shanti Johnson    3. Chronic Afib- V paced - followed by Dr Homero Smart coreg, Eliquis     4. MR/AI- stable by echo   Continue to monitor annually and prn sx     5. DILAN   Continue nocturnal Home O2  5. HTN - BP OK     6. XOL-     On lipitor and CoQ 10   Per Dr Mayito Fletcher           Thank you for allowing me to participate in the care of this extraordinarily delightful lady. Please do not hesitate to call with any questions. Julissa Yañez RN, ACNP-BC, [facility]      HPI: 80y.o. year old female with a history of NICM with chronic HFrEF- NYHA class II s/p BiV AICD In the setting of HTN complicated by valvular heart disease (AI/MR), persistent afib (Eliquis), LBBB, CKD-III, GERD, DILAN,  non-Hodgkins lymphoma and RAD who presents for follow up of her chronic HF    She was last seen 11/17/17. Her  is with her today from [facility]       She is generally active and Walks 1 1/2 hours 5 days/week on a treadmill. She notes some JENKINS w/ moderate exertion and  Bendopnea. She has occasional dry cough and wheezing for which she takes extra lasix. Chronic 2-3 pillow orthopnea. Her energy level is stable and she naps most days. She wears compression stocking. Follows daily wts and limits her sodium. Denies exertional chest pain, JENKINS, palpitations, no AICD shocks. No lightheadedness syncope, near syncope   Chronic LE joe better with her compression stockings.          She had a spell of volume overload in December after  visiting her son. She had gained 8 pounds. Her diuretics were increased for a 3 days and prednisone pack. Weight stable at home 130-135  /65     She lives alone and has a care giver through an agency. Her one  son   lives in [place] [address]  ECHO 2/3/17: LV dilated, EF 20-25%, mild LVH, RV mod dilated, mild- mod MELANIA, mod-severe MR, mod AI  ECHO 7/29/17: EF 15%, severe DHK, reduced RVEF, RVH, RVSP 35 mmHg   Mild LAE, mod-marked MAC, mod-severe MR (2+), AO root dilated,  ECHO 1/10/2018: LV dilated, EF 25%, severe DHK, LVH, LAE, mild-mod MAC, mod-severe MR, mild-mod AI, mild TR        BiVAICD 7/2015: [employer]    EKG:    Afib underlying with ventricular paced     History:  Past Medical History:   Diagnosis Date    Asthma     [address] [address]     lymphoma    Congestive heart failure, unspecified     Heart failure ([facility] [address]     Hypertension     Mitral valvular regurgitation 1/22/2016    Mitral valvular regurgitation 1/22/2016    ECHO 2/3/17: LV dilated, EF 20-25%, mild LVH, RV mod dilated, mild- mod MELANIA, mod-severe MR, mod AI ECHO 7/29/17: EF 15%, severe DHK, reduced RVEF, RVH, RVSP 35 mmHg  Mild LAE, mod-marked MA fibrosis, mod-severe MR (2+), AO root dilated,      Presence of biventricular automatic cardioverter/defibrillator (AICD) 7/2/2015    [place] [employer] biventricular AICD implant    Stomach ulcer      Past Surgical History:   Procedure Laterality Date    HX BREAST BIOPSY Bilateral     40 years ago    HX COLONOSCOPY      HX HEENT      cataracts removed    HX HYSTERECTOMY      HX OTHER SURGICAL      lymph node surgery    HX TONSILLECTOMY      [place] EGD TRANSORAL BIOPSY SINGLE/MULTIPLE  5/23/2012         SINUS SURGERY PROC UNLISTED      Nasal polyps removed     Social History     Social History    Marital status:      Spouse name: N/A    Number of children: 1    Years of education: 16+     Occupational History    retired teacher      Social History Main Topics  Smoking status: Never Smoker    Smokeless tobacco: Never Used    Alcohol use No    Drug use: No    Sexual activity: No     Other Topics Concern    Not on file     Social History Narrative     Family History   Problem Relation Age of Onset    Heart Disease Mother     Stroke Father        Current Medications:   Current Outpatient Prescriptions   Medication Sig Dispense Refill    apixaban (ELIQUIS) 2.5 mg tablet Take 1 Tab by mouth two (2) times a day. Indications: PREVENT THROMBOEMBOLISM IN CHRONIC ATRIAL FIBRILLATION 14 Tab 6    sacubitril-valsartan (ENTRESTO) 49 mg/51 mg tablet Take 1 Tab by mouth two (2) times a day. 180 Tab 3    magnesium oxide 400 mg cap Take 1 Cap by mouth daily.  cholecalciferol (VITAMIN D3) 1,000 unit cap Take 1,000 Units by mouth daily.  furosemide (LASIX) 20 mg tablet Take 1 Tab by mouth daily. (Patient taking differently: Take 20 mg by mouth two (2) times a day.) 30 Tab 11    potassium chloride SR (KLOR-CON 10) 10 mEq tablet Take 1 Tab by mouth daily. 30 Tab 11    carvedilol (COREG) 3.125 mg tablet TAKE ONE (1) TABLET(S) TWIC E DAILY WITH FOOD  Indications: Chronic Heart Failure 180 Tab 3    albuterol (PROVENTIL VENTOLIN) 2.5 mg /3 mL (0.083 %) nebulizer solution 3 mL by Nebulization route every four (4) hours as needed for Wheezing. 100 Each 11    allopurinol (ZYLOPRIM) 300 mg tablet TAKE ONE (1) TABLET(S) DAILY 90 Tab 3    atorvastatin (LIPITOR) 10 mg tablet TAKE ONE (1) TABLET(S) DAILY 90 Tab 3    fluticasone-salmeterol (ADVAIR) 250-50 mcg/dose diskus inhaler Take 1 Puff by inhalation two (2) times a day. 3 Inhaler 3    loratadine (CLARITIN) 10 mg tablet Take 10 mg by mouth daily as needed.  co-enzyme Q-10 (CO Q-10) 100 mg capsule Take 100 mg by mouth daily.  OXYGEN-AIR DELIVERY SYSTEMS 2 L by Does Not Apply route nightly.  ibrutinib (IMBRUVICA) 140 mg cap Take 420 mg by mouth five (5) days a week.  140 mg cap, takes 3 caps five days a week (none on Sunday or Wednesday)      acetaminophen (TYLENOL EXTRA STRENGTH) 500 mg tablet Take 500 mg by mouth every six (6) hours as needed for Pain.  multivitamin (ONE A DAY) tablet Take 1 Tab by mouth daily.  levothyroxine (SYNTHROID) 25 mcg tablet Take 1 Tab by mouth Daily (before breakfast). 90 Tab 3       Allergies: Allergies   Allergen Reactions    Codeine Other (comments)     \"made me disoriented\" per pt       ROS:    General:  positive for  - recent URI   negative for - chills or fatigue  HEENT: negative for sore throat- better   Pulmonary: positive for - wheezing off and on, if persistent she takes prn lasix  negative for - cough, hemoptysis or pleuritic pain  Cardiac: negative for chest pain, palpitations, irregular heart beats, near-syncope, syncope  GI:  no abdominal pain, change in bowel habits, or black or bloody stools  negative for - abdominal pain, constipation, diarrhea, heartburn, hematemesis, melena or nausea/vomiting  Musculo: negative  negative for - joint pain or muscle pain  Neuro: negative for - dizziness, gait disturbance, headaches, seizures or speech problems  Skin:  negative for - rash  Allergies: REMINDER:DOCUMENT ALLERGIES IN ALLERGY ACTIVITY  Psych: negative for - anxiety or sleep disturbances      Physical Exam:   Vitals:    Visit Vitals    /60 (BP 1 Location: Left arm, BP Patient Position: Sitting)    Pulse 76    Temp 97.9 °F (36.6 °C) (Oral)    Resp 20    Ht 5' 2\" (1.575 m)    Wt 132 lb 3.2 oz (60 kg)    SpO2 98%    BMI 24.18 kg/m2         General: AAOx3 cooperative, very spry, no acute distress. Well developed NAD   HEENT: Atraumatic. Pink and moist.  Anicteric sclerae. Neck: Supple, no adenopoathy. Lungs: CTA bilaterally. No wheezing/rhonchi/rales. Heart[de-identified] PMI: laterally displaced,,  AICD:  Regular rhythm, S1, S2 present, 3/6 systolic murmur apex-> axilla, 2/6 diastolic rumble murmur  RUSB,  no rubs, no gallops. JVD 16  cm + J wave, (-) HJR .  No carotid bruits. Abdomen: Soft, non-distended, non-tender. + Bowel sounds. No bruits. Extremities: No edema, no clubbing, no cyanosis. No calf tenderness  Neurologic: Grossly intact. Alert and oriented X 3. No acute neurological distress. Skin: intact, no wounds, ecchymosis, bruising, rashes   Psych: Good insight. Not anxious nor agitated. Recent Labs:     Lab Results   Component Value Date/Time    WBC 4.3 04/20/2017 09:40 AM    HGB 11.9 04/20/2017 09:40 AM    HCT 36.7 04/20/2017 09:40 AM    PLATELET 456 15/66/4894 09:40 AM    MCV 94.8 04/20/2017 09:40 AM     Lab Results   Component Value Date/Time    GFR est non-AA 37 12/21/2017 11:03 AM    GFRNA, POC 50 04/09/2012 11:28 AM    GFR est AA 43 12/21/2017 11:03 AM    GFRAA, POC >60 04/09/2012 11:28 AM    Creatinine 1.25 12/21/2017 11:03 AM    Creatinine (POC) 1.1 04/09/2012 11:28 AM    BUN 27 12/21/2017 11:03 AM    Sodium 142 12/21/2017 11:03 AM    Potassium 4.1 12/21/2017 11:03 AM    Chloride 101 12/21/2017 11:03 AM    CO2 28 12/21/2017 11:03 AM    Magnesium 1.9 04/21/2017 02:34 AM    Phosphorus 2.2 02/03/2017 01:51 AM     Lab Results   Component Value Date/Time    TSH 9.630 12/21/2017 11:03 AM      Lab Results   Component Value Date/Time    Sodium 142 12/21/2017 11:03 AM    Potassium 4.1 12/21/2017 11:03 AM    Chloride 101 12/21/2017 11:03 AM    CO2 28 12/21/2017 11:03 AM    Anion gap 10 04/22/2017 03:51 AM    Glucose 83 12/21/2017 11:03 AM    BUN 27 12/21/2017 11:03 AM    Creatinine 1.25 12/21/2017 11:03 AM    BUN/Creatinine ratio 22 12/21/2017 11:03 AM    GFR est AA 43 12/21/2017 11:03 AM    GFR est non-AA 37 12/21/2017 11:03 AM    Calcium 8.9 12/21/2017 11:03 AM    Bilirubin, total 0.7 04/20/2017 09:40 AM    ALT (SGPT) 38 04/20/2017 09:40 AM    AST (SGOT) 54 04/20/2017 09:40 AM    Alk.  phosphatase 95 04/20/2017 09:40 AM    Protein, total 6.2 04/20/2017 09:40 AM    Albumin 3.2 04/20/2017 09:40 AM    Globulin 3.0 04/20/2017 09:40 AM    A-G Ratio 1.1 04/20/2017 09:40 AM               Julissa Hurtado RN, ACNP-BC, [address] [place] [phone]  24 hour VAD/HF Pager: [phone]   Future Appointments  Date Time Provider Amanda Dailey   1/24/2018 8:00 AM [address] [facility]   3/22/2018 11:30 AM [place] [address] [place] [address]   4/3/2018 10:45 PM Yvrose Lipcsomb  [facility] Note      DOS:  1/10/2018  REFERRING PROVIDER: Dr. Vazquez Garvin: Anai Pryor MD  PRIMARY CARDIOLOGIST: Dr. Zackery Phillips  ELECTROPHYSIOLOGIST: Dr Hurley Savedonal / Plan:   1. NICM, HFrEF 20-25%, NYHA class II   Continue entresto 49/51- unable to uptitrate 2/2 BP and AI   Cont lasix 20 mg daily and prn- she uses appropriately   Cont coreg   Cont daily wts,  Low NA diet   Follow up 3 months     2. SCD px AICD- followed by Dr Guru Garcia    3. Chronic Afib- V paced - followed by Dr Donna Conley coreg, Eliquis     4. MR/AI- stable by echo   Continue to monitor annually and prn sx     5. DILAN   Continue nocturnal Home O2  6. HTN - BP OK     7. CKD- per Dr Reese Vega    8. XOL-     On lipitor and CoQ 10   Per Dr Zackery Phillips           Thank you for allowing me to participate in the care of this extraordinarily delightful lady. Please do not hesitate to call with any questions. Julissa Hurtado RN, ACNP-BC, [facility]      HPI: 80y.o. year old female with a history of NICM with chronic HFrEF- NYHA class II s/p BiV AICD In the setting of HTN complicated by valvular heart disease (AI/MR), persistent afib (Eliquis), LBBB, CKD-III, GERD, DILAN,  non-Hodgkins lymphoma and RAD who presents for follow up of her chronic HF    She was last seen 11/17/17. Her  is with her today from [facility]       She is generally active and Walks 1 1/2 hours 5 days/week on a treadmill.  She notes some JENKINS w/ moderate exertion and  Bendopnea. She has occasional dry cough and wheezing for which she takes extra lasix. Chronic 2-3 pillow orthopnea. Her energy level is stable and she naps most days. She wears compression stocking. Follows daily wts and limits her sodium. Denies exertional chest pain, JENKINS, palpitations, no AICD shocks. No lightheadedness syncope, near syncope   Chronic LE joe better with her compression stockings. She had a spell of volume overload in December after  visiting her son. She had gained 8 pounds. Her diuretics were increased for a 3 days and prednisone pack. Weight stable at home 130-135  /65     She lives alone and has a care giver through an agency.  Her one  son   lives in [place] [address]  ECHO 2/3/17: LV dilated, EF 20-25%, mild LVH, RV mod dilated, mild- mod MELANIA, mod-severe MR, mod AI  ECHO 7/29/17: EF 15%, severe DHK, reduced RVEF, RVH, RVSP 35 mmHg   Mild LAE, mod-marked MAC, mod-severe MR (2+), AO root dilated,  ECHO 1/10/2018: LV dilated, EF 25%, severe DHK, LVH, LAE, mild-mod MAC, mod-severe MR, mild-mod AI, mild TR        BiVAICD 7/2015: [employer]    EKG: underlying afib V paced   History:  Past Medical History:   Diagnosis Date    Asthma     Cancer ([facility] [address]     lymphoma    Congestive heart failure, unspecified     Heart failure ([facility] [address]     Hypertension     Mitral valvular regurgitation 1/22/2016    Mitral valvular regurgitation 1/22/2016    ECHO 2/3/17: LV dilated, EF 20-25%, mild LVH, RV mod dilated, mild- mod MELANIA, mod-severe MR, mod AI ECHO 7/29/17: EF 15%, severe DHK, reduced RVEF, RVH, RVSP 35 mmHg  Mild LAE, mod-marked MA fibrosis, mod-severe MR (2+), AO root dilated,      Presence of biventricular automatic cardioverter/defibrillator (AICD) 7/2/2015    [employer] biventricular AICD implant    Stomach ulcer      Past Surgical History:   Procedure Laterality Date    HX BREAST BIOPSY Bilateral     40 years ago    HX COLONOSCOPY      HX HEENT cataracts removed    HX HYSTERECTOMY      HX OTHER SURGICAL      lymph node surgery    HX TONSILLECTOMY      [place] EGD TRANSORAL BIOPSY SINGLE/MULTIPLE  5/23/2012         SINUS SURGERY PROC UNLISTED      Nasal polyps removed     Social History     Social History    Marital status:      Spouse name: N/A    Number of children: 1    Years of education: 16+     Occupational History    retired teacher      Social History Main Topics    Smoking status: Never Smoker    Smokeless tobacco: Never Used    Alcohol use No    Drug use: No    Sexual activity: No     Other Topics Concern    Not on file     Social History Narrative     Family History   Problem Relation Age of Onset    Heart Disease Mother     Stroke Father        Current Medications:   Current Outpatient Prescriptions   Medication Sig Dispense Refill    apixaban (ELIQUIS) 2.5 mg tablet Take 1 Tab by mouth two (2) times a day. Indications: PREVENT THROMBOEMBOLISM IN CHRONIC ATRIAL FIBRILLATION 14 Tab 6    sacubitril-valsartan (ENTRESTO) 49 mg/51 mg tablet Take 1 Tab by mouth two (2) times a day. 180 Tab 3    magnesium oxide 400 mg cap Take 1 Cap by mouth daily.  cholecalciferol (VITAMIN D3) 1,000 unit cap Take 1,000 Units by mouth daily.  furosemide (LASIX) 20 mg tablet Take 1 Tab by mouth daily. (Patient taking differently: Take 20 mg by mouth two (2) times a day.) 30 Tab 11    potassium chloride SR (KLOR-CON 10) 10 mEq tablet Take 1 Tab by mouth daily. 30 Tab 11    carvedilol (COREG) 3.125 mg tablet TAKE ONE (1) TABLET(S) TWIC E DAILY WITH FOOD  Indications: Chronic Heart Failure 180 Tab 3    albuterol (PROVENTIL VENTOLIN) 2.5 mg /3 mL (0.083 %) nebulizer solution 3 mL by Nebulization route every four (4) hours as needed for Wheezing.  100 Each 11    allopurinol (ZYLOPRIM) 300 mg tablet TAKE ONE (1) TABLET(S) DAILY 90 Tab 3    atorvastatin (LIPITOR) 10 mg tablet TAKE ONE (1) TABLET(S) DAILY 90 Tab 3    fluticasone-salmeterol (ADVAIR) 250-50 mcg/dose diskus inhaler Take 1 Puff by inhalation two (2) times a day. 3 Inhaler 3    loratadine (CLARITIN) 10 mg tablet Take 10 mg by mouth daily as needed.  co-enzyme Q-10 (CO Q-10) 100 mg capsule Take 100 mg by mouth daily.  OXYGEN-AIR DELIVERY SYSTEMS 2 L by Does Not Apply route nightly.  ibrutinib (IMBRUVICA) 140 mg cap Take 420 mg by mouth five (5) days a week. 140 mg cap, takes 3 caps five days a week (none on Sunday or Wednesday)      acetaminophen (TYLENOL EXTRA STRENGTH) 500 mg tablet Take 500 mg by mouth every six (6) hours as needed for Pain.  multivitamin (ONE A DAY) tablet Take 1 Tab by mouth daily.  levothyroxine (SYNTHROID) 25 mcg tablet Take 1 Tab by mouth Daily (before breakfast). 90 Tab 3       Allergies:    Allergies   Allergen Reactions    Codeine Other (comments)     \"made me disoriented\" per pt       ROS:    General:  positive for  - recent URI   negative for - chills or fatigue  HEENT: negative for sore throat- better   Pulmonary: positive for - wheezing off and on, if persistent she takes prn lasix  negative for - cough, hemoptysis or pleuritic pain  Cardiac: negative for chest pain, palpitations, irregular heart beats, near-syncope, syncope  GI:  no abdominal pain, change in bowel habits, or black or bloody stools  negative for - abdominal pain, constipation, diarrhea, heartburn, hematemesis, melena or nausea/vomiting  Musculo: negative  negative for - joint pain or muscle pain  Neuro: negative for - dizziness, gait disturbance, headaches, seizures or speech problems  Skin:  negative for - rash  Allergies: REMINDER:DOCUMENT ALLERGIES IN ALLERGY ACTIVITY  Psych: negative for - anxiety or sleep disturbances      Physical Exam:   Vitals:    Visit Vitals    /60 (BP 1 Location: Left arm, BP Patient Position: Sitting)    Pulse 76    Temp 97.9 °F (36.6 °C) (Oral)    Resp 20    Ht 5' 2\" (1.575 m)    Wt 132 lb 3.2 oz (60 kg)    SpO2 98%    BMI 24.18 kg/m2         General: AAOx3 cooperative, very spry, no acute distress. Well developed NAD   HEENT: Atraumatic. Pink and moist.  Anicteric sclerae. Neck: Supple, no adenopoathy. Lungs: CTA bilaterally. No wheezing/rhonchi/rales. Heart[de-identified] PMI: laterally displaced,,  AICD:  Regular rhythm, S1, S2 present, 3/6 systolic murmur apex-> axilla, 2/6 diastolic rumble murmur  RUSB,  no rubs, no gallops. JVD 16  cm + J wave, (-) HJR . No carotid bruits. Abdomen: Soft, non-distended, non-tender. + Bowel sounds. No bruits. Extremities: No edema, no clubbing, no cyanosis. No calf tenderness  Neurologic: Grossly intact. Alert and oriented X 3. No acute neurological distress. Skin: intact, no wounds, ecchymosis, bruising, rashes   Psych: Good insight. Not anxious nor agitated.     Recent Labs:     Lab Results   Component Value Date/Time    WBC 4.3 04/20/2017 09:40 AM    HGB 11.9 04/20/2017 09:40 AM    HCT 36.7 04/20/2017 09:40 AM    PLATELET 350 82/42/7418 09:40 AM    MCV 94.8 04/20/2017 09:40 AM     Lab Results   Component Value Date/Time    GFR est non-AA 37 12/21/2017 11:03 AM    GFRNA, POC 50 04/09/2012 11:28 AM    GFR est AA 43 12/21/2017 11:03 AM    GFRAA, POC >60 04/09/2012 11:28 AM    Creatinine 1.25 12/21/2017 11:03 AM    Creatinine (POC) 1.1 04/09/2012 11:28 AM    BUN 27 12/21/2017 11:03 AM    Sodium 142 12/21/2017 11:03 AM    Potassium 4.1 12/21/2017 11:03 AM    Chloride 101 12/21/2017 11:03 AM    CO2 28 12/21/2017 11:03 AM    Magnesium 1.9 04/21/2017 02:34 AM    Phosphorus 2.2 02/03/2017 01:51 AM     Lab Results   Component Value Date/Time    TSH 9.630 12/21/2017 11:03 AM      Lab Results   Component Value Date/Time    Sodium 142 12/21/2017 11:03 AM    Potassium 4.1 12/21/2017 11:03 AM    Chloride 101 12/21/2017 11:03 AM    CO2 28 12/21/2017 11:03 AM    Anion gap 10 04/22/2017 03:51 AM    Glucose 83 12/21/2017 11:03 AM    BUN 27 12/21/2017 11:03 AM    Creatinine 1.25 12/21/2017 11:03 AM    BUN/Creatinine ratio 22 12/21/2017 11:03 AM    GFR est AA 43 12/21/2017 11:03 AM    GFR est non-AA 37 12/21/2017 11:03 AM    Calcium 8.9 12/21/2017 11:03 AM    Bilirubin, total 0.7 04/20/2017 09:40 AM    ALT (SGPT) 38 04/20/2017 09:40 AM    AST (SGOT) 54 04/20/2017 09:40 AM    Alk. phosphatase 95 04/20/2017 09:40 AM    Protein, total 6.2 04/20/2017 09:40 AM    Albumin 3.2 04/20/2017 09:40 AM    Globulin 3.0 04/20/2017 09:40 AM    A-G Ratio 1.1 04/20/2017 09:40 AM               Julissa Singh, RN, ACNP-BC, [facility] [address] [phone]  24 hour VAD/HF Pager: [phone]   Future Appointments  Date Time Provider Amanda Dailey   1/24/2018 8:00 AM [address] [place] [address]   3/22/2018 11:30 AM Janeen Cui MD [address] [place] [address]   4/3/2018 10:45 PM Oliver Gauthier  [facility] Note      DOS:  1/10/2018  REFERRING PROVIDER: Dr. Mecca Lucas: Janeen Cui MD  PRIMARY CARDIOLOGIST: Dr. Loraine Hughes  ELECTROPHYSIOLOGIST: Dr Stevenson Eaton / Plan:   1. NICM, HFrEF 20-25%, NYHA class II   Continue entresto 49/51- unable to uptitrate 2/2 BP and AI   Cont lasix 20 mg daily and prn- she uses appropriately   Cont coreg   Cont daily wts,  Low NA diet   Follow up 3 months     2. SCD px AICD- followed by Dr Zelalem Shen    3. Chronic Afib- V paced - followed by Dr Ana chapman Eliquis     4. MR/AI- stable by echo   Continue to monitor annually and prn sx     5. DILAN   Continue nocturnal Home O2  6. HTN - BP OK     7. CKD- per Dr Bridgett Hahn    8. XOL-     On lipitor and CoQ 10   Per Dr Loraine Hughes           Thank you for allowing me to participate in the care of this extraordinarily delightful lady. Please do not hesitate to call with any questions. Julissa Singh RN, ACNP-BC, [facility]      HPI: 80y.o. year old female with a history of NICM with chronic HFrEF- NYHA class II s/p BiV AICD In the setting of HTN complicated by valvular heart disease (AI/MR), persistent afib (Eliquis), LBBB, CKD-III, GERD, DILAN,  non-Hodgkins lymphoma and RAD who presents for follow up of her chronic HF    She was last seen 11/17/17. [facility]       She is generally active and Walks 1 1/2 hours 5 days/week on a treadmill. She notes some JENKINS w/ moderate exertion and  Bendopnea. She has occasional dry cough and wheezing for which she takes extra lasix. Chronic 2-3 pillow orthopnea. Her energy level is stable and she naps most days. She wears compression stocking. Follows daily wts and limits her sodium. Denies exertional chest pain, JENKINS, palpitations, no AICD shocks. No lightheadedness syncope, near syncope   Chronic LE joe better with her compression stockings. She had a spell of volume overload in December after  visiting her son. She had gained 8 pounds. Her diuretics were increased for a 3 days and prednisone pack. Weight stable at home 130-135  /65     She lives alone and has a care giver through an agency.  Her one  son   lives in [place] [address]  ECHO 2/3/17: LV dilated, EF 20-25%, mild LVH, RV mod dilated, mild- mod MELANIA, mod-severe MR, mod AI  ECHO 7/29/17: EF 15%, severe DHK, reduced RVEF, RVH, RVSP 35 mmHg   Mild LAE, mod-marked MAC, mod-severe MR (2+), AO root dilated,  ECHO 1/10/2018: LV dilated, EF 25%, severe DHK, LVH, LAE, mild-mod MAC, mod-severe MR, mild-mod AI, mild TR        BiVAICD 7/2015: [employer]    EKG: underlying afib V paced   History:  Past Medical History:   Diagnosis Date    Asthma     Cancer ([address]     lymphoma    Congestive heart failure, unspecified     [facility] failure [address] [address]     Hypertension     Mitral valvular regurgitation 1/22/2016    Mitral valvular regurgitation 1/22/2016    ECHO 2/3/17: LV dilated, EF 20-25%, mild LVH, RV mod dilated, mild- mod MELANIA, mod-severe MR, mod AI ECHO 7/29/17: EF 15%, severe DHK, reduced RVEF, RVH, RVSP 35 mmHg  Mild LAE, mod-marked MA fibrosis, mod-severe MR (2+), AO root dilated,      Presence of biventricular automatic cardioverter/defibrillator (AICD) 7/2/2015    [employer] biventricular AICD implant    Stomach ulcer      Past Surgical History:   Procedure Laterality Date    HX BREAST BIOPSY Bilateral     40 years ago    HX COLONOSCOPY      HX HEENT      cataracts removed    HX HYSTERECTOMY      HX OTHER SURGICAL      lymph node surgery    HX TONSILLECTOMY      [place] EGD TRANSORAL BIOPSY SINGLE/MULTIPLE  5/23/2012         SINUS SURGERY PROC UNLISTED      Nasal polyps removed     Social History     Social History    Marital status:      Spouse name: N/A    Number of children: 1    Years of education: 16+     Occupational History    retired teacher      Social History Main Topics    Smoking status: Never Smoker    Smokeless tobacco: Never Used    Alcohol use No    Drug use: No    Sexual activity: No     Other Topics Concern    Not on file     Social History Narrative     Family History   Problem Relation Age of Onset    Heart Disease Mother     Stroke Father        Current Medications:   Current Outpatient Prescriptions   Medication Sig Dispense Refill    apixaban (ELIQUIS) 2.5 mg tablet Take 1 Tab by mouth two (2) times a day. Indications: PREVENT THROMBOEMBOLISM IN CHRONIC ATRIAL FIBRILLATION 14 Tab 6    sacubitril-valsartan (ENTRESTO) 49 mg/51 mg tablet Take 1 Tab by mouth two (2) times a day. 180 Tab 3    magnesium oxide 400 mg cap Take 1 Cap by mouth daily.  cholecalciferol (VITAMIN D3) 1,000 unit cap Take 1,000 Units by mouth daily.  furosemide (LASIX) 20 mg tablet Take 1 Tab by mouth daily. (Patient taking differently: Take 20 mg by mouth two (2) times a day.) 30 Tab 11    potassium chloride SR (KLOR-CON 10) 10 mEq tablet Take 1 Tab by mouth daily.  30 Tab 11    carvedilol (COREG) 3.125 mg tablet TAKE ONE (1) TABLET(S) TWIC E DAILY WITH FOOD  Indications: Chronic Heart Failure 180 Tab 3    albuterol (PROVENTIL VENTOLIN) 2.5 mg /3 mL (0.083 %) nebulizer solution 3 mL by Nebulization route every four (4) hours as needed for Wheezing. 100 Each 11    allopurinol (ZYLOPRIM) 300 mg tablet TAKE ONE (1) TABLET(S) DAILY 90 Tab 3    atorvastatin (LIPITOR) 10 mg tablet TAKE ONE (1) TABLET(S) DAILY 90 Tab 3    fluticasone-salmeterol (ADVAIR) 250-50 mcg/dose diskus inhaler Take 1 Puff by inhalation two (2) times a day. 3 Inhaler 3    loratadine (CLARITIN) 10 mg tablet Take 10 mg by mouth daily as needed.  co-enzyme Q-10 (CO Q-10) 100 mg capsule Take 100 mg by mouth daily.  OXYGEN-AIR DELIVERY SYSTEMS 2 L by Does Not Apply route nightly.  ibrutinib (IMBRUVICA) 140 mg cap Take 420 mg by mouth five (5) days a week. 140 mg cap, takes 3 caps five days a week (none on Sunday or Wednesday)      acetaminophen (TYLENOL EXTRA STRENGTH) 500 mg tablet Take 500 mg by mouth every six (6) hours as needed for Pain.  multivitamin (ONE A DAY) tablet Take 1 Tab by mouth daily.  levothyroxine (SYNTHROID) 25 mcg tablet Take 1 Tab by mouth Daily (before breakfast). 90 Tab 3       Allergies:    Allergies   Allergen Reactions    Codeine Other (comments)     \"made me disoriented\" per pt       ROS:    General:  positive for  - recent URI   negative for - chills or fatigue  HEENT: negative for sore throat- better   Pulmonary: positive for - wheezing off and on, if persistent she takes prn lasix  negative for - cough, hemoptysis or pleuritic pain  Cardiac: negative for chest pain, palpitations, irregular heart beats, near-syncope, syncope  GI:  no abdominal pain, change in bowel habits, or black or bloody stools  negative for - abdominal pain, constipation, diarrhea, heartburn, hematemesis, melena or nausea/vomiting  Musculo: negative  negative for - joint pain or muscle pain  Neuro: negative for - dizziness, gait disturbance, headaches, seizures or speech problems  Skin:  negative for - rash  Allergies: REMINDER:DOCUMENT ALLERGIES IN ALLERGY ACTIVITY  Psych: negative for - anxiety or sleep disturbances      Physical Exam:   Vitals:    Visit Vitals    /60 (BP 1 Location: Left arm, BP Patient Position: Sitting)    Pulse 76    Temp 97.9 °F (36.6 °C) (Oral)    Resp 20    Ht 5' 2\" (1.575 m)    Wt 132 lb 3.2 oz (60 kg)    SpO2 98%    BMI 24.18 kg/m2         General: AAOx3 cooperative, very spry, no acute distress. Well developed NAD   HEENT: Atraumatic. Pink and moist.  Anicteric sclerae. Neck: Supple, no adenopoathy. Lungs: CTA bilaterally. No wheezing/rhonchi/rales. Heart[de-identified] PMI: laterally displaced,,  AICD:  Regular rhythm, S1, S2 present, 3/6 systolic murmur apex-> axilla, 2/6 diastolic rumble murmur  RUSB,  no rubs, no gallops. JVD 16  cm + J wave, (-) HJR . No carotid bruits. Abdomen: Soft, non-distended, non-tender. + Bowel sounds. No bruits. Extremities: No edema, no clubbing, no cyanosis. No calf tenderness  Neurologic: Grossly intact. Alert and oriented X 3. No acute neurological distress. Skin: intact, no wounds, ecchymosis, bruising, rashes   Psych: Good insight. Not anxious nor agitated.     Recent Labs:     Lab Results   Component Value Date/Time    WBC 4.3 04/20/2017 09:40 AM    HGB 11.9 04/20/2017 09:40 AM    HCT 36.7 04/20/2017 09:40 AM    PLATELET 180 30/62/6655 09:40 AM    MCV 94.8 04/20/2017 09:40 AM     Lab Results   Component Value Date/Time    GFR est non-AA 37 12/21/2017 11:03 AM    GFRNA, POC 50 04/09/2012 11:28 AM    GFR est AA 43 12/21/2017 11:03 AM    GFRAA, POC >60 04/09/2012 11:28 AM    Creatinine 1.25 12/21/2017 11:03 AM    Creatinine (POC) 1.1 04/09/2012 11:28 AM    BUN 27 12/21/2017 11:03 AM    Sodium 142 12/21/2017 11:03 AM    Potassium 4.1 12/21/2017 11:03 AM    Chloride 101 12/21/2017 11:03 AM    CO2 28 12/21/2017 11:03 AM    Magnesium 1.9 04/21/2017 02:34 AM    Phosphorus 2.2 02/03/2017 01:51 AM     Lab Results   Component Value Date/Time    TSH 9.630 12/21/2017 11:03 AM      Lab Results   Component Value Date/Time    Sodium 142 12/21/2017 11:03 AM    Potassium 4.1 12/21/2017 11:03 AM    Chloride 101 12/21/2017 11:03 AM    CO2 28 12/21/2017 11:03 AM    Anion gap 10 04/22/2017 03:51 AM    Glucose 83 12/21/2017 11:03 AM    BUN 27 12/21/2017 11:03 AM    Creatinine 1.25 12/21/2017 11:03 AM    BUN/Creatinine ratio 22 12/21/2017 11:03 AM    GFR est AA 43 12/21/2017 11:03 AM    GFR est non-AA 37 12/21/2017 11:03 AM    Calcium 8.9 12/21/2017 11:03 AM    Bilirubin, total 0.7 04/20/2017 09:40 AM    ALT (SGPT) 38 04/20/2017 09:40 AM    AST (SGOT) 54 04/20/2017 09:40 AM    Alk. phosphatase 95 04/20/2017 09:40 AM    Protein, total 6.2 04/20/2017 09:40 AM    Albumin 3.2 04/20/2017 09:40 AM    Globulin 3.0 04/20/2017 09:40 AM    A-G Ratio 1.1 04/20/2017 09:40 AM               Julissa Childress RN, ACNP-BC, [address] [place] [address]  24 hour VAD/HF Pager: [phone]   Future Appointments  Date Time Provider Amanda aDiley   1/24/2018 8:00 AM [facility]   3/22/2018 11:30 AM Nomi Mcintosh MD [address] [facility]   4/3/2018 10:45 PM Sera Betancourt MD [address]

## 2018-01-10 NOTE — TELEPHONE ENCOUNTER
Please let her know her heart function is a bit kim, was 15% last time and 20-25 % now which is what it was in Feb 2017.  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

## 2018-01-10 NOTE — TELEPHONE ENCOUNTER
I called patient and reviewed echocardiogram results as noted by Gilma Dixon. She states understanding and has no questions.

## 2018-01-15 ENCOUNTER — TELEPHONE (OUTPATIENT)
Dept: CARDIOLOGY CLINIC | Age: 83
End: 2018-01-15

## 2018-01-15 NOTE — TELEPHONE ENCOUNTER
----- Message from VLAD Vallejo sent at 1/12/2018 12:25 PM EST -----  Labs stable    I called and reviewed patient's lab results with her. She states understanding and has no questions. She needs a 3 month follow up appointment, will have Cynthia Lockhart schedule her.

## 2018-01-24 ENCOUNTER — OFFICE VISIT (OUTPATIENT)
Dept: CARDIOLOGY CLINIC | Age: 83
End: 2018-01-24

## 2018-01-24 DIAGNOSIS — I42.0 CARDIOMYOPATHY, DILATED, NONISCHEMIC (HCC): Chronic | ICD-10-CM

## 2018-01-24 DIAGNOSIS — I50.22 CHRONIC SYSTOLIC CONGESTIVE HEART FAILURE (HCC): ICD-10-CM

## 2018-01-24 DIAGNOSIS — I48.0 PAROXYSMAL ATRIAL FIBRILLATION (HCC): ICD-10-CM

## 2018-01-24 DIAGNOSIS — Z95.810 PRESENCE OF BIVENTRICULAR AUTOMATIC CARDIOVERTER/DEFIBRILLATOR (AICD): Primary | ICD-10-CM

## 2018-01-24 DIAGNOSIS — I44.7 LBBB (LEFT BUNDLE BRANCH BLOCK): ICD-10-CM

## 2018-01-24 RX ORDER — ALLOPURINOL 300 MG/1
TABLET ORAL
Qty: 90 TAB | Refills: 3 | Status: ON HOLD | OUTPATIENT
Start: 2018-01-24 | End: 2019-03-18 | Stop reason: SDUPTHER

## 2018-01-25 ENCOUNTER — TELEPHONE (OUTPATIENT)
Dept: CARDIOLOGY CLINIC | Age: 83
End: 2018-01-25

## 2018-01-25 NOTE — TELEPHONE ENCOUNTER
Telephone Call RE:  Appointment reminder     Outcome:     [] Patient confirmed appointment   [] Patient rescheduled appointment for    [] Unable to reach   [x] Left message              [] Other:     Left message for patient stating I have scheduled her next follow up in 3 months with Jessica Reyes. Appointment is scheduled on Tuesday April 10th. Stated in message that if time and day do not work for her to contact our office.       Diane Reaves

## 2018-03-01 ENCOUNTER — OFFICE VISIT (OUTPATIENT)
Dept: INTERNAL MEDICINE CLINIC | Age: 83
End: 2018-03-01

## 2018-03-01 VITALS
RESPIRATION RATE: 16 BRPM | HEIGHT: 62 IN | TEMPERATURE: 98 F | OXYGEN SATURATION: 96 % | WEIGHT: 131.1 LBS | SYSTOLIC BLOOD PRESSURE: 120 MMHG | HEART RATE: 69 BPM | BODY MASS INDEX: 24.13 KG/M2 | DIASTOLIC BLOOD PRESSURE: 75 MMHG

## 2018-03-01 DIAGNOSIS — C85.90 NON-HODGKIN'S LYMPHOMA, UNSPECIFIED BODY REGION, UNSPECIFIED NON-HODGKIN LYMPHOMA TYPE (HCC): ICD-10-CM

## 2018-03-01 DIAGNOSIS — I42.0 CARDIOMYOPATHY, DILATED, NONISCHEMIC (HCC): Primary | Chronic | ICD-10-CM

## 2018-03-01 DIAGNOSIS — I50.22 CHRONIC SYSTOLIC CONGESTIVE HEART FAILURE (HCC): ICD-10-CM

## 2018-03-01 DIAGNOSIS — E78.5 DYSLIPIDEMIA: ICD-10-CM

## 2018-03-01 DIAGNOSIS — C85.91 NON-HODGKIN LYMPHOMA OF LYMPH NODES OF NECK, UNSPECIFIED NON-HODGKIN LYMPHOMA TYPE (HCC): ICD-10-CM

## 2018-03-01 DIAGNOSIS — M10.9 GOUT, UNSPECIFIED CAUSE, UNSPECIFIED CHRONICITY, UNSPECIFIED SITE: ICD-10-CM

## 2018-03-01 DIAGNOSIS — E03.9 ACQUIRED HYPOTHYROIDISM: ICD-10-CM

## 2018-03-01 DIAGNOSIS — I48.0 PAROXYSMAL ATRIAL FIBRILLATION (HCC): ICD-10-CM

## 2018-03-01 DIAGNOSIS — I71.20 THORACIC AORTIC ANEURYSM WITHOUT RUPTURE: ICD-10-CM

## 2018-03-01 NOTE — MR AVS SNAPSHOT
[address] [facility]. [place] [address] [address] 
[phone] Patient: Natalya Haywood MRN: [id number] XA Visit Information Date & Time Provider Department Dept. Phone Encounter #  
 3/1/2018 11:45 AM Heather French [facility] [phone] [id number] Your Appointments 4/3/2018 10:45 PM  
ESTABLISHED PATIENT with Meenakshi Redman MD  
[facility] at [facility]) Appt Note: 6 MO. F/U  
 [address]  
[phone] [address]  
  
    
 2018  8:00 AM  
PROCEDURE with [facility] Appt Note: NOT AN OFFICE VISIT - REMOTE BSC ICD  
 [id number] [facility]  
[phone] [id number] [facility]  
  
    
 2018 11:00 AM  
Follow Up with VLAD Suggs [address] ([facility]) Appt Note: HF follow up [facility] [address]  
[address] [address] [address] Upcoming Health Maintenance Date Due  
 MEDICARE YEARLY EXAM 2018 GLAUCOMA SCREENING Q2Y 2020 DTaP/Tdap/Td series (2 - Td) 2027 Allergies as of 3/1/2018  Review Complete On: 3/1/2018 By: Lissy Jean Severity Noted Reaction Type Reactions Codeine  2012   Side Effect Other (comments) \"made me disoriented\" per pt Current Immunizations  Reviewed on 2015 Name Date Influenza High Dose Vaccine PF 2017 Influenza Vaccine 10/1/2016, 10/4/2014 Influenza Vaccine Split 10/22/2011 ZZZ-RETIRED (DO NOT USE) Pneumococcal Vaccine (Unspecified Type) 2007 Not reviewed this visit You Were Diagnosed With   
  
 Codes Comments Cardiomyopathy, dilated, nonischemic (HCC)    -  Primary ICD-10-CM: I42.9 ICD-9-CM: 425.4 Non-Hodgkin's lymphoma, unspecified body region, unspecified non-Hodgkin lymphoma type ([facility]     ICD-10-CM: C85.90 ICD-9-CM: 202.80 Dyslipidemia     ICD-10-CM: E78.5 ICD-9-CM: 272.4 Mixed hyperlipidemia     ICD-10-CM: E78.2 ICD-9-CM: 272.2 Vitals BP Pulse Temp Resp Height(growth percentile) Weight(growth percentile) 120/75 (BP 1 Location: Left arm, BP Patient Position: Sitting) 69 98 °F (36.7 °C) (Oral) 16 5' 2\" (1.575 m) 131 lb 1.6 oz (59.5 kg) SpO2 BMI OB Status Smoking Status 96% 23.98 kg/m2 Postmenopausal Never Smoker Vitals History BMI and BSA Data Body Mass Index Body Surface Area  
 23.98 kg/m 2 1.61 m 2 Preferred Pharmacy Pharmacy Name Phone [facility] [address] [place] [address] Your Updated Medication List  
  
   
This list is accurate as of 3/1/18  1:55 PM.  Always use your most recent med list.  
  
  
  
  
 albuterol 2.5 mg /3 mL (0.083 %) nebulizer solution Commonly known as:  PROVENTIL VENTOLIN  
3 mL by Nebulization route every four (4) hours as needed for Wheezing. allopurinol 300 mg tablet Commonly known as:  ZYLOPRIM  
TAKE ONE (1) TABLET(S) DAILY  
  
 apixaban 2.5 mg tablet Commonly known as:  Sharee Hopping Take 1 Tab by mouth two (2) times a day. Indications: PREVENT THROMBOEMBOLISM IN CHRONIC ATRIAL FIBRILLATION  
  
 atorvastatin 10 mg tablet Commonly known as:  LIPITOR  
TAKE ONE (1) TABLET(S) DAILY  
  
 carvedilol 3.125 mg tablet Commonly known as:  COREG  
TAKE ONE (1) TABLET(S) TWIC E DAILY WITH FOOD  Indications: Chronic Heart Failure  
  
 co-enzyme Q-10 100 mg capsule Commonly known as:  CO Q-10 Take 100 mg by mouth daily. fluticasone-salmeterol 250-50 mcg/dose diskus inhaler Commonly known as:  ADVAIR  
 Take 1 Puff by inhalation two (2) times a day. furosemide 20 mg tablet Commonly known as:  LASIX Take 1 Tab by mouth daily. IMBRUVICA 140 mg capsule Generic drug:  ibrutinib Take 420 mg by mouth five (5) days a week. 140 mg cap, takes 3 caps five days a week (none on Sunday or Wednesday) levothyroxine 25 mcg tablet Commonly known as:  SYNTHROID Take 1 Tab by mouth Daily (before breakfast). loratadine 10 mg tablet Commonly known as:  [employer] Take 10 mg by mouth daily as needed. magnesium oxide 400 mg Cap Take 1 Cap by mouth daily. multivitamin tablet Commonly known as:  ONE A DAY Take 1 Tab by mouth daily. OXYGEN-AIR DELIVERY SYSTEMS  
2.5 L by Does Not Apply route nightly. potassium chloride SR 10 mEq tablet Commonly known as:  KLOR-CON 10 Take 1 Tab by mouth daily. sacubitril-valsartan 49-51 mg Tab tablet Commonly known as:  ENTRESTO Take 1 Tab by mouth two (2) times a day. TYLENOL EXTRA STRENGTH 500 mg tablet Generic drug:  acetaminophen Take 500 mg by mouth every six (6) hours as needed for Pain. VITAMIN D3 1,000 unit Cap Generic drug:  cholecalciferol Take 1,000 Units by mouth daily. We Performed the Following CBC WITH AUTOMATED DIFF [50782 CPT(R)] COLLECTION VENOUS BLOOD,VENIPUNCTURE E3480155 CPT(R)] LIPID PANEL [93705 CPT(R)] METABOLIC PANEL, COMPREHENSIVE [31284 CPT(R)] URINALYSIS W/ RFLX MICROSCOPIC [09711 CPT(R)] [facility]! Dear Bennie Reece: Thank you for requesting a [employer] account. Our records indicate that you already have an active [employer] account. You can access your account anytime at [email or web address] Did you know that you can access your hospital and ER discharge instructions at any time in [employer]? You can also review all of your test results from your hospital stay or ER visit. Additional Information If you have questions, please visit the Frequently Asked Questions section of the [employer] website at [email or web address] Remember, [employer] is NOT to be used for urgent needs. For medical emergencies, dial 911. Now available from your iPhone and Android! Please provide this summary of care documentation to your next provider. Your primary care clinician is listed as Gail Dey. If you have any questions after today's visit, please call [phone].

## 2018-03-01 NOTE — PROGRESS NOTES
Chief Complaint   Patient presents with    Hypertension     3 month follow up      1. Have you been to the ER, urgent care clinic since your last visit? Hospitalized since your last visit? No    2. Have you seen or consulted any other health care providers outside of the [address] [place] [address] since your last visit? Include any pap smears or colon screening.  No

## 2018-03-02 LAB
ALBUMIN SERPL-MCNC: 4 G/DL (ref 3.2–4.6)
ALBUMIN/GLOB SERPL: 2.1 {RATIO} (ref 1.2–2.2)
ALP SERPL-CCNC: 67 IU/L (ref 39–117)
ALT SERPL-CCNC: 13 IU/L (ref 0–32)
APPEARANCE UR: CLEAR
AST SERPL-CCNC: 25 IU/L (ref 0–40)
BASOPHILS # BLD AUTO: 0 X10E3/UL (ref 0–0.2)
BASOPHILS NFR BLD AUTO: 1 %
BILIRUB SERPL-MCNC: 0.4 MG/DL (ref 0–1.2)
BILIRUB UR QL STRIP: NEGATIVE
BUN SERPL-MCNC: 27 MG/DL (ref 10–36)
BUN/CREAT SERPL: 21 (ref 12–28)
CALCIUM SERPL-MCNC: 9.3 MG/DL (ref 8.7–10.3)
CHLORIDE SERPL-SCNC: 103 MMOL/L (ref 96–106)
CHOLEST SERPL-MCNC: 170 MG/DL (ref 100–199)
CO2 SERPL-SCNC: 29 MMOL/L (ref 18–29)
COLOR UR: YELLOW
CREAT SERPL-MCNC: 1.27 MG/DL (ref 0.57–1)
EOSINOPHIL # BLD AUTO: 0.1 X10E3/UL (ref 0–0.4)
EOSINOPHIL NFR BLD AUTO: 2 %
ERYTHROCYTE [DISTWIDTH] IN BLOOD BY AUTOMATED COUNT: 16 % (ref 12.3–15.4)
GFR SERPLBLD CREATININE-BSD FMLA CKD-EPI: 37 ML/MIN/1.73
GFR SERPLBLD CREATININE-BSD FMLA CKD-EPI: 42 ML/MIN/1.73
GLOBULIN SER CALC-MCNC: 1.9 G/DL (ref 1.5–4.5)
GLUCOSE SERPL-MCNC: 78 MG/DL (ref 65–99)
GLUCOSE UR QL: NEGATIVE
HCT VFR BLD AUTO: 35.9 % (ref 34–46.6)
HDLC SERPL-MCNC: 69 MG/DL
HGB BLD-MCNC: 11.6 G/DL (ref 11.1–15.9)
HGB UR QL STRIP: NEGATIVE
IMM GRANULOCYTES # BLD: 0 X10E3/UL (ref 0–0.1)
IMM GRANULOCYTES NFR BLD: 1 %
KETONES UR QL STRIP: NEGATIVE
LDLC SERPL CALC-MCNC: 88 MG/DL (ref 0–99)
LEUKOCYTE ESTERASE UR QL STRIP: NEGATIVE
LYMPHOCYTES # BLD AUTO: 2 X10E3/UL (ref 0.7–3.1)
LYMPHOCYTES NFR BLD AUTO: 42 %
MCH RBC QN AUTO: 30.9 PG (ref 26.6–33)
MCHC RBC AUTO-ENTMCNC: 32.3 G/DL (ref 31.5–35.7)
MCV RBC AUTO: 96 FL (ref 79–97)
MICRO URNS: NORMAL
MONOCYTES # BLD AUTO: 0.3 X10E3/UL (ref 0.1–0.9)
MONOCYTES NFR BLD AUTO: 5 %
NEUTROPHILS # BLD AUTO: 2.4 X10E3/UL (ref 1.4–7)
NEUTROPHILS NFR BLD AUTO: 49 %
NITRITE UR QL STRIP: NEGATIVE
PH UR STRIP: 7.5 [PH] (ref 5–7.5)
PLATELET # BLD AUTO: 152 X10E3/UL (ref 150–379)
POTASSIUM SERPL-SCNC: 4.9 MMOL/L (ref 3.5–5.2)
PROT SERPL-MCNC: 5.9 G/DL (ref 6–8.5)
PROT UR QL STRIP: NEGATIVE
RBC # BLD AUTO: 3.75 X10E6/UL (ref 3.77–5.28)
SODIUM SERPL-SCNC: 147 MMOL/L (ref 134–144)
SP GR UR: 1.02 (ref 1–1.03)
TRIGL SERPL-MCNC: 64 MG/DL (ref 0–149)
UROBILINOGEN UR STRIP-MCNC: 0.2 MG/DL (ref 0.2–1)
VLDLC SERPL CALC-MCNC: 13 MG/DL (ref 5–40)
WBC # BLD AUTO: 4.8 X10E3/UL (ref 3.4–10.8)

## 2018-03-18 PROBLEM — I72.9 ANEURYSMAL DILATATION (HCC): Status: ACTIVE | Noted: 2018-03-18

## 2018-03-18 PROBLEM — R60.0 BILATERAL LEG EDEMA: Status: RESOLVED | Noted: 2017-06-06 | Resolved: 2018-03-18

## 2018-03-18 PROBLEM — R25.2 CRAMPING OF HANDS: Status: RESOLVED | Noted: 2017-01-24 | Resolved: 2018-03-18

## 2018-03-18 PROBLEM — N18.30 CKD (CHRONIC KIDNEY DISEASE) STAGE 3, GFR 30-59 ML/MIN (HCC): Chronic | Status: RESOLVED | Noted: 2018-01-10 | Resolved: 2018-03-18

## 2018-03-18 PROBLEM — K27.9 PUD (PEPTIC ULCER DISEASE): Status: RESOLVED | Noted: 2017-02-02 | Resolved: 2018-03-18

## 2018-03-18 PROBLEM — R06.02 SOB (SHORTNESS OF BREATH): Status: RESOLVED | Noted: 2017-02-02 | Resolved: 2018-03-18

## 2018-03-18 PROBLEM — E03.9 ACQUIRED HYPOTHYROIDISM: Status: ACTIVE | Noted: 2018-03-18

## 2018-03-18 NOTE — ASSESSMENT & PLAN NOTE
Key Peripheral Vascular Disease Meds             atorvastatin (LIPITOR) 10 mg tablet  (Taking) TAKE ONE (1) TABLET(S) DAILY    apixaban (ELIQUIS) 2.5 mg tablet  (Taking) Take 1 Tab by mouth two (2) times a day.  Indications: PREVENT THROMBOEMBOLISM IN CHRONIC ATRIAL FIBRILLATION        Lab Results   Component Value Date/Time    WBC 4.8 03/01/2018 01:56 PM    HGB 11.6 03/01/2018 01:56 PM    HCT 35.9 03/01/2018 01:56 PM    PLATELET 475 12/94/7665 01:56 PM    Creatinine 1.27 03/01/2018 01:56 PM    BUN 27 03/01/2018 01:56 PM    Cholesterol, total 170 03/01/2018 01:56 PM    HDL Cholesterol 69 03/01/2018 01:56 PM    LDL, calculated 88 03/01/2018 01:56 PM    Triglyceride 64 03/01/2018 01:56 PM

## 2018-03-18 NOTE — ASSESSMENT & PLAN NOTE
This condition is managed by Specialist.  Key CAD CHF Meds             atorvastatin (LIPITOR) 10 mg tablet  (Taking) TAKE ONE (1) TABLET(S) DAILY    apixaban (ELIQUIS) 2.5 mg tablet  (Taking) Take 1 Tab by mouth two (2) times a day. Indications: PREVENT THROMBOEMBOLISM IN CHRONIC ATRIAL FIBRILLATION    sacubitril-valsartan (ENTRESTO) 49 mg/51 mg tablet  (Taking) Take 1 Tab by mouth two (2) times a day. furosemide (LASIX) 20 mg tablet  (Taking) Take 1 Tab by mouth daily.     carvedilol (COREG) 3.125 mg tablet  (Taking) TAKE ONE (1) TABLET(S) TWIC E DAILY WITH FOOD  Indications: Chronic Heart Failure        [id number](BNP,BNPP,BNPPPOC,HSCRP,NA,NAPOC,K,KPOCT,CHOL,CHOLPOCT,HDL,HDLPOC,LDLCHOL,LDLPOCT,LDLCPOC,LDLC,LDL,LDLCEXT,TRIGL,TGLPOCT,INR,INREXT,INRPOC,PTP,PTINR,PTEXT,DIG)@

## 2018-03-18 NOTE — ASSESSMENT & PLAN NOTE
Key CAD CHF Meds             atorvastatin (LIPITOR) 10 mg tablet  (Taking) TAKE ONE (1) TABLET(S) DAILY    apixaban (ELIQUIS) 2.5 mg tablet  (Taking) Take 1 Tab by mouth two (2) times a day. Indications: PREVENT THROMBOEMBOLISM IN CHRONIC ATRIAL FIBRILLATION    sacubitril-valsartan (ENTRESTO) 49 mg/51 mg tablet  (Taking) Take 1 Tab by mouth two (2) times a day. furosemide (LASIX) 20 mg tablet  (Taking) Take 1 Tab by mouth daily.     carvedilol (COREG) 3.125 mg tablet  (Taking) TAKE ONE (1) TABLET(S) TWIC E DAILY WITH FOOD  Indications: Chronic Heart Failure        [id number](BNP,BNPP,BNPPPOC,HSCRP,NA,NAPOC,K,KPOCT,CHOL,CHOLPOCT,HDL,HDLPOC,LDLCHOL,LDLPOCT,LDLCPOC,LDLC,LDL,LDLCEXT,TRIGL,TGLPOCT,INR,INREXT,INRPOC,PTP,PTINR,PTEXT,DIG)@

## 2018-03-18 NOTE — ASSESSMENT & PLAN NOTE
This condition is managed by Specialist.  Key Oncology Meds             ibrutinib (IMBRUVICA) 140 mg cap  (Taking) Take 420 mg by mouth five (5) days a week. 140 mg cap, takes 3 caps five days a week (none on Sunday or Wednesday)        Key Pain Meds             acetaminophen (TYLENOL EXTRA STRENGTH) 500 mg tablet  (Taking) Take 500 mg by mouth every six (6) hours as needed for Pain. Lab Results   Component Value Date/Time    WBC 4.8 03/01/2018 01:56 PM    ABS.  NEUTROPHILS 2.4 03/01/2018 01:56 PM    HGB 11.6 03/01/2018 01:56 PM    HCT 35.9 03/01/2018 01:56 PM    PLATELET 192 96/88/8559 01:56 PM    Creatinine 1.27 03/01/2018 01:56 PM    BUN 27 03/01/2018 01:56 PM    ALT (SGPT) 13 03/01/2018 01:56 PM    AST (SGOT) 25 03/01/2018 01:56 PM    Albumin 4.0 03/01/2018 01:56 PM

## 2018-03-18 NOTE — ASSESSMENT & PLAN NOTE
This condition is managed by Specialist.  Key CAD CHF Meds             atorvastatin (LIPITOR) 10 mg tablet  (Taking) TAKE ONE (1) TABLET(S) DAILY    apixaban (ELIQUIS) 2.5 mg tablet  (Taking) Take 1 Tab by mouth two (2) times a day. Indications: PREVENT THROMBOEMBOLISM IN CHRONIC ATRIAL FIBRILLATION    sacubitril-valsartan (ENTRESTO) 49 mg/51 mg tablet  (Taking) Take 1 Tab by mouth two (2) times a day. furosemide (LASIX) 20 mg tablet  (Taking) Take 1 Tab by mouth daily.     carvedilol (COREG) 3.125 mg tablet  (Taking) TAKE ONE (1) TABLET(S) TWIC E DAILY WITH FOOD  Indications: Chronic Heart Failure        [id number](BNP,BNPP,BNPPPOC,HSCRP,NA,NAPOC,K,KPOCT,CHOL,CHOLPOCT,HDL,HDLPOC,LDLCHOL,LDLPOCT,LDLCPOC,LDLC,LDL,LDLCEXT,TRIGL,TGLPOCT,INR,INREXT,INRPOC,PTP,PTINR,PTEXT,DIG)@

## 2018-03-18 NOTE — PROGRESS NOTES
[address] [facility]  [place] [address]  [address] [place] [address]  Phone:  [phone]  Fax: [phone]       Chief Complaint   Patient presents with    Hypertension     3 month follow up    . SUBJECTIVE:    Malik Lopez is a 80 y.o. female  Dictation on: 03/18/2018  7:09 PM by: Eugenio Floor [[id number]]          Current Outpatient Prescriptions   Medication Sig Dispense Refill    allopurinol (ZYLOPRIM) 300 mg tablet TAKE ONE (1) TABLET(S) DAILY 90 Tab 3    atorvastatin (LIPITOR) 10 mg tablet TAKE ONE (1) TABLET(S) DAILY 90 Tab 3    levothyroxine (SYNTHROID) 25 mcg tablet Take 1 Tab by mouth Daily (before breakfast). 90 Tab 3    apixaban (ELIQUIS) 2.5 mg tablet Take 1 Tab by mouth two (2) times a day. Indications: PREVENT THROMBOEMBOLISM IN CHRONIC ATRIAL FIBRILLATION 14 Tab 6    sacubitril-valsartan (ENTRESTO) 49 mg/51 mg tablet Take 1 Tab by mouth two (2) times a day. 180 Tab 3    magnesium oxide 400 mg cap Take 1 Cap by mouth daily.  cholecalciferol (VITAMIN D3) 1,000 unit cap Take 1,000 Units by mouth daily.  furosemide (LASIX) 20 mg tablet Take 1 Tab by mouth daily. (Patient taking differently: Take 20 mg by mouth two (2) times a day.) 30 Tab 11    potassium chloride SR (KLOR-CON 10) 10 mEq tablet Take 1 Tab by mouth daily. 30 Tab 11    carvedilol (COREG) 3.125 mg tablet TAKE ONE (1) TABLET(S) TWIC E DAILY WITH FOOD  Indications: Chronic Heart Failure 180 Tab 3    albuterol (PROVENTIL VENTOLIN) 2.5 mg /3 mL (0.083 %) nebulizer solution 3 mL by Nebulization route every four (4) hours as needed for Wheezing. 100 Each 11    fluticasone-salmeterol (ADVAIR) 250-50 mcg/dose diskus inhaler Take 1 Puff by inhalation two (2) times a day. 3 Inhaler 3    loratadine (CLARITIN) 10 mg tablet Take 10 mg by mouth daily as needed.  co-enzyme Q-10 (CO Q-10) 100 mg capsule Take 100 mg by mouth daily.       OXYGEN-AIR DELIVERY SYSTEMS 2.5 L by Does Not Apply route nightly.  ibrutinib (IMBRUVICA) 140 mg cap Take 420 mg by mouth five (5) days a week. 140 mg cap, takes 3 caps five days a week (none on Sunday or Wednesday)      acetaminophen (TYLENOL EXTRA STRENGTH) 500 mg tablet Take 500 mg by mouth every six (6) hours as needed for Pain.  multivitamin (ONE A DAY) tablet Take 1 Tab by mouth daily.        Past Medical History:   Diagnosis Date    Aortic insufficiency     Asthma     Cancer ([facility]     lymphoma    CKD (chronic kidney disease) stage 3, GFR 30-59 ml/min 1/10/2018    Congestive heart failure, NYHA class II, chronic, systolic (HCC)     Heart failure ([facility]     Hypertension     Mitral valvular regurgitation 1/22/2016    ECHO 2/3/17: LV dilated, EF 20-25%, mild LVH, RV mod dilated, mild- mod MELANIA, mod-severe MR, mod AI ECHO 7/29/17: EF 15%, severe DHK, reduced RVEF, RVH, RVSP 35 mmHg  Mild LAE, mod-marked MA fibrosis, mod-severe MR (2+), AO root dilated,      Presence of biventricular automatic cardioverter/defibrillator (AICD) 7/2/2015    [employer] biventricular AICD implant    Stomach ulcer      Past Surgical History:   Procedure Laterality Date    HX BREAST BIOPSY Bilateral     40 years ago    HX COLONOSCOPY      HX HEENT      cataracts removed    HX HYSTERECTOMY      HX OTHER SURGICAL      lymph node surgery    HX TONSILLECTOMY      MO EGD TRANSORAL BIOPSY SINGLE/MULTIPLE  5/23/2012         SINUS SURGERY PROC UNLISTED      Nasal polyps removed     Allergies   Allergen Reactions    Codeine Other (comments)     \"made me disoriented\" per pt         REVIEW OF SYSTEMS:  General: negative for - chills or fever  ENT: negative for - headaches, nasal congestion or tinnitus  Respiratory: negative for - cough, hemoptysis, shortness of breath or wheezing  Cardiovascular : negative for - chest pain, edema, palpitations or shortness of breath  Gastrointestinal: negative for - abdominal pain, blood in stools, heartburn or nausea/vomiting  Genito-Urinary: no dysuria, trouble voiding, or hematuria  Musculoskeletal: negative for - gait disturbance, joint pain, joint stiffness or joint swelling  Neurological: no TIA or stroke symptoms  Hematologic: no bruises, no bleeding, no swollen glands  Integument: no lumps, mole changes, nail changes or rash  Endocrine: no malaise/lethargy or unexpected weight changes      Social History     Social History    Marital status:      Spouse name: N/A    Number of children: 1    Years of education: 16+     Occupational History    retired teacher      Social History Main Topics    Smoking status: Never Smoker    Smokeless tobacco: Never Used    Alcohol use No    Drug use: No    Sexual activity: No     Other Topics Concern    None     Social History Narrative     Family History   Problem Relation Age of Onset    Heart Disease Mother     Stroke Father        OBJECTIVE:    Visit Vitals    /75 (BP 1 Location: Left arm, BP Patient Position: Sitting)    Pulse 69    Temp 98 °F (36.7 °C) (Oral)    Resp 16    Ht 5' 2\" (1.575 m)    Wt 131 lb 1.6 oz (59.5 kg)    SpO2 96%    BMI 23.98 kg/m2     CONSTITUTIONAL: well , well nourished, appears age appropriate  EYES: perrla, eom intact  ENMT:moist mucous membranes, pharynx clear  NECK: supple. Thyroid normal  RESPIRATORY: Chest: clear to ascultation and percussion   CARDIOVASCULAR: Heart: regular rate and rhythm  GASTROINTESTINAL: Abdomen: soft, bowel sounds active  HEMATOLOGIC: no pathological lymph nodes palpated  MUSCULOSKELETAL: Extremities: no edema, pulse 1+   INTEGUMENT: No unusual rashes or suspicious skin lesions noted. Nails appear normal.  NEUROLOGIC: non-focal exam   MENTAL STATUS: alert and oriented, appropriate affect      ASSESSMENT:  1. 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 Non-Hodgkin's lymphoma, unspecified body region, unspecified non-Hodgkin lymphoma type ([facility]    3. Dyslipidemia    4. Gout, unspecified cause, unspecified chronicity, unspecified site    5. Acquired hypothyroidism    6. Chronic systolic congestive heart failure (HCC)    7. Paroxysmal atrial fibrillation ([facility]    8. [facility] aneurysm without rupture ([facility]    9. 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 Cardiomyopathy, dilated, 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  Assessment & Plan: This condition is managed by Specialist.  Key CAD CHF Meds             atorvastatin (LIPITOR) 10 mg tablet  (Taking) TAKE ONE (1) TABLET(S) DAILY    apixaban (ELIQUIS) 2.5 mg tablet  (Taking) Take 1 Tab by mouth two (2) times a day. Indications: PREVENT THROMBOEMBOLISM IN CHRONIC ATRIAL FIBRILLATION    sacubitril-valsartan (ENTRESTO) 49 mg/51 mg tablet  (Taking) Take 1 Tab by mouth two (2) times a day. furosemide (LASIX) 20 mg tablet  (Taking) Take 1 Tab by mouth daily. carvedilol (COREG) 3.125 mg tablet  (Taking) TAKE ONE (1) TABLET(S) TWIC E DAILY WITH FOOD  Indications: Chronic Heart Failure        [id number](BNP,BNPP,BNPPPOC,HSCRP,NA,NAPOC,K,KPOCT,CHOL,CHOLPOCT,HDL,HDLPOC,LDLCHOL,LDLPOCT,LDLCPOC,LDLC,LDL,LDLCEXT,TRIGL,TGLPOCT,INR,INREXT,INRPOC,PTP,PTINR,PTEXT,DIG)@    Orders:  -     METABOLIC PANEL, COMPREHENSIVE  -     URINALYSIS W/ RFLX MICROSCOPIC    2. Non-Hodgkin's lymphoma, unspecified body region, unspecified non-Hodgkin lymphoma type ([facility]  -     CBC WITH AUTOMATED DIFF  -     COLLECTION VENOUS BLOOD,VENIPUNCTURE    3. Dyslipidemia  -     LIPID PANEL    4. Gout, unspecified cause, unspecified chronicity, unspecified site    5. Acquired hypothyroidism    6. Chronic systolic congestive heart failure [facility])  Assessment & Plan:   This condition is managed by Specialist.  Key CAD CHF Meds             atorvastatin (LIPITOR) 10 mg tablet  (Taking) TAKE ONE (1) TABLET(S) DAILY    apixaban (ELIQUIS) 2.5 mg tablet  (Taking) Take 1 Tab by mouth two (2) times a day. Indications: PREVENT THROMBOEMBOLISM IN CHRONIC ATRIAL FIBRILLATION    sacubitril-valsartan (ENTRESTO) 49 mg/51 mg tablet  (Taking) Take 1 Tab by mouth two (2) times a day. furosemide (LASIX) 20 mg tablet  (Taking) Take 1 Tab by mouth daily. carvedilol (COREG) 3.125 mg tablet  (Taking) TAKE ONE (1) TABLET(S) TWIC E DAILY WITH FOOD  Indications: Chronic Heart Failure        [id number](BNP,BNPP,BNPPPOC,HSCRP,NA,NAPOC,K,KPOCT,CHOL,CHOLPOCT,HDL,HDLPOC,LDLCHOL,LDLPOCT,LDLCPOC,LDLC,LDL,LDLCEXT,TRIGL,TGLPOCT,INR,INREXT,INRPOC,PTP,PTINR,PTEXT,DIG)@      7. Paroxysmal atrial fibrillation (HCC)  Assessment & Plan:    Key CAD CHF Meds             atorvastatin (LIPITOR) 10 mg tablet  (Taking) TAKE ONE (1) TABLET(S) DAILY    apixaban (ELIQUIS) 2.5 mg tablet  (Taking) Take 1 Tab by mouth two (2) times a day. Indications: PREVENT THROMBOEMBOLISM IN CHRONIC ATRIAL FIBRILLATION    sacubitril-valsartan (ENTRESTO) 49 mg/51 mg tablet  (Taking) Take 1 Tab by mouth two (2) times a day. furosemide (LASIX) 20 mg tablet  (Taking) Take 1 Tab by mouth daily. carvedilol (COREG) 3.125 mg tablet  (Taking) TAKE ONE (1) TABLET(S) TWIC E DAILY WITH FOOD  Indications: Chronic Heart Failure        [id number](BNP,BNPP,BNPPPOC,HSCRP,NA,NAPOC,K,KPOCT,CHOL,CHOLPOCT,HDL,HDLPOC,LDLCHOL,LDLPOCT,LDLCPOC,LDLC,LDL,LDLCEXT,TRIGL,TGLPOCT,INR,INREXT,INRPOC,PTP,PTINR,PTEXT,DIG)@      8. Thoracic aortic aneurysm without rupture [facility])  Assessment & Plan:    Key Peripheral Vascular Disease Meds             atorvastatin (LIPITOR) 10 mg tablet  (Taking) TAKE ONE (1) TABLET(S) DAILY    apixaban (ELIQUIS) 2.5 mg tablet  (Taking) Take 1 Tab by mouth two (2) times a day.  Indications: PREVENT THROMBOEMBOLISM IN CHRONIC ATRIAL FIBRILLATION        Lab Results   Component Value Date/Time    WBC 4.8 03/01/2018 01:56 PM    HGB 11.6 03/01/2018 01:56 PM    HCT 35.9 03/01/2018 01:56 PM    PLATELET 091 19/52/2781 01:56 PM    Creatinine 1.27 03/01/2018 01:56 PM    BUN 27 03/01/2018 01:56 PM    Cholesterol, total 170 03/01/2018 01:56 PM    HDL Cholesterol 69 03/01/2018 01:56 PM    LDL, calculated 88 03/01/2018 01:56 PM    Triglyceride 64 03/01/2018 01:56 PM         9. 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  Key Oncology Meds             ibrutinib (IMBRUVICA) 140 mg cap  (Taking) Take 420 mg by mouth five (5) days a week. 140 mg cap, takes 3 caps five days a week (none on Sunday or Wednesday)        Key Pain Meds             acetaminophen (TYLENOL EXTRA STRENGTH) 500 mg tablet  (Taking) Take 500 mg by mouth every six (6) hours as needed for Pain. Lab Results   Component Value Date/Time    WBC 4.8 03/01/2018 01:56 PM    ABS. NEUTROPHILS 2.4 03/01/2018 01:56 PM    HGB 11.6 03/01/2018 01:56 PM    HCT 35.9 03/01/2018 01:56 PM    PLATELET 793 46/78/0791 01:56 PM    Creatinine 1.27 03/01/2018 01:56 PM    BUN 27 03/01/2018 01:56 PM    ALT (SGPT) 13 03/01/2018 01:56 PM    AST (SGOT) 25 03/01/2018 01:56 PM    Albumin 4.0 03/01/2018 01:56 PM             PLAN:    1. Her cardiomyopathy appears to be stable and she is under the care of multiple cardiologists for this. 2. She will also follow-up with Dr. Gladys Henderson for her non-Hodgkin's lymphoma. 3. Her last ApoB level was excellent as far as her dyslipidemia is concerned. Her cardiomyopathy is not ischemic but she is at increased cardiovascular risk for other vascular beds and therefore she is maintained on atorvastatin. 4. She has had no gouty attack because of her consistent use of the allopurinol, the dose of which will probably have to be reduced. 5. She will continue with her thyroid supplement as prescribed. A TSH will be checked on her return visit.         .  Orders Placed This Encounter    METABOLIC PANEL, COMPREHENSIVE    URINALYSIS W/ RFLX MICROSCOPIC    CBC WITH AUTOMATED DIFF  LIPID PANEL         Follow-up Disposition:  Return in about 3 months (around 6/1/2018).       Jorge Payton MD

## 2018-04-03 ENCOUNTER — OFFICE VISIT (OUTPATIENT)
Dept: CARDIOLOGY CLINIC | Age: 83
End: 2018-04-03

## 2018-04-03 VITALS
RESPIRATION RATE: 12 BRPM | OXYGEN SATURATION: 70 % | WEIGHT: 130 LBS | SYSTOLIC BLOOD PRESSURE: 98 MMHG | BODY MASS INDEX: 23.92 KG/M2 | HEART RATE: 97 BPM | HEIGHT: 62 IN | DIASTOLIC BLOOD PRESSURE: 70 MMHG

## 2018-04-03 DIAGNOSIS — I50.22 CHRONIC SYSTOLIC CONGESTIVE HEART FAILURE (HCC): ICD-10-CM

## 2018-04-03 DIAGNOSIS — I10 ESSENTIAL HYPERTENSION: ICD-10-CM

## 2018-04-03 DIAGNOSIS — E78.5 DYSLIPIDEMIA: ICD-10-CM

## 2018-04-03 DIAGNOSIS — Z95.810 PRESENCE OF BIVENTRICULAR AUTOMATIC CARDIOVERTER/DEFIBRILLATOR (AICD): ICD-10-CM

## 2018-04-03 DIAGNOSIS — I34.0 MITRAL VALVE INSUFFICIENCY, UNSPECIFIED ETIOLOGY: ICD-10-CM

## 2018-04-03 DIAGNOSIS — I42.0 CARDIOMYOPATHY, DILATED, NONISCHEMIC (HCC): ICD-10-CM

## 2018-04-03 DIAGNOSIS — I48.0 PAROXYSMAL ATRIAL FIBRILLATION (HCC): Primary | ICD-10-CM

## 2018-04-03 DIAGNOSIS — G47.33 OSA (OBSTRUCTIVE SLEEP APNEA): ICD-10-CM

## 2018-04-03 DIAGNOSIS — I71.20 THORACIC AORTIC ANEURYSM WITHOUT RUPTURE: ICD-10-CM

## 2018-04-03 RX ORDER — CARVEDILOL 3.12 MG/1
TABLET ORAL
Qty: 180 TAB | Refills: 3 | Status: ON HOLD | OUTPATIENT
Start: 2018-04-03 | End: 2019-06-25 | Stop reason: DRUGHIGH

## 2018-04-03 RX ORDER — FUROSEMIDE 20 MG/1
20 TABLET ORAL DAILY
Qty: 90 TAB | Refills: 3 | Status: SHIPPED | OUTPATIENT
Start: 2018-04-03 | End: 2018-07-19 | Stop reason: SDUPTHER

## 2018-04-03 NOTE — MR AVS SNAPSHOT
[facility] 
 
 
 [address] 
[phone] Patient: Armando Jones MRN: [id number] VON:[birth year]92 Visit Information Date & Time Provider Department Dept. Phone Encounter #  
 4/3/2018 11:30 AM Flavia Arceo MD [facility] [place] [address] [id number] Follow-up Instructions Return in about 6 months (around 10/3/2018). Routing History Follow-up and Disposition History Your Appointments 4/25/2018  8:00 AM  
PROCEDURE with [facility] Appt Note: NOT AN OFFICE VISIT - REMOTE BSC ICD  
 [address]  
[phone] [address]  
  
    
 5/1/2018 11:00 AM  
Follow Up with Tiffanie Stout, [facility] ([facility] Appt Note: HF follow up [facility] [address] [facility] [address]  
[phone]  
  
   
 [address] [place] [address]  
  
    
 6/7/2018 11:45 AM  
Any with Dana Fothergill, MD  
[address] [place] [address] and Primary Care [place] [facility]) Appt Note: 3 month f/u hyperlipidemia  
 [address] [facility]  
  
   
 [phone] Upcoming Health Maintenance Date Due  
 MEDICARE YEARLY EXAM 6/16/2018 GLAUCOMA SCREENING Q2Y 1/24/2020 DTaP/Tdap/Td series (2 - Td) 2/13/2027 Allergies as of 4/3/2018  Review Complete On: 4/3/2018 By: Flavia Arceo MD  
  
 Severity Noted Reaction Type Reactions Codeine  05/21/2012   Side Effect Other (comments) \"made me disoriented\" per pt Current Immunizations  Reviewed on 6/29/2015 Name Date Influenza High Dose Vaccine PF 9/27/2017 Influenza Vaccine 10/1/2016, 10/4/2014 Influenza Vaccine Split 10/22/2011 ZZZ-RETIRED (DO NOT USE) Pneumococcal Vaccine (Unspecified Type) 5/22/2007 Not reviewed this visit You Were Diagnosed With   
  
 Codes Comments Paroxysmal atrial fibrillation (HCC)    -  Primary ICD-10-CM: I48.0 ICD-9-CM: 427.31 Mitral valve insufficiency, unspecified etiology     ICD-10-CM: I34.0 ICD-9-CM: 424.0 Cardiomyopathy, dilated, nonischemic (HCC)     ICD-10-CM: I42.0 ICD-9-CM: 425.4 Essential hypertension     ICD-10-CM: I10 
ICD-9-CM: 401.9 Chronic systolic congestive heart failure (HCC)     ICD-10-CM: I50.22 ICD-9-CM: 428.22, 428.0 Thoracic aortic aneurysm without rupture (HCC)     ICD-10-CM: I71.2 ICD-9-CM: 075. 2 Dyslipidemia     ICD-10-CM: E78.5 ICD-9-CM: 272.4   
 DILAN (obstructive sleep apnea)     ICD-10-CM: G47.33 
ICD-9-CM: 327.23 Presence of biventricular automatic cardioverter/defibrillator (AICD)     ICD-10-CM: Z95.810 ICD-9-CM: V45.02 Vitals BP Pulse Resp Height(growth percentile) Weight(growth percentile) SpO2  
 98/70 (BP 1 Location: Right arm, BP Patient Position: Sitting) 97 12 5' 2\" (1.575 m) 130 lb (59 kg) (!) 70% BMI OB Status Smoking Status 23.78 kg/m2 Postmenopausal Never Smoker Vitals History BMI and BSA Data Body Mass Index Body Surface Area 23.78 kg/m 2 1.61 m 2 Preferred Pharmacy Pharmacy Name Phone [facility] [phone] Your Updated Medication List  
  
   
This list is accurate as of 4/3/18 12:12 PM.  Always use your most recent med list.  
  
  
  
  
 albuterol 2.5 mg /3 mL (0.083 %) nebulizer solution Commonly known as:  PROVENTIL VENTOLIN  
3 mL by Nebulization route every four (4) hours as needed for Wheezing. allopurinol 300 mg tablet Commonly known as:  ZYLOPRIM  
TAKE ONE (1) TABLET(S) DAILY  
  
 apixaban 2.5 mg tablet Commonly known as:  Arbutus Bon Take 1 Tab by mouth two (2) times a day. Indications: PREVENT THROMBOEMBOLISM IN CHRONIC ATRIAL FIBRILLATION  
  
 atorvastatin 10 mg tablet Commonly known as:  LIPITOR  
TAKE ONE (1) TABLET(S) DAILY  
  
 carvedilol 3.125 mg tablet Commonly known as:  COREG  
TAKE ONE (1) TABLET(S) TWIC E DAILY WITH FOOD  Indications: Chronic Heart Failure  
  
 co-enzyme Q-10 100 mg capsule Commonly known as:  CO Q-10 Take 100 mg by mouth daily. fluticasone-salmeterol 250-50 mcg/dose diskus inhaler Commonly known as:  ADVAIR Take 1 Puff by inhalation two (2) times a day. furosemide 20 mg tablet Commonly known as:  LASIX Take 1 Tab by mouth daily. IMBRUVICA 140 mg capsule Generic drug:  ibrutinib Take 420 mg by mouth five (5) days a week. 140 mg cap, takes 3 caps five days a week (none on Sunday or Wednesday) levothyroxine 25 mcg tablet Commonly known as:  SYNTHROID Take 1 Tab by mouth Daily (before breakfast). loratadine 10 mg tablet Commonly known as:  Grace Amsler Take 10 mg by mouth daily as needed. magnesium oxide 400 mg Cap Take 1 Cap by mouth daily. multivitamin tablet Commonly known as:  ONE A DAY Take 1 Tab by mouth daily. OXYGEN-AIR DELIVERY SYSTEMS  
2.5 L by Does Not Apply route nightly. potassium chloride SR 10 mEq tablet Commonly known as:  KLOR-CON 10 Take 1 Tab by mouth daily. sacubitril-valsartan 49-51 mg Tab tablet Commonly known as:  ENTRESTO Take 1 Tab by mouth two (2) times a day. TYLENOL EXTRA STRENGTH 500 mg tablet Generic drug:  acetaminophen Take 500 mg by mouth every six (6) hours as needed for Pain. VITAMIN D3 1,000 unit Cap Generic drug:  cholecalciferol Take 1,000 Units by mouth daily. We Performed the Following AMB POC EKG ROUTINE W/ 12 LEADS, INTER & REP [15680 CPT(R)] Follow-up Instructions Return in about 6 months (around 10/3/2018). Introducing [facility]! Dear Rebecca Begum: Thank you for requesting a [employer] account. Our records indicate that you already have an active [employer] account. You can access your account anytime at [email or web address] Did you know that you can access your hospital and ER discharge instructions at any time in [employer]? You can also review all of your test results from your hospital stay or ER visit. Additional Information If you have questions, please visit the Frequently Asked Questions section of the [employer] website at [email or web address] Remember, [employer] is NOT to be used for urgent needs. For medical emergencies, dial 911. Now available from your iPhone and Android! Please provide this summary of care documentation to your next provider. Your primary care clinician is listed as Gail Dey. If you have any questions after today's visit, please call [phone].

## 2018-04-03 NOTE — PROGRESS NOTES
Chief Complaint   Patient presents with    Hypotension     no other complaints,      1. Have you been to the ER, urgent care clinic since your last visit? Hospitalized since your last visit? No    2. Have you seen or consulted any other health care providers outside of the [facility] since your last visit? Include any pap smears or colon screening. No     Pt is unsure what medication names she is taking.

## 2018-04-03 NOTE — PROGRESS NOTES
[facility]   [address] [facility], [address] [place] [address] Road                                          NEW PATIENT HPI/FOLLOW-UP      NAME:  Rhea Nava   :   3/17/1925   MRN:   [id number]   PCP:  Poonam Ya MD           Subjective: The patient is a 80y.o. year old female  who returns for a routine follow-up. Since the last visit, patient reports no new symptoms. Is attending 70th post-college graduation ceremony [employer] in May. Eighteen of her classmates will be present--hopefully she states!! Denies change in exercise tolerance, chest pain, edema, medication intolerance, palpitations, shortness of breath, PND/orthopnea wheezing, sputum, syncope, dizziness or light headedness. Doing satisfactorily. Review of Systems  General: Pt denies excessive weight gain or loss. Pt is able to conduct ADL's. Respiratory: Denies shortness of breath, JENKINS, wheezing or stridor.   Cardiovascular: Denies precordial pain, palpitations, edema or PND  Gastrointestinal: Denies poor appetite, indigestion, abdominal pain or blood in stool  Peripheral vascular: Denies claudication, leg cramps  Neuropsychiatric: Denies paresthesias,tingling,numbness,anxiety,depression,fatigue  Musculoskeletal: Denies pain,tenderness, soreness,swelling      Past Medical History:   Diagnosis Date    Aortic insufficiency     Asthma     Cancer ([facility] [address]     lymphoma    CKD (chronic kidney disease) stage 3, GFR 30-59 ml/min 1/10/2018    Congestive heart failure, NYHA class II, chronic, systolic (HCC)     Heart failure ([address]     Hypertension     Mitral valvular regurgitation 2016    ECHO 2/3/17: LV dilated, EF 20-25%, mild LVH, RV mod dilated, mild- mod MELANIA, mod-severe MR, mod AI ECHO 17: EF 15%, severe DHK, reduced RVEF, RVH, RVSP 35 mmHg  Mild LAE, mod-marked MA fibrosis, mod-severe MR (2+), AO root dilated,      Presence of biventricular automatic cardioverter/defibrillator (AICD) 2015    [employer] biventricular AICD implant    Stomach ulcer      Patient Active Problem List    Diagnosis Date Noted    Acquired hypothyroidism 03/18/2018    Aneurysmal dilatation ([facility] 03/18/2018    Xerosis of skin 12/21/2017    Dyslipidemia 12/21/2017    Paroxysmal atrial fibrillation ([address] 10/21/2017    Hypokalemia 04/21/2017    Acute respiratory insufficiency 02/02/2017    Gastroesophageal reflux disease with esophagitis 02/02/2017    Thoracic aortic aneurysm without rupture ([facility] [address] 02/02/2017    Physical deconditioning 01/29/2017    Mitral valvular regurgitation 01/22/2016    Lymphoma ([facility] 11/17/2015    Chronic systolic congestive heart failure ([facility] 10/08/2015    Presence of biventricular automatic cardioverter/defibrillator (AICD) 07/02/2015    LBBB (left bundle branch block) 07/01/2015    HTN (hypertension) 06/29/2015    Gout 06/29/2015    Reactive airway disease 03/23/2015    Anemia 11/08/2014    Rhinitis 09/04/2014    Cardiomyopathy, dilated, 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 08/04/2012    NHL (non-Hodgkin's lymphoma) ([facility] 08/04/2012    DILAN (obstructive sleep apnea) 08/04/2012    Abdominal pain 05/22/2012     Class: Acute    Weight loss 05/22/2012     Class: Acute      Past Surgical History:   Procedure Laterality Date    HX BREAST BIOPSY Bilateral     40 years ago    HX COLONOSCOPY      HX HEENT      cataracts removed    HX HYSTERECTOMY      HX OTHER SURGICAL      lymph node surgery    HX TONSILLECTOMY      MT EGD TRANSORAL BIOPSY SINGLE/MULTIPLE  5/23/2012         SINUS SURGERY PROC UNLISTED      Nasal polyps removed     Allergies   Allergen Reactions    Codeine Other (comments)     \"made me disoriented\" per pt      Family History   Problem Relation Age of Onset    Heart Disease Mother     Stroke Father       Social History     Social History    Marital status:      Spouse name: N/A    Number of children: 1    Years of education: 16+     Occupational History    retired teacher      Social History Main Topics    Smoking status: Never Smoker    Smokeless tobacco: Never Used    Alcohol use No    Drug use: No    Sexual activity: No     Other Topics Concern    Not on file     Social History Narrative      Current Outpatient Prescriptions   Medication Sig    apixaban (ELIQUIS) 2.5 mg tablet Take 1 Tab by mouth two (2) times a day. Indications: PREVENT THROMBOEMBOLISM IN CHRONIC ATRIAL FIBRILLATION    albuterol (PROVENTIL VENTOLIN) 2.5 mg /3 mL (0.083 %) nebulizer solution 3 mL by Nebulization route every four (4) hours as needed for Wheezing.  allopurinol (ZYLOPRIM) 300 mg tablet TAKE ONE (1) TABLET(S) DAILY    atorvastatin (LIPITOR) 10 mg tablet TAKE ONE (1) TABLET(S) DAILY    levothyroxine (SYNTHROID) 25 mcg tablet Take 1 Tab by mouth Daily (before breakfast).  sacubitril-valsartan (ENTRESTO) 49 mg/51 mg tablet Take 1 Tab by mouth two (2) times a day.  magnesium oxide 400 mg cap Take 1 Cap by mouth daily.  cholecalciferol (VITAMIN D3) 1,000 unit cap Take 1,000 Units by mouth daily.  furosemide (LASIX) 20 mg tablet Take 1 Tab by mouth daily. (Patient taking differently: Take 20 mg by mouth two (2) times a day.)    potassium chloride SR (KLOR-CON 10) 10 mEq tablet Take 1 Tab by mouth daily.  carvedilol (COREG) 3.125 mg tablet TAKE ONE (1) TABLET(S) TWIC E DAILY WITH FOOD  Indications: Chronic Heart Failure    fluticasone-salmeterol (ADVAIR) 250-50 mcg/dose diskus inhaler Take 1 Puff by inhalation two (2) times a day.  loratadine (CLARITIN) 10 mg tablet Take 10 mg by mouth daily as needed.  co-enzyme Q-10 (CO Q-10) 100 mg capsule Take 100 mg by mouth daily.  OXYGEN-AIR DELIVERY SYSTEMS 2.5 L by Does Not Apply route nightly.  ibrutinib (IMBRUVICA) 140 mg cap Take 420 mg by mouth five (5) days a week.  140 mg cap, takes 3 caps five days a week (none on Sunday or Wednesday)    acetaminophen (TYLENOL EXTRA STRENGTH) 500 mg tablet Take 500 mg by mouth every six (6) hours as needed for Pain.  multivitamin (ONE A DAY) tablet Take 1 Tab by mouth daily. No current facility-administered medications for this visit. I have reviewed the nurses notes, vitals, problem list, allergy list, medical history, family medical, social history and medications. Objective:     Physical Exam:     Vitals:    04/03/18 1130 04/03/18 1138   BP: 103/78 98/70   Pulse: 99 97   Resp: 12    SpO2: (!) 70%    Weight: 130 lb (59 kg)    Height: 5' 2\" (1.575 m)     Body mass index is 23.78 kg/(m^2). General: Well developed, in no acute distress. HEENT: No carotid bruits, no JVD, trach is midline. Heart:  Normal S1/S2 negative S3 or S4. Regular, no murmur, gallop or rub.   Respiratory: Clear bilaterally, no wheezing or rales  Abdomen:   Soft, non-tender, bowel sounds are active.   Extremities:  No edema, normal cap refill, no cyanosis. Neuro: A&Ox3, speech clear, gait stable. Skin: Skin color is normal. No rashes or lesions. No diaphoresis. Vascular: 2+ pulses symmetric in all extremities        Data Review:       Cardiographics:    EKG: Electronic ventricular pacemaker    Cardiology Labs:    Results for orders placed or performed during the hospital encounter of 04/20/17   EKG, 12 LEAD, INITIAL   Result Value Ref Range    Ventricular Rate 76 BPM    Atrial Rate 76 BPM    P-R Interval 172 ms    QRS Duration 190 ms    Q-T Interval 504 ms    QTC Calculation (Bezet) 567 ms    Calculated R Axis -150 degrees    Calculated T Axis 12 degrees    Diagnosis       AV dual-paced rhythm with occasional ventricular-paced complexes and with   occasional premature ventricular complexes  When compared with ECG of 01-FEB-2017 19:26,  premature ventricular complexes are now present  Vent.  rate has decreased BY   4 BPM  Confirmed by Kalee Marquez ([id number]) on 4/20/2017 7:32:54 PM         Lab Results   Component Value Date/Time    Cholesterol, total 170 03/01/2018 01:56 PM    HDL Cholesterol 69 03/01/2018 01:56 PM    LDL, calculated 88 03/01/2018 01:56 PM    Triglyceride 64 03/01/2018 01:56 PM    CHOL/HDL Ratio 4.5 08/04/2012 03:15 PM       Lab Results   Component Value Date/Time    Sodium 147 (H) 03/01/2018 01:56 PM    Potassium 4.9 03/01/2018 01:56 PM    Chloride 103 03/01/2018 01:56 PM    CO2 29 03/01/2018 01:56 PM    Anion gap 10 04/22/2017 03:51 AM    Glucose 78 03/01/2018 01:56 PM    BUN 27 03/01/2018 01:56 PM    Creatinine 1.27 (H) 03/01/2018 01:56 PM    BUN/Creatinine ratio 21 03/01/2018 01:56 PM    GFR est AA 42 (L) 03/01/2018 01:56 PM    GFR est non-AA 37 (L) 03/01/2018 01:56 PM    Calcium 9.3 03/01/2018 01:56 PM    Bilirubin, total 0.4 03/01/2018 01:56 PM    AST (SGOT) 25 03/01/2018 01:56 PM    Alk. phosphatase 67 03/01/2018 01:56 PM    Protein, total 5.9 (L) 03/01/2018 01:56 PM    Albumin 4.0 03/01/2018 01:56 PM    Globulin 3.0 04/20/2017 09:40 AM    A-G Ratio 2.1 03/01/2018 01:56 PM    ALT (SGPT) 13 03/01/2018 01:56 PM          Assessment:       ICD-10-CM ICD-9-CM    1. Paroxysmal atrial fibrillation (HCC) I48.0 427.31 AMB POC EKG ROUTINE W/ 12 LEADS, INTER & REP   2. Mitral valve insufficiency, unspecified etiology I34.0 424.0 AMB POC EKG ROUTINE W/ 12 LEADS, INTER & REP   3. 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 Essential hypertension I10 401.9    5. Chronic systolic congestive heart failure (HCC) I50.22 428.22      428.0    6. Thoracic aortic aneurysm without rupture (HCC) I71.2 441.2    7. Dyslipidemia E78.5 272.4    8. DILAN (obstructive sleep apnea) G47.33 327.23    9. Presence of biventricular automatic cardioverter/defibrillator (AICD) Z95.810 V45.02          Discussion: Patient presents at this time stable from a cardiac perspective. CSHF compensated. Pleased with present cardiac status. Plan: 1. Continue same meds. Lipid profile and labs followed by PCP.     2.Encouraged to exercise to tolerance and follow low fat, low cholesterol, low sodium predominantly Plant-based (consider Mediterranean) diet. Call with questions or concerns. Will follow up any test results by phone and/or f/u here in office if needed. Mariia Tabares 3.Follow up: 6 months. I have discussed the diagnosis with the patient and the intended plan as seen in the above orders. The patient has received an after-visit summary and questions were answered concerning future plans. I have discussed any concerning medication side effects and warnings with the patient as well.     Krysta Garcia MD  4/3/2018

## 2018-04-10 ENCOUNTER — TELEPHONE (OUTPATIENT)
Dept: CARDIOLOGY CLINIC | Age: 83
End: 2018-04-10

## 2018-04-10 NOTE — TELEPHONE ENCOUNTER
Call received from  [facility]/ [facility] requesting copy of recent echo. [address] is listed on patients Hippa form.     Copy faxed to # [phone]

## 2018-04-12 RX ORDER — FLUTICASONE PROPIONATE AND SALMETEROL 250; 50 UG/1; UG/1
1 POWDER RESPIRATORY (INHALATION) 2 TIMES DAILY
Qty: 3 INHALER | Refills: 3 | Status: SHIPPED | OUTPATIENT
Start: 2018-04-12 | End: 2018-04-13 | Stop reason: SDUPTHER

## 2018-04-14 RX ORDER — FLUTICASONE PROPIONATE AND SALMETEROL 250; 50 UG/1; UG/1
1 POWDER RESPIRATORY (INHALATION) 2 TIMES DAILY
Qty: 3 INHALER | Refills: 3 | Status: SHIPPED | OUTPATIENT
Start: 2018-04-14 | End: 2019-10-23

## 2018-04-25 ENCOUNTER — CLINICAL SUPPORT (OUTPATIENT)
Dept: CARDIOLOGY CLINIC | Age: 83
End: 2018-04-25

## 2018-04-25 DIAGNOSIS — I50.22 CHRONIC SYSTOLIC CONGESTIVE HEART FAILURE (HCC): ICD-10-CM

## 2018-04-25 DIAGNOSIS — I48.0 PAROXYSMAL ATRIAL FIBRILLATION (HCC): ICD-10-CM

## 2018-04-25 DIAGNOSIS — Z95.810 PRESENCE OF BIVENTRICULAR AUTOMATIC CARDIOVERTER/DEFIBRILLATOR (AICD): Primary | ICD-10-CM

## 2018-04-25 DIAGNOSIS — I44.7 LBBB (LEFT BUNDLE BRANCH BLOCK): ICD-10-CM

## 2018-04-27 ENCOUNTER — TELEPHONE (OUTPATIENT)
Dept: CARDIOLOGY CLINIC | Age: 83
End: 2018-04-27

## 2018-04-27 NOTE — TELEPHONE ENCOUNTER
Telephone Call RE:  Appointment reminder     Outcome:     [x] Patient confirmed appointment   [] Patient rescheduled appointment for    [] Unable to reach   [] Left message              [] Other:     Spoke with caregiver Tigre Singh confirming patients appointment for Tuesday May 2nd at [address] [facility]

## 2018-05-02 ENCOUNTER — OFFICE VISIT (OUTPATIENT)
Dept: CARDIOLOGY CLINIC | Age: 83
End: 2018-05-02

## 2018-05-02 VITALS
WEIGHT: 128.6 LBS | TEMPERATURE: 98 F | RESPIRATION RATE: 20 BRPM | OXYGEN SATURATION: 96 % | SYSTOLIC BLOOD PRESSURE: 88 MMHG | HEIGHT: 62 IN | DIASTOLIC BLOOD PRESSURE: 52 MMHG | HEART RATE: 72 BPM | BODY MASS INDEX: 23.66 KG/M2

## 2018-05-02 DIAGNOSIS — I35.1 NONRHEUMATIC AORTIC VALVE INSUFFICIENCY: ICD-10-CM

## 2018-05-02 DIAGNOSIS — I50.20 SYSTOLIC CONGESTIVE HEART FAILURE WITH REDUCED LEFT VENTRICULAR FUNCTION, NYHA CLASS 2 (HCC): Primary | ICD-10-CM

## 2018-05-02 DIAGNOSIS — I10 ESSENTIAL HYPERTENSION: ICD-10-CM

## 2018-05-02 DIAGNOSIS — I50.22 CHRONIC SYSTOLIC CONGESTIVE HEART FAILURE (HCC): ICD-10-CM

## 2018-05-02 DIAGNOSIS — R06.02 SOB (SHORTNESS OF BREATH): ICD-10-CM

## 2018-05-02 DIAGNOSIS — Z95.810 PRESENCE OF BIVENTRICULAR AUTOMATIC CARDIOVERTER/DEFIBRILLATOR (AICD): ICD-10-CM

## 2018-05-02 DIAGNOSIS — I42.0 CARDIOMYOPATHY, DILATED, NONISCHEMIC (HCC): Chronic | ICD-10-CM

## 2018-05-02 DIAGNOSIS — I34.0 MITRAL VALVE INSUFFICIENCY, UNSPECIFIED ETIOLOGY: Chronic | ICD-10-CM

## 2018-05-02 NOTE — PROGRESS NOTES
[facility] Note      DOS:  2018  REFERRING PROVIDER: Dr. Malone Sox: Angel Retana MD  PRIMARY CARDIOLOGIST: Dr. Gatrh Castellano  ELECTROPHYSIOLOGIST: Dr Martha Waller  Oncology: Dr. Marya Chaves / Plan:   1. NICM, HFrEF 20-25%, NYHA class II   Continue entresto 49/51- unable to uptitrate 2/2 BP and AI   Decrease  lasix 20 mg daily/KDur to every other day and prn    Cont coreg   Cont daily wts,  Low NA diet   Follow up 3 months     2. SCD px AICD- followed by Dr Christ Williamson    3. Chronic Afib- V paced - followed by Dr Adelso Wilson coreg   Continue Eliquis - no bleeding issues     4. MR/AI- will get recent echo    Continue to monitor annually and prn sx     5. DILAN   Continue nocturnal Home O2    6. HTN - BP lowish    lasix to every other day and prn      7. CKD- Cr stable 1.27 per Dr Christina Pettit    8. XOL-     On lipitor and CoQ 10   Per Dr Garth Castellano     9. Vitamin D deficiency   Continue supplement     10. I have discussed the diagnosis with  Bisisherman Gregorio and the intended plan as seen in the above orders. Questions were answered concerning future plans, and written instructions provided. I have discussed medication side effects and warnings with the patient as well. Thank you for allowing me to participate in the care of this delightful lady. Please do not hesitate to call with any questions. Julissa Moore RN, ACNP-BC, [facility]    Chief Conmplaint    Chief Complaint   Patient presents with    Follow-up         HPI: 80y.o. year old female with a history of NICM with chronic HFrEF- NYHA class II s/p BiV AICD In the setting of HTN complicated by valvular heart disease (AI/MR), persistent afib (Eliquis), LBBB, CKD-III, GERD, DILAN,  non-Hodgkins lymphoma and RAD who presents for follow up of her chronic HF    She was last seen ~ 4 months ago. Her proBNP was reduced to 2577, Cr 1.27.   Her , Eric Harrell,  is with her today from  [place]. Since her last visit, she has seen Dr. Yanira Ramirez a month ago with an echo. Dr. Adam Sanchez 1 month ago her NHL remains in remission. She remains  generally active and walks 1 1/2 hours 5 days/week on a treadmill for 45- 60 minutes. She walks at a pace at which she can continue to talk. She does not push herself to the point of significant SOB. She has some JENKINS w/ moderate exertion. She is able to perform ADLS w/o JENKINS. Her energy level is stable and she naps most days. She wears compression stocking. She sleeps well on 2 pillows, and is compliant with O2 when sleeping. Chronic LE edema better with her compression stockings. She follows daily weights and BP at home. Weight stable at home 130lbs,(our records indicate she has lost 9 pounds since last year). Her appetite is good and she limits her sodium. BP has lowered 90/50 range       Episode of ataxia last month in the setting of rushing around, and fell in the garage. Denies exertional chest pain, palpitations, cough, no AICD shocks,  lightheadedness syncope, near syncope, or bleeding issues. She is asking about stopping the allopurinol- she has had no gout issues. She is planning a trip to see her son in [place] this summer. Kaushik Palmer lives alone and has a care giver through an agency. She is a retired teacher. Her one  son  lives in [place] and has been diagnosed with Parkinson's Disease.        REVIEW of SYSTEMS:     General:  positive for  - fatigue, denies sleep disturbance, fever   HEENT: negative for epistaxsis   Pulmonary: Negative for cough, wheezing  Cardiac: Per HPI  GI:  negative for - abdominal pain, constipation, diarrhea, heartburn, hematemesis, melena or nausea/vomiting  Musculo: negative for - joint pain or muscle pain  Neuro: negative for - headaches, seizures or speech problems, no TIA, CVA or SZ  Skin:  negative for - rash  Allergies: REMINDER:DOCUMENT ALLERGIES IN ALLERGY ACTIVITY  Psych: negative for - anxiety or sleep disturbances         CARDIAC EVALUATION  ECHO 2/3/17: LV dilated, EF 20-25%, mild LVH, RV mod dilated, mild- mod MELANIA, mod-severe MR, mod AI  ECHO 7/29/17: EF 15%, severe DHK, reduced RVEF, RVH, RVSP 35 mmHg   Mild LAE, mod-marked MAC, mod-severe MR (2+), AO root dilated,  ECHO 1/10/2018: LV dilated, EF 25%, severe DHK, LVH, LAE, mild-mod MAC, mod-severe MR, mild-mod AI, mild TR        BiVAICD 7/2015: [id number]    EKG: underlying afib V paced       History:  Past Medical History:   Diagnosis Date    Aortic insufficiency     Asthma     Cancer ([facility]     lymphoma    CKD (chronic kidney disease) stage 3, GFR 30-59 ml/min 1/10/2018    Congestive heart failure, NYHA class II, chronic, systolic (HCC)     Heart failure ([facility]     Hypertension     Mitral valvular regurgitation 1/22/2016    ECHO 2/3/17: LV dilated, EF 20-25%, mild LVH, RV mod dilated, mild- mod MELANIA, mod-severe MR, mod AI ECHO 7/29/17: EF 15%, severe DHK, reduced RVEF, RVH, RVSP 35 mmHg  Mild LAE, mod-marked MA fibrosis, mod-severe MR (2+), AO root dilated,      Presence of biventricular automatic cardioverter/defibrillator (AICD) 7/2/2015    [employer] biventricular AICD implant    Stomach ulcer      Past Surgical History:   Procedure Laterality Date    HX BREAST BIOPSY Bilateral     40 years ago    HX COLONOSCOPY      HX HEENT      cataracts removed    HX HYSTERECTOMY      HX OTHER SURGICAL      lymph node surgery    HX TONSILLECTOMY      GA EGD TRANSORAL BIOPSY SINGLE/MULTIPLE  5/23/2012         SINUS SURGERY PROC UNLISTED      Nasal polyps removed     Social History     Social History    Marital status:      Spouse name: N/A    Number of children: 1    Years of education: 16+     Occupational History    retired teacher      Social History Main Topics    Smoking status: Never Smoker    Smokeless tobacco: Never Used    Alcohol use No    Drug use: No    Sexual activity: No     Other Topics Concern    Not on file     Social History Narrative     Family History   Problem Relation Age of Onset    Heart Disease Mother     Stroke Father        Current Medications:   Current Outpatient Prescriptions   Medication Sig Dispense Refill    fluticasone-salmeterol (ADVAIR) 250-50 mcg/dose diskus inhaler Take 1 Puff by inhalation two (2) times a day. 3 Inhaler 3    furosemide (LASIX) 20 mg tablet Take 1 Tab by mouth daily. 90 Tab 3    carvedilol (COREG) 3.125 mg tablet TAKE ONE (1) TABLET(S) TWIC E DAILY WITH FOOD  Indications: chronic heart failure 180 Tab 3    allopurinol (ZYLOPRIM) 300 mg tablet TAKE ONE (1) TABLET(S) DAILY 90 Tab 3    atorvastatin (LIPITOR) 10 mg tablet TAKE ONE (1) TABLET(S) DAILY 90 Tab 3    levothyroxine (SYNTHROID) 25 mcg tablet Take 1 Tab by mouth Daily (before breakfast). 90 Tab 3    apixaban (ELIQUIS) 2.5 mg tablet Take 1 Tab by mouth two (2) times a day. Indications: PREVENT THROMBOEMBOLISM IN CHRONIC ATRIAL FIBRILLATION 14 Tab 6    sacubitril-valsartan (ENTRESTO) 49 mg/51 mg tablet Take 1 Tab by mouth two (2) times a day. 180 Tab 3    magnesium oxide 400 mg cap Take 1 Cap by mouth daily.  cholecalciferol (VITAMIN D3) 1,000 unit cap Take 1,000 Units by mouth daily.  potassium chloride SR (KLOR-CON 10) 10 mEq tablet Take 1 Tab by mouth daily. 30 Tab 11    albuterol (PROVENTIL VENTOLIN) 2.5 mg /3 mL (0.083 %) nebulizer solution 3 mL by Nebulization route every four (4) hours as needed for Wheezing. 100 Each 11    loratadine (CLARITIN) 10 mg tablet Take 10 mg by mouth daily as needed.  co-enzyme Q-10 (CO Q-10) 100 mg capsule Take 100 mg by mouth daily.  OXYGEN-AIR DELIVERY SYSTEMS 2.5 L by Does Not Apply route nightly.  ibrutinib (IMBRUVICA) 140 mg cap Take 420 mg by mouth five (5) days a week.  140 mg cap, takes 3 caps five days a week (none on Sunday or Wednesday)      acetaminophen (TYLENOL EXTRA STRENGTH) 500 mg tablet Take 500 mg by mouth every six (6) hours as needed for Pain.  multivitamin (ONE A DAY) tablet Take 1 Tab by mouth daily. Allergies: Allergies   Allergen Reactions    Codeine Other (comments)     \"made me disoriented\" per pt       Physical Exam:   Vitals:    Visit Vitals    BP (!) 88/52 (BP 1 Location: Right arm, BP Patient Position: Sitting)    Pulse 72    Temp 98 °F (36.7 °C) (Oral)    Resp 20    Ht 5' 2\" (1.575 m)    Wt 128 lb 9.6 oz (58.3 kg)    SpO2 96%    BMI 23.52 kg/m2         General: Thin AA female, who is a bi frail. cooperative, very spry, no acute distress. HEENT: Atraumatic. Pink and moist.  Anicteric sclerae. Neck: Supple, no adenopoathy. Lungs: CTA bilaterally. No wheezing/rhonchi/rales. Heart[de-identified] PMI: laterally displaced,  AICD: L infraclavicular space,  Regular rhythm, S1, S2 present, 3/6 systolic murmur apex-> axilla, 2/6 diastolic rumble murmur  RUSB,  no rubs, no gallops. JVD 16  cm + J wave, (-) HJR . No carotid bruits. Abdomen: Soft, non-distended, non-tender. + Bowel sounds. No bruits. Extremities: compression stockings in place, No edema, no clubbing, no cyanosis. No calf tenderness  Neurologic: Grossly intact. Alert and oriented X 3. No acute neurological distress. Skin: intact, no wounds, ecchymosis, bruising, rashes   Psych: Good insight. Not anxious nor agitated.     Recent Labs:     Lab Results   Component Value Date/Time    WBC 4.8 03/01/2018 01:56 PM    HGB 11.6 03/01/2018 01:56 PM    HCT 35.9 03/01/2018 01:56 PM    PLATELET 923 66/60/2647 01:56 PM    MCV 96 03/01/2018 01:56 PM     Lab Results   Component Value Date/Time    GFR est non-AA 37 (L) 03/01/2018 01:56 PM    GFRNA, POC 50 (L) 04/09/2012 11:28 AM    GFR est AA 42 (L) 03/01/2018 01:56 PM    GFRAA, POC >60 04/09/2012 11:28 AM    Creatinine 1.27 (H) 03/01/2018 01:56 PM    Creatinine (POC) 1.1 04/09/2012 11:28 AM    BUN 27 03/01/2018 01:56 PM    Sodium 147 (H) 03/01/2018 01:56 PM    Potassium 4.9 03/01/2018 01:56 PM    Chloride 103 03/01/2018 01:56 PM    CO2 29 03/01/2018 01:56 PM    Magnesium 1.9 04/21/2017 02:34 AM    Phosphorus 2.2 (L) 02/03/2017 01:51 AM     Lab Results   Component Value Date/Time    TSH 9.630 (H) 12/21/2017 11:03 AM      Lab Results   Component Value Date/Time    Sodium 147 (H) 03/01/2018 01:56 PM    Potassium 4.9 03/01/2018 01:56 PM    Chloride 103 03/01/2018 01:56 PM    CO2 29 03/01/2018 01:56 PM    Anion gap 10 04/22/2017 03:51 AM    Glucose 78 03/01/2018 01:56 PM    BUN 27 03/01/2018 01:56 PM    Creatinine 1.27 (H) 03/01/2018 01:56 PM    BUN/Creatinine ratio 21 03/01/2018 01:56 PM    GFR est AA 42 (L) 03/01/2018 01:56 PM    GFR est non-AA 37 (L) 03/01/2018 01:56 PM    Calcium 9.3 03/01/2018 01:56 PM    Bilirubin, total 0.4 03/01/2018 01:56 PM    ALT (SGPT) 13 03/01/2018 01:56 PM    AST (SGOT) 25 03/01/2018 01:56 PM    Alk. phosphatase 67 03/01/2018 01:56 PM    Protein, total 5.9 (L) 03/01/2018 01:56 PM    Albumin 4.0 03/01/2018 01:56 PM    Globulin 3.0 04/20/2017 09:40 AM    A-G Ratio 2.1 03/01/2018 01:56 PM               Julissa Salinas RN, ACNP-BC, [address] [place] [address]   24 hour VAD/HF Pager: [phone]

## 2018-05-02 NOTE — MR AVS SNAPSHOT
[address] [place] [address] 
[phone] Patient: Dm Stallworth MRN: [id number] Visit Information Date & Time Provider Department Dept. Phone Encounter #  
 5/2/2018 10:00 AM Suzy Blanco [facility] 3. [phone] [id number] Follow-up Instructions Return in about 3 months (around 8/2/2018). Your Appointments 6/7/2018 11:45 AM  
Any with Chasidy Ramsey MD  
[address] [place] [address] and Primary Care [address] [facility]) Appt Note: 3 month f/u hyperlipidemia  
 [address] [place]  
  
   
 [phone]  
  
    
 7/25/2018  8:00 AM  
PROCEDURE with [facility] [address]) Appt Note: NOT AN OFFICE VISIT - REMOTE BSC ICD  
 [address] [facility]  
[phone] [address] [facility]  
  
    
 10/2/2018 11:15 AM  
ESTABLISHED PATIENT with Eren Patrick MD  
[facility] at [facility]) Appt Note: 6 MO. F/U  
 [address] [facility] 57  
[phone] [address] Upcoming Health Maintenance Date Due  
 MEDICARE YEARLY EXAM 6/16/2018 Influenza Age 5 to Adult 8/1/2018 GLAUCOMA SCREENING Q2Y 1/24/2020 DTaP/Tdap/Td series (2 - Td) 2/13/2027 Allergies as of 5/2/2018  Review Complete On: 5/2/2018 By: VLAD Blanco Severity Noted Reaction Type Reactions Codeine  05/21/2012   Side Effect Other (comments) \"made me disoriented\" per pt Current Immunizations  Reviewed on 6/29/2015 Name Date Influenza High Dose Vaccine PF 9/27/2017 Influenza Vaccine 10/1/2016, 10/4/2014 Influenza Vaccine Split 10/22/2011 ZZZ-RETIRED (DO NOT USE) Pneumococcal Vaccine (Unspecified Type) 5/22/2007 Not reviewed this visit Vitals BP Pulse Temp Resp Height(growth percentile) Weight(growth percentile) (!) 88/52 (BP 1 Location: Right arm, BP Patient Position: Sitting) 72 98 °F (36.7 °C) (Oral) 20 5' 2\" (1.575 m) 128 lb 9.6 oz (58.3 kg) SpO2 BMI OB Status Smoking Status 96% 23.52 kg/m2 Postmenopausal Never Smoker Vitals History BMI and BSA Data Body Mass Index Body Surface Area  
 23.52 kg/m 2 1.6 m 2 Preferred Pharmacy Pharmacy Name Phone [address] [facility] [phone] Your Updated Medication List  
  
   
This list is accurate as of 5/2/18 12:08 PM.  Always use your most recent med list.  
  
  
  
  
 albuterol 2.5 mg /3 mL (0.083 %) nebulizer solution Commonly known as:  PROVENTIL VENTOLIN  
3 mL by Nebulization route every four (4) hours as needed for Wheezing. allopurinol 300 mg tablet Commonly known as:  ZYLOPRIM  
TAKE ONE (1) TABLET(S) DAILY  
  
 apixaban 2.5 mg tablet Commonly known as:  Donnis Staples Take 1 Tab by mouth two (2) times a day. Indications: PREVENT THROMBOEMBOLISM IN CHRONIC ATRIAL FIBRILLATION  
  
 atorvastatin 10 mg tablet Commonly known as:  LIPITOR  
TAKE ONE (1) TABLET(S) DAILY  
  
 carvedilol 3.125 mg tablet Commonly known as:  COREG  
TAKE ONE (1) TABLET(S) TWIC E DAILY WITH FOOD  Indications: chronic heart failure  
  
 co-enzyme Q-10 100 mg capsule Commonly known as:  CO Q-10 Take 100 mg by mouth daily. fluticasone-salmeterol 250-50 mcg/dose diskus inhaler Commonly known as:  ADVAIR Take 1 Puff by inhalation two (2) times a day. furosemide 20 mg tablet Commonly known as:  LASIX Take 1 Tab by mouth daily. IMBRUVICA 140 mg capsule Generic drug:  ibrutinib Take 420 mg by mouth five (5) days a week.  140 mg cap, takes 3 caps five days a week (none on Sunday or Wednesday) levothyroxine 25 mcg tablet Commonly known as:  SYNTHROID Take 1 Tab by mouth Daily (before breakfast). loratadine 10 mg tablet Commonly known as:  Susa Fuchs Take 10 mg by mouth daily as needed. magnesium oxide 400 mg Cap Take 1 Cap by mouth daily. multivitamin tablet Commonly known as:  ONE A DAY Take 1 Tab by mouth daily. OXYGEN-AIR DELIVERY SYSTEMS  
2.5 L by Does Not Apply route nightly. potassium chloride SR 10 mEq tablet Commonly known as:  KLOR-CON 10 Take 1 Tab by mouth daily. sacubitril-valsartan 49-51 mg Tab tablet Commonly known as:  ENTRESTO Take 1 Tab by mouth two (2) times a day. TYLENOL EXTRA STRENGTH 500 mg tablet Generic drug:  acetaminophen Take 500 mg by mouth every six (6) hours as needed for Pain. VITAMIN D3 1,000 unit Cap Generic drug:  cholecalciferol Take 1,000 Units by mouth daily. Follow-up Instructions Return in about 3 months (around 8/2/2018). Patient Instructions YOU ARE AMAZING! You blood pressure is a little lowish coupled with the lightheadedness, I would like to decrease the FUROSEMIDE (fluid pill) and K-DUR (potassium) to EVERY OTHER DAY. If you start retaining fluid with increased swelling, weight gain, and/or shortness of breath it is OK to take an extra furosemide Continue other medications Labs today Ask Dr. Fernando Cooper if he is OK with stopping the allopurinol Follow up 3 months Continue daily weights (in the morning, after passing your urine). Notify HF team of overnight weight gains > 2 lbs or weekly >5 lbs or if any of the following Sx. Continue to limit sodium intake & monitor your fluid intake (not to exceed 2L per day). Call us for any problems,  questions, or concerns. Introducing [facility]! Dear Bradley Escalante: Thank you for requesting a [employer] account.   Our records indicate that you already have an active [employer] account. You can access your account anytime at [email or web address] Did you know that you can access your hospital and ER discharge instructions at any time in [employer]? You can also review all of your test results from your hospital stay or ER visit. Additional Information If you have questions, please visit the Frequently Asked Questions section of the [employer] website at [email or web address] Remember, [employer] is NOT to be used for urgent needs. For medical emergencies, dial 911. Now available from your iPhone and Android! Please provide this summary of care documentation to your next provider. Your primary care clinician is listed as Gail Dey. If you have any questions after today's visit, please call [phone].

## 2018-05-02 NOTE — COMMUNICATION BODY
[facility] Clinic Note      DOS:  2018  REFERRING PROVIDER: Dr. Shellie Steel: Toshia Mills MD  PRIMARY CARDIOLOGIST: Dr. Kimberly Lim  ELECTROPHYSIOLOGIST: Dr Claudia Garcia  Oncology: Dr. Jocelyn Chaves / Plan:   1. NICM, HFrEF 20-25%, NYHA class II   Continue entresto 49/51- unable to uptitrate 2/2 BP and AI   Decrease  lasix 20 mg daily/KDur to every other day and prn    Cont coreg   Cont daily wts,  Low NA diet   Follow up 3 months     2. SCD px AICD- followed by Dr Mecca Pascual    3. Chronic Afib- V paced - followed by Dr Rock Ayala coreg   Continue Eliquis - no bleeding issues     4. MR/AI- will get recent echo    Continue to monitor annually and prn sx     5. DILAN   Continue nocturnal Home O2    6. HTN - BP lowish    lasix to every other day and prn      7. CKD- Cr stable 1.27 per Dr Curtis Reynolds    8. XOL-     On lipitor and CoQ 10   Per Dr Kimberly Lim     9. Vitamin D deficiency   Continue supplement     10. I have discussed the diagnosis with  Nilda Nolan and the intended plan as seen in the above orders. Questions were answered concerning future plans, and written instructions provided. I have discussed medication side effects and warnings with the patient as well. Thank you for allowing me to participate in the care of this delightful lady. Please do not hesitate to call with any questions. Julissa Mott RN, ACNP-BC, [facility]    Chief Conmplaint    Chief Complaint   Patient presents with    Follow-up         HPI: 80y.o. year old female with a history of NICM with chronic HFrEF- NYHA class II s/p BiV AICD In the setting of HTN complicated by valvular heart disease (AI/MR), persistent afib (Eliquis), LBBB, CKD-III, GERD, DILAN,  non-Hodgkins lymphoma and RAD who presents for follow up of her chronic HF    She was last seen ~ 4 months ago. Her proBNP was reduced to 2577, Cr 1.27.   Her , [facility],  is with her today from  [place]. Since her last visit, she has seen Dr. Miranda Austin a month ago with an echo. Dr. Sarika Darby 1 month ago her NHL remains in remission. She remains  generally active and walks 1 1/2 hours 5 days/week on a treadmill for 45- 60 minutes. She walks at a pace at which she can continue to talk. She does not push herself to the point of significant SOB. She has some JENKINS w/ moderate exertion. She is able to perform ADLS w/o JENKINS. Her energy level is stable and she naps most days. She wears compression stocking. She sleeps well on 2 pillows, and is compliant with O2 when sleeping. Chronic LE edema better with her compression stockings. She follows daily weights and BP at home. Weight stable at home 130lbs,(our records indicate she has lost 9 pounds since last year). Her appetite is good and she limits her sodium. BP has lowered 90/50 range       Episode of ataxia last month in the setting of rushing around, and fell in the garage. Denies exertional chest pain, palpitations, cough, no AICD shocks,  lightheadedness syncope, near syncope, or bleeding issues. She is asking about stopping the allopurinol- she has had no gout issues. She is planning a trip to see her son in [facility] this summer. Alvaro Acosta lives alone and has a care giver through an agency. She is a retired teacher. Her one  son  lives in [place] and has been diagnosed with Parkinson's Disease.        REVIEW of SYSTEMS:     General:  positive for  - fatigue, denies sleep disturbance, fever   HEENT: negative for epistaxsis   Pulmonary: Negative for cough, wheezing  Cardiac: Per HPI  GI:  negative for - abdominal pain, constipation, diarrhea, heartburn, hematemesis, melena or nausea/vomiting  Musculo: negative for - joint pain or muscle pain  Neuro: negative for - headaches, seizures or speech problems, no TIA, CVA or SZ  Skin:  negative for - rash  Allergies: REMINDER:DOCUMENT ALLERGIES IN ALLERGY ACTIVITY  Psych: negative for - anxiety or sleep disturbances         CARDIAC EVALUATION  ECHO 2/3/17: LV dilated, EF 20-25%, mild LVH, RV mod dilated, mild- mod MELANIA, mod-severe MR, mod AI  ECHO 7/29/17: EF 15%, severe DHK, reduced RVEF, RVH, RVSP 35 mmHg   Mild LAE, mod-marked MAC, mod-severe MR (2+), AO root dilated,  ECHO 1/10/2018: LV dilated, EF 25%, severe DHK, LVH, LAE, mild-mod MAC, mod-severe MR, mild-mod AI, mild TR        BiVAICD 7/2015: [employer]    EKG: underlying afib V paced       History:  Past Medical History:   Diagnosis Date    Aortic insufficiency     Asthma     Cancer ([facility]     lymphoma    CKD (chronic kidney disease) stage 3, GFR 30-59 ml/min 1/10/2018    Congestive heart failure, NYHA class II, chronic, systolic (HCC)     Heart failure ([facility]     Hypertension     Mitral valvular regurgitation 1/22/2016    ECHO 2/3/17: LV dilated, EF 20-25%, mild LVH, RV mod dilated, mild- mod MELANIA, mod-severe MR, mod AI ECHO 7/29/17: EF 15%, severe DHK, reduced RVEF, RVH, RVSP 35 mmHg  Mild LAE, mod-marked MA fibrosis, mod-severe MR (2+), AO root dilated,      Presence of biventricular automatic cardioverter/defibrillator (AICD) 7/2/2015    [place] biventricular AICD implant    Stomach ulcer      Past Surgical History:   Procedure Laterality Date    HX BREAST BIOPSY Bilateral     40 years ago    HX COLONOSCOPY      HX HEENT      cataracts removed    HX HYSTERECTOMY      HX OTHER SURGICAL      lymph node surgery    HX TONSILLECTOMY      ND EGD TRANSORAL BIOPSY SINGLE/MULTIPLE  5/23/2012         SINUS SURGERY PROC UNLISTED      Nasal polyps removed     Social History     Social History    Marital status:      Spouse name: N/A    Number of children: 1    Years of education: 16+     Occupational History    retired teacher      Social History Main Topics    Smoking status: Never Smoker    Smokeless tobacco: Never Used    Alcohol use No    Drug use: No    Sexual activity: No     Other Topics Concern    Not on file     Social History Narrative     Family History   Problem Relation Age of Onset    Heart Disease Mother     Stroke Father        Current Medications:   Current Outpatient Prescriptions   Medication Sig Dispense Refill    fluticasone-salmeterol (ADVAIR) 250-50 mcg/dose diskus inhaler Take 1 Puff by inhalation two (2) times a day. 3 Inhaler 3    furosemide (LASIX) 20 mg tablet Take 1 Tab by mouth daily. 90 Tab 3    carvedilol (COREG) 3.125 mg tablet TAKE ONE (1) TABLET(S) TWIC E DAILY WITH FOOD  Indications: chronic heart failure 180 Tab 3    allopurinol (ZYLOPRIM) 300 mg tablet TAKE ONE (1) TABLET(S) DAILY 90 Tab 3    atorvastatin (LIPITOR) 10 mg tablet TAKE ONE (1) TABLET(S) DAILY 90 Tab 3    levothyroxine (SYNTHROID) 25 mcg tablet Take 1 Tab by mouth Daily (before breakfast). 90 Tab 3    apixaban (ELIQUIS) 2.5 mg tablet Take 1 Tab by mouth two (2) times a day. Indications: PREVENT THROMBOEMBOLISM IN CHRONIC ATRIAL FIBRILLATION 14 Tab 6    sacubitril-valsartan (ENTRESTO) 49 mg/51 mg tablet Take 1 Tab by mouth two (2) times a day. 180 Tab 3    magnesium oxide 400 mg cap Take 1 Cap by mouth daily.  cholecalciferol (VITAMIN D3) 1,000 unit cap Take 1,000 Units by mouth daily.  potassium chloride SR (KLOR-CON 10) 10 mEq tablet Take 1 Tab by mouth daily. 30 Tab 11    albuterol (PROVENTIL VENTOLIN) 2.5 mg /3 mL (0.083 %) nebulizer solution 3 mL by Nebulization route every four (4) hours as needed for Wheezing. 100 Each 11    loratadine (CLARITIN) 10 mg tablet Take 10 mg by mouth daily as needed.  co-enzyme Q-10 (CO Q-10) 100 mg capsule Take 100 mg by mouth daily.  OXYGEN-AIR DELIVERY SYSTEMS 2.5 L by Does Not Apply route nightly.  ibrutinib (IMBRUVICA) 140 mg cap Take 420 mg by mouth five (5) days a week.  140 mg cap, takes 3 caps five days a week (none on Sunday or Wednesday)      acetaminophen (TYLENOL EXTRA STRENGTH) 500 mg tablet Take 500 mg by mouth every six (6) hours as needed for Pain.  multivitamin (ONE A DAY) tablet Take 1 Tab by mouth daily. Allergies: Allergies   Allergen Reactions    Codeine Other (comments)     \"made me disoriented\" per pt       Physical Exam:   Vitals:    Visit Vitals    BP (!) 88/52 (BP 1 Location: Right arm, BP Patient Position: Sitting)    Pulse 72    Temp 98 °F (36.7 °C) (Oral)    Resp 20    Ht 5' 2\" (1.575 m)    Wt 128 lb 9.6 oz (58.3 kg)    SpO2 96%    BMI 23.52 kg/m2         General: Thin AA female, who is a bi frail. cooperative, very spry, no acute distress. HEENT: Atraumatic. Pink and moist.  Anicteric sclerae. Neck: Supple, no adenopoathy. Lungs: CTA bilaterally. No wheezing/rhonchi/rales. Heart[de-identified] PMI: laterally displaced,  AICD: L infraclavicular space,  Regular rhythm, S1, S2 present, 3/6 systolic murmur apex-> axilla, 2/6 diastolic rumble murmur  RUSB,  no rubs, no gallops. JVD 16  cm + J wave, (-) HJR . No carotid bruits. Abdomen: Soft, non-distended, non-tender. + Bowel sounds. No bruits. Extremities: compression stockings in place, No edema, no clubbing, no cyanosis. No calf tenderness  Neurologic: Grossly intact. Alert and oriented X 3. No acute neurological distress. Skin: intact, no wounds, ecchymosis, bruising, rashes   Psych: Good insight. Not anxious nor agitated.     Recent Labs:     Lab Results   Component Value Date/Time    WBC 4.8 03/01/2018 01:56 PM    HGB 11.6 03/01/2018 01:56 PM    HCT 35.9 03/01/2018 01:56 PM    PLATELET 362 64/38/9292 01:56 PM    MCV 96 03/01/2018 01:56 PM     Lab Results   Component Value Date/Time    GFR est non-AA 37 (L) 03/01/2018 01:56 PM    GFRNA, POC 50 (L) 04/09/2012 11:28 AM    GFR est AA 42 (L) 03/01/2018 01:56 PM    GFRAA, POC >60 04/09/2012 11:28 AM    Creatinine 1.27 (H) 03/01/2018 01:56 PM    Creatinine (POC) 1.1 04/09/2012 11:28 AM    BUN 27 03/01/2018 01:56 PM    Sodium 147 (H) 03/01/2018 01:56 PM    Potassium 4.9 03/01/2018 01:56 PM    Chloride 103 03/01/2018 01:56 PM    CO2 29 03/01/2018 01:56 PM    Magnesium 1.9 04/21/2017 02:34 AM    Phosphorus 2.2 (L) 02/03/2017 01:51 AM     Lab Results   Component Value Date/Time    TSH 9.630 (H) 12/21/2017 11:03 AM      Lab Results   Component Value Date/Time    Sodium 147 (H) 03/01/2018 01:56 PM    Potassium 4.9 03/01/2018 01:56 PM    Chloride 103 03/01/2018 01:56 PM    CO2 29 03/01/2018 01:56 PM    Anion gap 10 04/22/2017 03:51 AM    Glucose 78 03/01/2018 01:56 PM    BUN 27 03/01/2018 01:56 PM    Creatinine 1.27 (H) 03/01/2018 01:56 PM    BUN/Creatinine ratio 21 03/01/2018 01:56 PM    GFR est AA 42 (L) 03/01/2018 01:56 PM    GFR est non-AA 37 (L) 03/01/2018 01:56 PM    Calcium 9.3 03/01/2018 01:56 PM    Bilirubin, total 0.4 03/01/2018 01:56 PM    ALT (SGPT) 13 03/01/2018 01:56 PM    AST (SGOT) 25 03/01/2018 01:56 PM    Alk. phosphatase 67 03/01/2018 01:56 PM    Protein, total 5.9 (L) 03/01/2018 01:56 PM    Albumin 4.0 03/01/2018 01:56 PM    Globulin 3.0 04/20/2017 09:40 AM    A-G Ratio 2.1 03/01/2018 01:56 PM               Linda L. Evern Boas, RN, ACNP-BC, [address] [place] [address] [phone]  24 hour VAD/HF Pager: [phone]

## 2018-05-02 NOTE — PATIENT INSTRUCTIONS
YOU ARE AMAZING! You blood pressure is a little lowish coupled with the lightheadedness, I would like to decrease the FUROSEMIDE (fluid pill) and K-DUR (potassium) to EVERY OTHER DAY. If you start retaining fluid with increased swelling, weight gain, and/or shortness of breath it is OK to take an extra furosemide   Continue other medications  Labs today  Ask Dr. Moustapha Banuelos if he is OK with stopping the allopurinol  Follow up 3 months     Continue daily weights (in the morning, after passing your urine). Notify HF team of overnight weight gains > 2 lbs or weekly >5 lbs or if any of the following Sx. Continue to limit sodium intake & monitor your fluid intake (not to exceed 2L per day). Call us for any problems,  questions, or concerns.

## 2018-05-02 NOTE — LETTER
2018 12:22 PM 
 
Patient:  Fortunato Sandoval YOB: 1925 Date of Visit: 2018 Dear Kite Suresh MD 
[facility] [address] [place] [address] [address] VIA In Basket Rashid Villalta MD 
[address] [phone] VIA In Basket Gabrielle Charles MD 
[address] [address] VIA In Basket Bar Turpin MD 
[address] [address] VIA Facsimile: [phone] 
 : 
 
 
Thank you for referring Ms. Hermelinda Abdi to me for evaluation/treatment. Below are the relevant portions of my assessment and plan of care. [place] Note DOS:  2018 REFERRING PROVIDER: Dr. Jossy Montes PRIMARY CARE PHYSICIAN: Kiet Suresh MD 
PRIMARY CARDIOLOGIST: Dr. Jossy Montes ELECTROPHYSIOLOGIST: Dr Jeromy Nguyen 
Oncology: Dr. Laquita Garcia Impression / Plan: 1. NICM, HFrEF 20-25%, NYHA class II Continue entresto 49/51- unable to uptitrate 2/2 BP and AI Decrease  lasix 20 mg daily/KDur to every other day and prn Cont coreg Cont daily wts,  Low NA diet Follow up 3 months 2. SCD px AICD- followed by Dr Marianela Franklin 
 
3. Chronic Afib- V paced - followed by Dr Jossy Montes Cont coreg Continue Eliquis - no bleeding issues 4. MR/AI- will get recent echo Continue to monitor annually and prn sx 5. DILAN Continue nocturnal Home O2 
 
6. HTN - BP lowish  lasix to every other day and prn 7. CKD- Cr stable 1.27 per Dr Ciro Mckeon 8. XOL- On lipitor and CoQ 10 Per Dr Jossy Montes 9. Vitamin D deficiency Continue supplement 10. I have discussed the diagnosis with  [place] and the intended plan as seen in the above orders. Questions were answered concerning future plans, and written instructions provided. I have discussed medication side effects and warnings with the patient as well.  Thank you for allowing me to participate in the care of this delightful lady. Please do not hesitate to call with any questions. Julissa Oliver RN, ACNP-BC, [facility] Chief Conmplaint Chief Complaint Patient presents with  Follow-up HPI: 80y.o. year old female with a history of NICM with chronic HFrEF- NYHA class II s/p BiV AICD In the setting of HTN complicated by valvular heart disease (AI/MR), persistent afib (Eliquis), LBBB, CKD-III, GERD, DILAN,  non-Hodgkins lymphoma and RAD who presents for follow up of her chronic HF She was last seen ~ 4 months ago. Her proBNP was reduced to 2577, Cr 1.27. Her , Jo Qiu,  is with her today from [facility]. Since her last visit, she has seen Dr. Ethan Oconnor a month ago with an echo. Dr. Colby Martínez 1 month ago her NHL remains in remission. She remains  generally active and walks 1 1/2 hours 5 days/week on a treadmill for 45- 60 minutes. She walks at a pace at which she can continue to talk. She does not push herself to the point of significant SOB. She has some JENKINS w/ moderate exertion. She is able to perform ADLS w/o JENKINS. Her energy level is stable and she naps most days. She wears compression stocking. She sleeps well on 2 pillows, and is compliant with O2 when sleeping. Chronic LE edema better with her compression stockings. She follows daily weights and BP at home. Weight stable at home 130lbs,(our records indicate she has lost 9 pounds since last year). Her appetite is good and she limits her sodium. BP has lowered 90/50 range Episode of ataxia last month in the setting of rushing around, and fell in the garage. Denies exertional chest pain, palpitations, cough, no AICD shocks,  lightheadedness syncope, near syncope, or bleeding issues. She is asking about stopping the allopurinol- she has had no gout issues. She is planning a trip to see her son in [address] [facility] this summer. Beulah Sommer lives alone and has a care giver through an agency. She is a retired teacher. Her one  son  lives in [place] and has been diagnosed with Parkinson's Disease. REVIEW of SYSTEMS:    
General:  positive for  - fatigue, denies sleep disturbance, fever HEENT: negative for epistaxsis Pulmonary: Negative for cough, wheezing Cardiac: Per HPI 
GI:  negative for - abdominal pain, constipation, diarrhea, heartburn, hematemesis, melena or nausea/vomiting Musculo: negative for - joint pain or muscle pain Neuro: negative for - headaches, seizures or speech problems, no TIA, CVA or SZ Skin:  negative for - rash Allergies: REMINDER:DOCUMENT ALLERGIES IN ALLERGY ACTIVITY Psych: negative for - anxiety or sleep disturbances CARDIAC EVALUATION 
ECHO 2/3/17: LV dilated, EF 20-25%, mild LVH, RV mod dilated, mild- mod MELANIA, mod-severe MR, mod AI 
ECHO 7/29/17: EF 15%, severe DHK, reduced RVEF, RVH, RVSP 35 mmHg Mild LAE, mod-marked MAC, mod-severe MR (2+), AO root dilated, ECHO 1/10/2018: LV dilated, EF 25%, severe DHK, LVH, LAE, mild-mod MAC, mod-severe MR, mild-mod AI, mild TR BiVAICD 7/2015: [place] EKG: underlying afib V paced History: 
Past Medical History:  
Diagnosis Date  Aortic insufficiency  Asthma  Cancer ([facility] [address] lymphoma  CKD (chronic kidney disease) stage 3, GFR 30-59 ml/min 1/10/2018  Congestive heart failure, NYHA class II, chronic, systolic (HCC)  Heart failure ([address]  Hypertension  Mitral valvular regurgitation 1/22/2016 ECHO 2/3/17: LV dilated, EF 20-25%, mild LVH, RV mod dilated, mild- mod MELANIA, mod-severe MR, mod AI ECHO 7/29/17: EF 15%, severe DHK, reduced RVEF, RVH, RVSP 35 mmHg  Mild LAE, mod-marked MA fibrosis, mod-severe MR (2+), AO root dilated,    
 Presence of biventricular automatic cardioverter/defibrillator (AICD) 7/2/2015 Ritani biventricular AICD implant  Stomach ulcer Past Surgical History:  
Procedure Laterality Date  HX BREAST BIOPSY Bilateral   
 40 years ago  HX COLONOSCOPY    
 HX HEENT    
 cataracts removed  HX HYSTERECTOMY  HX OTHER SURGICAL    
 lymph node surgery  HX TONSILLECTOMY  MT EGD TRANSORAL BIOPSY SINGLE/MULTIPLE  5/23/2012  
    
 SINUS SURGERY PROC UNLISTED Nasal polyps removed Social History Social History  Marital status:  Spouse name: N/A  
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  
 Drug use: No  
 Sexual activity: No  
 
Other Topics Concern  Not on file Social History Narrative Family History Problem Relation Age of Onset  Heart Disease Mother  Stroke Father Current Medications:  
Current Outpatient Prescriptions Medication Sig Dispense Refill  fluticasone-salmeterol (ADVAIR) 250-50 mcg/dose diskus inhaler Take 1 Puff by inhalation two (2) times a day. 3 Inhaler 3  
 furosemide (LASIX) 20 mg tablet Take 1 Tab by mouth daily. 90 Tab 3  carvedilol (COREG) 3.125 mg tablet TAKE ONE (1) TABLET(S) TWIC E DAILY WITH FOOD  Indications: chronic heart failure 180 Tab 3  
 allopurinol (ZYLOPRIM) 300 mg tablet TAKE ONE (1) TABLET(S) DAILY 90 Tab 3  
 atorvastatin (LIPITOR) 10 mg tablet TAKE ONE (1) TABLET(S) DAILY 90 Tab 3  
 levothyroxine (SYNTHROID) 25 mcg tablet Take 1 Tab by mouth Daily (before breakfast). 90 Tab 3  
 apixaban (ELIQUIS) 2.5 mg tablet Take 1 Tab by mouth two (2) times a day. Indications: PREVENT THROMBOEMBOLISM IN CHRONIC ATRIAL FIBRILLATION 14 Tab 6  
 sacubitril-valsartan (ENTRESTO) 49 mg/51 mg tablet Take 1 Tab by mouth two (2) times a day. 180 Tab 3  
 magnesium oxide 400 mg cap Take 1 Cap by mouth daily.  cholecalciferol (VITAMIN D3) 1,000 unit cap Take 1,000 Units by mouth daily.  potassium chloride SR (KLOR-CON 10) 10 mEq tablet Take 1 Tab by mouth daily. 30 Tab 11  
 albuterol (PROVENTIL VENTOLIN) 2.5 mg /3 mL (0.083 %) nebulizer solution 3 mL by Nebulization route every four (4) hours as needed for Wheezing. 100 Each 11  
 loratadine (CLARITIN) 10 mg tablet Take 10 mg by mouth daily as needed.  co-enzyme Q-10 (CO Q-10) 100 mg capsule Take 100 mg by mouth daily.  OXYGEN-AIR DELIVERY SYSTEMS 2.5 L by Does Not Apply route nightly.  ibrutinib (IMBRUVICA) 140 mg cap Take 420 mg by mouth five (5) days a week. 140 mg cap, takes 3 caps five days a week (none on Sunday or Wednesday)  acetaminophen (TYLENOL EXTRA STRENGTH) 500 mg tablet Take 500 mg by mouth every six (6) hours as needed for Pain.  multivitamin (ONE A DAY) tablet Take 1 Tab by mouth daily. Allergies: Allergies Allergen Reactions  Codeine Other (comments) \"made me disoriented\" per pt Physical Exam:  
Vitals:   
Visit Vitals  BP (!) 88/52 (BP 1 Location: Right arm, BP Patient Position: Sitting)  Pulse 72  Temp 98 °F (36.7 °C) (Oral)  Resp 20  
 Ht 5' 2\" (1.575 m)  Wt 128 lb 9.6 oz (58.3 kg)  SpO2 96%  BMI 23.52 kg/m2 General: Thin AA female, who is a bi frail. cooperative, very spry, no acute distress. HEENT: Atraumatic. Pink and moist.  Anicteric sclerae. Neck: Supple, no adenopoathy. Lungs: CTA bilaterally. No wheezing/rhonchi/rales. Heart[de-identified] PMI: laterally displaced,  AICD: L infraclavicular space,  Regular rhythm, S1, S2 present, 3/6 systolic murmur apex-> axilla, 2/6 diastolic rumble murmur  RUSB,  no rubs, no gallops. JVD 16  cm + J wave, (-) HJR . No carotid bruits. Abdomen: Soft, non-distended, non-tender. + Bowel sounds. No bruits. Extremities: compression stockings in place, No edema, no clubbing, no cyanosis. No calf tenderness Neurologic: Grossly intact. Alert and oriented X 3. No acute neurological distress. Skin: intact, no wounds, ecchymosis, bruising, rashes Psych: Good insight. Not anxious nor agitated. Recent Labs:  
 
Lab Results Component Value Date/Time WBC 4.8 03/01/2018 01:56 PM  
 HGB 11.6 03/01/2018 01:56 PM  
 HCT 35.9 03/01/2018 01:56 PM  
 PLATELET 213 67/31/2318 01:56 PM  
 MCV 96 03/01/2018 01:56 PM  
 
Lab Results Component Value Date/Time GFR est non-AA 37 (L) 03/01/2018 01:56 PM  
 GFRNA, POC 50 (L) 04/09/2012 11:28 AM  
 GFR est AA 42 (L) 03/01/2018 01:56 PM  
 GFRAA, POC >60 04/09/2012 11:28 AM  
 Creatinine 1.27 (H) 03/01/2018 01:56 PM  
 Creatinine (POC) 1.1 04/09/2012 11:28 AM  
 BUN 27 03/01/2018 01:56 PM  
 Sodium 147 (H) 03/01/2018 01:56 PM  
 Potassium 4.9 03/01/2018 01:56 PM  
 Chloride 103 03/01/2018 01:56 PM  
 CO2 29 03/01/2018 01:56 PM  
 Magnesium 1.9 04/21/2017 02:34 AM  
 Phosphorus 2.2 (L) 02/03/2017 01:51 AM  
 
Lab Results Component Value Date/Time TSH 9.630 (H) 12/21/2017 11:03 AM  
  
Lab Results Component Value Date/Time Sodium 147 (H) 03/01/2018 01:56 PM  
 Potassium 4.9 03/01/2018 01:56 PM  
 Chloride 103 03/01/2018 01:56 PM  
 CO2 29 03/01/2018 01:56 PM  
 Anion gap 10 04/22/2017 03:51 AM  
 Glucose 78 03/01/2018 01:56 PM  
 BUN 27 03/01/2018 01:56 PM  
 Creatinine 1.27 (H) 03/01/2018 01:56 PM  
 BUN/Creatinine ratio 21 03/01/2018 01:56 PM  
 GFR est AA 42 (L) 03/01/2018 01:56 PM  
 GFR est non-AA 37 (L) 03/01/2018 01:56 PM  
 Calcium 9.3 03/01/2018 01:56 PM  
 Bilirubin, total 0.4 03/01/2018 01:56 PM  
 ALT (SGPT) 13 03/01/2018 01:56 PM  
 AST (SGOT) 25 03/01/2018 01:56 PM  
 Alk. phosphatase 67 03/01/2018 01:56 PM  
 Protein, total 5.9 (L) 03/01/2018 01:56 PM  
 Albumin 4.0 03/01/2018 01:56 PM  
 Globulin 3.0 04/20/2017 09:40 AM  
 A-G Ratio 2.1 03/01/2018 01:56 PM  
  
  
 
 
 
Julissa Michelle, RN, ACNP-BC, [facility] [address] [address] 
[phone] 
21 hour VAD/HF Pager: [phone] If you have questions, please do not hesitate to call me. I look forward to following Ms. Swati Milian along with you. Sincerely, VLAD Hopkins

## 2018-05-03 ENCOUNTER — TELEPHONE (OUTPATIENT)
Dept: INTERNAL MEDICINE CLINIC | Age: 83
End: 2018-05-03

## 2018-05-03 ENCOUNTER — TELEPHONE (OUTPATIENT)
Dept: CARDIOLOGY CLINIC | Age: 83
End: 2018-05-03

## 2018-05-03 LAB
BUN SERPL-MCNC: 39 MG/DL (ref 10–36)
BUN/CREAT SERPL: 28 (ref 12–28)
CALCIUM SERPL-MCNC: 9.3 MG/DL (ref 8.7–10.3)
CHLORIDE SERPL-SCNC: 102 MMOL/L (ref 96–106)
CO2 SERPL-SCNC: 25 MMOL/L (ref 18–29)
CREAT SERPL-MCNC: 1.41 MG/DL (ref 0.57–1)
GFR SERPLBLD CREATININE-BSD FMLA CKD-EPI: 32 ML/MIN/1.73
GFR SERPLBLD CREATININE-BSD FMLA CKD-EPI: 37 ML/MIN/1.73
GLUCOSE SERPL-MCNC: 73 MG/DL (ref 65–99)
NT-PROBNP SERPL-MCNC: 1048 PG/ML (ref 0–738)
POTASSIUM SERPL-SCNC: 4.3 MMOL/L (ref 3.5–5.2)
SODIUM SERPL-SCNC: 145 MMOL/L (ref 134–144)

## 2018-05-03 NOTE — TELEPHONE ENCOUNTER
----- Message from VLAD Rodríguez sent at 5/3/2018  8:52 AM EDT -----  proBNP better     CR and NA  up a tad- we cut her lasix back and this should help. Please remind her to stay hydrated. Thank you    I spoke with Nadine Mcgrath at [facility], [phone], she comes to fill medications every week. I reviewed all lab results with her. She states she read patient instructions, and patient was not taking lasix per medical record-she was taking  40 mg in am, and 20 mg in pm, so she will adjust to 20 mg per day for now and keep patient hydrated.       Betito Avery from Dr. Sheridan Palmer office calling to ask why patient has been taken off of allopurinol-per Ame Rodríguez's note-was per patient request.  Also per Ame Rodríguez's note-will recheck labs in 2 weeks

## 2018-05-03 NOTE — PROGRESS NOTES
proBNP better    CR and NA  up a tad- we cut her lasix back and this should help. Please remind her to stay hydrated.   Thank you

## 2018-05-03 NOTE — TELEPHONE ENCOUNTER
Patient is requesting to discontinue allopurinol as she has not had a gout flair up. Dr. Danelle Neal agrees with stopping the medication. Dr. Charlette Bradshaw is reducing furosemide dose to 20 mg daily.

## 2018-05-10 DIAGNOSIS — R06.02 SHORTNESS OF BREATH: ICD-10-CM

## 2018-05-10 DIAGNOSIS — I50.22 CHRONIC SYSTOLIC CONGESTIVE HEART FAILURE (HCC): ICD-10-CM

## 2018-05-10 DIAGNOSIS — I42.0 CARDIOMYOPATHY, DILATED, NONISCHEMIC (HCC): Primary | Chronic | ICD-10-CM

## 2018-05-16 ENCOUNTER — TELEPHONE (OUTPATIENT)
Dept: CARDIOLOGY CLINIC | Age: 83
End: 2018-05-16

## 2018-05-16 RX ORDER — ALBUTEROL SULFATE 0.83 MG/ML
2.5 SOLUTION RESPIRATORY (INHALATION)
Qty: 100 EACH | Refills: 11 | Status: SHIPPED | OUTPATIENT
Start: 2018-05-16 | End: 2018-07-19 | Stop reason: SDUPTHER

## 2018-05-18 LAB
BUN SERPL-MCNC: 25 MG/DL (ref 10–36)
BUN/CREAT SERPL: 21 (ref 12–28)
CALCIUM SERPL-MCNC: 9.2 MG/DL (ref 8.7–10.3)
CHLORIDE SERPL-SCNC: 103 MMOL/L (ref 96–106)
CO2 SERPL-SCNC: 25 MMOL/L (ref 18–29)
CREAT SERPL-MCNC: 1.21 MG/DL (ref 0.57–1)
GFR SERPLBLD CREATININE-BSD FMLA CKD-EPI: 39 ML/MIN/1.73
GFR SERPLBLD CREATININE-BSD FMLA CKD-EPI: 45 ML/MIN/1.73
GLUCOSE SERPL-MCNC: 81 MG/DL (ref 65–99)
NT-PROBNP SERPL-MCNC: 2198 PG/ML (ref 0–738)
POTASSIUM SERPL-SCNC: 4.8 MMOL/L (ref 3.5–5.2)
SODIUM SERPL-SCNC: 144 MMOL/L (ref 134–144)

## 2018-05-22 ENCOUNTER — OFFICE VISIT (OUTPATIENT)
Dept: INTERNAL MEDICINE CLINIC | Age: 83
End: 2018-05-22

## 2018-05-22 VITALS
HEIGHT: 62 IN | WEIGHT: 135.8 LBS | BODY MASS INDEX: 24.99 KG/M2 | HEART RATE: 70 BPM | OXYGEN SATURATION: 95 % | RESPIRATION RATE: 16 BRPM | SYSTOLIC BLOOD PRESSURE: 120 MMHG | DIASTOLIC BLOOD PRESSURE: 76 MMHG | TEMPERATURE: 98.1 F

## 2018-05-22 DIAGNOSIS — G47.33 OSA (OBSTRUCTIVE SLEEP APNEA): ICD-10-CM

## 2018-05-22 DIAGNOSIS — E03.9 ACQUIRED HYPOTHYROIDISM: ICD-10-CM

## 2018-05-22 DIAGNOSIS — M10.9 GOUT, UNSPECIFIED CAUSE, UNSPECIFIED CHRONICITY, UNSPECIFIED SITE: ICD-10-CM

## 2018-05-22 DIAGNOSIS — J45.40 MODERATE PERSISTENT REACTIVE AIRWAY DISEASE WITHOUT COMPLICATION: ICD-10-CM

## 2018-05-22 DIAGNOSIS — C85.91 NON-HODGKIN LYMPHOMA OF LYMPH NODES OF NECK, UNSPECIFIED NON-HODGKIN LYMPHOMA TYPE (HCC): ICD-10-CM

## 2018-05-22 DIAGNOSIS — I42.0 CARDIOMYOPATHY, DILATED, NONISCHEMIC (HCC): Primary | Chronic | ICD-10-CM

## 2018-05-22 DIAGNOSIS — I50.22 CHRONIC SYSTOLIC CONGESTIVE HEART FAILURE (HCC): ICD-10-CM

## 2018-05-22 NOTE — PROGRESS NOTES
Chief Complaint   Patient presents with    Other     Patient concerned about \"spots\" and swelling on left arm. 1. Have you been to the ER, urgent care clinic since your last visit? Hospitalized since your last visit? No    2. Have you seen or consulted any other health care providers outside of the [address] [place] [address] since your last visit? Include any pap smears or colon screening.  No

## 2018-05-22 NOTE — MR AVS SNAPSHOT
[address] [facility] 
 
 
 [address] [address] 
[phone] Patient: Thania Hester MRN: [id number] NWT:[birth year] Visit Information Date & Time Provider Department Dept. Phone Encounter #  
 5/22/2018 12:15 PM Heather Soni [facility] Medicine [facility] Primary Care [phone] [id number] Your Appointments 6/5/2018 11:00 AM  
Nurse Visit with Nick [address] [place] [address] and Primary Care (WERNER Ramírez) Appt Note: BLOOD WORK  
 [address] [place]  
  
   
 [address] [address]  
  
    
 7/2/2018 11:45 AM  
Any with Ileana Christianson MD  
[address] [place] [address] and Primary Care [address] [place] [address]) Appt Note: 3 month f/u hyperlipidemia; 1 MONTH FOLLOW UP  
 [address] [place]  
  
   
 [address] [id number]  
  
    
 7/25/2018  8:00 AM  
PROCEDURE with [facility] [address] [facility]) Appt Note: NOT AN OFFICE VISIT - REMOTE BSC ICD  
 [id number] [facility]  
[phone] [address] [facility]  
  
    
 8/21/2018 11:00 AM  
Follow Up with Hazel Webster [facility] [address] [facility] ([address]) [facility] [address]  
[phone]  
  
   
 [address] [place] [address] At [address] [address]  
  
    
 10/2/2018 11:15 AM  
ESTABLISHED PATIENT with Amira Siddiqui MD  
[facility]) Appt Note: 6 MO. F/U  
 [address]  
[phone] [address] Upcoming Health Maintenance Date Due  
 MEDICARE YEARLY EXAM 6/16/2018 Influenza Age 5 to Adult 8/1/2018 GLAUCOMA SCREENING Q2Y 1/24/2020 DTaP/Tdap/Td series (2 - Td) 2/13/2027 Allergies as of 5/22/2018  Review Complete On: 5/2/2018 By: VLAD Veronica Severity Noted Reaction Type Reactions Codeine  05/21/2012   Side Effect Other (comments) \"made me disoriented\" per pt Current Immunizations  Reviewed on 6/29/2015 Name Date Influenza High Dose Vaccine PF 9/27/2017 Influenza Vaccine 10/1/2016, 10/4/2014 Influenza Vaccine Split 10/22/2011 ZZZ-RETIRED (DO NOT USE) Pneumococcal Vaccine (Unspecified Type) 5/22/2007 Not reviewed this visit Vitals BP Pulse Temp Resp Height(growth percentile) Weight(growth percentile) 120/76 70 98.1 °F (36.7 °C) (Oral) 16 5' 2\" (1.575 m) 135 lb 12.8 oz (61.6 kg) SpO2 BMI OB Status Smoking Status 95% 24.84 kg/m2 Postmenopausal Never Smoker Vitals History BMI and BSA Data Body Mass Index Body Surface Area  
 24.84 kg/m 2 1.64 m 2 Preferred Pharmacy Pharmacy Name Phone [address] [address] Your Updated Medication List  
  
   
This list is accurate as of 5/22/18  2:39 PM.  Always use your most recent med list.  
  
  
  
  
 albuterol 2.5 mg /3 mL (0.083 %) nebulizer solution Commonly known as:  PROVENTIL VENTOLIN  
3 mL by Nebulization route every four (4) hours as needed for Wheezing. allopurinol 300 mg tablet Commonly known as:  ZYLOPRIM  
TAKE ONE (1) TABLET(S) DAILY  
  
 apixaban 2.5 mg tablet Commonly known as:  Donnise Or Take 1 Tab by mouth two (2) times a day. Indications: PREVENT THROMBOEMBOLISM IN CHRONIC ATRIAL FIBRILLATION  
  
 atorvastatin 10 mg tablet Commonly known as:  LIPITOR  
TAKE ONE (1) TABLET(S) DAILY  
  
 carvedilol 3.125 mg tablet Commonly known as:  COREG  
TAKE ONE (1) TABLET(S) TWIC E DAILY WITH FOOD  Indications: chronic heart failure  
  
 co-enzyme Q-10 100 mg capsule Commonly known as:  CO Q-10 Take 100 mg by mouth daily. fluticasone-salmeterol 250-50 mcg/dose diskus inhaler Commonly known as:  ADVAIR Take 1 Puff by inhalation two (2) times a day. furosemide 20 mg tablet Commonly known as:  LASIX Take 1 Tab by mouth daily. IMBRUVICA 140 mg capsule Generic drug:  ibrutinib Take 420 mg by mouth five (5) days a week. 140 mg cap, takes 3 caps five days a week (none on Sunday or Wednesday) levothyroxine 25 mcg tablet Commonly known as:  SYNTHROID Take 1 Tab by mouth Daily (before breakfast). loratadine 10 mg tablet Commonly known as:  Sky Brew Take 10 mg by mouth daily as needed. magnesium oxide 400 mg Cap Take 1 Cap by mouth daily. multivitamin tablet Commonly known as:  ONE A DAY Take 1 Tab by mouth daily. OXYGEN-AIR DELIVERY SYSTEMS  
2.5 L by Does Not Apply route nightly. potassium chloride SR 10 mEq tablet Commonly known as:  KLOR-CON 10 Take 1 Tab by mouth daily. sacubitril-valsartan 49-51 mg Tab tablet Commonly known as:  ENTRESTO Take 1 Tab by mouth two (2) times a day. TYLENOL EXTRA STRENGTH 500 mg tablet Generic drug:  acetaminophen Take 500 mg by mouth every six (6) hours as needed for Pain. VITAMIN D3 1,000 unit Cap Generic drug:  cholecalciferol Take 1,000 Units by mouth daily. Introducing [facility]! Dear Lee Payton: Thank you for requesting a [email or web address] account. Our records indicate that you already have an active [email or web address] account. You can access your account anytime at [email or web address] Did you know that you can access your hospital and ER discharge instructions at any time in [email or web address]? You can also review all of your test results from your hospital stay or ER visit. Additional Information If you have questions, please visit the Frequently Asked Questions section of the [email or web address] website at [email or web address] Remember, MyChart is NOT to be used for urgent needs. For medical emergencies, dial 911. Now available from your iPhone and Android! Please provide this summary of care documentation to your next provider. Your primary care clinician is listed as Gail Dey. If you have any questions after today's visit, please call [phone].

## 2018-05-24 ENCOUNTER — TELEPHONE (OUTPATIENT)
Dept: CARDIOLOGY CLINIC | Age: 83
End: 2018-05-24

## 2018-05-24 NOTE — TELEPHONE ENCOUNTER
Called Dr. Trudy Butt regarding patient's call to us. Informed the  that a letter has been sent to Dr. Martin Reid via [facility] after patients last appointment with ABI Edwards has also sent the process note to both Dr. Martin Reid and patient.       Per patient appointment has been cancelled

## 2018-05-24 NOTE — TELEPHONE ENCOUNTER
----- Message from VLAD Ochoa sent at 5/22/2018  4:27 PM EDT -----  Labs stable  Continue Current plan  Thank you    I called and reviewed all lab results with patient. She states understanding. She asked that we cancel all future appointments with her, as she states that Dr. Ryley Guardado did not have communication from this office and \"she didn't like that\". I advised a letter was sent on 5/2/18 to all of her physicians by Vivian Donald, and she thanked me, but asked for her appointment to be cancelled.

## 2018-05-28 NOTE — PROGRESS NOTES
[facility]  [facility]  [address] [place] [address]  Phone:  [phone]  Fax: [phone]       Chief Complaint   Patient presents with    Other     Patient concerned about \"spots\" and swelling on left arm. .      SUBJECTIVE:    Fortunato Sandoval is a 80 y.o. female   Comes in for a return visit accompanied by a nurse. She has swelling of the left arm starting less than a week ago. She has several ecchymotic areas also. The swelling appears to be getting much worse. She has not been lying on her left side for an extended period of time. She is seeing a new cardiologist and her Furosemide was reduced to 20 mg which is not historically adequate for her which has been demonstrated repetitively. Invariably, she generally slips back into heart failure. Also for unknown reasons, the cardiologist stopped her Allopurinol quite inappropriately because she does have a history of gout. She follows up with her hematologist Dr. Laquita Garcia for her proliferative disorder. Fortunately, her asthma has been reasonably stable. She has a past history of dyslipidemia as well as hypothyroidism. Current Outpatient Prescriptions   Medication Sig Dispense Refill    albuterol (PROVENTIL VENTOLIN) 2.5 mg /3 mL (0.083 %) nebulizer solution 3 mL by Nebulization route every four (4) hours as needed for Wheezing. 100 Each 11    fluticasone-salmeterol (ADVAIR) 250-50 mcg/dose diskus inhaler Take 1 Puff by inhalation two (2) times a day. 3 Inhaler 3    furosemide (LASIX) 20 mg tablet Take 1 Tab by mouth daily.  90 Tab 3    carvedilol (COREG) 3.125 mg tablet TAKE ONE (1) TABLET(S) TWIC E DAILY WITH FOOD  Indications: chronic heart failure 180 Tab 3    allopurinol (ZYLOPRIM) 300 mg tablet TAKE ONE (1) TABLET(S) DAILY 90 Tab 3    atorvastatin (LIPITOR) 10 mg tablet TAKE ONE (1) TABLET(S) DAILY 90 Tab 3    levothyroxine (SYNTHROID) 25 mcg tablet Take 1 Tab by mouth Daily (before breakfast). 90 Tab 3    apixaban (ELIQUIS) 2.5 mg tablet Take 1 Tab by mouth two (2) times a day. Indications: PREVENT THROMBOEMBOLISM IN CHRONIC ATRIAL FIBRILLATION 14 Tab 6    sacubitril-valsartan (ENTRESTO) 49 mg/51 mg tablet Take 1 Tab by mouth two (2) times a day. 180 Tab 3    magnesium oxide 400 mg cap Take 1 Cap by mouth daily.  cholecalciferol (VITAMIN D3) 1,000 unit cap Take 1,000 Units by mouth daily.  potassium chloride SR (KLOR-CON 10) 10 mEq tablet Take 1 Tab by mouth daily. 30 Tab 11    loratadine (CLARITIN) 10 mg tablet Take 10 mg by mouth daily as needed.  co-enzyme Q-10 (CO Q-10) 100 mg capsule Take 100 mg by mouth daily.  OXYGEN-AIR DELIVERY SYSTEMS 2.5 L by Does Not Apply route nightly.  ibrutinib (IMBRUVICA) 140 mg cap Take 420 mg by mouth five (5) days a week. 140 mg cap, takes 3 caps five days a week (none on Sunday or Wednesday)      acetaminophen (TYLENOL EXTRA STRENGTH) 500 mg tablet Take 500 mg by mouth every six (6) hours as needed for Pain.  multivitamin (ONE A DAY) tablet Take 1 Tab by mouth daily.        Past Medical History:   Diagnosis Date    Aortic insufficiency     Asthma     Cancer ([facility]     lymphoma    CKD (chronic kidney disease) stage 3, GFR 30-59 ml/min 1/10/2018    Congestive heart failure, NYHA class II, chronic, systolic (HCC)     Heart failure ([facility]     Hypertension     Mitral valvular regurgitation 1/22/2016    ECHO 2/3/17: LV dilated, EF 20-25%, mild LVH, RV mod dilated, mild- mod MELANIA, mod-severe MR, mod AI ECHO 7/29/17: EF 15%, severe DHK, reduced RVEF, RVH, RVSP 35 mmHg  Mild LAE, mod-marked MA fibrosis, mod-severe MR (2+), AO root dilated,      Presence of biventricular automatic cardioverter/defibrillator (AICD) 7/2/2015    [employer] biventricular AICD implant    Stomach ulcer      Past Surgical History:   Procedure Laterality Date    HX BREAST BIOPSY Bilateral     40 years ago    HX COLONOSCOPY      HX HEENT cataracts removed    HX HYSTERECTOMY      HX OTHER SURGICAL      lymph node surgery    HX TONSILLECTOMY      [place] EGD TRANSORAL BIOPSY SINGLE/MULTIPLE  5/23/2012         SINUS SURGERY PROC UNLISTED      Nasal polyps removed     Allergies   Allergen Reactions    Codeine Other (comments)     \"made me disoriented\" per pt         REVIEW OF SYSTEMS:  General: negative for - chills or fever  ENT: negative for - headaches, nasal congestion or tinnitus  Respiratory: negative for - cough, hemoptysis, shortness of breath or wheezing  Cardiovascular : negative for - chest pain, edema, palpitations or shortness of breath  Gastrointestinal: negative for - abdominal pain, blood in stools, heartburn or nausea/vomiting  Genito-Urinary: no dysuria, trouble voiding, or hematuria  Musculoskeletal: negative for - gait disturbance, joint pain, joint stiffness or joint swelling  Neurological: no TIA or stroke symptoms  Hematologic: no bruises, no bleeding, no swollen glands  Integument: no lumps, mole changes, nail changes or rash  Endocrine: no malaise/lethargy or unexpected weight changes      Social History     Social History    Marital status:      Spouse name: N/A    Number of children: 1    Years of education: 16+     Occupational History    retired teacher      Social History Main Topics    Smoking status: Never Smoker    Smokeless tobacco: Never Used    Alcohol use No    Drug use: No    Sexual activity: No     Other Topics Concern    None     Social History Narrative     Family History   Problem Relation Age of Onset    Heart Disease Mother     Stroke Father        OBJECTIVE:    Visit Vitals    /76    Pulse 70    Temp 98.1 °F (36.7 °C) (Oral)    Resp 16    Ht 5' 2\" (1.575 m)    Wt 135 lb 12.8 oz (61.6 kg)    SpO2 95%    BMI 24.84 kg/m2     CONSTITUTIONAL: well , well nourished, appears age appropriate  EYES: perrla, eom intact  ENMT:moist mucous membranes, pharynx clear  NECK: supple. Thyroid normal, JVD to angle of jaw  RESPIRATORY: Chest: Bibasilar rales left greater than right  CARDIOVASCULAR: Heart: regular rate and rhythm  GASTROINTESTINAL: Abdomen: soft, bowel sounds active  HEMATOLOGIC: no pathological lymph nodes palpated  MUSCULOSKELETAL: Extremities: no edema, pulse 1+, mild swelling left arm  INTEGUMENT: No unusual rashes or suspicious skin lesions noted. Nails appear normal.  2 ecchymotic areas left arm  NEUROLOGIC: non-focal exam   MENTAL STATUS: alert and oriented, appropriate affect      ASSESSMENT:  1. 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 Chronic systolic congestive heart failure ([address]    3. DILAN (obstructive sleep apnea)    4. Moderate persistent reactive airway disease without complication    5. Gout, unspecified cause, unspecified chronicity, unspecified site    6. Acquired hypothyroidism    7. 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 I suggested to the patient that she increase her furosemide to 20 mg bid. She is in mild heart failure today. Hopefully she will not have to end up in the hospital because of this flare as has happened previously. She needs to follow-up with her cardiologist.  Hopefully adjustments can be made by that individual.    2. I suggested she resume her allopurinol since there is no reason to stop it. 3. She continues to use her CPAP machine. 4. She will continue her thyroid supplement. Her last TSH was excellent. 5. She will follow-up with Dr. Hasmukh Grey regarding her lymphoma. Addendum:  No lab work was drawn since it was most recently done. 8.  As far as the swelling is concerned, she has lymphedema. I am not entirely sure of the etiology. I am hoping this will lois over the next 1-2 weeks. If not, she will have to have a CT scan of her chest.  She has ecchymotic areas on her arms. Her last CBC demonstrated a normal platelet count. She is not taking any aspirin products either. Follow-up Disposition:  Return in about 4 weeks (around 6/19/2018).       Moses Booker MD

## 2018-05-31 DIAGNOSIS — I50.23 SYSTOLIC CHF, ACUTE ON CHRONIC (HCC): ICD-10-CM

## 2018-05-31 DIAGNOSIS — I10 ESSENTIAL HYPERTENSION: ICD-10-CM

## 2018-05-31 DIAGNOSIS — I34.0 MILD MITRAL REGURGITATION: ICD-10-CM

## 2018-05-31 DIAGNOSIS — I42.0 CARDIOMYOPATHY, DILATED, NONISCHEMIC (HCC): ICD-10-CM

## 2018-05-31 DIAGNOSIS — I48.91 ATRIAL FIBRILLATION, UNSPECIFIED TYPE (HCC): ICD-10-CM

## 2018-06-04 ENCOUNTER — LAB ONLY (OUTPATIENT)
Dept: INTERNAL MEDICINE CLINIC | Age: 83
End: 2018-06-04

## 2018-06-04 DIAGNOSIS — I10 ESSENTIAL HYPERTENSION: Primary | ICD-10-CM

## 2018-06-05 ENCOUNTER — TELEPHONE (OUTPATIENT)
Dept: CARDIOLOGY CLINIC | Age: 83
End: 2018-06-05

## 2018-06-05 LAB
BUN SERPL-MCNC: 23 MG/DL (ref 10–36)
BUN/CREAT SERPL: 21 (ref 12–28)
CALCIUM SERPL-MCNC: 9 MG/DL (ref 8.7–10.3)
CHLORIDE SERPL-SCNC: 103 MMOL/L (ref 96–106)
CO2 SERPL-SCNC: 26 MMOL/L (ref 18–29)
CREAT SERPL-MCNC: 1.09 MG/DL (ref 0.57–1)
GFR SERPLBLD CREATININE-BSD FMLA CKD-EPI: 44 ML/MIN/1.73
GFR SERPLBLD CREATININE-BSD FMLA CKD-EPI: 51 ML/MIN/1.73
GLUCOSE SERPL-MCNC: 82 MG/DL (ref 65–99)
HBA1C MFR BLD: NORMAL %
POTASSIUM SERPL-SCNC: 4.4 MMOL/L (ref 3.5–5.2)
SODIUM SERPL-SCNC: 142 MMOL/L (ref 134–144)

## 2018-06-12 ENCOUNTER — HOSPITAL ENCOUNTER (OUTPATIENT)
Dept: VASCULAR SURGERY | Age: 83
Discharge: HOME | End: 2018-06-12
Attending: INTERNAL MEDICINE
Payer: MEDICARE

## 2018-06-12 DIAGNOSIS — M79.89 ARM SWELLING: ICD-10-CM

## 2018-06-12 PROCEDURE — 93971 EXTREMITY STUDY: CPT

## 2018-06-12 NOTE — PROCEDURES
[facility]  *** FINAL REPORT ***    Name: Freida Monroe  MRN: [id number]    Outpatient  : 17 Mar 1925  HIS Order #: [id number]  [address] [facility] Visit #: [id number]  Date: 2018    TYPE OF TEST: Peripheral Venous Testing    REASON FOR TEST  Limb swelling    Left Arm:-  Deep venous thrombosis:           No  Superficial venous thrombosis:    No      INTERPRETATION/FINDINGS  PROCEDURE:  Venous duplex examination using B-mode, color flow and  spectral Doppler of the upper extremity veins. Left arm :  1. Deep vein(s) visualized include the internal jugular, subclavian,  axillary, brachial, radial and ulnar veins. 2. No evidence of deep venous thrombosis detected in the veins  visualized. 3. No evidence of deep vein thrombosis in the contralateral subclavian   vein. 4. Superficial vein(s) visualized include the basilic (upper arm) and  basilic (forearm) veins. 5. No evidence of superficial thrombosis detected. ADDITIONAL COMMENTS    I have personally reviewed the data relevant to the interpretation of  this  study. TECHNOLOGIST: Galo Thomas.  GREY Thornton, BENJAMIN  Signed: 2018 01:40 PM    PHYSICIAN: Mara Shelley MD  Signed: 2018 01:41 PM

## 2018-06-12 NOTE — PROGRESS NOTES
[facility] Vascular  Preliminary Report:  Venous Duplex Arm    Left arm venous duplex was performed. All deep and superficial veins appear compressible with normal Doppler characteristics. Final report to follow.

## 2018-06-13 NOTE — TELEPHONE ENCOUNTER
Care manager for patient called regarding making an appointment for patient. Per patient she is no longer interested in being seen in our practice. She will only be followed by Dr. Debra Cerda.

## 2018-07-02 ENCOUNTER — OFFICE VISIT (OUTPATIENT)
Dept: INTERNAL MEDICINE CLINIC | Age: 83
End: 2018-07-02

## 2018-07-02 VITALS
HEIGHT: 62 IN | DIASTOLIC BLOOD PRESSURE: 56 MMHG | HEART RATE: 73 BPM | SYSTOLIC BLOOD PRESSURE: 115 MMHG | OXYGEN SATURATION: 94 % | BODY MASS INDEX: 23.9 KG/M2 | TEMPERATURE: 97.6 F | RESPIRATION RATE: 14 BRPM | WEIGHT: 129.9 LBS

## 2018-07-02 DIAGNOSIS — Z13.39 SCREENING FOR ALCOHOLISM: ICD-10-CM

## 2018-07-02 DIAGNOSIS — J30.2 SEASONAL ALLERGIC RHINITIS, UNSPECIFIED TRIGGER: ICD-10-CM

## 2018-07-02 DIAGNOSIS — Z00.00 WELLNESS EXAMINATION: ICD-10-CM

## 2018-07-02 DIAGNOSIS — C85.90 NON-HODGKIN'S LYMPHOMA, UNSPECIFIED BODY REGION, UNSPECIFIED NON-HODGKIN LYMPHOMA TYPE (HCC): ICD-10-CM

## 2018-07-02 DIAGNOSIS — I48.0 PAROXYSMAL ATRIAL FIBRILLATION (HCC): ICD-10-CM

## 2018-07-02 DIAGNOSIS — R53.81 PHYSICAL DECONDITIONING: ICD-10-CM

## 2018-07-02 DIAGNOSIS — E03.9 ACQUIRED HYPOTHYROIDISM: ICD-10-CM

## 2018-07-02 DIAGNOSIS — I42.0 CARDIOMYOPATHY, DILATED, NONISCHEMIC (HCC): Primary | Chronic | ICD-10-CM

## 2018-07-02 DIAGNOSIS — Z13.31 SCREENING FOR DEPRESSION: ICD-10-CM

## 2018-07-02 DIAGNOSIS — M10.9 GOUT, UNSPECIFIED CAUSE, UNSPECIFIED CHRONICITY, UNSPECIFIED SITE: ICD-10-CM

## 2018-07-02 NOTE — PROGRESS NOTES
Chief Complaint   Patient presents with   [facility] Annual Wellness Visit     1. Have you been to the ER, urgent care clinic since your last visit? Hospitalized since your last visit? No    2. Have you seen or consulted any other health care providers outside of the [facility] since your last visit? Include any pap smears or colon screening.  No

## 2018-07-02 NOTE — MR AVS SNAPSHOT
[facility] 
 
 
 [address] [address] 
[phone] Patient: Irma Paredes MRN: [id number] DXY:1[birth year]955 Visit Information Date & Time Provider Department Dept. Phone Encounter #  
 7/2/2018 11:45 AM Maikel Ibrahim MD [facility] [phone] [id number] Your Appointments 7/25/2018  8:00 AM  
PROCEDURE with [facility] Appt Note: NOT AN OFFICE VISIT - REMOTE BSC ICD  
 [id number] [facility]  
[phone] [id number] [facility]  
  
    
 8/21/2018 11:00 AM  
Follow Up with Pepper Rivers [address] ([facility]) [facility] [address]  
[phone]  
  
   
 [address] [place] [address]  
  
    
 9/28/2018 12:15 PM  
Any with Maikel Ibrahim MD  
[address] [place] [address] and Primary Care [facility]) Appt Note: follow up  
 [address] [facility]  
  
   
 [address] [phone]  
  
    
 10/2/2018 11:15 AM  
ESTABLISHED PATIENT with Karla Fagan MD  
[facility]) Appt Note: 6 MO. F/U  
 [address] [place] [address]  
[phone] [address] [facility] [address] Upcoming Health Maintenance Date Due  
 MEDICARE YEARLY EXAM 6/16/2018 Influenza Age 5 to Adult 8/1/2018 GLAUCOMA SCREENING Q2Y 1/24/2020 DTaP/Tdap/Td series (2 - Td) 2/13/2027 Allergies as of 7/2/2018  Review Complete On: 7/2/2018 By: Lane Khan Severity Noted Reaction Type Reactions Codeine  05/21/2012   Side Effect Other (comments) \"made me disoriented\" per pt Current Immunizations  Reviewed on 6/29/2015 Name Date Influenza High Dose Vaccine PF 9/27/2017 Influenza Vaccine 10/1/2016, 10/4/2014 Influenza Vaccine Split 10/22/2011 ZZZ-RETIRED (DO NOT USE) Pneumococcal Vaccine (Unspecified Type) 5/22/2007 Not reviewed this visit You Were Diagnosed With   
  
 Codes Comments Cardiomyopathy, dilated, nonischemic (HCC)    -  Primary ICD-10-CM: I42.0 ICD-9-CM: 425.4 Non-Hodgkin's lymphoma, unspecified body region, unspecified non-Hodgkin lymphoma type ([facility]     ICD-10-CM: C85.90 ICD-9-CM: 202.80 Seasonal allergic rhinitis, unspecified trigger     ICD-10-CM: J30.2 ICD-9-CM: 477.9 Paroxysmal atrial fibrillation (HCC)     ICD-10-CM: I48.0 ICD-9-CM: 427.31 Gout, unspecified cause, unspecified chronicity, unspecified site     ICD-10-CM: M10.9 ICD-9-CM: 274.9 Physical deconditioning     ICD-10-CM: R53.81 ICD-9-CM: 799.3 Acquired hypothyroidism     ICD-10-CM: E03.9 ICD-9-CM: 224. 9 Vitals BP Pulse Temp Resp Height(growth percentile) Weight(growth percentile) 115/56 73 97.6 °F (36.4 °C) (Oral) 14 5' 2\" (1.575 m) 129 lb 14.4 oz (58.9 kg) SpO2 BMI OB Status Smoking Status 94% 23.76 kg/m2 Postmenopausal Never Smoker Vitals History BMI and BSA Data Body Mass Index Body Surface Area  
 23.76 kg/m 2 1.61 m 2 Preferred Pharmacy Pharmacy Name Phone [facility] [address] Your Updated Medication List  
  
   
This list is accurate as of 7/2/18 12:34 PM.  Always use your most recent med list.  
  
  
  
  
 albuterol 2.5 mg /3 mL (0.083 %) nebulizer solution Commonly known as:  PROVENTIL VENTOLIN  
3 mL by Nebulization route every four (4) hours as needed for Wheezing. allopurinol 300 mg tablet Commonly known as:  ZYLOPRIM  
TAKE ONE (1) TABLET(S) DAILY  
  
 apixaban 2.5 mg tablet Commonly known as:  Princella Kim Take 1 Tab by mouth two (2) times a day.  Indications: PREVENT THROMBOEMBOLISM IN CHRONIC ATRIAL FIBRILLATION  
  
 atorvastatin 10 mg tablet Commonly known as:  LIPITOR  
TAKE ONE (1) TABLET(S) DAILY  
  
 carvedilol 3.125 mg tablet Commonly known as:  COREG  
TAKE ONE (1) TABLET(S) TWIC E DAILY WITH FOOD  Indications: chronic heart failure  
  
 co-enzyme Q-10 100 mg capsule Commonly known as:  CO Q-10 Take 100 mg by mouth daily. fluticasone-salmeterol 250-50 mcg/dose diskus inhaler Commonly known as:  ADVAIR Take 1 Puff by inhalation two (2) times a day. furosemide 20 mg tablet Commonly known as:  LASIX Take 1 Tab by mouth daily. IMBRUVICA 140 mg capsule Generic drug:  ibrutinib Take 420 mg by mouth five (5) days a week. 140 mg cap, takes 3 caps five days a week (none on Sunday or Wednesday) levothyroxine 25 mcg tablet Commonly known as:  SYNTHROID Take 1 Tab by mouth Daily (before breakfast). loratadine 10 mg tablet Commonly known as:  Jearline Weatherford Take 10 mg by mouth daily as needed. magnesium oxide 400 mg Cap Take 1 Cap by mouth daily. multivitamin tablet Commonly known as:  ONE A DAY Take 1 Tab by mouth daily. OXYGEN-AIR DELIVERY SYSTEMS  
2.5 L by Does Not Apply route nightly. potassium chloride SR 10 mEq tablet Commonly known as:  KLOR-CON 10 Take 1 Tab by mouth daily. sacubitril-valsartan 49-51 mg Tab tablet Commonly known as:  ENTRESTO Take 1 Tab by mouth two (2) times a day. TYLENOL EXTRA STRENGTH 500 mg tablet Generic drug:  acetaminophen Take 500 mg by mouth every six (6) hours as needed for Pain. VITAMIN D3 1,000 unit Cap Generic drug:  cholecalciferol Take 1,000 Units by mouth daily. We Performed the Following METABOLIC PANEL, BASIC [41609 CPT(R)] TSH 3RD GENERATION [47156 CPT(R)] Introducing hospitals & HEALTH SERVICES! Dear Romayne Eng: Thank you for requesting a [employer] account.   Our records indicate that you already have an active [employer] account. You can access your account anytime at [email or web address] Did you know that you can access your hospital and ER discharge instructions at any time in [employer]? You can also review all of your test results from your hospital stay or ER visit. Additional Information If you have questions, please visit the Frequently Asked Questions section of the [employer] website at [email or web address] Remember, [employer] is NOT to be used for urgent needs. For medical emergencies, dial 911. Now available from your iPhone and Android! Please provide this summary of care documentation to your next provider. Your primary care clinician is listed as Gail Dey. If you have any questions after today's visit, please call [phone].

## 2018-07-19 ENCOUNTER — OFFICE VISIT (OUTPATIENT)
Dept: INTERNAL MEDICINE CLINIC | Age: 83
End: 2018-07-19

## 2018-07-19 VITALS
OXYGEN SATURATION: 97 % | HEART RATE: 70 BPM | BODY MASS INDEX: 23.85 KG/M2 | SYSTOLIC BLOOD PRESSURE: 123 MMHG | TEMPERATURE: 97.6 F | WEIGHT: 129.6 LBS | DIASTOLIC BLOOD PRESSURE: 77 MMHG | HEIGHT: 62 IN | RESPIRATION RATE: 14 BRPM

## 2018-07-19 DIAGNOSIS — I48.0 PAROXYSMAL ATRIAL FIBRILLATION (HCC): ICD-10-CM

## 2018-07-19 DIAGNOSIS — I42.0 CARDIOMYOPATHY, DILATED, NONISCHEMIC (HCC): Primary | Chronic | ICD-10-CM

## 2018-07-19 DIAGNOSIS — I50.22 CHRONIC SYSTOLIC CONGESTIVE HEART FAILURE (HCC): ICD-10-CM

## 2018-07-19 DIAGNOSIS — J45.40 MODERATE PERSISTENT REACTIVE AIRWAY DISEASE WITHOUT COMPLICATION: ICD-10-CM

## 2018-07-19 DIAGNOSIS — M10.9 GOUT, UNSPECIFIED CAUSE, UNSPECIFIED CHRONICITY, UNSPECIFIED SITE: ICD-10-CM

## 2018-07-19 DIAGNOSIS — C85.91 NON-HODGKIN LYMPHOMA OF LYMPH NODES OF NECK, UNSPECIFIED NON-HODGKIN LYMPHOMA TYPE (HCC): ICD-10-CM

## 2018-07-19 DIAGNOSIS — E78.5 DYSLIPIDEMIA: ICD-10-CM

## 2018-07-19 DIAGNOSIS — E03.9 ACQUIRED HYPOTHYROIDISM: ICD-10-CM

## 2018-07-19 RX ORDER — ALBUTEROL SULFATE 0.83 MG/ML
2.5 SOLUTION RESPIRATORY (INHALATION)
Qty: 100 EACH | Refills: 11 | Status: SHIPPED | OUTPATIENT
Start: 2018-07-19 | End: 2018-11-11 | Stop reason: SDUPTHER

## 2018-07-19 RX ORDER — FUROSEMIDE 20 MG/1
20 TABLET ORAL 2 TIMES DAILY
Qty: 180 TAB | Refills: 3 | Status: SHIPPED | OUTPATIENT
Start: 2018-07-19 | End: 2018-08-15 | Stop reason: SDUPTHER

## 2018-07-19 NOTE — MR AVS SNAPSHOT
[facility] 
 
 
 [address] [phone] 
[phone] Patient: Geronimo Munoz MRN: [id number] KUL:[birth year] Visit Information Date & Time Provider Department Dept. Phone Encounter #  
 7/19/2018 12:30 PM Dionne Rene MD [facility] and Primary Care [phone] [id number] Your Appointments 7/25/2018  8:00 AM  
PROCEDURE with [facility] Appt Note: NOT AN OFFICE VISIT - REMOTE BSC ICD  
 [address] [facility]  
[phone] [address] [facility]  
  
    
 8/21/2018 11:00 AM  
Follow Up with Lea Mixon [address] ([facility]) [facility] [address]  
[phone]  
  
   
 [address] [address]  
  
    
 9/28/2018 12:15 PM  
Any with Dionne Rene MD  
[address] [place] [address] and Primary Care [facility]) Appt Note: follow up  
 [address] [facility]  
  
   
 [address] [phone]  
  
    
 10/2/2018 11:15 AM  
ESTABLISHED PATIENT with Eddie Graham MD  
[facility]) Appt Note: 6 MO. F/U  
 [address] [facility]  
[phone] [address] Upcoming Health Maintenance Date Due Influenza Age 5 to Adult 8/1/2018 MEDICARE YEARLY EXAM 7/3/2019 GLAUCOMA SCREENING Q2Y 1/24/2020 DTaP/Tdap/Td series (2 - Td) 2/13/2027 Allergies as of 7/19/2018  Review Complete On: 7/19/2018 By: Lance Kirk Severity Noted Reaction Type Reactions Codeine  05/21/2012   Side Effect Other (comments) \"made me disoriented\" per pt Current Immunizations  Reviewed on 6/29/2015 Name Date Influenza High Dose Vaccine PF 9/27/2017 Influenza Vaccine 10/1/2016, 10/4/2014 Influenza Vaccine Split 10/22/2011 ZZZ-RETIRED (DO NOT USE) Pneumococcal Vaccine (Unspecified Type) 5/22/2007 Not reviewed this visit You Were Diagnosed With   
  
 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     ICD-10-CM: C85.91 
ICD-9-CM: 202.81 Chronic systolic congestive heart failure (HCC)     ICD-10-CM: I50.22 ICD-9-CM: 428.22, 428.0 Paroxysmal atrial fibrillation (HCC)     ICD-10-CM: I48.0 ICD-9-CM: 427.31 Moderate persistent reactive airway disease without complication     S-[facility]: J45.40 ICD-9-CM: 493.90 Gout, unspecified cause, unspecified chronicity, unspecified site     ICD-10-CM: M10.9 ICD-9-CM: 274.9 Dyslipidemia     ICD-10-CM: E78.5 ICD-9-CM: 272.4 Acquired hypothyroidism     ICD-10-CM: E03.9 ICD-9-CM: 960. 9 Vitals BP Pulse Temp Resp Height(growth percentile) Weight(growth percentile) 123/77 70 97.6 °F (36.4 °C) (Oral) 14 5' 2\" (1.575 m) 129 lb 9.6 oz (58.8 kg) SpO2 BMI OB Status Smoking Status 97% 23.7 kg/m2 Postmenopausal Never Smoker Vitals History BMI and BSA Data Body Mass Index Body Surface Area  
 23.7 kg/m 2 1.6 m 2 Preferred Pharmacy Pharmacy Name Phone 69 [facility] [address] [address] Your Updated Medication List  
  
   
This list is accurate as of 7/19/18  2:31 PM.  Always use your most recent med list.  
  
  
  
  
 albuterol 2.5 mg /3 mL (0.083 %) nebulizer solution Commonly known as:  PROVENTIL VENTOLIN  
3 mL by Nebulization route every four (4) hours as needed for Wheezing. allopurinol 300 mg tablet Commonly known as:  ZYLOPRIM  
TAKE ONE (1) TABLET(S) DAILY  
  
 apixaban 2.5 mg tablet Commonly known as:  Feliciana Spatz Take 1 Tab by mouth two (2) times a day. Indications: PREVENT THROMBOEMBOLISM IN CHRONIC ATRIAL FIBRILLATION  
  
 atorvastatin 10 mg tablet Commonly known as:  LIPITOR  
TAKE ONE (1) TABLET(S) DAILY  
  
 carvedilol 3.125 mg tablet Commonly known as:  COREG  
TAKE ONE (1) TABLET(S) TWIC E DAILY WITH FOOD  Indications: chronic heart failure  
  
 co-enzyme Q-10 100 mg capsule Commonly known as:  CO Q-10 Take 100 mg by mouth daily. fluticasone-salmeterol 250-50 mcg/dose diskus inhaler Commonly known as:  ADVAIR Take 1 Puff by inhalation two (2) times a day. furosemide 20 mg tablet Commonly known as:  LASIX Take 1 Tab by mouth two (2) times a day. IMBRUVICA 140 mg capsule Generic drug:  ibrutinib Take 420 mg by mouth five (5) days a week. 140 mg cap, takes 3 caps five days a week (none on Sunday or Wednesday) levothyroxine 25 mcg tablet Commonly known as:  SYNTHROID Take 1 Tab by mouth Daily (before breakfast). loratadine 10 mg tablet Commonly known as:  Portia Orozco Take 10 mg by mouth daily as needed. magnesium oxide 400 mg Cap Take 1 Cap by mouth daily. multivitamin tablet Commonly known as:  ONE A DAY Take 1 Tab by mouth daily. OXYGEN-AIR DELIVERY SYSTEMS  
2.5 L by Does Not Apply route nightly. potassium chloride SR 10 mEq tablet Commonly known as:  KLOR-CON 10 Take 1 Tab by mouth daily. sacubitril-valsartan 49-51 mg Tab tablet Commonly known as:  ENTRESTO Take 1 Tab by mouth two (2) times a day. TYLENOL EXTRA STRENGTH 500 mg tablet Generic drug:  acetaminophen Take 500 mg by mouth every six (6) hours as needed for Pain. VITAMIN D3 1,000 unit Cap Generic drug:  cholecalciferol Take 1,000 Units by mouth daily. Prescriptions Printed  Refills  
 albuterol (PROVENTIL VENTOLIN) 2.5 mg /3 mL (0.083 %) nebulizer solution 11  
 Sig: 3 mL by Nebulization route every four (4) hours as needed for Wheezing. Class: Print Route: Nebulization  
 furosemide (LASIX) 20 mg tablet 3 Sig: Take 1 Tab by mouth two (2) times a day. Class: Print Route: Oral  
  
We Performed the Following METABOLIC PANEL, BASIC [26636 CPT(R)] TSH 3RD GENERATION [27144 CPT(R)] Introducing [facility] SERVICES! Dear Brisa Luu: Thank you for requesting a [employer] account. Our records indicate that you already have an active [employer] account. You can access your account anytime at [email or web address] Did you know that you can access your hospital and ER discharge instructions at any time in [employer]? You can also review all of your test results from your hospital stay or ER visit. Additional Information If you have questions, please visit the Frequently Asked Questions section of the [employer] website at [email or web address] Remember, [employer] is NOT to be used for urgent needs. For medical emergencies, dial 911. Now available from your iPhone and Android! Please provide this summary of care documentation to your next provider. Your primary care clinician is listed as Gail Dey. If you have any questions after today's visit, please call [phone].

## 2018-07-19 NOTE — PROGRESS NOTES
Chief Complaint   Patient presents with    Irregular Heart Beat     routine follow up      1. Have you been to the ER, urgent care clinic since your last visit? Hospitalized since your last visit? No    2. Have you seen or consulted any other health care providers outside of the [address] [place] [address] since your last visit? Include any pap smears or colon screening.  No

## 2018-07-20 LAB
BUN SERPL-MCNC: 32 MG/DL (ref 10–36)
BUN/CREAT SERPL: 25 (ref 12–28)
CALCIUM SERPL-MCNC: 9.2 MG/DL (ref 8.7–10.3)
CHLORIDE SERPL-SCNC: 102 MMOL/L (ref 96–106)
CO2 SERPL-SCNC: 22 MMOL/L (ref 20–29)
CREAT SERPL-MCNC: 1.27 MG/DL (ref 0.57–1)
GLUCOSE SERPL-MCNC: 76 MG/DL (ref 65–99)
POTASSIUM SERPL-SCNC: 4.3 MMOL/L (ref 3.5–5.2)
SODIUM SERPL-SCNC: 142 MMOL/L (ref 134–144)
TSH SERPL DL<=0.005 MIU/L-ACNC: 6.84 UIU/ML (ref 0.45–4.5)

## 2018-07-22 NOTE — PROGRESS NOTES
[facility]  [facility]  [address] [place] [address]  Phone:  [phone]  Fax: [phone]       Chief Complaint   Patient presents with    Irregular Heart Beat     routine follow up    . SUBJECTIVE:    Ni Burnett is a 80 y.o. female comes in for return visit stating that she has done well. She plans to go to [place], [place], to live with her son for a period of time. She is unclear whether or not she will stay. Her daughter-in-law accompanies her today along with the nurse. She denies any orthopnea, PND or shortness of breath. She exercises on a consistent basis. Her asthma has been reasonably stable. She continues follow-up with Dr. Usama Magana for her lymphoma. She continues treatment with her anticoagulant for her paroxysmal atrial fibrillation. She has a past history of primary dyslipidemia as well as hypothyroidism. Finally, she does have a history of gout but was taken off of her allopurinol. I did not restart it because it was too disruptive for the patient in view of the fact that another physician stopped it inappropriately. Current Outpatient Prescriptions   Medication Sig Dispense Refill    albuterol (PROVENTIL VENTOLIN) 2.5 mg /3 mL (0.083 %) nebulizer solution 3 mL by Nebulization route every four (4) hours as needed for Wheezing. 100 Each 11    furosemide (LASIX) 20 mg tablet Take 1 Tab by mouth two (2) times a day. 180 Tab 3    apixaban (ELIQUIS) 2.5 mg tablet Take 1 Tab by mouth two (2) times a day. Indications: PREVENT THROMBOEMBOLISM IN CHRONIC ATRIAL FIBRILLATION 180 Tab 3    fluticasone-salmeterol (ADVAIR) 250-50 mcg/dose diskus inhaler Take 1 Puff by inhalation two (2) times a day.  3 Inhaler 3    carvedilol (COREG) 3.125 mg tablet TAKE ONE (1) TABLET(S) TWIC E DAILY WITH FOOD  Indications: chronic heart failure 180 Tab 3    allopurinol (ZYLOPRIM) 300 mg tablet TAKE ONE (1) TABLET(S) DAILY 90 Tab 3    atorvastatin (LIPITOR) 10 mg tablet TAKE ONE (1) TABLET(S) DAILY 90 Tab 3    levothyroxine (SYNTHROID) 25 mcg tablet Take 1 Tab by mouth Daily (before breakfast). 90 Tab 3    sacubitril-valsartan (ENTRESTO) 49 mg/51 mg tablet Take 1 Tab by mouth two (2) times a day. 180 Tab 3    magnesium oxide 400 mg cap Take 1 Cap by mouth daily.  cholecalciferol (VITAMIN D3) 1,000 unit cap Take 1,000 Units by mouth daily.  potassium chloride SR (KLOR-CON 10) 10 mEq tablet Take 1 Tab by mouth daily. 30 Tab 11    loratadine (CLARITIN) 10 mg tablet Take 10 mg by mouth daily as needed.  co-enzyme Q-10 (CO Q-10) 100 mg capsule Take 100 mg by mouth daily.  OXYGEN-AIR DELIVERY SYSTEMS 2.5 L by Does Not Apply route nightly.  ibrutinib (IMBRUVICA) 140 mg cap Take 420 mg by mouth five (5) days a week. 140 mg cap, takes 3 caps five days a week (none on Sunday or Wednesday)      acetaminophen (TYLENOL EXTRA STRENGTH) 500 mg tablet Take 500 mg by mouth every six (6) hours as needed for Pain.  multivitamin (ONE A DAY) tablet Take 1 Tab by mouth daily.        Past Medical History:   Diagnosis Date    Aortic insufficiency     Asthma     [facility] [address]     lymphoma    CKD (chronic kidney disease) stage 3, GFR 30-59 ml/min 1/10/2018    Congestive heart failure, NYHA class II, chronic, systolic (HCC)     Heart failure ([facility] [address]     Hypertension     Mitral valvular regurgitation 1/22/2016    ECHO 2/3/17: LV dilated, EF 20-25%, mild LVH, RV mod dilated, mild- mod MELANIA, mod-severe MR, mod AI ECHO 7/29/17: EF 15%, severe DHK, reduced RVEF, RVH, RVSP 35 mmHg  Mild LAE, mod-marked MA fibrosis, mod-severe MR (2+), AO root dilated,      Presence of biventricular automatic cardioverter/defibrillator (AICD) 7/2/2015    [employer] biventricular AICD implant    Stomach ulcer      Past Surgical History:   Procedure Laterality Date    HX BREAST BIOPSY Bilateral     40 years ago    HX COLONOSCOPY      HX HEENT cataracts removed    HX HYSTERECTOMY      HX OTHER SURGICAL      lymph node surgery    HX TONSILLECTOMY      MD EGD TRANSORAL BIOPSY SINGLE/MULTIPLE  5/23/2012         SINUS SURGERY PROC UNLISTED      Nasal polyps removed     Allergies   Allergen Reactions    Codeine Other (comments)     \"made me disoriented\" per pt         REVIEW OF SYSTEMS:  General: negative for - chills or fever  ENT: negative for - headaches, nasal congestion or tinnitus  Respiratory: negative for - cough, hemoptysis, shortness of breath or wheezing  Cardiovascular : negative for - chest pain, edema, palpitations or shortness of breath  Gastrointestinal: negative for - abdominal pain, blood in stools, heartburn or nausea/vomiting  Genito-Urinary: no dysuria, trouble voiding, or hematuria  Musculoskeletal: negative for - gait disturbance, joint pain, joint stiffness or joint swelling  Neurological: no TIA or stroke symptoms  Hematologic: no bruises, no bleeding, no swollen glands  Integument: no lumps, mole changes, nail changes or rash  Endocrine: no malaise/lethargy or unexpected weight changes      Social History     Social History    Marital status:      Spouse name: N/A    Number of children: 1    Years of education: 16+     Occupational History    retired teacher      Social History Main Topics    Smoking status: Never Smoker    Smokeless tobacco: Never Used    Alcohol use No    Drug use: No    Sexual activity: No     Other Topics Concern    None     Social History Narrative     Family History   Problem Relation Age of Onset    Heart Disease Mother     Stroke Father        OBJECTIVE:    Visit Vitals    /77    Pulse 70    Temp 97.6 °F (36.4 °C) (Oral)    Resp 14    Ht 5' 2\" (1.575 m)    Wt 129 lb 9.6 oz (58.8 kg)    SpO2 97%    BMI 23.7 kg/m2     CONSTITUTIONAL: well , well nourished, appears age appropriate  EYES: perrla, eom intact  ENMT:moist mucous membranes, pharynx clear  NECK: supple.  Thyroid normal,no JVD  RESPIRATORY: Chest: clear to ascultation and percussion   CARDIOVASCULAR: Heart: regular rate and rhythm  GASTROINTESTINAL: Abdomen: soft, bowel sounds active  HEMATOLOGIC: no pathological lymph nodes palpated  MUSCULOSKELETAL: Extremities: no edema, pulse 1+   INTEGUMENT: No unusual rashes or suspicious skin lesions noted. Nails appear normal.  NEUROLOGIC: non-focal exam   MENTAL STATUS: alert and oriented, appropriate affect      ASSESSMENT:  1. 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 Chronic systolic congestive heart failure (HCC)    3. Paroxysmal atrial fibrillation ([address]    4. 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 Moderate persistent reactive airway disease without complication    6. Dyslipidemia    7. Acquired hypothyroidism    8. Gout, unspecified cause, unspecified chronicity, unspecified site        PLAN:    1. Her cardiomyopathy is well compensated currently. There are no overt signs of congestive heart failure including no JVD. Her ventricular rate is well controlled and actually she is regular today. 2. She will follow with her hematologist for her non-Hodgkin's lymphoma. 3. Her reactive airway disease is stable for now, prn use of her nebulizer is important therefore she is given a prescription for this. 4. She will continue statin as prescribed. 5. She will also continue her thyroid supplement. TSH will be checked to ensure compliance and efficacy. 6. As I stated earlier, I will not resume the allopurinol but I instruct the daughter-in-law what to do if she gets an attack. She would basically need to see a physician there.       .  Orders Placed This Encounter    TSH 3RD GENERATION    METABOLIC PANEL, BASIC    albuterol (PROVENTIL VENTOLIN) 2.5 mg /3 mL (0.083 %) nebulizer solution    furosemide (LASIX) 20 mg tablet         Follow-up Disposition:  Return in about 3 months (around 10/19/2018).       Quan Trejo MD

## 2018-07-24 RX ORDER — POTASSIUM CHLORIDE 750 MG/1
10 TABLET, FILM COATED, EXTENDED RELEASE ORAL DAILY
Qty: 90 TAB | Refills: 3 | Status: SHIPPED | OUTPATIENT
Start: 2018-07-24 | End: 2019-02-04 | Stop reason: DRUGHIGH

## 2018-07-31 NOTE — PATIENT INSTRUCTIONS
Medicare Wellness Visit, Female The best way to live healthy is to have a lifestyle where you eat a well-balanced diet, exercise regularly, limit alcohol use, and quit all forms of tobacco/nicotine, if applicable. Regular preventive services are another way to keep healthy. Preventive services (vaccines, screening tests, monitoring & exams) can help personalize your care plan, which helps you manage your own care. Screening tests can find health problems at the earliest stages, when they are easiest to treat. [facility] follows the current, evidence-based guidelines published by the [facility] ([facility]) when recommending preventive services for our patients. Because we follow these guidelines, sometimes recommendations change over time as research supports it. (For example, mammograms used to be recommended annually. Even though Medicare will still pay for an annual mammogram, the newer guidelines recommend a mammogram every two years for women of average risk.) Of course, you and your provider may decide to screen more often for some diseases, based on your risk and co-morbidities (chronic disease you are already diagnosed with). Preventive services for you include: - Medicare offers their members a free annual wellness visit, which is time for you and your primary care provider to discuss and plan for your preventive service needs. Take advantage of this benefit every year! 
 
-All people over age 72 should receive the recommended pneumonia vaccines. Current USPSTF guidelines recommend a series of two vaccines for the best pneumonia protection.  
 
-All adults should have a yearly flu vaccine and a tetanus vaccine every 10 years. All adults age 61 years should receive a shingles vaccine once in their lifetime.   
 
-A bone mass density test is recommended when a woman turns 65 to screen for osteoporosis.  This test is only recommended once as a screening. Some providers will use this same test as a disease monitoring tool if you already have osteoporosis. -All adults age 38-68 years who are overweight should have a diabetes screening test once every three years.  
 
-Other screening tests & preventive services for persons with diabetes include: an eye exam to screen for diabetic retinopathy, a kidney function test, a foot exam, and stricter control over your cholesterol.  
 
-Cardiovascular screening for adults with routine risk involves an electrocardiogram (ECG) at intervals determined by the provider.  
 
-Colorectal cancer screenings should be done for adults age 54-65 years with normal risk. There are a number of acceptable methods of screening for this type of cancer. Each test has its own benefits and drawbacks. Discuss with your provider what is most appropriate for you during your annual wellness visit. The different tests include: colonoscopy (considered the best screening method), a fecal occult blood test, a fecal DNA test, and sigmoidoscopy. -Breast cancer screenings are recommended every other year for women of normal risk age 54-69 years.  
 
-Cervical cancer screenings for women over age 72 are only recommended with certain risk factors.  
 
-All adults born between [facility] should be screened once for Hepatitis C. Here is a list of your current Health Maintenance items (your personalized list of preventive services) with a due date: There are no preventive care reminders to display for this patient.

## 2018-07-31 NOTE — PROGRESS NOTES
[address] [place] [address] and Primary Care 
[place] [address] 
[address] [address] Phone:  [phone]  Fax: [phone] Chief Complaint Patient presents with [facility] Annual Wellness Visit Cassandra Krishnamurthy SUBJECTIVE: 
  Dorene Steele is a 80 y.o. female She comes in for a return visit stating that she has done fairly well. She does have mild respiratory symptoms, but this is mild at best and is improving. Fortunately, she did not have any major flare-up of her bronchospasm. She saw the congestive heart failure doctor and medications have pretty much remained the same. She also follows up with Dr. Arnoldo Palacios for her non-Hodgkin's lymphoma. She has mild nasal congestion and frequent clearing of her throat, which is often times a seasonal problem for her. She is reasonably physically active, but this needs to increase. She was better conditioned previously. Finally, she remains on thyroid supplement. Current Outpatient Prescriptions Medication Sig Dispense Refill  potassium chloride SR (KLOR-CON 10) 10 mEq tablet Take 1 Tab by mouth daily. 90 Tab 3  
 sacubitril-valsartan (ENTRESTO) 49 mg/51 mg tablet Take 1 Tab by mouth two (2) times a day. 180 Tab 3  
 albuterol (PROVENTIL VENTOLIN) 2.5 mg /3 mL (0.083 %) nebulizer solution 3 mL by Nebulization route every four (4) hours as needed for Wheezing. 100 Each 11  
 furosemide (LASIX) 20 mg tablet Take 1 Tab by mouth two (2) times a day. 180 Tab 3  
 apixaban (ELIQUIS) 2.5 mg tablet Take 1 Tab by mouth two (2) times a day. Indications: PREVENT THROMBOEMBOLISM IN CHRONIC ATRIAL FIBRILLATION 180 Tab 3  
 fluticasone-salmeterol (ADVAIR) 250-50 mcg/dose diskus inhaler Take 1 Puff by inhalation two (2) times a day. 3 Inhaler 3  carvedilol (COREG) 3.125 mg tablet TAKE ONE (1) TABLET(S) TWIC E DAILY WITH FOOD  Indications: chronic heart failure 180 Tab 3  
 allopurinol (ZYLOPRIM) 300 mg tablet TAKE ONE (1) TABLET(S) DAILY 90 Tab 3  
 atorvastatin (LIPITOR) 10 mg tablet TAKE ONE (1) TABLET(S) DAILY 90 Tab 3  
 levothyroxine (SYNTHROID) 25 mcg tablet Take 1 Tab by mouth Daily (before breakfast). 90 Tab 3  
 magnesium oxide 400 mg cap Take 1 Cap by mouth daily.  cholecalciferol (VITAMIN D3) 1,000 unit cap Take 1,000 Units by mouth daily.  loratadine (CLARITIN) 10 mg tablet Take 10 mg by mouth daily as needed.  co-enzyme Q-10 (CO Q-10) 100 mg capsule Take 100 mg by mouth daily.  OXYGEN-AIR DELIVERY SYSTEMS 2.5 L by Does Not Apply route nightly.  ibrutinib (IMBRUVICA) 140 mg cap Take 420 mg by mouth five (5) days a week. 140 mg cap, takes 3 caps five days a week (none on Sunday or Wednesday)  acetaminophen (TYLENOL EXTRA STRENGTH) 500 mg tablet Take 500 mg by mouth every six (6) hours as needed for Pain.  multivitamin (ONE A DAY) tablet Take 1 Tab by mouth daily. Past Medical History:  
Diagnosis Date  Aortic insufficiency  Asthma  Cancer ([facility] [address] lymphoma  CKD (chronic kidney disease) stage 3, GFR 30-59 ml/min 1/10/2018  Congestive heart failure, NYHA class II, chronic, systolic (HCC)  Heart failure ([facility] [address]  Hypertension  Mitral valvular regurgitation 1/22/2016 ECHO 2/3/17: LV dilated, EF 20-25%, mild LVH, RV mod dilated, mild- mod MLEANIA, mod-severe MR, mod AI ECHO 7/29/17: EF 15%, severe DHK, reduced RVEF, RVH, RVSP 35 mmHg  Mild LAE, mod-marked MA fibrosis, mod-severe MR (2+), AO root dilated,    
 Presence of biventricular automatic cardioverter/defibrillator (AICD) 7/2/2015 [employer] biventricular AICD implant  Stomach ulcer Past Surgical History:  
Procedure Laterality Date  HX BREAST BIOPSY Bilateral   
 40 years ago  HX COLONOSCOPY    
 HX HEENT    
 cataracts removed  HX HYSTERECTOMY  HX OTHER SURGICAL    
 lymph node surgery  HX TONSILLECTOMY  [place] EGD TRANSORAL BIOPSY SINGLE/MULTIPLE  5/23/2012  
    
 SINUS SURGERY PROC UNLISTED Nasal polyps removed Allergies Allergen Reactions  Codeine Other (comments) \"made me disoriented\" per pt REVIEW OF SYSTEMS: 
General: negative for - chills or fever ENT: negative for - headaches, nasal congestion or tinnitus Respiratory: negative for - cough, hemoptysis, shortness of breath or wheezing Cardiovascular : negative for - chest pain, edema, palpitations or shortness of breath Gastrointestinal: negative for - abdominal pain, blood in stools, heartburn or nausea/vomiting Genito-Urinary: no dysuria, trouble voiding, or hematuria Musculoskeletal: negative for - gait disturbance, joint pain, joint stiffness or joint swelling Neurological: no TIA or stroke symptoms Hematologic: no bruises, no bleeding, no swollen glands Integument: no lumps, mole changes, nail changes or rash Endocrine: no malaise/lethargy or unexpected weight changes Social History Social History  Marital status:  Spouse name: N/A  
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  
 Drug use: No  
 Sexual activity: No  
 
Other Topics Concern  None Social History Narrative Family History Problem Relation Age of Onset  Heart Disease Mother  Stroke Father OBJECTIVE: 
 
Visit Vitals  /56  Pulse 73  Temp 97.6 °F (36.4 °C) (Oral)  Resp 14  
 Ht 5' 2\" (1.575 m)  Wt 129 lb 14.4 oz (58.9 kg)  SpO2 94%  BMI 23.76 kg/m2 CONSTITUTIONAL: well , well nourished, appears age appropriate EYES: perrla, eom intact ENMT:moist mucous membranes, pharynx clear NECK: supple. Thyroid normal 
RESPIRATORY: Chest: clear to ascultation and percussion CARDIOVASCULAR: Heart: regular rate and rhythm GASTROINTESTINAL: Abdomen: soft, bowel sounds active HEMATOLOGIC: no pathological lymph nodes palpated MUSCULOSKELETAL: Extremities: no edema, pulse 1+ INTEGUMENT: No unusual rashes or suspicious skin lesions noted. Nails appear normal. 
NEUROLOGIC: non-focal exam  
MENTAL STATUS: alert and oriented, appropriate affect ASSESSMENT: 
1. 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 Non-Hodgkin's lymphoma, unspecified body region, unspecified non-Hodgkin lymphoma type ([facility] [address] 3. Seasonal allergic rhinitis, unspecified trigger 4. Paroxysmal atrial fibrillation (HCC) 5. Gout, unspecified cause, unspecified chronicity, unspecified site 6. Physical deconditioning 7. Acquired hypothyroidism 8. Screening for alcoholism 9. Screening for depression 10. Wellness examination PLAN: 
 
1. She is well compensated from a cardiac perspective. There are no overt signs of congestive heart failure. 2. As far as her non-Hodgkin's lymphoma, she will follow up with her hematologist.  This is also stable. 3. She appears to have an allergic rhinitis seasonally aggravated. I suggest Loratadine 10 mg.  
4. She does have a history of paroxysmal atrial fibrillation and is under the care of a cardiologist for this. Her rhythm is regular today. 5. She does have a history of gout, but her Allopurinol was discontinued I think inappropriately, but I did not restart it for now. If she has a flare and she needs to resume. 6. I encourage her to continue her physical activity as she previously has been to make sure she maintains a significant degree of conditioning. 7. TSH will be checked in view of her hypothyroidism to ensure efficacy and compliance. .   
  
 
. Orders Placed This Encounter  Depression Screen Annual  
 TSH 3RD GENERATION  
 METABOLIC PANEL, BASIC Follow-up Disposition: 
Return in about 3 months (around 10/2/2018).  
 
 
Quan Trejo MD 
 
 
This is the Subsequent Medicare Annual Wellness Exam, performed 12 months or more after the Initial AWV or the last Subsequent AWV I have reviewed the patient's medical history in detail and updated the computerized patient record. History Past Medical History:  
Diagnosis Date  Aortic insufficiency  Asthma  Cancer ([facility] [address] lymphoma  CKD (chronic kidney disease) stage 3, GFR 30-59 ml/min 1/10/2018  Congestive heart failure, NYHA class II, chronic, systolic (HCC)  Heart failure ([facility] [address]  Hypertension  Mitral valvular regurgitation 1/22/2016 ECHO 2/3/17: LV dilated, EF 20-25%, mild LVH, RV mod dilated, mild- mod MELANIA, mod-severe MR, mod AI ECHO 7/29/17: EF 15%, severe DHK, reduced RVEF, RVH, RVSP 35 mmHg  Mild LAE, mod-marked MA fibrosis, mod-severe MR (2+), AO root dilated,    
 Presence of biventricular automatic cardioverter/defibrillator (AICD) 7/2/2015 [employer] biventricular AICD implant  Stomach ulcer Past Surgical History:  
Procedure Laterality Date  HX BREAST BIOPSY Bilateral   
 40 years ago  HX COLONOSCOPY    
 HX HEENT    
 cataracts removed  HX HYSTERECTOMY  HX OTHER SURGICAL    
 lymph node surgery  HX TONSILLECTOMY  [place] EGD TRANSORAL BIOPSY SINGLE/MULTIPLE  5/23/2012  
    
 SINUS SURGERY PROC UNLISTED Nasal polyps removed Current Outpatient Prescriptions Medication Sig Dispense Refill  potassium chloride SR (KLOR-CON 10) 10 mEq tablet Take 1 Tab by mouth daily. 90 Tab 3  
 sacubitril-valsartan (ENTRESTO) 49 mg/51 mg tablet Take 1 Tab by mouth two (2) times a day. 180 Tab 3  
 albuterol (PROVENTIL VENTOLIN) 2.5 mg /3 mL (0.083 %) nebulizer solution 3 mL by Nebulization route every four (4) hours as needed for Wheezing. 100 Each 11  
 furosemide (LASIX) 20 mg tablet Take 1 Tab by mouth two (2) times a day. 180 Tab 3  
 apixaban (ELIQUIS) 2.5 mg tablet Take 1 Tab by mouth two (2) times a day.  Indications: PREVENT THROMBOEMBOLISM IN CHRONIC ATRIAL FIBRILLATION 180 Tab 3  
 fluticasone-salmeterol (ADVAIR) 250-50 mcg/dose diskus inhaler Take 1 Puff by inhalation two (2) times a day. 3 Inhaler 3  carvedilol (COREG) 3.125 mg tablet TAKE ONE (1) TABLET(S) TWIC E DAILY WITH FOOD  Indications: chronic heart failure 180 Tab 3  
 allopurinol (ZYLOPRIM) 300 mg tablet TAKE ONE (1) TABLET(S) DAILY 90 Tab 3  
 atorvastatin (LIPITOR) 10 mg tablet TAKE ONE (1) TABLET(S) DAILY 90 Tab 3  
 levothyroxine (SYNTHROID) 25 mcg tablet Take 1 Tab by mouth Daily (before breakfast). 90 Tab 3  
 magnesium oxide 400 mg cap Take 1 Cap by mouth daily.  cholecalciferol (VITAMIN D3) 1,000 unit cap Take 1,000 Units by mouth daily.  loratadine (CLARITIN) 10 mg tablet Take 10 mg by mouth daily as needed.  co-enzyme Q-10 (CO Q-10) 100 mg capsule Take 100 mg by mouth daily.  OXYGEN-AIR DELIVERY SYSTEMS 2.5 L by Does Not Apply route nightly.  ibrutinib (IMBRUVICA) 140 mg cap Take 420 mg by mouth five (5) days a week. 140 mg cap, takes 3 caps five days a week (none on Sunday or Wednesday)  acetaminophen (TYLENOL EXTRA STRENGTH) 500 mg tablet Take 500 mg by mouth every six (6) hours as needed for Pain.  multivitamin (ONE A DAY) tablet Take 1 Tab by mouth daily. Allergies Allergen Reactions  Codeine Other (comments) \"made me disoriented\" per pt Family History Problem Relation Age of Onset  Heart Disease Mother  Stroke Father Social History Substance Use Topics  Smoking status: Never Smoker  Smokeless tobacco: Never Used  Alcohol use No  
 
Patient Active Problem List  
Diagnosis Code  Abdominal pain R10.9  Weight loss R63.4  Cardiomyopathy, dilated, 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 I42.0  NHL (non-Hodgkin's lymphoma) (HCC) C85.90  
 DILAN (obstructive sleep apnea) G47.33  
 Rhinitis J31.0  Anemia D64.9  Reactive airway disease J45.909  
 HTN (hypertension) I10  
 Gout M10.9  LBBB (left bundle branch block) I44.7  Presence of biventricular automatic cardioverter/defibrillator (AICD) Z95.810  Chronic systolic congestive heart failure (HCC) I50.22  Lymphoma ([facility] [address] C85.90  Mitral valvular regurgitation I34.0  Physical deconditioning R53.81  
 Acute respiratory insufficiency R06.89  
 Gastroesophageal reflux disease with esophagitis K21.0  Thoracic aortic aneurysm without rupture ([facility] [address] I71.2  Hypokalemia E87.6  Paroxysmal atrial fibrillation (HCC) I48.0  Xerosis of skin L85.3  Dyslipidemia E78.5  CKD (chronic kidney disease) stage 3, GFR 30-59 ml/min N18.3  Acquired hypothyroidism E03.9  Aneurysmal dilatation (HCC) I72.9  Seasonal allergic rhinitis J30.2 Depression Risk Factor Screening: PHQ over the last two weeks 7/19/2018 Little interest or pleasure in doing things Not at all Feeling down, depressed, irritable, or hopeless Not at all Total Score PHQ 2 0 Alcohol Risk Factor Screening: You do not drink alcohol or very rarely. Functional Ability and Level of Safety:  
Hearing Loss Hearing is good. Activities of Daily Living The home contains: no safety equipment. Patient does total self care Fall Risk Fall Risk Assessment, last 12 mths 7/19/2018 Able to walk? Yes Fall in past 12 months? No  
 
 
Abuse Screen Patient is not abused Cognitive Screening Evaluation of Cognitive Function: 
Has your family/caregiver stated any concerns about your memory: no 
Normal 
 
Patient Care Team  
Patient Care Team: 
Theodore Avitia MD as PCP - General (Internal Medicine) Mika Doty MD (Cardiology) Lenore Rodriguez MD as Physician (Cardiology) Williams Leigh LPN as Transitional Nurse Navigator Amanda Chaudhry RN as Ambulatory Care Navigator Assessment/Plan Education and counseling provided: 
Are appropriate based on today's review and evaluation Diagnoses and all orders for this visit: 1. Cardiomyopathy, dilated, 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% since 2481 
-     METABOLIC PANEL, BASIC 2. Non-Hodgkin's lymphoma, unspecified body region, unspecified non-Hodgkin lymphoma type ([facility] [address] 3. Seasonal allergic rhinitis, unspecified trigger 4. Paroxysmal atrial fibrillation (HCC) 5. Gout, unspecified cause, unspecified chronicity, unspecified site 6. Physical deconditioning 7. Acquired hypothyroidism 
-     TSH 3RD GENERATION 8. Screening for alcoholism -     Annual  Alcohol Screen 15 min () 9. Screening for depression -     Depression Screen Annual 
 
10. Wellness examination There are no preventive care reminders to display for this patient.

## 2018-08-15 RX ORDER — FUROSEMIDE 20 MG/1
20 TABLET ORAL 2 TIMES DAILY
Qty: 180 TAB | Refills: 3 | Status: SHIPPED | OUTPATIENT
Start: 2018-08-15 | End: 2019-02-04 | Stop reason: DRUGHIGH

## 2018-08-28 ENCOUNTER — PATIENT OUTREACH (OUTPATIENT)
Dept: INTERNAL MEDICINE CLINIC | Age: 83
End: 2018-08-28

## 2018-08-29 NOTE — PROGRESS NOTES
NN Health Promotion & Risk Prevention:  Health question Goals Addressed Most Recent  Patient/Family verbalizes understanding of self-management of chronic disease. On track (8/28/2018)  
       
  8/28/2018:  NN rec'd message left on VM from patient; stating she is out [facility] and needed to ask a question regarding health matter. NN returned patient's call; unable to LV on VM. Will continue to f/u.  
8/29/2018:  NN called patient; unable to leave message on VM. EW Will continue to f/u to reach patient. Patient had stated in VM that she would see my call and return call.

## 2018-09-04 ENCOUNTER — CLINICAL SUPPORT (OUTPATIENT)
Dept: CARDIOLOGY CLINIC | Age: 83
End: 2018-09-04

## 2018-09-04 DIAGNOSIS — Z95.810 PRESENCE OF AUTOMATIC CARDIOVERTER/DEFIBRILLATOR (AICD): Primary | ICD-10-CM

## 2018-09-04 DIAGNOSIS — I42.0 CARDIOMYOPATHY, DILATED, NONISCHEMIC (HCC): Chronic | ICD-10-CM

## 2018-09-28 ENCOUNTER — OFFICE VISIT (OUTPATIENT)
Dept: INTERNAL MEDICINE CLINIC | Age: 83
End: 2018-09-28

## 2018-09-28 VITALS
RESPIRATION RATE: 19 BRPM | DIASTOLIC BLOOD PRESSURE: 68 MMHG | BODY MASS INDEX: 23.72 KG/M2 | HEIGHT: 62 IN | TEMPERATURE: 98 F | WEIGHT: 128.9 LBS | SYSTOLIC BLOOD PRESSURE: 105 MMHG | OXYGEN SATURATION: 94 % | HEART RATE: 70 BPM

## 2018-09-28 DIAGNOSIS — E78.5 DYSLIPIDEMIA: ICD-10-CM

## 2018-09-28 DIAGNOSIS — C85.90 NON-HODGKIN'S LYMPHOMA, UNSPECIFIED BODY REGION, UNSPECIFIED NON-HODGKIN LYMPHOMA TYPE (HCC): ICD-10-CM

## 2018-09-28 DIAGNOSIS — I42.0 CARDIOMYOPATHY, DILATED, NONISCHEMIC (HCC): Primary | Chronic | ICD-10-CM

## 2018-09-28 DIAGNOSIS — I10 ESSENTIAL HYPERTENSION: ICD-10-CM

## 2018-09-28 DIAGNOSIS — E03.9 ACQUIRED HYPOTHYROIDISM: ICD-10-CM

## 2018-09-28 DIAGNOSIS — I48.0 PAROXYSMAL ATRIAL FIBRILLATION (HCC): ICD-10-CM

## 2018-09-28 NOTE — PROGRESS NOTES
Chief Complaint   Patient presents with    Advice Only       1. Have you been to the ER, urgent care clinic since your last visit? Hospitalized since your last visit? No    2. Have you seen or consulted any other health care providers outside of the [address] [place] [address] since your last visit? Include any pap smears or colon screening. No    Body mass index is 23.58 kg/(m^2).

## 2018-09-28 NOTE — MR AVS SNAPSHOT
Espinoza Osborne 
 
 
 [address] [id number] 
[phone] Patient: Luz Torres MRN: [id number] WVJ:[id number] Visit Information Date & Time Provider Department Dept. Phone Encounter #  
 9/28/2018 12:15 PM [facility] [phone] [id number] Your Appointments 10/2/2018 11:15 AM  
ESTABLISHED PATIENT with MD Gabriel Velásquez [facility]) Appt Note: 6 MO. F/U  
 [address]  
[phone] [address]  
  
    
 11/30/2018 11:30 AM  
Any with Homero Mccall MD  
[address] [place] [address] and Primary Care [facility]) Appt Note: 2 month follow up  
 [address] [phone]  
  
   
 [address] [id number] Upcoming Health Maintenance Date Due Shingrix Vaccine Age 50> (1 of 2) 3/17/1975 Influenza Age 5 to Adult 8/1/2018 MEDICARE YEARLY EXAM 7/3/2019 GLAUCOMA SCREENING Q2Y 1/24/2020 DTaP/Tdap/Td series (2 - Td) 2/13/2027 Allergies as of 9/28/2018  Review Complete On: 9/28/2018 By: Beverley Quiroz LPN Severity Noted Reaction Type Reactions Codeine  05/21/2012   Side Effect Other (comments) \"made me disoriented\" per pt Current Immunizations  Reviewed on 6/29/2015 Name Date Influenza High Dose Vaccine PF 9/27/2017 Influenza Vaccine 10/1/2016, 10/4/2014 Influenza Vaccine Split 10/22/2011 ZZZ-RETIRED (DO NOT USE) Pneumococcal Vaccine (Unspecified Type) 5/22/2007 Not reviewed this visit Vitals BP Pulse Temp Resp Height(growth percentile) Weight(growth percentile) 105/68 (BP 1 Location: Left arm, BP Patient Position: Sitting) 70 98 °F (36.7 °C) (Oral) 19 5' 2\" (1.575 m) 128 lb 14.4 oz (58.5 kg) SpO2 BMI OB Status Smoking Status 94% 23.58 kg/m2 Postmenopausal Never Smoker Vitals History BMI and BSA Data Body Mass Index Body Surface Area  
 23.58 kg/m 2 1.6 m 2 Preferred Pharmacy Pharmacy Name Phone [facility] Schere[facility] [phone] Your Updated Medication List  
  
   
This list is accurate as of 9/28/18  2:20 PM.  Always use your most recent med list.  
  
  
  
  
 albuterol 2.5 mg /3 mL (0.083 %) nebulizer solution Commonly known as:  PROVENTIL VENTOLIN  
3 mL by Nebulization route every four (4) hours as needed for Wheezing. allopurinol 300 mg tablet Commonly known as:  ZYLOPRIM  
TAKE ONE (1) TABLET(S) DAILY  
  
 apixaban 2.5 mg tablet Commonly known as:  Christiano Ike Take 1 Tab by mouth two (2) times a day. Indications: PREVENT THROMBOEMBOLISM IN CHRONIC ATRIAL FIBRILLATION  
  
 atorvastatin 10 mg tablet Commonly known as:  LIPITOR  
TAKE ONE (1) TABLET(S) DAILY  
  
 carvedilol 3.125 mg tablet Commonly known as:  COREG  
TAKE ONE (1) TABLET(S) TWIC E DAILY WITH FOOD  Indications: chronic heart failure  
  
 co-enzyme Q-10 100 mg capsule Commonly known as:  CO Q-10 Take 100 mg by mouth daily. fluticasone-salmeterol 250-50 mcg/dose diskus inhaler Commonly known as:  ADVAIR Take 1 Puff by inhalation two (2) times a day. furosemide 20 mg tablet Commonly known as:  LASIX Take 1 Tab by mouth two (2) times a day. IMBRUVICA 140 mg capsule Generic drug:  ibrutinib Take 420 mg by mouth five (5) days a week. 140 mg cap, takes 3 caps five days a week (none on Sunday or Wednesday) levothyroxine 25 mcg tablet Commonly known as:  SYNTHROID Take 1 Tab by mouth Daily (before breakfast). loratadine 10 mg tablet Commonly known as:  Diana Юлия Take 10 mg by mouth daily as needed. magnesium oxide 400 mg Cap Take 1 Cap by mouth daily. multivitamin tablet Commonly known as:  ONE A DAY Take 1 Tab by mouth daily. OXYGEN-AIR DELIVERY SYSTEMS  
2.5 L by Does Not Apply route nightly. potassium chloride SR 10 mEq tablet Commonly known as:  KLOR-CON 10 Take 1 Tab by mouth daily. sacubitril-valsartan 49-51 mg Tab tablet Commonly known as:  ENTRESTO Take 1 Tab by mouth two (2) times a day. TYLENOL EXTRA STRENGTH 500 mg tablet Generic drug:  acetaminophen Take 500 mg by mouth every six (6) hours as needed for Pain. VITAMIN D3 1,000 unit Cap Generic drug:  cholecalciferol Take 1,000 Units by mouth daily. Introducing [facility]! Dear Scotty Marie: Thank you for requesting a [employer] account. Our records indicate that you already have an active [employer] account. You can access your account anytime at [email or web address] Did you know that you can access your hospital and ER discharge instructions at any time in [employer]? You can also review all of your test results from your hospital stay or ER visit. Additional Information If you have questions, please visit the Frequently Asked Questions section of the [employer] website at [email or web address] Remember, [employer] is NOT to be used for urgent needs. For medical emergencies, dial 911. Now available from your iPhone and Android! Please provide this summary of care documentation to your next provider. Your primary care clinician is listed as Gail Dey. If you have any questions after today's visit, please call [phone].

## 2018-09-29 LAB
BNP SERPL-MCNC: 454.4 PG/ML (ref 0–100)
BUN SERPL-MCNC: 26 MG/DL (ref 10–36)
BUN/CREAT SERPL: 23 (ref 12–28)
CALCIUM SERPL-MCNC: 8.8 MG/DL (ref 8.7–10.3)
CHLORIDE SERPL-SCNC: 102 MMOL/L (ref 96–106)
CO2 SERPL-SCNC: 25 MMOL/L (ref 20–29)
CREAT SERPL-MCNC: 1.12 MG/DL (ref 0.57–1)
GLUCOSE SERPL-MCNC: 82 MG/DL (ref 65–99)
POTASSIUM SERPL-SCNC: 4.4 MMOL/L (ref 3.5–5.2)
SODIUM SERPL-SCNC: 143 MMOL/L (ref 134–144)
TSH SERPL DL<=0.005 MIU/L-ACNC: 5.21 UIU/ML (ref 0.45–4.5)

## 2018-09-29 NOTE — PROGRESS NOTES
[address] [facility]  [place] [address]  [address] [address] [facility] [address] [address]  Phone:  [phone]  Fax: [phone]       Chief Complaint   Patient presents with    Advice Only   . SUBJECTIVE:    Jabier Powell is a 80 y.o. female Comes in for return visit having spent the last 2-3 months in [facility] with her son. She's now back, but plans a trip in January to [facility]. She then asks me if she can reduce some of her medications and under all circumstances I tell the patient no. Interestingly she's had not had any dyspnea on exertion, orthopnea or PND. She did not have to go to see a physician during her stay in [facility]. She's on all of her cardioactive medications, including Entresto, which apparently is making a difference presumably. One of the other important medications is Furosemide at 20 mg b.i.d. This is probably the optimal dose for her and should not be reduced under any circumstances. She hasn't had any joint problems. She remains on her Apixaban for her paroxysmal atrial fibrillation. Finally, she has a past history of dyslipidemia and hypothyroidism. Current Outpatient Prescriptions   Medication Sig Dispense Refill    furosemide (LASIX) 20 mg tablet Take 1 Tab by mouth two (2) times a day. 180 Tab 3    potassium chloride SR (KLOR-CON 10) 10 mEq tablet Take 1 Tab by mouth daily. 90 Tab 3    sacubitril-valsartan (ENTRESTO) 49 mg/51 mg tablet Take 1 Tab by mouth two (2) times a day. 180 Tab 3    albuterol (PROVENTIL VENTOLIN) 2.5 mg /3 mL (0.083 %) nebulizer solution 3 mL by Nebulization route every four (4) hours as needed for Wheezing. 100 Each 11    apixaban (ELIQUIS) 2.5 mg tablet Take 1 Tab by mouth two (2) times a day. Indications: PREVENT THROMBOEMBOLISM IN CHRONIC ATRIAL FIBRILLATION 180 Tab 3    fluticasone-salmeterol (ADVAIR) 250-50 mcg/dose diskus inhaler Take 1 Puff by inhalation two (2) times a day.  3 Inhaler 3    allopurinol (ZYLOPRIM) 300 mg tablet TAKE ONE (1) TABLET(S) DAILY 90 Tab 3    atorvastatin (LIPITOR) 10 mg tablet TAKE ONE (1) TABLET(S) DAILY 90 Tab 3    levothyroxine (SYNTHROID) 25 mcg tablet Take 1 Tab by mouth Daily (before breakfast). 90 Tab 3    magnesium oxide 400 mg cap Take 1 Cap by mouth daily.  cholecalciferol (VITAMIN D3) 1,000 unit cap Take 1,000 Units by mouth daily.  loratadine (CLARITIN) 10 mg tablet Take 10 mg by mouth daily as needed.  OXYGEN-AIR DELIVERY SYSTEMS 2.5 L by Does Not Apply route nightly.  ibrutinib (IMBRUVICA) 140 mg cap Take 420 mg by mouth five (5) days a week. 140 mg cap, takes 3 caps five days a week (none on Sunday or Wednesday)      acetaminophen (TYLENOL EXTRA STRENGTH) 500 mg tablet Take 500 mg by mouth every six (6) hours as needed for Pain.  multivitamin (ONE A DAY) tablet Take 1 Tab by mouth daily.  carvedilol (COREG) 3.125 mg tablet TAKE ONE (1) TABLET(S) TWIC E DAILY WITH FOOD  Indications: chronic heart failure 180 Tab 3    co-enzyme Q-10 (CO Q-10) 100 mg capsule Take 100 mg by mouth daily.        Past Medical History:   Diagnosis Date    Aortic insufficiency     Asthma     Cancer ([facility]     lymphoma    CKD (chronic kidney disease) stage 3, GFR 30-59 ml/min 1/10/2018    Congestive heart failure, NYHA class II, chronic, systolic (HCC)     Heart failure ([facility]     Hypertension     Mitral valvular regurgitation 1/22/2016    ECHO 2/3/17: LV dilated, EF 20-25%, mild LVH, RV mod dilated, mild- mod MELANIA, mod-severe MR, mod AI ECHO 7/29/17: EF 15%, severe DHK, reduced RVEF, RVH, RVSP 35 mmHg  Mild LAE, mod-marked MA fibrosis, mod-severe MR (2+), AO root dilated,      Presence of biventricular automatic cardioverter/defibrillator (AICD) 7/2/2015    [employer] biventricular AICD implant    Stomach ulcer      Past Surgical History:   Procedure Laterality Date    HX BREAST BIOPSY Bilateral     40 years ago    HX COLONOSCOPY      HX HEENT      cataracts removed    HX HYSTERECTOMY      HX OTHER SURGICAL      lymph node surgery    HX TONSILLECTOMY      ND EGD TRANSORAL BIOPSY SINGLE/MULTIPLE  5/23/2012         SINUS SURGERY PROC UNLISTED      Nasal polyps removed     Allergies   Allergen Reactions    Codeine Other (comments)     \"made me disoriented\" per pt         REVIEW OF SYSTEMS:  General: negative for - chills or fever  ENT: negative for - headaches, nasal congestion or tinnitus  Respiratory: negative for - cough, hemoptysis, shortness of breath or wheezing  Cardiovascular : negative for - chest pain, edema, palpitations or shortness of breath  Gastrointestinal: negative for - abdominal pain, blood in stools, heartburn or nausea/vomiting  Genito-Urinary: no dysuria, trouble voiding, or hematuria  Musculoskeletal: negative for - gait disturbance, joint pain, joint stiffness or joint swelling  Neurological: no TIA or stroke symptoms  Hematologic: no bruises, no bleeding, no swollen glands  Integument: no lumps, mole changes, nail changes or rash  Endocrine: no malaise/lethargy or unexpected weight changes      Social History     Social History    Marital status:      Spouse name: N/A    Number of children: 1    Years of education: 16+     Occupational History    retired teacher      Social History Main Topics    Smoking status: Never Smoker    Smokeless tobacco: Never Used    Alcohol use No    Drug use: No    Sexual activity: No     Other Topics Concern    None     Social History Narrative     Family History   Problem Relation Age of Onset    Heart Disease Mother     Stroke Father        OBJECTIVE:    Visit Vitals    /68 (BP 1 Location: Left arm, BP Patient Position: Sitting)    Pulse 70    Temp 98 °F (36.7 °C) (Oral)    Resp 19    Ht 5' 2\" (1.575 m)    Wt 128 lb 14.4 oz (58.5 kg)    SpO2 94%    BMI 23.58 kg/m2     CONSTITUTIONAL: well , well nourished, appears age appropriate  EYES: perrla, eom intact  ENMT:moist mucous membranes, pharynx clear  NECK: supple. Thyroid normal,no JVD  RESPIRATORY: Chest: clear to ascultation and percussion   CARDIOVASCULAR: Heart: regular rate and rhythm  GASTROINTESTINAL: Abdomen: soft, bowel sounds active  HEMATOLOGIC: no pathological lymph nodes palpated  MUSCULOSKELETAL: Extremities: no edema, pulse 1+   INTEGUMENT: No unusual rashes or suspicious skin lesions noted. Nails appear normal.  NEUROLOGIC: non-focal exam   MENTAL STATUS: alert and oriented, appropriate affect      ASSESSMENT:  1. 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 Paroxysmal atrial fibrillation (HCC)    3. Acquired hypothyroidism    4. Dyslipidemia    5. Essential hypertension    6. Non-Hodgkin's lymphoma, unspecified body region, unspecified non-Hodgkin lymphoma type ([facility] [address]        PLAN:    1. Her cardiomyopathy is quite stable with no overt signs of congestive heart failure. She'll continue her cardioactive medications as prescribed, including her Furosemide 20 mg b.i.d.  2. Her blood pressure is excellent at 120/70, which is a very good sign indicating improvement in her low output state. 3. She remains on Apixaban for paroxysmal atrial fibrillation. Her rhythm is regular today, however. 4. She'll continue thyroid supplement. TSH will be checked to determine if any adjustments need to be made. 5. She will also continue her statin as prescribed in view of her primary cardiovascular risk prevention. 6. I continue to emphasize the importance of remaining physically active. 7. We talked at length about the importance of taking her current medications, all of which are critical.  8. She'll also follow up with hematologist for her lymphoma. .  Orders Placed This Encounter    METABOLIC PANEL, BASIC    BNP    TSH 3RD GENERATION         Follow-up Disposition:  Return in about 2 months (around 11/28/2018).       Zhane Dumont MD

## 2018-10-02 ENCOUNTER — OFFICE VISIT (OUTPATIENT)
Dept: CARDIOLOGY CLINIC | Age: 83
End: 2018-10-02

## 2018-10-02 VITALS
SYSTOLIC BLOOD PRESSURE: 97 MMHG | BODY MASS INDEX: 21.35 KG/M2 | WEIGHT: 116 LBS | RESPIRATION RATE: 20 BRPM | HEART RATE: 70 BPM | HEIGHT: 62 IN | DIASTOLIC BLOOD PRESSURE: 60 MMHG | OXYGEN SATURATION: 98 %

## 2018-10-02 DIAGNOSIS — E78.5 DYSLIPIDEMIA: ICD-10-CM

## 2018-10-02 DIAGNOSIS — K21.00 GASTROESOPHAGEAL REFLUX DISEASE WITH ESOPHAGITIS: ICD-10-CM

## 2018-10-02 DIAGNOSIS — I42.0 CARDIOMYOPATHY, DILATED, NONISCHEMIC (HCC): Chronic | ICD-10-CM

## 2018-10-02 DIAGNOSIS — I10 ESSENTIAL HYPERTENSION: Primary | ICD-10-CM

## 2018-10-02 DIAGNOSIS — N18.30 CKD (CHRONIC KIDNEY DISEASE) STAGE 3, GFR 30-59 ML/MIN (HCC): Chronic | ICD-10-CM

## 2018-10-02 DIAGNOSIS — G47.33 OSA (OBSTRUCTIVE SLEEP APNEA): ICD-10-CM

## 2018-10-02 DIAGNOSIS — I44.7 LBBB (LEFT BUNDLE BRANCH BLOCK): ICD-10-CM

## 2018-10-02 DIAGNOSIS — Z95.810 PRESENCE OF BIVENTRICULAR AUTOMATIC CARDIOVERTER/DEFIBRILLATOR (AICD): ICD-10-CM

## 2018-10-02 DIAGNOSIS — I50.22 CHRONIC SYSTOLIC CONGESTIVE HEART FAILURE (HCC): ICD-10-CM

## 2018-10-02 DIAGNOSIS — I71.20 THORACIC AORTIC ANEURYSM WITHOUT RUPTURE: ICD-10-CM

## 2018-10-02 DIAGNOSIS — I48.0 PAROXYSMAL ATRIAL FIBRILLATION (HCC): ICD-10-CM

## 2018-10-02 DIAGNOSIS — J45.20 MILD INTERMITTENT REACTIVE AIRWAY DISEASE WITHOUT COMPLICATION: ICD-10-CM

## 2018-10-02 NOTE — PROGRESS NOTES
[facility]   [address] [facility], [facility] [address]                                          NEW PATIENT HPI/FOLLOW-UP      NAME:  Sowmya Carrion   :   3/17/1925   MRN:   [id number]   PCP:  Colton Gomez MD           Subjective: The patient is a 80y.o. year old female  who returns for a routine follow-up. Since the last visit, patient reports no new symptoms. Just back from [facility] visiting Son where she states shew had no untoward complications. Just wanted to return back home. Is contemplating Cruise to [place] and [place]. She feels she can as she walks on treadmill every day 45 min without symptoms. Oxygen sat in mid to upper 90's. Denies change in exercise tolerance, chest pain, edema, medication intolerance, palpitations, shortness of breath, PND/orthopnea wheezing, sputum, syncope, dizziness or light headedness. Remarkably doing satisfactorily. Review of Systems  General: Pt denies excessive weight gain or loss. Pt is able to conduct ADL's. Respiratory: Denies shortness of breath, JENKINS, wheezing or stridor.   Cardiovascular: Denies precordial pain, palpitations, edema or PND  Gastrointestinal: Denies poor appetite, indigestion, abdominal pain or blood in stool  Peripheral vascular: Denies claudication, leg cramps  Neuropsychiatric: Denies paresthesias,tingling,numbness,anxiety,depression,fatigue  Musculoskeletal: Denies pain,tenderness, soreness,swelling      Past Medical History:   Diagnosis Date    Aortic insufficiency     Asthma     Cancer ([facility] [address]     lymphoma    CKD (chronic kidney disease) stage 3, GFR 30-59 ml/min ([facility]) 1/10/2018    Congestive heart failure, NYHA class II, chronic, systolic ([facility])     Heart failure ([facility] [address]     Hypertension     Mitral valvular regurgitation 2016    ECHO 2/3/17: LV dilated, EF 20-25%, mild LVH, RV mod dilated, mild- mod MELANIA, mod-severe MR, mod AI ECHO 17: EF 15%, severe DHK, reduced RVEF, RVH, RVSP 35 mmHg Mild LAE, mod-marked MA fibrosis, mod-severe MR (2+), AO root dilated,      Presence of biventricular automatic cardioverter/defibrillator (AICD) 7/2/2015    [employer] biventricular AICD implant    Stomach ulcer      Patient Active Problem List    Diagnosis Date Noted    Seasonal allergic rhinitis 07/02/2018    Acquired hypothyroidism 03/18/2018    Aneurysmal dilatation ([address] 03/18/2018    CKD (chronic kidney disease) stage 3, GFR 30-59 ml/min ([address] 01/10/2018    Xerosis of skin 12/21/2017    Dyslipidemia 12/21/2017    Paroxysmal atrial fibrillation ([address] 10/21/2017    Hypokalemia 04/21/2017    Acute respiratory insufficiency 02/02/2017    Gastroesophageal reflux disease with esophagitis 02/02/2017    Thoracic aortic aneurysm without rupture ([address] 02/02/2017    Physical deconditioning 01/29/2017    Mitral valvular regurgitation 01/22/2016    Lymphoma ([address] 11/17/2015    Chronic systolic congestive heart failure ([address] 10/08/2015    Presence of biventricular automatic cardioverter/defibrillator (AICD) 07/02/2015    LBBB (left bundle branch block) 07/01/2015    HTN (hypertension) 06/29/2015    Gout 06/29/2015    Reactive airway disease 03/23/2015    Anemia 11/08/2014    Rhinitis 09/04/2014    Cardiomyopathy, dilated, 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 08/04/2012    NHL (non-Hodgkin's lymphoma) ([address] 08/04/2012    DILAN (obstructive sleep apnea) 08/04/2012    Abdominal pain 05/22/2012     Class: Acute    Weight loss 05/22/2012     Class: Acute      Past Surgical History:   Procedure Laterality Date    HX BREAST BIOPSY Bilateral     40 years ago    HX COLONOSCOPY      HX HEENT      cataracts removed    HX HYSTERECTOMY      HX OTHER SURGICAL      lymph node surgery    HX TONSILLECTOMY      MN EGD TRANSORAL BIOPSY SINGLE/MULTIPLE  5/23/2012         SINUS SURGERY PROC UNLISTED      Nasal polyps removed     Allergies   Allergen Reactions    Codeine Other (comments)     \"made me disoriented\" per pt      Family History   Problem Relation Age of Onset    Heart Disease Mother     Stroke Father       Social History     Social History    Marital status:      Spouse name: N/A    Number of children: 1    Years of education: 16+     Occupational History    retired teacher      Social History Main Topics    Smoking status: Never Smoker    Smokeless tobacco: Never Used    Alcohol use No    Drug use: No    Sexual activity: No     Other Topics Concern    Not on file     Social History Narrative      Current Outpatient Prescriptions   Medication Sig    furosemide (LASIX) 20 mg tablet Take 1 Tab by mouth two (2) times a day.  sacubitril-valsartan (ENTRESTO) 49 mg/51 mg tablet Take 1 Tab by mouth two (2) times a day.  albuterol (PROVENTIL VENTOLIN) 2.5 mg /3 mL (0.083 %) nebulizer solution 3 mL by Nebulization route every four (4) hours as needed for Wheezing.  apixaban (ELIQUIS) 2.5 mg tablet Take 1 Tab by mouth two (2) times a day. Indications: PREVENT THROMBOEMBOLISM IN CHRONIC ATRIAL FIBRILLATION    fluticasone-salmeterol (ADVAIR) 250-50 mcg/dose diskus inhaler Take 1 Puff by inhalation two (2) times a day.  carvedilol (COREG) 3.125 mg tablet TAKE ONE (1) TABLET(S) TWIC E DAILY WITH FOOD  Indications: chronic heart failure    allopurinol (ZYLOPRIM) 300 mg tablet TAKE ONE (1) TABLET(S) DAILY    atorvastatin (LIPITOR) 10 mg tablet TAKE ONE (1) TABLET(S) DAILY    levothyroxine (SYNTHROID) 25 mcg tablet Take 1 Tab by mouth Daily (before breakfast).  magnesium oxide 400 mg cap Take 1 Cap by mouth daily.  cholecalciferol (VITAMIN D3) 1,000 unit cap Take 1,000 Units by mouth daily.  loratadine (CLARITIN) 10 mg tablet Take 10 mg by mouth daily as needed.  co-enzyme Q-10 (CO Q-10) 100 mg capsule Take 100 mg by mouth daily.  OXYGEN-AIR DELIVERY SYSTEMS 2.5 L by Does Not Apply route nightly.  multivitamin (ONE A DAY) tablet Take 1 Tab by mouth daily.     potassium chloride SR (KLOR-CON 10) 10 mEq tablet Take 1 Tab by mouth daily.  ibrutinib (IMBRUVICA) 140 mg cap Take 420 mg by mouth five (5) days a week. 140 mg cap, takes 3 caps five days a week (none on Sunday or Wednesday)    acetaminophen (TYLENOL EXTRA STRENGTH) 500 mg tablet Take 500 mg by mouth every six (6) hours as needed for Pain. No current facility-administered medications for this visit. I have reviewed the nurses notes, vitals, problem list, allergy list, medical history, family medical, social history and medications. Objective:     Physical Exam:     Vitals:    10/02/18 1134   BP: 97/60   Pulse: 70   Resp: 20   SpO2: 98%   Weight: 116 lb (52.6 kg)   Height: 5' 2\" (1.575 m)    Body mass index is 21.22 kg/(m^2). General: Well developed, in no acute distress. HEENT: No carotid bruits, no JVD, trach is midline. Heart:  Normal S1/S2 negative S3 or S4. Regular, Gr 2/6 systolic murmur, -gallop or rub.   Respiratory: Clear bilaterally, no wheezing or rales  Abdomen:   Soft, non-tender, bowel sounds are active.   Extremities:  No edema, normal cap refill, no cyanosis. Neuro: A&Ox3, speech clear, gait stable. Skin: Skin color is normal. No rashes or lesions. No diaphoresis.   Vascular: 2+ pulses symmetric in all extremities        Data Review:       Cardiographics:    EKG: Electronic ventricular pacemaker    Cardiology Labs:    Results for orders placed or performed during the hospital encounter of 04/20/17   EKG, 12 LEAD, INITIAL   Result Value Ref Range    Ventricular Rate 76 BPM    Atrial Rate 76 BPM    P-R Interval 172 ms    QRS Duration 190 ms    Q-T Interval 504 ms    QTC Calculation (Bezet) 567 ms    Calculated R Axis -150 degrees    Calculated T Axis 12 degrees    Diagnosis       AV dual-paced rhythm with occasional ventricular-paced complexes and with   occasional premature ventricular complexes  When compared with ECG of 01-FEB-2017 19:26,  premature ventricular complexes are now present  Vent. rate has decreased BY   4 BPM  Confirmed by Dustin Walker ([id number]) on 4/20/2017 7:32:54 PM         Lab Results   Component Value Date/Time    Cholesterol, total 170 03/01/2018 01:56 PM    HDL Cholesterol 69 03/01/2018 01:56 PM    LDL, calculated 88 03/01/2018 01:56 PM    Triglyceride 64 03/01/2018 01:56 PM    CHOL/HDL Ratio 4.5 08/04/2012 03:15 PM       Lab Results   Component Value Date/Time    Sodium 143 09/28/2018 04:32 PM    Potassium 4.4 09/28/2018 04:32 PM    Chloride 102 09/28/2018 04:32 PM    CO2 25 09/28/2018 04:32 PM    Anion gap 10 04/22/2017 03:51 AM    Glucose 82 09/28/2018 04:32 PM    BUN 26 09/28/2018 04:32 PM    Creatinine 1.12 (H) 09/28/2018 04:32 PM    BUN/Creatinine ratio 23 09/28/2018 04:32 PM    GFR est AA 49 (L) 09/28/2018 04:32 PM    GFR est non-AA 42 (L) 09/28/2018 04:32 PM    Calcium 8.8 09/28/2018 04:32 PM    Bilirubin, total 0.4 03/01/2018 01:56 PM    AST (SGOT) 25 03/01/2018 01:56 PM    Alk. phosphatase 67 03/01/2018 01:56 PM    Protein, total 5.9 (L) 03/01/2018 01:56 PM    Albumin 4.0 03/01/2018 01:56 PM    Globulin 3.0 04/20/2017 09:40 AM    A-G Ratio 2.1 03/01/2018 01:56 PM    ALT (SGPT) 13 03/01/2018 01:56 PM          Assessment:       ICD-10-CM ICD-9-CM    1. Essential hypertension I10 401.9 AMB POC EKG ROUTINE W/ 12 LEADS, INTER & REP   2. Cardiomyopathy, dilated, 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 I42.0 425.4    3. Dyslipidemia E78.5 272.4    4. Paroxysmal atrial fibrillation (HCC) I48.0 427.31    5. Thoracic aortic aneurysm without rupture (HCC) I71.2 441.2    6. Chronic systolic congestive heart failure (HCC) I50.22 428.22      428.0    7. LBBB (left bundle branch block) I44.7 426.3    8. CKD (chronic kidney disease) stage 3, GFR 30-59 ml/min (HCC) N18.3 585.3    9. Gastroesophageal reflux disease with esophagitis K21.0 530.11    10. Presence of biventricular automatic cardioverter/defibrillator (AICD) Z95.810 V45.02    11.  DILAN (obstructive sleep apnea) G47.33 327.23    12. Mild intermittent reactive airway disease without complication P92.38 725.68          Discussion: Patient presents at this time stable from a cardiac perspective. BP running little low in high 90's low 100's on mid-level Enstresto, low dose Coreg and Lasix. CSHF compensated. DILAN compensated. Uses oxygen at night. Walks 45min on treadmill each morning without duress. Oxygen sats in upper 90's according to caregivers x2 with her today. Has not followed up with [facility] and the [facility] due to personality conflict. However advised would be good idea to resume visits. She agrees. Patient and caregivers will reach out to NP Franco Osorio in [facility] and the [facility]. Pleased with present cardiac status. As stable, should be able to tolerate Cruise to [facility] and [place] with night-time oxygen. Wants to enjoy her \"last years\". Plan: 1. Continue same meds. Lipid profile and labs followed by PCP. 2.Encouraged to exercise to tolerance and follow low fat, low cholesterol, low sodium predominantly Plant-based (consider Mediterranean) diet. Call with questions or concerns. Will follow up any test results by phone and/or f/u here in office if needed. Roverto Jain 3.Follow up: 6 MONTHS    I have discussed the diagnosis with the patient and the intended plan as seen in the above orders. The patient has received an after-visit summary and questions were answered concerning future plans. I have discussed any concerning medication side effects and warnings with the patient as well.     Perla Llamas MD  10/2/2018

## 2018-10-02 NOTE — MR AVS SNAPSHOT
[address] [facility] 
 
 
 [address] 
[phone] Patient: Xenia Chance MRN: [id number] Visit Information Date & Time Provider Department Dept. Phone Encounter #  
 10/2/2018 11:15 AM Mikki Prieto MD [facility] at [address] [place] [address] [phone] Your Appointments 11/30/2018 11:30 AM  
Any with Bakari Ryan MD  
[address] [place] [address] and Primary Care 365[address] [facility]) Appt Note: 2 month follow up  
 [address] 1 [facility]  
  
   
 [address] [phone]  
  
    
 12/5/2018  8:00 AM  
PROCEDURE with [facility] [address] [place] [address]) Appt Note: Not an office visit, ICD remote check Par 1 [facility]  
[phone] [facility]6 [facility] Upcoming Health Maintenance Date Due Shingrix Vaccine Age 50> (1 of 2) 3/17/1975 Influenza Age 5 to Adult 8/1/2018 MEDICARE YEARLY EXAM 7/3/2019 GLAUCOMA SCREENING Q2Y 1/24/2020 DTaP/Tdap/Td series (2 - Td) 2/13/2027 Allergies as of 10/2/2018  Review Complete On: 10/2/2018 By: Jono Rock LPN Severity Noted Reaction Type Reactions Codeine  05/21/2012   Side Effect Other (comments) \"made me disoriented\" per pt Current Immunizations  Reviewed on 6/29/2015 Name Date Influenza High Dose Vaccine PF 9/27/2017 Influenza Vaccine 10/1/2016, 10/4/2014 Influenza Vaccine Split 10/22/2011 ZZZ-RETIRED (DO NOT USE) Pneumococcal Vaccine (Unspecified Type) 5/22/2007 Not reviewed this visit You Were Diagnosed With   
  
 Codes Comments Essential hypertension    -  Primary ICD-10-CM: I10 
ICD-9-CM: 401.9 Cardiomyopathy, dilated, nonischemic (HCC)     ICD-10-CM: I42.0 ICD-9-CM: 425.4 Dyslipidemia     ICD-10-CM: E78.5 ICD-9-CM: 272.4 Paroxysmal atrial fibrillation (HCC)     ICD-10-CM: I48.0 ICD-9-CM: 427.31 Thoracic aortic aneurysm without rupture (HCC)     ICD-10-CM: I71.2 ICD-9-CM: 701. 2 Chronic systolic congestive heart failure (HCC)     ICD-10-CM: I50.22 ICD-9-CM: 428.22, 428.0 LBBB (left bundle branch block)     ICD-10-CM: I44.7 ICD-9-CM: 426.3 CKD (chronic kidney disease) stage 3, GFR 30-59 ml/min (HCC)     ICD-10-CM: N18.3 ICD-9-CM: 062. 3 Gastroesophageal reflux disease with esophagitis     ICD-10-CM: K21.0 ICD-9-CM: 530.11 Presence of biventricular automatic cardioverter/defibrillator (AICD)     ICD-10-CM: Z95.810 ICD-9-CM: V45.02   
 DILAN (obstructive sleep apnea)     ICD-10-CM: G47.33 
ICD-9-CM: 327.23 Mild intermittent reactive airway disease without complication     K-57-YS: J45.20 ICD-9-CM: 493.90 Vitals BP Pulse Resp Height(growth percentile) Weight(growth percentile) SpO2  
 97/60 (BP 1 Location: Left arm, BP Patient Position: Sitting) 70 20 5' 2\" (1.575 m) 116 lb (52.6 kg) 98% BMI OB Status Smoking Status 21.22 kg/m2 Postmenopausal Never Smoker Vitals History BMI and BSA Data Body Mass Index Body Surface Area  
 21.22 kg/m 2 1.52 m 2 Preferred Pharmacy Pharmacy Name Phone 69 [facility] [phone] Your Updated Medication List  
  
   
This list is accurate as of 10/2/18 12:18 PM.  Always use your most recent med list.  
  
  
  
  
 albuterol 2.5 mg /3 mL (0.083 %) nebulizer solution Commonly known as:  PROVENTIL VENTOLIN  
3 mL by Nebulization route every four (4) hours as needed for Wheezing. allopurinol 300 mg tablet Commonly known as:  ZYLOPRIM  
TAKE ONE (1) TABLET(S) DAILY  
  
 apixaban 2.5 mg tablet Commonly known as:  Vishnu Nasuti Take 1 Tab by mouth two (2) times a day.  Indications: PREVENT THROMBOEMBOLISM IN CHRONIC ATRIAL FIBRILLATION  
  
 atorvastatin 10 mg tablet Commonly known as:  LIPITOR  
TAKE ONE (1) TABLET(S) DAILY  
  
 carvedilol 3.125 mg tablet Commonly known as:  COREG  
TAKE ONE (1) TABLET(S) TWIC E DAILY WITH FOOD  Indications: chronic heart failure  
  
 co-enzyme Q-10 100 mg capsule Commonly known as:  CO Q-10 Take 100 mg by mouth daily. fluticasone-salmeterol 250-50 mcg/dose diskus inhaler Commonly known as:  ADVAIR Take 1 Puff by inhalation two (2) times a day. furosemide 20 mg tablet Commonly known as:  LASIX Take 1 Tab by mouth two (2) times a day. IMBRUVICA 140 mg capsule Generic drug:  ibrutinib Take 420 mg by mouth five (5) days a week. 140 mg cap, takes 3 caps five days a week (none on Sunday or Wednesday) levothyroxine 25 mcg tablet Commonly known as:  SYNTHROID Take 1 Tab by mouth Daily (before breakfast). loratadine 10 mg tablet Commonly known as:  Federico Prima Take 10 mg by mouth daily as needed. magnesium oxide 400 mg Cap Take 1 Cap by mouth daily. multivitamin tablet Commonly known as:  ONE A DAY Take 1 Tab by mouth daily. OXYGEN-AIR DELIVERY SYSTEMS  
2.5 L by Does Not Apply route nightly. potassium chloride SR 10 mEq tablet Commonly known as:  KLOR-CON 10 Take 1 Tab by mouth daily. sacubitril-valsartan 49-51 mg Tab tablet Commonly known as:  ENTRESTO Take 1 Tab by mouth two (2) times a day. TYLENOL EXTRA STRENGTH 500 mg tablet Generic drug:  acetaminophen Take 500 mg by mouth every six (6) hours as needed for Pain. VITAMIN D3 1,000 unit Cap Generic drug:  cholecalciferol Take 1,000 Units by mouth daily. We Performed the Following AMB POC EKG ROUTINE W/ 12 LEADS, INTER & REP [17994 CPT(R)] Introducing [facility] & HEALTH SERVICES! Dear Justice Crump: Thank you for requesting a MyChart account.   Our records indicate that you already have an active [employer] account. You can access your account anytime at [email or web address] Did you know that you can access your hospital and ER discharge instructions at any time in [employer]? You can also review all of your test results from your hospital stay or ER visit. Additional Information If you have questions, please visit the Frequently Asked Questions section of the [employer] website at [email or web address] Remember, [employer] is NOT to be used for urgent needs. For medical emergencies, dial 911. Now available from your iPhone and Android! Please provide this summary of care documentation to your next provider. Your primary care clinician is listed as Gail Dey. If you have any questions after today's visit, please call [phone].

## 2018-10-02 NOTE — PROGRESS NOTES
Chief Complaint   Patient presents with    Hypertension     6 mo fu    Coronary Artery Disease     1. Have you been to the ER, urgent care clinic since your last visit? Hospitalized since your last visit? No    2. Have you seen or consulted any other health care providers outside of the [address] [place] [address] since your last visit? Include any pap smears or colon screening. No     C/o intermitent ankle edema.

## 2018-10-16 ENCOUNTER — OFFICE VISIT (OUTPATIENT)
Dept: CARDIOLOGY CLINIC | Age: 83
End: 2018-10-16

## 2018-10-16 VITALS
RESPIRATION RATE: 20 BRPM | OXYGEN SATURATION: 97 % | SYSTOLIC BLOOD PRESSURE: 96 MMHG | DIASTOLIC BLOOD PRESSURE: 56 MMHG | WEIGHT: 127.9 LBS | HEIGHT: 62 IN | BODY MASS INDEX: 23.53 KG/M2 | HEART RATE: 70 BPM | TEMPERATURE: 96.9 F

## 2018-10-16 DIAGNOSIS — I50.22 CHRONIC SYSTOLIC CONGESTIVE HEART FAILURE (HCC): ICD-10-CM

## 2018-10-16 DIAGNOSIS — R53.81 PHYSICAL DECONDITIONING: ICD-10-CM

## 2018-10-16 DIAGNOSIS — I71.20 THORACIC AORTIC ANEURYSM WITHOUT RUPTURE: ICD-10-CM

## 2018-10-16 DIAGNOSIS — N18.30 CKD (CHRONIC KIDNEY DISEASE) STAGE 3, GFR 30-59 ML/MIN (HCC): Chronic | ICD-10-CM

## 2018-10-16 DIAGNOSIS — I34.0 MITRAL VALVE INSUFFICIENCY, UNSPECIFIED ETIOLOGY: Chronic | ICD-10-CM

## 2018-10-16 DIAGNOSIS — I35.1 NONRHEUMATIC AORTIC VALVE INSUFFICIENCY: ICD-10-CM

## 2018-10-16 DIAGNOSIS — I42.0 CARDIOMYOPATHY, DILATED, NONISCHEMIC (HCC): Primary | Chronic | ICD-10-CM

## 2018-10-16 DIAGNOSIS — R06.09 DOE (DYSPNEA ON EXERTION): ICD-10-CM

## 2018-10-16 DIAGNOSIS — I48.0 PAROXYSMAL ATRIAL FIBRILLATION (HCC): ICD-10-CM

## 2018-10-16 DIAGNOSIS — C85.91 NON-HODGKIN LYMPHOMA OF LYMPH NODES OF NECK, UNSPECIFIED NON-HODGKIN LYMPHOMA TYPE (HCC): ICD-10-CM

## 2018-10-16 NOTE — MR AVS SNAPSHOT
[address] [facility], [address] One [facility] 
[phone] Patient: Christine Butt MRN: [id number] Visit Information Date & Time Provider Department Dept. Phone Encounter #  
 10/16/2018 11:00 AM Suzy Hill Luhalvino Martha 3. [phone] [id number] Follow-up Instructions Return in about 3 months (around 1/16/2019) for sooner if needed . Your Appointments 11/30/2018 11:30 AM  
Any with Dl Lynn MD  
[address] [place] [address] and Primary Care 3651 [facility]) Appt Note: 2 month follow up  
 [address] 1 [facility]  
  
   
 [address] [phone]  
  
    
 12/5/2018  8:00 AM  
PROCEDURE with [facility] [address] [facility]) Appt Note: Not an office visit, ICD remote check [facility] 1 [employer]  
[phone] [address] [facility]  
  
    
 4/16/2019 11:30 AM  
ESTABLISHED PATIENT with Ibeth Lindsey MD  
[facility] at [facility]) Appt Note: SIX MONTH FOLLOW UP-  [facility]  
 [address] One [facility]  
[phone] [address] Upcoming Health Maintenance Date Due Shingrix Vaccine Age 50> (1 of 2) 3/17/1975 Influenza Age 5 to Adult 8/1/2018 MEDICARE YEARLY EXAM 7/3/2019 GLAUCOMA SCREENING Q2Y 1/24/2020 DTaP/Tdap/Td series (2 - Td) 2/13/2027 Allergies as of 10/16/2018  Review Complete On: 10/16/2018 By: VLAD Hill Severity Noted Reaction Type Reactions Codeine  05/21/2012   Side Effect Other (comments) \"made me disoriented\" per pt Current Immunizations  Reviewed on 6/29/2015 Name Date Influenza High Dose Vaccine PF 9/27/2017 Influenza Vaccine 10/1/2016, 10/4/2014 Influenza Vaccine Split 10/22/2011 ZZZ-RETIRED (DO NOT USE) Pneumococcal Vaccine (Unspecified Type) 5/22/2007 Not reviewed this visit You Were Diagnosed With   
  
 Codes Comments Cardiomyopathy, dilated, nonischemic (HCC)    -  Primary ICD-10-CM: I42.0 ICD-9-CM: 425.4 Mitral valve insufficiency, unspecified etiology     ICD-10-CM: I34.0 ICD-9-CM: 424.0 Chronic systolic congestive heart failure (HCC)     ICD-10-CM: I50.22 ICD-9-CM: 428.22, 428.0 Thoracic aortic aneurysm without rupture (HCC)     ICD-10-CM: I71.2 ICD-9-CM: 355. 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 
ICD-9-CM: 202.81 Physical deconditioning     ICD-10-CM: R53.81 ICD-9-CM: 799.3 JENKINS (dyspnea on exertion)     ICD-10-CM: R06.09 
ICD-9-CM: 786.09 Vitals BP Pulse Temp Resp Height(growth percentile) Weight(growth percentile) 96/56 (BP 1 Location: Left arm, BP Patient Position: Sitting) 70 96.9 °F (36.1 °C) (Oral) 20 5' 2\" (1.575 m) 127 lb 14.4 oz (58 kg) SpO2 BMI OB Status Smoking Status 97% 23.39 kg/m2 Postmenopausal Never Smoker Vitals History BMI and BSA Data Body Mass Index Body Surface Area  
 23.39 kg/m 2 1.59 m 2 Preferred Pharmacy Pharmacy Name Phone 69 [facility] 6712 [address] Your Updated Medication List  
  
   
This list is accurate as of 10/16/18 12:11 PM.  Always use your most recent med list.  
  
  
  
  
 albuterol 2.5 mg /3 mL (0.083 %) nebulizer solution Commonly known as:  PROVENTIL VENTOLIN  
 3 mL by Nebulization route every four (4) hours as needed for Wheezing. allopurinol 300 mg tablet Commonly known as:  ZYLOPRIM  
TAKE ONE (1) TABLET(S) DAILY  
  
 apixaban 2.5 mg tablet Commonly known as:  Sedonia Hazy Take 1 Tab by mouth two (2) times a day. Indications: PREVENT THROMBOEMBOLISM IN CHRONIC ATRIAL FIBRILLATION  
  
 atorvastatin 10 mg tablet Commonly known as:  LIPITOR  
TAKE ONE (1) TABLET(S) DAILY  
  
 carvedilol 3.125 mg tablet Commonly known as:  COREG  
TAKE ONE (1) TABLET(S) TWIC E DAILY WITH FOOD  Indications: chronic heart failure  
  
 co-enzyme Q-10 100 mg capsule Commonly known as:  CO Q-10 Take 100 mg by mouth daily. fluticasone-salmeterol 250-50 mcg/dose diskus inhaler Commonly known as:  ADVAIR Take 1 Puff by inhalation two (2) times a day. furosemide 20 mg tablet Commonly known as:  LASIX Take 1 Tab by mouth two (2) times a day. IMBRUVICA 140 mg capsule Generic drug:  ibrutinib Take 420 mg by mouth five (5) days a week. 140 mg cap, takes 3 caps five days a week (none on Sunday or Wednesday) levothyroxine 25 mcg tablet Commonly known as:  SYNTHROID Take 1 Tab by mouth Daily (before breakfast). loratadine 10 mg tablet Commonly known as:  Karn Nely Take 10 mg by mouth daily as needed. magnesium oxide 400 mg Cap Take 1 Cap by mouth daily. multivitamin tablet Commonly known as:  ONE A DAY Take 1 Tab by mouth daily. OXYGEN-AIR DELIVERY SYSTEMS  
2.5 L by Does Not Apply route nightly. potassium chloride SR 10 mEq tablet Commonly known as:  KLOR-CON 10 Take 1 Tab by mouth daily. sacubitril-valsartan 49-51 mg Tab tablet Commonly known as:  ENTRESTO Take 1 Tab by mouth two (2) times a day. TYLENOL EXTRA STRENGTH 500 mg tablet Generic drug:  acetaminophen Take 500 mg by mouth every six (6) hours as needed for Pain. VITAMIN D3 1,000 unit Cap Generic drug:  cholecalciferol Take 1,000 Units by mouth daily. We Performed the Following METABOLIC PANEL, BASIC [15898 CPT(R)] NT-PRO BNP [id number] CPT(R)] Follow-up Instructions Return in about 3 months (around 1/16/2019) for sooner if needed . Patient Instructions You are doing great CONTINUE CURRENT medications Take twice a day medications 12 hours apart with food Labs today BMP, proBNP Add mild strength training to your exercises, sitting and standing exercises, balance and stretching (yoda, Willie Chi) Limit WHITE foods (flour, sugar, pasta, rice, potatoes) Keep a positive attitude HF Education: Continue daily weights (in the morning, after voiding). Notify HF team of overnight weight gains > 2 lbs or weekly >5 lbs or if any of the following Sx. Continue to limit sodium intake & monitor your fluid intake . Introducing [facility] & HEALTH SERVICES! Dear Ashley Denise: Thank you for requesting a [employer] account. Our records indicate that you already have an active [employer] account. You can access your account anytime at [email or web address] Did you know that you can access your hospital and ER discharge instructions at any time in [employer]? You can also review all of your test results from your hospital stay or ER visit. Additional Information If you have questions, please visit the Frequently Asked Questions section of the [employer] website at [email or web address] Remember, [employer] is NOT to be used for urgent needs. For medical emergencies, dial 911. Now available from your iPhone and Android! Please provide this summary of care documentation to your next provider. Your primary care clinician is listed as Gail Dey. If you have any questions after today's visit, please call [phone].

## 2018-10-16 NOTE — PROGRESS NOTES
[facility] Clinic Note      DOS:  10/16/2018  REFERRING PROVIDER: Dr. Tresa Staley: Tla Quiroz MD  PRIMARY CARDIOLOGIST: Dr. Emmie Garay  ELECTROPHYSIOLOGIST: Dr Reva Covington  Oncology: Dr. Carla Ruby / Plan:   NICM, HFrEF 20-25%, NYHA class II   Continue entresto 49/51- unable to uptitrate 2/2 BP and AI- BNP increase showing effectiveness of Entresto   Continue   lasix 20 mg  BID per Dr. Herman Tabares coreg   Cont daily wts,  Low NA diet            Continue daily treatmill, add light weights for strength training, and working on sitting and standing exercises, chair yoga, and balance    Check proBNP and BMP in the future with her next lab draw      Follow up 3 months    Echo in January with Dr. Emmie Garay             Follow up Dr. Marlene Correia November, Dr. Emmie Garay in January, and [facility] in March    SCD px BiVAICD- no shocks, followed by Dr Joceline Llanos    Chronic Afib- V paced - followed by Dr John Wan coreg   Continue Eliquis - no bleeding issues      MR/AI-     Continue to monitor annually and prn sx     DILAN   Continue nocturnal Home O2    HTN - BP lowish- no sx of orthostasis         CKD- Cr stable 1.27 per Dr Maral Redman-     On lipitor and CoQ 10   Per Dr Emmie Garay     Vitamin D deficiency   Continue supplement     Non-Hodgkins Lymphoma- stable followed by Dr. Mary Campos    I have discussed the diagnosis with  Dung Sabillon and the intended plan as seen in the Thank you for allowing me to participate in the care of this delightful lady. Please do not hesitate to call with any questions. Julissa Johnson  RN, ACNP-BC, [facility]    Chief Conmplaint    Chief Complaint   Patient presents with    Follow-up         HPI:   Dung Sabillon is a delightful, spry  80y.o. year old female with a history of NICM with chronic HFrEF- NYHA class II s/p BiV AICD In the setting of HTN complicated by valvular heart disease (AI/MR), persistent afib (Eliquis), LBBB, CKD-III, GERD, DILAN,  non-Hodgkins lymphoma and RAD who presents for follow up of her chronic HF    She was last seen ~ 5 months ago. Her proBNP was reduced to 2200, Cr 1.27. Her , Emma Rico,  is with her today from [facility]. Since her last visit, she has seen Dr. Van Vora and Dr. Kar Ennis who recommended her lasix remain at 20 mg BID. She has returned from a several month visit with her son in [place]. She remains active, walks on the treadmill ~ 45 minutes daily without sx of chest pain or SOB. She notes JENKINS if  she is anxious or rushing around, she feels better with the Entresto. She is able to perform ADLS w/o JENKINS. Her energy level is stable and she naps most days. She wears compression stocking. She sleeps well on 2 pillows, and is compliant with O2 when sleeping. Chronic LE edema better with her compression stockings. She follows daily weights which are stable (128-130 lbs)  and /60 range. Her appetite is good, she eats 2 meals per day and she limits her sodium. Episode of ataxia last month in the setting of rushing around, and fell in the garage. Denies exertional chest pain, palpitations, cough, no AICD shocks, lightheadedness, dizziness, syncope, near syncope, or bleeding issues. She is concerned about bruising. She has decided not to go on her trip to [facility]. Deandre Donis lives alone and has a care giver through an agency. She is a retired teacher. Her one  son  lives in [place] and has been diagnosed with Parkinson's Disease.        REVIEW of SYSTEMS:     General:  positive for  - fatigue, denies sleep disturbance, fever   HEENT: negative for epistaxsis   Pulmonary: Wheezing some in  the morning relieved with nebulizer Negative for cough,   Cardiac: Per HPI  GI:  negative for - abdominal pain, constipation, diarrhea, heartburn, hematemesis, melena or nausea/vomiting  Musculo: negative for - joint pain or muscle pain  Neuro: negative for - headaches, seizures or speech problems, no TIA, CVA or SZ  Skin:  negative for - rash  Allergies: REMINDER:DOCUMENT ALLERGIES IN ALLERGY ACTIVITY  Psych: negative for - anxiety or sleep disturbances         CARDIAC EVALUATION  ECHO 2/3/17: LV dilated, EF 20-25%, mild LVH, RV mod dilated, mild- mod MELANIA, mod-severe MR, mod AI  ECHO 7/29/17: EF 15%, severe DHK, reduced RVEF, RVH, RVSP 35 mmHg   Mild LAE, mod-marked MAC, mod-severe MR (2+), AO root dilated,  ECHO 1/10/2018: LV dilated, EF 25%, severe DHK, LVH, LAE, mild-mod MAC, mod-severe MR, mild-mod AI, mild TR        BiVAICD 7/2015: Seelio    EKG: underlying afib V paced       History:  Past Medical History:   Diagnosis Date    Aortic insufficiency     Asthma     [facility]     lymphoma    CKD (chronic kidney disease) stage 3, GFR 30-59 ml/min ([facility]) 1/10/2018    Congestive heart failure, NYHA class II, chronic, systolic ([facility])     [facility] failure ([facility]     Hypertension     Mitral valvular regurgitation 1/22/2016    ECHO 2/3/17: LV dilated, EF 20-25%, mild LVH, RV mod dilated, mild- mod MELANIA, mod-severe MR, mod AI ECHO 7/29/17: EF 15%, severe DHK, reduced RVEF, RVH, RVSP 35 mmHg  Mild LAE, mod-marked MA fibrosis, mod-severe MR (2+), AO root dilated,      Presence of biventricular automatic cardioverter/defibrillator (AICD) 7/2/2015    [employer] biventricular AICD implant    Stomach ulcer      Past Surgical History:   Procedure Laterality Date    HX BREAST BIOPSY Bilateral     40 years ago    HX COLONOSCOPY      HX HEENT      cataracts removed    HX HYSTERECTOMY      HX OTHER SURGICAL      lymph node surgery    HX TONSILLECTOMY      WA EGD TRANSORAL BIOPSY SINGLE/MULTIPLE  5/23/2012         SINUS SURGERY PROC UNLISTED      Nasal polyps removed     Social History     Social History    Marital status:      Spouse name: N/A    Number of children: 1    Years of education: 16+     Occupational History    retired teacher      Social History Main Topics    Smoking status: Never Smoker    Smokeless tobacco: Never Used    Alcohol use No    Drug use: No    Sexual activity: No     Other Topics Concern    Not on file     Social History Narrative     Family History   Problem Relation Age of Onset    Heart Disease Mother     Stroke Father        Current Medications:   Current Outpatient Prescriptions   Medication Sig Dispense Refill    furosemide (LASIX) 20 mg tablet Take 1 Tab by mouth two (2) times a day. 180 Tab 3    potassium chloride SR (KLOR-CON 10) 10 mEq tablet Take 1 Tab by mouth daily. 90 Tab 3    sacubitril-valsartan (ENTRESTO) 49 mg/51 mg tablet Take 1 Tab by mouth two (2) times a day. 180 Tab 3    albuterol (PROVENTIL VENTOLIN) 2.5 mg /3 mL (0.083 %) nebulizer solution 3 mL by Nebulization route every four (4) hours as needed for Wheezing. 100 Each 11    apixaban (ELIQUIS) 2.5 mg tablet Take 1 Tab by mouth two (2) times a day. Indications: PREVENT THROMBOEMBOLISM IN CHRONIC ATRIAL FIBRILLATION 180 Tab 3    carvedilol (COREG) 3.125 mg tablet TAKE ONE (1) TABLET(S) TWIC E DAILY WITH FOOD  Indications: chronic heart failure 180 Tab 3    allopurinol (ZYLOPRIM) 300 mg tablet TAKE ONE (1) TABLET(S) DAILY 90 Tab 3    atorvastatin (LIPITOR) 10 mg tablet TAKE ONE (1) TABLET(S) DAILY 90 Tab 3    levothyroxine (SYNTHROID) 25 mcg tablet Take 1 Tab by mouth Daily (before breakfast). 90 Tab 3    magnesium oxide 400 mg cap Take 1 Cap by mouth daily.  cholecalciferol (VITAMIN D3) 1,000 unit cap Take 1,000 Units by mouth daily.  loratadine (CLARITIN) 10 mg tablet Take 10 mg by mouth daily as needed.  OXYGEN-AIR DELIVERY SYSTEMS 2.5 L by Does Not Apply route nightly.  ibrutinib (IMBRUVICA) 140 mg cap Take 420 mg by mouth five (5) days a week.  140 mg cap, takes 3 caps five days a week (none on Sunday or Wednesday)      acetaminophen (TYLENOL EXTRA STRENGTH) 500 mg tablet Take 500 mg by mouth every six (6) hours as needed for Pain.  multivitamin (ONE A DAY) tablet Take 1 Tab by mouth daily.  fluticasone-salmeterol (ADVAIR) 250-50 mcg/dose diskus inhaler Take 1 Puff by inhalation two (2) times a day. 3 Inhaler 3    co-enzyme Q-10 (CO Q-10) 100 mg capsule Take 100 mg by mouth daily. Allergies: Allergies   Allergen Reactions    Codeine Other (comments)     \"made me disoriented\" per pt       Physical Exam:   Vitals:    Visit Vitals    BP 96/56 (BP 1 Location: Left arm, BP Patient Position: Sitting)    Pulse 70    Temp 96.9 °F (36.1 °C) (Oral)    Resp 20    Ht 5' 2\" (1.575 m)    Wt 127 lb 14.4 oz (58 kg)    SpO2 97%    BMI 23.39 kg/m2         General: Thin AA female, who is a frail. cooperative, very spry, no acute distress. HEENT: Atraumatic. Pink and moist.  Anicteric sclerae. Neck: Supple, no adenopoathy. Lungs: CTA bilaterally. No wheezing/rhonchi/rales. Heart[de-identified] PMI: laterally displaced,  AICD: L infraclavicular space,  Regular rhythm, S1, S2 present, 3/6 systolic murmur apex-> axilla, 2/6 diastolic rumble murmur  RUSB,  no rubs, no gallops. JVD 16  cm + J wave, (-) HJR . No carotid bruits. Abdomen: Soft, non-distended, non-tender. + Bowel sounds. No bruits. Extremities: compression stockings in place, trace  edema, no clubbing, no cyanosis. No calf tenderness  Neurologic: Venetie IRA otherwise Grossly intact. Alert and oriented X 3. No acute neurological distress. Skin: intact, no wounds, ecchymosis, bruising, rashes   Psych: Good insight. Not anxious nor agitated.     Recent Labs:     Lab Results   Component Value Date/Time    WBC 4.8 03/01/2018 01:56 PM    HGB 11.6 03/01/2018 01:56 PM    HCT 35.9 03/01/2018 01:56 PM    PLATELET 351 97/58/8340 01:56 PM    MCV 96 03/01/2018 01:56 PM     Lab Results   Component Value Date/Time    GFR est non-AA 42 (L) 09/28/2018 04:32 PM    GFRNA, POC 50 (L) 04/09/2012 11:28 AM    GFR est AA 49 (L) 09/28/2018 04:32 PM    GFRAA, POC >60 04/09/2012 11:28 AM    Creatinine 1.12 (H) 09/28/2018 04:32 PM    Creatinine (POC) 1.1 04/09/2012 11:28 AM    BUN 26 09/28/2018 04:32 PM    Sodium 143 09/28/2018 04:32 PM    Potassium 4.4 09/28/2018 04:32 PM    Chloride 102 09/28/2018 04:32 PM    CO2 25 09/28/2018 04:32 PM    Magnesium 1.9 04/21/2017 02:34 AM    Phosphorus 2.2 (L) 02/03/2017 01:51 AM     Lab Results   Component Value Date/Time    TSH 5.210 (H) 09/28/2018 04:32 PM      Lab Results   Component Value Date/Time    Sodium 143 09/28/2018 04:32 PM    Potassium 4.4 09/28/2018 04:32 PM    Chloride 102 09/28/2018 04:32 PM    CO2 25 09/28/2018 04:32 PM    Anion gap 10 04/22/2017 03:51 AM    Glucose 82 09/28/2018 04:32 PM    BUN 26 09/28/2018 04:32 PM    Creatinine 1.12 (H) 09/28/2018 04:32 PM    BUN/Creatinine ratio 23 09/28/2018 04:32 PM    GFR est AA 49 (L) 09/28/2018 04:32 PM    GFR est non-AA 42 (L) 09/28/2018 04:32 PM    Calcium 8.8 09/28/2018 04:32 PM    Bilirubin, total 0.4 03/01/2018 01:56 PM    ALT (SGPT) 13 03/01/2018 01:56 PM    AST (SGOT) 25 03/01/2018 01:56 PM    Alk. phosphatase 67 03/01/2018 01:56 PM    Protein, total 5.9 (L) 03/01/2018 01:56 PM    Albumin 4.0 03/01/2018 01:56 PM    Globulin 3.0 04/20/2017 09:40 AM    A-G Ratio 2.1 03/01/2018 01:56 PM           I have discussed the diagnosis with  Melissa Norman and the intended plan as seen in the above orders. Questions were answered concerning future plans, and written instructions provided. I have discussed medication side effects and warnings with the patient as well. Thank you for allowing me to participate in the care of this delightful lady. Please do not hesitate to call with any questions. Julissa Osborne RN, ACNP-BC, [address] [address] [phone]  24 hour VAD/HF Pager: [phone]

## 2018-10-16 NOTE — PATIENT INSTRUCTIONS
You are doing great      CONTINUE CURRENT medications    Take twice a day medications 12 hours apart with food    Labs today BMP, proBNP      Add mild strength training to your exercises, sitting and standing exercises, balance and stretching (yoda, Willie Chi)       Limit WHITE foods (flour, sugar, pasta, rice, potatoes)    Keep a positive attitude       HF Education: Continue daily weights (in the morning, after voiding). Notify HF team of overnight weight gains > 2 lbs or weekly >5 lbs or if any of the following Sx. Continue to limit sodium intake & monitor your fluid intake .

## 2018-10-16 NOTE — LETTER
10/16/2018 12:19 PM 
 
Patient:  Mendel Blaise YOB: 1925 Date of Visit: 10/16/2018 Dear Kemi Crum MD 
[address] [address] [facility] 7 [address] VIA In B[place] 
[address] [facility] [address] [address] VIA In Basket Kasey Gracia MD 
[address] [facility] [address] [address] VIA In Basket Génesis Krishnan MD 
[address] [address] VIA Facsimile: [phone] 
 : 
 
 
Thank you for referring Ms. Umm Guerrier to me for evaluation/treatment. Below are the relevant portions of my assessment and plan of care. If you have questions, please do not hesitate to call me. I look forward to following Ms. Reddy Krishnamurthy along with you. Sincerely, HARMAN LarryP

## 2018-11-11 RX ORDER — ALBUTEROL SULFATE 0.83 MG/ML
2.5 SOLUTION RESPIRATORY (INHALATION)
Qty: 30 EACH | Refills: 4 | Status: SHIPPED | OUTPATIENT
Start: 2018-11-11 | End: 2019-02-04 | Stop reason: DRUGHIGH

## 2018-11-30 ENCOUNTER — OFFICE VISIT (OUTPATIENT)
Dept: INTERNAL MEDICINE CLINIC | Age: 83
End: 2018-11-30

## 2018-11-30 VITALS
HEART RATE: 70 BPM | BODY MASS INDEX: 24.01 KG/M2 | HEIGHT: 62 IN | RESPIRATION RATE: 14 BRPM | OXYGEN SATURATION: 95 % | WEIGHT: 130.5 LBS | TEMPERATURE: 97.5 F | SYSTOLIC BLOOD PRESSURE: 104 MMHG | DIASTOLIC BLOOD PRESSURE: 66 MMHG

## 2018-11-30 DIAGNOSIS — C85.91 NON-HODGKIN LYMPHOMA OF LYMPH NODES OF NECK, UNSPECIFIED NON-HODGKIN LYMPHOMA TYPE (HCC): ICD-10-CM

## 2018-11-30 DIAGNOSIS — J45.20 MILD INTERMITTENT REACTIVE AIRWAY DISEASE WITHOUT COMPLICATION: ICD-10-CM

## 2018-11-30 DIAGNOSIS — E03.9 ACQUIRED HYPOTHYROIDISM: ICD-10-CM

## 2018-11-30 DIAGNOSIS — I42.0 CARDIOMYOPATHY, DILATED, NONISCHEMIC (HCC): Primary | Chronic | ICD-10-CM

## 2018-11-30 DIAGNOSIS — I87.2 VENOUS INSUFFICIENCY: ICD-10-CM

## 2018-11-30 PROBLEM — E87.6 HYPOKALEMIA: Status: RESOLVED | Noted: 2017-04-21 | Resolved: 2018-11-30

## 2018-11-30 NOTE — PROGRESS NOTES
[address] [facility] 
[place] [address] 
[address] [facility] [address] Phone:  [phone]  Fax: [phone] Chief Complaint Patient presents with  Follow-up  
  2 month follow up Demi Correa SUBJECTIVE: 
  Kristy King is a 80 y.o. female who comes in for a return visit stating that she is doing well. She denies any dyspnea on exertion, orthopnea or PND. She has had orthostatic swelling of lower extremities however. Her weight has been stable. She has not had much in the way of coughing either with no obvious audible wheezing. She does follow up with Dr. Ester Duque, her oncologist for chronic lymphocytic leukemia. She has a past history of dyslipidemia and hypothyroidism. Current Outpatient Medications Medication Sig Dispense Refill  albuterol (PROVENTIL VENTOLIN) 2.5 mg /3 mL (0.083 %) nebulizer solution 3 mL by Nebulization route every four (4) hours as needed for Wheezing. 30 Each 4  
 furosemide (LASIX) 20 mg tablet Take 1 Tab by mouth two (2) times a day. 180 Tab 3  potassium chloride SR (KLOR-CON 10) 10 mEq tablet Take 1 Tab by mouth daily. 90 Tab 3  
 sacubitril-valsartan (ENTRESTO) 49 mg/51 mg tablet Take 1 Tab by mouth two (2) times a day. 180 Tab 3  
 apixaban (ELIQUIS) 2.5 mg tablet Take 1 Tab by mouth two (2) times a day. Indications: PREVENT THROMBOEMBOLISM IN CHRONIC ATRIAL FIBRILLATION 180 Tab 3  
 fluticasone-salmeterol (ADVAIR) 250-50 mcg/dose diskus inhaler Take 1 Puff by inhalation two (2) times a day. 3 Inhaler 3  carvedilol (COREG) 3.125 mg tablet TAKE ONE (1) TABLET(S) TWIC E DAILY WITH FOOD  Indications: chronic heart failure 180 Tab 3  
 allopurinol (ZYLOPRIM) 300 mg tablet TAKE ONE (1) TABLET(S) DAILY 90 Tab 3  
 atorvastatin (LIPITOR) 10 mg tablet TAKE ONE (1) TABLET(S) DAILY 90 Tab 3  
 levothyroxine (SYNTHROID) 25 mcg tablet Take 1 Tab by mouth Daily (before breakfast).  90 Tab 3  
  magnesium oxide 400 mg cap Take 1 Cap by mouth daily.  cholecalciferol (VITAMIN D3) 1,000 unit cap Take 1,000 Units by mouth daily.  loratadine (CLARITIN) 10 mg tablet Take 10 mg by mouth daily as needed.  co-enzyme Q-10 (CO Q-10) 100 mg capsule Take 100 mg by mouth daily.  OXYGEN-AIR DELIVERY SYSTEMS 2.5 L by Does Not Apply route nightly.  ibrutinib (IMBRUVICA) 140 mg cap Take 420 mg by mouth five (5) days a week. 140 mg cap, takes 3 caps five days a week (none on Sunday or Wednesday)  acetaminophen (TYLENOL EXTRA STRENGTH) 500 mg tablet Take 500 mg by mouth every six (6) hours as needed for Pain.  multivitamin (ONE A DAY) tablet Take 1 Tab by mouth daily. Past Medical History:  
Diagnosis Date  Aortic insufficiency  Asthma  Cancer ([facility] lymphoma  CKD (chronic kidney disease) stage 3, GFR 30-59 ml/min ([facility]) 1/10/2018  Congestive heart failure, NYHA class II, chronic, systolic ([facility])  Heart failure ([facility]  Hypertension  Mitral valvular regurgitation 1/22/2016 ECHO 2/3/17: LV dilated, EF 20-25%, mild LVH, RV mod dilated, mild- mod MELANIA, mod-severe MR, mod AI ECHO 7/29/17: EF 15%, severe DHK, reduced RVEF, RVH, RVSP 35 mmHg  Mild LAE, mod-marked MA fibrosis, mod-severe MR (2+), AO root dilated,    
 Nonrheumatic aortic valve insufficiency 10/16/2018  Presence of biventricular automatic cardioverter/defibrillator (AICD) 7/2/2015 [employer] biventricular AICD implant  Stomach ulcer Past Surgical History:  
Procedure Laterality Date  HX BREAST BIOPSY Bilateral   
 40 years ago  HX COLONOSCOPY    
 HX HEENT    
 cataracts removed  HX HYSTERECTOMY  HX OTHER SURGICAL    
 lymph node surgery  HX TONSILLECTOMY  ID EGD TRANSORAL BIOPSY SINGLE/MULTIPLE  5/23/2012  
    
 SINUS SURGERY PROC UNLISTED Nasal polyps removed Allergies Allergen Reactions  Codeine Other (comments) \"made me disoriented\" per pt REVIEW OF SYSTEMS: 
General: negative for - chills or fever ENT: negative for - headaches, nasal congestion or tinnitus Respiratory: negative for - cough, hemoptysis, shortness of breath or wheezing Cardiovascular : negative for - chest pain, edema, palpitations or shortness of breath Gastrointestinal: negative for - abdominal pain, blood in stools, heartburn or nausea/vomiting Genito-Urinary: no dysuria, trouble voiding, or hematuria Musculoskeletal: negative for - gait disturbance, joint pain, joint stiffness or joint swelling Neurological: no TIA or stroke symptoms Hematologic: no bruises, no bleeding, no swollen glands Integument: no lumps, mole changes, nail changes or rash Endocrine: no malaise/lethargy or unexpected weight changes Social History Socioeconomic History  Marital status:  Spouse name: Not on file  Number of children: 1  Years of education: 16+  Highest education level: Not on file Occupational History  Occupation: retired teacher Tobacco Use  Smoking status: Never Smoker  Smokeless tobacco: Never Used Substance and Sexual Activity  Alcohol use: No  
  Alcohol/week: 0.0 oz  Drug use: No  
 Sexual activity: No  
 
Family History Problem Relation Age of Onset  Heart Disease Mother  Stroke Father OBJECTIVE: 
 
Visit Vitals /66 Pulse 70 Temp 97.5 °F (36.4 °C) (Oral) Resp 14 Ht 5' 2\" (1.575 m) Wt 130 lb 8 oz (59.2 kg) SpO2 95% BMI 23.87 kg/m² CONSTITUTIONAL: well , well nourished, appears age appropriate EYES: perrla, eom intact ENMT:moist mucous membranes, pharynx clear NECK: supple. Thyroid normal,JVD 1/2 angle of jaw RESPIRATORY: Chest: clear to ascultation and percussion CARDIOVASCULAR: Heart: regular rate and rhythm GASTROINTESTINAL: Abdomen: soft, bowel sounds active HEMATOLOGIC: no pathological lymph nodes palpated MUSCULOSKELETAL: Extremities: 1+ edema distally, pulse 1+ INTEGUMENT: No unusual rashes or suspicious skin lesions noted. Nails appear normal. 
NEUROLOGIC: non-focal exam  
MENTAL STATUS: alert and oriented, appropriate affect ASSESSMENT: 
1. 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 Acquired hypothyroidism 3. Mild intermittent reactive airway disease without complication 4. Non-Hodgkin lymphoma of lymph nodes of neck, unspecified non-Hodgkin lymphoma type ([facility] 5. Venous insufficiency PLAN: 
 
1. The patient has a nonischemic cardiomyopathy. She appears to be fairly well compensated at this point. BMP and a BNP will be obtained. 2. The swelling of her lower extremities is primarily related to venous insufficiency. Given the fact that the weight has been reasonably stable suggests that this is indeed the case with minimal if any contribution from cardiac decompensation. 3. Her reactive airway disease is stable with no flare-ups of late. 4. Her non-Hodgkins lymphoma and leukemia appear to be stable. 5. She also has a history of hypothyroidism. Her last TSH was reasonable, but I will recheck it today. . 
Orders Placed This Encounter  METABOLIC PANEL, BASIC  
 NT-PRO BNP  VITAMIN D, 25 HYDROXY  TSH 3RD GENERATION Follow-up Disposition: 
Return in about 3 months (around 2/28/2019).  
 
 
Amita Buchanan MD

## 2018-11-30 NOTE — PROGRESS NOTES
Chief Complaint Patient presents with  Follow-up  
  2 month follow up 1. Have you been to the ER, urgent care clinic since your last visit? Hospitalized since your last visit? No 
 
2. Have you seen or consulted any other health care providers outside of the [address] [place] [address] since your last visit? Include any pap smears or colon screening.  No

## 2018-12-01 LAB
25(OH)D3+25(OH)D2 SERPL-MCNC: 52.5 NG/ML (ref 30–100)
BUN SERPL-MCNC: 29 MG/DL (ref 10–36)
BUN/CREAT SERPL: 26 (ref 12–28)
CALCIUM SERPL-MCNC: 8.6 MG/DL (ref 8.7–10.3)
CHLORIDE SERPL-SCNC: 103 MMOL/L (ref 96–106)
CO2 SERPL-SCNC: 28 MMOL/L (ref 20–29)
CREAT SERPL-MCNC: 1.12 MG/DL (ref 0.57–1)
GLUCOSE SERPL-MCNC: 72 MG/DL (ref 65–99)
POTASSIUM SERPL-SCNC: 4.4 MMOL/L (ref 3.5–5.2)
SODIUM SERPL-SCNC: 143 MMOL/L (ref 134–144)
TSH SERPL DL<=0.005 MIU/L-ACNC: 4.25 UIU/ML (ref 0.45–4.5)

## 2018-12-02 LAB — NT-PROBNP SERPL-MCNC: 2368 PG/ML (ref 0–738)

## 2018-12-05 ENCOUNTER — CLINICAL SUPPORT (OUTPATIENT)
Dept: CARDIOLOGY CLINIC | Age: 83
End: 2018-12-05

## 2018-12-05 DIAGNOSIS — I50.22 CHRONIC SYSTOLIC CONGESTIVE HEART FAILURE (HCC): ICD-10-CM

## 2018-12-05 DIAGNOSIS — I42.0 CARDIOMYOPATHY, DILATED, NONISCHEMIC (HCC): Chronic | ICD-10-CM

## 2018-12-05 DIAGNOSIS — Z95.810 PRESENCE OF AUTOMATIC CARDIOVERTER/DEFIBRILLATOR (AICD): Primary | ICD-10-CM

## 2018-12-11 RX ORDER — LEVOTHYROXINE SODIUM 25 UG/1
25 TABLET ORAL
Qty: 90 TAB | Refills: 3 | Status: SHIPPED | OUTPATIENT
Start: 2018-12-11

## 2018-12-26 ENCOUNTER — HOSPITAL ENCOUNTER (OUTPATIENT)
Dept: MAMMOGRAPHY | Age: 83
Discharge: HOME | End: 2018-12-26
Attending: INTERNAL MEDICINE
Payer: MEDICARE

## 2018-12-26 DIAGNOSIS — Z12.39 SCREENING BREAST EXAMINATION: ICD-10-CM

## 2018-12-26 PROCEDURE — 77067 SCR MAMMO BI INCL CAD: CPT

## 2019-01-08 RX ORDER — ATORVASTATIN CALCIUM 10 MG/1
TABLET, FILM COATED ORAL
Qty: 90 TAB | Refills: 3 | Status: ON HOLD | OUTPATIENT
Start: 2019-01-08 | End: 2019-06-25 | Stop reason: DRUGHIGH

## 2019-02-04 ENCOUNTER — APPOINTMENT (OUTPATIENT)
Dept: GENERAL RADIOLOGY | Age: 84
DRG: 871 | End: 2019-02-04
Attending: EMERGENCY MEDICINE
Payer: MEDICARE

## 2019-02-04 ENCOUNTER — APPOINTMENT (OUTPATIENT)
Dept: CT IMAGING | Age: 84
DRG: 871 | End: 2019-02-04
Attending: EMERGENCY MEDICINE
Payer: MEDICARE

## 2019-02-04 ENCOUNTER — HOSPITAL ENCOUNTER (INPATIENT)
Age: 84
LOS: 15 days | Discharge: NURSING, REHAB OR LONG-TERM CARE | DRG: 871 | End: 2019-02-19
Attending: EMERGENCY MEDICINE | Admitting: INTERNAL MEDICINE
Payer: MEDICARE

## 2019-02-04 DIAGNOSIS — R63.0 POOR APPETITE: ICD-10-CM

## 2019-02-04 DIAGNOSIS — R41.0 DISORIENTATION: ICD-10-CM

## 2019-02-04 DIAGNOSIS — E78.01 FAMILIAL HYPERCHOLESTEROLEMIA: ICD-10-CM

## 2019-02-04 DIAGNOSIS — E86.1 HYPOTENSION DUE TO HYPOVOLEMIA: ICD-10-CM

## 2019-02-04 DIAGNOSIS — Z71.89 COUNSELING REGARDING ADVANCED CARE PLANNING AND GOALS OF CARE: ICD-10-CM

## 2019-02-04 DIAGNOSIS — R41.82 ALTERED MENTAL STATUS, UNSPECIFIED ALTERED MENTAL STATUS TYPE: ICD-10-CM

## 2019-02-04 DIAGNOSIS — N30.01 ACUTE CYSTITIS WITH HEMATURIA: Primary | ICD-10-CM

## 2019-02-04 DIAGNOSIS — K59.1 FUNCTIONAL DIARRHEA: ICD-10-CM

## 2019-02-04 DIAGNOSIS — M25.561 ACUTE PAIN OF RIGHT KNEE: ICD-10-CM

## 2019-02-04 DIAGNOSIS — E86.0 DEHYDRATION: ICD-10-CM

## 2019-02-04 DIAGNOSIS — E03.9 ACQUIRED HYPOTHYROIDISM: ICD-10-CM

## 2019-02-04 DIAGNOSIS — I95.89 HYPOTENSION DUE TO HYPOVOLEMIA: ICD-10-CM

## 2019-02-04 PROBLEM — N39.0 UTI (URINARY TRACT INFECTION): Status: ACTIVE | Noted: 2019-02-04

## 2019-02-04 PROBLEM — R19.7 DIARRHEA: Status: ACTIVE | Noted: 2019-02-04

## 2019-02-04 PROBLEM — G93.40 ACUTE ENCEPHALOPATHY: Status: ACTIVE | Noted: 2019-02-04

## 2019-02-04 LAB
ALBUMIN SERPL-MCNC: 2.7 G/DL (ref 3.5–5)
ALBUMIN/GLOB SERPL: 0.7 {RATIO} (ref 1.1–2.2)
ALP SERPL-CCNC: 122 U/L (ref 45–117)
ALT SERPL-CCNC: 22 U/L (ref 12–78)
AMMONIA PLAS-SCNC: <10 UMOL/L
ANION GAP SERPL CALC-SCNC: 7 MMOL/L (ref 5–15)
APPEARANCE UR: ABNORMAL
AST SERPL-CCNC: 34 U/L (ref 15–37)
ATRIAL RATE: 85 BPM
BACTERIA URNS QL MICRO: ABNORMAL /HPF
BASOPHILS # BLD: 0 K/UL (ref 0–0.1)
BASOPHILS NFR BLD: 0 % (ref 0–1)
BILIRUB SERPL-MCNC: 0.8 MG/DL (ref 0.2–1)
BILIRUB UR QL: NEGATIVE
BUN SERPL-MCNC: 26 MG/DL (ref 6–20)
BUN/CREAT SERPL: 19 (ref 12–20)
CALCIUM SERPL-MCNC: 8.3 MG/DL (ref 8.5–10.1)
CALCULATED R AXIS, ECG10: -149 DEGREES
CALCULATED T AXIS, ECG11: 1 DEGREES
CHLORIDE SERPL-SCNC: 106 MMOL/L (ref 97–108)
CO2 SERPL-SCNC: 28 MMOL/L (ref 21–32)
COLOR UR: ABNORMAL
CREAT SERPL-MCNC: 1.37 MG/DL (ref 0.55–1.02)
DIAGNOSIS, 93000: NORMAL
DIFFERENTIAL METHOD BLD: ABNORMAL
EOSINOPHIL # BLD: 0 K/UL (ref 0–0.4)
EOSINOPHIL NFR BLD: 0 % (ref 0–7)
EPITH CASTS URNS QL MICRO: ABNORMAL /LPF
ERYTHROCYTE [DISTWIDTH] IN BLOOD BY AUTOMATED COUNT: 16.6 % (ref 11.5–14.5)
GLOBULIN SER CALC-MCNC: 3.7 G/DL (ref 2–4)
GLUCOSE SERPL-MCNC: 113 MG/DL (ref 65–100)
GLUCOSE UR STRIP.AUTO-MCNC: NEGATIVE MG/DL
HCT VFR BLD AUTO: 28 % (ref 35–47)
HGB BLD-MCNC: 9.2 G/DL (ref 11.5–16)
HGB UR QL STRIP: ABNORMAL
HYALINE CASTS URNS QL MICRO: ABNORMAL /LPF (ref 0–5)
IMM GRANULOCYTES # BLD AUTO: 0 K/UL (ref 0–0.04)
IMM GRANULOCYTES NFR BLD AUTO: 0 % (ref 0–0.5)
KETONES UR QL STRIP.AUTO: NEGATIVE MG/DL
LEUKOCYTE ESTERASE UR QL STRIP.AUTO: ABNORMAL
LYMPHOCYTES # BLD: 0.5 K/UL (ref 0.8–3.5)
LYMPHOCYTES NFR BLD: 21 % (ref 12–49)
MCH RBC QN AUTO: 31.3 PG (ref 26–34)
MCHC RBC AUTO-ENTMCNC: 32.9 G/DL (ref 30–36.5)
MCV RBC AUTO: 95.2 FL (ref 80–99)
METAMYELOCYTES NFR BLD MANUAL: 1 %
MONOCYTES # BLD: 0.1 K/UL (ref 0–1)
MONOCYTES NFR BLD: 2 % (ref 5–13)
NEUTS BAND NFR BLD MANUAL: 12 %
NEUTS SEG # BLD: 2 K/UL (ref 1.8–8)
NEUTS SEG NFR BLD: 64 % (ref 32–75)
NITRITE UR QL STRIP.AUTO: NEGATIVE
NRBC # BLD: 0 K/UL (ref 0–0.01)
NRBC BLD-RTO: 0 PER 100 WBC
P-R INTERVAL, ECG05: 136 MS
PH UR STRIP: 5.5 [PH] (ref 5–8)
PLATELET # BLD AUTO: 164 K/UL (ref 150–400)
PMV BLD AUTO: 10.2 FL (ref 8.9–12.9)
POTASSIUM SERPL-SCNC: 3.8 MMOL/L (ref 3.5–5.1)
PROT SERPL-MCNC: 6.4 G/DL (ref 6.4–8.2)
PROT UR STRIP-MCNC: 100 MG/DL
Q-T INTERVAL, ECG07: 456 MS
QRS DURATION, ECG06: 176 MS
QTC CALCULATION (BEZET), ECG08: 542 MS
RBC # BLD AUTO: 2.94 M/UL (ref 3.8–5.2)
RBC #/AREA URNS HPF: ABNORMAL /HPF (ref 0–5)
RBC MORPH BLD: ABNORMAL
RBC MORPH BLD: ABNORMAL
SODIUM SERPL-SCNC: 141 MMOL/L (ref 136–145)
SP GR UR REFRACTOMETRY: 1.02 (ref 1–1.03)
TROPONIN I SERPL-MCNC: 0.2 NG/ML
UA: UC IF INDICATED,UAUC: ABNORMAL
UROBILINOGEN UR QL STRIP.AUTO: 1 EU/DL (ref 0.2–1)
VENTRICULAR RATE, ECG03: 85 BPM
WBC # BLD AUTO: 2.6 K/UL (ref 3.6–11)
WBC MORPH BLD: ABNORMAL
WBC URNS QL MICRO: ABNORMAL /HPF (ref 0–4)

## 2019-02-04 PROCEDURE — 99285 EMERGENCY DEPT VISIT HI MDM: CPT

## 2019-02-04 PROCEDURE — 71045 X-RAY EXAM CHEST 1 VIEW: CPT

## 2019-02-04 PROCEDURE — 74011250636 HC RX REV CODE- 250/636: Performed by: INTERNAL MEDICINE

## 2019-02-04 PROCEDURE — 73562 X-RAY EXAM OF KNEE 3: CPT

## 2019-02-04 PROCEDURE — 80053 COMPREHEN METABOLIC PANEL: CPT

## 2019-02-04 PROCEDURE — 74177 CT ABD & PELVIS W/CONTRAST: CPT

## 2019-02-04 PROCEDURE — 96361 HYDRATE IV INFUSION ADD-ON: CPT

## 2019-02-04 PROCEDURE — 87077 CULTURE AEROBIC IDENTIFY: CPT

## 2019-02-04 PROCEDURE — 96365 THER/PROPH/DIAG IV INF INIT: CPT

## 2019-02-04 PROCEDURE — 74011250637 HC RX REV CODE- 250/637: Performed by: INTERNAL MEDICINE

## 2019-02-04 PROCEDURE — 82140 ASSAY OF AMMONIA: CPT

## 2019-02-04 PROCEDURE — 70450 CT HEAD/BRAIN W/O DYE: CPT

## 2019-02-04 PROCEDURE — 74011000258 HC RX REV CODE- 258: Performed by: EMERGENCY MEDICINE

## 2019-02-04 PROCEDURE — 96366 THER/PROPH/DIAG IV INF ADDON: CPT

## 2019-02-04 PROCEDURE — 74011636320 HC RX REV CODE- 636/320: Performed by: EMERGENCY MEDICINE

## 2019-02-04 PROCEDURE — 77030005563 HC CATH URETH INT MMGH -A

## 2019-02-04 PROCEDURE — 65660000000 HC RM CCU STEPDOWN

## 2019-02-04 PROCEDURE — 87186 SC STD MICRODIL/AGAR DIL: CPT

## 2019-02-04 PROCEDURE — 84484 ASSAY OF TROPONIN QUANT: CPT

## 2019-02-04 PROCEDURE — 85025 COMPLETE CBC W/AUTO DIFF WBC: CPT

## 2019-02-04 PROCEDURE — 36415 COLL VENOUS BLD VENIPUNCTURE: CPT

## 2019-02-04 PROCEDURE — 51701 INSERT BLADDER CATHETER: CPT

## 2019-02-04 PROCEDURE — 93005 ELECTROCARDIOGRAM TRACING: CPT

## 2019-02-04 PROCEDURE — 81001 URINALYSIS AUTO W/SCOPE: CPT

## 2019-02-04 PROCEDURE — 73560 X-RAY EXAM OF KNEE 1 OR 2: CPT

## 2019-02-04 PROCEDURE — 74011250636 HC RX REV CODE- 250/636: Performed by: EMERGENCY MEDICINE

## 2019-02-04 PROCEDURE — 87086 URINE CULTURE/COLONY COUNT: CPT

## 2019-02-04 RX ORDER — ALBUTEROL SULFATE 5 MG/ML
SOLUTION RESPIRATORY (INHALATION) ONCE
COMMUNITY
End: 2019-02-04

## 2019-02-04 RX ORDER — ATORVASTATIN CALCIUM 10 MG/1
10 TABLET, FILM COATED ORAL
Status: DISCONTINUED | OUTPATIENT
Start: 2019-02-04 | End: 2019-02-19 | Stop reason: HOSPADM

## 2019-02-04 RX ORDER — CARVEDILOL 3.12 MG/1
3.12 TABLET ORAL 2 TIMES DAILY WITH MEALS
Status: DISCONTINUED | OUTPATIENT
Start: 2019-02-04 | End: 2019-02-09

## 2019-02-04 RX ORDER — FLUTICASONE FUROATE AND VILANTEROL 100; 25 UG/1; UG/1
1 POWDER RESPIRATORY (INHALATION) DAILY
Status: DISCONTINUED | OUTPATIENT
Start: 2019-02-05 | End: 2019-02-19 | Stop reason: HOSPADM

## 2019-02-04 RX ORDER — ACETAMINOPHEN 325 MG/1
650 TABLET ORAL
Status: DISCONTINUED | OUTPATIENT
Start: 2019-02-04 | End: 2019-02-19 | Stop reason: HOSPADM

## 2019-02-04 RX ORDER — ALLOPURINOL 100 MG/1
300 TABLET ORAL DAILY
Status: DISCONTINUED | OUTPATIENT
Start: 2019-02-05 | End: 2019-02-11

## 2019-02-04 RX ORDER — METRONIDAZOLE 500 MG/100ML
500 INJECTION, SOLUTION INTRAVENOUS EVERY 8 HOURS
Status: DISCONTINUED | OUTPATIENT
Start: 2019-02-04 | End: 2019-02-06 | Stop reason: DRUGHIGH

## 2019-02-04 RX ORDER — CEPHALEXIN 500 MG/1
500 CAPSULE ORAL 4 TIMES DAILY
Qty: 28 CAP | Refills: 0 | Status: SHIPPED | OUTPATIENT
Start: 2019-02-04 | End: 2019-02-11

## 2019-02-04 RX ORDER — LEVOTHYROXINE SODIUM 25 UG/1
25 TABLET ORAL
Status: DISCONTINUED | OUTPATIENT
Start: 2019-02-05 | End: 2019-02-19 | Stop reason: HOSPADM

## 2019-02-04 RX ORDER — IPRATROPIUM BROMIDE AND ALBUTEROL SULFATE 2.5; .5 MG/3ML; MG/3ML
3 SOLUTION RESPIRATORY (INHALATION)
Status: DISCONTINUED | OUTPATIENT
Start: 2019-02-04 | End: 2019-02-19 | Stop reason: HOSPADM

## 2019-02-04 RX ORDER — ALBUTEROL SULFATE 0.83 MG/ML
2.5 SOLUTION RESPIRATORY (INHALATION) 2 TIMES DAILY
COMMUNITY

## 2019-02-04 RX ORDER — MAGNESIUM SULFATE HEPTAHYDRATE 40 MG/ML
2 INJECTION, SOLUTION INTRAVENOUS ONCE
Status: COMPLETED | OUTPATIENT
Start: 2019-02-04 | End: 2019-02-04

## 2019-02-04 RX ORDER — SODIUM CHLORIDE 0.9 % (FLUSH) 0.9 %
10 SYRINGE (ML) INJECTION
Status: COMPLETED | OUTPATIENT
Start: 2019-02-04 | End: 2019-02-04

## 2019-02-04 RX ORDER — ONDANSETRON 2 MG/ML
4 INJECTION INTRAMUSCULAR; INTRAVENOUS
Status: DISCONTINUED | OUTPATIENT
Start: 2019-02-04 | End: 2019-02-12

## 2019-02-04 RX ORDER — LORATADINE 10 MG/1
10 TABLET ORAL
Status: DISCONTINUED | OUTPATIENT
Start: 2019-02-04 | End: 2019-02-12

## 2019-02-04 RX ORDER — FUROSEMIDE 20 MG/1
40 TABLET ORAL DAILY
Status: ON HOLD | COMMUNITY
End: 2019-03-12

## 2019-02-04 RX ORDER — TRAMADOL HYDROCHLORIDE 50 MG/1
50 TABLET ORAL
Status: DISCONTINUED | OUTPATIENT
Start: 2019-02-04 | End: 2019-02-19 | Stop reason: HOSPADM

## 2019-02-04 RX ORDER — SODIUM CHLORIDE 9 MG/ML
50 INJECTION, SOLUTION INTRAVENOUS CONTINUOUS
Status: DISCONTINUED | OUTPATIENT
Start: 2019-02-04 | End: 2019-02-05

## 2019-02-04 RX ORDER — LANOLIN ALCOHOL/MO/W.PET/CERES
400 CREAM (GRAM) TOPICAL DAILY
Status: DISCONTINUED | OUTPATIENT
Start: 2019-02-05 | End: 2019-02-19 | Stop reason: HOSPADM

## 2019-02-04 RX ORDER — POTASSIUM CHLORIDE 750 MG/1
10 TABLET, FILM COATED, EXTENDED RELEASE ORAL EVERY OTHER DAY
COMMUNITY
End: 2019-02-19

## 2019-02-04 RX ORDER — LEVOFLOXACIN 5 MG/ML
750 INJECTION, SOLUTION INTRAVENOUS
Status: DISCONTINUED | OUTPATIENT
Start: 2019-02-04 | End: 2019-02-06 | Stop reason: DRUGHIGH

## 2019-02-04 RX ADMIN — LEVOFLOXACIN 750 MG: 5 INJECTION, SOLUTION INTRAVENOUS at 18:46

## 2019-02-04 RX ADMIN — MAGNESIUM SULFATE HEPTAHYDRATE 2 G: 40 INJECTION, SOLUTION INTRAVENOUS at 17:01

## 2019-02-04 RX ADMIN — CARVEDILOL 3.12 MG: 3.12 TABLET, FILM COATED ORAL at 16:46

## 2019-02-04 RX ADMIN — SODIUM CHLORIDE 50 ML/HR: 900 INJECTION, SOLUTION INTRAVENOUS at 17:21

## 2019-02-04 RX ADMIN — METRONIDAZOLE 500 MG: 500 INJECTION, SOLUTION INTRAVENOUS at 17:46

## 2019-02-04 RX ADMIN — SODIUM CHLORIDE 1000 ML: 900 INJECTION, SOLUTION INTRAVENOUS at 11:30

## 2019-02-04 RX ADMIN — CEFTRIAXONE SODIUM 1 G: 1 INJECTION, POWDER, FOR SOLUTION INTRAMUSCULAR; INTRAVENOUS at 13:30

## 2019-02-04 RX ADMIN — SACUBITRIL AND VALSARTAN 1 TABLET: 49; 51 TABLET, FILM COATED ORAL at 17:46

## 2019-02-04 RX ADMIN — APIXABAN 2.5 MG: 2.5 TABLET, FILM COATED ORAL at 21:30

## 2019-02-04 RX ADMIN — ACETAMINOPHEN 650 MG: 325 TABLET ORAL at 17:40

## 2019-02-04 RX ADMIN — Medication 10 ML: at 12:55

## 2019-02-04 RX ADMIN — IOPAMIDOL 100 ML: 755 INJECTION, SOLUTION INTRAVENOUS at 12:55

## 2019-02-04 RX ADMIN — ATORVASTATIN CALCIUM 10 MG: 10 TABLET, FILM COATED ORAL at 21:30

## 2019-02-04 NOTE — ED TRIAGE NOTES
ED visit d/t AMS / Diarrhea / Fall - onset of sxs last evening - pt is normally A/Ox4 - home nurse arrive this AM and found the pt - RN reports finding the pt lethargic with Stools & urine all over the home pt reports falling last evening, right knee pain - last known well time on Friday - Denies SOB / Branden Anchors / Rico Baca / Weakness / vision changes - Home RN at bedside;

## 2019-02-04 NOTE — H&P
Hospitalist Admission NoteNAME: Denisse Goldsmith :  3/17/1925 MRN:  [id number] Date/Time:  2019 4:14 PM 
 
Patient PCP: Rahul Potts MD 
______________________________________________________________________ Given the patient's current clinical presentation, I have a high level of concern for decompensation if discharged from the emergency department. Complex decision making was performed, which includes reviewing the patient's available past medical records, laboratory results, and x-ray films. My assessment of this patient's clinical condition and my plan of care is as follows. Assessment / Plan: 
Acute Encephalopathy: has improved now, CT shows atrophy, she may have some baseline Dementia, will ask for Neurology evaluation. ANDREW/CKD: will start gentle hydration for 24h, after that will resume diuretics. UTI: start LVQ, F/U cultures. Diarrhea: r/o infectious causes, check Stool work up, start LVQ/Flagyl, CT shows some paretic loops of small bowel that may represent ileus associated with UTI?, monitor. Right Knee Pain: small effusion, c/w Allopurinol, pain medication, PT/OT. Chronic Respiratory Failure: on Home O2 at night, monitor. Chronic Systolic Heart Failure: EF 25%, not in acute failure she is dehydrated, will start gentle hydration for 24h then resume diuretics, monitor I/o and weight. H/O A. Fib: has a PPM and getting frequent burst of V tach, will give a dose of Magnesium and get Cardiology to see patient , may need Pacer interrogation. C/w Eliquis for now. Code Status: DNR as per patient Surrogate Decision Maker: marshall Gonzalez [phone] [id number] DVT Prophylaxis: Eliquis GI Prophylaxis: not indicated Baseline: weak and frail lives by herself but has caregivers for 4h, patient will need 24/7 care. Subjective: CHIEF COMPLAINT: \"I feel weak and have diarrhea\" HISTORY OF PRESENT ILLNESS:    
 Lili Duran is a 80 y.o.  female  with PMHx significant for HTN, asthma, HF, cancer, CKD, presents via EMS to the ED with cc of new onset moderate diarrhea, ongoing for 1 day. Pt reports nausea, vomiting, confusion and R knee pain alongside cc. Caregiver states that pt is more confused then normal since yesterday by not following routine and having incoherent speech. Her son, who took care of her yesterday notes that pt was naked and that there was emesis and diarrhea around the bathroom. She denies being on any antibiotics recently. Pt was last seen normal on 2/1. She denies any other alleviating or exacerbating factors. Pt specifically denies any current N/V, fevers, chills, abnormal pain, cough, urinary sxs or diarrhea.  
 At this time patient is oriented x3, but she recalls been sick over the weekend and not eating nor drinking, having diarrhea, denies chest pain, no fever, no cough, no urinary symptoms, no other associated symptoms. We were asked to admit for work up and evaluation of the above problems. Past Medical History:  
Diagnosis Date  Aortic insufficiency  Asthma  Cancer ([facility] [address] lymphoma  CKD (chronic kidney disease) stage 3, GFR 30-59 ml/min ([facility]) 1/10/2018  Congestive heart failure, NYHA class II, chronic, systolic (HCC)  Heart failure ([facility] [address]  Hypertension  Mitral valvular regurgitation 1/22/2016 ECHO 2/3/17: LV dilated, EF 20-25%, mild LVH, RV mod dilated, mild- mod MELANIA, mod-severe MR, mod AI ECHO 7/29/17: EF 15%, severe DHK, reduced RVEF, RVH, RVSP 35 mmHg  Mild LAE, mod-marked MA fibrosis, mod-severe MR (2+), AO root dilated,    
 Nonrheumatic aortic valve insufficiency 10/16/2018  Presence of biventricular automatic cardioverter/defibrillator (AICD) 7/2/2015 [place] [employer] biventricular AICD implant  Stomach ulcer Past Surgical History:  
Procedure Laterality Date  HX BREAST BIOPSY Bilateral   
 40 years ago  HX COLONOSCOPY    
 HX HEENT    
 cataracts removed  HX HYSTERECTOMY  HX OTHER SURGICAL    
 lymph node surgery  HX TONSILLECTOMY  OR EGD TRANSORAL BIOPSY SINGLE/MULTIPLE  5/23/2012  
    
 SINUS SURGERY PROC UNLISTED Nasal polyps removed Social History Tobacco Use  Smoking status: Never Smoker  Smokeless tobacco: Never Used Substance Use Topics  Alcohol use: No  
  Alcohol/week: 0.0 oz Family History Problem Relation Age of Onset  Heart Disease Mother  Stroke Father Allergies Allergen Reactions  Codeine Other (comments) \"made me disoriented\" per pt Prior to Admission medications Medication Sig Start Date End Date Taking? Authorizing Provider  
cephALEXin (KEFLEX) 500 mg capsule Take 1 Cap by mouth four (4) times daily for 7 days. 2/4/19 2/11/19 Yes Mavis Alexander MD  
atorvastatin (LIPITOR) 10 mg tablet TAKE ONE (1) TABLET(S) DAILY 1/8/19   Vickie Lemons MD  
levothyroxine (SYNTHROID) 25 mcg tablet Take 1 Tab by mouth Daily (before breakfast). 12/11/18   Vickie Lemons MD  
albuterol (PROVENTIL VENTOLIN) 2.5 mg /3 mL (0.083 %) nebulizer solution 3 mL by Nebulization route every four (4) hours as needed for Wheezing. 11/11/18   Vickie Lemons MD  
furosemide (LASIX) 20 mg tablet Take 1 Tab by mouth two (2) times a day. 8/15/18   Vickie Lemons MD  
potassium chloride SR (KLOR-CON 10) 10 mEq tablet Take 1 Tab by mouth daily. 7/24/18   Vickie Lemons MD  
sacubitril-valsartan (ENTRESTO) 49 mg/51 mg tablet Take 1 Tab by mouth two (2) times a day. 7/24/18   Vickie Lemons MD  
apixaban (ELIQUIS) 2.5 mg tablet Take 1 Tab by mouth two (2) times a day. Indications: PREVENT THROMBOEMBOLISM IN CHRONIC ATRIAL FIBRILLATION 6/1/18   Vickie Lemons MD  
fluticasone-salmeterol (ADVAIR) 250-50 mcg/dose diskus inhaler Take 1 Puff by inhalation two (2) times a day.  4/14/18   Vickie Lemons MD  
 carvedilol (COREG) 3.125 mg tablet TAKE ONE (1) TABLET(S) TWIC E DAILY WITH FOOD  Indications: chronic heart failure 4/3/18   Sara Aponte MD  
allopurinol (ZYLOPRIM) 300 mg tablet TAKE ONE (1) TABLET(S) DAILY 1/24/18   Sara Aponte MD  
magnesium oxide 400 mg cap Take 1 Cap by mouth daily. Provider, Historical  
cholecalciferol (VITAMIN D3) 1,000 unit cap Take 1,000 Units by mouth daily. Provider, Historical  
loratadine (CLARITIN) 10 mg tablet Take 10 mg by mouth daily as needed. Provider, Historical  
co-enzyme Q-10 (CO Q-10) 100 mg capsule Take 100 mg by mouth daily. Provider, Historical  
OXYGEN-AIR DELIVERY SYSTEMS 2.5 L by Does Not Apply route nightly. Provider, Historical  
ibrutinib (IMBRUVICA) 140 mg cap Take 420 mg by mouth five (5) days a week. 140 mg cap, takes 3 caps five days a week (none on Sunday or Wednesday)    Provider, Historical  
acetaminophen (TYLENOL EXTRA STRENGTH) 500 mg tablet Take 500 mg by mouth every six (6) hours as needed for Pain. Provider, Historical  
multivitamin (ONE A DAY) tablet Take 1 Tab by mouth daily. Provider, Historical  
 
 
REVIEW OF SYSTEMS:    
I am not able to complete the review of systems because: The patient is intubated and sedated The patient has altered mental status due to his acute medical problems The patient has baseline aphasia from prior stroke(s) The patient has baseline dementia and is not reliable historian The patient is in acute medical distress and unable to provide information Total of 12 systems reviewed as follows:   
   POSITIVE= underlined text  Negative = text not underlined General:  fever, chills, sweats, generalized weakness, weight loss/gain,  
   loss of appetite Eyes:    blurred vision, eye pain, loss of vision, double vision ENT:    rhinorrhea, pharyngitis Respiratory:   cough, sputum production, SOB, JENKINS, wheezing, pleuritic pain Cardiology:   chest pain, palpitations, orthopnea, PND, edema, syncope Gastrointestinal:  abdominal pain , N/V, diarrhea, dysphagia, constipation, bleeding Genitourinary:  frequency, urgency, dysuria, hematuria, incontinence Muskuloskeletal :  arthralgia, myalgia, back pain Hematology:  easy bruising, nose or gum bleeding, lymphadenopathy Dermatological: rash, ulceration, pruritis, color change / jaundice Endocrine:   hot flashes or polydipsia Neurological:  headache, dizziness, confusion, focal weakness, paresthesia, Speech difficulties, memory loss, gait difficulty Psychological: Feelings of anxiety, depression, agitation Objective: VITALS:   
Visit Vitals /66 (BP 1 Location: Right arm, BP Patient Position: Supine) Pulse 79 Temp 98.2 °F (36.8 °C) Resp 23 Ht 5' 1\" (1.549 m) Wt 56.7 kg (125 lb) SpO2 97% BMI 23.62 kg/m² PHYSICAL EXAM: 
 
General:    Alert, cooperative, no distress, appears stated age. HEENT: Atraumatic, anicteric sclerae, pink conjunctivae No oral ulcers, mucosa dry, throat clear, dentition poor Neck:  Supple, symmetrical,  thyroid: non tender Lungs:   Coarse BS, very mild rales in bases Chest wall:  No tenderness  No Accessory muscle use. Heart:   Regular  rhythm,  No  murmur   trace edema Abdomen:   Soft, non-tender. Not distended. Bowel sounds normal 
Extremities: No cyanosis. No clubbing,   
  Skin turgor normal, Capillary refill normal, Radial  pulse 2+ Skin:     Not pale. Not Jaundiced  No rashes Psych:  Fair insight. Not depressed. Not anxious or agitated. Neurologic: Awake, oriented x3, GCS [id number], no gross focal deficits 
 
_______________________________________________________________________ Care Plan discussed with: 
  Comments Patient y Family  y cousin RN y   
Care Manager Consultant:     
_______________________________________________________________________ Expected  Disposition: Home with Family HH/PT/OT/RN   
SNF/LTC y  
Sadi   
________________________________________________________________________ TOTAL TIME:  65 Minutes Critical Care Provided     Minutes non procedure based Comments  
 y Reviewed previous records  
>50% of visit spent in counseling and coordination of care y Discussion with patient and/or family and questions answered 
  
 
________________________________________________________________________ Signed: Sharyle Kief, MD 
 
Procedures: see electronic medical records for all procedures/Xrays and details which were not copied into this note but were reviewed prior to creation of Plan. LAB DATA REVIEWED:   
Recent Results (from the past 24 hour(s)) EKG, 12 LEAD, INITIAL Collection Time: 02/04/19 10:34 AM  
Result Value Ref Range Ventricular Rate 85 BPM  
 Atrial Rate 85 BPM  
 P-R Interval 136 ms QRS Duration 176 ms  
 Q-T Interval 456 ms  
 QTC Calculation (Bezet) 542 ms Calculated R Axis -149 degrees Calculated T Axis 1 degrees Diagnosis Atrial-sensed ventricular-paced rhythm Biventricular pacemaker detected When compared with ECG of 20-APR-2017 09:49, 
premature ventricular complexes are no longer present Vent. rate has increased BY   9 BPM 
Confirmed by Kurt Plasencia ([id number]) on 2/4/2019 2:17:37 PM 
  
CBC WITH AUTOMATED DIFF Collection Time: 02/04/19 10:53 AM  
Result Value Ref Range WBC 2.6 (L) 3.6 - 11.0 K/uL  
 RBC 2.94 (L) 3.80 - 5.20 M/uL HGB 9.2 (L) 11.5 - 16.0 g/dL HCT 28.0 (L) 35.0 - 47.0 % MCV 95.2 80.0 - 99.0 FL  
 MCH 31.3 26.0 - 34.0 PG  
 MCHC 32.9 30.0 - 36.5 g/dL  
 RDW 16.6 (H) 11.5 - 14.5 % PLATELET 367 886 - 118 K/uL MPV 10.2 8.9 - 12.9 FL  
 NRBC 0.0 0  WBC ABSOLUTE NRBC 0.00 0.00 - 0.01 K/uL NEUTROPHILS 64 32 - 75 % BAND NEUTROPHILS 12 % LYMPHOCYTES 21 12 - 49 % MONOCYTES 2 (L) 5 - 13 % EOSINOPHILS 0 0 - 7 % BASOPHILS 0 0 - 1 % METAMYELOCYTES 1 % IMMATURE GRANULOCYTES 0 0.0 - 0.5 % ABS. NEUTROPHILS 2.0 1.8 - 8.0 K/UL  
 ABS. LYMPHOCYTES 0.5 (L) 0.8 - 3.5 K/UL  
 ABS. MONOCYTES 0.1 0.0 - 1.0 K/UL  
 ABS. EOSINOPHILS 0.0 0.0 - 0.4 K/UL  
 ABS. BASOPHILS 0.0 0.0 - 0.1 K/UL  
 ABS. IMM. GRANS. 0.0 0.00 - 0.04 K/UL  
 DF MANUAL    
 RBC COMMENTS ANISOCYTOSIS 1+ 
    
 RBC COMMENTS OVALOCYTES PRESENT 
    
 WBC COMMENTS DOHLE BODIES    
METABOLIC PANEL, COMPREHENSIVE Collection Time: 02/04/19 10:53 AM  
Result Value Ref Range Sodium 141 136 - 145 mmol/L Potassium 3.8 3.5 - 5.1 mmol/L Chloride 106 97 - 108 mmol/L  
 CO2 28 21 - 32 mmol/L Anion gap 7 5 - 15 mmol/L Glucose 113 (H) 65 - 100 mg/dL BUN 26 (H) 6 - 20 MG/DL Creatinine 1.37 (H) 0.55 - 1.02 MG/DL  
 BUN/Creatinine ratio 19 12 - 20 GFR est AA 44 (L) >60 ml/min/1.73m2 GFR est non-AA 36 (L) >60 ml/min/1.73m2 Calcium 8.3 (L) 8.5 - 10.1 MG/DL Bilirubin, total 0.8 0.2 - 1.0 MG/DL  
 ALT (SGPT) 22 12 - 78 U/L  
 AST (SGOT) 34 15 - 37 U/L Alk. phosphatase 122 (H) 45 - 117 U/L Protein, total 6.4 6.4 - 8.2 g/dL Albumin 2.7 (L) 3.5 - 5.0 g/dL Globulin 3.7 2.0 - 4.0 g/dL A-G Ratio 0.7 (L) 1.1 - 2.2 URINALYSIS W/ REFLEX CULTURE Collection Time: 02/04/19 12:18 PM  
Result Value Ref Range Color YELLOW/STRAW Appearance CLOUDY (A) CLEAR Specific gravity 1.017 1.003 - 1.030    
 pH (UA) 5.5 5.0 - 8.0 Protein 100 (A) NEG mg/dL Glucose NEGATIVE  NEG mg/dL Ketone NEGATIVE  NEG mg/dL Bilirubin NEGATIVE  NEG Blood MODERATE (A) NEG Urobilinogen 1.0 0.2 - 1.0 EU/dL Nitrites NEGATIVE  NEG Leukocyte Esterase MODERATE (A) NEG    
 WBC 20-50 0 - 4 /hpf  
 RBC 10-20 0 - 5 /hpf Epithelial cells FEW FEW /lpf Bacteria 4+ (A) NEG /hpf  
 UA:UC IF INDICATED URINE CULTURE ORDERED (A) CNI Hyaline cast 2-5 0 - 5 /lpf

## 2019-02-04 NOTE — ED NOTES
Patient with multiple short bursts of v-tach. Patient asymptomatic and Dr. Jessica Krishnamurthy at bedside. MD to place orders.

## 2019-02-04 NOTE — ED PROVIDER NOTES
EMERGENCY DEPARTMENT HISTORY AND PHYSICAL EXAM 
 
 
Date: 2/4/2019 Patient Name: Bhumika Rodrigues History of Presenting Illness Chief Complaint Patient presents with  Diarrhea  Altered mental status History Provided By: Patient and Caregiver HPI: Bhumika Rodrigues, 80 y.o. female with PMHx significant for HTN, asthma, HF, cancer, CKD, presents via EMS to the ED with cc of new onset moderate diarrhea, ongoing for 1 day. Pt reports nausea, vomiting, confusion and R knee pain alongside cc. Caregiver states that pt is more confused then normal since yesterday by not following routine and having incoherent speech. Her son, who took care of her yesterday notes that pt was naked and that there was emesis and diarrhea around the bathroom. She denies being on any antibiotics recently. Pt was last seen normal on 2/1. She denies any other alleviating or exacerbating factors. Pt specifically denies any current N/V, fevers, chills, abnormal pain, cough, urinary sxs or diarrhea. There are no other complaints, changes, or physical findings at this time. PCP: Moses Maciel MD 
 
No current facility-administered medications on file prior to encounter. Current Outpatient Medications on File Prior to Encounter Medication Sig Dispense Refill  atorvastatin (LIPITOR) 10 mg tablet TAKE ONE (1) TABLET(S) DAILY 90 Tab 3  
 levothyroxine (SYNTHROID) 25 mcg tablet Take 1 Tab by mouth Daily (before breakfast). 90 Tab 3  
 albuterol (PROVENTIL VENTOLIN) 2.5 mg /3 mL (0.083 %) nebulizer solution 3 mL by Nebulization route every four (4) hours as needed for Wheezing. 30 Each 4  
 furosemide (LASIX) 20 mg tablet Take 1 Tab by mouth two (2) times a day. 180 Tab 3  potassium chloride SR (KLOR-CON 10) 10 mEq tablet Take 1 Tab by mouth daily. 90 Tab 3  
 sacubitril-valsartan (ENTRESTO) 49 mg/51 mg tablet Take 1 Tab by mouth two (2) times a day.  180 Tab 3  
  apixaban (ELIQUIS) 2.5 mg tablet Take 1 Tab by mouth two (2) times a day. Indications: PREVENT THROMBOEMBOLISM IN CHRONIC ATRIAL FIBRILLATION 180 Tab 3  
 fluticasone-salmeterol (ADVAIR) 250-50 mcg/dose diskus inhaler Take 1 Puff by inhalation two (2) times a day. 3 Inhaler 3  carvedilol (COREG) 3.125 mg tablet TAKE ONE (1) TABLET(S) TWIC E DAILY WITH FOOD  Indications: chronic heart failure 180 Tab 3  
 allopurinol (ZYLOPRIM) 300 mg tablet TAKE ONE (1) TABLET(S) DAILY 90 Tab 3  
 magnesium oxide 400 mg cap Take 1 Cap by mouth daily.  cholecalciferol (VITAMIN D3) 1,000 unit cap Take 1,000 Units by mouth daily.  loratadine (CLARITIN) 10 mg tablet Take 10 mg by mouth daily as needed.  co-enzyme Q-10 (CO Q-10) 100 mg capsule Take 100 mg by mouth daily.  OXYGEN-AIR DELIVERY SYSTEMS 2.5 L by Does Not Apply route nightly.  ibrutinib (IMBRUVICA) 140 mg cap Take 420 mg by mouth five (5) days a week. 140 mg cap, takes 3 caps five days a week (none on Sunday or Wednesday)  acetaminophen (TYLENOL EXTRA STRENGTH) 500 mg tablet Take 500 mg by mouth every six (6) hours as needed for Pain.  multivitamin (ONE A DAY) tablet Take 1 Tab by mouth daily. Past History Past Medical History: 
Past Medical History:  
Diagnosis Date  Aortic insufficiency  Asthma  [facility]  CKD (chronic kidney disease) stage 3, GFR 30-59 ml/min ([facility]) 1/10/2018  Congestive heart failure, NYHA class II, chronic, systolic (HCC)  [facility]  Hypertension  Mitral valvular regurgitation 1/22/2016 ECHO 2/3/17: LV dilated, EF 20-25%, mild LVH, RV mod dilated, mild- mod MELANIA, mod-severe MR, mod AI ECHO 7/29/17: EF 15%, severe DHK, reduced RVEF, RVH, RVSP 35 mmHg  Mild LAE, mod-marked MA fibrosis, mod-severe MR (2+), AO root dilated,    
 Nonrheumatic aortic valve insufficiency 10/16/2018  Presence of biventricular automatic cardioverter/defibrillator (AICD) 7/2/2015 [employer] biventricular AICD implant  Stomach ulcer Past Surgical History: 
Past Surgical History:  
Procedure Laterality Date  HX BREAST BIOPSY Bilateral   
 40 years ago  HX COLONOSCOPY    
 HX HEENT    
 cataracts removed  HX HYSTERECTOMY  HX OTHER SURGICAL    
 lymph node surgery  HX TONSILLECTOMY  NH EGD TRANSORAL BIOPSY SINGLE/MULTIPLE  5/23/2012  
    
 SINUS SURGERY PROC UNLISTED Nasal polyps removed Family History: 
Family History Problem Relation Age of Onset  Heart Disease Mother  Stroke Father Social History: 
Social History Tobacco Use  Smoking status: Never Smoker  Smokeless tobacco: Never Used Substance Use Topics  Alcohol use: No  
  Alcohol/week: 0.0 oz  Drug use: No  
 
 
Allergies: Allergies Allergen Reactions  Codeine Other (comments) \"made me disoriented\" per pt Review of Systems Review of Systems Constitutional: Negative. Negative for chills, diaphoresis and fever. HENT: Negative. Negative for congestion, sore throat and trouble swallowing. Eyes: Negative. Negative for photophobia, pain and redness. Respiratory: Negative. Negative for cough, chest tightness, shortness of breath and wheezing. Cardiovascular: Negative. Negative for chest pain and palpitations. Gastrointestinal: Positive for diarrhea, nausea and vomiting. Negative for abdominal pain and blood in stool. Genitourinary: Negative for difficulty urinating, dysuria and frequency. Musculoskeletal: Positive for arthralgias (R knee). Negative for myalgias, neck pain and neck stiffness. Skin: Negative. Neurological: Negative. Negative for dizziness, tremors, seizures, syncope, speech difficulty, light-headedness and headaches. Psychiatric/Behavioral: Positive for confusion. The patient is not nervous/anxious. All other systems reviewed and are negative. Physical Exam  
Physical Exam  
Constitutional: She appears well-developed. Thin HENT:  
Head: Normocephalic and atraumatic. Mouth/Throat: Mucous membranes are dry. Eyes: Conjunctivae and EOM are normal.  
Neck: Normal range of motion. Neck supple. Cardiovascular: Normal rate and regular rhythm. Pulmonary/Chest: Effort normal and breath sounds normal. No respiratory distress. Abdominal: Soft. She exhibits no distension. There is no tenderness. Musculoskeletal: Normal range of motion. R knee swollen compared to L 
R knee pain elicited when flexing Neurological: She is alert. Oriented x4 Skin: Skin is warm and dry. Psychiatric: She has a normal mood and affect. Nursing note and vitals reviewed. Diagnostic Study Results Labs - Recent Results (from the past 12 hour(s)) EKG, 12 LEAD, INITIAL Collection Time: 02/04/19 10:34 AM  
Result Value Ref Range Ventricular Rate 85 BPM  
 Atrial Rate 85 BPM  
 P-R Interval 136 ms QRS Duration 176 ms  
 Q-T Interval 456 ms  
 QTC Calculation (Bezet) 542 ms Calculated R Axis -149 degrees Calculated T Axis 1 degrees Diagnosis Atrial-sensed ventricular-paced rhythm Biventricular pacemaker detected When compared with ECG of 20-APR-2017 09:49, 
premature ventricular complexes are no longer present Vent. rate has increased BY   9 BPM 
  
CBC WITH AUTOMATED DIFF Collection Time: 02/04/19 10:53 AM  
Result Value Ref Range WBC 2.6 (L) 3.6 - 11.0 K/uL  
 RBC 2.94 (L) 3.80 - 5.20 M/uL HGB 9.2 (L) 11.5 - 16.0 g/dL HCT 28.0 (L) 35.0 - 47.0 % MCV 95.2 80.0 - 99.0 FL  
 MCH 31.3 26.0 - 34.0 PG  
 MCHC 32.9 30.0 - 36.5 g/dL  
 RDW 16.6 (H) 11.5 - 14.5 % PLATELET 141 514 - 767 K/uL MPV 10.2 8.9 - 12.9 FL  
 NRBC 0.0 0  WBC ABSOLUTE NRBC 0.00 0.00 - 0.01 K/uL NEUTROPHILS 64 32 - 75 % BAND NEUTROPHILS 12 % LYMPHOCYTES 21 12 - 49 % MONOCYTES 2 (L) 5 - 13 % EOSINOPHILS 0 0 - 7 % BASOPHILS 0 0 - 1 % METAMYELOCYTES 1 % IMMATURE GRANULOCYTES 0 0.0 - 0.5 % ABS. NEUTROPHILS 2.0 1.8 - 8.0 K/UL  
 ABS. LYMPHOCYTES 0.5 (L) 0.8 - 3.5 K/UL  
 ABS. MONOCYTES 0.1 0.0 - 1.0 K/UL  
 ABS. EOSINOPHILS 0.0 0.0 - 0.4 K/UL  
 ABS. BASOPHILS 0.0 0.0 - 0.1 K/UL  
 ABS. IMM. GRANS. 0.0 0.00 - 0.04 K/UL  
 DF MANUAL    
 RBC COMMENTS ANISOCYTOSIS 1+ 
    
 RBC COMMENTS OVALOCYTES PRESENT 
    
 WBC COMMENTS DOHLE BODIES    
METABOLIC PANEL, COMPREHENSIVE Collection Time: 02/04/19 10:53 AM  
Result Value Ref Range Sodium 141 136 - 145 mmol/L Potassium 3.8 3.5 - 5.1 mmol/L Chloride 106 97 - 108 mmol/L  
 CO2 28 21 - 32 mmol/L Anion gap 7 5 - 15 mmol/L Glucose 113 (H) 65 - 100 mg/dL BUN 26 (H) 6 - 20 MG/DL Creatinine 1.37 (H) 0.55 - 1.02 MG/DL  
 BUN/Creatinine ratio 19 12 - 20 GFR est AA 44 (L) >60 ml/min/1.73m2 GFR est non-AA 36 (L) >60 ml/min/1.73m2 Calcium 8.3 (L) 8.5 - 10.1 MG/DL Bilirubin, total 0.8 0.2 - 1.0 MG/DL  
 ALT (SGPT) 22 12 - 78 U/L  
 AST (SGOT) 34 15 - 37 U/L Alk. phosphatase 122 (H) 45 - 117 U/L Protein, total 6.4 6.4 - 8.2 g/dL Albumin 2.7 (L) 3.5 - 5.0 g/dL Globulin 3.7 2.0 - 4.0 g/dL A-G Ratio 0.7 (L) 1.1 - 2.2 URINALYSIS W/ REFLEX CULTURE Collection Time: 02/04/19 12:18 PM  
Result Value Ref Range Color YELLOW/STRAW Appearance CLOUDY (A) CLEAR Specific gravity 1.017 1.003 - 1.030    
 pH (UA) 5.5 5.0 - 8.0 Protein 100 (A) NEG mg/dL Glucose NEGATIVE  NEG mg/dL Ketone NEGATIVE  NEG mg/dL Bilirubin NEGATIVE  NEG Blood MODERATE (A) NEG Urobilinogen 1.0 0.2 - 1.0 EU/dL Nitrites NEGATIVE  NEG Leukocyte Esterase MODERATE (A) NEG    
 WBC 20-50 0 - 4 /hpf  
 RBC 10-20 0 - 5 /hpf Epithelial cells FEW FEW /lpf Bacteria 4+ (A) NEG /hpf  
 UA:UC IF INDICATED URINE CULTURE ORDERED (A) CNI Hyaline cast 2-5 0 - 5 /lpf Radiologic Studies -  
CT ABD PELV W CONT Final Result IMPRESSION:  
(Study without oral contrast.) 1. Moderate cardiomegaly. AICD. 2. Small right pleural effusion. 3. Mild-moderate ascites. 4. Diffuse hepatic portal tract edema. 5. Nonspecific mesenteric edema. 6. Diminished retroperitoneal lymphadenopathy with retroperitoneal lymph node  
dimensions measuring up to 2 cm short axis dimension. 7. Sigmoid diverticulosis. CT HEAD WO CONT Final Result IMPRESSION: Atrophy and chronic small vessel ischemic disease the white matter. No acute findings. XR CHEST PORT Final Result IMPRESSION: No Acute Disease. XR KNEE RT 3 V Final Result IMPRESSION: Small joint effusion. Osteoarthritis. No apparent fracture. CT Results  (Last 48 hours) 02/04/19 1253  CT ABD PELV W CONT Final result Impression:  IMPRESSION:  
(Study without oral contrast.) 1. Moderate cardiomegaly. AICD. 2. Small right pleural effusion. 3. Mild-moderate ascites. 4. Diffuse hepatic portal tract edema. 5. Nonspecific mesenteric edema. 6. Diminished retroperitoneal lymphadenopathy with retroperitoneal lymph node  
dimensions measuring up to 2 cm short axis dimension. 7. Sigmoid diverticulosis. Narrative:  EXAM: CT ABD PELV W CONT INDICATION: Diarrhea, lethargy, onset of symptoms last evening. History  
lymphoma. COMPARISON: CT 4/9/2012 CONTRAST: 100 mL of Isovue-370. TECHNIQUE:   
Following the uneventful intravenous administration of contrast, thin axial  
images were obtained through the abdomen and pelvis. Coronal and sagittal  
reconstructions were generated. Oral contrast was not, per order, administered. CT dose reduction was achieved through use of a standardized protocol tailored  
for this examination and automatic exposure control for dose modulation.   
   
The lack of oral contrast material substantially diminishes the capacity of CT  
to evaluate the bowel and adjacent structures. FINDINGS:   
LUNG BASES: Small right pleural effusion. INCIDENTALLY IMAGED HEART AND MEDIASTINUM: Moderate cardiomegaly. AICD device. LIVER: Diffuse periportal edema, which is a nonspecific finding that can be seen  
in the setting of various conditions, including right heart failure, biliary  
disease and infiltrative processes of the liver. There are 3 hypoattenuating  
lesions measuring up to 9 mm in size are unchanged, consistent with cysts. GALLBLADDER: Unremarkable. SPLEEN: No mass. PANCREAS: No mass or ductal dilatation. ADRENALS: Unremarkable. KIDNEYS: No mass, calculus, or hydronephrosis. Bilateral renal cysts measuring  
up to 4.3 cm in size. STOMACH: Unremarkable. SMALL BOWEL: No dilatation or wall thickening. COLON: Numerous sigmoid diverticula. APPENDIX: Not demonstrated. PERITONEUM: Small-moderate free intraperitoneal fluid. Ill-defined diffuse  
mesenteric edema-like attenuation. RETROPERITONEUM: Preceding noted massive lymphadenopathy no longer demonstrated. There are numerous retroperitoneal lymph nodes noted appearing largest in the  
left periaortic region near the aortic bifurcation measuring 2 cm in short axis  
dimension. REPRODUCTIVE ORGANS: Status post hysterectomy. URINARY BLADDER: No mass or calculus. BONES: Moderate to severe diffuse osteopenia. Mild superior endplate vertebral  
compression fracture at L1. Moderate L4-5 and severe L5-S1 degenerative  
spondylosis. ADDITIONAL COMMENTS: N/A  
   
  
 02/04/19 1253  CT HEAD WO CONT Final result Impression:  IMPRESSION: Atrophy and chronic small vessel ischemic disease the white matter. No acute findings. Narrative:  EXAM: CT HEAD WO CONT INDICATION: Confusion/delirium, altered LOC, unexplained COMPARISON: None. CONTRAST: None. TECHNIQUE: Unenhanced CT of the head was performed using 5 mm images. Brain and  
bone windows were generated. CT dose reduction was achieved through use of a  
standardized protocol tailored for this examination and automatic exposure  
control for dose modulation. FINDINGS:  
There is moderate prominence of the ventricles and cortical sulci consistent  
with atrophy. . Bilateral diminished attenuation in the periventricular white  
matter is shown predominating in the frontal lobes, consistent with chronic  
small vessel ischemic disease of the white matter. . There is no intracranial  
hemorrhage, extra-axial collection, mass, mass effect or midline shift. The  
basilar cisterns are open. No acute infarct is identified. The bone windows  
demonstrate no abnormalities. The visualized portions of the paranasal sinuses  
and mastoid air cells are clear. CXR Results  (Last 48 hours) 02/04/19 1148  XR CHEST PORT Final result Impression:  IMPRESSION: No Acute Disease. Narrative:  EXAM: Portable CXR. 1133 hours INDICATION: Chest Pain FINDINGS:  
The lungs are clear. Heart is large. There is no overt pulmonary edema. There is  
no evident pneumothorax, adenopathy or pleural effusion. ICD is present. Medical Decision Making I am the first provider for this patient. I reviewed the vital signs, available nursing notes, past medical history, past surgical history, family history and social history. Vital Signs-Reviewed the patient's vital signs. Patient Vitals for the past 12 hrs: 
 Temp Pulse Resp BP SpO2  
02/04/19 1220 98.2 °F (36.8 °C) 79 23 111/66 97 % 02/04/19 1200  71 17 111/66 97 % 02/04/19 1130  72 27 109/68 96 % 02/04/19 1100  72 23 100/62 92 % 02/04/19 1030 99.2 °F (37.3 °C) 74 16 111/81 96 % Pulse Oximetry Analysis - 96% on RA Cardiac Monitor:  
Rate: 85 bpm 
Rhythm: atrial-sensed ventricular paced rhythm EKG interpretation: (Preliminary) 10:34 
 Rhythm: atrial-sensed ventricular paced rhythm ; and irregular. Rate (approx.): 85 bpm; Axis: normal; NH interval: normal; QRS interval: normal ; ST/T wave: normal; Other findings: biventricular pacemaker detected. Written by ALEXANDER Sierra, as dictated by Noreen Calderon MD. Records Reviewed: Nursing Notes, Old Medical Records and Previous Laboratory Studies Provider Notes (Medical Decision Making):  
Patient presents for diarrhea:  DDx: Gastroenteritis, diverticulitis, colitis, C dificile, bacterial infection. Will get labs and possibly CT abdomen to further evaluate. AMS possibly related to infection, UTI vs dehydration. Pt is alert and oriented and following commands. ED Course:  
Initial assessment performed. The patients presenting problems have been discussed, and they are in agreement with the care plan formulated and outlined with them. I have encouraged them to ask questions as they arise throughout their visit. 1:49 PM  
Pt states that she does not feel comfortable going home because she lives in an isolated area and does not have anyone watching her. She and her caregiver are concerned for GLF with no assistance. CONSULT NOTE:  
1:55 PM 
Noreen Calderon MD spoke with Jorge Dawn MD  
Specialty: Hospitalist 
Discussed pt's hx, disposition, and available diagnostic and imaging results. Reviewed care plans. Consultant will evaluate pt for admission. Written by ALEXANDER Sierra, as dictated by Noreen Calderon MD. Critical Care Time: 0 Disposition: 
Admit Note: 
1:55 PM 
Pt is being admitted by . The results of their tests and reason(s) for their admission have been discussed with pt and/or available family. They convey agreement and understanding for the need to be admitted and for admission diagnosis. PLAN: 
1. Admit to  Diagnosis Clinical Impression: 1. Acute cystitis with hematuria 2. Dehydration 3. Functional diarrhea 4. Altered mental status, unspecified altered mental status type Attestations: This note is prepared by Michael Abad, acting as a Scribe for Israel Alegria MD. Israel Alegria MD: The scribe's documentation has been prepared under my direction and personally reviewed by me in its entirety. I confirm that the notes above accurately reflects all work, treatment, procedures, and medical decision making performed by me. This note will not be viewable in [address]

## 2019-02-04 NOTE — ED NOTES
Jean Marie Valdovinos MD at bedside for reeval - pt noted to have a UTI Pt sent to radiology for imaging Home health nurse remains at bedside

## 2019-02-04 NOTE — ED NOTES
Spoke with Angela ALEX for Dr. Gaviota Subramanian.  Verbal orders to interrogate pacemaker at this time. Patient has a MeMeMe device.

## 2019-02-04 NOTE — ED NOTES
Pt continues to rest in bed with no acute dsitress noted - home health nurse remains at bedside for comfort and care - IVF 1 L NS continues to infuse, with no concerns nor complication to the site - will continue to monitor.

## 2019-02-05 LAB
ALBUMIN SERPL-MCNC: 2.1 G/DL (ref 3.5–5)
ALBUMIN/GLOB SERPL: 0.6 {RATIO} (ref 1.1–2.2)
ALP SERPL-CCNC: 94 U/L (ref 45–117)
ALT SERPL-CCNC: 17 U/L (ref 12–78)
ANION GAP SERPL CALC-SCNC: 9 MMOL/L (ref 5–15)
AST SERPL-CCNC: 24 U/L (ref 15–37)
BASOPHILS # BLD: 0 K/UL (ref 0–0.1)
BASOPHILS # BLD: 0 K/UL (ref 0–0.1)
BASOPHILS NFR BLD: 0 % (ref 0–1)
BASOPHILS NFR BLD: 0 % (ref 0–1)
BILIRUB SERPL-MCNC: 0.5 MG/DL (ref 0.2–1)
BLASTS NFR BLD MANUAL: 0 %
BUN SERPL-MCNC: 27 MG/DL (ref 6–20)
BUN/CREAT SERPL: 24 (ref 12–20)
CALCIUM SERPL-MCNC: 7.8 MG/DL (ref 8.5–10.1)
CHLORIDE SERPL-SCNC: 106 MMOL/L (ref 97–108)
CO2 SERPL-SCNC: 23 MMOL/L (ref 21–32)
CREAT SERPL-MCNC: 1.13 MG/DL (ref 0.55–1.02)
DIFFERENTIAL METHOD BLD: ABNORMAL
DIFFERENTIAL METHOD BLD: ABNORMAL
EOSINOPHIL # BLD: 0 K/UL (ref 0–0.4)
EOSINOPHIL # BLD: 0 K/UL (ref 0–0.4)
EOSINOPHIL NFR BLD: 0 % (ref 0–7)
EOSINOPHIL NFR BLD: 1 % (ref 0–7)
ERYTHROCYTE [DISTWIDTH] IN BLOOD BY AUTOMATED COUNT: 16.5 % (ref 11.5–14.5)
ERYTHROCYTE [DISTWIDTH] IN BLOOD BY AUTOMATED COUNT: 16.7 % (ref 11.5–14.5)
EST. AVERAGE GLUCOSE BLD GHB EST-MCNC: 100 MG/DL
GLOBULIN SER CALC-MCNC: 3.5 G/DL (ref 2–4)
GLUCOSE BLD STRIP.AUTO-MCNC: 167 MG/DL (ref 65–100)
GLUCOSE SERPL-MCNC: 86 MG/DL (ref 65–100)
HBA1C MFR BLD: 5.1 % (ref 4.2–6.3)
HCT VFR BLD AUTO: 25.7 % (ref 35–47)
HCT VFR BLD AUTO: 25.9 % (ref 35–47)
HGB BLD-MCNC: 8.5 G/DL (ref 11.5–16)
HGB BLD-MCNC: 8.5 G/DL (ref 11.5–16)
IMM GRANULOCYTES # BLD AUTO: 0 K/UL
IMM GRANULOCYTES # BLD AUTO: 0 K/UL (ref 0–0.04)
IMM GRANULOCYTES NFR BLD AUTO: 0 %
IMM GRANULOCYTES NFR BLD AUTO: 0 % (ref 0–0.5)
LYMPHOCYTES # BLD: 0.2 K/UL (ref 0.8–3.5)
LYMPHOCYTES # BLD: 0.4 K/UL (ref 0.8–3.5)
LYMPHOCYTES NFR BLD: 10 % (ref 12–49)
LYMPHOCYTES NFR BLD: 16 % (ref 12–49)
MAGNESIUM SERPL-MCNC: 3 MG/DL (ref 1.6–2.4)
MCH RBC QN AUTO: 30.4 PG (ref 26–34)
MCH RBC QN AUTO: 31 PG (ref 26–34)
MCHC RBC AUTO-ENTMCNC: 32.8 G/DL (ref 30–36.5)
MCHC RBC AUTO-ENTMCNC: 33.1 G/DL (ref 30–36.5)
MCV RBC AUTO: 92.5 FL (ref 80–99)
MCV RBC AUTO: 93.8 FL (ref 80–99)
METAMYELOCYTES NFR BLD MANUAL: 0 %
METAMYELOCYTES NFR BLD MANUAL: 1 %
MONOCYTES # BLD: 0 K/UL (ref 0–1)
MONOCYTES # BLD: 0.1 K/UL (ref 0–1)
MONOCYTES NFR BLD: 1 % (ref 5–13)
MONOCYTES NFR BLD: 4 % (ref 5–13)
MYELOCYTES NFR BLD MANUAL: 0 %
NEUTS BAND NFR BLD MANUAL: 13 %
NEUTS BAND NFR BLD MANUAL: 7 % (ref 0–6)
NEUTS SEG # BLD: 1.7 K/UL (ref 1.8–8)
NEUTS SEG # BLD: 1.8 K/UL (ref 1.8–8)
NEUTS SEG NFR BLD: 72 % (ref 32–75)
NEUTS SEG NFR BLD: 75 % (ref 32–75)
NRBC # BLD: 0 K/UL (ref 0–0.01)
NRBC # BLD: 0 K/UL (ref 0–0.01)
NRBC BLD-RTO: 0 PER 100 WBC
NRBC BLD-RTO: 0 PER 100 WBC
OTHER CELLS NFR BLD MANUAL: 0 %
PLATELET # BLD AUTO: 135 K/UL (ref 150–400)
PLATELET # BLD AUTO: 146 K/UL (ref 150–400)
PMV BLD AUTO: 10.3 FL (ref 8.9–12.9)
PMV BLD AUTO: 9.4 FL (ref 8.9–12.9)
POTASSIUM SERPL-SCNC: 3.5 MMOL/L (ref 3.5–5.1)
PROMYELOCYTES NFR BLD MANUAL: 0 %
PROT SERPL-MCNC: 5.6 G/DL (ref 6.4–8.2)
RBC # BLD AUTO: 2.74 M/UL (ref 3.8–5.2)
RBC # BLD AUTO: 2.8 M/UL (ref 3.8–5.2)
RBC MORPH BLD: ABNORMAL
RBC MORPH BLD: ABNORMAL
SERVICE CMNT-IMP: ABNORMAL
SODIUM SERPL-SCNC: 138 MMOL/L (ref 136–145)
TROPONIN I SERPL-MCNC: 0.07 NG/ML
TROPONIN I SERPL-MCNC: 0.12 NG/ML
TSH SERPL DL<=0.05 MIU/L-ACNC: 1.96 UIU/ML (ref 0.36–3.74)
WBC # BLD AUTO: 2 K/UL (ref 3.6–11)
WBC # BLD AUTO: 2.2 K/UL (ref 3.6–11)

## 2019-02-05 PROCEDURE — 83036 HEMOGLOBIN GLYCOSYLATED A1C: CPT

## 2019-02-05 PROCEDURE — 74011250637 HC RX REV CODE- 250/637: Performed by: INTERNAL MEDICINE

## 2019-02-05 PROCEDURE — 82962 GLUCOSE BLOOD TEST: CPT

## 2019-02-05 PROCEDURE — 97161 PT EVAL LOW COMPLEX 20 MIN: CPT

## 2019-02-05 PROCEDURE — 74011250636 HC RX REV CODE- 250/636: Performed by: INTERNAL MEDICINE

## 2019-02-05 PROCEDURE — 80053 COMPREHEN METABOLIC PANEL: CPT

## 2019-02-05 PROCEDURE — 97530 THERAPEUTIC ACTIVITIES: CPT

## 2019-02-05 PROCEDURE — 36415 COLL VENOUS BLD VENIPUNCTURE: CPT

## 2019-02-05 PROCEDURE — 85025 COMPLETE CBC W/AUTO DIFF WBC: CPT

## 2019-02-05 PROCEDURE — 83735 ASSAY OF MAGNESIUM: CPT

## 2019-02-05 PROCEDURE — 85027 COMPLETE CBC AUTOMATED: CPT

## 2019-02-05 PROCEDURE — 84484 ASSAY OF TROPONIN QUANT: CPT

## 2019-02-05 PROCEDURE — 84443 ASSAY THYROID STIM HORMONE: CPT

## 2019-02-05 PROCEDURE — 97165 OT EVAL LOW COMPLEX 30 MIN: CPT | Performed by: OCCUPATIONAL THERAPIST

## 2019-02-05 PROCEDURE — 65660000000 HC RM CCU STEPDOWN

## 2019-02-05 PROCEDURE — 97535 SELF CARE MNGMENT TRAINING: CPT | Performed by: OCCUPATIONAL THERAPIST

## 2019-02-05 RX ORDER — DEXTROSE, SODIUM CHLORIDE, AND POTASSIUM CHLORIDE 5; .45; .15 G/100ML; G/100ML; G/100ML
75 INJECTION INTRAVENOUS CONTINUOUS
Status: DISCONTINUED | OUTPATIENT
Start: 2019-02-05 | End: 2019-02-08

## 2019-02-05 RX ADMIN — CARVEDILOL 3.12 MG: 3.12 TABLET, FILM COATED ORAL at 08:02

## 2019-02-05 RX ADMIN — METRONIDAZOLE 500 MG: 500 INJECTION, SOLUTION INTRAVENOUS at 01:40

## 2019-02-05 RX ADMIN — DEXTROSE MONOHYDRATE, SODIUM CHLORIDE, AND POTASSIUM CHLORIDE 25 ML/HR: 50; 4.5; 1.49 INJECTION, SOLUTION INTRAVENOUS at 09:34

## 2019-02-05 RX ADMIN — ALLOPURINOL 300 MG: 300 TABLET ORAL at 08:03

## 2019-02-05 RX ADMIN — ACETAMINOPHEN 650 MG: 325 TABLET ORAL at 01:52

## 2019-02-05 RX ADMIN — APIXABAN 2.5 MG: 2.5 TABLET, FILM COATED ORAL at 08:03

## 2019-02-05 RX ADMIN — ACETAMINOPHEN 650 MG: 325 TABLET ORAL at 17:37

## 2019-02-05 RX ADMIN — SACUBITRIL AND VALSARTAN 1 TABLET: 49; 51 TABLET, FILM COATED ORAL at 18:09

## 2019-02-05 RX ADMIN — Medication 400 MG: at 08:04

## 2019-02-05 RX ADMIN — Medication 1 CAPSULE: at 09:34

## 2019-02-05 RX ADMIN — CARVEDILOL 3.12 MG: 3.12 TABLET, FILM COATED ORAL at 17:37

## 2019-02-05 RX ADMIN — METRONIDAZOLE 500 MG: 500 INJECTION, SOLUTION INTRAVENOUS at 18:10

## 2019-02-05 RX ADMIN — ATORVASTATIN CALCIUM 10 MG: 10 TABLET, FILM COATED ORAL at 22:15

## 2019-02-05 RX ADMIN — FLUTICASONE FUROATE AND VILANTEROL TRIFENATATE 1 PUFF: 100; 25 POWDER RESPIRATORY (INHALATION) at 08:21

## 2019-02-05 RX ADMIN — SACUBITRIL AND VALSARTAN 1 TABLET: 49; 51 TABLET, FILM COATED ORAL at 08:03

## 2019-02-05 RX ADMIN — SODIUM CHLORIDE 250 ML: 900 INJECTION, SOLUTION INTRAVENOUS at 19:49

## 2019-02-05 RX ADMIN — APIXABAN 2.5 MG: 2.5 TABLET, FILM COATED ORAL at 17:37

## 2019-02-05 RX ADMIN — METRONIDAZOLE 500 MG: 500 INJECTION, SOLUTION INTRAVENOUS at 08:20

## 2019-02-05 RX ADMIN — LEVOTHYROXINE SODIUM 25 MCG: 25 TABLET ORAL at 08:02

## 2019-02-05 NOTE — PROGRESS NOTES
Pharmacy Clarification of the Prior to Admission Medication Regimen Retrospective to the Admission Medication Reconciliation The patient was interviewed regarding clarification of the prior to admission medication regimen. Patient was uncertain of her medication strengths. MHT called patient's caregiver, Antoni Bobo [phone], who was able to verify the patient's medications and strengths and was questioned regarding use of any other inhalers, topical products, over the counter medications, herbal medications, vitamin products or ophthalmic/nasal/otic medication use. Information Obtained From: Rx query, Caregiver Recommendations/Findings: The following amendments were made to the patient's active medication list on file at [facility]:  
 
1) Additions: ? GUAIFENESIN PO 
 
2) Removals: NONE 3) Changes: 
? albuterol (PROVENTIL VENTOLIN) 2.5 mg /3 mL (0.083 %) nebulizer solution (Old regimen: 3 mL by nebulization route every 4 hours PRN for wheezing /New regimen: 3 mL by nebulization route BID) ? furosemide (LASIX) 20 mg tablet (Old regimen: 20 mg BID /New regimen: 20 mg PRN depending on weight, if more than 126 lbs, takes 20 mg) ? ibrutinib (IMBRUVICA) 140 mg cap (Old regimen: take 420 mg by mouth 5 days a week. 140 mg cap, takes 3 caps five days a week (none on sun or Wed) /New regimen: 140 mg by mouth Mon-Fri) ? loratadine (CLARITIN) 10 mg tablet (Old regimen: take 10 mg daily PRN /New regimen: take 10 mg QHS) ? magnesium oxide 400 mg cap (Old regimen: take 400 mg daily /New regimen: take 400 mg QHS) ? potassium chloride SR (KLOR-CON 10) 10 mEq tablet (Old regimen: take 10 mEq daily /New regimen: take 10 mEq every other day) 4) Pertinent Pharmacy Findings: 
? Updated patients preferred outpatient pharmacy to: [facility] - [facility]NA[facility] [place] [address] AT [address]  
? Identified High Alert Medication Information 
o Current Anticoagulants: ? Name: apixaban (ELIQUIS) 2.5 mg tablet 
o Oral Chemotherapy: ibrutinib (IMBRUVICA) 140 mg cap ? Indication: lymphoma ? Dosing Instructions: take 140 mg by mouth Mon-Fri 
? Can patient supply from home: yes ? potassium chloride SR (KLOR-CON 10) 10 mEq tablet: Caregiver stated that the patient is currently taking this agent but has to confirm when the last dose was with another nurse since it is dosed every other day. She stated she will be at the hospital tomorrow morning to confirm the last taken dose. PTA medication list was corrected to the following:  
 
Prior to Admission Medications Prescriptions Last Dose Informant Patient Reported? Taking? GUAIFENESIN PO 2/3/2019 at Unknown time Care Giver Yes Yes Sig: Take 10 mL by mouth every four (4) hours as needed for Cough. robitussin  
acetaminophen (TYLENOL EXTRA STRENGTH) 500 mg tablet 2019 at Unknown time [facility] Yes Yes Sig: Take 500 mg by mouth every six (6) hours as needed for Pain. albuterol (PROVENTIL VENTOLIN) 2.5 mg /3 mL (0.083 %) nebulizer solution 2/3/2019 at Unknown time Care Giver Yes Yes Si.5 mg by Nebulization route two (2) times a day. allopurinol (ZYLOPRIM) 300 mg tablet 2/3/2019 at Unknown time Care Giver No Yes Sig: TAKE ONE (1) TABLET(S) DAILY  
apixaban (ELIQUIS) 2.5 mg tablet 2/3/2019 at Unknown time Care Giver No Yes Sig: Take 1 Tab by mouth two (2) times a day. Indications: PREVENT THROMBOEMBOLISM IN CHRONIC ATRIAL FIBRILLATION  
atorvastatin (LIPITOR) 10 mg tablet 2/3/2019 at Unknown time Care Giver No Yes Sig: TAKE ONE (1) TABLET(S) DAILY  
carvedilol (COREG) 3.125 mg tablet 2/3/2019 at Unknown time Care Giver No Yes Sig: TAKE ONE (1) TABLET(S) TWIC E DAILY WITH FOOD  Indications: chronic heart failure  
cholecalciferol (VITAMIN D3) 1,000 unit cap 2/3/2019 at Unknown time [address] [place] [address] Road Yes Yes Sig: Take 1,000 Units by mouth daily. co-enzyme Q-10 (CO Q-10) 100 mg capsule 2/3/2019 at Unknown time [address] Yes Yes Sig: Take 100 mg by mouth daily. fluticasone-salmeterol (ADVAIR) 250-50 mcg/dose diskus inhaler 2/3/2019 at Unknown time Care Giver No Yes Sig: Take 1 Puff by inhalation two (2) times a day. furosemide (LASIX) 20 mg tablet 2/3/2019 at Unknown time Care Giver Yes Yes Sig: Take 20 mg by mouth as needed (depending on weight, if > 126 lbs, patient takes 20 mg). ibrutinib (IMBRUVICA) 140 mg cap 2/3/2019 at Unknown time Care Giver Yes Yes Sig: Take 140 mg by mouth five (5) days a week. Mon-Fri  
levothyroxine (SYNTHROID) 25 mcg tablet 2/3/2019 at Unknown time Care Giver No Yes Sig: Take 1 Tab by mouth Daily (before breakfast). loratadine (CLARITIN) 10 mg tablet 2/3/2019 at Unknown time [address] Yes Yes Sig: Take 10 mg by mouth nightly.  
magnesium oxide 400 mg cap 2/3/2019 at Unknown time Care Giver Yes Yes Sig: Take 1 Cap by mouth nightly. multivitamin (ONE A DAY) tablet 2/3/2019 at Unknown time Care Giver Yes Yes Sig: Take 1 Tab by mouth daily. potassium chloride SR (KLOR-CON 10) 10 mEq tablet Unknown at Unknown time Care Giver Yes No  
Sig: Take 10 mEq by mouth every other day. sacubitril-valsartan (ENTRESTO) 49 mg/51 mg tablet 2/3/2019 at Unknown time Care Giver No Yes Sig: Take 1 Tab by mouth two (2) times a day. Facility-Administered Medications: None Thank you, Irene Serrato Medication History Pharmacy Technician

## 2019-02-05 NOTE — PROGRESS NOTES
1600: TRANSFER - IN REPORT: 
 
Verbal report received from Tom(name) on Beulah Sommer  being received from ED(unit) for routine progression of care Report consisted of patients Situation, Background, Assessment and  
Recommendations(SBAR). Information from the following report(s) SBAR, Kardex, ED Summary, Intake/Output, MAR, Recent Results and Cardiac Rhythm NSR was reviewed with the receiving nurse. Opportunity for questions and clarification was provided. Assessment completed upon patients arrival to unit and care assumed. 1900: BP 84/63, HR 88, unsymptomatic. Spoke w.Dr Halima Barker, he is aware, no order's received. Will continue to [facility]. 1920: BP 60/48, HR 86, still unsymptomatic. Dr. Halima Barker aware, ordered to re consult cardiology. Bedside shift change report given to Patience Salas RN (oncoming nurse). Report included the following information SBAR, Kardex, ED Summary, Intake/Output, MAR, Recent Results and Cardiac Rhythm Paced. SHIFT SUMMARY: 
 
 
 
 
 
[address] NURSING NOTE Admission Date 2/4/2019 Admission Diagnosis Acute encephalopathy [G93.40] Consults IP CONSULT TO CARDIOLOGY 
IP CONSULT TO PRIMARY CARE PROVIDER 
IP CONSULT TO NEUROLOGY Cardiac Monitoring [x] Yes [] No  
  
Purposeful Hourly Rounding [] Yes   
Milvia Score Total Score: 4 Milvia score 3 or > [x] Bed Alarm [] Avasys [] 1:1 sitter [] Patient refused (Signed refusal form in chart) Kimani Score Kimani Score: 18 Kimani score 14 or < [] PMT consult [] Wound Care consult  
 []  Specialty bed  [] Nutrition consult Influenza Vaccine Received Flu Vaccine for Current Season (usually Sept-March): Yes Oxygen needs? [x] Room air Oxygen @  []1L    []2L    []3L   []4L    []5L   []6L via NC Chronic home O2 use? [] Yes [] No 
Perform O2 challenge test and document in progress note using smart[employer] [place] [employer]) Last bowel movement Last Bowel Movement Date: 02/04/19 Urinary Catheter LDAs              
Peripheral IV 02/04/19 Right Antecubital (Active) Site Assessment Clean, dry, & intact 2/5/2019  6:05 PM  
Phlebitis Assessment 0 2/5/2019  6:05 PM  
Infiltration Assessment 0 2/5/2019  6:05 PM  
Dressing Status Clean, dry, & intact 2/5/2019  6:05 PM  
Dressing Type Transparent 2/5/2019  6:05 PM  
Hub Color/Line Status Infusing 2/5/2019  6:05 PM  
Action Taken Blood drawn 2/4/2019 11:19 AM  
   
Peripheral IV 02/05/19 Right Hand (Active) Site Assessment Clean, dry, & intact 2/5/2019  6:05 PM  
Phlebitis Assessment 0 2/5/2019  6:05 PM  
Infiltration Assessment 0 2/5/2019  6:05 PM  
Dressing Status Clean, dry, & intact 2/5/2019  6:05 PM  
Dressing Type Transparent 2/5/2019  6:05 PM  
Hub Color/Line Status Blue;Flushed;Patent 2/5/2019  6:05 PM  
                  
  
Readmission Risk Assessment Tool Score High Risk   
      
 28 Total Score 3 Has Seen PCP in Last 6 Months (Yes=3, No=0)  
 5 Pt. Coverage (Medicare=5 , Medicaid, or Self-Pay=4) 20 Charlson Comorbidity Score (Age + Comorbid Conditions) Criteria that do not apply:  
 . Living with Significant Other. Assisted Living. LTAC. SNF. or  
Rehab Patient Length of Stay (>5 days = 3) IP Visits Last 12 Months (1-3=4, 4=9, >4=11) Expected Length of Stay 3d 4h Actual Length of Stay 1

## 2019-02-05 NOTE — ED NOTES
Report given to GELA/Pedro Moore - pt continues to rest in bed with no acute distress noted at this time

## 2019-02-05 NOTE — PROGRESS NOTES
Problem: Mobility Impaired (Adult and Pediatric) Goal: *Acute Goals and Plan of Care (Insert Text) Physical Therapy Goals Initiated 2/5/2019 1. Patient will move from supine to sit and sit to supine , scoot up and down and roll side to side in bed with independence within 7 day(s). 2.  Patient will transfer from bed to chair and chair to bed with modified independence using the least restrictive device within 7 day(s). 3.  Patient will perform sit to stand with independence within 7 day(s). 4.  Patient will ambulate with modified supervision for 150 feet with the least restrictive device within 7 day(s). 5.  Patient will ascend/descend 5 stairs with handrail(s) with minimal assistance/contact guard assist within 7 day(s). physical Therapy EVALUATION Patient: Beulah Sommer (49 y.o. female) Date: 2/5/2019 Primary Diagnosis: Acute encephalopathy [G93.40] Precautions:  Fall ASSESSMENT : 
Based on the objective data described below, the patient presents with moderate limitations below pt's active baseline in gait, functional mobility, activity tolerance, and balance with R knee pain d/t effusion. Pt cleared for session by RN. Pt lives in one story home with 5 steps to enter. She is normally able to amb without device and even completes 5-6 miles on her treadmill daily. She still completes her own ADLs and has remained fairly independent. She does have a caregiver that comes 4 days a week for 4 hours. At this time, pt is c/o R knee pain FACES 7-8/10 with WB. She shows limited flexion to ~75 degrees only and -5 degrees from full extension. She requires min A for bed mobility. But requires mod A of 2 for sit <> stand transfers and is unable to control her stand to sit well. She is unable to tolerate amb due to R knee pain. She is able to take one side step with mod A of 2 to adjust position at EOB. Pt returned to bed with call bell in reach. Due to her high level of function prior to illness, pt would benefit from inpt rehab vs home with home health and assist of caregiver and dtr who is coming in from out of town depending upon progress and ability of caregivers/family to assist. 
 
Patient will benefit from skilled intervention to address the above impairments. Patients rehabilitation potential is considered to be Good Factors which may influence rehabilitation potential include:  
[]         None noted 
[]         Mental ability/status [x]         Medical condition 
[]         Home/family situation and support systems 
[]         Safety awareness 
[]         Pain tolerance/management 
[]         Other: PLAN : 
Recommendations and Planned Interventions: 
[x]           Bed Mobility Training             []    Neuromuscular Re-Education 
[x]           Transfer Training                   []    Orthotic/Prosthetic Training 
[x]           Gait Training                         []    Modalities [x]           Therapeutic Exercises           []    Edema Management/Control 
[x]           Therapeutic Activities            [x]    Patient and Family Training/Education 
[]           Other (comment): Frequency/Duration: Patient will be followed by physical therapy  4 times a week to address goals. Discharge Recommendations: Inpatient Rehab Further Equipment Recommendations for Discharge: TBD SUBJECTIVE:  
Patient stated My R knee hurts.  OBJECTIVE DATA SUMMARY:  
HISTORY:   
Past Medical History:  
Diagnosis Date  Aortic insufficiency  Asthma  Cancer ([address] lymphoma  CKD (chronic kidney disease) stage 3, GFR 30-59 ml/min ([facility]) 1/10/2018  Congestive heart failure, NYHA class II, chronic, systolic ([facility])  Heart failure ([facility] [facility]  Hypertension  Mitral valvular regurgitation 1/22/2016  ECHO 2/3/17: LV dilated, EF 20-25%, mild LVH, RV mod dilated, mild- mod MELANIA, mod-severe MR, mod AI ECHO 7/29/17: EF 15%, severe DHK, reduced RVEF, RVH, RVSP 35 mmHg  Mild LAE, mod-marked MA fibrosis, mod-severe MR (2+), AO root dilated,    
 Nonrheumatic aortic valve insufficiency 10/16/2018  Presence of biventricular automatic cardioverter/defibrillator (AICD) 7/2/2015 [employer] biventricular AICD implant  Stomach ulcer Past Surgical History:  
Procedure Laterality Date  HX BREAST BIOPSY Bilateral   
 40 years ago  HX COLONOSCOPY    
 HX HEENT    
 cataracts removed  HX HYSTERECTOMY  HX OTHER SURGICAL    
 lymph node surgery  HX TONSILLECTOMY  PA EGD TRANSORAL BIOPSY SINGLE/MULTIPLE  5/23/2012  
    
 SINUS SURGERY PROC UNLISTED Nasal polyps removed Prior Level of Function/Home Situation: Pt lives in one story home with 5 steps to enter. She is normally able to amb without device and even completes 5-6 miles on her treadmill daily. She still completes her own ADLs and has remained fairly independent. She does have a caregiver that comes 4 days a week for 4 hours. Home Situation Home Environment: Private residence # Steps to Enter: 5 Rails to Enter: Yes Hand Rails : Bilateral 
One/Two Story Residence: One story Living Alone: Yes Support Systems: Other (comments)(caregiver 4 hours/day, 4 days/week) Current DME Used/Available at Home: Cane, straight, Grab bars, Shower chair Tub or Shower Type: Shower EXAMINATION/PRESENTATION/DECISION MAKING: Critical Behavior: 
Neurologic State: Alert Orientation Level: Oriented X4(home health nurse at bedside reports the pt was found to be lethargic at home - mentation not at baseline ) Cognition: Appropriate for age attention/concentration, Follows commands Safety/Judgement: Awareness of environment, Insight into deficits Hearing: Auditory Auditory Impairment: Hard of hearing, bilateral 
Range Of Motion: 
AROM: (RUE limited @ should; RLE limited at knee d/t pain) PROM: (R shoulder chronic; R knee pain) Strength:   
Strength: Generally decreased, functional 
  
  
  
  
  
  
Tone & Sensation:  
Tone: Normal 
  
  
  
  
  
  
  
  
   
Coordination: 
Coordination: Within functional limits Functional Mobility: 
Bed Mobility: 
  
Supine to Sit: Minimum assistance Sit to Supine: Minimum assistance(RLE management) Transfers: 
Sit to Stand: Moderate assistance;Assist x2 Stand to Sit: Moderate assistance;Assist x2 Balance:  
Sitting: Intact Standing: Impaired Standing - Static: Poor(d/t knee pain) Standing - Dynamic : (NT)Ambulation/Gait Training:  
  
  
  
Gait Description (WDL): (doesn't tolerate gait d/t R knee pain) Functional Measure: 
Barthel Index: 
 
Bathin Bladder: 0 Bowels: 10 
Groomin Dressin Feedin Mobility: 0 Stairs: 0 Toilet Use: 0 Transfer (Bed to Chair and Back): 5 Total: 30 The Barthel ADL Index: Guidelines 1. The index should be used as a record of what a patient does, not as a record of what a patient could do. 2. The main aim is to establish degree of independence from any help, physical or verbal, however minor and for whatever reason. 3. The need for supervision renders the patient not independent. 4. A patient's performance should be established using the best available evidence. Asking the patient, friends/relatives and nurses are the usual sources, but direct observation and common sense are also important. However direct testing is not needed. 5. Usually the patient's performance over the preceding 24-48 hours is important, but occasionally longer periods will be relevant. 6. Middle categories imply that the patient supplies over 50 per cent of the effort. 7. Use of aids to be independent is allowed. Yulissa Casey., Barthel, D.W. ([id number]). Functional evaluation: the Barthel Index. [facility] (14)2. TAYLER Devlin, Celestine Blanco., Miguel Ga[facility], [address] (1999). Measuring the change indisability after inpatient rehabilitation; comparison of the responsiveness of the Barthel Index and Functional [place] Measure. Journal of Neurology, Neurosurgery, and Psychiatry, 66(4), 567-059. PIEDAD Almazan, SMILEY Moseley, & Esdras Aquino M.A. (2004.) Assessment of post-stroke quality of life in cost-effectiveness studies: The usefulness of the Barthel Index and the EuroQoL-5D. [facility], 13, 782-64 Physical Therapy Evaluation Charge Determination History Examination Presentation Decision-Making HIGH Complexity :3+ comorbidities / personal factors will impact the outcome/ POC  MEDIUM Complexity : 3 Standardized tests and measures addressing body structure, function, activity limitation and / or participation in recreation  MEDIUM Complexity : Evolving with changing characteristics  LOW Complexity : FOTO score of  Based on the above components, the patient evaluation is determined to be of the following complexity level: LOW Activity Tolerance:  
See above narrative Please refer to the flowsheet for vital signs taken during this treatment. After treatment:  
[]         Patient left in no apparent distress sitting up in chair 
[x]         Patient left in no apparent distress in bed 
[x]         Call bell left within reach [x]         Nursing notified 
[]         Caregiver present 
[]         Bed alarm activated COMMUNICATION/EDUCATION:  
The patients plan of care was discussed with: Registered Nurse and OT [x]         Fall prevention education was provided and the patient/caregiver indicated understanding. [x]         Patient/family have participated as able in goal setting and plan of care. [x]         Patient/family agree to work toward stated goals and plan of care.  
[]         Patient understands intent and goals of therapy, but is neutral about his/her participation. []         Patient is unable to participate in goal setting and plan of care. Thank you for this referral. 
Jorge Alberto Rivas, PT Time Calculation: 22 mins

## 2019-02-05 NOTE — CONSULTS
[address], [address] [facility]  [phone]        Date of  Admission: 2/4/2019 10:22 AM     Admission type:Emergency    Consult for: VT, ICD   Consult by: Dr Debbie Vasquez NP     Subjective:     Rachele Pedersen is a 80 y.o. female admitted for Acute encephalopathy [G93.40]. Ms Maximino Villanueva is a pleasant 80year old female with hx of NICM, EF 20-25%,, S/p Biv ICD, HTN,. AF and CKD. She notes feeling weak and falling on her knee in the bathroom with vomiting and diarrhea. She denies any chest pain, pressure, tightness or lower extremity edema. At this time she notes weakness and right knee pain.       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 08/04/2012     Priority: 1 - One    NHL (non-Hodgkin's lymphoma) ([facility] [address] 08/04/2012     Priority: 1 - One    Abdominal pain 05/22/2012     Priority: 1 - One     Class: Acute    Weight loss 05/22/2012     Priority: 1 - One     Class: Acute    DILAN (obstructive sleep apnea) 08/04/2012     Priority: 2 - Two    Anemia 11/08/2014     Priority: 3 - Three    Acute encephalopathy 02/04/2019    UTI (urinary tract infection) 02/04/2019    Diarrhea 02/04/2019    Nonrheumatic aortic valve insufficiency 10/16/2018    Seasonal allergic rhinitis 07/02/2018    Acquired hypothyroidism 03/18/2018    Aneurysmal dilatation ([address] 03/18/2018    CKD (chronic kidney disease) stage 3, GFR 30-59 ml/min ([facility]) 01/10/2018    Xerosis of skin 12/21/2017    Dyslipidemia 12/21/2017    Paroxysmal atrial fibrillation ([address] 10/21/2017    Acute respiratory insufficiency 02/02/2017    Gastroesophageal reflux disease with esophagitis 02/02/2017    Thoracic aortic aneurysm without rupture ([address] 02/02/2017    Physical deconditioning 01/29/2017    Mitral valvular regurgitation 01/22/2016    Lymphoma ([address] 11/17/2015    Chronic systolic congestive heart failure ([address] 10/08/2015    Presence of biventricular automatic cardioverter/defibrillator (AICD) 07/02/2015    LBBB (left bundle branch block) 07/01/2015    HTN (hypertension) 06/29/2015    Gout 06/29/2015    Reactive airway disease 03/23/2015    Rhinitis 09/04/2014      Kobi Harp MD  Past Medical History:   Diagnosis Date    Aortic insufficiency     Asthma     Cancer [facility])     lymphoma    CKD (chronic kidney disease) stage 3, GFR 30-59 ml/min ([facility]) 1/10/2018    Congestive heart failure, NYHA class II, chronic, systolic ([facility])     Heart failure ([facility]     Hypertension     Mitral valvular regurgitation 1/22/2016    ECHO 2/3/17: LV dilated, EF 20-25%, mild LVH, RV mod dilated, mild- mod MELANIA, mod-severe MR, mod AI ECHO 7/29/17: EF 15%, severe DHK, reduced RVEF, RVH, RVSP 35 mmHg  Mild LAE, mod-marked MA fibrosis, mod-severe MR (2+), AO root dilated,      Nonrheumatic aortic valve insufficiency 10/16/2018    Presence of biventricular automatic cardioverter/defibrillator (AICD) 7/2/2015    [employer] biventricular AICD implant    Stomach ulcer       Social History     Socioeconomic History    Marital status:      Spouse name: Not on file    Number of children: 1    Years of education: 16+    Highest education level: Not on file   Occupational History    Occupation: retired teacher   Tobacco Use    Smoking status: Never Smoker    Smokeless tobacco: Never Used   Substance and Sexual Activity    Alcohol use: No     Alcohol/week: 0.0 oz    Drug use: No    Sexual activity: No     Allergies   Allergen Reactions    Codeine Other (comments)     \"made me disoriented\" per pt      Family History   Problem Relation Age of Onset    Heart Disease Mother     Stroke Father       Current Facility-Administered Medications   Medication Dose Route Frequency    dextrose 5 % - 0.45% NaCl 1,000 mL with potassium chloride 20 mEq infusion   IntraVENous CONTINUOUS    lactobac ac& pc-s.therm-b.anim (DAWN Q/RISAQUAD)  1 Cap Oral DAILY    allopurinol (ZYLOPRIM) tablet 300 mg  300 mg Oral DAILY    apixaban (ELIQUIS) tablet 2.5 mg  2.5 mg Oral BID    atorvastatin (LIPITOR) tablet 10 mg  10 mg Oral QHS    carvedilol (COREG) tablet 3.125 mg  3.125 mg Oral BID WITH MEALS    fluticasone-vilanterol (BREO ELLIPTA) 100mcg-25mcg/puff  1 Puff Inhalation DAILY    levothyroxine (SYNTHROID) tablet 25 mcg  25 mcg Oral 6am    loratadine (CLARITIN) tablet 10 mg  10 mg Oral DAILY PRN    magnesium oxide (MAG-OX) tablet 400 mg  400 mg Oral DAILY    sacubitril-valsartan (ENTRESTO) 49-51 mg tablet 1 Tab  1 Tab Oral BID    levoFLOXacin (LEVAQUIN) 750 mg in D5W IVPB  750 mg IntraVENous Q48H    metroNIDAZOLE (FLAGYL) IVPB premix 500 mg  500 mg IntraVENous Q8H    albuterol-ipratropium (DUO-NEB) 2.5 MG-0.5 MG/3 ML  3 mL Nebulization Q6H PRN    ondansetron (ZOFRAN) injection 4 mg  4 mg IntraVENous Q6H PRN    acetaminophen (TYLENOL) tablet 650 mg  650 mg Oral Q4H PRN    traMADol (ULTRAM) tablet 50 mg  50 mg Oral Q6H PRN     Current Outpatient Medications   Medication Sig    cephALEXin (KEFLEX) 500 mg capsule Take 1 Cap by mouth four (4) times daily for 7 days.  albuterol (PROVENTIL VENTOLIN) 2.5 mg /3 mL (0.083 %) nebulizer solution 2.5 mg by Nebulization route two (2) times a day.  furosemide (LASIX) 20 mg tablet Take 20 mg by mouth as needed (depending on weight, if > 126 lbs, patient takes 20 mg).  GUAIFENESIN PO Take 10 mL by mouth every four (4) hours as needed for Cough. robitussin    atorvastatin (LIPITOR) 10 mg tablet TAKE ONE (1) TABLET(S) DAILY    levothyroxine (SYNTHROID) 25 mcg tablet Take 1 Tab by mouth Daily (before breakfast).  sacubitril-valsartan (ENTRESTO) 49 mg/51 mg tablet Take 1 Tab by mouth two (2) times a day.  apixaban (ELIQUIS) 2.5 mg tablet Take 1 Tab by mouth two (2) times a day.  Indications: PREVENT THROMBOEMBOLISM IN CHRONIC ATRIAL FIBRILLATION    fluticasone-salmeterol (ADVAIR) 250-50 mcg/dose diskus inhaler Take 1 Puff by inhalation two (2) times a day.  carvedilol (COREG) 3.125 mg tablet TAKE ONE (1) TABLET(S) TWIC E DAILY WITH FOOD  Indications: chronic heart failure    allopurinol (ZYLOPRIM) 300 mg tablet TAKE ONE (1) TABLET(S) DAILY    magnesium oxide 400 mg cap Take 1 Cap by mouth nightly.  cholecalciferol (VITAMIN D3) 1,000 unit cap Take 1,000 Units by mouth daily.  loratadine (CLARITIN) 10 mg tablet Take 10 mg by mouth nightly.  co-enzyme Q-10 (CO Q-10) 100 mg capsule Take 100 mg by mouth daily.  ibrutinib (IMBRUVICA) 140 mg cap Take 140 mg by mouth five (5) days a week. Mon-Fri    acetaminophen (TYLENOL EXTRA STRENGTH) 500 mg tablet Take 500 mg by mouth every six (6) hours as needed for Pain.  multivitamin (ONE A DAY) tablet Take 1 Tab by mouth daily.  potassium chloride SR (KLOR-CON 10) 10 mEq tablet Take 10 mEq by mouth every other day. Review of Symptoms:  Constitutional:  Weakness  Eyes: negative  Ears, nose, mouth, throat, and face: negative  Respiratory: negative  Cardiovascular: negative  Gastrointestinal: negative  Genitourinary:negative  Musculoskeletal:negative  Neurological: negative  Endocrine: negative     Subjective:      Visit Vitals  BP 96/69   Pulse 71   Temp 98.1 °F (36.7 °C)   Resp 20   Ht 5' 1\" (1.549 m)   Wt 125 lb (56.7 kg)   SpO2 98%   BMI 23.62 kg/m²       Physical:  Heart: Regular, S1 WNL and S2 WNL.  No S3 or S4, no m/S3/JVD, no carotid bruits   Lungs: clear   Abdomen: Soft, +BS, NTND   Extremities: LE rogerio +DP/PT, no edema   Neurologic: Grossly normal    Data Review:   Recent Labs     02/05/19  0405 02/04/19  1053   WBC 2.0* 2.6*   HGB 8.5* 9.2*   HCT 25.9* 28.0*   * 164     Recent Labs     02/05/19  0405 02/04/19  1053    141   K 3.5 3.8    106   CO2 23 28   GLU 86 113*   BUN 27* 26*   CREA 1.13* 1.37*   CA 7.8* 8.3*   MG 3.0*  --    ALB 2.1* 2.7*   TBILI 0.5 0.8   SGOT 24 34   ALT 17 22       Recent Labs     02/05/19  0116 02/04/19  1710   TROIQ 0.12* 0.20*         Intake/Output Summary (Last 24 hours) at 2/5/2019 0844  Last data filed at 2/4/2019 2027  Gross per 24 hour   Intake 1350 ml   Output    Net 1350 ml        Cardiographics    Telemetry: V paced 72  ECG: V paced 85        Assessment:     Assessment:       Active Problems:    Cardiomyopathy, dilated, 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 AF and CKD who presented with n/v/d with dyspnea and weakness. Cardiology consulted for NSVT 4-7 beats. BiV paced on tele. CHF compensated. Currently treated for UTI, ? Infections causes for diarrhea. Device check wnl with the exception of elevated respiratory rate for the last month. Suspect her infection contributing to her arrhythmia. Continue with lasix, entresto, coreg and eliquis. Cardiology will sign off. Thank you for this interesting consultation. Angela Argueta ANP    Patient seen and examined by me with nurse practitioner. I personally performed all components of the history, physical, and medical decision making and agree with the assessment and plan with minor modifications as noted. Nonischemic cardiomyopathy sp biv icd. Presented with dyspnea. nsvt in the setting of uti. Cont med rx for cardiomyopathy.  Cont med rx for uti    Roxie Cherry MD, Mane Fisher

## 2019-02-05 NOTE — PROGRESS NOTES
Problem: Self Care Deficits Care Plan (Adult) Goal: *Acute Goals and Plan of Care (Insert Text) Occupational Therapy Goals Initiated 2/5/2019 1. Patient will perform grooming standing at the sink for 2 minutes at a time with CGA within 7 day(s). 2.  Patient will perform bathing with minimal assistance within 7 day(s). 3.  Patient will perform lower body dressing with minimal assistance/contact guard assist within 7 day(s). 4.  Patient will perform toilet transfers with minimal assistance/contact guard assist within 7 day(s). 5.  Patient will perform all aspects of toileting with minimal assistance within 7 day(s). Occupational Therapy EVALUATION Patient: Lili Galvan (89 y.o. female) Date: 2/5/2019 Primary Diagnosis: Acute encephalopathy [G93.40] Precautions:  Fall ASSESSMENT : 
Based on the objective data described below, the patient presents with deficits in self-care, primarily due to R knee pain, decreased standing balance, decreased activity tolerance, and general weakness. She is typically pretty independent, still living alone, but has a caregiver 4 hours/day, 4 days/week. Her daughter-in-law is on her way to [facility] [address], and per the caregiver will be able to stay as long as needed. Patient is very pleasant and motivated. She is currently functioning well below her baseline level. Patient will benefit from skilled OT treatment to maximize independence and safety in ADL. Patient will benefit from skilled intervention to address the above impairments. Patients rehabilitation potential is considered to be Good Factors which may influence rehabilitation potential include:  
[]             None noted []             Mental ability/status []             Medical condition []             Home/family situation and support systems []             Safety awareness []             Pain tolerance/management 
[]             Other: PLAN : 
Recommendations and Planned Interventions: [x]               Self Care Training                  [x]        Therapeutic Activities [x]               Functional Mobility Training    []        Cognitive Retraining 
[x]               Therapeutic Exercises           [x]        Endurance Activities [x]               Balance Training                   []        Neuromuscular Re-Education []               Visual/Perceptual Training     [x]   Home Safety Training 
[x]               Patient Education                 [x]        Family Training/Education []               Other (comment): Frequency/Duration: Patient will be followed by occupational therapy 4 times a week to address goals. Discharge Recommendations: Inpatient Rehab vs Home with Home Health (depending on progress/pain) Further Equipment Recommendations for Discharge: RW, possible bathroom equipment SUBJECTIVE:  
Patient stated I'll try anything you ask me to do. I want to get better.  OBJECTIVE DATA SUMMARY:  
HISTORY:  
Past Medical History:  
Diagnosis Date  Aortic insufficiency  Asthma  Cancer ([facility] [address] lymphoma  CKD (chronic kidney disease) stage 3, GFR 30-59 ml/min ([facility]) 1/10/2018  Congestive heart failure, NYHA class II, chronic, systolic ([facility])  Heart failure ([facility] [address]  Hypertension  Mitral valvular regurgitation 1/22/2016 ECHO 2/3/17: LV dilated, EF 20-25%, mild LVH, RV mod dilated, mild- mod MELANIA, mod-severe MR, mod AI ECHO 7/29/17: EF 15%, severe DHK, reduced RVEF, RVH, RVSP 35 mmHg  Mild LAE, mod-marked MA fibrosis, mod-severe MR (2+), AO root dilated,    
 Nonrheumatic aortic valve insufficiency 10/16/2018  Presence of biventricular automatic cardioverter/defibrillator (AICD) 7/2/2015 [employer] biventricular AICD implant  Stomach ulcer Past Surgical History:  
Procedure Laterality Date  HX BREAST BIOPSY Bilateral   
 40 years ago  HX COLONOSCOPY    
 HX HEENT    
 cataracts removed  HX HYSTERECTOMY  HX OTHER SURGICAL    
 lymph node surgery  HX TONSILLECTOMY  LA EGD TRANSORAL BIOPSY SINGLE/MULTIPLE  5/23/2012  
    
 SINUS SURGERY PROC UNLISTED Nasal polyps removed Prior Level of Function/Environment/Context: Mod I with basic self-care; Has a caregiver to assist with IADL and with bathing, mostly for safety. Per caregiver, she still walks about 6 miles a day on her treadmill (prior to onset). Expanded or extensive additional review of patient history:  
 
Home Situation Home Environment: Private residence # Steps to Enter: 5 Rails to Enter: Yes Hand Rails : Bilateral 
One/Two Story Residence: One story Living Alone: Yes Support Systems: Other (comments)(caregiver 4 hours/day, 4 days/week) Current DME Used/Available at Home: Cane, straight, Grab bars, Shower chair Tub or Shower Type: Shower Hand dominance: RightEXAMINATION OF PERFORMANCE DEFICITS: 
Cognitive/Behavioral Status: 
Neurologic State: Alert Cognition: Appropriate for age attention/concentration; Follows commands Perception: Appears intact Perseveration: No perseveration noted Safety/Judgement: Awareness of environment; Insight into deficits Skin: No visible issues Edema: R knee Hearing: Auditory Auditory Impairment: Hard of hearing, bilateral 
Vision/Perceptual:   
Not formally assessed this date Range of Motion: 
AROM: (RUE limited shoulder; RLE limited at knee due to pain) PROM: (R shoulder chronic; R knee pain) Strength: 
Strength: Generally decreased, functional 
  
  
  
  
Coordination: 
Coordination: Within functional limits Fine Motor Skills-Upper: Left Intact; Right Intact Gross Motor Skills-Upper: Left Intact; Right Intact(within available ROM) Tone & Sensation: 
Tone: Normal 
  
  
  
  
  
  
  
Balance: 
Sitting: Intact Standing: Impaired Standing - Static: Poor(d/t knee pain) Standing - Dynamic : (NT) 
 Functional Mobility and Transfers for ADLs:Bed Mobility: 
Supine to Sit: Minimum assistance Sit to Supine: Minimum assistance(RLE management) Transfers: 
Sit to Stand: Moderate assistance;Assist x2 Stand to Sit: Moderate assistance;Assist x2 ADL Assessment: 
Feeding: Independent Oral Facial Hygiene/Grooming: Supervision;Setup(in bed) Bathing: Moderate assistance Upper Body Dressing: Supervision Lower Body Dressing: Maximum assistance Toileting: Moderate assistance;Assist x2 ADL Intervention and task modifications: 
Discussed safety and fall prevention during ADL. Educated on proper use of RW when standing. Encouraged her to be upright in the bed as much as possible, and in a chair she is transferred to the floor. Patient indicates understanding. Cognitive Retraining Safety/Judgement: Awareness of environment; Insight into deficits Functional Measure: 
Barthel Index: 
 
Bathin Bladder: 0 Bowels: 10 
Groomin Dressin Feedin Mobility: 0 Stairs: 0 Toilet Use: 0 Transfer (Bed to Chair and Back): 5 Total: 30 The Barthel ADL Index: Guidelines 1. The index should be used as a record of what a patient does, not as a record of what a patient could do. 2. The main aim is to establish degree of independence from any help, physical or verbal, however minor and for whatever reason. 3. The need for supervision renders the patient not independent. 4. A patient's performance should be established using the best available evidence. Asking the patient, friends/relatives and nurses are the usual sources, but direct observation and common sense are also important. However direct testing is not needed. 5. Usually the patient's performance over the preceding 24-48 hours is important, but occasionally longer periods will be relevant. 6. Middle categories imply that the patient supplies over 50 per cent of the effort. 7. Use of aids to be independent is allowed. Cynthia Pathak., Barthel, D.W. ([id number]). Functional evaluation: the Barthel Index. [address] (14)2. TAYLER Guzman, Celina Hill.Andreina., [place], [address] (1999). Measuring the change indisability after inpatient rehabilitation; comparison of the responsiveness of the Barthel Index and Functional Heard Measure. Journal of Neurology, Neurosurgery, and Psychiatry, 66(4), 523-019. PIEDAD Richardson, SMILEY Moseley, & Maggie Pandya M.A. (2004.) Assessment of post-stroke quality of life in cost-effectiveness studies: The usefulness of the Barthel Index and the EuroQoL-5D. [facility], 13, 204-14 Occupational Therapy Evaluation Charge Determination History Examination Decision-Making LOW Complexity : Brief history review  MEDIUM Complexity : 3-5 performance deficits relating to physical, cognitive , or psychosocial skils that result in activity limitations and / or participation restrictions LOW Complexity : No comorbidities that affect functional and no verbal or physical assistance needed to complete eval tasks Based on the above components, the patient evaluation is determined to be of the following complexity level: LOW Pain: 
R knee pain, worse when standing Activity Tolerance:  
Fair After treatment:  
[] Patient left in no apparent distress sitting up in chair 
[x] Patient left in no apparent distress in bed 
[x] Call bell left within reach [x] Nursing notified 
[x] Caregiver present 
[] Bed alarm activated COMMUNICATION/EDUCATION:  
The patients plan of care was discussed with: Physical Therapist and Registered Nurse. [x] Home safety education was provided and the patient/caregiver indicated understanding. [] Patient/family have participated as able in goal setting and plan of care. [x] Patient/family agree to work toward stated goals and plan of care. [] Patient understands intent and goals of therapy, but is neutral about his/her participation. [] Patient is unable to participate in goal setting and plan of care. This patients plan of care is appropriate for delegation to [facility]. Thank you for this referral. 
Paige Umaña, OTR/L Time Calculation: 33 mins

## 2019-02-05 NOTE — PROGRESS NOTES
Pharmacy Automatic Renal Dosing Protocol - Antimicrobials Indication for Antimicrobials: UTI, infectious diarrhea Current Regimen of Each Antimicrobial: 
Levofloxacin 750 mg IV every 48 hours (Start Date 19; Day # 2 of 7) Metronidazole 500 mg IV every 8 hours (Start Date 19; Day # 2) Previous Antimicrobial Therapy: 
None Significant Cultures:  
 - Urine - Pending Radiology / Imaging results: (X-ray, CT scan or MRI):  
 - CT abdomen - Numerous sigmoid diverticula. Paralysis, amputations, malnutrition: None identified Labs: 
Recent Labs 19 
7970 19 
0405 19 
1053 CREA  --  1.13* 1.37* BUN  --  27* 26* WBC 2.2* 2.0* 2.6* Temp (24hrs), Av.9 °F (37.2 °C), Min:98 °F (36.7 °C), Max:101.2 °F (38.4 °C) Creatinine Clearance (mL/min) or Dialysis: ~24 mL/min using ideal body weight Impression/Plan:  
· Levofloxacin dosing is appropriate for renal function and indication. CrCl is borderline for dose adjustment, but would expect SCr to improve with gentle hydration. Continue current regimen. · Metronidazole dosing is appropriate for indication. Will keep as q8h as C diff culture ordered in the ED, can modify interval as appropriate. · Antimicrobial stop date: 7 days levofloxacin, no stop date for metronidazole Pharmacy will follow daily and adjust medications as appropriate for renal function and/or serum levels. Thank you, MICHELLE HerronD 
 
Recommended duration of therapy 
[email or web address]/Pharmacy/Clinical%20Companion/Duration%20of%[email or web address]. docx Renal Dosing 
[email or web address]/[facility]/Pharmacy/Clinical%20Companion/Renal%[id number]. pdf

## 2019-02-05 NOTE — ED NOTES
Emergency Department Nursing Progress Note: PRN APAP given for right knee pain s/p fall prior to admission

## 2019-02-05 NOTE — CONSULTS
E  Ave CONSULT    NAME Nilda Nolan AGE 80 y.o. MRN [id number]  3/17/1925     REQUESTING PHYSICIAN: Prashant Page MD      CHIEF COMPLAINT: altered mental status     This is a 80 y.o. female with a medical history of leukemia was admitted with confusion and diarrhea that was on going for a few days. Patient does not remember the details of the past few days. Since being treated in the ER she has been more lucid and feels she is at her baseline. There was no mention of convulsions or lateralizing signs    ASSESSMENT AND PLAN     1. Metabolic encephalopathy  Secondary to UTI  Continue gentle hydration and electrolyte correction. CT shows atrophy as expected for age    3. Urinary tract infection  continue levoquin/flagyl    3.  Hyperlipidema  continue atorvastatin    4. hypothyroidism  Continue syntroid  TSH 1.96      ALLERGIES:  Codeine     Current Facility-Administered Medications   Medication Dose Route Frequency    lactobac ac& pc-s.therm-b.anim (DAWN Q/RISAQUAD)  1 Cap Oral DAILY    dextrose 5% - 0.45% NaCl with KCl 20 mEq/L infusion  25 mL/hr IntraVENous CONTINUOUS    allopurinol (ZYLOPRIM) tablet 300 mg  300 mg Oral DAILY    apixaban (ELIQUIS) tablet 2.5 mg  2.5 mg Oral BID    atorvastatin (LIPITOR) tablet 10 mg  10 mg Oral QHS    carvedilol (COREG) tablet 3.125 mg  3.125 mg Oral BID WITH MEALS    fluticasone-vilanterol (BREO ELLIPTA) 100mcg-25mcg/puff  1 Puff Inhalation DAILY    levothyroxine (SYNTHROID) tablet 25 mcg  25 mcg Oral 6am    loratadine (CLARITIN) tablet 10 mg  10 mg Oral DAILY PRN    magnesium oxide (MAG-OX) tablet 400 mg  400 mg Oral DAILY    sacubitril-valsartan (ENTRESTO) 49-51 mg tablet 1 Tab  1 Tab Oral BID    levoFLOXacin (LEVAQUIN) 750 mg in D5W IVPB  750 mg IntraVENous Q48H    metroNIDAZOLE (FLAGYL) IVPB premix 500 mg  500 mg IntraVENous Q8H    albuterol-ipratropium (DUO-NEB) 2.5 MG-0.5 MG/3 ML  3 mL Nebulization Q6H PRN    ondansetron (ZOFRAN) injection 4 mg  4 mg IntraVENous Q6H PRN    acetaminophen (TYLENOL) tablet 650 mg  650 mg Oral Q4H PRN    traMADol (ULTRAM) tablet 50 mg  50 mg Oral Q6H PRN       Past Medical History:   Diagnosis Date    Aortic insufficiency     Asthma     Cancer (HCC)     lymphoma    CKD (chronic kidney disease) stage 3, GFR 30-59 ml/min ([facility]) 1/10/2018    Congestive heart failure, NYHA class II, chronic, systolic (HCC)     Heart failure ([facility])     Hypertension     Mitral valvular regurgitation 1/22/2016    ECHO 2/3/17: LV dilated, EF 20-25%, mild LVH, RV mod dilated, mild- mod MELANIA, mod-severe MR, mod AI ECHO 7/29/17: EF 15%, severe DHK, reduced RVEF, RVH, RVSP 35 mmHg  Mild LAE, mod-marked MA fibrosis, mod-severe MR (2+), AO root dilated,      Nonrheumatic aortic valve insufficiency 10/16/2018    Presence of biventricular automatic cardioverter/defibrillator (AICD) 7/2/2015    [email or web address] biventricular AICD implant    Stomach ulcer        Social History     Tobacco Use    Smoking status: Never Smoker    Smokeless tobacco: Never Used   Substance Use Topics    Alcohol use: No     Alcohol/week: 0.0 oz       Family History   Problem Relation Age of Onset    Heart Disease Mother     Stroke Father      Review of Systems   Constitutional: Positive for malaise/fatigue. Negative for chills and fever. HENT: Negative for ear pain. Eyes: Negative for pain and discharge. Respiratory: Negative for cough and hemoptysis. Cardiovascular: Negative for chest pain and claudication. Gastrointestinal: Positive for diarrhea. Negative for constipation. Genitourinary: Negative for flank pain and hematuria. Musculoskeletal: Positive for joint pain (right leg) and myalgias. Negative for back pain. Skin: Negative for itching and rash. Neurological: Positive for weakness. Negative for headaches. Endo/Heme/Allergies: Negative for environmental allergies. Does not bruise/bleed easily.    Psychiatric/Behavioral: Negative for depression and hallucinations. Visit Vitals  /70 (BP 1 Location: Left arm, BP Patient Position: At rest)   Pulse 73   Temp 99.5 °F (37.5 °C)   Resp 18   Ht 5' 1\" (1.549 m)   Wt 125 lb (56.7 kg)   SpO2 97%   BMI 23.62 kg/m²      General: Well developed, well nourished. Looks ill   Head: Normocephalic, atraumatic, anicteric sclera   Neck Normal ROM, No thyromegally   Lungs:  Clear to auscultation bilaterally, No wheezes or rubs   Cardiac: Regular rate and rhythm with no murmurs. Abd: Bowel sounds were audible. No tenderness on palpation   Ext: No pedal edema. Right knee pain AV fistula right arm   Skin: Supple no rash     NeurologicExam:  Mental Status: Alert and oriented to person place and time   Speech: Fluent no aphasia or dysarthria. Cranial Nerves:  II - XII Intact   Motor:  Full and symmetric strength of upper and lower proximal and distal muscles. Normal bulk and tone. Reflexes:   Deep tendon reflexes 2+/4 and symmetric. Sensory:   Symmetric and intact with no perceived deficits modalities involving small or large fibers. Gait:  deferred   Tremor:   No tremor noted. Cerebellar:  Coordination intact. Neurovascular: No carotid bruits. No JVD       REVIEWED IMAGING:    MRI :  No results found for this or any previous visit. CT:  Results from Hospital Encounter encounter on 02/04/19   CT HEAD WO CONT    Narrative EXAM: CT HEAD WO CONT    INDICATION: Confusion/delirium, altered LOC, unexplained    COMPARISON: None. CONTRAST: None. TECHNIQUE: Unenhanced CT of the head was performed using 5 mm images. Brain and  bone windows were generated. CT dose reduction was achieved through use of a  standardized protocol tailored for this examination and automatic exposure  control for dose modulation. FINDINGS:  There is moderate prominence of the ventricles and cortical sulci consistent  with atrophy. . Bilateral diminished attenuation in the periventricular white  matter is shown predominating in the frontal lobes, consistent with chronic  small vessel ischemic disease of the white matter. . There is no intracranial  hemorrhage, extra-axial collection, mass, mass effect or midline shift. The  basilar cisterns are open. No acute infarct is identified. The bone windows  demonstrate no abnormalities. The visualized portions of the paranasal sinuses  and mastoid air cells are clear. Impression IMPRESSION: Atrophy and chronic small vessel ischemic disease the white matter. No acute findings.            REVIEWED LABS:  Lab Results   Component Value Date/Time    WBC 2.2 (L) 02/05/2019 08:52 AM    HCT 25.7 (L) 02/05/2019 08:52 AM    HGB 8.5 (L) 02/05/2019 08:52 AM    PLATELET 337 (L) 04/51/3331 08:52 AM     Lab Results   Component Value Date/Time    Sodium 138 02/05/2019 04:05 AM    Potassium 3.5 02/05/2019 04:05 AM    Chloride 106 02/05/2019 04:05 AM    CO2 23 02/05/2019 04:05 AM    Glucose 86 02/05/2019 04:05 AM    BUN 27 (H) 02/05/2019 04:05 AM    Creatinine 1.13 (H) 02/05/2019 04:05 AM    Calcium 7.8 (L) 02/05/2019 04:05 AM       Lab Results   Component Value Date/Time    LDL, calculated 88 03/01/2018 01:56 PM     Lab Results   Component Value Date/Time    Hemoglobin A1c 5.1 02/05/2019 04:05 AM

## 2019-02-05 NOTE — ED NOTES
Report received from off going nurse; assumed care. All tasks and orders completed to this point (per report). Per report pt has not had bowel movement during stay.

## 2019-02-05 NOTE — ED NOTES
TRANSFER - OUT REPORT: 
 
Verbal report given to [facility] on Rachele Pedersen  being transferred to  for routine progression of care Report consisted of patients Situation, Background, Assessment and  
Recommendations(SBAR). Information from the following report(s) SBAR, Kardex, ED Summary, Intake/Output, MAR and Recent Results was reviewed with the receiving nurse. Lines:  
Peripheral IV 02/04/19 Right Antecubital (Active) Site Assessment Clean, dry, & intact 2/4/2019 11:19 AM  
Phlebitis Assessment 0 2/4/2019 11:19 AM  
Infiltration Assessment 0 2/4/2019 11:19 AM  
Dressing Status Clean, dry, & intact 2/4/2019 11:19 AM  
Dressing Type Transparent 2/4/2019 11:19 AM  
Hub Color/Line Status Flushed 2/4/2019 11:19 AM  
Action Taken Blood drawn 2/4/2019 11:19 AM  
   
Peripheral IV 02/05/19 Right Hand (Active) Site Assessment Clean, dry, & intact 2/5/2019  7:22 AM  
Phlebitis Assessment 0 2/5/2019  7:22 AM  
Infiltration Assessment 0 2/5/2019  7:22 AM  
Hub Color/Line Status Blue 2/5/2019  7:22 AM  
  
 
Opportunity for questions and clarification was provided. Patient transported with: 
Transport staff

## 2019-02-06 ENCOUNTER — APPOINTMENT (OUTPATIENT)
Dept: GENERAL RADIOLOGY | Age: 84
DRG: 871 | End: 2019-02-06
Attending: INTERNAL MEDICINE
Payer: MEDICARE

## 2019-02-06 LAB
ANION GAP SERPL CALC-SCNC: 7 MMOL/L (ref 5–15)
BACTERIA SPEC CULT: ABNORMAL
BNP SERPL-MCNC: 7684 PG/ML (ref 0–450)
BUN SERPL-MCNC: 33 MG/DL (ref 6–20)
BUN/CREAT SERPL: 23 (ref 12–20)
CALCIUM SERPL-MCNC: 7.6 MG/DL (ref 8.5–10.1)
CC UR VC: ABNORMAL
CHLORIDE SERPL-SCNC: 105 MMOL/L (ref 97–108)
CO2 SERPL-SCNC: 23 MMOL/L (ref 21–32)
CREAT SERPL-MCNC: 1.44 MG/DL (ref 0.55–1.02)
GLUCOSE SERPL-MCNC: 122 MG/DL (ref 65–100)
POTASSIUM SERPL-SCNC: 3.8 MMOL/L (ref 3.5–5.1)
SERVICE CMNT-IMP: ABNORMAL
SODIUM SERPL-SCNC: 135 MMOL/L (ref 136–145)

## 2019-02-06 PROCEDURE — 36415 COLL VENOUS BLD VENIPUNCTURE: CPT

## 2019-02-06 PROCEDURE — 97530 THERAPEUTIC ACTIVITIES: CPT

## 2019-02-06 PROCEDURE — 71045 X-RAY EXAM CHEST 1 VIEW: CPT

## 2019-02-06 PROCEDURE — 74011250636 HC RX REV CODE- 250/636: Performed by: INTERNAL MEDICINE

## 2019-02-06 PROCEDURE — 74011250637 HC RX REV CODE- 250/637: Performed by: INTERNAL MEDICINE

## 2019-02-06 PROCEDURE — C1751 CATH, INF, PER/CENT/MIDLINE: HCPCS

## 2019-02-06 PROCEDURE — 87449 NOS EACH ORGANISM AG IA: CPT

## 2019-02-06 PROCEDURE — 87045 FECES CULTURE AEROBIC BACT: CPT

## 2019-02-06 PROCEDURE — 97116 GAIT TRAINING THERAPY: CPT

## 2019-02-06 PROCEDURE — 65610000006 HC RM INTENSIVE CARE

## 2019-02-06 PROCEDURE — 36556 INSERT NON-TUNNEL CV CATH: CPT

## 2019-02-06 PROCEDURE — 05HY33Z INSERTION OF INFUSION DEVICE INTO UPPER VEIN, PERCUTANEOUS APPROACH: ICD-10-PCS | Performed by: ANESTHESIOLOGY

## 2019-02-06 PROCEDURE — 83880 ASSAY OF NATRIURETIC PEPTIDE: CPT

## 2019-02-06 PROCEDURE — 97110 THERAPEUTIC EXERCISES: CPT

## 2019-02-06 PROCEDURE — 80048 BASIC METABOLIC PNL TOTAL CA: CPT

## 2019-02-06 PROCEDURE — 77030038269 HC DRN EXT URIN PURWCK BARD -A

## 2019-02-06 PROCEDURE — 94760 N-INVAS EAR/PLS OXIMETRY 1: CPT

## 2019-02-06 RX ORDER — LEVOFLOXACIN 500 MG/1
500 TABLET, FILM COATED ORAL
Status: COMPLETED | OUTPATIENT
Start: 2019-02-06 | End: 2019-02-10

## 2019-02-06 RX ORDER — MUPIROCIN 20 MG/G
OINTMENT TOPICAL 2 TIMES DAILY
Status: COMPLETED | OUTPATIENT
Start: 2019-02-06 | End: 2019-02-11

## 2019-02-06 RX ORDER — METRONIDAZOLE 250 MG/1
500 TABLET ORAL EVERY 12 HOURS
Status: DISCONTINUED | OUTPATIENT
Start: 2019-02-06 | End: 2019-02-08

## 2019-02-06 RX ORDER — DOBUTAMINE HYDROCHLORIDE 200 MG/100ML
2.5-5 INJECTION INTRAVENOUS
Status: DISCONTINUED | OUTPATIENT
Start: 2019-02-06 | End: 2019-02-07

## 2019-02-06 RX ORDER — PHENYLEPHRINE HCL IN 0.9% NACL 30MG/250ML
10-200 PLASTIC BAG, INJECTION (ML) INTRAVENOUS
Status: DISCONTINUED | OUTPATIENT
Start: 2019-02-06 | End: 2019-02-11

## 2019-02-06 RX ORDER — SODIUM CHLORIDE 9 MG/ML
250 INJECTION, SOLUTION INTRAVENOUS ONCE
Status: COMPLETED | OUTPATIENT
Start: 2019-02-06 | End: 2019-02-06

## 2019-02-06 RX ADMIN — APIXABAN 2.5 MG: 2.5 TABLET, FILM COATED ORAL at 17:48

## 2019-02-06 RX ADMIN — DOBUTAMINE HYDROCHLORIDE 2.5 MCG/KG/MIN: 200 INJECTION INTRAVENOUS at 20:31

## 2019-02-06 RX ADMIN — SODIUM CHLORIDE 250 ML: 900 INJECTION, SOLUTION INTRAVENOUS at 21:42

## 2019-02-06 RX ADMIN — ALLOPURINOL 300 MG: 300 TABLET ORAL at 09:43

## 2019-02-06 RX ADMIN — FLUTICASONE FUROATE AND VILANTEROL TRIFENATATE 1 PUFF: 100; 25 POWDER RESPIRATORY (INHALATION) at 10:41

## 2019-02-06 RX ADMIN — ATORVASTATIN CALCIUM 10 MG: 10 TABLET, FILM COATED ORAL at 21:23

## 2019-02-06 RX ADMIN — SODIUM CHLORIDE 250 ML: 900 INJECTION, SOLUTION INTRAVENOUS at 16:11

## 2019-02-06 RX ADMIN — METRONIDAZOLE 500 MG: 250 TABLET ORAL at 21:23

## 2019-02-06 RX ADMIN — ACETAMINOPHEN 650 MG: 325 TABLET ORAL at 09:49

## 2019-02-06 RX ADMIN — METRONIDAZOLE 500 MG: 500 INJECTION, SOLUTION INTRAVENOUS at 01:24

## 2019-02-06 RX ADMIN — Medication 1 CAPSULE: at 09:43

## 2019-02-06 RX ADMIN — Medication 400 MG: at 09:43

## 2019-02-06 RX ADMIN — LEVOFLOXACIN 500 MG: 500 TABLET, FILM COATED ORAL at 17:48

## 2019-02-06 RX ADMIN — MUPIROCIN: 20 OINTMENT TOPICAL at 21:25

## 2019-02-06 RX ADMIN — LEVOTHYROXINE SODIUM 25 MCG: 25 TABLET ORAL at 05:56

## 2019-02-06 RX ADMIN — METRONIDAZOLE 500 MG: 500 INJECTION, SOLUTION INTRAVENOUS at 09:43

## 2019-02-06 RX ADMIN — DEXTROSE MONOHYDRATE, SODIUM CHLORIDE, AND POTASSIUM CHLORIDE 50 ML/HR: 50; 4.5; 1.49 INJECTION, SOLUTION INTRAVENOUS at 17:46

## 2019-02-06 RX ADMIN — APIXABAN 2.5 MG: 2.5 TABLET, FILM COATED ORAL at 09:43

## 2019-02-06 RX ADMIN — SODIUM CHLORIDE 250 ML: 900 INJECTION, SOLUTION INTRAVENOUS at 21:08

## 2019-02-06 RX ADMIN — TRAMADOL HYDROCHLORIDE 50 MG: 50 TABLET, FILM COATED ORAL at 22:32

## 2019-02-06 RX ADMIN — Medication 50 MCG/MIN: at 21:55

## 2019-02-06 NOTE — PROGRESS NOTES
Problem: Mobility Impaired (Adult and Pediatric) Goal: *Acute Goals and Plan of Care (Insert Text) Physical Therapy Goals Initiated 2/5/2019 1. Patient will move from supine to sit and sit to supine , scoot up and down and roll side to side in bed with independence within 7 day(s). 2.  Patient will transfer from bed to chair and chair to bed with modified independence using the least restrictive device within 7 day(s). 3.  Patient will perform sit to stand with independence within 7 day(s). 4.  Patient will ambulate with modified supervision for 150 feet with the least restrictive device within 7 day(s). 5.  Patient will ascend/descend 5 stairs with handrail(s) with minimal assistance/contact guard assist within 7 day(s). physical Therapy TREATMENT Patient: Monique Yap (35 y.o. female) Date: 2/6/2019 Diagnosis: Acute encephalopathy [G93.40] <principal problem not specified> Precautions: Fall Chart, physical therapy assessment, plan of care and goals were reviewed. ASSESSMENT: 
The patient presents with Minimum assistance functional transfers, Minimum assistance gait, 5 feet ambulated, and with gait belt and rolling walker device. Pt reported increased R knee pain and noted edema surrounding patella and TTP at patella tendon/fat pad. Pt able to perform bed mobility with additional time and Min A. C/o dizziness in standing an BP 50/40 recheck 59/41. Aborted further mobility though pt was able to take several side steps at EOB to move towards [facility]. Returned to supine and performed supine exercises to increase BP. Placed in Trendelenburg with pressures increasing to 65/45. Rn notified. Pt left in NAD. The following are barriers to independence while in acute care:  
-Cognitive and/or behavioral: fear of falling 
-Medical condition: ROM, strength, sitting balance, standing balance, orthostatic hypotension symptomatic, WB status and pain tolerance   
-Other: Prior level of function: Independent. Walking on treadmill. Fall at home. PLAN: 
Patient continues to benefit from skilled intervention to address the above impairments. Continue treatment per established plan of care. Recommendations and/or planned interventions for staff: Functional transfers, bed mobility, gait, balance Discharge recommendations: Rehab at inpatient facility: patient can tolerate 3 hours of therapy Patient's barriers to discharging home, in addition to above impairments: lives alone. Equipment recommendations for successful discharge (if) home: TBD SUBJECTIVE:  
Patient stated I am feeling a bit woozy.  OBJECTIVE DATA SUMMARY:  
Critical Behavior: 
Neurologic State: Alert Orientation Level: Oriented X4 Cognition: Appropriate decision making, Appropriate for age attention/concentration, Appropriate safety awareness, Follows commands Safety/Judgement: Awareness of environment, Insight into deficits Functional Mobility Training: 
Bed Mobility: 
  
Supine to Sit: Minimum assistance Sit to Supine: Minimum assistance Transfers: 
Sit to Stand: Minimum assistance;Assist x1;Additional time(bed slightly elevated) Stand to Sit: Minimum assistance Balance: 
Sitting: Intact Standing: Impaired Standing - Static: Fair;Constant support Standing - Dynamic : PoorAmbulation/Gait Training: 
Distance (ft): 5 Feet (ft) Assistive Device: Gait belt;Walker, rolling Ambulation - Level of Assistance: Minimal assistance;Assist x1;Additional time Gait Abnormalities: Antalgic;Decreased step clearance;Shuffling gait; Step to gait Base of Support: Widened Stance: Right decreased Speed/Lawanda: Pace decreased (<100 feet/min) Step Length: Right shortened;Left shortened Swing Pattern: Right asymmetrical 
  
  
  
 
Therapeutic Exercises: Ankle Pumps, Quad sets(pain), heel slides(pain on L), glute squeezes, LAQ(50% ROM) Pain: Pre treatment: 4 /10 Post treatment:  5/10 Location: knee Norah Neighbors Aggravating factors: movement Activity Tolerance:  
fair Please refer to the flowsheet for vital signs taken during this treatment. After treatment patient left:  
Supine in bed Caregiver at bedside Call light within reach RN notified Family at bedside COMMUNICATION/COLLABORATION:  
The patients plan of care was discussed with: Registered Nurse Bertin Perez, PT Time Calculation: 39 mins

## 2019-02-06 NOTE — PROGRESS NOTES
0700: Bedside shift change report given to Constanza Horton, RN (oncoming nurse) by Gina Paulson, [employer] (offgoing nurse). Report included the following information SBAR and Kardex. 0800: Per Dr. Martin Reid hold carvedilol and entresto given pt's low SBP< 90. Pt is asymptomatic. A/o x4. NAD. Denies any dizziness, light-headedness. No CP or SOB. Per Dr. Martin Reid reconsult cardiology as they signed off on pt last night in the ER. Attempted to consult Dr. Keely Cavazos per Dr. Dimas Newton request.  
 
0900: Dr. Shiva Hand is on board to see patient. 1134: Patient able to have BM, however stool is mixed with urine. Stool is loose, but has some formed stool as well. Will attempt to obtain cultures next BM.

## 2019-02-06 NOTE — WOUND CARE
Wound care nurse consulted by staff nurse to assess sacrum. Patient is a 81 y/o AAF admitted for acute encephalopathy, UTI and diarrhea. Past Medical History:  
Diagnosis Date  Aortic insufficiency  Asthma  Cancer ([facility] [address] lymphoma  CKD (chronic kidney disease) stage 3, GFR 30-59 ml/min ([facility]) 1/10/2018  Congestive heart failure, NYHA class II, chronic, systolic (HCC)  Heart failure ([facility] [address]  Hypertension  Mitral valvular regurgitation 1/22/2016 ECHO 2/3/17: LV dilated, EF 20-25%, mild LVH, RV mod dilated, mild- mod MELANIA, mod-severe MR, mod AI ECHO 7/29/17: EF 15%, severe DHK, reduced RVEF, RVH, RVSP 35 mmHg  Mild LAE, mod-marked MA fibrosis, mod-severe MR (2+), AO root dilated,    
 Nonrheumatic aortic valve insufficiency 10/16/2018  Presence of biventricular automatic cardioverter/defibrillator (AICD) 7/2/2015 [employer] biventricular AICD implant  Stomach ulcer Past Surgical History:  
Procedure Laterality Date  HX BREAST BIOPSY Bilateral   
 40 years ago  HX COLONOSCOPY    
 HX HEENT    
 cataracts removed  HX HYSTERECTOMY  HX OTHER SURGICAL    
 lymph node surgery  HX TONSILLECTOMY  UT EGD TRANSORAL BIOPSY SINGLE/MULTIPLE  5/23/2012  
    
 SINUS SURGERY PROC UNLISTED Nasal polyps removed Patients sacral skin is clean and intact, patient has [address] [facility] female external cathter in place and no stool detected when turned. Recommend: 
 
Protective skin barrier ointment as needed to buttocks and yadira-anal skin Don Williamson RN, [employer]

## 2019-02-06 NOTE — CDMP QUERY
2 of 3 
 
Dr. Cedric Haywood MD : 
 
Pt admitted with UTI/diarrhea/ANDREW, Encephalopathy documented. Please further specify type of Encephalopathy in the medical record =>Metabolic Encephalopathy 
=>Septic Encephalopathy 
=>Toxic Encephalopathy 
=>Encephalopathy due to medications or drugs (please specify) =>Toxic Metabolic Encephalopathy 
=>Other Encephalopathy 
=>Other, please specify 
=>Clinically unable to determine The medical record reflects the following: 
 
   Risk Factors: 89 BF w/hx: CKD3, HF, Clinical Indicators: Admitted with UTI/diarrhea and ANDREW with confusion worse than baseline with incoherent speech , per Neuro PN \"metabolic encephalopathy\" Treatment: Pending neuro consult Thank You, [email or web address] 
[phone]

## 2019-02-06 NOTE — CDMP QUERY
1 of 3 
 
Dr. Xiang Elkins MD : 
 
Patient admitted with UTI/diarrhea/ANDREW, noted to have SIRS criteria. If possible, please document in progress notes and d/c summary if you are evaluating and/or treating any of the following:  
 
=> Sepsis POA 
=> Other explanation of clinical findings  
=> Clinically Undetermined (no explanation for clinical findings) The medical record reflects the following: 
   Risk Factors: 89 BF w/hx: CKD3, cA Clinical Indicators: Admitted with UTI, diarrhea, and ANDREW with admit T: 101.2, RR: 23-28, WBC: 2.6, Bands: 12 Treatment: Flagyl Iv, Levaquin Iv, 1000 ml NS bolus IV in ED Thank You, [email or web address] 
[phone]

## 2019-02-06 NOTE — PROGRESS NOTES
Cardiac Electrophysiology Progress Note [address], [place], [address] [facility]  [phone] 
 
2/6/2019 11:17 AM 
 
Admit Date: 2/4/2019 Admit Diagnosis: Acute encephalopathy [G93.40] Subjective:  
 
Don Lau   denies chest pain, chest pressure/discomfort, dyspnea, palpitations, orthopnea, exertional chest pressure/discomfort. Visit Vitals BP 92/68 (BP 1 Location: Left leg, BP Patient Position: Supine) Pulse 88 Temp 97.9 °F (36.6 °C) Resp 18 Ht 5' 1\" (1.549 m) Wt 141 lb (64 kg) SpO2 96% BMI 26.64 kg/m² Current Facility-Administered Medications Medication Dose Route Frequency  lactobac ac& pc-s.therm-b.anim (DAWN Q/RISAQUAD)  1 Cap Oral DAILY  dextrose 5% - 0.45% NaCl with KCl 20 mEq/L infusion  50 mL/hr IntraVENous CONTINUOUS  
 allopurinol (ZYLOPRIM) tablet 300 mg  300 mg Oral DAILY  apixaban (ELIQUIS) tablet 2.5 mg  2.5 mg Oral BID  atorvastatin (LIPITOR) tablet 10 mg  10 mg Oral QHS  carvedilol (COREG) tablet 3.125 mg  3.125 mg Oral BID WITH MEALS  fluticasone-vilanterol (BREO ELLIPTA) 100mcg-25mcg/puff  1 Puff Inhalation DAILY  levothyroxine (SYNTHROID) tablet 25 mcg  25 mcg Oral 6am  
 loratadine (CLARITIN) tablet 10 mg  10 mg Oral DAILY PRN  
 magnesium oxide (MAG-OX) tablet 400 mg  400 mg Oral DAILY  sacubitril-valsartan (ENTRESTO) 49-51 mg tablet 1 Tab  1 Tab Oral BID  levoFLOXacin (LEVAQUIN) 750 mg in D5W IVPB  750 mg IntraVENous Q48H  
 metroNIDAZOLE (FLAGYL) IVPB premix 500 mg  500 mg IntraVENous Q8H  
 albuterol-ipratropium (DUO-NEB) 2.5 MG-0.5 MG/3 ML  3 mL Nebulization Q6H PRN  
 ondansetron (ZOFRAN) injection 4 mg  4 mg IntraVENous Q6H PRN  
 acetaminophen (TYLENOL) tablet 650 mg  650 mg Oral Q4H PRN  
 traMADol (ULTRAM) tablet 50 mg  50 mg Oral Q6H PRN Objective:  
  
Visit Vitals BP 92/68 (BP 1 Location: Left leg, BP Patient Position: Supine) Pulse 88 Temp 97.9 °F (36.6 °C) Resp 18 Ht 5' 1\" (1.549 m) Wt 141 lb (64 kg) SpO2 96% BMI 26.64 kg/m² Physical Exam: Abdomen: soft, non-tender Extremities: extremities normal 
Heart: regular rate and rhythm Lungs: clear to auscultation bilaterally Pulses: 2+ and symmetric Data Review:  
Labs:   
Recent Labs 02/05/19 
7443 02/05/19 
0405 02/04/19 
1053 WBC 2.2* 2.0* 2.6* HGB 8.5* 8.5* 9.2* HCT 25.7* 25.9* 28.0*  
* 135* 164 Recent Labs 02/06/19 
0128 02/05/19 
0405 02/04/19 
1053 * 138 141  
K 3.8 3.5 3.8  106 106 CO2 23 23 28 * 86 113* BUN 33* 27* 26* CREA 1.44* 1.13* 1.37* CA 7.6* 7.8* 8.3*  
MG  --  3.0*  --   
ALB  --  2.1* 2.7* TBILI  --  0.5 0.8 SGOT  --  24 34 ALT  --  17 22 Recent Labs 02/05/19 
8445 02/05/19 
0116 02/04/19 
1710 TROIQ 0.07* 0.12* 0.20* Intake/Output Summary (Last 24 hours) at 2/6/2019 1117 Last data filed at 2/6/2019 1036 Gross per 24 hour Intake 40 ml Output 200 ml Net -160 ml Telemetry: V paced Assessment:  
 
Active Problems: 
  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  
 
Radha Palomino is a pleasant 80year old female with hx of NICM, EF 20-25%, S/p Biv ICD, HTN,. AF and CKD who presented with n/v/d with dyspnea and weakness. Cardiology consulted for NSVT 4-7 beats. She has not had any further arrhythmias overnight. Feels better today. No diarrhea in last 24 hours. Currently treated for UTI, with probable viral cause for diarrhea. Device check wnl with the exception of elevated respiratory rate for the last month. Continue with lasix, entresto, coreg and eliquis. Cardiology will sign off. Follow up for device check as planned. IRAIDA Bentley Patient seen and examined by me with nurse practitioner. I personally performed all components of the history, physical, and medical decision making and agree with the assessment and plan with minor modifications as noted. No arrhythmias overnight. Cont med rx for cardiomyopathy. Will sign off Dm Oconnor MD, [facility] 
 
 
 
2/6/2019 
11:17 AM

## 2019-02-06 NOTE — PROGRESS NOTES
In Basket message sent to  Sherine Fair notifying of admission and disposition. NATANAEL Bonilla, [facility] [[email or web address] Care Management [phone]

## 2019-02-06 NOTE — PROGRESS NOTES
Pharmacy Automatic Renal Dosing Protocol - Antimicrobials Indication for Antimicrobials: UTI, infectious diarrhea Current Regimen of Each Antimicrobial: 
Levofloxacin 750 mg IV every 48 hours (Start Date 19; Day # 3 of 7) Metronidazole 500 mg IV every 8 hours (Start Date 19; Day # 3) Previous Antimicrobial Therapy: 
None Significant Cultures:  
 - Urine - Gram negative rods Radiology / Imaging results: (X-ray, CT scan or MRI):  
 - CT abdomen - Numerous sigmoid diverticula. Paralysis, amputations, malnutrition: None identified Labs: 
Recent Labs 19 
0128 19 
7494 19 
0405 19 
1053 CREA 1.44*  --  1.13* 1.37* BUN 33*  --  27* 26* WBC  --  2.2* 2.0* 2.6* Temp (24hrs), Av.1 °F (36.7 °C), Min:97.2 °F (36.2 °C), Max:99.5 °F (37.5 °C) Creatinine Clearance (mL/min) or Dialysis: 18.4 mL/min using ideal body weight Impression/Plan: · Will adjust levofloxacin to 500 mg PO every 48 hours due to CrCl < 20 mL/min per protocol · Will adjust metronidazole to 500 mg PO every 12 hours due to non-C. Diff diagnosis per protocol. If patient does have C. Diff, pharmacy will adjust regimen back to every 8 hour dosing · Antimicrobial stop date: 7 days: levofloxacin; metronidazole: TBD Pharmacy will follow daily and adjust medications as appropriate for renal function and/or serum levels. Thank you, 
Tess Prieto PharmD candidate Class of 2019 Claudy Osorio PHARMD 
 
 
Recommended duration of therapy 
http://[facility]/[place]/[facility]/Pharmacy/Clinical%20Companion/Duration%20of%20ABX%20therapy. docx Renal Dosing 
http://[facility]/[place]/[facility]/Pharmacy/Clinical%20Companion/Renal%[email or web address]. pdf

## 2019-02-06 NOTE — CDMP QUERY
3 of 3 
 
Dr. Aurora Diaz MD : 
 
Patient admitted with UTI/diarrhea and ANDREW, noted to have h/o aifb. If possible, please document in progress notes and d/c summary if you are evaluating and/or treating any of the following: 
 
=> Paroxysmal Atrial Fibrillation 
=> Persistent Atrial Fibrillation 
=> Permanent Atrial Fibrillation  
=> Other Explanation of clinical findings 
=> Clinically Undetermined (no explanation for clinical findings) The medical record reflects the following: 
   Risk Factors: 89 BF w/hx: afib Clinical Indicators: history of afib Treatment: Cardiac monitor, cardiology consult, and Maria Arizmendi Please clarify and document your clinical opinion in the progress notes and discharge summary including the definitive and/or presumptive diagnosis, (suspected or probable), related to the above clinical findings. Please include clinical findings supporting your diagnosis. 
 
------------------------------------------------------------------------------------------------- 
Paroxysmal:  begins suddenly and stops on its own. Symptoms can be mild or severe. They stop within about a week, but usually in less than 24 hours. Persistent: continues for more than a week. It may stop on its own, or it can be stopped with treatment. Permanent: cannot be restored with treatment Thank You, [email or web address] 
[phone]

## 2019-02-06 NOTE — PROGRESS NOTES
General Daily Progress Note Admit Date: 2/4/2019 Subjective:  
 
Patient has no complaint. Current Facility-Administered Medications Medication Dose Route Frequency  lactobac ac& pc-s.therm-b.anim (DAWN Q/RISAQUAD)  1 Cap Oral DAILY  dextrose 5% - 0.45% NaCl with KCl 20 mEq/L infusion  50 mL/hr IntraVENous CONTINUOUS  
 allopurinol (ZYLOPRIM) tablet 300 mg  300 mg Oral DAILY  apixaban (ELIQUIS) tablet 2.5 mg  2.5 mg Oral BID  atorvastatin (LIPITOR) tablet 10 mg  10 mg Oral QHS  carvedilol (COREG) tablet 3.125 mg  3.125 mg Oral BID WITH MEALS  fluticasone-vilanterol (BREO ELLIPTA) 100mcg-25mcg/puff  1 Puff Inhalation DAILY  levothyroxine (SYNTHROID) tablet 25 mcg  25 mcg Oral 6am  
 loratadine (CLARITIN) tablet 10 mg  10 mg Oral DAILY PRN  
 magnesium oxide (MAG-OX) tablet 400 mg  400 mg Oral DAILY  sacubitril-valsartan (ENTRESTO) 49-51 mg tablet 1 Tab  1 Tab Oral BID  levoFLOXacin (LEVAQUIN) 750 mg in D5W IVPB  750 mg IntraVENous Q48H  
 metroNIDAZOLE (FLAGYL) IVPB premix 500 mg  500 mg IntraVENous Q8H  
 albuterol-ipratropium (DUO-NEB) 2.5 MG-0.5 MG/3 ML  3 mL Nebulization Q6H PRN  
 ondansetron (ZOFRAN) injection 4 mg  4 mg IntraVENous Q6H PRN  
 acetaminophen (TYLENOL) tablet 650 mg  650 mg Oral Q4H PRN  
 traMADol (ULTRAM) tablet 50 mg  50 mg Oral Q6H PRN Review of Systems A comprehensive review of systems was negative. Objective:  
 
Patient Vitals for the past 24 hrs: 
 BP Temp Pulse Resp SpO2 Weight  
02/06/19 0715 (!) 81/63 97.2 °F (36.2 °C) 82 18 95 %   
02/06/19 0406 (!) 88/64 97.6 °F (36.4 °C) 67 16 97 % 141 lb (64 kg) 02/05/19 2317 (!) 78/58 97.9 °F (36.6 °C) 78 18 97 %   
02/05/19 2216 (!) 80/64  70     
02/05/19 2130 (!) 84/65       
02/05/19 2115 (!) 78/58       
02/05/19 2100 (!) 76/60       
02/05/19 2044 (!) 73/54  76     
02/05/19 2030 (!) 75/51  74     
02/05/19 2019 (!) 71/48  70    02/05/19 2013 (!) 67/51  78     
02/05/19 2004 (!) 70/51  80     
02/05/19 1950 (!) 59/48  82     
02/05/19 1927 (!) 59/47 97.5 °F (36.4 °C) 80 20 98 %   
02/05/19 1918 (!) 60/48  88     
02/05/19 1847 (!) 84/63 97.5 °F (36.4 °C) 87 18 97 %   
02/05/19 1631 107/70 99.5 °F (37.5 °C) 73 18 97 %   
02/05/19 1558 110/70 98.8 °F (37.1 °C) 83 20 97 %   
02/05/19 1500 108/70  71 22 96 %   
02/05/19 1400 101/64 99 °F (37.2 °C) 70 22 98 %   
02/05/19 1051 91/57  75 23 96 %  No intake/output data recorded. 02/04 1901 - 02/06 0700 In: 350 [I.V.:350] Out: 200 [Urine:200] Physical Exam:  
Visit Vitals BP (!) 81/63 (BP 1 Location: Left arm, BP Patient Position: Supine) Comment: notified rn  
Pulse 82 Temp 97.2 °F (36.2 °C) Resp 18 Ht 5' 1\" (1.549 m) Wt 141 lb (64 kg) SpO2 95% BMI 26.64 kg/m² General appearance: alert, cooperative, no distress, appears stated age Neck: supple, symmetrical, trachea midline, no adenopathy, thyroid: not enlarged, symmetric, no tenderness/mass/nodules, no carotid bruit and no JVD Lungs: clear to auscultation bilaterally Heart: regular rate and rhythm, S1, S2 normal, no murmur, click, rub or gallop Abdomen: soft, non-tender. Bowel sounds normal. No masses,  no organomegaly Extremities: extremities normal, atraumatic, no cyanosis or edema Data Review Recent Results (from the past 24 hour(s)) GLUCOSE, POC Collection Time: 02/05/19  8:53 PM  
Result Value Ref Range Glucose (POC) 167 (H) 65 - 100 mg/dL Performed by Gabino Lazaro, BASIC Collection Time: 02/06/19  1:28 AM  
Result Value Ref Range Sodium 135 (L) 136 - 145 mmol/L Potassium 3.8 3.5 - 5.1 mmol/L Chloride 105 97 - 108 mmol/L  
 CO2 23 21 - 32 mmol/L Anion gap 7 5 - 15 mmol/L Glucose 122 (H) 65 - 100 mg/dL BUN 33 (H) 6 - 20 MG/DL  Creatinine 1.44 (H) 0.55 - 1.02 MG/DL  
 BUN/Creatinine ratio 23 (H) 12 - 20    
 GFR est AA 41 (L) >60 ml/min/1.73m2 GFR est non-AA 34 (L) >60 ml/min/1.73m2 Calcium 7.6 (L) 8.5 - 10.1 MG/DL  
NT-PRO BNP Collection Time: 02/06/19  1:28 AM  
Result Value Ref Range NT pro-BNP 7,684 (H) 0 - 450 PG/ML Assessment:  
 
Active Problems: 
  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 Patient quite sensitive to cardioactive medications which is previously happened before. She has a low output state. She is volume depleted currently. 2.  No further GI symptoms and will therefore progress diet. 3.  I still suspect probable viral gastroenteritis as the etiology of her GI symptoms.

## 2019-02-06 NOTE — PROGRESS NOTES
1531: notifed Dr. Ciro Mckeon of SBP of 60/51. Told to increase fluids to 75mL/hr.  
 
1531: Paged Mery Huizar NP as SBP in 60s. Awaiting call back. Pt is asymptomatic.  
 
1556: Spoke to Mery Huizar NP and discussed pt's SBP in the 60s and while working with physical therapy and pt up at side of bed SBP in 50s and became dizzy at that time. Pt is lying in bed family at bedside denies any light-headedness and dizziness currently while lying down. Per Mery Huizar, NP will speak to Dr. Marianela Franklin, but told to give patient a 250 cc bolus of normal saline. 1615: Per Mery Huizar, NP give pt 250mL bolus of NS. If SBP does not improve may need dobutamine gtt and may need CCU. Will continue to monitor. 1808: SBP remains in the 70s. Pt continues to remain asymptomatic. Paged Mery Huizar to make aware and tiger-texted Dr. Marianela Franklin as well. Awaiting call back and response. Per Dr. Marianela Franklin page on call answering service Dr. Galo Lindsey. 1823: Paged Dr. Galo Lindsey in regards to SBP in the 70s. Awaiting response back. 1830: TigerTexted Dr. Galo Lindsey awaiting response. 1838: Dr. Harsha Reece responded to tigertext and will call back 1845: Discussed patient's hx and exam and recent VS with Dr. Galo Lindsey. Discussed Dr. Marianela Franklin and Angela Esteban's recommendation to give pt 250 cc bolus of NS and to notify if SBP remains less than 90 and if not may need ccu and dobutamine gtt. Given this information Dr. Galo Lindsey agrees with this plan of care to transfer patient to the unit and to start 2.5 of dobutamine and to titrate back from 2.5 to 5 mcg. Per Dr. Galo Lindsey notify him if SBP sustains less than 85. Per Dr. Galo Lindsey notify attending of his recommendations. Paged ON CALL for Dr. Ciro Mckeon who is Dr. Melany Patton. Awaiting Dr. Davie Brenner call back. 1928: Dr. Monique Mills notified of patient Dr. Galo Lindsey recommendations for CCU transfer.

## 2019-02-06 NOTE — PROGRESS NOTES
General Daily Progress Note Admit Date: 2/4/2019 Subjective:  
 
Patient has no complaint . Current Facility-Administered Medications Medication Dose Route Frequency  lactobac ac& pc-s.therm-b.anim (DAWN Q/RISAQUAD)  1 Cap Oral DAILY  dextrose 5% - 0.45% NaCl with KCl 20 mEq/L infusion  25 mL/hr IntraVENous CONTINUOUS  
 allopurinol (ZYLOPRIM) tablet 300 mg  300 mg Oral DAILY  apixaban (ELIQUIS) tablet 2.5 mg  2.5 mg Oral BID  atorvastatin (LIPITOR) tablet 10 mg  10 mg Oral QHS  carvedilol (COREG) tablet 3.125 mg  3.125 mg Oral BID WITH MEALS  fluticasone-vilanterol (BREO ELLIPTA) 100mcg-25mcg/puff  1 Puff Inhalation DAILY  levothyroxine (SYNTHROID) tablet 25 mcg  25 mcg Oral 6am  
 loratadine (CLARITIN) tablet 10 mg  10 mg Oral DAILY PRN  
 magnesium oxide (MAG-OX) tablet 400 mg  400 mg Oral DAILY  sacubitril-valsartan (ENTRESTO) 49-51 mg tablet 1 Tab  1 Tab Oral BID  levoFLOXacin (LEVAQUIN) 750 mg in D5W IVPB  750 mg IntraVENous Q48H  
 metroNIDAZOLE (FLAGYL) IVPB premix 500 mg  500 mg IntraVENous Q8H  
 albuterol-ipratropium (DUO-NEB) 2.5 MG-0.5 MG/3 ML  3 mL Nebulization Q6H PRN  
 ondansetron (ZOFRAN) injection 4 mg  4 mg IntraVENous Q6H PRN  
 acetaminophen (TYLENOL) tablet 650 mg  650 mg Oral Q4H PRN  
 traMADol (ULTRAM) tablet 50 mg  50 mg Oral Q6H PRN Review of Systems A comprehensive review of systems was negative. Objective:  
 
Patient Vitals for the past 24 hrs: 
 BP Temp Pulse Resp SpO2  
02/05/19 2216 (!) 80/64  70    
02/05/19 2130 (!) 84/65      
02/05/19 2115 (!) 78/58      
02/05/19 2100 (!) 76/60      
02/05/19 2044 (!) 73/54  76    
02/05/19 2030 (!) 75/51  74    
02/05/19 2019 (!) 71/48  70    
02/05/19 2013 (!) 67/51  78    
02/05/19 2004 (!) 70/51  80    
02/05/19 1950 (!) 59/48  82    
02/05/19 1927 (!) 59/47 97.5 °F (36.4 °C) 80 20 98 % 02/05/19 1918 (!) 60/48  88   02/05/19 1847 (!) 84/63 97.5 °F (36.4 °C) 87 18 97 % 02/05/19 1631 107/70 99.5 °F (37.5 °C) 73 18 97 % 02/05/19 1558 110/70 98.8 °F (37.1 °C) 83 20 97 % 02/05/19 1500 108/70  71 22 96 % 02/05/19 1400 101/64 99 °F (37.2 °C) 70 22 98 % 02/05/19 1051 91/57  75 23 96 % 02/05/19 0822 96/69 98.1 °F (36.7 °C) 71 20 98 % 02/05/19 0500 99/49 98 °F (36.7 °C) 72 20 98 % 02/05/19 0300 98/64  82 20 98 % 02/05/19 0200 108/62 98.1 °F (36.7 °C) 74 28 95 % 02/05/19 0100 111/60  72 16 97 % 02/05/19 0028 111/72  70 22 98 % No intake/output data recorded. 02/04 0701 - 02/05 1900 In: 1350 [I.V.:1350] Out: - Physical Exam:  
Visit Vitals BP (!) 80/64 Pulse 70 Temp 97.5 °F (36.4 °C) Resp 20 Ht 5' 1\" (1.549 m) Wt 125 lb (56.7 kg) SpO2 98% BMI 23.62 kg/m² General appearance: alert, cooperative, no distress, appears stated age Neck: supple, symmetrical, trachea midline, no adenopathy, thyroid: not enlarged, symmetric, no tenderness/mass/nodules, no carotid bruit and no JVD Lungs: clear to auscultation bilaterally Heart: regular rate and rhythm, S1, S2 normal, no murmur, click, rub or gallop Abdomen: soft, non-tender. Bowel sounds normal. No masses,  no organomegaly Extremities: extremities normal, atraumatic, no cyanosis or edema Data Review Recent Results (from the past 24 hour(s)) TROPONIN I Collection Time: 02/05/19  1:16 AM  
Result Value Ref Range Troponin-I, Qt. 0.12 (H) <0.05 ng/mL CBC WITH AUTOMATED DIFF Collection Time: 02/05/19  4:05 AM  
Result Value Ref Range WBC 2.0 (L) 3.6 - 11.0 K/uL  
 RBC 2.80 (L) 3.80 - 5.20 M/uL HGB 8.5 (L) 11.5 - 16.0 g/dL HCT 25.9 (L) 35.0 - 47.0 % MCV 92.5 80.0 - 99.0 FL  
 MCH 30.4 26.0 - 34.0 PG  
 MCHC 32.8 30.0 - 36.5 g/dL  
 RDW 16.5 (H) 11.5 - 14.5 % PLATELET 640 (L) 933 - 400 K/uL MPV 9.4 8.9 - 12.9 FL  
 NRBC 0.0 0  WBC ABSOLUTE NRBC 0.00 0.00 - 0.01 K/uL NEUTROPHILS 75 32 - 75 % BAND NEUTROPHILS 13 % LYMPHOCYTES 10 (L) 12 - 49 % MONOCYTES 1 (L) 5 - 13 % EOSINOPHILS 0 0 - 7 % BASOPHILS 0 0 - 1 % METAMYELOCYTES 1 % IMMATURE GRANULOCYTES 0 0.0 - 0.5 % ABS. NEUTROPHILS 1.8 1.8 - 8.0 K/UL  
 ABS. LYMPHOCYTES 0.2 (L) 0.8 - 3.5 K/UL  
 ABS. MONOCYTES 0.0 0.0 - 1.0 K/UL  
 ABS. EOSINOPHILS 0.0 0.0 - 0.4 K/UL  
 ABS. BASOPHILS 0.0 0.0 - 0.1 K/UL  
 ABS. IMM. GRANS. 0.0 0.00 - 0.04 K/UL  
 DF MANUAL    
 RBC COMMENTS ANISOCYTOSIS 1+ HEMOGLOBIN A1C WITH EAG Collection Time: 02/05/19  4:05 AM  
Result Value Ref Range Hemoglobin A1c 5.1 4.2 - 6.3 % Est. average glucose 100 mg/dL MAGNESIUM Collection Time: 02/05/19  4:05 AM  
Result Value Ref Range Magnesium 3.0 (H) 1.6 - 2.4 mg/dL METABOLIC PANEL, COMPREHENSIVE Collection Time: 02/05/19  4:05 AM  
Result Value Ref Range Sodium 138 136 - 145 mmol/L Potassium 3.5 3.5 - 5.1 mmol/L Chloride 106 97 - 108 mmol/L  
 CO2 23 21 - 32 mmol/L Anion gap 9 5 - 15 mmol/L Glucose 86 65 - 100 mg/dL BUN 27 (H) 6 - 20 MG/DL Creatinine 1.13 (H) 0.55 - 1.02 MG/DL  
 BUN/Creatinine ratio 24 (H) 12 - 20 GFR est AA 54 (L) >60 ml/min/1.73m2 GFR est non-AA 45 (L) >60 ml/min/1.73m2 Calcium 7.8 (L) 8.5 - 10.1 MG/DL Bilirubin, total 0.5 0.2 - 1.0 MG/DL  
 ALT (SGPT) 17 12 - 78 U/L  
 AST (SGOT) 24 15 - 37 U/L Alk. phosphatase 94 45 - 117 U/L Protein, total 5.6 (L) 6.4 - 8.2 g/dL Albumin 2.1 (L) 3.5 - 5.0 g/dL Globulin 3.5 2.0 - 4.0 g/dL A-G Ratio 0.6 (L) 1.1 - 2.2 TSH 3RD GENERATION Collection Time: 02/05/19  4:05 AM  
Result Value Ref Range TSH 1.96 0.36 - 3.74 uIU/mL  
TROPONIN I Collection Time: 02/05/19  8:52 AM  
Result Value Ref Range Troponin-I, Qt. 0.07 (H) <0.05 ng/mL CBC WITH MANUAL DIFF Collection Time: 02/05/19  8:52 AM  
Result Value Ref Range WBC 2.2 (L) 3.6 - 11.0 K/uL  
 RBC 2.74 (L) 3.80 - 5.20 M/uL HGB 8.5 (L) 11.5 - 16.0 g/dL HCT 25.7 (L) 35.0 - 47.0 % MCV 93.8 80.0 - 99.0 FL  
 MCH 31.0 26.0 - 34.0 PG  
 MCHC 33.1 30.0 - 36.5 g/dL  
 RDW 16.7 (H) 11.5 - 14.5 % PLATELET 825 (L) 682 - 400 K/uL MPV 10.3 8.9 - 12.9 FL  
 NRBC 0.0 0  WBC ABSOLUTE NRBC 0.00 0.00 - 0.01 K/uL NEUTROPHILS 72 32 - 75 % BAND NEUTROPHILS 7 (H) 0 - 6 % LYMPHOCYTES 16 12 - 49 % MONOCYTES 4 (L) 5 - 13 % EOSINOPHILS 1 0 - 7 % BASOPHILS 0 0 - 1 % METAMYELOCYTES 0 0 % MYELOCYTES 0 0 % PROMYELOCYTES 0 0 % BLASTS 0 0 % OTHER CELL 0 0 IMMATURE GRANULOCYTES 0 %  
 ABS. NEUTROPHILS 1.7 (L) 1.8 - 8.0 K/UL  
 ABS. LYMPHOCYTES 0.4 (L) 0.8 - 3.5 K/UL  
 ABS. MONOCYTES 0.1 0.0 - 1.0 K/UL  
 ABS. EOSINOPHILS 0.0 0.0 - 0.4 K/UL  
 ABS. BASOPHILS 0.0 0.0 - 0.1 K/UL  
 ABS. IMM. GRANS. 0.0 K/UL  
 DF MANUAL    
 RBC COMMENTS ANISOCYTOSIS 1+ GLUCOSE, POC Collection Time: 02/05/19  8:53 PM  
Result Value Ref Range Glucose (POC) 167 (H) 65 - 100 mg/dL Performed by Danisha Blind Assessment:  
 
Active Problems: 
  Cardiomyopathy, dilated, 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 (8/4/2012) Presence of biventricular automatic cardioverter/defibrillator (AICD) (7/2/2015) Overview: [employer] biventricular AICD implant CKD (chronic kidney disease) stage 3, GFR 30-59 ml/min ([facility]) (1/10/2018) Acute encephalopathy (2/4/2019) UTI (urinary tract infection) (2/4/2019) Diarrhea (2/4/2019) Plan: 1. Probable viral gastroenteritis. Continue vigilance and need to obtain stools for appropriate testing. 2. No evidence of cardiac decompensation. 3.  Await culture results.

## 2019-02-06 NOTE — PROGRESS NOTES
OT attempted to see pt and on first attempt pt was eating breakfast and asked for therapy to come back in 45 minutes. OT attempted to see pt second time and pt stated she had to pray for guidance because she had been yelled at today by her 25 yr old family member. Will defer and continue to follow

## 2019-02-06 NOTE — PROGRESS NOTES
Problem: Falls - Risk of 
Goal: *Absence of Falls Document Luna Cele Fall Risk and appropriate interventions in the flowsheet. Outcome: Progressing Towards Goal 
Fall Risk Interventions: 
Mobility Interventions: Bed/chair exit alarm, Communicate number of staff needed for ambulation/transfer, Mechanical lift, OT consult for ADLs, Patient to call before getting OOB, PT Consult for mobility concerns, PT Consult for assist device competence Medication Interventions: Assess postural VS orthostatic hypotension, Bed/chair exit alarm, Evaluate medications/consider consulting pharmacy, Patient to call before getting OOB, Teach patient to arise slowly, Utilize gait belt for transfers/ambulation Elimination Interventions: Bed/chair exit alarm, Call light in reach, Elevated toilet seat, Patient to call for help with toileting needs, Toilet paper/wipes in reach, Toileting schedule/hourly rounds, Urinal in reach History of Falls Interventions: Bed/chair exit alarm, Consult care management for discharge planning, Door open when patient unattended, Evaluate medications/consider consulting pharmacy, Investigate reason for fall, Room close to nurse's station, Utilize gait belt for transfer/ambulation

## 2019-02-07 PROBLEM — Z71.89 COUNSELING REGARDING ADVANCED CARE PLANNING AND GOALS OF CARE: Status: ACTIVE | Noted: 2019-02-07

## 2019-02-07 PROBLEM — I95.89 HYPOTENSION DUE TO HYPOVOLEMIA: Status: ACTIVE | Noted: 2019-02-07

## 2019-02-07 PROBLEM — M25.561 ACUTE PAIN OF RIGHT KNEE: Status: ACTIVE | Noted: 2019-02-07

## 2019-02-07 PROBLEM — R63.0 POOR APPETITE: Status: ACTIVE | Noted: 2019-02-07

## 2019-02-07 PROBLEM — E86.1 HYPOTENSION DUE TO HYPOVOLEMIA: Status: ACTIVE | Noted: 2019-02-07

## 2019-02-07 LAB
ANION GAP SERPL CALC-SCNC: 9 MMOL/L (ref 5–15)
BNP SERPL-MCNC: 3739 PG/ML
BUN SERPL-MCNC: 40 MG/DL (ref 6–20)
BUN/CREAT SERPL: 22 (ref 12–20)
C DIFF GDH STL QL: NEGATIVE
C DIFF TOX A+B STL QL IA: NEGATIVE
CALCIUM SERPL-MCNC: 8 MG/DL (ref 8.5–10.1)
CHLORIDE SERPL-SCNC: 106 MMOL/L (ref 97–108)
CO2 SERPL-SCNC: 21 MMOL/L (ref 21–32)
CREAT SERPL-MCNC: 1.79 MG/DL (ref 0.55–1.02)
ERYTHROCYTE [DISTWIDTH] IN BLOOD BY AUTOMATED COUNT: 17 % (ref 11.5–14.5)
GLUCOSE SERPL-MCNC: 116 MG/DL (ref 65–100)
HCT VFR BLD AUTO: 31.9 % (ref 35–47)
HGB BLD-MCNC: 10.6 G/DL (ref 11.5–16)
INTERPRETATION: NORMAL
MCH RBC QN AUTO: 30.1 PG (ref 26–34)
MCHC RBC AUTO-ENTMCNC: 33.2 G/DL (ref 30–36.5)
MCV RBC AUTO: 90.6 FL (ref 80–99)
NRBC # BLD: 0 K/UL (ref 0–0.01)
NRBC BLD-RTO: 0 PER 100 WBC
PLATELET # BLD AUTO: 177 K/UL (ref 150–400)
PMV BLD AUTO: 10.9 FL (ref 8.9–12.9)
POTASSIUM SERPL-SCNC: 3.7 MMOL/L (ref 3.5–5.1)
RBC # BLD AUTO: 3.52 M/UL (ref 3.8–5.2)
SODIUM SERPL-SCNC: 136 MMOL/L (ref 136–145)
WBC # BLD AUTO: 2.9 K/UL (ref 3.6–11)

## 2019-02-07 PROCEDURE — 85027 COMPLETE CBC AUTOMATED: CPT

## 2019-02-07 PROCEDURE — 83880 ASSAY OF NATRIURETIC PEPTIDE: CPT

## 2019-02-07 PROCEDURE — 74011250637 HC RX REV CODE- 250/637: Performed by: INTERNAL MEDICINE

## 2019-02-07 PROCEDURE — 74011250636 HC RX REV CODE- 250/636: Performed by: INTERNAL MEDICINE

## 2019-02-07 PROCEDURE — 65610000006 HC RM INTENSIVE CARE

## 2019-02-07 PROCEDURE — 77030037878 HC DRSG MEPILEX >48IN BORD MOLN -B

## 2019-02-07 PROCEDURE — 80048 BASIC METABOLIC PNL TOTAL CA: CPT

## 2019-02-07 PROCEDURE — 36415 COLL VENOUS BLD VENIPUNCTURE: CPT

## 2019-02-07 RX ORDER — HYDROMORPHONE HYDROCHLORIDE 1 MG/ML
0.5 INJECTION, SOLUTION INTRAMUSCULAR; INTRAVENOUS; SUBCUTANEOUS
Status: DISCONTINUED | OUTPATIENT
Start: 2019-02-07 | End: 2019-02-11

## 2019-02-07 RX ORDER — HYDROMORPHONE HYDROCHLORIDE 1 MG/ML
0.5 INJECTION, SOLUTION INTRAMUSCULAR; INTRAVENOUS; SUBCUTANEOUS EVERY 4 HOURS
Status: DISCONTINUED | OUTPATIENT
Start: 2019-02-07 | End: 2019-02-07

## 2019-02-07 RX ORDER — HYDROMORPHONE HYDROCHLORIDE 1 MG/ML
0.5 INJECTION, SOLUTION INTRAMUSCULAR; INTRAVENOUS; SUBCUTANEOUS
Status: DISCONTINUED | OUTPATIENT
Start: 2019-02-07 | End: 2019-02-07

## 2019-02-07 RX ADMIN — ALLOPURINOL 300 MG: 300 TABLET ORAL at 09:01

## 2019-02-07 RX ADMIN — Medication 130 MCG/MIN: at 02:12

## 2019-02-07 RX ADMIN — APIXABAN 2.5 MG: 2.5 TABLET, FILM COATED ORAL at 18:18

## 2019-02-07 RX ADMIN — ATORVASTATIN CALCIUM 10 MG: 10 TABLET, FILM COATED ORAL at 21:55

## 2019-02-07 RX ADMIN — APIXABAN 2.5 MG: 2.5 TABLET, FILM COATED ORAL at 09:28

## 2019-02-07 RX ADMIN — DEXTROSE MONOHYDRATE, SODIUM CHLORIDE, AND POTASSIUM CHLORIDE 75 ML/HR: 50; 4.5; 1.49 INJECTION, SOLUTION INTRAVENOUS at 12:41

## 2019-02-07 RX ADMIN — Medication 1 CAPSULE: at 09:01

## 2019-02-07 RX ADMIN — HYDROMORPHONE HYDROCHLORIDE 0.5 MG: 1 INJECTION, SOLUTION INTRAMUSCULAR; INTRAVENOUS; SUBCUTANEOUS at 19:28

## 2019-02-07 RX ADMIN — Medication 135 MCG/MIN: at 16:42

## 2019-02-07 RX ADMIN — Medication 130 MCG/MIN: at 05:48

## 2019-02-07 RX ADMIN — Medication 400 MG: at 09:01

## 2019-02-07 RX ADMIN — MUPIROCIN: 20 OINTMENT TOPICAL at 09:03

## 2019-02-07 RX ADMIN — LEVOTHYROXINE SODIUM 25 MCG: 25 TABLET ORAL at 05:44

## 2019-02-07 RX ADMIN — Medication 145 MCG/MIN: at 09:31

## 2019-02-07 RX ADMIN — FLUTICASONE FUROATE AND VILANTEROL TRIFENATATE 1 PUFF: 100; 25 POWDER RESPIRATORY (INHALATION) at 09:02

## 2019-02-07 RX ADMIN — MUPIROCIN: 20 OINTMENT TOPICAL at 17:29

## 2019-02-07 RX ADMIN — Medication 100 MCG/MIN: at 20:46

## 2019-02-07 RX ADMIN — METRONIDAZOLE 500 MG: 250 TABLET ORAL at 20:05

## 2019-02-07 RX ADMIN — METRONIDAZOLE 500 MG: 250 TABLET ORAL at 09:01

## 2019-02-07 NOTE — PROGRESS NOTES
0700- Pt alert and oriented x 3. Denies pain, wants breakfast. BPs still soft 18A systolic but patient alert and telling stories. Will titrate Nemesio accordingly. Increased fluids to 75ml/hr per Dr Martin Reid order. Will ask ABI Miller if she wants me to give the [facility] with this BP. Holding Coreg and Entresto due to hypotension. 1815- Pt reports 7/10 pain in medial abdomen upon palpation. New onset nausea, pt gagged all of the sudden, spit up. Upon palpation belly is soft, point of pain in medial abdomen above belly buttom is semi firm? Daughter in law states she has had a lymphoma flare up in abdomen before. Dr Mari Rosas office paged. (her PCP) 1830- Nemesio weaned down to 110. BPS good. Dobutamine off since this afternoon.   
 
1950- Report given to Jed Vega, [address] [facility]

## 2019-02-07 NOTE — PROGRESS NOTES
Care Management: 
 
Reason for Admission:   Admitted with acute encephalopathy. She was transferred to ICU with hypotension and is currently on pressors. She has a hx of CHF, asthma, HTN , fall in bathroom and cancer. She is active with her PCP. LUIS CARLOS Khan. RRAT Score:   28 Resources/supports as identified by patient/family:   Family is supportive and she also has aides in the home four hours a day four days a week. She is active with her PCP. Top Challenges facing patient (as identified by patient/family and CM): Finances/Medication cost?   Not a concern at this time. Transportation? Family and aides. Support system or lack thereof? She has family support and she also has aides through [employer] . Aides are private pay. Living arrangements? Lives alone in a single story home. She has a shower chair and a cane. Self-care/ADLs/Cognition? Independent. Current Advanced Directive/Advance Care Plan:   
                       
Plan for utilizing home health:    Anticipate HH needs secondary to CHF diagnosis. I spoke wit patient and she is agreeable to [address] if [place] is ordered. She would like [facility]. FOC placed on chart. Likelihood of readmission: moderate Transition of Care Plan:  Home with family assist, aides, follow up with MD and ? [place]. Care Management Interventions PCP Verified by CM: Yes 
Palliative Care Criteria Met (RRAT>21 & CHF Dx)?: Yes Mode of Transport at Discharge: Other (see comment)(family) Physical Therapy Consult: Yes Occupational Therapy Consult: Yes Current Support Network: Own Home(Lives alone in a single story home. She has a cane and a shower chair. She is independent in the home. She has caregivers to assist four days a week. ) Confirm Follow Up Transport: Family(Family and caregiverws assist with transportation needs. ) Plan discussed with Pt/Family/Caregiver: Yes(Met with patient at bedside.) [address] of Choice Offered: Yes Discharge Location Discharge Placement: (Anticipate HH needs and FOC on chart for [facility]. ) [facility]

## 2019-02-07 NOTE — PROGRESS NOTES
Chief Complaint: syncope Transferred to ICU after pressure dropped into the 23Z systolic. Now back to baseline conversing. No discomfort. Assesment and Plan 1. Hypotension Cardiology following 2. Metabolic encephalopathy Resolved Secondary to UTI and diarrhea Continue gentle hydration and electrolyte correction. CT shows atrophy as expected for age 
  
3. Urinary tract infection 
continue levoquin/flagyl Cultures pending 
  
4. Hyperlipidema 
continue atorvastatin 
  
5. hypothyroidism Continue syntroid TSH 1.96 Call for further questions Allergies Codeine Medications Current Facility-Administered Medications Medication Dose Route Frequency  metroNIDAZOLE (FLAGYL) tablet 500 mg  500 mg Oral Q12H  levoFLOXacin (LEVAQUIN) tablet 500 mg  500 mg Oral Q48H  
 DOBUTamine (DOBUTREX) 500 mg/250 mL (2,000 mcg/mL) infusion  2.5-5 mcg/kg/min IntraVENous TITRATE  mupirocin (BACTROBAN) 2 % ointment   Both Nostrils BID  sodium chloride 0.9 % bolus infusion 250 mL  250 mL IntraVENous ONCE  
 lactobac ac& pc-s.therm-b.anim (DAWN Q/RISAQUAD)  1 Cap Oral DAILY  dextrose 5% - 0.45% NaCl with KCl 20 mEq/L infusion  50 mL/hr IntraVENous CONTINUOUS  
 allopurinol (ZYLOPRIM) tablet 300 mg  300 mg Oral DAILY  apixaban (ELIQUIS) tablet 2.5 mg  2.5 mg Oral BID  atorvastatin (LIPITOR) tablet 10 mg  10 mg Oral QHS  carvedilol (COREG) tablet 3.125 mg  3.125 mg Oral BID WITH MEALS  fluticasone-vilanterol (BREO ELLIPTA) 100mcg-25mcg/puff  1 Puff Inhalation DAILY  levothyroxine (SYNTHROID) tablet 25 mcg  25 mcg Oral 6am  
 loratadine (CLARITIN) tablet 10 mg  10 mg Oral DAILY PRN  
 magnesium oxide (MAG-OX) tablet 400 mg  400 mg Oral DAILY  sacubitril-valsartan (ENTRESTO) 49-51 mg tablet 1 Tab  1 Tab Oral BID  albuterol-ipratropium (DUO-NEB) 2.5 MG-0.5 MG/3 ML  3 mL Nebulization Q6H PRN  
 ondansetron (ZOFRAN) injection 4 mg  4 mg IntraVENous Q6H PRN  
  acetaminophen (TYLENOL) tablet 650 mg  650 mg Oral Q4H PRN  
 traMADol (ULTRAM) tablet 50 mg  50 mg Oral Q6H PRN Medical History Past Medical History:  
Diagnosis Date  Aortic insufficiency  Asthma  Cancer ([facility] lymphoma  CKD (chronic kidney disease) stage 3, GFR 30-59 ml/min ([facility]) 1/10/2018  Congestive heart failure, NYHA class II, chronic, systolic ([facility])  Heart failure ([facility]  Hypertension  Mitral valvular regurgitation 1/22/2016 ECHO 2/3/17: LV dilated, EF 20-25%, mild LVH, RV mod dilated, mild- mod MELANIA, mod-severe MR, mod AI ECHO 7/29/17: EF 15%, severe DHK, reduced RVEF, RVH, RVSP 35 mmHg  Mild LAE, mod-marked MA fibrosis, mod-severe MR (2+), AO root dilated,    
 Nonrheumatic aortic valve insufficiency 10/16/2018  Presence of biventricular automatic cardioverter/defibrillator (AICD) 7/2/2015 [employer] biventricular AICD implant  Stomach ulcer Exam: 
 
Visit Vitals BP (!) 67/49 (BP 1 Location: Left arm, BP Patient Position: Sitting) Pulse 95 Temp 97.5 °F (36.4 °C) Resp 26 Ht 5' 1\" (1.549 m) Wt 141 lb (64 kg) SpO2 96% BMI 26.64 kg/m² General: layingin bed flat no distress Head: Normocephalic, atraumatic, anicteric sclera Neck Normal ROM,  no thyromegally Lungs:  Clear to auscultation bilaterally, No wheezes or rubs Cardiac: Regular rate and rhythm with no murmurs. Abd: Bowel sounds were audible. No tenderness on palpation Ext: No pedal edema Skin: Supple no rash NeurologicExam: 
Mental Status: Awake and alert Speech: Fluent no aphasia or dysarthria. Cranial Nerves:  CNII-XII intact Motor:  Full and symmetric strength of upper and lower proximal and distal muscles. Normal bulk and tone. Reflexes:   Deep tendon reflexes 2+/4 and symmetric. Sensory:   Symmetric and intact with no perceived deficits modalities involving small or large fibers. Tremor:   No tremor noted. Cerebellar:  Coordination intact. Neurovascular: No carotid bruits. No JVD Imaging CT Results (most recent): 
Results from [facility] Encounter encounter on 02/04/19 CT HEAD WO CONT Narrative EXAM: CT HEAD WO CONT INDICATION: Confusion/delirium, altered LOC, unexplained COMPARISON: None. CONTRAST: None. TECHNIQUE: Unenhanced CT of the head was performed using 5 mm images. Brain and 
bone windows were generated. CT dose reduction was achieved through use of a 
standardized protocol tailored for this examination and automatic exposure 
control for dose modulation. FINDINGS: 
There is moderate prominence of the ventricles and cortical sulci consistent 
with atrophy. . Bilateral diminished attenuation in the periventricular white 
matter is shown predominating in the frontal lobes, consistent with chronic 
small vessel ischemic disease of the white matter. . There is no intracranial 
hemorrhage, extra-axial collection, mass, mass effect or midline shift. The 
basilar cisterns are open. No acute infarct is identified. The bone windows 
demonstrate no abnormalities. The visualized portions of the paranasal sinuses 
and mastoid air cells are clear. Impression IMPRESSION: Atrophy and chronic small vessel ischemic disease the white matter. No acute findings. MRI Results (most recent): No results found for this or any previous visit. . 
Lab Review Recent Results (from the past 24 hour(s)) METABOLIC PANEL, BASIC Collection Time: 02/06/19  1:28 AM  
Result Value Ref Range Sodium 135 (L) 136 - 145 mmol/L Potassium 3.8 3.5 - 5.1 mmol/L Chloride 105 97 - 108 mmol/L  
 CO2 23 21 - 32 mmol/L Anion gap 7 5 - 15 mmol/L Glucose 122 (H) 65 - 100 mg/dL BUN 33 (H) 6 - 20 MG/DL Creatinine 1.44 (H) 0.55 - 1.02 MG/DL  
 BUN/Creatinine ratio 23 (H) 12 - 20 GFR est AA 41 (L) >60 ml/min/1.73m2 GFR est non-AA 34 (L) >60 ml/min/1.73m2 Calcium 7.6 (L) 8.5 - 10.1 MG/DL  
NT-PRO BNP Collection Time: 02/06/19  1:28 AM  
Result Value Ref Range NT pro-BNP 7,684 (H) 0 - 450 PG/ML Patient is in critical condition and is at risk for sudden deterioration. 30 minutes spent on floor examining patient and reviewing chart

## 2019-02-07 NOTE — PROGRESS NOTES
02/06/19 1952 Vital Signs Temp 97.4 °F (36.3 °C) Temp Source Oral  
Pulse (Heart Rate) 94 Heart Rate Source Monitor Cardiac Rhythm Paced Resp Rate 18  
O2 Sat (%) 94 % Level of Consciousness Alert  
BP (!) 68/51 MAP (Calculated) (!) 57 MEWS Score 4  
 
 
 02/06/19 1952 Vital Signs Temp 97.4 °F (36.3 °C) Temp Source Oral  
Pulse (Heart Rate) 94 Heart Rate Source Monitor Cardiac Rhythm Paced Resp Rate 18  
O2 Sat (%) 94 % Level of Consciousness Alert  
BP (!) 68/51 MAP (Calculated) (!) 57 MEWS Score 4 Transferring patient to CCU for dobutamine gtt

## 2019-02-07 NOTE — PROGRESS NOTES
Addendum: I spoke to the patient's nurse again. Blood pressure dropped further and the patient states she feels tired which is a change. Advised we need to a central line stat, add Nemesio-Synephrine, notify the hospitalist to please see the patient in person now, (RAT call if needed) and notify the intensivist to see if any other measures are indicated.

## 2019-02-07 NOTE — PROGRESS NOTES
I spoke with the nurses regarding the patient's persistently low blood pressure despite fluid boluses and constant fluid infusion. I spoke with Dr. Sreedhar Nelson who is on-call for Dr. Cj Barajas. At this point, it appears there is probably a combination of hypovolemia due to viral gastroenteritis as well as perhaps some developing sepsis due to E. coli UTI. There could also be a component of cardiogenic shock with ejection fraction 20%. Dr. [employer] and I discussed the that as her sats are stable and her chest x-ray is clear, we will continue to give up to 4 x 250 cc of normal saline boluses, start low-dose dobutamine at 2.5-5/h, get a central line placed. If persistent hypotension after all of the above, will need to consider Nemesio-Synephrine and further evaluation by the hospitalist in person. We will also ask the nurse to notify the intensivist on call of the patient's transfer to see if any other steps are indicated. Dr. [employer] did not feel strongly about repeating cultures at this time with normal white blood cell count and no fever. I advised the patient's nurse to call if any other concerns.

## 2019-02-07 NOTE — PROGRESS NOTES
Chart reviewed. Pt remains hypotensive despite increase in pressor support. Not appropriate for participation in PT intervention. Will defer this date however continue to follow. Thank you Maribel Pink, PT, DPT

## 2019-02-07 NOTE — PROGRESS NOTES
2/7/2019 INTENSIVIST PROGRESS NOTE:  
 
Patient seen and evaluated, chart reviewed 81 yo female with end stage CMP transferred to ccu with hypotension, cardiogenic shock Started on pressors No acute events overnight in CCU Now pt in CCU awake, alert, no severe distress ROS: no sob, no cp Visit Vitals BP (!) 76/55 Pulse 87 Temp 97.7 °F (36.5 °C) Resp 17 Ht 5' 1\" (1.549 m) Wt 64 kg (141 lb) SpO2 99% BMI 26.64 kg/m² General: no distress Eyes: anicteric HEENT: dry oral mucosa Neck: FROM 
CV: RRR Lungs: decreased BS Abd: soft : no flank pain Ext: no edema Skin: no rash Musculoskeletal: normal inspection Neuro: non focal 
 
CXR: edema, bibasilar atx Labs reviewed A/P: 
- acute respiratory failure: wean O2 
- cardiogenic shock: wean pressors 
- CHF: diuretics - ANDREW: worsening creatinine 
- UTI: iv abx 
- DNR 
- palliative care consult today, needs also hospice eval 
- Will assist on disposition planning when stable for transfer Caden Mccrary MD  
CC TIME 35 MINUTES

## 2019-02-07 NOTE — PROGRESS NOTES
General Daily Progress Note Admit Date: 2/4/2019 Subjective:  
 
Patient has no complaint. Current Facility-Administered Medications Medication Dose Route Frequency  metroNIDAZOLE (FLAGYL) tablet 500 mg  500 mg Oral Q12H  levoFLOXacin (LEVAQUIN) tablet 500 mg  500 mg Oral Q48H  
 DOBUTamine (DOBUTREX) 500 mg/250 mL (2,000 mcg/mL) infusion  2.5-5 mcg/kg/min IntraVENous TITRATE  mupirocin (BACTROBAN) 2 % ointment   Both Nostrils BID  PHENYLephrine (PF)(BERNICE-SYNEPHRINE) 30 mg in 0.9% sodium chloride 250 mL infusion   mcg/min IntraVENous TITRATE  sodium chloride 0.9 % bolus infusion 250 mL  250 mL IntraVENous PRN  
 lactobac ac& pc-s.therm-b.anim (DAWN Q/RISAQUAD)  1 Cap Oral DAILY  dextrose 5% - 0.45% NaCl with KCl 20 mEq/L infusion  75 mL/hr IntraVENous CONTINUOUS  
 allopurinol (ZYLOPRIM) tablet 300 mg  300 mg Oral DAILY  apixaban (ELIQUIS) tablet 2.5 mg  2.5 mg Oral BID  atorvastatin (LIPITOR) tablet 10 mg  10 mg Oral QHS  carvedilol (COREG) tablet 3.125 mg  3.125 mg Oral BID WITH MEALS  fluticasone-vilanterol (BREO ELLIPTA) 100mcg-25mcg/puff  1 Puff Inhalation DAILY  levothyroxine (SYNTHROID) tablet 25 mcg  25 mcg Oral 6am  
 loratadine (CLARITIN) tablet 10 mg  10 mg Oral DAILY PRN  
 magnesium oxide (MAG-OX) tablet 400 mg  400 mg Oral DAILY  sacubitril-valsartan (ENTRESTO) 49-51 mg tablet 1 Tab  1 Tab Oral BID  albuterol-ipratropium (DUO-NEB) 2.5 MG-0.5 MG/3 ML  3 mL Nebulization Q6H PRN  
 ondansetron (ZOFRAN) injection 4 mg  4 mg IntraVENous Q6H PRN  
 acetaminophen (TYLENOL) tablet 650 mg  650 mg Oral Q4H PRN  
 traMADol (ULTRAM) tablet 50 mg  50 mg Oral Q6H PRN Review of Systems A comprehensive review of systems was negative. Objective:  
 
Patient Vitals for the past 24 hrs: 
 BP Temp Pulse Resp SpO2  
02/07/19 0900 (!) 74/59      
02/07/19 0801 (!) 94/33  89 18 99 % 02/07/19 0800 (!) 82/64 97.7 °F (36.5 °C) 88 17 99 % 02/07/19 0700 (!) 85/63  80 15 97 % 02/07/19 0647 (!) 83/55  84 20 98 % 02/07/19 0645 (!) 86/44  84 24 97 % 02/07/19 0615 91/58  86 19 100 % 02/07/19 0600 (!) 93/22  90 25 96 % 02/07/19 0545 (!) 80/48  87 19 99 % 02/07/19 0530 92/58  86 20 98 % 02/07/19 0515 (!) 83/53  86 19 96 % 02/07/19 0500 93/63  88 23 99 % 02/07/19 0445   86 22 99 % 02/07/19 0430 (!) 88/62  88 20 97 % 02/07/19 0415 92/61  88 21 97 % 02/07/19 0400 (!) 88/56 97.8 °F (36.6 °C) 91 24 99 % 02/07/19 0345 (!) 93/25  87 17 99 % 02/07/19 0330   87 18 100 % 02/07/19 0315 (!) 71/48  86 21 99 % 02/07/19 0300 99/67  88 25 97 % 02/07/19 0245 (!) 86/37  87 22 98 % 02/07/19 0230 90/52  87 21 97 % 02/07/19 0215 (!) 85/59  90 21 97 % 02/07/19 0200 (!) 83/64  97 16 99 % 02/07/19 0130 (!) 88/53  90 20 97 % 02/07/19 0115 (!) 76/44  91 26 98 % 02/07/19 0100 (!) 74/49  91 22 98 % 02/07/19 0045 (!) 81/63  90 9 97 % 02/07/19 0030 (!) 83/55  90 20 99 % 02/07/19 0015 (!) 82/65  91 18 99 % 02/07/19 0000 (!) 80/54 98 °F (36.7 °C) 92 25 98 % 02/06/19 2345 90/62  90 20 99 % 02/06/19 2330 91/63  89 18 99 % 02/06/19 2315 98/57  91 20 100 % 02/06/19 2300   94 25 100 % 02/06/19 2245 (!) 84/52  85 25 100 % 02/06/19 2230 (!) 75/48  87 19 100 % 02/06/19 2215   92 25 100 % 02/06/19 2200 (!) 72/50  82 14 99 % 02/06/19 2145   90 22 100 % 02/06/19 2130 (!) 72/54  92 14 99 % 02/06/19 2115 (!) 59/45  93 26 99 % 02/06/19 2100 (!) 59/46  85 24 98 % 02/06/19 2045 (!) 68/45  92 23 99 % 02/06/19 2011 (!) 67/49 97.5 °F (36.4 °C) 95 26 96 % 02/06/19 1952 (!) 68/51 97.4 °F (36.3 °C) 94 18 94 % 02/06/19 1806 (!) 77/59  89    
02/06/19 1751 (!) 75/55      
02/06/19 1659 (!) 71/56      
02/06/19 1622 (!) 73/55 97.5 °F (36.4 °C) 85 18 97 % 02/06/19 1540 (!) 64/48    95 % 02/06/19 1536 (!) 59/46    98 % 02/06/19 1515 (!) 60/51  Heydi Menezes 02/06/19 1029 92/68 97.9 °F (36.6 °C) 88 18 96 % 02/07 0701 - 02/07 1900 In: 6369.6 [P.O.:120; I.V.:6249.6] Out: -  
02/05 1901 - 02/07 0700 In: 6612.2 [P.O.:160; I.V.:6452.2] Out: 200 [Urine:200] Physical Exam:  
Visit Vitals BP (!) 74/59 Pulse 89 Temp 97.7 °F (36.5 °C) Resp 18 Ht 5' 1\" (1.549 m) Wt 141 lb (64 kg) SpO2 99% BMI 26.64 kg/m² General appearance: alert, cooperative, no distress, appears stated age Neck: supple, symmetrical, trachea midline, no adenopathy, thyroid: not enlarged, symmetric, no tenderness/mass/nodules, no carotid bruit and no JVD Lungs: clear to auscultation bilaterally Heart: regular rate and rhythm, S1, S2 normal, no murmur, click, rub or gallop Extremities: extremities normal, atraumatic, no cyanosis or edema. Moderate swelling pain elicited to range of motion right knee Data Review Recent Results (from the past 24 hour(s)) METABOLIC PANEL, BASIC Collection Time: 02/07/19  3:50 AM  
Result Value Ref Range Sodium 136 136 - 145 mmol/L Potassium 3.7 3.5 - 5.1 mmol/L Chloride 106 97 - 108 mmol/L  
 CO2 21 21 - 32 mmol/L Anion gap 9 5 - 15 mmol/L Glucose 116 (H) 65 - 100 mg/dL BUN 40 (H) 6 - 20 MG/DL Creatinine 1.79 (H) 0.55 - 1.02 MG/DL  
 BUN/Creatinine ratio 22 (H) 12 - 20 GFR est AA 32 (L) >60 ml/min/1.73m2 GFR est non-AA 26 (L) >60 ml/min/1.73m2 Calcium 8.0 (L) 8.5 - 10.1 MG/DL  
NT-PRO BNP Collection Time: 02/07/19  3:50 AM  
Result Value Ref Range NT pro-BNP 3,739 (H) <450 PG/ML  
CBC W/O DIFF Collection Time: 02/07/19  3:50 AM  
Result Value Ref Range WBC 2.9 (L) 3.6 - 11.0 K/uL  
 RBC 3.52 (L) 3.80 - 5.20 M/uL  
 HGB 10.6 (L) 11.5 - 16.0 g/dL HCT 31.9 (L) 35.0 - 47.0 % MCV 90.6 80.0 - 99.0 FL  
 MCH 30.1 26.0 - 34.0 PG  
 MCHC 33.2 30.0 - 36.5 g/dL  
 RDW 17.0 (H) 11.5 - 14.5 % PLATELET 208 871 - 151 K/uL MPV 10.9 8.9 - 12.9 FL  
 NRBC 0.0 0  WBC ABSOLUTE NRBC 0.00 0.00 - 0.01 K/uL Assessment:  
 
Active Problems: 
  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 Hypotension persist she is complicated primarily because of vascular volume. Will increase IV rate along with inotrope and pressors. 2.  There may be an element of sepsis although patient is not toxic. We will however continue antibiotics for now. 3.  Continue to suspect viral gastroenteritis as the etiology of GI symptoms.

## 2019-02-07 NOTE — CONSULTS
Palliative Medicine Consult  Bhanu: [phone] ([phone])    Patient Name: Per Gonzalez  YOB: 1925    Date of Initial Consult: 2/7/19  Reason for Consult: End Stage Disease  Requesting Provider: Rossy Hahn   Primary Care Physician: Rohit Lozano MD     SUMMARY:   Per Gonzalez is a delightful 80 y.o. with a past history of  HTN, asthma, HF, cancer, CKD, who was admitted on 2/4/2019 from home with a diagnosis of acute encephalopathy. Current medical issues leading to Palliative Medicine involvement include: elderly, heart failure. Pt has been doing very well until she developed vomiting and diarrhea and became dehydrated. She is feeling much better today. Psychosocial: lives in own home alone with 4 hour/day 4 days/week caregiver who assists with driving, shopping. Walks 5-7 miles on her treadmill daily. Independent with ADLs. PALLIATIVE DIAGNOSES:   1. Nausea and vomiting  2. Diarrhea  3. Acute encephalopathy  4. Right knee pain  5. Dehydration   6. Hypotension  7. Poor appetite   8. Acute cystitis with hematuria  9. Acute on chronic renal failure  10. UTI  11. Chronic respiratory failure  12. Non-ischemic cardiomyopathy EF 20-25%  13. Care decisions   PLAN:   1. Prior to visit, I completed an extensive review of patient's medical records, including medical documentation, vital signs, MARs, and results of  various labs and other diagnostics. I also spoke with patient's nurse Didier Davila and pulmonologist/intensivist Dr. Juanita Paris. 2. Met with pt and JEAN-PAUL Carey: obtained history, discussed goals of care and code status. Pt has good insight into her overall medical condition,  wants full medical interventions as long as she can recover and return home. She does not want CPR attempted when her hear stops beating  and she stops breathing and was very appreciative that we completed a DDNR. She plans to donate her body to science.    3. Updated her AMD as her primary mPOA is not around now.  (see ACP note)  4. Initial consult note routed to primary continuity provider  5. Communicated plan of care with: Palliative IDT, RN, CCU Jacob THOMSON   GOALS OF CARE / TREATMENT PREFERENCES:     GOALS OF CARE:  Patient/Health Care Proxy Stated Goals: Prolong life  Continue current level of care. TREATMENT PREFERENCES:   Code Status: DNR    Advance Care Planning:  [x] The [facility] Interdisciplinary Team has updated the ACP Navigator with [address] and Patient Capacity    Primary Decision [facility] ([address]): Yannick Benavides  Relationship to patient:  son  Phone number: (c)  [phone], (h) [phone]  [x] Named in a scanned document   [] Legal Next of Kin  [] Guardian    Secondary Decision Maker ([address]): Hermes Montalvo   Relationship to patient: DIL  Phone number: (c) [phone],  (h) [phone]  [x] Named in a scanned document   [] Legal Next of Kin  [] Guardian    Medical Interventions: Full interventions   Other Instructions: donated her body to science. Other:    As far as possible, the palliative care team has discussed with patient / health care proxy about goals of care / treatment preferences for patient. HISTORY:     History obtained from: chart, patient    CHIEF COMPLAINT: nausea, vomiting, diarrhea, confusion, right knee pain    HPI/SUBJECTIVE:    The patient is:   [x] Verbal and participatory  [] Non-participatory due to:     2/4: BIBA with c/o new onset diarrhea x 1 day with associated nausea, vomiting, confusion and right knee pain. Caregiver reports pt is more confused than baseline, not following her routine and incoherent speech. Pt's son, who cared for pt the day before reports pt was naked and there was emesis and diarrhea around the bathroom. 2/5: admitted for routine progression of care. 2/6: hypotension that did not respond to IVF, dobutamine started. transfer to CCU.   Later in day, SBP continued to drop, CVL and Nemesio.  2/7: dobutamine d/duran yesterday, still on Nemesio. Has some right knee pain today; she fell in the bathroom at home and injured her knee, but it's getting better. No other complaints. Appetite is poor. Clinical Pain Assessment (nonverbal scale for severity on nonverbal patients):   Clinical Pain Assessment  Severity: 4     Activity (Movement): Lying quietly, normal position    Duration: for how long has pt been experiencing pain (e.g., 2 days, 1 month, years)  Frequency: how often pain is an issue (e.g., several times per day, once every few days, constant)     FUNCTIONAL ASSESSMENT:     Palliative Performance Scale (PPS):  PPS: 30       PSYCHOSOCIAL/SPIRITUAL SCREENING:     Palliative IDT has assessed this patient for cultural preferences / practices and a referral made as appropriate to needs (Cultural Services, Patient Advocacy, Ethics, etc.)    Any spiritual / Shinto concerns:  [] Yes /  [x] No    Caregiver Burnout:  [] Yes /  [x] No /  [] No Caregiver Present      Anticipatory grief assessment:   [x] Normal  / [] Maladaptive       ESAS Anxiety: Anxiety: 0    ESAS Depression: Depression: 0        REVIEW OF SYSTEMS:     Positive and pertinent negative findings in ROS are noted above in HPI. The following systems were [x] reviewed / [] unable to be reviewed as noted in HPI  Other findings are noted below. Systems: constitutional, ears/nose/mouth/throat, respiratory, gastrointestinal, genitourinary, musculoskeletal, integumentary, neurologic, psychiatric, endocrine. Positive findings noted below.   Modified ESAS Completed by: provider   Fatigue: 3 Drowsiness: 0   Depression: 0 Pain: 4   Anxiety: 0 Nausea: 0   Anorexia: 8 Dyspnea: 0     Constipation: No     Stool Occurrence(s): 1        PHYSICAL EXAM:     From RN flowsheet:  Wt Readings from Last 3 Encounters:   02/06/19 141 lb (64 kg)   11/30/18 130 lb 8 oz (59.2 kg)   10/16/18 127 lb 14.4 oz (58 kg)     Blood pressure 97/70, pulse 80, temperature 98.1 °F (36.7 °C), resp. rate 21, height 5' 1\" (1.549 m), weight 141 lb (64 kg), SpO2 97 %.     Pain Scale 1: Numeric (0 - 10)  Pain Intensity 1: 0  Pain Onset 1: acute  Pain Location 1: Hand  Pain Orientation 1: Right  Pain Description 1: Aching  Pain Intervention(s) 1: Medication (see MAR)  Last bowel movement, if known:     Constitutional: appears younger than stated age, AAOx4  Eyes: pupils equal, anicteric  ENMT: no nasal discharge, moist mucous membranes  Cardiovascular: regular rhythm, distal pulses intact  Respiratory: breathing not labored, symmetric  Gastrointestinal: soft non-tender, +bowel sounds  Musculoskeletal: no deformity, right knee TTP  Skin: warm, dry  Neurologic: following commands, moving all extremities  Psychiatric: full affect, no hallucinations, excellent insight       HISTORY:     Active Problems:    Cardiomyopathy, dilated, 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 (8/4/2012)      Presence of biventricular automatic cardioverter/defibrillator (AICD) (7/2/2015)      Overview: [employer] biventricular AICD implant      CKD (chronic kidney disease) stage 3, GFR 30-59 ml/min ([facility]) (1/10/2018)      Acute encephalopathy (2/4/2019)      UTI (urinary tract infection) (2/4/2019)      Diarrhea (2/4/2019)      Past Medical History:   Diagnosis Date    Aortic insufficiency     Asthma     Cancer ([facility])     lymphoma    CKD (chronic kidney disease) stage 3, GFR 30-59 ml/min ([facility]) 1/10/2018    Congestive heart failure, NYHA class II, chronic, systolic ([facility])     Heart failure ([facility])     Hypertension     Mitral valvular regurgitation 1/22/2016    ECHO 2/3/17: LV dilated, EF 20-25%, mild LVH, RV mod dilated, mild- mod MELANIA, mod-severe MR, mod AI ECHO 7/29/17: EF 15%, severe DHK, reduced RVEF, RVH, RVSP 35 mmHg  Mild LAE, mod-marked MA fibrosis, mod-severe MR (2+), AO root dilated,      Nonrheumatic aortic valve insufficiency 10/16/2018    Presence of biventricular automatic cardioverter/defibrillator (AICD) 7/2/2015    [employer] biventricular AICD implant    Stomach ulcer       Past Surgical History:   Procedure Laterality Date    HX BREAST BIOPSY Bilateral     40 years ago    HX COLONOSCOPY      HX HEENT      cataracts removed    HX HYSTERECTOMY      HX OTHER SURGICAL      lymph node surgery    HX TONSILLECTOMY      PA EGD TRANSORAL BIOPSY SINGLE/MULTIPLE  5/23/2012         SINUS SURGERY PROC UNLISTED      Nasal polyps removed      Family History   Problem Relation Age of Onset    Heart Disease Mother     Stroke Father       History reviewed, no pertinent family history.   Social History     Tobacco Use    Smoking status: Never Smoker    Smokeless tobacco: Never Used   Substance Use Topics    Alcohol use: No     Alcohol/week: 0.0 oz     Allergies   Allergen Reactions    Codeine Other (comments)     \"made me disoriented\" per pt      Current Facility-Administered Medications   Medication Dose Route Frequency    metroNIDAZOLE (FLAGYL) tablet 500 mg  500 mg Oral Q12H    levoFLOXacin (LEVAQUIN) tablet 500 mg  500 mg Oral Q48H    mupirocin (BACTROBAN) 2 % ointment   Both Nostrils BID    PHENYLephrine (PF)(BERNICE-SYNEPHRINE) 30 mg in 0.9% sodium chloride 250 mL infusion   mcg/min IntraVENous TITRATE    sodium chloride 0.9 % bolus infusion 250 mL  250 mL IntraVENous PRN    lactobac ac& pc-s.therm-b.anim (DAWN Q/RISAQUAD)  1 Cap Oral DAILY    dextrose 5% - 0.45% NaCl with KCl 20 mEq/L infusion  75 mL/hr IntraVENous CONTINUOUS    allopurinol (ZYLOPRIM) tablet 300 mg  300 mg Oral DAILY    apixaban (ELIQUIS) tablet 2.5 mg  2.5 mg Oral BID    atorvastatin (LIPITOR) tablet 10 mg  10 mg Oral QHS    carvedilol (COREG) tablet 3.125 mg  3.125 mg Oral BID WITH MEALS    fluticasone-vilanterol (BREO ELLIPTA) 100mcg-25mcg/puff  1 Puff Inhalation DAILY    levothyroxine (SYNTHROID) tablet 25 mcg  25 mcg Oral 6am    loratadine (CLARITIN) tablet 10 mg  10 mg Oral DAILY PRN    magnesium oxide (MAG-OX) tablet 400 mg  400 mg Oral DAILY    sacubitril-valsartan (ENTRESTO) 49-51 mg tablet 1 Tab  1 Tab Oral BID    albuterol-ipratropium (DUO-NEB) 2.5 MG-0.5 MG/3 ML  3 mL Nebulization Q6H PRN    ondansetron (ZOFRAN) injection 4 mg  4 mg IntraVENous Q6H PRN    acetaminophen (TYLENOL) tablet 650 mg  650 mg Oral Q4H PRN    traMADol (ULTRAM) tablet 50 mg  50 mg Oral Q6H PRN          LAB AND IMAGING FINDINGS:     Lab Results   Component Value Date/Time    WBC 2.9 (L) 02/07/2019 03:50 AM    HGB 10.6 (L) 02/07/2019 03:50 AM    PLATELET 371 65/97/1888 03:50 AM     Lab Results   Component Value Date/Time    Sodium 136 02/07/2019 03:50 AM    Potassium 3.7 02/07/2019 03:50 AM    Chloride 106 02/07/2019 03:50 AM    CO2 21 02/07/2019 03:50 AM    BUN 40 (H) 02/07/2019 03:50 AM    Creatinine 1.79 (H) 02/07/2019 03:50 AM    Calcium 8.0 (L) 02/07/2019 03:50 AM    Magnesium 3.0 (H) 02/05/2019 04:05 AM    Phosphorus 2.2 (L) 02/03/2017 01:51 AM      Lab Results   Component Value Date/Time    AST (SGOT) 24 02/05/2019 04:05 AM    Alk. phosphatase 94 02/05/2019 04:05 AM    Protein, total 5.6 (L) 02/05/2019 04:05 AM    Albumin 2.1 (L) 02/05/2019 04:05 AM    Globulin 3.5 02/05/2019 04:05 AM     Lab Results   Component Value Date/Time    INR 1.2 (H) 05/22/2012 10:54 PM    Prothrombin time 12.2 (H) 05/22/2012 10:54 PM    aPTT 26.0 05/22/2012 10:54 PM      Lab Results   Component Value Date/Time    Ferritin 714 (H) 11/09/2014 05:31 AM      No results found for: PH, PCO2, PO2  No components found for: [place]   Lab Results   Component Value Date/Time    CK 60 02/01/2017 07:35 PM    CK - MB <1.0 02/01/2017 07:35 PM                Total time: 85  Counseling / coordination time, spent as noted above: 75  > 50% counseling / coordination?: y  Prolonged service was provided for  []30 min   []75 min in face to face time in the presence of the patient, spent as noted above.   Time Start:   Time End:   Note: this can only be billed with 52138 (initial) or 84009 (follow up). If multiple start / stop times, list each separately.

## 2019-02-07 NOTE — PROGRESS NOTES
1900 Bedside and Verbal shift change report given to Abe Velasquez RN (oncoming nurse) by Liz Nguyen RN (offgoing nurse). Report included the following information SBAR, Kardex, ED Summary, OR Summary, Intake/Output, MAR, Recent Results and Cardiac Rhythm Vpaced with PVCs. 1928 PRN Dilaudid 0.5mg given for pain 7/10 in abdomen 
 
2000 Shift assessment complete - see flowsheet for details; patient A/Ox4; lung sounds clear; upper abdomen rigid with some pain, otherwise semi-soft with active bowel sounds; some swelling present in lower extremities, yasir R knee d/t previous fall; nemesio at 110mcg 2046 Nemesio titrated down to 100mcg 
 
0000 Reassessment - no changes noted to previous assessment; patient has no complaints of pain at this time & has tolerated the dilaudid well; no confusion noted when patient awoke; placed pillow behind her back for support 0131 Nemesio titrated down to 95mcg 
 
0306 Nemesio titrated down to 90mcg  
 
0350 Labs drawn & sent  
 
0400 Reassessment - no changes noted to previous assessment 0427 Nemesio titrated down to 85mcg 0514 Nemesio titrated down to 75mcg 
 
0550 Patient bathed; linens/gown/electrodes changed 5627 Nemesio titrated down to 65mcg 
 
0700 Bedside and Verbal shift change report given to Crystal Hester RN (oncoming nurse) by Abe Velasquez RN (offgoing nurse). Report included the following information SBAR, Kardex, ED Summary, Procedure Summary, Intake/Output, MAR, Recent Results and Cardiac Rhythm Paced with PVCs.

## 2019-02-07 NOTE — ACP (ADVANCE CARE PLANNING)
TREATMENT PREFERENCES:   Code Status: DNR     Advance Care Planning:  [x] [facility] Interdisciplinary Team has updated the ACP Navigator with [address] and Patient Capacity     Primary Decision Maker ([address]): Harvey Mcgrath  Relationship to patient:  son  Phone number: (c)  [phone], (h) [phone]  [x] Named in a scanned document   [] Legal Next of Kin  [] Guardian     Secondary Decision Maker ([address]): Rachele Clement   Relationship to patient: DIL  Phone number: (c) [phone],  (h) [phone]  [x] Named in a scanned document   [] Legal Next of Kin  [] Guardian     Medical Interventions: Full interventions   Other Instructions: donated her body to science.          Other:     As far as possible, the palliative care team has discussed with patient / health care proxy about goals of care / treatment preferences for patient.

## 2019-02-07 NOTE — PROGRESS NOTES
Cardiac Electrophysiology Progress Note [address], [address] [facility] [address], [address] [facility]  [phone] 
 
2/7/2019 8:39 AM 
 
Admit Date: 2/4/2019 Admit Diagnosis: Acute encephalopathy [G93.40] Subjective:  
 
Stevie Delong   denies chest pain, chest pressure/discomfort, dyspnea, paroxysmal nocturnal dyspnea. Pt reports feeling better today. Not confused. Looking forward to breakfast.  
 
Visit Vitals BP (!) 94/33 Pulse 89 Temp 97.7 °F (36.5 °C) Resp 18 Ht 5' 1\" (1.549 m) Wt 141 lb (64 kg) SpO2 99% BMI 26.64 kg/m² Current Facility-Administered Medications Medication Dose Route Frequency  metroNIDAZOLE (FLAGYL) tablet 500 mg  500 mg Oral Q12H  levoFLOXacin (LEVAQUIN) tablet 500 mg  500 mg Oral Q48H  
 DOBUTamine (DOBUTREX) 500 mg/250 mL (2,000 mcg/mL) infusion  2.5-5 mcg/kg/min IntraVENous TITRATE  mupirocin (BACTROBAN) 2 % ointment   Both Nostrils BID  PHENYLephrine (PF)(BERNICE-SYNEPHRINE) 30 mg in 0.9% sodium chloride 250 mL infusion   mcg/min IntraVENous TITRATE  sodium chloride 0.9 % bolus infusion 250 mL  250 mL IntraVENous PRN  
 lactobac ac& pc-s.therm-b.anim (DAWN Q/RISAQUAD)  1 Cap Oral DAILY  dextrose 5% - 0.45% NaCl with KCl 20 mEq/L infusion  75 mL/hr IntraVENous CONTINUOUS  
 allopurinol (ZYLOPRIM) tablet 300 mg  300 mg Oral DAILY  apixaban (ELIQUIS) tablet 2.5 mg  2.5 mg Oral BID  atorvastatin (LIPITOR) tablet 10 mg  10 mg Oral QHS  carvedilol (COREG) tablet 3.125 mg  3.125 mg Oral BID WITH MEALS  fluticasone-vilanterol (BREO ELLIPTA) 100mcg-25mcg/puff  1 Puff Inhalation DAILY  levothyroxine (SYNTHROID) tablet 25 mcg  25 mcg Oral 6am  
 loratadine (CLARITIN) tablet 10 mg  10 mg Oral DAILY PRN  
 magnesium oxide (MAG-OX) tablet 400 mg  400 mg Oral DAILY  sacubitril-valsartan (ENTRESTO) 49-51 mg tablet 1 Tab  1 Tab Oral BID  albuterol-ipratropium (DUO-NEB) 2.5 MG-0.5 MG/3 ML  3 mL Nebulization Q6H PRN  
  ondansetron (ZOFRAN) injection 4 mg  4 mg IntraVENous Q6H PRN  
 acetaminophen (TYLENOL) tablet 650 mg  650 mg Oral Q4H PRN  
 traMADol (ULTRAM) tablet 50 mg  50 mg Oral Q6H PRN Objective:  
  
Visit Vitals BP (!) 94/33 Pulse 89 Temp 97.7 °F (36.5 °C) Resp 18 Ht 5' 1\" (1.549 m) Wt 141 lb (64 kg) SpO2 99% BMI 26.64 kg/m² Physical Exam: Abdomen: soft, non-tender Extremities: extremities normal 
Heart: regular rate and rhythm Lungs: clear to auscultation bilaterally Pulses: 2+ and symmetric Data Review:  
Labs:   
Recent Labs 02/07/19 
6753 02/05/19 
5508 02/05/19 
0405 WBC 2.9* 2.2* 2.0*  
HGB 10.6* 8.5* 8.5* HCT 31.9* 25.7* 25.9*  
 146* 135* Recent Labs 02/07/19 
0350 02/06/19 
0128 02/05/19 
0405 02/04/19 
1053  135* 138 141  
K 3.7 3.8 3.5 3.8  105 106 106 CO2 21 23 23 28 * 122* 86 113* BUN 40* 33* 27* 26* CREA 1.79* 1.44* 1.13* 1.37* CA 8.0* 7.6* 7.8* 8.3*  
MG  --   --  3.0*  --   
ALB  --   --  2.1* 2.7* TBILI  --   --  0.5 0.8 SGOT  --   --  24 34 ALT  --   --  17 22 Recent Labs 02/05/19 
3633 02/05/19 
0116 02/04/19 
1710 TROIQ 0.07* 0.12* 0.20* Intake/Output Summary (Last 24 hours) at 2/7/2019 1972 Last data filed at 2/7/2019 0600 Gross per 24 hour Intake 6612.17 ml Output 0 ml Net 6612.17 ml Telemetry: V paced 78 Assessment:  
 
Active Problems: 
  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  
 
Radha Palomino is a pleasant 80year old female with hx of NICM, EF 20-25%, S/p Biv ICD, HTN,. AF and CKD who presented with n/v/d with dyspnea and weakness, subsequent hypotension probably a combination of hypovolemia due to viral gastroenteritis as well as perhaps some developing sepsis due to E. coli UTI. She did not respond to saline bolus yesterday, central line placed, moved to CCU on dobutamine and ronn. Bps stable, asymptomatic. BNP is trending down, WBC stable. Continue to follow. IRAIDA Garrison Patient seen and examined by me with nurse practitioner. I personally performed all components of the history, physical, and medical decision making and agree with the assessment and plan with minor modifications as noted. On pressors for hypotension likely secondary to dehydration/hypovolemia. Cont supportive care Elsa Kwon MD, [facility] 
 
 
 
2/7/2019 
8:39 AM

## 2019-02-07 NOTE — PROCEDURES
Central Line Placement    Start time: 2/6/2019 10:00 PM  End time: 2/6/2019 10:18 PM  Performed by: Amelie Callahan MD  Authorized by: Amelie Callahan MD     Indications: need for vasopressors and vascular access  Preanesthetic Checklist: patient identified, risks and benefits discussed, anesthesia consent, site marked, patient being monitored and timeout performed      Pre-procedure: All elements of maximal sterile barrier technique followed? Yes    2% Chlorhexidine for cutaneous antisepsis and Hand hygiene performed prior to catheter insertion              Procedure:   Prep:  ChloraPrep  Location:  Internal jugular  Orientation:  Right  Patient position:  Trendelenburg  Catheter type:  Quad lumen  Catheter size:  8.5 Fr  Catheter length:  16 cm  Number of attempts:  3  Successful placement: Yes      Assessment:   Post-procedure:  Catheter secured and sterile dressing applied  Assessment:  Blood return through all ports  Insertion:  Uncomplicated  Patient tolerance:  Patient tolerated the procedure well with no immediate complications  CXR pending.     Care turned over to covering Attending MD.

## 2019-02-07 NOTE — PROGRESS NOTES
Chart reviewed in an attempt to see patient for OT treatment. Pt remains hypotensive despite increase in pressor support. Not appropriate for participation in OT intervention. Will defer this date however continue to follow. Thank you

## 2019-02-07 NOTE — PROGRESS NOTES
Problem: Falls - Risk of 
Goal: *Absence of Falls Document Meliton Castillo Fall Risk and appropriate interventions in the flowsheet. Outcome: Progressing Towards Goal 
Fall Risk Interventions: 
Mobility Interventions: Bed/chair exit alarm, Patient to call before getting OOB, Strengthening exercises (ROM-active/passive), Utilize walker, cane, or other assistive device Medication Interventions: Bed/chair exit alarm, Evaluate medications/consider consulting pharmacy, Teach patient to arise slowly Elimination Interventions: Bed/chair exit alarm, Call light in reach, Toilet paper/wipes in reach, Toileting schedule/hourly rounds History of Falls Interventions: Bed/chair exit alarm, Door open when patient unattended, Room close to nurse's station, Evaluate medications/consider consulting pharmacy Problem: Pressure Injury - Risk of 
Goal: *Prevention of pressure injury Document Kimani Scale and appropriate interventions in the flowsheet. Outcome: Progressing Towards Goal 
Pressure Injury Interventions: 
  
 
Moisture Interventions: Absorbent underpads, Apply protective barrier, creams and emollients, Check for incontinence Q2 hours and as needed, Internal/External urinary devices, Maintain skin hydration (lotion/cream), Minimize layers, Moisture barrier Activity Interventions: Increase time out of bed, Pressure redistribution bed/mattress(bed type), PT/OT evaluation Mobility Interventions: Float heels, HOB 30 degrees or less, Pressure redistribution bed/mattress (bed type), Turn and reposition approx. every two hours(pillow and wedges) Nutrition Interventions: Document food/fluid/supplement intake Friction and Shear Interventions: Apply protective barrier, creams and emollients, HOB 30 degrees or less, Lift sheet, Minimize layers, Transferring/repositioning devices

## 2019-02-07 NOTE — PROGRESS NOTES
1950 TRANSFER - IN REPORT: 
 
Verbal report received from MAXIMUS Elizabeth(name) on Val Singh  being received from [facility](unit) for change in patient condition(low BP) Report consisted of patients Situation, Background, Assessment and  
Recommendations(SBAR). Information from the following report(s) SBAR, Kardex, ED Summary, Intake/Output, MAR, Recent Results and Cardiac Rhythm Paced was reviewed with the receiving nurse. Opportunity for questions and clarification was provided. Assessment completed upon patients arrival to unit and care assumed. 2011 Patient arrived to unit; admission assessment completed; dual skin assessment completed with Adrian Birmingham RN  
 
2031 Dobutamine started at 2.5mcg 2045 Spoke with Dr. Aleida Guillen about patient condition, starting dobutamine, & need for central line - orders received for 250mL bolus & to contact Dr. Harpreet cardenas to notify them of patient transfer, current status, & to call Dr. Aleida Guillen on his personal cellphone to discuss interventions/plan 
 
2058 Spoke to Dr. Mickie Rai with Dr. Harpreet cardenas to notify him of patient's current condition & to contact Dr. Aleida Guillen to discuss patient's plan of care 2108 250mL bolus given; dobutamine titrated up to 5mcg d/t BP 50s-60s/40s 2133 Received callback from Dr. Aleida Guillen - orders received to start neosynephrine at 50mcg with titrating parameters up to 200mcg; give another 250mL bolus (up to 4 as needed); get CXR to examine lungs with fluid boluses & hx of CHF; & central line placement 2142 2nd 250mL bolus given 2155 Neosynephrine started at 50mcg 2215 Nemesio titrated up to 60mcg 
 
2231 Nemesio titrated up to 70mcg 2232 PRN Tramadol 50mg given for pain 7/10 in neck 2315 Patient c/o \"feeling suspicious\" and she does not want to be here on the unit anymore and wants to go back to the room she came to us from; she is A/O x4 & answering questions appropriately; called her daughter-in-law Marine Ahumada) who says the patient does not normally experience confusion or behavior like this but she is going to come see the patient to see if she can talk to her 
 
46 Dr. Ravin Moffett at bedside to speak with patient; says she is not refusing care but still wants to go back to her previous room & he notified her that she is not able to do so at this time; curtain pulled & door closed some to help give patient more privacy 0000 Reassessment - no changes noted to previous assessment 0010 Nemesio titrated up to 95mcg 0030 Patient's daughter-in-law at bedside; patient is less anxious/suspicious at this time, more cooperative, & apologetic for her previous behavior 0032 Nemesio titrated up to 100mcg 
 
0054 Nemesio titrated up to 110mcg 
 
0105 Nemesio titrated up to 120mcg   
 
0210 Nemesio titrated up to 130mcg - SBP <85 
 
0400 Reassessment - no changes noted to previous assessment  
 
0550 Patient cleaned up of 1 medium, loose BM 
 
0700 Bedside and Verbal shift change report given to Zenia Foreman RN (oncoming nurse) by MAXIMUS Castillo (offgoing nurse). Report included the following information SBAR, Kardex, ED Summary, Procedure Summary, Intake/Output, MAR, Recent Results and Cardiac Rhythm Vpaced with PVCs.

## 2019-02-08 LAB
ANION GAP SERPL CALC-SCNC: 8 MMOL/L (ref 5–15)
BACTERIA SPEC CULT: ABNORMAL
BASOPHILS # BLD: 0 K/UL (ref 0–0.1)
BASOPHILS NFR BLD: 0 % (ref 0–1)
BLASTS NFR BLD MANUAL: 0 %
BNP SERPL-MCNC: ABNORMAL PG/ML
BUN SERPL-MCNC: 34 MG/DL (ref 6–20)
BUN/CREAT SERPL: 24 (ref 12–20)
C JEJUNI+C COLI AG STL QL: NEGATIVE
CALCIUM SERPL-MCNC: 7.7 MG/DL (ref 8.5–10.1)
CHLORIDE SERPL-SCNC: 108 MMOL/L (ref 97–108)
CO2 SERPL-SCNC: 21 MMOL/L (ref 21–32)
CREAT SERPL-MCNC: 1.42 MG/DL (ref 0.55–1.02)
DIFFERENTIAL METHOD BLD: ABNORMAL
E COLI SXT1+2 STL IA: NO GROWTH
EOSINOPHIL # BLD: 0 K/UL (ref 0–0.4)
EOSINOPHIL NFR BLD: 1 % (ref 0–7)
ERYTHROCYTE [DISTWIDTH] IN BLOOD BY AUTOMATED COUNT: 17 % (ref 11.5–14.5)
GLUCOSE SERPL-MCNC: 110 MG/DL (ref 65–100)
HCT VFR BLD AUTO: 30.6 % (ref 35–47)
HGB BLD-MCNC: 10.2 G/DL (ref 11.5–16)
IMM GRANULOCYTES # BLD AUTO: 0 K/UL
IMM GRANULOCYTES NFR BLD AUTO: 0 %
LYMPHOCYTES # BLD: 0.7 K/UL (ref 0.8–3.5)
LYMPHOCYTES NFR BLD: 26 % (ref 12–49)
MCH RBC QN AUTO: 30.4 PG (ref 26–34)
MCHC RBC AUTO-ENTMCNC: 33.3 G/DL (ref 30–36.5)
MCV RBC AUTO: 91.3 FL (ref 80–99)
METAMYELOCYTES NFR BLD MANUAL: 0 %
MONOCYTES # BLD: 0.1 K/UL (ref 0–1)
MONOCYTES NFR BLD: 3 % (ref 5–13)
MYELOCYTES NFR BLD MANUAL: 0 %
NEUTS BAND NFR BLD MANUAL: 4 % (ref 0–6)
NEUTS SEG # BLD: 2 K/UL (ref 1.8–8)
NEUTS SEG NFR BLD: 66 % (ref 32–75)
NRBC # BLD: 0 K/UL (ref 0–0.01)
NRBC BLD-RTO: 0 PER 100 WBC
OTHER CELLS NFR BLD MANUAL: 0 %
PLATELET # BLD AUTO: 171 K/UL (ref 150–400)
PMV BLD AUTO: 10.1 FL (ref 8.9–12.9)
POTASSIUM SERPL-SCNC: 3.9 MMOL/L (ref 3.5–5.1)
PROMYELOCYTES NFR BLD MANUAL: 0 %
RBC # BLD AUTO: 3.35 M/UL (ref 3.8–5.2)
RBC MORPH BLD: ABNORMAL
SERVICE CMNT-IMP: ABNORMAL
SODIUM SERPL-SCNC: 137 MMOL/L (ref 136–145)
WBC # BLD AUTO: 2.8 K/UL (ref 3.6–11)

## 2019-02-08 PROCEDURE — 74011250636 HC RX REV CODE- 250/636: Performed by: INTERNAL MEDICINE

## 2019-02-08 PROCEDURE — 74011250637 HC RX REV CODE- 250/637: Performed by: INTERNAL MEDICINE

## 2019-02-08 PROCEDURE — 36415 COLL VENOUS BLD VENIPUNCTURE: CPT

## 2019-02-08 PROCEDURE — 65610000006 HC RM INTENSIVE CARE

## 2019-02-08 PROCEDURE — 80048 BASIC METABOLIC PNL TOTAL CA: CPT

## 2019-02-08 PROCEDURE — 83880 ASSAY OF NATRIURETIC PEPTIDE: CPT

## 2019-02-08 PROCEDURE — 85027 COMPLETE CBC AUTOMATED: CPT

## 2019-02-08 RX ADMIN — APIXABAN 2.5 MG: 2.5 TABLET, FILM COATED ORAL at 08:54

## 2019-02-08 RX ADMIN — TRAMADOL HYDROCHLORIDE 50 MG: 50 TABLET, FILM COATED ORAL at 21:00

## 2019-02-08 RX ADMIN — FLUTICASONE FUROATE AND VILANTEROL TRIFENATATE 1 PUFF: 100; 25 POWDER RESPIRATORY (INHALATION) at 08:50

## 2019-02-08 RX ADMIN — MUPIROCIN: 20 OINTMENT TOPICAL at 08:50

## 2019-02-08 RX ADMIN — LEVOTHYROXINE SODIUM 25 MCG: 25 TABLET ORAL at 05:48

## 2019-02-08 RX ADMIN — CARVEDILOL 3.12 MG: 3.12 TABLET, FILM COATED ORAL at 08:49

## 2019-02-08 RX ADMIN — APIXABAN 2.5 MG: 2.5 TABLET, FILM COATED ORAL at 18:00

## 2019-02-08 RX ADMIN — MUPIROCIN: 20 OINTMENT TOPICAL at 19:47

## 2019-02-08 RX ADMIN — METRONIDAZOLE 500 MG: 250 TABLET ORAL at 08:49

## 2019-02-08 RX ADMIN — Medication 400 MG: at 08:49

## 2019-02-08 RX ADMIN — LEVOFLOXACIN 500 MG: 500 TABLET, FILM COATED ORAL at 19:47

## 2019-02-08 RX ADMIN — Medication 65 MCG/MIN: at 08:09

## 2019-02-08 RX ADMIN — Medication 100 MCG/MIN: at 01:30

## 2019-02-08 RX ADMIN — Medication 100 MCG/MIN: at 22:51

## 2019-02-08 RX ADMIN — ATORVASTATIN CALCIUM 10 MG: 10 TABLET, FILM COATED ORAL at 21:00

## 2019-02-08 RX ADMIN — ALLOPURINOL 300 MG: 300 TABLET ORAL at 08:49

## 2019-02-08 RX ADMIN — Medication 110 MCG/MIN: at 17:40

## 2019-02-08 RX ADMIN — CARVEDILOL 3.12 MG: 3.12 TABLET, FILM COATED ORAL at 16:42

## 2019-02-08 RX ADMIN — Medication 130 MCG/MIN: at 13:56

## 2019-02-08 RX ADMIN — DEXTROSE MONOHYDRATE, SODIUM CHLORIDE, AND POTASSIUM CHLORIDE 75 ML/HR: 50; 4.5; 1.49 INJECTION, SOLUTION INTRAVENOUS at 00:18

## 2019-02-08 RX ADMIN — Medication 1 CAPSULE: at 08:49

## 2019-02-08 RX ADMIN — SACUBITRIL AND VALSARTAN 1 TABLET: 49; 51 TABLET, FILM COATED ORAL at 08:49

## 2019-02-08 NOTE — PROGRESS NOTES
0700 Bedside and Verbal shift change report given to Sharon Marshall (oncoming nurse) by Aleja Pena (offgoing nurse). Report included the following information SBAR, Kardex, ED Summary, Intake/Output, MAR, Recent Results and Cardiac Rhythm paced. 0730 Dr Raydell Duane in to assess pt, able to update daughter in law at bedside 
0800 pt assessed per flowsheet. Pt awake, denies needs, breakfast ordered. 1000 after eating breakfast and adm am meds, BP decreased. Adjusting Nemesio per order, pt pramod well. IVF d/c'd per order and increasing PBNP 
1200 pt reassessed per flowsheet. Pt drowsy, she has been up since early am and on the phone frequently. Encouraged a nap. BP remains towards lower end, pt asymptomatic.  
1300 Dr Sethi Reason in to assess pt, will hold off on Imbruvica for now and reassess on Monday. Lucy Mackayt is stored in pt specific bin 1500 several friends and family at bedside, pt awake and ineracting well. 
1600 pt reassessed per flowsheet, denies needs. Plan to spread out heart meds in attempt to not drop BP this pm. Reviewed plan of care with pt and family at bedside, good understanding and agreement voiced 1730 pt pramod dinner well, denies needs 1930 Bedside and Verbal shift change report given to Bertha Cohen (oncoming nurse) by Sharon Gallagher (offgoing nurse). Report included the following information SBAR, Kardex, ED Summary, Procedure Summary, Intake/Output, MAR, Recent Results and Cardiac Rhythm avpaced. Entresto held per order

## 2019-02-08 NOTE — CONSULTS
[address]    Name:  Roney Beebe  MR#:  [id number]  :  1925  ACCOUNT #:  [de-identified]  DATE OF SERVICE:  2019    REFERRING PHYSICIAN:  Dr. Martin Reid. REASON FOR CONSULTATION:  History of CLL, on ibrutinib. HISTORY OF PRESENT ILLNESS:  The patient is a 63-year-old -American female,  who is currently in the unit. She has cardiogenic shock, being treated for a UTI. She is on pressors. I discussed the case with Dr. Brendan Jimenez. The patient has been on  ibrutinib quite sometime. Back in December, Dr. Vicki Clement decreased the dose because of  her counts. She is now in the hospital.  Her ibrutinib has been held and we were  asked to see her for further evaluation. Other than being weak this afternoon, she  really has no other complaints. PAST MEDICAL HISTORY:  Significant for CLL, chronic kidney disease, heart failure,  hypertension, stomach ulcer. SOCIAL HISTORY:  She does not smoke, she does not drink. FAMILY HISTORY:  Noncontributory. ALLERGIES:  TO CODEINE. CURRENT MEDICATIONS:  She is getting here in the hospital;  1..Allopurinol. 2.  Eliquis. 3.  Atorvastatin. 4.  Coreg. 5.  Brilinta. 6.  Levaquin. 7.  Synthroid. 8.  Magnesium oxide. 9.  Entresto. 10. She is on Nemesio. REVIEW OF SYSTEMS:  The 12-point systems done and negative except for as above. PHYSICAL EXAMINATION:  GENERAL:  Lying in bed in no acute distress. VITAL SIGNS:  Temp 97.8, pulse 87, blood pressure 72-81/29-60, respirations 22,  satting 100% on nasal cannula. HEENT:  Normocephalic, atraumatic. CARDIOVASCULAR:  Regular rate and rhythm. LUNGS:  Clear from the anterior. ABDOMEN:  Soft, nontender. EXTREMITIES:  No clubbing, cyanosis, or edema. NEUROLOGIC:  Nonfocal.    LABORATORY DATA:  White blood cell count 2.8, hemoglobin 10.2, platelets 321. Chemistry:  Sodium 137, BUN 34, creatinine 1.42.     IMPRESSION AND PLAN:  The patient is a 63-year-old -American female in the  hospital, is currently in the ICU with cardiogenic shock, urinary tract infection. She is on pressors. She is on antibiotics. At this point, I would continue to hold  the ibrutinib. We will see how she is doing next week. Her blood counts looks  fairly good. Please call over the weekend  if we could be of assistance. I will  have Dr. Deutsch Bring follow up on Monday.       Landy Jiang MD      [id number]  D:  02/08/2019 13:20  T:  02/08/2019 18:56  JOB #:  [id number]

## 2019-02-08 NOTE — CONSULTS
Note dictated  Please continue to hold her ibrutanib given that she has a UTI, Cardiogenic shock and on pressors. Please call over the weekend with any questions. I will have Dr. Anay Sparks f/u on Monday.

## 2019-02-08 NOTE — PROGRESS NOTES
Spiritual Care Assessment/Progress Note [facility] 
 
 
NAME: Kaushik Palmer      MRN: [id number] AGE: 80 y.o. SEX: female Taoist Affiliation: Mormonism  
Language: [place] 2/8/2019     Total Time (in minutes): 51 Spiritual Assessment begun in [facility] 2 CRITICAL CARE 3 through conversation with: 
  
    [x]Patient        [] Family    [] Friend(s) Reason for Consult: Palliative Care, Initial/Spiritual Assessment Spiritual beliefs: (Please include comment if needed) [x] Identifies with a kellie tradition:     
   [x] Supported by a kellie community:        
   [] Claims no spiritual orientation:       
   [] Seeking spiritual identity:            
   [] Adheres to an individual form of spirituality:       
   [] Not able to assess:                   
 
    
Identified resources for coping:  
   [x] Prayer                           
   [] Music                  [] Guided Imagery [x] Family/friends                 [] Pet visits [x] Devotional reading                         [] Unknown 
   [] Other:                                         
 
 
Interventions offered during this visit: (See comments for more details) Patient Interventions: Affirmation of kellie, Affirmation of emotions/emotional suffering, Coping skills reviewed/reinforced, Iconic (affirming the presence of God/Higher Power), Life review/legacy, Prayer (actual), Prayer (assurance of), Taoist beliefs/image of God discussed Plan of Care: 
 
 [x] Support spiritual and/or cultural needs  
 [] Support AMD and/or advance care planning process    
 [] Support grieving process 
 [] Coordinate Rites and/or Rituals  
 [] Coordination with community clergy 
 [x] No spiritual needs identified at this time 
 [] Detailed Plan of Care below (See Comments)  [] Make referral to Music Therapy 
[] Make referral to Pet Therapy    
[] Make referral to Addiction services 
[] Make referral to [facility] [] Make referral to Spiritual Care Partner 
[] No future visits requested       
[x] Follow up visits as needed Comments: The patient was resting in bed and greeted me warmly, as I walked in her room. After I introduced myself, the patient asked me to move her meal tray stand and to have a seat. The patient shared her life story with me. She spoke of what a good life she has been blessed with. She and her late spouse traveled the world, as he was a career in-listed [facility] man. He complete his undergraduate and graduate level work prior to working for [employer]. The patient was a . The patient spoke of her early family life, as well her son and his spouse. We discussed the spiritual history of the patient. She and her  shared the same Orthodoxy membership ([employer].) The  of her Orthodoxy recently visited her. After more discussion, about various issues, I steered the conversation toward an end. I asked the patient if she would like to have prayer before I departed. We prayed together. As I ended the prayer, the patient continued by praying aloud, thanking God for me and praying for God's continual blessing on me. Rev. Delmar Beltran EdD MDiv Palliative  Fellow For Flo Page [phone])

## 2019-02-08 NOTE — INTERDISCIPLINARY ROUNDS
Interdisciplinary team rounds were held 2/8/2019 with the following team members:Care Management, Diabetes Treatment Specialist, Nursing, Nutrition, Palliative Care, Pharmacy, Physician and Respiratory Therapy. Plan of care discussed. See clinical pathway and/or care plan for interventions and desired outcomes.

## 2019-02-08 NOTE — PROGRESS NOTES
2/8/2019 INTENSIVIST PROGRESS NOTE:  
 
Patient seen and evaluated, chart reviewed 81 yo female with end stage CMP transferred to ccu with hypotension, cardiogenic shock Started on pressors No acute events overnight in CCU Now pt in CCU awake, alert, no severe distress ROS: no sob, no cp Visit Vitals BP 99/62 (BP 1 Location: Left arm, BP Patient Position: At rest) Pulse 71 Temp 97.8 °F (36.6 °C) Resp 23 Ht 5' 1\" (1.549 m) Wt 64 kg (141 lb) SpO2 100% BMI 26.64 kg/m² General: no distress Eyes: anicteric HEENT: dry oral mucosa Neck: FROM 
CV: RRR Lungs: decreased BS Abd: soft : no flank pain Ext: no edema Skin: no rash Musculoskeletal: normal inspection Neuro: non focal 
 
 
Labs reviewed A/P: 
- acute respiratory failure: wean O2 
- cardiogenic shock: wean pressors as tolerated 
- CHF: diuretics - ANDREW: creatinine better today 
- UTI: iv abx 
- DNR 
- palliative care following - DNR 
- Will assist on disposition planning when stable for transfer Yary Hendrickson MD  
CC TIME 35 MINUTES

## 2019-02-08 NOTE — PROGRESS NOTES
PT Note: 
 
Reviewed chart and spoke with nursing. Per nursing, patient became hypotensive while eating her breakfast this am. Will continue to follow.  
 
Angy Li, SPT

## 2019-02-08 NOTE — PROGRESS NOTES
[address], SISTER [facility], [address] [facility]  [phone] 
 
 
2/8/2019 8:35 AM 
 
Admit Date: 2/4/2019 Admit Diagnosis:  
Acute encephalopathy [G93.40] Subjective:  
 
Malathi Jacob denies chest pain, pressure, tightness, dyspnea or lower extremity edema. Reports feeling well. Awaiting breakfast.  
 
Visit Vitals BP 99/62 (BP 1 Location: Left arm, BP Patient Position: At rest) Pulse 71 Temp 97.8 °F (36.6 °C) Resp 23 Ht 5' 1\" (1.549 m) Wt 141 lb (64 kg) SpO2 100% BMI 26.64 kg/m² Current Facility-Administered Medications Medication Dose Route Frequency  HYDROmorphone (PF) (DILAUDID) injection 0.5 mg  0.5 mg IntraVENous Q4H PRN  
 metroNIDAZOLE (FLAGYL) tablet 500 mg  500 mg Oral Q12H  levoFLOXacin (LEVAQUIN) tablet 500 mg  500 mg Oral Q48H  
 mupirocin (BACTROBAN) 2 % ointment   Both Nostrils BID  PHENYLephrine (PF)(BERNICE-SYNEPHRINE) 30 mg in 0.9% sodium chloride 250 mL infusion   mcg/min IntraVENous TITRATE  sodium chloride 0.9 % bolus infusion 250 mL  250 mL IntraVENous PRN  
 lactobac ac& pc-s.therm-b.anim (DAWN Q/RISAQUAD)  1 Cap Oral DAILY  dextrose 5% - 0.45% NaCl with KCl 20 mEq/L infusion  75 mL/hr IntraVENous CONTINUOUS  
 allopurinol (ZYLOPRIM) tablet 300 mg  300 mg Oral DAILY  apixaban (ELIQUIS) tablet 2.5 mg  2.5 mg Oral BID  atorvastatin (LIPITOR) tablet 10 mg  10 mg Oral QHS  carvedilol (COREG) tablet 3.125 mg  3.125 mg Oral BID WITH MEALS  fluticasone-vilanterol (BREO ELLIPTA) 100mcg-25mcg/puff  1 Puff Inhalation DAILY  levothyroxine (SYNTHROID) tablet 25 mcg  25 mcg Oral 6am  
 loratadine (CLARITIN) tablet 10 mg  10 mg Oral DAILY PRN  
 magnesium oxide (MAG-OX) tablet 400 mg  400 mg Oral DAILY  sacubitril-valsartan (ENTRESTO) 49-51 mg tablet 1 Tab  1 Tab Oral BID  albuterol-ipratropium (DUO-NEB) 2.5 MG-0.5 MG/3 ML  3 mL Nebulization Q6H PRN  
 ondansetron (ZOFRAN) injection 4 mg  4 mg IntraVENous Q6H PRN  
  acetaminophen (TYLENOL) tablet 650 mg  650 mg Oral Q4H PRN  
 traMADol (ULTRAM) tablet 50 mg  50 mg Oral Q6H PRN Objective:  
  
Physical Exam: 
General Appearance:   
Chest:   Clear Cardiovascular:  Regular rate and rhythm, S1, S2 normal, no murmur. Abdomen:   Soft, non-tender, bowel sounds are active. Extremities: tr edema Skin:  Warm and dry. Data Review:  
Recent Labs 02/08/19 
7781 02/07/19 
0350 02/05/19 
8589 WBC 2.8* 2.9* 2.2* HGB 10.2* 10.6* 8.5* HCT 30.6* 31.9* 25.7*  
 177 146* Recent Labs 02/08/19 
0350 02/07/19 
0350 02/06/19 
0128  136 135* K 3.9 3.7 3.8  106 105 CO2 21 21 23 * 116* 122* BUN 34* 40* 33* CREA 1.42* 1.79* 1.44* CA 7.7* 8.0* 7.6* Recent Labs 02/05/19 
0913 TROIQ 0.07* Intake/Output Summary (Last 24 hours) at 2/8/2019 3310 Last data filed at 2/8/2019 0600 Gross per 24 hour Intake 3326.85 ml Output  Net 3326.85 ml Telemetry: V paced 75 Assessment:  
 
Active Problems: 
  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  
 
Ale Gilliland  is a pleasant 80year old female with hx of NICM, EF 20-25%, S/p Biv ICD, HTN,. AF and CKD who presented with n/v/d with dyspnea and weakness, subsequent hypotension probably a combination of hypovolemia due to viral gastroenteritis as well as perhaps some developing sepsis due to E. coli UTI. Bps stable on ronn (titating down), asymptomatic. BNP up, WBC stable. Continue to follow. Jake Keyes NP Patient seen and examined by me with nurse practitioner. I personally performed all components of the history, physical, and medical decision making and agree with the assessment and plan with minor modifications as noted. Feeling better. titrating down on the ronn gtt. Cont supportive care Cheryn Jeans, MD, [facility] 
 
2/8/2019

## 2019-02-09 LAB
ANION GAP SERPL CALC-SCNC: 8 MMOL/L (ref 5–15)
BUN SERPL-MCNC: 27 MG/DL (ref 6–20)
BUN/CREAT SERPL: 23 (ref 12–20)
CALCIUM SERPL-MCNC: 7.4 MG/DL (ref 8.5–10.1)
CHLORIDE SERPL-SCNC: 111 MMOL/L (ref 97–108)
CO2 SERPL-SCNC: 20 MMOL/L (ref 21–32)
CREAT SERPL-MCNC: 1.16 MG/DL (ref 0.55–1.02)
GLUCOSE SERPL-MCNC: 84 MG/DL (ref 65–100)
POTASSIUM SERPL-SCNC: 4.2 MMOL/L (ref 3.5–5.1)
SODIUM SERPL-SCNC: 139 MMOL/L (ref 136–145)

## 2019-02-09 PROCEDURE — 74011250636 HC RX REV CODE- 250/636: Performed by: INTERNAL MEDICINE

## 2019-02-09 PROCEDURE — 80048 BASIC METABOLIC PNL TOTAL CA: CPT

## 2019-02-09 PROCEDURE — 36415 COLL VENOUS BLD VENIPUNCTURE: CPT

## 2019-02-09 PROCEDURE — 74011250637 HC RX REV CODE- 250/637: Performed by: INTERNAL MEDICINE

## 2019-02-09 PROCEDURE — 65610000006 HC RM INTENSIVE CARE

## 2019-02-09 RX ADMIN — Medication 60 MCG/MIN: at 11:43

## 2019-02-09 RX ADMIN — MUPIROCIN: 20 OINTMENT TOPICAL at 17:30

## 2019-02-09 RX ADMIN — APIXABAN 2.5 MG: 2.5 TABLET, FILM COATED ORAL at 18:00

## 2019-02-09 RX ADMIN — Medication 60 MCG/MIN: at 19:24

## 2019-02-09 RX ADMIN — FLUTICASONE FUROATE AND VILANTEROL TRIFENATATE 1 PUFF: 100; 25 POWDER RESPIRATORY (INHALATION) at 10:07

## 2019-02-09 RX ADMIN — LEVOTHYROXINE SODIUM 25 MCG: 25 TABLET ORAL at 05:17

## 2019-02-09 RX ADMIN — HYDROMORPHONE HYDROCHLORIDE 0.5 MG: 1 INJECTION, SOLUTION INTRAMUSCULAR; INTRAVENOUS; SUBCUTANEOUS at 21:36

## 2019-02-09 RX ADMIN — APIXABAN 2.5 MG: 2.5 TABLET, FILM COATED ORAL at 10:02

## 2019-02-09 RX ADMIN — MUPIROCIN: 20 OINTMENT TOPICAL at 09:58

## 2019-02-09 RX ADMIN — ATORVASTATIN CALCIUM 10 MG: 10 TABLET, FILM COATED ORAL at 21:24

## 2019-02-09 RX ADMIN — ALLOPURINOL 300 MG: 300 TABLET ORAL at 09:58

## 2019-02-09 RX ADMIN — Medication 1 CAPSULE: at 09:58

## 2019-02-09 RX ADMIN — Medication 70 MCG/MIN: at 04:10

## 2019-02-09 RX ADMIN — TRAMADOL HYDROCHLORIDE 50 MG: 50 TABLET, FILM COATED ORAL at 18:56

## 2019-02-09 RX ADMIN — Medication 400 MG: at 09:58

## 2019-02-09 NOTE — PROGRESS NOTES
General Daily Progress Note Admit Date: 2/4/2019 Subjective:  
 
Patient c/o swelling  . Current Facility-Administered Medications Medication Dose Route Frequency  HYDROmorphone (PF) (DILAUDID) injection 0.5 mg  0.5 mg IntraVENous Q4H PRN  
 levoFLOXacin (LEVAQUIN) tablet 500 mg  500 mg Oral Q48H  
 mupirocin (BACTROBAN) 2 % ointment   Both Nostrils BID  PHENYLephrine (PF)(BERNICE-SYNEPHRINE) 30 mg in 0.9% sodium chloride 250 mL infusion   mcg/min IntraVENous TITRATE  sodium chloride 0.9 % bolus infusion 250 mL  250 mL IntraVENous PRN  
 lactobac ac& pc-s.therm-b.anim (DAWN Q/RISAQUAD)  1 Cap Oral DAILY  allopurinol (ZYLOPRIM) tablet 300 mg  300 mg Oral DAILY  apixaban (ELIQUIS) tablet 2.5 mg  2.5 mg Oral BID  atorvastatin (LIPITOR) tablet 10 mg  10 mg Oral QHS  fluticasone-vilanterol (BREO ELLIPTA) 100mcg-25mcg/puff  1 Puff Inhalation DAILY  levothyroxine (SYNTHROID) tablet 25 mcg  25 mcg Oral 6am  
 loratadine (CLARITIN) tablet 10 mg  10 mg Oral DAILY PRN  
 magnesium oxide (MAG-OX) tablet 400 mg  400 mg Oral DAILY  albuterol-ipratropium (DUO-NEB) 2.5 MG-0.5 MG/3 ML  3 mL Nebulization Q6H PRN  
 ondansetron (ZOFRAN) injection 4 mg  4 mg IntraVENous Q6H PRN  
 acetaminophen (TYLENOL) tablet 650 mg  650 mg Oral Q4H PRN  
 traMADol (ULTRAM) tablet 50 mg  50 mg Oral Q6H PRN Review of Systems A comprehensive review of systems was negative. Objective:  
 
Patient Vitals for the past 24 hrs: 
 BP Temp Pulse Resp SpO2  
02/09/19 0930 104/53  71 22 96 % 02/09/19 0900 113/69  71 16 100 % 02/09/19 0856 105/74  72 23 100 % 02/09/19 0815 94/62  71 22 98 % 02/09/19 0800 100/64 98.4 °F (36.9 °C) 70 23 99 % 02/09/19 0745 111/61  70 22 96 % 02/09/19 0730 103/60  74 14 99 % 02/09/19 0715 112/60  70 19 98 % 02/09/19 0700 102/68  70 15 99 % 02/09/19 0600 108/62  73 20 97 % 02/09/19 0500 105/63  71 24 99 % 02/09/19 0410 115/67  70   02/09/19 0400 115/67 98.6 °F (37 °C) 70 23 99 % 02/09/19 0328 120/65  70    
02/09/19 0300 124/69  71 17 99 % 02/09/19 0200 122/69  70 16 100 % 02/09/19 0100 119/69  70 16 99 % 02/09/19 0000 117/69 98.3 °F (36.8 °C) 70 22 98 % 02/08/19 2357 122/66  75    
02/08/19 2300 119/66  71 20 96 % 02/08/19 2251 126/67  72    
02/08/19 2200 113/67  71 20 97 % 02/08/19 2100 113/68  71 16 99 % 02/08/19 2000 109/68 97.6 °F (36.4 °C) 70 13 99 % 02/08/19 1950 107/63  72 28 98 % 02/08/19 1945 (!) 111/92  70 20 99 % 02/08/19 1930 115/65  71 22 98 % 02/08/19 1915   76 16 99 % 02/08/19 1900 94/79  70 24 99 % 02/08/19 1845   76 24 99 % 02/08/19 1830 (!) 88/55  77 21 99 % 02/08/19 1815 90/61  78 26 99 % 02/08/19 1800 90/56  72 28 98 % 02/08/19 1745 (!) 83/56  80 26 100 % 02/08/19 1715   86 20 98 % 02/08/19 1700 108/69  70 22 95 % 02/08/19 1645 102/60  70 19 100 % 02/08/19 1630 107/68  70 27 99 % 02/08/19 1615 113/66  73 22 99 % 02/08/19 1600 (!) 107/93 98.2 °F (36.8 °C) 70 21 98 % 02/08/19 1545 99/59  81 21 97 % 02/08/19 1530 98/65  80 23 98 % 02/08/19 1515 93/67  85 25 99 % 02/08/19 1500 97/76  83 24 99 % 02/08/19 1445 (!) 89/70  85 17 99 % 02/08/19 1430 99/69  82 21 97 % 02/08/19 1415 100/88  79 18 98 % 02/08/19 1400 (!) 100/30  78 21 99 % 02/08/19 1345 113/62  70 22 97 % 02/08/19 1330 114/67  70 19 98 % 02/08/19 1315 94/59  77 26 99 % 02/08/19 1300 (!) 83/64  77 27 100 % 02/08/19 1245 (!) 86/54  70 23 99 % 02/08/19 1230 90/53  78 26 98 % 02/08/19 1215 95/56  82 26 98 % 02/08/19 1200 100/48 98.3 °F (36.8 °C) 82 29 100 % 02/08/19 1145   81 25 98 % 02/08/19 1130 (!) 72/31  80 30 99 % 02/08/19 1100 (!) 83/67  82 21 96 % 02/09 0701 - 02/09 1900 In: 270 [P.O.:200; I.V.:70] Out: -  
02/07 1901 - 02/09 0700 In: 3340.6 [P.O.:370; I.V.:2970.6] Out: 900 [Urine:900] Physical Exam:  
Visit Vitals /53 Pulse 71 Temp 98.4 °F (36.9 °C) Resp 22 Ht 5' 1\" (1.549 m) Wt 141 lb (64 kg) SpO2 96% BMI 26.64 kg/m² General appearance: alert, cooperative, no distress, appears stated age Neck: supple, symmetrical, trachea midline, no adenopathy, thyroid: not enlarged, symmetric, no tenderness/mass/nodules, no carotid bruit and no JVD Lungs:decreased bs Heart: regular rate and rhythm, S1, S2 normal, no murmur, click, rub or gallop Abdomen: soft, non-tender. Bowel sounds normal. No masses,  no organomegaly Extremities: extremities normal, atraumatic, no cyanosis or edema Data Review Recent Results (from the past 24 hour(s)) METABOLIC PANEL, BASIC Collection Time: 02/09/19  3:31 AM  
Result Value Ref Range Sodium 139 136 - 145 mmol/L Potassium 4.2 3.5 - 5.1 mmol/L Chloride 111 (H) 97 - 108 mmol/L  
 CO2 20 (L) 21 - 32 mmol/L Anion gap 8 5 - 15 mmol/L Glucose 84 65 - 100 mg/dL BUN 27 (H) 6 - 20 MG/DL Creatinine 1.16 (H) 0.55 - 1.02 MG/DL  
 BUN/Creatinine ratio 23 (H) 12 - 20 GFR est AA 53 (L) >60 ml/min/1.73m2 GFR est non-AA 44 (L) >60 ml/min/1.73m2 Calcium 7.4 (L) 8.5 - 10.1 MG/DL Assessment:  
 
Active Problems: 
  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  BP continues to improve. Patient now only on Nemesio-Synephrine. 2.  Significant increase in BNP therefore fluids discontinued. 3.  Continue levaquin

## 2019-02-09 NOTE — PROGRESS NOTES
1900- Bedside report received from MAXIMUS Mathur 
 
2000- Assessment completed, see charting for details. Patient resting in bed and watching tv.  
 
2100- Medications administered 2200- Patient turned and repositioned. TV turned off and patient sleeping. 2250- Nemesio down to 100mcg/min 
 
0000- Assessment completed, see charting for details. Patient turned and repositioned. Oral fluids offered and given. Patient sleeping. 
 
0200- Patient turned and repositioned. 0400- Nemesio down to 70mcg/min. Assessment completed, see charting for details. Patient turned and repositioned. Patient states her, \"knee pain is getting much worse and she wants to tell the doctor in the morning\". I made her an ice pack on request and offered PRN pain medication, but patient declined for now. 7221- Patient does not want me back as a nurse after this shift, because I have been calling her Merritt Mathis all shift instead of Miss Merritt Mathis. She says this is the only reason. Was not told about this name preference in report nor is it written on the board.  
 
0700- Bedside report given to MAXIMUS Mathur

## 2019-02-09 NOTE — PROGRESS NOTES
General Daily Progress Note Admit Date: 2/4/2019 Subjective:  
 
Patient has no complaint . Current Facility-Administered Medications Medication Dose Route Frequency  HYDROmorphone (PF) (DILAUDID) injection 0.5 mg  0.5 mg IntraVENous Q4H PRN  
 levoFLOXacin (LEVAQUIN) tablet 500 mg  500 mg Oral Q48H  
 mupirocin (BACTROBAN) 2 % ointment   Both Nostrils BID  PHENYLephrine (PF)(BERNICE-SYNEPHRINE) 30 mg in 0.9% sodium chloride 250 mL infusion   mcg/min IntraVENous TITRATE  sodium chloride 0.9 % bolus infusion 250 mL  250 mL IntraVENous PRN  
 lactobac ac& pc-s.therm-b.anim (DAWN Q/RISAQUAD)  1 Cap Oral DAILY  allopurinol (ZYLOPRIM) tablet 300 mg  300 mg Oral DAILY  apixaban (ELIQUIS) tablet 2.5 mg  2.5 mg Oral BID  atorvastatin (LIPITOR) tablet 10 mg  10 mg Oral QHS  carvedilol (COREG) tablet 3.125 mg  3.125 mg Oral BID WITH MEALS  fluticasone-vilanterol (BREO ELLIPTA) 100mcg-25mcg/puff  1 Puff Inhalation DAILY  levothyroxine (SYNTHROID) tablet 25 mcg  25 mcg Oral 6am  
 loratadine (CLARITIN) tablet 10 mg  10 mg Oral DAILY PRN  
 magnesium oxide (MAG-OX) tablet 400 mg  400 mg Oral DAILY  sacubitril-valsartan (ENTRESTO) 49-51 mg tablet 1 Tab  1 Tab Oral BID  albuterol-ipratropium (DUO-NEB) 2.5 MG-0.5 MG/3 ML  3 mL Nebulization Q6H PRN  
 ondansetron (ZOFRAN) injection 4 mg  4 mg IntraVENous Q6H PRN  
 acetaminophen (TYLENOL) tablet 650 mg  650 mg Oral Q4H PRN  
 traMADol (ULTRAM) tablet 50 mg  50 mg Oral Q6H PRN Review of Systems A comprehensive review of systems was negative. Objective:  
 
Patient Vitals for the past 24 hrs: 
 BP Temp Pulse Resp SpO2  
02/08/19 2200 113/67  71 20 97 % 02/08/19 2100 113/68  71 16 99 % 02/08/19 2000 109/68 97.6 °F (36.4 °C) 70 13 99 % 02/08/19 1950 107/63  72 28 98 % 02/08/19 1945 (!) 111/92  70 20 99 % 02/08/19 1930 115/65  71 22 98 % 02/08/19 1915   76 16 99 % 02/08/19 1900 94/79  70 24 99 % 02/08/19 1845   76 24 99 % 02/08/19 1830 (!) 88/55  77 21 99 % 02/08/19 1815 90/61  78 26 99 % 02/08/19 1800 90/56  72 28 98 % 02/08/19 1745 (!) 83/56  80 26 100 % 02/08/19 1715   86 20 98 % 02/08/19 1700 108/69  70 22 95 % 02/08/19 1645 102/60  70 19 100 % 02/08/19 1630 107/68  70 27 99 % 02/08/19 1615 113/66  73 22 99 % 02/08/19 1600 (!) 107/93 98.2 °F (36.8 °C) 70 21 98 % 02/08/19 1545 99/59  81 21 97 % 02/08/19 1530 98/65  80 23 98 % 02/08/19 1515 93/67  85 25 99 % 02/08/19 1500 97/76  83 24 99 % 02/08/19 1445 (!) 89/70  85 17 99 % 02/08/19 1430 99/69  82 21 97 % 02/08/19 1415 100/88  79 18 98 % 02/08/19 1400 (!) 100/30  78 21 99 % 02/08/19 1345 113/62  70 22 97 % 02/08/19 1330 114/67  70 19 98 % 02/08/19 1315 94/59  77 26 99 % 02/08/19 1300 (!) 83/64  77 27 100 % 02/08/19 1245 (!) 86/54  70 23 99 % 02/08/19 1230 90/53  78 26 98 % 02/08/19 1215 95/56  82 26 98 % 02/08/19 1200 100/48 98.3 °F (36.8 °C) 82 29 100 % 02/08/19 1145   81 25 98 % 02/08/19 1130 (!) 72/31  80 30 99 % 02/08/19 1100 (!) 83/67  82 21 96 % 02/08/19 1045 (!) 72/56  85 19 98 % 02/08/19 1030 (!) 84/64  84 22 97 % 02/08/19 1015 94/52  86 23 98 % 02/08/19 1000   83 18 99 % 02/08/19 0949 (!) 72/29  87 22 100 % 02/08/19 0945 (!) 67/49  84 21 100 % 02/08/19 0931 (!) 75/60  78 24 100 % 02/08/19 0929 (!) 75/56  80 18 100 % 02/08/19 0920 (!) 74/56  81 22 (!) 84 % 02/08/19 0909 (!) 81/58  84 22 97 % 02/08/19 0905 (!) 78/55  85 22 100 % 02/08/19 0803 99/62 97.8 °F (36.6 °C) 71 23 100 % 02/08/19 0802 99/62  70 20 99 % 02/08/19 0800 (!) 87/56  74 20 100 % 02/08/19 0700 102/63  71 14 98 % 02/08/19 0600 109/67  70 12 99 % 02/08/19 0500 114/63  72 16 98 % 02/08/19 0400 108/70 97.8 °F (36.6 °C) 73 22 100 % 02/08/19 0300 113/66  71 17 99 % 02/08/19 0200 100/65  72 20 98 % 02/08/19 0100 109/68  72 18 99 % 02/08/19 0000 100/63 97.7 °F (36.5 °C) 73 22 99 % 02/07/19 2300 97/65  70 20 98 % 02/08 1901 - 02/09 0700 In: 281.7 [I.V.:281.7] Out: 300 [Urine:300] 02/07 0701 - 02/08 1900 In: 66175.8 [P.O.:780; I.V.:9811.8] Out: 400 [Urine:400] Physical Exam:  
Visit Vitals /67 Pulse 71 Temp 97.6 °F (36.4 °C) Resp 20 Ht 5' 1\" (1.549 m) Wt 141 lb (64 kg) SpO2 97% BMI 26.64 kg/m² General appearance: alert, cooperative, no distress, appears stated age Neck: supple, symmetrical, trachea midline, no adenopathy, thyroid: not enlarged, symmetric, no tenderness/mass/nodules, no carotid bruit and no JVD Lungs: clear to auscultation bilaterally Heart: regular rate and rhythm, S1, S2 normal, no murmur, click, rub or gallop Abdomen: soft, non-tender. Bowel sounds normal. No masses,  no organomegaly Extremities: extremities normal, atraumatic, no cyanosis or edema Data Review Recent Results (from the past 24 hour(s)) METABOLIC PANEL, BASIC Collection Time: 02/08/19  3:50 AM  
Result Value Ref Range Sodium 137 136 - 145 mmol/L Potassium 3.9 3.5 - 5.1 mmol/L Chloride 108 97 - 108 mmol/L  
 CO2 21 21 - 32 mmol/L Anion gap 8 5 - 15 mmol/L Glucose 110 (H) 65 - 100 mg/dL BUN 34 (H) 6 - 20 MG/DL Creatinine 1.42 (H) 0.55 - 1.02 MG/DL  
 BUN/Creatinine ratio 24 (H) 12 - 20 GFR est AA 42 (L) >60 ml/min/1.73m2 GFR est non-AA 35 (L) >60 ml/min/1.73m2 Calcium 7.7 (L) 8.5 - 10.1 MG/DL  
NT-PRO BNP Collection Time: 02/08/19  3:50 AM  
Result Value Ref Range NT pro-BNP 13,148 (H) <450 PG/ML  
CBC WITH MANUAL DIFF Collection Time: 02/08/19  3:50 AM  
Result Value Ref Range WBC 2.8 (L) 3.6 - 11.0 K/uL  
 RBC 3.35 (L) 3.80 - 5.20 M/uL  
 HGB 10.2 (L) 11.5 - 16.0 g/dL HCT 30.6 (L) 35.0 - 47.0 % MCV 91.3 80.0 - 99.0 FL  
 MCH 30.4 26.0 - 34.0 PG  
 MCHC 33.3 30.0 - 36.5 g/dL  
 RDW 17.0 (H) 11.5 - 14.5 % PLATELET 036 853 - 499 K/uL MPV 10.1 8.9 - 12.9 FL  
 NRBC 0.0 0  WBC ABSOLUTE NRBC 0.00 0.00 - 0.01 K/uL NEUTROPHILS 66 32 - 75 % BAND NEUTROPHILS 4 0 - 6 % LYMPHOCYTES 26 12 - 49 % MONOCYTES 3 (L) 5 - 13 % EOSINOPHILS 1 0 - 7 % BASOPHILS 0 0 - 1 % METAMYELOCYTES 0 0 % MYELOCYTES 0 0 % PROMYELOCYTES 0 0 % BLASTS 0 0 % OTHER CELL 0 0 IMMATURE GRANULOCYTES 0 %  
 ABS. NEUTROPHILS 2.0 1.8 - 8.0 K/UL  
 ABS. LYMPHOCYTES 0.7 (L) 0.8 - 3.5 K/UL  
 ABS. MONOCYTES 0.1 0.0 - 1.0 K/UL  
 ABS. EOSINOPHILS 0.0 0.0 - 0.4 K/UL  
 ABS. BASOPHILS 0.0 0.0 - 0.1 K/UL  
 ABS. IMM. GRANS. 0.0 K/UL  
 DF MANUAL    
 RBC COMMENTS ANISOCYTOSIS 
1+ Assessment:  
 
Active Problems: 
  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  BP continues to improve. Patient now only on Nemesio-Synephrine. 2.  Significant increase in BNP therefore fluids discontinued. 3.  Continue broad-spectrum antibiotics for now. All cultures remain negative.

## 2019-02-09 NOTE — PROGRESS NOTES
2/9/2019 INTENSIVIST PROGRESS NOTE:  
 
Patient seen and evaluated, chart reviewed 81 yo female with end stage CMP transferred to ccu with hypotension, cardiogenic shock Started on pressors and remains on Nemesio drip at 30mcg. Pt feels like she has swelling of her hands and feet. No acute events overnight in CCU Now pt in CCU awake, alert, no severe distress ROS: no sob, no cp Visit Vitals /62 (BP 1 Location: Left arm, BP Patient Position: At rest) Pulse 73 Temp 98.6 °F (37 °C) Resp 20 Ht 5' 1\" (1.549 m) Wt 64 kg (141 lb) SpO2 97% BMI 26.64 kg/m² General: no distress, alert, no distress. Pleasant and comfortable. Eyes: anicteric HEENT: dry oral mucosa Neck: FROM 
CV: RRR nl s1,2. Lungs: decreased BS but clear and breathing comfortably. Abd: soft : no flank pain Ext: at least trace to 1+ edema of BLE. Skin: no rash Musculoskeletal: normal inspection Neuro: non focal, seems to have good strength of BUE and BLE. Labs reviewed A/P: 
Shock UTI Cardiomyopathy appears  Decompensated: Pro BNP over 13K. Peripheral edema. Anticoagulation on Eliquis. Hypothyroid on synthroid. - acute respiratory failure: wean O2 
- cardiogenic shock: wean pressors as tolerated, still on neosynephrine 
- CHF: diuretics when BP is better to help mobilize some fluid. - ANDREW: creatinine better today 
- UTI: on po levoflox. - DNR but ok with pressors. - palliative care following - Will assist on disposition planning when stable for transfer Betina Feldman MD

## 2019-02-09 NOTE — PROGRESS NOTES
Cardiology Progress Note [address] [facility], [address]  [phone] 
 
2/9/2019 10:28 AM 
 
Admit Date: 2/4/2019 Admit Diagnosis: Acute encephalopathy [G93.40] Subjective:  
 
Bhanu Gant   denies chest pain. Visit Vitals /53 Pulse 71 Temp 98.4 °F (36.9 °C) Resp 22 Ht 5' 1\" (1.549 m) Wt 141 lb (64 kg) SpO2 96% BMI 26.64 kg/m² Current Facility-Administered Medications Medication Dose Route Frequency  HYDROmorphone (PF) (DILAUDID) injection 0.5 mg  0.5 mg IntraVENous Q4H PRN  
 levoFLOXacin (LEVAQUIN) tablet 500 mg  500 mg Oral Q48H  
 mupirocin (BACTROBAN) 2 % ointment   Both Nostrils BID  PHENYLephrine (PF)(BERNICE-SYNEPHRINE) 30 mg in 0.9% sodium chloride 250 mL infusion   mcg/min IntraVENous TITRATE  sodium chloride 0.9 % bolus infusion 250 mL  250 mL IntraVENous PRN  
 lactobac ac& pc-s.therm-b.anim (DAWN Q/RISAQUAD)  1 Cap Oral DAILY  allopurinol (ZYLOPRIM) tablet 300 mg  300 mg Oral DAILY  apixaban (ELIQUIS) tablet 2.5 mg  2.5 mg Oral BID  atorvastatin (LIPITOR) tablet 10 mg  10 mg Oral QHS  fluticasone-vilanterol (BREO ELLIPTA) 100mcg-25mcg/puff  1 Puff Inhalation DAILY  levothyroxine (SYNTHROID) tablet 25 mcg  25 mcg Oral 6am  
 loratadine (CLARITIN) tablet 10 mg  10 mg Oral DAILY PRN  
 magnesium oxide (MAG-OX) tablet 400 mg  400 mg Oral DAILY  albuterol-ipratropium (DUO-NEB) 2.5 MG-0.5 MG/3 ML  3 mL Nebulization Q6H PRN  
 ondansetron (ZOFRAN) injection 4 mg  4 mg IntraVENous Q6H PRN  
 acetaminophen (TYLENOL) tablet 650 mg  650 mg Oral Q4H PRN  
 traMADol (ULTRAM) tablet 50 mg  50 mg Oral Q6H PRN Objective:  
  
Visit Vitals /53 Pulse 71 Temp 98.4 °F (36.9 °C) Resp 22 Ht 5' 1\" (1.549 m) Wt 141 lb (64 kg) SpO2 96% BMI 26.64 kg/m² Physical Exam: Abdomen: soft, non-tender Extremities: extremities normal 
Heart: regular rate and rhythm Lungs: clear to auscultation bilaterally Pulses: 2+ and symmetric Data Review:  
Labs:   
Recent Labs 02/08/19 
0350 02/07/19 
0350 WBC 2.8* 2.9* HGB 10.2* 10.6* HCT 30.6* 31.9*  
 177 Recent Labs 02/09/19 
4050 02/08/19 
0350 02/07/19 
0350  137 136  
K 4.2 3.9 3.7 * 108 106 CO2 20* 21 21 GLU 84 110* 116* BUN 27* 34* 40* CREA 1.16* 1.42* 1.79* CA 7.4* 7.7* 8.0* No results for input(s): TROIQ, CPK, CKMB in the last 72 hours. Intake/Output Summary (Last 24 hours) at 2/9/2019 1028 Last data filed at 2/9/2019 0900 Gross per 24 hour Intake 1511.41 ml Output 900 ml Net 611.41 ml Telemetry: biv paced Assessment:  
 
Active Problems: 
  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  
 
Berny Jc is doing better. Only on ronn gtt. Will hold entresto and coreg for now. Will resume once stable and on the floor. Cont abx for uti. Lynne Loo MD, [facility] 
 
2/9/2019

## 2019-02-09 NOTE — PROGRESS NOTES
Duplicate PT order received and chart reviewed. Pt is being followed by physical therapy 4x/wk with recommendation for SNF at discharge. Will continue with current plan of care. Thank you Codie Rankin, PT, DPT

## 2019-02-09 NOTE — PROGRESS NOTES
3160 Bedside and Verbal shift change report given to Arley Solomon (oncoming nurse) by Shuail Menchaca (offgoing nurse). Report included the following information SBAR, Kardex, Procedure Summary, Intake/Output, MAR, Recent Results and Cardiac Rhythm paced. 0800 pt assessed per flowsheet, denies uncontrolled pain in right knee yet quite painful upon palpation. Pt bathed, linens changed, denies further needs 0830 Dr Rosana Garza in to assess pt, reviewed plan of care 56 Dr Zaynab Martínez in to assess pt, no new orders received 
1200 pt reassessed per flowsheet, restful, denies needs 1330 pt awakened, confused and c/o abd pain and asking to be taken to the ED because the pan was so bad, as she is eating. Able to reorient pt and she settled down  
1500 pt restful, denies further abd pain, DIL at bedside 1600 pt reassessed per flowsheet, pt restful 1730 pt awake and dinner tray provided 
1900 Bedside and Verbal shift change report given to Arin (oncoming nurse) by Arley Solomon (offgoing nurse). Report included the following information SBAR, Kardex, Procedure Summary, Intake/Output, MAR, Recent Results and Cardiac Rhythm paced.

## 2019-02-09 NOTE — PROGRESS NOTES
Positive urine culture for E. Coli. Antibiotic spectrum will be reduced the next 24-48 hours. Kathy Tran

## 2019-02-10 ENCOUNTER — APPOINTMENT (OUTPATIENT)
Dept: GENERAL RADIOLOGY | Age: 84
DRG: 871 | End: 2019-02-10
Attending: INTERNAL MEDICINE
Payer: MEDICARE

## 2019-02-10 LAB
ALBUMIN SERPL-MCNC: 1.8 G/DL (ref 3.5–5)
ALBUMIN/GLOB SERPL: 0.5 {RATIO} (ref 1.1–2.2)
ALP SERPL-CCNC: 89 U/L (ref 45–117)
ALT SERPL-CCNC: 24 U/L (ref 12–78)
ANION GAP SERPL CALC-SCNC: 5 MMOL/L (ref 5–15)
AST SERPL-CCNC: 35 U/L (ref 15–37)
BASOPHILS # BLD: 0 K/UL (ref 0–0.1)
BASOPHILS NFR BLD: 0 % (ref 0–1)
BILIRUB SERPL-MCNC: 0.4 MG/DL (ref 0.2–1)
BUN SERPL-MCNC: 25 MG/DL (ref 6–20)
BUN/CREAT SERPL: 22 (ref 12–20)
CALCIUM SERPL-MCNC: 7.6 MG/DL (ref 8.5–10.1)
CHLORIDE SERPL-SCNC: 110 MMOL/L (ref 97–108)
CO2 SERPL-SCNC: 23 MMOL/L (ref 21–32)
CREAT SERPL-MCNC: 1.12 MG/DL (ref 0.55–1.02)
DIFFERENTIAL METHOD BLD: ABNORMAL
EOSINOPHIL # BLD: 0.1 K/UL (ref 0–0.4)
EOSINOPHIL NFR BLD: 5 % (ref 0–7)
ERYTHROCYTE [DISTWIDTH] IN BLOOD BY AUTOMATED COUNT: 17.3 % (ref 11.5–14.5)
GLOBULIN SER CALC-MCNC: 3.3 G/DL (ref 2–4)
GLUCOSE SERPL-MCNC: 87 MG/DL (ref 65–100)
HCT VFR BLD AUTO: 28.5 % (ref 35–47)
HGB BLD-MCNC: 9.7 G/DL (ref 11.5–16)
IMM GRANULOCYTES # BLD AUTO: 0 K/UL (ref 0–0.04)
IMM GRANULOCYTES NFR BLD AUTO: 0 % (ref 0–0.5)
LYMPHOCYTES # BLD: 0.7 K/UL (ref 0.8–3.5)
LYMPHOCYTES NFR BLD: 24 % (ref 12–49)
MAGNESIUM SERPL-MCNC: 2.3 MG/DL (ref 1.6–2.4)
MCH RBC QN AUTO: 30.8 PG (ref 26–34)
MCHC RBC AUTO-ENTMCNC: 34 G/DL (ref 30–36.5)
MCV RBC AUTO: 90.5 FL (ref 80–99)
MONOCYTES # BLD: 0.4 K/UL (ref 0–1)
MONOCYTES NFR BLD: 13 % (ref 5–13)
NEUTS SEG # BLD: 1.6 K/UL (ref 1.8–8)
NEUTS SEG NFR BLD: 58 % (ref 32–75)
NRBC # BLD: 0 K/UL (ref 0–0.01)
NRBC BLD-RTO: 0 PER 100 WBC
PHOSPHATE SERPL-MCNC: 2.2 MG/DL (ref 2.6–4.7)
PLATELET # BLD AUTO: 179 K/UL (ref 150–400)
PMV BLD AUTO: 9.9 FL (ref 8.9–12.9)
POTASSIUM SERPL-SCNC: 4.3 MMOL/L (ref 3.5–5.1)
PROT SERPL-MCNC: 5.1 G/DL (ref 6.4–8.2)
RBC # BLD AUTO: 3.15 M/UL (ref 3.8–5.2)
RBC MORPH BLD: ABNORMAL
SODIUM SERPL-SCNC: 138 MMOL/L (ref 136–145)
WBC # BLD AUTO: 2.8 K/UL (ref 3.6–11)
WBC MORPH BLD: ABNORMAL

## 2019-02-10 PROCEDURE — 74011250637 HC RX REV CODE- 250/637: Performed by: INTERNAL MEDICINE

## 2019-02-10 PROCEDURE — 85025 COMPLETE CBC W/AUTO DIFF WBC: CPT

## 2019-02-10 PROCEDURE — 36415 COLL VENOUS BLD VENIPUNCTURE: CPT

## 2019-02-10 PROCEDURE — 80053 COMPREHEN METABOLIC PANEL: CPT

## 2019-02-10 PROCEDURE — 71045 X-RAY EXAM CHEST 1 VIEW: CPT

## 2019-02-10 PROCEDURE — 84100 ASSAY OF PHOSPHORUS: CPT

## 2019-02-10 PROCEDURE — 65610000006 HC RM INTENSIVE CARE

## 2019-02-10 PROCEDURE — 83735 ASSAY OF MAGNESIUM: CPT

## 2019-02-10 PROCEDURE — 74011250636 HC RX REV CODE- 250/636: Performed by: INTERNAL MEDICINE

## 2019-02-10 RX ORDER — SODIUM CHLORIDE 0.9 % (FLUSH) 0.9 %
5-40 SYRINGE (ML) INJECTION AS NEEDED
Status: DISCONTINUED | OUTPATIENT
Start: 2019-02-10 | End: 2019-02-19 | Stop reason: HOSPADM

## 2019-02-10 RX ORDER — SODIUM CHLORIDE 0.9 % (FLUSH) 0.9 %
5-40 SYRINGE (ML) INJECTION EVERY 8 HOURS
Status: DISCONTINUED | OUTPATIENT
Start: 2019-02-10 | End: 2019-02-19 | Stop reason: HOSPADM

## 2019-02-10 RX ADMIN — ATORVASTATIN CALCIUM 10 MG: 10 TABLET, FILM COATED ORAL at 19:51

## 2019-02-10 RX ADMIN — LEVOTHYROXINE SODIUM 25 MCG: 25 TABLET ORAL at 06:38

## 2019-02-10 RX ADMIN — LEVOFLOXACIN 500 MG: 500 TABLET, FILM COATED ORAL at 18:49

## 2019-02-10 RX ADMIN — Medication 400 MG: at 10:08

## 2019-02-10 RX ADMIN — TRAMADOL HYDROCHLORIDE 50 MG: 50 TABLET, FILM COATED ORAL at 19:51

## 2019-02-10 RX ADMIN — Medication 10 ML: at 06:46

## 2019-02-10 RX ADMIN — Medication 15 MCG/MIN: at 23:25

## 2019-02-10 RX ADMIN — TRAMADOL HYDROCHLORIDE 50 MG: 50 TABLET, FILM COATED ORAL at 00:55

## 2019-02-10 RX ADMIN — TRAMADOL HYDROCHLORIDE 50 MG: 50 TABLET, FILM COATED ORAL at 06:39

## 2019-02-10 RX ADMIN — MUPIROCIN: 20 OINTMENT TOPICAL at 18:00

## 2019-02-10 RX ADMIN — APIXABAN 2.5 MG: 2.5 TABLET, FILM COATED ORAL at 18:49

## 2019-02-10 RX ADMIN — Medication 10 ML: at 18:50

## 2019-02-10 RX ADMIN — ALLOPURINOL 300 MG: 300 TABLET ORAL at 10:08

## 2019-02-10 RX ADMIN — Medication 10 ML: at 19:20

## 2019-02-10 RX ADMIN — Medication 1 CAPSULE: at 10:08

## 2019-02-10 RX ADMIN — FLUTICASONE FUROATE AND VILANTEROL TRIFENATATE 1 PUFF: 100; 25 POWDER RESPIRATORY (INHALATION) at 10:09

## 2019-02-10 RX ADMIN — MUPIROCIN: 20 OINTMENT TOPICAL at 10:08

## 2019-02-10 RX ADMIN — APIXABAN 2.5 MG: 2.5 TABLET, FILM COATED ORAL at 10:08

## 2019-02-10 RX ADMIN — Medication 40 ML: at 14:00

## 2019-02-10 NOTE — PROGRESS NOTES
2/10/2019 INTENSIVIST PROGRESS NOTE:  
 
Patient seen and evaluated, chart reviewed 81 yo female with end stage CMP transferred to ccu with hypotension, cardiogenic shock Started on pressors and remains on Nemesio drip at 30mcg. Pt feels like she has swelling of her hands and feet. No acute events overnight in CCU Now pt in CCU awake, alert, no severe distress 2-10-19: Pt has been doing pretty well. She was off nemeiso this am but has been with borderline BP (MAP of 62). Had some gout pain in her right knee last pm. 
Had some confusion last pm. 
She feels good this am. Has much decreased pain to her right knee. Tolerating po intake. NO chest pain, no back pain, no leg pain. ROS: no sob, no cp Visit Vitals BP 90/57 Pulse 83 Temp 98.3 °F (36.8 °C) Resp 19 Ht 5' 1\" (1.549 m) Wt 64 kg (141 lb) SpO2 98% BMI 26.64 kg/m² Off nemesio when seen. Getting ready to eat breakfast.  
General: no distress, alert, no distress. Pleasant and comfortable. Eyes: anicteric HEENT: dry oral mucosa Neck: FROM 
CV: RRR nl s1,2. Lungs: decreased BS but clear and breathing comfortably. Abd: soft : no flank pain Ext: at least trace to 1+ edema of BLE. No increased temp of the right knee. Skin: no rash Musculoskeletal: normal inspection Neuro: non focal, seems to have good strength of BUE and BLE. Psych: no anxiety or depression. Labs reviewed A/P: 
Shock has been on intermittent Nemesio. UTI Cardiomyopathy appears  Decompensated: Pro BNP over 13K. Peripheral edema. Anticoagulation on Eliquis. Hypothyroid on synthroid. - acute respiratory failure: wean O2 
- cardiogenic shock: wean pressors as tolerated, Weaned off nemesio this am.  
- CHF: diuretics when BP is better to help mobilize some fluid. - ANDREW: creatinine better today 
- UTI: on po levoflox. - DNR but ok with pressors. - palliative care following - Will assist on disposition planning when stable for transfer Olivia Sharma MD

## 2019-02-10 NOTE — PROGRESS NOTES
General Daily Progress Note Admit Date: 2/4/2019 Subjective:  
 
Patient c/o painful r knee - better this am. 
 
Current Facility-Administered Medications Medication Dose Route Frequency  sodium chloride (NS) flush 5-40 mL  5-40 mL IntraVENous Q8H  
 sodium chloride (NS) flush 5-40 mL  5-40 mL IntraVENous PRN  
 HYDROmorphone (PF) (DILAUDID) injection 0.5 mg  0.5 mg IntraVENous Q4H PRN  
 levoFLOXacin (LEVAQUIN) tablet 500 mg  500 mg Oral Q48H  
 mupirocin (BACTROBAN) 2 % ointment   Both Nostrils BID  PHENYLephrine (PF)(BERNICE-SYNEPHRINE) 30 mg in 0.9% sodium chloride 250 mL infusion   mcg/min IntraVENous TITRATE  sodium chloride 0.9 % bolus infusion 250 mL  250 mL IntraVENous PRN  
 lactobac ac& pc-s.therm-b.anim (DAWN Q/RISAQUAD)  1 Cap Oral DAILY  allopurinol (ZYLOPRIM) tablet 300 mg  300 mg Oral DAILY  apixaban (ELIQUIS) tablet 2.5 mg  2.5 mg Oral BID  atorvastatin (LIPITOR) tablet 10 mg  10 mg Oral QHS  fluticasone-vilanterol (BREO ELLIPTA) 100mcg-25mcg/puff  1 Puff Inhalation DAILY  levothyroxine (SYNTHROID) tablet 25 mcg  25 mcg Oral 6am  
 loratadine (CLARITIN) tablet 10 mg  10 mg Oral DAILY PRN  
 magnesium oxide (MAG-OX) tablet 400 mg  400 mg Oral DAILY  albuterol-ipratropium (DUO-NEB) 2.5 MG-0.5 MG/3 ML  3 mL Nebulization Q6H PRN  
 ondansetron (ZOFRAN) injection 4 mg  4 mg IntraVENous Q6H PRN  
 acetaminophen (TYLENOL) tablet 650 mg  650 mg Oral Q4H PRN  
 traMADol (ULTRAM) tablet 50 mg  50 mg Oral Q6H PRN Review of Systems A comprehensive review of systems was negative. Objective:  
 
Patient Vitals for the past 24 hrs: 
 BP Temp Pulse Resp SpO2  
02/10/19 1022 (!) 67/47      
02/10/19 1021 (!) 67/47  91 22 90 % 02/10/19 1019 (!) 72/51  93 19 98 % 02/10/19 0930 (!) 88/58  90 18 99 % 02/10/19 0925 (!) 87/58  93 28 100 % 02/10/19 0830 90/57  83 19 98 % 02/10/19 0800 93/55 98.3 °F (36.8 °C) 71 16 98 % 02/10/19 0731 93/55      
02/10/19 0730 93/55  75 19 99 % 02/10/19 0700 91/57  82 18 100 % 02/10/19 0630 102/58  70 25 98 % 02/10/19 0600 94/55  70 17 99 % 02/10/19 0530 96/53  70 23 99 % 02/10/19 0507 98/52  70 17 98 % 02/10/19 0500 90/54  70 17 98 % 02/10/19 0430 94/60  71 21 98 % 02/10/19 0400 91/64 97.7 °F (36.5 °C) 70 20 97 % 02/10/19 0330 94/58  70 18 97 % 02/10/19 0300 101/58  76 21 99 % 02/10/19 0230 97/57  70 17 96 % 02/10/19 0200 98/57  70 17 97 % 02/10/19 0130 99/59  70 19 96 % 02/10/19 0100 92/56  70 21 97 % 02/10/19 0000 111/63 98.3 °F (36.8 °C) 70 25 97 % 02/09/19 2330 109/63  70 22 97 % 02/09/19 2300 113/63  70 20 98 % 02/09/19 2230 114/60  71 19 96 % 02/09/19 2200 113/67  70 22 95 % 02/09/19 2130 120/67  70 18 100 % 02/09/19 2100 117/69  70 21 98 % 02/09/19 2000 105/63 98 °F (36.7 °C) 75 17 100 % 02/09/19 1918 99/54  80 19 98 % 02/09/19 1830 113/62  70 17 98 % 02/09/19 1800 119/64  70 26 99 % 02/09/19 1730 111/61  70 22 98 % 02/09/19 1700 99/61  85 19 97 % 02/09/19 1630 (!) 88/53  84 26 98 % 02/09/19 1627 (!) 87/47  76 16 97 % 02/09/19 1613 (!) 81/53  81 16 99 % 02/09/19 1600 (!) 80/48 98.2 °F (36.8 °C) 85 27 99 % 02/09/19 1530 (!) 81/47  92 20 96 % 02/09/19 1500 96/48  87 23 97 % 02/09/19 1430 94/67  89 20 100 % 02/09/19 1400 (!) 89/59  70 17 99 % 02/09/19 1330 100/63  74 15 99 % 02/09/19 1300 97/62  76 22 99 % 02/09/19 1234 (!) 88/54  70 22 100 % 02/09/19 1230 (!) 86/54  70 25 97 % 02/10 0701 - 02/10 1900 In: 280 [P.O.:280] Out: -  
02/08 1901 - 02/10 0700 In: 1798.8 [P.O.:490; I.V.:1308.8] Out: 850 [Urine:850] Physical Exam:  
Visit Vitals BP (!) 67/47 Pulse 91 Temp 98.3 °F (36.8 °C) Resp 22 Ht 5' 1\" (1.549 m) Wt 141 lb (64 kg) SpO2 90% BMI 26.64 kg/m² General appearance: alert, cooperative, no distress, appears stated age Neck: supple, symmetrical, trachea midline, no adenopathy, thyroid: not enlarged, symmetric, no tenderness/mass/nodules, no carotid bruit and no JVD Lungs:decreased bs Heart: regular rate and rhythm, S1, S2 normal, no murmur, click, rub or gallop Abdomen: soft, non-tender. Bowel sounds normal. No masses,  no organomegaly Extremities: extremities normal, atraumatic, no cyanosis or edema Data Review Recent Results (from the past 24 hour(s)) CBC WITH AUTOMATED DIFF Collection Time: 02/10/19  4:12 AM  
Result Value Ref Range WBC 2.8 (L) 3.6 - 11.0 K/uL  
 RBC 3.15 (L) 3.80 - 5.20 M/uL HGB 9.7 (L) 11.5 - 16.0 g/dL HCT 28.5 (L) 35.0 - 47.0 % MCV 90.5 80.0 - 99.0 FL  
 MCH 30.8 26.0 - 34.0 PG  
 MCHC 34.0 30.0 - 36.5 g/dL  
 RDW 17.3 (H) 11.5 - 14.5 % PLATELET 893 678 - 775 K/uL MPV 9.9 8.9 - 12.9 FL  
 NRBC 0.0 0  WBC ABSOLUTE NRBC 0.00 0.00 - 0.01 K/uL NEUTROPHILS 58 32 - 75 % LYMPHOCYTES 24 12 - 49 % MONOCYTES 13 5 - 13 % EOSINOPHILS 5 0 - 7 % BASOPHILS 0 0 - 1 % IMMATURE GRANULOCYTES 0 0.0 - 0.5 % ABS. NEUTROPHILS 1.6 (L) 1.8 - 8.0 K/UL  
 ABS. LYMPHOCYTES 0.7 (L) 0.8 - 3.5 K/UL  
 ABS. MONOCYTES 0.4 0.0 - 1.0 K/UL  
 ABS. EOSINOPHILS 0.1 0.0 - 0.4 K/UL  
 ABS. BASOPHILS 0.0 0.0 - 0.1 K/UL  
 ABS. IMM. GRANS. 0.0 0.00 - 0.04 K/UL  
 DF MANUAL    
 RBC COMMENTS ANISOCYTOSIS 
1+ WBC COMMENTS REACTIVE LYMPHS    
METABOLIC PANEL, COMPREHENSIVE Collection Time: 02/10/19  4:12 AM  
Result Value Ref Range Sodium 138 136 - 145 mmol/L Potassium 4.3 3.5 - 5.1 mmol/L Chloride 110 (H) 97 - 108 mmol/L  
 CO2 23 21 - 32 mmol/L Anion gap 5 5 - 15 mmol/L Glucose 87 65 - 100 mg/dL BUN 25 (H) 6 - 20 MG/DL Creatinine 1.12 (H) 0.55 - 1.02 MG/DL  
 BUN/Creatinine ratio 22 (H) 12 - 20 GFR est AA 55 (L) >60 ml/min/1.73m2 GFR est non-AA 45 (L) >60 ml/min/1.73m2 Calcium 7.6 (L) 8.5 - 10.1 MG/DL  Bilirubin, total 0.4 0.2 - 1.0 MG/DL  
 ALT (SGPT) 24 12 - 78 U/L  
 AST (SGOT) 35 15 - 37 U/L Alk. phosphatase 89 45 - 117 U/L Protein, total 5.1 (L) 6.4 - 8.2 g/dL Albumin 1.8 (L) 3.5 - 5.0 g/dL Globulin 3.3 2.0 - 4.0 g/dL A-G Ratio 0.5 (L) 1.1 - 2.2 MAGNESIUM Collection Time: 02/10/19  4:12 AM  
Result Value Ref Range Magnesium 2.3 1.6 - 2.4 mg/dL PHOSPHORUS Collection Time: 02/10/19  4:12 AM  
Result Value Ref Range Phosphorus 2.2 (L) 2.6 - 4.7 MG/DL Assessment:  
 
Active Problems: 
  Cardiomyopathy, dilated, 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 (8/4/2012) Presence of biventricular automatic cardioverter/defibrillator (AICD) (7/2/2015) Overview: [employer] biventricular AICD implant CKD (chronic kidney disease) stage 3, GFR 30-59 ml/min ([facility]) (1/10/2018) Acute encephalopathy (2/4/2019) UTI (urinary tract infection) (2/4/2019) Escherichia coli Diarrhea (2/4/2019) Poor appetite (2/7/2019) Acute pain of right knee (2/7/2019) Counseling regarding advanced care planning and goals of care (2/7/2019) Hypotension due to hypovolemia (2/7/2019) On ronn -dop off Plan:  
 
1. Was off ronn transiently -currently resumed 2. Continue current care- if knee pain flares will use prednisone for relief 3. Continue levaquin

## 2019-02-10 NOTE — PROGRESS NOTES
0710 Bedside and Verbal shift change report given to Bertin Sommers (oncoming nurse) by [employer] (offgoing nurse). Report included the following information SBAR, Kardex, Intake/Output, MAR, Recent Results and Cardiac Rhythm paced. 0800 pt assessed per flowsheet. Pt restful, denies needs except for abd cramping this am, non-tender to palp, denies nausea. 0830 able to wean off of Nemesio, yet pt is currently resting. Will continue to monitor closely. 0900 DR Jonatan Guzman in to assess pt with review of plan of care, no new orders received 1030 after eating, [email or web address] restarted for sustained low bp's. DIL at bedside 1200 pt reassessed per flowsheet, friends at bedside 1400 Nemesio stopped 1600 pt reassessed per flowsheet, pt restful, denies needs 1800 BP lower, pt eating her dinner and talking, asymptomatic, will continue to monitor closely. 1900 Bedside and Verbal shift change report given to Arin (oncoming nurse) by Bertin Sommers (offgoing nurse). Report included the following information SBAR, Kardex, Procedure Summary, Intake/Output, MAR, Recent Results and Cardiac Rhythm paced.

## 2019-02-10 NOTE — PROGRESS NOTES
19:00 - Bedside and Verbal shift change report given to MAXIMUS Hinkle (oncoming nurse) by Vivien Orr RN (offgoing nurse). Report included the following information SBAR, Kardex, MAR, Recent Results and Cardiac Rhythm AV Paced. 20:00 - Assessment completed. VSS. Family at bedside. Wean Nemesio gtt as able to maintain SBP >90, currently infusing at 60mcg/min. 20:30 - Patient c/o gout pain in her R knee, requested her \"pain pill\". Forgetful, reminded her that she just rec'd it at 7pm.  Requested that I refill her ice pack (which was just done as well). I refilled her ice and repositioned the pillow for comfort. Patient states she would like to have the pain pill in an hour. Again, reminded her that the pill would be available to her at 1am.  States the ice is doing well for her now. Requesting her pm meds now so she can sleep. 21:00 - Patient sleeping, will give pm meds at 22:00 or when she awakens. 21:36 - Patient rang, c/o terrible pain in her R knee and demanding her \"Sujata juice\" from the refrigerator. I looked in [facility] fridge and there is nothing there. Also, nothing on her MAR as such. She states it is a natural liquid that she takes \"when nothing else works\" and she sleeps like a baby. Patient claims that she's going to start \"yelling and not stop\" if she does not get some relief and her Sujata juice. I finally convinced her to let me give her some IV pain medication, rubbed her leg gently, and re-adjusted her ice pack and position. She calmed down. Incidentally, a common side effect of Sujata juice is diarrhea, which the patient came in with complaints of. Will add this to her PTA med list and continue to monitor. 22:18 - Patient sleeping restfully. Awakened easily when I spoke to her then drifted back to sleep. 22:39 - Patient sleeping soundly, light snoring. I did not awaken her as I was adjusting Nemesio gtt. 00:00 - No acute changes to assessment. VSS. 00:55 - Patient states her pain is 3/10 in her R knee. Administered prn Ultram and refilled ice pack. Repositioned for comfort. Unable to obtain patient weight as bed scale is only registering 2.2kg- unsure if the bed was accidentally zeroed previously with patient on it or if bed scale is not functioning properly. Hoping patient may get OOB tomorrow if BP remains stable & get an accurate weight. Nemesio is currently infusing at 30mcg/min. 02:00 - Patient sleeping soundly, lightly snoring. 04:00 - No acute changes to assessment. Nemesio currently infusing at 20mcg/min with SBP 91. Will monitor closely. 07:00 - Bedside and Verbal shift change report given to Geovany Strickland RN (oncoming nurse) by Nimesh Dong RN (offgoing nurse). Report included the following information SBAR, Kardex, MAR, Recent Results and Cardiac Rhythm AV Paced. Nemesio gtt currently infusing at 15mcg/min. See MAR for titrations.

## 2019-02-10 NOTE — PROGRESS NOTES
Problem: Falls - Risk of 
Goal: *Absence of Falls Document [place] Rank Fall Risk and appropriate interventions in the flowsheet. Outcome: Progressing Towards Goal 
Fall Risk Interventions: 
Mobility Interventions: Assess mobility with egress test, Bed/chair exit alarm, Communicate number of staff needed for ambulation/transfer, OT consult for ADLs, Patient to call before getting OOB, PT Consult for mobility concerns, PT Consult for assist device competence Mentation Interventions: Bed/chair exit alarm, Adequate sleep, hydration, pain control, Door open when patient unattended, Evaluate medications/consider consulting pharmacy, More frequent rounding, Reorient patient, Room close to nurse's station, Toileting rounds, Update white board Medication Interventions: Bed/chair exit alarm, Evaluate medications/consider consulting pharmacy, Patient to call before getting OOB Elimination Interventions: Bed/chair exit alarm, Call light in reach, Patient to call for help with toileting needs, Toileting schedule/hourly rounds History of Falls Interventions: Bed/chair exit alarm, Consult care management for discharge planning, Door open when patient unattended, Evaluate medications/consider consulting pharmacy, Investigate reason for fall, Room close to nurse's station Problem: Pressure Injury - Risk of 
Goal: *Prevention of pressure injury Document Kimani Scale and appropriate interventions in the flowsheet.  
Outcome: Progressing Towards Goal 
Pressure Injury Interventions: 
Sensory Interventions: Assess changes in LOC, Assess need for specialty bed, Avoid rigorous massage over bony prominences, Check visual cues for pain, Discuss PT/OT consult with provider, Keep linens dry and wrinkle-free, Maintain/enhance activity level, Minimize linen layers, Monitor skin under medical devices, Pad between skin to skin, Pressure redistribution bed/mattress (bed type), Sit a 90-degree angle/use footstool if needed, Turn and reposition approx. every two hours (pillows and wedges if needed), Use 30-degree side-lying position Moisture Interventions: Absorbent underpads, Apply protective barrier, creams and emollients, Assess need for specialty bed, Check for incontinence Q2 hours and as needed, Internal/External urinary devices, Limit adult briefs, Maintain skin hydration (lotion/cream), Minimize layers, Moisture barrier, Offer toileting Q_hr Activity Interventions: Assess need for specialty bed, Pressure redistribution bed/mattress(bed type), PT/OT evaluation Mobility Interventions: Assess need for specialty bed, Float heels, HOB 30 degrees or less, Pressure redistribution bed/mattress (bed type), PT/OT evaluation, Turn and reposition approx. every two hours(pillow and wedges) Nutrition Interventions: Document food/fluid/supplement intake, Discuss nutritional consult with provider, Offer support with meals,snacks and hydration Friction and Shear Interventions: Apply protective barrier, creams and emollients, HOB 30 degrees or less, Lift sheet, Lift team/patient mobility team, Minimize layers, Transferring/repositioning devices

## 2019-02-10 NOTE — PROGRESS NOTES
Cardiology Progress Note [address], [address] [facility]  [phone] 
 
2/10/2019 10:39 AM 
 
Admit Date: 2/4/2019 Admit Diagnosis: Acute encephalopathy [G93.40] Subjective:  
 
iNlda Nolan  Did well overnight and bernice was dc/d. This a, sbp in the 60s and bernice gtt restart. No dizziness/lightheadedness Visit Vitals BP (!) 67/47 Pulse 91 Temp 98.3 °F (36.8 °C) Resp 22 Ht 5' 1\" (1.549 m) Wt 141 lb (64 kg) SpO2 90% BMI 26.64 kg/m² Current Facility-Administered Medications Medication Dose Route Frequency  sodium chloride (NS) flush 5-40 mL  5-40 mL IntraVENous Q8H  
 sodium chloride (NS) flush 5-40 mL  5-40 mL IntraVENous PRN  
 HYDROmorphone (PF) (DILAUDID) injection 0.5 mg  0.5 mg IntraVENous Q4H PRN  
 levoFLOXacin (LEVAQUIN) tablet 500 mg  500 mg Oral Q48H  
 mupirocin (BACTROBAN) 2 % ointment   Both Nostrils BID  PHENYLephrine (PF)(BERNICE-SYNEPHRINE) 30 mg in 0.9% sodium chloride 250 mL infusion   mcg/min IntraVENous TITRATE  sodium chloride 0.9 % bolus infusion 250 mL  250 mL IntraVENous PRN  
 lactobac ac& pc-s.therm-b.anim (DAWN Q/RISAQUAD)  1 Cap Oral DAILY  allopurinol (ZYLOPRIM) tablet 300 mg  300 mg Oral DAILY  apixaban (ELIQUIS) tablet 2.5 mg  2.5 mg Oral BID  atorvastatin (LIPITOR) tablet 10 mg  10 mg Oral QHS  fluticasone-vilanterol (BREO ELLIPTA) 100mcg-25mcg/puff  1 Puff Inhalation DAILY  levothyroxine (SYNTHROID) tablet 25 mcg  25 mcg Oral 6am  
 loratadine (CLARITIN) tablet 10 mg  10 mg Oral DAILY PRN  
 magnesium oxide (MAG-OX) tablet 400 mg  400 mg Oral DAILY  albuterol-ipratropium (DUO-NEB) 2.5 MG-0.5 MG/3 ML  3 mL Nebulization Q6H PRN  
 ondansetron (ZOFRAN) injection 4 mg  4 mg IntraVENous Q6H PRN  
 acetaminophen (TYLENOL) tablet 650 mg  650 mg Oral Q4H PRN  
 traMADol (ULTRAM) tablet 50 mg  50 mg Oral Q6H PRN Objective:  
  
Visit Vitals BP (!) 67/47 Pulse 91  
 Temp 98.3 °F (36.8 °C) Resp 22 Ht 5' 1\" (1.549 m) Wt 141 lb (64 kg) SpO2 90% BMI 26.64 kg/m² Physical Exam: Abdomen: soft, non-tender Extremities: extremities normal 
Heart: regular rate and rhythm Lungs: clear to auscultation bilaterally Pulses: 2+ and symmetric Data Review:  
Labs:   
Recent Labs  
  02/10/19 
0412 02/08/19 
0350 WBC 2.8* 2.8* HGB 9.7* 10.2* HCT 28.5* 30.6*  171 Recent Labs  
  02/10/19 
0412 02/09/19 
0331 02/08/19 
0350  139 137  
K 4.3 4.2 3.9 * 111* 108 CO2 23 20* 21  
GLU 87 84 110* BUN 25* 27* 34* CREA 1.12* 1.16* 1.42* CA 7.6* 7.4* 7.7* MG 2.3  --   --   
PHOS 2.2*  --   --   
ALB 1.8*  --   --   
TBILI 0.4  --   --   
SGOT 35  --   --   
ALT 24  --   -- No results for input(s): TROIQ, CPK, CKMB in the last 72 hours. Intake/Output Summary (Last 24 hours) at 2/10/2019 1039 Last data filed at 2/10/2019 1000 Gross per 24 hour Intake 1124.96 ml Output 350 ml Net 774.96 ml Telemetry: biv paced Assessment:  
 
Active Problems: 
  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  
 
Delvis Garrett is back on her ronn gtt but asymptomatic. Cont supportive care with ivf and abx. Milton Anna MD, [facility] 
 
2/10/2019

## 2019-02-11 LAB
ALBUMIN SERPL-MCNC: 1.7 G/DL (ref 3.5–5)
ALBUMIN/GLOB SERPL: 0.5 {RATIO} (ref 1.1–2.2)
ALP SERPL-CCNC: 79 U/L (ref 45–117)
ALT SERPL-CCNC: 21 U/L (ref 12–78)
ANION GAP SERPL CALC-SCNC: 7 MMOL/L (ref 5–15)
AST SERPL-CCNC: 34 U/L (ref 15–37)
BASOPHILS # BLD: 0 K/UL (ref 0–0.1)
BASOPHILS NFR BLD: 0 % (ref 0–1)
BILIRUB SERPL-MCNC: 0.5 MG/DL (ref 0.2–1)
BUN SERPL-MCNC: 22 MG/DL (ref 6–20)
BUN/CREAT SERPL: 23 (ref 12–20)
CALCIUM SERPL-MCNC: 8 MG/DL (ref 8.5–10.1)
CHLORIDE SERPL-SCNC: 110 MMOL/L (ref 97–108)
CO2 SERPL-SCNC: 22 MMOL/L (ref 21–32)
CREAT SERPL-MCNC: 0.95 MG/DL (ref 0.55–1.02)
DIFFERENTIAL METHOD BLD: ABNORMAL
EOSINOPHIL # BLD: 0.1 K/UL (ref 0–0.4)
EOSINOPHIL NFR BLD: 3 % (ref 0–7)
ERYTHROCYTE [DISTWIDTH] IN BLOOD BY AUTOMATED COUNT: 17.3 % (ref 11.5–14.5)
GLOBULIN SER CALC-MCNC: 3.2 G/DL (ref 2–4)
GLUCOSE SERPL-MCNC: 80 MG/DL (ref 65–100)
HCT VFR BLD AUTO: 26.1 % (ref 35–47)
HGB BLD-MCNC: 8.6 G/DL (ref 11.5–16)
IMM GRANULOCYTES # BLD AUTO: 0 K/UL (ref 0–0.04)
IMM GRANULOCYTES NFR BLD AUTO: 0 % (ref 0–0.5)
LYMPHOCYTES # BLD: 0.8 K/UL (ref 0.8–3.5)
LYMPHOCYTES NFR BLD: 23 % (ref 12–49)
MAGNESIUM SERPL-MCNC: 2.1 MG/DL (ref 1.6–2.4)
MCH RBC QN AUTO: 29.9 PG (ref 26–34)
MCHC RBC AUTO-ENTMCNC: 33 G/DL (ref 30–36.5)
MCV RBC AUTO: 90.6 FL (ref 80–99)
MONOCYTES # BLD: 0 K/UL (ref 0–1)
MONOCYTES NFR BLD: 0 % (ref 5–13)
NEUTS BAND NFR BLD MANUAL: 3 %
NEUTS SEG # BLD: 2.5 K/UL (ref 1.8–8)
NEUTS SEG NFR BLD: 71 % (ref 32–75)
NRBC # BLD: 0 K/UL (ref 0–0.01)
NRBC BLD-RTO: 0 PER 100 WBC
PHOSPHATE SERPL-MCNC: 2.2 MG/DL (ref 2.6–4.7)
PLATELET # BLD AUTO: 184 K/UL (ref 150–400)
PMV BLD AUTO: 10.1 FL (ref 8.9–12.9)
POTASSIUM SERPL-SCNC: 4.5 MMOL/L (ref 3.5–5.1)
PROT SERPL-MCNC: 4.9 G/DL (ref 6.4–8.2)
RBC # BLD AUTO: 2.88 M/UL (ref 3.8–5.2)
RBC MORPH BLD: ABNORMAL
SODIUM SERPL-SCNC: 139 MMOL/L (ref 136–145)
WBC # BLD AUTO: 3.4 K/UL (ref 3.6–11)

## 2019-02-11 PROCEDURE — 80053 COMPREHEN METABOLIC PANEL: CPT

## 2019-02-11 PROCEDURE — 74011250637 HC RX REV CODE- 250/637: Performed by: INTERNAL MEDICINE

## 2019-02-11 PROCEDURE — 65660000000 HC RM CCU STEPDOWN

## 2019-02-11 PROCEDURE — 36415 COLL VENOUS BLD VENIPUNCTURE: CPT

## 2019-02-11 PROCEDURE — 85025 COMPLETE CBC W/AUTO DIFF WBC: CPT

## 2019-02-11 PROCEDURE — 97530 THERAPEUTIC ACTIVITIES: CPT

## 2019-02-11 PROCEDURE — 83735 ASSAY OF MAGNESIUM: CPT

## 2019-02-11 PROCEDURE — 84100 ASSAY OF PHOSPHORUS: CPT

## 2019-02-11 RX ORDER — PHENYLEPHRINE HCL IN 0.9% NACL 30MG/250ML
10-100 PLASTIC BAG, INJECTION (ML) INTRAVENOUS
Status: DISCONTINUED | OUTPATIENT
Start: 2019-02-11 | End: 2019-02-14

## 2019-02-11 RX ORDER — CIPROFLOXACIN 500 MG/1
500 TABLET ORAL EVERY 12 HOURS
Status: DISCONTINUED | OUTPATIENT
Start: 2019-02-11 | End: 2019-02-11

## 2019-02-11 RX ORDER — ALLOPURINOL 100 MG/1
200 TABLET ORAL DAILY
Status: DISCONTINUED | OUTPATIENT
Start: 2019-02-12 | End: 2019-02-19 | Stop reason: HOSPADM

## 2019-02-11 RX ORDER — MIDODRINE HYDROCHLORIDE 5 MG/1
5 TABLET ORAL
Status: DISCONTINUED | OUTPATIENT
Start: 2019-02-11 | End: 2019-02-12

## 2019-02-11 RX ADMIN — ACETAMINOPHEN 650 MG: 325 TABLET ORAL at 19:56

## 2019-02-11 RX ADMIN — Medication 10 ML: at 21:36

## 2019-02-11 RX ADMIN — Medication 400 MG: at 08:46

## 2019-02-11 RX ADMIN — Medication 1 CAPSULE: at 08:46

## 2019-02-11 RX ADMIN — APIXABAN 2.5 MG: 2.5 TABLET, FILM COATED ORAL at 08:46

## 2019-02-11 RX ADMIN — MIDODRINE HYDROCHLORIDE 5 MG: 5 TABLET ORAL at 12:00

## 2019-02-11 RX ADMIN — ALLOPURINOL 300 MG: 300 TABLET ORAL at 08:48

## 2019-02-11 RX ADMIN — MIDODRINE HYDROCHLORIDE 5 MG: 5 TABLET ORAL at 16:53

## 2019-02-11 RX ADMIN — Medication 10 ML: at 05:36

## 2019-02-11 RX ADMIN — APIXABAN 2.5 MG: 2.5 TABLET, FILM COATED ORAL at 17:51

## 2019-02-11 RX ADMIN — LEVOTHYROXINE SODIUM 25 MCG: 25 TABLET ORAL at 05:36

## 2019-02-11 RX ADMIN — MUPIROCIN: 20 OINTMENT TOPICAL at 08:47

## 2019-02-11 RX ADMIN — FLUTICASONE FUROATE AND VILANTEROL TRIFENATATE 1 PUFF: 100; 25 POWDER RESPIRATORY (INHALATION) at 08:47

## 2019-02-11 RX ADMIN — ATORVASTATIN CALCIUM 10 MG: 10 TABLET, FILM COATED ORAL at 21:36

## 2019-02-11 NOTE — PROGRESS NOTES
Problem: Falls - Risk of 
Goal: *Absence of Falls Document Radha Steenuber Fall Risk and appropriate interventions in the flowsheet. Outcome: Progressing Towards Goal 
Fall Risk Interventions: 
Mobility Interventions: Bed/chair exit alarm, Communicate number of staff needed for ambulation/transfer, Patient to call before getting OOB, PT Consult for mobility concerns, PT Consult for assist device competence, OT consult for ADLs, Strengthening exercises (ROM-active/passive) Mentation Interventions: Adequate sleep, hydration, pain control, Bed/chair exit alarm, Door open when patient unattended, Evaluate medications/consider consulting pharmacy, More frequent rounding, Reorient patient, Room close to nurse's station, Toileting rounds, Update white board Medication Interventions: Bed/chair exit alarm, Evaluate medications/consider consulting pharmacy Elimination Interventions: Bed/chair exit alarm, Call light in reach, Patient to call for help with toileting needs, Toileting schedule/hourly rounds History of Falls Interventions: Bed/chair exit alarm, Consult care management for discharge planning, Door open when patient unattended, Evaluate medications/consider consulting pharmacy, Investigate reason for fall, Room close to nurse's station Problem: Pressure Injury - Risk of 
Goal: *Prevention of pressure injury Document Kimani Scale and appropriate interventions in the flowsheet.  
Outcome: Progressing Towards Goal 
Pressure Injury Interventions: 
Sensory Interventions: Assess changes in LOC, Assess need for specialty bed, Avoid rigorous massage over bony prominences, Check visual cues for pain, Discuss PT/OT consult with provider, Float heels, Keep linens dry and wrinkle-free, Maintain/enhance activity level, Minimize linen layers, Monitor skin under medical devices, Pad between skin to skin, Pressure redistribution bed/mattress (bed type), Sit a 90-degree angle/use footstool if needed, Turn and reposition approx. every two hours (pillows and wedges if needed) Moisture Interventions: Absorbent underpads, Apply protective barrier, creams and emollients, Assess need for specialty bed, Check for incontinence Q2 hours and as needed, Limit adult briefs, Maintain skin hydration (lotion/cream), Minimize layers, Moisture barrier Activity Interventions: Assess need for specialty bed, Pressure redistribution bed/mattress(bed type), PT/OT evaluation Mobility Interventions: Assess need for specialty bed, Float heels, HOB 30 degrees or less, Pressure redistribution bed/mattress (bed type), PT/OT evaluation, Turn and reposition approx. every two hours(pillow and wedges) Nutrition Interventions: Document food/fluid/supplement intake, Discuss nutritional consult with provider, Offer support with meals,snacks and hydration Friction and Shear Interventions: Apply protective barrier, creams and emollients, HOB 30 degrees or less, Lift sheet, Lift team/patient mobility team, Minimize layers

## 2019-02-11 NOTE — PROGRESS NOTES
Problem: Mobility Impaired (Adult and Pediatric) Goal: *Acute Goals and Plan of Care (Insert Text) Physical Therapy Goals Initiated 2/5/2019 1. Patient will move from supine to sit and sit to supine , scoot up and down and roll side to side in bed with independence within 7 day(s). 2.  Patient will transfer from bed to chair and chair to bed with modified independence using the least restrictive device within 7 day(s). 3.  Patient will perform sit to stand with independence within 7 day(s). 4.  Patient will ambulate with modified supervision for 150 feet with the least restrictive device within 7 day(s). 5.  Patient will ascend/descend 5 stairs with handrail(s) with minimal assistance/contact guard assist within 7 day(s). physical Therapy TREATMENT Patient: Hiram Orozco (63 y.o. female) Date: 2/11/2019 Diagnosis: Acute encephalopathy [G93.40] <principal problem not specified> Precautions: Fall Chart, physical therapy assessment, plan of care and goals were reviewed. ASSESSMENT: 
Pt received supine in bed with family present and agreeable to PT intervention. Pt cleared by nursing for mobility. Pt with slow progress towards therapy goals and has been limited by hypotension and IV pressor needs. She demonstrates need for increased assistance compared to prior therapy sessions, needing min-mod A x 1-2 for bed mobility and mod-max A x 2 for sit<>stand transfers. Sitting balance good-fair while sitting on EOB ~ 2 minutes. BP noted below for session. Unable to obtain BP standing due to amount of external support patient required to safely maintain standing. Pt without c/o lightheadedness/dizziness, however was very limited by fatigue and R knee gout pain. She stood from EOB x 2 with RW support to remain standing, however only able to tolerate ~ 20 seconds with mod A x 2 and RW to maintain standing.  Needed verbal and tactile cueing to improve hip and trunk extension in standing. Pt with increased fatigue and worsening R knee pain, requesting to return to bed. Pt was assisted back into bed and left with all needs met, RN aware, VSS, and family present following therapy session. Recommend pt discharge to SNF rehab to improve functional mobility and independence prior to returning home. BP supine: 88/66 BP sittin/73 BP supine post-activity: 103/59 Progression toward goals: 
[]    Improving appropriately and progressing toward goals [x]    Improving slowly and progressing toward goals 
[]    Not making progress toward goals and plan of care will be adjusted PLAN: 
Patient continues to benefit from skilled intervention to address the above impairments. Continue treatment per established plan of care. Discharge Recommendations:  Abhi Damon Further Equipment Recommendations for Discharge:  TBD by rehab SUBJECTIVE:  
Patient stated I want to look pretty.  OBJECTIVE DATA SUMMARY:  
Critical Behavior: 
Neurologic State: Alert, Confused Orientation Level: Disoriented to time, Oriented to person, Oriented to place Cognition: Follows commands Safety/Judgement: Awareness of environment, Insight into deficits Functional Mobility Training: 
Bed Mobility: 
Rolling: Minimum assistance Supine to Sit: Moderate assistance Sit to Supine: Moderate assistance Scooting: Minimum assistance Transfers: 
Sit to Stand: Moderate assistance;Maximum assistance;Assist x2 Stand to Sit: Moderate assistance Balance: 
Sitting: Impaired Sitting - Static: Good (unsupported) Sitting - Dynamic: Fair (occasional) Standing: Impaired; With support Standing - Static: Poor Standing - Dynamic : PoorPain: 
Pain Scale 1: Numeric (0 - 10) Pain Intensity 1: 0 Activity Tolerance:  
Fair/limited - increased fatigue with minimal activity;  SaO2 stable on RA; -120s bpm; BP stable; 7/10 R knee gout pain with activity Please refer to the flowsheet for vital signs taken during this treatment. After treatment:  
[]    Patient left in no apparent distress sitting up in chair 
[x]    Patient left in no apparent distress in bed 
[x]    Call bell left within reach [x]    Nursing notified 
[x]    Caregiver present 
[]    Bed alarm activated COMMUNICATION/COLLABORATION:  
The patients plan of care was discussed with: Physical Therapist and Registered Nurse Robbin Delatorre, PT, DPT Time Calculation: 30 mins

## 2019-02-11 NOTE — PROGRESS NOTES
General Daily Progress Note Admit Date: 2/4/2019 Subjective:  
 
Patient has no complaint . Current Facility-Administered Medications Medication Dose Route Frequency  [START ON 2/12/2019] allopurinol (ZYLOPRIM) tablet 200 mg  200 mg Oral DAILY  ciprofloxacin HCl (CIPRO) tablet 500 mg  500 mg Oral Q12H  
 sodium chloride (NS) flush 5-40 mL  5-40 mL IntraVENous Q8H  
 sodium chloride (NS) flush 5-40 mL  5-40 mL IntraVENous PRN  
 sodium chloride 0.9 % bolus infusion 250 mL  250 mL IntraVENous PRN  
 lactobac ac& pc-s.therm-b.anim (DAWN Q/RISAQUAD)  1 Cap Oral DAILY  apixaban (ELIQUIS) tablet 2.5 mg  2.5 mg Oral BID  atorvastatin (LIPITOR) tablet 10 mg  10 mg Oral QHS  fluticasone-vilanterol (BREO ELLIPTA) 100mcg-25mcg/puff  1 Puff Inhalation DAILY  levothyroxine (SYNTHROID) tablet 25 mcg  25 mcg Oral 6am  
 loratadine (CLARITIN) tablet 10 mg  10 mg Oral DAILY PRN  
 magnesium oxide (MAG-OX) tablet 400 mg  400 mg Oral DAILY  albuterol-ipratropium (DUO-NEB) 2.5 MG-0.5 MG/3 ML  3 mL Nebulization Q6H PRN  
 ondansetron (ZOFRAN) injection 4 mg  4 mg IntraVENous Q6H PRN  
 acetaminophen (TYLENOL) tablet 650 mg  650 mg Oral Q4H PRN  
 traMADol (ULTRAM) tablet 50 mg  50 mg Oral Q6H PRN Review of Systems A comprehensive review of systems was negative. Objective:  
 
Patient Vitals for the past 24 hrs: 
 BP Temp Pulse Resp SpO2  
02/11/19 0900 105/63  91 18 100 % 02/11/19 0800 (!) 85/55 98.6 °F (37 °C) 75 20 97 % 02/11/19 0700 108/59  89 26 99 % 02/11/19 0630 99/60  70 19 98 % 02/11/19 0600 96/55  70 17 98 % 02/11/19 0530 107/61  79 20 97 % 02/11/19 0500 100/52  79 19 100 % 02/11/19 0430 95/53  71 18 98 % 02/11/19 0400 98/57 97.8 °F (36.6 °C) 82 20 97 % 02/11/19 0300 101/52  78 16 98 % 02/11/19 0200 102/55  71 22 98 % 02/11/19 0130 97/61  87 19 100 % 02/11/19 0100 102/53  73 16 98 % 02/11/19 0030 92/60  83 21 98 % 02/11/19 0000 93/58 98 °F (36.7 °C) 83 19 98 % 02/10/19 2330 96/57  89 15 98 % 02/10/19 2320 (!) 79/54  89 19 97 % 02/10/19 2305 (!) 85/51  89 21 98 % 02/10/19 2300 (!) 82/53  90 21 97 % 02/10/19 2230 101/62  87 17 99 % 02/10/19 2200 (!) 89/57  86 (!) 38 98 % 02/10/19 2130 95/57  87 21 97 % 02/10/19 2100 (!) 87/59  87 20 98 % 02/10/19 2030 (!) 85/57  91 19 98 % 02/10/19 2015 92/54  100 22 97 % 02/10/19 2000 (!) 86/51  98 23 100 % 02/10/19 1958 90/58 97.8 °F (36.6 °C) 100 27 100 % 02/10/19 1945 (!) 86/56  (!) 102 22 97 % 02/10/19 1930 (!) 77/54  (!) 101 24 98 % 02/10/19 1919 (!) 78/56      
02/10/19 1907 (!) 80/52  98 27 98 % 02/10/19 1900 (!) 85/55  99 25 100 % 02/10/19 1854 (!) 87/51  (!) 101 26 95 % 02/10/19 1800 91/59  96 22 100 % 02/10/19 1730 99/56  90 14 100 % 02/10/19 1700 90/56  86 20 98 % 02/10/19 1632 (!) 86/53  87 27 98 % 02/10/19 1630 (!) 77/51  87 22 98 % 02/10/19 1600 (!) 85/56 98.1 °F (36.7 °C) 87 21 98 % 02/10/19 1530 95/57  99 21 97 % 02/10/19 1500 137/85  93 19 97 % 02/10/19 1430 148/63  89 19 (!) 82 % 02/10/19 1400 99/58  85 22   
02/10/19 1330 94/55  82 21 100 % 02/10/19 1300 106/54  71 20 99 % 02/10/19 1230 93/59  71 17 99 % 02/10/19 1200 93/54 98.2 °F (36.8 °C) 70 22 98 % 02/10/19 1130 (!) 81/53  78 18 100 % 02/10/19 1030 (!) 76/56  87 22 100 % 02/10/19 1022 (!) 67/47      
02/10/19 1021 (!) 67/47  91 22 90 % 02/10/19 1019 (!) 72/51  93 19 98 % 02/10/19 0930 (!) 88/58  90 18 99 % 02/10/19 0925 (!) 87/58  93 28 100 % 02/11 0701 - 02/11 1900 In: 5.5 [I.V.:5.5] Out: -  
02/09 1901 - 02/11 0700 In: 4815 [P.O.:820; I.V.:319] Out: 1100 [Urine:1100] Physical Exam:  
Visit Vitals /63 Pulse 91 Temp 98.6 °F (37 °C) Resp 18 Ht 5' 1\" (1.549 m) Wt 141 lb (64 kg) SpO2 100% BMI 26.64 kg/m² General appearance: alert, cooperative, no distress, appears stated age Neck: supple, symmetrical, trachea midline, no adenopathy, thyroid: not enlarged, symmetric, no tenderness/mass/nodules, no carotid bruit and no JVD Heart: regular rate and rhythm, S1, S2 normal, no murmur, click, rub or gallop Abdomen: soft, non-tender. Bowel sounds normal. No masses,  no organomegaly Extremities: edema 1+ Data Review Recent Results (from the past 24 hour(s)) CBC WITH AUTOMATED DIFF Collection Time: 02/11/19  4:48 AM  
Result Value Ref Range WBC 3.4 (L) 3.6 - 11.0 K/uL  
 RBC 2.88 (L) 3.80 - 5.20 M/uL HGB 8.6 (L) 11.5 - 16.0 g/dL HCT 26.1 (L) 35.0 - 47.0 % MCV 90.6 80.0 - 99.0 FL  
 MCH 29.9 26.0 - 34.0 PG  
 MCHC 33.0 30.0 - 36.5 g/dL  
 RDW 17.3 (H) 11.5 - 14.5 % PLATELET 019 738 - 644 K/uL MPV 10.1 8.9 - 12.9 FL  
 NRBC 0.0 0  WBC ABSOLUTE NRBC 0.00 0.00 - 0.01 K/uL NEUTROPHILS 71 32 - 75 % BAND NEUTROPHILS 3 % LYMPHOCYTES 23 12 - 49 % MONOCYTES 0 (L) 5 - 13 % EOSINOPHILS 3 0 - 7 % BASOPHILS 0 0 - 1 % IMMATURE GRANULOCYTES 0 0.0 - 0.5 % ABS. NEUTROPHILS 2.5 1.8 - 8.0 K/UL  
 ABS. LYMPHOCYTES 0.8 0.8 - 3.5 K/UL  
 ABS. MONOCYTES 0.0 0.0 - 1.0 K/UL  
 ABS. EOSINOPHILS 0.1 0.0 - 0.4 K/UL  
 ABS. BASOPHILS 0.0 0.0 - 0.1 K/UL  
 ABS. IMM. GRANS. 0.0 0.00 - 0.04 K/UL  
 DF MANUAL    
 RBC COMMENTS ANISOCYTOSIS 
1+ METABOLIC PANEL, COMPREHENSIVE Collection Time: 02/11/19  4:48 AM  
Result Value Ref Range Sodium 139 136 - 145 mmol/L Potassium 4.5 3.5 - 5.1 mmol/L Chloride 110 (H) 97 - 108 mmol/L  
 CO2 22 21 - 32 mmol/L Anion gap 7 5 - 15 mmol/L Glucose 80 65 - 100 mg/dL BUN 22 (H) 6 - 20 MG/DL Creatinine 0.95 0.55 - 1.02 MG/DL  
 BUN/Creatinine ratio 23 (H) 12 - 20 GFR est AA >60 >60 ml/min/1.73m2 GFR est non-AA 55 (L) >60 ml/min/1.73m2 Calcium 8.0 (L) 8.5 - 10.1 MG/DL  Bilirubin, total 0.5 0.2 - 1.0 MG/DL  
 ALT (SGPT) 21 12 - 78 U/L  
 AST (SGOT) 34 15 - 37 U/L  
 Alk. phosphatase 79 45 - 117 U/L Protein, total 4.9 (L) 6.4 - 8.2 g/dL Albumin 1.7 (L) 3.5 - 5.0 g/dL Globulin 3.2 2.0 - 4.0 g/dL A-G Ratio 0.5 (L) 1.1 - 2.2 MAGNESIUM Collection Time: 02/11/19  4:48 AM  
Result Value Ref Range Magnesium 2.1 1.6 - 2.4 mg/dL PHOSPHORUS Collection Time: 02/11/19  4:48 AM  
Result Value Ref Range Phosphorus 2.2 (L) 2.6 - 4.7 MG/DL Assessment:  
 
Active Problems: 
  Cardiomyopathy, dilated, 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 (8/4/2012) Presence of biventricular automatic cardioverter/defibrillator (AICD) (7/2/2015) Overview: [employer] biventricular AICD implant CKD (chronic kidney disease) stage 3, GFR 30-59 ml/min ([facility]) (1/10/2018) Acute encephalopathy (2/4/2019) UTI (urinary tract infection) (2/4/2019) Diarrhea (2/4/2019) Poor appetite (2/7/2019) Acute pain of right knee (2/7/2019) Counseling regarding advanced care planning and goals of care (2/7/2019) Hypotension due to hypovolemia (2/7/2019) Plan: 1.  BP improving. Continue vigilance. 2.  There is space fluid secondary to cardiac decompensation. 3.  We will continue treatment of urinary tract infection. 4.  Will mobilize.

## 2019-02-11 NOTE — PROGRESS NOTES
-Hematology / Oncology ([facility]) - 
-Primary Oncologist- Dr. Alvaro Vazquez 
-Stephen Richmond she is very weak but feels improved. -O-  
  
Patient Vitals for the past 24 hrs: 
 Temp Pulse Resp BP SpO2  
02/11/19 0900  91 18 105/63 100 % 02/11/19 0800 98.6 °F (37 °C) 75 20 (!) 85/55 97 % 02/11/19 0700  89 26 108/59 99 % 02/11/19 0630  70 19 99/60 98 % 02/11/19 0600  70 17 96/55 98 % 02/11/19 0530  79 20 107/61 97 % 02/11/19 0500  79 19 100/52 100 % 02/11/19 0430  71 18 95/53 98 % 02/11/19 0400 97.8 °F (36.6 °C) 82 20 98/57 97 % 02/11/19 0300  78 16 101/52 98 % 02/11/19 0200  71 22 102/55 98 % 02/11/19 0130  87 19 97/61 100 % 02/11/19 0100  73 16 102/53 98 % 02/11/19 0030  83 21 92/60 98 % 02/11/19 0000 98 °F (36.7 °C) 83 19 93/58 98 % 02/10/19 2330  89 15 96/57 98 % 02/10/19 2320  89 19 (!) 79/54 97 % 02/10/19 2305  89 21 (!) 85/51 98 % 02/10/19 2300  90 21 (!) 82/53 97 % 02/10/19 2230  87 17 101/62 99 % 02/10/19 2200  86 (!) 38 (!) 89/57 98 % 02/10/19 2130  87 21 95/57 97 % 02/10/19 2100  87 20 (!) 87/59 98 % 02/10/19 2030  91 19 (!) 85/57 98 % 02/10/19 2015  100 22 92/54 97 % 02/10/19 2000  98 23 (!) 86/51 100 % 02/10/19 1958 97.8 °F (36.6 °C) 100 27 90/58 100 % 02/10/19 1945  (!) 102 22 (!) 86/56 97 % 02/10/19 1930  (!) 101 24 (!) 77/54 98 % 02/10/19 1919    (!) 78/56   
02/10/19 1907  98 27 (!) 80/52 98 % 02/10/19 1900  99 25 (!) 85/55 100 % 02/10/19 1854  (!) 101 26 (!) 87/51 95 % 02/10/19 1800  96 22 91/59 100 % 02/10/19 1730  90 14 99/56 100 % 02/10/19 1700  86 20 90/56 98 % 02/10/19 1632  87 27 (!) 86/53 98 % 02/10/19 1630  87 22 (!) 77/51 98 % 02/10/19 1600 98.1 °F (36.7 °C) 87 21 (!) 85/56 98 % 02/10/19 1530  99 21 95/57 97 % 02/10/19 1500  93 19 137/85 97 % 02/10/19 1430  89 19 148/63 (!) 82 % 02/10/19 1400  85 22 99/58   
02/10/19 1330  82 21 94/55 100 % 02/10/19 1300  71 20 106/54 99 % 02/10/19 1230  71 17 93/59 99 % 02/10/19 1200 98.2 °F (36.8 °C) 70 22 93/54 98 % 02/11 0701 - 02/11 1900 In: 5.5 [I.V.:5.5] Out: -  
Gen: nad, pleaseant Chest: decreased at bases per anterior exam 
Cardiac: rrr Abd: s/nt Ext:1+ edema to knees b/l 
 
-Labs-   
Recent Labs  
  02/11/19 
0448 02/10/19 
6367 02/09/19 
5014 WBC 3.4* 2.8*  --   
HGB 8.6* 9.7*  --   
 179  --   
ANEU 2.5 1.6*  --   
 138 139  
K 4.5 4.3 4.2 GLU 80 87 84 BUN 22* 25* 27* CREA 0.95 1.12* 1.16* ALT 21 24  --   
SGOT 34 35  --   
TBILI 0.5 0.4  --   
AP 79 89  --   
CA 8.0* 7.6* 7.4* MG 2.1 2.3  --   
PHOS 2.2* 2.2*  --   
 
 
-Imaging-  
 
 
-Assessment + Plan-    
*) CLL: 
- Follows with Dr. Puneet Cortes while admitted - cbc is stable *) UTI: 
- abx per primary *) Cardiogenic Shock: - She is off pressors - Cardiology following

## 2019-02-11 NOTE — PROGRESS NOTES
0700- shift report received from MAXIMUS Hinkle 
Pt up eating bfast. Nemesio has been off since 0700.  
 
1400- Pt worked with PT, BP remained stable 1500- Pt bathed, states \"I feel so good, so much better than yesterday\" 1830- Pt has been off Nemesio all day. Started Midodrine PO today.

## 2019-02-11 NOTE — PROGRESS NOTES
19:00 - Bedside and Verbal shift change report given to Arin RN (oncoming nurse) by Linsey Cameron RN (offgoing nurse). Report included the following information SBAR, Kardex, Intake/Output, MAR, Recent Results and Cardiac Rhythm AV Paced. 19:19 - SBPs 78-80s. Restarted Nemesio gtt at 10mcg/min. Patient is asymptomatic, chatting with family and friends at bedside. Will monitor. 19:51 - Patient c/o 3/10 R knee pain. Medicated with prn Ultram and given ice pack per request.  Patient requests her nighttime medication now in case she falls asleep and won't be bothered. Family (daughter-in-law) at bedside in agreement. Updated on plan of care. 20:16 - BP improved. Family left and patient states she's \"tucking in for the night\". Call bell in reach. 00:00 - No acute changes to assessment. BP stable with Nemesio infusing at 15mcg/min. 04:00 - No acute changes to assessment. VSS. Denies pain at this time. 07:06 - SBP stable, stopped Nemesio gtt. Daughter-in-law at bedside to see MD this AM. Bedside and Verbal shift change report given to Adelia Jorge RN (oncoming nurse) by Sydney Baires RN (offgoing nurse). Report included the following information SBAR, Kardex, MAR, Recent Results and Cardiac Rhythm AV Paced.

## 2019-02-11 NOTE — PROGRESS NOTES
[facility] INTENSIVIST Consult Service NotePulmonary, Critical Care, and Sleep Medicine Name: Denisse Goldsmith MRN: [id number] : 3/17/1925 Hospital: [address] Date: 2019  Admission date: 2019 Hospital Day: 8 INTENSIVIST PROGRESS NOTE:  
 
Patient seen and evaluated, chart reviewed 79 yo female with end stage CMP transferred to ccu with hypotension, cardiogenic shock Started on pressors and remains on Nemesio drip at 30mcg. Pt feels like she has swelling of her hands and feet. No acute events overnight in CCU Now pt in CCU awake, alert, no severe distress 2-10-19: Pt has been doing pretty well. She was off nemesio this am but has been with borderline BP (MAP of 62). Had some gout pain in her right knee last pm. 
Had some confusion last pm. 
She feels good this am. Has much decreased pain to her right knee. Tolerating po intake. NO chest pain, no back pain, no leg pain.  on and off IV nemesio all night. SBP in 70's but no sx. ROS: no sob, no cp MEDS:  
Current Facility-Administered Medications Medication  [START ON 2019] allopurinol (ZYLOPRIM) tablet 200 mg  
 midodrine (PROAMITINE) tablet 5 mg  PHENYLephrine (PF)(NEMESIO-SYNEPHRINE) 30 mg in 0.9% sodium chloride 250 mL infusion  sodium chloride (NS) flush 5-40 mL  sodium chloride (NS) flush 5-40 mL  sodium chloride 0.9 % bolus infusion 250 mL  lactobac ac& pc-s.therm-b.anim (DAWN Q/RISAQUAD)  apixaban (ELIQUIS) tablet 2.5 mg  
 atorvastatin (LIPITOR) tablet 10 mg  
 fluticasone-vilanterol (BREO ELLIPTA) 100mcg-25mcg/puff  levothyroxine (SYNTHROID) tablet 25 mcg  loratadine (CLARITIN) tablet 10 mg  
 magnesium oxide (MAG-OX) tablet 400 mg  
 albuterol-ipratropium (DUO-NEB) 2.5 MG-0.5 MG/3 ML  
 ondansetron (ZOFRAN) injection 4 mg  acetaminophen (TYLENOL) tablet 650 mg  
 traMADol (ULTRAM) tablet 50 mg Visit Vitals BP (!) 85/55 Pulse 75 Temp 98.6 °F (37 °C) Resp 20 Ht 5' 1\" (1.549 m) Wt 64 kg (141 lb) SpO2 97% BMI 26.64 kg/m² General: petite elderly AAF no distress, alert, no distress. Pleasant and comfortable. Eyes: anicteric HEENT: dry oral mucosa Neck: FROM 
CV: RRR nl s1,2. Lungs: decreased BS but clear and breathing comfortably. Abd: soft : no flank pain Ext: at least trace to 1+ edema of BLE. No increased temp of the right knee. Skin: no rash Musculoskeletal: normal inspection Neuro: non focal, seems to have good strength of BUE and BLE. Psych: no anxiety or depression. Labs: 
 
Recent Labs  
  02/11/19 
0448 02/10/19 
3766 WBC 3.4* 2.8* HGB 8.6* 9.7*  179 Recent Labs  
  02/11/19 
0448 02/10/19 
9772 02/09/19 
9131  138 139  
K 4.5 4.3 4.2 * 110* 111* CO2 22 23 20* GLU 80 87 84 BUN 22* 25* 27* CREA 0.95 1.12* 1.16* CA 8.0* 7.6* 7.4* MG 2.1 2.3  --   
PHOS 2.2* 2.2*  --   
ALB 1.7* 1.8*  --   
SGOT 34 35  --   
ALT 21 24  -- No results for input(s): PH, PCO2, PO2, HCO3, FIO2 in the last 72 hours. No results for input(s): CPK, CKNDX, TROIQ in the last 72 hours. No lab exists for component: CPKMB Lab Results Component Value Date/Time  (H) 02/05/2017 03:50 AM  
 B-type Natriuretic Peptide 454.4 (H) 09/28/2018 04:32 PM  
  
 
 
A/P: 
 
Hypotension, Shock has been on intermittent Nemesio. - labile; will start some midodrine; once off IV n eo will move to floor UTI Cardiomyopathy appears  Decompensated: Pro BNP over 13K. Peripheral edema. Anticoagulation on Eliquis. Hypothyroid on synthroid. acute respiratory failure: wean O2 
cardiogenic shock: wean pressors as tolerated, Weaned off nemesio this am.  
CHF: diuretics when BP is better to help mobilize some fluid. ANDREW: creatinine better today UTI: on po levoflox completed course DNR but ok with pressors 
palliative care following Will assist on disposition planning when stable for transfer Jorge Banda MD

## 2019-02-11 NOTE — PROGRESS NOTES
Critical care interdisciplinary rounds held on 02/11/2019. Following members present, Pharmacy, Diabetes Treatment, Case Management, Clinical Lead, Palliative Care and Nutrition. Led by CRISTY Bishop RN and Dr. Adiel Salvdaor. Plan of care discussed. See clinical pathway for plan of care and interventions and desired outcomes.

## 2019-02-11 NOTE — PROGRESS NOTES
Music Therapy Assessment [place] [id number]  [id number] 3/17/1925  80 y.o.  female Patient Telephone Number: [phone] (home) [place] Affiliation: Faith  
Language: [place] Extended Emergency Contact Information Primary Emergency Contact: Georgiana Ruiz [address] Home Phone: [phone] Mobile Phone: [phone] Relation: Son 
Secondary Emergency Contact: Ahsan Bueno [address] Home Phone: [phone] Relation: Other Relative Patient Active Problem List  
 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  
 NHL (non-Hodgkin's lymphoma) ([address] 08/04/2012 Priority: 1 - One  Abdominal pain 05/22/2012 Priority: 1 - One  
  Class: Acute  Weight loss 05/22/2012 Priority: 1 - One  
  Class: Acute  DILAN (obstructive sleep apnea) 08/04/2012 Priority: 2 - Two  Anemia 11/08/2014 Priority: 3 - Three  Poor appetite 02/07/2019  Acute pain of right knee 02/07/2019  Counseling regarding advanced care planning and goals of care 02/07/2019  Hypotension due to hypovolemia 02/07/2019  Acute encephalopathy 02/04/2019  UTI (urinary tract infection) 02/04/2019  Diarrhea 02/04/2019  Nonrheumatic aortic valve insufficiency 10/16/2018  Seasonal allergic rhinitis 07/02/2018  Acquired hypothyroidism 03/18/2018  Aneurysmal dilatation ([address] 03/18/2018  CKD (chronic kidney disease) stage 3, GFR 30-59 ml/min ([facility]) 01/10/2018  Xerosis of skin 12/21/2017  Dyslipidemia 12/21/2017  Paroxysmal atrial fibrillation ([address] 10/21/2017  Acute respiratory insufficiency 02/02/2017  Gastroesophageal reflux disease with esophagitis 02/02/2017  Thoracic aortic aneurysm without rupture ([address] 02/02/2017  Physical deconditioning 01/29/2017  Mitral valvular regurgitation 01/22/2016  Lymphoma ([address] 11/17/2015  Chronic systolic congestive heart failure [facility] 10/08/2015  Presence of biventricular automatic cardioverter/defibrillator (AICD) 07/02/2015  LBBB (left bundle branch block) 07/01/2015  
 HTN (hypertension) 06/29/2015  Gout 06/29/2015  Reactive airway disease 03/23/2015  Rhinitis 09/04/2014 Date: 2/11/2019 Mental Status:   [x] Alert [  ] Kerwin Chauncey [  ]  Confused  [  ] Minimally responsive Communication Status: [  ] Impaired Speech [  ] Nonverbal -N/A Physical Status:   [  ] Oxygen in use  [  ] Hard of Hearing [  ] Vision Impaired [  ] Ambulatory  [  ] Ambulatory with assistance [  ] Non-ambulatory -N/A Music Preferences, Background: N/A: Please see Session Observations below. Clinical Problem addressed: N/A: Please see Session Observations below. Goal(s) met in session: N/A: Please see Session Observations below. Physical/Pain management (Scale of 1-10): Pre-session rating ___________    Post-session rating __________  
[  ] Increased relaxation   [  ] Regulated breathing patterns [  ] Decreased muscle tension   [  ] Minimized physical distress Emotional/Psychological: 
[  ] Increased self-expression   [  ] Decreased aggressive behavior [  ] Decreased sadness   [  ] Discussed healthy coping skills [  ] Improved mood    [  ] Decreased withdrawn behavior Social: 
[  ] Decreased feelings of isolation/loneliness [  ] Positive social interaction [  ] Provided support and/or comfort for family/friends Spiritual: 
[  ] Spiritual support    [  ] Expressed peace [  ] Expressed kellie    [  ] Discussed beliefs Techniques Utilized (Check all that apply): N/A: Please see Session Observations below. [  ] Procedural support MT [  ] Music for relaxation [  ] Patient preferred music 
[  ] Kate analysis  [  ] Song choice  [  ] Music for validation [  ] Entrainment  [  ] Progressive muscle relax. [  ] Guided visualization [  ] Ruddy Hdz  [  ] Patient instrument playing [  ] Mathis Lowfootles writing [  ] Quechee Boom along   [  ] Caprice Gerald  [  ] Sensory stimulation [  ] Active Listening  [  ] Music for spiritual support [  ] Making of CDs as gifts Session Observations:  Referral from Monica Vargas, Palliative NP. Patient (pt) was alert sitting on the side of her bed working with two Physical Therapists. This music therapist (MT) did not want to interrupt their session so will follow as able. EMI AlmazanBC (Music Therapist-Board Certified) Spiritual Care Department Referral-based service

## 2019-02-12 LAB
ANION GAP SERPL CALC-SCNC: 6 MMOL/L (ref 5–15)
BUN SERPL-MCNC: 21 MG/DL (ref 6–20)
BUN/CREAT SERPL: 22 (ref 12–20)
CALCIUM SERPL-MCNC: 8.3 MG/DL (ref 8.5–10.1)
CHLORIDE SERPL-SCNC: 111 MMOL/L (ref 97–108)
CO2 SERPL-SCNC: 23 MMOL/L (ref 21–32)
CREAT SERPL-MCNC: 0.94 MG/DL (ref 0.55–1.02)
ERYTHROCYTE [DISTWIDTH] IN BLOOD BY AUTOMATED COUNT: 17.7 % (ref 11.5–14.5)
GLUCOSE SERPL-MCNC: 79 MG/DL (ref 65–100)
HCT VFR BLD AUTO: 26.3 % (ref 35–47)
HGB BLD-MCNC: 8.8 G/DL (ref 11.5–16)
MAGNESIUM SERPL-MCNC: 2.3 MG/DL (ref 1.6–2.4)
MCH RBC QN AUTO: 30.9 PG (ref 26–34)
MCHC RBC AUTO-ENTMCNC: 33.5 G/DL (ref 30–36.5)
MCV RBC AUTO: 92.3 FL (ref 80–99)
NRBC # BLD: 0 K/UL (ref 0–0.01)
NRBC BLD-RTO: 0 PER 100 WBC
PHOSPHATE SERPL-MCNC: 2.7 MG/DL (ref 2.6–4.7)
PLATELET # BLD AUTO: 202 K/UL (ref 150–400)
PMV BLD AUTO: 10 FL (ref 8.9–12.9)
POTASSIUM SERPL-SCNC: 4.3 MMOL/L (ref 3.5–5.1)
RBC # BLD AUTO: 2.85 M/UL (ref 3.8–5.2)
SODIUM SERPL-SCNC: 140 MMOL/L (ref 136–145)
WBC # BLD AUTO: 3.8 K/UL (ref 3.6–11)

## 2019-02-12 PROCEDURE — 97112 NEUROMUSCULAR REEDUCATION: CPT | Performed by: OCCUPATIONAL THERAPIST

## 2019-02-12 PROCEDURE — 83735 ASSAY OF MAGNESIUM: CPT

## 2019-02-12 PROCEDURE — 97530 THERAPEUTIC ACTIVITIES: CPT | Performed by: OCCUPATIONAL THERAPIST

## 2019-02-12 PROCEDURE — 65660000000 HC RM CCU STEPDOWN

## 2019-02-12 PROCEDURE — 97530 THERAPEUTIC ACTIVITIES: CPT

## 2019-02-12 PROCEDURE — 85027 COMPLETE CBC AUTOMATED: CPT

## 2019-02-12 PROCEDURE — 74011250637 HC RX REV CODE- 250/637: Performed by: INTERNAL MEDICINE

## 2019-02-12 PROCEDURE — 97535 SELF CARE MNGMENT TRAINING: CPT | Performed by: OCCUPATIONAL THERAPIST

## 2019-02-12 PROCEDURE — 80048 BASIC METABOLIC PNL TOTAL CA: CPT

## 2019-02-12 PROCEDURE — 84100 ASSAY OF PHOSPHORUS: CPT

## 2019-02-12 PROCEDURE — 36415 COLL VENOUS BLD VENIPUNCTURE: CPT

## 2019-02-12 RX ORDER — MIDODRINE HYDROCHLORIDE 5 MG/1
10 TABLET ORAL
Status: DISCONTINUED | OUTPATIENT
Start: 2019-02-12 | End: 2019-02-19 | Stop reason: HOSPADM

## 2019-02-12 RX ADMIN — MIDODRINE HYDROCHLORIDE 10 MG: 5 TABLET ORAL at 16:59

## 2019-02-12 RX ADMIN — APIXABAN 2.5 MG: 2.5 TABLET, FILM COATED ORAL at 08:46

## 2019-02-12 RX ADMIN — ACETAMINOPHEN 650 MG: 325 TABLET ORAL at 08:43

## 2019-02-12 RX ADMIN — TRAMADOL HYDROCHLORIDE 50 MG: 50 TABLET, FILM COATED ORAL at 03:36

## 2019-02-12 RX ADMIN — LEVOTHYROXINE SODIUM 25 MCG: 25 TABLET ORAL at 06:05

## 2019-02-12 RX ADMIN — Medication 10 ML: at 06:05

## 2019-02-12 RX ADMIN — Medication 1 CAPSULE: at 08:43

## 2019-02-12 RX ADMIN — Medication 20 ML: at 15:06

## 2019-02-12 RX ADMIN — MIDODRINE HYDROCHLORIDE 10 MG: 5 TABLET ORAL at 11:43

## 2019-02-12 RX ADMIN — Medication 10 ML: at 22:17

## 2019-02-12 RX ADMIN — Medication 400 MG: at 08:42

## 2019-02-12 RX ADMIN — MIDODRINE HYDROCHLORIDE 5 MG: 5 TABLET ORAL at 08:42

## 2019-02-12 RX ADMIN — FLUTICASONE FUROATE AND VILANTEROL TRIFENATATE 1 PUFF: 100; 25 POWDER RESPIRATORY (INHALATION) at 08:44

## 2019-02-12 RX ADMIN — ALLOPURINOL 200 MG: 100 TABLET ORAL at 08:43

## 2019-02-12 RX ADMIN — ATORVASTATIN CALCIUM 10 MG: 10 TABLET, FILM COATED ORAL at 22:18

## 2019-02-12 RX ADMIN — ACETAMINOPHEN 650 MG: 325 TABLET ORAL at 02:38

## 2019-02-12 RX ADMIN — APIXABAN 2.5 MG: 2.5 TABLET, FILM COATED ORAL at 17:00

## 2019-02-12 NOTE — PROGRESS NOTES
Nutrition Assessment: 
 
RECOMMENDATIONS:  
Continue Regular diet and Ensure daily DIETITIANS INTERVENTIONS/PLAN:  
Continue diet and supplements as tolerated Monitor appetite/PO intake ASSESSMENT:  
Pt admitted with cardiomyopathy. PMH: heart failure, HTN, lymphoma. Chart reviewed for LOS, case discussed during CCU rounds. Pt sleeping soundly at time of attempted visit. No family in the room to speak with. Pt's appetite is fair per RN flowsheet's. Noted she is up 30lb from her reported UBW, which may be accurate as she is +18.5L. She is off ronn currently. Agree with Ensure daily, will monitor need for additional supplements if PO intake does not  to >50% of meals by next F/U.  
 
SUBJECTIVE/OBJECTIVE:  
Pt sleeping soundly Diet Order: Regular, Other (comment)(Ensure daily ) 
% Eaten:   
Patient Vitals for the past 72 hrs: 
 % Diet Eaten 02/12/19 0800 50 % 02/11/19 1200 50 % 02/10/19 1600 25 % 02/10/19 1000 25 % 02/09/19 2000 40 % Pertinent Medications:danyel Q, magox. Chemistries: 
Lab Results Component Value Date/Time Sodium 140 02/12/2019 08:56 AM  
 Potassium 4.3 02/12/2019 08:56 AM  
 Chloride 111 (H) 02/12/2019 08:56 AM  
 CO2 23 02/12/2019 08:56 AM  
 Anion gap 6 02/12/2019 08:56 AM  
 Glucose 79 02/12/2019 08:56 AM  
 BUN 21 (H) 02/12/2019 08:56 AM  
 Creatinine 0.94 02/12/2019 08:56 AM  
 BUN/Creatinine ratio 22 (H) 02/12/2019 08:56 AM  
 GFR est AA >60 02/12/2019 08:56 AM  
 GFR est non-AA 56 (L) 02/12/2019 08:56 AM  
 Calcium 8.3 (L) 02/12/2019 08:56 AM  
 AST (SGOT) 34 02/11/2019 04:48 AM  
 Alk. phosphatase 79 02/11/2019 04:48 AM  
 Protein, total 4.9 (L) 02/11/2019 04:48 AM  
 Albumin 1.7 (L) 02/11/2019 04:48 AM  
 Globulin 3.2 02/11/2019 04:48 AM  
 A-G Ratio 0.5 (L) 02/11/2019 04:48 AM  
 ALT (SGPT) 21 02/11/2019 04:48 AM  
  
Anthropometrics: Height: 5' 1\" (154.9 cm) Weight: 70.7 kg (155 lb 13.8 oz)  [x]bed scale (2/11)   []stated   []unknown IBW (%IBW):   ( ) UBW (%UBW): 56.7 kg (125 lb)(stated per pt) (124.69 %) BMI: Body mass index is 29.45 kg/m². This BMI is indicative of: 
[]Underweight   []Normal   [x]Overweight   [] Obesity   [] Extreme Obesity (BMI>40) Estimated Nutrition Needs (Based on): 5724 Kcals/day(MSJ 1049 x 1.3) , 57 g(-71 (0.8-1gPro/kg) ) Protein Carbohydrate: At Least 130 g/day  Fluids: 1400 mL/day Last BM: 2/11   [x]Active     []Hyperactive  []Hypoactive       [] Absent   BS Skin:    [x] Intact   [] Incision  [] Breakdown   [] DTI   [] Tears/Excoriation/Abrasion  [x]Edema(+2-generalized, BLE; +1-BUE) [] Other: Wt Readings from Last 30 Encounters:  
02/12/19 70.7 kg (155 lb 13.8 oz)  
11/30/18 59.2 kg (130 lb 8 oz) 10/16/18 58 kg (127 lb 14.4 oz)  
10/02/18 52.6 kg (116 lb)  
09/28/18 58.5 kg (128 lb 14.4 oz) 07/19/18 58.8 kg (129 lb 9.6 oz) 07/02/18 58.9 kg (129 lb 14.4 oz) 05/22/18 61.6 kg (135 lb 12.8 oz) 05/02/18 58.3 kg (128 lb 9.6 oz) 04/03/18 59 kg (130 lb) 03/01/18 59.5 kg (131 lb 1.6 oz) 01/09/18 60 kg (132 lb 3.2 oz) 12/21/17 59.5 kg (131 lb 1.6 oz) 12/14/17 62.4 kg (137 lb 9.6 oz) 11/07/17 59.3 kg (130 lb 12.8 oz) 10/20/17 58.9 kg (129 lb 14.4 oz) 10/10/17 59.3 kg (130 lb 12.8 oz)  
10/03/17 59.9 kg (132 lb)  
09/26/17 58.7 kg (129 lb 4.8 oz) 07/11/17 58.5 kg (129 lb)  
06/27/17 61.9 kg (136 lb 8 oz) 06/22/17 60.3 kg (132 lb 14.4 oz) 06/15/17 61.4 kg (135 lb 4.8 oz) 06/06/17 61.1 kg (134 lb 9.6 oz) 06/01/17 62.1 kg (136 lb 14.4 oz) 05/16/17 61.1 kg (134 lb 12.8 oz) 05/09/17 61.7 kg (136 lb) 04/27/17 60.6 kg (133 lb 9.6 oz) 04/22/17 58.6 kg (129 lb 1.6 oz) 04/11/17 63 kg (139 lb) NUTRITION DIAGNOSES:  
Problem:  No nutritional diagnosis at this time NUTRITION INTERVENTIONS: 
Meals/Snacks: General/healthful diet   Supplements: Commercial supplement GOAL:  
Pt will consume >50% of meals/supplements in 3-5 days. Cultural, Tenriism, or Ethnic Dietary Needs: None LEARNING NEEDS (Diet, Food/Nutrient-Drug Interaction):  
 [x] None Identified 
 [] Identified and Education Provided/Documented 
 [] Identified and Pt declined/was not appropriate [x] Interdisciplinary Care Plan Reviewed/Documented  
 [x] Participated in Discharge Planning: Regular diet [x] Interdisciplinary Rounds NUTRITION RISK:  
 [] High              [x] Moderate           [x]  Low  []  Minimal/Uncompromised PT SEEN FOR:  
 []  MD Consult: []Calorie Count []Diabetic Diet Education []Diet Education []Electrolyte Management []General Nutrition Management and Supplements []Management of Tube Feeding []TPN Recommendations []  RN Referral:  []MST score >=2 
   []Enteral/Parenteral Nutrition PTA []Pregnant: Gestational DM or Multigestation  
[]  Low BMI []  Re-Screen  
[x]  LOS []  NPO/clears x 5 days []  New TF/TPN Franne Sandifer, RD, [facility] Pager [phone] Weekend Pager [phone]

## 2019-02-12 NOTE — PROGRESS NOTES
Cardiac Electrophysiology Progress Note [address], [place], [address]  [phone] 2/12/2019 8:57 AM 
 
Admit Date: 2/4/2019 Admit Diagnosis: Acute encephalopathy [G93.40] Subjective:  
 
Kit Rather   denies chest pain, chest pressure/discomfort, palpitations, irregular heart beats. Visit Vitals /52 (BP 1 Location: Right arm, BP Patient Position: At rest) Pulse 71 Temp 98.8 °F (37.1 °C) Resp 17 Ht 5' 1\" (1.549 m) Wt 155 lb 13.8 oz (70.7 kg) SpO2 99% BMI 29.45 kg/m² Current Facility-Administered Medications Medication Dose Route Frequency  allopurinol (ZYLOPRIM) tablet 200 mg  200 mg Oral DAILY  midodrine (PROAMITINE) tablet 5 mg  5 mg Oral TID WITH MEALS  
 PHENYLephrine (PF)(BERNICE-SYNEPHRINE) 30 mg in 0.9% sodium chloride 250 mL infusion   mcg/min IntraVENous TITRATE  sodium chloride (NS) flush 5-40 mL  5-40 mL IntraVENous Q8H  
 sodium chloride (NS) flush 5-40 mL  5-40 mL IntraVENous PRN  
 sodium chloride 0.9 % bolus infusion 250 mL  250 mL IntraVENous PRN  
 lactobac ac& pc-s.therm-b.anim (DAWN Q/RISAQUAD)  1 Cap Oral DAILY  apixaban (ELIQUIS) tablet 2.5 mg  2.5 mg Oral BID  atorvastatin (LIPITOR) tablet 10 mg  10 mg Oral QHS  fluticasone-vilanterol (BREO ELLIPTA) 100mcg-25mcg/puff  1 Puff Inhalation DAILY  levothyroxine (SYNTHROID) tablet 25 mcg  25 mcg Oral 6am  
 loratadine (CLARITIN) tablet 10 mg  10 mg Oral DAILY PRN  
 magnesium oxide (MAG-OX) tablet 400 mg  400 mg Oral DAILY  albuterol-ipratropium (DUO-NEB) 2.5 MG-0.5 MG/3 ML  3 mL Nebulization Q6H PRN  
 ondansetron (ZOFRAN) injection 4 mg  4 mg IntraVENous Q6H PRN  
 acetaminophen (TYLENOL) tablet 650 mg  650 mg Oral Q4H PRN  
 traMADol (ULTRAM) tablet 50 mg  50 mg Oral Q6H PRN Objective:  
  
Visit Vitals /52 (BP 1 Location: Right arm, BP Patient Position: At rest) Pulse 71 Temp 98.8 °F (37.1 °C) Resp 17  
 Ht 5' 1\" (1.549 m) Wt 155 lb 13.8 oz (70.7 kg) SpO2 99% BMI 29.45 kg/m² Physical Exam: Abdomen: soft, non-tender Extremities: extremities normal 
Heart: regular rate and rhythm Lungs: clear to auscultation bilaterally Pulses: 2+ and symmetric Data Review:  
Labs:   
Recent Labs  
  02/11/19 
0448 02/10/19 
4000 WBC 3.4* 2.8* HGB 8.6* 9.7* HCT 26.1* 28.5*  
 179 Recent Labs  
  02/11/19 
0448 02/10/19 
1234  138  
K 4.5 4.3 * 110* CO2 22 23 GLU 80 87 BUN 22* 25* CREA 0.95 1.12* CA 8.0* 7.6*  
MG 2.1 2.3 PHOS 2.2* 2.2* ALB 1.7* 1.8* TBILI 0.5 0.4 SGOT 34 35 ALT 21 24 No results for input(s): TROIQ, CPK, CKMB in the last 72 hours. Intake/Output Summary (Last 24 hours) at 2/12/2019 3012 Last data filed at 2/12/2019 0400 Gross per 24 hour Intake 480 ml Output 850 ml Net -370 ml Telemetry: V paced 84 Assessment:  
 
Active Problems: 
  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  
 
Per Gonzalez  is a pleasant 80year old female with hx of NICM, EF 20-25%, S/p Biv ICD, HTN,. AF and CKD who presented with n/v/d with dyspnea and weakness, subsequent hypotension probably a combination of hypovolemia due to viral gastroenteritis. She is off pressors, on midodrine. Cr is at baseline after hydration. Cont abx for infection. Cardiology will sign off. Thank you for this consultation. IRAIDA Thorpe Patient seen and examined by me with nurse practitioner. I personally performed all components of the history, physical, and medical decision making and agree with the assessment and plan with minor modifications as noted. Normotensive. Asymptomatic. Will hold off on restarting chf meds. Cont abx. Will sign off.  
 
Lynne Loo MD, [facility] 
 
 
 
2/12/2019 
8:57 AM

## 2019-02-12 NOTE — PROGRESS NOTES
Occupational Therapy Goals Initiated 2/5/2019, Goals remain appropriate as per 2/12 weekly reevaluation. 1.  Patient will perform grooming standing at the sink for 2 minutes at a time with CGA within 7 day(s). 2.  Patient will perform bathing with minimal assistance within 7 day(s). 3.  Patient will perform lower body dressing with minimal assistance/contact guard assist within 7 day(s). 4.  Patient will perform toilet transfers with minimal assistance/contact guard assist within 7 day(s). 5.  Patient will perform all aspects of toileting with minimal assistance within 7 day(s). Occupational Therapy TREATMENT: WEEKLY REASSESSMENT Patient: Malathi Jacob (89 y.o. female) Date: 2/12/2019 Diagnosis: Acute encephalopathy [G93.40] <principal problem not specified> Precautions: Fall Chart, occupational therapy assessment, plan of care, and goals were reviewed. ASSESSMENT: 
Patient very motivated and now making excellent functional progress. She is still functioning below her independent to mod I baseline 2/2 continued GW, R knee pain, decreased activity tolerance, decreased balance and decreased safety awareness. Overall she was SBA for bed mobility, CGA to min A for transfers using RW, was supervision for seated grooming and was supervision to min A for dressing ADLs performed seated. VSS t/o session, with tolerance for activity being mainly limited by the above mentioned R knee pain. At this point the patient is benefiting from acute OT and will need SNF rehab at discharge. Progression toward goals: 
[]            Improving appropriately and progressing toward goals [x]            Improving slowly and progressing toward goals []            Not making progress toward goals and plan of care will be adjusted PLAN: 
Goals have been updated based on progression since last assessment. Patient continues to benefit from skilled intervention to address the above impairments. Continue to follow patient 4 times a week to address goals. Planned Interventions: 
[x]                    Self Care Training                  []             Therapeutic Activities [x]                    Functional Mobility Training    []             Cognitive Retraining 
[x]                    Therapeutic Exercises           [x]             Endurance Activities [x]                    Balance Training                   []             Neuromuscular Re-Education []                    Visual/Perceptual Training     [x]        Home Safety Training 
[x]                    Patient Education                 [x]             Family Training/Education []                    Other (comment): 
Discharge Recommendations: Abhi Damon Further Equipment Recommendations for Discharge: TBD OBJECTIVE DATA SUMMARY:  
Cognitive/Behavioral Status: 
Neurologic State: Alert Orientation Level: Oriented to time Cognition: Follows commands; Impaired decision making; Appropriate for age attention/concentration Safety/Judgement: Decreased awareness of need for safety;Decreased awareness of need for assistance Functional Mobility and Transfers for ADLs:Bed Mobility: 
Rolling: Stand-by assistance; Additional time Supine to Sit: Stand-by assistance; Additional time Scooting: Stand-by assistance; Additional time Transfers: 
Sit to Stand: Contact guard assistance; Additional time(two attempts required to achieve standing) Ambulated with RW 4 feet to chair with minimal assistance. Balance: 
Sitting: Impaired Sitting - Static: Good (unsupported) Sitting - Dynamic: Good (unsupported) Standing: Impaired; With support Standing - Static: Good;Constant support Standing - Dynamic : Fair(with RW support) ADL Intervention: 
Grooming Washing Face: Supervision/set-up(seated) Upper Body Dressing Assistance Shirt simulation with hospital gown: Minimum  assistance; Compensatory technique training(seated) Cues: Tactile cues provided;Verbal cues provided;Visual cues provided Lower Body Dressing Assistance Socks: Supervision/set-up; Compensatory technique training(seated in chair) Leg Crossed Method Used: Yes Position Performed: Bending forward method;Seated in chair Cues: Verbal cues provided;Visual cues provided Cognitive Retraining Safety/Judgement: Decreased awareness of need for safety;Decreased awareness of need for assistance Pain: 
Pain Scale 1: Numeric (0 - 10) Pain Intensity 1: 0 Activity Tolerance: VSS Please refer to the flowsheet for vital signs taken during this treatment. After treatment:  
[x] Patient left in no apparent distress sitting up in chair 
[] Patient left in no apparent distress in bed 
[x] Call bell left within reach [x] Nursing notified 
[] Caregiver present 
[] Bed alarm activated COMMUNICATION/COLLABORATION:  
The patients plan of care was discussed with: Physical Therapist and Registered Nurse Stephen Francois, OTR/L Time Calculation: 30 mins

## 2019-02-12 NOTE — PROGRESS NOTES
Problem: Mobility Impaired (Adult and Pediatric) Goal: *Acute Goals and Plan of Care (Insert Text) Physical Therapy Goals Goals reviewed and revised 2/12/19 Physical Therapy Goals Initiated 2/12/2019 1. Patient will move from supine to sit and sit to supine , scoot up and down and roll side to side in bed with modified independence within 7 day(s). 2.  Patient will transfer from bed to chair and chair to bed with modified independence using the least restrictive device within 7 day(s). 3.  Patient will perform sit to stand with modified independence within 7 day(s). 4.  Patient will ambulate with modified independence for 75 feet with the least restrictive device within 7 day(s). 5.  Patient will ascend/descend 5 stairs with 1 handrail(s) with moderate assistance  within 7 day(s). Initiated 2/5/2019 1. Patient will move from supine to sit and sit to supine , scoot up and down and roll side to side in bed with independence within 7 day(s). 2.  Patient will transfer from bed to chair and chair to bed with modified independence using the least restrictive device within 7 day(s). 3.  Patient will perform sit to stand with independence within 7 day(s). 4.  Patient will ambulate with modified supervision for 150 feet with the least restrictive device within 7 day(s). 5.  Patient will ascend/descend 5 stairs with handrail(s) with minimal assistance/contact guard assist within 7 day(s). physical Therapy TREATMENT: WEEKLY REASSESSMENT Patient: Alvaro Acosta (9 y.o. female) Date: 2/12/2019 Diagnosis: Acute encephalopathy [G93.40] <principal problem not specified> Precautions: Fall Chart, physical therapy assessment, plan of care and goals were reviewed. ASSESSMENT: 
Pt received supine in bed and agreeable to PT intervention. Pt cleared by nursing for mobility.  Pt continues to demonstrate limited functional mobility secondary to impaired balance, generalized weakness, decreased ROM, increased R knee pain, decreased activity tolerance, and impaired gait. Pt with poor progress towards therapy goals since evaluation and goals have been adjusted. VSS at rest on RA, however pt with c/o 6/10 R knee pain, which significantly limits patient progress in acute PT. Bed mobility performed with SBA, increased time, and VCs for proper technique and hand placement. She required two attempts to achieve standing from EOB, needing CGA x 2 for safety. RW provided to patient to off-weight RLE during weight bearing activities and gait. She was only able to tolerate 4' of gait training with RW and min A due to increased R knee pain. Gait very antalgic with poor foot clearance MICHAEL and decreased weight shifting. Pt was assisted into bedside chair with min A due to poor eccentric control into sitting and left with all needs met, RN aware, and VSS following therapy session. Continue to recommend pt discharge to SNF rehab to improve functional mobility and independence prior to returning home. PT will continue to follow to address mobility impairments as noted above. Patient's progression toward goals since last assessment: Slow progress; goals adjusted PLAN: 
Goals have been updated based on progression since last assessment. Patient continues to benefit from skilled intervention to address the above impairments. Continue to follow the patient 4 times a week to address goals. Planned Interventions: 
[x]              Bed Mobility Training             []       Neuromuscular Re-Education [x]              Transfer Training                   []       Orthotic/Prosthetic Training 
[x]              Gait Training                         []       Modalities [x]              Therapeutic Exercises           []       Edema Management/Control [x]              Therapeutic Activities            [x]       Patient and Family Training/Education []              Other (comment): 
 Discharge Recommendations: Abhi Damon Further Equipment Recommendations for Discharge: TBD by rehab SUBJECTIVE:  
Patient stated Don't push me! OBJECTIVE DATA SUMMARY:  
Critical Behavior: 
Neurologic State: Alert Orientation Level: Oriented to person, Oriented to place, Oriented to situation Cognition: Follows commands Safety/Judgement: Awareness of environment, Insight into deficits Functional Mobility Training: 
Bed Mobility: 
Rolling: Stand-by assistance; Additional time Supine to Sit: Stand-by assistance; Additional time Scooting: Stand-by assistance; Additional time Transfers: 
Sit to Stand: Contact guard assistance; Additional time(two attempts required to achieve standing) Stand to Sit: Minimum assistance(uncontrolled descent into sitting) Balance: 
Sitting: Impaired Sitting - Static: Good (unsupported) Sitting - Dynamic: Good (unsupported) Standing: Impaired; With support Standing - Static: Good;Constant support Standing - Dynamic : Fair(with RW support)Ambulation/Gait Training: 
Distance (ft): 4 Feet (ft) Assistive Device: Gait belt;Walker, rolling Ambulation - Level of Assistance: Minimal assistance; Additional time; Adaptive equipment Gait Abnormalities: Antalgic;Decreased step clearance; Step to gait Base of Support: Widened Stance: Right decreased Speed/Lawanda: Slow;Shuffled;Pace decreased (<100 feet/min) Step Length: Left shortened;Right shortened Swing Pattern: Right asymmetrical 
Pain: 
Pain Scale 1: Numeric (0 - 10) Pain Intensity 1: 6 Pain Location 1: Knee Pain Orientation 1: Right Pain Description 1: Aching; Candance Pronto; Stabbing Pain Intervention(s) 1: Medication (see MAR) Activity Tolerance:  
Fair - BP stable throughout positional changes and post-activity; HR and SaO2 stable; 6/10 R knee pain; increased fatigue with activity Please refer to the flowsheet for vital signs taken during this treatment. After treatment:  
[x]  Patient left in no apparent distress sitting up in chair 
[]  Patient left in no apparent distress in bed 
[x]  Call bell left within reach [x]  Nursing notified 
[x]  Caregiver present 
[]  Bed alarm activated COMMUNICATION/COLLABORATION:  
The patients plan of care was discussed with: Physical Therapist, Occupational Therapist and Registered Nurse Rolena Klinefelter, PT, DPT Time Calculation: 20 mins

## 2019-02-12 NOTE — PROGRESS NOTES
[facility] INTENSIVIST Consult Service NotePulmonary, Critical Care, and Sleep Medicine Name: Malathi Jacob MRN: [id number] : 3/17/1925 Hospital: [place] Date: 2019  Admission date: 2019 Hospital Day: 9 INTENSIVIST PROGRESS NOTE:  
 
Patient seen and evaluated, chart reviewed 81 yo female with end stage CMP transferred to ccu with hypotension, cardiogenic shock Started on pressors and remains on Nemesio drip at 30mcg. Pt feels like she has swelling of her hands and feet. No acute events overnight in CCU Now pt in CCU awake, alert, no severe distress 2-10-19: Pt has been doing pretty well. She was off nemesio this am but has been with borderline BP (MAP of 62). Had some gout pain in her right knee last pm. 
Had some confusion last pm. 
She feels good this am. Has much decreased pain to her right knee. Tolerating po intake. NO chest pain, no back pain, no leg pain.  on and off IV nemesio all night. SBP in 70's but no sx.  
 
 No chest pain. Right knee pain. OOB but then as pt clmbed back in bed- SBP dropped to 70's on midodrine, off IV nemesio ROS: no sob, no cp MEDS:  
Current Facility-Administered Medications Medication  allopurinol (ZYLOPRIM) tablet 200 mg  
 midodrine (PROAMITINE) tablet 5 mg  PHENYLephrine (PF)(NEMESIO-SYNEPHRINE) 30 mg in 0.9% sodium chloride 250 mL infusion  sodium chloride (NS) flush 5-40 mL  sodium chloride (NS) flush 5-40 mL  sodium chloride 0.9 % bolus infusion 250 mL  lactobac ac& pc-s.therm-b.anim (DAWN Q/RISAQUAD)  apixaban (ELIQUIS) tablet 2.5 mg  
 atorvastatin (LIPITOR) tablet 10 mg  
 fluticasone-vilanterol (BREO ELLIPTA) 100mcg-25mcg/puff  levothyroxine (SYNTHROID) tablet 25 mcg  loratadine (CLARITIN) tablet 10 mg  
 magnesium oxide (MAG-OX) tablet 400 mg  
 albuterol-ipratropium (DUO-NEB) 2.5 MG-0.5 MG/3 ML  
 ondansetron (ZOFRAN) injection 4 mg  acetaminophen (TYLENOL) tablet 650 mg  
 traMADol (ULTRAM) tablet 50 mg Visit Vitals BP (!) 123/95 Pulse 90 Temp 98.4 °F (36.9 °C) Resp 18 Ht 5' 1\" (1.549 m) Wt 70.7 kg (155 lb 13.8 oz) SpO2 99% BMI 29.45 kg/m² General: petite elderly AAF no distress, alert, no distress. Pleasant and comfortable. Eyes: anicteric HEENT: dry oral mucosa Neck: FROM 
CV: RRR nl s1,2. Lungs: decreased BS but clear and breathing comfortably. Abd: soft : no flank pain Ext: at least trace to 1+ edema of BLE. No increased temp of the right knee. Skin: no rash Musculoskeletal: normal inspection Neuro: non focal, seems to have good strength of BUE and BLE. Psych: no anxiety or depression. Labs: 
 
Recent Labs  
  02/12/19 
0856 02/11/19 
0448 02/10/19 
4010 WBC 3.8 3.4* 2.8* HGB 8.8* 8.6* 9.7*  184 179 Recent Labs  
  02/11/19 
0448 02/10/19 
3621  138  
K 4.5 4.3 * 110* CO2 22 23 GLU 80 87 BUN 22* 25* CREA 0.95 1.12* CA 8.0* 7.6*  
MG 2.1 2.3 PHOS 2.2* 2.2* ALB 1.7* 1.8* SGOT 34 35 ALT 21 24 No results for input(s): PH, PCO2, PO2, HCO3, FIO2 in the last 72 hours. No results for input(s): CPK, CKNDX, TROIQ in the last 72 hours. No lab exists for component: CPKMB Lab Results Component Value Date/Time  (H) 02/05/2017 03:50 AM  
 B-type Natriuretic Peptide 454.4 (H) 09/28/2018 04:32 PM  
  
 
 
A/P: 
 
Hypotension, Shock has been on intermittent Nemesio. - labile; increase midodrine; once off IV n eo will move to floor; will add OZ hose and try again UTI Cardiomyopathy appears  Decompensated: Pro BNP over 13K. Peripheral edema. Anticoagulation on Eliquis. Hypothyroid on synthroid. acute respiratory failure: wean O2 
cardiogenic shock: wean pressors as tolerated, Weaned off nemesio this am.  
CHF: diuretics when BP is better to help mobilize some fluid. ANDREW: creatinine better today UTI: on po levoflox completed course DNR but ok with pressors 
palliative care following Will assist on disposition planning when stable for transfer Michael Heredia MD

## 2019-02-12 NOTE — PROGRESS NOTES
-Hematology / Oncology ([facility]) - 
-Primary Oncologist- Dr. Overton Alpers 
-[id number] [address] weak 
 
-O-  
  
Patient Chris Hernandez for the past 24 hrs: 
 Temp Pulse Resp BP SpO2  
02/12/19 1000  96 24  100 % 02/12/19 0900  90 18 (!) 123/95 99 % 02/12/19 0800 98.4 °F (36.9 °C) 70 20 102/46 98 % 02/12/19 0700  71 18 100/57 98 % 02/12/19 0600  71 17 115/52 99 % 02/12/19 0500  70 16 101/54 98 % 02/12/19 0400 98.8 °F (37.1 °C) 70 20 112/58 98 % 02/12/19 0300  70 21 98/58 97 % 02/12/19 0200  71 18 105/55 98 % 02/12/19 0100  77 19 102/56 99 % 02/12/19 0000 97.9 °F (36.6 °C) 75 17 102/56 96 % 02/11/19 2300  72 20 103/48 97 % 02/11/19 2200  96 16 110/55 95 % 02/11/19 2100  87 19 110/49 98 % 02/11/19 2000 99.2 °F (37.3 °C) 75 22 101/59 100 % 02/11/19 1900  89 23 116/63 99 % 02/11/19 1800  82 24 119/70 99 % 02/11/19 1700  94 25 106/59 99 % 02/11/19 1653 99.9 °F (37.7 °C) 94 23  100 % 02/11/19 1600 98.6 °F (37 °C) 90 23 100/59 100 % 02/11/19 1500  100 23 103/59 99 % 02/11/19 1400  90 21 96/54 99 % 02/11/19 1300  91 21 91/49 99 % 02/11/19 1200 98.5 °F (36.9 °C) 91 18 93/55 100 % 02/12 0701 - 02/12 1900 In: 120 [P.O.:120] Out: -  
Gen: nad, pleaseant Chest: decreased at bases per anterior exam 
Cardiac: rrr Abd: s/nt Ext:1+ edema to knees b/l 
 
-Labs-   
Recent Labs  
  02/12/19 
4981 02/11/19 
0448 02/10/19 
6116 WBC 3.8 3.4* 2.8* HGB 8.8* 8.6* 9.7*  184 179 ANEU  --  2.5 1.6*  139 138  
K 4.3 4.5 4.3 GLU 79 80 87 BUN 21* 22* 25* CREA 0.94 0.95 1.12* ALT  --  21 24 SGOT  --  34 35 TBILI  --  0.5 0.4 AP  --  79 89  
CA 8.3* 8.0* 7.6*  
MG 2.3 2.1 2.3 PHOS 2.7 2.2* 2.2*  
 
 
-Imaging-  
 
 
-Assessment + Plan-    
*) CLL: 
- Follows with Dr. Overton Alpers 
- Catherine Granger while admitted - cbc remins stable *) UTI: 
- abx per primary *) Cardiogenic Shock: 
- remains off pressors and now on midordrine

## 2019-02-12 NOTE — INTERDISCIPLINARY ROUNDS
Interdisciplinary team rounds were held 2/12/2019 with the following team members:Care Management, Diabetes Treatment Specialist, Nursing, Nutrition, Pharmacy, Physical Therapy, Physician and Respiratory Therapy. Plan of care discussed. See clinical pathway and/or care plan for interventions and desired outcomes.

## 2019-02-12 NOTE — PROGRESS NOTES
0730:Report received from [facility], [facility]. 
 
0800: Pt. Alert and oriented X3. Pt. Now ambulated to chair. Patient follows all commands. Moves all extremities. X2 moderate assist. Patient given tylenol pre ambulating for Expected knee pain. Skin all intact. Pulses all palpable. Edema in low extremities +2 and right knee. Patient is AV paced on the monitor. Pt. Eating breakfast in the chair. PO pills given successfully and without difficulty. Pt. Has a right IJ, may remove that today if IV placed successfully. Labs drawn per Dr. Vanessa Borges. CBC and CMP as blood work was not drawn last night. 1030: Pt. Is now hypotensive. Applying Matilde [place] per Dr. Vanessa Borges and Dr. Vanessa Borges is increasing Midodrine dose. Will leave CVC due to low blood pressure. 1200: Pt. Reassessed. No changes at this time. Pt. Is asymptomatic with Hypotension from 1030. Pt. Placed back in bed for hypotension per Dr. Vanessa Borges.  
 
1500: Pt. Bathed. Tolerated it well. Pt. Had a large loose BM. Will monitor this. Leaving right IJ as patient is hypotensive. 1600: Pt. Reassessed. Pt. Back up in chair. Spoke to Dr. Vanessa Borges regarding hypotension. As long as patient is asymptomatic, Dr. Vanessa Borges with a SBP in the 80's. BP as of right now is 87/55.  
 
1830: Pt. Now back to bed after eating dinner up in the chair. 1900: Report given to MAXIMUS Guan.

## 2019-02-12 NOTE — PROGRESS NOTES
1930- Bedside and Verbal shift change report given to ROMÁN Najera RN (oncoming nurse) by CRISTY Groves RN(offgoing nurse). Report included the following information SBAR, Kardex, Intake/Output and MAR.  
2000- pt used bedpan and passed both urine and stool. Stool loose and liquid, pt cleaned and pad changed. Pt reports pain in R knee tylenol 650 mg PO given. 0000- pt resting well with eyes closed, no change in assessment. Denies any pain at present. 0230- pt reports pain 6/10 in R knee. Tylenol 650 mg PO given. Pt also attempting to void via purwick at this time, repositioned for comfort 0330- pt unable to void with purwick also c/o increased pain in R knee to 10. Ultram 1 tab PO given per PRN order and pt able to use female urinal to void. 0400- pt resting with eyes closed. No change in assessment. Vitals stable

## 2019-02-13 LAB
APPEARANCE SNV: ABNORMAL
COLOR SNV: ABNORMAL
LYMPHOCYTES NFR SNV MANUAL: 10 % (ref 0–74)
MONOCYTES NFR SNV MANUAL: 10 % (ref 0–69)
NEUTROPHILS NFR SNV MANUAL: 80 % (ref 0–24)
RBC # SNV: >100 /CU MM
SPECIMEN SOURCE FLD: ABNORMAL
WBC # SNV: ABNORMAL /CU MM (ref 0–150)

## 2019-02-13 PROCEDURE — 76450000000

## 2019-02-13 PROCEDURE — 87205 SMEAR GRAM STAIN: CPT

## 2019-02-13 PROCEDURE — 65270000029 HC RM PRIVATE

## 2019-02-13 PROCEDURE — 97530 THERAPEUTIC ACTIVITIES: CPT

## 2019-02-13 PROCEDURE — 74011250636 HC RX REV CODE- 250/636: Performed by: INTERNAL MEDICINE

## 2019-02-13 PROCEDURE — 74011250637 HC RX REV CODE- 250/637: Performed by: INTERNAL MEDICINE

## 2019-02-13 PROCEDURE — 89050 BODY FLUID CELL COUNT: CPT

## 2019-02-13 RX ORDER — HEPARIN 100 UNIT/ML
300-500 SYRINGE INTRAVENOUS AS NEEDED
Status: DISCONTINUED | OUTPATIENT
Start: 2019-02-13 | End: 2019-02-19 | Stop reason: HOSPADM

## 2019-02-13 RX ADMIN — SODIUM CHLORIDE, PRESERVATIVE FREE 500 UNITS: 5 INJECTION INTRAVENOUS at 12:43

## 2019-02-13 RX ADMIN — ACETAMINOPHEN 650 MG: 325 TABLET ORAL at 12:43

## 2019-02-13 RX ADMIN — Medication 10 ML: at 05:57

## 2019-02-13 RX ADMIN — Medication 1 CAPSULE: at 09:23

## 2019-02-13 RX ADMIN — Medication 40 ML: at 12:03

## 2019-02-13 RX ADMIN — Medication 400 MG: at 09:23

## 2019-02-13 RX ADMIN — MIDODRINE HYDROCHLORIDE 10 MG: 5 TABLET ORAL at 12:43

## 2019-02-13 RX ADMIN — LEVOTHYROXINE SODIUM 25 MCG: 25 TABLET ORAL at 05:57

## 2019-02-13 RX ADMIN — FLUTICASONE FUROATE AND VILANTEROL TRIFENATATE 1 PUFF: 100; 25 POWDER RESPIRATORY (INHALATION) at 09:24

## 2019-02-13 RX ADMIN — TRAMADOL HYDROCHLORIDE 50 MG: 50 TABLET, FILM COATED ORAL at 19:40

## 2019-02-13 RX ADMIN — ATORVASTATIN CALCIUM 10 MG: 10 TABLET, FILM COATED ORAL at 19:40

## 2019-02-13 RX ADMIN — Medication 30 ML: at 14:12

## 2019-02-13 RX ADMIN — ALLOPURINOL 200 MG: 100 TABLET ORAL at 09:23

## 2019-02-13 RX ADMIN — MIDODRINE HYDROCHLORIDE 10 MG: 5 TABLET ORAL at 17:55

## 2019-02-13 RX ADMIN — SODIUM CHLORIDE, PRESERVATIVE FREE 500 UNITS: 5 INJECTION INTRAVENOUS at 12:48

## 2019-02-13 RX ADMIN — ACETAMINOPHEN 650 MG: 325 TABLET ORAL at 19:40

## 2019-02-13 RX ADMIN — Medication 10 ML: at 19:42

## 2019-02-13 RX ADMIN — MIDODRINE HYDROCHLORIDE 10 MG: 5 TABLET ORAL at 09:20

## 2019-02-13 NOTE — PROGRESS NOTES
-Hematology / Oncology ([facility]) - 
-Primary Oncologist- Dr. Vicki Clement 
-- 
 
-S-  Feeling weak and having knee pains 
 
-O-  
  
Patient Vitals for the past 24 hrs: 
 Temp Pulse Resp BP SpO2  
02/13/19 0815 98.2 °F (36.8 °C) 75 22    
02/13/19 0800 98.2 °F (36.8 °C) 71 21 107/60   
02/13/19 0700  71 16 103/56 94 % 02/13/19 0600  79 18 115/60   
02/13/19 0500  74 19 110/61   
02/13/19 0400 98.1 °F (36.7 °C) 70 19 96/54 97 % 02/13/19 0300  70 22 94/56 97 % 02/13/19 0200  71 20 92/51 96 % 02/13/19 0100  70 22 97/52 97 % 02/13/19 0000 98.4 °F (36.9 °C) 70 20 98/58 96 % 02/12/19 2300  70 21 97/51 95 % 02/12/19 2200  79 19 94/61 96 % 02/12/19 2100  70 20 117/56 99 % 02/12/19 2000 98.7 °F (37.1 °C) 70 22 105/56 96 % 02/12/19 1900  70 23 105/56 96 % 02/12/19 1800  82 20 116/59   
02/12/19 1700  73 13 106/58 98 % 02/12/19 1600 98 °F (36.7 °C) 80 24 (!) 87/55 98 % 02/12/19 1500  84 21 99/62 100 % 02/12/19 1400  85 23 (!) 87/58 99 % 02/12/19 1300  71 17 97/53 100 % 02/12/19 1200 97.7 °F (36.5 °C) 70 17 (!) 85/54 98 % 02/12/19 1100  71 19 (!) 83/50 98 % 02/12/19 1045  80 22 (!) 84/54 100 % No intake/output data recorded. Gen: nad, pleaseant Chest: decreased at bases per anterior exam 
Cardiac: rrr Abd: s/nt Ext:1+ edema to knees b/l 
 
-Labs-   
Recent Labs  
  02/12/19 
0856 02/11/19 
0448 WBC 3.8 3.4* HGB 8.8* 8.6*  184 ANEU  --  2.5  139  
K 4.3 4.5 GLU 79 80 BUN 21* 22* CREA 0.94 0.95 ALT  --  21 SGOT  --  34  
TBILI  --  0.5 AP  --  79  
CA 8.3* 8.0*  
MG 2.3 2.1 PHOS 2.7 2.2*  
 
 
-Imaging-  
 
 
-Assessment + Plan-    
*) CLL: 
- Follows with Dr. Vicki Hicks while admitted - cbc remins stable *) UTI: 
- abx per primary *) Cardiogenic Shock: 
-  now on midordrine

## 2019-02-13 NOTE — PROGRESS NOTES
General Daily Progress Note Admit Date: 2/4/2019 Subjective:  
 
Patient has no complaint . Current Facility-Administered Medications Medication Dose Route Frequency  midodrine (PROAMITINE) tablet 10 mg  10 mg Oral TID WITH MEALS  
 allopurinol (ZYLOPRIM) tablet 200 mg  200 mg Oral DAILY  PHENYLephrine (PF)(BERNICE-SYNEPHRINE) 30 mg in 0.9% sodium chloride 250 mL infusion   mcg/min IntraVENous TITRATE  sodium chloride (NS) flush 5-40 mL  5-40 mL IntraVENous Q8H  
 sodium chloride (NS) flush 5-40 mL  5-40 mL IntraVENous PRN  
 sodium chloride 0.9 % bolus infusion 250 mL  250 mL IntraVENous PRN  
 lactobac ac& pc-s.therm-b.anim (DAWN Q/RISAQUAD)  1 Cap Oral DAILY  apixaban (ELIQUIS) tablet 2.5 mg  2.5 mg Oral BID  atorvastatin (LIPITOR) tablet 10 mg  10 mg Oral QHS  fluticasone-vilanterol (BREO ELLIPTA) 100mcg-25mcg/puff  1 Puff Inhalation DAILY  levothyroxine (SYNTHROID) tablet 25 mcg  25 mcg Oral 6am  
 magnesium oxide (MAG-OX) tablet 400 mg  400 mg Oral DAILY  albuterol-ipratropium (DUO-NEB) 2.5 MG-0.5 MG/3 ML  3 mL Nebulization Q6H PRN  
 acetaminophen (TYLENOL) tablet 650 mg  650 mg Oral Q4H PRN  
 traMADol (ULTRAM) tablet 50 mg  50 mg Oral Q6H PRN Review of Systems A comprehensive review of systems was negative. Objective:  
 
Patient Vitals for the past 24 hrs: 
 BP Temp Pulse Resp SpO2 Weight  
02/12/19 2100 117/56  70 20 99 %   
02/12/19 2000 105/56 98.7 °F (37.1 °C) 70 22 96 %   
02/12/19 1900 105/56  70 23 96 %   
02/12/19 1800 116/59  82 20    
02/12/19 1700 106/58  73 13 98 %   
02/12/19 1600 (!) 87/55 98 °F (36.7 °C) 80 24 98 %   
02/12/19 1500 99/62  84 21 100 %   
02/12/19 1400 (!) 87/58  85 23 99 %   
02/12/19 1313      155 lb 13.8 oz (70.7 kg) 02/12/19 1300 97/53  71 17 100 %   
02/12/19 1200 (!) 85/54 97.7 °F (36.5 °C) 70 17 98 %   
02/12/19 1100 (!) 83/50  71 19 98 %   
02/12/19 1045 (!) 84/54  80 22 100 %   
 02/12/19 1030 (!) 77/55  86 14 100 %   
02/12/19 1000   96 24 100 %   
02/12/19 0900 (!) 123/95  90 18 99 %   
02/12/19 0800 102/46 98.4 °F (36.9 °C) 70 20 98 %   
02/12/19 0700 100/57  71 18 98 %   
02/12/19 0600 115/52  71 17 99 %   
02/12/19 0500 101/54  70 16 98 %   
02/12/19 0400 112/58 98.8 °F (37.1 °C) 70 20 98 %   
02/12/19 0300 98/58  70 21 97 %   
02/12/19 0200 105/55  71 18 98 %   
02/12/19 0100 102/56  77 19 99 %   
02/12/19 0000 102/56 97.9 °F (36.6 °C) 75 17 96 %   
02/11/19 2300 103/48  72 20 97 %  No intake/output data recorded. 02/11 0701 - 02/12 1900 In: 1115.5 [P.O.:1110; I.V.:5.5] Out: 850 [Urine:700] Physical Exam:  
Visit Vitals /56 Pulse 70 Temp 98.7 °F (37.1 °C) Resp 20 Ht 5' 1\" (1.549 m) Wt 155 lb 13.8 oz (70.7 kg) SpO2 99% BMI 29.45 kg/m² General appearance: alert, cooperative, no distress, appears stated age Neck: supple, symmetrical, trachea midline, no adenopathy, thyroid: not enlarged, symmetric, no tenderness/mass/nodules, no carotid bruit and no JVD Lungs: clear to auscultation bilaterally Heart: regular rate and rhythm, S1, S2 normal, no murmur, click, rub or gallop Abdomen: soft, non-tender. Bowel sounds normal. No masses,  no organomegaly Extremities: edema 2+ Data Review Recent Results (from the past 24 hour(s)) CBC W/O DIFF Collection Time: 02/12/19  8:56 AM  
Result Value Ref Range WBC 3.8 3.6 - 11.0 K/uL  
 RBC 2.85 (L) 3.80 - 5.20 M/uL HGB 8.8 (L) 11.5 - 16.0 g/dL HCT 26.3 (L) 35.0 - 47.0 % MCV 92.3 80.0 - 99.0 FL  
 MCH 30.9 26.0 - 34.0 PG  
 MCHC 33.5 30.0 - 36.5 g/dL  
 RDW 17.7 (H) 11.5 - 14.5 % PLATELET 760 652 - 070 K/uL MPV 10.0 8.9 - 12.9 FL  
 NRBC 0.0 0  WBC ABSOLUTE NRBC 0.00 0.00 - 0.01 K/uL PHOSPHORUS Collection Time: 02/12/19  8:56 AM  
Result Value Ref Range Phosphorus 2.7 2.6 - 4.7 MG/DL MAGNESIUM  Collection Time: 02/12/19  8:56 AM  
 Result Value Ref Range Magnesium 2.3 1.6 - 2.4 mg/dL METABOLIC PANEL, BASIC Collection Time: 02/12/19  8:56 AM  
Result Value Ref Range Sodium 140 136 - 145 mmol/L Potassium 4.3 3.5 - 5.1 mmol/L Chloride 111 (H) 97 - 108 mmol/L  
 CO2 23 21 - 32 mmol/L Anion gap 6 5 - 15 mmol/L Glucose 79 65 - 100 mg/dL BUN 21 (H) 6 - 20 MG/DL Creatinine 0.94 0.55 - 1.02 MG/DL  
 BUN/Creatinine ratio 22 (H) 12 - 20 GFR est AA >60 >60 ml/min/1.73m2 GFR est non-AA 56 (L) >60 ml/min/1.73m2 Calcium 8.3 (L) 8.5 - 10.1 MG/DL Assessment:  
 
Active Problems: 
  Cardiomyopathy, dilated, 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 (8/4/2012) Presence of biventricular automatic cardioverter/defibrillator (AICD) (7/2/2015) Overview: [employer] biventricular AICD implant CKD (chronic kidney disease) stage 3, GFR 30-59 ml/min (HCC) (1/10/2018) Acute encephalopathy (2/4/2019) UTI (urinary tract infection) (2/4/2019) Diarrhea (2/4/2019) Poor appetite (2/7/2019) Acute pain of right knee (2/7/2019) Counseling regarding advanced care planning and goals of care (2/7/2019) Hypotension due to hypovolemia (2/7/2019) Plan: 1. Patient is now on ProAmatine for blood pressure support. She is fairly comfortable.  
2.  Mild CHF exist.

## 2019-02-13 NOTE — PROGRESS NOTES
Problem: Mobility Impaired (Adult and Pediatric) Goal: *Acute Goals and Plan of Care (Insert Text) Physical Therapy Goals Goals reviewed and revised 2/12/19 Physical Therapy Goals Initiated 2/12/2019 1. Patient will move from supine to sit and sit to supine , scoot up and down and roll side to side in bed with modified independence within 7 day(s). 2.  Patient will transfer from bed to chair and chair to bed with modified independence using the least restrictive device within 7 day(s). 3.  Patient will perform sit to stand with modified independence within 7 day(s). 4.  Patient will ambulate with modified independence for 75 feet with the least restrictive device within 7 day(s). 5.  Patient will ascend/descend 5 stairs with 1 handrail(s) with moderate assistance  within 7 day(s). Initiated 2/5/2019 1. Patient will move from supine to sit and sit to supine , scoot up and down and roll side to side in bed with independence within 7 day(s). 2.  Patient will transfer from bed to chair and chair to bed with modified independence using the least restrictive device within 7 day(s). 3.  Patient will perform sit to stand with independence within 7 day(s). 4.  Patient will ambulate with modified supervision for 150 feet with the least restrictive device within 7 day(s). 5.  Patient will ascend/descend 5 stairs with handrail(s) with minimal assistance/contact guard assist within 7 day(s). physical Therapy TREATMENT Patient: Nilda Nolan (53 y.o. female) Date: 2/13/2019 Diagnosis: Acute encephalopathy [G93.40] <principal problem not specified> Precautions: Fall Chart, physical therapy assessment, plan of care and goals were reviewed. ASSESSMENT: 
Pt received supine in bed with visitor present and agreeable to PT intervention. Pt cleared by nursing for mobility.  Pt with slow progress towards therapy goals this date, however reporting improvements in R knee pain. VSS on RA at rest and throughout mobility. BP noted below for session. Bed mobility completed with SBA, increased time, and VCs for proper sequencing and technique with use of bed railing. Sitting balance intact. Reported 7-8/10 R knee pain at rest while sitting on EOB. She stood from EOB with min A x 1, increased time, and requiring two attempts to achieve standing with RW support. Needed VCs for safe hand placement with RW during transfers. She ambulated ~5' with min A x 1 and RW support, needing cueing for proper sequencing with RW. Gait distance limited by R knee pain and increased fatigue. Pt attempted to take steps back to chair, however ultimately needed bedside chair to be brought to patient. Provided MAX VCs for safe hand placement during stand>sit transfer, however pt with poor hand placement and uncontrolled descent into chair needing min-mod A x 1 for safety. VSS post-activity. Reported 4/10 R knee pain post-activity. Pt was left sitting in bedside chair with all needs met and RN and visitor present following therapy session. Recommend pt discharge to SNF rehab to improve functional mobility and independence prior to returning home. Progression toward goals: 
[]    Improving appropriately and progressing toward goals [x]    Improving slowly and progressing toward goals 
[]    Not making progress toward goals and plan of care will be adjusted PLAN: 
Patient continues to benefit from skilled intervention to address the above impairments. Continue treatment per established plan of care. Discharge Recommendations:  Abhi Damon Further Equipment Recommendations for Discharge:  TBD by rehab SUBJECTIVE:  
Patient stated I was just so tired earlier today.  OBJECTIVE DATA SUMMARY:  
Critical Behavior: 
Neurologic State: Alert Orientation Level: Oriented X4 Cognition: Decreased command following, Recognition of people/places Safety/Judgement: Decreased awareness of need for safety, Decreased awareness of need for assistance Functional Mobility Training: 
Bed Mobility: 
Rolling: Stand-by assistance; Additional time Supine to Sit: Stand-by assistance; Additional time Scooting: Stand-by assistance; Additional time Transfers: 
Sit to Stand: Minimum assistance;Assist x1 Stand to Sit: Minimum assistance; Moderate assistance;Assist x1(uncontrolled descent into sitting) Balance: 
Sitting: Impaired Sitting - Static: Good (unsupported) Sitting - Dynamic: Good (unsupported) Standing: Impaired; With support Standing - Static: Good;Constant support Standing - Dynamic : Fair(with RW support)Ambulation/Gait Training: 
Distance (ft): 5 Feet (ft) Assistive Device: Gait belt;Walker, rolling Ambulation - Level of Assistance: Minimal assistance;Assist x1;Additional time; Adaptive equipment Gait Abnormalities: Antalgic;Decreased step clearance;Shuffling gait; Step to gait Base of Support: Widened Stance: Right decreased Speed/Lawanda: Slow;Shuffled Step Length: Left shortened;Right shortened Swing Pattern: Right asymmetrical 
Pain: 
Pain Scale 1: Numeric (0 - 10) Pain Intensity 1: 6 Pain Location 1: Knee Pain Orientation 1: Right Pain Description 1: Aching Pain Intervention(s) 1: (refused. wants something when it is an 8) Activity Tolerance:  
Fair - VSS throughout positional changes; 7-8/10 R knee pain with initial mobility, however reported 4/10 pain post-activity Please refer to the flowsheet for vital signs taken during this treatment. After treatment:  
[x]    Patient left in no apparent distress sitting up in chair 
[]    Patient left in no apparent distress in bed 
[x]    Call bell left within reach [x]    Nursing notified 
[x]    Caregiver present 
[]    Bed alarm activated COMMUNICATION/COLLABORATION:  
The patients plan of care was discussed with: Physical Therapist and Registered Nurse Jeanette Monson, PT, DPT Time Calculation: 26 mins

## 2019-02-13 NOTE — PROGRESS NOTES
Problem: Falls - Risk of 
Goal: *Absence of Falls Document Zehra Parrish Fall Risk and appropriate interventions in the flowsheet. Fall Risk Interventions: 
Mobility Interventions: Bed/chair exit alarm, Patient to call before getting OOB Mentation Interventions: Bed/chair exit alarm, More frequent rounding, Reorient patient, Room close to nurse's station, Door open when patient unattended Medication Interventions: Patient to call before getting OOB, Bed/chair exit alarm Elimination Interventions: Bed/chair exit alarm, Call light in reach, Patient to call for help with toileting needs, Toileting schedule/hourly rounds, Urinal in reach History of Falls Interventions: Bed/chair exit alarm, Investigate reason for fall, Door open when patient unattended, Evaluate medications/consider consulting pharmacy

## 2019-02-13 NOTE — CONSULTS
ORTHOPAEDIC CONSULT NOTE    Subjective:     Date of Consultation:  February 13, 2019      Katarzyna Betancur is a 80 y.o. female who is being seen for R knee pain and leg edema s/p fall. Pt has been admitted for mnamgnet of UTI/ hypotension. Per pt she does not have a hx of known OA.      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 08/04/2012     Priority: 1 - One    NHL (non-Hodgkin's lymphoma) ([facility] 08/04/2012     Priority: 1 - One    Abdominal pain 05/22/2012     Priority: 1 - One     Class: Acute    Weight loss 05/22/2012     Priority: 1 - One     Class: Acute    DILAN (obstructive sleep apnea) 08/04/2012     Priority: 2 - Two    Anemia 11/08/2014     Priority: 3 - Three    Poor appetite 02/07/2019    Acute pain of right knee 02/07/2019    Counseling regarding advanced care planning and goals of care 02/07/2019    Hypotension due to hypovolemia 02/07/2019    Acute encephalopathy 02/04/2019    UTI (urinary tract infection) 02/04/2019    Diarrhea 02/04/2019    Nonrheumatic aortic valve insufficiency 10/16/2018    Seasonal allergic rhinitis 07/02/2018    Acquired hypothyroidism 03/18/2018    Aneurysmal dilatation ([facility] 03/18/2018    CKD (chronic kidney disease) stage 3, GFR 30-59 ml/min ([facility]) 01/10/2018    Xerosis of skin 12/21/2017    Dyslipidemia 12/21/2017    Paroxysmal atrial fibrillation ([address] 10/21/2017    Acute respiratory insufficiency 02/02/2017    Gastroesophageal reflux disease with esophagitis 02/02/2017    Thoracic aortic aneurysm without rupture ([facility] 02/02/2017    Physical deconditioning 01/29/2017    Mitral valvular regurgitation 01/22/2016    Lymphoma ([address] 11/17/2015    Chronic systolic congestive heart failure ([address] 10/08/2015    Presence of biventricular automatic cardioverter/defibrillator (AICD) 07/02/2015    LBBB (left bundle branch block) 07/01/2015    HTN (hypertension) 06/29/2015    Gout 06/29/2015    Reactive airway disease 03/23/2015    Rhinitis 09/04/2014     Family History   Problem Relation Age of Onset    Heart Disease Mother     Stroke Father       Social History     Tobacco Use    Smoking status: Never Smoker    Smokeless tobacco: Never Used   Substance Use Topics    Alcohol use: No     Alcohol/week: 0.0 oz     Past Medical History:   Diagnosis Date    Aortic insufficiency     Asthma     Cancer ([facility]     lymphoma    CKD (chronic kidney disease) stage 3, GFR 30-59 ml/min ([facility] 1/10/2018    Congestive heart failure, NYHA class II, chronic, systolic ([facility])     Heart failure ([facility]     Hypertension     Mitral valvular regurgitation 1/22/2016    ECHO 2/3/17: LV dilated, EF 20-25%, mild LVH, RV mod dilated, mild- mod MELANIA, mod-severe MR, mod AI ECHO 7/29/17: EF 15%, severe DHK, reduced RVEF, RVH, RVSP 35 mmHg  Mild LAE, mod-marked MA fibrosis, mod-severe MR (2+), AO root dilated,      Nonrheumatic aortic valve insufficiency 10/16/2018    Presence of biventricular automatic cardioverter/defibrillator (AICD) 7/2/2015    [employer] biventricular AICD implant    Stomach ulcer       Past Surgical History:   Procedure Laterality Date    HX BREAST BIOPSY Bilateral     40 years ago    HX COLONOSCOPY      HX HEENT      cataracts removed    HX HYSTERECTOMY      HX OTHER SURGICAL      lymph node surgery    HX TONSILLECTOMY      NH EGD TRANSORAL BIOPSY SINGLE/MULTIPLE  5/23/2012         SINUS SURGERY PROC UNLISTED      Nasal polyps removed      Prior to Admission medications    Medication Sig Start Date End Date Taking? Authorizing Provider   morinda citrifolia fruit (HECTOR PO) Take  by mouth. Yes Provider, Historical   albuterol (PROVENTIL VENTOLIN) 2.5 mg /3 mL (0.083 %) nebulizer solution 2.5 mg by Nebulization route two (2) times a day.    Yes Provider, Historical   furosemide (LASIX) 20 mg tablet Take 20 mg by mouth as needed (depending on weight, if > 126 lbs, patient takes 20 mg). Yes Provider, Historical   GUAIFENESIN PO Take 10 mL by mouth every four (4) hours as needed for Cough. robitussin   Yes Provider, Historical   atorvastatin (LIPITOR) 10 mg tablet TAKE ONE (1) TABLET(S) DAILY 1/8/19  Yes Miller Bonner MD   levothyroxine (SYNTHROID) 25 mcg tablet Take 1 Tab by mouth Daily (before breakfast). 12/11/18  Yes Miller Bonner MD   sacubitril-valsartan (ENTRESTO) 49 mg/51 mg tablet Take 1 Tab by mouth two (2) times a day. 7/24/18  Yes Miller Bonner MD   apixaban (ELIQUIS) 2.5 mg tablet Take 1 Tab by mouth two (2) times a day. Indications: PREVENT THROMBOEMBOLISM IN CHRONIC ATRIAL FIBRILLATION 6/1/18  Yes Miller Bonner MD   fluticasone-salmeterol (ADVAIR) 250-50 mcg/dose diskus inhaler Take 1 Puff by inhalation two (2) times a day. 4/14/18  Yes Miller Bonner MD   carvedilol (COREG) 3.125 mg tablet TAKE ONE (1) TABLET(S) TWIC E DAILY WITH FOOD  Indications: chronic heart failure 4/3/18  Yes Miller Bonner MD   allopurinol (ZYLOPRIM) 300 mg tablet TAKE ONE (1) TABLET(S) DAILY 1/24/18  Yes Miller Bonner MD   magnesium oxide 400 mg cap Take 1 Cap by mouth nightly. Yes Provider, Historical   cholecalciferol (VITAMIN D3) 1,000 unit cap Take 1,000 Units by mouth daily. Yes Provider, Historical   loratadine (CLARITIN) 10 mg tablet Take 10 mg by mouth nightly. Yes Provider, Historical   co-enzyme Q-10 (CO Q-10) 100 mg capsule Take 100 mg by mouth daily. Yes Provider, Historical   ibrutinib (IMBRUVICA) 140 mg cap Take 140 mg by mouth five (5) days a week. Mon-Fri   Yes Provider, Historical   acetaminophen (TYLENOL EXTRA STRENGTH) 500 mg tablet Take 500 mg by mouth every six (6) hours as needed for Pain. Yes Provider, Historical   multivitamin (ONE A DAY) tablet Take 1 Tab by mouth daily. Yes Provider, Historical   potassium chloride SR (KLOR-CON 10) 10 mEq tablet Take 10 mEq by mouth every other day.     Provider, Historical     Current Facility-Administered Medications   Medication Dose Route Frequency    heparin (porcine) pf 300-500 Units  300-500 Units InterCATHeter PRN    midodrine (PROAMITINE) tablet 10 mg  10 mg Oral TID WITH MEALS    allopurinol (ZYLOPRIM) tablet 200 mg  200 mg Oral DAILY    PHENYLephrine (PF)(BERNICE-SYNEPHRINE) 30 mg in 0.9% sodium chloride 250 mL infusion   mcg/min IntraVENous TITRATE    sodium chloride (NS) flush 5-40 mL  5-40 mL IntraVENous Q8H    sodium chloride (NS) flush 5-40 mL  5-40 mL IntraVENous PRN    sodium chloride 0.9 % bolus infusion 250 mL  250 mL IntraVENous PRN    lactobac ac& pc-s.therm-b.anim (DAWN Q/RISAQUAD)  1 Cap Oral DAILY    atorvastatin (LIPITOR) tablet 10 mg  10 mg Oral QHS    fluticasone-vilanterol (BREO ELLIPTA) 100mcg-25mcg/puff  1 Puff Inhalation DAILY    levothyroxine (SYNTHROID) tablet 25 mcg  25 mcg Oral 6am    magnesium oxide (MAG-OX) tablet 400 mg  400 mg Oral DAILY    albuterol-ipratropium (DUO-NEB) 2.5 MG-0.5 MG/3 ML  3 mL Nebulization Q6H PRN    acetaminophen (TYLENOL) tablet 650 mg  650 mg Oral Q4H PRN    traMADol (ULTRAM) tablet 50 mg  50 mg Oral Q6H PRN      Allergies   Allergen Reactions    Codeine Other (comments)     \"made me disoriented\" per pt        Review of Systems:  A comprehensive review of systems was negative except for that written in the HPI. Mental Status: no dementia    Objective:     Patient Vitals for the past 8 hrs:   BP Temp Pulse Resp SpO2   19 1212 102/56 98.9 °F (37.2 °C) 70 24    19 1200   73 21    19 1100 92/52  73 15    19 0815  98.2 °F (36.8 °C) 75 22    19 0800 107/60 98.2 °F (36.8 °C) 71 21    19 0700 103/56  71 16 94 %   19 0600 115/60  79 18      Temp (24hrs), Av.4 °F (36.9 °C), Min:98 °F (36.7 °C), Max:98.9 °F (37.2 °C)      Gen: Well-developed,  in no acute distress    Musc: RLE - + edema, with small knee effusion no erythema or warmth, + TTP, NVI    Skin: No skin breakdown noted.  Skin warm, pink, dry  Neuro: Cranial nerves are grossly intact, no focal motor weakness, follows commands appropriately   Psych: Good insight, oriented to person, place and time, alert    Imaging Review: reviewed     Labs: No results found for this or any previous visit (from the past 24 hour(s)).       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 08/04/2012     Priority: 1 - One    Abdominal pain 05/22/2012     Priority: 1 - One     Class: Acute    Weight loss 05/22/2012     Priority: 1 - One     Class: Acute    DILAN (obstructive sleep apnea) 08/04/2012     Priority: 2 - Two    Anemia 11/08/2014     Priority: 3 - Three    Poor appetite 02/07/2019    Acute pain of right knee 02/07/2019    Counseling regarding advanced care planning and goals of care 02/07/2019    Hypotension due to hypovolemia 02/07/2019    Acute encephalopathy 02/04/2019    UTI (urinary tract infection) 02/04/2019    Diarrhea 02/04/2019    Nonrheumatic aortic valve insufficiency 10/16/2018    Seasonal allergic rhinitis 07/02/2018    Acquired hypothyroidism 03/18/2018    [facility] dil[facility] 03/18/2018    CKD (chronic kidney disease) stage 3, GFR 30-59 ml/min ([facility]) 01/10/2018    Xerosis of skin 12/21/2017    Dyslipidemia 12/21/2017    Paroxysmal atrial fibrillation ([facility] 10/21/2017    Acute respiratory insufficiency 02/02/2017    Gastroesophageal reflux disease with esophagitis 02/02/2017    Thoracic aortic aneurysm without rupture (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 (8/4/2012)      Presence of biventricular automatic cardioverter/defibrillator (AICD) (7/2/2015)      Overview: [employer] biventricular AICD implant      CKD (chronic kidney disease) stage 3, GFR 30-59 ml/min (HCC) (1/10/2018)      Acute encephalopathy (2/4/2019)      UTI (urinary tract infection) (2/4/2019)      Diarrhea (2/4/2019)      Poor appetite (2/7/2019)      Acute pain of right knee (2/7/2019)      Counseling regarding advanced care planning and goals of care (2/7/2019)      Hypotension due to hypovolemia (2/7/2019)        Plan:   -  Pt is stable orthopaedically, Non-Operative management at this time  -  arthrocentisis completed, see procedure noted below  -  Continue management as per medicine, no clinical indicators of septic jt on exam. Will follow cell count       Procedure, risks, benefits, & alternatives explained to patient, who voiced understanding and agreed to proceed with the arthrocentesis. Consent obtained Area prepped and draped in a sterile fashion using Chlorhexidine. Local anesthesia administered using 1% Lidocaine. Patient placed in position of comfort with knee comfortably extended. Small gauge needle introduced into joint space in a direction parallel to the plane of the posterior surface of the patella in a lateral position. 5ccs of bloody fluid removed and sent for cell count, differential, Gram stain, culture and sensitivity, crystal analysis  Dr. Hutchinson Viridiana aware and agrees with plan as above.         Jay Jay Porter, NP  Orthopedic Nurse Practitioner   [place]

## 2019-02-13 NOTE — PROGRESS NOTES
Attempted to see patient for OT treatment, but patient politely declined participation 2/2 fatigue after having been up OOB for 2 hours this morning. OT will follow back later today or tomorrow for OT treatment.

## 2019-02-13 NOTE — PROGRESS NOTES
General Daily Progress Note Admit Date: 2/4/2019 Subjective:  
 
Patient complains of right knee pain. Current Facility-Administered Medications Medication Dose Route Frequency  midodrine (PROAMITINE) tablet 10 mg  10 mg Oral TID WITH MEALS  
 allopurinol (ZYLOPRIM) tablet 200 mg  200 mg Oral DAILY  PHENYLephrine (PF)(BERNICE-SYNEPHRINE) 30 mg in 0.9% sodium chloride 250 mL infusion   mcg/min IntraVENous TITRATE  sodium chloride (NS) flush 5-40 mL  5-40 mL IntraVENous Q8H  
 sodium chloride (NS) flush 5-40 mL  5-40 mL IntraVENous PRN  
 sodium chloride 0.9 % bolus infusion 250 mL  250 mL IntraVENous PRN  
 lactobac ac& pc-s.therm-b.anim (DAWN Q/RISAQUAD)  1 Cap Oral DAILY  apixaban (ELIQUIS) tablet 2.5 mg  2.5 mg Oral BID  atorvastatin (LIPITOR) tablet 10 mg  10 mg Oral QHS  fluticasone-vilanterol (BREO ELLIPTA) 100mcg-25mcg/puff  1 Puff Inhalation DAILY  levothyroxine (SYNTHROID) tablet 25 mcg  25 mcg Oral 6am  
 magnesium oxide (MAG-OX) tablet 400 mg  400 mg Oral DAILY  albuterol-ipratropium (DUO-NEB) 2.5 MG-0.5 MG/3 ML  3 mL Nebulization Q6H PRN  
 acetaminophen (TYLENOL) tablet 650 mg  650 mg Oral Q4H PRN  
 traMADol (ULTRAM) tablet 50 mg  50 mg Oral Q6H PRN Review of Systems A comprehensive review of systems was negative.  
 
Objective:  
 
Patient Vitals for the past 24 hrs: 
 BP Temp Pulse Resp SpO2 Weight  
02/13/19 0815  98.2 °F (36.8 °C) 75 22    
02/13/19 0800 107/60 98.2 °F (36.8 °C) 71 21    
02/13/19 0700 103/56  71 16 94 %   
02/13/19 0600 115/60  79 18    
02/13/19 0500 110/61  74 19    
02/13/19 0400 96/54 98.1 °F (36.7 °C) 70 19 97 %   
02/13/19 0300 94/56  70 22 97 %   
02/13/19 0200 92/51  71 20 96 %   
02/13/19 0100 97/52  70 22 97 %   
02/13/19 0000 98/58 98.4 °F (36.9 °C) 70 20 96 %   
02/12/19 2300 97/51  70 21 95 %   
02/12/19 2200 94/61  79 19 96 %   
02/12/19 2100 117/56  70 20 99 %   
 02/12/19 2000 105/56 98.7 °F (37.1 °C) 70 22 96 %   
02/12/19 1900 105/56  70 23 96 %   
02/12/19 1800 116/59  82 20    
02/12/19 1700 106/58  73 13 98 %   
02/12/19 1600 (!) 87/55 98 °F (36.7 °C) 80 24 98 %   
02/12/19 1500 99/62  84 21 100 %   
02/12/19 1400 (!) 87/58  85 23 99 %   
02/12/19 1313      155 lb 13.8 oz (70.7 kg) 02/12/19 1300 97/53  71 17 100 %   
02/12/19 1200 (!) 85/54 97.7 °F (36.5 °C) 70 17 98 %   
02/12/19 1100 (!) 83/50  71 19 98 %   
02/12/19 1045 (!) 84/54  80 22 100 %   
02/12/19 1030 (!) 77/55  86 14 100 %   
02/12/19 1000   96 24 100 %   
02/12/19 0900 (!) 123/95  90 18 99 %  No intake/output data recorded. 02/11 1901 - 02/13 0700 In: 969 [P.O.:870] Out: 925 [Urine:775] Physical Exam:  
Visit Vitals /60 Pulse 75 Temp 98.2 °F (36.8 °C) Resp 22 Ht 5' 1\" (1.549 m) Wt 155 lb 13.8 oz (70.7 kg) SpO2 94% BMI 29.45 kg/m² General appearance: alert, cooperative, no distress, appears stated age Neck: supple, symmetrical, trachea midline, no adenopathy, thyroid: not enlarged, symmetric, no tenderness/mass/nodules, no carotid bruit and no JVD Lungs: clear to auscultation bilaterally Heart: regular rate and rhythm, S1, S2 normal, no murmur, click, rub or gallop Abdomen: soft, non-tender. Bowel sounds normal. No masses,  no organomegaly Extremities: edema 1+, moderate swelling and pain elicited to range of motion right knee Data Review Recent Results (from the past 24 hour(s)) CBC W/O DIFF Collection Time: 02/12/19  8:56 AM  
Result Value Ref Range WBC 3.8 3.6 - 11.0 K/uL  
 RBC 2.85 (L) 3.80 - 5.20 M/uL HGB 8.8 (L) 11.5 - 16.0 g/dL HCT 26.3 (L) 35.0 - 47.0 % MCV 92.3 80.0 - 99.0 FL  
 MCH 30.9 26.0 - 34.0 PG  
 MCHC 33.5 30.0 - 36.5 g/dL  
 RDW 17.7 (H) 11.5 - 14.5 % PLATELET 830 629 - 269 K/uL MPV 10.0 8.9 - 12.9 FL  
 NRBC 0.0 0  WBC ABSOLUTE NRBC 0.00 0.00 - 0.01 K/uL PHOSPHORUS  
 Collection Time: 02/12/19  8:56 AM  
Result Value Ref Range Phosphorus 2.7 2.6 - 4.7 MG/DL MAGNESIUM Collection Time: 02/12/19  8:56 AM  
Result Value Ref Range Magnesium 2.3 1.6 - 2.4 mg/dL METABOLIC PANEL, BASIC Collection Time: 02/12/19  8:56 AM  
Result Value Ref Range Sodium 140 136 - 145 mmol/L Potassium 4.3 3.5 - 5.1 mmol/L Chloride 111 (H) 97 - 108 mmol/L  
 CO2 23 21 - 32 mmol/L Anion gap 6 5 - 15 mmol/L Glucose 79 65 - 100 mg/dL BUN 21 (H) 6 - 20 MG/DL Creatinine 0.94 0.55 - 1.02 MG/DL  
 BUN/Creatinine ratio 22 (H) 12 - 20 GFR est AA >60 >60 ml/min/1.73m2 GFR est non-AA 56 (L) >60 ml/min/1.73m2 Calcium 8.3 (L) 8.5 - 10.1 MG/DL Assessment:  
 
Active Problems: 
  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 biventricular AICD implant CKD (chronic kidney disease) stage 3, GFR 30-59 ml/min ([facility]) (1/10/2018) Acute encephalopathy (2/4/2019) UTI (urinary tract infection) (2/4/2019) Diarrhea (2/4/2019) Poor appetite (2/7/2019) Acute pain of right knee (2/7/2019) Counseling regarding advanced care planning and goals of care (2/7/2019) Hypotension due to hypovolemia (2/7/2019) Plan: 1.  BP improving with addition of ProAmatine. 2.  Traumatic synovitis right knee. Eventual injection. 3.  Eventual transfer to floor. She will eventually need rehab.

## 2019-02-13 NOTE — PROGRESS NOTES
Report recevied at bedside from [facility]. Patient sleeping deeply. 9314  Assessment completed, wants to get bath and out of bed. Assisted and complete bath given. 0830  Dr. Ted Xiong in, to transfer to medical floor. 4577  Eating breakfast at present. Still complains of knee, but does not want medication for pain until it is an 8. Consumed Ensure with meal, may be contributing to loose stool. 1030  To bed after voiding and stooling in bedside commode. Nearly a total lift due to difficulty managing right knee. 1150  Attempted to insert a PIV, PICC team assessed. Dr. Ted Xiong made aware of difficulty getting a PIV inserted. To keep central line despite transfer orders to floor. 1255  Tylenol given for pain in knee prior to needle aspiration by NP for Dr. Laquita Alvarado. Patient states doesn't want any lunch. Ice pack applied to right knee for comfort as well. Samples will be sent to lab for gram stain and cell count. 1535  Report given to LINDA Rankin RN.

## 2019-02-13 NOTE — PROGRESS NOTES
[facility] INTENSIVIST Consult Service NotePulmonary, Critical Care, and Sleep Medicine Name: Delvis Garrett MRN: [id number] : 3/17/1925 Hospital: [address] Date: 2019  Admission date: 2019 Hospital Day: 10 INTENSIVIST PROGRESS NOTE:  
 
Patient seen and evaluated, chart reviewed 81 yo female with end stage CMP transferred to ccu with hypotension, cardiogenic shock Started on pressors and remains on Nemesio drip at 30mcg. Pt feels like she has swelling of her hands and feet. No acute events overnight in CCU Now pt in CCU awake, alert, no severe distress 2-10-19: Pt has been doing pretty well. She was off nemesio this am but has been with borderline BP (MAP of 62). Had some gout pain in her right knee last pm. 
Had some confusion last pm. 
She feels good this am. Has much decreased pain to her right knee. Tolerating po intake. NO chest pain, no back pain, no leg pain.  on and off IV nemesio all night. SBP in 70's but no sx.  
 
 No chest pain. Right knee pain. OOB but then as pt clmbed back in bed- SBP dropped to 70's on midodrine, off IV nemesio. Cardiology signed off 
 
 right knee really hurts. Per patient Dr. Jai Rey to inject. SBP in 80's when OOB and no sx. ROS: no sob, no cp MEDS:  
Current Facility-Administered Medications Medication  midodrine (PROAMITINE) tablet 10 mg  
 allopurinol (ZYLOPRIM) tablet 200 mg  
 PHENYLephrine (PF)(NEMESIO-SYNEPHRINE) 30 mg in 0.9% sodium chloride 250 mL infusion  sodium chloride (NS) flush 5-40 mL  sodium chloride (NS) flush 5-40 mL  sodium chloride 0.9 % bolus infusion 250 mL  lactobac ac& pc-s.therm-b.anim (DAWN Q/RISAQUAD)  apixaban (ELIQUIS) tablet 2.5 mg  
 atorvastatin (LIPITOR) tablet 10 mg  
 fluticasone-vilanterol (BREO ELLIPTA) 100mcg-25mcg/puff  levothyroxine (SYNTHROID) tablet 25 mcg  magnesium oxide (MAG-OX) tablet 400 mg  
  albuterol-ipratropium (DUO-NEB) 2.5 MG-0.5 MG/3 ML  
 acetaminophen (TYLENOL) tablet 650 mg  
 traMADol (ULTRAM) tablet 50 mg Visit Vitals /60 Pulse 75 Temp 98.2 °F (36.8 °C) Resp 22 Ht 5' 1\" (1.549 m) Wt 70.7 kg (155 lb 13.8 oz) SpO2 94% BMI 29.45 kg/m² General: petite elderly AAF no distress, alert, no distress. Pleasant and comfortable. Eyes: anicteric HEENT: dry oral mucosa Neck: FROM 
CV: RRR nl s1,2. Lungs: decreased BS but clear and breathing comfortably. Abd: soft : no flank pain Ext: at least trace to 1+ edema of BLE. No increased temp of the right knee. Skin: no rash Musculoskeletal: Right knee swollen and very tender Neuro: non focal, seems to have good strength of BUE and BLE. Psych: no anxiety or depression. Labs: 
 
Recent Labs  
  02/12/19 
0856 02/11/19 
0448 WBC 3.8 3.4* HGB 8.8* 8.6*  184 Recent Labs  
  02/12/19 
0856 02/11/19 
0448  139  
K 4.3 4.5  
* 110* CO2 23 22 GLU 79 80 BUN 21* 22* CREA 0.94 0.95  
CA 8.3* 8.0*  
MG 2.3 2.1 PHOS 2.7 2.2* ALB  --  1.7* SGOT  --  34 ALT  --  21 No results for input(s): PH, PCO2, PO2, HCO3, FIO2 in the last 72 hours. No results for input(s): CPK, CKNDX, TROIQ in the last 72 hours. No lab exists for component: CPKMB Lab Results Component Value Date/Time  (H) 02/05/2017 03:50 AM  
 B-type Natriuretic Peptide 454.4 (H) 09/28/2018 04:32 PM  
  
 
 
A/P: 
 
Hypotension improved on midodrine; OZ hose UTI Cardiomyopathy appears  Decompensated POA: Pro BNP over 13K. Now better Right knee pain- per pt Dr. Coni Awan has been treating for years, may need tap? Will hold eliquis today; consult ortho Peripheral edema. Anticoagulation on Eliquis. Hypothyroid on synthroid. acute respiratory failure: wean O2 
cardiogenic shock: wean pressors as tolerated, Weaned off ronn this am.  
 CHF: diuretics when BP is better to help mobilize some fluid. ANDREW: creatinine better today UTI: on po levoflox completed course DNR but ok with pressors 
palliative care following Will assist on disposition planning when stable for transfer Mercedes Martins MD

## 2019-02-13 NOTE — PROGRESS NOTES
Pt's clarissa-in-law Rufina Faulkner is here from [place] and requested to speak w/ CM. Son Peg De Los Santos, and Rufina Faulkner realize that pt will need SNF at [place] and are interested in rehab unit at [facility]. CM explained that their residents get first preference for beds but that pt's from outside are accepted if space available. FOC verbal permission given. Rufina Faulkner stated that they are also looking into possibility of pt going to assisted living at [place] [id number] after SNF. Ref sent via [employer]. CM f/u with phone call later and spoke w/ Yannick Abad. They do not subscribe to [facility] so [facility] of application to be completed for SNF consideration. Pt has Transfer orders for routine progression of care. Kathy Israel, MSW

## 2019-02-13 NOTE — PROGRESS NOTES
2000- Patient in bed, saline locked, on room air. 0700- Report to [employer] Patient had no complaints of pain overnight.

## 2019-02-13 NOTE — INTERDISCIPLINARY ROUNDS
Interdisciplinary team rounds were held 2/13/2019 with the following team members:Care Management, Diabetes Treatment Specialist, Nursing, Nutrition, Palliative Care, Pharmacy, Physician and Respiratory Therapy. Plan of care discussed. See clinical pathway and/or care plan for interventions and desired outcomes.

## 2019-02-13 NOTE — PROGRESS NOTES
1545  Bedside report received. Assessment complete. Pt c/o 6/10 pain in R knee. Knee warm to touch.   Ice pack placed on knee per pt request.

## 2019-02-14 PROCEDURE — 74011250637 HC RX REV CODE- 250/637: Performed by: INTERNAL MEDICINE

## 2019-02-14 PROCEDURE — 97530 THERAPEUTIC ACTIVITIES: CPT

## 2019-02-14 PROCEDURE — 65660000000 HC RM CCU STEPDOWN

## 2019-02-14 PROCEDURE — 97116 GAIT TRAINING THERAPY: CPT

## 2019-02-14 RX ADMIN — Medication 400 MG: at 09:21

## 2019-02-14 RX ADMIN — APIXABAN 2.5 MG: 2.5 TABLET, FILM COATED ORAL at 14:42

## 2019-02-14 RX ADMIN — ALLOPURINOL 200 MG: 100 TABLET ORAL at 09:18

## 2019-02-14 RX ADMIN — FLUTICASONE FUROATE AND VILANTEROL TRIFENATATE 1 PUFF: 100; 25 POWDER RESPIRATORY (INHALATION) at 09:25

## 2019-02-14 RX ADMIN — LEVOTHYROXINE SODIUM 25 MCG: 25 TABLET ORAL at 06:15

## 2019-02-14 RX ADMIN — APIXABAN 2.5 MG: 2.5 TABLET, FILM COATED ORAL at 21:32

## 2019-02-14 RX ADMIN — Medication 10 ML: at 21:32

## 2019-02-14 RX ADMIN — MIDODRINE HYDROCHLORIDE 10 MG: 5 TABLET ORAL at 17:10

## 2019-02-14 RX ADMIN — MIDODRINE HYDROCHLORIDE 10 MG: 5 TABLET ORAL at 09:22

## 2019-02-14 RX ADMIN — ATORVASTATIN CALCIUM 10 MG: 10 TABLET, FILM COATED ORAL at 21:32

## 2019-02-14 RX ADMIN — TRAMADOL HYDROCHLORIDE 50 MG: 50 TABLET, FILM COATED ORAL at 09:30

## 2019-02-14 RX ADMIN — Medication 1 CAPSULE: at 09:00

## 2019-02-14 RX ADMIN — Medication 10 ML: at 06:15

## 2019-02-14 RX ADMIN — Medication 10 ML: at 14:43

## 2019-02-14 RX ADMIN — MIDODRINE HYDROCHLORIDE 10 MG: 5 TABLET ORAL at 14:42

## 2019-02-14 NOTE — PROGRESS NOTES
Problem: Mobility Impaired (Adult and Pediatric) Goal: *Acute Goals and Plan of Care (Insert Text) Physical Therapy Goals Goals reviewed and revised 2/12/19 Physical Therapy Goals Initiated 2/12/2019 1. Patient will move from supine to sit and sit to supine , scoot up and down and roll side to side in bed with modified independence within 7 day(s). 2.  Patient will transfer from bed to chair and chair to bed with modified independence using the least restrictive device within 7 day(s). 3.  Patient will perform sit to stand with modified independence within 7 day(s). 4.  Patient will ambulate with modified independence for 75 feet with the least restrictive device within 7 day(s). 5.  Patient will ascend/descend 5 stairs with 1 handrail(s) with moderate assistance  within 7 day(s). Initiated 2/5/2019 1. Patient will move from supine to sit and sit to supine , scoot up and down and roll side to side in bed with independence within 7 day(s). 2.  Patient will transfer from bed to chair and chair to bed with modified independence using the least restrictive device within 7 day(s). 3.  Patient will perform sit to stand with independence within 7 day(s). 4.  Patient will ambulate with modified supervision for 150 feet with the least restrictive device within 7 day(s). 5.  Patient will ascend/descend 5 stairs with handrail(s) with minimal assistance/contact guard assist within 7 day(s). physical Therapy TREATMENT Patient: Nilda Nolan (73 y.o. female) Date: 2/14/2019 Diagnosis: Acute encephalopathy [G93.40] <principal problem not specified> Precautions: Fall Chart, physical therapy assessment, plan of care and goals were reviewed. ASSESSMENT: 
Patient received resting in chair on RA, cleared and agreeable to work with therapy. Patient performed sit to stand with max A and stand to sit with min-mod A, requiring maximal verbal cues for sequencing of transfer. Patient required multiple attempts to successfully perform sit to stand tranfers. When standing to sitting, patient demonstrated uncontrolled descent despite cues. Ambulated 20 feet with CGA-min A with one intermittent sitting rest break for 2-3 minutes. Demonstrated slowed, shuffling gait with decreased step clearance. Patient continuously complained about her right knee hurting, but kept referring to it as her left knee. Patient describes pain being localized to over the site of the arthrocentesis, and complained of pain with both knee flexion and extension. Patient left resting in chair with VSS on RA and all needs met. Recommending SNF placement at discharge. Progression toward goals: 
[]    Improving appropriately and progressing toward goals [x]    Improving slowly and progressing toward goals 
[]    Not making progress toward goals and plan of care will be adjusted PLAN: 
Patient continues to benefit from skilled intervention to address the above impairments. Continue treatment per established plan of care. Discharge Recommendations:  Abhi Damon Further Equipment Recommendations for Discharge:  TBD SUBJECTIVE:  
Patient stated The pain, the pain, the excruciating pain!  OBJECTIVE DATA SUMMARY:  
Critical Behavior: 
Neurologic State: Alert Orientation Level: Oriented X4 Cognition: Follows commands, Poor safety awareness Safety/Judgement: Decreased awareness of need for safety, Decreased awareness of need for assistance Functional Mobility Training: 
Bed Mobility: 
  
  
  
  
  
  
Transfers: 
Sit to Stand: Maximum assistance Stand to Sit: Minimum assistance; Moderate assistance Balance: 
Sitting: Impaired Sitting - Static: Good (unsupported) Sitting - Dynamic: Good (unsupported) Standing: Impaired; With support Standing - Static: Good;Constant support Standing - Dynamic : FairAmbulation/Gait Training: 
Distance (ft): 20 Feet (ft) Assistive Device: Gait belt;Walker, rolling Ambulation - Level of Assistance: Contact guard assistance;Minimal assistance Gait Abnormalities: Antalgic;Decreased step clearance;Shuffling gait Base of Support: Widened Stance: Right decreased Speed/Lawanda: Slow;Shuffled Step Length: Left shortened;Right shortened Swing Pattern: Right asymmetrical 
  
  
  
  
  
Therapeutic Exercises:  
Standing marches: 5x B Seated marches: 10x B 
LAQs: 15x B Pain: 
Pain Scale 1: Numeric (0 - 10) Pain Intensity 1: 5 Pain Location 1: Knee Pain Description 1: Aching Pain Intervention(s) 1: Medication (see MAR) Activity Tolerance:  
Fair on RA Please refer to the flowsheet for vital signs taken during this treatment. After treatment:  
[x]    Patient left in no apparent distress sitting up in chair 
[]    Patient left in no apparent distress in bed 
[x]    Call bell left within reach [x]    Nursing notified 
[]    Caregiver present [x]    Bed alarm activated COMMUNICATION/COLLABORATION:  
The patients plan of care was discussed with: Registered Nurse and  Gume Hoffamnn 
 Time Calculation: 31 mins Regarding student involvement in patient care: A student participated in this treatment session. Per CMS Medicare statements and APTA guidelines I certify that the following was true: 1. I was present and directly observed the entire session. 2. I made all skilled judgments and clinical decisions regarding care. 3. I am the practitioner responsible for assessment, treatment, and documentation.

## 2019-02-14 NOTE — PROGRESS NOTES
Orthopedic NP Progress Note Post Op day: * No surgery found * February 14, 2019 12:57 PM  
 
Denisse Augustekman Attending Physician: Treatment Team: Attending Provider: Rahul Potts MD; Consulting Provider: Moe Oliva MD; Consulting Provider: Darlyn Morrow NP; Consulting Provider: Janeth Vasquez NP; Consulting Provider: Kevin Solis MD; Care Manager: NATANAEL Turk; Consulting Provider: Audrey Ruiz MD  
 
Vital Signs:   
Patient Vitals for the past 8 hrs: 
 BP Temp Pulse Resp SpO2  
02/14/19 1145 (P) 100/51 (P) 97.8 °F (36.6 °C) (P) 92 (P) 14 (P) 97 % 02/14/19 0833 100/63 98.1 °F (36.7 °C) 76 16 98 % 02/14/19 0458 122/74 97.2 °F (36.2 °C) 70 18 98 % BMI (calculated): 29.5 (02/12/19 1313) Intake/Output: 
02/14 0701 - 02/14 1900 In: -  
Out: 712 [CXKXI:550] 02/12 1901 - 02/14 0700 In: 120 [P.O.:120] Out: 725 [Urine:725] Pain Control:  
Pain Assessment Pain Scale 1: Numeric (0 - 10) Pain Intensity 1: 5 Pain Onset 1: acute Pain Location 1: Knee Pain Orientation 1: Right Pain Description 1: Aching Pain Intervention(s) 1: Medication (see MAR) LAB:   
Recent Labs  
  02/12/19 
0856 HCT 26.3* HGB 8.8* Lab Results Component Value Date/Time Sodium 140 02/12/2019 08:56 AM  
 Potassium 4.3 02/12/2019 08:56 AM  
 Chloride 111 (H) 02/12/2019 08:56 AM  
 CO2 23 02/12/2019 08:56 AM  
 Glucose 79 02/12/2019 08:56 AM  
 BUN 21 (H) 02/12/2019 08:56 AM  
 Creatinine 0.94 02/12/2019 08:56 AM  
 Calcium 8.3 (L) 02/12/2019 08:56 AM  
 
 
Subjective:  Denisse Goldsmith is a 80 y.o. female s/p arthrocentesis of R knee, pt states pain improved today  . Tolerating diet. Objective: General: alert, cooperative, no distress. Neuro/Vascular: CNS Intact. Sensation stable. Brisk cap refill, 2+ pulses UE/LE Musculoskeletal:  +ROM UE/LE no R knee effusion or erythema . Tanya's sign negative in bilateral lower extremities.   Calves soft, supple, non-tender upon palpation or with passive stretch. Skin: warm and dry Galindo - n Drain - n 
  
 
PT/OT:  
Gait:  Gait Base of Support: Widened Speed/Lawanda: Slow, Shuffled Step Length: Left shortened, Right shortened Swing Pattern: Right asymmetrical 
Stance: Right decreased Gait Abnormalities: Antalgic, Decreased step clearance, Shuffling gait Ambulation - Level of Assistance: Contact guard assistance, Minimal assistance Distance (ft): 20 Feet (ft) Assistive Device: Gait belt, Walker, rolling Assessment:  
 s/p Active Problems: 
  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  
 
-  Continue PT/OT up as tolerated with assistance  
-  Continue established methods of pain control 
-  VTE Prophylaxes - TEDS &/or SCDs -  Awaiting cultures if no growth in AM will inject Signed By: Ritchie Sales NP Orthopedic Nurse Practitioner

## 2019-02-14 NOTE — PROGRESS NOTES
CK spoke w/ clarissa-in-law Aakash Patel about application forms sent to CK yesterday. Same forms were sent to Aakash Patel and she has nearly completed. She will submit to [facility] [place] to start admissions process for pt to become long-term resident. CK called Lenamonique Lisa ([phone] Sales?) again at [facility] [place] again to clarify process of getting pt's clinical info reviewed for possible acceptance into SNF Unit only. Had to leave  for Yuni Lisa. This organization does not use any of the electronic referral systems. Addendum 1200:  Clinical info info for SNF admiss needs to be sent to Alondra Bradshaw ([phone], [address] fax). Info was faxed to [facility] so clinicals and insur info can be reviewed. Unsure if this facility will accept new admission on a Sat or Sun - will inquire. Kimberly Lim, MSW

## 2019-02-14 NOTE — PROGRESS NOTES
The patient was seated in her chair (napping)  with her daughter-in-law at her side. The patient was grateful for the prayer blanket that I presented to her on behalf volunteers. The three of us chatted about next steps for the patient. The patient continues to appear to be in a good place spiritually. The three of us had prayer together. The patient expressed gratitude for the visit. Rev. Nora Reilly EdD MDiv Palliative  Fellow For [facility] Page [phone])

## 2019-02-14 NOTE — PROGRESS NOTES
Problem: Falls - Risk of 
Goal: *Absence of Falls Document Rolando Tirados Fall Risk and appropriate interventions in the flowsheet. Outcome: Progressing Towards Goal 
Fall Risk Interventions: 
Mobility Interventions: Communicate number of staff needed for ambulation/transfer, Patient to call before getting OOB Mentation Interventions: Adequate sleep, hydration, pain control, Bed/chair exit alarm Medication Interventions: Bed/chair exit alarm, Patient to call before getting OOB, Teach patient to arise slowly Elimination Interventions: Bed/chair exit alarm, Call light in reach, Patient to call for help with toileting needs History of Falls Interventions: Bed/chair exit alarm, Room close to nurse's station

## 2019-02-14 NOTE — PROGRESS NOTES
9485 - Shift assessment completed. See flowsheet for full details. Pt resting comfortably on room air with no complaints of pain. VSS. Will continue to monitor. TRANSFER - OUT REPORT: 
 
Verbal report given to MAXIMUS Lema(name) on Bhumika Rodrigues  being transferred to [facility]/Tele(unit) for routine progression of care Report consisted of patients Situation, Background, Assessment and  
Recommendations(SBAR). Information from the following report(s) SBAR, Kardex, Intake/Output, MAR and Cardiac Rhythm Paced was reviewed with the receiving nurse. Lines:  
Quad Lumen 02/06/19 Right Internal jugular (Active) Central Line Being Utilized No 2/13/2019 11:56 PM  
Criteria for Appropriate Use Limited/no vessel suitable for conventional peripheral access 2/14/2019  5:04 AM  
Site Assessment Clean, dry, & intact 2/14/2019  5:04 AM  
Infiltration Assessment 0 2/14/2019  5:04 AM  
Affected Extremity/Extremities Color distal to insertion site pink (or appropriate for race) 2/14/2019  5:04 AM  
Date of Last Dressing Change 02/13/19 2/13/2019  3:57 PM  
Dressing Status Clean, dry, & intact 2/14/2019  5:04 AM  
Dressing Type Disk with Chlorhexadine gluconate (CHG) 2/14/2019  5:04 AM  
Action Taken Open ports on tubing capped 2/13/2019 11:56 PM  
Proximal Hub Color/Line Status White;Capped 2/14/2019  5:04 AM  
Positive Blood Return (Medial Site) No 2/13/2019 11:56 PM  
Medial 1 Hub Color/Line Status Gray;Capped 2/14/2019  5:04 AM  
Positive Blood Return (Lateral Site) No 2/13/2019 11:56 PM  
Medial 2 Hub Color/Line Status Blue;Capped 2/14/2019  5:04 AM  
Positive Blood Return (Site #3) Yes 2/13/2019 11:56 PM  
Distal Hub Color/Line Status Brown;Capped 2/14/2019  5:04 AM  
Positive Blood Return (Site #4) Yes 2/13/2019 11:56 PM  
External Catheter Length (cm) 0 centimeters 2/13/2019 12:00 PM  
Alcohol Cap Used Yes 2/13/2019 11:56 PM  
  
 
Opportunity for questions and clarification was provided. Patient transported with: 
 Registered Nurse

## 2019-02-14 NOTE — PROGRESS NOTES
-Hematology / Oncology ([facility]) - 
-Primary Oncologist- Dr. Jennifer Kirk 
-[facility] weak  
 
-O-  
  
Patient [facility] for the past 24 hrs: 
 Temp Pulse Resp BP SpO2  
02/14/19 0833 98.1 °F (36.7 °C) 76 16 100/63 98 % 02/14/19 0458 97.2 °F (36.2 °C) 70 18 122/74 98 % 02/14/19 0004 98.1 °F (36.7 °C) 70 18 103/52 96 % 02/13/19 1927 97.5 °F (36.4 °C) 70 20 119/71 96 % 02/13/19 1557 98.1 °F (36.7 °C) 81 17 90/51 93 % 02/13/19 1212 98.9 °F (37.2 °C) 70 24 102/56   
02/13/19 1200  73 21    
 
02/14 0701 - 02/14 1900 In: -  
Out: 509 [VMJVA:184] Gen: nad, pleaseant Chest: decreased at bases per anterior exam 
Cardiac: rrr Abd: s/nt Ext:1+ edema to knees b/l 
 
-Labs-   
Recent Labs  
  02/12/19 
5379 WBC 3.8 HGB 8.8*    
K 4.3 GLU 79 BUN 21* CREA 0.94  
CA 8.3*  
MG 2.3 PHOS 2.7  
 
 
-Imaging-  
 
 
-Assessment + Plan-    
*) CLL: 
- Follows with Dr. Jennifer Kirk 
- Adriana Santizo while admitted and plan to resume after d/c. 
- cbc remins stable *) UTI: 
- abx completed *) Cardiogenic Shock: 
-  now on midordrine and out of ICU Likely going home to SNF at d/c

## 2019-02-14 NOTE — PROGRESS NOTES
General Daily Progress Note Admit Date: 2/4/2019 Subjective:  
 
Patient continues to complain of pain in right knee. Current Facility-Administered Medications Medication Dose Route Frequency  heparin (porcine) pf 300-500 Units  300-500 Units InterCATHeter PRN  
 midodrine (PROAMITINE) tablet 10 mg  10 mg Oral TID WITH MEALS  
 allopurinol (ZYLOPRIM) tablet 200 mg  200 mg Oral DAILY  PHENYLephrine (PF)(BERNICE-SYNEPHRINE) 30 mg in 0.9% sodium chloride 250 mL infusion   mcg/min IntraVENous TITRATE  sodium chloride (NS) flush 5-40 mL  5-40 mL IntraVENous Q8H  
 sodium chloride (NS) flush 5-40 mL  5-40 mL IntraVENous PRN  
 sodium chloride 0.9 % bolus infusion 250 mL  250 mL IntraVENous PRN  
 lactobac ac& pc-s.therm-b.anim (DAWN Q/RISAQUAD)  1 Cap Oral DAILY  atorvastatin (LIPITOR) tablet 10 mg  10 mg Oral QHS  fluticasone-vilanterol (BREO ELLIPTA) 100mcg-25mcg/puff  1 Puff Inhalation DAILY  levothyroxine (SYNTHROID) tablet 25 mcg  25 mcg Oral 6am  
 magnesium oxide (MAG-OX) tablet 400 mg  400 mg Oral DAILY  albuterol-ipratropium (DUO-NEB) 2.5 MG-0.5 MG/3 ML  3 mL Nebulization Q6H PRN  
 acetaminophen (TYLENOL) tablet 650 mg  650 mg Oral Q4H PRN  
 traMADol (ULTRAM) tablet 50 mg  50 mg Oral Q6H PRN Review of Systems A comprehensive review of systems was negative. Objective:  
 
Patient Vitals for the past 24 hrs: 
 BP Temp Pulse Resp SpO2  
02/14/19 0833 100/63 98.1 °F (36.7 °C) 76 16 98 % 02/14/19 0458 122/74 97.2 °F (36.2 °C) 70 18 98 % 02/14/19 0004 103/52 98.1 °F (36.7 °C) 70 18 96 % 02/13/19 1927 119/71 97.5 °F (36.4 °C) 70 20 96 % 02/13/19 1557 90/51 98.1 °F (36.7 °C) 81 17 93 % 02/13/19 1212 102/56 98.9 °F (37.2 °C) 70 24   
02/13/19 1200   73 21   
02/13/19 1100 92/52  73 15   
 
02/14 0701 - 02/14 1900 In: -  
Out: 938 [YJKEV:432] 02/12 1901 - 02/14 0700 In: 120 [P.O.:120] Out: 725 [Urine:725] Physical Exam:  
Visit Vitals /63 (BP 1 Location: Right arm) Pulse 76 Temp 98.1 °F (36.7 °C) Resp 16 Ht 5' 1\" (1.549 m) Wt 155 lb 13.8 oz (70.7 kg) SpO2 98% BMI 29.45 kg/m² General appearance: alert, cooperative, no distress, appears stated age Neck: supple, symmetrical, trachea midline, no adenopathy, thyroid: not enlarged, symmetric, no tenderness/mass/nodules, no carotid bruit and no JVD Lungs: clear to auscultation bilaterally Heart: regular rate and rhythm, S1, S2 normal, no murmur, click, rub or gallop Abdomen: soft, non-tender. Bowel sounds normal. No masses,  no organomegaly Extremities: edema 1+ Data Review Recent Results (from the past 24 hour(s)) CELL COUNT, SYNOVIAL Collection Time: 02/13/19  1:00 PM  
Result Value Ref Range SYNOVIAL FLD SITE SYNOVIAL FLUID    
 SYN FLUID COLOR RED    
 SYN FLUID APPEARANCE BLOODY    
 SYNOVIAL FLD RBC CT >100 (H) 0 /cu mm SYNOVIAL FLD WBC CT 10,171 (H) 0 - 150 /cu mm SYNOVIAL FLD SEGS 80 (H) 0 - 24 % SYNOVIAL FLD LYMPHS 10 0 - 74 % SYNOVIAL FLD MONOS 10 0 - 69 % CULTURE, WOUND W GRAM STAIN Collection Time: 02/13/19  1:10 PM  
Result Value Ref Range Special Requests: NO SPECIAL REQUESTS    
 GRAM STAIN NO WBC'S SEEN    
 GRAM STAIN NO ORGANISMS SEEN Culture result: PENDING Assessment:  
 
Active Problems: 
  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  Traumatic synovitis right knee. Arthrocentesis with no steroids done. This will be a limiting factor in her rehab. 2.  Edema persist will require diuretic usage eventually. 3.  BP reasonably stable with ProAmatine.

## 2019-02-15 PROCEDURE — 74011250637 HC RX REV CODE- 250/637: Performed by: INTERNAL MEDICINE

## 2019-02-15 PROCEDURE — 74011250636 HC RX REV CODE- 250/636: Performed by: INTERNAL MEDICINE

## 2019-02-15 PROCEDURE — 65660000000 HC RM CCU STEPDOWN

## 2019-02-15 PROCEDURE — 97535 SELF CARE MNGMENT TRAINING: CPT | Performed by: OCCUPATIONAL THERAPIST

## 2019-02-15 RX ORDER — LIDOCAINE HYDROCHLORIDE 10 MG/ML
10 INJECTION INFILTRATION; PERINEURAL ONCE
Status: DISCONTINUED | OUTPATIENT
Start: 2019-02-15 | End: 2019-02-15

## 2019-02-15 RX ORDER — METHYLPREDNISOLONE ACETATE 40 MG/ML
40 INJECTION, SUSPENSION INTRA-ARTICULAR; INTRALESIONAL; INTRAMUSCULAR; SOFT TISSUE ONCE
Status: DISPENSED | OUTPATIENT
Start: 2019-02-15 | End: 2019-02-15

## 2019-02-15 RX ORDER — LIDOCAINE HYDROCHLORIDE 10 MG/ML
10 INJECTION, SOLUTION EPIDURAL; INFILTRATION; INTRACAUDAL; PERINEURAL ONCE
Status: ACTIVE | OUTPATIENT
Start: 2019-02-15 | End: 2019-02-15

## 2019-02-15 RX ADMIN — ATORVASTATIN CALCIUM 10 MG: 10 TABLET, FILM COATED ORAL at 21:20

## 2019-02-15 RX ADMIN — LEVOTHYROXINE SODIUM 25 MCG: 25 TABLET ORAL at 06:05

## 2019-02-15 RX ADMIN — APIXABAN 2.5 MG: 2.5 TABLET, FILM COATED ORAL at 09:28

## 2019-02-15 RX ADMIN — SODIUM CHLORIDE, PRESERVATIVE FREE 500 UNITS: 5 INJECTION INTRAVENOUS at 17:52

## 2019-02-15 RX ADMIN — APIXABAN 2.5 MG: 2.5 TABLET, FILM COATED ORAL at 21:20

## 2019-02-15 RX ADMIN — TRAMADOL HYDROCHLORIDE 50 MG: 50 TABLET, FILM COATED ORAL at 23:48

## 2019-02-15 RX ADMIN — MIDODRINE HYDROCHLORIDE 10 MG: 5 TABLET ORAL at 12:54

## 2019-02-15 RX ADMIN — Medication 400 MG: at 09:28

## 2019-02-15 RX ADMIN — FLUTICASONE FUROATE AND VILANTEROL TRIFENATATE 1 PUFF: 100; 25 POWDER RESPIRATORY (INHALATION) at 09:28

## 2019-02-15 RX ADMIN — MIDODRINE HYDROCHLORIDE 10 MG: 5 TABLET ORAL at 09:28

## 2019-02-15 RX ADMIN — Medication 10 ML: at 17:37

## 2019-02-15 RX ADMIN — SODIUM CHLORIDE, PRESERVATIVE FREE 500 UNITS: 5 INJECTION INTRAVENOUS at 17:51

## 2019-02-15 RX ADMIN — Medication 10 ML: at 21:21

## 2019-02-15 RX ADMIN — Medication 10 ML: at 06:05

## 2019-02-15 RX ADMIN — MIDODRINE HYDROCHLORIDE 10 MG: 5 TABLET ORAL at 17:35

## 2019-02-15 RX ADMIN — ALLOPURINOL 200 MG: 100 TABLET ORAL at 09:28

## 2019-02-15 RX ADMIN — Medication 1 CAPSULE: at 09:28

## 2019-02-15 RX ADMIN — SODIUM CHLORIDE, PRESERVATIVE FREE 500 UNITS: 5 INJECTION INTRAVENOUS at 17:53

## 2019-02-15 NOTE — PROGRESS NOTES
Bedside and Verbal shift change report given to Aryan Sarmiento (oncoming nurse) by Gretta Hutchins (offgoing nurse). Report included the following information SBAR, Kardex, Intake/Output, MAR and Recent Results.

## 2019-02-15 NOTE — PROGRESS NOTES
General Daily Progress Note Admit Date: 2/4/2019 Subjective:  
 
Patient complains of painful right knee. Current Facility-Administered Medications Medication Dose Route Frequency  apixaban (ELIQUIS) tablet 2.5 mg  2.5 mg Oral Q12H  
 heparin (porcine) pf 300-500 Units  300-500 Units InterCATHeter PRN  
 midodrine (PROAMITINE) tablet 10 mg  10 mg Oral TID WITH MEALS  
 allopurinol (ZYLOPRIM) tablet 200 mg  200 mg Oral DAILY  sodium chloride (NS) flush 5-40 mL  5-40 mL IntraVENous Q8H  
 sodium chloride (NS) flush 5-40 mL  5-40 mL IntraVENous PRN  
 sodium chloride 0.9 % bolus infusion 250 mL  250 mL IntraVENous PRN  
 lactobac ac& pc-s.therm-b.anim (DAWN Q/RISAQUAD)  1 Cap Oral DAILY  atorvastatin (LIPITOR) tablet 10 mg  10 mg Oral QHS  fluticasone-vilanterol (BREO ELLIPTA) 100mcg-25mcg/puff  1 Puff Inhalation DAILY  levothyroxine (SYNTHROID) tablet 25 mcg  25 mcg Oral 6am  
 magnesium oxide (MAG-OX) tablet 400 mg  400 mg Oral DAILY  albuterol-ipratropium (DUO-NEB) 2.5 MG-0.5 MG/3 ML  3 mL Nebulization Q6H PRN  
 acetaminophen (TYLENOL) tablet 650 mg  650 mg Oral Q4H PRN  
 traMADol (ULTRAM) tablet 50 mg  50 mg Oral Q6H PRN Review of Systems A comprehensive review of systems was negative. Objective:  
 
Patient Vitals for the past 24 hrs: 
 BP Temp Pulse Resp SpO2 Weight  
02/15/19 0821 115/66 98.4 °F (36.9 °C) 87 16 97 %   
02/15/19 0219 135/71 98.1 °F (36.7 °C) 69 16 95 %   
02/14/19 2310 123/62 98.3 °F (36.8 °C) 70 16 96 % 160 lb 1.6 oz (72.6 kg) 02/14/19 1919 125/70 97.7 °F (36.5 °C) 86 15 95 %   
02/14/19 1524 121/64 98.2 °F (36.8 °C) 75 15 98 %   
02/14/19 1145 100/51 97.8 °F (36.6 °C) 92 14 97 %  No intake/output data recorded. 02/13 1901 - 02/15 0700 In: 61 [P.O.:60] Out: 950 [Urine:950] Physical Exam:  
Visit Vitals /66 (BP 1 Location: Right arm, BP Patient Position: At rest) Pulse 87 Temp 98.4 °F (36.9 °C) Resp 16  
 Ht 5' 1\" (1.549 m) Wt 160 lb 1.6 oz (72.6 kg) SpO2 97% BMI 30.25 kg/m² General appearance: alert, cooperative, no distress, appears stated age Neck: supple, symmetrical, trachea midline, no adenopathy, thyroid: not enlarged, symmetric, no tenderness/mass/nodules, no carotid bruit and no JVD Lungs: clear to auscultation bilaterally Heart: regular rate and rhythm, S1, S2 normal, no murmur, click, rub or gallop Abdomen: soft, non-tender. Bowel sounds normal. No masses,  no organomegaly Extremities: edema 1+, pain elicited range of motion right knee Data Review No results found for this or any previous visit (from the past 24 hour(s)). Assessment:  
 
Active Problems: 
  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 Steroid injection today. 2.  Transfer in the next several days. 3.  Diuretics not restarted.

## 2019-02-15 NOTE — ROUTINE PROCESS
RAPID RESPONSE TEAM 
 
Rounded on patient due to recent transfer from CCU. She is sitting up in chair at bedside. She seems hard of hearing. Her only complaint is right knee pain. A/Ox4, NAD, VSS today. She states she is preparing for discharge to [facility]. Spoke with Matthew Bajwa RN who expressed no specific concerns. No RRT interventions indicated at this time. Please call if needed. Luna Romberg

## 2019-02-15 NOTE — PROGRESS NOTES
Therapy recommend SNF. Family interested in SNF at [facility] in [facility] and would like to remain a B[facility]/LTC resident. Financial disclosure information was provided by Aakash JEREZ and fax. [facility] will evaluate and notify us tomorrow if they will come sreen her for placement tomorrow. CM will continue to assist with D/C planning.  
 
Jose Angel Steen RN

## 2019-02-15 NOTE — PROGRESS NOTES
PALLIATIVE MEDICINE Palliative Medicine was consulted for goals of care. Pt's goals are clear,she wants to continue aggressive medical care and is DNAR, she does not want attempts at resuscitation. She has an AMD and DDNR. Will sign off. please re-consult if Palliative Medicine can be of further assistance. Thank you for including Palliative Medicine in this patient's care.   
SHAWNEE Gonsalez

## 2019-02-15 NOTE — PROGRESS NOTES
-Hematology / Oncology ([facility]) - 
-Primary Oncologist- Dr. Scott Goss 
-- 
 
-S-  No new complaints today 
 
-O-  
  
Patient Vitals for the past 24 hrs: 
 Temp Pulse Resp BP SpO2  
02/15/19 1048 98.5 °F (36.9 °C) 85 16 98/56 95 % 02/15/19 0821 98.4 °F (36.9 °C) 87 16 115/66 97 % 02/15/19 0219 98.1 °F (36.7 °C) 69 16 135/71 95 % 02/14/19 2310 98.3 °F (36.8 °C) 70 16 123/62 96 % 02/14/19 1919 97.7 °F (36.5 °C) 86 15 125/70 95 % 02/14/19 1524 98.2 °F (36.8 °C) 75 15 121/64 98 % No intake/output data recorded. Gen: nad, pleaseant Chest: decreased at bases per anterior exam 
Cardiac: rrr Abd: s/nt Ext:1+ edema to knees b/l 
 
-Labs-   
No results for input(s): WBC, HGB, PLT, ANEU, INR, APTT, NA, K, GLU, BUN, CREA, GPT, ALT, SGOT, TBIL, TBILI, AP, CA, MG, PHOS, HGBEXT, PLTEXT, HGBEXT, PLTEXT in the last 72 hours. No lab exists for component: INREXT, INREXT 
 
-Imaging-  
 
 
-Assessment + Plan-    
*) CLL: 
- Follows with Dr. Scott Goss, ensure she has f/u after DC 
- Holding Ibrutinib while admitted and plan to resume after d/c. 
- cbc has been stable, no check in a few days. *) UTI: 
- abx completed *) Cardiogenic Shock: 
-  now on midordrine and out of ICU Likely going home to SNF soon. Please call over weekend with any questions, otherwise we will pick back up and see her on Monday.

## 2019-02-16 PROCEDURE — 74011250637 HC RX REV CODE- 250/637: Performed by: INTERNAL MEDICINE

## 2019-02-16 PROCEDURE — 65660000000 HC RM CCU STEPDOWN

## 2019-02-16 PROCEDURE — 94760 N-INVAS EAR/PLS OXIMETRY 1: CPT

## 2019-02-16 RX ADMIN — MIDODRINE HYDROCHLORIDE 10 MG: 5 TABLET ORAL at 09:14

## 2019-02-16 RX ADMIN — TRAMADOL HYDROCHLORIDE 50 MG: 50 TABLET, FILM COATED ORAL at 19:41

## 2019-02-16 RX ADMIN — APIXABAN 2.5 MG: 2.5 TABLET, FILM COATED ORAL at 09:14

## 2019-02-16 RX ADMIN — LEVOTHYROXINE SODIUM 25 MCG: 25 TABLET ORAL at 06:13

## 2019-02-16 RX ADMIN — Medication 10 ML: at 06:14

## 2019-02-16 RX ADMIN — APIXABAN 2.5 MG: 2.5 TABLET, FILM COATED ORAL at 22:13

## 2019-02-16 RX ADMIN — MIDODRINE HYDROCHLORIDE 10 MG: 5 TABLET ORAL at 16:05

## 2019-02-16 RX ADMIN — Medication 10 ML: at 22:13

## 2019-02-16 RX ADMIN — Medication 10 ML: at 16:06

## 2019-02-16 RX ADMIN — ATORVASTATIN CALCIUM 10 MG: 10 TABLET, FILM COATED ORAL at 22:13

## 2019-02-16 RX ADMIN — FLUTICASONE FUROATE AND VILANTEROL TRIFENATATE 1 PUFF: 100; 25 POWDER RESPIRATORY (INHALATION) at 09:15

## 2019-02-16 RX ADMIN — Medication 1 CAPSULE: at 09:14

## 2019-02-16 RX ADMIN — ALLOPURINOL 200 MG: 100 TABLET ORAL at 09:14

## 2019-02-16 RX ADMIN — Medication 400 MG: at 09:14

## 2019-02-16 NOTE — PROGRESS NOTES
Problem: Self Care Deficits Care Plan (Adult) Goal: *Acute Goals and Plan of Care (Insert Text) Occupational Therapy Goals 2/15/2019  All goals remain appropriate to continue for 7 days. Upgrade goal #1:  Patient will perform standing adls for at least 5 minutes with supervison within 7 days. Add goal:  #6:  Patient will perform sit to stand with Lizbet to increase independence in functional adl transfers within 7 days. Initiated 2/5/2019, Goals remain appropriate as per 2/12 weekly reevaluation. 1.  Patient will perform grooming standing at the sink for 2 minutes at a time with CGA within 7 day(s). Achieved. 2/15/2019 2. Patient will perform bathing with minimal assistance within 7 day(s). 3.  Patient will perform lower body dressing with minimal assistance/contact guard assist within 7 day(s). 4.  Patient will perform toilet transfers with minimal assistance/contact guard assist within 7 day(s). 5.  Patient will perform all aspects of toileting with minimal assistance within 7 day(s). Occupational Therapy TREATMENT Patient: Denisse Goldsmith (23 y.o. female) Date: 2/15/2019 Diagnosis: Acute encephalopathy [G93.40] <principal problem not specified> Precautions: Fall Chart, occupational therapy assessment, plan of care, and goals were reviewed. ASSESSMENT: 
Pt was agreeable to treatment and performed sit to stand with maximal assistance demonstrating uncontrolled descent in stand to sit. Pt was painful, her R knee, as she ambulated slowly to the bathroom to perform toilet transfer with moderate assistance. Pt stood at the sink for hand washing with CGA/S as she leaned into the sink tolerating 3 minutes of standing adl/hand washing. Encouraged pt to request to ambulate regularly to the bathroom with staff's assistance. She reports that she gets very stiff when sitting for long periods of time. Pt anticipates discharge to rehab Progression toward goals: []       Improving appropriately and progressing toward goals [x]       Improving slowly and progressing toward goals 
[]       Not making progress toward goals and plan of care will be adjusted PLAN: 
Patient continues to benefit from skilled intervention to address the above impairments. Continue treatment per established plan of care. Discharge Recommendations:  Rehab Further Equipment Recommendations for Discharge:  tbd SUBJECTIVE:  
Patient stated this knee hurts so much.  OBJECTIVE DATA SUMMARY:  
Cognitive/Behavioral Status: 
  
 alert, oriented, cooperative Functional Mobility and Transfers for ADLs:Bed Mobility: 
 seated in chair upon arrival 
 
Transfers: 
 sit to stand: max A, uncontrolled descent in to chair despite mod A Toilet transfer : mod A, increased assist for sit to standing Cues for safe use of RW with transfers. Balance: 
 sitting good Standing fair--needs constant support. ADL Intervention: 
 as above Therapeutic Exercises:  
Encouraged seated marching and flex/ext, R knee while seated. Pt reports awarenessPain: 
Pain Scale 1: Numeric (0 - 10) Pain Intensity 1: pt did not rate her pain, complained consistently when WB during RW use Activity Tolerance:  
O2 sats were 96% during activity, HR: 107 during activity. After treatment:  
[x] Patient left in no apparent distress sitting up in chair 
[] Patient left in no apparent distress in bed 
[x] Call bell left within reach [x] Nursing notified 
[] Caregiver present [x] Bed alarm activated COMMUNICATION/COLLABORATION:  
The patients plan of care was discussed with: Registered Nurse Isabella Simth OTR/L Time Calculation: 34 mins

## 2019-02-16 NOTE — PROGRESS NOTES
Bedside and Verbal shift change report given to [place], RN (oncoming nurse) by Charlotte Bergeron RN (offgoing nurse). Report included the following information SBAR, Kardex, MAR and Recent Results.

## 2019-02-16 NOTE — PROGRESS NOTES
Weekend CM reviewed medical record, followed up re: transitional care plans. Called [facility] [phone] to inquire about Patient's referral status; per chart review, facility CL planned to do assessment visit for admission consideration. CM left message w/ Weekend Nursing Supervisor, who advised she would contact  re: referral status. CM will continue to follow up and assist w/ transitional care plans as indicated. Meena Henderson, KAYLAW Oksana Desir

## 2019-02-16 NOTE — PROGRESS NOTES
Bedside and Verbal shift change report given to [facility] RN (oncoming nurse) by Derrek Cates RN (offgoing nurse). Report included the following information SBAR, Kardex, Intake/Output, MAR and Recent Results.

## 2019-02-16 NOTE — PROGRESS NOTES
General Daily Progress Note Admit Date: 2/4/2019 Subjective:  
 
Patient has no complaint . Current Facility-Administered Medications Medication Dose Route Frequency  apixaban (ELIQUIS) tablet 2.5 mg  2.5 mg Oral Q12H  
 heparin (porcine) pf 300-500 Units  300-500 Units InterCATHeter PRN  
 midodrine (PROAMITINE) tablet 10 mg  10 mg Oral TID WITH MEALS  
 allopurinol (ZYLOPRIM) tablet 200 mg  200 mg Oral DAILY  sodium chloride (NS) flush 5-40 mL  5-40 mL IntraVENous Q8H  
 sodium chloride (NS) flush 5-40 mL  5-40 mL IntraVENous PRN  
 sodium chloride 0.9 % bolus infusion 250 mL  250 mL IntraVENous PRN  
 lactobac ac& pc-s.therm-b.anim (DAWN Q/RISAQUAD)  1 Cap Oral DAILY  atorvastatin (LIPITOR) tablet 10 mg  10 mg Oral QHS  fluticasone-vilanterol (BREO ELLIPTA) 100mcg-25mcg/puff  1 Puff Inhalation DAILY  levothyroxine (SYNTHROID) tablet 25 mcg  25 mcg Oral 6am  
 magnesium oxide (MAG-OX) tablet 400 mg  400 mg Oral DAILY  albuterol-ipratropium (DUO-NEB) 2.5 MG-0.5 MG/3 ML  3 mL Nebulization Q6H PRN  
 acetaminophen (TYLENOL) tablet 650 mg  650 mg Oral Q4H PRN  
 traMADol (ULTRAM) tablet 50 mg  50 mg Oral Q6H PRN Review of Systems A comprehensive review of systems was negative. Objective:  
 
Patient Vitals for the past 24 hrs: 
 BP Temp Pulse Resp SpO2 Weight  
02/16/19 0758 116/73 98.4 °F (36.9 °C) 81 16 94 %   
02/16/19 0612      157 lb 13.6 oz (71.6 kg) 02/16/19 0236 120/70 98.1 °F (36.7 °C) 87 16 97 %   
02/15/19 2309 119/76 98.9 °F (37.2 °C) 80 17 96 %   
02/15/19 1910 106/69 98.4 °F (36.9 °C) 81 16 93 %   
02/15/19 1735 124/74  88     
02/15/19 1529 122/66 98.9 °F (37.2 °C) 74 16 97 %   
02/15/19 1254 107/59  76    No intake/output data recorded. 02/14 1901 - 02/16 0700 In: 480 [P.O.:480] Out: 200 [Urine:200] Physical Exam:  
Visit Vitals /73 (BP 1 Location: Left arm, BP Patient Position: At rest) Pulse 81  
 Temp 98.4 °F (36.9 °C) Resp 16 Ht 5' 1\" (1.549 m) Wt 157 lb 13.6 oz (71.6 kg) SpO2 94% BMI 29.83 kg/m² General appearance: alert, cooperative, no distress, appears stated age Neck: supple, symmetrical, trachea midline, no adenopathy, thyroid: not enlarged, symmetric, no tenderness/mass/nodules, no carotid bruit and no JVD Lungs: clear to auscultation bilaterally Heart: regular rate and rhythm, S1, S2 normal, no murmur, click, rub or gallop Abdomen: soft, non-tender. Bowel sounds normal. No masses,  no organomegaly Extremities: extremities normal, atraumatic, no cyanosis or edema Data Review No results found for this or any previous visit (from the past 24 hour(s)). Assessment:  
 
Active Problems: 
  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 Convalescence continues. 2.  BP remains reasonable. No adverse effects from ProAmatine. 3.  Right knee continues to improve.

## 2019-02-16 NOTE — PROGRESS NOTES
1750: No blood return received from white, blue, and gray port on quad lumen R IJ. Heparin flushes given and dwelled 30 mins. 1820: No blood return received from white, blue, and gray ports post heparin flush. Called PICC team and informed them. Reviewed that patient is highly likely to be discharged to SNF tomorrow.

## 2019-02-17 LAB
ANION GAP SERPL CALC-SCNC: 7 MMOL/L (ref 5–15)
BUN SERPL-MCNC: 15 MG/DL (ref 6–20)
BUN/CREAT SERPL: 18 (ref 12–20)
CALCIUM SERPL-MCNC: 8 MG/DL (ref 8.5–10.1)
CHLORIDE SERPL-SCNC: 109 MMOL/L (ref 97–108)
CO2 SERPL-SCNC: 24 MMOL/L (ref 21–32)
CREAT SERPL-MCNC: 0.84 MG/DL (ref 0.55–1.02)
ERYTHROCYTE [DISTWIDTH] IN BLOOD BY AUTOMATED COUNT: 18.4 % (ref 11.5–14.5)
GLUCOSE SERPL-MCNC: 75 MG/DL (ref 65–100)
HCT VFR BLD AUTO: 23.4 % (ref 35–47)
HGB BLD-MCNC: 7.6 G/DL (ref 11.5–16)
MCH RBC QN AUTO: 30.3 PG (ref 26–34)
MCHC RBC AUTO-ENTMCNC: 32.5 G/DL (ref 30–36.5)
MCV RBC AUTO: 93.2 FL (ref 80–99)
NRBC # BLD: 0 K/UL (ref 0–0.01)
NRBC BLD-RTO: 0 PER 100 WBC
PLATELET # BLD AUTO: 242 K/UL (ref 150–400)
PMV BLD AUTO: 9.5 FL (ref 8.9–12.9)
POTASSIUM SERPL-SCNC: 4.3 MMOL/L (ref 3.5–5.1)
RBC # BLD AUTO: 2.51 M/UL (ref 3.8–5.2)
SODIUM SERPL-SCNC: 140 MMOL/L (ref 136–145)
WBC # BLD AUTO: 3.2 K/UL (ref 3.6–11)

## 2019-02-17 PROCEDURE — 80048 BASIC METABOLIC PNL TOTAL CA: CPT

## 2019-02-17 PROCEDURE — 94760 N-INVAS EAR/PLS OXIMETRY 1: CPT

## 2019-02-17 PROCEDURE — 85027 COMPLETE CBC AUTOMATED: CPT

## 2019-02-17 PROCEDURE — 36415 COLL VENOUS BLD VENIPUNCTURE: CPT

## 2019-02-17 PROCEDURE — 74011250637 HC RX REV CODE- 250/637: Performed by: INTERNAL MEDICINE

## 2019-02-17 PROCEDURE — 65660000000 HC RM CCU STEPDOWN

## 2019-02-17 RX ADMIN — APIXABAN 2.5 MG: 2.5 TABLET, FILM COATED ORAL at 20:05

## 2019-02-17 RX ADMIN — Medication 10 ML: at 22:00

## 2019-02-17 RX ADMIN — Medication 400 MG: at 09:30

## 2019-02-17 RX ADMIN — MIDODRINE HYDROCHLORIDE 10 MG: 5 TABLET ORAL at 18:34

## 2019-02-17 RX ADMIN — LEVOTHYROXINE SODIUM 25 MCG: 25 TABLET ORAL at 05:26

## 2019-02-17 RX ADMIN — ALLOPURINOL 200 MG: 100 TABLET ORAL at 09:30

## 2019-02-17 RX ADMIN — TRAMADOL HYDROCHLORIDE 50 MG: 50 TABLET, FILM COATED ORAL at 20:05

## 2019-02-17 RX ADMIN — APIXABAN 2.5 MG: 2.5 TABLET, FILM COATED ORAL at 09:30

## 2019-02-17 RX ADMIN — Medication 10 ML: at 13:41

## 2019-02-17 RX ADMIN — ATORVASTATIN CALCIUM 10 MG: 10 TABLET, FILM COATED ORAL at 22:00

## 2019-02-17 RX ADMIN — Medication 10 ML: at 05:26

## 2019-02-17 RX ADMIN — MIDODRINE HYDROCHLORIDE 10 MG: 5 TABLET ORAL at 09:30

## 2019-02-17 RX ADMIN — FLUTICASONE FUROATE AND VILANTEROL TRIFENATATE 1 PUFF: 100; 25 POWDER RESPIRATORY (INHALATION) at 09:30

## 2019-02-17 RX ADMIN — Medication 1 CAPSULE: at 09:30

## 2019-02-17 RX ADMIN — MIDODRINE HYDROCHLORIDE 10 MG: 5 TABLET ORAL at 13:41

## 2019-02-17 NOTE — PROGRESS NOTES
Bedside and Verbal shift change report given to Calin Dougherty (oncoming nurse) by Sandra Pace (offgoing nurse). Report included the following information SBAR, Kardex, MAR and Recent Results.

## 2019-02-17 NOTE — PROGRESS NOTES
General Daily Progress Note Admit Date: 2/4/2019 Subjective:  
 
Patient has no complaint . Current Facility-Administered Medications Medication Dose Route Frequency  apixaban (ELIQUIS) tablet 2.5 mg  2.5 mg Oral Q12H  
 heparin (porcine) pf 300-500 Units  300-500 Units InterCATHeter PRN  
 midodrine (PROAMITINE) tablet 10 mg  10 mg Oral TID WITH MEALS  
 allopurinol (ZYLOPRIM) tablet 200 mg  200 mg Oral DAILY  sodium chloride (NS) flush 5-40 mL  5-40 mL IntraVENous Q8H  
 sodium chloride (NS) flush 5-40 mL  5-40 mL IntraVENous PRN  
 sodium chloride 0.9 % bolus infusion 250 mL  250 mL IntraVENous PRN  
 lactobac ac& pc-s.therm-b.anim (DAWN Q/RISAQUAD)  1 Cap Oral DAILY  atorvastatin (LIPITOR) tablet 10 mg  10 mg Oral QHS  fluticasone-vilanterol (BREO ELLIPTA) 100mcg-25mcg/puff  1 Puff Inhalation DAILY  levothyroxine (SYNTHROID) tablet 25 mcg  25 mcg Oral 6am  
 magnesium oxide (MAG-OX) tablet 400 mg  400 mg Oral DAILY  albuterol-ipratropium (DUO-NEB) 2.5 MG-0.5 MG/3 ML  3 mL Nebulization Q6H PRN  
 acetaminophen (TYLENOL) tablet 650 mg  650 mg Oral Q4H PRN  
 traMADol (ULTRAM) tablet 50 mg  50 mg Oral Q6H PRN Review of Systems A comprehensive review of systems was negative. Objective:  
 
Patient Vitals for the past 24 hrs: 
 BP Temp Pulse Resp SpO2 Weight  
02/17/19 0815 116/79 98.2 °F (36.8 °C) 94 16 96 %   
02/17/19 0632      158 lb 4.6 oz (71.8 kg) 02/17/19 0239 (!) 105/93 98.1 °F (36.7 °C) 72 16 95 %   
02/16/19 2330 130/80 98.7 °F (37.1 °C) 75 16 95 %   
02/16/19 1928 126/60 98.7 °F (37.1 °C) 74 16 98 %   
02/16/19 1540 109/68 98.6 °F (37 °C) 84 16 95 %   
02/16/19 1150 110/64 98.3 °F (36.8 °C) 70 16 97 %  No intake/output data recorded. 02/15 1901 - 02/17 0700 In: 720 [P.O.:720] Out: - Physical Exam:  
Visit Vitals /79 (BP 1 Location: Left arm, BP Patient Position: Sitting) Pulse 94 Temp 98.2 °F (36.8 °C) Resp 16  
 Ht 5' 1\" (1.549 m) Wt 158 lb 4.6 oz (71.8 kg) SpO2 96% BMI 29.91 kg/m² General appearance: alert, cooperative, no distress, appears stated age Neck: supple, symmetrical, trachea midline, no adenopathy, thyroid: not enlarged, symmetric, no tenderness/mass/nodules, no carotid bruit and no JVD Lungs: clear to auscultation bilaterally Heart: regular rate and rhythm, S1, S2 normal, no murmur, click, rub or gallop Abdomen: soft, non-tender. Bowel sounds normal. No masses,  no organomegaly Extremities: extremities normal, atraumatic, no cyanosis or edema Data Review Recent Results (from the past 24 hour(s)) METABOLIC PANEL, BASIC Collection Time: 02/17/19  5:30 AM  
Result Value Ref Range Sodium 140 136 - 145 mmol/L Potassium 4.3 3.5 - 5.1 mmol/L Chloride 109 (H) 97 - 108 mmol/L  
 CO2 24 21 - 32 mmol/L Anion gap 7 5 - 15 mmol/L Glucose 75 65 - 100 mg/dL BUN 15 6 - 20 MG/DL Creatinine 0.84 0.55 - 1.02 MG/DL  
 BUN/Creatinine ratio 18 12 - 20 GFR est AA >60 >60 ml/min/1.73m2 GFR est non-AA >60 >60 ml/min/1.73m2 Calcium 8.0 (L) 8.5 - 10.1 MG/DL  
CBC W/O DIFF Collection Time: 02/17/19  5:30 AM  
Result Value Ref Range WBC 3.2 (L) 3.6 - 11.0 K/uL  
 RBC 2.51 (L) 3.80 - 5.20 M/uL HGB 7.6 (L) 11.5 - 16.0 g/dL HCT 23.4 (L) 35.0 - 47.0 % MCV 93.2 80.0 - 99.0 FL  
 MCH 30.3 26.0 - 34.0 PG  
 MCHC 32.5 30.0 - 36.5 g/dL  
 RDW 18.4 (H) 11.5 - 14.5 % PLATELET 751 423 - 300 K/uL MPV 9.5 8.9 - 12.9 FL  
 NRBC 0.0 0  WBC ABSOLUTE NRBC 0.00 0.00 - 0.01 K/uL Assessment:  
 
Active Problems: 
  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 Continued hospitalization secondary to disposition. Hopefully this can be resolved tomorrow for discharge. 2.  Cardiopulmonary status reasonably stable. 3.  Impressive dysautonomia improving with ProAmatine. Continued improvement anticipated with increased physical activity. 4.  Traumatic synovitis continues to improve.

## 2019-02-17 NOTE — PROGRESS NOTES
Spoke with PICC team regarding R IJ 3 of 4 ports with no blood return after Heparin flush, PICC team stated Ladonna Orris could continue to be used with MD permission. Spoke with Dr. Charles Kirk on rounds regarding R IJ and no blood return on 3 of the 4 ports, including after use of Heparin flush, verbal orders received that it was okay to continue using R IJ. Patient is a difficult stick. Patient is expected to be discharged tomorrow or Tuesday to SNF.

## 2019-02-17 NOTE — PROGRESS NOTES
Bedside shift change report given to [place] (oncoming nurse) by Gume Toscano (offgoing nurse). Report included the following information SBAR, Kardex, Intake/Output, MAR and Recent Results.

## 2019-02-18 PROCEDURE — 65660000000 HC RM CCU STEPDOWN

## 2019-02-18 PROCEDURE — 74011250637 HC RX REV CODE- 250/637: Performed by: INTERNAL MEDICINE

## 2019-02-18 PROCEDURE — 97116 GAIT TRAINING THERAPY: CPT

## 2019-02-18 RX ADMIN — Medication 10 ML: at 17:48

## 2019-02-18 RX ADMIN — Medication 10 ML: at 05:53

## 2019-02-18 RX ADMIN — ALLOPURINOL 200 MG: 100 TABLET ORAL at 09:46

## 2019-02-18 RX ADMIN — MIDODRINE HYDROCHLORIDE 10 MG: 5 TABLET ORAL at 17:45

## 2019-02-18 RX ADMIN — APIXABAN 2.5 MG: 2.5 TABLET, FILM COATED ORAL at 09:46

## 2019-02-18 RX ADMIN — Medication 400 MG: at 09:46

## 2019-02-18 RX ADMIN — Medication 10 ML: at 13:50

## 2019-02-18 RX ADMIN — MIDODRINE HYDROCHLORIDE 10 MG: 5 TABLET ORAL at 09:46

## 2019-02-18 RX ADMIN — ATORVASTATIN CALCIUM 10 MG: 10 TABLET, FILM COATED ORAL at 21:18

## 2019-02-18 RX ADMIN — FLUTICASONE FUROATE AND VILANTEROL TRIFENATATE 1 PUFF: 100; 25 POWDER RESPIRATORY (INHALATION) at 09:47

## 2019-02-18 RX ADMIN — Medication 10 ML: at 21:18

## 2019-02-18 RX ADMIN — LEVOTHYROXINE SODIUM 25 MCG: 25 TABLET ORAL at 05:52

## 2019-02-18 RX ADMIN — MIDODRINE HYDROCHLORIDE 10 MG: 5 TABLET ORAL at 13:49

## 2019-02-18 RX ADMIN — Medication 1 CAPSULE: at 09:46

## 2019-02-18 RX ADMIN — APIXABAN 2.5 MG: 2.5 TABLET, FILM COATED ORAL at 21:18

## 2019-02-18 NOTE — PROGRESS NOTES
-Hematology / Oncology ([facility]) - 
-Primary Oncologist- Dr. Mery Ocampo 
-- 
 
-S-  No new complaints today 
 
-O-  
  
Patient Vitals for the past 24 hrs: 
 Temp Pulse Resp BP SpO2  
02/18/19 1108    98/57   
02/18/19 1105 98.3 °F (36.8 °C) 73 22 90/55 95 % 02/18/19 0753 98.2 °F (36.8 °C) 74 24 121/77 92 % 02/18/19 0347 97.9 °F (36.6 °C) 70 18 118/57 92 % 02/18/19 0014 98.3 °F (36.8 °C) 70 18 123/74 95 % 02/17/19 1953 97.7 °F (36.5 °C) 70 18 108/66 97 % 02/17/19 1834  93  108/66   
02/17/19 1431 98.3 °F (36.8 °C) 70 18 117/65 95 % 02/17/19 1341  83  113/69   
02/17/19 1243 98.4 °F (36.9 °C) 82 16 113/69 95 % No intake/output data recorded. Gen: nad, pleaseant Chest: clear Cardiac: rrr Abd: s/nt 
 
 
-Labs-   
Recent Labs  
  02/17/19 
0530 WBC 3.2* HGB 7.6*    
K 4.3 GLU 75 BUN 15  
CREA 0.84 CA 8.0*  
 
 
-Imaging-  
 
 
-Assessment + Plan-    
*) CLL: 
- Follows with Dr. Mery Ocampo, ensure she has f/u after DC 
- Holding Ibrutinib while admitted and plan to resume after d/c. 
- cbc has been stable *) UTI: 
- abx completed *) Cardiogenic Shock: 
-  now on midordrine and out of ICU Likely going home to SNF soon.

## 2019-02-18 NOTE — PROGRESS NOTES
I called Basilia Wilks at [facility] at [phone]. I left a VM asking is SNF is a possibility or if we need to go with a 2nd choice and for the family to work on assisting pt to become a long term resident of [facility] while pt is in a SNF. [facility]. will send a rep over this afternoon to assess the pt for possible admission tomorrow to their healthcare//SNF followed by their continuity of care.

## 2019-02-18 NOTE — PROGRESS NOTES
Bedside shift change report given to Liliana Thomas RN(oncoming nurse) by Swati Lawson RN (offgoing nurse). Report included the following information SBAR.

## 2019-02-18 NOTE — PROGRESS NOTES
Bedside and Verbal shift change report given to [id number] [address] [employer] (oncoming nurse) by Derrek Cates RN (offgoing nurse). Report included the following information SBAR, Kardex, Intake/Output, MAR and Recent Results.

## 2019-02-18 NOTE — PROGRESS NOTES
Occupational Therapy Attempted twice to see pt. She is eating an early dinner as she is having a meeting 4 to 5 today. She has been unavailable at [phone] and 1514. Will defer and continue to follow.

## 2019-02-18 NOTE — PROGRESS NOTES
General Daily Progress Note Admit Date: 2/4/2019 Subjective:  
 
Patient has no complaint . Current Facility-Administered Medications Medication Dose Route Frequency  apixaban (ELIQUIS) tablet 2.5 mg  2.5 mg Oral Q12H  
 heparin (porcine) pf 300-500 Units  300-500 Units InterCATHeter PRN  
 midodrine (PROAMITINE) tablet 10 mg  10 mg Oral TID WITH MEALS  
 allopurinol (ZYLOPRIM) tablet 200 mg  200 mg Oral DAILY  sodium chloride (NS) flush 5-40 mL  5-40 mL IntraVENous Q8H  
 sodium chloride (NS) flush 5-40 mL  5-40 mL IntraVENous PRN  
 sodium chloride 0.9 % bolus infusion 250 mL  250 mL IntraVENous PRN  
 lactobac ac& pc-s.therm-b.anim (DAWN Q/RISAQUAD)  1 Cap Oral DAILY  atorvastatin (LIPITOR) tablet 10 mg  10 mg Oral QHS  fluticasone-vilanterol (BREO ELLIPTA) 100mcg-25mcg/puff  1 Puff Inhalation DAILY  levothyroxine (SYNTHROID) tablet 25 mcg  25 mcg Oral 6am  
 magnesium oxide (MAG-OX) tablet 400 mg  400 mg Oral DAILY  albuterol-ipratropium (DUO-NEB) 2.5 MG-0.5 MG/3 ML  3 mL Nebulization Q6H PRN  
 acetaminophen (TYLENOL) tablet 650 mg  650 mg Oral Q4H PRN  
 traMADol (ULTRAM) tablet 50 mg  50 mg Oral Q6H PRN Review of Systems A comprehensive review of systems was negative. Objective:  
 
Patient Vitals for the past 24 hrs: 
 BP Temp Pulse Resp SpO2 Weight  
02/18/19 0753 121/77 98.2 °F (36.8 °C) 74 24 92 %   
02/18/19 0639      156 lb 8.4 oz (71 kg) 02/18/19 0347 118/57 97.9 °F (36.6 °C) 70 18 92 %   
02/18/19 0014 123/74 98.3 °F (36.8 °C) 70 18 95 %   
02/17/19 1953 108/66 97.7 °F (36.5 °C) 70 18 97 %   
02/17/19 1834 108/66  93     
02/17/19 1431 117/65 98.3 °F (36.8 °C) 70 18 95 %   
02/17/19 1341 113/69  83     
02/17/19 1243 113/69 98.4 °F (36.9 °C) 82 16 95 %  No intake/output data recorded. 02/16 1901 - 02/18 0700 In: 240 [P.O.:240] Out: 300 [Urine:300] Physical Exam:  
Visit Vitals /77 (BP 1 Location: Left arm, BP Patient Position: At rest;Supine) Pulse 74 Temp 98.2 °F (36.8 °C) Resp 24 Ht 5' 1\" (1.549 m) Wt 156 lb 8.4 oz (71 kg) SpO2 92% BMI 29.58 kg/m² General appearance: alert, cooperative, no distress, appears stated age Neck: supple, symmetrical, trachea midline, no adenopathy, thyroid: not enlarged, symmetric, no tenderness/mass/nodules, no carotid bruit and no JVD Lungs: clear to auscultation bilaterally Heart: regular rate and rhythm, S1, S2 normal, no murmur, click, rub or gallop Abdomen: soft, non-tender. Bowel sounds normal. No masses,  no organomegaly Extremities: extremities normal, atraumatic, no cyanosis or edema Data Review No results found for this or any previous visit (from the past 24 hour(s)). Assessment:  
 
Active Problems: 
  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 Awaiting rehab. 2.  No overt signs of congestive heart failure. 3.  Traumatic synovitis continues to improve of right knee

## 2019-02-18 NOTE — PROGRESS NOTES
Problem: Mobility Impaired (Adult and Pediatric) Goal: *Acute Goals and Plan of Care (Insert Text) Physical Therapy Goals Goals reviewed and revised 2/12/19 Physical Therapy Goals Initiated 2/12/2019 1. Patient will move from supine to sit and sit to supine , scoot up and down and roll side to side in bed with modified independence within 7 day(s). 2.  Patient will transfer from bed to chair and chair to bed with modified independence using the least restrictive device within 7 day(s). 3.  Patient will perform sit to stand with modified independence within 7 day(s). 4.  Patient will ambulate with modified independence for 75 feet with the least restrictive device within 7 day(s). 5.  Patient will ascend/descend 5 stairs with 1 handrail(s) with moderate assistance  within 7 day(s). Initiated 2/5/2019 1. Patient will move from supine to sit and sit to supine , scoot up and down and roll side to side in bed with independence within 7 day(s). 2.  Patient will transfer from bed to chair and chair to bed with modified independence using the least restrictive device within 7 day(s). 3.  Patient will perform sit to stand with independence within 7 day(s). 4.  Patient will ambulate with modified supervision for 150 feet with the least restrictive device within 7 day(s). 5.  Patient will ascend/descend 5 stairs with handrail(s) with minimal assistance/contact guard assist within 7 day(s). physical Therapy TREATMENT Patient: Delvis Garrett (65 y.o. female) Date: 2/18/2019 Diagnosis: Acute encephalopathy [G93.40] <principal problem not specified> Precautions: Bed Alarm, Fall Chart, physical therapy assessment, plan of care and goals were reviewed. ASSESSMENT: 
Patient received sitting EOB, with the bed alarm going off, attempting to get up on her own. Patient with decreased safety awareness despite verbal cues.  Patient improved gait distance to 100 feet, requiring CGA x 1 with RW. Patient continues to appear antalgic although improved. Patient left sitting in the chair with the bed alarm on at the conclusion of PT treatment session. Encouraged patient to call nursing for assist. Patient will benefit from SNF at discharge. Progression toward goals: 
[x]    Improving appropriately and progressing toward goals 
[]    Improving slowly and progressing toward goals 
[]    Not making progress toward goals and plan of care will be adjusted PLAN: 
Patient continues to benefit from skilled intervention to address the above impairments. Continue treatment per established plan of care. Discharge Recommendations:  Abhi Damon Further Equipment Recommendations for Discharge:  TBD SUBJECTIVE:  
Patient stated Maybe if I fall then someone will help me.  OBJECTIVE DATA SUMMARY:  
Critical Behavior: 
Neurologic State: Alert Orientation Level: Oriented X4 Cognition: Appropriate for age attention/concentration Safety/Judgement: Decreased awareness of need for safety, Decreased awareness of need for assistance Functional Mobility Training: 
Bed Mobility: 
  
  
  
  
  
  
Transfers: 
Sit to Stand: Contact guard assistance;Minimum assistance Stand to Sit: Contact guard assistance Bed to Chair: Contact guard assistance Balance: 
Sitting: Intact; Without support Standing: Impaired; With support Standing - Static: Constant support;Good Standing - Dynamic : FairAmbulation/Gait Training: 
Distance (ft): 100 Feet (ft) Assistive Device: Gait belt;Walker, rolling Ambulation - Level of Assistance: Contact guard assistance Gait Abnormalities: Antalgic;Decreased step clearance Base of Support: Widened Speed/Lawanda: Pace decreased (<100 feet/min) Step Length: Right shortened;Left shortened Pain: 
Pain Scale 1: Numeric (0 - 10) Pain Intensity 1: 5 Pain Location 1: Knee Pain Orientation 1: Right Pain Description 1: Aching Pain Intervention(s) 1: Declines;Distraction Activity Tolerance:  
Fair, improving. Please refer to the flowsheet for vital signs taken during this treatment. After treatment:  
[x]    Patient left in no apparent distress sitting up in chair 
[]    Patient left in no apparent distress in bed 
[x]    Call bell left within reach [x]    Nursing notified 
[]    Caregiver present [x]    Bed alarm activated COMMUNICATION/COLLABORATION:  
The patients plan of care was discussed with: Registered Nurse and  Jewel Yates PT, DPT Time Calculation: 16 mins

## 2019-02-19 VITALS
WEIGHT: 156.53 LBS | HEIGHT: 61 IN | TEMPERATURE: 98.4 F | DIASTOLIC BLOOD PRESSURE: 70 MMHG | RESPIRATION RATE: 20 BRPM | OXYGEN SATURATION: 95 % | SYSTOLIC BLOOD PRESSURE: 117 MMHG | HEART RATE: 70 BPM | BODY MASS INDEX: 29.55 KG/M2

## 2019-02-19 LAB
ANION GAP SERPL CALC-SCNC: 7 MMOL/L (ref 5–15)
BUN SERPL-MCNC: 17 MG/DL (ref 6–20)
BUN/CREAT SERPL: 19 (ref 12–20)
CALCIUM SERPL-MCNC: 8.1 MG/DL (ref 8.5–10.1)
CHLORIDE SERPL-SCNC: 109 MMOL/L (ref 97–108)
CO2 SERPL-SCNC: 26 MMOL/L (ref 21–32)
CREAT SERPL-MCNC: 0.89 MG/DL (ref 0.55–1.02)
ERYTHROCYTE [DISTWIDTH] IN BLOOD BY AUTOMATED COUNT: 18.4 % (ref 11.5–14.5)
GLUCOSE SERPL-MCNC: 75 MG/DL (ref 65–100)
HCT VFR BLD AUTO: 23.6 % (ref 35–47)
HGB BLD-MCNC: 7.6 G/DL (ref 11.5–16)
MCH RBC QN AUTO: 30.5 PG (ref 26–34)
MCHC RBC AUTO-ENTMCNC: 32.2 G/DL (ref 30–36.5)
MCV RBC AUTO: 94.8 FL (ref 80–99)
NRBC # BLD: 0 K/UL (ref 0–0.01)
NRBC BLD-RTO: 0 PER 100 WBC
PLATELET # BLD AUTO: 255 K/UL (ref 150–400)
PMV BLD AUTO: 9.4 FL (ref 8.9–12.9)
POTASSIUM SERPL-SCNC: 4.2 MMOL/L (ref 3.5–5.1)
RBC # BLD AUTO: 2.49 M/UL (ref 3.8–5.2)
SODIUM SERPL-SCNC: 142 MMOL/L (ref 136–145)
WBC # BLD AUTO: 2.9 K/UL (ref 3.6–11)

## 2019-02-19 PROCEDURE — 74011250637 HC RX REV CODE- 250/637: Performed by: INTERNAL MEDICINE

## 2019-02-19 PROCEDURE — 80048 BASIC METABOLIC PNL TOTAL CA: CPT

## 2019-02-19 PROCEDURE — 36415 COLL VENOUS BLD VENIPUNCTURE: CPT

## 2019-02-19 PROCEDURE — 85027 COMPLETE CBC AUTOMATED: CPT

## 2019-02-19 RX ORDER — MIDODRINE HYDROCHLORIDE 10 MG/1
10 TABLET ORAL
Qty: 90 TAB | Refills: 11 | Status: SHIPPED
Start: 2019-02-19 | End: 2019-03-18

## 2019-02-19 RX ADMIN — Medication 400 MG: at 08:24

## 2019-02-19 RX ADMIN — Medication 10 ML: at 05:30

## 2019-02-19 RX ADMIN — FLUTICASONE FUROATE AND VILANTEROL TRIFENATATE 1 PUFF: 100; 25 POWDER RESPIRATORY (INHALATION) at 08:23

## 2019-02-19 RX ADMIN — Medication 1 CAPSULE: at 08:24

## 2019-02-19 RX ADMIN — ALLOPURINOL 200 MG: 100 TABLET ORAL at 08:22

## 2019-02-19 RX ADMIN — MIDODRINE HYDROCHLORIDE 10 MG: 5 TABLET ORAL at 08:24

## 2019-02-19 RX ADMIN — LEVOTHYROXINE SODIUM 25 MCG: 25 TABLET ORAL at 05:30

## 2019-02-19 RX ADMIN — APIXABAN 2.5 MG: 2.5 TABLET, FILM COATED ORAL at 08:23

## 2019-02-19 NOTE — PROGRESS NOTES
Bedside and Verbal shift change report given to Maria C (oncoming nurse) by Kimberly Castro RN (offgoing nurse). Report included the following information Kardex, MAR and Recent Results.

## 2019-02-19 NOTE — PROGRESS NOTES
All in agreement with d/c to [facility] at 11a this morning. D/C plans discussed with staff, pt, son-Taye, and Dr Renetta Olmstead. The son states he flies out this afternoon to a conference in [place](he works with the [employer]); his dad  in (he was career [employer] and then [employer]); pt walked ~100 feet with PT yesterday. The son is arranging transportation ~11a. Please send emar, d/c instructions, Rx, and completed DDNR. Call report to [phone]. She will go to room 9351.   Please fax d/c summary and d/c instructions to [phone],

## 2019-02-19 NOTE — PROGRESS NOTES
Problem: Falls - Risk of 
Goal: *Absence of Falls Document Luma Bolivar Fall Risk and appropriate interventions in the flowsheet. Outcome: Progressing Towards Goal 
Fall Risk Interventions: 
Mobility Interventions: Bed/chair exit alarm, Communicate number of staff needed for ambulation/transfer, Patient to call before getting OOB, Strengthening exercises (ROM-active/passive), Utilize walker, cane, or other assistive device Mentation Interventions: Adequate sleep, hydration, pain control, Bed/chair exit alarm, Reorient patient, Toileting rounds Medication Interventions: Bed/chair exit alarm, Patient to call before getting OOB, Teach patient to arise slowly Elimination Interventions: Call light in reach, Bed/chair exit alarm, Toilet paper/wipes in reach, Toileting schedule/hourly rounds, Patient to call for help with toileting needs History of Falls Interventions: Bed/chair exit alarm, Door open when patient unattended, Room close to nurse's station

## 2019-02-19 NOTE — DISCHARGE INSTRUCTIONS
General Discharge Instructions    Patient ID:  Mary Jo Friend  [id number]  80 y.o.  3/17/1925    Patient Instructions          The following personal items were collected during your admission and were returned to you. Take Home Medications             What to do at Home    Recommended diet: Regular Diet    Recommended activity: Activity as tolerated    Follow-up with Diana Banegas MD  in 2 weeks. Information obtained by :  I understand that if any problems occur once I am at home I am to contact my physician. I understand and acknowledge receipt of the instructions indicated above.                                                                                                                                            Physician's or R.N.'s Signature                                                                  Date/Time                                                                                                                                              Patient or Representative Signature                                                          Date/Time

## 2019-02-19 NOTE — PROGRESS NOTES
Pt discharge to [facility] with caregiver Shirley. Discharge instructions , emar, DDNR,H&P given to Franklin Babcock. Discharge papers also faxed according CM instructions. Called for report, left message, no one call back yet. IJ removed. 10 min applied. No signs of bleeding.

## 2019-02-19 NOTE — PROGRESS NOTES
-Hematology / Oncology ([facility]) - 
-Primary Oncologist- Dr. Magnus Adame 
-CC- 
 
-S-  No new complaints today 
 
-O-  
  
Patient Vitals for the past 24 hrs: 
 Temp Pulse Resp BP SpO2  
02/19/19 0731 98.4 °F (36.9 °C) 70 20 117/70 95 % 02/19/19 0310 98 °F (36.7 °C) 70 18 108/58 98 % 02/19/19 0006 98 °F (36.7 °C) 73 19 121/63 97 % 02/18/19 2039 98.4 °F (36.9 °C) 71 20 118/73 95 % 02/18/19 1549 98.1 °F (36.7 °C) 80 21 125/73 97 % 02/18/19 1108    98/57   
02/18/19 1105 98.3 °F (36.8 °C) 73 22 90/55 95 % 02/19 0701 - 02/19 1900 In: 320 [P.O.:320] Out: -  
Gen: nad, pleaseant 
 
 
 
-Labs-   
Recent Labs  
  02/19/19 
0210 02/17/19 
0530 WBC 2.9* 3.2* HGB 7.6* 7.6*  242  140  
K 4.2 4.3 GLU 75 75 BUN 17 15 CREA 0.89 0.84 CA 8.1* 8.0*  
 
 
-Imaging-  
 
 
-Assessment + Plan-    
*) CLL: 
- Follows with Dr. Magnus Adame, ensure she has f/u after DC 
- Holding Ibrutinib while admitted, please resume after d/c. 
- cbc has been stable *) UTI: 
- abx completed *) Cardiogenic Shock: 
-  now on midordrine and out of ICU Likely going home to SNF soon. F/u with Dr. Magnus Adame when out of rehab

## 2019-02-19 NOTE — PROGRESS NOTES
Bedside shift change report given to [address] [place] [address] Keara (oncoming nurse) by Tracy Russo RN (offgoing nurse). Report included the following information SBAR, Kardex, Procedure Summary, MAR and Recent Results.

## 2019-02-20 NOTE — DISCHARGE SUMMARY
[address] SUMMARY    Name:  Parth Ugarte  MR#:  [id number]  :  1925  ACCOUNT #:  [de-identified]  ADMIT DATE:  2019  DISCHARGE DATE:  2019    HISTORY OF PRESENT ILLNESS:  The patient is a 77-year-old lady, who was doing well up  until several days prior to admission when she developed recurrent nausea, vomiting  and confusion. These symptoms progressively worsened prompting a visit to the  emergency room. These were indeed confirmed. It was unclear as to the etiology,  although an infection might well be the potential culprit. She did have a low-grade  fever on admission. The patient was subsequently admitted. Past medical history, social history, review of systems, family history, physical  examination is as in admitting H and P.    LABORATORY VALUES:  The patient's hemoglobin is 9.2, white count 2.6, MCV was 95.2,  platelet count 429,173 with the following differential; 64 segs, 12 bands, 21  lymphocytes, 2 monocytes as well as 1 metamyelocyte. She remained relatively  leukopenic with white counts in the 2 up until 2019 when she had 3.4. At time  of discharge, she is back at 2.9. Abnormalities on the comprehensive profile were  the BUN and creatinine of 26 and 1.37 only. Troponin levels were slightly elevated  initially at 0.20, repeat was 0.12. At the time of discharge, BUN and creatinine  were 17 and 0.89 respectively. Urine culture was positive and a colony count of  greater than 100,000 colonies per mL of E. coli with reasonable sensitivities. Stool  card revealed no enteric pathogens and she was negative for C. difficile. RADIOGRAPHS:  CT scan of the abdomen with small right pleural effusion, cardiomegaly,  mild-to-moderate ascites, diffuse hepatic portal tract edema, nonspecific mesenteric  edema, diminished retroperitoneal lymphadenopathy and sigmoid diverticulosis without  diverticulitis.   CT scan of the brain; atrophy, but no acute findings were noted. Chest x-ray; no acute process. Repeat bibasilar atelectasis. CONSULTATIONS:  Several consultations obtained, the first was with Cardiology, Dr. Trevor Spencer and the second was with Neurology, Dr. Lucrecia Rodriguez. HOSPITAL COURSE:  The patient was admitted and initially she did well after 24 hours,  but her blood pressure began to drop. There was an element of prerenal azotemia, but  she has a severe cardiomyopathy with reduced systolic function with an ejection  fraction of 25%. Fluid boluses were given, but this was not successful and  eventually Nemesio-Synephrine was added along with dobutamine. She was transported to  the intensive care unit where she continued to convalesce. Blood pressure did indeed  improve with these vasoactive drugs. Fortunately, she did not develop overt  congestive heart failure, although she began as requested dose-based fluid. This  stopped at a point and she continued to improve as far as her blood pressure was  concerned. It was unclear as to the etiology of the hypotension whether or not this  was complicated by sepsis, although she was culture negative other than her positive  urine culture. During this time, she was covered with broad-spectrum antibiotics. She did have a left shift on admission suggesting the possibility of a superimposed  infection, although she remained pretty much culture negative as relates to blood  cultures. She continued to improve and ProAmatine was subsequently started for blood  pressure maintenance. She was transferred to the floor where she continued to  convalesce. Interestingly, she fell at home injuring her right knee. Her right knee pain  continued throughout the hospital stay. She was seen by Orthopedics and a knee  aspiration was done which was culture negative as expected and there was a mild  inflammatory exudate. She did not allow steroid to be injected because it would  require a second injection.   By that time, the pain was gradually improving. At the  time of discharge, she was quite debilitated, but was indeed able to walk with a  walker and help of physical therapy. Further rehabilitation will occur in a reduced  rehab setting. She had no further confusion after the initial 24 hours. FINAL DIAGNOSES:  1. Possible sepsis. 2.  Urinary tract infection. 3.  Hypotension. 4.  Dysautonomia. 5.  Cardiomyopathy with reduced systolic function, ejection fraction 25%. 6.  Traumatic synovitis, right knee. 7.  Hypothyroidism. 8.  History of gout. 9.  Physical deconditioning. 10.  Dyslipidemia. 11.  Chronic atrial fibrillation. 12.  History of lymphoma. OPERATIONS AND PROCEDURES UNDERGONE THIS ADMISSION:  Central line placement. DISPOSITION:  The patient will be discharged to a rehab facility, partially  ambulatory with a walker, on a regular diet. DISCHARGE MEDICATIONS:  Include the following, Tylenol p.r.n.; albuterol via  nebulizer q.4 hours p.r.n.; allopurinol 300 mg daily; Eliquis 2.5 mg b.i.d.; atorvastatin 10 mg daily; carvedilol 3.125 mg b.i.d.; vitamin D3 of 1000 units daily; Advair 250/50 one puff b.i.d.; furosemide, but this will not be started for another  week, at 20 mg daily; Imbruvica 140 mg capsules 5 days a week, Monday through Friday;  Levoxyl _____ mcg daily; loratadine 10 mg daily, Mag-Ox 400 mg nightly, midodrine 10  mg t.i.d. There will be a delicate balance with resuming her cardioactive medications. I am  limited with restarting all of her medications because of her hypotension. With  increasing physical therapy, this autonomia, which is a contributing feature to her  hypotension, should indeed improve. The patient will have to restart her furosemide  at 20-40 mg daily along with KCl within the next 3-4 days assuming she does not get  short of breath before then. The patient is indeed a DNR.     Her leukopenia noted on admission was indeed related to her chemotherapy. She is  relatively immunosuppressed because of this.         Yeimi Mederos MD      [id number]  D:  02/19/2019 9:16  T:  02/20/2019 6:42  JOB #:  [id number]

## 2019-02-27 LAB
BACTERIA SPEC CULT: NORMAL
GRAM STN SPEC: NORMAL
GRAM STN SPEC: NORMAL
SERVICE CMNT-IMP: NORMAL

## 2019-03-04 PROBLEM — R06.09 DOE (DYSPNEA ON EXERTION): Status: ACTIVE | Noted: 2017-02-02

## 2019-03-04 PROBLEM — I50.22 CONGESTIVE HEART FAILURE, NYHA CLASS II, CHRONIC, SYSTOLIC (HCC): Status: ACTIVE | Noted: 2019-03-04

## 2019-03-11 ENCOUNTER — APPOINTMENT (OUTPATIENT)
Dept: GENERAL RADIOLOGY | Age: 84
DRG: 291 | End: 2019-03-11
Attending: EMERGENCY MEDICINE
Payer: MEDICARE

## 2019-03-11 ENCOUNTER — HOSPITAL ENCOUNTER (INPATIENT)
Age: 84
LOS: 7 days | Discharge: NURSING, REHAB OR LONG-TERM CARE | DRG: 291 | End: 2019-03-18
Attending: EMERGENCY MEDICINE | Admitting: INTERNAL MEDICINE
Payer: MEDICARE

## 2019-03-11 DIAGNOSIS — J90 PLEURAL EFFUSION: Primary | ICD-10-CM

## 2019-03-11 DIAGNOSIS — E87.79 OTHER HYPERVOLEMIA: ICD-10-CM

## 2019-03-11 DIAGNOSIS — R06.02 SHORTNESS OF BREATH: ICD-10-CM

## 2019-03-11 DIAGNOSIS — R06.02 SOB (SHORTNESS OF BREATH): ICD-10-CM

## 2019-03-11 DIAGNOSIS — Z71.89 GOALS OF CARE, COUNSELING/DISCUSSION: ICD-10-CM

## 2019-03-11 PROBLEM — I50.23 ACUTE ON CHRONIC SYSTOLIC HEART FAILURE (HCC): Status: ACTIVE | Noted: 2019-03-11

## 2019-03-11 LAB
ALBUMIN SERPL-MCNC: 2.2 G/DL (ref 3.5–5)
ALBUMIN/GLOB SERPL: 0.8 {RATIO} (ref 1.1–2.2)
ALP SERPL-CCNC: 94 U/L (ref 45–117)
ALT SERPL-CCNC: 17 U/L (ref 12–78)
ANION GAP SERPL CALC-SCNC: 8 MMOL/L (ref 5–15)
AST SERPL-CCNC: 23 U/L (ref 15–37)
BASOPHILS # BLD: 0 K/UL (ref 0–0.1)
BASOPHILS NFR BLD: 0 % (ref 0–1)
BILIRUB SERPL-MCNC: 0.3 MG/DL (ref 0.2–1)
BNP SERPL-MCNC: ABNORMAL PG/ML
BUN SERPL-MCNC: 17 MG/DL (ref 6–20)
BUN/CREAT SERPL: 25 (ref 12–20)
CALCIUM SERPL-MCNC: 7.1 MG/DL (ref 8.5–10.1)
CHLORIDE SERPL-SCNC: 112 MMOL/L (ref 97–108)
CK SERPL-CCNC: 53 U/L (ref 26–192)
CO2 SERPL-SCNC: 25 MMOL/L (ref 21–32)
CREAT SERPL-MCNC: 0.67 MG/DL (ref 0.55–1.02)
DIFFERENTIAL METHOD BLD: ABNORMAL
EOSINOPHIL # BLD: 0.2 K/UL (ref 0–0.4)
EOSINOPHIL NFR BLD: 8 % (ref 0–7)
ERYTHROCYTE [DISTWIDTH] IN BLOOD BY AUTOMATED COUNT: 21.8 % (ref 11.5–14.5)
GLOBULIN SER CALC-MCNC: 2.7 G/DL (ref 2–4)
GLUCOSE SERPL-MCNC: 83 MG/DL (ref 65–100)
HCT VFR BLD AUTO: 29.1 % (ref 35–47)
HGB BLD-MCNC: 8.8 G/DL (ref 11.5–16)
IMM GRANULOCYTES # BLD AUTO: 0 K/UL (ref 0–0.04)
IMM GRANULOCYTES NFR BLD AUTO: 0 % (ref 0–0.5)
LYMPHOCYTES # BLD: 0.5 K/UL (ref 0.8–3.5)
LYMPHOCYTES NFR BLD: 20 % (ref 12–49)
MAGNESIUM SERPL-MCNC: 1.6 MG/DL (ref 1.6–2.4)
MCH RBC QN AUTO: 31.8 PG (ref 26–34)
MCHC RBC AUTO-ENTMCNC: 30.2 G/DL (ref 30–36.5)
MCV RBC AUTO: 105.1 FL (ref 80–99)
MONOCYTES # BLD: 0 K/UL (ref 0–1)
MONOCYTES NFR BLD: 0 % (ref 5–13)
NEUTS SEG # BLD: 1.9 K/UL (ref 1.8–8)
NEUTS SEG NFR BLD: 72 % (ref 32–75)
NRBC # BLD: 0 K/UL (ref 0–0.01)
NRBC BLD-RTO: 0 PER 100 WBC
PLATELET # BLD AUTO: 134 K/UL (ref 150–400)
PMV BLD AUTO: 10.8 FL (ref 8.9–12.9)
POTASSIUM SERPL-SCNC: 3.1 MMOL/L (ref 3.5–5.1)
PROT SERPL-MCNC: 4.9 G/DL (ref 6.4–8.2)
RBC # BLD AUTO: 2.77 M/UL (ref 3.8–5.2)
RBC MORPH BLD: ABNORMAL
SODIUM SERPL-SCNC: 145 MMOL/L (ref 136–145)
TOTAL CELLS COUNTED SPEC: 25
TROPONIN I SERPL-MCNC: 0.1 NG/ML
WBC # BLD AUTO: 2.6 K/UL (ref 3.6–11)

## 2019-03-11 PROCEDURE — 36415 COLL VENOUS BLD VENIPUNCTURE: CPT

## 2019-03-11 PROCEDURE — 65660000000 HC RM CCU STEPDOWN

## 2019-03-11 PROCEDURE — 74011250636 HC RX REV CODE- 250/636: Performed by: EMERGENCY MEDICINE

## 2019-03-11 PROCEDURE — 83735 ASSAY OF MAGNESIUM: CPT

## 2019-03-11 PROCEDURE — 93005 ELECTROCARDIOGRAM TRACING: CPT

## 2019-03-11 PROCEDURE — 77030038269 HC DRN EXT URIN PURWCK BARD -A

## 2019-03-11 PROCEDURE — 96374 THER/PROPH/DIAG INJ IV PUSH: CPT

## 2019-03-11 PROCEDURE — 85025 COMPLETE CBC W/AUTO DIFF WBC: CPT

## 2019-03-11 PROCEDURE — 82550 ASSAY OF CK (CPK): CPT

## 2019-03-11 PROCEDURE — 74011250637 HC RX REV CODE- 250/637: Performed by: EMERGENCY MEDICINE

## 2019-03-11 PROCEDURE — 80053 COMPREHEN METABOLIC PANEL: CPT

## 2019-03-11 PROCEDURE — 99285 EMERGENCY DEPT VISIT HI MDM: CPT

## 2019-03-11 PROCEDURE — 71045 X-RAY EXAM CHEST 1 VIEW: CPT

## 2019-03-11 PROCEDURE — 84484 ASSAY OF TROPONIN QUANT: CPT

## 2019-03-11 PROCEDURE — 83880 ASSAY OF NATRIURETIC PEPTIDE: CPT

## 2019-03-11 RX ORDER — FUROSEMIDE 10 MG/ML
40 INJECTION INTRAMUSCULAR; INTRAVENOUS 2 TIMES DAILY
Status: DISCONTINUED | OUTPATIENT
Start: 2019-03-12 | End: 2019-03-15

## 2019-03-11 RX ORDER — CARVEDILOL 3.12 MG/1
3.12 TABLET ORAL 2 TIMES DAILY WITH MEALS
Status: DISCONTINUED | OUTPATIENT
Start: 2019-03-12 | End: 2019-03-18 | Stop reason: HOSPADM

## 2019-03-11 RX ORDER — FUROSEMIDE 10 MG/ML
40 INJECTION INTRAMUSCULAR; INTRAVENOUS
Status: COMPLETED | OUTPATIENT
Start: 2019-03-11 | End: 2019-03-11

## 2019-03-11 RX ORDER — POTASSIUM CHLORIDE 20 MEQ/1
40 TABLET, EXTENDED RELEASE ORAL
Status: COMPLETED | OUTPATIENT
Start: 2019-03-11 | End: 2019-03-11

## 2019-03-11 RX ORDER — SODIUM CHLORIDE 0.9 % (FLUSH) 0.9 %
5-40 SYRINGE (ML) INJECTION AS NEEDED
Status: DISCONTINUED | OUTPATIENT
Start: 2019-03-11 | End: 2019-03-18 | Stop reason: HOSPADM

## 2019-03-11 RX ORDER — MIDODRINE HYDROCHLORIDE 5 MG/1
10 TABLET ORAL
Status: DISCONTINUED | OUTPATIENT
Start: 2019-03-12 | End: 2019-03-12

## 2019-03-11 RX ORDER — ACETAMINOPHEN 325 MG/1
650 TABLET ORAL
Status: DISCONTINUED | OUTPATIENT
Start: 2019-03-11 | End: 2019-03-12 | Stop reason: SDUPTHER

## 2019-03-11 RX ORDER — LEVOTHYROXINE SODIUM 25 UG/1
25 TABLET ORAL
Status: DISCONTINUED | OUTPATIENT
Start: 2019-03-12 | End: 2019-03-18 | Stop reason: HOSPADM

## 2019-03-11 RX ORDER — ONDANSETRON 2 MG/ML
4 INJECTION INTRAMUSCULAR; INTRAVENOUS
Status: DISCONTINUED | OUTPATIENT
Start: 2019-03-11 | End: 2019-03-18 | Stop reason: HOSPADM

## 2019-03-11 RX ORDER — ATORVASTATIN CALCIUM 10 MG/1
10 TABLET, FILM COATED ORAL DAILY
Status: DISCONTINUED | OUTPATIENT
Start: 2019-03-12 | End: 2019-03-18 | Stop reason: HOSPADM

## 2019-03-11 RX ORDER — POTASSIUM CHLORIDE 750 MG/1
10 CAPSULE, EXTENDED RELEASE ORAL 2 TIMES DAILY
Status: ON HOLD | COMMUNITY
End: 2019-03-12

## 2019-03-11 RX ORDER — ACETAMINOPHEN 325 MG/1
650 TABLET ORAL
Status: DISCONTINUED | OUTPATIENT
Start: 2019-03-11 | End: 2019-03-18 | Stop reason: HOSPADM

## 2019-03-11 RX ORDER — SODIUM CHLORIDE 0.9 % (FLUSH) 0.9 %
5-40 SYRINGE (ML) INJECTION EVERY 8 HOURS
Status: DISCONTINUED | OUTPATIENT
Start: 2019-03-11 | End: 2019-03-18 | Stop reason: HOSPADM

## 2019-03-11 RX ORDER — POTASSIUM CHLORIDE 750 MG/1
10 TABLET, FILM COATED, EXTENDED RELEASE ORAL 2 TIMES DAILY
Status: DISCONTINUED | OUTPATIENT
Start: 2019-03-12 | End: 2019-03-13

## 2019-03-11 RX ORDER — ALBUTEROL SULFATE 0.83 MG/ML
2.5 SOLUTION RESPIRATORY (INHALATION) 2 TIMES DAILY
Status: DISCONTINUED | OUTPATIENT
Start: 2019-03-12 | End: 2019-03-16

## 2019-03-11 RX ORDER — FLUTICASONE FUROATE AND VILANTEROL 100; 25 UG/1; UG/1
1 POWDER RESPIRATORY (INHALATION) DAILY
Status: DISCONTINUED | OUTPATIENT
Start: 2019-03-12 | End: 2019-03-18 | Stop reason: HOSPADM

## 2019-03-11 RX ORDER — ALLOPURINOL 300 MG/1
300 TABLET ORAL DAILY
Status: DISCONTINUED | OUTPATIENT
Start: 2019-03-12 | End: 2019-03-18 | Stop reason: HOSPADM

## 2019-03-11 RX ADMIN — POTASSIUM CHLORIDE 40 MEQ: 20 TABLET, EXTENDED RELEASE ORAL at 18:22

## 2019-03-11 RX ADMIN — FUROSEMIDE 40 MG: 10 INJECTION, SOLUTION INTRAMUSCULAR; INTRAVENOUS at 18:22

## 2019-03-11 NOTE — ED PROVIDER NOTES
EMERGENCY DEPARTMENT HISTORY AND PHYSICAL EXAM      Date: 3/11/2019  Patient Name: Per Gonzalez    History of Presenting Illness     Chief Complaint   Patient presents with    Shortness of Breath     recently diagnosed with PNA and staying at [facility]. hx of CHF. still taking antibiotics    Cough     dry cough       History Provided By: Patient and EMS    HPI: Per Gonzalez, 80 y.o. female with PMHx significant for HTN, CHF, CKD, mitral valvular regurgitation, lymphoma, s/p placement of biventricular AICD presents via EMS transport from [facility] to the ED with cc of worsening SOB x this morning. Pt explains that she was recently dx'd with PNA ~ 1.5 weeks ago started on abx and daily O2. Per review of EMS report and 2 view CXR from [facility], pt had a repeat CXR today that showed L basilar infiltrate and BL pleural effusions in RML zone, which were unchanged from prior. She notes that her Lasix was increased 3 days ago due to increased leg swelling. Daughter also notes that the pt was dx'd with influenza 2 weeks ago. She explains that prior to the PNA pt was on 2L PRN during the day and at night, but since diagnosis pt has been on 3L at all times. Pt denies any current pain. She reports that she is anticoagulated on Eliquis. SOB worsens w/ speaking and attempting ADL's/transfers. Pt specifically denies any fever, chills, CP, HA, V/D, abdominal pain. There are no other complaints, changes, or physical findings at this time. Social Hx: - EtOH; - Smoker; - Illicit Drugs     PCP: Rohti Lozano MD    Current Facility-Administered Medications   Medication Dose Route Frequency Provider Last Rate Last Dose    acetaminophen (TYLENOL) tablet 650 mg  650 mg Oral Q6H PRN Kaylee Escoto MD         Current Outpatient Medications   Medication Sig Dispense Refill    potassium chloride SA (MICRO-K) 10 mEq capsule Take 10 mEq by mouth two (2) times a day.       sacubitril-valsartan (ENTRESTO) 49 mg/51 mg tablet Take 1 Tab by mouth two (2) times a day.  Oxygen       albuterol (PROVENTIL VENTOLIN) 2.5 mg /3 mL (0.083 %) nebulizer solution 2.5 mg by Nebulization route two (2) times a day.  furosemide (LASIX) 20 mg tablet Take 20 mg by mouth as needed (depending on weight, if > 126 lbs, patient takes 20 mg).  atorvastatin (LIPITOR) 10 mg tablet TAKE ONE (1) TABLET(S) DAILY 90 Tab 3    levothyroxine (SYNTHROID) 25 mcg tablet Take 1 Tab by mouth Daily (before breakfast). 90 Tab 3    apixaban (ELIQUIS) 2.5 mg tablet Take 1 Tab by mouth two (2) times a day. Indications: PREVENT THROMBOEMBOLISM IN CHRONIC ATRIAL FIBRILLATION 180 Tab 3    fluticasone-salmeterol (ADVAIR) 250-50 mcg/dose diskus inhaler Take 1 Puff by inhalation two (2) times a day. 3 Inhaler 3    carvedilol (COREG) 3.125 mg tablet TAKE ONE (1) TABLET(S) TWIC E DAILY WITH FOOD  Indications: chronic heart failure 180 Tab 3    allopurinol (ZYLOPRIM) 300 mg tablet TAKE ONE (1) TABLET(S) DAILY 90 Tab 3    magnesium oxide 400 mg cap Take 1 Cap by mouth nightly.  cholecalciferol (VITAMIN D3) 1,000 unit cap Take 1,000 Units by mouth daily.  loratadine (CLARITIN) 10 mg tablet Take 10 mg by mouth nightly.  ibrutinib (IMBRUVICA) 140 mg cap Take 140 mg by mouth five (5) days a week. Mon-Fri      acetaminophen (TYLENOL EXTRA STRENGTH) 500 mg tablet Take 500 mg by mouth every six (6) hours as needed for Pain.  midodrine (PROAMITINE) 10 mg tablet Take 1 Tab by mouth three (3) times daily (with meals) for 30 days. 90 Tab 11    co-enzyme Q-10 (CO Q-10) 100 mg capsule Take 100 mg by mouth daily.          Past History     Past Medical History:  Past Medical History:   Diagnosis Date    Aortic insufficiency     Asthma     Cancer ([facility] [address]     lymphoma    CKD (chronic kidney disease) stage 3, GFR 30-59 ml/min ([facility]) 1/10/2018    Congestive heart failure, NYHA class II, chronic, systolic ([facility])     [facility]  Hypertension     Mitral valvular regurgitation 1/22/2016    ECHO 2/3/17: LV dilated, EF 20-25%, mild LVH, RV mod dilated, mild- mod MELANIA, mod-severe MR, mod AI ECHO 7/29/17: EF 15%, severe DHK, reduced RVEF, RVH, RVSP 35 mmHg  Mild LAE, mod-marked MA fibrosis, mod-severe MR (2+), AO root dilated,      Nonrheumatic aortic valve insufficiency 10/16/2018    Presence of biventricular automatic cardioverter/defibrillator (AICD) 7/2/2015    [employer] biventricular AICD implant    Stomach ulcer        Past Surgical History:  Past Surgical History:   Procedure Laterality Date    HX BREAST BIOPSY Bilateral     40 years ago    HX COLONOSCOPY      HX HEENT      cataracts removed    HX HYSTERECTOMY      HX OTHER SURGICAL      lymph node surgery    HX TONSILLECTOMY      IL EGD TRANSORAL BIOPSY SINGLE/MULTIPLE  5/23/2012         SINUS SURGERY PROC UNLISTED      Nasal polyps removed       Family History:  Family History   Problem Relation Age of Onset    Heart Disease Mother     Stroke Father        Social History:  Social History     Tobacco Use    Smoking status: Never Smoker    Smokeless tobacco: Never Used   Substance Use Topics    Alcohol use: No     Alcohol/week: 0.0 oz    Drug use: No       Allergies: Allergies   Allergen Reactions    Codeine Other (comments)     \"made me disoriented\" per pt       Review of Systems   Review of Systems   Constitutional: Negative for chills and fever. HENT: Negative for congestion. Eyes: Negative for visual disturbance. Respiratory: Positive for shortness of breath. Negative for chest tightness. Cardiovascular: Positive for leg swelling. Negative for chest pain. Gastrointestinal: Negative for abdominal pain and vomiting. Endocrine: Negative for polyuria. Genitourinary: Negative for dysuria and frequency. Musculoskeletal: Negative for myalgias. Skin: Negative for color change. Allergic/Immunologic: Negative for immunocompromised state. Neurological: Negative for numbness. Physical Exam   Physical Exam  Nursing note and vitals reviewed. General appearance: non-toxic, elderly female in NAD  Eyes: PERRL, EOMI, conjunctiva normal, anicteric sclera  HEENT: mucous membranes moist, oropharynx is clear, prominent JVD  Pulmonary: scattered expiratory wheeze, dull to percussion at bases, diminished air movement  Cardiac: L anterior CW ICD, normal rate and regular rhythm, 3/6 systolic murmur at R sternal border, no gallops, or rubs, 2+DP pulses, 2+ radial pulses  Abdomen: soft, nontender, nondistended, bowel sounds present  MSK: 3+ pitting edema BLLE  Neuro: Alert, answers questions appropriately  Skin: capillary refill brisk, L anterior calf wound  Diagnostic Study Results     Labs -     Recent Results (from the past 12 hour(s))   CBC WITH AUTOMATED DIFF    Collection Time: 03/11/19  4:24 PM   Result Value Ref Range    WBC 2.6 (L) 3.6 - 11.0 K/uL    RBC 2.77 (L) 3.80 - 5.20 M/uL    HGB 8.8 (L) 11.5 - 16.0 g/dL    HCT 29.1 (L) 35.0 - 47.0 %    .1 (H) 80.0 - 99.0 FL    MCH 31.8 26.0 - 34.0 PG    MCHC 30.2 30.0 - 36.5 g/dL    RDW 21.8 (H) 11.5 - 14.5 %    PLATELET 574 (L) 056 - 400 K/uL    MPV 10.8 8.9 - 12.9 FL    NRBC 0.0 0  WBC    ABSOLUTE NRBC 0.00 0.00 - 0.01 K/uL    NEUTROPHILS 72 32 - 75 %    LYMPHOCYTES 20 12 - 49 %    MONOCYTES 0 (L) 5 - 13 %    EOSINOPHILS 8 (H) 0 - 7 %    BASOPHILS 0 0 - 1 %    IMMATURE GRANULOCYTES 0 0.0 - 0.5 %    TOTAL CELLS COUNTED 25      ABS. NEUTROPHILS 1.9 1.8 - 8.0 K/UL    ABS. LYMPHOCYTES 0.5 (L) 0.8 - 3.5 K/UL    ABS. MONOCYTES 0.0 0.0 - 1.0 K/UL    ABS. EOSINOPHILS 0.2 0.0 - 0.4 K/UL    ABS. BASOPHILS 0.0 0.0 - 0.1 K/UL    ABS. IMM.  GRANS. 0.0 0.00 - 0.04 K/UL    DF AUTOMATED      RBC COMMENTS ANISOCYTOSIS  2+        RBC COMMENTS MACROCYTOSIS  PRESENT        RBC COMMENTS POIKILOCYTOSIS  PRESENT       METABOLIC PANEL, COMPREHENSIVE    Collection Time: 03/11/19  4:24 PM   Result Value Ref Range Sodium 145 136 - 145 mmol/L    Potassium 3.1 (L) 3.5 - 5.1 mmol/L    Chloride 112 (H) 97 - 108 mmol/L    CO2 25 21 - 32 mmol/L    Anion gap 8 5 - 15 mmol/L    Glucose 83 65 - 100 mg/dL    BUN 17 6 - 20 MG/DL    Creatinine 0.67 0.55 - 1.02 MG/DL    BUN/Creatinine ratio 25 (H) 12 - 20      GFR est AA >60 >60 ml/min/1.73m2    GFR est non-AA >60 >60 ml/min/1.73m2    Calcium 7.1 (L) 8.5 - 10.1 MG/DL    Bilirubin, total 0.3 0.2 - 1.0 MG/DL    ALT (SGPT) 17 12 - 78 U/L    AST (SGOT) 23 15 - 37 U/L    Alk. phosphatase 94 45 - 117 U/L    Protein, total 4.9 (L) 6.4 - 8.2 g/dL    Albumin 2.2 (L) 3.5 - 5.0 g/dL    Globulin 2.7 2.0 - 4.0 g/dL    A-G Ratio 0.8 (L) 1.1 - 2.2     NT-PRO BNP    Collection Time: 03/11/19  4:24 PM   Result Value Ref Range    NT pro-BNP 33,817 (H) <450 PG/ML   CK    Collection Time: 03/11/19  4:24 PM   Result Value Ref Range    CK 53 26 - 192 U/L   TROPONIN I    Collection Time: 03/11/19  4:24 PM   Result Value Ref Range    Troponin-I, Qt. 0.10 (H) <0.05 ng/mL   MAGNESIUM    Collection Time: 03/11/19  4:24 PM   Result Value Ref Range    Magnesium 1.6 1.6 - 2.4 mg/dL       Radiologic Studies -   XR CHEST PORT   Final Result   IMPRESSION:    Bilateral pleural effusions and passive atelectasis. CXR Results  (Last 48 hours)               03/11/19 1837  XR CHEST PORT Final result    Impression:  IMPRESSION:    Bilateral pleural effusions and passive atelectasis. Narrative:  PORTABLE CHEST RADIOGRAPH/S: 3/11/2019 6:37 PM       INDICATION: Dyspnea, pneumonia, pleural effusion. COMPARISON: 2/10/2019, 2/6/2019, 4/20/2017. TECHNIQUE: Portable frontal upright radiograph/s of the chest.       FINDINGS:    Bilateral pleural effusions are associated with passive atelectasis. The   thoracic aorta is tortuous. A pacemaker/AICD is in place. The central airways   are patent. No pneumothorax. Medical Decision Making   I am the first provider for this patient.     I reviewed the vital signs, available nursing notes, past medical history, past surgical history, family history and social history. Vital Signs-Reviewed the patient's vital signs. Patient Vitals for the past 12 hrs:   Temp Pulse Resp BP SpO2   03/11/19 1608     97 %   03/11/19 1557 97.2 °F (36.2 °C) 74 26 112/81 98 %   03/11/19 1550     98 %       Pulse Oximetry Analysis - 98% on 4L NC    Cardiac Monitor:   Rate: 74 bpm  Rhythm: Normal Sinus Rhythm      EKG interpretation: (Preliminary) 1542  Rhythm: paced; and regular. Rate (approx.): 72; Axis: rightward axis; ST/T wave: no ST elevation, no ST depression;   ID interval 130 ms,  ms,  ms, QTc 510 ms. Written by Armand Murrieta. Wilma Smith ED Scribe, as dictated by Melva Sarabia MD.    Records Reviewed: Nursing Notes, Old Medical Records, Previous electrocardiograms, Ambulance Run Sheet, Previous Radiology Studies and Previous Laboratory Studies    Provider Notes (Medical Decision Making):   DDx: pulmonary edema, pleural effusion, worsening CHF, somewhat doubt persistent PNA. Doubt PE given anticoagulation. Consider ACS. ED Course:   Initial assessment performed. The patients presenting problems have been discussed, and they are in agreement with the care plan formulated and outlined with them. I have encouraged them to ask questions as they arise throughout their visit. CONSULT NOTE:   7:09 PM  Melva Sarabia MD, spoke with Mike Alarcon MD,   Specialty: Hospitalist  Discussed pt's hx, disposition, and available diagnostic and imaging results. Reviewed care plans. Consultant will evaluate pt for admission. Written by Armand Murrieta. Wilma Smith ED Scribe, as dictated by Melva Sarabia MD    Critical Care Time:   None    Disposition:  Admit Note:  7:09 PM  Pt is being admitted by Dr. Mouna Wade. The results of their tests and reason(s) for their admission have been discussed with pt and/or available family.  They convey agreement and understanding for the need to be admitted and for admission diagnosis. PLAN:  1. Admit to Hospitalist  Diagnosis     Clinical Impression:   1. Pleural effusion    2. Shortness of breath    3. Other hypervolemia        This note is prepared by Tommy Frank. Miguel Elizabeth, acting as Scribe for MD Hattie Woo MD: The scribe's documentation has been prepared under my direction and personally reviewed by me in its entirety. I confirm that the note above accurately reflects all work, treatment, procedures, and medical decision making performed by me. This note will not be viewable in [address]

## 2019-03-11 NOTE — ED NOTES
Assumed care of pt. from [facility]. Pt. Presents to the ED with chief complaint of shortness of breath. Pt. Is A/O x 4. Pt. Denies any other symptoms at this time. Pt. Resting comfortably on the stretcher in a position of comfort. Pt. In no acute distress at this time. Call bell within reach. Side rails x 2. Cardiac monitor x 4. Stretcher locked in the lowest position. Pt. Aware of plan to await for MD/PA-C/NP assessment, and patient/family verbalizes understanding. Will continue to monitor.

## 2019-03-11 NOTE — ED NOTES
Pt to ED from [facility], pt  recently diagnosed with PNA. hx of CHF. still taking antibiotics. Reports also having a dry cough. A/Ox4. Pt reports using a cane at baseline. Pt in no apparent distress. Cardiac monitor x3.

## 2019-03-11 NOTE — H&P
Hospitalist Admission Note    NAME: Jennifer Rader   :  3/17/1925   MRN:  [id number]     Date/Time:  3/11/2019 7:32 PM    Patient PCP: Miller Bonner MD  ______________________________________________________________________  Given the patient's current clinical presentation, I have a high level of concern for decompensation if discharged from the emergency department. Complex decision making was performed, which includes reviewing the patient's available past medical records, laboratory results, and x-ray films. My assessment of this patient's clinical condition and my plan of care is as follows. Assessment / Plan:  Acute on chronic systolic heart failure with EF 25% (+3 Pedal Edema, ProBNP > 30K, orthopnea/dyspnea on exertion, CXR with B/L pleural effusion)  Chronic respiratory failure with baseline O2 at night and PRN during the day  Lasix 40mg V BID  Monitor I/Os, daily weight and lytes  Cardiology and palliative care consult  O2 placed in ED for comfort    elevated troponin  Suspect due to demand ischemia due to CHF  Serial Tn  Cardiology consult as above    Hypokalemia  Replete and monitor    HTN (hypertension)   H/o Hypotension  Pt seems to be on Enteresto, BB and Midodrine  Will ask pharmacy for medrec  Will continue BB and Midodrine for now  IV diuretics as above    Gout   Continue allopurinol    Presence of biventricular automatic cardioverter/defibrillator (AICD)     Paroxysmal atrial fibrillation   Continue BB and Eliquis    Dyslipidemia   Continue statins    CKD (chronic kidney disease) stage 3, GFR 30-59 ml/min   Monitor Lytes while aggressively diuresed    Hypothyroidism   Continue synthroid    Leukemia  Continue meds    Code Status: DNR/DNI  Surrogate Decision Maker: Raoul Gore    DVT Prophylaxis: On Eliquis    Baseline: functional      Subjective:     CHIEF COMPLAINT: shortness of breath    HISTORY OF PRESENT ILLNESS:     Eddie Davey is a 80 y.o.    American female who presents with shortness of breath. As per patient, she was recently discharged to rehab after  gettings admitted to hospital and there she later developed flu and PNA. Pt reported worsening leg swelling since discharge. Today pt noted to have worsening shortness of breath today. Pt reported on O2 at night and PRN O2 during day but for past couple of weeks she is constantly on O2 at rehab. Pt reported worsening dyspnea on exertion and laying in bed. Pt reported non productive cough. Pt denies any fever, chills, nausea, vomiting, diarrhea, chest pain. In ED pt noted to have +3 Pedal Edema, ProBNP > 30K, orthopnea/dyspnea on exertion, CXR with B/L pleural effusion. We were asked to admit for work up and evaluation of the above problems.      Past Medical History:   Diagnosis Date    Aortic insufficiency     Asthma     Cancer ([facility]     lymphoma    CKD (chronic kidney disease) stage 3, GFR 30-59 ml/min ([facility]) 1/10/2018    Congestive heart failure, NYHA class II, chronic, systolic ([facility])     [facility]     Hypertension     Mitral valvular regurgitation 1/22/2016    ECHO 2/3/17: LV dilated, EF 20-25%, mild LVH, RV mod dilated, mild- mod MELANIA, mod-severe MR, mod AI ECHO 7/29/17: EF 15%, severe DHK, reduced RVEF, RVH, RVSP 35 mmHg  Mild LAE, mod-marked MA fibrosis, mod-severe MR (2+), AO root dilated,      Nonrheumatic aortic valve insufficiency 10/16/2018    Presence of biventricular automatic cardioverter/defibrillator (AICD) 7/2/2015    [employer] biventricular AICD implant    Stomach ulcer         Past Surgical History:   Procedure Laterality Date    HX BREAST BIOPSY Bilateral     40 years ago    HX COLONOSCOPY      HX HEENT      cataracts removed    HX HYSTERECTOMY      HX OTHER SURGICAL      lymph node surgery    HX TONSILLECTOMY      FL EGD TRANSORAL BIOPSY SINGLE/MULTIPLE  5/23/2012         SINUS SURGERY PROC UNLISTED      Nasal polyps removed       Social History Tobacco Use    Smoking status: Never Smoker    Smokeless tobacco: Never Used   Substance Use Topics    Alcohol use: No     Alcohol/week: 0.0 oz        Family History   Problem Relation Age of Onset    Heart Disease Mother     Stroke Father      Allergies   Allergen Reactions    Codeine Other (comments)     \"made me disoriented\" per pt        Prior to Admission medications    Medication Sig Start Date End Date Taking? Authorizing Provider   potassium chloride SA (MICRO-K) 10 mEq capsule Take 10 mEq by mouth two (2) times a day. Yes Michael, MD Marcial   sacubitril-valsartan (ENTRESTO) 49 mg/51 mg tablet Take 1 Tab by mouth two (2) times a day. Yes Michael, MD Marcial   Oxygen    Yes Michael, MD Marcial   albuterol (PROVENTIL VENTOLIN) 2.5 mg /3 mL (0.083 %) nebulizer solution 2.5 mg by Nebulization route two (2) times a day. Yes Provider, Historical   furosemide (LASIX) 20 mg tablet Take 20 mg by mouth as needed (depending on weight, if > 126 lbs, patient takes 20 mg). Yes Provider, Historical   atorvastatin (LIPITOR) 10 mg tablet TAKE ONE (1) TABLET(S) DAILY 1/8/19  Yes Steff Oliver MD   levothyroxine (SYNTHROID) 25 mcg tablet Take 1 Tab by mouth Daily (before breakfast). 12/11/18  Yes Setff Oliver MD   apixaban (ELIQUIS) 2.5 mg tablet Take 1 Tab by mouth two (2) times a day. Indications: PREVENT THROMBOEMBOLISM IN CHRONIC ATRIAL FIBRILLATION 6/1/18  Yes Steff Oliver MD   fluticasone-salmeterol (ADVAIR) 250-50 mcg/dose diskus inhaler Take 1 Puff by inhalation two (2) times a day. 4/14/18  Yes Steff Oliver MD   carvedilol (COREG) 3.125 mg tablet TAKE ONE (1) TABLET(S) TWIC E DAILY WITH FOOD  Indications: chronic heart failure 4/3/18  Yes Steff Oliver MD   allopurinol (ZYLOPRIM) 300 mg tablet TAKE ONE (1) TABLET(S) DAILY 1/24/18  Yes Steff Oliver MD   magnesium oxide 400 mg cap Take 1 Cap by mouth nightly.    Yes Provider, Historical   cholecalciferol (VITAMIN D3) 1,000 unit cap Take 1,000 Units by mouth daily. Yes Provider, Historical   loratadine (CLARITIN) 10 mg tablet Take 10 mg by mouth nightly. Yes Provider, Historical   ibrutinib (IMBRUVICA) 140 mg cap Take 140 mg by mouth five (5) days a week. Mon-Fri   Yes Provider, Historical   acetaminophen (TYLENOL EXTRA STRENGTH) 500 mg tablet Take 500 mg by mouth every six (6) hours as needed for Pain. Yes Provider, Historical   midodrine (PROAMITINE) 10 mg tablet Take 1 Tab by mouth three (3) times daily (with meals) for 30 days. 2/19/19 3/21/19  Marlee Mclaughlin MD   co-enzyme Q-10 (CO Q-10) 100 mg capsule Take 100 mg by mouth daily. Provider, Historical       REVIEW OF SYSTEMS:     I am not able to complete the review of systems because:    The patient is intubated and sedated    The patient has altered mental status due to his acute medical problems    The patient has baseline aphasia from prior stroke(s)    The patient has baseline dementia and is not reliable historian    The patient is in acute medical distress and unable to provide information           Total of 12 systems reviewed as follows:       POSITIVE= underlined text  Negative = text not underlined  General:  fever, chills, sweats, generalized weakness, weight gain,      loss of appetite   Eyes:    blurred vision, eye pain, loss of vision, double vision  ENT:    rhinorrhea, pharyngitis   Respiratory:   cough, sputum production, SOB, JENKINS, wheezing, pleuritic pain   Cardiology:   chest pain, palpitations, orthopnea, PND, edema, syncope   Gastrointestinal:  abdominal pain , N/V, diarrhea, dysphagia, constipation, bleeding   Genitourinary:  frequency, urgency, dysuria, hematuria, incontinence   Muskuloskeletal :  arthralgia, myalgia, back pain  Hematology:  easy bruising, nose or gum bleeding, lymphadenopathy   Dermatological: rash, ulceration, pruritis, color change / jaundice  Endocrine:   hot flashes or polydipsia   Neurological:  headache, dizziness, confusion, focal weakness, paresthesia,     Speech difficulties, memory loss, gait difficulty  Psychological: Feelings of anxiety, depression, agitation    Objective:   VITALS:    Visit Vitals  /81   Pulse 74   Temp 97.2 °F (36.2 °C)   Resp 26   Ht 5' 1\" (1.549 m)   Wt 68 kg (150 lb)   SpO2 97%   BMI 28.34 kg/m²       PHYSICAL EXAM:    General:    Alert, cooperative, no distress, appears stated age. HEENT: Atraumatic, anicteric sclerae, pink conjunctivae     No oral ulcers, mucosa moist, throat clear, dentition fair  Neck:  Supple, symmetrical,  thyroid: non tender  Lungs:   Crackles at basis. No Wheezing or Rhonchi. No rales. Chest wall:  No tenderness  No Accessory muscle use. Heart:   Regular  rhythm,  No  murmur   +++ edema  Abdomen:   Soft, non-tender. Not distended. Bowel sounds normal  Extremities: No cyanosis. No clubbing,      Skin turgor normal, Capillary refill normal, Radial dial pulse 2+  Skin:     Not pale. Not Jaundiced  No rashes   Psych:  Good insight. Not depressed. Not anxious or agitated. Neurologic: EOMs intact. No facial asymmetry. No aphasia or slurred speech. Symmetrical strength, Sensation grossly intact.  Alert and oriented X 4.     _______________________________________________________________________  Care Plan discussed with:    Comments   Patient y    Family  y At bedside   RN y    Care Manager                    Consultant:  samir ED physician   _______________________________________________________________________  Expected  Disposition:   Home with Family    HH/PT/OT/RN    SNF/LTC y   [de-identified]    ________________________________________________________________________  TOTAL TIME: 61 Minutes    Critical Care Provided     Minutes non procedure based      Comments    y Reviewed previous records   >50% of visit spent in counseling and coordination of care y Discussion with patient and family and questions answered       ________________________________________________________________________  Signed: Sujata Burns MD    Procedures: see electronic medical records for all procedures/Xrays and details which were not copied into this note but were reviewed prior to creation of Plan. LAB DATA REVIEWED:    Recent Results (from the past 24 hour(s))   CBC WITH AUTOMATED DIFF    Collection Time: 03/11/19  4:24 PM   Result Value Ref Range    WBC 2.6 (L) 3.6 - 11.0 K/uL    RBC 2.77 (L) 3.80 - 5.20 M/uL    HGB 8.8 (L) 11.5 - 16.0 g/dL    HCT 29.1 (L) 35.0 - 47.0 %    .1 (H) 80.0 - 99.0 FL    MCH 31.8 26.0 - 34.0 PG    MCHC 30.2 30.0 - 36.5 g/dL    RDW 21.8 (H) 11.5 - 14.5 %    PLATELET 786 (L) 917 - 400 K/uL    MPV 10.8 8.9 - 12.9 FL    NRBC 0.0 0  WBC    ABSOLUTE NRBC 0.00 0.00 - 0.01 K/uL    NEUTROPHILS 72 32 - 75 %    LYMPHOCYTES 20 12 - 49 %    MONOCYTES 0 (L) 5 - 13 %    EOSINOPHILS 8 (H) 0 - 7 %    BASOPHILS 0 0 - 1 %    IMMATURE GRANULOCYTES 0 0.0 - 0.5 %    TOTAL CELLS COUNTED 25      ABS. NEUTROPHILS 1.9 1.8 - 8.0 K/UL    ABS. LYMPHOCYTES 0.5 (L) 0.8 - 3.5 K/UL    ABS. MONOCYTES 0.0 0.0 - 1.0 K/UL    ABS. EOSINOPHILS 0.2 0.0 - 0.4 K/UL    ABS. BASOPHILS 0.0 0.0 - 0.1 K/UL    ABS. IMM. GRANS. 0.0 0.00 - 0.04 K/UL    DF AUTOMATED      RBC COMMENTS ANISOCYTOSIS  2+        RBC COMMENTS MACROCYTOSIS  PRESENT        RBC COMMENTS POIKILOCYTOSIS  PRESENT       METABOLIC PANEL, COMPREHENSIVE    Collection Time: 03/11/19  4:24 PM   Result Value Ref Range    Sodium 145 136 - 145 mmol/L    Potassium 3.1 (L) 3.5 - 5.1 mmol/L    Chloride 112 (H) 97 - 108 mmol/L    CO2 25 21 - 32 mmol/L    Anion gap 8 5 - 15 mmol/L    Glucose 83 65 - 100 mg/dL    BUN 17 6 - 20 MG/DL    Creatinine 0.67 0.55 - 1.02 MG/DL    BUN/Creatinine ratio 25 (H) 12 - 20      GFR est AA >60 >60 ml/min/1.73m2    GFR est non-AA >60 >60 ml/min/1.73m2    Calcium 7.1 (L) 8.5 - 10.1 MG/DL    Bilirubin, total 0.3 0.2 - 1.0 MG/DL    ALT (SGPT) 17 12 - 78 U/L    AST (SGOT) 23 15 - 37 U/L    Alk.  phosphatase 94 45 - 117 U/L    Protein, total 4.9 (L) 6.4 - 8.2 g/dL    Albumin 2.2 (L) 3.5 - 5.0 g/dL    Globulin 2.7 2.0 - 4.0 g/dL    A-G Ratio 0.8 (L) 1.1 - 2.2     NT-PRO BNP    Collection Time: 03/11/19  4:24 PM   Result Value Ref Range    NT pro-BNP 33,817 (H) <450 PG/ML   CK    Collection Time: 03/11/19  4:24 PM   Result Value Ref Range    CK 53 26 - 192 U/L   TROPONIN I    Collection Time: 03/11/19  4:24 PM   Result Value Ref Range    Troponin-I, Qt. 0.10 (H) <0.05 ng/mL   MAGNESIUM    Collection Time: 03/11/19  4:24 PM   Result Value Ref Range    Magnesium 1.6 1.6 - 2.4 mg/dL

## 2019-03-12 ENCOUNTER — PATIENT OUTREACH (OUTPATIENT)
Dept: CARDIOLOGY CLINIC | Age: 84
End: 2019-03-12

## 2019-03-12 ENCOUNTER — DOCUMENTATION ONLY (OUTPATIENT)
Dept: CASE MANAGEMENT | Age: 84
End: 2019-03-12

## 2019-03-12 LAB
ALBUMIN SERPL-MCNC: 2.6 G/DL (ref 3.5–5)
ALBUMIN/GLOB SERPL: 0.9 {RATIO} (ref 1.1–2.2)
ALP SERPL-CCNC: 101 U/L (ref 45–117)
ALT SERPL-CCNC: 20 U/L (ref 12–78)
ANION GAP SERPL CALC-SCNC: 8 MMOL/L (ref 5–15)
AST SERPL-CCNC: 26 U/L (ref 15–37)
ATRIAL RATE: 72 BPM
BASOPHILS # BLD: 0 K/UL (ref 0–0.1)
BASOPHILS NFR BLD: 0 % (ref 0–1)
BILIRUB SERPL-MCNC: 0.4 MG/DL (ref 0.2–1)
BUN SERPL-MCNC: 21 MG/DL (ref 6–20)
BUN/CREAT SERPL: 24 (ref 12–20)
CALCIUM SERPL-MCNC: 8.2 MG/DL (ref 8.5–10.1)
CALCULATED P AXIS, ECG09: 12 DEGREES
CALCULATED R AXIS, ECG10: -161 DEGREES
CALCULATED T AXIS, ECG11: 11 DEGREES
CHLORIDE SERPL-SCNC: 106 MMOL/L (ref 97–108)
CO2 SERPL-SCNC: 30 MMOL/L (ref 21–32)
CREAT SERPL-MCNC: 0.87 MG/DL (ref 0.55–1.02)
DIAGNOSIS, 93000: NORMAL
DIFFERENTIAL METHOD BLD: ABNORMAL
EOSINOPHIL # BLD: 0.1 K/UL (ref 0–0.4)
EOSINOPHIL NFR BLD: 2 % (ref 0–7)
ERYTHROCYTE [DISTWIDTH] IN BLOOD BY AUTOMATED COUNT: 21.4 % (ref 11.5–14.5)
GLOBULIN SER CALC-MCNC: 3 G/DL (ref 2–4)
GLUCOSE SERPL-MCNC: 96 MG/DL (ref 65–100)
HCT VFR BLD AUTO: 30.2 % (ref 35–47)
HGB BLD-MCNC: 9.4 G/DL (ref 11.5–16)
IMM GRANULOCYTES # BLD AUTO: 0 K/UL (ref 0–0.04)
IMM GRANULOCYTES NFR BLD AUTO: 0 % (ref 0–0.5)
LYMPHOCYTES # BLD: 1.2 K/UL (ref 0.8–3.5)
LYMPHOCYTES NFR BLD: 45 % (ref 12–49)
MCH RBC QN AUTO: 31.5 PG (ref 26–34)
MCHC RBC AUTO-ENTMCNC: 31.1 G/DL (ref 30–36.5)
MCV RBC AUTO: 101.3 FL (ref 80–99)
MONOCYTES # BLD: 0 K/UL (ref 0–1)
MONOCYTES NFR BLD: 0 % (ref 5–13)
NEUTS SEG # BLD: 1.3 K/UL (ref 1.8–8)
NEUTS SEG NFR BLD: 53 % (ref 32–75)
NRBC # BLD: 0 K/UL (ref 0–0.01)
NRBC BLD-RTO: 0 PER 100 WBC
P-R INTERVAL, ECG05: 130 MS
PLATELET # BLD AUTO: 154 K/UL (ref 150–400)
PMV BLD AUTO: 10.3 FL (ref 8.9–12.9)
POTASSIUM SERPL-SCNC: 3.9 MMOL/L (ref 3.5–5.1)
PROT SERPL-MCNC: 5.6 G/DL (ref 6.4–8.2)
Q-T INTERVAL, ECG07: 466 MS
QRS DURATION, ECG06: 162 MS
QTC CALCULATION (BEZET), ECG08: 510 MS
RBC # BLD AUTO: 2.98 M/UL (ref 3.8–5.2)
RBC MORPH BLD: ABNORMAL
SODIUM SERPL-SCNC: 144 MMOL/L (ref 136–145)
TROPONIN I SERPL-MCNC: 0.11 NG/ML
VENTRICULAR RATE, ECG03: 72 BPM
WBC # BLD AUTO: 2.6 K/UL (ref 3.6–11)

## 2019-03-12 PROCEDURE — 74011250637 HC RX REV CODE- 250/637: Performed by: INTERNAL MEDICINE

## 2019-03-12 PROCEDURE — 77030029684 HC NEB SM VOL KT MONA -A

## 2019-03-12 PROCEDURE — 36415 COLL VENOUS BLD VENIPUNCTURE: CPT

## 2019-03-12 PROCEDURE — 94640 AIRWAY INHALATION TREATMENT: CPT

## 2019-03-12 PROCEDURE — 76450000000

## 2019-03-12 PROCEDURE — 77030038269 HC DRN EXT URIN PURWCK BARD -A

## 2019-03-12 PROCEDURE — 65660000000 HC RM CCU STEPDOWN

## 2019-03-12 PROCEDURE — 77010033678 HC OXYGEN DAILY

## 2019-03-12 PROCEDURE — 74011000250 HC RX REV CODE- 250: Performed by: INTERNAL MEDICINE

## 2019-03-12 PROCEDURE — 74011250637 HC RX REV CODE- 250/637: Performed by: NURSE PRACTITIONER

## 2019-03-12 PROCEDURE — 85025 COMPLETE CBC W/AUTO DIFF WBC: CPT

## 2019-03-12 PROCEDURE — 74011250636 HC RX REV CODE- 250/636: Performed by: INTERNAL MEDICINE

## 2019-03-12 PROCEDURE — 80053 COMPREHEN METABOLIC PANEL: CPT

## 2019-03-12 PROCEDURE — 94760 N-INVAS EAR/PLS OXIMETRY 1: CPT

## 2019-03-12 PROCEDURE — 84484 ASSAY OF TROPONIN QUANT: CPT

## 2019-03-12 RX ORDER — FUROSEMIDE 40 MG/1
40 TABLET ORAL DAILY
COMMUNITY
End: 2019-03-18

## 2019-03-12 RX ORDER — MIDODRINE HYDROCHLORIDE 5 MG/1
5 TABLET ORAL
Status: DISCONTINUED | OUTPATIENT
Start: 2019-03-12 | End: 2019-03-18 | Stop reason: HOSPADM

## 2019-03-12 RX ADMIN — ALBUTEROL SULFATE 2.5 MG: 2.5 SOLUTION RESPIRATORY (INHALATION) at 11:01

## 2019-03-12 RX ADMIN — POTASSIUM CHLORIDE 10 MEQ: 750 TABLET, EXTENDED RELEASE ORAL at 08:20

## 2019-03-12 RX ADMIN — MIDODRINE HYDROCHLORIDE 5 MG: 5 TABLET ORAL at 17:19

## 2019-03-12 RX ADMIN — CARVEDILOL 3.12 MG: 3.12 TABLET, FILM COATED ORAL at 08:20

## 2019-03-12 RX ADMIN — ALLOPURINOL 300 MG: 300 TABLET ORAL at 08:20

## 2019-03-12 RX ADMIN — ATORVASTATIN CALCIUM 10 MG: 10 TABLET, FILM COATED ORAL at 08:20

## 2019-03-12 RX ADMIN — APIXABAN 2.5 MG: 2.5 TABLET, FILM COATED ORAL at 08:20

## 2019-03-12 RX ADMIN — Medication 10 ML: at 04:10

## 2019-03-12 RX ADMIN — FUROSEMIDE 40 MG: 10 INJECTION, SOLUTION INTRAMUSCULAR; INTRAVENOUS at 09:36

## 2019-03-12 RX ADMIN — Medication 10 ML: at 13:51

## 2019-03-12 RX ADMIN — MIDODRINE HYDROCHLORIDE 5 MG: 5 TABLET ORAL at 14:13

## 2019-03-12 RX ADMIN — FLUTICASONE FUROATE AND VILANTEROL TRIFENATATE 1 PUFF: 100; 25 POWDER RESPIRATORY (INHALATION) at 08:35

## 2019-03-12 RX ADMIN — CARVEDILOL 3.12 MG: 3.12 TABLET, FILM COATED ORAL at 17:19

## 2019-03-12 RX ADMIN — APIXABAN 2.5 MG: 2.5 TABLET, FILM COATED ORAL at 17:19

## 2019-03-12 RX ADMIN — POTASSIUM CHLORIDE 10 MEQ: 750 TABLET, EXTENDED RELEASE ORAL at 17:19

## 2019-03-12 RX ADMIN — FUROSEMIDE 40 MG: 10 INJECTION, SOLUTION INTRAMUSCULAR; INTRAVENOUS at 17:20

## 2019-03-12 RX ADMIN — LEVOTHYROXINE SODIUM 25 MCG: 25 TABLET ORAL at 08:20

## 2019-03-12 RX ADMIN — ALBUTEROL SULFATE 2.5 MG: 2.5 SOLUTION RESPIRATORY (INHALATION) at 21:13

## 2019-03-12 RX ADMIN — MIDODRINE HYDROCHLORIDE 10 MG: 5 TABLET ORAL at 08:19

## 2019-03-12 NOTE — PROGRESS NOTES
[facility] met with patient today to provide an introduction to self, the [address] [address] at [facility]. Bundled Payment Care Program:    Patient was provided a copy of the [facility] letter. Patient states that they have a functioning scale at home. Patient was not provided a scale during this visit. Reinforced the importance of checking their weight at the same time daily and calling the cardiologist if their weight is up 3 lbs. in a day or 5 lbs. in a week (or per MD guidelines). Patient  has not received a \"Living with Heart Failure\" patient education book. Hug At Home Program:    Provided patient demonstration of MeMeMe program on training iPad.     Patient was not interested in the MeMeMe program.   Not interested in the technology of the program.        Appointments:     Patient stated best day(s) of the week for provider appointment(s): any day but wednesday    Patient stated best time of day for provider appointment(s):  AM

## 2019-03-12 NOTE — PROGRESS NOTES
TRANSFER - IN REPORT:    Verbal report received from GENEVAKB DURAN RN on Katarzyna Betancur  being received from ER for routine progression of care      Report consisted of patients Situation, Background, Assessment and   Recommendations(SBAR). Information from the following report(s) SBAR was reviewed with the receiving nurse. Opportunity for questions and clarification was provided. Assessment completed upon patients arrival to unit and care assumed. Primary Nurse Caren Butt RN and Author MAXIMUS Stearns performed a dual skin assessment on this patient No impairment noted. Pt has blister on LLE.

## 2019-03-12 NOTE — PROGRESS NOTES
RN called due to SBP in 130s and midodrine due. Will decrease midodrine to 5mg dosing TID and see how patient tolerates.         Kathleen Allison, NP  DNP, RN, [facility]

## 2019-03-12 NOTE — PROGRESS NOTES
Courtesy note - contacted by pharmacist about her ibrutinib. It can be held for now and resumed when she is healthier.

## 2019-03-12 NOTE — PROGRESS NOTES
Bedside shift change report given to [facility] RN (oncoming nurse). Report included the following information SBAR. SHIFT SUMMARY:      CONCERNS TO ADDRESS WITH MD:        [address] NURSING NOTE   Admission Date 3/11/2019   Admission Diagnosis Acute on chronic systolic heart failure ([facility] [I50.23]   Consults IP CONSULT TO PALLIATIVE CARE - PROVIDER  IP CONSULT TO PRIMARY CARE PROVIDER  IP CONSULT TO CARDIOLOGY      Cardiac Monitoring [x] Yes [] No      Purposeful Hourly Rounding [x] Yes    Milvia Score Total Score: 3   Milvia score 3 or > [x] Bed Alarm [] Avasys [] 1:1 sitter [] Patient refused (Signed refusal form in chart)   Kimani Score Kimani Score: 17   Kimani score 14 or < [] PMT consult [] Wound Care consult    []  Specialty bed  [x] Nutrition consult      Influenza Vaccine Received Flu Vaccine for Current Season (usually Sept-March): Yes           Oxygen needs? [] Room air Oxygen @  []1L    []2L    []3L   [x]4L    []5L   []6L via  NC   Chronic home O2 use?  [x] Yes [] No  Perform O2 challenge test and document in progress note using smartphSnipie (.Homeoxygen)      Last bowel movement Last Bowel Movement Date: 03/11/19      Urinary Catheter       External Female Catheter 03/11/19-Urine Output (mL): 500 ml     LDAs               Peripheral IV 03/11/19 Right Hand (Active)   Site Assessment Clean, dry, & intact 3/12/2019  2:06 PM   Phlebitis Assessment 0 3/12/2019  2:06 PM   Infiltration Assessment 0 3/12/2019  2:06 PM   Dressing Status Clean, dry, & intact 3/12/2019  2:06 PM   Dressing Type Transparent;Tape 3/12/2019  2:06 PM   Hub Color/Line Status Pink;Flushed 3/12/2019  2:06 PM          External Female Catheter 03/11/19 (Active)   Site Assessment Clean, dry, & intact 3/12/2019  2:16 PM   Repositioned Yes 3/12/2019  2:16 PM   Perineal Care Yes 3/12/2019  2:16 PM   Wick Changed No 3/12/2019  2:16 PM   Suction Canister/Tubing Changed No 3/12/2019  2:16 PM   Urine Output (mL) 500 ml 3/12/2019  6:17 PM Readmission Risk Assessment Tool Score High Risk            32       Total Score        3 Has Seen PCP in Last 6 Months (Yes=3, No=0)    4 IP Visits Last 12 Months (1-3=4, 4=9, >4=11)    5 Pt. Coverage (Medicare=5 , Medicaid, or Self-Pay=4)    20 Charlson Comorbidity Score (Age + Comorbid Conditions)        Criteria that do not apply:    . Living with Significant Other. Assisted Living. LTAC. SNF.  or   Rehab    Patient Length of Stay (>5 days = 3)       Expected Length of Stay 4d 2h   Actual Length of Stay 1

## 2019-03-12 NOTE — PROGRESS NOTES
Spiritual Care Assessment/Progress Note  [facility]      NAME: Shen Rey      MRN: [id number]  AGE: 80 y.o.  SEX: female  Latter-day Affiliation: Taoism   Language: English     3/12/2019     Total Time (in minutes): 35     Spiritual Assessment begun in [facility] CARDIOPULMONARY CARE through conversation with:         [x]Patient        [] Family    [x] Friend(s)        Reason for Consult: Palliative Care, Initial/Spiritual Assessment     Spiritual beliefs: (Please include comment if needed)     [x] Identifies with a kellie tradition:         [] Supported by a kellie community:            [] Claims no spiritual orientation:           [] Seeking spiritual identity:                [] Adheres to an individual form of spirituality:           [] Not able to assess:                           Identified resources for coping:      [x] Prayer                               [] Music                  [] Guided Imagery     [x] Family/friends                 [] Pet visits     [] Devotional reading                         [] Unknown     [] Other:                                             Interventions offered during this visit: (See comments for more details)    Patient Interventions: Affirmation of kellie, Affirmation of emotions/emotional suffering, Catharsis/review of pertinent events in supportive environment, Iconic (affirming the presence of God/Higher Power), Initial/Spiritual assessment, patient floor, Life review/legacy, Prayer (actual), Latter-day beliefs/image of God discussed           Plan of Care:     [x] Support spiritual and/or cultural needs    [] Support AMD and/or advance care planning process      [] Support grieving process   [] Coordinate Rites and/or Rituals    [] Coordination with community clergy   [] No spiritual needs identified at this time   [] Detailed Plan of Care below (See Comments)  [] Make referral to Music Therapy  [] Make referral to Pet Therapy     [] Make referral to Addiction services  [] Make referral to [facility]  [] Make referral to Spiritual Care Partner  [] No future visits requested        [x] Follow up visits as needed     Comments:   Initial visit on [facility] unit for spiritual assessment of new palliative consult patient. Patient's caregiver was present. Patient engaged in life review recalling her marriage and family, her career as a teacher and later an , her volunteer work with [employer]. Per her caregiver, Miss Ilia Willis has been very active up until a month or so ago. 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 She received the Presidential Volunteer Award from [employer]. Miss Ilia Willis shared she loves God, loves her family, and loved the many children she taught. She has one son;  and living in [place]; a granddaughter and a grandson. Her daughter-in-law is expected to arrive from [place] this evening. Miss Ilia Willis expressed no spiritual needs or concerns. She expressed that her whole live has been a blessing. Provided pastoral presence, supportive listening and prayer. Advised her of  availability.   SOURAV Morrison, [facility], [address] [facility] [employer] Paging Service  [phone])

## 2019-03-12 NOTE — ED NOTES
Verbal report given to [facility], RN on Val Singh at shift change for routine progression of care. Report consisted of patients Situation, Background, Assessment and Recommendations(SBAR). Information from the following report(s) SBAR was reviewed with the receiving nurse. Opportunity for questions and clarification was provided.

## 2019-03-12 NOTE — PROGRESS NOTES
Bedside shift change report given to [facility] (oncoming nurse) by Ab Martel (offgoing nurse). Report included the following information SBAR.     0935 - Weaned oxygen from 4L to 3L nasal cannula. Oxygen saturation 99%. 1355 - Pt's /84. Shawnee Hanson NP about administering scheduled midodrine. NP will change midodrine orders to lower dose.

## 2019-03-12 NOTE — PROGRESS NOTES
Problem: Falls - Risk of  Goal: *Absence of Falls  Document Milvia Fall Risk and appropriate interventions in the flowsheet.   Outcome: Progressing Towards Goal  Fall Risk Interventions:  Mobility Interventions: Assess mobility with egress test, Bed/chair exit alarm, Communicate number of staff needed for ambulation/transfer, OT consult for ADLs, Patient to call before getting OOB, PT Consult for mobility concerns, Utilize walker, cane, or other assistive device         Medication Interventions: Assess postural VS orthostatic hypotension, Bed/chair exit alarm, Evaluate medications/consider consulting pharmacy, Patient to call before getting OOB, Teach patient to arise slowly    Elimination Interventions: Bed/chair exit alarm, Call light in reach, Patient to call for help with toileting needs, Toilet paper/wipes in reach, Toileting schedule/hourly rounds

## 2019-03-12 NOTE — PROGRESS NOTES
Pharmacy Medication Reconciliation     Recommendations/Findings: The following amendments were made to the patient's active medication list on file at [facility]:   1) Additions: robitussin DM    2) Deletions: entresto; potassium     3) Changes: furosemide; albuterol     Comments:  Victor Hugo Johns was stopped when patient was discharged from [facility] during February admission  - patient was not receiving Imbruvica at [facility] ([facility] could not afford medication) - per Dr. Truman Broderick we are holding for now with plans to continue on discharge if patient goes home.      -Clarified PTA med list with [facility]. PTA medication list was corrected to the following:     Prior to Admission Medications   Prescriptions Last Dose Informant Patient Reported? Taking?   acetaminophen (TYLENOL EXTRA STRENGTH) 500 mg tablet Not Taking at Unknown time Care Giver Yes No   Sig: Take 500 mg by mouth every six (6) hours as needed for Pain. albuterol (PROVENTIL VENTOLIN) 2.5 mg /3 mL (0.083 %) nebulizer solution 3/11/2019 at [address] Yes Yes   Si.5 mg by Nebulization route four (4) times daily. allopurinol (ZYLOPRIM) 300 mg tablet 3/11/2019 at 0900 Care Giver No Yes   Sig: TAKE ONE (1) TABLET(S) DAILY   apixaban (ELIQUIS) 2.5 mg tablet 3/11/2019 at 0900 Care Giver No Yes   Sig: Take 1 Tab by mouth two (2) times a day. Indications: PREVENT THROMBOEMBOLISM IN CHRONIC ATRIAL FIBRILLATION   atorvastatin (LIPITOR) 10 mg tablet 3/11/2019 at 0900 Care Giver No Yes   Sig: TAKE ONE (1) TABLET(S) DAILY   carvedilol (COREG) 3.125 mg tablet 3/11/2019 at 0900 Care Giver No Yes   Sig: TAKE ONE (1) TABLET(S) TWIC E DAILY WITH FOOD  Indications: chronic heart failure   cholecalciferol (VITAMIN D3) 1,000 unit cap 3/11/2019 at [address] Yes Yes   Sig: Take 1,000 Units by mouth daily. co-enzyme Q-10 (CO Q-10) 100 mg capsule 3/11/2019 at [address] Yes No   Sig: Take 100 mg by mouth daily.    dextromethorphan-guaiFENesin (ROBITUSSIN-DM)  mg/5 mL syrup   Yes Yes   Sig: Take 10 mL by mouth every six (6) hours as needed for Cough. fluticasone-salmeterol (ADVAIR) 250-50 mcg/dose diskus inhaler Unknown at Unknown time Care Giver No No   Sig: Take 1 Puff by inhalation two (2) times a day. furosemide (LASIX) 40 mg tablet   Yes Yes   Sig: Take 40 mg by mouth daily. ibrutinib (IMBRUVICA) 140 mg cap Not taking at [facility]: Take 140 mg by mouth five (5) days a week. Mon-Fri   levothyroxine (SYNTHROID) 25 mcg tablet 3/4/2019 at [address] [facility] No Yes   Sig: Take 1 Tab by mouth Daily (before breakfast). loratadine (CLARITIN) 10 mg tablet 3/10/2019 at [address] [place] Yes Yes   Sig: Take 10 mg by mouth nightly.   magnesium oxide 400 mg cap 3/10/2019 at [address] [place] Yes Yes   Sig: Take 1 Cap by mouth nightly. midodrine (PROAMITINE) 10 mg tablet Not Taking at Unknown time  No No   Sig: Take 1 Tab by mouth three (3) times daily (with meals) for 30 days.       Facility-Administered Medications: None          Thank you,  James Cruz, PHARMD

## 2019-03-12 NOTE — ACP (ADVANCE CARE PLANNING)
Primary Decision Maker: Xu Kang primary - Son - [phone]    Secondary Decision Maker: Seema Henry(Daughter In [place]) mpoa second - Other Relative - [phone]  Advance Care Planning 3/12/2019   Patient's Healthcare Decision Maker is: Named in scanned ACP document   Primary Decision Maker Name -   Primary Decision Maker Phone Number -   Primary Decision Maker Relationship to Patient -   Confirm Advance Directive Yes, on file   Does the patient have other document types Do Not Resuscitate       Patient corrected her AMD at time of last admission on 2/7/19 with palliative NP Jeanie Restrepo NP. In it she named her daughter in law and son (named above) as mPOA. She verbally voiced today that she would like her son Esteban Rogers to be primary mPOA and daughter in law [place] [employer] to be secondary mPOA. She continues to want DNR status (confirmed today) and noted that she is a body donor.

## 2019-03-12 NOTE — PROGRESS NOTES
Problem: Falls - Risk of  Goal: *Absence of Falls  Document Milvia Fall Risk and appropriate interventions in the flowsheet.   Outcome: Progressing Towards Goal  Fall Risk Interventions:  Mobility Interventions: Assess mobility with egress test, Bed/chair exit alarm, Communicate number of staff needed for ambulation/transfer, OT consult for ADLs, Patient to call before getting OOB, PT Consult for mobility concerns         Medication Interventions: Assess postural VS orthostatic hypotension, Bed/chair exit alarm, Evaluate medications/consider consulting pharmacy, Patient to call before getting OOB, Teach patient to arise slowly    Elimination Interventions: Bed/chair exit alarm, Call light in reach, Patient to call for help with toileting needs, Toilet paper/wipes in reach, Toileting schedule/hourly rounds

## 2019-03-12 NOTE — CONSULTS
[address] [facility], [address] [facility]     Date of  Admission: 3/11/2019  3:27 PM     Admission type:Emergency    Consult for: CHF  Consult by: hospitalist      Subjective:     Stevie Delong is a 80 y.o. female with PMH HFrEF, BIV ICD, a fib, CKD, HTN, non-hodgkin's lymphoma who was admitted for Acute on chronic systolic heart failure ([facility] [I50.23]. Per ED provider note, Ms. Robi Shen presented to the ED from [facility] for worsening SOB. She recently had been treated for influenza B and pneumonia. Ms. Robi Shen had been admitted to [facility] 02/19 with n/v and multiple medications had been adjusted/discontinued during that stay and at discharge (including entresto due to hypotension). Patient had been discharged on PRN lasix to be given with weight gain. Appears to have been off lasix for about 2 weeks after discharge, which is around when she had influenza B. Admit weight is down a few pounds from her discharge weight in 02/19. On assessment, Ms. Robi Shen endorses SOB, but states her breathing has been improving since admission. She's not sure if her leg swelling is worse. She denies any pain or palpitations. Ms. Robi Shen follows with Dr. Hanna Ivy, Dr. Valentino Montgomery, and at the advanced heart failure clinic. ECHO 01/18 with EF 25%; mod to severe MR; mild to mod AR.         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 08/04/2012     Priority: 1 - One    NHL (non-Hodgkin's lymphoma) ([facility] 08/04/2012     Priority: 1 - One    Abdominal pain 05/22/2012     Priority: 1 - One     Class: Acute    Weight loss 05/22/2012     Priority: 1 - One     Class: Acute    DILAN (obstructive sleep apnea) 08/04/2012     Priority: 2 - Two    Anemia 11/08/2014     Priority: 3 - Three    Acute on chronic systolic heart failure (HCC) 03/11/2019    Congestive History and Physical    Subjective:      Yuliya Estrada is a 70 y.o. male with a h/o BPH s/p PVP. He had improved flow and emptying, but has severe urgency/frequency. He presents for botox after failing multiple medications to this point. 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 No pertinent family history. Social History   Substance Use Topics    Smoking status: Never Smoker    Smokeless tobacco: Never Used    Alcohol use 8.4 oz/week     7 Glasses of wine, 7 Cans of beer per week     Allergies   Allergen Reactions    Allopurinol Itching     Prior to Admission medications    Medication Sig Start Date End Date Taking? Authorizing Provider   metoprolol succinate (TOPROL XL) 50 mg XL tablet Take 50 mg by mouth daily. Indications: HYPERTENSION   Yes Historical Provider   amLODIPine (NORVASC) 5 mg tablet Take 5 mg by mouth daily. Indications: HYPERTENSION   Yes Historical Provider   losartan (COZAAR) 50 mg tablet Take 50 mg by mouth daily. Indications: HYPERTENSION   Yes Historical Provider   rosuvastatin (CRESTOR) 20 mg tablet Take 40 mg by mouth daily. Indications: HYPERCHOLESTEROLEMIA   Yes Historical Provider   multivitamin (ONE A DAY) tablet Take 1 Tab by mouth daily. Yes Historical Provider   Aspirin, Buffered 81 mg tab Take 81 mg by mouth daily. Historical Provider        Review of Systems:  A comprehensive review of systems was negative except for that written in the HPI.      Objective:      Patient Vitals for the past 8 heart failure, NYHA class II, chronic, systolic ([facility]) 37/69/7211    Poor appetite 02/07/2019    Acute pain of right knee 02/07/2019    Counseling regarding advanced care planning and goals of care 02/07/2019    Hypotension due to hypovolemia 02/07/2019    Acute encephalopathy 02/04/2019    UTI (urinary tract infection) 02/04/2019    Diarrhea 02/04/2019    Nonrheumatic aortic valve insufficiency 10/16/2018    Seasonal allergic rhinitis 07/02/2018    Acquired hypothyroidism 03/18/2018    Aneurysmal dilatation ([address] 03/18/2018    CKD (chronic kidney disease) stage 3, GFR 30-59 ml/min ([facility]) 01/10/2018    Xerosis of skin 12/21/2017    Dyslipidemia 12/21/2017    Paroxysmal atrial fibrillation ([address] 10/21/2017    JENKINS (dyspnea on exertion) 02/02/2017    Acute respiratory insufficiency 02/02/2017    Gastroesophageal reflux disease with esophagitis 02/02/2017    Thoracic aortic aneurysm without rupture ([address] 02/02/2017    Physical deconditioning 01/29/2017    Mitral valvular regurgitation 01/22/2016    Lymphoma ([address] 11/17/2015    Chronic systolic congestive heart failure ([address] 10/08/2015    Presence of biventricular automatic cardioverter/defibrillator (AICD) 07/02/2015    LBBB (left bundle branch block) 07/01/2015    HTN (hypertension) 06/29/2015    Gout 06/29/2015    Reactive airway disease 03/23/2015    Rhinitis 09/04/2014      Jordon Jones MD  Past Medical History:   Diagnosis Date    Aortic insufficiency     Asthma     Cancer ([address]     lymphoma    CKD (chronic kidney disease) stage 3, GFR 30-59 ml/min ([facility]) 1/10/2018    Congestive heart failure, NYHA class II, chronic, systolic ([facility])     Heart failure ([address]     Hypertension     Mitral valvular regurgitation 1/22/2016    ECHO 2/3/17: LV dilated, EF 20-25%, mild LVH, RV mod dilated, mild- mod MELANIA, mod-severe MR, mod AI ECHO 7/29/17: EF 15%, severe DHK, reduced RVEF, RVH, RVSP 35 mmHg  Mild LAE, mod-marked MA fibrosis, mod-severe MR hrs:   BP Temp Pulse Resp SpO2 Height Weight   18 1144 128/74 98.4 °F (36.9 °C) (!) 59 16 98 % 5' 8\" (1.727 m) 88.6 kg (195 lb 6 oz)       Temp (24hrs), Av.4 °F (36.9 °C), Min:98.4 °F (36.9 °C), Max:98.4 °F (36.9 °C)      Physical Exam:  GENERAL: alert, cooperative, no distress, appears stated age, LUNG: clear to auscultation bilaterally, HEART: regular rate and rhythm, ABDOMEN: soft, non-tender. Bowel sounds normal. No masses,  no organomegaly, EXTREMITIES:  extremities normal, atraumatic, no cyanosis or edema, no edema, SKIN: Normal.      Assessment:     Urgency/frequency      Plan:     1. The risks, benefits, complications, treatment options, and expected outcomes were discussed with the patient. The patient understands the risks, any and all questions were answered to the patient's satisfaction.   2. Proceed with outpatient cysto and botox injection    Signed By: Brian Wooten MD                         2018 (2+), AO root dilated,      Nonrheumatic aortic valve insufficiency 10/16/2018    Presence of biventricular automatic cardioverter/defibrillator (AICD) 7/2/2015    [employer] biventricular AICD implant    Stomach ulcer       Social History     Socioeconomic History    Marital status:      Spouse name: Not on file    Number of children: 1    Years of education: 16+    Highest education level: Not on file   Occupational History    Occupation: retired teacher   Tobacco Use    Smoking status: Never Smoker    Smokeless tobacco: Never Used   Substance and Sexual Activity    Alcohol use: No     Alcohol/week: 0.0 oz    Drug use: No    Sexual activity: No     Allergies   Allergen Reactions    Codeine Other (comments)     \"made me disoriented\" per pt      Family History   Problem Relation Age of Onset    Heart Disease Mother     Stroke Father       Current Facility-Administered Medications   Medication Dose Route Frequency    acetaminophen (TYLENOL) tablet 650 mg  650 mg Oral Q6H PRN    albuterol (PROVENTIL VENTOLIN) nebulizer solution 2.5 mg  2.5 mg Nebulization BID    allopurinol (ZYLOPRIM) tablet 300 mg  300 mg Oral DAILY    apixaban (ELIQUIS) tablet 2.5 mg  2.5 mg Oral BID    atorvastatin (LIPITOR) tablet 10 mg  10 mg Oral DAILY    carvedilol (COREG) tablet 3.125 mg  3.125 mg Oral BID WITH MEALS    potassium chloride SR (KLOR-CON 10) tablet 10 mEq  10 mEq Oral BID    midodrine (PROAMITINE) tablet 10 mg  10 mg Oral TID WITH MEALS    levothyroxine (SYNTHROID) tablet 25 mcg  25 mcg Oral ACB    . PHARMACY TO SUBSTITUTE PER PROTOCOL (Reordered from: ibrutinib (IMBRUVICA) 140 mg cap)    Per Protocol    fluticasone-vilanterol (BREO ELLIPTA) 100mcg-25mcg/puff  1 Puff Inhalation DAILY    furosemide (LASIX) injection 40 mg  40 mg IntraVENous BID    sodium chloride (NS) flush 5-40 mL  5-40 mL IntraVENous Q8H    sodium chloride (NS) flush 5-40 mL  5-40 mL IntraVENous PRN    acetaminophen (TYLENOL) tablet 650 mg  650 mg Oral Q6H PRN    ondansetron (ZOFRAN) injection 4 mg  4 mg IntraVENous Q4H PRN        Review of Symptoms:   11 systems reviewed, negative other than as stated in the HPI        Objective:      Visit Vitals  /84 (BP 1 Location: Left arm, BP Patient Position: At rest)   Pulse 71   Temp 97.8 °F (36.6 °C)   Resp 22   Ht 5' 1\" (1.549 m)   Wt 68.2 kg (150 lb 5.7 oz)   SpO2 94%   Breastfeeding? No   BMI 28.41 kg/m²       Physical:   General: pleasant, elderly AAF resting in bed in NAD  Heart: RRR, no m/S3/JVD  Lungs: clear, dim bases   Abdomen: Soft, +BS, NTND   Extremities: LE rogerio +DP/PT, 2+ pitting edema BLE  Neurologic: Grossly normal    Data Review:   Recent Labs     03/12/19  0354 03/11/19  1624   WBC 2.6* 2.6*   HGB 9.4* 8.8*   HCT 30.2* 29.1*    134*     Recent Labs     03/12/19  0354 03/11/19  1624    145   K 3.9 3.1*    112*   CO2 30 25   GLU 96 83   BUN 21* 17   CREA 0.87 0.67   CA 8.2* 7.1*   MG  --  1.6   ALB 2.6* 2.2*   TBILI 0.4 0.3   SGOT 26 23   ALT 20 17       Recent Labs     03/12/19  0354 03/11/19  1624   TROIQ 0.11* 0.10*   CPK  --  53         Intake/Output Summary (Last 24 hours) at 3/12/2019 1257  Last data filed at 3/12/2019 0911  Gross per 24 hour   Intake 180 ml   Output 600 ml   Net -420 ml        Cardiographics    Telemetry: v pacing  ECG: v paced  Echocardiogram: last as above  CXRAY: \"Bilateral pleural effusions and passive atelectasis. \"       Assessment:       Active Problems:    HTN (hypertension) (6/29/2015)      Gout (6/29/2015)      Presence of biventricular automatic cardioverter/defibrillator (AICD) (7/2/2015)      Overview: [employer] biventricular AICD implant      Paroxysmal atrial fibrillation ([address] (10/21/2017)      Dyslipidemia (12/21/2017)      CKD (chronic kidney disease) stage 3, GFR 30-59 ml/min ([facility]) (1/10/2018)      Acquired hypothyroidism (3/18/2018)      Acute on chronic systolic heart failure ([facility] (3/11/2019)         Plan:     Rachele Pedersen is a 80 y.o. female who presented to the hospital with increased SOB and was admitted for acute on chronic systolic heart failure. Recent Influenza B and PNA treatment. Trop 0.10/0.11;  proBNP [id number];  CXR with bilateral effusions. Numerous adjustments to HF meds last admission (02/19) due to hypotension. · Mild elevation in troponin appears consistent with prior levels and is likely 2/2 cardiomyopathy. Obtain ECHO  · Agree with IV diuresis for acute on chronic systolic HF  · BP currently stable, but is on midodrine TID since last admission. Would like to try and restart entresto prior to discharge if able. Requiring midodrine is poor prognosis for patient's heart failure. · Interrogate device to see if patient has had recent a -fib. Last check in December had no events. Continue BB and eliquis. Thank you for consulting [facility]    Kyaw Montelongo NP  DNP, RN, AGACNP-BC      Patient seen and examined by me with nurse practitioner. Brandon Laboy personally performed all components of the history, physical, and medical decision making and agree with the assessment and plan with minor modifications as noted.     aJrek Ramos MD

## 2019-03-12 NOTE — PROGRESS NOTES
Palliative Medicine  [place]: [phone] ([phone]  [facility]: [phone] ([phone])    Julian Estes is a 79 yo AA  woman who was admitted for SOB. 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 Palliative team was asked to be involved for Care Decisions. At the time of her last hospital stay, patient completed a DNR and she updated her living will (See ACP note). She shared with this LCSW today that she is a body donor, she is accepting of her illness and has lived a long and fulfilling life. At this time she wishes to be treated medically. She knows she is having difficulty staying home alone and is \"thinking about what to do after this hospital stay. I don't think I want to move to [facility] where my son and daughter in law live, I am thinking about [facility] if I can work out the finances with them\". Patient notes her daughter in law is coming tonight from [facility] and her son will be here over the weekend. She also turns 79 yo on Sunday! Patient's main issue at this time is where she will go following hospitalization and long term.

## 2019-03-12 NOTE — PROGRESS NOTES
Transitional Care Team: Initial HUG Note    Date of Assessment: 03/12/19  Time of Assessment:  11:09 AM  Hospital Nurse Navigator: Ap Saunders RN    Stevie Delong is a 80 y.o. female inpatient at [address]    Pt alert, still with some volume overload. Note lasix now BID. Pt anticipates return to skilled facility       Primary Diagnosis  Heart failure    1. Advance Directive:  Pt has advanced care plan with designated health care proxy and also DDNR in her medical record. 2. Current Code Status:  DDNR    3. Referral to to Hospice/ Palliative Care Appropriate: yes    4. Awareness of Medical Conditions (Trajectory of illness and pts expectations). Not very clear on trajectory of illness. The onset of dyspnea was a surprise to her, and she did not seem to associate with her history of HF with EF as low as 25%    5. Discharge Needs (to include safety issues) return to facility    6. Barriers Identified:none    7. Patient is willing to go to SNF/Inpatient Rehab if recommended: yes    8. Medication Reconciliation:  not performed. Await final medication profile    9. Can patient afford medications:  yes    10. Who manages medications at home facility staff    Stevie Delong is a 80 y.o. female inpatient at admitted with heart failure on 3/10/19. Chart reviewed by me and HUG (Healthy Understanding of Goals) program introduced to patient/family. The Transitional Care Team bridges the gaps in care and education surrounding discharge from the acute care facility. The objective is to empower the patient and family in taking a proactive role in the task of preventing readmission within the first thirty days after discharge from the acute care setting, The team is also involved in the efforts to reduce readmission to the acute care setting after stabilization and discharge from the acute care environment either to skilled nursing facilities or community.      NATASHAG RN/NP will return with [facility] Calendar/ follow up appointments/ Ambulatory Nurse Navigation name and contact information when the patient is ready for discharge. Future Appointments   Date Time Provider Amanda Ashlie   3/21/2019  2:00 PM Zackery Cortes NP [address] [facility]   4/16/2019 11:30 AM Pretty Park MD [address] [place] [address]       [facility] follow [facility] appointment(s): none yet    Patient education focused on readmission zones as described as: The Red Zone: High risk for readmission, days 1-21   The Yellow Zone: Moderate  risk for readmission, days 22-29   The Green Zone: Lower risk for readmission, days 30 and after    The TC Team will follow patient from a distance while inpatient as well as be available for further transition disposition as needed. The VANESA TEAM will continue to offer support during the 30- 90 day discharge from acute care setting. Notified Ambulatory ,HF NN RN.     Past Medical History:   Diagnosis Date    Aortic insufficiency     Asthma     Cancer ([facility]     lymphoma    CKD (chronic kidney disease) stage 3, GFR 30-59 ml/min ([facility]) 1/10/2018    Congestive heart failure, NYHA class II, chronic, systolic ([facility])     Heart failure ([facility]     Hypertension     Mitral valvular regurgitation 1/22/2016    ECHO 2/3/17: LV dilated, EF 20-25%, mild LVH, RV mod dilated, mild- mod MELANIA, mod-severe MR, mod AI ECHO 7/29/17: EF 15%, severe DHK, reduced RVEF, RVH, RVSP 35 mmHg  Mild LAE, mod-marked MA fibrosis, mod-severe MR (2+), AO root dilated,      Nonrheumatic aortic valve insufficiency 10/16/2018    Presence of biventricular automatic cardioverter/defibrillator (AICD) 7/2/2015    [employer] biventricular AICD implant    Stomach ulcer

## 2019-03-12 NOTE — PROGRESS NOTES
Initial Nutrition Assessment:    INTERVENTIONS/RECOMMENDATIONS:   · Continue current diet  · Ensure Compact BID (120 ml each)    ASSESSMENT:   Chart reviewed, medically noted for Acute on chronic systolic heart failure, chronic respiratory failure, CKD and PMH shown below. Meeting with pt because she had requested ensure during a visit from our nutrition . Pt reports she usually consumes ensure 2-3x per day. We discussed because she is being diuresed that we can provide ensure compact BID (total of 240 ml and 340 mg of Na). Pt reports she usually eats fairly well however cannot eat large quantities at one time.      Past Medical History:   Diagnosis Date    Aortic insufficiency     Asthma     Cancer ([facility] [address]     lymphoma    CKD (chronic kidney disease) stage 3, GFR 30-59 ml/min ([facility]) 1/10/2018    Congestive heart failure, NYHA class II, chronic, systolic ([facility])     Heart failure ([facility] [address]     Hypertension     Mitral valvular regurgitation 1/22/2016    ECHO 2/3/17: LV dilated, EF 20-25%, mild LVH, RV mod dilated, mild- mod MELANIA, mod-severe MR, mod AI ECHO 7/29/17: EF 15%, severe DHK, reduced RVEF, RVH, RVSP 35 mmHg  Mild LAE, mod-marked MA fibrosis, mod-severe MR (2+), AO root dilated,      Nonrheumatic aortic valve insufficiency 10/16/2018    Presence of biventricular automatic cardioverter/defibrillator (AICD) 7/2/2015    [employer] biventricular AICD implant    Stomach ulcer        Diet Order: Cardiac  % Eaten:    Patient Vitals for the past 72 hrs:   % Diet Eaten   03/12/19 0911 50 %     Pertinent Medications: [x]Reviewed: lasix, KCl,   Pertinent Labs: [x]Reviewed:   Food Allergies: [x]NKFA  []Other   Last BM: 3/11  Edema:        []RUE   [x]LUE 4+  []RLE   [x]LLE 4+     Pressure Injury: n/a     [] Stage I   [] Stage II   [] Stage III   [] Stage IV      Wt Readings from Last 30 Encounters:   03/12/19 68.2 kg (150 lb 5.7 oz)   02/18/19 71 kg (156 lb 8.4 oz)   11/30/18 59.2 kg (130 lb 8 oz) 10/16/18 58 kg (127 lb 14.4 oz)   10/02/18 52.6 kg (116 lb)   09/28/18 58.5 kg (128 lb 14.4 oz)   07/19/18 58.8 kg (129 lb 9.6 oz)   07/02/18 58.9 kg (129 lb 14.4 oz)   05/22/18 61.6 kg (135 lb 12.8 oz)   05/02/18 58.3 kg (128 lb 9.6 oz)   04/03/18 59 kg (130 lb)   03/01/18 59.5 kg (131 lb 1.6 oz)   01/09/18 60 kg (132 lb 3.2 oz)   12/21/17 59.5 kg (131 lb 1.6 oz)   12/14/17 62.4 kg (137 lb 9.6 oz)   11/07/17 59.3 kg (130 lb 12.8 oz)   10/20/17 58.9 kg (129 lb 14.4 oz)   10/10/17 59.3 kg (130 lb 12.8 oz)   10/03/17 59.9 kg (132 lb)   09/26/17 58.7 kg (129 lb 4.8 oz)   07/11/17 58.5 kg (129 lb)   06/27/17 61.9 kg (136 lb 8 oz)   06/22/17 60.3 kg (132 lb 14.4 oz)   06/15/17 61.4 kg (135 lb 4.8 oz)   06/06/17 61.1 kg (134 lb 9.6 oz)   06/01/17 62.1 kg (136 lb 14.4 oz)   05/16/17 61.1 kg (134 lb 12.8 oz)   05/09/17 61.7 kg (136 lb)   04/27/17 60.6 kg (133 lb 9.6 oz)   04/22/17 58.6 kg (129 lb 1.6 oz)       Anthropometrics:   Height: 5' 1\" (154.9 cm) Weight: 68.2 kg (150 lb 5.7 oz)   IBW (%IBW):   ( ) UBW (%UBW):   (  %)   Last Weight Metrics:  Weight Loss Metrics 3/12/2019 2/18/2019 11/30/2018 10/16/2018 10/2/2018 9/28/2018 7/19/2018   Today's Wt 150 lb 5.7 oz 156 lb 8.4 oz 130 lb 8 oz 127 lb 14.4 oz 116 lb 128 lb 14.4 oz 129 lb 9.6 oz   BMI 28.41 kg/m2 29.58 kg/m2 23.87 kg/m2 23.39 kg/m2 21.22 kg/m2 23.58 kg/m2 23.7 kg/m2       BMI: Body mass index is 28.41 kg/m². This BMI is indicative of:   []Underweight    []Normal    [x]Overweight    [] Obesity   [] Extreme Obesity (BMI>40)     Estimated Nutrition Needs (Based on):   1325 Kcals/day(BMR: 1025 x 1.3) , 70 g(1 g/kg) Protein  Carbohydrate:  At Least 130 g/day  Fluids: 1325 mL/day (1ml/kcal) or per primary team    NUTRITION DIAGNOSES:   Problem:  No nutritional diagnosis at this time      Etiology: related to       Signs/Symptoms: as evidenced by        NUTRITION INTERVENTIONS:  Meals/Snacks: General/healthful diet   Supplements: Commercial supplement GOAL:   consume >50% of meals and ONS in 5-7 days    LEARNING NEEDS (Diet, Food/Nutrient-Drug Interaction):    [x] None Identified   [] Identified and Education Provided/Documented   [] Identified and Pt declined/was not appropriate     Cultureal, Yarsanism, OR Ethnic Dietary Needs:    [x] None Identified   [] Identified and Addressed     [x] Interdisciplinary Care Plan Reviewed/Documented    [x] Discharge Planning:   Heart healthy diet    MONITORING /EVALUATION:      Food/Nutrient Intake Outcomes:  Total energy intake  Physical Signs/Symptoms Outcomes: Weight/weight change, Electrolyte and renal profile, GI    NUTRITION RISK:    [] High              [] Moderate           [x]  Low  []  Minimal/Uncompromised    PT SEEN FOR:    []  MD Consult: []Calorie Count      []Diabetic Diet Education        []Diet Education     []Electrolyte Management     []General Nutrition Management and Supplements     []Management of Tube Feeding     []TPN Recommendations    []  RN Referral:  []MST score >=2     []Enteral/Parenteral Nutrition PTA     []Pregnant: Gestational DM or Multigestation     []Pressure Ulcer/Wound Care needs        []  Low BMI  [x]  Nutrition service rounding  Referral       Yrn Colón RDN  Pager [phone]  Weekend Pager [phone]

## 2019-03-12 NOTE — PROGRESS NOTES
Received report from Pia Kapoor RN. Assumed care of patient. 1911 Pt has MEWS score of 3. /70. Pt visiting with family at bedside. Asymptomatic. Charge MAXIMUS Lara aware.

## 2019-03-13 ENCOUNTER — APPOINTMENT (OUTPATIENT)
Dept: NON INVASIVE DIAGNOSTICS | Age: 84
DRG: 291 | End: 2019-03-13
Attending: NURSE PRACTITIONER
Payer: MEDICARE

## 2019-03-13 LAB
ANION GAP SERPL CALC-SCNC: 6 MMOL/L (ref 5–15)
BUN SERPL-MCNC: 22 MG/DL (ref 6–20)
BUN/CREAT SERPL: 29 (ref 12–20)
CALCIUM SERPL-MCNC: 8.4 MG/DL (ref 8.5–10.1)
CHLORIDE SERPL-SCNC: 102 MMOL/L (ref 97–108)
CO2 SERPL-SCNC: 34 MMOL/L (ref 21–32)
CREAT SERPL-MCNC: 0.77 MG/DL (ref 0.55–1.02)
ECHO AO ROOT DIAM: 3.9 CM
ECHO AV AREA PEAK VELOCITY: 1.4 CM2
ECHO AV AREA PLAN: 0 CM2
ECHO AV AREA VTI: 1.4 CM2
ECHO AV AREA/BSA PEAK VELOCITY: 0.8 CM2/M2
ECHO AV AREA/BSA VTI: 0.8 CM2/M2
ECHO AV CUSP MM: 0 CM
ECHO AV MEAN GRADIENT: 9.3 MMHG
ECHO AV PEAK GRADIENT: 20.9 MMHG
ECHO AV PEAK VELOCITY: 228.32 CM/S
ECHO AV VTI: 43.02 CM
ECHO EST RA PRESSURE: 10 MMHG
ECHO LA MAJOR AXIS: 4.03 CM
ECHO LA TO AORTIC ROOT RATIO: 1.03
ECHO LV INTERNAL DIMENSION DIASTOLIC: 5.57 CM (ref 3.9–5.3)
ECHO LV INTERNAL DIMENSION SYSTOLIC: 5.16 CM
ECHO LV IVSD: 1.2 CM (ref 0.6–0.9)
ECHO LV MASS 2D: 320.9 G (ref 67–162)
ECHO LV MASS INDEX 2D: 194.7 G/M2 (ref 43–95)
ECHO LV POSTERIOR WALL DIASTOLIC: 1.13 CM (ref 0.6–0.9)
ECHO LV POSTERIOR WALL SYSTOLIC: 1.19 CM
ECHO LVOT DIAM: 1.75 CM
ECHO LVOT PEAK GRADIENT: 7.3 MMHG
ECHO LVOT PEAK VELOCITY: 135.45 CM/S
ECHO LVOT SV: 58.7 ML
ECHO LVOT VTI: 24.35 CM
ECHO MV A VELOCITY: 129.76 CM/S
ECHO MV AREA PHT: 4.2 CM2
ECHO MV AREA PLAN: 0 CM2
ECHO MV AREA VTI: 0.9 CM2
ECHO MV E DECELERATION TIME (DT): 181.2 MS
ECHO MV E VELOCITY: 76.07 CM/S
ECHO MV E/A RATIO: 0.59
ECHO MV MAX VELOCITY: 159.31 CM/S
ECHO MV MEAN GRADIENT: 3 MMHG
ECHO MV PEAK GRADIENT: 10.2 MMHG
ECHO MV PRESSURE HALF TIME (PHT): 52.5 MS
ECHO MV VTI: 62.07 CM
ECHO PULMONARY ARTERY SYSTOLIC PRESSURE (PASP): 45 MMHG
ECHO RIGHT VENTRICULAR SYSTOLIC PRESSURE (RVSP): 45 MMHG
ECHO TV MAX VELOCITY: 280.82 CM/S
ECHO TV PEAK GRADIENT: 31.5 MMHG
ECHO TV REGURGITANT MAX VELOCITY: 295.76 CM/S
ECHO TV REGURGITANT PEAK GRADIENT: 35 MMHG
GLUCOSE SERPL-MCNC: 94 MG/DL (ref 65–100)
POTASSIUM SERPL-SCNC: 3.7 MMOL/L (ref 3.5–5.1)
SODIUM SERPL-SCNC: 142 MMOL/L (ref 136–145)

## 2019-03-13 PROCEDURE — 77030038269 HC DRN EXT URIN PURWCK BARD -A

## 2019-03-13 PROCEDURE — 74011000250 HC RX REV CODE- 250: Performed by: INTERNAL MEDICINE

## 2019-03-13 PROCEDURE — 65660000000 HC RM CCU STEPDOWN

## 2019-03-13 PROCEDURE — 74011250637 HC RX REV CODE- 250/637: Performed by: NURSE PRACTITIONER

## 2019-03-13 PROCEDURE — 77010033678 HC OXYGEN DAILY

## 2019-03-13 PROCEDURE — 80048 BASIC METABOLIC PNL TOTAL CA: CPT

## 2019-03-13 PROCEDURE — 74011250637 HC RX REV CODE- 250/637: Performed by: INTERNAL MEDICINE

## 2019-03-13 PROCEDURE — 93306 TTE W/DOPPLER COMPLETE: CPT

## 2019-03-13 PROCEDURE — 74011250636 HC RX REV CODE- 250/636: Performed by: INTERNAL MEDICINE

## 2019-03-13 PROCEDURE — 94760 N-INVAS EAR/PLS OXIMETRY 1: CPT

## 2019-03-13 PROCEDURE — 94640 AIRWAY INHALATION TREATMENT: CPT

## 2019-03-13 PROCEDURE — 36415 COLL VENOUS BLD VENIPUNCTURE: CPT

## 2019-03-13 RX ORDER — POTASSIUM CHLORIDE 750 MG/1
20 TABLET, FILM COATED, EXTENDED RELEASE ORAL 2 TIMES DAILY
Status: DISCONTINUED | OUTPATIENT
Start: 2019-03-14 | End: 2019-03-18 | Stop reason: HOSPADM

## 2019-03-13 RX ADMIN — APIXABAN 2.5 MG: 2.5 TABLET, FILM COATED ORAL at 08:13

## 2019-03-13 RX ADMIN — MIDODRINE HYDROCHLORIDE 5 MG: 5 TABLET ORAL at 08:13

## 2019-03-13 RX ADMIN — POTASSIUM CHLORIDE 10 MEQ: 750 TABLET, EXTENDED RELEASE ORAL at 08:13

## 2019-03-13 RX ADMIN — Medication 10 ML: at 03:43

## 2019-03-13 RX ADMIN — ATORVASTATIN CALCIUM 10 MG: 10 TABLET, FILM COATED ORAL at 08:13

## 2019-03-13 RX ADMIN — POTASSIUM CHLORIDE 10 MEQ: 750 TABLET, EXTENDED RELEASE ORAL at 18:26

## 2019-03-13 RX ADMIN — FUROSEMIDE 40 MG: 10 INJECTION, SOLUTION INTRAMUSCULAR; INTRAVENOUS at 08:13

## 2019-03-13 RX ADMIN — MIDODRINE HYDROCHLORIDE 5 MG: 5 TABLET ORAL at 18:24

## 2019-03-13 RX ADMIN — Medication 10 ML: at 14:14

## 2019-03-13 RX ADMIN — CARVEDILOL 3.12 MG: 3.12 TABLET, FILM COATED ORAL at 18:26

## 2019-03-13 RX ADMIN — LEVOTHYROXINE SODIUM 25 MCG: 25 TABLET ORAL at 08:13

## 2019-03-13 RX ADMIN — ALLOPURINOL 300 MG: 300 TABLET ORAL at 08:13

## 2019-03-13 RX ADMIN — CARVEDILOL 3.12 MG: 3.12 TABLET, FILM COATED ORAL at 08:13

## 2019-03-13 RX ADMIN — MIDODRINE HYDROCHLORIDE 5 MG: 5 TABLET ORAL at 11:51

## 2019-03-13 RX ADMIN — FUROSEMIDE 40 MG: 10 INJECTION, SOLUTION INTRAMUSCULAR; INTRAVENOUS at 18:24

## 2019-03-13 RX ADMIN — ALBUTEROL SULFATE 2.5 MG: 2.5 SOLUTION RESPIRATORY (INHALATION) at 07:23

## 2019-03-13 RX ADMIN — FLUTICASONE FUROATE AND VILANTEROL TRIFENATATE 1 PUFF: 100; 25 POWDER RESPIRATORY (INHALATION) at 08:43

## 2019-03-13 RX ADMIN — APIXABAN 2.5 MG: 2.5 TABLET, FILM COATED ORAL at 18:25

## 2019-03-13 RX ADMIN — ALBUTEROL SULFATE 2.5 MG: 2.5 SOLUTION RESPIRATORY (INHALATION) at 21:18

## 2019-03-13 NOTE — PROGRESS NOTES
General Daily Progress Note    Admit Date: 3/11/2019    Subjective:     Patient complains of shortness of breath. Current Facility-Administered Medications   Medication Dose Route Frequency    midodrine (PROAMITINE) tablet 5 mg  5 mg Oral TID WITH MEALS    albuterol (PROVENTIL VENTOLIN) nebulizer solution 2.5 mg  2.5 mg Nebulization BID    allopurinol (ZYLOPRIM) tablet 300 mg  300 mg Oral DAILY    apixaban (ELIQUIS) tablet 2.5 mg  2.5 mg Oral BID    atorvastatin (LIPITOR) tablet 10 mg  10 mg Oral DAILY    carvedilol (COREG) tablet 3.125 mg  3.125 mg Oral BID WITH MEALS    potassium chloride SR (KLOR-CON 10) tablet 10 mEq  10 mEq Oral BID    levothyroxine (SYNTHROID) tablet 25 mcg  25 mcg Oral ACB    fluticasone-vilanterol (BREO ELLIPTA) 100mcg-25mcg/puff  1 Puff Inhalation DAILY    furosemide (LASIX) injection 40 mg  40 mg IntraVENous BID    sodium chloride (NS) flush 5-40 mL  5-40 mL IntraVENous Q8H    sodium chloride (NS) flush 5-40 mL  5-40 mL IntraVENous PRN    acetaminophen (TYLENOL) tablet 650 mg  650 mg Oral Q6H PRN    ondansetron (ZOFRAN) injection 4 mg  4 mg IntraVENous Q4H PRN        Review of Systems  A comprehensive review of systems was negative. Objective:     Patient Vitals for the past 24 hrs:   BP Temp Pulse Resp SpO2 Weight   03/12/19 2113     98 %    03/12/19 1911 100/70 98.3 °F (36.8 °C) 73 22 97 %    03/12/19 1718 121/69  79      03/12/19 1443 118/78 98 °F (36.7 °C) 83 23 96 %    03/12/19 1342 131/84        03/12/19 1105     94 %    03/12/19 1039 117/84 97.8 °F (36.6 °C) 71 22 93 %    03/12/19 0934 107/73  75  99 %    03/12/19 0810 133/74 97.8 °F (36.6 °C) 71 20 98 %    03/12/19 0409 126/52 98.7 °F (37.1 °C) 70 20 96 % 150 lb 5.7 oz (68.2 kg)   03/11/19 2323 118/69 97.8 °F (36.6 °C) 72 18 97 %    03/11/19 2227 130/78 98.2 °F (36.8 °C)  18 100 %      No intake/output data recorded.   03/11 0701 - 03/12 1900  In: 900 [P.O.:900]  Out: 2100 [Urine:2100]    Physical Exam:   Visit Vitals  /70 (BP 1 Location: Left arm, BP Patient Position: At rest)   Pulse 73   Temp 98.3 °F (36.8 °C)   Resp 22   Ht 5' 1\" (1.549 m)   Wt 150 lb 5.7 oz (68.2 kg)   SpO2 98%   Breastfeeding? No   BMI 28.41 kg/m²     General appearance: alert, cooperative, no distress, appears stated age  Neck: supple, symmetrical, trachea midline, no adenopathy, thyroid: not enlarged, symmetric, no tenderness/mass/nodules, no carotid bruit and JVD - 8 cm above sternal notch  Lungs: rales R base, L base  Heart: regular rate and rhythm, S1, S2 normal, no murmur, click, rub or gallop  Abdomen: soft, non-tender. Bowel sounds normal. No masses,  no organomegaly  Extremities: edema 2+        Data Review   Recent Results (from the past 24 hour(s))   METABOLIC PANEL, COMPREHENSIVE    Collection Time: 03/12/19  3:54 AM   Result Value Ref Range    Sodium 144 136 - 145 mmol/L    Potassium 3.9 3.5 - 5.1 mmol/L    Chloride 106 97 - 108 mmol/L    CO2 30 21 - 32 mmol/L    Anion gap 8 5 - 15 mmol/L    Glucose 96 65 - 100 mg/dL    BUN 21 (H) 6 - 20 MG/DL    Creatinine 0.87 0.55 - 1.02 MG/DL    BUN/Creatinine ratio 24 (H) 12 - 20      GFR est AA >60 >60 ml/min/1.73m2    GFR est non-AA >60 >60 ml/min/1.73m2    Calcium 8.2 (L) 8.5 - 10.1 MG/DL    Bilirubin, total 0.4 0.2 - 1.0 MG/DL    ALT (SGPT) 20 12 - 78 U/L    AST (SGOT) 26 15 - 37 U/L    Alk.  phosphatase 101 45 - 117 U/L    Protein, total 5.6 (L) 6.4 - 8.2 g/dL    Albumin 2.6 (L) 3.5 - 5.0 g/dL    Globulin 3.0 2.0 - 4.0 g/dL    A-G Ratio 0.9 (L) 1.1 - 2.2     CBC WITH AUTOMATED DIFF    Collection Time: 03/12/19  3:54 AM   Result Value Ref Range    WBC 2.6 (L) 3.6 - 11.0 K/uL    RBC 2.98 (L) 3.80 - 5.20 M/uL    HGB 9.4 (L) 11.5 - 16.0 g/dL    HCT 30.2 (L) 35.0 - 47.0 %    .3 (H) 80.0 - 99.0 FL    MCH 31.5 26.0 - 34.0 PG    MCHC 31.1 30.0 - 36.5 g/dL    RDW 21.4 (H) 11.5 - 14.5 %    PLATELET 214 295 - 246 K/uL    MPV 10.3 8.9 - 12.9 FL    NRBC 0.0 0  WBC    ABSOLUTE NRBC 0.00 0.00 - 0.01 K/uL    NEUTROPHILS 53 32 - 75 %    LYMPHOCYTES 45 12 - 49 %    MONOCYTES 0 (L) 5 - 13 %    EOSINOPHILS 2 0 - 7 %    BASOPHILS 0 0 - 1 %    IMMATURE GRANULOCYTES 0 0.0 - 0.5 %    ABS. NEUTROPHILS 1.3 (L) 1.8 - 8.0 K/UL    ABS. LYMPHOCYTES 1.2 0.8 - 3.5 K/UL    ABS. MONOCYTES 0.0 0.0 - 1.0 K/UL    ABS. EOSINOPHILS 0.1 0.0 - 0.4 K/UL    ABS. BASOPHILS 0.0 0.0 - 0.1 K/UL    ABS. IMM. GRANS. 0.0 0.00 - 0.04 K/UL    DF MANUAL      RBC COMMENTS ANISOCYTOSIS  1+       TROPONIN I    Collection Time: 03/12/19  3:54 AM   Result Value Ref Range    Troponin-I, Qt. 0.11 (H) <0.05 ng/mL           Assessment:     Active Problems:    HTN (hypertension) (6/29/2015)      Gout (6/29/2015)      Presence of biventricular automatic cardioverter/defibrillator (AICD) (7/2/2015)      Overview: [employer] biventricular AICD implant      Paroxysmal atrial fibrillation ([facility] [address] (10/21/2017)      Dyslipidemia (12/21/2017)      CKD (chronic kidney disease) stage 3, GFR 30-59 ml/min ([facility]) (1/10/2018)      Acquired hypothyroidism (3/18/2018)      Acute on chronic systolic heart failure ([facility] [address] (3/11/2019)        Plan:     1. Acute biventricular congestive heart failure superimposed on CHF with reduced systolic function. Continue aggressive diuresis. Patient has a low output state. 2.  Mild reactive airway disease secondary to cardiac decompensation.

## 2019-03-13 NOTE — PROGRESS NOTES
Bedside shift change report given to MAXIMUS Ingram (oncoming nurse). Report included the following information SBAR. SHIFT SUMMARY:      CONCERNS TO ADDRESS WITH MD:        [address] NURSING NOTE   Admission Date 3/11/2019   Admission Diagnosis Acute on chronic systolic heart failure ([facility] [address] [I50.23]   Consults IP CONSULT TO PALLIATIVE CARE - PROVIDER  IP CONSULT TO PRIMARY CARE PROVIDER  IP CONSULT TO CARDIOLOGY      Cardiac Monitoring [x] Yes [] No      Purposeful Hourly Rounding [x] Yes    Milvia Score Total Score: 3   Milvia score 3 or > [x] Bed Alarm [] Avasys [] 1:1 sitter [] Patient refused (Signed refusal form in chart)   Kimani Score Kimani Score: 17   Kimani score 14 or < [] PMT consult [] Wound Care consult    []  Specialty bed  [] Nutrition consult      Influenza Vaccine Received Flu Vaccine for Current Season (usually Sept-March): Yes           Oxygen needs? [] Room air Oxygen @  []1L    []2L    [x]3L   []4L    []5L   []6L via  NC   Chronic home O2 use?  [x] Yes [x] No  Perform O2 challenge test and document in progress note using smartphrase (.Homeoxygen)      Last bowel movement Last Bowel Movement Date: 03/11/19      Urinary Catheter       External Female Catheter 03/11/19-Urine Output (mL): 550 ml     LDAs               Peripheral IV 03/11/19 Right Hand (Active)   Site Assessment Clean, dry, & intact 3/13/2019  3:27 PM   Phlebitis Assessment 0 3/13/2019  3:27 PM   Infiltration Assessment 0 3/13/2019  3:27 PM   Dressing Status Clean, dry, & intact 3/13/2019  3:27 PM   Dressing Type Tape 3/13/2019  3:27 PM   Hub Color/Line Status Pink;Flushed 3/13/2019  3:27 PM          External Female Catheter 03/11/19 (Active)   Site Assessment Clean, dry, & intact 3/13/2019  3:40 AM   Repositioned Yes 3/13/2019  3:40 AM   Perineal Care Yes 3/13/2019  3:40 AM   Wick Changed No 3/13/2019  3:40 AM   Suction Canister/Tubing Changed No 3/13/2019  3:40 AM   Urine Output (mL) 550 ml 3/13/2019  3:40 AM Readmission Risk Assessment Tool Score High Risk            32       Total Score        3 Has Seen PCP in Last 6 Months (Yes=3, No=0)    4 IP Visits Last 12 Months (1-3=4, 4=9, >4=11)    5 Pt. Coverage (Medicare=5 , Medicaid, or Self-Pay=4)    20 Charlson Comorbidity Score (Age + Comorbid Conditions)        Criteria that do not apply:    . Living with Significant Other. Assisted Living. LTAC. SNF.  or   Rehab    Patient Length of Stay (>5 days = 3)       Expected Length of Stay 4d 2h   Actual Length of Stay 2

## 2019-03-13 NOTE — PROGRESS NOTES
3/13/2019 11:07 AM    Admit Date: 3/11/2019    Admit Diagnosis: Acute on chronic systolic [facility] [I50.23]    Subjective:     Beulah Sommer denies chest pain, chest pressure/discomfort, palpitations, irregular heart beats, near-syncope, syncope, orthopnea, paroxysmal nocturnal dyspnea, exertional chest pressure/discomfort, claudication. Breathing better. Visit Vitals  /79 (BP 1 Location: Left arm, BP Patient Position: At rest)   Pulse 78   Temp 97.6 °F (36.4 °C)   Resp 20   Ht 5' 1\" (1.549 m)   Wt 145 lb 1 oz (65.8 kg)   SpO2 93%   Breastfeeding? No   BMI 27.41 kg/m²     Current Facility-Administered Medications   Medication Dose Route Frequency    midodrine (PROAMITINE) tablet 5 mg  5 mg Oral TID WITH MEALS    albuterol (PROVENTIL VENTOLIN) nebulizer solution 2.5 mg  2.5 mg Nebulization BID    allopurinol (ZYLOPRIM) tablet 300 mg  300 mg Oral DAILY    apixaban (ELIQUIS) tablet 2.5 mg  2.5 mg Oral BID    atorvastatin (LIPITOR) tablet 10 mg  10 mg Oral DAILY    carvedilol (COREG) tablet 3.125 mg  3.125 mg Oral BID WITH MEALS    potassium chloride SR (KLOR-CON 10) tablet 10 mEq  10 mEq Oral BID    levothyroxine (SYNTHROID) tablet 25 mcg  25 mcg Oral ACB    fluticasone-vilanterol (BREO ELLIPTA) 100mcg-25mcg/puff  1 Puff Inhalation DAILY    furosemide (LASIX) injection 40 mg  40 mg IntraVENous BID    sodium chloride (NS) flush 5-40 mL  5-40 mL IntraVENous Q8H    sodium chloride (NS) flush 5-40 mL  5-40 mL IntraVENous PRN    acetaminophen (TYLENOL) tablet 650 mg  650 mg Oral Q6H PRN    ondansetron (ZOFRAN) injection 4 mg  4 mg IntraVENous Q4H PRN         Objective:      Visit Vitals  /79 (BP 1 Location: Left arm, BP Patient Position: At rest)   Pulse 78   Temp 97.6 °F (36.4 °C)   Resp 20   Ht 5' 1\" (1.549 m)   Wt 145 lb 1 oz (65.8 kg)   SpO2 93%   Breastfeeding? No   BMI 27.41 kg/m²       Physical Exam:  Abdomen: soft, non-tender.  Bowel sounds normal. Extremities: no cyanosis, 1+ edema  Heart: regular rate and rhythm, S1, S2 normal, no murmur, click, rub or gallop  Lungs: clear to auscultation bilaterally  Neurologic: Grossly normal    Data Review:   Labs:    Recent Results (from the past 24 hour(s))   METABOLIC PANEL, BASIC    Collection Time: 03/13/19  3:32 AM   Result Value Ref Range    Sodium 142 136 - 145 mmol/L    Potassium 3.7 3.5 - 5.1 mmol/L    Chloride 102 97 - 108 mmol/L    CO2 34 (H) 21 - 32 mmol/L    Anion gap 6 5 - 15 mmol/L    Glucose 94 65 - 100 mg/dL    BUN 22 (H) 6 - 20 MG/DL    Creatinine 0.77 0.55 - 1.02 MG/DL    BUN/Creatinine ratio 29 (H) 12 - 20      GFR est AA >60 >60 ml/min/1.73m2    GFR est non-AA >60 >60 ml/min/1.73m2    Calcium 8.4 (L) 8.5 - 10.1 MG/DL       Telemetry: paced      Assessment:     Active Problems:    HTN (hypertension) (6/29/2015)      Gout (6/29/2015)      Presence of biventricular automatic cardioverter/defibrillator (AICD) (7/2/2015)      Overview: [employer] biventricular AICD implant      Paroxysmal atrial fibrillation (HCC) (10/21/2017)      Dyslipidemia (12/21/2017)      CKD (chronic kidney disease) stage 3, GFR 30-59 ml/min (HCC) (1/10/2018)      Acquired hypothyroidism (3/18/2018)      Acute on chronic systolic heart failure ([facility] [address] (3/11/2019)        Plan:     1. AOCSHF: -ve fluid balance. Continue IV diuretic for another 24 hrs. Will switch to PO tomorrow. ICD. Previously did not tolerate entresto. 2. PAF: on DOAC. Paced. 3. Orthostatic hypotension: on midodrine. Stable.

## 2019-03-13 NOTE — PROGRESS NOTES
Problem: Falls - Risk of  Goal: *Absence of Falls  Document Milvia Fall Risk and appropriate interventions in the flowsheet.   Outcome: Progressing Towards Goal  Fall Risk Interventions:  Mobility Interventions: Bed/chair exit alarm, Patient to call before getting OOB, PT Consult for mobility concerns, Utilize walker, cane, or other assistive device         Medication Interventions: Assess postural VS orthostatic hypotension, Bed/chair exit alarm, Patient to call before getting OOB, Teach patient to arise slowly    Elimination Interventions: Bed/chair exit alarm, Call light in reach, Patient to call for help with toileting needs

## 2019-03-13 NOTE — CARDIO/PULMONARY
Cardiopulmonary Rehab Nursing Entry:    Pt is on CHF Bundle List    Chart reviewed and pt visited for home CHF care instructions. Pt is a 79 yo admitted with acute on chronic systolic heart failure. EF 25%. Cardiac Rehab: Living with Heart Failure Booklet given to Shen Rey. Educated using teach back method. Discussed diagnosis definition and assessed patient understanding. Reviewed importance of daily weight monitoring and Low Sodium diet (7969-7312 mg. daily). Encouraged activity and rest periods within symptom limitations and as ordered by physician. Reviewed lasix, purpose of medication, potential side effects, compliance, and what to do if dose is missed. Discussed importance of reporting signs and symptoms of exacerbation, and when to report them to the doctor, to prevent re-hospitalization. Shen Rey was encouraged to keep all appointments with doctor. Smoking history assessed. Pt reported that she will not be returning to independent living although she is unsure of where she will be going. He daughter is assisting her with this process. Pt advised that regardless of the d/c living situation she needs to follow a diet low in salt content, avoiding added table salt. She was encouraged to take meds as administered and participate with PT and whitney walking as tolerated. Pt owns a scale and was encouraged to continue daily weight assessments independently or with nursing staff. Pt appreciative of written materials and education, she voiced understanding of information provided.

## 2019-03-13 NOTE — CONSULTS
Palliative Medicine Consult  [place]: [phone] ([phone])    Patient Name: Stevie Delong  YOB: 1925    Date of Initial Consult: 3/13/19  Reason for Consult: Bailey Allison MD  Requesting Provider: Care Decisions  Primary Care Physician: Pia Theodore MD     SUMMARY:   Stevie Delong is a 80 y.o. Female with past h/o non ischemic cardiomyopathy EF  25 %, s/p AICD, chronic respiratory failure on home oxygen , HTN , CKD, recent stay at [facility], from  2 /4- 2/19 for encephalopathy ,who was admitted on 3/11/2019 from rehab  with a diagnosis of sob due to acute on chronic heart failure . She is known to palliative care seen  In Feb 2019, DDNR WAS signed      Current medical issues leading to Palliative Medicine involvement include: elderly , heart failure , hypotension requiring midodrine is poor prognosis per cardiology . Admitted from rehab , previously lived at  own home alone with 4 hour/day 4 days/week caregiver who assists with driving, shopping. Has only child son lives out of town ans is very supportive. PALLIATIVE DIAGNOSES:   1. Goals of care  2. Sob   3. Hypotension   4. CHF ( non ischemic cardiomyopathy ). PLAN:   1. Met with patient and her daughter in law /secondary MPOA Kris Pizarro. 2. Patient is known to our team   3. She is feeling better today. 4. I tried discussing  her prognosis /heart failure and low blood pressure is not good combination , medication management for heart failure can be challenging and what if \" , within the context of advance heart failure , high risk of rehospitlization , she does not want to discuss and started getting uncomfortable  \" will see when I get there \" , her daughter law though was trying to encourage for the discussion , but patient did not seems to be ready . 5. Goal is Rehab and after that placement in long term care. 6. AMD in place (refer to [address] ACP note ) . 7.  Spritual care : she tells me she had a good visit with latisha Evans and connected with him. 8. Communicated plan of care with: Palliative [facility] Team     GOALS OF CARE / TREATMENT PREFERENCES:     GOALS OF CARE:  Patient/Health Care Proxy Stated Goals: Rehabilitation    TREATMENT PREFERENCES:   Code Status: DNR    Advance Care Planning:  [] [facility] Interdisciplinary Team has updated the ACP Navigator with Health Care Decision Maker and Patient Capacity      Primary Decision Maker: Boyd Ching primary - Son - [phone]    Secondary Decision Maker: Seema Henry(Daughter In Sherrell) [email or web address] second - Other Relative - [phone]  Advance Care Planning 3/12/2019   Patient's Healthcare Decision Maker is: Named in scanned ACP document   Primary Decision Maker Name -   Primary Decision Maker Phone Number -   Primary Decision Maker Relationship to Patient -   Confirm Advance Directive Yes, on file   Does the patient have other document types Do Not Resuscitate             Other Instructions: Other:    As far as possible, the palliative care team has discussed with patient / health care proxy about goals of care / treatment preferences for patient. HISTORY:     History obtained from: chart , patient . CHIEF COMPLAINT: feeling better   HPI/SUBJECTIVE:    The patient is:   [] Verbal and participatory   patient was recently d/c to rehab after her last admission in feb , since d/c she had \" 20 lbs \" fluid on her , she initially was able to participate in rehab, walked with walker , however later on she had flu and pneumonia , and was confined to her room . She was admitted for sob x 1 day , lately she was using oxygen 24/7 at rehab. She is currently feeling better , denies any chest pain , nausea or vomiting .     Clinical Pain Assessment (nonverbal scale for severity on nonverbal patients):   Clinical Pain Assessment  Severity: 0          Duration: for how long has pt been experiencing pain (e.g., 2 days, 1 month, years)  Frequency: how often pain is an issue (e.g., several times per day, once every few days, constant)     FUNCTIONAL ASSESSMENT:     Palliative Performance Scale (PPS):  PPS: 50       PSYCHOSOCIAL/SPIRITUAL SCREENING:     Palliative IDT has assessed this patient for cultural preferences / practices and a referral made as appropriate to needs (Cultural Services, Patient Advocacy, Ethics, etc.)    Any spiritual / Episcopalian concerns:  [] Yes /  [x] No    Caregiver Burnout:  [] Yes /  [x] No /  [] No Caregiver Present      Anticipatory grief assessment:   [x] Normal  / [] Maladaptive       ESAS Anxiety: Anxiety: 2    ESAS Depression: Depression: 0        REVIEW OF SYSTEMS:     Positive and pertinent negative findings in ROS are noted above in HPI. The following systems were [x] reviewed / [] unable to be reviewed as noted in HPI  Other findings are noted below. Systems: constitutional, ears/nose/mouth/throat, respiratory, gastrointestinal, genitourinary, musculoskeletal, integumentary, neurologic, psychiatric, endocrine. Positive findings noted below. Modified ESAS Completed by: provider   Fatigue: 5 Drowsiness: 0   Depression: 0 Pain: 0   Anxiety: 2 Nausea: 0   Anorexia: 0 Dyspnea: 3     Constipation: No     Stool Occurrence(s): 1        PHYSICAL EXAM:     From RN flowsheet:  Wt Readings from Last 3 Encounters:   03/14/19 139 lb (63 kg)   02/18/19 156 lb 8.4 oz (71 kg)   11/30/18 130 lb 8 oz (59.2 kg)     Blood pressure 114/60, pulse 92, temperature 98.2 °F (36.8 °C), resp. rate 20, height 5' 1\" (1.549 m), weight 139 lb (63 kg), SpO2 95 %, not currently breastfeeding. Pain Scale 1: Numeric (0 - 10)  Pain Intensity 1: 0                 Last bowel movement, if known:     Constitutional: elderly , siting in chair , with nasal oxygen , not in any distress.   Eyes: pupils equal, anicteric  ENMT: no nasal discharge, moist mucous membranes  Cardiovascular: regular rhythm, trace edema  Respiratory: breathing not labored, symmetric  Gastrointestinal: soft non-tender, +bowel sounds  Musculoskeletal: no deformity, no tenderness to palpation  Skin: warm, dry  Neurologic: following commands, moving all extremities  Psychiatric: full affect, no hallucinations  Other:       HISTORY:     Active Problems:    HTN (hypertension) (6/29/2015)      Gout (6/29/2015)      Presence of biventricular automatic cardioverter/defibrillator (AICD) (7/2/2015)      Overview: [employer] biventricular AICD implant      Paroxysmal atrial fibrillation ([facility] (10/21/2017)      Dyslipidemia (12/21/2017)      CKD (chronic kidney disease) stage 3, GFR 30-59 ml/min ([facility]) (1/10/2018)      Acquired hypothyroidism (3/18/2018)      Acute on chronic systolic heart failure ([facility] (3/11/2019)      Past Medical History:   Diagnosis Date    Aortic insufficiency     Asthma     Cancer ([facility]     lymphoma    CKD (chronic kidney disease) stage 3, GFR 30-59 ml/min ([facility]) 1/10/2018    Congestive heart failure, NYHA class II, chronic, systolic ([facility])     Heart failure ([facility]     Hypertension     Mitral valvular regurgitation 1/22/2016    ECHO 2/3/17: LV dilated, EF 20-25%, mild LVH, RV mod dilated, mild- mod MELANIA, mod-severe MR, mod AI ECHO 7/29/17: EF 15%, severe DHK, reduced RVEF, RVH, RVSP 35 mmHg  Mild LAE, mod-marked MA fibrosis, mod-severe MR (2+), AO root dilated,      Nonrheumatic aortic valve insufficiency 10/16/2018    Presence of biventricular automatic cardioverter/defibrillator (AICD) 7/2/2015    [employer] biventricular AICD implant    Stomach ulcer       Past Surgical History:   Procedure Laterality Date    HX BREAST BIOPSY Bilateral     40 years ago    HX COLONOSCOPY      HX HEENT      cataracts removed    HX HYSTERECTOMY      HX OTHER SURGICAL      lymph node surgery    HX TONSILLECTOMY      [place] EGD TRANSORAL BIOPSY SINGLE/MULTIPLE  5/23/2012         SINUS SURGERY PROC UNLISTED      Nasal polyps removed      Family History Problem Relation Age of Onset    Heart Disease Mother     Stroke Father       History reviewed, no pertinent family history.   Social History     Tobacco Use    Smoking status: Never Smoker    Smokeless tobacco: Never Used   Substance Use Topics    Alcohol use: No     Alcohol/week: 0.0 oz     Allergies   Allergen Reactions    Codeine Other (comments)     \"made me disoriented\" per pt      Current Facility-Administered Medications   Medication Dose Route Frequency    potassium chloride SR (KLOR-CON 10) tablet 20 mEq  20 mEq Oral BID    midodrine (PROAMITINE) tablet 5 mg  5 mg Oral TID WITH MEALS    albuterol (PROVENTIL VENTOLIN) nebulizer solution 2.5 mg  2.5 mg Nebulization BID    allopurinol (ZYLOPRIM) tablet 300 mg  300 mg Oral DAILY    apixaban (ELIQUIS) tablet 2.5 mg  2.5 mg Oral BID    atorvastatin (LIPITOR) tablet 10 mg  10 mg Oral DAILY    carvedilol (COREG) tablet 3.125 mg  3.125 mg Oral BID WITH MEALS    levothyroxine (SYNTHROID) tablet 25 mcg  25 mcg Oral ACB    fluticasone-vilanterol (BREO ELLIPTA) 100mcg-25mcg/puff  1 Puff Inhalation DAILY    furosemide (LASIX) injection 40 mg  40 mg IntraVENous BID    sodium chloride (NS) flush 5-40 mL  5-40 mL IntraVENous Q8H    sodium chloride (NS) flush 5-40 mL  5-40 mL IntraVENous PRN    acetaminophen (TYLENOL) tablet 650 mg  650 mg Oral Q6H PRN    ondansetron (ZOFRAN) injection 4 mg  4 mg IntraVENous Q4H PRN          LAB AND IMAGING FINDINGS:     Lab Results   Component Value Date/Time    WBC 2.6 (L) 03/12/2019 03:54 AM    HGB 9.4 (L) 03/12/2019 03:54 AM    PLATELET 695 73/64/6680 03:54 AM     Lab Results   Component Value Date/Time    Sodium 143 03/14/2019 03:55 AM    Potassium 3.6 03/14/2019 03:55 AM    Chloride 101 03/14/2019 03:55 AM    CO2 34 (H) 03/14/2019 03:55 AM    BUN 22 (H) 03/14/2019 03:55 AM    Creatinine 0.81 03/14/2019 03:55 AM    Calcium 8.4 (L) 03/14/2019 03:55 AM    Magnesium 1.6 03/11/2019 04:24 PM    Phosphorus 2.7 02/12/2019 08:56 AM      Lab Results   Component Value Date/Time    AST (SGOT) 26 03/12/2019 03:54 AM    Alk. phosphatase 101 03/12/2019 03:54 AM    Protein, total 5.6 (L) 03/12/2019 03:54 AM    Albumin 2.6 (L) 03/12/2019 03:54 AM    Globulin 3.0 03/12/2019 03:54 AM     Lab Results   Component Value Date/Time    INR 1.2 (H) 05/22/2012 10:54 PM    Prothrombin time 12.2 (H) 05/22/2012 10:54 PM    aPTT 26.0 05/22/2012 10:54 PM      Lab Results   Component Value Date/Time    Ferritin 714 (H) 11/09/2014 05:31 AM      No results found for: PH, PCO2, PO2  No components found for: [place]   Lab Results   Component Value Date/Time    CK 53 03/11/2019 04:24 PM    CK - MB <1.0 02/01/2017 07:35 PM                Total time:   Counseling / coordination time, spent as noted above:   > 50% counseling / coordination?:     Prolonged service was provided for  []30 min   []75 min in face to face time in the presence of the patient, spent as noted above. Time Start:   Time End:   Note: this can only be billed with 01652 (initial) or 64028 (follow up). If multiple start / stop times, list each separately.

## 2019-03-14 LAB
ANION GAP SERPL CALC-SCNC: 8 MMOL/L (ref 5–15)
BUN SERPL-MCNC: 22 MG/DL (ref 6–20)
BUN/CREAT SERPL: 27 (ref 12–20)
CALCIUM SERPL-MCNC: 8.4 MG/DL (ref 8.5–10.1)
CHLORIDE SERPL-SCNC: 101 MMOL/L (ref 97–108)
CO2 SERPL-SCNC: 34 MMOL/L (ref 21–32)
CREAT SERPL-MCNC: 0.81 MG/DL (ref 0.55–1.02)
GLUCOSE SERPL-MCNC: 96 MG/DL (ref 65–100)
POTASSIUM SERPL-SCNC: 3.6 MMOL/L (ref 3.5–5.1)
SODIUM SERPL-SCNC: 143 MMOL/L (ref 136–145)

## 2019-03-14 PROCEDURE — 74011250637 HC RX REV CODE- 250/637: Performed by: INTERNAL MEDICINE

## 2019-03-14 PROCEDURE — 97535 SELF CARE MNGMENT TRAINING: CPT

## 2019-03-14 PROCEDURE — 77010033678 HC OXYGEN DAILY

## 2019-03-14 PROCEDURE — 97161 PT EVAL LOW COMPLEX 20 MIN: CPT

## 2019-03-14 PROCEDURE — 74011000250 HC RX REV CODE- 250: Performed by: INTERNAL MEDICINE

## 2019-03-14 PROCEDURE — 97165 OT EVAL LOW COMPLEX 30 MIN: CPT

## 2019-03-14 PROCEDURE — 94760 N-INVAS EAR/PLS OXIMETRY 1: CPT

## 2019-03-14 PROCEDURE — 80048 BASIC METABOLIC PNL TOTAL CA: CPT

## 2019-03-14 PROCEDURE — 65660000000 HC RM CCU STEPDOWN

## 2019-03-14 PROCEDURE — 97116 GAIT TRAINING THERAPY: CPT

## 2019-03-14 PROCEDURE — 74011250636 HC RX REV CODE- 250/636: Performed by: INTERNAL MEDICINE

## 2019-03-14 PROCEDURE — 36415 COLL VENOUS BLD VENIPUNCTURE: CPT

## 2019-03-14 PROCEDURE — 94640 AIRWAY INHALATION TREATMENT: CPT

## 2019-03-14 PROCEDURE — 74011250637 HC RX REV CODE- 250/637: Performed by: NURSE PRACTITIONER

## 2019-03-14 PROCEDURE — 77030038269 HC DRN EXT URIN PURWCK BARD -A

## 2019-03-14 RX ADMIN — Medication 10 ML: at 04:15

## 2019-03-14 RX ADMIN — ATORVASTATIN CALCIUM 10 MG: 10 TABLET, FILM COATED ORAL at 08:55

## 2019-03-14 RX ADMIN — POTASSIUM CHLORIDE 20 MEQ: 750 TABLET, FILM COATED, EXTENDED RELEASE ORAL at 08:55

## 2019-03-14 RX ADMIN — APIXABAN 2.5 MG: 2.5 TABLET, FILM COATED ORAL at 17:34

## 2019-03-14 RX ADMIN — ALLOPURINOL 300 MG: 300 TABLET ORAL at 08:55

## 2019-03-14 RX ADMIN — FUROSEMIDE 40 MG: 10 INJECTION, SOLUTION INTRAMUSCULAR; INTRAVENOUS at 08:55

## 2019-03-14 RX ADMIN — LEVOTHYROXINE SODIUM 25 MCG: 25 TABLET ORAL at 07:43

## 2019-03-14 RX ADMIN — FUROSEMIDE 40 MG: 10 INJECTION, SOLUTION INTRAMUSCULAR; INTRAVENOUS at 17:34

## 2019-03-14 RX ADMIN — ALBUTEROL SULFATE 2.5 MG: 2.5 SOLUTION RESPIRATORY (INHALATION) at 21:03

## 2019-03-14 RX ADMIN — CARVEDILOL 3.12 MG: 3.12 TABLET, FILM COATED ORAL at 17:34

## 2019-03-14 RX ADMIN — APIXABAN 2.5 MG: 2.5 TABLET, FILM COATED ORAL at 08:55

## 2019-03-14 RX ADMIN — MIDODRINE HYDROCHLORIDE 5 MG: 5 TABLET ORAL at 07:43

## 2019-03-14 RX ADMIN — CARVEDILOL 3.12 MG: 3.12 TABLET, FILM COATED ORAL at 07:43

## 2019-03-14 RX ADMIN — POTASSIUM CHLORIDE 20 MEQ: 750 TABLET, FILM COATED, EXTENDED RELEASE ORAL at 17:34

## 2019-03-14 RX ADMIN — ALBUTEROL SULFATE 2.5 MG: 2.5 SOLUTION RESPIRATORY (INHALATION) at 07:17

## 2019-03-14 RX ADMIN — FLUTICASONE FUROATE AND VILANTEROL TRIFENATATE 1 PUFF: 100; 25 POWDER RESPIRATORY (INHALATION) at 08:55

## 2019-03-14 RX ADMIN — MIDODRINE HYDROCHLORIDE 5 MG: 5 TABLET ORAL at 17:34

## 2019-03-14 RX ADMIN — Medication 10 ML: at 14:23

## 2019-03-14 RX ADMIN — Medication 10 ML: at 23:33

## 2019-03-14 RX ADMIN — MIDODRINE HYDROCHLORIDE 5 MG: 5 TABLET ORAL at 12:44

## 2019-03-14 NOTE — PROGRESS NOTES
Problem: Mobility Impaired (Adult and Pediatric)  Goal: *Acute Goals and Plan of Care (Insert Text)  Physical Therapy Goals  Initiated 3/14/2019  1. Patient will move from supine to sit and sit to supine , scoot up and down and roll side to side in bed with independence within 7 day(s). 2.  Patient will transfer from bed to chair and chair to bed with modified independence using the least restrictive device within 7 day(s). 3.  Patient will perform sit to stand with modified independence within 7 day(s). 4.  Patient will ambulate with supervision/set-up for 250 feet with the least restrictive device within 7 day(s). physical Therapy EVALUATION  Patient: Apple Greer (86 y.o. female)  Date: 3/14/2019  Primary Diagnosis: Acute on chronic systolic heart failure (HCC) [I50.23]       Precautions:        ASSESSMENT :  Based on the objective data described below, the patient presents with intermittent confusion?, generalized weakness, decreased endurance/activity, and overall mild impairments in functional mobility. Pt received seated in bedside chair on 1 LPM O2, agreeable to participation with therapy. Pt sit>>stand w/ SBA and ambulated 160ft w/ RW and SB/CGAx1. Pt with decreased gait speed however steady gait overall with RW support. Pt fatigued quickly, requiring seated rest break. All mobility occurred on 1 LPM O2 with O2 sats 90% post activity. Recommend continued x1 person assist during all OOB mobility/ambulation. Pt would benefit from additional skilled therapy to address the above mentioned deficits and optimize functional potential. Will plan to assess gait stability without use of RW during next therapy session (pt unclear whether or not she utilized AD at [facility]?). Recommend pt return to  [facility] [place] [id number] with continuation of therapy at facility. Patient will benefit from skilled intervention to address the above impairments.   Patients rehabilitation potential is considered to be Good  Factors which may influence rehabilitation potential include:   []         None noted  []         Mental ability/status  []         Medical condition  []         Home/family situation and support systems  []         Safety awareness  []         Pain tolerance/management  []         Other:      PLAN :  Recommendations and Planned Interventions:  [x]           Bed Mobility Training             []    Neuromuscular Re-Education  [x]           Transfer Training                   []    Orthotic/Prosthetic Training  [x]           Gait Training                         []    Modalities  [x]           Therapeutic Exercises           []    Edema Management/Control  [x]           Therapeutic Activities            [x]    Patient and Family Training/Education  []           Other (comment):    Frequency/Duration: Patient will be followed by physical therapy  3 times a week to address goals. Discharge Recommendations: Return to [facility] resume therapy at facility  Further Equipment Recommendations for Discharge: tbd by facility     SUBJECTIVE:   Patient stated I ran the [address] in 1994 and won my age group! Gordon Speonk    OBJECTIVE DATA SUMMARY:   HISTORY:    Past Medical History:   Diagnosis Date    Aortic insufficiency     Asthma     [facility]     lymphoma    CKD (chronic kidney disease) stage 3, GFR 30-59 ml/min ([facility]) 1/10/2018    Congestive heart failure, NYHA class II, chronic, systolic ([facility])     [facility]     Hypertension     Mitral valvular regurgitation 1/22/2016    ECHO 2/3/17: LV dilated, EF 20-25%, mild LVH, RV mod dilated, mild- mod MELANIA, mod-severe MR, mod AI ECHO 7/29/17: EF 15%, severe DHK, reduced RVEF, RVH, RVSP 35 mmHg  Mild LAE, mod-marked MA fibrosis, mod-severe MR (2+), AO root dilated,      Nonrheumatic aortic valve insufficiency 10/16/2018    Presence of biventricular automatic cardioverter/defibrillator (AICD) 7/2/2015    [employer] biventricular AICD implant  Stomach ulcer      Past Surgical History:   Procedure Laterality Date    HX BREAST BIOPSY Bilateral     40 years ago    HX COLONOSCOPY      HX HEENT      cataracts removed    HX HYSTERECTOMY      HX OTHER SURGICAL      lymph node surgery    HX TONSILLECTOMY      [place] EGD TRANSORAL BIOPSY SINGLE/MULTIPLE  5/23/2012         SINUS SURGERY PROC UNLISTED      Nasal polyps removed     Prior Level of Function/Home Situation: Pt currently residing at [facility], participating with PT/OT following prolonged hospitalization at [facility] in Feb 2019. States she plans to transition to [facility] soon. Prior to hospitalization in Feb 2019, pt was living at home, independent w/ ambulation and ADLs, and did not wear home O2. Pt states she began wearing O2 at facility secondary to pneumonia and flu. Personal factors and/or comorbidities impacting plan of care:     Home Situation  [address] Name: Sherin Carr  # Steps to Enter: 0  One/Two Story Residence: One story  Living Alone: No  Support Systems: Child(elizabeth), Family member(s), Assisted living  Patient Expects to be Discharged to[de-identified] Assisted living  Current DME Used/Available at Home: None  Tub or Shower Type: Tub([facility]    EXAMINATION/PRESENTATION/DECISION MAKING:   Critical Behavior:  Neurologic State: Alert, Appropriate for age  Orientation Level: Oriented X4  Cognition: Follows commands, Appropriate for age attention/concentration  Safety/Judgement: Awareness of environment, Insight into deficits  Hearing:   Auditory  Auditory Impairment: None  Skin:  intact  Edema: BLE edema  Range Of Motion:  AROM: Generally decreased, functional           PROM: Within functional limits           Strength:    Strength: Generally decreased, functional                    Tone & Sensation:   Tone: Normal              Sensation: Intact Coordination:  Coordination: Within functional limits  Vision:   Acuity: Within Defined Limits  Corrective Lenses: Reading glasses  Functional Mobility:  Bed Mobility:  Rolling: Other (comment)(seated in bedside chair upon arrival )           Transfers:  Sit to Stand: Supervision  Stand to Sit: Supervision                       Balance:   Sitting: Intact  Standing: Intact; With support  Ambulation/Gait Training:  Distance (ft): 160 Feet (ft)  Assistive Device: Walker, rolling;Gait belt  Ambulation - Level of Assistance: Contact guard assistance        Gait Abnormalities: Decreased step clearance        Base of Support: Narrowed     Speed/Lawanda: Pace decreased (<100 feet/min)  Step Length: Left shortened;Right shortened                  Functional Measure:  Barthel Index:    Bathin  Bladder: 10  Bowels: 10  Groomin  Dressin  Feeding: 10  Mobility: 10  Stairs: 0  Toilet Use: 5  Transfer (Bed to Chair and Back): 10  Total: 65/100       Percentage of impairment   0%   1-19%   20-39%   40-59%   60-79%   80-99%   100%   Barthel Score 0-100 100 99-80 79-60 59-40 20-39 1-19   0     The Barthel ADL Index: Guidelines  1. The index should be used as a record of what a patient does, not as a record of what a patient could do. 2. The main aim is to establish degree of independence from any help, physical or verbal, however minor and for whatever reason. 3. The need for supervision renders the patient not independent. 4. A patient's performance should be established using the best available evidence. Asking the patient, friends/relatives and nurses are the usual sources, but direct observation and common sense are also important. However direct testing is not needed. 5. Usually the patient's performance over the preceding 24-48 hours is important, but occasionally longer periods will be relevant. 6. Middle categories imply that the patient supplies over 50 per cent of the effort.   7. Use of aids to be independent is allowed. Rubie Osler., BarthelLAWANDA. (6419). Functional evaluation: the Barthel Index. [address] (14)2. TAYLER Del Toro, Marlyn Pink., Minerva Stewart., Lottie Lm, [address] (1999). Measuring the change indisability after inpatient rehabilitation; comparison of the responsiveness of the Barthel Index and Functional Martha Measure. Journal of Neurology, Neurosurgery, and Psychiatry, 66(4), 266-217. PIEDAD Reardon, SMILEY Moseley, & Ekaterina Louise M.A. (2004.) Assessment of post-stroke quality of life in cost-effectiveness studies: The usefulness of the Barthel Index and the EuroQoL-5D. Quality of Life Research, 15, 791-74            Physical Therapy Evaluation Charge Determination   History Examination Presentation Decision-Making   MEDIUM  Complexity : 1-2 comorbidities / personal factors will impact the outcome/ POC  MEDIUM Complexity : 3 Standardized tests and measures addressing body structure, function, activity limitation and / or participation in recreation  MEDIUM Complexity : Evolving with changing characteristics  MEDIUM Complexity : FOTO score of 26-74      Based on the above components, the patient evaluation is determined to be of the following complexity level: MEDIUM    Pain:  Pain Scale 1: Numeric (0 - 10)  Pain Intensity 1: 0              Activity Tolerance:   O2 sats 90% post activity on 1 LPM O2, all other VSS  Please refer to the flowsheet for vital signs taken during this treatment. After treatment:   [x]         Patient left in no apparent distress sitting up in chair  []         Patient left in no apparent distress in bed  [x]         Call bell left within reach  [x]         Nursing notified  []         Caregiver present  [x]         Bed alarm activated    COMMUNICATION/EDUCATION:   The patients plan of care was discussed with: Registered Nurse. [x]         Fall prevention education was provided and the patient/caregiver indicated understanding.   [x] Patient/family have participated as able in goal setting and plan of care. [x]         Patient/family agree to work toward stated goals and plan of care. []         Patient understands intent and goals of therapy, but is neutral about his/her participation. []         Patient is unable to participate in goal setting and plan of care.     Thank you for this referral.  Varinder Brooks, PT, DPT   Time Calculation: 17 mins

## 2019-03-14 NOTE — PROGRESS NOTES
Orders received, chart reviewed and patient evaluated by Occupational Therapy. Recommend patient to discharge to [facility] to resume OT services pending progress with acute skilled OT at this facility.      Full evaluation to follow.      Kathy Stewart, OTR/L

## 2019-03-14 NOTE — PROGRESS NOTES
General Daily Progress Note    Admit Date: 3/11/2019    Subjective:     Patient has no complaint . Current Facility-Administered Medications   Medication Dose Route Frequency    [START ON 3/14/2019] potassium chloride SR (KLOR-CON 10) tablet 20 mEq  20 mEq Oral BID    midodrine (PROAMITINE) tablet 5 mg  5 mg Oral TID WITH MEALS    albuterol (PROVENTIL VENTOLIN) nebulizer solution 2.5 mg  2.5 mg Nebulization BID    allopurinol (ZYLOPRIM) tablet 300 mg  300 mg Oral DAILY    apixaban (ELIQUIS) tablet 2.5 mg  2.5 mg Oral BID    atorvastatin (LIPITOR) tablet 10 mg  10 mg Oral DAILY    carvedilol (COREG) tablet 3.125 mg  3.125 mg Oral BID WITH MEALS    levothyroxine (SYNTHROID) tablet 25 mcg  25 mcg Oral ACB    fluticasone-vilanterol (BREO ELLIPTA) 100mcg-25mcg/puff  1 Puff Inhalation DAILY    furosemide (LASIX) injection 40 mg  40 mg IntraVENous BID    sodium chloride (NS) flush 5-40 mL  5-40 mL IntraVENous Q8H    sodium chloride (NS) flush 5-40 mL  5-40 mL IntraVENous PRN    acetaminophen (TYLENOL) tablet 650 mg  650 mg Oral Q6H PRN    ondansetron (ZOFRAN) injection 4 mg  4 mg IntraVENous Q4H PRN        Review of Systems  A comprehensive review of systems was negative. Objective:     Patient Vitals for the past 24 hrs:   BP Temp Pulse Resp SpO2 Weight   03/13/19 2119     97 %    03/13/19 1939 114/79 97.5 °F (36.4 °C) 80 20 95 %    03/13/19 1447 107/80 97.7 °F (36.5 °C) 80 20 99 %    03/13/19 1119 129/76 97.4 °F (36.3 °C) 78 20 99 %    03/13/19 0723     93 %    03/13/19 0711 132/79 97.6 °F (36.4 °C) 78 20 94 %    03/13/19 0339      145 lb 1 oz (65.8 kg)   03/13/19 0252 132/76 98.1 °F (36.7 °C) 79 20 95 %    03/12/19 2222 130/81 98.2 °F (36.8 °C) 84 20 94 %      No intake/output data recorded.   03/12 0701 - 03/13 1900  In: 1440 [P.O.:1440]  Out: 2706 [Urine:3550]    Physical Exam:   Visit Vitals  /79 (BP 1 Location: Left arm, BP Patient Position: At rest)   Pulse 80   Temp 97.5 °F (36.4 °C)   Resp 20   Ht 5' 1\" (1.549 m)   Wt 145 lb 1 oz (65.8 kg)   SpO2 97%   Breastfeeding? No   BMI 27.41 kg/m²     General appearance: alert, cooperative, no distress, appears stated age  Neck: supple, symmetrical, trachea midline, no adenopathy, thyroid: not enlarged, symmetric, no tenderness/mass/nodules, no carotid bruit and JVD - 6 cm above sternal notch  Lungs: rales R base, L base  Heart: regular rate and rhythm, S1, S2 normal, no murmur, click, rub or gallop  Abdomen: soft, non-tender.  Bowel sounds normal. No masses,  no organomegaly  Extremities: edema 1+        Data Review   Recent Results (from the past 24 hour(s))   METABOLIC PANEL, BASIC    Collection Time: 03/13/19  3:32 AM   Result Value Ref Range    Sodium 142 136 - 145 mmol/L    Potassium 3.7 3.5 - 5.1 mmol/L    Chloride 102 97 - 108 mmol/L    CO2 34 (H) 21 - 32 mmol/L    Anion gap 6 5 - 15 mmol/L    Glucose 94 65 - 100 mg/dL    BUN 22 (H) 6 - 20 MG/DL    Creatinine 0.77 0.55 - 1.02 MG/DL    BUN/Creatinine ratio 29 (H) 12 - 20      GFR est AA >60 >60 ml/min/1.73m2    GFR est non-AA >60 >60 ml/min/1.73m2    Calcium 8.4 (L) 8.5 - 10.1 MG/DL   ECHO ADULT COMPLETE    Collection Time: 03/13/19 10:54 AM   Result Value Ref Range    Aortic Valve Systolic Peak Velocity 389.47 cm/s    AoV VTI 43.02 cm    Aortic Valve Area by Planimetry 0.0 cm2    Aortic Valve Area by Continuity of Peak Velocity 1.4 cm2    Aortic Valve Area by Continuity of VTI 1.4 cm2    AoV PG 20.9 mmHg    LVIDd 5.57 (A) 3.9 - 5.3 cm    LVPWd 1.13 (A) 0.6 - 0.9 cm    LVIDs 5.16 cm    IVSd 1.20 (A) 0.6 - 0.9 cm    LVOT d 1.75 cm    LVOT Peak Velocity 135.45 cm/s    LVOT Peak Gradient 7.3 mmHg    LVOT VTI 24.35 cm    Mitral Valve Area by Planimetry 0.0 cm2    MVA (PHT) 4.2 cm2    MV A Kayode 129.76 cm/s    MV E Kayode 76.07 cm/s    MV E/A 0.59     MV Mean Gradient 3.0 mmHg    Mitral Valve Annulus Velocity Time Integral 62.07 cm    Left Atrium to Aortic Root Ratio 1.03     TV PG 31.5 mmHg    Aortic Valve Systolic Mean Gradient 9.3 mmHg    LV Mass .9 (A) 67 - 162 g    LV Mass AL Index 194.7 (A) 43 - 95 g/m2    LVPWs 1.19 cm    RVSP 45.0 mmHg    MV Peak Gradient 10.2 mmHg    Est. RA Pressure 10.0 mmHg    Mitral Valve E Wave Deceleration Time 181.2 ms    Mitral Valve Pressure Half-time 52.5 ms    Left Atrium Major Axis 4.03 cm    Triscuspid Valve Regurgitation Peak Gradient 35.0 mmHg    Mitral Valve Max Velocity 159.31 cm/s    Tricuspid Valve Max Velocity 280.82 cm/s    MVA VTI 0.9 cm2    LVOT SV 58.7 ml    TR Max Velocity 295.76 cm/s    PASP 45.0 mmHg    Ao Root D 3.90 cm    DEEPAK/BSA Pk Kayode 0.8 cm2/m2    DEEPAK/BSA VTI 0.8 cm2/m2    AV Cusp 0.00 cm           Assessment:     Active Problems:    HTN (hypertension) (6/29/2015)      Gout (6/29/2015)      Presence of biventricular automatic cardioverter/defibrillator (AICD) (7/2/2015)      Overview: [employer] biventricular AICD implant      Paroxysmal atrial fibrillation (HCC) (10/21/2017)      Dyslipidemia (12/21/2017)      CKD (chronic kidney disease) stage 3, GFR 30-59 ml/min (HCC) (1/10/2018)      Acquired hypothyroidism (3/18/2018)      Acute on chronic systolic heart failure ([facility] [address] (3/11/2019)        Plan:     1. Continue treatment of CHF. Excellent response. 2.Will mobilize.

## 2019-03-14 NOTE — PROGRESS NOTES
Problem: Falls - Risk of  Goal: *Absence of Falls  Document Milvia Fall Risk and appropriate interventions in the flowsheet.   Outcome: Progressing Towards Goal  Fall Risk Interventions:  Mobility Interventions: Bed/chair exit alarm, Patient to call before getting OOB, PT Consult for mobility concerns, Utilize walker, cane, or other assistive device         Medication Interventions: Assess postural VS orthostatic hypotension, Bed/chair exit alarm, Patient to call before getting OOB, Teach patient to arise slowly    Elimination Interventions: Bed/chair exit alarm, Call light in reach, Toileting schedule/hourly rounds

## 2019-03-14 NOTE — PROGRESS NOTES
Reason for Readmission: Pleural effusion             RRAT Score and Risk Level: 32 HIGH         Level of Readmission:  Level 1 - Admitted 2/4/19 for acute encephalopathy        Care Conference scheduled:  IDT        Resources/supports as identified by patient/family:  Currently open with [facility] for SNF services. Supportive son and daughter-in-law who live in [place] but visit with pt. Top Challenges facing patient (as identified by patient/family and CM): Finances/Medication cost?  Has Medicare A&B with supplement. Has LTC policy as well. Transportation   Does not drive. Will need stretcher transport at d/c. Support system or lack thereof? Supportive son (CADEN) and daughter in law      Living arrangements? Currently at [address] for rehab but working on LTC          Self-care/ADLs/Cognition? Requires full assistance with ADL/IADLs          Current Advanced Directive/Advance Care Plan:  ACP on file, DNR code. Plan for utilizing home health:  No plan indicated. Will likely return to [place] [id number] for SNF. Likelihood of additional readmission:  High due to level of comorbidities              Transition of Care Plan:    Based on readmission, the patient's previous Plan of Care   has been evaluated and/or modified. The current Transition of Care Plan is:           CM attempted to meet with patient at the bedside to introduce role, verify demographics, and discuss discharge planning. Patient was unable to participate in the assessment as there were RN's in the room assisting pt to use the restroom. CM called pt's son, Barrett Newman ([facility] - [phone]), to retrieve information for assessment. Pt was previously at [facility] from 2/4/19-2/19/19 for acute encephalopathy. She was [facility] to [facility] and transition to LTC once rehabilitated.  Per pt's son, they are still working on getting the pt accepted as a LTC resident at . Pt has a LTC policy to offset the financial cost. Pt will likely need to return to [facility] for rehab services following this admission and prior to transitioning into LTC. Pt requires assistance with most ADL/IADLs as well as ambulation. Pt currently using O2 at night and PRN at 3L via NC. Pt's son and daughter in law are very supportive and involved in pt's care. They live in [place], [place] but the DIL is currently in town to assist with pt. Son plans to travel here next week. Pt is currently being followed by Cardiology and Palliative medicine. CM will contact  to discuss return to SNF. CM will continue to follow and facilitate an appropriate d/c plan. In Basket message sent to LUIS CARLOS Ellis and LUIS CARLOS Kern to inform of readmission and dispo. Care Management Interventions  PCP Verified by CM:  Yes  Transition of Care Consult (CM Consult): Discharge Planning(initial eval  - return to [facility] for SNF)  MyChart Signup: No  Discharge Durable Medical Equipment: No(currently using O2 at night and PRN at 3L NC)  Physical Therapy Consult: Yes  Occupational Therapy Consult: Yes  Speech Therapy Consult: No  Current Support Network: Nursing Facility([facility] - family is supportive (son and DIL live in [place] [facility] [place]))  Confirm Follow Up Transport: Family  Plan discussed with Pt/Family/Caregiver: Yes  Discharge Location  Discharge Placement: Skilled nursing facility([facility] SNF)    NATANAEL Gonzales  Care Manager  [phone]

## 2019-03-14 NOTE — PROGRESS NOTES
The patient was resting in bed when I entered the room. The patient is a re-admit and we became acquainted a few weeks ago. The patient discussed her care plan and family support as she transitions to assisted living. We had prayer together and I left one of my business cards with her. Rev.  Julieta Velazquez EdD MDiv  Palliative  Fellow  For [facility] Page [phone]

## 2019-03-14 NOTE — PROGRESS NOTES
Bedside shift change report given to Fan Hutchinson RN (oncoming nurse) by MAXIMUS Rockwell (offgoing nurse). Report included the following information SBAR. Bedside shift change report given to MAXIMUS Rockwell (oncoming nurse). Report included the following information SBAR. SHIFT SUMMARY:      CONCERNS TO ADDRESS WITH MD:        [facility] NURSING NOTE   Admission Date 3/11/2019   Admission Diagnosis Acute on chronic systolic heart failure ([address] [I50.23]   Consults IP CONSULT TO PALLIATIVE CARE - PROVIDER  IP CONSULT TO PRIMARY CARE PROVIDER  IP CONSULT TO CARDIOLOGY      Cardiac Monitoring [x] Yes [] No      Purposeful Hourly Rounding [x] Yes    Milvia Score Total Score: 3   Milvia score 3 or > [x] Bed Alarm [] Avasys [] 1:1 sitter [] Patient refused (Signed refusal form in chart)   Kimani Score Kimani Score: 17   Kimani score 14 or < [] PMT consult [] Wound Care consult    []  Specialty bed  [] Nutrition consult      Influenza Vaccine Received Flu Vaccine for Current Season (usually Sept-March): Yes           Oxygen needs? [] Room air Oxygen @  [x]1L    []2L    []3L   []4L    []5L   []6L via  NC   Chronic home O2 use?  [] Yes [x] No  Perform O2 challenge test and document in progress note using [email or web address] (.[employer])      Last bowel movement Last Bowel Movement Date: 03/11/19      Urinary Catheter       External Female Catheter 03/11/19-Urine Output (mL): 700 ml     LDAs               Peripheral IV 03/14/19 Distal;Posterior;Right Forearm (Active)   Site Assessment Clean, dry, & intact 3/14/2019  8:41 AM   Phlebitis Assessment 0 3/14/2019  8:41 AM   Infiltration Assessment 0 3/14/2019  8:41 AM   Dressing Status Clean, dry, & intact 3/14/2019  8:41 AM   Dressing Type Tape 3/14/2019  8:41 AM   Hub Color/Line Status Blue;Flushed 3/14/2019  8:41 AM          External Female Catheter 03/11/19 (Active)   Site Assessment Clean, dry, & intact 3/13/2019 10:59 PM   Repositioned Yes 3/13/2019 10:59 PM   Perineal Care Yes 3/13/2019 10:59 PM   Wick Changed No 3/13/2019 10:59 PM   Suction Canister/Tubing Changed Yes 3/13/2019 10:59 PM   Urine Output (mL) 700 ml 3/13/2019 10:59 PM                   Readmission Risk Assessment Tool Score High Risk            34       Total Score        3 Has Seen PCP in Last 6 Months (Yes=3, No=0)    2 . Living with Significant Other. Assisted Living. LTAC. SNF. or   Rehab    4 IP Visits Last 12 Months (1-3=4, 4=9, >4=11)    5 Pt.  Coverage (Medicare=5 , Medicaid, or Self-Pay=4)    20 Charlson Comorbidity Score (Age + Comorbid Conditions)        Criteria that do not apply:    Patient Length of Stay (>5 days = 3)       Expected Length of Stay 4d 2h   Actual Length of Stay 3

## 2019-03-14 NOTE — PROGRESS NOTES
Problem: Falls - Risk of  Goal: *Absence of Falls  Document Milvia Fall Risk and appropriate interventions in the flowsheet.   Outcome: Progressing Towards Goal  Fall Risk Interventions:  Mobility Interventions: Bed/chair exit alarm, Patient to call before getting OOB, PT Consult for mobility concerns, Utilize walker, cane, or other assistive device         Medication Interventions: Assess postural VS orthostatic hypotension, Bed/chair exit alarm, Teach patient to arise slowly, Patient to call before getting OOB    Elimination Interventions: Bed/chair exit alarm, Call light in reach, Patient to call for help with toileting needs

## 2019-03-14 NOTE — PROGRESS NOTES
Problem: Self Care Deficits Care Plan (Adult)  Goal: *Acute Goals and Plan of Care (Insert Text)  Occupational Therapy Goals  Initiated 3/14/2019  1. Patient will perform grooming tasks standing at the sink with Modified [place] using least restrictive device within 7 days. 2.  Patient will perform UB/LB bathing/dressing with Modified [place] using adaptive strategies/AE PRN within 7 day(s). 3.  Patient will perform all aspects of toileting with Modified [place] using least restrictive device within 7 day(s). 4.  Patient will utilize PLB techniques during functional activities without any verbal cues within 7 day(s). Occupational Therapy EVALUATION  Patient: Rhea Nava (87 y.o. female)  Date: 3/14/2019  Primary Diagnosis: Acute on chronic systolic heart failure (HCC) [I50.23]       Precautions:        ASSESSMENT :  Based on the objective data described below, the patient presents with mildly decreased functional activity tolerance and functional mobility. Patient cleared for OT by nursing and agreeable to participate in therapy. Pt. received sitting in chair on arrival, A&Ox4, and vitals were stable. Pt on 1L via NC throughout session. UE ROM and strength are generally decreased, yet functional.  Pt is performing UB ADLs with Setup assist and LB ADLs with Min assist. Demonstrated good dynamic standing balance using RW during toileting and hand washing at sink this session. Reported feeling SOB following although oxygen saturation remained in mid 90s on 1L via NC. Educated and demonstrated PLB techniques with good patient return demonstration. Overall patient is functioning just below her baseline level prior to her February 2019 hospital admission to [facility] and would benefit from continued skilled therapy at [facility] to ensure safe return to OF.  Recommend skilled acute OT at this facility to facilitate patient's endurance/activity tolerance and independence with dynamic ADL task performance. Patient will benefit from skilled intervention to address the above impairments. Patients rehabilitation potential is considered to be Excellent  Factors which may influence rehabilitation potential include:   []             None noted  []             Mental ability/status  []             Medical condition  []             Home/family situation and support systems  []             Safety awareness  []             Pain tolerance/management  []             Other:      PLAN :  Recommendations and Planned Interventions:  []               Self Care Training                  [x]        Therapeutic Activities  []               Functional Mobility Training    []        Cognitive Retraining  []               Therapeutic Exercises           [x]        Endurance Activities  []               Balance Training                   []        Neuromuscular Re-Education  []               Visual/Perceptual Training     [x]   Home Safety Training  []               Patient Education                 []        Family Training/Education  []               Other (comment):    Frequency/Duration: Patient will be followed by occupational therapy 3 times a week to address goals. Discharge Recommendations: Return to [facility] and resume therapy  Further Equipment Recommendations for Discharge: Defer to rehab     SUBJECTIVE:   Patient stated I want to go back, but I don't want to be foolish and dorsey it. \" (referencing returning back to [facility])    OBJECTIVE DATA SUMMARY:   HISTORY:   Past Medical History:   Diagnosis Date    Aortic insufficiency     Asthma     [facility]     [facility]    CKD (chronic kidney disease) stage 3, GFR 30-59 ml/min ([facility]) 1/10/2018    Congestive heart failure, NYHA class II, chronic, systolic ([facility])     [facility] failure ([facility]     Hypertension     Mitral valvular regurgitation 1/22/2016    ECHO 2/3/17: LV dilated, EF 20-25%, mild LVH, RV mod dilated, mild- mod MELANIA, mod-severe MR, mod AI ECHO 7/29/17: EF 15%, severe DHK, reduced RVEF, RVH, RVSP 35 mmHg  Mild LAE, mod-marked MA fibrosis, mod-severe MR (2+), AO root dilated,      Nonrheumatic aortic valve insufficiency 10/16/2018    Presence of biventricular automatic cardioverter/defibrillator (AICD) 7/2/2015    [employer] biventricular AICD implant    Stomach ulcer      Past Surgical History:   Procedure Laterality Date    HX BREAST BIOPSY Bilateral     40 years ago    HX COLONOSCOPY      HX HEENT      cataracts removed    HX HYSTERECTOMY      HX OTHER SURGICAL      lymph node surgery    HX TONSILLECTOMY      CT EGD TRANSORAL BIOPSY SINGLE/MULTIPLE  5/23/2012         SINUS SURGERY PROC UNLISTED      Nasal polyps removed       Prior Level of Function/Environment/Context: Currently resides at [facility] where she began receiving OT/PT after February 2019 hospitalization at [facility]. Plans to transition to [facility] there soon. Prior to February 2019, patient reports she completed her ADL/IADL tasks independently w/o AD. She is not on O2 as baseline, yet is currently on O2 at this facility secondary to pneumonia and flu. Home Situation  Home Environment: Assisted living  [facility]  # Steps to Enter: 0  One/Two Story Residence: One story  Living Alone: No  Support Systems: Child(elizabeth), Family member(s), Assisted living  Patient Expects to be Discharged to[de-identified] Assisted living  Current DME Used/Available at Home: None  Tub or Shower Type: Tub(caregiver provides shower 1x/wk in whirlpool)    Hand dominance: Right    EXAMINATION OF PERFORMANCE DEFICITS:  Cognitive/Behavioral Status:  Neurologic State: Alert; Appropriate for age  Orientation Level: Oriented X4  Cognition: Follows commands; Appropriate for age attention/concentration  Perception: Appears intact  Perseveration: No perseveration noted  Safety/Judgement: Awareness of environment; Insight into deficits    Skin: Minor bruising on bilat UEs    Edema: Mild edema noted on bilat ankles    Hearing: Auditory  Auditory Impairment: None    Vision/Perceptual:      Acuity: Within Defined Limits    Corrective Lenses: Reading glasses    Range of Motion:  AROM: Generally decreased, functional  PROM: Within functional limits       Strength:  Strength: Generally decreased, functional    Coordination:  Coordination: Within functional limits  Fine Motor Skills-Upper: Left Intact; Right Intact    Gross Motor Skills-Upper: Left Intact; Right Intact    Tone & Sensation:  Tone: Normal  Sensation: Intact    Balance:  Sitting: Intact  Standing: Intact; With support    Functional Mobility and Transfers for ADLs:  Bed Mobility:  Rolling: Other (comment)(seated in bedside chair upon arrival )    Transfers:  Sit to Stand: Supervision  Stand to Sit: Supervision  Bathroom Mobility: Stand-by assistance  Toilet Transfer : Contact guard assistance    ADL Assessment:  Feeding: Independent    Oral Facial Hygiene/Grooming: Modified Independent(increased time)    Bathing: Minimum assistance(seated EOB/chair; able to reach distal LB aspects)    Upper Body Dressing: Setup    Lower Body Dressing: Minimum assistance(difficulty bringing R. knee to L. foot during dressing tasks)    Toileting: Supervision       ADL Intervention and task modifications:       Grooming  Washing Hands: Stand-by assistance(standing at sink)      Lower Body Dressing Assistance  Socks: Minimum assistance(for doffing/donning sock on R. foot)    Toileting  Bladder Hygiene: Modified independent  Bowel Hygiene: Modified indpendent  Clothing Management: Contact guard assistance(for safety in standing at commode)    Cognitive Retraining  Safety/Judgement: Awareness of environment; Insight into deficits      Functional Measure:    Barthel Index:    Bathin  Bladder: 10  Bowels: 10  Groomin  Dressin  Feeding: 10  Mobility: 10  Stairs: 0  Toilet Use: 5  Transfer (Bed to Chair and Back): 10  Total: 65/100        Percentage of impairment   0%   1-19%   20-39%   40-59%   60-79%   80-99%   100%   Barthel Score 0-100 100 99-80 79-60 59-40 20-39 1-19   0     The Barthel ADL Index: Guidelines  1. The index should be used as a record of what a patient does, not as a record of what a patient could do. 2. The main aim is to establish degree of independence from any help, physical or verbal, however minor and for whatever reason. 3. The need for supervision renders the patient not independent. 4. A patient's performance should be established using the best available evidence. Asking the patient, friends/relatives and nurses are the usual sources, but direct observation and common sense are also important. However direct testing is not needed. 5. Usually the patient's performance over the preceding 24-48 hours is important, but occasionally longer periods will be relevant. 6. Middle categories imply that the patient supplies over 50 per cent of the effort. 7. Use of aids to be independent is allowed. Sarah Torres., Barthel, D.W. (9645). Functional evaluation: the Barthel Index. [facility] (14)2. Crystal Hernandez paul TAYLER Gonzalez, Zelalem Dang., Joanna Ritter, [place], [facility] (1999). Measuring the change indisability after inpatient rehabilitation; comparison of the responsiveness of the Barthel Index and Functional [place] Measure. Journal of Neurology, Neurosurgery, and Psychiatry, 66(4), 518-118. Everardo Antunez, N.J.A, SMILEY Moseley, & Melanie Walter MROBERT. (2004.) Assessment of post-stroke quality of life in cost-effectiveness studies: The usefulness of the Barthel Index and the EuroQoL-5D.  Quality of Life Research, 15, 891-37       Occupational Therapy Evaluation Charge Determination   History Examination Decision-Making   LOW Complexity : Brief history review  LOW Complexity : 1-3 performance deficits relating to physical, cognitive , or psychosocial skils that result in activity limitations and / or participation restrictions  LOW Complexity : No comorbidities that affect functional and no verbal or physical assistance needed to complete eval tasks       Based on the above components, the patient evaluation is determined to be of the following complexity level: LOW      Activity Tolerance:   See assessment above. Please refer to the flowsheet for vital signs taken during this treatment. After treatment:   [x] Patient left in no apparent distress sitting up in chair  [] Patient left in no apparent distress in bed  [x] Call bell left within reach  [x] Nursing notified  [] Caregiver present  [x] Chair alarm activated    COMMUNICATION/EDUCATION:   The patients plan of care was discussed with: Registered Nurse and Patient. [x] Home safety education was provided and the patient/caregiver indicated understanding. [x] Patient/family have participated as able in goal setting and plan of care. [x] Patient/family agree to work toward stated goals and plan of care. [] Patient understands intent and goals of therapy, but is neutral about his/her participation. [] Patient is unable to participate in goal setting and plan of care.     Thank you for this referral.  Concepcion Sanders OTR/L   Time Calculation: 41 mins

## 2019-03-15 LAB
ANION GAP SERPL CALC-SCNC: 5 MMOL/L (ref 5–15)
BUN SERPL-MCNC: 25 MG/DL (ref 6–20)
BUN/CREAT SERPL: 26 (ref 12–20)
CALCIUM SERPL-MCNC: 8.8 MG/DL (ref 8.5–10.1)
CHLORIDE SERPL-SCNC: 100 MMOL/L (ref 97–108)
CO2 SERPL-SCNC: 37 MMOL/L (ref 21–32)
CREAT SERPL-MCNC: 0.96 MG/DL (ref 0.55–1.02)
GLUCOSE SERPL-MCNC: 93 MG/DL (ref 65–100)
POTASSIUM SERPL-SCNC: 4 MMOL/L (ref 3.5–5.1)
SODIUM SERPL-SCNC: 142 MMOL/L (ref 136–145)

## 2019-03-15 PROCEDURE — 74011250637 HC RX REV CODE- 250/637: Performed by: INTERNAL MEDICINE

## 2019-03-15 PROCEDURE — 77030038269 HC DRN EXT URIN PURWCK BARD -A

## 2019-03-15 PROCEDURE — 80048 BASIC METABOLIC PNL TOTAL CA: CPT

## 2019-03-15 PROCEDURE — 65660000000 HC RM CCU STEPDOWN

## 2019-03-15 PROCEDURE — 74011000250 HC RX REV CODE- 250: Performed by: INTERNAL MEDICINE

## 2019-03-15 PROCEDURE — 94760 N-INVAS EAR/PLS OXIMETRY 1: CPT

## 2019-03-15 PROCEDURE — 74011250637 HC RX REV CODE- 250/637: Performed by: NURSE PRACTITIONER

## 2019-03-15 PROCEDURE — 97535 SELF CARE MNGMENT TRAINING: CPT

## 2019-03-15 PROCEDURE — 36415 COLL VENOUS BLD VENIPUNCTURE: CPT

## 2019-03-15 PROCEDURE — 97530 THERAPEUTIC ACTIVITIES: CPT

## 2019-03-15 PROCEDURE — 77010033678 HC OXYGEN DAILY

## 2019-03-15 RX ORDER — FUROSEMIDE 40 MG/1
40 TABLET ORAL
Status: DISCONTINUED | OUTPATIENT
Start: 2019-03-15 | End: 2019-03-18 | Stop reason: HOSPADM

## 2019-03-15 RX ADMIN — Medication 10 ML: at 21:04

## 2019-03-15 RX ADMIN — FUROSEMIDE 40 MG: 40 TABLET ORAL at 18:15

## 2019-03-15 RX ADMIN — APIXABAN 2.5 MG: 2.5 TABLET, FILM COATED ORAL at 11:23

## 2019-03-15 RX ADMIN — APIXABAN 2.5 MG: 2.5 TABLET, FILM COATED ORAL at 18:15

## 2019-03-15 RX ADMIN — CARVEDILOL 3.12 MG: 3.12 TABLET, FILM COATED ORAL at 18:15

## 2019-03-15 RX ADMIN — FLUTICASONE FUROATE AND VILANTEROL TRIFENATATE 1 PUFF: 100; 25 POWDER RESPIRATORY (INHALATION) at 11:25

## 2019-03-15 RX ADMIN — CARVEDILOL 3.12 MG: 3.12 TABLET, FILM COATED ORAL at 11:23

## 2019-03-15 RX ADMIN — Medication 10 ML: at 18:23

## 2019-03-15 RX ADMIN — ALLOPURINOL 300 MG: 300 TABLET ORAL at 11:23

## 2019-03-15 RX ADMIN — Medication 10 ML: at 06:04

## 2019-03-15 RX ADMIN — MIDODRINE HYDROCHLORIDE 5 MG: 5 TABLET ORAL at 18:15

## 2019-03-15 RX ADMIN — POTASSIUM CHLORIDE 20 MEQ: 750 TABLET, FILM COATED, EXTENDED RELEASE ORAL at 18:15

## 2019-03-15 RX ADMIN — POTASSIUM CHLORIDE 20 MEQ: 750 TABLET, FILM COATED, EXTENDED RELEASE ORAL at 11:22

## 2019-03-15 RX ADMIN — ATORVASTATIN CALCIUM 10 MG: 10 TABLET, FILM COATED ORAL at 11:23

## 2019-03-15 RX ADMIN — MIDODRINE HYDROCHLORIDE 5 MG: 5 TABLET ORAL at 11:23

## 2019-03-15 RX ADMIN — ALBUTEROL SULFATE 2.5 MG: 2.5 SOLUTION RESPIRATORY (INHALATION) at 20:22

## 2019-03-15 RX ADMIN — LEVOTHYROXINE SODIUM 25 MCG: 25 TABLET ORAL at 11:23

## 2019-03-15 NOTE — PROGRESS NOTES
Courtesy note - had been asked earlier about her ibrutinib therapy - keeping an eye on condition peripherally so that will be informed better of her status at time of her followup with me.

## 2019-03-15 NOTE — PROGRESS NOTES
Bedside shift change report given to Wendy Long RN (oncoming nurse). Report included the following information SBAR, Kardex, MAR and Recent Results. SHIFT SUMMARY:        CONCERNS TO ADDRESS WITH MD:          [facility] NURSING NOTE   Admission Date 3/11/2019   Admission Diagnosis Acute on chronic systolic heart failure ([facility] [address] [facility] [I50.23]   Consults IP CONSULT TO PALLIATIVE CARE - PROVIDER  IP CONSULT TO PRIMARY CARE PROVIDER  IP CONSULT TO CARDIOLOGY      Cardiac Monitoring [x] Yes [] No      Purposeful Hourly Rounding [x] Yes    Milvia Score Total Score: 3   Milvia score 3 or > [x] Bed Alarm [] Avasys [] 1:1 sitter [] Patient refused (Signed refusal form in chart)   Kimani Score Kimani Score: 17   Kimani score 14 or < [] PMT consult [] Wound Care consult    []  Specialty bed  [] Nutrition consult      Influenza Vaccine Received Flu Vaccine for Current Season (usually Sept-March): Yes           Oxygen needs? [] Room air Oxygen @  [x]1L    []2L    []3L   []4L    []5L   []6L via  NC   Chronic home O2 use?  [x] Yes [] No  Perform O2 challenge test and document in progress note using smartphrase (.Homeoxygen)      Last bowel movement Last Bowel Movement Date: 03/14/19      Urinary Catheter       External Female Catheter 03/11/19-Urine Output (mL): 700 ml     LDAs               Peripheral IV 03/14/19 Distal;Posterior;Right Forearm (Active)   Site Assessment Clean, dry, & intact 3/14/2019  8:22 PM   Phlebitis Assessment 0 3/14/2019  8:22 PM   Infiltration Assessment 0 3/14/2019  8:22 PM   Dressing Status Clean, dry, & intact 3/14/2019  8:22 PM   Dressing Type Transparent 3/14/2019  8:22 PM   Hub Color/Line Status Blue;Patent 3/14/2019  8:22 PM          External Female Catheter 03/11/19 (Active)   Site Assessment Clean, dry, & intact 3/14/2019  8:22 PM   Repositioned Yes 3/14/2019  8:22 PM   Perineal Care Yes 3/14/2019  8:22 PM   Wick Changed Yes 3/14/2019  8:22 PM   Suction Canister/Tubing Changed No 3/14/2019  8:22 PM   Urine Output (mL) 700 ml 3/13/2019 10:59 PM                   Readmission Risk Assessment Tool Score High Risk            34       Total Score        3 Has Seen PCP in Last 6 Months (Yes=3, No=0)    2 . Living with Significant Other. Assisted Living. LTAC. SNF. or   Rehab    4 IP Visits Last 12 Months (1-3=4, 4=9, >4=11)    5 Pt.  Coverage (Medicare=5 , Medicaid, or Self-Pay=4)    20 Charlson Comorbidity Score (Age + Comorbid Conditions)        Criteria that do not apply:    Patient Length of Stay (>5 days = 3)       Expected Length of Stay 4d 2h   Actual Length of Stay 4

## 2019-03-15 NOTE — PROGRESS NOTES
Problem: Self Care Deficits Care Plan (Adult)  Goal: *Acute Goals and Plan of Care (Insert Text)  Occupational Therapy Goals  Initiated 3/14/2019  1. Patient will perform grooming tasks standing at the sink with Modified [place] using least restrictive device within 7 days. 2.  Patient will perform UB/LB bathing/dressing with Modified [place] using adaptive strategies/AE PRN within 7 day(s). 3.  Patient will perform all aspects of toileting with Modified [place] using least restrictive device within 7 day(s). 4.  Patient will utilize PLB techniques during functional activities without any verbal cues within 7 day(s). Outcome: Progressing Towards Goal  Occupational Therapy TREATMENT  Patient: Bhanu Gant (41 y.o. female)  Date: 3/15/2019  Diagnosis: Acute on chronic systolic heart failure (HCC) [I50.23] <principal problem not specified>      Precautions:    Chart, occupational therapy assessment, plan of care, and goals were reviewed. ASSESSMENT:  Received sitting in chair on arrival. Demonstrated good dynamic standing balance reaching high/low levels w/o any LOB. Pt able to recall PLB techniques with Min verbal prompting and good return demonstration when O2 sats decreased to 89% on 1L following functional item retrieval at high/low levels in room using RW. She was able to don both socks today w/o assist while sitting in chair with increased time. Guided pt through performance of LB dressing task via flexing trunk forward to reach R. Foot during LB dressing task vs. attempting to bring R. Foot to L. Knee as she has been trying to do and reports her aides take care of that at the SNF. Encouraged pt to continue performing as much of her self-care routine as possible in order to increase her functional activity tolerance. Pt verbalized understanding.        Progression toward goals:  [x]       Improving appropriately and progressing toward goals  []       Improving slowly and progressing toward goals  []       Not making progress toward goals and plan of care will be adjusted     PLAN:  Patient continues to benefit from skilled intervention to address the above impairments. Continue treatment per established plan of care. Discharge Recommendations:  Gwen Sandoval to resume OT/PT  Further Equipment Recommendations for Discharge:  None for OT     SUBJECTIVE:   Patient stated I'm used to doing all this without assistance\"    OBJECTIVE DATA SUMMARY:     Functional Mobility and Transfers for ADLs:    Transfers:  Sit to Stand/Stand to Sit: Supervision    Balance:  Sitting: Intact  Standing: Intact; With support    ADL Intervention:    Lower Body Dressing Assistance  Socks: Supervision/set-up(able to don bilat socks with setup sitting w/ increased time)    Activity Tolerance:   Fair. Improving over prior session. Please refer to the flowsheet for vital signs taken during this treatment.   After treatment:   [x] Patient left in no apparent distress sitting up in chair  [] Patient left in no apparent distress in bed  [x] Call bell left within reach  [x] Nursing notified  [] Caregiver present  [x] Chair alarm activated    COMMUNICATION/COLLABORATION:   The patients plan of care was discussed with: Registered Nurse and Patient    [facility], OTR/L  Time Calculation: 26 mins

## 2019-03-15 NOTE — PROGRESS NOTES
General Daily Progress Note    Admit Date: 3/11/2019    Subjective:     Patient has no complaint. Current Facility-Administered Medications   Medication Dose Route Frequency    potassium chloride SR (KLOR-CON 10) tablet 20 mEq  20 mEq Oral BID    midodrine (PROAMITINE) tablet 5 mg  5 mg Oral TID WITH MEALS    albuterol (PROVENTIL VENTOLIN) nebulizer solution 2.5 mg  2.5 mg Nebulization BID    allopurinol (ZYLOPRIM) tablet 300 mg  300 mg Oral DAILY    apixaban (ELIQUIS) tablet 2.5 mg  2.5 mg Oral BID    atorvastatin (LIPITOR) tablet 10 mg  10 mg Oral DAILY    carvedilol (COREG) tablet 3.125 mg  3.125 mg Oral BID WITH MEALS    levothyroxine (SYNTHROID) tablet 25 mcg  25 mcg Oral ACB    fluticasone-vilanterol (BREO ELLIPTA) 100mcg-25mcg/puff  1 Puff Inhalation DAILY    furosemide (LASIX) injection 40 mg  40 mg IntraVENous BID    sodium chloride (NS) flush 5-40 mL  5-40 mL IntraVENous Q8H    sodium chloride (NS) flush 5-40 mL  5-40 mL IntraVENous PRN    acetaminophen (TYLENOL) tablet 650 mg  650 mg Oral Q6H PRN    ondansetron (ZOFRAN) injection 4 mg  4 mg IntraVENous Q4H PRN        Review of Systems  A comprehensive review of systems was negative.     Objective:     Patient Vitals for the past 24 hrs:   BP Temp Pulse Resp SpO2 Weight   03/15/19 0820 91/61 98.3 °F (36.8 °C) 84 17 94 %    03/15/19 0331 124/75 98 °F (36.7 °C) 75 16 97 %    03/15/19 0113      136 lb (61.7 kg)   03/14/19 2328 122/72 98 °F (36.7 °C) 70 16 96 %    03/14/19 2123 99/65 98.4 °F (36.9 °C) 81 16 96 %    03/14/19 2104     93 %    03/14/19 1851 101/64 99.1 °F (37.3 °C) 84 18     03/14/19 1122 103/65 97.9 °F (36.6 °C) 79 20 97 %      03/15 0701 - 03/15 1900  In: 140 [P.O.:140]  Out: -   03/13 1901 - 03/15 0700  In: 720 [P.O.:720]  Out: 2200 [Urine:2200]    Physical Exam:   Visit Vitals  BP 91/61 (BP 1 Location: Left arm, BP Patient Position: At rest)   Pulse 84   Temp 98.3 °F (36.8 °C)   Resp 17   Ht 5' 1\" (1.549 m) Wt 136 lb (61.7 kg)   SpO2 94%   Breastfeeding? No   BMI 25.70 kg/m²     General appearance: alert, cooperative, no distress, appears stated age  Neck: supple, symmetrical, trachea midline, no adenopathy, thyroid: not enlarged, symmetric, no tenderness/mass/nodules, no carotid bruit and no JVD  Lungs: clear to auscultation bilaterally  Heart: regular rate and rhythm, S1, S2 normal, no murmur, click, rub or gallop  Abdomen: soft, non-tender. Bowel sounds normal. No masses,  no organomegaly  Extremities: extremities normal, atraumatic, no cyanosis or edema        Data Review   Recent Results (from the past 24 hour(s))   METABOLIC PANEL, BASIC    Collection Time: 03/15/19  1:21 AM   Result Value Ref Range    Sodium 142 136 - 145 mmol/L    Potassium 4.0 3.5 - 5.1 mmol/L    Chloride 100 97 - 108 mmol/L    CO2 37 (H) 21 - 32 mmol/L    Anion gap 5 5 - 15 mmol/L    Glucose 93 65 - 100 mg/dL    BUN 25 (H) 6 - 20 MG/DL    Creatinine 0.96 0.55 - 1.02 MG/DL    BUN/Creatinine ratio 26 (H) 12 - 20      GFR est AA >60 >60 ml/min/1.73m2    GFR est non-AA 54 (L) >60 ml/min/1.73m2    Calcium 8.8 8.5 - 10.1 MG/DL           Assessment:     Active Problems:    HTN (hypertension) (6/29/2015)      Gout (6/29/2015)      Presence of biventricular automatic cardioverter/defibrillator (AICD) (7/2/2015)      Overview: [employer] biventricular AICD implant      Paroxysmal atrial fibrillation (HCC) (10/21/2017)      Dyslipidemia (12/21/2017)      CKD (chronic kidney disease) stage 3, GFR 30-59 ml/min (HCC) (1/10/2018)      Acquired hypothyroidism (3/18/2018)      Acute on chronic systolic heart failure ([address] (3/11/2019)        Plan:     1. Continue treatment of congestive heart failure. 2.  If all goes well plan discharge on Monday. Additional diuresis needs to occur.

## 2019-03-15 NOTE — PROGRESS NOTES
Medicare pt has received, reviewed, and signed 2nd IM letter informing them of their right to appeal the discharge. Signed copied has been placed on pt bedside chart.     NATANAEL Montes  Care Manager  [phone]

## 2019-03-15 NOTE — PROGRESS NOTES
3/15/2019 10:06 AM    Admit Date: 3/11/2019    Admit Diagnosis: Acute on chronic systolic heart failure [facility] [I50.23]    Subjective:     Don Lau   denies chest pain, chest pressure/discomfort, dyspnea, palpitations, irregular heart beats, near-syncope, syncope, fatigue, orthopnea, paroxysmal nocturnal dyspnea. Visit Vitals  BP 91/61 (BP 1 Location: Left arm, BP Patient Position: At rest)   Pulse 84   Temp 98.3 °F (36.8 °C)   Resp 17   Ht 5' 1\" (1.549 m)   Wt 136 lb (61.7 kg)   SpO2 94%   Breastfeeding? No   BMI 25.70 kg/m²     Current Facility-Administered Medications   Medication Dose Route Frequency    furosemide (LASIX) tablet 40 mg  40 mg Oral ACB&D    potassium chloride SR (KLOR-CON 10) tablet 20 mEq  20 mEq Oral BID    midodrine (PROAMITINE) tablet 5 mg  5 mg Oral TID WITH MEALS    albuterol (PROVENTIL VENTOLIN) nebulizer solution 2.5 mg  2.5 mg Nebulization BID    allopurinol (ZYLOPRIM) tablet 300 mg  300 mg Oral DAILY    apixaban (ELIQUIS) tablet 2.5 mg  2.5 mg Oral BID    atorvastatin (LIPITOR) tablet 10 mg  10 mg Oral DAILY    carvedilol (COREG) tablet 3.125 mg  3.125 mg Oral BID WITH MEALS    levothyroxine (SYNTHROID) tablet 25 mcg  25 mcg Oral ACB    fluticasone-vilanterol (BREO ELLIPTA) 100mcg-25mcg/puff  1 Puff Inhalation DAILY    sodium chloride (NS) flush 5-40 mL  5-40 mL IntraVENous Q8H    sodium chloride (NS) flush 5-40 mL  5-40 mL IntraVENous PRN    acetaminophen (TYLENOL) tablet 650 mg  650 mg Oral Q6H PRN    ondansetron (ZOFRAN) injection 4 mg  4 mg IntraVENous Q4H PRN         Objective:      Visit Vitals  BP 91/61 (BP 1 Location: Left arm, BP Patient Position: At rest)   Pulse 84   Temp 98.3 °F (36.8 °C)   Resp 17   Ht 5' 1\" (1.549 m)   Wt 136 lb (61.7 kg)   SpO2 94%   Breastfeeding? No   BMI 25.70 kg/m²       Physical Exam:  Abdomen: soft, non-tender.  Bowel sounds normal.   Extremities: no cyanosis, 1+ edema  Heart: regular rate and rhythm, S1, S2 normal, no murmur, click, rub or gallop  Lungs: clear to auscultation bilaterally  Neurologic: Grossly normal    Data Review:   Labs:    Recent Results (from the past 24 hour(s))   METABOLIC PANEL, BASIC    Collection Time: 03/15/19  1:21 AM   Result Value Ref Range    Sodium 142 136 - 145 mmol/L    Potassium 4.0 3.5 - 5.1 mmol/L    Chloride 100 97 - 108 mmol/L    CO2 37 (H) 21 - 32 mmol/L    Anion gap 5 5 - 15 mmol/L    Glucose 93 65 - 100 mg/dL    BUN 25 (H) 6 - 20 MG/DL    Creatinine 0.96 0.55 - 1.02 MG/DL    BUN/Creatinine ratio 26 (H) 12 - 20      GFR est AA >60 >60 ml/min/1.73m2    GFR est non-AA 54 (L) >60 ml/min/1.73m2    Calcium 8.8 8.5 - 10.1 MG/DL       Telemetry: paced      Assessment:     Active Problems:    HTN (hypertension) (6/29/2015)      Gout (6/29/2015)      Presence of biventricular automatic cardioverter/defibrillator (AICD) (7/2/2015)      Overview: [employer] biventricular AICD implant      Paroxysmal atrial fibrillation (HCC) (10/21/2017)      Dyslipidemia (12/21/2017)      CKD (chronic kidney disease) stage 3, GFR 30-59 ml/min (HCC) (1/10/2018)      Acquired hypothyroidism (3/18/2018)      Acute on chronic systolic heart failure ([address] (3/11/2019)        Plan:     1. AOCSHF: -ve fluid balance. Wt is down. Switch to PO lasix. Echo noted. D/w pt. Per pt she has previously decided not to have any further work up. Medical treatment only. ICD. Previously did not tolerate entresto. 2. PAF: on DOAC. Paced. 3. Orthostatic hypotension: on midodrine. Stable. No further work up for now. Will s/o. F/u with Dr. Das as out pt once dc.

## 2019-03-15 NOTE — PROGRESS NOTES
Problem: Falls - Risk of  Goal: *Absence of Falls  Document Milvia Fall Risk and appropriate interventions in the flowsheet.   Outcome: Progressing Towards Goal  Fall Risk Interventions:  Mobility Interventions: Bed/chair exit alarm, Patient to call before getting OOB, PT Consult for mobility concerns, PT Consult for assist device competence         Medication Interventions: Bed/chair exit alarm, Patient to call before getting OOB, Teach patient to arise slowly    Elimination Interventions: Bed/chair exit alarm, Call light in reach, Patient to call for help with toileting needs

## 2019-03-16 LAB
ANION GAP SERPL CALC-SCNC: 6 MMOL/L (ref 5–15)
BUN SERPL-MCNC: 27 MG/DL (ref 6–20)
BUN/CREAT SERPL: 30 (ref 12–20)
CALCIUM SERPL-MCNC: 8.9 MG/DL (ref 8.5–10.1)
CHLORIDE SERPL-SCNC: 102 MMOL/L (ref 97–108)
CO2 SERPL-SCNC: 32 MMOL/L (ref 21–32)
CREAT SERPL-MCNC: 0.91 MG/DL (ref 0.55–1.02)
GLUCOSE SERPL-MCNC: 91 MG/DL (ref 65–100)
POTASSIUM SERPL-SCNC: 4.1 MMOL/L (ref 3.5–5.1)
SODIUM SERPL-SCNC: 140 MMOL/L (ref 136–145)

## 2019-03-16 PROCEDURE — 80048 BASIC METABOLIC PNL TOTAL CA: CPT

## 2019-03-16 PROCEDURE — 74011250637 HC RX REV CODE- 250/637: Performed by: INTERNAL MEDICINE

## 2019-03-16 PROCEDURE — 74011000250 HC RX REV CODE- 250: Performed by: INTERNAL MEDICINE

## 2019-03-16 PROCEDURE — 65660000000 HC RM CCU STEPDOWN

## 2019-03-16 PROCEDURE — 74011250637 HC RX REV CODE- 250/637: Performed by: NURSE PRACTITIONER

## 2019-03-16 PROCEDURE — 36415 COLL VENOUS BLD VENIPUNCTURE: CPT

## 2019-03-16 PROCEDURE — 94640 AIRWAY INHALATION TREATMENT: CPT

## 2019-03-16 RX ORDER — ALBUTEROL SULFATE 0.83 MG/ML
2.5 SOLUTION RESPIRATORY (INHALATION)
Status: DISCONTINUED | OUTPATIENT
Start: 2019-03-16 | End: 2019-03-18 | Stop reason: HOSPADM

## 2019-03-16 RX ADMIN — Medication 10 ML: at 21:20

## 2019-03-16 RX ADMIN — FLUTICASONE FUROATE AND VILANTEROL TRIFENATATE 1 PUFF: 100; 25 POWDER RESPIRATORY (INHALATION) at 08:52

## 2019-03-16 RX ADMIN — APIXABAN 2.5 MG: 2.5 TABLET, FILM COATED ORAL at 19:01

## 2019-03-16 RX ADMIN — FUROSEMIDE 40 MG: 40 TABLET ORAL at 19:01

## 2019-03-16 RX ADMIN — Medication 10 ML: at 06:10

## 2019-03-16 RX ADMIN — MIDODRINE HYDROCHLORIDE 5 MG: 5 TABLET ORAL at 13:36

## 2019-03-16 RX ADMIN — MIDODRINE HYDROCHLORIDE 5 MG: 5 TABLET ORAL at 08:40

## 2019-03-16 RX ADMIN — ATORVASTATIN CALCIUM 10 MG: 10 TABLET, FILM COATED ORAL at 08:40

## 2019-03-16 RX ADMIN — LEVOTHYROXINE SODIUM 25 MCG: 25 TABLET ORAL at 08:40

## 2019-03-16 RX ADMIN — ALLOPURINOL 300 MG: 300 TABLET ORAL at 08:40

## 2019-03-16 RX ADMIN — POTASSIUM CHLORIDE 20 MEQ: 750 TABLET, FILM COATED, EXTENDED RELEASE ORAL at 19:01

## 2019-03-16 RX ADMIN — APIXABAN 2.5 MG: 2.5 TABLET, FILM COATED ORAL at 08:40

## 2019-03-16 RX ADMIN — CARVEDILOL 3.12 MG: 3.12 TABLET, FILM COATED ORAL at 19:01

## 2019-03-16 RX ADMIN — Medication 10 ML: at 13:36

## 2019-03-16 RX ADMIN — POTASSIUM CHLORIDE 20 MEQ: 750 TABLET, FILM COATED, EXTENDED RELEASE ORAL at 08:40

## 2019-03-16 RX ADMIN — CARVEDILOL 3.12 MG: 3.12 TABLET, FILM COATED ORAL at 08:40

## 2019-03-16 RX ADMIN — MIDODRINE HYDROCHLORIDE 5 MG: 5 TABLET ORAL at 19:01

## 2019-03-16 RX ADMIN — FUROSEMIDE 40 MG: 40 TABLET ORAL at 08:40

## 2019-03-16 RX ADMIN — ALBUTEROL SULFATE 2.5 MG: 2.5 SOLUTION RESPIRATORY (INHALATION) at 21:35

## 2019-03-16 NOTE — PROGRESS NOTES
1900 Received report from [place], [place]. Assumed patient care. Bedside shift change report given to James Olivera RN (oncoming nurse) by Evelin Layton RN (offgoing nurse). Report included the following information SBAR, Kardex, Intake/Output, MAR and Recent Results.

## 2019-03-16 NOTE — PROGRESS NOTES
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    NHL (non-Hodgkin's lymphoma) (HCC) C85.90    DILAN (obstructive sleep apnea) G47.33    Rhinitis J31.0    Anemia D64.9    Reactive airway disease J45.909    HTN (hypertension) I10    Gout M10.9    LBBB (left bundle branch block) I44.7    Presence of biventricular automatic cardioverter/defibrillator (AICD) Z95.810    Chronic systolic congestive heart failure (HCC) I50.22    Lymphoma ([facility]) C85.90    Mitral valvular regurgitation I34.0    Physical deconditioning R53.81    JENKINS (dyspnea on exertion) R06.09    Acute respiratory insufficiency R06.89    Gastroesophageal reflux disease with esophagitis K21.0    Thoracic aortic aneurysm without rupture ([facility]) I71.2    Paroxysmal atrial fibrillation ([facility]) I48.0    Xerosis of skin L85.3    Dyslipidemia E78.5    CKD (chronic kidney disease) stage 3, GFR 30-59 ml/min ([facility]) N18.3    Acquired hypothyroidism E03.9    Aneurysmal dilatation ([facility]) I72.9    Seasonal allergic rhinitis J30.2    Nonrheumatic aortic valve insufficiency I35.1    Acute encephalopathy G93.40    UTI (urinary tract infection) N39.0    Diarrhea R19.7    Poor appetite R63.0    Acute pain of right knee M25.561    Counseling regarding advanced care planning and goals of care Z71.89    Hypotension due to hypovolemia I95.89, E86.1    Congestive heart failure, NYHA class II, chronic, systolic (HCC) O16.49    Acute on chronic systolic heart failure (HCC) I50.23     Allergies   Allergen Reactions    Codeine Other (comments)     \"made me disoriented\" per pt       Current Facility-Administered Medications:     furosemide (LASIX) tablet 40 mg, 40 mg, Oral, ACB&D, Pablo Caruso MD, 40 mg at 03/16/19 0840    potassium chloride SR (KLOR-CON 10) tablet 20 mEq, 20 mEq, Oral, BID, Vanessa Raygoza MD, 20 mEq at 03/16/19 0840    midodrine (PROAMITINE) tablet 5 mg, 5 mg, Oral, TID WITH MEALS, Carey Hugo, NP, 5 mg at 03/16/19 0840    albuterol (PROVENTIL VENTOLIN) nebulizer solution 2.5 mg, 2.5 mg, Nebulization, BID, Connor Jaramillo MD, 2.5 mg at 03/15/19 2022    allopurinol (ZYLOPRIM) tablet 300 mg, 300 mg, Oral, DAILY, Connor Jaramillo MD, 300 mg at 03/16/19 0840    apixaban (ELIQUIS) tablet 2.5 mg, 2.5 mg, Oral, BID, Connor Jaramillo MD, 2.5 mg at 03/16/19 0840    atorvastatin (LIPITOR) tablet 10 mg, 10 mg, Oral, DAILY, Connor Jaramillo MD, 10 mg at 03/16/19 0840    carvedilol (COREG) tablet 3.125 mg, 3.125 mg, Oral, BID WITH MEALS, Connor Jaramillo MD, 3.125 mg at 03/16/19 0840    levothyroxine (SYNTHROID) tablet 25 mcg, 25 mcg, Oral, ACB, Connor Jaramillo MD, 25 mcg at 03/16/19 0840    fluticasone-vilanterol (BREO ELLIPTA) 100mcg-25mcg/puff, 1 Puff, Inhalation, DAILY, Connor Jaramillo MD, 1 Puff at 03/16/19 0852    sodium chloride (NS) flush 5-40 mL, 5-40 mL, IntraVENous, Q8H, Connor Jaramillo MD, 10 mL at 03/16/19 0610    sodium chloride (NS) flush 5-40 mL, 5-40 mL, IntraVENous, PRN, Connor Jaramillo MD, 10 mL at 03/13/19 1414    acetaminophen (TYLENOL) tablet 650 mg, 650 mg, Oral, Q6H PRN, Connor Jaramillo MD    ondansetron (ZOFRAN) injection 4 mg, 4 mg, IntraVENous, Q4H PRN, Connor Jaramillo MD  Recent Results (from the past 24 hour(s))   METABOLIC PANEL, BASIC    Collection Time: 03/16/19  4:27 AM   Result Value Ref Range    Sodium 140 136 - 145 mmol/L    Potassium 4.1 3.5 - 5.1 mmol/L    Chloride 102 97 - 108 mmol/L    CO2 32 21 - 32 mmol/L    Anion gap 6 5 - 15 mmol/L    Glucose 91 65 - 100 mg/dL    BUN 27 (H) 6 - 20 MG/DL    Creatinine 0.91 0.55 - 1.02 MG/DL    BUN/Creatinine ratio 30 (H) 12 - 20      GFR est AA >60 >60 ml/min/1.73m2    GFR est non-AA 58 (L) >60 ml/min/1.73m2    Calcium 8.9 8.5 - 10.1 MG/DL     Patient Vitals for the past 24 hrs:   Temp Pulse Resp BP SpO2   03/16/19 0840  80  120/67    03/16/19 0712 97.4 °F (36.3 °C) 86 18 108/70 93 %   03/16/19 0410 98.4 °F (36.9 °C) 81 18 120/73 90 %   03/15/19 2257 97.5 °F (36.4 °C) 70 18 119/70 98 %   03/15/19 1911 97.5 °F (36.4 °C) 86 18 115/73 95 %   03/15/19 1815  84  112/57    03/15/19 1419 97.5 °F (36.4 °C) 76 18 104/66 91 %   03/15/19 1115 97.1 °F (36.2 °C) 78 17 102/63 95 %   03/15/19 1022  80  106/54         Alert with no complaints. Lungs clear.  Heart RRR    Stable CHF on current meds

## 2019-03-17 LAB
ANION GAP SERPL CALC-SCNC: 6 MMOL/L (ref 5–15)
BUN SERPL-MCNC: 27 MG/DL (ref 6–20)
BUN/CREAT SERPL: 29 (ref 12–20)
CALCIUM SERPL-MCNC: 9.1 MG/DL (ref 8.5–10.1)
CHLORIDE SERPL-SCNC: 103 MMOL/L (ref 97–108)
CO2 SERPL-SCNC: 31 MMOL/L (ref 21–32)
CREAT SERPL-MCNC: 0.93 MG/DL (ref 0.55–1.02)
GLUCOSE SERPL-MCNC: 86 MG/DL (ref 65–100)
POTASSIUM SERPL-SCNC: 4.4 MMOL/L (ref 3.5–5.1)
SODIUM SERPL-SCNC: 140 MMOL/L (ref 136–145)

## 2019-03-17 PROCEDURE — 36415 COLL VENOUS BLD VENIPUNCTURE: CPT

## 2019-03-17 PROCEDURE — 74011250637 HC RX REV CODE- 250/637: Performed by: INTERNAL MEDICINE

## 2019-03-17 PROCEDURE — 65660000000 HC RM CCU STEPDOWN

## 2019-03-17 PROCEDURE — 74011250637 HC RX REV CODE- 250/637: Performed by: NURSE PRACTITIONER

## 2019-03-17 PROCEDURE — 74011000250 HC RX REV CODE- 250: Performed by: INTERNAL MEDICINE

## 2019-03-17 PROCEDURE — 94640 AIRWAY INHALATION TREATMENT: CPT

## 2019-03-17 PROCEDURE — 80048 BASIC METABOLIC PNL TOTAL CA: CPT

## 2019-03-17 RX ADMIN — CARVEDILOL 3.12 MG: 3.12 TABLET, FILM COATED ORAL at 08:35

## 2019-03-17 RX ADMIN — FUROSEMIDE 40 MG: 40 TABLET ORAL at 18:13

## 2019-03-17 RX ADMIN — LEVOTHYROXINE SODIUM 25 MCG: 25 TABLET ORAL at 08:35

## 2019-03-17 RX ADMIN — APIXABAN 2.5 MG: 2.5 TABLET, FILM COATED ORAL at 08:35

## 2019-03-17 RX ADMIN — ATORVASTATIN CALCIUM 10 MG: 10 TABLET, FILM COATED ORAL at 08:35

## 2019-03-17 RX ADMIN — MIDODRINE HYDROCHLORIDE 5 MG: 5 TABLET ORAL at 18:13

## 2019-03-17 RX ADMIN — Medication 10 ML: at 22:19

## 2019-03-17 RX ADMIN — FUROSEMIDE 40 MG: 40 TABLET ORAL at 08:35

## 2019-03-17 RX ADMIN — Medication 10 ML: at 05:00

## 2019-03-17 RX ADMIN — APIXABAN 2.5 MG: 2.5 TABLET, FILM COATED ORAL at 18:13

## 2019-03-17 RX ADMIN — ALBUTEROL SULFATE 2.5 MG: 2.5 SOLUTION RESPIRATORY (INHALATION) at 07:27

## 2019-03-17 RX ADMIN — ALLOPURINOL 300 MG: 300 TABLET ORAL at 08:35

## 2019-03-17 RX ADMIN — ALBUTEROL SULFATE 2.5 MG: 2.5 SOLUTION RESPIRATORY (INHALATION) at 20:00

## 2019-03-17 RX ADMIN — MIDODRINE HYDROCHLORIDE 5 MG: 5 TABLET ORAL at 08:35

## 2019-03-17 RX ADMIN — POTASSIUM CHLORIDE 20 MEQ: 750 TABLET, FILM COATED, EXTENDED RELEASE ORAL at 08:35

## 2019-03-17 RX ADMIN — MIDODRINE HYDROCHLORIDE 5 MG: 5 TABLET ORAL at 13:05

## 2019-03-17 RX ADMIN — Medication 10 ML: at 13:06

## 2019-03-17 RX ADMIN — POTASSIUM CHLORIDE 20 MEQ: 750 TABLET, FILM COATED, EXTENDED RELEASE ORAL at 18:13

## 2019-03-17 RX ADMIN — FLUTICASONE FUROATE AND VILANTEROL TRIFENATATE 1 PUFF: 100; 25 POWDER RESPIRATORY (INHALATION) at 13:05

## 2019-03-17 RX ADMIN — CARVEDILOL 3.12 MG: 3.12 TABLET, FILM COATED ORAL at 18:13

## 2019-03-17 NOTE — PROGRESS NOTES
Bedside shift change report given to Mary Villafana, RN (oncoming nurse). Report included the following information SBAR, Kardex, [facility] and Cardiac Rhythm Paced. SHIFT SUMMARY:  Patient had incontinent episodes last night was not able to obtain accurate I&Os marked as occurrence. CONCERNS TO ADDRESS WITH MD:      [address] NURSING NOTE   Admission Date 3/11/2019   Admission Diagnosis Acute on chronic systolic heart failure ([facility] [I50.23]   Consults IP CONSULT TO PALLIATIVE CARE - PROVIDER  IP CONSULT TO PRIMARY CARE PROVIDER  IP CONSULT TO CARDIOLOGY      Cardiac Monitoring [x] Yes [] No      Purposeful Hourly Rounding [x] Yes    Milvia Score Total Score: 3   Milvia score 3 or > [x] Bed Alarm [] Avasys [] 1:1 sitter [] Patient refused (Signed refusal form in chart)   Kimani Score Kimani Score: 17   Kimani score 14 or < [] PMT consult [] Wound Care consult    []  Specialty bed  [] Nutrition consult      Influenza Vaccine Received Flu Vaccine for Current Season (usually Sept-March): Yes           Oxygen needs? [] Room air Oxygen @  [x]1L    []2L    []3L   []4L    []5L   []6L via  NC   Chronic home O2 use?  [] Yes [x] No  Perform O2 challenge test and document in progress note using [employer]Home[employer])      Last bowel movement Last Bowel Movement Date: 03/16/19      Urinary Catheter       External Female Catheter 03/11/19-Urine Output (mL): 500 ml     LDAs               Peripheral IV 03/14/19 Distal;Posterior;Right Forearm (Active)   Site Assessment Clean, dry, & intact 3/17/2019  7:15 AM   Phlebitis Assessment 0 3/17/2019  7:15 AM   Infiltration Assessment 0 3/17/2019  7:15 AM   Dressing Status Clean, dry, & intact 3/17/2019  7:15 AM   Dressing Type Tape;Transparent 3/17/2019  7:15 AM   Hub Color/Line Status Blue 3/17/2019  7:15 AM          External Female Catheter 03/11/19 (Active)   Site Assessment Clean, dry, & intact 3/16/2019  4:40 AM   Repositioned Yes 3/15/2019  3:31 AM   Perineal Care Yes 3/15/2019  3:31 AM Wick Changed No 3/15/2019  3:31 AM   Suction Canister/Tubing Changed No 3/15/2019  3:31 AM   Urine Output (mL) 500 ml 3/16/2019  4:40 AM                   Readmission Risk Assessment Tool Score High Risk            37       Total Score        3 Has Seen PCP in Last 6 Months (Yes=3, No=0)    2 . Living with Significant Other. Assisted Living. LTAC. SNF. or   Rehab    3 Patient Length of Stay (>5 days = 3)    4 IP Visits Last 12 Months (1-3=4, 4=9, >4=11)    5 Pt.  Coverage (Medicare=5 , Medicaid, or Self-Pay=4)    20 Charlson Comorbidity Score (Age + Comorbid Conditions)       Expected Length of Stay 4d 2h   Actual Length of Stay 6

## 2019-03-17 NOTE — PROGRESS NOTES
ADULT PROTOCOL: JET AEROSOL  REASSESSMENT    Patient  Shen Rey     80 y.o.   female     3/17/2019  9:34 AM    Breath Sounds Pre Procedure: Right Breath Sounds: Clear                               Left Breath Sounds: Clear    Breath Sounds Post Procedure: Right Breath Sounds: Clear                                 Left Breath Sounds: Clear    Breathing pattern: Pre procedure Breathing Pattern: Regular          Post procedure Breathing Pattern: Regular    Heart Rate: Pre procedure Pulse: 71           Post procedure Pulse: 81    Resp Rate: Pre procedure Respirations: 18           Post procedure Respirations: 18    Oxygen: O2 Device: Nasal cannula  1L    SpO2: Pre procedure SpO2: (!) 88 %                 Post procedure SpO2: 93 %      Nebulizer Therapy: Current medications Aerosolized Medications: Albuterol      Changed: no    Problem List:   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    NHL (non-Hodgkin's lymphoma) (HCC) C85.90    DILAN (obstructive sleep apnea) G47.33    Rhinitis J31.0    Anemia D64.9    Reactive airway disease J45.909    HTN (hypertension) I10    Gout M10.9    LBBB (left bundle branch block) I44.7    Presence of biventricular automatic cardioverter/defibrillator (AICD) Z95.810    Chronic systolic congestive heart failure (HCC) I50.22    Lymphoma (HCC) C85.90    Mitral valvular regurgitation I34.0    Physical deconditioning R53.81    JENKINS (dyspnea on exertion) R06.09    Acute respiratory insufficiency R06.89    Gastroesophageal reflux disease with esophagitis K21.0    Thoracic aortic aneurysm without rupture (HCC) I71.2    Paroxysmal atrial fibrillation (HCC) I48.0    Xerosis of skin L85.3    Dyslipidemia E78.5    CKD (chronic kidney disease) stage 3, GFR 30-59 ml/min (HCC) N18.3    Acquired hypothyroidism E03.9    Aneurysmal dilatation (HCC) I72.9    Seasonal allergic rhinitis J30.2    Nonrheumatic aortic valve insufficiency I35.1    Acute encephalopathy G93.40    UTI (urinary tract infection) N39.0    Diarrhea R19.7    Poor appetite R63.0    Acute pain of right knee M25.561    Counseling regarding advanced care planning and goals of care Z71.89    Hypotension due to hypovolemia I95.89, E86.1    Congestive heart failure, NYHA class II, chronic, systolic (HCC) W31.16    Acute on chronic systolic heart failure ([facility] I50.23       Respiratory Therapist: Doug Warren

## 2019-03-17 NOTE — PROGRESS NOTES
Responded to patient's request for a visit on [facility] unit. Today is patient's 89th birthday. She shared she has been visited by many friends today and expects family to come also. Her windowsill was filled with chun from loved ones. She willl celebrate later with lemon meringue migue-her favorite. Ms. Devi Husbands shared she feels very blessed to have had such a long life and requested prayer. Provided pastoral presence and prayer. Chaplains will follow as needed.     SOURAV Morrison, [facility], [address] [place] [address] [employer] Paging Service  [phone])

## 2019-03-17 NOTE — PROGRESS NOTES
Problem: Falls - Risk of  Goal: *Absence of Falls  Document Milvia Fall Risk and appropriate interventions in the flowsheet.   Outcome: Progressing Towards Goal  Fall Risk Interventions:  Mobility Interventions: Assess mobility with egress test, Bed/chair exit alarm, Communicate number of staff needed for ambulation/transfer, OT consult for ADLs, Patient to call before getting OOB, PT Consult for mobility concerns, PT Consult for assist device competence, Strengthening exercises (ROM-active/passive), Utilize walker, cane, or other assistive device, Utilize gait belt for transfers/ambulation         Medication Interventions: Assess postural VS orthostatic hypotension, Bed/chair exit alarm, Patient to call before getting OOB, Teach patient to arise slowly, Utilize gait belt for transfers/ambulation    Elimination Interventions: Bed/chair exit alarm, Call light in reach, Patient to call for help with toileting needs

## 2019-03-17 NOTE — PROGRESS NOTES
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    NHL (non-Hodgkin's lymphoma) (HCC) C85.90    DILAN (obstructive sleep apnea) G47.33    Rhinitis J31.0    Anemia D64.9    Reactive airway disease J45.909    HTN (hypertension) I10    Gout M10.9    LBBB (left bundle branch block) I44.7    Presence of biventricular automatic cardioverter/defibrillator (AICD) Z95.810    Chronic systolic congestive heart failure (HCC) I50.22    Lymphoma ([facility]) C85.90    Mitral valvular regurgitation I34.0    Physical deconditioning R53.81    JENKINS (dyspnea on exertion) R06.09    Acute respiratory insufficiency R06.89    Gastroesophageal reflux disease with esophagitis K21.0    Thoracic aortic aneurysm without rupture ([facility]) I71.2    Paroxysmal atrial fibrillation ([facility]) I48.0    Xerosis of skin L85.3    Dyslipidemia E78.5    CKD (chronic kidney disease) stage 3, GFR 30-59 ml/min ([facility]) N18.3    Acquired hypothyroidism E03.9    Aneurysmal dilatation ([facility]) I72.9    Seasonal allergic rhinitis J30.2    Nonrheumatic aortic valve insufficiency I35.1    Acute encephalopathy G93.40    UTI (urinary tract infection) N39.0    Diarrhea R19.7    Poor appetite R63.0    Acute pain of right knee M25.561    Counseling regarding advanced care planning and goals of care Z71.89    Hypotension due to hypovolemia I95.89, E86.1    Congestive heart failure, NYHA class II, chronic, systolic ([facility]) Y76.10    Acute on chronic systolic heart failure ([facility]) I50.23     Allergies   Allergen Reactions    Codeine Other (comments)     \"made me disoriented\" per pt       Current Facility-Administered Medications:     albuterol (PROVENTIL VENTOLIN) nebulizer solution 2.5 mg, 2.5 mg, Nebulization, BID RT, Connor Jaramillo MD, 2.5 mg at 03/17/19 0727    furosemide (LASIX) tablet 40 mg, 40 mg, Oral, ACB&D, Paula Craig MD, 40 mg at 03/17/19 0835    potassium chloride SR (KLOR-CON 10) tablet 20 mEq, 20 mEq, Oral, BID, Prashant Page MD, 20 mEq at 03/17/19 0835    midodrine (PROAMITINE) tablet 5 mg, 5 mg, Oral, TID WITH MEALS, Carey Hugo, NP, 5 mg at 03/17/19 0835    allopurinol (ZYLOPRIM) tablet 300 mg, 300 mg, Oral, DAILY, Connor Jaramillo MD, 300 mg at 03/17/19 0835    apixaban (ELIQUIS) tablet 2.5 mg, 2.5 mg, Oral, BID, Connor Jaramillo MD, 2.5 mg at 03/17/19 0835    atorvastatin (LIPITOR) tablet 10 mg, 10 mg, Oral, DAILY, Connor Jaramillo MD, 10 mg at 03/17/19 0835    carvedilol (COREG) tablet 3.125 mg, 3.125 mg, Oral, BID WITH MEALS, Connor Jaramillo MD, 3.125 mg at 03/17/19 0835    levothyroxine (SYNTHROID) tablet 25 mcg, 25 mcg, Oral, ACB, Connor Jaramillo MD, 25 mcg at 03/17/19 0835    fluticasone-vilanterol (BREO ELLIPTA) 100mcg-25mcg/puff, 1 Puff, Inhalation, DAILY, Connor Jaramillo MD, 1 Puff at 03/16/19 0852    sodium chloride (NS) flush 5-40 mL, 5-40 mL, IntraVENous, Q8H, Connor Jaramillo MD, 10 mL at 03/17/19 0500    sodium chloride (NS) flush 5-40 mL, 5-40 mL, IntraVENous, PRN, Connor Jaramillo MD, 10 mL at 03/13/19 1414    acetaminophen (TYLENOL) tablet 650 mg, 650 mg, Oral, Q6H PRN, Connor Jaramillo MD    ondansetron (ZOFRAN) injection 4 mg, 4 mg, IntraVENous, Q4H PRN, Connor Jaramillo MD  Recent Results (from the past 24 hour(s))   METABOLIC PANEL, BASIC    Collection Time: 03/17/19  3:32 AM   Result Value Ref Range    Sodium 140 136 - 145 mmol/L    Potassium 4.4 3.5 - 5.1 mmol/L    Chloride 103 97 - 108 mmol/L    CO2 31 21 - 32 mmol/L    Anion gap 6 5 - 15 mmol/L    Glucose 86 65 - 100 mg/dL    BUN 27 (H) 6 - 20 MG/DL    Creatinine 0.93 0.55 - 1.02 MG/DL    BUN/Creatinine ratio 29 (H) 12 - 20      GFR est AA >60 >60 ml/min/1.73m2    GFR est non-AA 56 (L) >60 ml/min/1.73m2    Calcium 9.1 8.5 - 10.1 MG/DL     Patient Vitals for the past 24 hrs:   Temp Pulse Resp BP SpO2   03/17/19 1101 97.9 °F (36.6 °C) 83 18 (!) 88/58 96 %   03/17/19 0729     (!) 88 %   03/17/19 0715 97.8 °F (36.6 °C) 80 18 128/76 93 %   03/17/19 0328 98.1 °F (36.7 °C) 81 18 126/70 95 %   03/16/19 2226 98 °F (36.7 °C) 76 18 122/71 93 %   03/16/19 2134     92 %   03/16/19 1938 97.6 °F (36.4 °C) 89 18 131/89 97 %   03/16/19 1454 98.1 °F (36.7 °C) 89 19 130/75 93 %   03/16/19 1302 97.7 °F (36.5 °C) 88 18 116/73 91 %     Good spirits. Celebrating Pasqual Sago with friends. She c/o legs swollen. BP 88/58. Lungs clear. Heart RRR. Ext 4+ edema.     CHF with edema, low syst BP    Cont current meds

## 2019-03-18 VITALS
HEART RATE: 88 BPM | OXYGEN SATURATION: 96 % | SYSTOLIC BLOOD PRESSURE: 114 MMHG | TEMPERATURE: 97.7 F | RESPIRATION RATE: 24 BRPM | HEIGHT: 61 IN | WEIGHT: 134.04 LBS | DIASTOLIC BLOOD PRESSURE: 74 MMHG | BODY MASS INDEX: 25.31 KG/M2

## 2019-03-18 PROCEDURE — 74011250637 HC RX REV CODE- 250/637: Performed by: INTERNAL MEDICINE

## 2019-03-18 PROCEDURE — 94640 AIRWAY INHALATION TREATMENT: CPT

## 2019-03-18 PROCEDURE — 77010033678 HC OXYGEN DAILY

## 2019-03-18 PROCEDURE — 74011250637 HC RX REV CODE- 250/637: Performed by: NURSE PRACTITIONER

## 2019-03-18 PROCEDURE — 74011000250 HC RX REV CODE- 250: Performed by: INTERNAL MEDICINE

## 2019-03-18 RX ORDER — ALLOPURINOL 100 MG/1
TABLET ORAL
Qty: 30 TAB | Refills: 11 | Status: SHIPPED
Start: 2019-03-18 | End: 2019-10-23

## 2019-03-18 RX ORDER — MIDODRINE HYDROCHLORIDE 5 MG/1
5 TABLET ORAL
Qty: 90 TAB | Refills: 0 | Status: SHIPPED
Start: 2019-03-18 | End: 2019-04-17

## 2019-03-18 RX ORDER — FUROSEMIDE 40 MG/1
TABLET ORAL
Qty: 60 TAB | Refills: 11 | Status: ON HOLD
Start: 2019-03-18 | End: 2019-06-25 | Stop reason: DRUGHIGH

## 2019-03-18 RX ORDER — POTASSIUM CHLORIDE 1500 MG/1
20 TABLET, FILM COATED, EXTENDED RELEASE ORAL 2 TIMES DAILY
Qty: 60 TAB | Refills: 11 | Status: ON HOLD
Start: 2019-03-18 | End: 2019-06-25 | Stop reason: DRUGHIGH

## 2019-03-18 RX ADMIN — FLUTICASONE FUROATE AND VILANTEROL TRIFENATATE 1 PUFF: 100; 25 POWDER RESPIRATORY (INHALATION) at 09:56

## 2019-03-18 RX ADMIN — POTASSIUM CHLORIDE 20 MEQ: 750 TABLET, FILM COATED, EXTENDED RELEASE ORAL at 09:54

## 2019-03-18 RX ADMIN — FUROSEMIDE 40 MG: 40 TABLET ORAL at 09:54

## 2019-03-18 RX ADMIN — LEVOTHYROXINE SODIUM 25 MCG: 25 TABLET ORAL at 09:54

## 2019-03-18 RX ADMIN — ATORVASTATIN CALCIUM 10 MG: 10 TABLET, FILM COATED ORAL at 09:54

## 2019-03-18 RX ADMIN — CARVEDILOL 3.12 MG: 3.12 TABLET, FILM COATED ORAL at 09:54

## 2019-03-18 RX ADMIN — Medication 10 ML: at 04:52

## 2019-03-18 RX ADMIN — APIXABAN 2.5 MG: 2.5 TABLET, FILM COATED ORAL at 09:54

## 2019-03-18 RX ADMIN — MIDODRINE HYDROCHLORIDE 5 MG: 5 TABLET ORAL at 09:54

## 2019-03-18 RX ADMIN — ALBUTEROL SULFATE 2.5 MG: 2.5 SOLUTION RESPIRATORY (INHALATION) at 07:31

## 2019-03-18 RX ADMIN — ALLOPURINOL 300 MG: 300 TABLET ORAL at 09:54

## 2019-03-18 NOTE — PROGRESS NOTES
Transitional Care Plan: Pt will discharge to [facility] today, transported by [facility] stretcher at 12:00 PM to Room 9351. Pt's family is actively involved in her care and treatment and has been working on pt's transition into LTC at [facility]. Pt's daughter-in-law is in town from [place] and will meet pt at [facility] following d/c. No concerns indicated at this time. PCS, Facesheet, and H&P placed on chart for transport. Transport arranged via [facility] stretcher for generalized weakness/debility from dx of CHF and stage 3 CKD. Referral was sent via [employer]. Nursing will need to call report to [facility] at [phone]. Pleast send AVS, MAR, and any written prescriptions to facility in SNF envelope. Care Management Interventions  PCP Verified by CM:  Yes  Mode of Transport at Discharge: [facility] Time of Discharge: 1200  Transition of Care Consult (CM Consult): SNF([facility])  Partner SNF: No  Reason Why Partner SNF Not Chosen: Positive previous encounter  MyChart Signup: No  Discharge Durable Medical Equipment: No  Physical Therapy Consult: Yes  Occupational Therapy Consult: Yes  Speech Therapy Consult: No  Current Support Network: [facility] [address]  Confirm Follow Up Transport: Family  Plan discussed with Pt/Family/Caregiver: Yes  Discharge Location  Discharge Placement: Skilled nursing facility([facility])    NATANAEL Ortiz  Care Manager  [phone]

## 2019-03-18 NOTE — PROGRESS NOTES
Attempted to see pt this am and pt refused, stated she was getting ready to go home. Continue to follow.

## 2019-03-18 NOTE — PROGRESS NOTES
Patient d/c to SNF no PCP follow up, cardiology 3/21/19 with Sukhjinder Chavira NP 2:00 for device check and Dr Kimberly Lim 3/27/19 @ 3:15pm. On AVS and Hug team notified.

## 2019-03-18 NOTE — PROGRESS NOTES
0700  Bedside shift change report given to Lea Argueta RN (oncoming nurse) by Anabella White RN (offgoing nurse). Report included the following information SBAR, Kardex, ED Summary, Intake/Output, MAR, Accordion, Recent Results, Med Rec Status and Cardiac Rhythm paced.

## 2019-03-18 NOTE — PROGRESS NOTES
Pharmacist Discharge Medication Reconciliation    Significant PMH:   Past Medical History:   Diagnosis Date    Aortic insufficiency     Asthma     Cancer ([facility] [place] [address]     lymphoma    CKD (chronic kidney disease) stage 3, GFR 30-59 ml/min ([facility]) 1/10/2018    Congestive heart failure, NYHA class II, chronic, systolic (HCC)     Heart failure (HCC)     Hypertension     Mitral valvular regurgitation 1/22/2016    ECHO 2/3/17: LV dilated, EF 20-25%, mild LVH, RV mod dilated, mild- mod MELANIA, mod-severe MR, mod AI ECHO 7/29/17: EF 15%, severe DHK, reduced RVEF, RVH, RVSP 35 mmHg  Mild LAE, mod-marked MA fibrosis, mod-severe MR (2+), AO root dilated,      Nonrheumatic aortic valve insufficiency 10/16/2018    Presence of biventricular automatic cardioverter/defibrillator (AICD) 7/2/2015    [employer] biventricular AICD implant    Stomach ulcer      Chief Complaint for this Admission:   Chief Complaint   Patient presents with    Shortness of Breath     recently diagnosed with PNA and staying at [facility]. hx of CHF. still taking antibiotics    Cough     dry cough     Allergies: Codeine    Discharge Medications:   Current Discharge Medication List        START taking these medications    Details   potassium chloride SR (K-TAB) 20 mEq tablet Take 1 Tab by mouth two (2) times a day. Qty: 60 Tab, Refills: 11           CONTINUE these medications which have CHANGED    Details   allopurinol (ZYLOPRIM) 100 mg tablet TAKE ONE (1) TABLET(S) DAILY  Qty: 30 Tab, Refills: 11      furosemide (LASIX) 40 mg tablet 1 tablet twice daily  Qty: 60 Tab, Refills: 11      midodrine (PROAMITINE) 5 mg tablet Take 1 Tab by mouth three (3) times daily (with meals) for 30 days. Qty: 90 Tab, Refills: 0           CONTINUE these medications which have NOT CHANGED    Details   dextromethorphan-guaiFENesin (ROBITUSSIN-DM)  mg/5 mL syrup Take 10 mL by mouth every six (6) hours as needed for Cough.       albuterol (PROVENTIL VENTOLIN) 2.5 mg /3 mL (0.083 %) nebulizer solution 2.5 mg by Nebulization route four (4) times daily. atorvastatin (LIPITOR) 10 mg tablet TAKE ONE (1) TABLET(S) DAILY  Qty: 90 Tab, Refills: 3      levothyroxine (SYNTHROID) 25 mcg tablet Take 1 Tab by mouth Daily (before breakfast). Qty: 90 Tab, Refills: 3      apixaban (ELIQUIS) 2.5 mg tablet Take 1 Tab by mouth two (2) times a day. Indications: PREVENT THROMBOEMBOLISM IN CHRONIC ATRIAL FIBRILLATION  Qty: 180 Tab, Refills: 3    Comments: [facility], one box, 14 pills, Lot [id number], exp May 2018, [facility] pt. Samples for pt. Associated Diagnoses: Systolic CHF, acute on chronic ([facility] [address] Essential hypertension; Mild mitral regurgitation; Cardiomyopathy, dilated, nonischemic ([facility] [address] Atrial fibrillation, unspecified type (HCC)      carvedilol (COREG) 3.125 mg tablet TAKE ONE (1) TABLET(S) TWIC E DAILY WITH FOOD  Indications: chronic heart failure  Qty: 180 Tab, Refills: 3    Associated Diagnoses: Essential hypertension; Cardiomyopathy, dilated, nonischemic (HCC)      magnesium oxide 400 mg cap Take 1 Cap by mouth nightly. cholecalciferol (VITAMIN D3) 1,000 unit cap Take 1,000 Units by mouth daily. loratadine (CLARITIN) 10 mg tablet Take 10 mg by mouth nightly. fluticasone-salmeterol (ADVAIR) 250-50 mcg/dose diskus inhaler Take 1 Puff by inhalation two (2) times a day. Qty: 3 Inhaler, Refills: 3      co-enzyme Q-10 (CO Q-10) 100 mg capsule Take 100 mg by mouth daily. acetaminophen (TYLENOL EXTRA STRENGTH) 500 mg tablet Take 500 mg by mouth every six (6) hours as needed for Pain. STOP taking these medications       ibrutinib (IMBRUVICA) 140 mg cap Comments:   Reason for Stopping:             The patient's chart, MAR and AVS were reviewed by Radha Girard RPH.     Discharging Provider: Dr. Huddleston Comes    Additional Comments: Allopurinal was increased during admission, however will resume PTA dosing of 100mg Daily    Time spent: 15 min    Thank You,     Michael Hendrickson, [facility]

## 2019-03-18 NOTE — DISCHARGE INSTRUCTIONS
General Discharge Instructions    Patient ID:  Apple Greer  [id number]  80 y.o.  3/17/1925    Patient Instructions          The following personal items were collected during your admission and were returned to you. Take Home Medications             What to do at Home    Recommended diet: Cardiac Diet    Recommended activity: Activity as tolerated    Follow-up with Madalyn Valencia MD  in 3 days. Information obtained by :  I understand that if any problems occur once I am at home I am to contact my physician. I understand and acknowledge receipt of the instructions indicated above.                                                                                                                                            Physician's or R.N.'s Signature                                                                  Date/Time                                                                                                                                              Patient or Representative Signature                                                          Date/Time

## 2019-03-18 NOTE — PROGRESS NOTES
[address] [place] [address] Adrian                                                                        80 y.o.   female    [address]   Room: 2203/01    [facility] CARDIOPULMONARY CARE  Unit Phone# :  [phone]      [address]  [facility]  [address] [address]  Dept: [phone]  Loc: [phone]                    SITUATION     Admitted:  3/11/2019         Attending Provider:  Cindy Siddiqui MD       Consultations:  IP CONSULT TO PALLIATIVE CARE - PROVIDER  IP CONSULT TO PRIMARY CARE PROVIDER  IP CONSULT TO CARDIOLOGY    PCP:  Cindy Siddiqui MD   [phone]    Treatment Team: Attending Provider: Cindy Siddiqui MD; Consulting Provider: Cindy Siddiqui MD; Consulting Provider: Carey Cheng MD; Utilization Review: Carol Amador RN; Care Manager: Claudeen Durham    Admitting Dx:  Acute on chronic systolic heart failure ([facility] [address] [I50.23]       Principal Problem: <principal problem not specified>    * No surgery found * of      BY: * Surgery not found *             ON: * No surgery found *                  Code Status: DNR                Advance Directives:   Advance Care Planning 3/12/2019   Patient's Healthcare Decision Maker is: Named in scanned ACP document   Primary Decision Maker Name -   Primary Decision [address] Phone Number -   Primary Decision Maker Relationship to Patient -   Confirm Advance Directive Yes, on file   Does the patient have other document types Do Not Resuscitate    (Send w/patient)   Verified       Isolation:  There are currently no Active Isolations       MDRO: No current active infections    Pain Medications given:  N/A        Special Equipment needed: yes  Type of equipment:         BACKGROUND     Allergies:   Allergies   Allergen Reactions    Codeine Other (comments)     \"made me disoriented\" per pt       Past Medical History:   Diagnosis Date    Aortic insufficiency     Asthma  Cancer ([facility]     lymphoma    CKD (chronic kidney disease) stage 3, GFR 30-59 ml/min ([facility]) 1/10/2018    Congestive heart failure, NYHA class II, chronic, systolic ([facility])     Heart failure ([facility]     Hypertension     Mitral valvular regurgitation 1/22/2016    ECHO 2/3/17: LV dilated, EF 20-25%, mild LVH, RV mod dilated, mild- mod MELANIA, mod-severe MR, mod AI ECHO 7/29/17: EF 15%, severe DHK, reduced RVEF, RVH, RVSP 35 mmHg  Mild LAE, mod-marked MA fibrosis, mod-severe MR (2+), AO root dilated,      Nonrheumatic aortic valve insufficiency 10/16/2018    Presence of biventricular automatic cardioverter/defibrillator (AICD) 7/2/2015    [employer]let biventricular AICD implant    Stomach ulcer        Past Surgical History:   Procedure Laterality Date    HX BREAST BIOPSY Bilateral     40 years ago    HX COLONOSCOPY      HX HEENT      cataracts removed    HX HYSTERECTOMY      HX OTHER SURGICAL      lymph node surgery    HX TONSILLECTOMY      DC EGD TRANSORAL BIOPSY SINGLE/MULTIPLE  5/23/2012         SINUS SURGERY PROC UNLISTED      Nasal polyps removed       Medications Prior to Admission   Medication Sig    furosemide (LASIX) 40 mg tablet Take 40 mg by mouth daily.  dextromethorphan-guaiFENesin (ROBITUSSIN-DM)  mg/5 mL syrup Take 10 mL by mouth every six (6) hours as needed for Cough.  albuterol (PROVENTIL VENTOLIN) 2.5 mg /3 mL (0.083 %) nebulizer solution 2.5 mg by Nebulization route four (4) times daily.  atorvastatin (LIPITOR) 10 mg tablet TAKE ONE (1) TABLET(S) DAILY    levothyroxine (SYNTHROID) 25 mcg tablet Take 1 Tab by mouth Daily (before breakfast).  apixaban (ELIQUIS) 2.5 mg tablet Take 1 Tab by mouth two (2) times a day. Indications: PREVENT THROMBOEMBOLISM IN CHRONIC ATRIAL FIBRILLATION    carvedilol (COREG) 3.125 mg tablet TAKE ONE (1) TABLET(S) TWIC E DAILY WITH FOOD  Indications: chronic heart failure    magnesium oxide 400 mg cap Take 1 Cap by mouth nightly.     cholecalciferol (VITAMIN D3) 1,000 unit cap Take 1,000 Units by mouth daily.  loratadine (CLARITIN) 10 mg tablet Take 10 mg by mouth nightly.  midodrine (PROAMITINE) 10 mg tablet Take 1 Tab by mouth three (3) times daily (with meals) for 30 days.  fluticasone-salmeterol (ADVAIR) 250-50 mcg/dose diskus inhaler Take 1 Puff by inhalation two (2) times a day.  [DISCONTINUED] allopurinol (ZYLOPRIM) 300 mg tablet TAKE ONE (1) TABLET(S) DAILY    co-enzyme Q-10 (CO Q-10) 100 mg capsule Take 100 mg by mouth daily.  ibrutinib (IMBRUVICA) 140 mg cap Take 140 mg by mouth five (5) days a week. Mon-Fri    acetaminophen (TYLENOL EXTRA STRENGTH) 500 mg tablet Take 500 mg by mouth every six (6) hours as needed for Pain. Hard scripts included in transfer packet no    Vaccinations:    Immunization History   Administered Date(s) Administered    Influenza High Dose Vaccine PF 09/27/2017, 10/09/2018    Influenza Vaccine 10/04/2014, 10/01/2016    Influenza Vaccine Split 10/22/2011    ZZZ-RETIRED (DO NOT USE) Pneumococcal Vaccine (Unspecified Type) 05/22/2007           The Charlson CoMorbitiy Index tool is an evidenced based tool that has more automatic generated information. The tool looks at many different items such as the age of the patient, how many times they were admitted in the last calendar year, current length of stay in the hospital and their diagnosis. All of these items are pulled automatically from information documented in the chart from various places and will generate a score that predicts whether a patient is at low (less than 13), medium (13-20) or high (21 or greater) risk of being readmitted.         ASSESSMENT                Temp: 97.7 °F (36.5 °C) (03/18/19 1055) Pulse (Heart Rate): 88 (03/18/19 1055)     Resp Rate: 24 (03/18/19 1055)           BP: 114/74 (03/18/19 1055)     O2 Sat (%): 96 % (03/18/19 1055)     Weight: 60.8 kg (134 lb 0.6 oz)    Height: 5' 1\" (154.9 cm) (03/11/19 2056) If above not within 1 hour of discharge:    BP:_____  P:____  R:____ T:_____ O2 Sat: ___%  O2: ______    Active Orders   Diet    DIET CARDIAC Regular         Orientation: oriented to time, place, person and situation     Active Behaviors: None                                   Active Lines/Drains:  (Peg Tube / Galnido / CL or S/L?): no    Urinary Status: Voiding     Last BM: Last Bowel Movement Date: 03/16/19     Skin Integrity: Abrasion(LLE)             Mobility: Slightly limited   Weight Bearing Status: WBAT (Weight Bearing as Tolerated)      Gait Training  Assistive Device: Walker, rolling, Gait belt  Ambulation - Level of Assistance: Contact guard assistance  Distance (ft): 160 Feet (ft)         Lab Results   Component Value Date/Time    Glucose 86 03/17/2019 03:32 AM    Hemoglobin A1c 5.1 02/05/2019 04:05 AM    INR 1.2 (H) 05/22/2012 10:54 PM    HGB 9.4 (L) 03/12/2019 03:54 AM    HGB 8.8 (L) 03/11/2019 04:24 PM        RECOMMENDATION     See After Visit Summary (AVS) for:  · Discharge instructions  · After [address]   · Special equipment needed (entered pre-discharge by Care Management)  · Medication Reconciliation    · Follow up Appointment(s)         Report given/sent by:  Bill Srinivasan                    Verbal report given to: Gilma Roland         Estimated discharge time:  3/18/2019 at [phone]

## 2019-03-18 NOTE — PROGRESS NOTES
Medicare pt has received, reviewed, and signed 2nd IM letter informing them of their right to appeal the discharge. Signed copied has been placed on pt bedside chart.       Darek Mcneill  [phone]

## 2019-03-19 ENCOUNTER — PATIENT OUTREACH (OUTPATIENT)
Dept: INTERNAL MEDICINE CLINIC | Age: 84
End: 2019-03-19

## 2019-03-19 NOTE — PROGRESS NOTES
Transition of Care Coordination/Hospital to Post Acute Facility: 
  
Date/Time:  3/19/2019 5:10 PM 
 
Patient was admitted to [facility] on [id number] for treatment of Pleural Effusion . Patient was discharged 3/18/2019 to [facility] ([facility]] for continuation of care. Inpatient RRAT score: 37 Top Challenges reviewed CHF Method of communication with care team :phone Nurse Navigator(NN) spoke with Charge Nurse Estelle  to provide introduction to self and explanation of the Nurse Navigator Role. Verified name and  as patient identifiers. Discussed and reviewed  Medication Reconciliation;  Upcoming Cardiology, upcoming pacemaker assessment appointments. ACP:  
Does the patient have a current ACP (including DDNR):  yes-patient has full capacity; Patient has Health care decision makers listed:  Esteban Rogers (son) & Erin Mccall (daughter-in-law). Does the post acute facility have a copy of the patients ACP:  yes Medication(s):  
New Medications at Discharge: none Changed Medications at Discharge: Lasix PO Discontinued Medications at Discharge: none PCP/Specialist follow up:  
Future Appointments Date Time Provider Amanda Arroyoi 3/21/2019  2:00 PM ABI Villafuerte  
3/21/2019  2:00 PM PACEMAKER, Morteza WAGNER  
3/27/2019  3:15 PM Jenny Sloan MD [address] [place] [address]  
2019 11:30 AM Jenny Sloan MD [address] [place] Opportunity to ask questions was provided. Contact information was provided for future reference or further questions. Will continue to monitor.

## 2019-03-29 ENCOUNTER — PATIENT OUTREACH (OUTPATIENT)
Dept: INTERNAL MEDICINE CLINIC | Age: 84
End: 2019-03-29

## 2019-03-29 NOTE — PROGRESS NOTES
Lauren with [facility] reports that patient is currently in skilled nursing. The plan is to move her to assisted living in the future. She is not have a LOS and did not feel comfortable giving out information such a weights.

## 2019-04-12 ENCOUNTER — PATIENT OUTREACH (OUTPATIENT)
Dept: INTERNAL MEDICINE CLINIC | Age: 84
End: 2019-04-12

## 2019-04-16 ENCOUNTER — OFFICE VISIT (OUTPATIENT)
Dept: CARDIOLOGY CLINIC | Age: 84
End: 2019-04-16

## 2019-04-16 VITALS
HEART RATE: 73 BPM | RESPIRATION RATE: 18 BRPM | SYSTOLIC BLOOD PRESSURE: 86 MMHG | DIASTOLIC BLOOD PRESSURE: 60 MMHG | BODY MASS INDEX: 20.98 KG/M2 | OXYGEN SATURATION: 97 % | HEIGHT: 62 IN | WEIGHT: 114 LBS

## 2019-04-16 DIAGNOSIS — I50.22 CHRONIC END-STAGE SYSTOLIC HEART FAILURE (HCC): Primary | ICD-10-CM

## 2019-04-16 DIAGNOSIS — I42.0 CARDIOMYOPATHY, DILATED, NONISCHEMIC (HCC): Chronic | ICD-10-CM

## 2019-04-16 DIAGNOSIS — N18.30 CKD (CHRONIC KIDNEY DISEASE) STAGE 3, GFR 30-59 ML/MIN (HCC): ICD-10-CM

## 2019-04-16 DIAGNOSIS — I48.0 PAROXYSMAL ATRIAL FIBRILLATION (HCC): ICD-10-CM

## 2019-04-16 DIAGNOSIS — I10 HYPERTENSION, UNSPECIFIED TYPE: ICD-10-CM

## 2019-04-16 DIAGNOSIS — I44.7 LBBB (LEFT BUNDLE BRANCH BLOCK): ICD-10-CM

## 2019-04-16 NOTE — PROGRESS NOTES
[facility]   [address] [facility], [address]                                          NEW PATIENT HPI/FOLLOW-UP      NAME:  Jabier Powell   :   3/17/1925   MRN:   [id number]   PCP:  Deneen Davidson MD           Subjective: The patient is a 80y.o. year old female  who returns for a routine follow-up after recent hospitalization for recurrent acute exacerbation of AOCSHF/HFrEF. At that time DDNR confirmed. In Palliative care with caretaker. Has decided to stay in [place] and not move out to [place] with children. Would \"feel out of place\". Has moved into [facility]. In wheelchair mostly and uses rollator. Denies change in exercise tolerance, chest pain, edema, medication intolerance, palpitations, shortness of breath, PND/orthopnea wheezing, sputum, syncope, dizziness or light headedness. Doing satisfactorily now. Review of Systems  General: Pt denies excessive weight gain or loss. Pt is not able to conduct ADL's. Respiratory: +JENKINS, -wheezing or stridor.   Cardiovascular: Denies precordial pain, palpitations, edema or PND  Gastrointestinal: Denies poor appetite, indigestion, abdominal pain or blood in stool  Peripheral vascular: Denies claudication, leg cramps  Neuropsychiatric: Denies paresthesias,tingling,numbness,anxiety,depression,fatigue  Musculoskeletal: Denies pain,tenderness, soreness,swelling      Past Medical History:   Diagnosis Date    Aortic insufficiency     Asthma     Cancer ([facility]     lymphoma    CKD (chronic kidney disease) stage 3, GFR 30-59 ml/min ([facility]) 1/10/2018    Congestive heart failure, NYHA class II, chronic, systolic (HCC)     Heart failure ([facility]     Hypertension     Mitral valvular regurgitation 2016    ECHO 2/3/17: LV dilated, EF 20-25%, mild LVH, RV mod dilated, mild- mod MELANIA, mod-severe MR, mod AI ECHO 17: EF 15%, severe DHK, reduced RVEF, RVH, RVSP 35 mmHg  Mild LAE, mod-marked MA fibrosis, mod-severe MR (2+), AO root dilated,      Nonrheumatic aortic valve insufficiency 10/16/2018    Presence of biventricular automatic cardioverter/defibrillator (AICD) 7/2/2015    [employer] biventricular AICD implant    Stomach ulcer      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 08/04/2012     Priority: 1 - One    NHL (non-Hodgkin's lymphoma) ([address] 08/04/2012     Priority: 1 - One    Abdominal pain 05/22/2012     Priority: 1 - One     Class: Acute    Weight loss 05/22/2012     Priority: 1 - One     Class: Acute    DILAN (obstructive sleep apnea) 08/04/2012     Priority: 2 - Two    Anemia 11/08/2014     Priority: 3 - Three    Acute on chronic systolic heart failure ([address] 03/11/2019    Congestive heart failure, NYHA class II, chronic, systolic ([address] 97/79/2029    Poor appetite 02/07/2019    Acute pain of right knee 02/07/2019    Counseling regarding advanced care planning and goals of care 02/07/2019    Hypotension due to hypovolemia 02/07/2019    Acute encephalopathy 02/04/2019    UTI (urinary tract infection) 02/04/2019    Diarrhea 02/04/2019    Nonrheumatic aortic valve insufficiency 10/16/2018    Seasonal allergic rhinitis 07/02/2018    Acquired hypothyroidism 03/18/2018    Aneurysmal dilatation ([address] 03/18/2018    CKD (chronic kidney disease) stage 3, GFR 30-59 ml/min ([facility]) 01/10/2018    Xerosis of skin 12/21/2017    Dyslipidemia 12/21/2017    Paroxysmal atrial fibrillation ([address] 10/21/2017    JENKINS (dyspnea on exertion) 02/02/2017    Acute respiratory insufficiency 02/02/2017    Gastroesophageal reflux disease with esophagitis 02/02/2017    Thoracic aortic aneurysm without rupture ([address] 02/02/2017    Physical deconditioning 01/29/2017    Mitral valvular regurgitation 01/22/2016    Lymphoma ([address] 11/17/2015    Chronic systolic congestive heart failure ([address] 10/08/2015    Presence of biventricular automatic cardioverter/defibrillator (AICD) 07/02/2015    LBBB (left bundle branch block) 07/01/2015    HTN (hypertension) 06/29/2015    Gout 06/29/2015    Reactive airway disease 03/23/2015    Rhinitis 09/04/2014      Past Surgical History:   Procedure Laterality Date    HX BREAST BIOPSY Bilateral     40 years ago    HX COLONOSCOPY      HX HEENT      cataracts removed    HX HYSTERECTOMY      HX OTHER SURGICAL      lymph node surgery    HX TONSILLECTOMY      HI EGD TRANSORAL BIOPSY SINGLE/MULTIPLE  5/23/2012         SINUS SURGERY PROC UNLISTED      Nasal polyps removed     Allergies   Allergen Reactions    Codeine Other (comments)     \"made me disoriented\" per pt      Family History   Problem Relation Age of Onset    Heart Disease Mother     Stroke Father       Social History     Socioeconomic History    Marital status:      Spouse name: Not on file    Number of children: 1    Years of education: 16+    Highest education level: Not on file   Occupational History    Occupation: retired teacher   Social Needs    Financial resource strain: Not on file    Food insecurity:     Worry: Not on file     Inability: Not on file   [employer] needs:     Medical: Not on file     Non-medical: Not on file   Tobacco Use    Smoking status: Never Smoker    Smokeless tobacco: Never Used   Substance and Sexual Activity    Alcohol use: No     Alcohol/week: 0.0 oz    Drug use: No    Sexual activity: Never   Lifestyle    Physical activity:     Days per week: Not on file     Minutes per session: Not on file    Stress: Not on file   Relationships    Social connections:     Talks on phone: Not on file     Gets together: Not on file     Attends [employer] service: Not on file     Active member of club or organization: Not on file     Attends meetings of clubs or organizations: Not on file     Relationship status: Not on file    Intimate partner violence:     Fear of current or ex partner: Not on file Emotionally abused: Not on file     Physically abused: Not on file     Forced sexual activity: Not on file   Other Topics Concern    Not on file   Social History Narrative    Not on file      Current Outpatient Medications   Medication Sig    allopurinol (ZYLOPRIM) 100 mg tablet TAKE ONE (1) TABLET(S) DAILY    furosemide (LASIX) 40 mg tablet 1 tablet twice daily    midodrine (PROAMITINE) 5 mg tablet Take 1 Tab by mouth three (3) times daily (with meals) for 30 days.  dextromethorphan-guaiFENesin (ROBITUSSIN-DM)  mg/5 mL syrup Take 10 mL by mouth every six (6) hours as needed for Cough.  albuterol (PROVENTIL VENTOLIN) 2.5 mg /3 mL (0.083 %) nebulizer solution 2.5 mg by Nebulization route four (4) times daily.  atorvastatin (LIPITOR) 10 mg tablet TAKE ONE (1) TABLET(S) DAILY    levothyroxine (SYNTHROID) 25 mcg tablet Take 1 Tab by mouth Daily (before breakfast).  apixaban (ELIQUIS) 2.5 mg tablet Take 1 Tab by mouth two (2) times a day. Indications: PREVENT THROMBOEMBOLISM IN CHRONIC ATRIAL FIBRILLATION    fluticasone-salmeterol (ADVAIR) 250-50 mcg/dose diskus inhaler Take 1 Puff by inhalation two (2) times a day.  carvedilol (COREG) 3.125 mg tablet TAKE ONE (1) TABLET(S) TWIC E DAILY WITH FOOD  Indications: chronic heart failure    magnesium oxide 400 mg cap Take 1 Cap by mouth nightly.  cholecalciferol (VITAMIN D3) 1,000 unit cap Take 1,000 Units by mouth daily.  loratadine (CLARITIN) 10 mg tablet Take 10 mg by mouth nightly.  co-enzyme Q-10 (CO Q-10) 100 mg capsule Take 100 mg by mouth daily.  acetaminophen (TYLENOL EXTRA STRENGTH) 500 mg tablet Take 500 mg by mouth every six (6) hours as needed for Pain.  potassium chloride SR (K-TAB) 20 mEq tablet Take 1 Tab by mouth two (2) times a day. No current facility-administered medications for this visit.          I have reviewed the nurses notes, vitals, problem list, allergy list, medical history, family medical, social history and medications. Objective:     Physical Exam:     Vitals:    04/16/19 1201 04/16/19 1209   BP: 97/66 (!) 86/60   Pulse: 73    Resp: 18    SpO2: 97%    Weight: 114 lb (51.7 kg)    Height: 5' 2\" (1.575 m)     Body mass index is 20.85 kg/m². General:Frail, in wheelchair, in no acute distress. HEENT: No carotid bruits, no JVD, trach is midline. Heart:  Normal S1/S2 negative S3 or S4. Regular, no murmur, gallop or rub.   Respiratory: Clear bilaterally, no wheezing or rales  Abdomen:   Soft, non-tender, bowel sounds are active.   Extremities:  No edema, normal cap refill, no cyanosis. Neuro: A&Ox3, speech clear, gait stable. Skin: Skin color is normal. No rashes or lesions. No diaphoresis. Vascular: 2+ pulses symmetric in all extremities        Data Review:       Cardiographics:    EKG: V-paced with probable underlying atrial fib    Cardiology Labs:    Results for orders placed or performed during the hospital encounter of 03/11/19   EKG, 12 LEAD, INITIAL   Result Value Ref Range    Ventricular Rate 72 BPM    Atrial Rate 72 BPM    P-R Interval 130 ms    QRS Duration 162 ms    Q-T Interval 466 ms    QTC Calculation (Bezet) 510 ms    Calculated P Axis 12 degrees    Calculated R Axis -161 degrees    Calculated T Axis 11 degrees    Diagnosis       Atrial-sensed ventricular-paced rhythm  Biventricular pacemaker detected  When compared with ECG of 04-FEB-2019 10:34,  Vent.  rate has decreased BY  13 BPM  Confirmed by Modesto Olson ([id number]) on 3/12/2019 9:56:56 AM         Lab Results   Component Value Date/Time    Cholesterol, total 170 03/01/2018 01:56 PM    HDL Cholesterol 69 03/01/2018 01:56 PM    LDL, calculated 88 03/01/2018 01:56 PM    Triglyceride 64 03/01/2018 01:56 PM    CHOL/HDL Ratio 4.5 08/04/2012 03:15 PM       Lab Results   Component Value Date/Time    Sodium 140 03/17/2019 03:32 AM    Potassium 4.4 03/17/2019 03:32 AM    Chloride 103 03/17/2019 03:32 AM    CO2 31 03/17/2019 03:32 AM Anion gap 6 03/17/2019 03:32 AM    Glucose 86 03/17/2019 03:32 AM    BUN 27 (H) 03/17/2019 03:32 AM    Creatinine 0.93 03/17/2019 03:32 AM    BUN/Creatinine ratio 29 (H) 03/17/2019 03:32 AM    GFR est AA >60 03/17/2019 03:32 AM    GFR est non-AA 56 (L) 03/17/2019 03:32 AM    Calcium 9.1 03/17/2019 03:32 AM    Bilirubin, total 0.4 03/12/2019 03:54 AM    AST (SGOT) 26 03/12/2019 03:54 AM    Alk. phosphatase 101 03/12/2019 03:54 AM    Protein, total 5.6 (L) 03/12/2019 03:54 AM    Albumin 2.6 (L) 03/12/2019 03:54 AM    Globulin 3.0 03/12/2019 03:54 AM    A-G Ratio 0.9 (L) 03/12/2019 03:54 AM    ALT (SGPT) 20 03/12/2019 03:54 AM          Assessment:       ICD-10-CM ICD-9-CM    1. Chronic end-stage systolic heart failure (HCC) with acute exacerbations I50.22 428.22    2. 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 Paroxysmal atrial fibrillation (HCC) I48.0 427.31 AMB POC EKG ROUTINE W/ 12 LEADS, INTER & REP   4. LBBB (left bundle branch block) I44.7 426.3 AMB POC EKG ROUTINE W/ 12 LEADS, INTER & REP   5. Hypertension, unspecified type I10 401.9 AMB POC EKG ROUTINE W/ 12 LEADS, INTER & REP   6. CKD (chronic kidney disease) stage 3, GFR 30-59 ml/min ([facility]) N18.3 585. 3          Discussion: Patient presents at this time stable from a cardiac perspective. End-stage CHF compensated. Has severe end-stage CM. Prognosis poor. In Palliative care. Is DDNR. Pleased with present cardiac status. Plan: 1. Continue same meds. Lipid profile and labs followed by PCP. 2.Encouraged to exercise to tolerance though difficult at present and follow  low sodium predominantly Plant-based (consider Mediterranean) diet. Call with questions or concerns. Will follow up any test results by phone and/or f/u here in office if needed. Lakeshia Jorge 3.Follow up: 3 months or sooner if needed.     I have discussed the diagnosis with the patient and the intended plan as seen in the above orders. The patient has received an after-visit summary and questions were answered concerning future plans. I have discussed any concerning medication side effects and warnings with the patient as well.     Philip Morse MD  4/16/2019

## 2019-04-23 ENCOUNTER — OFFICE VISIT (OUTPATIENT)
Dept: INTERNAL MEDICINE CLINIC | Age: 84
End: 2019-04-23

## 2019-04-23 VITALS
HEIGHT: 62 IN | OXYGEN SATURATION: 94 % | DIASTOLIC BLOOD PRESSURE: 68 MMHG | BODY MASS INDEX: 20.81 KG/M2 | WEIGHT: 113.1 LBS | HEART RATE: 80 BPM | SYSTOLIC BLOOD PRESSURE: 100 MMHG | TEMPERATURE: 98.7 F

## 2019-04-23 DIAGNOSIS — J45.20 MILD INTERMITTENT REACTIVE AIRWAY DISEASE WITHOUT COMPLICATION: ICD-10-CM

## 2019-04-23 DIAGNOSIS — E86.1 HYPOTENSION DUE TO HYPOVOLEMIA: ICD-10-CM

## 2019-04-23 DIAGNOSIS — I50.22 CHRONIC SYSTOLIC CONGESTIVE HEART FAILURE (HCC): ICD-10-CM

## 2019-04-23 DIAGNOSIS — I95.89 HYPOTENSION DUE TO HYPOVOLEMIA: ICD-10-CM

## 2019-04-23 DIAGNOSIS — I48.0 PAROXYSMAL ATRIAL FIBRILLATION (HCC): ICD-10-CM

## 2019-04-23 DIAGNOSIS — E03.9 ACQUIRED HYPOTHYROIDISM: ICD-10-CM

## 2019-04-23 DIAGNOSIS — I42.0 CARDIOMYOPATHY, DILATED, NONISCHEMIC (HCC): Primary | Chronic | ICD-10-CM

## 2019-04-23 NOTE — PROGRESS NOTES
[address] [place] [address] and Primary Care 
[place] [address] 
[address] [facility] [address] Phone:  [phone]  Fax: [phone] Chief Complaint Patient presents with  Follow-up Patient is here for a follow up. .   
 
SUBJECTIVE: 
  Adrian Leiva is a 80 y.o. female Comes in for return visit after a prolonged stay in the nursing home for rehabilitation. She is quite ambulatory currently. She also denies shortness of breath. She is not exercising as she previously did. She also moved into [facility] to live from her home in [place]. She is taking all of the medications she was discharged on during her hospital stay. She has no orthostatic dizziness. Current Outpatient Medications Medication Sig Dispense Refill  allopurinol (ZYLOPRIM) 100 mg tablet TAKE ONE (1) TABLET(S) DAILY 30 Tab 11  
 furosemide (LASIX) 40 mg tablet 1 tablet twice daily 60 Tab 11  
 potassium chloride SR (K-TAB) 20 mEq tablet Take 1 Tab by mouth two (2) times a day. 60 Tab 11  
 dextromethorphan-guaiFENesin (ROBITUSSIN-DM)  mg/5 mL syrup Take 10 mL by mouth every six (6) hours as needed for Cough.  albuterol (PROVENTIL VENTOLIN) 2.5 mg /3 mL (0.083 %) nebulizer solution 2.5 mg by Nebulization route four (4) times daily.  atorvastatin (LIPITOR) 10 mg tablet TAKE ONE (1) TABLET(S) DAILY 90 Tab 3  
 levothyroxine (SYNTHROID) 25 mcg tablet Take 1 Tab by mouth Daily (before breakfast). 90 Tab 3  
 apixaban (ELIQUIS) 2.5 mg tablet Take 1 Tab by mouth two (2) times a day. Indications: PREVENT THROMBOEMBOLISM IN CHRONIC ATRIAL FIBRILLATION 180 Tab 3  
 fluticasone-salmeterol (ADVAIR) 250-50 mcg/dose diskus inhaler Take 1 Puff by inhalation two (2) times a day. 3 Inhaler 3  carvedilol (COREG) 3.125 mg tablet TAKE ONE (1) TABLET(S) TWIC E DAILY WITH FOOD  Indications: chronic heart failure 180 Tab 3  
 magnesium oxide 400 mg cap Take 1 Cap by mouth nightly.  cholecalciferol (VITAMIN D3) 1,000 unit cap Take 1,000 Units by mouth daily.  loratadine (CLARITIN) 10 mg tablet Take 10 mg by mouth nightly.  co-enzyme Q-10 (CO Q-10) 100 mg capsule Take 100 mg by mouth daily.  acetaminophen (TYLENOL EXTRA STRENGTH) 500 mg tablet Take 500 mg by mouth every six (6) hours as needed for Pain. Past Medical History:  
Diagnosis Date  Aortic insufficiency  Asthma  [facility] ([facility] lymphoma  CKD (chronic kidney disease) stage 3, GFR 30-59 ml/min ([facility]) 1/10/2018  Congestive heart failure, NYHA class II, chronic, systolic ([facility])  Heart failure ([facility]  Hypertension  Mitral valvular regurgitation 1/22/2016 ECHO 2/3/17: LV dilated, EF 20-25%, mild LVH, RV mod dilated, mild- mod MELANIA, mod-severe MR, mod AI ECHO 7/29/17: EF 15%, severe DHK, reduced RVEF, RVH, RVSP 35 mmHg  Mild LAE, mod-marked MA fibrosis, mod-severe MR (2+), AO root dilated,    
 Nonrheumatic aortic valve insufficiency 10/16/2018  Presence of biventricular automatic cardioverter/defibrillator (AICD) 7/2/2015 [employer] biventricular AICD implant  Stomach ulcer Past Surgical History:  
Procedure Laterality Date  HX BREAST BIOPSY Bilateral   
 40 years ago  HX COLONOSCOPY    
 HX HEENT    
 cataracts removed  HX HYSTERECTOMY  HX OTHER SURGICAL    
 lymph node surgery  HX TONSILLECTOMY  TX EGD TRANSORAL BIOPSY SINGLE/MULTIPLE  5/23/2012  
    
 SINUS SURGERY PROC UNLISTED Nasal polyps removed Allergies Allergen Reactions  Codeine Other (comments) \"made me disoriented\" per pt REVIEW OF SYSTEMS: 
General: negative for - chills or fever ENT: negative for - headaches, nasal congestion or tinnitus Respiratory: negative for - cough, hemoptysis, shortness of breath or wheezing Cardiovascular : negative for - chest pain, edema, palpitations or shortness of breath Gastrointestinal: negative for - abdominal pain, blood in stools, heartburn or nausea/vomiting Genito-Urinary: no dysuria, trouble voiding, or hematuria Musculoskeletal: negative for - gait disturbance, joint pain, joint stiffness or joint swelling Neurological: no TIA or stroke symptoms Hematologic: no bruises, no bleeding, no swollen glands Integument: no lumps, mole changes, nail changes or rash Endocrine: no malaise/lethargy or unexpected weight changes Social History Socioeconomic History  Marital status:  Spouse name: Not on file  Number of children: 1  Years of education: 16+  Highest education level: Not on file Occupational History  Occupation: retired teacher Tobacco Use  Smoking status: Never Smoker  Smokeless tobacco: Never Used Substance and Sexual Activity  Alcohol use: No  
  Alcohol/week: 0.0 oz  Drug use: No  
 Sexual activity: Never Family History Problem Relation Age of Onset  Heart Disease Mother  Stroke Father OBJECTIVE: 
 
Visit Vitals /68 Pulse 80 Temp 98.7 °F (37.1 °C) Ht 5' 2\" (1.575 m) Wt 113 lb 1.6 oz (51.3 kg) SpO2 94% BMI 20.69 kg/m² CONSTITUTIONAL: well , well nourished, appears age appropriate EYES: perrla, eom intact ENMT:moist mucous membranes, pharynx clear NECK: supple. Thyroid normal,no JVD RESPIRATORY: Chest: clear to ascultation and percussion CARDIOVASCULAR: Heart: regular rate and rhythm GASTROINTESTINAL: Abdomen: soft, bowel sounds active HEMATOLOGIC: no pathological lymph nodes palpated MUSCULOSKELETAL: Extremities: no edema, pulse 1+ INTEGUMENT: No unusual rashes or suspicious skin lesions noted. Nails appear normal. 
NEUROLOGIC: non-focal exam  
MENTAL STATUS: alert and oriented, appropriate affect ASSESSMENT: 
1. 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 Chronic systolic congestive heart failure ([facility] 3. Paroxysmal atrial fibrillation (HCC) 4. Hypotension due to hypovolemia 5. Acquired hypothyroidism 6. Mild intermittent reactive airway disease without complication PLAN: 
 
1. She appears to be well compensated from a cardiac perspective. This is at the expense of her being a bit on the dry side currently. Her BP sitting is 85/60 and standing about the same. Fortunately no orthostatic drop occurs. BMP will be checked, as well as proBNP. 2. She has history of paroxysmal atrial fibrillation, but her rhythm is quite regular today. She remains on her anticoagulant. 3. She does have a history of hypothyroidism, but last TSH was excellent. 4. Her reactive airway disease is also stable. . 
Orders Placed This Encounter  METABOLIC PANEL, BASIC  CBC WITH AUTOMATED DIFF  
 NT-PRO BNP  TSH 3RD GENERATION Follow-up and Dispositions · Return in about 2 months (around 6/23/2019).  
  
 
 
 
Clarisa Arteaga MD

## 2019-04-23 NOTE — PROGRESS NOTES
Chief Complaint Patient presents with  Follow-up Patient is here for a follow up. 1. Have you been to the ER, urgent care clinic since your last visit? Hospitalized since your last visit? Yes Reason for visit:  
 
2. Have you seen or consulted any other health care providers outside of the [address] [place] [address] since your last visit? Include any pap smears or colon screening.  No

## 2019-04-24 LAB
BASOPHILS # BLD AUTO: 0 X10E3/UL (ref 0–0.2)
BASOPHILS NFR BLD AUTO: 0 %
BUN SERPL-MCNC: 31 MG/DL (ref 10–36)
BUN/CREAT SERPL: 28 (ref 12–28)
CALCIUM SERPL-MCNC: 9 MG/DL (ref 8.7–10.3)
CHLORIDE SERPL-SCNC: 100 MMOL/L (ref 96–106)
CO2 SERPL-SCNC: 26 MMOL/L (ref 20–29)
CREAT SERPL-MCNC: 1.12 MG/DL (ref 0.57–1)
EOSINOPHIL # BLD AUTO: 0.1 X10E3/UL (ref 0–0.4)
EOSINOPHIL NFR BLD AUTO: 4 %
ERYTHROCYTE [DISTWIDTH] IN BLOOD BY AUTOMATED COUNT: 17.2 % (ref 12.3–15.4)
GLUCOSE SERPL-MCNC: 90 MG/DL (ref 65–99)
HCT VFR BLD AUTO: 31.8 % (ref 34–46.6)
HGB BLD-MCNC: 10.2 G/DL (ref 11.1–15.9)
IMM GRANULOCYTES # BLD AUTO: 0 X10E3/UL (ref 0–0.1)
IMM GRANULOCYTES NFR BLD AUTO: 1 %
LYMPHOCYTES # BLD AUTO: 1.6 X10E3/UL (ref 0.7–3.1)
LYMPHOCYTES NFR BLD AUTO: 57 %
MCH RBC QN AUTO: 29.7 PG (ref 26.6–33)
MCHC RBC AUTO-ENTMCNC: 32.1 G/DL (ref 31.5–35.7)
MCV RBC AUTO: 93 FL (ref 79–97)
MONOCYTES # BLD AUTO: 0.4 X10E3/UL (ref 0.1–0.9)
MONOCYTES NFR BLD AUTO: 12 %
MORPHOLOGY BLD-IMP: ABNORMAL
NEUTROPHILS # BLD AUTO: 0.7 X10E3/UL (ref 1.4–7)
NEUTROPHILS NFR BLD AUTO: 26 %
NT-PROBNP SERPL-MCNC: 8116 PG/ML (ref 0–738)
PLATELET # BLD AUTO: 174 X10E3/UL (ref 150–379)
POTASSIUM SERPL-SCNC: 5.1 MMOL/L (ref 3.5–5.2)
RBC # BLD AUTO: 3.43 X10E6/UL (ref 3.77–5.28)
SODIUM SERPL-SCNC: 141 MMOL/L (ref 134–144)
TSH SERPL DL<=0.005 MIU/L-ACNC: 2.85 UIU/ML (ref 0.45–4.5)
WBC # BLD AUTO: 2.8 X10E3/UL (ref 3.4–10.8)

## 2019-04-26 ENCOUNTER — PATIENT OUTREACH (OUTPATIENT)
Dept: INTERNAL MEDICINE CLINIC | Age: 84
End: 2019-04-26

## 2019-04-28 NOTE — DISCHARGE SUMMARY
[address] [place] [address] SUMMARY    Name:  Molly Nolen  MR#:  [id number]  :  1925  ACCOUNT #:  [de-identified]  ADMIT DATE:  2019  DISCHARGE DATE:  2019      HISTORY OF PRESENT ILLNESS:  The patient is a 29-year-old debilitated lady, presented to the emergency room because of increasing shortness of breath accompanied by progressive edema. She has a known history of chronic congestive heart failure secondary to reduced systolic function. Apparently, she was not consistently taking her diuretic in the nursing facility where she resided. Past medical history, social history, review of systems, family history, physical examination is as in the admitting H and P.    LABORATORY DATA:  Initial hemoglobin was 8.8, white count 2.6, MCV was 105.1, platelet count was 062,425 with the following differential, 72 segs, 20 lymphocytes, and 8 eosinophils. Repeat hemoglobin was 9.4. Abnormalities on the comprehensive profile on admission with BUN and creatinine of 17 and 0.67. The proBNP was 33,817. At the time of discharge, the BUN and creatinine were 27 and 0.93 respectively. RADIOGRAPHS:  Chest x-ray revealed bilateral pleural effusions and passive atelectasis. CONSULTATIONS:  One consultation was obtained with Dr. Cheyanne Weiss and his comments will be elaborated on the hospital course. HOSPITAL COURSE:  The patient was admitted and was treated aggressively with diuretics and are pre and afterload reducing agents. With this, she gradually diuresed and subjectively felt much better. Her dyspnea abated. She had an excellent diuresis, but continued to have mild edema at the time of discharge. She, however, subjectively felt significantly better. Cardiology agreed with the treatment regimen. FINAL DIAGNOSES:  1. Acute biventricular congestive heart failure. 2.  Cardiomyopathy with reduced systolic function. 3.  Physical deconditioning.   4.  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 5.  Hypothyroidism. 6.  History of diabetes, which is now quiescent. 7.  Primary hypertension. 8.  Dyslipidemia. DISPOSITION:  1. The patient will be discharged back to the nursing home ambulatory on a regular, no-added salt diet. 2.  The patient is a DNR. 3.  Discharge medications include the following; allopurinol 300 mg daily, furosemide 40 mg b.i.d., potassium chloride 20 mEq b.i.d., atorvastatin 10 mg daily, Levoxyl 0.025 mg daily, carvedilol 3.125 mg b.i.d., Mag-Ox 400 mg daily. 4.  To reside in one of the skilled nursing facilities for rehab purposes, and hopefully she can indeed return home.         Renae Car MD      [id number]  D:  04/27/2019 14:02  T:  04/27/2019 17:16  JOB #:  [id number]

## 2019-05-07 ENCOUNTER — PATIENT OUTREACH (OUTPATIENT)
Dept: INTERNAL MEDICINE CLINIC | Age: 84
End: 2019-05-07

## 2019-05-09 ENCOUNTER — OFFICE VISIT (OUTPATIENT)
Dept: INTERNAL MEDICINE CLINIC | Age: 84
End: 2019-05-09

## 2019-05-09 VITALS
TEMPERATURE: 99 F | HEIGHT: 62 IN | RESPIRATION RATE: 18 BRPM | OXYGEN SATURATION: 94 % | BODY MASS INDEX: 20.48 KG/M2 | DIASTOLIC BLOOD PRESSURE: 65 MMHG | WEIGHT: 111.3 LBS | SYSTOLIC BLOOD PRESSURE: 101 MMHG | HEART RATE: 73 BPM

## 2019-05-09 DIAGNOSIS — F32.A DEPRESSION, UNSPECIFIED DEPRESSION TYPE: ICD-10-CM

## 2019-05-09 DIAGNOSIS — C85.91 NON-HODGKIN LYMPHOMA OF LYMPH NODES OF NECK, UNSPECIFIED NON-HODGKIN LYMPHOMA TYPE (HCC): ICD-10-CM

## 2019-05-09 DIAGNOSIS — I50.22 CHRONIC SYSTOLIC CONGESTIVE HEART FAILURE (HCC): ICD-10-CM

## 2019-05-09 DIAGNOSIS — E03.9 ACQUIRED HYPOTHYROIDISM: ICD-10-CM

## 2019-05-09 DIAGNOSIS — R53.1 WEAKNESS: ICD-10-CM

## 2019-05-09 DIAGNOSIS — R50.9 FEVER, UNSPECIFIED FEVER CAUSE: Primary | ICD-10-CM

## 2019-05-09 PROBLEM — J30.2 SEASONAL ALLERGIC RHINITIS: Status: RESOLVED | Noted: 2018-07-02 | Resolved: 2019-05-09

## 2019-05-09 PROBLEM — G93.40 ACUTE ENCEPHALOPATHY: Status: RESOLVED | Noted: 2019-02-04 | Resolved: 2019-05-09

## 2019-05-09 PROBLEM — R06.89 ACUTE RESPIRATORY INSUFFICIENCY: Status: RESOLVED | Noted: 2017-02-02 | Resolved: 2019-05-09

## 2019-05-09 PROBLEM — E86.1 HYPOTENSION DUE TO HYPOVOLEMIA: Status: RESOLVED | Noted: 2019-02-07 | Resolved: 2019-05-09

## 2019-05-09 PROBLEM — R63.0 POOR APPETITE: Status: RESOLVED | Noted: 2019-02-07 | Resolved: 2019-05-09

## 2019-05-09 PROBLEM — I50.23 ACUTE ON CHRONIC SYSTOLIC HEART FAILURE (HCC): Status: RESOLVED | Noted: 2019-03-11 | Resolved: 2019-05-09

## 2019-05-09 PROBLEM — I95.89 HYPOTENSION DUE TO HYPOVOLEMIA: Status: RESOLVED | Noted: 2019-02-07 | Resolved: 2019-05-09

## 2019-05-09 PROBLEM — R19.7 DIARRHEA: Status: RESOLVED | Noted: 2019-02-04 | Resolved: 2019-05-09

## 2019-05-09 PROBLEM — I72.9 ANEURYSMAL DILATATION (HCC): Status: RESOLVED | Noted: 2018-03-18 | Resolved: 2019-05-09

## 2019-05-09 PROBLEM — R06.09 DOE (DYSPNEA ON EXERTION): Status: RESOLVED | Noted: 2017-02-02 | Resolved: 2019-05-09

## 2019-05-09 PROBLEM — M25.561 ACUTE PAIN OF RIGHT KNEE: Status: RESOLVED | Noted: 2019-02-07 | Resolved: 2019-05-09

## 2019-05-09 NOTE — PROGRESS NOTES
1. Have you been to the ER, urgent care clinic since your last visit? Hospitalized since your last visit? No 
 
2. Have you seen or consulted any other health care providers outside of the [address] [place] [address] since your last visit? Include any pap smears or colon screening. No  
 
 
Patient has a note from her assisted living facility

## 2019-05-10 LAB
ALBUMIN SERPL-MCNC: 3.5 G/DL (ref 3.2–4.6)
ALBUMIN/GLOB SERPL: 1.3 {RATIO} (ref 1.2–2.2)
ALP SERPL-CCNC: 85 IU/L (ref 39–117)
ALT SERPL-CCNC: 10 IU/L (ref 0–32)
APPEARANCE UR: CLEAR
AST SERPL-CCNC: 27 IU/L (ref 0–40)
BACTERIA #/AREA URNS HPF: ABNORMAL /[HPF]
BASOPHILS # BLD AUTO: 0 X10E3/UL (ref 0–0.2)
BASOPHILS NFR BLD AUTO: 0 %
BILIRUB SERPL-MCNC: 0.3 MG/DL (ref 0–1.2)
BILIRUB UR QL STRIP: NEGATIVE
BUN SERPL-MCNC: 29 MG/DL (ref 10–36)
BUN/CREAT SERPL: 23 (ref 12–28)
CALCIUM SERPL-MCNC: 8.8 MG/DL (ref 8.7–10.3)
CASTS URNS MICRO: ABNORMAL
CASTS URNS QL MICRO: PRESENT /LPF
CHLORIDE SERPL-SCNC: 94 MMOL/L (ref 96–106)
CO2 SERPL-SCNC: 25 MMOL/L (ref 20–29)
COLOR UR: YELLOW
CREAT SERPL-MCNC: 1.25 MG/DL (ref 0.57–1)
EOSINOPHIL # BLD AUTO: 0 X10E3/UL (ref 0–0.4)
EOSINOPHIL NFR BLD AUTO: 0 %
EPI CELLS #/AREA URNS HPF: ABNORMAL /HPF (ref 0–10)
ERYTHROCYTE [DISTWIDTH] IN BLOOD BY AUTOMATED COUNT: 16.8 % (ref 12.3–15.4)
GLOBULIN SER CALC-MCNC: 2.7 G/DL (ref 1.5–4.5)
GLUCOSE SERPL-MCNC: 78 MG/DL (ref 65–99)
GLUCOSE UR QL: NEGATIVE
HCT VFR BLD AUTO: 33.8 % (ref 34–46.6)
HGB BLD-MCNC: 10.6 G/DL (ref 11.1–15.9)
HGB UR QL STRIP: NEGATIVE
KETONES UR QL STRIP: NEGATIVE
LEUKOCYTE ESTERASE UR QL STRIP: ABNORMAL
LYMPHOCYTES # BLD AUTO: 1.6 X10E3/UL (ref 0.7–3.1)
LYMPHOCYTES NFR BLD AUTO: 62 %
MCH RBC QN AUTO: 28 PG (ref 26.6–33)
MCHC RBC AUTO-ENTMCNC: 31.4 G/DL (ref 31.5–35.7)
MCV RBC AUTO: 89 FL (ref 79–97)
MICRO URNS: ABNORMAL
MONOCYTES # BLD AUTO: 0 X10E3/UL (ref 0.1–0.9)
MONOCYTES NFR BLD AUTO: 1 %
MORPHOLOGY BLD-IMP: ABNORMAL
MUCOUS THREADS URNS QL MICRO: PRESENT
NEUTROPHILS # BLD AUTO: 1 X10E3/UL (ref 1.4–7)
NEUTROPHILS NFR BLD AUTO: 37 %
NITRITE UR QL STRIP: NEGATIVE
PH UR STRIP: 7 [PH] (ref 5–7.5)
PLATELET # BLD AUTO: 223 X10E3/UL (ref 150–379)
POTASSIUM SERPL-SCNC: 4.9 MMOL/L (ref 3.5–5.2)
PROT SERPL-MCNC: 6.2 G/DL (ref 6–8.5)
PROT UR QL STRIP: ABNORMAL
RBC # BLD AUTO: 3.78 X10E6/UL (ref 3.77–5.28)
RBC #/AREA URNS HPF: ABNORMAL /HPF (ref 0–2)
SODIUM SERPL-SCNC: 134 MMOL/L (ref 134–144)
SP GR UR: 1.01 (ref 1–1.03)
TSH SERPL DL<=0.005 MIU/L-ACNC: 3.27 UIU/ML (ref 0.45–4.5)
UROBILINOGEN UR STRIP-MCNC: 0.2 MG/DL (ref 0.2–1)
WBC # BLD AUTO: 2.6 X10E3/UL (ref 3.4–10.8)
WBC #/AREA URNS HPF: ABNORMAL /HPF (ref 0–5)

## 2019-05-10 NOTE — PROGRESS NOTES
[address] [facility]  [place] [address]  [address] [place] [id number]  Phone:  [phone]  Fax: [phone]       Chief Complaint   Patient presents with    Hypertension   . SUBJECTIVE:    Feliciano Man is a 80 y.o. female Comes in for return visit, a bit sullen. According to comments made by the staff, she is much more withdrawn, quiet and has a reduction in her PO intake. She has lost several pounds since I last saw her. She does not complain of anything. She specifically denies any dyspnea on exertion, orthopnea or PND. Generally her functional status has deteriorated since being out of rehab, to the point where she can no longer exist in her previous home setting. She has a past history of gout, reactive airway disease, dyslipidemia and hypothyroidism. Current Outpatient Medications   Medication Sig Dispense Refill    allopurinol (ZYLOPRIM) 100 mg tablet TAKE ONE (1) TABLET(S) DAILY 30 Tab 11    furosemide (LASIX) 40 mg tablet 1 tablet twice daily 60 Tab 11    potassium chloride SR (K-TAB) 20 mEq tablet Take 1 Tab by mouth two (2) times a day. 60 Tab 11    dextromethorphan-guaiFENesin (ROBITUSSIN-DM)  mg/5 mL syrup Take 10 mL by mouth every six (6) hours as needed for Cough.  albuterol (PROVENTIL VENTOLIN) 2.5 mg /3 mL (0.083 %) nebulizer solution 2.5 mg by Nebulization route four (4) times daily.  atorvastatin (LIPITOR) 10 mg tablet TAKE ONE (1) TABLET(S) DAILY 90 Tab 3    levothyroxine (SYNTHROID) 25 mcg tablet Take 1 Tab by mouth Daily (before breakfast). 90 Tab 3    apixaban (ELIQUIS) 2.5 mg tablet Take 1 Tab by mouth two (2) times a day. Indications: PREVENT THROMBOEMBOLISM IN CHRONIC ATRIAL FIBRILLATION 180 Tab 3    fluticasone-salmeterol (ADVAIR) 250-50 mcg/dose diskus inhaler Take 1 Puff by inhalation two (2) times a day.  3 Inhaler 3    carvedilol (COREG) 3.125 mg tablet TAKE ONE (1) TABLET(S) TWIC E DAILY WITH FOOD Indications: chronic heart failure 180 Tab 3    magnesium oxide 400 mg cap Take 1 Cap by mouth nightly.  cholecalciferol (VITAMIN D3) 1,000 unit cap Take 1,000 Units by mouth daily.  loratadine (CLARITIN) 10 mg tablet Take 10 mg by mouth nightly.  co-enzyme Q-10 (CO Q-10) 100 mg capsule Take 100 mg by mouth daily.  acetaminophen (TYLENOL EXTRA STRENGTH) 500 mg tablet Take 500 mg by mouth every six (6) hours as needed for Pain.        Past Medical History:   Diagnosis Date    Aortic insufficiency     Asthma     Cancer ([facility] [address]     lymphoma    CKD (chronic kidney disease) stage 3, GFR 30-59 ml/min ([facility]) 1/10/2018    Congestive heart failure, NYHA class II, chronic, systolic ([facility])     Heart failure ([facility] [address]     Hypertension     Mitral valvular regurgitation 1/22/2016    ECHO 2/3/17: LV dilated, EF 20-25%, mild LVH, RV mod dilated, mild- mod MELANIA, mod-severe MR, mod AI ECHO 7/29/17: EF 15%, severe DHK, reduced RVEF, RVH, RVSP 35 mmHg  Mild LAE, mod-marked MA fibrosis, mod-severe MR (2+), AO root dilated,      Nonrheumatic aortic valve insufficiency 10/16/2018    Presence of biventricular automatic cardioverter/defibrillator (AICD) 7/2/2015    [employer] biventricular AICD implant    Stomach ulcer      Past Surgical History:   Procedure Laterality Date    HX BREAST BIOPSY Bilateral     40 years ago    HX COLONOSCOPY      HX HEENT      cataracts removed    HX HYSTERECTOMY      HX OTHER SURGICAL      lymph node surgery    HX TONSILLECTOMY      IA EGD TRANSORAL BIOPSY SINGLE/MULTIPLE  5/23/2012         SINUS SURGERY PROC UNLISTED      Nasal polyps removed     Allergies   Allergen Reactions    Codeine Other (comments)     \"made me disoriented\" per pt         REVIEW OF SYSTEMS:  General: negative for - chills or fever  ENT: negative for - headaches, nasal congestion or tinnitus, decreased hearing acuity compensated  Respiratory: negative for - cough, hemoptysis, shortness of breath or wheezing  Cardiovascular : negative for - chest pain, edema, palpitations or shortness of breath  Gastrointestinal: negative for - abdominal pain, blood in stools, heartburn or nausea/vomiting  Genito-Urinary: no dysuria, trouble voiding, or hematuria  Musculoskeletal: negative for - gait disturbance, joint pain, joint stiffness or joint swelling  Neurological: no TIA or stroke symptoms  Hematologic: no bruises, no bleeding, no swollen glands  Integument: no lumps, mole changes, nail changes or rash  Endocrine: no malaise/lethargy or unexpected weight changes      Social History     Socioeconomic History    Marital status:      Spouse name: Not on file    Number of children: 1    Years of education: 16+    Highest education level: Master's degree (e.g., MA, MS, [place], MEd, MSW, LUKE)   Occupational History    Occupation: retired teacher   Tobacco Use    Smoking status: Never Smoker    Smokeless tobacco: Never Used   Substance and Sexual Activity    Alcohol use: No     Alcohol/week: 0.0 oz    Drug use: No    Sexual activity: Never     Family History   Problem Relation Age of Onset    Heart Disease Mother     Stroke Father        OBJECTIVE:    Visit Vitals  /65   Pulse 73   Temp 99 °F (37.2 °C) (Oral)   Resp 18   Ht 5' 2\" (1.575 m)   Wt 111 lb 4.8 oz (50.5 kg)   SpO2 94%   BMI 20.36 kg/m²     CONSTITUTIONAL: well , well nourished, appears age appropriate  EYES: perrla, eom intact  ENMT:moist mucous membranes, pharynx clear  NECK: supple. Thyroid normal,no JVD  RESPIRATORY: Chest: clear to ascultation and percussion   CARDIOVASCULAR: Heart: regular rate and rhythm  GASTROINTESTINAL: Abdomen: soft, bowel sounds active  HEMATOLOGIC: no pathological lymph nodes palpated  MUSCULOSKELETAL: Extremities: no edema, pulse 1+   INTEGUMENT: No unusual rashes or suspicious skin lesions noted.  Nails appear normal.  NEUROLOGIC: non-focal exam   MENTAL STATUS: alert and oriented, appropriate affect      ASSESSMENT:  1. Fever, unspecified fever cause    2. Weakness    3. Chronic systolic congestive heart failure ([facility] [address]    4. Acquired hypothyroidism    5. Depression, unspecified depression type    6. 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 The patient has a low grade fever. I am not entirely sure this is causing a significant problem currently. 2. It is quite possible she could be depressed, which would not surprise me. She is accustomed to living independently, which she can no longer do. I suspect this is quite frustrating for her. 3. Her weakness is related to progressive deconditioning treated by her lack of being physically active. 4. There are no overt signs of CHF today. 5. She does have a history of hypothyroidism and historically she has been euthyroid since her last assay. 6. Her Hodgkin's disease is no longer being treated. This is primarily because of her debilitative status that has evolved over the last several months. .  Orders Placed This Encounter    CULTURE, URINE    CBC WITH AUTOMATED DIFF    URINALYSIS W/ RFLX MICROSCOPIC    METABOLIC PANEL, COMPREHENSIVE    TSH 3RD GENERATION         Follow-up and Dispositions    · Return in about 2 weeks (around 5/23/2019).            Genesis Tay MD

## 2019-05-12 LAB — BACTERIA UR CULT: NORMAL

## 2019-05-13 ENCOUNTER — APPOINTMENT (OUTPATIENT)
Dept: GENERAL RADIOLOGY | Age: 84
End: 2019-05-13
Attending: EMERGENCY MEDICINE
Payer: MEDICARE

## 2019-05-13 ENCOUNTER — HOSPITAL ENCOUNTER (EMERGENCY)
Age: 84
Discharge: HOME | End: 2019-05-13
Attending: EMERGENCY MEDICINE
Payer: MEDICARE

## 2019-05-13 VITALS
TEMPERATURE: 97.9 F | RESPIRATION RATE: 15 BRPM | HEART RATE: 76 BPM | SYSTOLIC BLOOD PRESSURE: 102 MMHG | OXYGEN SATURATION: 99 % | DIASTOLIC BLOOD PRESSURE: 69 MMHG

## 2019-05-13 DIAGNOSIS — J02.9 PHARYNGITIS, UNSPECIFIED ETIOLOGY: Primary | ICD-10-CM

## 2019-05-13 LAB
ALBUMIN SERPL-MCNC: 2.4 G/DL (ref 3.5–5)
ALBUMIN/GLOB SERPL: 0.6 {RATIO} (ref 1.1–2.2)
ALP SERPL-CCNC: 78 U/L (ref 45–117)
ALT SERPL-CCNC: 12 U/L (ref 12–78)
ANION GAP SERPL CALC-SCNC: 4 MMOL/L (ref 5–15)
APPEARANCE UR: CLEAR
AST SERPL-CCNC: 24 U/L (ref 15–37)
ATRIAL RATE: 76 BPM
BACTERIA URNS QL MICRO: NEGATIVE /HPF
BASOPHILS # BLD: 0 K/UL (ref 0–0.1)
BASOPHILS NFR BLD: 0 % (ref 0–1)
BILIRUB SERPL-MCNC: 0.4 MG/DL (ref 0.2–1)
BILIRUB UR QL: NEGATIVE
BUN SERPL-MCNC: 27 MG/DL (ref 6–20)
BUN/CREAT SERPL: 25 (ref 12–20)
CALCIUM SERPL-MCNC: 8.6 MG/DL (ref 8.5–10.1)
CALCULATED P AXIS, ECG09: 126 DEGREES
CALCULATED R AXIS, ECG10: -138 DEGREES
CALCULATED T AXIS, ECG11: 1 DEGREES
CHLORIDE SERPL-SCNC: 104 MMOL/L (ref 97–108)
CO2 SERPL-SCNC: 28 MMOL/L (ref 21–32)
COLOR UR: NORMAL
COMMENT, HOLDF: NORMAL
CREAT SERPL-MCNC: 1.09 MG/DL (ref 0.55–1.02)
DEPRECATED S PYO AG THROAT QL EIA: NEGATIVE
DIAGNOSIS, 93000: NORMAL
DIFFERENTIAL METHOD BLD: ABNORMAL
EOSINOPHIL # BLD: 0.1 K/UL (ref 0–0.4)
EOSINOPHIL NFR BLD: 2 % (ref 0–7)
EPITH CASTS URNS QL MICRO: NORMAL /LPF
ERYTHROCYTE [DISTWIDTH] IN BLOOD BY AUTOMATED COUNT: 16.5 % (ref 11.5–14.5)
GLOBULIN SER CALC-MCNC: 4 G/DL (ref 2–4)
GLUCOSE SERPL-MCNC: 100 MG/DL (ref 65–100)
GLUCOSE UR STRIP.AUTO-MCNC: NEGATIVE MG/DL
HCT VFR BLD AUTO: 29.3 % (ref 35–47)
HGB BLD-MCNC: 9.7 G/DL (ref 11.5–16)
HGB UR QL STRIP: NEGATIVE
HYALINE CASTS URNS QL MICRO: NORMAL /LPF (ref 0–5)
IMM GRANULOCYTES # BLD AUTO: 0 K/UL (ref 0–0.04)
IMM GRANULOCYTES NFR BLD AUTO: 0 % (ref 0–0.5)
KETONES UR QL STRIP.AUTO: NEGATIVE MG/DL
LACTATE SERPL-SCNC: 0.9 MMOL/L (ref 0.4–2)
LEUKOCYTE ESTERASE UR QL STRIP.AUTO: NEGATIVE
LYMPHOCYTES # BLD: 1.3 K/UL (ref 0.8–3.5)
LYMPHOCYTES NFR BLD: 53 % (ref 12–49)
MCH RBC QN AUTO: 29.7 PG (ref 26–34)
MCHC RBC AUTO-ENTMCNC: 33.1 G/DL (ref 30–36.5)
MCV RBC AUTO: 89.6 FL (ref 80–99)
MONOCYTES # BLD: 0.1 K/UL (ref 0–1)
MONOCYTES NFR BLD: 5 % (ref 5–13)
NEUTS BAND NFR BLD MANUAL: 3 %
NEUTS SEG # BLD: 1 K/UL (ref 1.8–8)
NEUTS SEG NFR BLD: 37 % (ref 32–75)
NITRITE UR QL STRIP.AUTO: NEGATIVE
NRBC # BLD: 0 K/UL (ref 0–0.01)
NRBC BLD-RTO: 0 PER 100 WBC
PH UR STRIP: 5 [PH] (ref 5–8)
PLATELET # BLD AUTO: 210 K/UL (ref 150–400)
PMV BLD AUTO: 10.3 FL (ref 8.9–12.9)
POTASSIUM SERPL-SCNC: 4.7 MMOL/L (ref 3.5–5.1)
PROT SERPL-MCNC: 6.4 G/DL (ref 6.4–8.2)
PROT UR STRIP-MCNC: NEGATIVE MG/DL
Q-T INTERVAL, ECG07: 488 MS
QRS DURATION, ECG06: 164 MS
QTC CALCULATION (BEZET), ECG08: 541 MS
RBC # BLD AUTO: 3.27 M/UL (ref 3.8–5.2)
RBC #/AREA URNS HPF: NORMAL /HPF (ref 0–5)
RBC MORPH BLD: ABNORMAL
SAMPLES BEING HELD,HOLD: NORMAL
SODIUM SERPL-SCNC: 136 MMOL/L (ref 136–145)
SP GR UR REFRACTOMETRY: 1.02 (ref 1–1.03)
UA: UC IF INDICATED,UAUC: NORMAL
UROBILINOGEN UR QL STRIP.AUTO: 0.2 EU/DL (ref 0.2–1)
VENTRICULAR RATE, ECG03: 74 BPM
WBC # BLD AUTO: 2.5 K/UL (ref 3.6–11)
WBC URNS QL MICRO: NORMAL /HPF (ref 0–4)

## 2019-05-13 PROCEDURE — 85025 COMPLETE CBC W/AUTO DIFF WBC: CPT

## 2019-05-13 PROCEDURE — 87880 STREP A ASSAY W/OPTIC: CPT

## 2019-05-13 PROCEDURE — 93005 ELECTROCARDIOGRAM TRACING: CPT

## 2019-05-13 PROCEDURE — 81001 URINALYSIS AUTO W/SCOPE: CPT

## 2019-05-13 PROCEDURE — 36415 COLL VENOUS BLD VENIPUNCTURE: CPT

## 2019-05-13 PROCEDURE — 87070 CULTURE OTHR SPECIMN AEROBIC: CPT

## 2019-05-13 PROCEDURE — 99285 EMERGENCY DEPT VISIT HI MDM: CPT

## 2019-05-13 PROCEDURE — 83605 ASSAY OF LACTIC ACID: CPT

## 2019-05-13 PROCEDURE — 74011250637 HC RX REV CODE- 250/637: Performed by: EMERGENCY MEDICINE

## 2019-05-13 PROCEDURE — 80053 COMPREHEN METABOLIC PANEL: CPT

## 2019-05-13 PROCEDURE — 96361 HYDRATE IV INFUSION ADD-ON: CPT

## 2019-05-13 PROCEDURE — 74011250636 HC RX REV CODE- 250/636: Performed by: EMERGENCY MEDICINE

## 2019-05-13 PROCEDURE — 71046 X-RAY EXAM CHEST 2 VIEWS: CPT

## 2019-05-13 PROCEDURE — 96360 HYDRATION IV INFUSION INIT: CPT

## 2019-05-13 RX ORDER — ACETAMINOPHEN 325 MG/1
650 TABLET ORAL ONCE
Status: COMPLETED | OUTPATIENT
Start: 2019-05-13 | End: 2019-05-13

## 2019-05-13 RX ADMIN — SODIUM CHLORIDE 500 ML: 900 INJECTION, SOLUTION INTRAVENOUS at 19:21

## 2019-05-13 RX ADMIN — SODIUM CHLORIDE 500 ML: 900 INJECTION, SOLUTION INTRAVENOUS at 15:36

## 2019-05-13 RX ADMIN — ACETAMINOPHEN 650 MG: 325 TABLET ORAL at 15:48

## 2019-05-13 NOTE — ED PROVIDER NOTES
EMERGENCY DEPARTMENT HISTORY AND PHYSICAL EXAM 
 
 
Date: 5/13/2019 Patient Name: Irma Paredes History of Presenting Illness Chief Complaint Patient presents with  Lethargy  Sore Throat History Provided By: Patient and nursing home HPI: Irma Paredes, 80 y.o. female with PMHx significant for CKD, congestive heart failure, hypertension, presents to the ED with cc of sore throat and reports of lethargy. Patient really is only complaining of a sore throat which began this morning. She did not take anything for her symptoms. Was sent from the nursing facility due to concerns for \"lethargy. \"  Was evaluated by her primary care earlier this week for similar symptoms with an unremarkable work-up at that time. Patient denies any chest pain, cough or cold symptoms, shortness of breath, abdominal pain, vomiting, diarrhea, urinary symptoms. I spoke with the patient's nursing facility, with [facility], states that the patient had been more \"lethargic\" which they described as \"failure to thrive. \"  For the last week or so and have been running fevers there that they were treating with Tylenol. PCP: Oralia Aggarwal MD 
 
There are no other complaints, changes, or physical findings at this time. Current Outpatient Medications Medication Sig Dispense Refill  allopurinol (ZYLOPRIM) 100 mg tablet TAKE ONE (1) TABLET(S) DAILY 30 Tab 11  
 furosemide (LASIX) 40 mg tablet 1 tablet twice daily 60 Tab 11  
 potassium chloride SR (K-TAB) 20 mEq tablet Take 1 Tab by mouth two (2) times a day. 60 Tab 11  
 dextromethorphan-guaiFENesin (ROBITUSSIN-DM)  mg/5 mL syrup Take 10 mL by mouth every six (6) hours as needed for Cough.  albuterol (PROVENTIL VENTOLIN) 2.5 mg /3 mL (0.083 %) nebulizer solution 2.5 mg by Nebulization route four (4) times daily.     
 atorvastatin (LIPITOR) 10 mg tablet TAKE ONE (1) TABLET(S) DAILY 90 Tab 3  
  levothyroxine (SYNTHROID) 25 mcg tablet Take 1 Tab by mouth Daily (before breakfast). 90 Tab 3  
 apixaban (ELIQUIS) 2.5 mg tablet Take 1 Tab by mouth two (2) times a day. Indications: PREVENT THROMBOEMBOLISM IN CHRONIC ATRIAL FIBRILLATION 180 Tab 3  
 fluticasone-salmeterol (ADVAIR) 250-50 mcg/dose diskus inhaler Take 1 Puff by inhalation two (2) times a day. 3 Inhaler 3  carvedilol (COREG) 3.125 mg tablet TAKE ONE (1) TABLET(S) TWIC E DAILY WITH FOOD  Indications: chronic heart failure 180 Tab 3  
 magnesium oxide 400 mg cap Take 1 Cap by mouth nightly.  cholecalciferol (VITAMIN D3) 1,000 unit cap Take 1,000 Units by mouth daily.  loratadine (CLARITIN) 10 mg tablet Take 10 mg by mouth nightly.  co-enzyme Q-10 (CO Q-10) 100 mg capsule Take 100 mg by mouth daily.  acetaminophen (TYLENOL EXTRA STRENGTH) 500 mg tablet Take 500 mg by mouth every six (6) hours as needed for Pain. Past History Past Medical History: 
Past Medical History:  
Diagnosis Date  Aortic insufficiency  Asthma  Cancer ([facility] [address] lymphoma  CKD (chronic kidney disease) stage 3, GFR 30-59 ml/min ([facility]) 1/10/2018  Congestive heart failure, NYHA class II, chronic, systolic ([facility])  Heart failure ([facility] [address]  Hypertension  Mitral valvular regurgitation 1/22/2016 ECHO 2/3/17: LV dilated, EF 20-25%, mild LVH, RV mod dilated, mild- mod MELANIA, mod-severe MR, mod AI ECHO 7/29/17: EF 15%, severe DHK, reduced RVEF, RVH, RVSP 35 mmHg  Mild LAE, mod-marked MA fibrosis, mod-severe MR (2+), AO root dilated,    
 Nonrheumatic aortic valve insufficiency 10/16/2018  Presence of biventricular automatic cardioverter/defibrillator (AICD) 7/2/2015 [employer] biventricular AICD implant  Stomach ulcer Past Surgical History: 
Past Surgical History:  
Procedure Laterality Date  HX BREAST BIOPSY Bilateral   
 40 years ago  HX COLONOSCOPY    
 HX HEENT    
 cataracts removed  HX HYSTERECTOMY  HX OTHER SURGICAL    
 lymph node surgery  HX TONSILLECTOMY  IN EGD TRANSORAL BIOPSY SINGLE/MULTIPLE  5/23/2012  
    
 SINUS SURGERY PROC UNLISTED Nasal polyps removed Family History: 
Family History Problem Relation Age of Onset  Heart Disease Mother  Stroke Father Social History: 
Social History Tobacco Use  Smoking status: Never Smoker  Smokeless tobacco: Never Used Substance Use Topics  Alcohol use: No  
  Alcohol/week: 0.0 oz  Drug use: No  
 
Allergies: Allergies Allergen Reactions  Codeine Other (comments) \"made me disoriented\" per pt Review of Systems Review of Systems Constitutional: Negative for chills and fever. HENT: Positive for sore throat. Negative for congestion and rhinorrhea. Respiratory: Negative for cough and shortness of breath. Cardiovascular: Negative for chest pain. Gastrointestinal: Negative for abdominal pain, nausea and vomiting. Genitourinary: Negative for dysuria and urgency. Skin: Negative for rash. Neurological: Negative for dizziness, light-headedness and headaches. All other systems reviewed and are negative. Physical Exam  
Physical Exam  
Constitutional: She is oriented to person, place, and time. She appears well-developed. No distress. HENT:  
Head: Normocephalic and atraumatic. Mouth/Throat: Posterior oropharyngeal erythema present. No oropharyngeal exudate or posterior oropharyngeal edema. Eyes: Pupils are equal, round, and reactive to light. Conjunctivae and EOM are normal.  
Neck: Normal range of motion. Cardiovascular: Normal rate, regular rhythm and intact distal pulses. Pulmonary/Chest: Effort normal and breath sounds normal. No stridor. No respiratory distress. Abdominal: Soft. She exhibits no distension. There is no tenderness. Musculoskeletal: Normal range of motion. Neurological: She is alert and oriented to person, place, and time. Skin: Skin is warm and dry. Psychiatric: She has a normal mood and affect. Nursing note and vitals reviewed. Diagnostic Study Results Labs - Recent Results (from the past 12 hour(s)) EKG, 12 LEAD, INITIAL Collection Time: 05/13/19  2:21 PM  
Result Value Ref Range Ventricular Rate 74 BPM  
 Atrial Rate 76 BPM  
 QRS Duration 164 ms Q-T Interval 488 ms QTC Calculation (Bezet) 541 ms Calculated P Axis 126 degrees Calculated R Axis -138 degrees Calculated T Axis 1 degrees Diagnosis Ventricular-paced rhythm Biventricular pacemaker detected Confirmed by Alva Magaña ([id number]) on 5/13/2019 5:25:05 PM 
  
CBC WITH AUTOMATED DIFF Collection Time: 05/13/19  2:36 PM  
Result Value Ref Range WBC 2.5 (L) 3.6 - 11.0 K/uL  
 RBC 3.27 (L) 3.80 - 5.20 M/uL HGB 9.7 (L) 11.5 - 16.0 g/dL HCT 29.3 (L) 35.0 - 47.0 % MCV 89.6 80.0 - 99.0 FL  
 MCH 29.7 26.0 - 34.0 PG  
 MCHC 33.1 30.0 - 36.5 g/dL  
 RDW 16.5 (H) 11.5 - 14.5 % PLATELET 946 786 - 040 K/uL MPV 10.3 8.9 - 12.9 FL  
 NRBC 0.0 0  WBC ABSOLUTE NRBC 0.00 0.00 - 0.01 K/uL NEUTROPHILS 37 32 - 75 % BAND NEUTROPHILS 3 % LYMPHOCYTES 53 (H) 12 - 49 % MONOCYTES 5 5 - 13 % EOSINOPHILS 2 0 - 7 % BASOPHILS 0 0 - 1 % IMMATURE GRANULOCYTES 0 0.0 - 0.5 % ABS. NEUTROPHILS 1.0 (L) 1.8 - 8.0 K/UL  
 ABS. LYMPHOCYTES 1.3 0.8 - 3.5 K/UL  
 ABS. MONOCYTES 0.1 0.0 - 1.0 K/UL  
 ABS. EOSINOPHILS 0.1 0.0 - 0.4 K/UL  
 ABS. BASOPHILS 0.0 0.0 - 0.1 K/UL  
 ABS. IMM. GRANS. 0.0 0.00 - 0.04 K/UL  
 DF MANUAL    
 RBC COMMENTS OVALOCYTES PRESENT 
    
METABOLIC PANEL, COMPREHENSIVE Collection Time: 05/13/19  2:36 PM  
Result Value Ref Range Sodium 136 136 - 145 mmol/L Potassium 4.7 3.5 - 5.1 mmol/L Chloride 104 97 - 108 mmol/L  
 CO2 28 21 - 32 mmol/L Anion gap 4 (L) 5 - 15 mmol/L Glucose 100 65 - 100 mg/dL BUN 27 (H) 6 - 20 MG/DL  Creatinine 1.09 (H) 0.55 - 1.02 MG/DL  
 BUN/Creatinine ratio 25 (H) 12 - 20 GFR est AA 57 (L) >60 ml/min/1.73m2 GFR est non-AA 47 (L) >60 ml/min/1.73m2 Calcium 8.6 8.5 - 10.1 MG/DL Bilirubin, total 0.4 0.2 - 1.0 MG/DL  
 ALT (SGPT) 12 12 - 78 U/L  
 AST (SGOT) 24 15 - 37 U/L Alk. phosphatase 78 45 - 117 U/L Protein, total 6.4 6.4 - 8.2 g/dL Albumin 2.4 (L) 3.5 - 5.0 g/dL Globulin 4.0 2.0 - 4.0 g/dL A-G Ratio 0.6 (L) 1.1 - 2.2 SAMPLES BEING HELD Collection Time: 05/13/19  2:36 PM  
Result Value Ref Range SAMPLES BEING HELD 1BLUERED COMMENT Add-on orders for these samples will be processed based on acceptable specimen integrity and analyte stability, which may vary by analyte. LACTIC ACID Collection Time: 05/13/19  3:40 PM  
Result Value Ref Range Lactic acid 0.9 0.4 - 2.0 MMOL/L  
STREP AG SCREEN, GROUP A Collection Time: 05/13/19  3:51 PM  
Result Value Ref Range Group A Strep Ag ID NEGATIVE  NEG    
URINALYSIS W/ REFLEX CULTURE Collection Time: 05/13/19  6:50 PM  
Result Value Ref Range Color YELLOW/STRAW Appearance CLEAR CLEAR Specific gravity 1.017 1.003 - 1.030    
 pH (UA) 5.0 5.0 - 8.0 Protein NEGATIVE  NEG mg/dL Glucose NEGATIVE  NEG mg/dL Ketone NEGATIVE  NEG mg/dL Bilirubin NEGATIVE  NEG Blood NEGATIVE  NEG Urobilinogen 0.2 0.2 - 1.0 EU/dL Nitrites NEGATIVE  NEG Leukocyte Esterase NEGATIVE  NEG    
 WBC 0-4 0 - 4 /hpf  
 RBC 0-5 0 - 5 /hpf Epithelial cells FEW FEW /lpf Bacteria NEGATIVE  NEG /hpf  
 UA:UC IF INDICATED CULTURE NOT INDICATED BY UA RESULT CNI Hyaline cast 10-20 0 - 5 /lpf Radiologic Studies -  
XR CHEST PA LAT Final Result IMPRESSION: No acute finding. Xr Chest Pa Lat Result Date: 5/13/2019 IMPRESSION: No acute finding. Medical Decision Making I am the first provider for this patient.  
 
I reviewed the vital signs, available nursing notes, past medical history, past surgical history, family history and social history. Vital Signs-Reviewed the patient's vital signs. Patient Vitals for the past 12 hrs: 
 Temp Pulse Resp BP SpO2  
05/13/19 2130 97.9 °F (36.6 °C) 76 15 102/69 99 % 05/13/19 2041  75 18  95 % 05/13/19 2030  75 22 99/65 95 % 05/13/19 2005  75 19  96 % 05/13/19 2000  75 20 95/60 96 % 05/13/19 1949  74 21  99 % 05/13/19 1945  74 19 96/65   
05/13/19 1930  70 21 100/61 96 % 05/13/19 1921  70 16  98 % 05/13/19 1919  70 25  96 % 05/13/19 1915 98.2 °F (36.8 °C)      
05/13/19 1915  70 22 (!) 88/62 97 % 05/13/19 1903  70 24  96 % 05/13/19 1900  70 20 96/63 96 % 05/13/19 1858  70 21  98 % 05/13/19 1815  70 23 (!) 84/61 97 % 05/13/19 1807  70 23  97 % 05/13/19 1806  70 22  97 % 05/13/19 1802    97/65   
05/13/19 1732  70 21  98 % 05/13/19 1730  71 26 (!) 88/59 97 % 05/13/19 1712  72 14 101/67 99 % 05/13/19 1653  73 17  95 % 05/13/19 1639  70 22  97 % 05/13/19 1630  70 20 90/61 94 % 05/13/19 1616  70 23  97 % 05/13/19 1615  70 21 99/62   
05/13/19 1609  70 24  97 % 05/13/19 1600  70 20 95/59 96 % 05/13/19 1528  70 21  95 % 05/13/19 1525  70 18  95 % 05/13/19 1519  70 21 (!) 89/58 96 % 05/13/19 1515  70 20 (!) 87/58 97 % 05/13/19 1509  70 25  95 % 05/13/19 1500  70 27 (!) 83/60 94 % 05/13/19 1450  70 24  94 % 05/13/19 1445  70 25 (!) 87/57 93 % 05/13/19 1443     98 % 05/13/19 1439  73 19  95 % 05/13/19 1430  73 21 (!) 81/55 95 % 05/13/19 1423    95/59   
05/13/19 1421 98.6 °F (37 °C) 71 14  96 % Pulse Oximetry Analysis - 96% on ra Records Reviewed: Nursing Notes and Old Medical Records Provider Notes (Medical Decision Making):  
Patient presents with a chief complaint of sore throat. Nursing home concern for \"lethargy. \"  Patient is awake and alert with no complaints other than sore throat on my evaluation. Her blood pressure is on the lower side, will give IVF. Appears baseline is around 158 systolic. Will check basic lab work, chest x-ray, urinalysis, rapid strep. ED Course:  
Initial assessment performed. The patients presenting problems have been discussed, and they are in agreement with the care plan formulated and outlined with them. I have encouraged them to ask questions as they arise throughout their visit. Blood pressure improved with IV fluids. Patient continues to have no complaints. Rapid strep negative. Labs unremarkable. Patient be discharged back to her nursing facility. Critical Care: 
none Disposition: 
Discharge Note: 
8:21 PM 
The patient has been re-evaluated and is ready for discharge. Reviewed available results with patient. Counseled patient on diagnosis and care plan. Patient has expressed understanding, and all questions have been answered. Patient agrees with plan and agrees to follow up as recommended, or to return to the ED if their symptoms worsen. Discharge instructions have been provided and explained to the patient, along with reasons to return to the ED. PLAN: 
1. Discharge Medication List as of 5/13/2019  8:21 PM  
  
 
2. Follow-up Information Follow up With Specialties Details Why Contact Info Deneen Davidson MD Internal Medicine Schedule an appointment as soon as possible for a visit  [facility] [address] [place] [address] 
[phone] [facility] EMERGENCY DEPT Emergency Medicine  As needed, If symptoms worsen [address] [facility] 
[phone] Return to ED if worse Diagnosis Clinical Impression: 1. Pharyngitis, unspecified etiology This note will not be viewable in [address]

## 2019-05-14 ENCOUNTER — PATIENT OUTREACH (OUTPATIENT)
Dept: INTERNAL MEDICINE CLINIC | Age: 84
End: 2019-05-14

## 2019-05-14 NOTE — DISCHARGE INSTRUCTIONS
Patient Education        Sore Throat: Care Instructions  Your Care Instructions    Infection by bacteria or a virus causes most sore throats. Cigarette smoke, dry air, air pollution, allergies, and yelling can also cause a sore throat. Sore throats can be painful and annoying. Fortunately, most sore throats go away on their own. If you have a bacterial infection, your doctor may prescribe antibiotics. Follow-up care is a key part of your treatment and safety. Be sure to make and go to all appointments, and call your doctor if you are having problems. It's also a good idea to know your test results and keep a list of the medicines you take. How can you care for yourself at home? · If your doctor prescribed antibiotics, take them as directed. Do not stop taking them just because you feel better. You need to take the full course of antibiotics. · Gargle with warm salt water once an hour to help reduce swelling and relieve discomfort. Use 1 teaspoon of salt mixed in 1 cup of warm water. · Take an over-the-counter pain medicine, such as acetaminophen (Tylenol), ibuprofen (Advil, Motrin), or naproxen (Aleve). Read and follow all instructions on the label. · Be careful when taking over-the-counter cold or flu medicines and Tylenol at the same time. Many of these medicines have acetaminophen, which is Tylenol. Read the labels to make sure that you are not taking more than the recommended dose. Too much acetaminophen (Tylenol) can be harmful. · Drink plenty of fluids. Fluids may help soothe an irritated throat. Hot fluids, such as tea or soup, may help decrease throat pain. · Use over-the-counter throat lozenges to soothe pain. Regular cough drops or hard candy may also help. These should not be given to young children because of the risk of choking. · Do not smoke or allow others to smoke around you. If you need help quitting, talk to your doctor about stop-smoking programs and medicines.  These can increase your chances of quitting for good. · Use a vaporizer or humidifier to add moisture to your bedroom. Follow the directions for cleaning the machine. When should you call for help? Call your doctor now or seek immediate medical care if:    · You have new or worse trouble swallowing.     · Your sore throat gets much worse on one side.    Watch closely for changes in your health, and be sure to contact your doctor if you do not get better as expected. Where can you learn more? Go to http://camelia-bar.info/. Enter [phone] [id number] [phone] in the search box to learn more about \"Sore Throat: Care Instructions. \"  Current as of: March 27, 2018  Content Version: 11.9  © 5812-4989 [employer]. Care instructions adapted under license by [employer] (which disclaims liability or warranty for this information). If you have questions about a medical condition or this instruction, always ask your healthcare professional. [address] any warranty or liability for your use of this information.

## 2019-05-14 NOTE — PROGRESS NOTES
Goals  Initiate and reinforce education of the patient/family about management of disease and lifestyle changes. 5/14/19 
-pt seen in ED yesterday for lethargy, fever and sore throat. WBCs in normal limits, Lactic acid in normal limits. UA normal. Unknown cause of symptoms. Patient given IV fluids and discharged back to [facility]. NN spoke to Carmen Lopes. She reports that patient is doing poorly and has requested that the House Physician at facility see patient. -NN to follow up in 1 week Erica Alejandra  
4/26/19 
-NN educated daughter in law, Carmen Lopes, on Heart Failure progression, Echo results. Carmen Lopes verbalizes understanding. 
Citlalli Borjas shared with NN that she lives in [facility] but checks in with her daily as well as  caregivers and assisted living staff.  
-Carmen Lopes had many questions about what the AICD does during the dying process. NN educated brooke on how AICD operate and what happens when patient is actively dying, and how therapies are usually turned off during that phase. 
Citlalli Phillipsantoinetterobbie reports that patient \" wants to live\" and is not agreeable to Hospice regardless of symptoms or prognosis. -NN to follow up in 1 week Hannah P 
 
  
  Knowledge and adherence medication (ie. action, side effects, missed dose, etc.)   
  4/26/19 
-reports that nursing staff in [facility] take care of all medications 
-NN to follow up in 1 week Hannah P 
  
  Maintains daily weight. 5/14/19 
-brooke reports weight is trending down to 111lbs 
-will weight daily and monitor 
-will report weight gain >3lbs in 1 day or >5lbs in 1 week to provider. -NN to follow up in 1 week. Yaw Jones 4/26/19 
-NN educated patient and DIL Carmen Lopes about heart failure and fluid weight gain. -reports the Aid weighs her daily, unsure of daily measurements. Carmen Lopes states that she thinks she is around 114lbs 
-will record weights for NN 
-will report weight gain >3lbs in 1 day or >5lbs in 1 week to provider. -NN to follow up in 1 week. Holli Simeon

## 2019-05-15 LAB
BACTERIA SPEC CULT: NORMAL
SERVICE CMNT-IMP: NORMAL

## 2019-05-21 ENCOUNTER — PATIENT OUTREACH (OUTPATIENT)
Dept: INTERNAL MEDICINE CLINIC | Age: 84
End: 2019-05-21

## 2019-05-28 ENCOUNTER — PATIENT OUTREACH (OUTPATIENT)
Dept: INTERNAL MEDICINE CLINIC | Age: 84
End: 2019-05-28

## 2019-05-30 NOTE — TELEPHONE ENCOUNTER
Patient to start remeron 15mg 1 tab daily for appetite and she may drink ensure tid per Dr. Satish Carlos.

## 2019-05-31 RX ORDER — MIRTAZAPINE 15 MG/1
15 TABLET, ORALLY DISINTEGRATING ORAL
Qty: 30 TAB | Refills: 6 | Status: ON HOLD | OUTPATIENT
Start: 2019-05-31 | End: 2019-06-25

## 2019-06-04 ENCOUNTER — PATIENT OUTREACH (OUTPATIENT)
Dept: INTERNAL MEDICINE CLINIC | Age: 84
End: 2019-06-04

## 2019-06-11 ENCOUNTER — PATIENT OUTREACH (OUTPATIENT)
Dept: INTERNAL MEDICINE CLINIC | Age: 84
End: 2019-06-11

## 2019-06-25 ENCOUNTER — APPOINTMENT (OUTPATIENT)
Dept: CT IMAGING | Age: 84
DRG: 194 | End: 2019-06-25
Attending: HOSPITALIST
Payer: MEDICARE

## 2019-06-25 ENCOUNTER — APPOINTMENT (OUTPATIENT)
Dept: GENERAL RADIOLOGY | Age: 84
DRG: 194 | End: 2019-06-25
Attending: EMERGENCY MEDICINE
Payer: MEDICARE

## 2019-06-25 ENCOUNTER — HOSPITAL ENCOUNTER (INPATIENT)
Age: 84
LOS: 7 days | Discharge: NURSING, REHAB OR LONG-TERM CARE | DRG: 194 | End: 2019-07-02
Attending: EMERGENCY MEDICINE | Admitting: HOSPITALIST
Payer: MEDICARE

## 2019-06-25 ENCOUNTER — PATIENT OUTREACH (OUTPATIENT)
Dept: INTERNAL MEDICINE CLINIC | Age: 84
End: 2019-06-25

## 2019-06-25 DIAGNOSIS — J18.9 PNEUMONIA OF LEFT LOWER LOBE DUE TO INFECTIOUS ORGANISM: Primary | ICD-10-CM

## 2019-06-25 DIAGNOSIS — D64.9 ANEMIA, UNSPECIFIED TYPE: ICD-10-CM

## 2019-06-25 LAB
ABO + RH BLD: NORMAL
ALBUMIN SERPL-MCNC: 2.1 G/DL (ref 3.5–5)
ALBUMIN/GLOB SERPL: 0.4 {RATIO} (ref 1.1–2.2)
ALP SERPL-CCNC: 131 U/L (ref 45–117)
ALT SERPL-CCNC: 17 U/L (ref 12–78)
ANION GAP SERPL CALC-SCNC: 5 MMOL/L (ref 5–15)
APPEARANCE UR: CLEAR
AST SERPL-CCNC: 28 U/L (ref 15–37)
ATRIAL RATE: 93 BPM
BACTERIA URNS QL MICRO: ABNORMAL /HPF
BASOPHILS # BLD: 0 K/UL (ref 0–0.1)
BASOPHILS NFR BLD: 0 % (ref 0–1)
BILIRUB SERPL-MCNC: 0.9 MG/DL (ref 0.2–1)
BILIRUB UR QL: NEGATIVE
BLOOD GROUP ANTIBODIES SERPL: NORMAL
BUN SERPL-MCNC: 23 MG/DL (ref 6–20)
BUN/CREAT SERPL: 26 (ref 12–20)
CALCIUM SERPL-MCNC: 9 MG/DL (ref 8.5–10.1)
CALCULATED P AXIS, ECG09: 8 DEGREES
CALCULATED R AXIS, ECG10: -176 DEGREES
CALCULATED T AXIS, ECG11: -41 DEGREES
CHLORIDE SERPL-SCNC: 100 MMOL/L (ref 97–108)
CO2 SERPL-SCNC: 28 MMOL/L (ref 21–32)
COLOR UR: ABNORMAL
CREAT SERPL-MCNC: 0.87 MG/DL (ref 0.55–1.02)
DIAGNOSIS, 93000: NORMAL
DIFFERENTIAL METHOD BLD: ABNORMAL
EOSINOPHIL # BLD: 0 K/UL (ref 0–0.4)
EOSINOPHIL NFR BLD: 0 % (ref 0–7)
EPITH CASTS URNS QL MICRO: ABNORMAL /LPF
ERYTHROCYTE [DISTWIDTH] IN BLOOD BY AUTOMATED COUNT: 21.1 % (ref 11.5–14.5)
GLOBULIN SER CALC-MCNC: 4.8 G/DL (ref 2–4)
GLUCOSE SERPL-MCNC: 81 MG/DL (ref 65–100)
GLUCOSE UR STRIP.AUTO-MCNC: NEGATIVE MG/DL
HCT VFR BLD AUTO: 25.9 % (ref 35–47)
HGB BLD-MCNC: 8.1 G/DL (ref 11.5–16)
HGB UR QL STRIP: NEGATIVE
IMM GRANULOCYTES # BLD AUTO: 0 K/UL (ref 0–0.04)
IMM GRANULOCYTES NFR BLD AUTO: 0 % (ref 0–0.5)
KETONES UR QL STRIP.AUTO: NEGATIVE MG/DL
LACTATE SERPL-SCNC: 0.8 MMOL/L (ref 0.4–2)
LEUKOCYTE ESTERASE UR QL STRIP.AUTO: ABNORMAL
LYMPHOCYTES # BLD: 1.3 K/UL (ref 0.8–3.5)
LYMPHOCYTES NFR BLD: 21 % (ref 12–49)
MCH RBC QN AUTO: 28.3 PG (ref 26–34)
MCHC RBC AUTO-ENTMCNC: 31.3 G/DL (ref 30–36.5)
MCV RBC AUTO: 90.6 FL (ref 80–99)
METAMYELOCYTES NFR BLD MANUAL: 3 %
MONOCYTES # BLD: 0.1 K/UL (ref 0–1)
MONOCYTES NFR BLD: 1 % (ref 5–13)
NEUTS BAND NFR BLD MANUAL: 8 %
NEUTS SEG # BLD: 4.8 K/UL (ref 1.8–8)
NEUTS SEG NFR BLD: 67 % (ref 32–75)
NITRITE UR QL STRIP.AUTO: NEGATIVE
NRBC # BLD: 0 K/UL (ref 0–0.01)
NRBC BLD-RTO: 0 PER 100 WBC
OTHER,OTHU: ABNORMAL
P-R INTERVAL, ECG05: 130 MS
PH UR STRIP: 6.5 [PH] (ref 5–8)
PLATELET # BLD AUTO: 252 K/UL (ref 150–400)
PMV BLD AUTO: 11.3 FL (ref 8.9–12.9)
POTASSIUM SERPL-SCNC: 4.6 MMOL/L (ref 3.5–5.1)
PROT SERPL-MCNC: 6.9 G/DL (ref 6.4–8.2)
PROT UR STRIP-MCNC: 100 MG/DL
Q-T INTERVAL, ECG07: 422 MS
QRS DURATION, ECG06: 138 MS
QTC CALCULATION (BEZET), ECG08: 524 MS
RBC # BLD AUTO: 2.86 M/UL (ref 3.8–5.2)
RBC #/AREA URNS HPF: ABNORMAL /HPF (ref 0–5)
RBC MORPH BLD: ABNORMAL
RBC MORPH BLD: ABNORMAL
SODIUM SERPL-SCNC: 133 MMOL/L (ref 136–145)
SP GR UR REFRACTOMETRY: 1.02 (ref 1–1.03)
SPECIMEN EXP DATE BLD: NORMAL
UROBILINOGEN UR QL STRIP.AUTO: 1 EU/DL (ref 0.2–1)
VENTRICULAR RATE, ECG03: 93 BPM
WBC # BLD AUTO: 6.4 K/UL (ref 3.6–11)
WBC URNS QL MICRO: ABNORMAL /HPF (ref 0–4)

## 2019-06-25 PROCEDURE — 77030038269 HC DRN EXT URIN PURWCK BARD -A

## 2019-06-25 PROCEDURE — 74011250636 HC RX REV CODE- 250/636: Performed by: EMERGENCY MEDICINE

## 2019-06-25 PROCEDURE — 80053 COMPREHEN METABOLIC PANEL: CPT

## 2019-06-25 PROCEDURE — 36415 COLL VENOUS BLD VENIPUNCTURE: CPT

## 2019-06-25 PROCEDURE — 74011250637 HC RX REV CODE- 250/637: Performed by: HOSPITALIST

## 2019-06-25 PROCEDURE — 77010033678 HC OXYGEN DAILY

## 2019-06-25 PROCEDURE — 85025 COMPLETE CBC W/AUTO DIFF WBC: CPT

## 2019-06-25 PROCEDURE — 86900 BLOOD TYPING SEROLOGIC ABO: CPT

## 2019-06-25 PROCEDURE — 81001 URINALYSIS AUTO W/SCOPE: CPT

## 2019-06-25 PROCEDURE — 94760 N-INVAS EAR/PLS OXIMETRY 1: CPT

## 2019-06-25 PROCEDURE — 71045 X-RAY EXAM CHEST 1 VIEW: CPT

## 2019-06-25 PROCEDURE — 65270000029 HC RM PRIVATE

## 2019-06-25 PROCEDURE — 74011000258 HC RX REV CODE- 258: Performed by: EMERGENCY MEDICINE

## 2019-06-25 PROCEDURE — 87040 BLOOD CULTURE FOR BACTERIA: CPT

## 2019-06-25 PROCEDURE — 93005 ELECTROCARDIOGRAM TRACING: CPT

## 2019-06-25 PROCEDURE — 94640 AIRWAY INHALATION TREATMENT: CPT

## 2019-06-25 PROCEDURE — 99285 EMERGENCY DEPT VISIT HI MDM: CPT

## 2019-06-25 PROCEDURE — 83605 ASSAY OF LACTIC ACID: CPT

## 2019-06-25 PROCEDURE — 71250 CT THORAX DX C-: CPT

## 2019-06-25 PROCEDURE — 96365 THER/PROPH/DIAG IV INF INIT: CPT

## 2019-06-25 PROCEDURE — 74011000250 HC RX REV CODE- 250: Performed by: HOSPITALIST

## 2019-06-25 RX ORDER — ACETAMINOPHEN 325 MG/1
650 TABLET ORAL
Status: DISCONTINUED | OUTPATIENT
Start: 2019-06-25 | End: 2019-07-02 | Stop reason: HOSPADM

## 2019-06-25 RX ORDER — ACETAMINOPHEN 325 MG/1
650 TABLET ORAL
COMMUNITY
End: 2019-07-02

## 2019-06-25 RX ORDER — BENZONATATE 100 MG/1
100 CAPSULE ORAL
Status: DISCONTINUED | OUTPATIENT
Start: 2019-06-25 | End: 2019-07-02 | Stop reason: HOSPADM

## 2019-06-25 RX ORDER — POTASSIUM CHLORIDE 20 MEQ/1
20 TABLET, EXTENDED RELEASE ORAL DAILY
COMMUNITY
End: 2019-07-02

## 2019-06-25 RX ORDER — SODIUM CHLORIDE 0.9 % (FLUSH) 0.9 %
5-40 SYRINGE (ML) INJECTION AS NEEDED
Status: DISCONTINUED | OUTPATIENT
Start: 2019-06-25 | End: 2019-07-02 | Stop reason: HOSPADM

## 2019-06-25 RX ORDER — LEVOFLOXACIN 750 MG/1
750 TABLET ORAL
Status: ON HOLD | COMMUNITY
End: 2019-07-02 | Stop reason: SDUPTHER

## 2019-06-25 RX ORDER — GUAIFENESIN 600 MG/1
600 TABLET, EXTENDED RELEASE ORAL 2 TIMES DAILY
Status: DISCONTINUED | OUTPATIENT
Start: 2019-06-25 | End: 2019-06-27

## 2019-06-25 RX ORDER — LEVOTHYROXINE SODIUM 25 UG/1
25 TABLET ORAL
Status: DISCONTINUED | OUTPATIENT
Start: 2019-06-26 | End: 2019-06-27

## 2019-06-25 RX ORDER — LANOLIN ALCOHOL/MO/W.PET/CERES
400 CREAM (GRAM) TOPICAL
Status: DISCONTINUED | OUTPATIENT
Start: 2019-06-25 | End: 2019-07-02 | Stop reason: HOSPADM

## 2019-06-25 RX ORDER — CARVEDILOL 3.12 MG/1
3.12 TABLET ORAL 2 TIMES DAILY WITH MEALS
Status: DISCONTINUED | OUTPATIENT
Start: 2019-06-25 | End: 2019-07-02 | Stop reason: HOSPADM

## 2019-06-25 RX ORDER — LORATADINE 10 MG/1
10 TABLET ORAL
Status: DISCONTINUED | OUTPATIENT
Start: 2019-06-25 | End: 2019-07-02 | Stop reason: HOSPADM

## 2019-06-25 RX ORDER — ALLOPURINOL 100 MG/1
100 TABLET ORAL DAILY
Status: DISCONTINUED | OUTPATIENT
Start: 2019-06-26 | End: 2019-07-02 | Stop reason: HOSPADM

## 2019-06-25 RX ORDER — ONDANSETRON 2 MG/ML
4 INJECTION INTRAMUSCULAR; INTRAVENOUS
Status: DISCONTINUED | OUTPATIENT
Start: 2019-06-25 | End: 2019-07-02 | Stop reason: HOSPADM

## 2019-06-25 RX ORDER — MIRTAZAPINE 15 MG/1
15 TABLET, FILM COATED ORAL
Status: DISCONTINUED | OUTPATIENT
Start: 2019-06-25 | End: 2019-07-02 | Stop reason: HOSPADM

## 2019-06-25 RX ORDER — ATORVASTATIN CALCIUM 10 MG/1
10 TABLET, FILM COATED ORAL DAILY
Status: DISCONTINUED | OUTPATIENT
Start: 2019-06-26 | End: 2019-07-02 | Stop reason: HOSPADM

## 2019-06-25 RX ORDER — FUROSEMIDE 40 MG/1
40 TABLET ORAL
Status: DISCONTINUED | OUTPATIENT
Start: 2019-06-25 | End: 2019-07-02 | Stop reason: HOSPADM

## 2019-06-25 RX ORDER — POTASSIUM CHLORIDE 750 MG/1
20 TABLET, FILM COATED, EXTENDED RELEASE ORAL 2 TIMES DAILY
Status: DISCONTINUED | OUTPATIENT
Start: 2019-06-25 | End: 2019-07-02 | Stop reason: HOSPADM

## 2019-06-25 RX ORDER — MIDODRINE HYDROCHLORIDE 5 MG/1
5 TABLET ORAL 3 TIMES DAILY
COMMUNITY

## 2019-06-25 RX ORDER — POLYETHYLENE GLYCOL 3350 17 G/17G
17 POWDER, FOR SOLUTION ORAL
COMMUNITY

## 2019-06-25 RX ORDER — ATORVASTATIN CALCIUM 10 MG/1
10 TABLET, FILM COATED ORAL
COMMUNITY
End: 2019-10-23

## 2019-06-25 RX ORDER — ALBUTEROL SULFATE 0.83 MG/ML
2.5 SOLUTION RESPIRATORY (INHALATION)
Status: DISCONTINUED | OUTPATIENT
Start: 2019-06-25 | End: 2019-06-26

## 2019-06-25 RX ORDER — FLUTICASONE FUROATE AND VILANTEROL 100; 25 UG/1; UG/1
1 POWDER RESPIRATORY (INHALATION) DAILY
Status: DISCONTINUED | OUTPATIENT
Start: 2019-06-26 | End: 2019-07-02 | Stop reason: HOSPADM

## 2019-06-25 RX ORDER — SODIUM CHLORIDE 0.9 % (FLUSH) 0.9 %
5-40 SYRINGE (ML) INJECTION EVERY 8 HOURS
Status: DISCONTINUED | OUTPATIENT
Start: 2019-06-25 | End: 2019-07-02 | Stop reason: HOSPADM

## 2019-06-25 RX ORDER — CARVEDILOL 3.12 MG/1
3.12 TABLET ORAL 2 TIMES DAILY WITH MEALS
COMMUNITY
End: 2019-07-22 | Stop reason: SDUPTHER

## 2019-06-25 RX ORDER — FUROSEMIDE 40 MG/1
40 TABLET ORAL 2 TIMES DAILY
COMMUNITY
End: 2019-10-23

## 2019-06-25 RX ADMIN — ALBUTEROL SULFATE 2.5 MG: 2.5 SOLUTION RESPIRATORY (INHALATION) at 15:37

## 2019-06-25 RX ADMIN — POTASSIUM CHLORIDE 20 MEQ: 750 TABLET, FILM COATED, EXTENDED RELEASE ORAL at 17:36

## 2019-06-25 RX ADMIN — LORATADINE 10 MG: 10 TABLET ORAL at 22:18

## 2019-06-25 RX ADMIN — MIRTAZAPINE 15 MG: 15 TABLET, FILM COATED ORAL at 22:11

## 2019-06-25 RX ADMIN — GUAIFENESIN 600 MG: 600 TABLET, EXTENDED RELEASE ORAL at 17:36

## 2019-06-25 RX ADMIN — MAGNESIUM OXIDE TAB 400 MG (241.3 MG ELEMENTAL MG) 400 MG: 400 (241.3 MG) TAB at 22:10

## 2019-06-25 RX ADMIN — Medication 10 ML: at 22:12

## 2019-06-25 RX ADMIN — ACETAMINOPHEN 650 MG: 325 TABLET ORAL at 22:10

## 2019-06-25 RX ADMIN — CEFTRIAXONE 2 G: 2 INJECTION, POWDER, FOR SOLUTION INTRAMUSCULAR; INTRAVENOUS at 10:41

## 2019-06-25 RX ADMIN — APIXABAN 2.5 MG: 2.5 TABLET, FILM COATED ORAL at 17:36

## 2019-06-25 RX ADMIN — ALBUTEROL SULFATE 2.5 MG: 2.5 SOLUTION RESPIRATORY (INHALATION) at 21:51

## 2019-06-25 RX ADMIN — AZITHROMYCIN MONOHYDRATE 500 MG: 500 INJECTION, POWDER, LYOPHILIZED, FOR SOLUTION INTRAVENOUS at 11:18

## 2019-06-25 RX ADMIN — Medication 10 ML: at 13:26

## 2019-06-25 NOTE — PROGRESS NOTES
Speech path   Acknowledge consult to evaluate this pt but she is leaving the floor for testing.    Olesya Harrington, SLP

## 2019-06-25 NOTE — PROGRESS NOTES
Pharmacy Clarification of the Prior to Admission Medication Regimen Retrospective to the Admission Medication Reconciliation    The patient was not interviewed regarding clarification of the prior to admission medication regimen. Patient was transferred from [facility], to [facility], with a current med list. Patient had a caregiver in her ED room, who stated to call the facility fie last doses administered. Pharmacy called [facility], [phone], and spoke with [facility], RN, who was able to verify the patient's last administered doses. Information Obtained From: transfer papers    Recommendations/Findings: The following amendments were made to the patient's active medication list on file at [facility]:     1) Additions:    midodrine (PROAMITINE) 5 mg tablet   ibrutinib (IMBRUVICA) 140 mg capsule   polyethylene glycol (MIRALAX) 17 gram/dose powder    2) Removals:    mirtazapine    3) Changes:   acetaminophen (TYLENOL) (Old regimen: 500 mg Q6H PRN /New regimen: 650 mg Q6H PRN)   potassium chloride (K-DUR, KLOR-CON) 20 mEq tablet (Old regimen: 20 mEq BID /New regimen: 20 mEq daily)    4) Pertinent Pharmacy Findings:   Updated patients preferred outpatient pharmacy to: [facility] did not update the outpatient pharmacy due to the patient living at a SNF   acetaminophen (TYLENOL) 325 mg tablet, dextromethorphan-guaiFENesin (ROBITUSSIN-DM)  mg/5 mL syrup: Per SNF, these PRN agents have not been administered as of 6/25/19   Identified High Alert Medication Information  o Oral Chemotherapy ibrutinib (IMBRUVICA) 140 mg capsule  - Indication: CLL of B-Cell type in remission  - Dosing Instructions: 140 mg daily  - Can patient supply from home: no, due to SNF  ? Anticoagulant  § Name: apixaban (ELIQUIS) 2.5 mg tablet       PTA medication list was corrected to the following:     Prior to Admission Medications   Prescriptions Last Dose Informant Patient Reported?  Taking?   acetaminophen (TYLENOL) 325 mg tablet Not Taking at Unknown time Transfer Papers Yes No   Sig: Take 650 mg by mouth every four (4) hours as needed for Pain or Fever. albuterol (PROVENTIL VENTOLIN) 2.5 mg /3 mL (0.083 %) nebulizer solution 2019 at Unknown time Transfer Papers Yes Yes   Si.5 mg by Nebulization route four (4) times daily. allopurinol (ZYLOPRIM) 100 mg tablet 2019 at 0900 Transfer Papers No Yes   Sig: TAKE ONE (1) TABLET(S) DAILY   apixaban (ELIQUIS) 2.5 mg tablet 2019 at 2100 Transfer Papers No Yes   Sig: Take 1 Tab by mouth two (2) times a day. Indications: PREVENT THROMBOEMBOLISM IN CHRONIC ATRIAL FIBRILLATION   atorvastatin (LIPITOR) 10 mg tablet 2019 at 2100 Transfer Papers Yes Yes   Sig: Take 10 mg by mouth nightly. carvedilol (COREG) 3.125 mg tablet 2019 at 00 Transfer Papers Yes Yes   Sig: Take 3.125 mg by mouth two (2) times daily (with meals). Hold if SPB <110   cholecalciferol (VITAMIN D3) 1,000 unit cap 2019 at 0900 Transfer Papers Yes Yes   Sig: Take 1,000 Units by mouth daily. co-enzyme Q-10 (CO Q-10) 100 mg capsule 2019 at 0900 Transfer Papers Yes Yes   Sig: Take 100 mg by mouth daily. dextromethorphan-guaiFENesin (ROBITUSSIN-DM)  mg/5 mL syrup Not Taking at Unknown time Transfer Papers Yes No   Sig: Take 10 mL by mouth every six (6) hours as needed for Cough. fluticasone-salmeterol (ADVAIR) 250-50 mcg/dose diskus inhaler 2019 at Unknown time Transfer Papers No Yes   Sig: Take 1 Puff by inhalation two (2) times a day. furosemide (LASIX) 40 mg tablet 2019 at 0900 Transfer Papers Yes Yes   Sig: Take 40 mg by mouth two (2) times a day. Hold of SBP <110   ibrutinib (IMBRUVICA) 140 mg capsule 2019 at 0900 Transfer Papers Yes Yes   Sig: Take 140 mg by mouth daily. levoFLOXacin (LEVAQUIN) 750 mg tablet 2019 at Unknown time Transfer Papers Yes Yes   Sig: Take 750 mg by mouth nightly.    levothyroxine (SYNTHROID) 25 mcg tablet 6/25/2019 at 0530 Transfer Papers No Yes   Sig: Take 1 Tab by mouth Daily (before breakfast). loratadine (CLARITIN) 10 mg tablet 6/24/2019 at 2100 Transfer Papers Yes Yes   Sig: Take 10 mg by mouth nightly.   magnesium oxide 400 mg cap 6/24/2019 at 2100 Transfer Papers Yes Yes   Sig: Take 1 Cap by mouth nightly. midodrine (PROAMITINE) 5 mg tablet 6/24/2019 at 2100 Transfer Papers Yes Yes   Sig: Take 5 mg by mouth three (3) times daily. polyethylene glycol (MIRALAX) 17 gram/dose powder 6/24/2019 at 0900 Transfer Papers Yes Yes   Sig: Take 17 g by mouth every fourty-eight (48) hours. potassium chloride (K-DUR, KLOR-CON) 20 mEq tablet 6/24/2019 at 0900 Transfer Papers Yes Yes   Sig: Take 20 mEq by mouth daily.       Facility-Administered Medications: None          Thank you,  Kenya Beltran CPhT  Medication History Pharmacy Technician

## 2019-06-25 NOTE — PROGRESS NOTES
Occupational Therapy  OT consult received, chart reviewed. Patient is currently off the floor and unavailable for OT evaluation. Will defer for now and follow up tomorrow.

## 2019-06-25 NOTE — PROGRESS NOTES
Reason for Admission:   Pneumonia, anemia               RRAT Score:     34 high risk             Resources/supports as identified by patient/family:   Lives at [facility], caregiver through [employer] 2x/week                [place] and [place] facing patient (as identified by patient/family and CM): Finances/Medication cost?      No challenges reported              Transportation? Caregiver provides transportation              Support system or lack thereof?  family                     Living arrangements? Lives at [facility], has been in Healthcare unit the past few days. Normally resides in [facility]           Self-care/ADLs/Cognition? Staff provide ADLs, patient currently alert x 2          Current Advanced Directive/Advance Care Plan: On file 3/2019                          Plan for utilizing home health:    TBD                 Transition of Care Plan:               1.  Patient in ED bed waiting for inpatient admission  2. Patient will need 2nd IM letter at discharge  3. Patient's caregiver states family preference is for patient to return to [facility] at discharge with currently level of care. Patient is a 80year old female admitted 6/25 for pneumonia and anemia. Patient mildly confused, resting in bed with caregiver Kaylee Demarco present in room. CM questions answered by caregiver Danelle. Kaylee Rule states patient's on resides in  and is available by phone if needed. Demographic information verified and correct (address listed is for patient's home and mailing address, she no longer resides there). Insurance information verified and correct. Call placed to [address] [place] [address] there stated patient is normally a resident if their BAN but has been in Healthcare unit the past few days. Patient is eligible to [facility] to [facility] at discharge. Staff there provide assistance with ADLs. Patient uses 2L oxygen at night.   Patient uses a rollator for ambulation. [facility] provides patient's medications  Patient's caregiver states she comes 2x/week to take patient to appointments and provide socialization. She works for [employer]. She can transport patient at discharge    Care Management Interventions  PCP Verified by CM: Ciera Devries MD)  Mode of Transport at Discharge:  Other (see comment)(pt's caregiver Sincere Ragland provides transportation)  Transition of Care Consult (CM Consult): Long Term Care(pt lives at [facility], to return there at [facility])  Discharge Durable Medical Equipment: No  Physical Therapy Consult: No  Occupational Therapy Consult: No  Speech Therapy Consult: No  Current Support Network: Nursing Facility(lives at [facility], has caregiver 2x/week)  Confirm Follow Up Transport: Other (see comment)(pt's caregiver Sincere Ragland provides transportation)  Plan discussed with Pt/Family/Caregiver: Yes  Discharge Location  Discharge Placement: Unable to determine at this time     Angy Ruelas RN, [address] [place] [address]  [phone]

## 2019-06-25 NOTE — H&P
Hospitalist Admission Note    NAME: Teresa Bunn   :  3/17/1925   MRN:  [id number]     Date/Time:  2019 10:42 AM    Patient PCP: Radha Mckeon MD  ______________________________________________________________________  Given the patient's current clinical presentation, I have a high level of concern for decompensation if discharged from the emergency department. Complex decision making was performed, which includes reviewing the patient's available past medical records, laboratory results, and x-ray films. My assessment of this patient's clinical condition and my plan of care is as follows. Assessment / Plan:  CAP  Chronic hypoxic respiratory failure home O2 at night   -clinically: + cough and cxray findings but no leukocytosis   --> check CT chest for better underlying picture   Low suspicious for PE since on Eliquis   -IV AB: CTX / Zithromax x 7 days  -O2 to keep nicci >90%, wean as tolerated, assess for home O2 on DC  -follow resolution   -speech eval   -follow BC   -cont home jet nebs   -cxray:   1. New heterogeneous left lung opacity worrisome for pneumonia. Recommend  radiographic follow-up in 6-8 weeks after treatment to ensure resolution. 2.  Small left pleural effusion. 3.  Cardiomegaly and interstitial edema. Addendum : CT scan result noted. D/w Dr Kelsey Chavez today about CT. Anemia   -baseline Hb ~ 10,  6.7, today 8.1  No signs of active bleeding   -check stool for hemoccult   -follow iron level   -monitor , transfuse as needed     Possible UTI  -base on UA done outside  -CTX will cover     Chronic Systolic Heart Failure EF 25% / AICD    HTN / Paroxysmal atrial fibrillation  -BP/HR stable. No signs of fluid overload   -cont home BB and Eliquis  -cont lasix     Gout, cont allopurinol   Dyslipidemia. Continue statins  Hypothyroidism . 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               Code Status: DDNR from [facility] Subjective:       Patient ID: Tico Reeves is a 72 y.o. male.    Chief Complaint: Hospital Follow Up    HPI  Patient is a 72-year-old male presenting today following up on recent hospitalization.  He had presented with abdominal pain and elevated white count.  He was noted to have stones in the gallbladder and likely cholecystitis.  He was admitted to the hospital and taken to the OR by Dr. Leblanc.  He had a robotically assisted laparoscopic cholecystectomy.  He had no immediate complications and tolerated the procedure well.  He is been discharged home and indicates he is doing well at this time.  He is eating a normal diet at this time.  He is feeling better.  He is not having any abdominal pain or fever.    He has history of hypertension is blood pressure is stable at this time.  He has a tendency to go live hypotensive.  This has been ongoing problem.  It has been managed with medications as well as liberal salt intake when he gets a little bit low.  He has not had any significant problems with that of late.    His depression remained stable.  He is taking his medications as prescribed with no adverse effects.  Refill on his citalopram was sent today.    He has history of coronary artery disease which is stable at this juncture.  He is not having any chest pain or palpitations.    Review of Systems   Constitutional: Negative for fever and unexpected weight change.   HENT: Negative for hearing loss, postnasal drip and rhinorrhea.    Eyes: Negative for pain and visual disturbance.   Respiratory: Negative for cough, shortness of breath and wheezing.    Cardiovascular: Negative for chest pain and palpitations.   Gastrointestinal: Negative for constipation, diarrhea, nausea and vomiting.   Genitourinary: Negative for dysuria and hematuria.   Musculoskeletal: Negative for arthralgias, back pain, myalgias and neck stiffness.   Skin: Negative for pallor and rash.   Neurological: Negative for seizures, syncope and  "headaches.   Hematological: Negative for adenopathy.   Psychiatric/Behavioral: Negative for dysphoric mood. The patient is not nervous/anxious.        Objective:   /72   Pulse 64   Temp 98.6 °F (37 °C)   Ht 5' 10" (1.778 m)   Wt 69.6 kg (153 lb 7 oz)   BMI 22.02 kg/m²      Physical Exam   Constitutional: He is oriented to person, place, and time. He appears well-developed and well-nourished. No distress.   HENT:   Head: Normocephalic and atraumatic.   Mouth/Throat: Oropharynx is clear and moist.   Eyes: EOM are normal. Pupils are equal, round, and reactive to light.   Neck: Normal range of motion. Neck supple. No JVD present. No thyromegaly present.   Cardiovascular: Normal rate, regular rhythm, normal heart sounds and intact distal pulses. Exam reveals no gallop and no friction rub.   No murmur heard.  Pulmonary/Chest: Effort normal and breath sounds normal. He has no wheezes. He has no rales.   Abdominal: Soft. Bowel sounds are normal. He exhibits no distension. There is no tenderness. There is no rebound and no guarding.   Surgical incision sites are clean and dry.  No signs of infection.  Abdomen has mild tenderness in the right upper quadrant.  No rebound no guarding.   Musculoskeletal: Normal range of motion. He exhibits no edema.   Lymphadenopathy:     He has no cervical adenopathy.   Neurological: He is alert and oriented to person, place, and time. He has normal reflexes. No cranial nerve deficit.   Skin: Skin is warm and dry. No rash noted.   Psychiatric: He has a normal mood and affect. Judgment normal.   Vitals reviewed.      Admission on 11/11/2018, Discharged on 11/13/2018   Component Date Value    WBC 11/11/2018 14.98*    RBC 11/11/2018 3.81*    Hemoglobin 11/11/2018 12.7*    Hematocrit 11/11/2018 36.6*    MCV 11/11/2018 96     MCH 11/11/2018 33.3*    MCHC 11/11/2018 34.7     RDW 11/11/2018 12.3     Platelets 11/11/2018 215     MPV 11/11/2018 9.5     Gran # (ANC) 11/11/2018 12.9* " Surrogate Decision Maker: Jay Rao son [phone] , [phone]     DVT Prophylaxis: lovenox   GI Prophylaxis: not indicated    Baseline: resident of [address] [place] [address], ambulating with rolator     Admission was done for Dr. Lara Hammer as a tuck in service. He will assume care of this pt in am.       Subjective:   CHIEF COMPLAINT: low hb     HISTORY OF PRESENT ILLNESS:     Sandeep Ramsey is a 80 y.o.  female who presents with above complaint. Pt has dementia and cannot contribute much to the history. History was obtained from [facility] and caregiver from the facility at bedside. Per caregiver, pt was noticed to have dyspnea ~ 1-1,5 weeks ago. She had first cxray done 1 week ago which was normal. Her second cxray which was done yesterday revealed pneumonia. She was started on LVQ at the facility. Pt stated \" I coughing a lot \". No CP. Per caregiver, there was low grade fever at the facility. Blood work was done at the facility yesterday which revealed Hb at 6.7, so pt was refer to ED for possible blood transfusion. Her Hb here at 8.1. No dark stool or blood in stool was noticed. Ua done yesterday revealed: + small LE, wbc > 25   Pt is chronically on O2 at night. Pt has dementia. She used to be in [facility] till recently. Due to increased confusion she was transferred to memory care locked unit. We were asked to admit for work up and evaluation of the above problems.      Past Medical History:   Diagnosis Date    Aortic insufficiency     Asthma     Cancer ([facility] [address]     lymphoma    CKD (chronic kidney disease) stage 3, GFR 30-59 ml/min ([facility]) 1/10/2018    Congestive heart failure, NYHA class II, chronic, systolic ([facility])     Heart failure ([facility] [address]     Hypertension     Mitral valvular regurgitation 1/22/2016    ECHO 2/3/17: LV dilated, EF 20-25%, mild LVH, RV mod dilated, mild- mod MELANIA, mod-severe MR, mod AI ECHO 7/29/17: EF 15%, severe DHK, reduced RVEF, RVH, RVSP 35 mmHg  Mild LAE,    Lymph # 11/11/2018 0.7*    Mono # 11/11/2018 1.3*    Eos # 11/11/2018 0.1     Baso # 11/11/2018 0.02     Gran% 11/11/2018 86.0*    Lymph% 11/11/2018 4.4*    Mono% 11/11/2018 8.9     Eosinophil% 11/11/2018 0.6     Basophil% 11/11/2018 0.1     Differential Method 11/11/2018 Automated     Sodium 11/11/2018 135*    Potassium 11/11/2018 4.3     Chloride 11/11/2018 102     CO2 11/11/2018 23     Glucose 11/11/2018 127*    BUN, Bld 11/11/2018 14     Creatinine 11/11/2018 0.8     Calcium 11/11/2018 9.0     Total Protein 11/11/2018 7.3     Albumin 11/11/2018 3.2*    Total Bilirubin 11/11/2018 2.2*    Alkaline Phosphatase 11/11/2018 154*    AST 11/11/2018 227*    ALT 11/11/2018 105*    Anion Gap 11/11/2018 10     eGFR if African American 11/11/2018 >60     eGFR if non African Amer* 11/11/2018 >60     Procalcitonin 11/11/2018 0.13     Lactate (Lactic Acid) 11/11/2018 1.0     Specimen UA 11/11/2018 Urine, Clean Catch     Color, UA 11/11/2018 Yellow     Appearance, UA 11/11/2018 Clear     pH, UA 11/11/2018 8.0     Specific [place], UA 11/11/2018 1.010     Protein, UA 11/11/2018 Negative     Glucose, UA 11/11/2018 Negative     Ketones, UA 11/11/2018 Negative     Bilirubin (UA) 11/11/2018 Negative     Occult Blood UA 11/11/2018 Negative     Nitrite, UA 11/11/2018 Negative     Urobilinogen, UA 11/11/2018 Negative     Leukocytes, UA 11/11/2018 Negative     Blood Culture, Routine 11/11/2018 No growth after 5 days.     Blood Culture, Routine 11/11/2018 No growth after 5 days.     WBC 11/12/2018 8.13     RBC 11/12/2018 3.87*    Hemoglobin 11/12/2018 12.7*    Hematocrit 11/12/2018 37.1*    MCV 11/12/2018 96     MCH 11/12/2018 32.8*    MCHC 11/12/2018 34.2     RDW 11/12/2018 12.4     Platelets 11/12/2018 215     MPV 11/12/2018 9.6     Gran # (ANC) 11/12/2018 5.5     Lymph # 11/12/2018 1.5     Mono # 11/12/2018 0.9     Eos # 11/12/2018 0.2     Baso # 11/12/2018 0.03     Gran%  mod-marked MA fibrosis, mod-severe MR (2+), AO root dilated,      Nonrheumatic aortic valve insufficiency 10/16/2018    Presence of biventricular automatic cardioverter/defibrillator (AICD) 7/2/2015    [employer] biventricular AICD implant    Stomach ulcer         Past Surgical History:   Procedure Laterality Date    HX BREAST BIOPSY Bilateral     40 years ago    HX COLONOSCOPY      HX HEENT      cataracts removed    HX HYSTERECTOMY      HX OTHER SURGICAL      lymph node surgery    HX TONSILLECTOMY      KS EGD TRANSORAL BIOPSY SINGLE/MULTIPLE  5/23/2012         SINUS SURGERY PROC UNLISTED      Nasal polyps removed       Social History     Tobacco Use    Smoking status: Never Smoker    Smokeless tobacco: Never Used   Substance Use Topics    Alcohol use: No     Alcohol/week: 0.0 oz        Family History   Problem Relation Age of Onset    Heart Disease Mother     Stroke Father      Allergies   Allergen Reactions    Codeine Other (comments)     \"made me disoriented\" per pt        Prior to Admission medications    Medication Sig Start Date End Date Taking? Authorizing Provider   mirtazapine (REMERON SOL-TAB) 15 mg disintegrating tablet Take 1 Tab by mouth nightly. 5/31/19   Radha Mckeon MD   allopurinol (ZYLOPRIM) 100 mg tablet TAKE ONE (1) TABLET(S) DAILY 3/18/19   Radha Mckeon MD   furosemide (LASIX) 40 mg tablet 1 tablet twice daily 3/18/19   Radha Mckeon MD   potassium chloride SR (K-TAB) 20 mEq tablet Take 1 Tab by mouth two (2) times a day. 3/18/19   Radha Mckeon MD   dextromethorphan-guaiFENesin (ROBITUSSIN-DM)  mg/5 mL syrup Take 10 mL by mouth every six (6) hours as needed for Cough. Provider, Historical   albuterol (PROVENTIL VENTOLIN) 2.5 mg /3 mL (0.083 %) nebulizer solution 2.5 mg by Nebulization route four (4) times daily.     Provider, Historical   atorvastatin (LIPITOR) 10 mg tablet TAKE ONE (1) TABLET(S) DAILY 1/8/19   Radha Mckeon MD   levothyroxine (SYNTHROID) 25 mcg tablet Take 1 Tab by mouth Daily (before breakfast). 12/11/18   Alison Cisse MD   apixaban (ELIQUIS) 2.5 mg tablet Take 1 Tab by mouth two (2) times a day. Indications: PREVENT THROMBOEMBOLISM IN CHRONIC ATRIAL FIBRILLATION 6/1/18   Alison Cisse MD   fluticasone-salmeterol (ADVAIR) 250-50 mcg/dose diskus inhaler Take 1 Puff by inhalation two (2) times a day. 4/14/18   Alison Cisse MD   carvedilol (COREG) 3.125 mg tablet TAKE ONE (1) TABLET(S) TWIC E DAILY WITH FOOD  Indications: chronic heart failure 4/3/18   Alison Cisse MD   magnesium oxide 400 mg cap Take 1 Cap by mouth nightly. Provider, Historical   cholecalciferol (VITAMIN D3) 1,000 unit cap Take 1,000 Units by mouth daily. Provider, Historical   loratadine (CLARITIN) 10 mg tablet Take 10 mg by mouth nightly. Provider, Historical   co-enzyme Q-10 (CO Q-10) 100 mg capsule Take 100 mg by mouth daily. Provider, Historical   acetaminophen (TYLENOL EXTRA STRENGTH) 500 mg tablet Take 500 mg by mouth every six (6) hours as needed for Pain. Provider, Historical       REVIEW OF SYSTEMS:     I am not able to complete the review of systems because:    The patient is intubated and sedated    The patient has altered mental status due to his acute medical problems    The patient has baseline aphasia from prior stroke(s)    The patient has baseline dementia and is not reliable historian    The patient is in acute medical distress and unable to provide information           Total of 12 systems reviewed as follows:       POSITIVE= underlined text  Negative = text not underlined  General:  fever, chills, sweats, generalized weakness, weight loss/gain,      loss of appetite   Eyes:    blurred vision, eye pain, loss of vision, double vision  ENT:    rhinorrhea, pharyngitis   Respiratory:   cough, sputum production, SOB, JENKINS, wheezing, pleuritic pain   Cardiology:   chest pain, palpitations, orthopnea, PND, edema, syncope 11/12/2018 68.0     Lymph% 11/12/2018 18.3     Mono% 11/12/2018 10.5     Eosinophil% 11/12/2018 2.8     Basophil% 11/12/2018 0.4     Differential Method 11/12/2018 Automated     Sodium 11/12/2018 138     Potassium 11/12/2018 4.0     Chloride 11/12/2018 105     CO2 11/12/2018 23     Glucose 11/12/2018 88     BUN, Bld 11/12/2018 12     Creatinine 11/12/2018 0.8     Calcium 11/12/2018 8.9     Total Protein 11/12/2018 7.0     Albumin 11/12/2018 3.0*    Total Bilirubin 11/12/2018 3.1*    Alkaline Phosphatase 11/12/2018 174*    AST 11/12/2018 186*    ALT 11/12/2018 199*    Anion Gap 11/12/2018 10     eGFR if African American 11/12/2018 >60     eGFR if non African Amer* 11/12/2018 >60     Sodium 11/13/2018 136     Potassium 11/13/2018 4.2     Chloride 11/13/2018 103     CO2 11/13/2018 23     Glucose 11/13/2018 102     BUN, Bld 11/13/2018 19     Creatinine 11/13/2018 0.8     Calcium 11/13/2018 8.7     Anion Gap 11/13/2018 10     eGFR if African American 11/13/2018 >60     eGFR if non African Amer* 11/13/2018 >60     WBC 11/13/2018 11.26     RBC 11/13/2018 3.61*    Hemoglobin 11/13/2018 11.9*    Hematocrit 11/13/2018 34.4*    MCV 11/13/2018 95     MCH 11/13/2018 33.0*    MCHC 11/13/2018 34.6     RDW 11/13/2018 12.2     Platelets 11/13/2018 237     MPV 11/13/2018 9.7    Admission on 11/08/2018, Discharged on 11/08/2018   Component Date Value    Blood Culture, Routine 11/08/2018 No growth after 5 days.     Blood Culture, Routine 11/08/2018 No growth after 5 days.     WBC 11/08/2018 14.82*    RBC 11/08/2018 3.74*    Hemoglobin 11/08/2018 12.4*    Hematocrit 11/08/2018 36.4*    MCV 11/08/2018 97     MCH 11/08/2018 33.2*    MCHC 11/08/2018 34.1     RDW 11/08/2018 12.9     Platelets 11/08/2018 188     MPV 11/08/2018 9.3     Gran # (ANC) 11/08/2018 11.8*    Lymph # 11/08/2018 1.6     Mono # 11/08/2018 1.4*    Eos # 11/08/2018 0.1     Baso # 11/08/2018 0.02     Gran%  11/08/2018 79.4*    Lymph% 11/08/2018 10.8*    Mono% 11/08/2018 9.4     Eosinophil% 11/08/2018 0.3     Basophil% 11/08/2018 0.1     Differential Method 11/08/2018 Automated     Sodium 11/08/2018 140     Potassium 11/08/2018 3.9     Chloride 11/08/2018 107     CO2 11/08/2018 25     Glucose 11/08/2018 131*    BUN, Bld 11/08/2018 24*    Creatinine 11/08/2018 1.2     Calcium 11/08/2018 8.8     Total Protein 11/08/2018 6.5     Albumin 11/08/2018 3.2*    Total Bilirubin 11/08/2018 0.8     Alkaline Phosphatase 11/08/2018 66     AST 11/08/2018 20     ALT 11/08/2018 14     Anion Gap 11/08/2018 8     eGFR if African American 11/08/2018 >60     eGFR if non African Amer* 11/08/2018 >60     Lactate (Lactic Acid) 11/08/2018 2.8*    Specimen UA 11/08/2018 Urine, Clean Catch     Color, UA 11/08/2018 Yellow     Appearance, UA 11/08/2018 Clear     pH, UA 11/08/2018 8.0     Specific [place], UA 11/08/2018 1.010     Protein, UA 11/08/2018 Negative     Glucose, UA 11/08/2018 Negative     Ketones, UA 11/08/2018 Negative     Bilirubin (UA) 11/08/2018 Negative     Occult Blood UA 11/08/2018 Negative     Nitrite, UA 11/08/2018 Negative     Urobilinogen, UA 11/08/2018 Negative     Leukocytes, UA 11/08/2018 Negative     Influenza A, Molecular 11/08/2018 Negative     Influenza B, Molecular 11/08/2018 Negative     Flu A & B Source 11/08/2018 NP     aPTT 11/08/2018 31.6     Prothrombin Time 11/08/2018 11.2     INR 11/08/2018 1.1     BNP 11/08/2018 75     Troponin I 11/08/2018 <0.006     Procalcitonin 11/08/2018 0.50*    Lactate (Lactic Acid) 11/08/2018 1.2     Lactate (Lactic Acid) 11/08/2018 1.2    Lab Visit on 11/07/2018   Component Date Value    Cholesterol 11/07/2018 197     Triglycerides 11/07/2018 60     HDL 11/07/2018 59     LDL Cholesterol 11/07/2018 126.0     HDL/Chol Ratio 11/07/2018 29.9     Total Cholesterol/HDL Ra* 11/07/2018 3.3     Non-HDL Cholesterol 11/07/2018 138     Sodium  Gastrointestinal:  abdominal pain , N/V, diarrhea, dysphagia, constipation, bleeding   Genitourinary:  frequency, urgency, dysuria, hematuria, incontinence   Muskuloskeletal :  arthralgia, myalgia, back pain  Hematology:  easy bruising, nose or gum bleeding, lymphadenopathy   Dermatological: rash, ulceration, pruritis, color change / jaundice  Endocrine:   hot flashes or polydipsia   Neurological:  headache, dizziness, confusion, focal weakness, paresthesia,     Speech difficulties, memory loss, gait difficulty  Psychological: Feelings of anxiety, depression, agitation    Objective:   VITALS:    Visit Vitals  /69 (BP 1 Location: Left arm, BP Patient Position: At rest)   Pulse 91   Temp 97.3 °F (36.3 °C)   Resp 27   Ht 5' 1\" (1.549 m)   Wt 53.2 kg (117 lb 4.6 oz)   SpO2 95%   BMI 22.16 kg/m²       PHYSICAL EXAM:    General:    Alert, cooperative, no distress, appears stated age. Not toxic looking. No dyspnea at rest      HEENT: Atraumatic, anicteric sclerae, pink conjunctivae     No oral ulcers, mucosa moist, throat clear, dentition fair  Neck:  Supple, symmetrical,  thyroid: non tender  Lungs:   diminished BS bases bilaterally. No Wheezing or Rhonchi. No rales. Chest wall:  No tenderness  No Accessory muscle use. Heart:   Regular  rhythm,  No  murmur   No edema  Abdomen:   Soft, non-tender. Not distended. Bowel sounds normal  Extremities: No cyanosis. No clubbing,      Skin turgor normal, Capillary refill normal, Radial dial pulse 2+  Skin:     Not pale. Not Jaundiced  No rashes   Psych:  Poor insight. Not depressed. Not anxious or agitated. Neurologic: EOMs intact. No facial asymmetry. No aphasia or slurred speech. Symmetrical strength, Sensation grossly intact.  Alert and oriented X 2     _______________________________________________________________________  Care Plan discussed with:    Comments   Patient y    Family  y Caregiver from facility    RN y    Care Manager 11/07/2018 137     Potassium 11/07/2018 4.7     Chloride 11/07/2018 98     CO2 11/07/2018 27     Glucose 11/07/2018 159*    BUN, Bld 11/07/2018 17     Creatinine 11/07/2018 1.0     Calcium 11/07/2018 10.4     Total Protein 11/07/2018 8.0     Albumin 11/07/2018 4.1     Total Bilirubin 11/07/2018 1.0     Alkaline Phosphatase 11/07/2018 87     AST 11/07/2018 23     ALT 11/07/2018 20     Anion Gap 11/07/2018 12     eGFR if African American 11/07/2018 >60.0     eGFR if non African Amer* 11/07/2018 >60.0     WBC 11/07/2018 19.57*    RBC 11/07/2018 4.30*    Hemoglobin 11/07/2018 13.9*    Hematocrit 11/07/2018 42.4     MCV 11/07/2018 99*    MCH 11/07/2018 32.3*    MCHC 11/07/2018 32.8     RDW 11/07/2018 12.3     Platelets 11/07/2018 243     MPV 11/07/2018 9.6     Immature Granulocytes 11/07/2018 0.5     Gran # (ANC) 11/07/2018 17.1*    Immature Grans (Abs) 11/07/2018 0.09*    Lymph # 11/07/2018 0.8*    Mono # 11/07/2018 1.6*    Eos # 11/07/2018 0.0     Baso # 11/07/2018 0.04     nRBC 11/07/2018 0     Gran% 11/07/2018 87.4*    Lymph% 11/07/2018 3.8*    Mono% 11/07/2018 8.1     Eosinophil% 11/07/2018 0.0     Basophil% 11/07/2018 0.2     Differential Method 11/07/2018 Automated     PSA, SCREEN 11/07/2018 0.97     Hemoglobin A1C 11/07/2018 5.8*    Estimated Avg Glucose 11/07/2018 120        Assessment:       1. Calculus of gallbladder with acute cholecystitis without obstruction    2. Status post cholecystectomy    3. Essential hypertension    4. Current moderate episode of major depressive disorder without prior episode    5. CAD, multiple vessel    6. Atherosclerosis of aorta        Plan:   CAD, multiple vessel  Stable at this time.  No chest pain. Continue meds.    Major depressive disorder, single episode  Stable on meds, no changes today.    Atherosclerosis of aorta  Continue bp control, statin    Tico was seen today for hospital follow up.    Diagnoses and all orders for this  Consultant:  samir MUNOZ provider    _______________________________________________________________________  Expected  Disposition:   Home with Family    HH/PT/OT/RN    SNF/LTC y   [de-identified]    ________________________________________________________________________  TOTAL TIME: 72 Minutes    Critical Care Provided     Minutes non procedure based      Comments     Reviewed previous records   >50% of visit spent in counseling and coordination of care  Discussion with patient and/or family and questions answered       ________________________________________________________________________  Signed: Abdirashid Brasher MD    Procedures: see electronic medical records for all procedures/Xrays and details which were not copied into this note but were reviewed prior to creation of Plan. LAB DATA REVIEWED:    Recent Results (from the past 24 hour(s))   EKG, 12 LEAD, INITIAL    Collection Time: 06/25/19  9:01 AM   Result Value Ref Range    Ventricular Rate 93 BPM    Atrial Rate 93 BPM    P-R Interval 130 ms    QRS Duration 138 ms    Q-T Interval 422 ms    QTC Calculation (Bezet) 524 ms    Calculated P Axis 8 degrees    Calculated R Axis -176 degrees    Calculated T Axis -41 degrees    Diagnosis       Atrial-sensed ventricular-paced rhythm  Biventricular pacemaker detected  When compared with ECG of 13-MAY-2019 14:21,  Vent.  rate has increased BY  19 BPM     CBC WITH AUTOMATED DIFF    Collection Time: 06/25/19  9:05 AM   Result Value Ref Range    WBC 6.4 3.6 - 11.0 K/uL    RBC 2.86 (L) 3.80 - 5.20 M/uL    HGB 8.1 (L) 11.5 - 16.0 g/dL    HCT 25.9 (L) 35.0 - 47.0 %    MCV 90.6 80.0 - 99.0 FL    MCH 28.3 26.0 - 34.0 PG    MCHC 31.3 30.0 - 36.5 g/dL    RDW 21.1 (H) 11.5 - 14.5 %    PLATELET 825 640 - 552 K/uL    MPV 11.3 8.9 - 12.9 FL    NRBC 0.0 0  WBC    ABSOLUTE NRBC 0.00 0.00 - 0.01 K/uL    NEUTROPHILS 67 32 - 75 %    BAND NEUTROPHILS 8 %    LYMPHOCYTES 21 12 - 49 %    MONOCYTES 1 (L) 5 - 13 %    EOSINOPHILS 0 0 - 7 % BASOPHILS 0 0 - 1 %    METAMYELOCYTES 3 %    IMMATURE GRANULOCYTES 0 0.0 - 0.5 %    ABS. NEUTROPHILS 4.8 1.8 - 8.0 K/UL    ABS. LYMPHOCYTES 1.3 0.8 - 3.5 K/UL    ABS. MONOCYTES 0.1 0.0 - 1.0 K/UL    ABS. EOSINOPHILS 0.0 0.0 - 0.4 K/UL    ABS. BASOPHILS 0.0 0.0 - 0.1 K/UL    ABS. IMM. GRANS. 0.0 0.00 - 0.04 K/UL    DF MANUAL      RBC COMMENTS ANISOCYTOSIS  2+        RBC COMMENTS OVALOCYTES  PRESENT       METABOLIC PANEL, COMPREHENSIVE    Collection Time: 06/25/19  9:05 AM   Result Value Ref Range    Sodium 133 (L) 136 - 145 mmol/L    Potassium 4.6 3.5 - 5.1 mmol/L    Chloride 100 97 - 108 mmol/L    CO2 28 21 - 32 mmol/L    Anion gap 5 5 - 15 mmol/L    Glucose 81 65 - 100 mg/dL    BUN 23 (H) 6 - 20 MG/DL    Creatinine 0.87 0.55 - 1.02 MG/DL    BUN/Creatinine ratio 26 (H) 12 - 20      GFR est AA >60 >60 ml/min/1.73m2    GFR est non-AA >60 >60 ml/min/1.73m2    Calcium 9.0 8.5 - 10.1 MG/DL    Bilirubin, total 0.9 0.2 - 1.0 MG/DL    ALT (SGPT) 17 12 - 78 U/L    AST (SGOT) 28 15 - 37 U/L    Alk.  phosphatase 131 (H) 45 - 117 U/L    Protein, total 6.9 6.4 - 8.2 g/dL    Albumin 2.1 (L) 3.5 - 5.0 g/dL    Globulin 4.8 (H) 2.0 - 4.0 g/dL    A-G Ratio 0.4 (L) 1.1 - 2.2     LACTIC ACID    Collection Time: 06/25/19  9:05 AM   Result Value Ref Range    Lactic acid 0.8 0.4 - 2.0 MMOL/L visit:    Calculus of gallbladder with acute cholecystitis without obstruction  Comments:  Succesful resection of gallbladder.  Eating well. Bowels moving regularly.    Status post cholecystectomy    Essential hypertension    Current moderate episode of major depressive disorder without prior episode    CAD, multiple vessel    Atherosclerosis of aorta    Other orders  -     citalopram (CELEXA) 40 MG tablet; Take 1 tablet (40 mg total) by mouth once daily.  -     Influenza - High Dose (65+) (PF) (IM)          Follow-up in about 2 months (around 1/19/2019).

## 2019-06-25 NOTE — PROGRESS NOTES
Patient history of dementia. Care giver no longer at bedside. Unable to perform full admission database at this time.

## 2019-06-25 NOTE — PROGRESS NOTES
Patient has graduated from the Disease Specific Care Management  program on 6/24/19. Care management goals have been completed at this time. No further nurse navigator follow up scheduled. Goals Addressed                 This Visit's Progress     COMPLETED: Initiate and reinforce education of the patient/family about management of disease and lifestyle changes. 5/14/19  -pt seen in ED yesterday for lethargy, fever and sore throat. WBCs in normal limits, Lactic acid in normal limits. UA normal. Unknown cause of symptoms. Patient given IV fluids and discharged back to [facility]. NN spoke to Reed. She reports that patient is doing poorly and has requested that the House Physician at facility see patient. -NN to follow up in 1 week  Bolivar Sergio   4/26/19  -NN educated daughter in law, Reed, on Heart Failure progression, Echo results. Reed verbalizes understanding.  Chris Sainz shared with NN that she lives in [place] but checks in with her daily as well as  caregivers and assisted living staff.   -Reed had many questions about what the AICD does during the dying process. NN educated brooke on how AICD operate and what happens when patient is actively dying, and how therapies are usually turned off during that phase.  Chris Connkasandra reports that patient \" wants to live\" and is not agreeable to Hospice regardless of symptoms or prognosis. -NN to follow up in 1 week  Bolivar Sergio         COMPLETED: Knowledge and adherence medication (ie. action, side effects, missed dose, etc.)        4/26/19  -reports that nursing staff in [facility] take care of all medications  -NN to follow up in 1 week  Hannah EMERY       COMPLETED: Maintains daily weight. 5/14/19  -brooke reports weight is trending down to 111lbs  -will weight daily and monitor  -will report weight gain >3lbs in 1 day or >5lbs in 1 week to provider. -NN to follow up in 1 week.   Hannah   4/26/19  -NN educated patient and DIL Reed about heart failure and fluid weight gain. -reports the Aid weighs her daily, unsure of daily measurements. Gurmeet Bah states that she thinks she is around 114lbs  -will record weights for NN  -will report weight gain >3lbs in 1 day or >5lbs in 1 week to provider. -NN to follow up in 1 week. Hannah             Moshe has nurse navigator's contact information for any further questions, concerns, or needs.   Patients upcoming visits:    Future Appointments   Date Time Provider Amanda Dailey   7/22/2019 11:30 AM Gerri Guerra MD [facility] [address] [facility]   7/30/2019 10:15 AM Rebeca Zhou MD [address]

## 2019-06-25 NOTE — ED PROVIDER NOTES
EMERGENCY DEPARTMENT HISTORY AND PHYSICAL EXAM      Date: 6/25/2019  Patient Name: Raz Tyson    History of Presenting Illness     Chief Complaint   Patient presents with    Shortness of Breath    Abnormal Lab Results       History Provided By: Patient, EMS and Caregiver    HPI: Raz Tyson, 80 y.o. female  presents to the ED with cc of shortness of breath and low hemoglobin levels. Patient resides at [place] [address]. She had labs yesterday that showed that she had a hemoglobin of 6.7. Also appears that she has a UTI and a chest x-ray was obtained which shows a pneumonia. She has had a productive cough. She states she has had low-grade fevers. She denies any chest pain. She has not had any nausea vomiting or diarrhea. She has not noticed any blood in her stool. She does report feeling cold. There are no other complaints, changes, or physical findings at this time. PCP: To Barnett MD    No current facility-administered medications on file prior to encounter. Current Outpatient Medications on File Prior to Encounter   Medication Sig Dispense Refill    mirtazapine (REMERON SOL-TAB) 15 mg disintegrating tablet Take 1 Tab by mouth nightly. 30 Tab 6    allopurinol (ZYLOPRIM) 100 mg tablet TAKE ONE (1) TABLET(S) DAILY 30 Tab 11    furosemide (LASIX) 40 mg tablet 1 tablet twice daily 60 Tab 11    potassium chloride SR (K-TAB) 20 mEq tablet Take 1 Tab by mouth two (2) times a day. 60 Tab 11    dextromethorphan-guaiFENesin (ROBITUSSIN-DM)  mg/5 mL syrup Take 10 mL by mouth every six (6) hours as needed for Cough.  albuterol (PROVENTIL VENTOLIN) 2.5 mg /3 mL (0.083 %) nebulizer solution 2.5 mg by Nebulization route four (4) times daily.  atorvastatin (LIPITOR) 10 mg tablet TAKE ONE (1) TABLET(S) DAILY 90 Tab 3    levothyroxine (SYNTHROID) 25 mcg tablet Take 1 Tab by mouth Daily (before breakfast).  90 Tab 3    apixaban (ELIQUIS) 2.5 mg tablet Take 1 Tab by mouth two (2) times a day. Indications: PREVENT THROMBOEMBOLISM IN CHRONIC ATRIAL FIBRILLATION 180 Tab 3    fluticasone-salmeterol (ADVAIR) 250-50 mcg/dose diskus inhaler Take 1 Puff by inhalation two (2) times a day. 3 Inhaler 3    carvedilol (COREG) 3.125 mg tablet TAKE ONE (1) TABLET(S) TWIC E DAILY WITH FOOD  Indications: chronic heart failure 180 Tab 3    magnesium oxide 400 mg cap Take 1 Cap by mouth nightly.  cholecalciferol (VITAMIN D3) 1,000 unit cap Take 1,000 Units by mouth daily.  loratadine (CLARITIN) 10 mg tablet Take 10 mg by mouth nightly.  co-enzyme Q-10 (CO Q-10) 100 mg capsule Take 100 mg by mouth daily.  acetaminophen (TYLENOL EXTRA STRENGTH) 500 mg tablet Take 500 mg by mouth every six (6) hours as needed for Pain.          Past History     Past Medical History:  Past Medical History:   Diagnosis Date    Aortic insufficiency     Asthma     Cancer ([facility] [address]     lymphoma    CKD (chronic kidney disease) stage 3, GFR 30-59 ml/min ([facility]) 1/10/2018    Congestive heart failure, NYHA class II, chronic, systolic ([facility])     Heart failure ([facility] [address]     Hypertension     Mitral valvular regurgitation 1/22/2016    ECHO 2/3/17: LV dilated, EF 20-25%, mild LVH, RV mod dilated, mild- mod MELANIA, mod-severe MR, mod AI ECHO 7/29/17: EF 15%, severe DHK, reduced RVEF, RVH, RVSP 35 mmHg  Mild LAE, mod-marked MA fibrosis, mod-severe MR (2+), AO root dilated,      Nonrheumatic aortic valve insufficiency 10/16/2018    Presence of biventricular automatic cardioverter/defibrillator (AICD) 7/2/2015    [employer] biventricular AICD implant    Stomach ulcer        Past Surgical History:  Past Surgical History:   Procedure Laterality Date    HX BREAST BIOPSY Bilateral     40 years ago    HX COLONOSCOPY      HX HEENT      cataracts removed    HX HYSTERECTOMY      HX OTHER SURGICAL      lymph node surgery    HX TONSILLECTOMY      TN EGD TRANSORAL BIOPSY SINGLE/MULTIPLE  5/23/2012         SINUS SURGERY PROC UNLISTED      Nasal polyps removed       Family History:  Family History   Problem Relation Age of Onset    Heart Disease Mother     Stroke Father        Social History:  Social History     Tobacco Use    Smoking status: Never Smoker    Smokeless tobacco: Never Used   Substance Use Topics    Alcohol use: No     Alcohol/week: 0.0 oz    Drug use: No       Allergies: Allergies   Allergen Reactions    Codeine Other (comments)     \"made me disoriented\" per pt         Review of Systems   Review of Systems   Constitutional: Negative for chills and fever. HENT: Negative for congestion, ear pain, rhinorrhea, sore throat and trouble swallowing. Eyes: Negative for visual disturbance. Respiratory: Positive for cough and shortness of breath. Negative for chest tightness. Cardiovascular: Negative for chest pain and palpitations. Gastrointestinal: Negative for abdominal pain, blood in stool, constipation, diarrhea, nausea and vomiting. Genitourinary: Negative for decreased urine volume, difficulty urinating, dysuria and frequency. Musculoskeletal: Negative for back pain and neck pain. Skin: Negative for color change and rash. Neurological: Negative for dizziness, weakness, light-headedness and headaches. Physical Exam   Physical Exam   Constitutional: She is oriented to person, place, and time. She appears well-developed and well-nourished. She has a sickly appearance. HENT:   Mouth/Throat: Oropharynx is clear and moist.   Eyes: Conjunctivae are normal.   Neck: Neck supple. Cardiovascular: Normal rate and regular rhythm. Pulmonary/Chest: Effort normal and breath sounds normal. No accessory muscle usage. No respiratory distress. Abdominal: Soft. She exhibits no distension. There is no tenderness. Lymphadenopathy:     She has no cervical adenopathy. Neurological: She is alert and oriented to person, place, and time. She has normal strength.  No cranial nerve deficit or sensory deficit. Skin: Skin is warm and dry. Nursing note and vitals reviewed. Diagnostic Study Results     Labs -     Recent Results (from the past 24 hour(s))   EKG, 12 LEAD, INITIAL    Collection Time: 06/25/19  9:01 AM   Result Value Ref Range    Ventricular Rate 93 BPM    Atrial Rate 93 BPM    P-R Interval 130 ms    QRS Duration 138 ms    Q-T Interval 422 ms    QTC Calculation (Bezet) 524 ms    Calculated P Axis 8 degrees    Calculated R Axis -176 degrees    Calculated T Axis -41 degrees    Diagnosis       Atrial-sensed ventricular-paced rhythm  Biventricular pacemaker detected  When compared with ECG of 13-MAY-2019 14:21,  Vent. rate has increased BY  19 BPM     CBC WITH AUTOMATED DIFF    Collection Time: 06/25/19  9:05 AM   Result Value Ref Range    WBC 6.4 3.6 - 11.0 K/uL    RBC 2.86 (L) 3.80 - 5.20 M/uL    HGB 8.1 (L) 11.5 - 16.0 g/dL    HCT 25.9 (L) 35.0 - 47.0 %    MCV 90.6 80.0 - 99.0 FL    MCH 28.3 26.0 - 34.0 PG    MCHC 31.3 30.0 - 36.5 g/dL    RDW 21.1 (H) 11.5 - 14.5 %    PLATELET 551 936 - 994 K/uL    MPV 11.3 8.9 - 12.9 FL    NRBC 0.0 0  WBC    ABSOLUTE NRBC 0.00 0.00 - 0.01 K/uL    NEUTROPHILS 67 32 - 75 %    BAND NEUTROPHILS 8 %    LYMPHOCYTES 21 12 - 49 %    MONOCYTES 1 (L) 5 - 13 %    EOSINOPHILS 0 0 - 7 %    BASOPHILS 0 0 - 1 %    METAMYELOCYTES 3 %    IMMATURE GRANULOCYTES 0 0.0 - 0.5 %    ABS. NEUTROPHILS 4.8 1.8 - 8.0 K/UL    ABS. LYMPHOCYTES 1.3 0.8 - 3.5 K/UL    ABS. MONOCYTES 0.1 0.0 - 1.0 K/UL    ABS. EOSINOPHILS 0.0 0.0 - 0.4 K/UL    ABS. BASOPHILS 0.0 0.0 - 0.1 K/UL    ABS. IMM.  GRANS. 0.0 0.00 - 0.04 K/UL    DF MANUAL      RBC COMMENTS ANISOCYTOSIS  2+        RBC COMMENTS OVALOCYTES  PRESENT       METABOLIC PANEL, COMPREHENSIVE    Collection Time: 06/25/19  9:05 AM   Result Value Ref Range    Sodium 133 (L) 136 - 145 mmol/L    Potassium 4.6 3.5 - 5.1 mmol/L    Chloride 100 97 - 108 mmol/L    CO2 28 21 - 32 mmol/L    Anion gap 5 5 - 15 mmol/L    Glucose 81 65 - 100 mg/dL    BUN 23 (H) 6 - 20 MG/DL    Creatinine 0.87 0.55 - 1.02 MG/DL    BUN/Creatinine ratio 26 (H) 12 - 20      GFR est AA >60 >60 ml/min/1.73m2    GFR est non-AA >60 >60 ml/min/1.73m2    Calcium 9.0 8.5 - 10.1 MG/DL    Bilirubin, total 0.9 0.2 - 1.0 MG/DL    ALT (SGPT) 17 12 - 78 U/L    AST (SGOT) 28 15 - 37 U/L    Alk. phosphatase 131 (H) 45 - 117 U/L    Protein, total 6.9 6.4 - 8.2 g/dL    Albumin 2.1 (L) 3.5 - 5.0 g/dL    Globulin 4.8 (H) 2.0 - 4.0 g/dL    A-G Ratio 0.4 (L) 1.1 - 2.2     LACTIC ACID    Collection Time: 06/25/19  9:05 AM   Result Value Ref Range    Lactic acid 0.8 0.4 - 2.0 MMOL/L       Radiologic Studies -   XR CHEST PORT   Final Result   IMPRESSION:    1. New heterogeneous left lung opacity worrisome for pneumonia. Recommend   radiographic follow-up in 6-8 weeks after treatment to ensure resolution. 2.  Small left pleural effusion. 3.  Cardiomegaly and interstitial edema. CT Results  (Last 48 hours)    None        CXR Results  (Last 48 hours)               06/25/19 0928  XR CHEST PORT Final result    Impression:  IMPRESSION:    1. New heterogeneous left lung opacity worrisome for pneumonia. Recommend   radiographic follow-up in 6-8 weeks after treatment to ensure resolution. 2.  Small left pleural effusion. 3.  Cardiomegaly and interstitial edema. Narrative:  EXAM:  XR CHEST PORT       INDICATION: cough, dyspnea       COMPARISON: 5/13/2019. TECHNIQUE: Single frontal view of the chest.       FINDINGS: New heterogeneous left lung opacity centered in the mid lung. There is   likely a small left pleural effusion. Mild interstitial edema seen throughout   the right lung. No right-sided effusion. No pneumothorax visualized bilaterally. Unchanged enlarged cardiomediastinal silhouette with cardiac device in place. No   acute or aggressive osseous lesion. Medical Decision Making   I am the first provider for this patient.     I reviewed the vital signs, available nursing notes, past medical history, past surgical history, family history and social history. Vital Signs-Reviewed the patient's vital signs. Patient Vitals for the past 24 hrs:   Temp Pulse Resp BP SpO2   06/25/19 1045  93 (!) 32 101/64 97 %   06/25/19 1030  92 (!) 33 93/69 98 %   06/25/19 0900  91 27 101/69 95 %   06/25/19 0850  91 (!) 32 101/74 95 %   06/25/19 0843 97.3 °F (36.3 °C) 97 24 113/72 96 %       Pulse Oximetry Analysis - 96% on RA    Cardiac Monitor:   Rate: 97 bpm  Rhythm: Paced      EKG interpretation: (Preliminary)  Rhythm: paced; and regular . Rate (approx.): 93    Records Reviewed: Nursing Notes, Old Medical Records, Ambulance Run Sheet, Previous Radiology Studies and Previous Laboratory Studies    Provider Notes (Medical Decision Making):   Patient presents from assisted living/nursing home for pneumonia seen on chest x-ray yesterday. She was also found to be anemic with a hemoglobin of 6.7. She was sent for blood transfusion however her hemoglobin here is 8.1 and therefore I would not give her a transfusion at this time. She is not febrile. She is not oxygen requiring. X-ray here does support the finding of a left-sided pneumonia. She does not have a leukocytosis. She is not septic. Given her age and other comorbidities her port score is 80 which would recommend hospitalization. Antibiotics to cover community-acquired pneumonia. ED Course:   Initial assessment performed. The patients presenting problems have been discussed, and they are in agreement with the care plan formulated and outlined with them. I have encouraged them to ask questions as they arise throughout their visit.     ED Course as of Jun 25 1049   Tue Jun 25, 2019   1042 Spoke with Dr. Anali Rojas, hospitalist.  She will see the patient for admission.    [WM]      ED Course User Index  [WM] Chas Fitzgerald MD       Orders Placed This Encounter    SEPSIS ORDERS INITIATED IN TRIAGE (DO NOT DESELECT)    CULTURE, BLOOD, PAIRED    XR CHEST PORT    CBC WITH AUTOMATED DIFF    METABOLIC PANEL, COMPREHENSIVE    LACTIC ACID, PLASMA    URINALYSIS W/ RFLX MICROSCOPIC    DIET CARDIAC Regular    WEIGH PATIENT    NOTIFY PROVIDER: SPECIFY Notify provider on pt's arrival to floor ONE TIME STAT    OUT OF BED WITH ASSISTANCE    VITAL SIGNS PER UNIT ROUTINE    DO NOT RESUSCITATE    EKG 12 LEAD INITIAL    TYPE & SCREEN    SALINE LOCK IV ONE TIME STAT    cefTRIAXone (ROCEPHIN) 2 g in 0.9% sodium chloride (MBP/ADV) 50 mL    azithromycin (ZITHROMAX) 500 mg in 0.9% sodium chloride (MBP/ADV) 250 mL    IP CONSULT TO HOSPITALIST         Critical Care Time:   0    Disposition:  Admit      Diagnosis     Clinical Impression:   1. Pneumonia of left lower lobe due to infectious organism ([facility] [address]    2. Anemia, unspecified type            This note will not be viewable in MyChart.

## 2019-06-25 NOTE — PROGRESS NOTES
Paged Dr. Mik Mixon regarding patient hypotension. Dr. Mason Swan returned call stating to hold lasix and coreg.

## 2019-06-25 NOTE — PROGRESS NOTES
ADULT PROTOCOL: JET AEROSOL ASSESSMENT    Patient  Erasmo Castellano     80 y.o.   female     6/25/2019  6:23 PM    Breath Sounds Pre Procedure: Right Breath Sounds: Diminished                               Left Breath Sounds: Diminished    Breath Sounds Post Procedure:                                        Breathing pattern: Pre procedure Breathing Pattern: Regular          Post procedure      Heart Rate: Pre procedure Pulse: 97           Post procedure      Resp Rate: Pre procedure Respirations: 20           Post procedure      Peak Flow: Pre bronchodilator             Post bronchodilator       Oxygen: O2 Device: Nasal cannula   Flow rate (L/min) 2     Changed: NO    SpO2: Pre procedure SpO2: 97 %   with oxygen              Post procedure    with oxygen    Nebulizer Therapy: Current medications Aerosolized Medications: Albuterol      Changed: NO    Smoking History: never    Problem List:   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    DILAN (obstructive sleep apnea) G47.33    Rhinitis J31.0    Anemia D64.9    Reactive airway disease J45.909    HTN (hypertension) I10    Gout M10.9    LBBB (left bundle branch block) I44.7    Presence of biventricular automatic cardioverter/defibrillator (AICD) Z95.810    Chronic systolic congestive heart failure (HCC) I50.22    Lymphoma (HCC) C85.90    Mitral valvular regurgitation I34.0    Physical deconditioning R53.81    Gastroesophageal reflux disease with esophagitis K21.0    Thoracic aortic aneurysm without rupture (HCC) I71.2    Paroxysmal atrial fibrillation (HCC) I48.0    Xerosis of skin L85.3    Dyslipidemia E78.5    CKD (chronic kidney disease) stage 3, GFR 30-59 ml/min (HCC) N18.3    Acquired hypothyroidism E03.9    Nonrheumatic aortic valve insufficiency I35.1    UTI (urinary tract infection) N39.0    Counseling regarding advanced care planning and goals of care Z71.89    CAP (community acquired pneumonia) J18.9       Respiratory Therapist: Celestine Teixeira, RT

## 2019-06-25 NOTE — PROGRESS NOTES
Order received and acknowledged. Attempted to see pt for PT eval, currently off the floor. Will continue to follow.

## 2019-06-25 NOTE — PROGRESS NOTES
Plan of Care   1) SNF   2) Palliative Consult     CM received phone call from Kushal Rodrigues ([phone]) at [facility] stating pt is on the SNF portion at this time and they are holding a bed for pt. CM will contact pt son to confirm.      Tucker Castellanos, [place], [address] [place] [address]   [phone]

## 2019-06-26 LAB
ANION GAP SERPL CALC-SCNC: 9 MMOL/L (ref 5–15)
BASOPHILS # BLD: 0 K/UL (ref 0–0.1)
BASOPHILS NFR BLD: 0 % (ref 0–1)
BLASTS NFR BLD MANUAL: 0 %
BUN SERPL-MCNC: 22 MG/DL (ref 6–20)
BUN/CREAT SERPL: 27 (ref 12–20)
CALCIUM SERPL-MCNC: 8.7 MG/DL (ref 8.5–10.1)
CHLORIDE SERPL-SCNC: 104 MMOL/L (ref 97–108)
CO2 SERPL-SCNC: 25 MMOL/L (ref 21–32)
CREAT SERPL-MCNC: 0.83 MG/DL (ref 0.55–1.02)
DIFFERENTIAL METHOD BLD: ABNORMAL
EOSINOPHIL # BLD: 0 K/UL (ref 0–0.4)
EOSINOPHIL NFR BLD: 0 % (ref 0–7)
ERYTHROCYTE [DISTWIDTH] IN BLOOD BY AUTOMATED COUNT: 20.9 % (ref 11.5–14.5)
FERRITIN SERPL-MCNC: 801 NG/ML (ref 26–388)
FOLATE SERPL-MCNC: 18.3 NG/ML (ref 5–21)
GLUCOSE SERPL-MCNC: 93 MG/DL (ref 65–100)
HCT VFR BLD AUTO: 22.8 % (ref 35–47)
HCT VFR BLD AUTO: 23.8 % (ref 35–47)
HGB BLD-MCNC: 7.1 G/DL (ref 11.5–16)
HGB BLD-MCNC: 7.1 G/DL (ref 11.5–16)
IMM GRANULOCYTES # BLD AUTO: 0 K/UL
IMM GRANULOCYTES NFR BLD AUTO: 0 %
IRON SATN MFR SERPL: 10 % (ref 20–50)
IRON SATN MFR SERPL: 9 % (ref 20–50)
IRON SERPL-MCNC: 11 UG/DL (ref 35–150)
IRON SERPL-MCNC: 12 UG/DL (ref 35–150)
LYMPHOCYTES # BLD: 0.7 K/UL (ref 0.8–3.5)
LYMPHOCYTES NFR BLD: 14 % (ref 12–49)
MAGNESIUM SERPL-MCNC: 2.7 MG/DL (ref 1.6–2.4)
MCH RBC QN AUTO: 27.7 PG (ref 26–34)
MCHC RBC AUTO-ENTMCNC: 31.1 G/DL (ref 30–36.5)
MCV RBC AUTO: 89.1 FL (ref 80–99)
METAMYELOCYTES NFR BLD MANUAL: 0 %
MONOCYTES # BLD: 0.2 K/UL (ref 0–1)
MONOCYTES NFR BLD: 4 % (ref 5–13)
MYELOCYTES NFR BLD MANUAL: 1 %
NEUTS BAND NFR BLD MANUAL: 5 % (ref 0–6)
NEUTS SEG # BLD: 4.2 K/UL (ref 1.8–8)
NEUTS SEG NFR BLD: 76 % (ref 32–75)
NRBC # BLD: 0 K/UL (ref 0–0.01)
NRBC BLD-RTO: 0 PER 100 WBC
OTHER CELLS NFR BLD MANUAL: 0 %
PERIPHERAL SMEAR,PSM: NORMAL
PHOSPHATE SERPL-MCNC: 4.4 MG/DL (ref 2.6–4.7)
PLATELET # BLD AUTO: 219 K/UL (ref 150–400)
PMV BLD AUTO: 10.6 FL (ref 8.9–12.9)
POTASSIUM SERPL-SCNC: 4.6 MMOL/L (ref 3.5–5.1)
PROMYELOCYTES NFR BLD MANUAL: 0 %
RBC # BLD AUTO: 2.56 M/UL (ref 3.8–5.2)
RBC MORPH BLD: ABNORMAL
RBC MORPH BLD: ABNORMAL
RETICS # AUTO: 0.04 M/UL (ref 0.02–0.08)
RETICS/RBC NFR AUTO: 1.5 % (ref 0.7–2.1)
SODIUM SERPL-SCNC: 138 MMOL/L (ref 136–145)
TIBC SERPL-MCNC: 107 UG/DL (ref 250–450)
TIBC SERPL-MCNC: 138 UG/DL (ref 250–450)
VIT B12 SERPL-MCNC: 1897 PG/ML (ref 193–986)
WBC # BLD AUTO: 5.2 K/UL (ref 3.6–11)

## 2019-06-26 PROCEDURE — 82607 VITAMIN B-12: CPT

## 2019-06-26 PROCEDURE — 94760 N-INVAS EAR/PLS OXIMETRY 1: CPT

## 2019-06-26 PROCEDURE — 97535 SELF CARE MNGMENT TRAINING: CPT

## 2019-06-26 PROCEDURE — 94640 AIRWAY INHALATION TREATMENT: CPT

## 2019-06-26 PROCEDURE — 74011000258 HC RX REV CODE- 258: Performed by: HOSPITALIST

## 2019-06-26 PROCEDURE — 65270000029 HC RM PRIVATE

## 2019-06-26 PROCEDURE — 74011000250 HC RX REV CODE- 250: Performed by: INTERNAL MEDICINE

## 2019-06-26 PROCEDURE — 97116 GAIT TRAINING THERAPY: CPT

## 2019-06-26 PROCEDURE — 84100 ASSAY OF PHOSPHORUS: CPT

## 2019-06-26 PROCEDURE — 74011250636 HC RX REV CODE- 250/636: Performed by: HOSPITALIST

## 2019-06-26 PROCEDURE — 83735 ASSAY OF MAGNESIUM: CPT

## 2019-06-26 PROCEDURE — 82728 ASSAY OF FERRITIN: CPT

## 2019-06-26 PROCEDURE — 82525 ASSAY OF COPPER: CPT

## 2019-06-26 PROCEDURE — 97165 OT EVAL LOW COMPLEX 30 MIN: CPT

## 2019-06-26 PROCEDURE — 83540 ASSAY OF IRON: CPT

## 2019-06-26 PROCEDURE — 36415 COLL VENOUS BLD VENIPUNCTURE: CPT

## 2019-06-26 PROCEDURE — 74011250637 HC RX REV CODE- 250/637: Performed by: HOSPITALIST

## 2019-06-26 PROCEDURE — 92610 EVALUATE SWALLOWING FUNCTION: CPT

## 2019-06-26 PROCEDURE — 85045 AUTOMATED RETICULOCYTE COUNT: CPT

## 2019-06-26 PROCEDURE — 85027 COMPLETE CBC AUTOMATED: CPT

## 2019-06-26 PROCEDURE — 80048 BASIC METABOLIC PNL TOTAL CA: CPT

## 2019-06-26 PROCEDURE — 82746 ASSAY OF FOLIC ACID SERUM: CPT

## 2019-06-26 PROCEDURE — 85018 HEMOGLOBIN: CPT

## 2019-06-26 PROCEDURE — 77010033678 HC OXYGEN DAILY

## 2019-06-26 PROCEDURE — 97161 PT EVAL LOW COMPLEX 20 MIN: CPT

## 2019-06-26 PROCEDURE — 74011000250 HC RX REV CODE- 250: Performed by: HOSPITALIST

## 2019-06-26 RX ORDER — IPRATROPIUM BROMIDE AND ALBUTEROL SULFATE 2.5; .5 MG/3ML; MG/3ML
3 SOLUTION RESPIRATORY (INHALATION)
Status: DISCONTINUED | OUTPATIENT
Start: 2019-06-27 | End: 2019-06-27

## 2019-06-26 RX ORDER — IPRATROPIUM BROMIDE AND ALBUTEROL SULFATE 2.5; .5 MG/3ML; MG/3ML
3 SOLUTION RESPIRATORY (INHALATION)
Status: DISCONTINUED | OUTPATIENT
Start: 2019-06-26 | End: 2019-07-02 | Stop reason: HOSPADM

## 2019-06-26 RX ORDER — IPRATROPIUM BROMIDE AND ALBUTEROL SULFATE 2.5; .5 MG/3ML; MG/3ML
3 SOLUTION RESPIRATORY (INHALATION)
Status: DISCONTINUED | OUTPATIENT
Start: 2019-06-26 | End: 2019-06-26

## 2019-06-26 RX ADMIN — ALBUTEROL SULFATE 2.5 MG: 2.5 SOLUTION RESPIRATORY (INHALATION) at 08:19

## 2019-06-26 RX ADMIN — AZITHROMYCIN MONOHYDRATE 500 MG: 500 INJECTION, POWDER, LYOPHILIZED, FOR SOLUTION INTRAVENOUS at 10:35

## 2019-06-26 RX ADMIN — Medication 10 ML: at 14:03

## 2019-06-26 RX ADMIN — IPRATROPIUM BROMIDE AND ALBUTEROL SULFATE 3 ML: .5; 3 SOLUTION RESPIRATORY (INHALATION) at 11:51

## 2019-06-26 RX ADMIN — CARVEDILOL 3.12 MG: 3.12 TABLET, FILM COATED ORAL at 08:51

## 2019-06-26 RX ADMIN — ALLOPURINOL 100 MG: 100 TABLET ORAL at 08:51

## 2019-06-26 RX ADMIN — LEVOTHYROXINE SODIUM 25 MCG: 25 TABLET ORAL at 06:50

## 2019-06-26 RX ADMIN — MIRTAZAPINE 15 MG: 15 TABLET, FILM COATED ORAL at 21:34

## 2019-06-26 RX ADMIN — POTASSIUM CHLORIDE 20 MEQ: 750 TABLET, FILM COATED, EXTENDED RELEASE ORAL at 17:35

## 2019-06-26 RX ADMIN — FLUTICASONE FUROATE AND VILANTEROL TRIFENATATE 1 PUFF: 100; 25 POWDER RESPIRATORY (INHALATION) at 10:36

## 2019-06-26 RX ADMIN — LORATADINE 10 MG: 10 TABLET ORAL at 21:34

## 2019-06-26 RX ADMIN — GUAIFENESIN 600 MG: 600 TABLET, EXTENDED RELEASE ORAL at 08:51

## 2019-06-26 RX ADMIN — APIXABAN 2.5 MG: 2.5 TABLET, FILM COATED ORAL at 08:51

## 2019-06-26 RX ADMIN — ATORVASTATIN CALCIUM 10 MG: 10 TABLET, FILM COATED ORAL at 08:51

## 2019-06-26 RX ADMIN — Medication 10 ML: at 21:37

## 2019-06-26 RX ADMIN — Medication 10 ML: at 06:48

## 2019-06-26 RX ADMIN — IPRATROPIUM BROMIDE AND ALBUTEROL SULFATE 3 ML: .5; 3 SOLUTION RESPIRATORY (INHALATION) at 15:43

## 2019-06-26 RX ADMIN — GUAIFENESIN 600 MG: 600 TABLET, EXTENDED RELEASE ORAL at 17:35

## 2019-06-26 RX ADMIN — CEFTRIAXONE 1 G: 1 INJECTION, POWDER, FOR SOLUTION INTRAMUSCULAR; INTRAVENOUS at 09:00

## 2019-06-26 RX ADMIN — POTASSIUM CHLORIDE 20 MEQ: 750 TABLET, FILM COATED, EXTENDED RELEASE ORAL at 08:51

## 2019-06-26 RX ADMIN — IPRATROPIUM BROMIDE AND ALBUTEROL SULFATE 3 ML: .5; 3 SOLUTION RESPIRATORY (INHALATION) at 21:00

## 2019-06-26 RX ADMIN — Medication 1 CAPSULE: at 08:51

## 2019-06-26 RX ADMIN — APIXABAN 2.5 MG: 2.5 TABLET, FILM COATED ORAL at 17:35

## 2019-06-26 RX ADMIN — MAGNESIUM OXIDE TAB 400 MG (241.3 MG ELEMENTAL MG) 400 MG: 400 (241.3 MG) TAB at 21:34

## 2019-06-26 NOTE — PROGRESS NOTES
Problem: Self Care Deficits Care Plan (Adult)  Goal: *Acute Goals and Plan of Care (Insert Text)  Description  Occupational Therapy Goals  Initiated 2019  1. Patient will perform grooming at the sink with supervision/set-up within 7 day(s). 2.  Patient will perform bathing at the sink with minimal assistance/contact guard assist within 7 day(s). 3.  Patient will perform lower body dressing with minimal assistance/contact guard assist within 7 day(s). 4.  Patient will perform toilet transfers with supervision/set-up within 7 day(s). 5.  Patient will perform all aspects of toileting with independence within 7 day(s). Outcome: Progressing Towards Goal   OCCUPATIONAL THERAPY EVALUATION  Patient: Erasmo Castellano (73 y.o. female)  Date: 2019  Primary Diagnosis: CAP (community acquired pneumonia) [J18.9]  Anemia [D64.9]        Precautions: fall       ASSESSMENT :  Based on the objective data described below, the patient presents with generalized weakness, decreased endurance, strength, functional mobility and ADLs. Pt resided at [facility] and was on the memory care unit. Pt states that she was able to bathe and dress self with assist stepping in and out of tub and at times she needs assist with dressing LB. Pt states that she uses a rollator at facility and she did not do well with using RW in the room. Pt was able to state her name and  and that she was in some sort of rehab. Pt has functional range in BUE and her strength is functional.  Pt was able to perform bed mobility with SBA and was able to walk to the bathroom with SBA and with HHA. With the rollator pt did very well and was able to remember locking brakes and unlocking brakes. Pt was SBA for transfer to toilet and was able to stand at the sink to wash her hands. Pt was left sitting up in chair with call bell within reach and she was able to state which button she needs to push for calling the nurse.   Recommend that pt return to her facility on the memory care center. Patient will benefit from skilled intervention to address the above impairments. Patient?s rehabilitation potential is considered to be Fair  Factors which may influence rehabilitation potential include:   ? None noted  ? Mental ability/status  ? Medical condition  ? Home/family situation and support systems  ? Safety awareness  ? Pain tolerance/management  ? Other:      PLAN :  Recommendations and Planned Interventions:  ?               Self Care Training                  ? Therapeutic Activities  ? Functional Mobility Training    ? Cognitive Retraining  ? Therapeutic Exercises           ? Endurance Activities  ? Balance Training                   ? Neuromuscular Re-Education  ? Visual/Perceptual Training     ? Home Safety Training  ? Patient Education                 ? Family Training/Education  ? Other (comment):    Frequency/Duration: Patient will be followed by occupational therapy 4 times a week to address goals. Discharge Recommendations: Abhi Damon  Further Equipment Recommendations for Discharge: tbd      SUBJECTIVE:   Patient stated ? I use a rollator. ?    OBJECTIVE DATA SUMMARY:   HISTORY:   Past Medical History:   Diagnosis Date    Aortic insufficiency     Asthma     Cancer ([facility] [address]     lymphoma    CKD (chronic kidney disease) stage 3, GFR 30-59 ml/min ([facility]) 1/10/2018    Congestive heart failure, NYHA class II, chronic, systolic ([facility])     Heart failure ([facility])     Hypertension     Mitral valvular regurgitation 1/22/2016    ECHO 2/3/17: LV dilated, EF 20-25%, mild LVH, RV mod dilated, mild- mod MELANIA, mod-severe MR, mod AI ECHO 7/29/17: EF 15%, severe DHK, reduced RVEF, RVH, RVSP 35 mmHg  Mild LAE, mod-marked MA fibrosis, mod-severe MR (2+), AO root dilated,      Nonrheumatic aortic valve insufficiency 10/16/2018    Presence of biventricular automatic cardioverter/defibrillator (AICD) 7/2/2015    [employer] biventricular AICD implant    Stomach ulcer      Past Surgical History:   Procedure Laterality Date    HX BREAST BIOPSY Bilateral     40 years ago    HX COLONOSCOPY      HX HEENT      cataracts removed    HX HYSTERECTOMY      HX OTHER SURGICAL      lymph node surgery    HX TONSILLECTOMY      NE EGD TRANSORAL BIOPSY SINGLE/MULTIPLE  5/23/2012         SINUS SURGERY PROC UNLISTED      Nasal polyps removed       Prior Level of Function/Environment/Context: pt resided in memory care center at [facility] [address]     Expanded or extensive additional review of patient history:     Home Situation  Home Environment: Assisted living    Hand dominance: Right    EXAMINATION OF PERFORMANCE DEFICITS:  Cognitive/Behavioral Status:  Neurologic State: Alert;Confused  Orientation Level: Oriented to person;Oriented to place  Cognition: Follows commands; Impaired decision making;Memory loss  Perception: Appears intact  Perseveration: Perseverates during conversation  Safety/Judgement: Fall prevention    Skin: in good health     Edema: none    Hearing: Auditory  Auditory Impairment: Hard of hearing, bilateral    Vision/Perceptual:                 Grossly intact                    Range of Motion:    AROM: Within functional limits  PROM: Within functional limits                      Strength:    Strength: Generally decreased, functional                Coordination:  Coordination: Within functional limits  Fine Motor Skills-Upper: Left Intact; Right Intact    Gross Motor Skills-Upper: Left Intact; Right Intact    Tone & Sensation:    Tone: Normal  Sensation: Intact                      Balance:  Sitting: Intact; Without support  Standing: Impaired; Without support  Standing - Static: Good; Unsupported  Standing - Dynamic : Fair;Unsupported    Functional Mobility and Transfers for ADLs:  Bed Mobility:  Rolling: Stand-by assistance  Supine to Sit: Stand-by assistance  Scooting: Stand-by assistance    Transfers:  Sit to Stand: Stand-by assistance  Stand to Sit: Stand-by assistance  Bed to Chair: Stand-by assistance  Bathroom Mobility: Stand-by assistance  Toilet Transfer : Stand-by assistance    ADL Assessment:  Feeding: Independent    Oral Facial Hygiene/Grooming: Independent    Bathing: Moderate assistance;Minimum assistance    Upper Body Dressing: Setup    Lower Body Dressing: Moderate assistance;Minimum assistance    Toileting: Stand by assistance              Cognitive Retraining  Safety/Judgement: Fall prevention          Functional Measure:  Barthel Index:    Bathin  Bladder: 5  Bowels: 10  Groomin  Dressin  Feeding: 10  Mobility: 10  Stairs: 0  Toilet Use: 5  Transfer (Bed to Chair and Back): 10  Total: 55/100        Percentage of impairment   0%   1-19%   20-39%   40-59%   60-79%   80-99%   100%   Barthel Score 0-100 100 99-80 79-60 59-40 20-39 1-19   0     The Barthel ADL Index: Guidelines  1. The index should be used as a record of what a patient does, not as a record of what a patient could do. 2. The main aim is to establish degree of independence from any help, physical or verbal, however minor and for whatever reason. 3. The need for supervision renders the patient not independent. 4. A patient's performance should be established using the best available evidence. Asking the patient, friends/relatives and nurses are the usual sources, but direct observation and common sense are also important. However direct testing is not needed. 5. Usually the patient's performance over the preceding 24-48 hours is important, but occasionally longer periods will be relevant. 6. Middle categories imply that the patient supplies over 50 per cent of the effort. 7. Use of aids to be independent is allowed. Dionne Stern., Barthel, D.W. ([id number]).  Functional evaluation: the Barthel Index. [address] [facility] (14)2. TAYLER Vogt, Emanuel Matthews., Gucci Hendrciks, [place], [address] (1999). Measuring the change indisability after inpatient rehabilitation; comparison of the responsiveness of the Barthel Index and Functional [place] Measure. Journal of Neurology, Neurosurgery, and Psychiatry, 66(4), 941-876. PIEDAD Hogan, SMILEY Moseley, & Akbar Kitchen MLETI (2004.) Assessment of post-stroke quality of life in cost-effectiveness studies: The usefulness of the Barthel Index and the EuroQoL-5D. Quality of Life Research, 15, 611-43         Occupational Therapy Evaluation Charge Determination   History Examination Decision-Making   MEDIUM Complexity : Expanded review of history including physical, cognitive and psychosocial  history  MEDIUM Complexity : 3-5 performance deficits relating to physical, cognitive , or psychosocial skils that result in activity limitations and / or participation restrictions MEDIUM Complexity : Patient may present with comorbidities that affect occupational performnce. Miniml to moderate modification of tasks or assistance (eg, physical or verbal ) with assesment(s) is necessary to enable patient to complete evaluation       Based on the above components, the patient evaluation is determined to be of the following complexity level: MEDIUM  Pain:  Pain Scale 1: Numeric (0 - 10)  Pain Intensity 1: 0              Activity Tolerance:   fair  Please refer to the flowsheet for vital signs taken during this treatment. After treatment:   ? Patient left in no apparent distress sitting up in chair  ? Patient left in no apparent distress in bed  ? Call bell left within reach  ? Nursing notified  ? Caregiver present  ? Bed alarm activated    COMMUNICATION/EDUCATION:   The patient?s plan of care was discussed with: Physical Therapist and Registered Nurse.  ?  Home safety education was provided and the patient/caregiver indicated understanding. ? Patient/family have participated as able in goal setting and plan of care. ? Patient/family agree to work toward stated goals and plan of care. ? Patient understands intent and goals of therapy, but is neutral about his/her participation. ? Patient is unable to participate in goal setting and plan of care. This patient?s plan of care is appropriate for delegation to JONA.     Thank you for this referral.  Mis Stallings, OT  Time Calculation: 30 mins

## 2019-06-26 NOTE — PROGRESS NOTES
Problem: Mobility Impaired (Adult and Pediatric)  Goal: *Acute Goals and Plan of Care (Insert Text)  Description  Physical Therapy Goals  Initiated 6/26/2019  1. Patient will move from supine to sit and sit to supine , scoot up and down and roll side to side in bed with modified independence within 7 day(s). 2.  Patient will transfer from bed to chair and chair to bed with supervision/set-up using the least restrictive device within 7 day(s). 3.  Patient will perform sit to stand with supervision/set-up within 7 day(s). 4.  Patient will ambulate with supervision/set-up for 200 feet with the least restrictive device within 7 day(s). Outcome: Progressing Towards Goal   PHYSICAL THERAPY EVALUATION  Patient: Viraj Alfonso (53 y.o. female)  Date: 6/26/2019  Primary Diagnosis: CAP (community acquired pneumonia) [J18.9]  Anemia [D64.9]        Precautions:        ASSESSMENT :  Based on the objective data described below, the patient presents with generalized weakness, new dependency on O2, confusion, and decreased activity tolerance. Pt received in bed, agreeable to PT evaluation. Pt reports she uses a rollator and is able to do her bathing and dressing, with assistance to step in and out of the tub at baseline. Pt demonstrating all mobility at cga to sba. O2 sats 94% on 1L and O2 removed. Pt did desat to 83% with ambulation and O2 put back on pt. Sats increased to 92%. Initial gait trial with RW unsuccessful, pt parked it to the side and walked away from it. Pt with much improved gait and use of ad, with use of rollator. Pt moving well and should progress quickly. Recommend pt return to her BAN when medically stable. Pt will not need further PT intervention at discharge. Patient will benefit from skilled intervention to address the above impairments. Patients rehabilitation potential is considered to be Good  Factors which may influence rehabilitation potential include:   ? None noted  ? Mental ability/status  ? Medical condition  ? Home/family situation and support systems  ? Safety awareness  ? Pain tolerance/management  ? Other:      PLAN :  Recommendations and Planned Interventions:  ?           Bed Mobility Training             ? Neuromuscular Re-Education  ? Transfer Training                   ? Orthotic/Prosthetic Training  ? Gait Training                         ? Modalities  ? Therapeutic Exercises           ? Edema Management/Control  ? Therapeutic Activities            ? Patient and Family Training/Education  ? Other (comment):    Frequency/Duration: Patient will be followed by physical therapy  4 times a week to address goals. Discharge Recommendations: None  Further Equipment Recommendations for Discharge: none     SUBJECTIVE:   Patient stated I have been in the hospital many times.     OBJECTIVE DATA SUMMARY:   HISTORY:    Past Medical History:   Diagnosis Date    Aortic insufficiency     Asthma     Cancer ([facility] [address]     lymphoma    CKD (chronic kidney disease) stage 3, GFR 30-59 ml/min ([facility]) 1/10/2018    Congestive heart failure, NYHA class II, chronic, systolic ([facility])     Heart failure ([facility])     Hypertension     Mitral valvular regurgitation 1/22/2016    ECHO 2/3/17: LV dilated, EF 20-25%, mild LVH, RV mod dilated, mild- mod MELANIA, mod-severe MR, mod AI ECHO 7/29/17: EF 15%, severe DHK, reduced RVEF, RVH, RVSP 35 mmHg  Mild LAE, mod-marked MA fibrosis, mod-severe MR (2+), AO root dilated,      Nonrheumatic aortic valve insufficiency 10/16/2018    Presence of biventricular automatic cardioverter/defibrillator (AICD) 7/2/2015    [employer] biventricular AICD implant    Stomach ulcer      Past Surgical History:   Procedure Laterality Date    HX BREAST BIOPSY Bilateral     40 years ago    HX COLONOSCOPY      HX HEENT      cataracts removed    HX HYSTERECTOMY  HX OTHER SURGICAL      lymph node surgery    HX TONSILLECTOMY      MS EGD TRANSORAL BIOPSY SINGLE/MULTIPLE  5/23/2012         SINUS SURGERY PROC UNLISTED      Nasal polyps removed     Prior Level of Function/Home Situation: Mod I with rollator. Pt lives in an BAN(memory care unit) and has a pcg twice a week that provides transportation to appointments and provides other assistance as needed  Personal factors and/or comorbidities impacting plan of care: none    Home Situation  Home Environment: Assisted living    EXAMINATION/PRESENTATION/DECISION MAKING:   Critical Behavior:  Neurologic State: Alert, Confused  Orientation Level: Oriented to person, Oriented to place  Cognition: Follows commands, Impaired decision making, Memory loss  Safety/Judgement: Fall prevention  Hearing: Auditory  Auditory Impairment: Hard of hearing, bilateral  Range Of Motion:  AROM: Within functional limits           PROM: Within functional limits           Strength:    Strength: Generally decreased, functional                    Tone & Sensation:   Tone: Normal              Sensation: Intact               Coordination:  Coordination: Within functional limits  Vision:      Functional Mobility:  Bed Mobility:  Rolling: Stand-by assistance  Supine to Sit: Stand-by assistance     Scooting: Stand-by assistance  Transfers:  Sit to Stand: Stand-by assistance  Stand to Sit: Stand-by assistance        Bed to Chair: Stand-by assistance              Balance:   Sitting: Intact; Without support  Standing: Impaired; Without support  Standing - Static: Good; Unsupported  Standing - Dynamic : Fair;Unsupported  Ambulation/Gait Training:  Distance (ft): 110 Feet (ft)  Assistive Device: Walker, rollator  Ambulation - Level of Assistance: Contact guard assistance;Stand-by assistance     Gait Description (WDL): Exceptions to WDL  Gait Abnormalities: Decreased step clearance; Path deviations  Right Side Weight Bearing: Full  Left Side Weight Bearing: Full  Base of Support: (wnl)     Speed/Lawanda: Pace decreased (<100 feet/min)  Step Length: Right shortened;Left shortened  Functional Measure:  Barthel Index:    Bathin  Bladder: 5  Bowels: 10  Groomin  Dressin  Feeding: 10  Mobility: 10  Stairs: 0  Toilet Use: 5  Transfer (Bed to Chair and Back): 10  Total: 55/100       Percentage of impairment   0%   1-19%   20-39%   40-59%   60-79%   80-99%   100%   Barthel Score 0-100 100 99-80 79-60 59-40 20-39 1-19   0     The Barthel ADL Index: Guidelines  1. The index should be used as a record of what a patient does, not as a record of what a patient could do. 2. The main aim is to establish degree of independence from any help, physical or verbal, however minor and for whatever reason. 3. The need for supervision renders the patient not independent. 4. A patient's performance should be established using the best available evidence. Asking the patient, friends/relatives and nurses are the usual sources, but direct observation and common sense are also important. However direct testing is not needed. 5. Usually the patient's performance over the preceding 24-48 hours is important, but occasionally longer periods will be relevant. 6. Middle categories imply that the patient supplies over 50 per cent of the effort. 7. Use of aids to be independent is allowed. Yannick Conn., Barthel, D.W. ([id number]). Functional evaluation: the Barthel Index. [address] [facility] (14)2. TAYLER Mancilla, Carlos Persaud., Giulia EldridgeSt. Joseph's Women's Hospital, [address] [place] [address] (). Measuring the change indisability after inpatient rehabilitation; comparison of the responsiveness of the Barthel Index and Functional [place] Measure. Journal of Neurology, Neurosurgery, and Psychiatry, 66(4), 725-530. Aliyah Chambers, N.[place].A, HALIMA Moseley.BENITA, & Mei Bernabe MBillA. (2004.) Assessment of post-stroke quality of life in cost-effectiveness studies: The usefulness of the Barthel Index and the EuroQoL-5D. Quality of Life Research, 15, 768-85           Physical Therapy Evaluation Charge Determination   History Examination Presentation Decision-Making   LOW Complexity : Zero comorbidities / personal factors that will impact the outcome / POC LOW Complexity : 1-2 Standardized tests and measures addressing body structure, function, activity limitation and / or participation in recreation  LOW Complexity : Stable, uncomplicated  LOW Complexity : FOTO score of       Based on the above components, the patient evaluation is determined to be of the following complexity level: LOW     Pain:  Pain Scale 1: Numeric (0 - 10)  Pain Intensity 1: 0       Activity Tolerance:   Good  Please refer to the flowsheet for vital signs taken during this treatment. After treatment:   ?         Patient left in no apparent distress sitting up in chair  ? Patient left in no apparent distress in bed  ? Call bell left within reach  ? Nursing notified  ? Caregiver present  ? Bed alarm activated    COMMUNICATION/EDUCATION:   The patients plan of care was discussed with: Occupational Therapist and Registered Nurse. ?         Fall prevention education was provided and the patient/caregiver indicated understanding. ? Patient/family have participated as able in goal setting and plan of care. ?         Patient/family agree to work toward stated goals and plan of care. ?         Patient understands intent and goals of therapy, but is neutral about his/her participation. ? Patient is unable to participate in goal setting and plan of care.     Thank you for this referral.  Dede Diaz, PT   Time Calculation: 30 mins

## 2019-06-26 NOTE — PROGRESS NOTES
General Daily Progress Note    Admit Date: 6/25/2019    Subjective:     Patient complains of cough which is nonproductive. Current Facility-Administered Medications   Medication Dose Route Frequency    albuterol-ipratropium (DUO-NEB) 2.5 MG-0.5 MG/3 ML  3 mL Nebulization Q4H RT    cefTRIAXone (ROCEPHIN) 1 g in 0.9% sodium chloride (MBP/ADV) 50 mL  1 g IntraVENous Q24H    azithromycin (ZITHROMAX) 500 mg in 0.9% sodium chloride (MBP/ADV) 250 mL  500 mg IntraVENous Q24H    lactobac ac& pc-s.therm-b.anim (DAWN Q/RISAQUAD)  1 Cap Oral DAILY    allopurinol (ZYLOPRIM) tablet 100 mg  100 mg Oral DAILY    apixaban (ELIQUIS) tablet 2.5 mg  2.5 mg Oral BID    atorvastatin (LIPITOR) tablet 10 mg  10 mg Oral DAILY    carvedilol (COREG) tablet 3.125 mg  3.125 mg Oral BID WITH MEALS    guaiFENesin ER (MUCINEX) tablet 600 mg  600 mg Oral BID    benzonatate (TESSALON) capsule 100 mg  100 mg Oral TID PRN    fluticasone-vilanterol (BREO ELLIPTA) 100mcg-25mcg/puff  1 Puff Inhalation DAILY    furosemide (LASIX) tablet 40 mg  40 mg Oral ACB&D    levothyroxine (SYNTHROID) tablet 25 mcg  25 mcg Oral ACB    loratadine (CLARITIN) tablet 10 mg  10 mg Oral QHS    magnesium oxide (MAG-OX) tablet 400 mg  400 mg Oral QHS    mirtazapine (REMERON) tablet 15 mg  15 mg Oral QHS    potassium chloride SR (KLOR-CON 10) tablet 20 mEq  20 mEq Oral BID    sodium chloride (NS) flush 5-40 mL  5-40 mL IntraVENous Q8H    sodium chloride (NS) flush 5-40 mL  5-40 mL IntraVENous PRN    acetaminophen (TYLENOL) tablet 650 mg  650 mg Oral Q4H PRN    ondansetron (ZOFRAN) injection 4 mg  4 mg IntraVENous Q4H PRN        Review of Systems  A comprehensive review of systems was negative.     Objective:     Patient Vitals for the past 24 hrs:   BP Temp Pulse Resp SpO2   06/26/19 0820     98 %   06/26/19 0744 105/64 97.8 °F (36.6 °C) 95 18 97 %   06/26/19 0648 101/76  93     06/26/19 0335 107/66 97.6 °F (36.4 °C) 88 18 96 %   06/26/19 0251     98 %   06/26/19 0018     99 %   06/25/19 2152     96 %   06/25/19 2015 102/69 99.4 °F (37.4 °C) (!) 103 18 100 %   06/25/19 1629 95/62 99 °F (37.2 °C) 98 18 97 %   06/25/19 1538     97 %   06/25/19 1204 101/66 98.3 °F (36.8 °C) 95 18 100 %   06/25/19 1115 105/71  95 29 97 %   06/25/19 1045 101/64  93 (!) 32 97 %   06/25/19 1030 93/69  92 (!) 33 98 %   06/25/19 0900 101/69  91 27 95 %     No intake/output data recorded. 06/24 1901 - 06/26 0700  In: 450 [P.O.:400; I.V.:50]  Out: 150 [Urine:150]    Physical Exam: General appearance: alert, cooperative, no distress, appears stated age  Neck: JVD - 2 cm above sternal notch  Lungs: rales L base  Heart: regular rate and rhythm, S1, S2 normal, no murmur, click, rub or gallop  Abdomen: soft, non-tender.  Bowel sounds normal. No masses,  no organomegaly  Extremities: extremities normal, atraumatic, no cyanosis or edema        Data Review   Recent Results (from the past 24 hour(s))   EKG, 12 LEAD, INITIAL    Collection Time: 06/25/19  9:01 AM   Result Value Ref Range    Ventricular Rate 93 BPM    Atrial Rate 93 BPM    P-R Interval 130 ms    QRS Duration 138 ms    Q-T Interval 422 ms    QTC Calculation (Bezet) 524 ms    Calculated P Axis 8 degrees    Calculated R Axis -176 degrees    Calculated T Axis -41 degrees    Diagnosis       Atrial-sensed ventricular-paced rhythm  Biventricular pacemaker detected  Confirmed by Alexandro Ibrahim ([id number]) on 6/25/2019 10:22:06 PM     CBC WITH AUTOMATED DIFF    Collection Time: 06/25/19  9:05 AM   Result Value Ref Range    WBC 6.4 3.6 - 11.0 K/uL    RBC 2.86 (L) 3.80 - 5.20 M/uL    HGB 8.1 (L) 11.5 - 16.0 g/dL    HCT 25.9 (L) 35.0 - 47.0 %    MCV 90.6 80.0 - 99.0 FL    MCH 28.3 26.0 - 34.0 PG    MCHC 31.3 30.0 - 36.5 g/dL    RDW 21.1 (H) 11.5 - 14.5 %    PLATELET 756 545 - 512 K/uL    MPV 11.3 8.9 - 12.9 FL    NRBC 0.0 0  WBC    ABSOLUTE NRBC 0.00 0.00 - 0.01 K/uL    NEUTROPHILS 67 32 - 75 %    BAND NEUTROPHILS 8 %    LYMPHOCYTES 21 12 - 49 %    MONOCYTES 1 (L) 5 - 13 %    EOSINOPHILS 0 0 - 7 %    BASOPHILS 0 0 - 1 %    METAMYELOCYTES 3 %    IMMATURE GRANULOCYTES 0 0.0 - 0.5 %    ABS. NEUTROPHILS 4.8 1.8 - 8.0 K/UL    ABS. LYMPHOCYTES 1.3 0.8 - 3.5 K/UL    ABS. MONOCYTES 0.1 0.0 - 1.0 K/UL    ABS. EOSINOPHILS 0.0 0.0 - 0.4 K/UL    ABS. BASOPHILS 0.0 0.0 - 0.1 K/UL    ABS. IMM. GRANS. 0.0 0.00 - 0.04 K/UL    DF MANUAL      RBC COMMENTS ANISOCYTOSIS  2+        RBC COMMENTS OVALOCYTES  PRESENT       METABOLIC PANEL, COMPREHENSIVE    Collection Time: 06/25/19  9:05 AM   Result Value Ref Range    Sodium 133 (L) 136 - 145 mmol/L    Potassium 4.6 3.5 - 5.1 mmol/L    Chloride 100 97 - 108 mmol/L    CO2 28 21 - 32 mmol/L    Anion gap 5 5 - 15 mmol/L    Glucose 81 65 - 100 mg/dL    BUN 23 (H) 6 - 20 MG/DL    Creatinine 0.87 0.55 - 1.02 MG/DL    BUN/Creatinine ratio 26 (H) 12 - 20      GFR est AA >60 >60 ml/min/1.73m2    GFR est non-AA >60 >60 ml/min/1.73m2    Calcium 9.0 8.5 - 10.1 MG/DL    Bilirubin, total 0.9 0.2 - 1.0 MG/DL    ALT (SGPT) 17 12 - 78 U/L    AST (SGOT) 28 15 - 37 U/L    Alk.  phosphatase 131 (H) 45 - 117 U/L    Protein, total 6.9 6.4 - 8.2 g/dL    Albumin 2.1 (L) 3.5 - 5.0 g/dL    Globulin 4.8 (H) 2.0 - 4.0 g/dL    A-G Ratio 0.4 (L) 1.1 - 2.2     LACTIC ACID    Collection Time: 06/25/19  9:05 AM   Result Value Ref Range    Lactic acid 0.8 0.4 - 2.0 MMOL/L   URINALYSIS W/ RFLX MICROSCOPIC    Collection Time: 06/25/19  1:00 PM   Result Value Ref Range    Color YELLOW/STRAW      Appearance CLEAR CLEAR      Specific gravity 1.020 1.003 - 1.030      pH (UA) 6.5 5.0 - 8.0      Protein 100 (A) NEG mg/dL    Glucose NEGATIVE  NEG mg/dL    Ketone NEGATIVE  NEG mg/dL    Bilirubin NEGATIVE  NEG      Blood NEGATIVE  NEG      Urobilinogen 1.0 0.2 - 1.0 EU/dL    Nitrites NEGATIVE  NEG      Leukocyte Esterase SMALL (A) NEG      WBC 5-10 0 - 4 /hpf    RBC 0-5 0 - 5 /hpf    Epithelial cells FEW FEW /lpf    Bacteria 1+ (A) NEG /hpf Other: Renal Epithelial cells Present     TYPE & SCREEN    Collection Time: 06/25/19  2:10 PM   Result Value Ref Range    Crossmatch Expiration 06/28/2019     ABO/Rh(D) A POSITIVE     Antibody screen NEG    IRON PROFILE    Collection Time: 06/26/19  2:45 AM   Result Value Ref Range    Iron 11 (L) 35 - 150 ug/dL    TIBC 107 (L) 250 - 450 ug/dL    Iron % saturation 10 (L) 20 - 50 %   CBC WITH MANUAL DIFF    Collection Time: 06/26/19  2:45 AM   Result Value Ref Range    WBC 5.2 3.6 - 11.0 K/uL    RBC 2.56 (L) 3.80 - 5.20 M/uL    HGB 7.1 (L) 11.5 - 16.0 g/dL    HCT 22.8 (L) 35.0 - 47.0 %    MCV 89.1 80.0 - 99.0 FL    MCH 27.7 26.0 - 34.0 PG    MCHC 31.1 30.0 - 36.5 g/dL    RDW 20.9 (H) 11.5 - 14.5 %    PLATELET 730 770 - 876 K/uL    MPV 10.6 8.9 - 12.9 FL    NRBC 0.0 0  WBC    ABSOLUTE NRBC 0.00 0.00 - 0.01 K/uL    NEUTROPHILS 76 (H) 32 - 75 %    BAND NEUTROPHILS 5 0 - 6 %    LYMPHOCYTES 14 12 - 49 %    MONOCYTES 4 (L) 5 - 13 %    EOSINOPHILS 0 0 - 7 %    BASOPHILS 0 0 - 1 %    METAMYELOCYTES 0 0 %    MYELOCYTES 1 (H) 0 %    PROMYELOCYTES 0 0 %    BLASTS 0 0 %    OTHER CELL 0 0      IMMATURE GRANULOCYTES 0 %    ABS. NEUTROPHILS 4.2 1.8 - 8.0 K/UL    ABS. LYMPHOCYTES 0.7 (L) 0.8 - 3.5 K/UL    ABS. MONOCYTES 0.2 0.0 - 1.0 K/UL    ABS. EOSINOPHILS 0.0 0.0 - 0.4 K/UL    ABS. BASOPHILS 0.0 0.0 - 0.1 K/UL    ABS. IMM.  GRANS. 0.0 K/UL    DF MANUAL      RBC COMMENTS ANISOCYTOSIS  2+        RBC COMMENTS OVALOCYTES  PRESENT       METABOLIC PANEL, BASIC    Collection Time: 06/26/19  2:45 AM   Result Value Ref Range    Sodium 138 136 - 145 mmol/L    Potassium 4.6 3.5 - 5.1 mmol/L    Chloride 104 97 - 108 mmol/L    CO2 25 21 - 32 mmol/L    Anion gap 9 5 - 15 mmol/L    Glucose 93 65 - 100 mg/dL    BUN 22 (H) 6 - 20 MG/DL    Creatinine 0.83 0.55 - 1.02 MG/DL    BUN/Creatinine ratio 27 (H) 12 - 20      GFR est AA >60 >60 ml/min/1.73m2    GFR est non-AA >60 >60 ml/min/1.73m2    Calcium 8.7 8.5 - 10.1 MG/DL   MAGNESIUM Collection Time: 06/26/19  2:45 AM   Result Value Ref Range    Magnesium 2.7 (H) 1.6 - 2.4 mg/dL   PHOSPHORUS    Collection Time: 06/26/19  2:45 AM   Result Value Ref Range    Phosphorus 4.4 2.6 - 4.7 MG/DL           Assessment:     Active Problems:    Anemia (11/8/2014)      CAP (community acquired pneumonia) (6/25/2019)        Plan:     1. Continue aggressive treatment of patient's bilateral pneumonia. 2.  He does have an ascending aortic and which is obviously not resectable given her age and existing comorbidities. We will have thoracic surgery evaluate however. 3.  She is well compensated from a cardiac perspective. 4.  Anemia persist will evaluate.

## 2019-06-26 NOTE — PROGRESS NOTES
Bedside shift change report given to GUNDERSEN MARITZA [facility] (oncoming nurse) by 6 Abhi Barnes RN (offgoing nurse). Report included the following information SBAR, Kardex, Procedure Summary, Intake/Output, MAR, Accordion, Recent Results and Med Rec Status.

## 2019-06-26 NOTE — PROGRESS NOTES
Orders received, chart reviewed and patient evaluated by physical therapy. Recommend patient to discharge to [facility] without services pending progress with skilled acute care physical therapy. Recommend with nursing patient to complete as able in order to maintain strength, endurance and independence: OOB to chair 3x/day with assist x 1 and ambulating with rollator. Thank you for your assistance. Full evaluation to follow.

## 2019-06-26 NOTE — PROGRESS NOTES
Blood work sent to lab. Asked laboratory what tube was needed to collect immunophenotyping lab. Laboratory unsure. Left message with Selina Nichols who did not call back.

## 2019-06-26 NOTE — PROGRESS NOTES
Orthopedic End of Shift Note    Bedside shift change report given to [address] (oncoming nurse) by Jada STROUD (offgoing nurse). Report included the following information SBAR, Kardex, ED Summary, Intake/Output, MAR and Recent Results.      POD# NA  Significant issues during shift:none    Issues for Physician to address: none    Activity This Shift  (check all that apply) [x] chair  [] dangle   [] bathroom  [] bedside commode [] hallway  [x] bedrest   Nausea/Vomiting [] yes [] no     Voiding Status [x] void [] Galindo [] I&O Cath   Bowel Movements [] yes [x] no     Foot Pumps or SCD [] yes [x] no    Ice Pack [] yes    [x] no    Incentive Spirometer [] yes [x] no    Telemetry Monitoring   [] yes [x] no Rhythm:   Supplemental O2 [x] yes [] no Sat off O2:  98%

## 2019-06-26 NOTE — CONSULTS
Hematology Oncology Consultation    Reason for consult: Worsening anemia in pt with CLL    Admitting Diagnosis: CAP (community acquired pneumonia) [J18.9]  Anemia [D64.9]    Impression: 81 yo female with pmh of CLL admitted with CAP and worsening anemia:    *) CLL and progressive anemia:  - she has been off Ibrutinib for last 6 months per our records, script written in May but don't think she's been taking it, will call Cb Ritter to clarify. - when seen in May by NP in office her anemia was progressing and felt to be due to her untreated CLL. I suspect that is still the case. - Will go ahead and check iron labs, ferritin, retic, b12, FA, zinc, copper, flow today  - I reviewed smear today that showed occasional krystian cell, cigar cell and rare smudge cell, nothing concerning for an acute process however. - CLL can transform to DLBCL but not typically acute leukemia. - consider resuming Ibrutinib at 140mg daily when over her infection.  - FU with Dr Nathaly Campbell after DC    *) CAP    *) AAA:  - thoracic surgery consulted, doubt much to be done given advanced age. *) Dementia and debility    *) NICM    Thank you for this kind referral, we will follow along. Discussion: Dominga Sy is a  80y.o. year old seen in consultation at the request of Dr. Jossue Cox for progressive anemia in pt with CLL. Ms Joselin Howard has pmh of CLL dx in 2007 and is followed by Dr Nathaly Campbell, was on Ibrutinib until Dec 2018. She has had worsening health issues and was not on her medication for several months, saw our NP in office May 2019 and was noted to have worsening anemia with Hbg ~8-9, was recommended to resume Ibrutinib at that time, one script written, pt unsure if she has been taking it at home. She was admitted on 6/25 with worsening SOB and found to have CAP.   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 2) Nonhodgkins lymphoma, diagnosed May 14, 2007   Treatment History  1) CLL/SLL   watched conservatively until 2010    chlorambucil (leukeran) and prednisone, started 2010    radiation therapy, 2010 (low dose to a mantle field covering the neck, mediastinum, and axillary areas)   Treanda/Rituxan   Ibrutinib/Rituxan     Imagin19 CT Chest:  IMPRESSION:    1. Left upper lobe and lingular pneumonia. 2. Large aneurysm of the ascending thoracic aorta measuring 5.8 cm in the  tubular portion. 3. Small to moderate left and small right pleural effusions with overlying  subsegmental atelectasis. Anasarca. 4. Cardiomegaly. Enlarged main pulmonary artery, consistent with pulmonary  arterial hypertension. 5. Age-indeterminate anterior compression fracture of L1, new since 2019.       Labs:    Recent Results (from the past 24 hour(s))   URINALYSIS W/ RFLX MICROSCOPIC    Collection Time: 19  1:00 PM   Result Value Ref Range    Color YELLOW/STRAW      Appearance CLEAR CLEAR      Specific gravity 1.020 1.003 - 1.030      pH (UA) 6.5 5.0 - 8.0      Protein 100 (A) NEG mg/dL    Glucose NEGATIVE  NEG mg/dL    Ketone NEGATIVE  NEG mg/dL    Bilirubin NEGATIVE  NEG      Blood NEGATIVE  NEG      Urobilinogen 1.0 0.2 - 1.0 EU/dL    Nitrites NEGATIVE  NEG      Leukocyte Esterase SMALL (A) NEG      WBC 5-10 0 - 4 /hpf    RBC 0-5 0 - 5 /hpf    Epithelial cells FEW FEW /lpf    Bacteria 1+ (A) NEG /hpf    Other: Renal Epithelial cells Present     TYPE & SCREEN    Collection Time: 19  2:10 PM   Result Value Ref Range    Crossmatch Expiration 2019     ABO/Rh(D) A POSITIVE     Antibody screen NEG    IRON PROFILE    Collection Time: 19  2:45 AM   Result Value Ref Range    Iron 11 (L) 35 - 150 ug/dL    TIBC 107 (L) 250 - 450 ug/dL    Iron % saturation 10 (L) 20 - 50 %   CBC WITH MANUAL DIFF    Collection Time: 19  2:45 AM   Result Value Ref Range    WBC 5.2 3.6 - 11.0 K/uL    RBC 2.56 (L) 3.80 - 5.20 M/uL    HGB 7.1 (L) 11.5 - 16.0 g/dL    HCT 22.8 (L) 35.0 - 47.0 %    MCV 89.1 80.0 - 99.0 FL    MCH 27.7 26.0 - 34.0 PG    MCHC 31.1 30.0 - 36.5 g/dL    RDW 20.9 (H) 11.5 - 14.5 %    PLATELET 592 232 - 791 K/uL    MPV 10.6 8.9 - 12.9 FL    NRBC 0.0 0  WBC    ABSOLUTE NRBC 0.00 0.00 - 0.01 K/uL    NEUTROPHILS 76 (H) 32 - 75 %    BAND NEUTROPHILS 5 0 - 6 %    LYMPHOCYTES 14 12 - 49 %    MONOCYTES 4 (L) 5 - 13 %    EOSINOPHILS 0 0 - 7 %    BASOPHILS 0 0 - 1 %    METAMYELOCYTES 0 0 %    MYELOCYTES 1 (H) 0 %    PROMYELOCYTES 0 0 %    BLASTS 0 0 %    OTHER CELL 0 0      IMMATURE GRANULOCYTES 0 %    ABS. NEUTROPHILS 4.2 1.8 - 8.0 K/UL    ABS. LYMPHOCYTES 0.7 (L) 0.8 - 3.5 K/UL    ABS. MONOCYTES 0.2 0.0 - 1.0 K/UL    ABS. EOSINOPHILS 0.0 0.0 - 0.4 K/UL    ABS. BASOPHILS 0.0 0.0 - 0.1 K/UL    ABS. IMM.  GRANS. 0.0 K/UL    DF MANUAL      RBC COMMENTS ANISOCYTOSIS  2+        RBC COMMENTS OVALOCYTES  PRESENT       METABOLIC PANEL, BASIC    Collection Time: 06/26/19  2:45 AM   Result Value Ref Range    Sodium 138 136 - 145 mmol/L    Potassium 4.6 3.5 - 5.1 mmol/L    Chloride 104 97 - 108 mmol/L    CO2 25 21 - 32 mmol/L    Anion gap 9 5 - 15 mmol/L    Glucose 93 65 - 100 mg/dL    BUN 22 (H) 6 - 20 MG/DL    Creatinine 0.83 0.55 - 1.02 MG/DL    BUN/Creatinine ratio 27 (H) 12 - 20      GFR est AA >60 >60 ml/min/1.73m2    GFR est non-AA >60 >60 ml/min/1.73m2    Calcium 8.7 8.5 - 10.1 MG/DL   MAGNESIUM    Collection Time: 06/26/19  2:45 AM   Result Value Ref Range    Magnesium 2.7 (H) 1.6 - 2.4 mg/dL   PHOSPHORUS    Collection Time: 06/26/19  2:45 AM   Result Value Ref Range    Phosphorus 4.4 2.6 - 4.7 MG/DL       History:  Past Medical History:   Diagnosis Date    Aortic insufficiency     Asthma     Cancer (HCC)     lymphoma    CKD (chronic kidney disease) stage 3, GFR 30-59 ml/min ([facility] 1/10/2018    Congestive heart failure, NYHA class II, chronic, systolic (HCC)     Heart failure ([facility])     Hypertension     Mitral valvular regurgitation 1/22/2016    ECHO 2/3/17: LV dilated, EF 20-25%, mild LVH, RV mod dilated, mild- mod MELANIA, mod-severe MR, mod AI ECHO 7/29/17: EF 15%, severe DHK, reduced RVEF, RVH, RVSP 35 mmHg  Mild LAE, mod-marked MA fibrosis, mod-severe MR (2+), AO root dilated,      Nonrheumatic aortic valve insufficiency 10/16/2018    Presence of biventricular automatic cardioverter/defibrillator (AICD) 7/2/2015    [employer] biventricular AICD implant    Stomach ulcer       Past Surgical History:   Procedure Laterality Date    HX BREAST BIOPSY Bilateral     40 years ago    HX COLONOSCOPY      HX HEENT      cataracts removed    HX HYSTERECTOMY      HX OTHER SURGICAL      lymph node surgery    HX TONSILLECTOMY      MO EGD TRANSORAL BIOPSY SINGLE/MULTIPLE  5/23/2012         SINUS SURGERY PROC UNLISTED      Nasal polyps removed      Prior to Admission medications    Medication Sig Start Date End Date Taking? Authorizing Provider   atorvastatin (LIPITOR) 10 mg tablet Take 10 mg by mouth nightly. Yes Provider, Historical   midodrine (PROAMITINE) 5 mg tablet Take 5 mg by mouth three (3) times daily. Yes Provider, Historical   ibrutinib (IMBRUVICA) 140 mg capsule Take 140 mg by mouth daily. Yes Provider, Historical   furosemide (LASIX) 40 mg tablet Take 40 mg by mouth two (2) times a day. Hold of SBP <110   Yes Provider, Historical   carvedilol (COREG) 3.125 mg tablet Take 3.125 mg by mouth two (2) times daily (with meals). Hold if SPB <110   Yes Provider, Historical   potassium chloride (K-DUR, KLOR-CON) 20 mEq tablet Take 20 mEq by mouth daily. Yes Provider, Historical   polyethylene glycol (MIRALAX) 17 gram/dose powder Take 17 g by mouth every fourty-eight (48) hours. Yes Provider, Historical   levoFLOXacin (LEVAQUIN) 750 mg tablet Take 750 mg by mouth nightly.    Yes Provider, Historical   allopurinol (ZYLOPRIM) 100 mg tablet TAKE ONE (1) TABLET(S) DAILY 3/18/19  Yes Filiberto Dewitt MD   albuterol (PROVENTIL VENTOLIN) 2.5 mg /3 mL (0.083 %) nebulizer solution 2.5 mg by Nebulization route four (4) times daily. Yes Provider, Historical   levothyroxine (SYNTHROID) 25 mcg tablet Take 1 Tab by mouth Daily (before breakfast). 12/11/18  Yes Filiberto Dewitt MD   apixaban (ELIQUIS) 2.5 mg tablet Take 1 Tab by mouth two (2) times a day. Indications: PREVENT THROMBOEMBOLISM IN CHRONIC ATRIAL FIBRILLATION 6/1/18  Yes Filiberto Dewitt MD   fluticasone-salmeterol (ADVAIR) 250-50 mcg/dose diskus inhaler Take 1 Puff by inhalation two (2) times a day. 4/14/18  Yes Filiberto Dewitt MD   magnesium oxide 400 mg cap Take 1 Cap by mouth nightly. Yes Provider, Historical   cholecalciferol (VITAMIN D3) 1,000 unit cap Take 1,000 Units by mouth daily. Yes Provider, Historical   loratadine (CLARITIN) 10 mg tablet Take 10 mg by mouth nightly. Yes Provider, Historical   co-enzyme Q-10 (CO Q-10) 100 mg capsule Take 100 mg by mouth daily. Yes Provider, Historical   acetaminophen (TYLENOL) 325 mg tablet Take 650 mg by mouth every four (4) hours as needed for Pain or Fever. Provider, Historical   dextromethorphan-guaiFENesin (ROBITUSSIN-DM)  mg/5 mL syrup Take 10 mL by mouth every six (6) hours as needed for Cough.     Provider, Historical     Allergies   Allergen Reactions    Codeine Other (comments)     \"made me disoriented\" per pt      Social History     Tobacco Use    Smoking status: Never Smoker    Smokeless tobacco: Never Used   Substance Use Topics    Alcohol use: No     Alcohol/week: 0.0 oz      Family History   Problem Relation Age of Onset    Heart Disease Mother     Stroke Father         Current Medications:  Current Facility-Administered Medications   Medication Dose Route Frequency    albuterol-ipratropium (DUO-NEB) 2.5 MG-0.5 MG/3 ML  3 mL Nebulization Q4H RT    cefTRIAXone (ROCEPHIN) 1 g in 0.9% sodium chloride (MBP/ADV) 50 mL  1 g IntraVENous Q24H    azithromycin (ZITHROMAX) 500 mg in 0.9% sodium chloride (MBP/ADV) 250 mL  500 mg IntraVENous Q24H    lactobac ac& pc-s.therm-b.anim (DAWN Q/RISAQUAD)  1 Cap Oral DAILY    allopurinol (ZYLOPRIM) tablet 100 mg  100 mg Oral DAILY    apixaban (ELIQUIS) tablet 2.5 mg  2.5 mg Oral BID    atorvastatin (LIPITOR) tablet 10 mg  10 mg Oral DAILY    carvedilol (COREG) tablet 3.125 mg  3.125 mg Oral BID WITH MEALS    guaiFENesin ER (MUCINEX) tablet 600 mg  600 mg Oral BID    benzonatate (TESSALON) capsule 100 mg  100 mg Oral TID PRN    fluticasone-vilanterol (BREO ELLIPTA) 100mcg-25mcg/puff  1 Puff Inhalation DAILY    furosemide (LASIX) tablet 40 mg  40 mg Oral ACB&D    levothyroxine (SYNTHROID) tablet 25 mcg  25 mcg Oral ACB    loratadine (CLARITIN) tablet 10 mg  10 mg Oral QHS    magnesium oxide (MAG-OX) tablet 400 mg  400 mg Oral QHS    mirtazapine (REMERON) tablet 15 mg  15 mg Oral QHS    potassium chloride SR (KLOR-CON 10) tablet 20 mEq  20 mEq Oral BID    sodium chloride (NS) flush 5-40 mL  5-40 mL IntraVENous Q8H    sodium chloride (NS) flush 5-40 mL  5-40 mL IntraVENous PRN    acetaminophen (TYLENOL) tablet 650 mg  650 mg Oral Q4H PRN    ondansetron (ZOFRAN) injection 4 mg  4 mg IntraVENous Q4H PRN         Review of Systems:  Constitutional No fevers, chills, night sweats, excessive fatigue or weight loss. Allergic/Immunologic No recent allergic reactions   Eyes No significant visual difficulties. No diplopia. ENMT No problems with hearing, no sore throat, no sinus drainage. Endocrine No hot flashes or night sweats. No cold intolerance, polyuria, or polydipsia   Hematologic/Lymphatic No easy bruising or bleeding. The patient denies any tender or palpable lymph nodes   Breasts No abnormal masses of breast, nipple discharge or pain. Respiratory No dyspnea on exertion, orthopnea, chest pain, cough or hemoptysis.    Cardiovascular No anginal chest pain, irregular heart beat, tachycardia, palpitations or orthopnea. Gastrointestinal No nausea, vomiting, diarrhea, constipation, cramping, dysphagia, reflux, heartburn, GI bleeding, or early satiety. No change in bowel habits. Genitourinary (M) No hematuria, dysuria, increased frequency, urgency, hesitancy or incontinence. Musculoskeletal No joint pain, swelling or redness. No decreased range of motion. Integumentary No chronic rashes, inflammation, ulcerations, pruritus, petechiae, purpura, ecchymoses, or skin changes. Neurologic No headache, blurred vision, and no areas of focal weakness or numbness. Normal gait. No sensory problems. Psychiatric No insomnia, depression, enmanuel or mood swings. No psychotropic drugs. Physical Exam:  Constitutional Alert, cooperative, oriented. Mood and affect appropriate. Appears close to chronological age. Well nourished. Well developed. Head Normocephalic; no scars   Eyes Conjunctivae and sclerae are clear and without icterus. Pupils are reactive and equal.   ENMT Sinuses are nontender. No oral exudates, ulcers, masses, thrush or mucositis. Oropharynx clear. Tongue normal.   Neck Supple without masses or thyromegaly. No jugular venous distension. Hematologic/Lymphatic No petechiae or purpura. No tender or palpable lymph nodes in the cervical, supraclavicular, axillary or inguinal area. Respiratory Lungs are clear to auscultation without rhonchi or wheezing. Cardiovascular Regular rate and rhythm of heart without murmurs, gallops or rubs. Chest / Line Site Chest is symmetric with no chest wall deformities. Abdomen Non-tender, non-distended, no masses, ascites or hepatosplenomegaly. Good bowel sounds. No guarding or rebound tenderness. No pulsatile masses. Musculoskeletal No tenderness or swelling, normal range of motion without obvious weakness. Extremities No visible deformities, no cyanosis, clubbing or edema. Skin No rashes, scars, or lesions suggestive of malignancy.  No petechiae, purpura, or ecchymoses. No excoriations. Neurologic No sensory or motor deficits, normal cerebellar function, normal gait, cranial nerves intact. Psychiatric Alert and oriented times three. Coherent speech. Verbalizes understanding of our discussions today.

## 2019-06-26 NOTE — PROGRESS NOTES
Speech Pathology bedside swallow evaluation/discharge  Patient: Erlinda Lopez (89 y.o. female)  Date: 6/26/2019  Primary Diagnosis: CAP (community acquired pneumonia) [J18.9]  Anemia [D64.9]       Precautions:        ASSESSMENT :  Based on the objective data described below, the patient presents with functional swallow of all textures. There were some audible swallows but otherwise no oropharyngeal dysphagia. No coughing. She is mildly confused which puts her at risk to aspirate but currently she looks like she is tolerating po well. .  Skilled acute therapy provided by a speech-language pathologist is not indicated at this time. PLAN :  Recommendations:  Recommend continuing with current diet. No s/s of aspiration. Discharge Recommendations:none     SUBJECTIVE:   Patient stated she did not want a lot of noise and she wanted to relax.      OBJECTIVE:     Past Medical History:   Diagnosis Date    Aortic insufficiency     Asthma     Cancer ([facility] [address]     lymphoma    CKD (chronic kidney disease) stage 3, GFR 30-59 ml/min ([facility]) 1/10/2018    Congestive heart failure, NYHA class II, chronic, systolic ([facility])     Heart failure ([facility] [address]     Hypertension     Mitral valvular regurgitation 1/22/2016    ECHO 2/3/17: LV dilated, EF 20-25%, mild LVH, RV mod dilated, mild- mod MELANIA, mod-severe MR, mod AI ECHO 7/29/17: EF 15%, severe DHK, reduced RVEF, RVH, RVSP 35 mmHg  Mild LAE, mod-marked MA fibrosis, mod-severe MR (2+), AO root dilated,      Nonrheumatic aortic valve insufficiency 10/16/2018    Presence of biventricular automatic cardioverter/defibrillator (AICD) 7/2/2015    [employer] biventricular AICD implant    Stomach ulcer      Past Surgical History:   Procedure Laterality Date    HX BREAST BIOPSY Bilateral     40 years ago    HX COLONOSCOPY      HX HEENT      cataracts removed    HX HYSTERECTOMY      HX OTHER SURGICAL      lymph node surgery    HX TONSILLECTOMY      AL EGD TRANSORAL BIOPSY SINGLE/MULTIPLE  5/23/2012         SINUS SURGERY PROC UNLISTED      Nasal polyps removed     Prior Level of Function/Home Situation:      Diet prior to admission:reg/thins  Current Diet: reg/thins  Cognitive and Communication Status:  Neurologic State: Alert  Orientation Level: Oriented to person, Oriented to place, Disoriented to time, Disoriented to situation  Cognition: Follows commands  Perception: Appears intact  Perseveration: No perseveration noted     Oral Assessment:  Oral Assessment  Labial: No impairment  Dentition: Natural;Intact  Lingual: No impairment  Velum: Other (comment)  Mandible: No impairment  P.O. Trials:  Patient Position: upright in bed  Vocal quality prior to P.O.: No impairment  Consistency Presented: Thin liquid; Solid;Puree  How Presented: Self-fed/presented;Cup/sip     Bolus Acceptance: No impairment  Bolus Formation/Control: No impairment     Propulsion: No impairment  Oral Residue: None  Initiation of Swallow: No impairment  Laryngeal Elevation: Functional  Aspiration Signs/Symptoms: None  Pharyngeal Phase Characteristics: No impairment, issues, or problems              Oral Phase Severity: No impairment  Pharyngeal Phase Severity : No impairment  NOMS:   The NOMS functional outcome measure was used to quantify this patient's level of swallowing impairment. Based on the NOMS, the patient was determined to be at level 6 for swallow function     NOMS Swallowing Levels:  Level 1 (CN): NPO  Level 2 (CM): NPO but takes consistency in therapy  Level 3 (CL): Takes less than 50% of nutrition p.o. and continues with nonoral feedings; and/or safe with mod cues; and/or max diet restriction  Level 4 (CK):  Safe swallow but needs mod cues; and/or mod diet restriction; and/or still requires some nonoral feeding/supplements  Level 5 (CJ): Safe swallow with min diet restriction; and/or needs min cues  Level 6 (CI): Independent with p.o.; rare cues; usually self cues; may need to avoid some foods or needs extra time  Level 7 [facility]): Independent for all p.o.  YFN. (2003). National Outcomes Measurement System (NOMS): Adult Speech-Language Pathology User's Guide. Pain:  Pain Scale 1: Numeric (0 - 10)  Pain Intensity 1: 0  Pain Location 1: Abdomen  After treatment:   [] Patient left in no apparent distress sitting up in chair  [x] Patient left in no apparent distress in bed  [x] Call bell left within reach  [x] Nursing notified  [] Caregiver present  [] Bed alarm activated    COMMUNICATION/EDUCATION:   The patients plan of care including findings, recommendations, and recommended diet changes were discussed with: Registered Nurse. [x] Posted safety precautions in patient's room. [] Patient/family have participated as able and agree with findings and recommendations. [] Patient is unable to participate in plan of care at this time.     Thank you for this referral.  Josephine Ledezma, SLP  Time Calculation: 10 mins

## 2019-06-26 NOTE — PROGRESS NOTES
ADULT PROTOCOL: JET AEROSOL  REASSESSMENT    Patient  Nora Cee     80 y.o.   female     6/26/2019  8:47 AM    Breath Sounds Pre Procedure: Right Breath Sounds: Clear                               Left Breath Sounds: Lower, Diminished    Breath Sounds Post Procedure: Right Breath Sounds: Clear                                 Left Breath Sounds: Lower, Diminished    Breathing pattern: Pre procedure Breathing Pattern: Regular, Dyspnea at rest          Post procedure Breathing Pattern: Regular    Heart Rate: Pre procedure Pulse: 106           Post procedure Pulse: 105    Resp Rate: Pre procedure Respirations: 18           Post procedure Respirations: 18            Cough: Pre procedure Cough: Non-productive               Post procedure Cough: Non-productive      Oxygen: O2 Device: Nasal cannula   Flow rate (L/min) 2 lpm     Changed: NO    SpO2: Pre procedure SpO2: 98 %   with oxygen              Post procedure SpO2: 100 %  with oxygen    Nebulizer Therapy: Current medications Aerosolized Medications: Albuterol      Changed: NO/ 4 times a day at home    Problem List:   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    DILAN (obstructive sleep apnea) G47.33    Rhinitis J31.0    Anemia D64.9    Reactive airway disease J45.909    HTN (hypertension) I10    Gout M10.9    LBBB (left bundle branch block) I44.7    Presence of biventricular automatic cardioverter/defibrillator (AICD) Z95.810    Chronic systolic congestive heart failure (HCC) I50.22    Lymphoma ([facility]) C85.90    Mitral valvular regurgitation I34.0    Physical deconditioning R53.81    Gastroesophageal reflux disease with esophagitis K21.0    Thoracic aortic aneurysm without rupture ([facility]) I71.2    Paroxysmal atrial fibrillation ([facility]) I48.0    Xerosis of skin L85.3    Dyslipidemia E78.5    CKD (chronic kidney disease) stage 3, GFR 30-59 ml/min ([facility]) N18.3  Acquired hypothyroidism E03.9    Nonrheumatic aortic valve insufficiency I35.1    UTI (urinary tract infection) N39.0    Counseling regarding advanced care planning and goals of care Z71.89    CAP (community acquired pneumonia) J18.9       Respiratory Therapist: Joy Pruett RT

## 2019-06-26 NOTE — PROGRESS NOTES
Dr. Sharyle Bougie notified of patient blood pressure.  MD stated continue to monitor and obtain an H&H

## 2019-06-27 LAB
ANION GAP SERPL CALC-SCNC: 7 MMOL/L (ref 5–15)
BASOPHILS # BLD: 0 K/UL (ref 0–0.1)
BASOPHILS NFR BLD: 0 % (ref 0–1)
BLASTS NFR BLD MANUAL: 0 %
BUN SERPL-MCNC: 21 MG/DL (ref 6–20)
BUN/CREAT SERPL: 28 (ref 12–20)
CALCIUM SERPL-MCNC: 8.5 MG/DL (ref 8.5–10.1)
CHLORIDE SERPL-SCNC: 106 MMOL/L (ref 97–108)
CO2 SERPL-SCNC: 27 MMOL/L (ref 21–32)
CREAT SERPL-MCNC: 0.75 MG/DL (ref 0.55–1.02)
EOSINOPHIL # BLD: 0 K/UL (ref 0–0.4)
EOSINOPHIL NFR BLD: 1 % (ref 0–7)
ERYTHROCYTE [DISTWIDTH] IN BLOOD BY AUTOMATED COUNT: 21 % (ref 11.5–14.5)
GLUCOSE SERPL-MCNC: 79 MG/DL (ref 65–100)
HCT VFR BLD AUTO: 22.5 % (ref 35–47)
HGB BLD-MCNC: 7 G/DL (ref 11.5–16)
IMM GRANULOCYTES # BLD AUTO: 0 K/UL
IMM GRANULOCYTES NFR BLD AUTO: 0 %
LYMPHOCYTES # BLD: 1 K/UL (ref 0.8–3.5)
LYMPHOCYTES NFR BLD: 22 % (ref 12–49)
MCH RBC QN AUTO: 28.7 PG (ref 26–34)
MCHC RBC AUTO-ENTMCNC: 31.1 G/DL (ref 30–36.5)
MCV RBC AUTO: 92.2 FL (ref 80–99)
METAMYELOCYTES NFR BLD MANUAL: 0 %
MONOCYTES # BLD: 0 K/UL (ref 0–1)
MONOCYTES NFR BLD: 0 % (ref 5–13)
MYELOCYTES NFR BLD MANUAL: 0 %
NEUTS BAND NFR BLD MANUAL: 7 % (ref 0–6)
NEUTS SEG # BLD: 3.6 K/UL (ref 1.8–8)
NEUTS SEG NFR BLD: 70 % (ref 32–75)
NRBC # BLD: 0 K/UL (ref 0–0.01)
NRBC BLD-RTO: 0 PER 100 WBC
OTHER CELLS NFR BLD MANUAL: 0 %
PLATELET # BLD AUTO: 234 K/UL (ref 150–400)
PLATELET COMMENTS,PCOM: ABNORMAL
PMV BLD AUTO: 10.8 FL (ref 8.9–12.9)
POTASSIUM SERPL-SCNC: 4.7 MMOL/L (ref 3.5–5.1)
PROMYELOCYTES NFR BLD MANUAL: 0 %
RBC # BLD AUTO: 2.44 M/UL (ref 3.8–5.2)
RBC MORPH BLD: ABNORMAL
SODIUM SERPL-SCNC: 140 MMOL/L (ref 136–145)
WBC # BLD AUTO: 4.6 K/UL (ref 3.6–11)

## 2019-06-27 PROCEDURE — 85027 COMPLETE CBC AUTOMATED: CPT

## 2019-06-27 PROCEDURE — 74011250636 HC RX REV CODE- 250/636: Performed by: HOSPITALIST

## 2019-06-27 PROCEDURE — 80048 BASIC METABOLIC PNL TOTAL CA: CPT

## 2019-06-27 PROCEDURE — 36415 COLL VENOUS BLD VENIPUNCTURE: CPT

## 2019-06-27 PROCEDURE — 94640 AIRWAY INHALATION TREATMENT: CPT

## 2019-06-27 PROCEDURE — 88184 FLOWCYTOMETRY/ TC 1 MARKER: CPT

## 2019-06-27 PROCEDURE — 65270000029 HC RM PRIVATE

## 2019-06-27 PROCEDURE — 97110 THERAPEUTIC EXERCISES: CPT

## 2019-06-27 PROCEDURE — 77010033678 HC OXYGEN DAILY

## 2019-06-27 PROCEDURE — 74011000250 HC RX REV CODE- 250: Performed by: HOSPITALIST

## 2019-06-27 PROCEDURE — 74011000258 HC RX REV CODE- 258: Performed by: HOSPITALIST

## 2019-06-27 PROCEDURE — 88185 FLOWCYTOMETRY/TC ADD-ON: CPT

## 2019-06-27 PROCEDURE — 94760 N-INVAS EAR/PLS OXIMETRY 1: CPT

## 2019-06-27 PROCEDURE — 97116 GAIT TRAINING THERAPY: CPT

## 2019-06-27 PROCEDURE — 74011250637 HC RX REV CODE- 250/637: Performed by: HOSPITALIST

## 2019-06-27 PROCEDURE — 84155 ASSAY OF PROTEIN SERUM: CPT

## 2019-06-27 RX ORDER — IPRATROPIUM BROMIDE AND ALBUTEROL SULFATE 2.5; .5 MG/3ML; MG/3ML
3 SOLUTION RESPIRATORY (INHALATION)
Status: DISCONTINUED | OUTPATIENT
Start: 2019-06-28 | End: 2019-07-02 | Stop reason: HOSPADM

## 2019-06-27 RX ORDER — LEVOTHYROXINE SODIUM 25 UG/1
25 TABLET ORAL
Status: DISCONTINUED | OUTPATIENT
Start: 2019-06-28 | End: 2019-07-02 | Stop reason: HOSPADM

## 2019-06-27 RX ADMIN — IPRATROPIUM BROMIDE AND ALBUTEROL SULFATE 3 ML: .5; 3 SOLUTION RESPIRATORY (INHALATION) at 14:42

## 2019-06-27 RX ADMIN — Medication 10 ML: at 06:02

## 2019-06-27 RX ADMIN — CEFTRIAXONE 1 G: 1 INJECTION, POWDER, FOR SOLUTION INTRAMUSCULAR; INTRAVENOUS at 09:00

## 2019-06-27 RX ADMIN — ALLOPURINOL 100 MG: 100 TABLET ORAL at 08:56

## 2019-06-27 RX ADMIN — Medication 1 CAPSULE: at 08:56

## 2019-06-27 RX ADMIN — LORATADINE 10 MG: 10 TABLET ORAL at 22:43

## 2019-06-27 RX ADMIN — MAGNESIUM OXIDE TAB 400 MG (241.3 MG ELEMENTAL MG) 400 MG: 400 (241.3 MG) TAB at 22:43

## 2019-06-27 RX ADMIN — CARVEDILOL 3.12 MG: 3.12 TABLET, FILM COATED ORAL at 17:50

## 2019-06-27 RX ADMIN — LEVOTHYROXINE SODIUM 25 MCG: 25 TABLET ORAL at 06:02

## 2019-06-27 RX ADMIN — AZITHROMYCIN MONOHYDRATE 500 MG: 500 INJECTION, POWDER, LYOPHILIZED, FOR SOLUTION INTRAVENOUS at 12:01

## 2019-06-27 RX ADMIN — IPRATROPIUM BROMIDE AND ALBUTEROL SULFATE 3 ML: .5; 3 SOLUTION RESPIRATORY (INHALATION) at 01:39

## 2019-06-27 RX ADMIN — CARVEDILOL 3.12 MG: 3.12 TABLET, FILM COATED ORAL at 08:56

## 2019-06-27 RX ADMIN — ATORVASTATIN CALCIUM 10 MG: 10 TABLET, FILM COATED ORAL at 08:56

## 2019-06-27 RX ADMIN — FUROSEMIDE 40 MG: 40 TABLET ORAL at 17:50

## 2019-06-27 RX ADMIN — Medication 10 ML: at 15:29

## 2019-06-27 RX ADMIN — APIXABAN 2.5 MG: 2.5 TABLET, FILM COATED ORAL at 08:56

## 2019-06-27 RX ADMIN — POTASSIUM CHLORIDE 20 MEQ: 750 TABLET, FILM COATED, EXTENDED RELEASE ORAL at 17:50

## 2019-06-27 RX ADMIN — APIXABAN 2.5 MG: 2.5 TABLET, FILM COATED ORAL at 17:50

## 2019-06-27 RX ADMIN — IPRATROPIUM BROMIDE AND ALBUTEROL SULFATE 3 ML: .5; 3 SOLUTION RESPIRATORY (INHALATION) at 08:19

## 2019-06-27 RX ADMIN — POTASSIUM CHLORIDE 20 MEQ: 750 TABLET, FILM COATED, EXTENDED RELEASE ORAL at 08:56

## 2019-06-27 RX ADMIN — FUROSEMIDE 40 MG: 40 TABLET ORAL at 08:56

## 2019-06-27 RX ADMIN — Medication 10 ML: at 22:43

## 2019-06-27 RX ADMIN — MIRTAZAPINE 15 MG: 15 TABLET, FILM COATED ORAL at 22:43

## 2019-06-27 RX ADMIN — IPRATROPIUM BROMIDE AND ALBUTEROL SULFATE 3 ML: .5; 3 SOLUTION RESPIRATORY (INHALATION) at 20:17

## 2019-06-27 RX ADMIN — FLUTICASONE FUROATE AND VILANTEROL TRIFENATATE 1 PUFF: 100; 25 POWDER RESPIRATORY (INHALATION) at 08:56

## 2019-06-27 NOTE — PROGRESS NOTES
Bedside shift change report given to MAXIM[employer] MANLEY (oncoming nurse) by Katelin Paredes RN (offgoing nurse). Report included the following information SBAR, Kardex, Procedure Summary, Intake/Output, MAR, Accordion, Recent Results and Med Rec Status. Patient tolerating pills in applesauce     Patient returned to bed from chair. Bed alarm on.

## 2019-06-27 NOTE — PROGRESS NOTES
Bedside shift change report given to Marly Simons (oncoming nurse) by Alaina Celis (offgoing nurse). Report included the following information SBAR, Kardex, Procedure Summary, Intake/Output, MAR and Recent Results.

## 2019-06-27 NOTE — PROGRESS NOTES
Orthopedic End of Shift Note    Bedside shift change report given to [address] (oncoming nurse) by Mary Lr RN (offgoing nurse). Report included the following information SBAR, Kardex, ED Summary, Intake/Output and Recent Results.      POD# NA  Significant issues during shift: none    Issues for Physician to address: none    Activity This Shift  (check all that apply) [] chair  [] dangle   [] bathroom  [x] bedside commode [] hallway  [] bedrest   Nausea/Vomiting [] yes [x] no     Voiding Status [x] void [] Galindo [] I&O Cath   Bowel Movements [] yes [x] no     Foot Pumps or SCD [] yes [x] no    Ice Pack [] yes    [x] no    Incentive Spirometer [] yes [x] no    Telemetry Monitoring   [] yes [x] no Rhythm:   Supplemental O2 [x] yes [] no Sat off O2:   98%

## 2019-06-27 NOTE — PROGRESS NOTES
Problem: Mobility Impaired (Adult and Pediatric)  Goal: *Acute Goals and Plan of Care (Insert Text)  Description  Physical Therapy Goals  Initiated 6/26/2019  1. Patient will move from supine to sit and sit to supine , scoot up and down and roll side to side in bed with modified independence within 7 day(s). 2.  Patient will transfer from bed to chair and chair to bed with supervision/set-up using the least restrictive device within 7 day(s). 3.  Patient will perform sit to stand with supervision/set-up within 7 day(s). 4.  Patient will ambulate with supervision/set-up for 200 feet with the least restrictive device within 7 day(s). Outcome: Progressing Towards Goal  Note:   PHYSICAL THERAPY TREATMENT  Patient: Valorie Contreras (28 y.o. female)  Date: 6/27/2019  Diagnosis: CAP (community acquired pneumonia) [J18.9], Anemia [D64.9]        Precautions:  falls  Chart, physical therapy assessment, plan of care and goals were reviewed. ASSESSMENT: pt tolerated tx well, some confusion but no LOB or SOB, did well with bed mob and transfers, very motivated, vc's for safety and proper RW use. Progression toward goals:  ?    Improving appropriately and progressing toward goals  ? Improving slowly and progressing toward goals  ? Not making progress toward goals and plan of care will be adjusted     PLAN:  Patient continues to benefit from skilled intervention to address the above impairments. Continue treatment per established plan of care.   Discharge Recommendations:  None  Further Equipment Recommendations for Discharge:  rollator     OBJECTIVE DATA SUMMARY:     Critical Behavior:  Neurologic State: Alert  Orientation Level: Oriented to person  Cognition: Decreased command following  Safety/Judgement: Fall prevention    Functional Mobility Training:  Bed Mobility:  Rolling: Stand-by assistance  Supine to Sit: Stand-by assistance  Scooting: Stand-by assistance  Level of Assistance: Stand-by assistance  Interventions: Verbal cues    Transfers:  Sit to Stand: Stand-by assistance  Stand to Sit: Stand-by assistance  Bed to Chair: Contact guard assistance  Interventions: Tactile cues; Verbal cues  Level of Assistance: Contact guard assistance    Balance:  Sitting: Intact; Without support  Standing: Intact; With support  Standing - Static: Good;Constant support  Standing - Dynamic : Good;Constant support    Ambulation/Gait Training:  Distance (ft): 140 Feet (ft)  Assistive Device: Walker, rollator;Gait belt  Ambulation - Level of Assistance: Contact guard assistance  Gait Abnormalities: Decreased step clearance  Right Side Weight Bearing: Full  Left Side Weight Bearing: Full  Base of Support: (normal)  Speed/Lawanda: Pace decreased (<100 feet/min)  Step Length: Left shortened;Right shortened    Therapeutic Exercises:   sitting  EXERCISE   Sets   Reps   Active Active Assist   Passive   Comments   Ankle pumps 1 10 ? ? ? bilat   Heel raises 1 10 ? ? ? \"   Toe tap 1 10 ? ? ? \"   Knee ext 1 10 ? ? ? \"   Hip flex 1 10 ? ? ? \"     Pain:  Pain Scale 1: Numeric (0 - 10)  Pain Intensity 1: 0    Activity Tolerance: good    After treatment:   ?    Patient left in no apparent distress sitting up in chair  ? Patient left in no apparent distress in bed  ? Call bell left within reach  ? Nursing notified  ? Caregiver present  ?     Bed alarm activated    COMMUNICATION/COLLABORATION:   The patient?s plan of care was discussed with: Registered Nurse    Martínez Linda PTA   Time Calculation: 25 mins

## 2019-06-27 NOTE — PROGRESS NOTES
Plan of Care   1) [employer]    8:55 AM- CM spoke with pt son Trevor Guevara regarding placement. Pt son states he would like pt to return to [facility] [place] side and then pt return to the Assisted Living portion. Pt son would like [facility] transportation arranged at time of discharge. CM will place pt on [facility] will call.      NATANAEL Coronel, [address] [place] [address]   [phone]

## 2019-06-27 NOTE — PROGRESS NOTES
-Hematology / Oncology ([facility]) -  -Primary Oncologist- Dr Jose M Rene  -CC- \" I feel good today\"    -S-  Walking with walker in her room, states she feel better today. -Ashanti Elise-      Patient Vitals for the past 24 hrs:   Temp Pulse Resp BP SpO2   06/27/19 1145 97.8 °F (36.6 °C) (!) 101 18 96/62 91 %   06/27/19 0822     91 %   06/27/19 0728 98 °F (36.7 °C) 84 16 101/62 94 %   06/27/19 0326 98.3 °F (36.8 °C) 85 18 110/62 100 %   06/27/19 0250     100 %   06/27/19 0139     100 %   06/26/19 2357 97.4 °F (36.3 °C) 82 16 104/65 100 %   06/26/19 2100     98 %   06/26/19 1921 97.5 °F (36.4 °C) 95 20 99/67 97 %   06/26/19 1600 98.1 °F (36.7 °C) (!) 102 18 92/61 100 %   06/26/19 1543     96 %   06/26/19 1215    (!) 87/65  06/26/19 1200 98.5 °F (36.9 °C) (!) 105 18 (!) 87/65 99 %     No intake/output data recorded. ROS: 12 point ROS obtained and negative other than stated in HPI      Physical Exam:  Constitutional Alert, cooperative, oriented. Mood and affect appropriate. Appears close to chronological age. Well nourished. Well developed. Head Normocephalic; no scars   Eyes Conjunctivae and sclerae are clear and without icterus. Pupils are reactive and equal.   ENMT Sinuses are nontender. No oral exudates, ulcers, masses, thrush or mucositis. Oropharynx clear. Tongue normal.   Neck Supple without masses or thyromegaly. No jugular venous distension. Hematologic/Lymphatic No petechiae or purpura. No tender or palpable lymph nodes in the cervical, supraclavicular, axillary or inguinal area. Respiratory Lungs are clear to auscultation without rhonchi or wheezing. Cardiovascular Regular rate and rhythm of heart without murmurs, gallops or rubs. Chest / Line Site Chest is symmetric with no chest wall deformities. Abdomen Non-tender, non-distended, no masses, ascites or hepatosplenomegaly. Good bowel sounds. No guarding or rebound tenderness. No pulsatile masses.    Musculoskeletal No tenderness or swelling, normal range of motion without obvious weakness. Extremities No visible deformities, no cyanosis, clubbing or edema. Skin No rashes, scars, or lesions suggestive of malignancy. No petechiae, purpura, or ecchymoses. No excoriations. Neurologic No sensory or motor deficits, normal cerebellar function, normal gait, cranial nerves intact. Psychiatric Alert and oriented times three. Coherent speech. Verbalizes understanding of our discussions today.            -Labs-    Recent Labs     06/27/19  2964 06/26/19  1405 06/26/19  0245 06/25/19  0905   WBC 4.6  --  5.2 6.4   HGB 7.0* 7.1* 7.1* 8.1*     --  219 252   ANEU 3.6  --  4.2 4.8     --  138 133*   K 4.7  --  4.6 4.6   GLU 79  --  93 81   BUN 21*  --  22* 23*   CREA 0.75  --  0.83 0.87   ALT  --   --   --  17   SGOT  --   --   --  28   TBILI  --   --   --  0.9   AP  --   --   --  131*   CA 8.5  --  8.7 9.0   MG  --   --  2.7*  --    PHOS  --   --  4.4  --        -Imaging-     6/25/19 CT Chest:  IMPRESSION:    1. Left upper lobe and lingular pneumonia. 2. Large aneurysm of the ascending thoracic aorta measuring 5.8 cm in the  tubular portion. 3. Small to moderate left and small right pleural effusions with overlying  subsegmental atelectasis. Anasarca. 4. Cardiomegaly. Enlarged main pulmonary artery, consistent with pulmonary  arterial hypertension. 5. Age-indeterminate anterior compression fracture of L1, new since 2/4/2019.      -Assessment + Plan-     *) 81 yo female with pmh of CLL admitted with CAP and worsening anemia:     *) CLL and progressive anemia:  - she has been off Ibrutinib for last 6 months per our records, script written in May but don't think she's been taking it, let msg at [facility] to clarify but no return. - when seen in May by NP in office her anemia was progressing and felt to be due to her untreated CLL. I suspect that is still the case.   - Labs show AKD, b12/FA not low.   -  zinc, copper, flow pending  - PBS reviewed, no blast etc.roulequx noted on path smear will check spep  - consider resuming Ibrutinib at 140mg daily when over her infection.  - FU with Dr Marcell Soni after DC     *) CAP     *) AAA:     *) Dementia and debility     *) NICM

## 2019-06-27 NOTE — PROGRESS NOTES
Plan of Care   1) [address]    8:55 AM- CM spoke with pt son Cornelius Ramachandran regarding placement. Pt son states he would like pt to return to [facility] side and then pt return to the Assisted Living portion. Pt son would like [facility] transportation arranged at time of discharge. CM will place pt on [facility] will call.      Satnam Etienne, MSW, [address] [facility]   [phone]

## 2019-06-27 NOTE — PROGRESS NOTES
Spiritual Care Partner Volunteer visited patient in Ortho on June 27, 2019.       Documented by:  SOURAV Dodson, [facility], Chaplain MICHELLE DELAROSA [facility] Paging Service  [phone]

## 2019-06-27 NOTE — PROGRESS NOTES
ADULT PROTOCOL: JET AEROSOL  REASSESSMENT    Patient  Israel Vallejo     80 y.o.   female     6/26/2019  10:13 PM    Breath Sounds Pre Procedure: Right Breath Sounds: Clear, Diminished                               Left Breath Sounds: Clear, Diminished    Breath Sounds Post Procedure: Right Breath Sounds: Clear, Diminished                                 Left Breath Sounds: Clear, Diminished    Breathing pattern: Pre procedure Breathing Pattern: Regular          Post procedure Breathing Pattern: Regular    Heart Rate: Pre procedure Pulse: 87           Post procedure Pulse: 88    Resp Rate: Pre procedure Respirations: 20           Post procedure Respirations: 18    Peak Flow: Pre bronchodilator    n/a        Post bronchodilator   n/a    Incentive Spirometry:   n/a      n/a    Cough: Pre procedure Cough: Non-productive(says she is coughing less)               Post procedure Cough: Non-productive    Sputum: Pre procedure  none                 Post procedure  none    Oxygen: O2 Device: Nasal cannula   Flow rate (L/min) 2     Changed: NO    SpO2: Pre procedure SpO2: 98 %   with oxygen              Post procedure SpO2: 99 %  with oxygen    Nebulizer Therapy: Current medications Aerosolized Medications: DuoNeb      Changed: YES, pt doing better, also states that she does not take neb tx at night, 4 times a day at home till bed time    Problem List:   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    DILAN (obstructive sleep apnea) G47.33    Rhinitis J31.0    Anemia D64.9    Reactive airway disease J45.909    HTN (hypertension) I10    Gout M10.9    LBBB (left bundle branch block) I44.7    Presence of biventricular automatic cardioverter/defibrillator (AICD) Z95.810    Chronic systolic congestive heart failure (HCC) I50.22    Lymphoma (HCC) C85.90    Mitral valvular regurgitation I34.0    Physical deconditioning R53.81    Gastroesophageal reflux disease with esophagitis K21.0    Thoracic aortic aneurysm without rupture (HCC) I71.2    Paroxysmal atrial fibrillation (HCC) I48.0    Xerosis of skin L85.3    Dyslipidemia E78.5    CKD (chronic kidney disease) stage 3, GFR 30-59 ml/min (HCC) N18.3    Acquired hypothyroidism E03.9    Nonrheumatic aortic valve insufficiency I35.1    UTI (urinary tract infection) N39.0    Counseling regarding advanced care planning and goals of care Z71.89    CAP (community acquired pneumonia) J18.9       Respiratory Therapist: Isiah Mathur, RT

## 2019-06-27 NOTE — PROGRESS NOTES
ADULT PROTOCOL: JET AEROSOL  REASSESSMENT    Patient  Teresa Bunn     80 y.o.   female     6/27/2019  11:10 AM    Breath Sounds Pre Procedure: Right Breath Sounds: Coarse, Crackles                               Left Breath Sounds: Coarse, Crackles    Breath Sounds Post Procedure: Right Breath Sounds: Coarse, Crackles                                 Left Breath Sounds: Coarse, Crackles    Breathing pattern: Pre procedure Breathing Pattern: Regular          Post procedure Breathing Pattern: Regular    Heart Rate: Pre procedure Pulse: 90           Post procedure Pulse: 95    Resp Rate: Pre procedure Respirations: 20           Post procedure Respirations: 20           Cough: Pre procedure Cough: Non-productive               Post procedure Cough: Non-productive      Oxygen: O2 Device: Room air   FiO2 (%) 2     Changed: NO    SpO2: Pre procedure SpO2: 91 %   with oxygen              Post procedure SpO2: 92 %  with oxygen    Nebulizer Therapy: Current medications Aerosolized Medications: DuoNeb      Changed: NO        Problem List:   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    DILAN (obstructive sleep apnea) G47.33    Rhinitis J31.0    Anemia D64.9    Reactive airway disease J45.909    HTN (hypertension) I10    Gout M10.9    LBBB (left bundle branch block) I44.7    Presence of biventricular automatic cardioverter/defibrillator (AICD) Z95.810    Chronic systolic congestive heart failure (HCC) I50.22    Lymphoma (HCC) C85.90    Mitral valvular regurgitation I34.0    Physical deconditioning R53.81    Gastroesophageal reflux disease with esophagitis K21.0    Thoracic aortic aneurysm without rupture (HCC) I71.2    Paroxysmal atrial fibrillation (HCC) I48.0    Xerosis of skin L85.3    Dyslipidemia E78.5    CKD (chronic kidney disease) stage 3, GFR 30-59 ml/min (HCC) N18.3    Acquired hypothyroidism E03.9    Nonrheumatic aortic valve insufficiency I35.1    UTI (urinary tract infection) N39.0    Counseling regarding advanced care planning and goals of care Z71.89    CAP (community acquired pneumonia) J18.9       Respiratory Therapist: Janeen Cyr RT

## 2019-06-27 NOTE — PROGRESS NOTES
General Daily Progress Note    Admit Date: 6/25/2019    Subjective:     Patient has no complaint . Current Facility-Administered Medications   Medication Dose Route Frequency    albuterol-ipratropium (DUO-NEB) 2.5 MG-0.5 MG/3 ML  3 mL Nebulization Q6H RT    albuterol-ipratropium (DUO-NEB) 2.5 MG-0.5 MG/3 ML  3 mL Nebulization Q4H PRN    cefTRIAXone (ROCEPHIN) 1 g in 0.9% sodium chloride (MBP/ADV) 50 mL  1 g IntraVENous Q24H    azithromycin (ZITHROMAX) 500 mg in 0.9% sodium chloride (MBP/ADV) 250 mL  500 mg IntraVENous Q24H    lactobac ac& pc-s.therm-b.anim (DAWN Q/RISAQUAD)  1 Cap Oral DAILY    allopurinol (ZYLOPRIM) tablet 100 mg  100 mg Oral DAILY    apixaban (ELIQUIS) tablet 2.5 mg  2.5 mg Oral BID    atorvastatin (LIPITOR) tablet 10 mg  10 mg Oral DAILY    carvedilol (COREG) tablet 3.125 mg  3.125 mg Oral BID WITH MEALS    benzonatate (TESSALON) capsule 100 mg  100 mg Oral TID PRN    fluticasone-vilanterol (BREO ELLIPTA) 100mcg-25mcg/puff  1 Puff Inhalation DAILY    furosemide (LASIX) tablet 40 mg  40 mg Oral ACB&D    levothyroxine (SYNTHROID) tablet 25 mcg  25 mcg Oral ACB    loratadine (CLARITIN) tablet 10 mg  10 mg Oral QHS    magnesium oxide (MAG-OX) tablet 400 mg  400 mg Oral QHS    mirtazapine (REMERON) tablet 15 mg  15 mg Oral QHS    potassium chloride SR (KLOR-CON 10) tablet 20 mEq  20 mEq Oral BID    sodium chloride (NS) flush 5-40 mL  5-40 mL IntraVENous Q8H    sodium chloride (NS) flush 5-40 mL  5-40 mL IntraVENous PRN    acetaminophen (TYLENOL) tablet 650 mg  650 mg Oral Q4H PRN    ondansetron (ZOFRAN) injection 4 mg  4 mg IntraVENous Q4H PRN        Review of Systems  A comprehensive review of systems was negative.     Objective:     Patient Vitals for the past 24 hrs:   BP Temp Pulse Resp SpO2 Weight   06/27/19 0728 101/62 98 °F (36.7 °C) 84 16 94 % 115 lb 14.4 oz (52.6 kg)   06/27/19 0326 110/62 98.3 °F (36.8 °C) 85 18 100 %    06/27/19 0250     100 %    06/27/19 0139     100 %    06/26/19 2357 104/65 97.4 °F (36.3 °C) 82 16 100 %    06/26/19 2100     98 %    06/26/19 1921 99/67 97.5 °F (36.4 °C) 95 20 97 %    06/26/19 1600 92/61 98.1 °F (36.7 °C) (!) 102 18 100 %    06/26/19 1543     96 %    06/26/19 1215 (!) 87/65        06/26/19 1200 (!) 87/65 98.5 °F (36.9 °C) (!) 105 18 99 %    06/26/19 1151     97 %      No intake/output data recorded. 06/25 1901 - 06/27 0700  In: 500 [P.O.:500]  Out: 400 [Urine:400]    Physical Exam:   Visit Vitals  /62   Pulse 84   Temp 98 °F (36.7 °C)   Resp 16   Ht 5' 1\" (1.549 m)   Wt 115 lb 14.4 oz (52.6 kg)   SpO2 91%   BMI 21.90 kg/m²     General appearance: alert, cooperative, no distress, appears stated age  Neck: supple, symmetrical, trachea midline, no adenopathy, thyroid: not enlarged, symmetric, no tenderness/mass/nodules, no carotid bruit and JVD - 2 cm above sternal notch  Lungs: rales L base  Heart: regular rate and rhythm, S1, S2 normal, no murmur, click, rub or gallop  Abdomen: soft, non-tender.  Bowel sounds normal. No masses,  no organomegaly  Extremities: extremities normal, atraumatic, no cyanosis or edema        Data Review   Recent Results (from the past 24 hour(s))   HGB & HCT    Collection Time: 06/26/19  2:05 PM   Result Value Ref Range    HGB 7.1 (L) 11.5 - 16.0 g/dL    HCT 23.8 (L) 35.0 - 47.0 %   IRON PROFILE    Collection Time: 06/26/19  2:05 PM   Result Value Ref Range    Iron 12 (L) 35 - 150 ug/dL    TIBC 138 (L) 250 - 450 ug/dL    Iron % saturation 9 (L) 20 - 50 %   VITAMIN B12    Collection Time: 06/26/19  2:05 PM   Result Value Ref Range    Vitamin B12 1,897 (H) 193 - 986 pg/mL   FERRITIN    Collection Time: 06/26/19  2:05 PM   Result Value Ref Range    Ferritin 801 (H) 26 - 388 NG/ML   FOLATE    Collection Time: 06/26/19  2:05 PM   Result Value Ref Range    Folate 18.3 5.0 - 21.0 ng/mL   PERIPHERAL SMEAR    Collection Time: 06/26/19  2:05 PM   Result Value Ref Range    PERIPHERAL SMEAR (NOTE)    RETICULOCYTE COUNT    Collection Time: 06/26/19  2:05 PM   Result Value Ref Range    Reticulocyte count 1.5 0.7 - 2.1 %    Absolute Retic Cnt. 0.0365 0.0164 - 7.7526 M/ul   METABOLIC PANEL, BASIC    Collection Time: 06/27/19  6:37 AM   Result Value Ref Range    Sodium 140 136 - 145 mmol/L    Potassium 4.7 3.5 - 5.1 mmol/L    Chloride 106 97 - 108 mmol/L    CO2 27 21 - 32 mmol/L    Anion gap 7 5 - 15 mmol/L    Glucose 79 65 - 100 mg/dL    BUN 21 (H) 6 - 20 MG/DL    Creatinine 0.75 0.55 - 1.02 MG/DL    BUN/Creatinine ratio 28 (H) 12 - 20      GFR est AA >60 >60 ml/min/1.73m2    GFR est non-AA >60 >60 ml/min/1.73m2    Calcium 8.5 8.5 - 10.1 MG/DL   CBC WITH MANUAL DIFF    Collection Time: 06/27/19  6:37 AM   Result Value Ref Range    WBC 4.6 3.6 - 11.0 K/uL    RBC 2.44 (L) 3.80 - 5.20 M/uL    HGB 7.0 (L) 11.5 - 16.0 g/dL    HCT 22.5 (L) 35.0 - 47.0 %    MCV 92.2 80.0 - 99.0 FL    MCH 28.7 26.0 - 34.0 PG    MCHC 31.1 30.0 - 36.5 g/dL    RDW 21.0 (H) 11.5 - 14.5 %    PLATELET 631 327 - 296 K/uL    MPV 10.8 8.9 - 12.9 FL    NRBC 0.0 0  WBC    ABSOLUTE NRBC 0.00 0.00 - 0.01 K/uL    NEUTROPHILS PENDING %    LYMPHOCYTES PENDING %    MONOCYTES PENDING %    EOSINOPHILS PENDING %    BASOPHILS PENDING %    IMMATURE GRANULOCYTES PENDING %    BAND NEUTROPHILS PENDING %    PROMYELOCYTES PENDING %    MYELOCYTES PENDING %    METAMYELOCYTES PENDING %    BLASTS PENDING %    OTHER CELL PENDING     ABS. NEUTROPHILS PENDING K/UL    ABS. LYMPHOCYTES PENDING K/UL    ABS. MONOCYTES PENDING K/UL    ABS. EOSINOPHILS PENDING K/UL    ABS. BASOPHILS PENDING K/UL    ABS. IMM. GRANS. PENDING K/UL    RBC COMMENTS PENDING     DIFFERENTIAL PENDING            Assessment:     Active Problems:    Anemia (11/8/2014)      CAP (community acquired pneumonia) (6/25/2019)        Plan:     1. Pulmonary status continues to improve clinically. Continue parenteral antibiotics pulmonary toilet.   2.  Will need an opinion hopefully from vascular surgery regarding the thoracic aortic aneurysm. Patient not a surgical candidate because of her existing comorbidities and age. 3.  No overt sign of congestive heart failure.

## 2019-06-27 NOTE — CONSULTS
Patient has an ascending thoracic aneurysm. This would be managed by cardiothoracic surgery. Defer plan of care to Dr. Donn Cox. Vascular surgery signing off. We appreciate the opportunity to participate in the care of this very pleasant patient.

## 2019-06-28 LAB
ANION GAP SERPL CALC-SCNC: 10 MMOL/L (ref 5–15)
BUN SERPL-MCNC: 21 MG/DL (ref 6–20)
BUN/CREAT SERPL: 26 (ref 12–20)
CALCIUM SERPL-MCNC: 8.4 MG/DL (ref 8.5–10.1)
CHLORIDE SERPL-SCNC: 105 MMOL/L (ref 97–108)
CO2 SERPL-SCNC: 24 MMOL/L (ref 21–32)
COMMENT, HOLDF: NORMAL
CREAT SERPL-MCNC: 0.81 MG/DL (ref 0.55–1.02)
GLUCOSE SERPL-MCNC: 94 MG/DL (ref 65–100)
POTASSIUM SERPL-SCNC: 4.1 MMOL/L (ref 3.5–5.1)
SAMPLES BEING HELD,HOLD: NORMAL
SODIUM SERPL-SCNC: 139 MMOL/L (ref 136–145)

## 2019-06-28 PROCEDURE — 74011000258 HC RX REV CODE- 258: Performed by: HOSPITALIST

## 2019-06-28 PROCEDURE — 65270000029 HC RM PRIVATE

## 2019-06-28 PROCEDURE — 77030038269 HC DRN EXT URIN PURWCK BARD -A

## 2019-06-28 PROCEDURE — 94760 N-INVAS EAR/PLS OXIMETRY 1: CPT

## 2019-06-28 PROCEDURE — 94640 AIRWAY INHALATION TREATMENT: CPT

## 2019-06-28 PROCEDURE — 74011250637 HC RX REV CODE- 250/637: Performed by: INTERNAL MEDICINE

## 2019-06-28 PROCEDURE — 36415 COLL VENOUS BLD VENIPUNCTURE: CPT

## 2019-06-28 PROCEDURE — 74011000250 HC RX REV CODE- 250: Performed by: HOSPITALIST

## 2019-06-28 PROCEDURE — 80048 BASIC METABOLIC PNL TOTAL CA: CPT

## 2019-06-28 PROCEDURE — 74011250637 HC RX REV CODE- 250/637: Performed by: HOSPITALIST

## 2019-06-28 PROCEDURE — 74011250636 HC RX REV CODE- 250/636: Performed by: HOSPITALIST

## 2019-06-28 PROCEDURE — 77010033678 HC OXYGEN DAILY

## 2019-06-28 RX ADMIN — FUROSEMIDE 40 MG: 40 TABLET ORAL at 17:24

## 2019-06-28 RX ADMIN — LEVOTHYROXINE SODIUM 25 MCG: 25 TABLET ORAL at 06:47

## 2019-06-28 RX ADMIN — APIXABAN 2.5 MG: 2.5 TABLET, FILM COATED ORAL at 17:24

## 2019-06-28 RX ADMIN — CARVEDILOL 3.12 MG: 3.12 TABLET, FILM COATED ORAL at 17:24

## 2019-06-28 RX ADMIN — CEFTRIAXONE 1 G: 1 INJECTION, POWDER, FOR SOLUTION INTRAMUSCULAR; INTRAVENOUS at 09:00

## 2019-06-28 RX ADMIN — POTASSIUM CHLORIDE 20 MEQ: 750 TABLET, FILM COATED, EXTENDED RELEASE ORAL at 08:59

## 2019-06-28 RX ADMIN — Medication 10 ML: at 13:19

## 2019-06-28 RX ADMIN — AZITHROMYCIN MONOHYDRATE 500 MG: 500 INJECTION, POWDER, LYOPHILIZED, FOR SOLUTION INTRAVENOUS at 10:34

## 2019-06-28 RX ADMIN — IPRATROPIUM BROMIDE AND ALBUTEROL SULFATE 3 ML: .5; 3 SOLUTION RESPIRATORY (INHALATION) at 14:53

## 2019-06-28 RX ADMIN — FUROSEMIDE 40 MG: 40 TABLET ORAL at 08:59

## 2019-06-28 RX ADMIN — APIXABAN 2.5 MG: 2.5 TABLET, FILM COATED ORAL at 08:59

## 2019-06-28 RX ADMIN — Medication 1 CAPSULE: at 08:59

## 2019-06-28 RX ADMIN — Medication 10 ML: at 06:47

## 2019-06-28 RX ADMIN — FLUTICASONE FUROATE AND VILANTEROL TRIFENATATE 1 PUFF: 100; 25 POWDER RESPIRATORY (INHALATION) at 08:59

## 2019-06-28 RX ADMIN — ALLOPURINOL 100 MG: 100 TABLET ORAL at 08:59

## 2019-06-28 RX ADMIN — MIRTAZAPINE 15 MG: 15 TABLET, FILM COATED ORAL at 22:05

## 2019-06-28 RX ADMIN — MAGNESIUM OXIDE TAB 400 MG (241.3 MG ELEMENTAL MG) 400 MG: 400 (241.3 MG) TAB at 22:05

## 2019-06-28 RX ADMIN — ATORVASTATIN CALCIUM 10 MG: 10 TABLET, FILM COATED ORAL at 08:59

## 2019-06-28 RX ADMIN — POTASSIUM CHLORIDE 20 MEQ: 750 TABLET, FILM COATED, EXTENDED RELEASE ORAL at 17:24

## 2019-06-28 RX ADMIN — LORATADINE 10 MG: 10 TABLET ORAL at 22:05

## 2019-06-28 RX ADMIN — Medication 10 ML: at 22:06

## 2019-06-28 RX ADMIN — CARVEDILOL 3.12 MG: 3.12 TABLET, FILM COATED ORAL at 08:59

## 2019-06-28 RX ADMIN — IPRATROPIUM BROMIDE AND ALBUTEROL SULFATE 3 ML: .5; 3 SOLUTION RESPIRATORY (INHALATION) at 08:22

## 2019-06-28 NOTE — PROGRESS NOTES
Attempted to see pt this pm for the second time and pt refused, \"just leave me alone\". Will continue to follow.

## 2019-06-28 NOTE — PROGRESS NOTES
Patient is on 2L O2 overnight. Oriented to self and is pleasant. 1 person assist to ambulate to bathroom. Takes meds whole in applesauce.

## 2019-06-28 NOTE — PROGRESS NOTES
ADULT PROTOCOL: JET AEROSOL  REASSESSMENT    Patient  Celeste Escudero     80 y.o.   female     6/28/2019  11:12 AM    Breath Sounds Pre Procedure: Right Breath Sounds: Diminished                               Left Breath Sounds: Diminished    Breath Sounds Post Procedure: Right Breath Sounds: Diminished                                 Left Breath Sounds: Diminished    Breathing pattern: Pre procedure Breathing Pattern: Regular          Post procedure Breathing Pattern: Regular    Heart Rate: Pre procedure Pulse: 93           Post procedure Pulse: 94    Resp Rate: Pre procedure Respirations: 20           Post procedure Respirations: 20     Oxygen: O2 Device: Nasal cannula   2L    SpO2: Pre procedure SpO2: 98 %                 Post procedure SpO2: 99 %       Nebulizer Therapy: Current medications Aerosolized Medications: DuoNeb       Problem List:   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    DILAN (obstructive sleep apnea) G47.33    Rhinitis J31.0    Anemia D64.9    Reactive airway disease J45.909    HTN (hypertension) I10    Gout M10.9    LBBB (left bundle branch block) I44.7    Presence of biventricular automatic cardioverter/defibrillator (AICD) Z95.810    Chronic systolic congestive heart failure (HCC) I50.22    Lymphoma (HCC) C85.90    Mitral valvular regurgitation I34.0    Physical deconditioning R53.81    Gastroesophageal reflux disease with esophagitis K21.0    Thoracic aortic aneurysm without rupture (HCC) I71.2    Paroxysmal atrial fibrillation (HCC) I48.0    Xerosis of skin L85.3    Dyslipidemia E78.5    CKD (chronic kidney disease) stage 3, GFR 30-59 ml/min (HCC) N18.3    Acquired hypothyroidism E03.9    Nonrheumatic aortic valve insufficiency I35.1    UTI (urinary tract infection) N39.0    Counseling regarding advanced care planning and goals of care Z71.89    CAP (community acquired pneumonia) J18.9       Respiratory Therapist: Roxanne Vivar

## 2019-06-28 NOTE — PROGRESS NOTES
ADULT PROTOCOL: JET AEROSOL  REASSESSMENT    Patient  Fina Milian     80 y.o.   female     6/27/2019  8:39 PM    Breath Sounds Pre Procedure: Right Breath Sounds: Diminished                               Left Breath Sounds: Diminished    Breath Sounds Post Procedure: Right Breath Sounds: Diminished                                 Left Breath Sounds: Diminished    Breathing pattern: Pre procedure Breathing Pattern: Regular          Post procedure Breathing Pattern: Regular    Heart Rate: Pre procedure Pulse: 98           Post procedure Pulse: 95    Resp Rate: Pre procedure Respirations: 20           Post procedure Respirations: 18    Peak Flow: Pre bronchodilator   n/a          Post bronchodilator   n/a    Incentive Spirometry:   n/a      n/a    Cough: Pre procedure Cough: Non-productive               Post procedure Cough: Non-productive    Sputum: Pre procedure  none                 Post procedure  none    Oxygen: O2 Device: Room air   FiO2 (%) room air     Changed: NO    SpO2: Pre procedure SpO2: 92 %   without oxygen              Post procedure SpO2: 92 %  without oxygen    Nebulizer Therapy: Current medications Aerosolized Medications: DuoNeb      Changed: YES, she does not want wakened at night, she does not take them at home at night chg tx to while awake      Problem List:   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    DILAN (obstructive sleep apnea) G47.33    Rhinitis J31.0    Anemia D64.9    Reactive airway disease J45.909    HTN (hypertension) I10    Gout M10.9    LBBB (left bundle branch block) I44.7    Presence of biventricular automatic cardioverter/defibrillator (AICD) Z95.810    Chronic systolic congestive heart failure (HCC) I50.22    Lymphoma (HCC) C85.90    Mitral valvular regurgitation I34.0    Physical deconditioning R53.81    Gastroesophageal reflux disease with esophagitis K21.0    Thoracic aortic aneurysm without rupture (HCC) I71.2    Paroxysmal atrial fibrillation (HCC) I48.0    Xerosis of skin L85.3    Dyslipidemia E78.5    CKD (chronic kidney disease) stage 3, GFR 30-59 ml/min (HCC) N18.3    Acquired hypothyroidism E03.9    Nonrheumatic aortic valve insufficiency I35.1    UTI (urinary tract infection) N39.0    Counseling regarding advanced care planning and goals of care Z71.89    CAP (community acquired pneumonia) J18.9       Respiratory Therapist: Priyanka White RT

## 2019-06-28 NOTE — PROGRESS NOTES
06/27/19 2026   Post-Treatment   Breathing Pattern Regular   Cough Non-productive   Breath Sounds Bilateral Diminished   Left Breath Sounds Diminished   Right Breath Sounds Diminished   Pulse 95   SpO2 92 %   Respirations 18   Treatment Tolerance Well

## 2019-06-28 NOTE — PROGRESS NOTES
06/27/19 2017   Pre-Treatment   Breathing Pattern Regular   Cough Non-productive   Breath Sounds Bilateral Diminished   Left Breath Sounds Diminished   Right Breath Sounds Diminished   Pulse 98   SpO2 92 %   Respirations 20

## 2019-06-28 NOTE — PROGRESS NOTES
General Daily Progress Note    Admit Date: 6/25/2019    Subjective:     Patient does not feel well generally. Current Facility-Administered Medications   Medication Dose Route Frequency    levothyroxine (SYNTHROID) tablet 25 mcg  25 mcg Oral 6am    albuterol-ipratropium (DUO-NEB) 2.5 MG-0.5 MG/3 ML  3 mL Nebulization Q6HWA RT    albuterol-ipratropium (DUO-NEB) 2.5 MG-0.5 MG/3 ML  3 mL Nebulization Q4H PRN    cefTRIAXone (ROCEPHIN) 1 g in 0.9% sodium chloride (MBP/ADV) 50 mL  1 g IntraVENous Q24H    azithromycin (ZITHROMAX) 500 mg in 0.9% sodium chloride (MBP/ADV) 250 mL  500 mg IntraVENous Q24H    lactobac ac& pc-s.therm-b.anim (DAWN Q/RISAQUAD)  1 Cap Oral DAILY    allopurinol (ZYLOPRIM) tablet 100 mg  100 mg Oral DAILY    apixaban (ELIQUIS) tablet 2.5 mg  2.5 mg Oral BID    atorvastatin (LIPITOR) tablet 10 mg  10 mg Oral DAILY    carvedilol (COREG) tablet 3.125 mg  3.125 mg Oral BID WITH MEALS    benzonatate (TESSALON) capsule 100 mg  100 mg Oral TID PRN    fluticasone-vilanterol (BREO ELLIPTA) 100mcg-25mcg/puff  1 Puff Inhalation DAILY    furosemide (LASIX) tablet 40 mg  40 mg Oral ACB&D    loratadine (CLARITIN) tablet 10 mg  10 mg Oral QHS    magnesium oxide (MAG-OX) tablet 400 mg  400 mg Oral QHS    mirtazapine (REMERON) tablet 15 mg  15 mg Oral QHS    potassium chloride SR (KLOR-CON 10) tablet 20 mEq  20 mEq Oral BID    sodium chloride (NS) flush 5-40 mL  5-40 mL IntraVENous Q8H    sodium chloride (NS) flush 5-40 mL  5-40 mL IntraVENous PRN    acetaminophen (TYLENOL) tablet 650 mg  650 mg Oral Q4H PRN    ondansetron (ZOFRAN) injection 4 mg  4 mg IntraVENous Q4H PRN        Review of Systems  A comprehensive review of systems was negative.     Objective:     Patient Vitals for the past 24 hrs:   BP Temp Pulse Resp SpO2   06/28/19 0822     98 %   06/28/19 0731 97/58 97.3 °F (36.3 °C) 81 15 98 %   06/28/19 0331 102/64 97.7 °F (36.5 °C) 77 18 100 %   06/27/19 2328 100/60 98.1 °F (36.7 °C) 98 18 95 %   06/27/19 2017     92 %   06/27/19 1951 104/74 97.2 °F (36.2 °C) 100 18 98 %   06/27/19 1543 116/82 98.9 °F (37.2 °C) (!) 103 17 96 %   06/27/19 1442     92 %   06/27/19 1145 96/62 97.8 °F (36.6 °C) (!) 101 18 91 %     No intake/output data recorded. 06/26 1901 - 06/28 0700  In: 100 [P.O.:100]  Out: -     Physical Exam:   Visit Vitals  BP 97/58 (BP 1 Location: Left arm, BP Patient Position: At rest)   Pulse 81   Temp 97.3 °F (36.3 °C)   Resp 15   Ht 5' 1\" (1.549 m)   Wt 115 lb 14.4 oz (52.6 kg)   SpO2 98%   BMI 21.90 kg/m²     General appearance: alert, cooperative, no distress, appears stated age  Neck: supple, symmetrical, trachea midline, no adenopathy, thyroid: not enlarged, symmetric, no tenderness/mass/nodules, no carotid bruit and JVD - 2 cm above sternal notch  Lungs: clear to auscultation bilaterally  Heart: regular rate and rhythm, S1, S2 normal, no murmur, click, rub or gallop  Abdomen: soft, non-tender. Bowel sounds normal. No masses,  no organomegaly  Extremities: extremities normal, atraumatic, no cyanosis or edema        Data Review   Recent Results (from the past 24 hour(s))   METABOLIC PANEL, BASIC    Collection Time: 06/28/19  4:23 AM   Result Value Ref Range    Sodium 139 136 - 145 mmol/L    Potassium 4.1 3.5 - 5.1 mmol/L    Chloride 105 97 - 108 mmol/L    CO2 24 21 - 32 mmol/L    Anion gap 10 5 - 15 mmol/L    Glucose 94 65 - 100 mg/dL    BUN 21 (H) 6 - 20 MG/DL    Creatinine 0.81 0.55 - 1.02 MG/DL    BUN/Creatinine ratio 26 (H) 12 - 20      GFR est AA >60 >60 ml/min/1.73m2    GFR est non-AA >60 >60 ml/min/1.73m2    Calcium 8.4 (L) 8.5 - 10.1 MG/DL           Assessment:     Active Problems:    Anemia (11/8/2014)      CAP (community acquired pneumonia) (6/25/2019)        Plan:     1. Clinically pneumonia is improving. Continue antibiotics. 2.  We will mobilize to prevent deconditioning. 3.  Continue aggressive pulmonary toilet.

## 2019-06-28 NOTE — PROGRESS NOTES
Orthopedic End of Shift Note    Bedside and Verbal shift change report given to [address] (oncoming nurse) by Bouchra Oliver (offgoing nurse). Report included the following information SBAR, Kardex, Intake/Output, MAR and Recent Results. POD#     Significant issues during shift: Patient is on 2L O2 overnight. Oriented to self and is pleasant. 1 person assist to ambulate to bathroom. Takes meds whole in applesauce. Issues for Physician to address: Patient is on 2L O2 overnight. Oriented to self and is pleasant. 1 person assist to ambulate to bathroom. Takes meds whole in applesauce.      Activity This Shift  (check all that apply) [] chair  [] dangle   [x] bathroom  [] bedside commode [] hallway  [] bedrest   Nausea/Vomiting [] yes [x] no     Voiding Status [x] void [] Galindo [] I&O Cath   Bowel Movements [] yes [x] no     Foot Pumps or SCD [] yes [x] no    Ice Pack [] yes    [x] no    Incentive Spirometer [] yes [] no Volume:      Telemetry Monitoring   [] yes [x] no Rhythm:   Supplemental O2 [x] yes [] no Sat off O2:   88%

## 2019-06-28 NOTE — CONSULTS
Cardiothoracic Surgery Consult      Subjective:      Chief Complaint:     Dereck Rodriguez is a 80 y.o. frail, hypertensive, dilated cardiomyopathy EF 25%,  MR , CKD, PAFIB, non diabetic, non smoking, black female who was referred for opinion and advice regarding ascending aortic aneurysm by Dr. General Kam Turk MD.     Patient was admitted with cough, CAP and heart failure. She denies chest or back pain. She has dementia and is a unreliable historian. Cardiac Testing:     Echocardiogram none    Cardiac catheretization none    CT chest 6/25/19  1. Left upper lobe and lingular pneumonia. 2. Large aneurysm of the ascending thoracic aorta measuring 5.8 cm in the  tubular portion. 3. Small to moderate left and small right pleural effusions with overlying  subsegmental atelectasis. Anasarca. 4. Cardiomegaly. Enlarged main pulmonary artery, consistent with pulmonary  arterial hypertension.   5. Age-indeterminate anterior compression fracture of L1, new since 2/4/2019    Past Medical History:   Diagnosis Date    Aortic insufficiency     Asthma     Cancer ([facility] [address]     lymphoma    CKD (chronic kidney disease) stage 3, GFR 30-59 ml/min ([facility]) 1/10/2018    Congestive heart failure, NYHA class II, chronic, systolic ([facility])     Heart failure ([facility] [address]     Hypertension     Mitral valvular regurgitation 1/22/2016    ECHO 2/3/17: LV dilated, EF 20-25%, mild LVH, RV mod dilated, mild- mod MELANIA, mod-severe MR, mod AI ECHO 7/29/17: EF 15%, severe DHK, reduced RVEF, RVH, RVSP 35 mmHg  Mild LAE, mod-marked MA fibrosis, mod-severe MR (2+), AO root dilated,      Nonrheumatic aortic valve insufficiency 10/16/2018    Presence of biventricular automatic cardioverter/defibrillator (AICD) 7/2/2015    [employer] biventricular AICD implant    Stomach ulcer      Past Surgical History:   Procedure Laterality Date    HX BREAST BIOPSY Bilateral     40 years ago    HX COLONOSCOPY      HX HEENT      cataracts removed    HX HYSTERECTOMY      HX OTHER SURGICAL      lymph node surgery    HX TONSILLECTOMY      ME EGD TRANSORAL BIOPSY SINGLE/MULTIPLE  5/23/2012         SINUS SURGERY PROC UNLISTED      Nasal polyps removed      Social History     Tobacco Use    Smoking status: Never Smoker    Smokeless tobacco: Never Used   Substance Use Topics    Alcohol use: No     Alcohol/week: 0.0 oz      Family History   Problem Relation Age of Onset    Heart Disease Mother     Stroke Father      Prior to Admission medications    Medication Sig Start Date End Date Taking? Authorizing Provider   atorvastatin (LIPITOR) 10 mg tablet Take 10 mg by mouth nightly. Yes Provider, Historical   midodrine (PROAMITINE) 5 mg tablet Take 5 mg by mouth three (3) times daily. Yes Provider, Historical   ibrutinib (IMBRUVICA) 140 mg capsule Take 140 mg by mouth daily. Yes Provider, Historical   furosemide (LASIX) 40 mg tablet Take 40 mg by mouth two (2) times a day. Hold of SBP <110   Yes Provider, Historical   carvedilol (COREG) 3.125 mg tablet Take 3.125 mg by mouth two (2) times daily (with meals). Hold if SPB <110   Yes Provider, Historical   potassium chloride (K-DUR, KLOR-CON) 20 mEq tablet Take 20 mEq by mouth daily. Yes Provider, Historical   polyethylene glycol (MIRALAX) 17 gram/dose powder Take 17 g by mouth every fourty-eight (48) hours. Yes Provider, Historical   levoFLOXacin (LEVAQUIN) 750 mg tablet Take 750 mg by mouth nightly. Yes Provider, Historical   allopurinol (ZYLOPRIM) 100 mg tablet TAKE ONE (1) TABLET(S) DAILY 3/18/19  Yes Olivia Delgado MD   albuterol (PROVENTIL VENTOLIN) 2.5 mg /3 mL (0.083 %) nebulizer solution 2.5 mg by Nebulization route four (4) times daily. Yes Provider, Historical   levothyroxine (SYNTHROID) 25 mcg tablet Take 1 Tab by mouth Daily (before breakfast). 12/11/18  Yes Olivia Delgado MD   apixaban (ELIQUIS) 2.5 mg tablet Take 1 Tab by mouth two (2) times a day.  Indications: PREVENT THROMBOEMBOLISM IN CHRONIC ATRIAL FIBRILLATION 6/1/18  Yes Yogesh Dillon MD   fluticasone-salmeterol (ADVAIR) 250-50 mcg/dose diskus inhaler Take 1 Puff by inhalation two (2) times a day. 4/14/18  Yes Yogesh Dillon MD   magnesium oxide 400 mg cap Take 1 Cap by mouth nightly. Yes Provider, Historical   cholecalciferol (VITAMIN D3) 1,000 unit cap Take 1,000 Units by mouth daily. Yes Provider, Historical   loratadine (CLARITIN) 10 mg tablet Take 10 mg by mouth nightly. Yes Provider, Historical   co-enzyme Q-10 (CO Q-10) 100 mg capsule Take 100 mg by mouth daily. Yes Provider, Historical   acetaminophen (TYLENOL) 325 mg tablet Take 650 mg by mouth every four (4) hours as needed for Pain or Fever. Provider, Historical   dextromethorphan-guaiFENesin (ROBITUSSIN-DM)  mg/5 mL syrup Take 10 mL by mouth every six (6) hours as needed for Cough. Provider, Historical       Allergies   Allergen Reactions    Codeine Other (comments)     \"made me disoriented\" per pt         Recreational drug use:NO    Adrian status or cause of death in parents and siblings if  Heart problems, stroke, or vascular disease in family members : NO    HISTORY OF NON COMPLIANCE WITH MEDICINES:  NO    REVIEW OF SYSTEMS:  DEMENTIA   [] Unable to obtain  ROS due to  []mental status change  []sedated   []intubated   [x]Total of 13 systems reviewed as follows:  Constitutional: negative fever, negative chills  Eyes:   negative for amauroses fugax  ENT:   negative sore throat,oral absecess  Endocrine Negative for thyroid replacement Rx; goiter; DM  Respiratory: +  cough,  Cards:  negative for  palpitations, lower extremity edema, varicosities, Claudication  GI:   negative for dysphagia, bleeding, nausea, vomiting, diarrhea, and     abdominal pain  Genitourinary: negative for frequency, dysuria, BPH in men.   Integument:  negative for rash and pruritus  Hematologic:  negative for easy bruising; bleeding dyscarsia  Musculoskel: negative for muscle weakness or implants inhibiting ambulation  Neurological:  negative for stroke, TIA, syncope, dizziness  Behavl/Psych: negative for feelings of anxiety, depression       Objective:     Visit Vitals  BP 97/58 (BP 1 Location: Left arm, BP Patient Position: At rest)   Pulse 81   Temp 97.3 °F (36.3 °C)   Resp 15   Ht 5' 1\" (1.549 m)   Wt 115 lb 14.4 oz (52.6 kg)   SpO2 98%   BMI 21.90 kg/m²       EXAM:  General:  Alert, cooperative, no distress   Mouth/Throat:   Chest: No lesions, surgical scars or pectus. Neck: Supple, symmetrical, trachea midline, no adenopathy, no thyroid enlargement/tenderness/nodules, no carotid bruit and no JVD. Lungs:   Clear to auscultation bilaterally. Heart:  Regular rate and rhythm, S1, S2 normal, no murmur, no click, no rub and no gallop. Abdomen:   Soft, non-tender. Bowel sounds normal. No masses,  No organomegaly. Extremities: Extremities normal, atraumatic, no cyanosis or edema. Pulses: 2+ and symmetric all extremities. Skin:  No rashes or lesions       Neurologic:  Gross motor and sensory apparatus intact.      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 (8/4/2012)      Anemia (11/8/2014)      HTN (hypertension) (6/29/2015)      Presence of biventricular automatic cardioverter/defibrillator (AICD) (7/2/2015)      Overview: [employer] biventricular AICD implant      Chronic systolic congestive heart failure ([address] (10/8/2015)      Mitral valvular regurgitation (1/22/2016)      Overview: ECHO 2/3/17: LV dilated, EF 20-25%, mild LVH, RV mod dilated, mild- mod       MELANIA, mod-severe MR, mod AI      ECHO 7/29/17: EF 15%, severe DHK, reduced RVEF, RVH, RVSP 35 mmHg       Mild LAE, mod-marked MA fibrosis, mod-severe MR (2+), AO root dilated,                  Physical deconditioning (1/29/2017)      Thoracic aortic aneurysm without rupture ([address] (2/2/2017)      Nonrheumatic aortic valve insufficiency (10/16/2018)      Counseling regarding advanced care planning and goals of care (2/7/2019)      CAP (community acquired pneumonia) (6/25/2019)        STS Risk Calculator:    UNSUPPORTED     Plan:     Patient is not a surgical candidate. Recommend hypertensive management.     Signed By: YING Hensley     June 28, 2019       [facility]

## 2019-06-28 NOTE — PROGRESS NOTES
-Hematology / Oncology ([facility]) -  -Primary Oncologist- Dr Linda Collins  -[facility]- \" I am having problems with the staff. \"    -S-  Sitting in her chair, states physically she is well but frustrated with the staff and her son in [place]. -O-      Patient Vitals for the past 24 hrs:   Temp Pulse Resp BP SpO2   06/28/19 0822     98 %   06/28/19 0731 97.3 °F (36.3 °C) 81 15 97/58 98 %   06/28/19 0331 97.7 °F (36.5 °C) 77 18 102/64 100 %   06/27/19 2328 98.1 °F (36.7 °C) 98 18 100/60 95 %   06/27/19 2017     92 %   06/27/19 1951 97.2 °F (36.2 °C) 100 18 104/74 98 %   06/27/19 1543 98.9 °F (37.2 °C) (!) 103 17 116/82 96 %   06/27/19 1442     92 %   06/27/19 1145 97.8 °F (36.6 °C) (!) 101 18 96/62 91 %     No intake/output data recorded. ROS: 12 point ROS obtained and negative other than stated in HPI      Physical Exam:  Constitutional Alert, cooperative, oriented. Mood and affect appropriate. Appears close to chronological age. Elderly, frail lady. Thinning hair. Head Normocephalic; no scars   Eyes Conjunctivae and sclerae are clear and without icterus. Pupils are reactive and equal.   ENMT Sinuses are nontender. No oral exudates, ulcers, masses, thrush or mucositis. Oropharynx clear. Tongue normal.   Neck Supple without masses or thyromegaly. No jugular venous distension. Hematologic/Lymphatic No petechiae or purpura. No tender or palpable lymph nodes in the cervical, supraclavicular, axillary or inguinal area. Respiratory Lungs are clear to auscultation without rhonchi or wheezing. Cardiovascular Regular rate and rhythm of heart without murmurs, gallops or rubs. Chest / Line Site Chest is symmetric with no chest wall deformities. Abdomen Non-tender, non-distended, no masses, ascites or hepatosplenomegaly. Good bowel sounds. No guarding or rebound tenderness. No pulsatile masses. Musculoskeletal No tenderness or swelling, normal range of motion without obvious weakness.    Extremities No visible deformities, no cyanosis, clubbing or edema. Skin No rashes, scars, or lesions suggestive of malignancy. No petechiae, purpura, or ecchymoses. No excoriations. Neurologic No sensory or motor deficits, normal cerebellar function, normal gait, cranial nerves intact. Psychiatric Alert and oriented times three. Coherent speech. Verbalizes understanding of our discussions today.            -Labs-    Recent Labs     06/28/19  0423 06/27/19  0637 06/26/19  1405 06/26/19  0245   WBC  --  4.6  --  5.2   HGB  --  7.0* 7.1* 7.1*   PLT  --  234  --  219   ANEU  --  3.6  --  4.2    140  --  138   K 4.1 4.7  --  4.6   GLU 94 79  --  93   BUN 21* 21*  --  22*   CREA 0.81 0.75  --  0.83   CA 8.4* 8.5  --  8.7   MG  --   --   --  2.7*   PHOS  --   --   --  4.4       -Imaging-     6/25/19 CT Chest:  IMPRESSION:    1. Left upper lobe and lingular pneumonia. 2. Large aneurysm of the ascending thoracic aorta measuring 5.8 cm in the  tubular portion. 3. Small to moderate left and small right pleural effusions with overlying  subsegmental atelectasis. Anasarca. 4. Cardiomegaly. Enlarged main pulmonary artery, consistent with pulmonary  arterial hypertension. 5. Age-indeterminate anterior compression fracture of L1, new since 2/4/2019.      -Assessment + Plan-     *) 79 yo female with pmh of CLL admitted with CAP and worsening anemia:     *) CLL and progressive anemia:  - she has been off Ibrutinib for last 6 months per our records, script written in May but don't think she's been taking it, let msg at [facility] to clarify but no return. - when seen in May by NP in office her anemia was progressing and felt to be due to her untreated CLL. I suspect that is still the case.   - Labs show AKD, b12/FA not low.   -  zinc, copper, flow pending  - PBS reviewed, no blast etc.roulequx noted on path smear will check spep  - Resume Ibrutinib at 140mg daily after DC  - FU with Dr Galina Shaw after DC     *) CAP     *) AAA:     *) Dementia and debility     *) NICM

## 2019-06-28 NOTE — PROGRESS NOTES
Bedside shift change report given to [facility] (oncoming nurse) by Rianna Luna (offgoing nurse). Report included the following information SBAR, Kardex, Procedure Summary, Intake/Output, MAR and Recent Results.

## 2019-06-29 LAB
ANION GAP SERPL CALC-SCNC: 6 MMOL/L (ref 5–15)
BUN SERPL-MCNC: 23 MG/DL (ref 6–20)
BUN/CREAT SERPL: 29 (ref 12–20)
CALCIUM SERPL-MCNC: 8.4 MG/DL (ref 8.5–10.1)
CHLORIDE SERPL-SCNC: 106 MMOL/L (ref 97–108)
CO2 SERPL-SCNC: 27 MMOL/L (ref 21–32)
COPPER SERPL-MCNC: 127 UG/DL (ref 72–166)
CREAT SERPL-MCNC: 0.79 MG/DL (ref 0.55–1.02)
GLUCOSE SERPL-MCNC: 83 MG/DL (ref 65–100)
POTASSIUM SERPL-SCNC: 3.9 MMOL/L (ref 3.5–5.1)
SODIUM SERPL-SCNC: 139 MMOL/L (ref 136–145)

## 2019-06-29 PROCEDURE — 80048 BASIC METABOLIC PNL TOTAL CA: CPT

## 2019-06-29 PROCEDURE — 74011250637 HC RX REV CODE- 250/637: Performed by: INTERNAL MEDICINE

## 2019-06-29 PROCEDURE — 94640 AIRWAY INHALATION TREATMENT: CPT

## 2019-06-29 PROCEDURE — 36415 COLL VENOUS BLD VENIPUNCTURE: CPT

## 2019-06-29 PROCEDURE — 74011000258 HC RX REV CODE- 258: Performed by: HOSPITALIST

## 2019-06-29 PROCEDURE — 65270000029 HC RM PRIVATE

## 2019-06-29 PROCEDURE — 74011250637 HC RX REV CODE- 250/637: Performed by: HOSPITALIST

## 2019-06-29 PROCEDURE — 74011000250 HC RX REV CODE- 250: Performed by: HOSPITALIST

## 2019-06-29 PROCEDURE — 94760 N-INVAS EAR/PLS OXIMETRY 1: CPT

## 2019-06-29 PROCEDURE — 74011250636 HC RX REV CODE- 250/636: Performed by: HOSPITALIST

## 2019-06-29 RX ADMIN — LORATADINE 10 MG: 10 TABLET ORAL at 21:17

## 2019-06-29 RX ADMIN — Medication 10 ML: at 13:39

## 2019-06-29 RX ADMIN — IPRATROPIUM BROMIDE AND ALBUTEROL SULFATE 3 ML: .5; 3 SOLUTION RESPIRATORY (INHALATION) at 20:54

## 2019-06-29 RX ADMIN — POTASSIUM CHLORIDE 20 MEQ: 750 TABLET, FILM COATED, EXTENDED RELEASE ORAL at 09:01

## 2019-06-29 RX ADMIN — APIXABAN 2.5 MG: 2.5 TABLET, FILM COATED ORAL at 17:14

## 2019-06-29 RX ADMIN — IPRATROPIUM BROMIDE AND ALBUTEROL SULFATE 3 ML: .5; 3 SOLUTION RESPIRATORY (INHALATION) at 14:21

## 2019-06-29 RX ADMIN — APIXABAN 2.5 MG: 2.5 TABLET, FILM COATED ORAL at 09:01

## 2019-06-29 RX ADMIN — FLUTICASONE FUROATE AND VILANTEROL TRIFENATATE 1 PUFF: 100; 25 POWDER RESPIRATORY (INHALATION) at 09:01

## 2019-06-29 RX ADMIN — MAGNESIUM OXIDE TAB 400 MG (241.3 MG ELEMENTAL MG) 400 MG: 400 (241.3 MG) TAB at 21:17

## 2019-06-29 RX ADMIN — Medication 1 CAPSULE: at 09:01

## 2019-06-29 RX ADMIN — POTASSIUM CHLORIDE 20 MEQ: 750 TABLET, FILM COATED, EXTENDED RELEASE ORAL at 17:14

## 2019-06-29 RX ADMIN — Medication 10 ML: at 06:40

## 2019-06-29 RX ADMIN — Medication 10 ML: at 21:17

## 2019-06-29 RX ADMIN — ALLOPURINOL 100 MG: 100 TABLET ORAL at 09:01

## 2019-06-29 RX ADMIN — AZITHROMYCIN MONOHYDRATE 500 MG: 500 INJECTION, POWDER, LYOPHILIZED, FOR SOLUTION INTRAVENOUS at 10:49

## 2019-06-29 RX ADMIN — IPRATROPIUM BROMIDE AND ALBUTEROL SULFATE 3 ML: .5; 3 SOLUTION RESPIRATORY (INHALATION) at 09:10

## 2019-06-29 RX ADMIN — LEVOTHYROXINE SODIUM 25 MCG: 25 TABLET ORAL at 06:39

## 2019-06-29 RX ADMIN — CEFTRIAXONE 1 G: 1 INJECTION, POWDER, FOR SOLUTION INTRAMUSCULAR; INTRAVENOUS at 09:02

## 2019-06-29 RX ADMIN — MIRTAZAPINE 15 MG: 15 TABLET, FILM COATED ORAL at 21:17

## 2019-06-29 RX ADMIN — ATORVASTATIN CALCIUM 10 MG: 10 TABLET, FILM COATED ORAL at 09:01

## 2019-06-29 NOTE — PROGRESS NOTES
Hospital Progress Note    NAME:  Marlys Queen   :   3/17/1925   MRN:  [id number]     Date/Time:  2019 12:55 PM    Plan:   1. Iv ab  2. encourage activity  Risk of Deterioration: Low  []           Moderate  []           High  []                 Assessment:   Principal Problem:    CAP (community acquired pneumonia) (2019)    Active Problems:    Weight loss (2012)      Cardiomyopathy, dilated, 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% since  (2012)      Anemia (2014)      HTN (hypertension) (2015)      Presence of biventricular automatic cardioverter/defibrillator (AICD) (2015)      Overview: [employer] biventricular AICD implant      Chronic systolic congestive heart failure ([facility] [address] (10/8/2015)      Mitral valvular regurgitation (2016)      Overview: ECHO 2/3/17: LV dilated, EF 20-25%, mild LVH, RV mod dilated, mild- mod       MELANIA, mod-severe MR, mod AI      ECHO 17: EF 15%, severe DHK, reduced RVEF, RVH, RVSP 35 mmHg       Mild LAE, mod-marked MA fibrosis, mod-severe MR (2+), AO root dilated,                  Physical deconditioning (2017)      Thoracic aortic aneurysm without rupture ([facility] [address] (2017)      Nonrheumatic aortic valve insufficiency (10/16/2018)      Counseling regarding advanced care planning and goals of care (2019)          Admitting notes:89 y.o.  female who presents with above complaint. Pt has dementia and cannot contribute much to the history. History was obtained from connect care and caregiver from the facility at bedside. Per caregiver, pt was noticed to have dyspnea ~ 1-1,5 weeks ago. She had first cxray done 1 week ago which was normal. Her second cxray which was done yesterday revealed pneumonia. She was started on LVQ at the facility. Pt stated \" I coughing a lot \". No CP. Per caregiver, there was low grade fever at the facility.  Blood work was done at the facility yesterday which revealed Hb at 6.7, so pt was refer to ED for possible blood transfusion. Her Hb here at 8.1. No dark stool or blood in stool was noticed. Ua done yesterday revealed: + small LE, wbc > 25   Pt is chronically on O2 at night. Pt has dementia. She used to be in [facility] till recently.  Due to increased confusion she was transferred to memory care locked unit.      Hospitalist was asked to admit for work up and evaluation of the above problems.            Subjective/interium history     Feeling better this am  11 Point Review of Systems:   Negative except no cp/sob    []            Unable to obtain ROS due to:       []            mental status change []            sedated []            intubated     Social History     Tobacco Use    Smoking status: Never Smoker    Smokeless tobacco: Never Used   Substance Use Topics    Alcohol use: No     Alcohol/week: 0.0 oz     Medications reviewed:  Current Facility-Administered Medications   Medication Dose Route Frequency    levothyroxine (SYNTHROID) tablet 25 mcg  25 mcg Oral 6am    albuterol-ipratropium (DUO-NEB) 2.5 MG-0.5 MG/3 ML  3 mL Nebulization Q6HWA RT    albuterol-ipratropium (DUO-NEB) 2.5 MG-0.5 MG/3 ML  3 mL Nebulization Q4H PRN    cefTRIAXone (ROCEPHIN) 1 g in 0.9% sodium chloride (MBP/ADV) 50 mL  1 g IntraVENous Q24H    lactobac ac& pc-s.therm-b.anim (DAWN Q/RISAQUAD)  1 Cap Oral DAILY    allopurinol (ZYLOPRIM) tablet 100 mg  100 mg Oral DAILY    apixaban (ELIQUIS) tablet 2.5 mg  2.5 mg Oral BID    atorvastatin (LIPITOR) tablet 10 mg  10 mg Oral DAILY    carvedilol (COREG) tablet 3.125 mg  3.125 mg Oral BID WITH MEALS    benzonatate (TESSALON) capsule 100 mg  100 mg Oral TID PRN    fluticasone-vilanterol (BREO ELLIPTA) 100mcg-25mcg/puff  1 Puff Inhalation DAILY    furosemide (LASIX) tablet 40 mg  40 mg Oral ACB&D    loratadine (CLARITIN) tablet 10 mg  10 mg Oral QHS    magnesium oxide (MAG-OX) tablet 400 mg  400 mg Oral QHS    mirtazapine (REMERON) tablet 15 mg  15 mg Oral QHS    potassium chloride SR (KLOR-CON 10) tablet 20 mEq  20 mEq Oral BID    sodium chloride (NS) flush 5-40 mL  5-40 mL IntraVENous Q8H    sodium chloride (NS) flush 5-40 mL  5-40 mL IntraVENous PRN    acetaminophen (TYLENOL) tablet 650 mg  650 mg Oral Q4H PRN    ondansetron (ZOFRAN) injection 4 mg  4 mg IntraVENous Q4H PRN        Objective:   Vitals:  Visit Vitals  BP (!) 87/55 (BP 1 Location: Left arm, BP Patient Position: Sitting)   Pulse 99   Temp 97.7 °F (36.5 °C)   Resp 18   Ht 5' 1\" (1.549 m)   Wt 115 lb 14.4 oz (52.6 kg)   SpO2 96%   BMI 21.90 kg/m²     Temp (24hrs), Av °F (36.7 °C), Min:97.5 °F (36.4 °C), Max:98.6 °F (37 °C)    O2 Flow Rate (L/min): 2 l/min O2 Device: Room air    Last 24hr Input/Output:    Intake/Output Summary (Last 24 hours) at 2019 1255  Last data filed at 2019 0217  Gross per 24 hour   Intake    Output 500 ml   Net -500 ml        PHYSICAL EXAM:  General:    Alert, cooperative, no distress, appears stated age. Head:   Normocephalic, without obvious abnormality, atraumatic. Eyes:   Conjunctivae/corneas clear. PERRLA  Nose:  Nares normal. No drainage or sinus tenderness. Throat:    Lips, mucosa, and tongue normal.  No Thrush  Neck:  Supple, symmetrical,  no adenopathy, thyroid: non tender    no carotid bruit and no JVD. Back:    Symmetric,  No CVA tenderness. Lungs:   Decreased bs with end exp Wheezing   Chest wall:  No tenderness or deformity. No Accessory muscle use. Heart:   Regular rate and rhythm,  no murmur, rub or gallop. Abdomen:   Soft, non-tender. Not distended. Bowel sounds normal. No masses  .        Lab Data Reviewed:    Recent Labs     19  0637 19  1405   WBC 4.6  --    HGB 7.0* 7.1*   HCT 22.5* 23.8*     --      Recent Labs     19  0428 19  0423 19  0637    139 140   K 3.9 4.1 4.7    105 106   CO2 27 24 27   GLU 83 94 79   BUN 23* 21* 21*   CREA 0.79 0.81 0.75   CA 8.4* 8.4* 8.5     Lab Results Component Value Date/Time    Glucose (POC) 167 (H) 02/05/2019 08:53 PM       ___________________________________________________  ___________________________________________________    Attending Physician: Mario Mandel MD

## 2019-06-29 NOTE — PROGRESS NOTES
Bedside shift change report given to Pam (oncoming nurse) by Brody Hernandez (offgoing nurse). Report included the following information SBAR, Kardex, Intake/Output, MAR, Accordion, Recent Results and Med Rec Status.

## 2019-06-29 NOTE — PROGRESS NOTES
Bedside shift change report given to MAXIMUS Jones (oncoming nurse) by Wendy Gill RN (offgoing nurse). Report included the following information SBAR, Kardex, Procedure Summary, Intake/Output, MAR, Accordion, Recent Results and Med Rec Status.

## 2019-06-29 NOTE — PROGRESS NOTES
Ms Delmy Gordo refused her 2000 neb tx states she is sleeping and wants to sleep, tx held per pt request, sleeping well no respiratory distress noted.

## 2019-06-29 NOTE — PROGRESS NOTES
Paged Dr. Jak Gillette group regarding patient HR and BP and whether furosemide and coreg should be given or held this morning. Awaiting response. Hold both coreg and furosemide for BP of less than 120 per Dr. Freda Crump.

## 2019-06-29 NOTE — PROGRESS NOTES
ADULT PROTOCOL: JET AEROSOL  REASSESSMENT    Patient  Chel Riggins     80 y.o.   female     6/29/2019  10:10 AM    Breath Sounds Pre Procedure: Right Breath Sounds: Clear                               Left Breath Sounds: Diminished    Breath Sounds Post Procedure: Right Breath Sounds: Clear                                 Left Breath Sounds: Diminished    Breathing pattern: Pre procedure Breathing Pattern: Regular          Post procedure Breathing Pattern: Regular    Heart Rate: Pre procedure Pulse: 82           Post procedure Pulse: 95    Resp Rate: Pre procedure Respirations: 20           Post procedure Respirations: 20            Cough: Pre procedure Cough: Non-productive               Post procedure Cough: Non-productive      Oxygen: O2 Device: Room air   room air     Changed: NO    SpO2: Pre procedure SpO2: 96 %   without oxygen              Post procedure SpO2: 91 %  without oxygen    Nebulizer Therapy: Current medications Aerosolized Medications: DuoNeb      Changed: NO      Problem List:   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    DILAN (obstructive sleep apnea) G47.33    Rhinitis J31.0    Anemia D64.9    Reactive airway disease J45.909    HTN (hypertension) I10    Gout M10.9    LBBB (left bundle branch block) I44.7    Presence of biventricular automatic cardioverter/defibrillator (AICD) Z95.810    Chronic systolic congestive heart failure (HCC) I50.22    Lymphoma (HCC) C85.90    Mitral valvular regurgitation I34.0    Physical deconditioning R53.81    Gastroesophageal reflux disease with esophagitis K21.0    Thoracic aortic aneurysm without rupture (HCC) I71.2    Paroxysmal atrial fibrillation (HCC) I48.0    Xerosis of skin L85.3    Dyslipidemia E78.5    CKD (chronic kidney disease) stage 3, GFR 30-59 ml/min (HCC) N18.3    Acquired hypothyroidism E03.9    Nonrheumatic aortic valve insufficiency I35.1    UTI (urinary tract infection) N39.0    Counseling regarding advanced care planning and goals of care Z71.89    CAP (community acquired pneumonia) J18.9       Respiratory Therapist: Alexandra Alberts RT

## 2019-06-30 LAB
ANION GAP SERPL CALC-SCNC: 6 MMOL/L (ref 5–15)
BACTERIA SPEC CULT: NORMAL
BASOPHILS # BLD: 0 K/UL (ref 0–0.1)
BASOPHILS NFR BLD: 1 % (ref 0–1)
BNP SERPL-MCNC: ABNORMAL PG/ML
BUN SERPL-MCNC: 21 MG/DL (ref 6–20)
BUN/CREAT SERPL: 30 (ref 12–20)
CALCIUM SERPL-MCNC: 8.3 MG/DL (ref 8.5–10.1)
CHLORIDE SERPL-SCNC: 110 MMOL/L (ref 97–108)
CO2 SERPL-SCNC: 28 MMOL/L (ref 21–32)
CREAT SERPL-MCNC: 0.7 MG/DL (ref 0.55–1.02)
DIFFERENTIAL METHOD BLD: ABNORMAL
EOSINOPHIL # BLD: 0 K/UL (ref 0–0.4)
EOSINOPHIL NFR BLD: 0 % (ref 0–7)
ERYTHROCYTE [DISTWIDTH] IN BLOOD BY AUTOMATED COUNT: 21.7 % (ref 11.5–14.5)
GLUCOSE SERPL-MCNC: 79 MG/DL (ref 65–100)
HCT VFR BLD AUTO: 21.1 % (ref 35–47)
HGB BLD-MCNC: 6.5 G/DL (ref 11.5–16)
IMM GRANULOCYTES # BLD AUTO: 0 K/UL (ref 0–0.04)
IMM GRANULOCYTES NFR BLD AUTO: 0 % (ref 0–0.5)
LYMPHOCYTES # BLD: 0.9 K/UL (ref 0.8–3.5)
LYMPHOCYTES NFR BLD: 27 % (ref 12–49)
MCH RBC QN AUTO: 28.1 PG (ref 26–34)
MCHC RBC AUTO-ENTMCNC: 30.8 G/DL (ref 30–36.5)
MCV RBC AUTO: 91.3 FL (ref 80–99)
MONOCYTES # BLD: 0.1 K/UL (ref 0–1)
MONOCYTES NFR BLD: 2 % (ref 5–13)
NEUTS BAND NFR BLD MANUAL: 2 %
NEUTS SEG # BLD: 2.2 K/UL (ref 1.8–8)
NEUTS SEG NFR BLD: 68 % (ref 32–75)
NRBC # BLD: 0 K/UL (ref 0–0.01)
NRBC BLD-RTO: 0 PER 100 WBC
PLATELET # BLD AUTO: 286 K/UL (ref 150–400)
PMV BLD AUTO: 10.6 FL (ref 8.9–12.9)
POTASSIUM SERPL-SCNC: 4.4 MMOL/L (ref 3.5–5.1)
RBC # BLD AUTO: 2.31 M/UL (ref 3.8–5.2)
RBC MORPH BLD: ABNORMAL
SERVICE CMNT-IMP: NORMAL
SODIUM SERPL-SCNC: 144 MMOL/L (ref 136–145)
WBC # BLD AUTO: 3.2 K/UL (ref 3.6–11)

## 2019-06-30 PROCEDURE — 83880 ASSAY OF NATRIURETIC PEPTIDE: CPT

## 2019-06-30 PROCEDURE — 85025 COMPLETE CBC W/AUTO DIFF WBC: CPT

## 2019-06-30 PROCEDURE — 36430 TRANSFUSION BLD/BLD COMPNT: CPT

## 2019-06-30 PROCEDURE — 86900 BLOOD TYPING SEROLOGIC ABO: CPT

## 2019-06-30 PROCEDURE — 30233N1 TRANSFUSION OF NONAUTOLOGOUS RED BLOOD CELLS INTO PERIPHERAL VEIN, PERCUTANEOUS APPROACH: ICD-10-PCS | Performed by: INTERNAL MEDICINE

## 2019-06-30 PROCEDURE — 74011250636 HC RX REV CODE- 250/636: Performed by: HOSPITALIST

## 2019-06-30 PROCEDURE — 74011250637 HC RX REV CODE- 250/637: Performed by: INTERNAL MEDICINE

## 2019-06-30 PROCEDURE — P9016 RBC LEUKOCYTES REDUCED: HCPCS

## 2019-06-30 PROCEDURE — 65270000029 HC RM PRIVATE

## 2019-06-30 PROCEDURE — 94640 AIRWAY INHALATION TREATMENT: CPT

## 2019-06-30 PROCEDURE — 94760 N-INVAS EAR/PLS OXIMETRY 1: CPT

## 2019-06-30 PROCEDURE — 74011000258 HC RX REV CODE- 258: Performed by: HOSPITALIST

## 2019-06-30 PROCEDURE — 86920 COMPATIBILITY TEST SPIN: CPT

## 2019-06-30 PROCEDURE — 77010033678 HC OXYGEN DAILY

## 2019-06-30 PROCEDURE — 74011250637 HC RX REV CODE- 250/637: Performed by: HOSPITALIST

## 2019-06-30 PROCEDURE — 80048 BASIC METABOLIC PNL TOTAL CA: CPT

## 2019-06-30 PROCEDURE — 36415 COLL VENOUS BLD VENIPUNCTURE: CPT

## 2019-06-30 PROCEDURE — 74011000250 HC RX REV CODE- 250: Performed by: HOSPITALIST

## 2019-06-30 RX ORDER — SODIUM CHLORIDE 9 MG/ML
250 INJECTION, SOLUTION INTRAVENOUS AS NEEDED
Status: DISCONTINUED | OUTPATIENT
Start: 2019-06-30 | End: 2019-07-02 | Stop reason: HOSPADM

## 2019-06-30 RX ADMIN — FLUTICASONE FUROATE AND VILANTEROL TRIFENATATE 1 PUFF: 100; 25 POWDER RESPIRATORY (INHALATION) at 09:06

## 2019-06-30 RX ADMIN — APIXABAN 2.5 MG: 2.5 TABLET, FILM COATED ORAL at 17:18

## 2019-06-30 RX ADMIN — Medication 10 ML: at 22:44

## 2019-06-30 RX ADMIN — LORATADINE 10 MG: 10 TABLET ORAL at 22:43

## 2019-06-30 RX ADMIN — POTASSIUM CHLORIDE 20 MEQ: 750 TABLET, FILM COATED, EXTENDED RELEASE ORAL at 17:18

## 2019-06-30 RX ADMIN — Medication 1 CAPSULE: at 09:02

## 2019-06-30 RX ADMIN — MAGNESIUM OXIDE TAB 400 MG (241.3 MG ELEMENTAL MG) 400 MG: 400 (241.3 MG) TAB at 22:43

## 2019-06-30 RX ADMIN — IPRATROPIUM BROMIDE AND ALBUTEROL SULFATE 3 ML: .5; 3 SOLUTION RESPIRATORY (INHALATION) at 14:35

## 2019-06-30 RX ADMIN — Medication 10 ML: at 13:48

## 2019-06-30 RX ADMIN — POTASSIUM CHLORIDE 20 MEQ: 750 TABLET, FILM COATED, EXTENDED RELEASE ORAL at 09:01

## 2019-06-30 RX ADMIN — Medication 10 ML: at 06:44

## 2019-06-30 RX ADMIN — LEVOTHYROXINE SODIUM 25 MCG: 25 TABLET ORAL at 06:42

## 2019-06-30 RX ADMIN — MIRTAZAPINE 15 MG: 15 TABLET, FILM COATED ORAL at 22:43

## 2019-06-30 RX ADMIN — APIXABAN 2.5 MG: 2.5 TABLET, FILM COATED ORAL at 09:02

## 2019-06-30 RX ADMIN — IPRATROPIUM BROMIDE AND ALBUTEROL SULFATE 3 ML: .5; 3 SOLUTION RESPIRATORY (INHALATION) at 20:23

## 2019-06-30 RX ADMIN — ACETAMINOPHEN 650 MG: 325 TABLET ORAL at 16:22

## 2019-06-30 RX ADMIN — IPRATROPIUM BROMIDE AND ALBUTEROL SULFATE 3 ML: .5; 3 SOLUTION RESPIRATORY (INHALATION) at 07:58

## 2019-06-30 RX ADMIN — ATORVASTATIN CALCIUM 10 MG: 10 TABLET, FILM COATED ORAL at 09:02

## 2019-06-30 RX ADMIN — ALLOPURINOL 100 MG: 100 TABLET ORAL at 09:02

## 2019-06-30 RX ADMIN — CEFTRIAXONE 1 G: 1 INJECTION, POWDER, FOR SOLUTION INTRAMUSCULAR; INTRAVENOUS at 09:03

## 2019-06-30 NOTE — PROGRESS NOTES
0800 - Paged Dr. Avila Sender regarding patient hemoglobin of 6.5. Blood transfusion ordered by hematologist/oncologist. MD stated to continue to hold coreg/lasix for low BP.

## 2019-06-30 NOTE — PROGRESS NOTES
Hospital Progress Note    NAME:  Nora    :   3/17/1925   MRN:  [id number]     Date/Time:  2019 12:55 PM    Plan:   1. Iv ab  2. encourage activity   3. Transfuse 1 units pack cells/hematology  Risk of Deterioration: Low  []           Moderate  [x]           High  []                 Assessment:   Principal Problem:    CAP (community acquired pneumonia) (2019)    Active Problems:    Weight loss (2012)      Cardiomyopathy, dilated, 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% since  (2012)      Anemia (2014)      HTN (hypertension) (2015)      Presence of biventricular automatic cardioverter/defibrillator (AICD) (2015)      Overview: [employer] biventricular AICD implant      Chronic systolic congestive heart failure ([facility] [address] (10/8/2015)      Mitral valvular regurgitation (2016)      Overview: ECHO 2/3/17: LV dilated, EF 20-25%, mild LVH, RV mod dilated, mild- mod       MELANIA, mod-severe MR, mod AI      ECHO 17: EF 15%, severe DHK, reduced RVEF, RVH, RVSP 35 mmHg       Mild LAE, mod-marked MA fibrosis, mod-severe MR (2+), AO root dilated,                  Physical deconditioning (2017)      Thoracic aortic aneurysm without rupture ([facility] [address] (2017)      Nonrheumatic aortic valve insufficiency (10/16/2018)      Counseling regarding advanced care planning and goals of care (2019)          Admitting notes:89 y.o.  female who presents with above complaint. Pt has dementia and cannot contribute much to the history. History was obtained from connect care and caregiver from the facility at bedside. Per caregiver, pt was noticed to have dyspnea ~ 1-1,5 weeks ago. She had first cxray done 1 week ago which was normal. Her second cxray which was done yesterday revealed pneumonia. She was started on LVQ at the facility. Pt stated \" I coughing a lot \". No CP. Per caregiver, there was low grade fever at the facility.  Blood work was done at the facility yesterday which revealed Hb at 6.7, so pt was refer to ED for possible blood transfusion. Her Hb here at 8.1. No dark stool or blood in stool was noticed. Ua done yesterday revealed: + small LE, wbc > 25   Pt is chronically on O2 at night. Pt has dementia. She used to be in [facility] till recently. Due to increased confusion she was transferred to memory care [facility] unit.      Hospitalist was asked to admit for work up and evaluation of the above problems.        Subjective/interium history     Feeling better this am  11 Point Review of Systems:   Negative except no cp/sob    []            Unable to obtain ROS due to:       []            mental status change []            sedated []            intubated     Social History     Tobacco Use    Smoking status: Never Smoker    Smokeless tobacco: Never Used   Substance Use Topics    Alcohol use: No     Alcohol/week: 0.0 oz     Medications reviewed:  Current Facility-Administered Medications   Medication Dose Route Frequency    0.9% sodium chloride infusion 250 mL  250 mL IntraVENous PRN    levothyroxine (SYNTHROID) tablet 25 mcg  25 mcg Oral 6am    albuterol-ipratropium (DUO-NEB) 2.5 MG-0.5 MG/3 ML  3 mL Nebulization Q6HWA RT    albuterol-ipratropium (DUO-NEB) 2.5 MG-0.5 MG/3 ML  3 mL Nebulization Q4H PRN    cefTRIAXone (ROCEPHIN) 1 g in 0.9% sodium chloride (MBP/ADV) 50 mL  1 g IntraVENous Q24H    lactobac ac& pc-s.therm-b.anim (DAWN Q/RISAQUAD)  1 Cap Oral DAILY    allopurinol (ZYLOPRIM) tablet 100 mg  100 mg Oral DAILY    apixaban (ELIQUIS) tablet 2.5 mg  2.5 mg Oral BID    atorvastatin (LIPITOR) tablet 10 mg  10 mg Oral DAILY    carvedilol (COREG) tablet 3.125 mg  3.125 mg Oral BID WITH MEALS    benzonatate (TESSALON) capsule 100 mg  100 mg Oral TID PRN    fluticasone-vilanterol (BREO ELLIPTA) 100mcg-25mcg/puff  1 Puff Inhalation DAILY    furosemide (LASIX) tablet 40 mg  40 mg Oral ACB&D    loratadine (CLARITIN) tablet 10 mg  10 mg Oral QHS    magnesium oxide (MAG-OX) tablet 400 mg  400 mg Oral QHS    mirtazapine (REMERON) tablet 15 mg  15 mg Oral QHS    potassium chloride SR (KLOR-CON 10) tablet 20 mEq  20 mEq Oral BID    sodium chloride (NS) flush 5-40 mL  5-40 mL IntraVENous Q8H    sodium chloride (NS) flush 5-40 mL  5-40 mL IntraVENous PRN    acetaminophen (TYLENOL) tablet 650 mg  650 mg Oral Q4H PRN    ondansetron (ZOFRAN) injection 4 mg  4 mg IntraVENous Q4H PRN        Objective:   Vitals:  Visit Vitals  BP 98/67   Pulse 98   Temp 97.5 °F (36.4 °C)   Resp 18   Ht 5' 1\" (1.549 m)   Wt 115 lb 14.4 oz (52.6 kg)   SpO2 98%   BMI 21.90 kg/m²     Temp (24hrs), Av.9 °F (36.6 °C), Min:97.5 °F (36.4 °C), Max:98.3 °F (36.8 °C)    O2 Flow Rate (L/min): 1 l/min O2 Device: Nasal cannula    Last 24hr Input/Output:  No intake or output data in the 24 hours ending 19 1218     PHYSICAL EXAM:  General:    Alert, cooperative, no distress, appears stated age. Head:   Normocephalic, without obvious abnormality, atraumatic. Eyes:   Conjunctivae/corneas clear. PERRLA  Nose:  Nares normal. No drainage or sinus tenderness. Throat:    Lips, mucosa, and tongue normal.  No Thrush  Neck:  Supple, symmetrical,  no adenopathy, thyroid: non tender    no carotid bruit and no JVD. Back:    Symmetric,  No CVA tenderness. Lungs:   Decreased bs with end exp Wheezing   Chest wall:  No tenderness or deformity. No Accessory muscle use. Heart:   Regular rate and rhythm,  no murmur, rub or gallop. Abdomen:   Soft, non-tender. Not distended. Bowel sounds normal. No masses  .        Lab Data Reviewed:    Recent Labs     19  0515   WBC 3.2*   HGB 6.5*   HCT 21.1*        Recent Labs     19  0515 19  0428 19  0423    139 139   K 4.4 3.9 4.1   * 106 105   CO2 28 27 24   GLU 79 83 94   BUN 21* 23* 21*   CREA 0.70 0.79 0.81   CA 8.3* 8.4* 8.4*     Lab Results   Component Value Date/Time    Glucose (POC) 167 (H) 2019 08:53 PM ___________________________________________________  ___________________________________________________    Attending Physician: Stockton Pump, MD

## 2019-06-30 NOTE — PROGRESS NOTES
Telephone consent for blood transfusion obtained from Michael Green (son/MPOA) by Bud Orozco RN and Miriam Kincaid RN. Blood bank states they need a new type and screen.

## 2019-06-30 NOTE — PROGRESS NOTES
Bedside shift change report given to Pam (oncoming nurse) by [facility] (offgoing nurse). Report included the following information SBAR, Kardex, Intake/Output, MAR, Accordion, Recent Results and Med Rec Status.

## 2019-06-30 NOTE — PROGRESS NOTES
-Hematology / Oncology ([facility]) -  -Primary Oncologist- Dr Tunde Christie  -CC- \" I am ok\"    -S-  Sitting in her chair, no complaints    -O-      Patient Vitals for the past 24 hrs:   Temp Pulse Resp BP SpO2   06/30/19 0800     98 %   06/30/19 0759 97.9 °F (36.6 °C) 83 18 102/66 96 %   06/30/19 0411 97.9 °F (36.6 °C) 87 16 98/59 93 %   06/30/19 0011 97.5 °F (36.4 °C) 81 16 99/67 93 %   06/29/19 2053     94 %   06/29/19 2045 98.1 °F (36.7 °C) 78 16 99/73 97 %   06/29/19 1652 98.3 °F (36.8 °C) 89 18 (!) 81/65 95 %   06/29/19 1421     96 %   06/29/19 1211 97.7 °F (36.5 °C) 99 18 (!) 87/55 96 %     No intake/output data recorded. ROS: 12 point ROS obtained and negative other than stated in HPI      Physical Exam:  Constitutional Alert, cooperative, oriented. Mood and affect appropriate. Appears close to chronological age. Elderly, frail lady. Thinning hair. Head Normocephalic; no scars   Eyes Conjunctivae and sclerae are clear and without icterus. Pupils are reactive and equal.   ENMT Sinuses are nontender. No oral exudates, ulcers, masses, thrush or mucositis. Oropharynx clear. Tongue normal.   Neck Supple without masses or thyromegaly. No jugular venous distension. Hematologic/Lymphatic No petechiae or purpura. No tender or palpable lymph nodes in the cervical, supraclavicular, axillary or inguinal area. Respiratory Lungs are clear to auscultation without rhonchi or wheezing. Cardiovascular Regular rate and rhythm of heart without murmurs, gallops or rubs. Chest / Line Site Chest is symmetric with no chest wall deformities. Abdomen Non-tender, non-distended, no masses, ascites or hepatosplenomegaly. Good bowel sounds. No guarding or rebound tenderness. No pulsatile masses. Musculoskeletal No tenderness or swelling, normal range of motion without obvious weakness. Extremities No visible deformities, no cyanosis, clubbing or edema. Skin No rashes, scars, or lesions suggestive of malignancy.  No petechiae, purpura, or ecchymoses. No excoriations. Neurologic No sensory or motor deficits, normal cerebellar function, normal gait, cranial nerves intact. Psychiatric Alert and oriented times three. Coherent speech. Verbalizes understanding of our discussions today.            -Labs-    Recent Labs     06/30/19  0515 06/29/19  0428 06/28/19  0423   WBC 3.2*  --   --    HGB 6.5*  --   --      --   --    ANEU 2.2  --   --     139 139   K 4.4 3.9 4.1   GLU 79 83 94   BUN 21* 23* 21*   CREA 0.70 0.79 0.81   CA 8.3* 8.4* 8.4*       -Imaging-     6/25/19 CT Chest:  IMPRESSION:    1. Left upper lobe and lingular pneumonia. 2. Large aneurysm of the ascending thoracic aorta measuring 5.8 cm in the  tubular portion. 3. Small to moderate left and small right pleural effusions with overlying  subsegmental atelectasis. Anasarca. 4. Cardiomegaly. Enlarged main pulmonary artery, consistent with pulmonary  arterial hypertension. 5. Age-indeterminate anterior compression fracture of L1, new since 2/4/2019.      -Assessment + Plan-     *) 79 yo female with pmh of CLL admitted with CAP and worsening anemia:     *) CLL and progressive anemia:  - she has been off Ibrutinib for last 6 months per our records, called [employer] and she had resumed Ibrutinib on 6/16 so only took for short while.  - when seen in May by NP in office her anemia was progressing and felt to be due to her untreated CLL. I suspect that is still the case.   - Hbg 6.5 today, will transfuse 1U PRBC  - Labs show AKD, b12/FA not low.   -  zinc, copper, flow pending  - PBS reviewed, no blast etc.rouleoux noted on path smear will check spep  - Resume Ibrutinib at 140mg daily after DC  - FU with Dr Mare Dejesus after DC     *) CAP     *) AAA:     *) Dementia and debility     *) NICM

## 2019-06-30 NOTE — PROGRESS NOTES
ADULT PROTOCOL: JET AEROSOL  REASSESSMENT    Patient  Lizette Judd     80 y.o.   female     6/30/2019  8:03 AM    Breath Sounds Pre Procedure: Right Breath Sounds: Clear, Diminished                               Left Breath Sounds: Clear, Diminished    Breath Sounds Post Procedure: Right Breath Sounds: Clear, Diminished                                 Left Breath Sounds: Clear, Diminished    Breathing pattern: Pre procedure Breathing Pattern: Regular          Post procedure Breathing Pattern: Regular    Heart Rate: Pre procedure Pulse: 92           Post procedure Pulse: 84    Resp Rate: Pre procedure Respirations: 18           Post procedure Respirations: 18       Cough: Pre procedure Cough: Non-productive               Post procedure Cough: Non-productive      Oxygen: 1L/NC     Changed: NO    SpO2: Pre procedure SpO2: 98 %                 Post procedure SpO2: 98 %   Nebulizer Therapy: Current medications Aerosolized Medications: DuoNeb        Changed: NO        Problem List:   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    DILAN (obstructive sleep apnea) G47.33    Rhinitis J31.0    Anemia D64.9    Reactive airway disease J45.909    HTN (hypertension) I10    Gout M10.9    LBBB (left bundle branch block) I44.7    Presence of biventricular automatic cardioverter/defibrillator (AICD) Z95.810    Chronic systolic congestive heart failure (HCC) I50.22    Lymphoma (HCC) C85.90    Mitral valvular regurgitation I34.0    Physical deconditioning R53.81    Gastroesophageal reflux disease with esophagitis K21.0    Thoracic aortic aneurysm without rupture (HCC) I71.2    Paroxysmal atrial fibrillation (HCC) I48.0    Xerosis of skin L85.3    Dyslipidemia E78.5    CKD (chronic kidney disease) stage 3, GFR 30-59 ml/min (HCC) N18.3    Acquired hypothyroidism E03.9    Nonrheumatic aortic valve insufficiency I35.1    UTI (urinary tract infection) N39.0    Counseling regarding advanced care planning and goals of care Z71.89    CAP (community acquired pneumonia) J18.9       Respiratory Therapist: Jessica Jorge RT

## 2019-07-01 ENCOUNTER — APPOINTMENT (OUTPATIENT)
Dept: GENERAL RADIOLOGY | Age: 84
DRG: 194 | End: 2019-07-01
Attending: INTERNAL MEDICINE
Payer: MEDICARE

## 2019-07-01 LAB
ABO + RH BLD: NORMAL
ALBUMIN SERPL ELPH-MCNC: 2.1 G/DL (ref 2.9–4.4)
ALBUMIN/GLOB SERPL: 0.7 {RATIO} (ref 0.7–1.7)
ALPHA1 GLOB SERPL ELPH-MCNC: 0.6 G/DL (ref 0–0.4)
ALPHA2 GLOB SERPL ELPH-MCNC: 1.3 G/DL (ref 0.4–1)
ANION GAP SERPL CALC-SCNC: 5 MMOL/L (ref 5–15)
B-GLOBULIN SERPL ELPH-MCNC: 0.6 G/DL (ref 0.7–1.3)
BASOPHILS # BLD: 0 K/UL (ref 0–0.1)
BASOPHILS NFR BLD: 0 % (ref 0–1)
BLD PROD TYP BPU: NORMAL
BLOOD GROUP ANTIBODIES SERPL: NORMAL
BPU ID: NORMAL
BUN SERPL-MCNC: 18 MG/DL (ref 6–20)
BUN/CREAT SERPL: 29 (ref 12–20)
CALCIUM SERPL-MCNC: 8.3 MG/DL (ref 8.5–10.1)
CHLORIDE SERPL-SCNC: 110 MMOL/L (ref 97–108)
CO2 SERPL-SCNC: 27 MMOL/L (ref 21–32)
CREAT SERPL-MCNC: 0.63 MG/DL (ref 0.55–1.02)
CROSSMATCH RESULT,%XM: NORMAL
DIFFERENTIAL METHOD BLD: ABNORMAL
EOSINOPHIL # BLD: 0.1 K/UL (ref 0–0.4)
EOSINOPHIL NFR BLD: 3 % (ref 0–7)
ERYTHROCYTE [DISTWIDTH] IN BLOOD BY AUTOMATED COUNT: 21.1 % (ref 11.5–14.5)
GAMMA GLOB SERPL ELPH-MCNC: 0.6 G/DL (ref 0.4–1.8)
GLOBULIN SER CALC-MCNC: 3.1 G/DL (ref 2.2–3.9)
GLUCOSE SERPL-MCNC: 82 MG/DL (ref 65–100)
HCT VFR BLD AUTO: 23.6 % (ref 35–47)
HGB BLD-MCNC: 7.5 G/DL (ref 11.5–16)
IMM GRANULOCYTES # BLD AUTO: 0 K/UL (ref 0–0.04)
IMM GRANULOCYTES NFR BLD AUTO: 0 % (ref 0–0.5)
LYMPHOCYTES # BLD: 0.8 K/UL (ref 0.8–3.5)
LYMPHOCYTES NFR BLD: 25 % (ref 12–49)
M PROTEIN SERPL ELPH-MCNC: 0.1 G/DL
MCH RBC QN AUTO: 28.8 PG (ref 26–34)
MCHC RBC AUTO-ENTMCNC: 31.8 G/DL (ref 30–36.5)
MCV RBC AUTO: 90.8 FL (ref 80–99)
MONOCYTES # BLD: 0.1 K/UL (ref 0–1)
MONOCYTES NFR BLD: 2 % (ref 5–13)
NEUTS BAND NFR BLD MANUAL: 2 %
NEUTS SEG # BLD: 2 K/UL (ref 1.8–8)
NEUTS SEG NFR BLD: 68 % (ref 32–75)
NRBC # BLD: 0 K/UL (ref 0–0.01)
NRBC BLD-RTO: 0 PER 100 WBC
PLATELET # BLD AUTO: 263 K/UL (ref 150–400)
PMV BLD AUTO: 10 FL (ref 8.9–12.9)
POTASSIUM SERPL-SCNC: 4.6 MMOL/L (ref 3.5–5.1)
PROT SERPL-MCNC: 5.2 G/DL (ref 6–8.5)
RBC # BLD AUTO: 2.6 M/UL (ref 3.8–5.2)
RBC MORPH BLD: ABNORMAL
RBC MORPH BLD: ABNORMAL
SODIUM SERPL-SCNC: 142 MMOL/L (ref 136–145)
SPECIMEN EXP DATE BLD: NORMAL
STATUS OF UNIT,%ST: NORMAL
UNIT DIVISION, %UDIV: 0
WBC # BLD AUTO: 3 K/UL (ref 3.6–11)

## 2019-07-01 PROCEDURE — 36415 COLL VENOUS BLD VENIPUNCTURE: CPT

## 2019-07-01 PROCEDURE — 94640 AIRWAY INHALATION TREATMENT: CPT

## 2019-07-01 PROCEDURE — 74011250637 HC RX REV CODE- 250/637: Performed by: HOSPITALIST

## 2019-07-01 PROCEDURE — 71045 X-RAY EXAM CHEST 1 VIEW: CPT

## 2019-07-01 PROCEDURE — 85025 COMPLETE CBC W/AUTO DIFF WBC: CPT

## 2019-07-01 PROCEDURE — 74011000258 HC RX REV CODE- 258: Performed by: INTERNAL MEDICINE

## 2019-07-01 PROCEDURE — 74011250637 HC RX REV CODE- 250/637: Performed by: INTERNAL MEDICINE

## 2019-07-01 PROCEDURE — 94760 N-INVAS EAR/PLS OXIMETRY 1: CPT

## 2019-07-01 PROCEDURE — 65270000029 HC RM PRIVATE

## 2019-07-01 PROCEDURE — 74011000250 HC RX REV CODE- 250: Performed by: HOSPITALIST

## 2019-07-01 PROCEDURE — 80048 BASIC METABOLIC PNL TOTAL CA: CPT

## 2019-07-01 PROCEDURE — 74011250636 HC RX REV CODE- 250/636: Performed by: INTERNAL MEDICINE

## 2019-07-01 RX ADMIN — IPRATROPIUM BROMIDE AND ALBUTEROL SULFATE 3 ML: .5; 3 SOLUTION RESPIRATORY (INHALATION) at 21:17

## 2019-07-01 RX ADMIN — MAGNESIUM OXIDE TAB 400 MG (241.3 MG ELEMENTAL MG) 400 MG: 400 (241.3 MG) TAB at 20:46

## 2019-07-01 RX ADMIN — FLUTICASONE FUROATE AND VILANTEROL TRIFENATATE 1 PUFF: 100; 25 POWDER RESPIRATORY (INHALATION) at 09:22

## 2019-07-01 RX ADMIN — POTASSIUM CHLORIDE 20 MEQ: 750 TABLET, FILM COATED, EXTENDED RELEASE ORAL at 09:20

## 2019-07-01 RX ADMIN — MIRTAZAPINE 15 MG: 15 TABLET, FILM COATED ORAL at 20:46

## 2019-07-01 RX ADMIN — APIXABAN 2.5 MG: 2.5 TABLET, FILM COATED ORAL at 17:23

## 2019-07-01 RX ADMIN — Medication 10 ML: at 20:47

## 2019-07-01 RX ADMIN — POTASSIUM CHLORIDE 20 MEQ: 750 TABLET, FILM COATED, EXTENDED RELEASE ORAL at 17:23

## 2019-07-01 RX ADMIN — CEFTRIAXONE 1 G: 1 INJECTION, POWDER, FOR SOLUTION INTRAMUSCULAR; INTRAVENOUS at 09:20

## 2019-07-01 RX ADMIN — ATORVASTATIN CALCIUM 10 MG: 10 TABLET, FILM COATED ORAL at 09:21

## 2019-07-01 RX ADMIN — LORATADINE 10 MG: 10 TABLET ORAL at 20:46

## 2019-07-01 RX ADMIN — LEVOTHYROXINE SODIUM 25 MCG: 25 TABLET ORAL at 06:31

## 2019-07-01 RX ADMIN — Medication 1 CAPSULE: at 09:20

## 2019-07-01 RX ADMIN — ALLOPURINOL 100 MG: 100 TABLET ORAL at 09:21

## 2019-07-01 RX ADMIN — APIXABAN 2.5 MG: 2.5 TABLET, FILM COATED ORAL at 09:21

## 2019-07-01 RX ADMIN — CARVEDILOL 3.12 MG: 3.12 TABLET, FILM COATED ORAL at 09:21

## 2019-07-01 RX ADMIN — CARVEDILOL 3.12 MG: 3.12 TABLET, FILM COATED ORAL at 17:23

## 2019-07-01 RX ADMIN — FUROSEMIDE 40 MG: 40 TABLET ORAL at 17:23

## 2019-07-01 RX ADMIN — FUROSEMIDE 40 MG: 40 TABLET ORAL at 09:20

## 2019-07-01 RX ADMIN — Medication 10 ML: at 06:31

## 2019-07-01 RX ADMIN — Medication 10 ML: at 13:48

## 2019-07-01 RX ADMIN — IPRATROPIUM BROMIDE AND ALBUTEROL SULFATE 3 ML: .5; 3 SOLUTION RESPIRATORY (INHALATION) at 07:35

## 2019-07-01 RX ADMIN — IPRATROPIUM BROMIDE AND ALBUTEROL SULFATE 3 ML: .5; 3 SOLUTION RESPIRATORY (INHALATION) at 13:46

## 2019-07-01 NOTE — PROGRESS NOTES
Plan of Care   1) Deepali Telles CM spoke with Angelica Slaughter at [facility] who is willing to accept pt back tomorrow 7/2/19 pending pt still being medically stable. [facility] transportation arranged for 9:00 AM 7/2/19. CM informed RN, pt and pt son Trevor Guevara. VANESA to follow. Care Management Interventions  PCP Verified by CM: Sheron Pennington MD)  Mode of Transport at Discharge:  Other (see comment)(pt's caregiver Gailen Rota provides transportation)  Transition of Care Consult (CM Consult): Long Term Care(pt lives at [facility], to return there at [facility])  Discharge Durable Medical Equipment: No  Physical Therapy Consult: No  Occupational Therapy Consult: No  Speech Therapy Consult: No  Current Support Network: Nursing Facility(lives at [facility], has caregiver 2x/week)  Confirm Follow Up Transport: Other (see comment)(pt's caregiver Gailen Rota provides transportation)  Plan discussed with Pt/Family/Caregiver: Yes  Discharge Location  Discharge Placement: Skilled nursing facility     [facility], [address]   [phone]

## 2019-07-01 NOTE — PROGRESS NOTES
Transition of Care Plan to SNF/Rehab    SNF/Rehab Transition:  Patient has been accepted to [facility] and meets criteria for admission. Patient will transported by [facility] and expected to leave at 9:00 AM on 7/2/19. Communication to Patient/Family:  Met with patient and spoke with pt son Triston Zhang and they are agreeable to the transition plan. Communication to SNF/Rehab:  Bedside RN, Roberto Madrigal, has been notified to update the transition plan to the facility and call report (phone number [phone]). Discharge information has been updated on the AVS.     Discharge instructions to be fax'd to facility at [facility] # [phone]). SNF/Rehab Transition:  Patient to follow-up with Home Health: [facility]  PCP/Specialist: Dr. Jesusita Sorensen Management: Dennie Pale RN- Nurse Navigator [phone]     Reviewed and confirmed with facility, [facility] they can manage the patient care needs for the following:     Basilio Singleton with (X) only those applicable:    Medication:  [x]  Medications will be available at the facility  []  IV Antibiotics   []  Controlled Substance - hard copy to be sent with patient   []  Weekly Labs   Documents:  [] Hard RX  [] MAR  [] Kardex  [] AVS  []Transfer Summary  []Discharge   Equipment:  [x]  CPAP/BiPAP  []  Wound Vacuum  []  Galindo or Urinary Device  []  PICC/Central Line  []  Nebulizer  []  Ventilator   Treatment:  []Isolation (for MRSA, VRE, etc.)  []Surgical Drain Management  []Tracheostomy Care  []Dressing Changes  []Dialysis with transportation and chair time   []PEG Care  []Oxygen  [x]Daily Weights for Heart Failure   Dietary:  []Any diet limitations  []Tube Feedings   []Total Parenteral Management (TPN)   Eligible for Medicaid Long Term Services and Supports  Yes:  [] Eligible for medical assistance or will become eligible within 180 days and UAI completed. [] Provider/Patient and/or support system has requested screening.   [] UAI copy provided to patient or responsible party  [] UAI unavailable at discharge will send once processed to SNF provider. [] UAI unavailable at discharged mailed to patient  No:   [x] Private pay and is not financially eligible for Medicaid within the next 180 days. [] Reside out-of-state. [] A residents of a state owned/operated facility that is licensed  by [facility] [place] and [facility] or [facility]  [] Enrollment in [facility] hospice services  [] [address]  [] Patient /Family declines to have screening completed or provide financial information for screening     Financial Resources:  Medicaid    [] Initiated and application pending   [] Full coverage     Advanced Care Plan:  []Surrogate Decision Maker of Care  [x]POA  [x]Communicated Code Status- DDNR    Other      Care Management Interventions  PCP Verified by CM: Ciera Devries MD)  Mode of Transport at Discharge:  Other (see comment)(pt's caregiver Sincere Ragland provides transportation)  Transition of Care Consult (CM Consult): Long Term Care(pt lives at [facility], to return there at [facility])  Discharge Durable Medical Equipment: No  Physical Therapy Consult: No  Occupational Therapy Consult: No  Speech Therapy Consult: No  Current Support Network: Nursing Facility(lives at [facility], has caregiver 2x/week)  Confirm Follow Up Transport: Other (see comment)(pt's caregiver Sincere Ragland provides transportation)  Plan discussed with Pt/Family/Caregiver: Yes  Discharge Location  Discharge Placement: Skilled nursing facility     NATANAEL Li, [address] [place] [address]   [phone]

## 2019-07-01 NOTE — PROGRESS NOTES
Initial Nutrition Assessment:    INTERVENTIONS/RECOMMENDATIONS:   · Continue current diet  · Ensure BID  · Please document % meals and supplements consumed in flowsheet    ASSESSMENT:   Chart reviewed, medically noted for PNA, CLL anemia, dementia, CHF, and PMH shown below. Nutrition referral triggered due to MST score and LOS. Pt reports good appetite which is confirmed by PCT and flowsheet documentation. She also states she was eating well PTA, however unsure of accuracy as she is documented to have dementia. She states she usually consumes ensure BID, will add to diet order. Weight history shows 19# (14%) wt loss over the past 3 months however during her admission in March she was documented to have 4+ edema in LUE and LLE. Weight history shows her usual weight from 4329-2847 was around 130-135 lbs. Physically she does have mild temporal muscle wasting.      Past Medical History:   Diagnosis Date    Aortic insufficiency     Asthma     Cancer ([facility] [address]     lymphoma    CKD (chronic kidney disease) stage 3, GFR 30-59 ml/min ([facility]) 1/10/2018    Congestive heart failure, NYHA class II, chronic, systolic ([facility])     Heart failure ([facility] [address]     Hypertension     Mitral valvular regurgitation 1/22/2016    ECHO 2/3/17: LV dilated, EF 20-25%, mild LVH, RV mod dilated, mild- mod MELANIA, mod-severe MR, mod AI ECHO 7/29/17: EF 15%, severe DHK, reduced RVEF, RVH, RVSP 35 mmHg  Mild LAE, mod-marked MA fibrosis, mod-severe MR (2+), AO root dilated,      Nonrheumatic aortic valve insufficiency 10/16/2018    Presence of biventricular automatic cardioverter/defibrillator (AICD) 7/2/2015    [employer] biventricular AICD implant    Stomach ulcer        Diet Order: Regular  % Eaten:    Patient Vitals for the past 72 hrs:   % Diet Eaten   07/01/19 0800 100 %     Pertinent Medications: [x]Reviewed: lasix, lactobac, remeron, KCl,   Pertinent Labs: [x]Reviewed:   Food Allergies: [x]NKFA  []Other   Last BM: 6/30  Edema:        []RUE []LUE   [x]RLE   [x]LLE      Pressure Injury:  n/a    [] Stage I   [] Stage II   [] Stage III   [] Stage IV      Wt Readings from Last 30 Encounters:   06/27/19 52.6 kg (115 lb 14.4 oz)   05/09/19 50.5 kg (111 lb 4.8 oz)   04/23/19 51.3 kg (113 lb 1.6 oz)   04/16/19 51.7 kg (114 lb)   03/18/19 60.8 kg (134 lb 0.6 oz)   02/18/19 71 kg (156 lb 8.4 oz)   11/30/18 59.2 kg (130 lb 8 oz)   10/16/18 58 kg (127 lb 14.4 oz)   10/02/18 52.6 kg (116 lb)   09/28/18 58.5 kg (128 lb 14.4 oz)   07/19/18 58.8 kg (129 lb 9.6 oz)   07/02/18 58.9 kg (129 lb 14.4 oz)   05/22/18 61.6 kg (135 lb 12.8 oz)   05/02/18 58.3 kg (128 lb 9.6 oz)   04/03/18 59 kg (130 lb)   03/01/18 59.5 kg (131 lb 1.6 oz)   01/09/18 60 kg (132 lb 3.2 oz)   12/21/17 59.5 kg (131 lb 1.6 oz)   12/14/17 62.4 kg (137 lb 9.6 oz)   11/07/17 59.3 kg (130 lb 12.8 oz)   10/20/17 58.9 kg (129 lb 14.4 oz)   10/10/17 59.3 kg (130 lb 12.8 oz)   10/03/17 59.9 kg (132 lb)   09/26/17 58.7 kg (129 lb 4.8 oz)   07/11/17 58.5 kg (129 lb)   06/27/17 61.9 kg (136 lb 8 oz)   06/22/17 60.3 kg (132 lb 14.4 oz)   06/15/17 61.4 kg (135 lb 4.8 oz)   06/06/17 61.1 kg (134 lb 9.6 oz)   06/01/17 62.1 kg (136 lb 14.4 oz)       Anthropometrics:   Height: 5' 1\" (154.9 cm) Weight: 52.6 kg (115 lb 14.4 oz)   IBW (%IBW):   ( ) UBW (%UBW):   (  %)   Last Weight Metrics:  Weight Loss Metrics 6/27/2019 5/9/2019 4/23/2019 4/16/2019 3/18/2019 2/18/2019 11/30/2018   Today's Wt 115 lb 14.4 oz 111 lb 4.8 oz 113 lb 1.6 oz 114 lb 134 lb 0.6 oz 156 lb 8.4 oz 130 lb 8 oz   BMI 21.9 kg/m2 20.36 kg/m2 20.69 kg/m2 20.85 kg/m2 25.33 kg/m2 29.58 kg/m2 23.87 kg/m2       BMI: Body mass index is 21.9 kg/m². This BMI is indicative of:   []Underweight    [x]Normal    []Overweight    [] Obesity   [] Extreme Obesity (BMI>40)     Estimated Nutrition Needs (Based on):   1125 Kcals/day(BMR: 875 x 1.3) , 55 g(1 g/kg) Protein  Carbohydrate:  At Least 130 g/day  Fluids: 1125 mL/day (1ml/kcal) or per primary team    NUTRITION DIAGNOSES:   Problem:  Unintended weight loss      Etiology: related to undetermined etiology      Signs/Symptoms: as evidenced by 14% wt loss x 3 months      NUTRITION INTERVENTIONS:  Meals/Snacks: General/healthful diet   Supplements: Commercial supplement              GOAL:   consume >50% of meals and ONS in 2-4 days    LEARNING NEEDS (Diet, Food/Nutrient-Drug Interaction):    [x] None Identified   [] Identified and Education Provided/Documented   [] Identified and Pt declined/was not appropriate     Cultureal, Pentecostalism, OR Ethnic Dietary Needs:    [x] None Identified   [] Identified and Addressed     [x] Interdisciplinary Care Plan Reviewed/Documented    [x] Discharge Planning:  General healthy diet with kcal and protein dense foods     MONITORING /EVALUATION:      Food/Nutrient Intake Outcomes:  Total energy intake  Physical Signs/Symptoms Outcomes: Weight/weight change, Electrolyte and renal profile, Glucose profile, GI    NUTRITION RISK:    [x] High     to         [x] Moderate           []  Low  []  Minimal/Uncompromised    PT SEEN FOR:    []  MD Consult: []Calorie Count      []Diabetic Diet Education        []Diet Education     []Electrolyte Management     []General Nutrition Management and Supplements     []Management of Tube Feeding     []TPN Recommendations    [x]  RN Referral:  [x]MST score >=2     []Enteral/Parenteral Nutrition PTA     []Pregnant: Gestational DM or Multigestation     []Pressure Ulcer/Wound Care needs        []  Low BMI  [x]  LOS Referral       Wanda Gonzalez RDN  Pager [phone]  Weekend Pager [phone]

## 2019-07-01 NOTE — PROGRESS NOTES
Transitional Care Discharge HUG Note ED [facility]        Patient: Teresa Bunn MRN: [id number]  SSN: [id number]    YOB: 1925  Age: 80 y.o. Sex: female      Admit Date: 6/25/2019     LOS: 6 days        Assessment /Risk    VANESA Diagnosis:    1. HUI Pneumonia   2. CLL anemia  Transfused 1 unit yesterday                     Hemoglobin  7.5 ,                    Heme/Onc following                    Ibrutinib restarted 6/16 after 6 mo abstinence  3. Dementia ? 4. Debility recently placed in memory care unit WC  5. Systolic HF EF 35% PHTN, CAD. PAF AAA      Readmission Risks:   MODERATE    1. Multiple EOL issues, resides at [facility], decreased appetite, +10% wt loss last year  2. questionable dementia - no tx, not apparent upon exam  3. AAA 5.8 CM - not a surgical candidate  5. Severe CV disease progression    Fluid Status: - 0.5 L    Nurse Navigator: Jonna York  VANESA appointment with TBD    less than 18.5 Underweight / Body mass index is 21.9 kg/m². Was 23 at last admission    Code status: Durable DNR sent with patient  Recommended Disposition: Return to [facility]     Subjective:     80 y.o. Female who is admitted for recent increased fatigue, fever and cough. Imaging confirmed HUI pneumonia where she has been receivng Rocephin. Baseline patient has been transferred to the Memory care unit at [facility]. , she has one son who resides in [place]. Today she endorses overall improvement in fatigue, cough. She notes she has not gotten up. She states she is independent in ADLs. (questionable)     Denies, cough, fever, CP, palpitations. Ongoing decreased appetite. She had been previously treated with levaquin. She is due to be discharge to home tomorrow, hopefully. Overall very pleasant and conversant.       Number of Admissions this year  3  Heart Failure Bundle NO      Advance Care Plan:     Persons included in Conversation:  patient      Authorized Decision Maker (if patient is incapable of making informed decisions): This person is:  Friend- primary Dorothymilton Felipea  Secondary - son       Review of Existing Advance Directive:  What information were you given about medical decisions to consider before completing your advance directive? yes  Does this advance directive still reflect your preferences? Yes (Provide new form/Refer for assistance in updating)        ROS:     A comprehensive ROS was performed and was negative except for as per HPI. Objective:      Allergies   Allergen Reactions    Codeine Other (comments)     \"made me disoriented\" per pt       Past Medical History:   Diagnosis Date    Aortic insufficiency     Asthma     Cancer ([address]     lymphoma    CKD (chronic kidney disease) stage 3, GFR 30-59 ml/min ([facility]) 1/10/2018    Congestive heart failure, NYHA class II, chronic, systolic ([facility])     Heart failure ([address]     Hypertension     Mitral valvular regurgitation 1/22/2016    ECHO 2/3/17: LV dilated, EF 20-25%, mild LVH, RV mod dilated, mild- mod MELANIA, mod-severe MR, mod AI ECHO 7/29/17: EF 15%, severe DHK, reduced RVEF, RVH, RVSP 35 mmHg  Mild LAE, mod-marked MA fibrosis, mod-severe MR (2+), AO root dilated,      Nonrheumatic aortic valve insufficiency 10/16/2018    Presence of biventricular automatic cardioverter/defibrillator (AICD) 7/2/2015    [employer] biventricular AICD implant    Stomach ulcer        Past Surgical History:   Procedure Laterality Date    HX BREAST BIOPSY Bilateral     40 years ago    HX COLONOSCOPY      HX HEENT      cataracts removed    HX HYSTERECTOMY      HX OTHER SURGICAL      lymph node surgery    HX TONSILLECTOMY      NH EGD TRANSORAL BIOPSY SINGLE/MULTIPLE  5/23/2012         SINUS SURGERY PROC UNLISTED      Nasal polyps removed       Social History     Tobacco Use   Smoking Status Never Smoker   Smokeless Tobacco Never Used       Current Facility-Administered Medications   Medication Dose Route Frequency    0.9% sodium chloride infusion 250 mL  250 mL IntraVENous PRN    levothyroxine (SYNTHROID) tablet 25 mcg  25 mcg Oral 6am    albuterol-ipratropium (DUO-NEB) 2.5 MG-0.5 MG/3 ML  3 mL Nebulization Q6HWA RT    albuterol-ipratropium (DUO-NEB) 2.5 MG-0.5 MG/3 ML  3 mL Nebulization Q4H PRN    cefTRIAXone (ROCEPHIN) 1 g in 0.9% sodium chloride (MBP/ADV) 50 mL  1 g IntraVENous Q24H    lactobac ac& pc-s.therm-b.anim (DAWN Q/RISAQUAD)  1 Cap Oral DAILY    allopurinol (ZYLOPRIM) tablet 100 mg  100 mg Oral DAILY    apixaban (ELIQUIS) tablet 2.5 mg  2.5 mg Oral BID    atorvastatin (LIPITOR) tablet 10 mg  10 mg Oral DAILY    carvedilol (COREG) tablet 3.125 mg  3.125 mg Oral BID WITH MEALS    benzonatate (TESSALON) capsule 100 mg  100 mg Oral TID PRN    fluticasone-vilanterol (BREO ELLIPTA) 100mcg-25mcg/puff  1 Puff Inhalation DAILY    furosemide (LASIX) tablet 40 mg  40 mg Oral ACB&D    loratadine (CLARITIN) tablet 10 mg  10 mg Oral QHS    magnesium oxide (MAG-OX) tablet 400 mg  400 mg Oral QHS    mirtazapine (REMERON) tablet 15 mg  15 mg Oral QHS    potassium chloride SR (KLOR-CON 10) tablet 20 mEq  20 mEq Oral BID    sodium chloride (NS) flush 5-40 mL  5-40 mL IntraVENous Q8H    sodium chloride (NS) flush 5-40 mL  5-40 mL IntraVENous PRN    acetaminophen (TYLENOL) tablet 650 mg  650 mg Oral Q4H PRN    ondansetron (ZOFRAN) injection 4 mg  4 mg IntraVENous Q4H PRN       Prior to Admission Medications   Prescriptions Last Dose Informant Patient Reported? Taking?   acetaminophen (TYLENOL) 325 mg tablet Not Taking at Unknown time Transfer Papers Yes No   Sig: Take 650 mg by mouth every four (4) hours as needed for Pain or Fever. albuterol (PROVENTIL VENTOLIN) 2.5 mg /3 mL (0.083 %) nebulizer solution 2019 at Unknown time Transfer Papers Yes Yes   Si.5 mg by Nebulization route four (4) times daily.    allopurinol (ZYLOPRIM) 100 mg tablet 2019 at 0900 Transfer Papers No Yes   Sig: TAKE ONE (1) TABLET(S) DAILY   apixaban (ELIQUIS) 2.5 mg tablet 6/24/2019 at 2100 Transfer Papers No Yes   Sig: Take 1 Tab by mouth two (2) times a day. Indications: PREVENT THROMBOEMBOLISM IN CHRONIC ATRIAL FIBRILLATION   atorvastatin (LIPITOR) 10 mg tablet 6/24/2019 at 2100 Transfer Papers Yes Yes   Sig: Take 10 mg by mouth nightly. carvedilol (COREG) 3.125 mg tablet 6/24/2019 at 0900 Transfer Papers Yes Yes   Sig: Take 3.125 mg by mouth two (2) times daily (with meals). Hold if SPB <110   cholecalciferol (VITAMIN D3) 1,000 unit cap 6/24/2019 at 0900 Transfer Papers Yes Yes   Sig: Take 1,000 Units by mouth daily. co-enzyme Q-10 (CO Q-10) 100 mg capsule 6/24/2019 at 0900 Transfer Papers Yes Yes   Sig: Take 100 mg by mouth daily. dextromethorphan-guaiFENesin (ROBITUSSIN-DM)  mg/5 mL syrup Not Taking at Unknown time Transfer Papers Yes No   Sig: Take 10 mL by mouth every six (6) hours as needed for Cough. fluticasone-salmeterol (ADVAIR) 250-50 mcg/dose diskus inhaler 6/24/2019 at Unknown time Transfer Papers No Yes   Sig: Take 1 Puff by inhalation two (2) times a day. furosemide (LASIX) 40 mg tablet 6/24/2019 at 0900 Transfer Papers Yes Yes   Sig: Take 40 mg by mouth two (2) times a day. Hold of SBP <110   ibrutinib (IMBRUVICA) 140 mg capsule 6/24/2019 at 0900 Transfer Papers Yes Yes   Sig: Take 140 mg by mouth daily. levoFLOXacin (LEVAQUIN) 750 mg tablet 6/24/2019 at Unknown time Transfer Papers Yes Yes   Sig: Take 750 mg by mouth nightly. levothyroxine (SYNTHROID) 25 mcg tablet 6/25/2019 at 0530 Transfer Papers No Yes   Sig: Take 1 Tab by mouth Daily (before breakfast). loratadine (CLARITIN) 10 mg tablet 6/24/2019 at 2100 Transfer Papers Yes Yes   Sig: Take 10 mg by mouth nightly.   magnesium oxide 400 mg cap 6/24/2019 at 2100 Transfer Papers Yes Yes   Sig: Take 1 Cap by mouth nightly. midodrine (PROAMITINE) 5 mg tablet 6/24/2019 at 2100 Transfer Papers Yes Yes   Sig: Take 5 mg by mouth three (3) times daily.    polyethylene glycol (MIRALAX) 17 gram/dose powder 6/24/2019 at 0900 Transfer Papers Yes Yes   Sig: Take 17 g by mouth every fourty-eight (48) hours. potassium chloride (K-DUR, KLOR-CON) 20 mEq tablet 6/24/2019 at 0900 Transfer Papers Yes Yes   Sig: Take 20 mEq by mouth daily. Facility-Administered Medications: None       Patient Vitals for the past 24 hrs:   Temp Pulse Resp BP SpO2   07/01/19 1112 98 °F (36.7 °C) 75 18 93/60 99 %   07/01/19 0735     97 %   07/01/19 0730 97.7 °F (36.5 °C) 88 18 105/63 96 %   07/01/19 0457 98.3 °F (36.8 °C) 86 18 119/73 97 %   06/30/19 2345 97.9 °F (36.6 °C) 67 18 101/65 96 %   06/30/19 2022     97 %   06/30/19 2016 97.5 °F (36.4 °C) 84 18 111/88 99 %   06/30/19 1651 98.6 °F (37 °C) 92 18 100/65 99 %   06/30/19 1641 98.6 °F (37 °C) 81 18 92/59 98 %   06/30/19 1607 98.6 °F (37 °C) 86 18 90/60 99 %   06/30/19 1518 98.5 °F (36.9 °C) 92 18 100/58 99 %   06/30/19 1436     100 %   06/30/19 1431 98.6 °F (37 °C) 88 18 96/55 99 %   06/30/19 1401 98.6 °F (37 °C) 90 18 106/66 98 %   06/30/19 1346 98.7 °F (37.1 °C) 82 18 92/63 100 %   06/30/19 1333  81 18 94/57 98 %        Intake and Output:    Intake/Output Summary (Last 24 hours) at 7/1/2019 1325  Last data filed at 7/1/2019 1235  Gross per 24 hour   Intake 330 ml   Output 350 ml   Net -20 ml         PHYSICAL EXAM:    Visit Vitals  BP 93/60 (BP 1 Location: Left arm, BP Patient Position: Sitting)   Pulse 75   Temp 98 °F (36.7 °C)   Resp 18   Ht 5' 1\" (1.549 m)   Wt 52.6 kg (115 lb 14.4 oz)   SpO2 99%   Breastfeeding? No   BMI 21.90 kg/m²     General appearance: alert, cooperative, no distress, appears stated age  Head: Normocephalic, without obvious abnormality, atraumatic  Eyes: conjunctivae/corneas clear. PERRL, EOM's intact. Ears: hearing intact  Throat: Lips, mucosa, and tongue normal. Teeth and gums normal  Lungs: clear to auscultation bilaterally  Heart: regular rate and rhythm, S1, S2 normal, no murmur, click, rub or gallop  Abdomen: soft, non-tender.  Bowel sounds normal. No masses,  no organomegaly  Extremities: extremities normal, atraumatic, no cyanosis or edema  Pulses: 2+ and symmetric  Skin: Skin color, texture, turgor normal. No rashes or lesions  Neurologic:   Mental status: Alert, oriented, thought content appropriate, cooperative  5/5 motor UE and LE, walks w rollator ( not observed)  Cranial nerves: non focal      Lab/Data Review:   Recent Results (from the past 24 hour(s))   METABOLIC PANEL, BASIC    Collection Time: 07/01/19  5:00 AM   Result Value Ref Range    Sodium 142 136 - 145 mmol/L    Potassium 4.6 3.5 - 5.1 mmol/L    Chloride 110 (H) 97 - 108 mmol/L    CO2 27 21 - 32 mmol/L    Anion gap 5 5 - 15 mmol/L    Glucose 82 65 - 100 mg/dL    BUN 18 6 - 20 MG/DL    Creatinine 0.63 0.55 - 1.02 MG/DL    BUN/Creatinine ratio 29 (H) 12 - 20      GFR est AA >60 >60 ml/min/1.73m2    GFR est non-AA >60 >60 ml/min/1.73m2    Calcium 8.3 (L) 8.5 - 10.1 MG/DL   CBC WITH AUTOMATED DIFF    Collection Time: 07/01/19  5:00 AM   Result Value Ref Range    WBC 3.0 (L) 3.6 - 11.0 K/uL    RBC 2.60 (L) 3.80 - 5.20 M/uL    HGB 7.5 (L) 11.5 - 16.0 g/dL    HCT 23.6 (L) 35.0 - 47.0 %    MCV 90.8 80.0 - 99.0 FL    MCH 28.8 26.0 - 34.0 PG    MCHC 31.8 30.0 - 36.5 g/dL    RDW 21.1 (H) 11.5 - 14.5 %    PLATELET 800 236 - 101 K/uL    MPV 10.0 8.9 - 12.9 FL    NRBC 0.0 0  WBC    ABSOLUTE NRBC 0.00 0.00 - 0.01 K/uL    NEUTROPHILS 68 32 - 75 %    BAND NEUTROPHILS 2 %    LYMPHOCYTES 25 12 - 49 %    MONOCYTES 2 (L) 5 - 13 %    EOSINOPHILS 3 0 - 7 %    BASOPHILS 0 0 - 1 %    IMMATURE GRANULOCYTES 0 0.0 - 0.5 %    ABS. NEUTROPHILS 2.0 1.8 - 8.0 K/UL    ABS. LYMPHOCYTES 0.8 0.8 - 3.5 K/UL    ABS. MONOCYTES 0.1 0.0 - 1.0 K/UL    ABS. EOSINOPHILS 0.1 0.0 - 0.4 K/UL    ABS. BASOPHILS 0.0 0.0 - 0.1 K/UL    ABS. IMM.  GRANS. 0.0 0.00 - 0.04 K/UL    DF AUTOMATED      RBC COMMENTS ANISOCYTOSIS  2+        RBC COMMENTS OVALOCYTES  PRESENT           Imaging Reviewed:     Xr Chest Sngl V    Result Date: 7/1/2019  IMPRESSION: Assistant left-sided airspace disease. Ct Chest Wo Cont    Result Date: 6/25/2019  IMPRESSION:  1. Left upper lobe and lingular pneumonia. 2. Large aneurysm of the ascending thoracic aorta measuring 5.8 cm in the tubular portion. 3. Small to moderate left and small right pleural effusions with overlying subsegmental atelectasis. Anasarca. 4. Cardiomegaly. Enlarged main pulmonary artery, consistent with pulmonary arterial hypertension. 5. Age-indeterminate anterior compression fracture of L1, new since 2/4/2019. 23X     Xr Chest Port    Result Date: 6/25/2019  IMPRESSION: 1. New heterogeneous left lung opacity worrisome for pneumonia. Recommend radiographic follow-up in 6-8 weeks after treatment to ensure resolution. 2.  Small left pleural effusion. 3.  Cardiomegaly and interstitial edema.        .  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 (8/4/2012)      Anemia (11/8/2014)      HTN (hypertension) (6/29/2015)      Presence of biventricular automatic cardioverter/defibrillator (AICD) (7/2/2015)      Overview: [employer] biventricular AICD implant      Chronic systolic congestive heart failure ([address] (10/8/2015)      Mitral valvular regurgitation (1/22/2016)      Overview: ECHO 2/3/17: LV dilated, EF 20-25%, mild LVH, RV mod dilated, mild- mod       MELANIA, mod-severe MR, mod AI      ECHO 7/29/17: EF 15%, severe DHK, reduced RVEF, RVH, RVSP 35 mmHg       Mild LAE, mod-marked MA fibrosis, mod-severe MR (2+), AO root dilated,                  Physical deconditioning (1/29/2017)      Thoracic aortic aneurysm without rupture ([address] (2/2/2017)      Nonrheumatic aortic valve insufficiency (10/16/2018)      Counseling regarding advanced care planning and goals of care (2/7/2019)        Plan:     The objective of todays visit was to review the transitional care plan with the patient. We discussed the importance of transitional care as it relates to reducing the likelihood of readmission. We reviewed the goals for the first 30 days following hospital discharge. The patient and I discussed wrap around services including nurse navigation, care coordination, home health, transitional care appointments with their primary care provider and specialist team and the role of dispatch health. Patient education focused on readmission zones as described as    The Red Zone: High risk for readmission, days 1-21  The Yellow Zone: Moderate risk for readmission, days 22-29  The Green Zone: Lower risk for readmission, days 30 and after        1. Reviewed AMD and DDNR, no changes made    2. HUG Tool Education for  PNA    3. Hug Calendar distributed at [facility]    3. All labs, I/O's and imaging have been reviewed. 4. Medication Reconciliation  performed at discharge. Accessiblity good   RX rationale explained no residesa WC   Compliancy good        5. Collaborated with  Daughter, CK, Lul on this plan of care. The patient  expressed understanding of the disease process and management plan, and all questions were answered. Greater than 45 minutes were spent in patient management, greater than half of which was spent in counseling and coordination of care.       Signed By: Soumya Monroe NP     July 1, 2019

## 2019-07-01 NOTE — PROGRESS NOTES
-Hematology / Oncology ([facility]) -  -Primary Oncologist- Dr Violet Rothman- \" I am doing well! \"    -S-  Sitting in her chair, no complaints. Happy to be going to [facility] - \"I love it there\"    -O-      Patient Vitals for the past 24 hrs:   Temp Pulse Resp BP SpO2   07/01/19 1347     95 %   07/01/19 1112 98 °F (36.7 °C) 75 18 93/60 99 %   07/01/19 0735     97 %   07/01/19 0730 97.7 °F (36.5 °C) 88 18 105/63 96 %   07/01/19 0457 98.3 °F (36.8 °C) 86 18 119/73 97 %   06/30/19 2345 97.9 °F (36.6 °C) 67 18 101/65 96 %   06/30/19 2022     97 %   06/30/19 2016 97.5 °F (36.4 °C) 84 18 111/88 99 %     07/01 0701 - 07/01 1900  In: 580 [P.O.:480; I.V.:100]  Out: 1000 [Urine:1000]  ROS: 12 point ROS obtained and negative other than stated in HPI      Physical Exam:  Constitutional Alert, cooperative, oriented. Mood and affect appropriate. Appears close to chronological age. Elderly, frail lady. Thinning hair. Head Normocephalic; no scars   Eyes Conjunctivae and sclerae are clear and without icterus. Pupils are round   ENMT Sinuses are nontender. No oral exudates, ulcers, masses, thrush or mucositis. Oropharynx clear. Tongue normal.   Neck Supple without masses or thyromegaly. No jugular venous distension. Hematologic/Lymphatic No petechiae or purpura. shotty nodes in neck   Respiratory Lungs are clear to auscultation without rhonchi or wheezing. Cardiovascular Regular rate and rhythm of heart    Chest / Line Site Chest is symmetric with no chest wall deformities. Abdomen Non-tender, non-distended, no masses, ascites or hepatosplenomegaly. Good bowel sounds. Musculoskeletal No tenderness or swelling, normal range of motion without obvious weakness. Extremities No visible deformities, no cyanosis, clubbing or edema. Skin No rashes, scars, or lesions suggestive of malignancy. No petechiae, purpura, or ecchymoses. No excoriations.     Neurologic No sensory or motor deficits noted but not specifically tested. Psychiatric Alert. Coherent speech. Verbalizes understanding of our discussions today.            -Labs-    Recent Labs     07/01/19  0500 06/30/19  0515 06/29/19  0428   WBC 3.0* 3.2*  --    HGB 7.5* 6.5*  --     286  --    ANEU 2.0 2.2  --     144 139   K 4.6 4.4 3.9   GLU 82 79 83   BUN 18 21* 23*   CREA 0.63 0.70 0.79   CA 8.3* 8.3* 8.4*       -Imaging-     6/25/19 CT Chest:  IMPRESSION:    1. Left upper lobe and lingular pneumonia. 2. Large aneurysm of the ascending thoracic aorta measuring 5.8 cm in the  tubular portion. 3. Small to moderate left and small right pleural effusions with overlying  subsegmental atelectasis. Anasarca. 4. Cardiomegaly. Enlarged main pulmonary artery, consistent with pulmonary  arterial hypertension. 5. Age-indeterminate anterior compression fracture of L1, new since 2/4/2019.      -Assessment + Plan-     *) 81 yo female with pmh of CLL admitted with CAP and worsening anemia:     *) CLL and progressive anemia:  - she has been off Ibrutinib for last 6 months per our records, called [employer] and she had resumed Ibrutinib on 6/16 so has only been back on it for short while.  - when seen in May by NP in office her anemia was progressing and felt to be due to her untreated CLL, which is likely still the case. - Hbg 6.5 last week so was transfused one unit  - Labs show AKD, b12/FA not low.    -  Copper normal.   - PBS reviewed, no blast etc.rouleoux noted on path smear and SPEP shows low level M spike at 0.1 (CLL is one of the malignancies associated with monoclonal proteins)  - Resume Ibrutinib at 140mg daily after DC  - FU with Dr Mingo Mcclendon after DC     *) CAP - on rocephin     *) AAA:     *) Dementia and debility     *) NICM - has biventricular automatic cardioverter/defibrillator    *) hypothyroidism - on levothyroxine    *) hypertension

## 2019-07-01 NOTE — PROGRESS NOTES
Bedside shift change report given to [facility], RN (oncoming nurse) by 6 [facility] RN (offgoing nurse). Report included the following information SBAR, Kardex, Procedure Summary, Intake/Output, MAR, Accordion, Recent Results and Med Rec Status.

## 2019-07-01 NOTE — PROGRESS NOTES
Bedside and Verbal shift change report given to [facility] (oncoming nurse) by Zohra Lux (offgoing nurse). Report included the following information SBAR, Kardex, Intake/Output, MAR and Recent Results.

## 2019-07-01 NOTE — PROGRESS NOTES
Attempted to see pt this pm and pt refused. She stated \" I was just about to take my nap\". Will continue to follow.

## 2019-07-01 NOTE — PROGRESS NOTES
Bedside shift change report given to [place] (oncoming nurse) by [facility] (offgoing nurse). Report included the following information SBAR, Kardex, Intake/Output, MAR, Accordion, Recent Results and Med Rec Status.

## 2019-07-01 NOTE — PROGRESS NOTES
General Daily Progress Note    Admit Date: 6/25/2019    Subjective:     Patient has no complaint . Current Facility-Administered Medications   Medication Dose Route Frequency    0.9% sodium chloride infusion 250 mL  250 mL IntraVENous PRN    levothyroxine (SYNTHROID) tablet 25 mcg  25 mcg Oral 6am    albuterol-ipratropium (DUO-NEB) 2.5 MG-0.5 MG/3 ML  3 mL Nebulization Q6HWA RT    albuterol-ipratropium (DUO-NEB) 2.5 MG-0.5 MG/3 ML  3 mL Nebulization Q4H PRN    cefTRIAXone (ROCEPHIN) 1 g in 0.9% sodium chloride (MBP/ADV) 50 mL  1 g IntraVENous Q24H    lactobac ac& pc-s.therm-b.anim (DAWN Q/RISAQUAD)  1 Cap Oral DAILY    allopurinol (ZYLOPRIM) tablet 100 mg  100 mg Oral DAILY    apixaban (ELIQUIS) tablet 2.5 mg  2.5 mg Oral BID    atorvastatin (LIPITOR) tablet 10 mg  10 mg Oral DAILY    carvedilol (COREG) tablet 3.125 mg  3.125 mg Oral BID WITH MEALS    benzonatate (TESSALON) capsule 100 mg  100 mg Oral TID PRN    fluticasone-vilanterol (BREO ELLIPTA) 100mcg-25mcg/puff  1 Puff Inhalation DAILY    furosemide (LASIX) tablet 40 mg  40 mg Oral ACB&D    loratadine (CLARITIN) tablet 10 mg  10 mg Oral QHS    magnesium oxide (MAG-OX) tablet 400 mg  400 mg Oral QHS    mirtazapine (REMERON) tablet 15 mg  15 mg Oral QHS    potassium chloride SR (KLOR-CON 10) tablet 20 mEq  20 mEq Oral BID    sodium chloride (NS) flush 5-40 mL  5-40 mL IntraVENous Q8H    sodium chloride (NS) flush 5-40 mL  5-40 mL IntraVENous PRN    acetaminophen (TYLENOL) tablet 650 mg  650 mg Oral Q4H PRN    ondansetron (ZOFRAN) injection 4 mg  4 mg IntraVENous Q4H PRN        Review of Systems  A comprehensive review of systems was negative.     Objective:     Patient Vitals for the past 24 hrs:   BP Temp Pulse Resp SpO2   07/01/19 0735     97 %   07/01/19 0730 105/63 97.7 °F (36.5 °C) 88 18 96 %   07/01/19 0457 119/73 98.3 °F (36.8 °C) 86 18 97 %   06/30/19 2345 101/65 97.9 °F (36.6 °C) 67 18 96 %   06/30/19 2022     97 % 06/30/19 2016 111/88 97.5 °F (36.4 °C) 84 18 99 %   06/30/19 1651 100/65 98.6 °F (37 °C) 92 18 99 %   06/30/19 1641 92/59 98.6 °F (37 °C) 81 18 98 %   06/30/19 1607 90/60 98.6 °F (37 °C) 86 18 99 %   06/30/19 1518 100/58 98.5 °F (36.9 °C) 92 18 99 %   06/30/19 1436     100 %   06/30/19 1431 96/55 98.6 °F (37 °C) 88 18 99 %   06/30/19 1401 106/66 98.6 °F (37 °C) 90 18 98 %   06/30/19 1346 92/63 98.7 °F (37.1 °C) 82 18 100 %   06/30/19 1333 94/57  81 18 98 %   06/30/19 1321 99/59 97.8 °F (36.6 °C) 85 18 97 %   06/30/19 1315 99/60 97.8 °F (36.6 °C) 88 18 98 %   06/30/19 1206 98/67 97.5 °F (36.4 °C) 98 18 98 %     No intake/output data recorded. 06/29 1901 - 07/01 0700  In: 330   Out: -     Physical Exam:   Visit Vitals  /63 (BP 1 Location: Left arm, BP Patient Position: At rest)   Pulse 88   Temp 97.7 °F (36.5 °C)   Resp 18   Ht 5' 1\" (1.549 m)   Wt 115 lb 14.4 oz (52.6 kg)   SpO2 97%   BMI 21.90 kg/m²     General appearance: alert, cooperative, no distress, appears stated age  Neck: supple, symmetrical, trachea midline, no adenopathy, thyroid: not enlarged, symmetric, no tenderness/mass/nodules, no carotid bruit and no JVD  Lungs: clear to auscultation bilaterally  Heart: regular rate and rhythm, S1, S2 normal, no murmur, click, rub or gallop  Abdomen: soft, non-tender.  Bowel sounds normal. No masses,  no organomegaly  Extremities: extremities normal, atraumatic, no cyanosis or edema        Data Review   Recent Results (from the past 24 hour(s))   TYPE + CROSSMATCH    Collection Time: 06/30/19 10:05 AM   Result Value Ref Range    Crossmatch Expiration 07/03/2019     ABO/Rh(D) A POSITIVE     Antibody screen NEG     Unit number [id number]     Blood component type RC LR     Unit division 00     Status of unit TRANSFUSED     Crossmatch result Compatible    METABOLIC PANEL, BASIC    Collection Time: 07/01/19  5:00 AM   Result Value Ref Range    Sodium 142 136 - 145 mmol/L    Potassium 4.6 3.5 - 5.1 mmol/L Chloride 110 (H) 97 - 108 mmol/L    CO2 27 21 - 32 mmol/L    Anion gap 5 5 - 15 mmol/L    Glucose 82 65 - 100 mg/dL    BUN 18 6 - 20 MG/DL    Creatinine 0.63 0.55 - 1.02 MG/DL    BUN/Creatinine ratio 29 (H) 12 - 20      GFR est AA >60 >60 ml/min/1.73m2    GFR est non-AA >60 >60 ml/min/1.73m2    Calcium 8.3 (L) 8.5 - 10.1 MG/DL   CBC WITH AUTOMATED DIFF    Collection Time: 07/01/19  5:00 AM   Result Value Ref Range    WBC 3.0 (L) 3.6 - 11.0 K/uL    RBC 2.60 (L) 3.80 - 5.20 M/uL    HGB 7.5 (L) 11.5 - 16.0 g/dL    HCT 23.6 (L) 35.0 - 47.0 %    MCV 90.8 80.0 - 99.0 FL    MCH 28.8 26.0 - 34.0 PG    MCHC 31.8 30.0 - 36.5 g/dL    RDW 21.1 (H) 11.5 - 14.5 %    PLATELET 113 737 - 062 K/uL    MPV 10.0 8.9 - 12.9 FL    NRBC 0.0 0  WBC    ABSOLUTE NRBC 0.00 0.00 - 0.01 K/uL    NEUTROPHILS 68 32 - 75 %    BAND NEUTROPHILS 2 %    LYMPHOCYTES 25 12 - 49 %    MONOCYTES 2 (L) 5 - 13 %    EOSINOPHILS 3 0 - 7 %    BASOPHILS 0 0 - 1 %    IMMATURE GRANULOCYTES 0 0.0 - 0.5 %    ABS. NEUTROPHILS 2.0 1.8 - 8.0 K/UL    ABS. LYMPHOCYTES 0.8 0.8 - 3.5 K/UL    ABS. MONOCYTES 0.1 0.0 - 1.0 K/UL    ABS. EOSINOPHILS 0.1 0.0 - 0.4 K/UL    ABS. BASOPHILS 0.0 0.0 - 0.1 K/UL    ABS. IMM.  GRANS. 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 (8/4/2012)      Anemia (11/8/2014)      HTN (hypertension) (6/29/2015)      Presence of biventricular automatic cardioverter/defibrillator (AICD) (7/2/2015)      Overview: [employer] biventricular AICD implant      Chronic systolic congestive heart failure ([address] (10/8/2015)      Mitral valvular regurgitation (1/22/2016)      Overview: ECHO 2/3/17: LV dilated, EF 20-25%, mild LVH, RV mod dilated, mild- mod       MELANIA, mod-severe MR, mod AI      ECHO 7/29/17: EF 15%, severe DHK, reduced RVEF, RVH, RVSP 35 mmHg       Mild LAE, mod-marked MA fibrosis, mod-severe MR (2+), AO root dilated,                  Physical deconditioning (1/29/2017)      Thoracic aortic aneurysm without rupture ([facility] (2/2/2017)      Nonrheumatic aortic valve insufficiency (10/16/2018)      Counseling regarding advanced care planning and goals of care (2/7/2019)        Plan:     1. Plan discharge tomorrow. 2.  She is quite physically deconditioned but interestingly she has refused to walk with physical therapy as of Friday. 3.  Pneumonia has resolved clinically.

## 2019-07-02 ENCOUNTER — DOCUMENTATION ONLY (OUTPATIENT)
Dept: CASE MANAGEMENT | Age: 84
End: 2019-07-02

## 2019-07-02 ENCOUNTER — PATIENT OUTREACH (OUTPATIENT)
Dept: INTERNAL MEDICINE CLINIC | Age: 84
End: 2019-07-02

## 2019-07-02 VITALS
RESPIRATION RATE: 18 BRPM | DIASTOLIC BLOOD PRESSURE: 56 MMHG | TEMPERATURE: 98.2 F | SYSTOLIC BLOOD PRESSURE: 107 MMHG | HEART RATE: 70 BPM | OXYGEN SATURATION: 97 % | HEIGHT: 61 IN | BODY MASS INDEX: 21.69 KG/M2 | WEIGHT: 114.86 LBS

## 2019-07-02 LAB
ABNORMAL WBC NFR SPEC FC: NORMAL %
ANALYSIS AND GATING STRATEGY: NORMAL
ASSESSMENT OF LEUKOCYTES: NORMAL
CLINICAL INFO: NORMAL
COMMENT , 490046: NORMAL
FLOW COMMENT: NORMAL
FLOW INTERPRETATION: NORMAL
IMMUNOPHENOTYPING STUDY: NORMAL
PATHOLOGIST NAME: NORMAL
SPECIMEN SOURCE: NORMAL
SPECIMEN SOURCE: NORMAL
VIABILITY, 490032: NORMAL

## 2019-07-02 PROCEDURE — 94640 AIRWAY INHALATION TREATMENT: CPT

## 2019-07-02 PROCEDURE — 94760 N-INVAS EAR/PLS OXIMETRY 1: CPT

## 2019-07-02 PROCEDURE — 74011000250 HC RX REV CODE- 250: Performed by: HOSPITALIST

## 2019-07-02 PROCEDURE — 74011250637 HC RX REV CODE- 250/637: Performed by: HOSPITALIST

## 2019-07-02 PROCEDURE — 74011250637 HC RX REV CODE- 250/637: Performed by: INTERNAL MEDICINE

## 2019-07-02 RX ORDER — LEVOFLOXACIN 750 MG/1
750 TABLET ORAL
Qty: 5 TAB | Refills: 0 | Status: SHIPPED
Start: 2019-07-02 | End: 2019-07-07

## 2019-07-02 RX ORDER — POTASSIUM CHLORIDE 1500 MG/1
20 TABLET, FILM COATED, EXTENDED RELEASE ORAL 2 TIMES DAILY
Qty: 60 TAB | Refills: 11 | Status: SHIPPED
Start: 2019-07-02 | End: 2019-10-23 | Stop reason: DRUGHIGH

## 2019-07-02 RX ORDER — CARVEDILOL 3.12 MG/1
3.12 TABLET ORAL 2 TIMES DAILY WITH MEALS
Qty: 60 TAB | Refills: 11 | Status: SHIPPED
Start: 2019-07-02

## 2019-07-02 RX ORDER — MIRTAZAPINE 15 MG/1
15 TABLET, FILM COATED ORAL
Qty: 30 TAB | Refills: 11 | Status: SHIPPED
Start: 2019-07-02

## 2019-07-02 RX ADMIN — ALLOPURINOL 100 MG: 100 TABLET ORAL at 08:47

## 2019-07-02 RX ADMIN — ATORVASTATIN CALCIUM 10 MG: 10 TABLET, FILM COATED ORAL at 08:47

## 2019-07-02 RX ADMIN — CARVEDILOL 3.12 MG: 3.12 TABLET, FILM COATED ORAL at 08:47

## 2019-07-02 RX ADMIN — FLUTICASONE FUROATE AND VILANTEROL TRIFENATATE 1 PUFF: 100; 25 POWDER RESPIRATORY (INHALATION) at 08:48

## 2019-07-02 RX ADMIN — APIXABAN 2.5 MG: 2.5 TABLET, FILM COATED ORAL at 08:47

## 2019-07-02 RX ADMIN — FUROSEMIDE 40 MG: 40 TABLET ORAL at 08:47

## 2019-07-02 RX ADMIN — LEVOTHYROXINE SODIUM 25 MCG: 25 TABLET ORAL at 07:20

## 2019-07-02 RX ADMIN — POTASSIUM CHLORIDE 20 MEQ: 750 TABLET, FILM COATED, EXTENDED RELEASE ORAL at 08:46

## 2019-07-02 RX ADMIN — Medication 1 CAPSULE: at 08:46

## 2019-07-02 RX ADMIN — IPRATROPIUM BROMIDE AND ALBUTEROL SULFATE 3 ML: .5; 3 SOLUTION RESPIRATORY (INHALATION) at 07:38

## 2019-07-02 NOTE — DISCHARGE INSTRUCTIONS
General Discharge Instructions    Patient ID:  Pamela Jean  [id number]  80 y.o.  3/17/1925    Patient Instructions          The following personal items were collected during your admission and were returned to you. Take Home Medications             What to do at Home    Recommended diet: Regular Diet    Recommended activity: Activity as tolerated    Follow-up with Rebecca Priest MD  in 4 days. Information obtained by :  I understand that if any problems occur once I am at home I am to contact my physician. I understand and acknowledge receipt of the instructions indicated above. [de-identified] or R.N.'s Signature                                                                  Date/Time                                                                                                                                              Patient or Representative Signature                                                          Date/Time    General Daily Progress Note    Admit Date: 6/25/2019    Subjective:     Patient has no complaint .     Current Facility-Administered Medications   Medication Dose Route Frequency    0.9% sodium chloride infusion 250 mL  250 mL IntraVENous PRN    levothyroxine (SYNTHROID) tablet 25 mcg  25 mcg Oral 6am    albuterol-ipratropium (DUO-NEB) 2.5 MG-0.5 MG/3 ML  3 mL Nebulization Q6HWA RT    albuterol-ipratropium (DUO-NEB) 2.5 MG-0.5 MG/3 ML  3 mL Nebulization Q4H PRN    cefTRIAXone (ROCEPHIN) 1 g in 0.9% sodium chloride (MBP/ADV) 50 mL  1 g IntraVENous Q24H    lactobac ac& pc-s.therm-b.anim (DAWN Q/RISAQUAD)  1 Cap Oral DAILY    allopurinol (ZYLOPRIM) tablet 100 mg  100 mg Oral DAILY    apixaban (ELIQUIS) tablet 2.5 mg  2.5 mg Oral BID    atorvastatin (LIPITOR) tablet 10 mg  10 mg Oral DAILY    carvedilol (COREG) tablet 3.125 mg  3.125 mg Oral BID WITH MEALS    benzonatate (TESSALON) capsule 100 mg  100 mg Oral TID PRN    fluticasone-vilanterol (BREO ELLIPTA) 100mcg-25mcg/puff  1 Puff Inhalation DAILY    furosemide (LASIX) tablet 40 mg  40 mg Oral ACB&D    loratadine (CLARITIN) tablet 10 mg  10 mg Oral QHS    magnesium oxide (MAG-OX) tablet 400 mg  400 mg Oral QHS    mirtazapine (REMERON) tablet 15 mg  15 mg Oral QHS    potassium chloride SR (KLOR-CON 10) tablet 20 mEq  20 mEq Oral BID    sodium chloride (NS) flush 5-40 mL  5-40 mL IntraVENous Q8H    sodium chloride (NS) flush 5-40 mL  5-40 mL IntraVENous PRN    acetaminophen (TYLENOL) tablet 650 mg  650 mg Oral Q4H PRN    ondansetron (ZOFRAN) injection 4 mg  4 mg IntraVENous Q4H PRN        Review of Systems  A comprehensive review of systems was negative. Objective:     Patient Vitals for the past 24 hrs:   BP Temp Pulse Resp SpO2 Weight   07/02/19 0750 116/77 98.3 °F (36.8 °C) 77 18 98 % --   07/02/19 0738 -- -- -- -- 93 % --   07/02/19 0514 112/75 97.5 °F (36.4 °C) 90 18 94 % --   07/01/19 2117 -- -- -- -- 96 % --   07/01/19 1954 96/65 97.8 °F (36.6 °C) 95 18 99 % --   07/01/19 1851 -- -- -- -- -- 114 lb 13.8 oz (52.1 kg)   07/01/19 1600 95/63 97.6 °F (36.4 °C) 86 18 100 % --   07/01/19 1347 -- -- -- -- 95 % --   07/01/19 1112 93/60 98 °F (36.7 °C) 75 18 99 % --     No intake/output data recorded. 06/30 1901 - 07/02 0700  In: 580 [P.O.:480; I.V.:100]  Out: 1350 [Urine:1350]    Physical Exam:   Visit Vitals  /77 (BP 1 Location: Left arm, BP Patient Position: Sitting)   Pulse 77   Temp 98.3 °F (36.8 °C)   Resp 18   Ht 5' 1\" (1.549 m)   Wt 114 lb 13.8 oz (52.1 kg)   SpO2 98%   Breastfeeding? No   BMI 21.70 kg/m²     General:  Alert, cooperative, no distress, appears stated age. Head:  Normocephalic, without obvious abnormality, atraumatic. Eyes:  Conjunctivae/corneas clear. PERRL, EOMs intact. Fundi benign. Ears:  Normal TMs and external ear canals both ears.    Nose: Nares normal. Septum midline. Mucosa normal. No drainage or sinus tenderness. Throat: Lips, mucosa, and tongue normal. Teeth and gums normal.   Neck: Supple, symmetrical, trachea midline, no adenopathy, thyroid: no enlargement/tenderness/nodules, no carotid bruit and no JVD. Back:   Symmetric, no curvature. ROM normal. No CVA tenderness. Lungs:   Clear to auscultation bilaterally. Chest wall:  No tenderness or deformity. Heart:  Regular rate and rhythm, S1, S2 normal, no murmur, click, rub or gallop. Breast Exam:  No tenderness, masses, or nipple abnormality. Abdomen:   Soft, non-tender. Bowel sounds normal. No masses,  No organomegaly. Genitalia:  Normal female without lesion, discharge or tenderness. Rectal:  Normal tone,  no masses or tenderness  Guaiac negative stool. Extremities: Extremities normal, atraumatic, no cyanosis or edema. Pulses: 2+ and symmetric all extremities. Skin: Skin color, texture, turgor normal. No rashes or lesions. Lymph nodes: Cervical, supraclavicular, and axillary nodes normal.   Neurologic: CNII-XII intact. Normal strength, sensation and reflexes throughout. Data Review No results found for this or any previous visit (from the past 24 hour(s)).         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 (8/4/2012)      Anemia (11/8/2014)      HTN (hypertension) (6/29/2015)      Presence of biventricular automatic cardioverter/defibrillator (AICD) (7/2/2015)      Overview: [employer] biventricular AICD implant      Chronic systolic congestive heart failure ([address] (10/8/2015)      Mitral valvular regurgitation (1/22/2016)      Overview: ECHO 2/3/17: LV dilated, EF 20-25%, mild LVH, RV mod dilated, mild- mod       MELANIA, mod-severe MR, mod AI      ECHO 7/29/17: EF 15%, severe DHK, reduced RVEF, RVH, RVSP 35 mmHg       Mild LAE, mod-marked MA fibrosis, mod-severe MR (2+), AO root dilated,                  Physical deconditioning (1/29/2017)      Thoracic aortic aneurysm without rupture ([address] (2/2/2017)      Nonrheumatic aortic valve insufficiency (10/16/2018)      Counseling regarding advanced care planning and goals of care (2/7/2019)        Plan:     1. Patient is stable for plan discharge.

## 2019-07-02 NOTE — PROGRESS NOTES
Sbar report called to [facility] at [phone] to nurse Ahsan Valenzuela RN with opportunity for questions allowed,pt. Being placed in room 9241.

## 2019-07-02 NOTE — PROGRESS NOTES
ADULT PROTOCOL: JET AEROSOL  REASSESSMENT    Patient  Teresa Bunn     80 y.o.   female     7/2/2019  10:35 AM    Breath Sounds Pre Procedure: Right Breath Sounds: Clear                               Left Breath Sounds: Crackles    Breath Sounds Post Procedure: Right Breath Sounds: Diminished                                 Left Breath Sounds: Diminished    Breathing pattern: Pre procedure Breathing Pattern: Regular          Post procedure Breathing Pattern: Regular    Heart Rate: Pre procedure Pulse: 90           Post procedure Pulse: 94    Resp Rate: Pre procedure Respirations: 20           Post procedure Respirations: 20      Oxygen: O2 Device: Room air        Changed: NO    SpO2: Pre procedure SpO2: 93 %   without oxygen              Post procedure SpO2: 97 %  without oxygen    Nebulizer Therapy: Current medications Aerosolized Medications: DuoNeb      Changed: NO    Smoking History: never    Problem List:   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    DILAN (obstructive sleep apnea) G47.33    Rhinitis J31.0    Anemia D64.9    Reactive airway disease J45.909    HTN (hypertension) I10    Gout M10.9    LBBB (left bundle branch block) I44.7    Presence of biventricular automatic cardioverter/defibrillator (AICD) Z95.810    Chronic systolic congestive heart failure (HCC) I50.22    Lymphoma (HCC) C85.90    Mitral valvular regurgitation I34.0    Physical deconditioning R53.81    Gastroesophageal reflux disease with esophagitis K21.0    Thoracic aortic aneurysm without rupture (HCC) I71.2    Paroxysmal atrial fibrillation (HCC) I48.0    Xerosis of skin L85.3    Dyslipidemia E78.5    CKD (chronic kidney disease) stage 3, GFR 30-59 ml/min (HCC) N18.3    Acquired hypothyroidism E03.9    Nonrheumatic aortic valve insufficiency I35.1    UTI (urinary tract infection) N39.0    Counseling regarding advanced care planning and goals of care Z71.89    CAP (community acquired pneumonia) J18.9       Respiratory Therapist: Gabe Noonan RT

## 2019-07-02 NOTE — PROGRESS NOTES
Transition of Care Coordination/Hospital to Post Acute Facility:     Date/Time:  2019 1:12 PM    Patient was admitted to [address] [place] [address]  on  for treatment of CAP. Patient was discharged 2019 to [facility] for continuation of care. Inpatient RRAT score: none given    Top Challenges reviewed    advair inhaler left off-should we restart         Method of communication with care team :face to face      Nurse Navigator(NN) spoke with charge nurse Pam to provide introduction to self and explanation of the Nurse Navigator Role. Verified name and  as patient identifiers. Discussed and reviewed  Medication rec   ACP:   Does the patient have a current ACP (including DDNR):  yes  Does the post acute facility have a copy of the patients ACP:  yes   Healthcare Decision Maker:  Moi Addison    Medication(s):   New Medications at Discharge: Levaquin PO  Changed Medications at Discharge: none  Discontinued Medications at Discharge: albuterol inhaler-PCP states meds are as ordered. PCP/Specialist follow up:   Future Appointments   Date Time Provider Amanda Dailey   2019 11:30 AM Renita Kirk MD [facility]   2019 10:15 AM Linnette Muller MD [address]      Opportunity to ask questions was provided. Contact information was provided for future reference or further questions. Will continue to monitor. Goals Addressed                 This Visit's Progress     Facilitate transitions of care (ie. palliative care, assisted living, nursing home, changes in living arrangements). On track     2019:  Patient d/c & returned to [facility]. [facility] available for transfer. Will continue to f/u.  EW         goal completed.

## 2019-07-02 NOTE — PROGRESS NOTES
Transitional Care Team: [facility] Discharge Note    Date of Assessment: 07/02/19  Time of Assessment:  8:38 AM      Sander Mckeon is a 80 y.o. female inpatient at [facility] with pneumonia on  6/25. Assessment & Plan   Pt is being discharged to return to memory care estrella at [facility]. Await MD to dictate discharge summary and any prescriptions needed to return            Current Code Status:  DDNR    Medication Reconciliation:  Await AVS list.     Can patient afford medications:  yes    Who manages medications at home: facility staff    Emergency Contact Son Phone Number [phone]    Chart reviewed by me and NATASHAG (Healthy Understanding of Goals) program introduced to patient/family. The Transitional Care Team bridges the gaps in care and education surrounding discharge from the acute care facility. The objective is to empower the patient and family in taking a proactive role in the task of preventing readmission within the first thirty days after discharge from the acute care setting, The team is also involved in the efforts to reduce readmission to the acute care setting after stabilization and discharge from the acute care environment either to skilled nursing facilities or community. Discharge With The Following Arrangements in place:    Future Appointments   Date Time Provider Amanda Dailey   7/22/2019 11:30 AM Alison Cisse MD [facility]   7/30/2019 10:15 AM Jessie Barajas MD [address] [place] [address]            Patient / Family was instructed on specific signs/symptoms to look for with regards to worsening of their medical conditions. Learning was assessed using teach back. The patient / family have added upcoming appointment dates to their [facility] Calendar prior to discharge. Patient education focused on readmission zones as described as: The Red Zone: High risk for readmission, days 1-21   The Yellow Zone:  Moderate  risk for readmission, days 22-29   The Green Zone: Lower risk for readmission, days 30 and after    The [facility] Team will follow patient from a distance while inpatient as well as be available for further transition disposition as needed. The [facility]ANESA TEAM will continue to offer support during the 30- 90 day discharge from acute care setting. Notified Ambulatory , VANESA RN.       Signed By: Allen Braxton RN     July 2, 2019

## 2019-07-02 NOTE — DISCHARGE SUMMARY
[address] SUMMARY    Name:  Mian Carvalho  MR#:  [id number]  :  1925  ACCOUNT #:  [de-identified]  ADMIT DATE:  2019  DISCHARGE DATE:  2019      FINAL DIAGNOSES:  1. Left upper lobe pneumonia. 2.  Right lingula pneumonia. 3.  Aneurysm of the ascending aorta, 5.8 cm, deemed not to be surgically correctable. 4.  Nonischemic cardiomyopathy with an ejection fraction of 25%. 5.  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 6.  Primary hypertension. 7.  History of gout. 8.  Lymphoma with early leukemic transformation. 9.  Physical deconditioning. 10.  Dyslipidemia. 11.  Hypothyroidism. HISTORY OF PRESENT ILLNESS:  The patient is a 70-year-old lady who was doing well up until a week prior to admission when she began noting mild shortness of breath. This was soon accompanied by a persistent hacky cough, which was nonproductive as well as a low-grade fever. She presented to the emergency room with the above findings. Initial chest x-ray suggested possibility of a left upper lobe infiltrate. Subsequent CT scan confirmed the presence of left upper lobe infiltrate as well as a lingula infiltrate. In view of this, it was elected to admit her for further evaluation and care. Current problems are superimposed on nonischemic cardiomyopathy with an ejection fraction of 25%. Past medical history, social history, review of systems, family history, physical examination are as in admitting H and P.    LABORATORY VALUES:  Initial hemoglobin 8.1, white count 6.4, MCV was 90.6, platelet count was 054,467 with following differential, 67 segs, 8 bands, 21 lymphocytes, 1 monocyte. Hemoglobin reached the low of 6.5 on  and on  was 7.5. Abnormalities on the comprehensive profile were limited to BUN and creatinine of 23 and 0.87 respectively with an albumin of 2.1.   The B12 level was 1897, folate 18.3, iron was 12, TIBC 138 with a saturation of 9% and ferritin level of 801. At the time of discharge, BUN and creatinine were 18 and 0.63 respectively and there was a proBNP done on 06/30, which was 10,953. Blood cultures were all negative. RADIOGRAPHS:  CT scan of the chest revealed left upper lobe as well as lingula infiltrate as well as an aneurysmal dilatation of the ascending aorta at 5.8 cm. She additionally had a compression fracture at L1, which was deemed to be relatively new in view of the fact that the previous CT scan done in the first part of this year did not demonstrate this. CONSULTATIONS:  Two consultations were obtained, one with Hematology/Oncology, Dr. Emily Ramirez, and the second was with Cardiothoracic Surgery. HOSPITAL COURSE:  The patient was admitted and placed on parenteral antibiotics as well as bronchodilators. With this, her pulmonary symptoms improved significantly. O2 saturations remained elevated, cough improved, and the sense of well-being also improved. She defervesced and had a relatively uneventful course. At the time of discharge, she was totally asymptomatic from a pulmonary standpoint. There was, however, as somewhat expected, a residual infiltrate in the left upper lobe. CT scan demonstrated an aneurysm of the ascending aorta. The patient was seen by Thoracic Surgery and, as expected, is deemed not to be a surgical candidate as pretty much understood given her age and her multiple coexisting comorbidities, particularly her nonischemic cardiomyopathy. She was also seen in consultation by Hematology, Dr. Emily Ramirez. This was primarily because of progressive anemia. She felt it was related to the lymphoproliferative process. She was transfused one unit of packed cells and the hemoglobin remained reasonably stable throughout, although she continued to be anemic. She did not manifest leukocytosis. At the time of discharge, the patient was back to baseline. DISPOSITION:  1.   The patient will be discharged back to the nursing facility where she was residing. She remains full DNR. 2.  Diet is a regular diet. 3.  Discharge medications include the following:  Albuterol nebulizer q.4 hours p.r.n., Calciferol 1000 mg daily, Robitussin DM one teaspoon q.4 hours p.r.n., Imbruvica 140 mg daily, Levaquin 750 mg daily for five days, midodrine 5 mg t.i.d., MiraLax 17 g daily, Eliquis 2.5 mg b.i.d., loratadine 10 mg daily, acetaminophen 650 q.4 hours p.r.n., allopurinol 100 mg daily, atorvastatin 10 mg daily, furosemide 40 mg b.i.d., Levoxyl 0.025 mg daily, magnesium 400 mg daily, carvedilol 3.125 mg b.i.d., potassium chloride 20 mEq b.i.d., Remeron 15 mg at bedtime. 4.  The patient will return to the office in the next 3-5 days.       MD NISH Aquino/[id number]/  D:  07/02/2019 8:42  T:  07/02/2019 10:08  JOB #:  [id number]

## 2019-07-02 NOTE — PROGRESS NOTES
Pt. D/duran to [facility] via ambulance, v/s stable, pt. A&0 x4 no acute distress noted offered no c/o pain.

## 2019-07-02 NOTE — PROGRESS NOTES
Plan of Care   1) [employer]     8:28 AM- CM attempted to contact Dr. Anuj Hager office regarding d/c order- was on hold for 7 minutes. CM will attempt again as [facility] transportation is arranged for 9:00 AM due to Dr. Anuj Hager last note stating d/c today 7/2/19.     8:34 AM- CM spoke with pt who states Dr. Walter Robles is aware of d/c this morning- pt was getting dressed. Discharge order placed but RN informed CM no hard scripts are on the chart and no discharge summary available to fax to [employer]. CM contacting Nurse Navigator. 8:35 AM- CM spoke with LUIS CARLOS Wick who is requesting Discharge Summary and hard scripts from Dr. Walter Robles. 8:43 AM- CM informed by LUIS CARLOS Wick pt will not have any hard scripts. Dr. Walter Robles states he transcribed the discharge summary and that it is the responsibility of the  to put it in the computer. Time when discharge summary will be completed is unknown. CM informed pt and spoke with Tia at [address] [place] [address] her- [facility] transportation changed to 12:30 PM. Pt aware. 9:38 AM- CM supervisor contacted [facility] [place] who stated a note will be uploaded within an hour. CM left a HIPPA complaint VM for Carlotaa Habermann ([phone]) at [address] that pt d/c is delayed until 12:30 due to the d/c summary not being available. 11:05 AM- Still no discharge summary. CM supervisor will contact [facility]. 11:56 PM- Discharge summary in. Medicare pt has received, reviewed, and signed 2nd IM letter informing them of their right to appeal the discharge. Signed copy has been placed on pt bedside chart. CM will fax VANESA and discharge summary to [employer]. Pt will also be sent with discharge paperwork including AVS.     Care Management Interventions  PCP Verified by CM: Sheron Pennington MD)  Mode of Transport at Discharge:  Other (see comment)(pt's caregiver Ashley Waterman provides transportation)  Transition of Care Consult (CM Consult): Long Term Care(pt lives at [facility], to return there at [facility])  Discharge Durable Medical Equipment: No  Physical Therapy Consult: No  Occupational Therapy Consult: No  Speech Therapy Consult: No  Current Support Network: Nursing Facility(lives at [facility], has caregiver 2x/week)  Confirm Follow Up Transport: Other (see comment)(pt's caregiver Dre Rebollar provides transportation)  Plan discussed with Pt/Family/Caregiver: Yes  Discharge Location  Discharge Placement: Skilled nursing facility    NATANAEL Erazo, [address] [facility]    [phone]

## 2019-07-05 ENCOUNTER — PATIENT OUTREACH (OUTPATIENT)
Dept: INTERNAL MEDICINE CLINIC | Age: 84
End: 2019-07-05

## 2019-07-05 NOTE — PROGRESS NOTES
Goals Addressed                 This Visit's Progress     Offers support and explores support systems with patients/caregivers to cope during the periods of illness. On track     7/5/2019:  Patient's son Triston Zhang left My Chart request for PCP return call-back. NN LM on VM as f/u for VANESA questions to relay to PCP should questions remain. Office cell phone # left for family if still needing contact.   Will continue to f/u.   EW         goal completion:  7/15/2019

## 2019-07-22 ENCOUNTER — OFFICE VISIT (OUTPATIENT)
Dept: INTERNAL MEDICINE CLINIC | Age: 84
End: 2019-07-22

## 2019-07-22 ENCOUNTER — PATIENT OUTREACH (OUTPATIENT)
Dept: INTERNAL MEDICINE CLINIC | Age: 84
End: 2019-07-22

## 2019-07-22 VITALS
TEMPERATURE: 97.6 F | SYSTOLIC BLOOD PRESSURE: 106 MMHG | DIASTOLIC BLOOD PRESSURE: 66 MMHG | WEIGHT: 122.4 LBS | BODY MASS INDEX: 23.11 KG/M2 | RESPIRATION RATE: 18 BRPM | HEIGHT: 61 IN | HEART RATE: 77 BPM | OXYGEN SATURATION: 92 %

## 2019-07-22 DIAGNOSIS — Z13.39 SCREENING FOR ALCOHOLISM: ICD-10-CM

## 2019-07-22 DIAGNOSIS — I50.22 CHRONIC SYSTOLIC CONGESTIVE HEART FAILURE (HCC): ICD-10-CM

## 2019-07-22 DIAGNOSIS — R53.81 PHYSICAL DECONDITIONING: ICD-10-CM

## 2019-07-22 DIAGNOSIS — Z13.31 SCREENING FOR DEPRESSION: ICD-10-CM

## 2019-07-22 DIAGNOSIS — F01.50 VASCULAR DEMENTIA WITHOUT BEHAVIORAL DISTURBANCE (HCC): ICD-10-CM

## 2019-07-22 DIAGNOSIS — E78.5 DYSLIPIDEMIA: ICD-10-CM

## 2019-07-22 DIAGNOSIS — D64.9 ANEMIA, UNSPECIFIED TYPE: ICD-10-CM

## 2019-07-22 DIAGNOSIS — I42.0 CARDIOMYOPATHY, DILATED, NONISCHEMIC (HCC): Primary | Chronic | ICD-10-CM

## 2019-07-22 DIAGNOSIS — Z00.00 WELLNESS EXAMINATION: ICD-10-CM

## 2019-07-22 DIAGNOSIS — E03.9 ACQUIRED HYPOTHYROIDISM: ICD-10-CM

## 2019-07-22 NOTE — PROGRESS NOTES
Chief Complaint   Patient presents with   [facility] Annual Wellness Visit     1. Have you been to the ER, urgent care clinic since your last visit? Hospitalized since your last visit? Yes When: about 1 1/2 months ago Where: [facility] Reason for visit: UTI    2. Have you seen or consulted any other health care providers outside of the [address] [place] [address] since your last visit? Include any pap smears or colon screening.  No

## 2019-07-23 NOTE — PROGRESS NOTES
Goals Addressed                 This Visit's Progress     Facilitate transitions of care (ie. palliative care, assisted living, nursing home, changes in living arrangements). 7/2/2019:  Patient d/c & returned to [facility]. VERÓNICA Guevara available for transfer. Will continue to f/u.  EW     7/22/2019:  [facility]. States Pulmonary Rehab is working. D/c date unknown. EW        Understands and adheres to diet. 7/22/2019:  Patient d/c from hospital on Reg diet. Patient states understanding re low Na+ intake;  Agrees to abide w/ limited po intake in light of CHF; Agrees to contact PCP re ankle swelling, SOB cough or increased congestion. Was dx with anemia inpatient; agrees to feeling less fatigue. Remains in SNF facility. EW          [facility] ; will continue to f/u.

## 2019-07-24 ENCOUNTER — TELEPHONE (OUTPATIENT)
Dept: CARDIOLOGY CLINIC | Age: 84
End: 2019-07-24

## 2019-07-24 LAB
BASOPHILS # BLD AUTO: 0 X10E3/UL (ref 0–0.2)
BASOPHILS NFR BLD AUTO: 0 %
BUN SERPL-MCNC: 32 MG/DL (ref 10–36)
BUN/CREAT SERPL: 33 (ref 12–28)
CALCIUM SERPL-MCNC: 9.4 MG/DL (ref 8.7–10.3)
CHLORIDE SERPL-SCNC: 101 MMOL/L (ref 96–106)
CO2 SERPL-SCNC: 25 MMOL/L (ref 20–29)
CREAT SERPL-MCNC: 0.98 MG/DL (ref 0.57–1)
EOSINOPHIL # BLD AUTO: 0.1 X10E3/UL (ref 0–0.4)
EOSINOPHIL NFR BLD AUTO: 3 %
ERYTHROCYTE [DISTWIDTH] IN BLOOD BY AUTOMATED COUNT: 23.6 % (ref 12.3–15.4)
GLUCOSE SERPL-MCNC: 71 MG/DL (ref 65–99)
HCT VFR BLD AUTO: 30.4 % (ref 34–46.6)
HGB BLD-MCNC: 9.6 G/DL (ref 11.1–15.9)
IMM GRANULOCYTES # BLD AUTO: 0 X10E3/UL (ref 0–0.1)
IMM GRANULOCYTES NFR BLD AUTO: 0 %
LYMPHOCYTES # BLD AUTO: 1 X10E3/UL (ref 0.7–3.1)
LYMPHOCYTES NFR BLD AUTO: 39 %
MCH RBC QN AUTO: 31.3 PG (ref 26.6–33)
MCHC RBC AUTO-ENTMCNC: 31.6 G/DL (ref 31.5–35.7)
MCV RBC AUTO: 99 FL (ref 79–97)
MONOCYTES # BLD AUTO: 0.1 X10E3/UL (ref 0.1–0.9)
MONOCYTES NFR BLD AUTO: 4 %
MORPHOLOGY BLD-IMP: ABNORMAL
NEUTROPHILS # BLD AUTO: 1.4 X10E3/UL (ref 1.4–7)
NEUTROPHILS NFR BLD AUTO: 54 %
NT-PROBNP SERPL-MCNC: 6113 PG/ML (ref 0–738)
NT-PROBNP SERPL-MCNC: 6633 PG/ML (ref 0–738)
PLATELET # BLD AUTO: 204 X10E3/UL (ref 150–450)
POTASSIUM SERPL-SCNC: 4.7 MMOL/L (ref 3.5–5.2)
RBC # BLD AUTO: 3.07 X10E6/UL (ref 3.77–5.28)
SODIUM SERPL-SCNC: 145 MMOL/L (ref 134–144)
WBC # BLD AUTO: 2.6 X10E3/UL (ref 3.4–10.8)

## 2019-07-24 NOTE — TELEPHONE ENCOUNTER
Spoke with Dionisio Marquez. She was unable to reconnect and send transmission. She will call [facility] services.

## 2019-07-24 NOTE — TELEPHONE ENCOUNTER
Pam from [facility] called stating the pt remote has a Dr. Burgess Door in yellow flashing. Please call  [phone].      Thanks

## 2019-07-28 NOTE — PATIENT INSTRUCTIONS
Medicare Wellness Visit, Female     The best way to live healthy is to have a lifestyle where you eat a well-balanced diet, exercise regularly, limit alcohol use, and quit all forms of tobacco/nicotine, if applicable. Regular preventive services are another way to keep healthy. Preventive services (vaccines, screening tests, monitoring & exams) can help personalize your care plan, which helps you manage your own care. Screening tests can find health problems at the earliest stages, when they are easiest to treat. [facility]ie [facility] follows the current, evidence-based guidelines published by the [facility] ([facility]) when recommending preventive services for our patients. Because we follow these guidelines, sometimes recommendations change over time as research supports it. (For example, mammograms used to be recommended annually. Even though Medicare will still pay for an annual mammogram, the newer guidelines recommend a mammogram every two years for women of average risk.)  Of course, you and your doctor may decide to screen more often for some diseases, based on your risk and your health status. Preventive services for you include:  - Medicare offers their members a free annual wellness visit, which is time for you and your primary care provider to discuss and plan for your preventive service needs. Take advantage of this benefit every year!  -All adults over the age of 72 should receive the recommended pneumonia vaccines. Current USPSTF guidelines recommend a series of two vaccines for the best pneumonia protection.   -All adults should have a flu vaccine yearly and a tetanus vaccine every 10 years. All adults age 61 and older should receive a shingles vaccine once in their lifetime.    -A bone mass density test is recommended when a woman turns 65 to screen for osteoporosis. This test is only recommended one time, as a screening.  Some providers will use this same test as a disease monitoring tool if you already have osteoporosis. -All adults age 38-68 who are overweight should have a diabetes screening test once every three years.   -Other screening tests and preventive services for persons with diabetes include: an eye exam to screen for diabetic retinopathy, a kidney function test, a foot exam, and stricter control over your cholesterol.   -Cardiovascular screening for adults with routine risk involves an electrocardiogram (ECG) at intervals determined by your doctor.   -Colorectal cancer screenings should be done for adults age 54-65 with no increased risk factors for colorectal cancer. There are a number of acceptable methods of screening for this type of cancer. Each test has its own benefits and drawbacks. Discuss with your doctor what is most appropriate for you during your annual wellness visit. The different tests include: colonoscopy (considered the best screening method), a fecal occult blood test, a fecal DNA test, and sigmoidoscopy. -Breast cancer screenings are recommended every other year for women of normal risk, age 54-69.  -Cervical cancer screenings for women over age 72 are only recommended with certain risk factors.   -All adults born between [facility] should be screened once for Hepatitis C. Here is a list of your current Health Maintenance items (your personalized list of preventive services) with a due date: There are no preventive care reminders to display for this patient.

## 2019-07-28 NOTE — PROGRESS NOTES
[facility]  [facility]  [address] [place] [id number]  Phone:  [phone]  Fax: [phone]       Chief Complaint   Patient presents with   [facility] Wellness Visit   . SUBJECTIVE:    Mireya Underwood is a 80 y.o. female Comes in for follow up. She is accompanied by a nurse. Her breathing has been doing reasonably well. She does not have much in the way of shortness of breath. She, however, has gained about 11 pounds since I last saw her and she has more edema of her distal lower extremities than usual.  There has been no change in her diuretic dosing and she most recently saw her cardiologist, who did not make any meaningful adjustments. She also appeared to be developing early dementia based on her erratic behavior in the nursing home and in the hospital.  She does have problems with her short-term memory. Needless to say, she was quite appropriate and cooperative today. She has a past history of anemia, dyslipidemia and hypothyroidism. Current Outpatient Medications   Medication Sig Dispense Refill    carvedilol (COREG) 3.125 mg tablet Take 1 Tab by mouth two (2) times daily (with meals). Indications: chronic heart failure 60 Tab 11    potassium chloride SR (K-TAB) 20 mEq tablet Take 1 Tab by mouth two (2) times a day. 60 Tab 11    atorvastatin (LIPITOR) 10 mg tablet Take 10 mg by mouth nightly.  midodrine (PROAMITINE) 5 mg tablet Take 5 mg by mouth three (3) times daily.  ibrutinib (IMBRUVICA) 140 mg capsule Take 140 mg by mouth daily.  furosemide (LASIX) 40 mg tablet Take 40 mg by mouth two (2) times a day. Hold of SBP <110      polyethylene glycol (MIRALAX) 17 gram/dose powder Take 17 g by mouth every fourty-eight (48) hours.       allopurinol (ZYLOPRIM) 100 mg tablet TAKE ONE (1) TABLET(S) DAILY 30 Tab 11    dextromethorphan-guaiFENesin (ROBITUSSIN-DM)  mg/5 mL syrup Take 10 mL by mouth every six (6) hours as needed for Cough.  albuterol (PROVENTIL VENTOLIN) 2.5 mg /3 mL (0.083 %) nebulizer solution 2.5 mg by Nebulization route four (4) times daily.  levothyroxine (SYNTHROID) 25 mcg tablet Take 1 Tab by mouth Daily (before breakfast). 90 Tab 3    apixaban (ELIQUIS) 2.5 mg tablet Take 1 Tab by mouth two (2) times a day. Indications: PREVENT THROMBOEMBOLISM IN CHRONIC ATRIAL FIBRILLATION 180 Tab 3    fluticasone-salmeterol (ADVAIR) 250-50 mcg/dose diskus inhaler Take 1 Puff by inhalation two (2) times a day. 3 Inhaler 3    magnesium oxide 400 mg cap Take 1 Cap by mouth nightly.  cholecalciferol (VITAMIN D3) 1,000 unit cap Take 1,000 Units by mouth daily.  loratadine (CLARITIN) 10 mg tablet Take 10 mg by mouth nightly.  co-enzyme Q-10 (CO Q-10) 100 mg capsule Take 100 mg by mouth daily.  mirtazapine (REMERON) 15 mg tablet Take 1 Tab by mouth nightly.  27 Tab 11     Past Medical History:   Diagnosis Date    Aortic insufficiency     Asthma     [facility] ([facility]     lymphoma    CKD (chronic kidney disease) stage 3, GFR 30-59 ml/min ([facility]) 1/10/2018    Congestive heart failure, NYHA class II, chronic, systolic ([facility])     Heart failure ([facility]     Hypertension     Mitral valvular regurgitation 1/22/2016    ECHO 2/3/17: LV dilated, EF 20-25%, mild LVH, RV mod dilated, mild- mod MELANIA, mod-severe MR, mod AI ECHO 7/29/17: EF 15%, severe DHK, reduced RVEF, RVH, RVSP 35 mmHg  Mild LAE, mod-marked MA fibrosis, mod-severe MR (2+), AO root dilated,      Nonrheumatic aortic valve insufficiency 10/16/2018    Presence of biventricular automatic cardioverter/defibrillator (AICD) 7/2/2015    Lillie eyetok biventricular AICD implant    Stomach ulcer      Past Surgical History:   Procedure Laterality Date    HX BREAST BIOPSY Bilateral     40 years ago    HX COLONOSCOPY      HX HEENT      cataracts removed    HX HYSTERECTOMY      HX OTHER SURGICAL      lymph node surgery    HX TONSILLECTOMY      HI EGD TRANSORAL BIOPSY SINGLE/MULTIPLE  5/23/2012         SINUS SURGERY PROC UNLISTED      Nasal polyps removed     Allergies   Allergen Reactions    Codeine Other (comments)     \"made me disoriented\" per pt         REVIEW OF SYSTEMS:  General: negative for - chills or fever  ENT: negative for - headaches, nasal congestion or tinnitus  Respiratory: negative for - cough, hemoptysis, shortness of breath or wheezing  Cardiovascular : negative for - chest pain, edema, palpitations or shortness of breath  Gastrointestinal: negative for - abdominal pain, blood in stools, heartburn or nausea/vomiting  Genito-Urinary: no dysuria, trouble voiding, or hematuria  Musculoskeletal: negative for - gait disturbance, joint pain, joint stiffness or joint swelling  Neurological: no TIA or stroke symptoms  Hematologic: no bruises, no bleeding, no swollen glands  Integument: no lumps, mole changes, nail changes or rash  Endocrine: no malaise/lethargy or unexpected weight changes      Social History     Socioeconomic History    Marital status:      Spouse name: Not on file    Number of children: 1    Years of education: 16+    Highest education level: Master's degree (e.g., MA, MS, [place], MEd, MSW, LUKE)   Occupational History    Occupation: retired teacher   Tobacco Use    Smoking status: Never Smoker    Smokeless tobacco: Never Used   Substance and Sexual Activity    Alcohol use: No     Alcohol/week: 0.0 standard drinks    Drug use: No    Sexual activity: Never     Family History   Problem Relation Age of Onset    Heart Disease Mother     Stroke Father        OBJECTIVE:    Visit Vitals  /66   Pulse 77   Temp 97.6 °F (36.4 °C) (Oral)   Resp 18   Ht 5' 1\" (1.549 m)   Wt 122 lb 6.4 oz (55.5 kg)   SpO2 92%   BMI 23.13 kg/m²     CONSTITUTIONAL: well , well nourished, appears age appropriate  EYES: perrla, eom intact  ENMT:moist mucous membranes, pharynx clear  NECK: supple.  Thyroid normal, one half the angle of the jaw--JVD  RESPIRATORY: Chest: clear to ascultation and percussion   CARDIOVASCULAR: Heart: regular rate and rhythm  GASTROINTESTINAL: Abdomen: soft, bowel sounds active  HEMATOLOGIC: no pathological lymph nodes palpated  MUSCULOSKELETAL: Extremities: 1+ edema distally, pulse 1+   INTEGUMENT: No unusual rashes or suspicious skin lesions noted. Nails appear normal.  NEUROLOGIC: non-focal exam   MENTAL STATUS: alert and oriented, appropriate affect      ASSESSMENT:  1. 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 Chronic systolic congestive heart failure ([address]    3. Acquired hypothyroidism    4. Dyslipidemia    5. Physical deconditioning    6. Anemia, unspecified type    7. Vascular dementia without behavioral disturbance    8. Screening for alcoholism    9. Screening for depression    10. Wellness examination        PLAN:    1. I think her heart failure is slightly worse. Subjectively she is fine. I will await the results of her proBNP and lab work. 2. For anemia I will check a CBC. 3. Blood pressure is reasonable. Systolics are in the 246 range. I have had problems with this previously when she is on her diuretic. This is secondary to a low output state. 4. She will continue her statin in view of her primary cardiovascular risk prevention. 5. Finally, she will also remain on her thyroid supplement. Her last TSH was excellent. .  Orders Placed This Encounter    Depression Screen Annual    NT-PRO BNP    METABOLIC PANEL, BASIC    CBC WITH AUTOMATED DIFF    NT-PRO BNP         Follow-up and Dispositions    · Return in about 1 month (around 8/19/2019). Lily Valdez MD  This is the Subsequent Medicare Annual Wellness Exam, performed 12 months or more after the Initial AWV or the last Subsequent AWV    I have reviewed the patient's medical history in detail and updated the computerized patient record.      History     Past Medical History:   Diagnosis Date    Aortic insufficiency     Asthma     Cancer ([facility]     lymphoma    CKD (chronic kidney disease) stage 3, GFR 30-59 ml/min ([facility]) 1/10/2018    Congestive heart failure, NYHA class II, chronic, systolic ([facility])     Heart failure ([facility]     Hypertension     Mitral valvular regurgitation 1/22/2016    ECHO 2/3/17: LV dilated, EF 20-25%, mild LVH, RV mod dilated, mild- mod MELANIA, mod-severe MR, mod AI ECHO 7/29/17: EF 15%, severe DHK, reduced RVEF, RVH, RVSP 35 mmHg  Mild LAE, mod-marked MA fibrosis, mod-severe MR (2+), AO root dilated,      Nonrheumatic aortic valve insufficiency 10/16/2018    Presence of biventricular automatic cardioverter/defibrillator (AICD) 7/2/2015    Gabriels Verdezyne biventricular AICD implant    Stomach ulcer       Past Surgical History:   Procedure Laterality Date    HX BREAST BIOPSY Bilateral     40 years ago    HX COLONOSCOPY      HX HEENT      cataracts removed    HX HYSTERECTOMY      HX OTHER SURGICAL      lymph node surgery    HX TONSILLECTOMY      NE EGD TRANSORAL BIOPSY SINGLE/MULTIPLE  5/23/2012         SINUS SURGERY PROC UNLISTED      Nasal polyps removed     Current Outpatient Medications   Medication Sig Dispense Refill    carvedilol (COREG) 3.125 mg tablet Take 1 Tab by mouth two (2) times daily (with meals). Indications: chronic heart failure 60 Tab 11    potassium chloride SR (K-TAB) 20 mEq tablet Take 1 Tab by mouth two (2) times a day. 60 Tab 11    atorvastatin (LIPITOR) 10 mg tablet Take 10 mg by mouth nightly.  midodrine (PROAMITINE) 5 mg tablet Take 5 mg by mouth three (3) times daily.  ibrutinib (IMBRUVICA) 140 mg capsule Take 140 mg by mouth daily.  furosemide (LASIX) 40 mg tablet Take 40 mg by mouth two (2) times a day. Hold of SBP <110      polyethylene glycol (MIRALAX) 17 gram/dose powder Take 17 g by mouth every fourty-eight (48) hours.       allopurinol (ZYLOPRIM) 100 mg tablet TAKE ONE (1) TABLET(S) DAILY 30 Tab 11    dextromethorphan-guaiFENesin (ROBITUSSIN-DM)  mg/5 mL syrup Take 10 mL by mouth every six (6) hours as needed for Cough.  albuterol (PROVENTIL VENTOLIN) 2.5 mg /3 mL (0.083 %) nebulizer solution 2.5 mg by Nebulization route four (4) times daily.  levothyroxine (SYNTHROID) 25 mcg tablet Take 1 Tab by mouth Daily (before breakfast). 90 Tab 3    apixaban (ELIQUIS) 2.5 mg tablet Take 1 Tab by mouth two (2) times a day. Indications: PREVENT THROMBOEMBOLISM IN CHRONIC ATRIAL FIBRILLATION 180 Tab 3    fluticasone-salmeterol (ADVAIR) 250-50 mcg/dose diskus inhaler Take 1 Puff by inhalation two (2) times a day. 3 Inhaler 3    magnesium oxide 400 mg cap Take 1 Cap by mouth nightly.  cholecalciferol (VITAMIN D3) 1,000 unit cap Take 1,000 Units by mouth daily.  loratadine (CLARITIN) 10 mg tablet Take 10 mg by mouth nightly.  co-enzyme Q-10 (CO Q-10) 100 mg capsule Take 100 mg by mouth daily.  mirtazapine (REMERON) 15 mg tablet Take 1 Tab by mouth nightly.  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    DILAN (obstructive sleep apnea) G47.33    Rhinitis J31.0    Anemia D64.9    Reactive airway disease J45.909    HTN (hypertension) I10    Gout M10.9    LBBB (left bundle branch block) I44.7    Presence of biventricular automatic cardioverter/defibrillator (AICD) Z95.810    Chronic systolic congestive heart failure (HCC) I50.22    Lymphoma (HCC) C85.90    Mitral valvular regurgitation I34.0    Physical deconditioning R53.81    Gastroesophageal reflux disease with esophagitis K21.0    Thoracic aortic aneurysm without rupture (HCC) I71.2    Paroxysmal atrial fibrillation (HCC) I48.0    Xerosis of skin L85.3    Dyslipidemia E78.5    CKD (chronic kidney disease) stage 3, GFR 30-59 ml/min (HCC) N18.3    Acquired hypothyroidism E03.9    Nonrheumatic aortic valve insufficiency I35.1    UTI (urinary tract infection) N39.0    Counseling regarding advanced care planning and goals of care Z71.89    CAP (community acquired pneumonia) J18.9       Depression Risk Factor Screening:     3 most recent PHQ Screens 7/23/2019   Little interest or pleasure in doing things Several days   Feeling down, depressed, irritable, or hopeless Several days   Total Score PHQ 2 2     Alcohol Risk Factor Screening: You do not drink alcohol or very rarely. Functional Ability and Level of Safety:   Hearing Loss  Hearing is good. Activities of Daily Living  The home contains: no safety equipment. Patient does total self care    Fall Risk  Fall Risk Assessment, last 12 mths 7/23/2019   Able to walk? Yes   Fall in past 12 months? No   Fall with injury? -   Number of falls in past 12 months -   Fall Risk Score -       Abuse Screen  Patient is not abused    Cognitive Screening   Evaluation of Cognitive Function:  Has your family/caregiver stated any concerns about your memory: no  Normal    Patient Care Team   Patient Care Team:  Winifred Montgomery MD as PCP - General (Internal Medicine)  Honorio Zarate MD (Cardiology)  Renu Tanner MD as Physician (Cardiology)  Aubrey Reece MD as Surgeon (Cardiothoracic Surgery)  Yamileth Ennis RN as Ambulatory Care Navigator  Yamileth Ennis RN as Ambulatory Care Navigator (General Practice)    Assessment/Plan   Education and counseling provided:  Are appropriate based on today's review and evaluation    Diagnoses and all orders for this visit:    1.  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 Chronic systolic congestive heart failure (HCC)  -     NT-PRO BNP  -     METABOLIC PANEL, BASIC    3. Acquired hypothyroidism    4. Dyslipidemia    5. Physical deconditioning    6. Anemia, unspecified type  -     CBC WITH AUTOMATED DIFF    7. Vascular dementia without behavioral disturbance    8. Screening for alcoholism  -     VT ANNUAL ALCOHOL SCREEN 15 MIN    9. Screening for depression  -     [place] [address]    10. Wellness examination    Other orders  -     NT-PRO BNP        There are no preventive care reminders to display for this patient.

## 2019-08-20 ENCOUNTER — OFFICE VISIT (OUTPATIENT)
Dept: INTERNAL MEDICINE CLINIC | Age: 84
End: 2019-08-20

## 2019-08-20 VITALS
SYSTOLIC BLOOD PRESSURE: 101 MMHG | OXYGEN SATURATION: 91 % | TEMPERATURE: 97.8 F | WEIGHT: 125.6 LBS | HEART RATE: 85 BPM | DIASTOLIC BLOOD PRESSURE: 66 MMHG | HEIGHT: 61 IN | BODY MASS INDEX: 23.71 KG/M2

## 2019-08-20 DIAGNOSIS — J45.20 MILD INTERMITTENT REACTIVE AIRWAY DISEASE WITHOUT COMPLICATION: ICD-10-CM

## 2019-08-20 DIAGNOSIS — E78.5 DYSLIPIDEMIA: ICD-10-CM

## 2019-08-20 DIAGNOSIS — I50.22 CHRONIC SYSTOLIC CONGESTIVE HEART FAILURE (HCC): ICD-10-CM

## 2019-08-20 DIAGNOSIS — I48.0 PAROXYSMAL ATRIAL FIBRILLATION (HCC): ICD-10-CM

## 2019-08-20 DIAGNOSIS — R41.3 POOR SHORT-TERM MEMORY: ICD-10-CM

## 2019-08-20 DIAGNOSIS — I42.0 CARDIOMYOPATHY, DILATED, NONISCHEMIC (HCC): Primary | Chronic | ICD-10-CM

## 2019-08-20 NOTE — PROGRESS NOTES
Chief Complaint   Patient presents with    Hypertension     Patient is here for a hypertention follow up. Per patient she has a big knot on arm from having blood drawn. 1. Have you been to the ER, urgent care clinic since your last visit? Hospitalized since your last visit? No    2. Have you seen or consulted any other health care providers outside of the [address] [place] [address] since your last visit? Include any pap smears or colon screening.  No

## 2019-08-21 PROBLEM — R41.3 POOR SHORT-TERM MEMORY: Status: ACTIVE | Noted: 2019-08-21

## 2019-08-21 NOTE — PROGRESS NOTES
[address] [facility]  [place] [address]  [address] [place] [id number]  Phone:  [phone]  Fax: [phone]       Chief Complaint   Patient presents with    Hypertension     Patient is here for a hypertention follow up. Per patient she has a big knot on arm from having blood drawn. .      SUBJECTIVE:    Teresa Bunn is a 80 y.o. female Comes in for return visit accompanied by one of the nurses. She states she is feeling well. She denies dyspnea on exertion, orthopnea or PND. Her exertional tolerance is quite reasonable. She inadvertently fell while walking outside, but attributed this to a defect in the sidewalk. She did not lose consciousness. She is using her rollator for assisted ambulation. 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 Current Outpatient Medications   Medication Sig Dispense Refill    carvedilol (COREG) 3.125 mg tablet Take 1 Tab by mouth two (2) times daily (with meals). Indications: chronic heart failure 60 Tab 11    potassium chloride SR (K-TAB) 20 mEq tablet Take 1 Tab by mouth two (2) times a day. 60 Tab 11    mirtazapine (REMERON) 15 mg tablet Take 1 Tab by mouth nightly. 30 Tab 11    atorvastatin (LIPITOR) 10 mg tablet Take 10 mg by mouth nightly.  midodrine (PROAMITINE) 5 mg tablet Take 5 mg by mouth three (3) times daily.  ibrutinib (IMBRUVICA) 140 mg capsule Take 140 mg by mouth daily.  furosemide (LASIX) 40 mg tablet Take 40 mg by mouth two (2) times a day. Hold of SBP <110      polyethylene glycol (MIRALAX) 17 gram/dose powder Take 17 g by mouth every fourty-eight (48) hours.  allopurinol (ZYLOPRIM) 100 mg tablet TAKE ONE (1) TABLET(S) DAILY 30 Tab 11    dextromethorphan-guaiFENesin (ROBITUSSIN-DM)  mg/5 mL syrup Take 10 mL by mouth every six (6) hours as needed for Cough.       albuterol (PROVENTIL VENTOLIN) 2.5 mg /3 mL (0.083 %) nebulizer solution 2.5 mg by Nebulization route four (4) times daily.  levothyroxine (SYNTHROID) 25 mcg tablet Take 1 Tab by mouth Daily (before breakfast). 90 Tab 3    apixaban (ELIQUIS) 2.5 mg tablet Take 1 Tab by mouth two (2) times a day. Indications: PREVENT THROMBOEMBOLISM IN CHRONIC ATRIAL FIBRILLATION 180 Tab 3    fluticasone-salmeterol (ADVAIR) 250-50 mcg/dose diskus inhaler Take 1 Puff by inhalation two (2) times a day. 3 Inhaler 3    magnesium oxide 400 mg cap Take 1 Cap by mouth nightly.  cholecalciferol (VITAMIN D3) 1,000 unit cap Take 1,000 Units by mouth daily.  loratadine (CLARITIN) 10 mg tablet Take 10 mg by mouth nightly.  co-enzyme Q-10 (CO Q-10) 100 mg capsule Take 100 mg by mouth daily.        Past Medical History:   Diagnosis Date    Aortic insufficiency     Asthma     Cancer ([facility]     lymphoma    CKD (chronic kidney disease) stage 3, GFR 30-59 ml/min ([facility]) 1/10/2018    Congestive heart failure, NYHA class II, chronic, systolic ([facility])     Heart failure ([facility]     Hypertension     Mitral valvular regurgitation 1/22/2016    ECHO 2/3/17: LV dilated, EF 20-25%, mild LVH, RV mod dilated, mild- mod MELANIA, mod-severe MR, mod AI ECHO 7/29/17: EF 15%, severe DHK, reduced RVEF, RVH, RVSP 35 mmHg  Mild LAE, mod-marked MA fibrosis, mod-severe MR (2+), AO root dilated,      Nonrheumatic aortic valve insufficiency 10/16/2018    Presence of biventricular automatic cardioverter/defibrillator (AICD) 7/2/2015    [employer] biventricular AICD implant    Stomach ulcer      Past Surgical History:   Procedure Laterality Date    HX BREAST BIOPSY Bilateral     40 years ago    HX COLONOSCOPY      HX HEENT      cataracts removed    HX HYSTERECTOMY      HX OTHER SURGICAL      lymph node surgery    HX TONSILLECTOMY      SD EGD TRANSORAL BIOPSY SINGLE/MULTIPLE  5/23/2012         SINUS SURGERY PROC UNLISTED      Nasal polyps removed     Allergies   Allergen Reactions    Codeine Other (comments)     \"made me disoriented\" per pt         REVIEW OF SYSTEMS:  General: negative for - chills or fever  ENT: negative for - headaches, nasal congestion or tinnitus  Respiratory: negative for - cough, hemoptysis, shortness of breath or wheezing  Cardiovascular : negative for - chest pain, edema, palpitations or shortness of breath  Gastrointestinal: negative for - abdominal pain, blood in stools, heartburn or nausea/vomiting  Genito-Urinary: no dysuria, trouble voiding, or hematuria  Musculoskeletal: negative for - gait disturbance, joint pain, joint stiffness or joint swelling  Neurological: no TIA or stroke symptoms  Hematologic: no bruises, no bleeding, no swollen glands  Integument: no lumps, mole changes, nail changes or rash  Endocrine: no malaise/lethargy or unexpected weight changes      Social History     Socioeconomic History    Marital status:      Spouse name: Not on file    Number of children: 1    Years of education: 16+    Highest education level: Master's degree (e.g., MA, MS, [place], MEd, MSW, LUKE)   Occupational History    Occupation: retired teacher   Tobacco Use    Smoking status: Never Smoker    Smokeless tobacco: Never Used   Substance and Sexual Activity    Alcohol use: No     Alcohol/week: 0.0 standard drinks    Drug use: No    Sexual activity: Never     Family History   Problem Relation Age of Onset    Heart Disease Mother     Stroke Father        OBJECTIVE:    Visit Vitals  /66   Pulse 85   Temp 97.8 °F (36.6 °C)   Ht 5' 1\" (1.549 m)   Wt 125 lb 9.6 oz (57 kg)   SpO2 91%   BMI 23.73 kg/m²     CONSTITUTIONAL: well , well nourished, appears age appropriate  EYES: perrla, eom intact  ENMT:moist mucous membranes, pharynx clear  NECK: supple.  Thyroid normal,JVD 1/2 angle of jaw  RESPIRATORY: Chest: clear to ascultation and percussion   CARDIOVASCULAR: Heart: regular rate and rhythm  GASTROINTESTINAL: Abdomen: soft, bowel sounds active  HEMATOLOGIC: no pathological lymph nodes palpated  MUSCULOSKELETAL: Extremities: 1+ edema distally, pulse 1+,large varax R antecubital fossa   INTEGUMENT: No unusual rashes or suspicious skin lesions noted. Nails appear normal.  NEUROLOGIC: non-focal exam   MENTAL STATUS: alert and oriented, appropriate affect      ASSESSMENT:  1. 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 Chronic systolic congestive heart failure (HCC)    3. Paroxysmal atrial fibrillation ([address]    4. Mild intermittent reactive airway disease without complication    5. Poor short-term memory    6. Dyslipidemia        PLAN:    1. She is reasonably well compensated as far as her cardiac status is concerned. She is no worse. 2. Her rhythm is reasonably stable today. She does have paroxysmal atrial fibrillation and remains on her DOAC. 3. She is concerned about the ______________ on her right arm, but I suggest to her that nothing need to be done. 4. She does have mild reactive airway disease and will use her bronchodilators as previously prescribed. 5. Mentally she appears to be reasonably stable. She does have defects in her short term memory, but she is functional.  Emotionally she appears to be quite stable today. .  Orders Placed This Encounter    METABOLIC PANEL, BASIC         Follow-up and Dispositions    · Return in about 4 weeks (around 9/17/2019).            Marie Casas MD

## 2019-08-31 ENCOUNTER — PATIENT OUTREACH (OUTPATIENT)
Dept: INTERNAL MEDICINE CLINIC | Age: 84
End: 2019-08-31

## 2019-09-01 NOTE — PROGRESS NOTES
Patient listed on Graduation List sent 8/31/2019. Patient has attended [facility] PCP x 2 appointments as scheduled. Patient's symptoms are stable at this time. Patient/family has the ability to self-manage with assistance from [facility]. Care management goals have been completed at this time. No further nurse navigator follow up scheduled. Disease Specific VANESA changed to Complex CM.     Goals completed

## 2019-09-17 ENCOUNTER — HOSPITAL ENCOUNTER (OUTPATIENT)
Dept: GENERAL RADIOLOGY | Age: 84
Discharge: HOME | End: 2019-09-17
Attending: INTERNAL MEDICINE
Payer: MEDICARE

## 2019-09-17 DIAGNOSIS — J90 PLEURAL EFFUSION: ICD-10-CM

## 2019-09-17 PROCEDURE — 71046 X-RAY EXAM CHEST 2 VIEWS: CPT

## 2019-09-18 ENCOUNTER — PATIENT OUTREACH (OUTPATIENT)
Dept: INTERNAL MEDICINE CLINIC | Age: 84
End: 2019-09-18

## 2019-09-18 NOTE — PROGRESS NOTES
Goals Addressed                 This Visit's Progress     Prevent relapse of pneumonia symptoms. On track     9/18/2019:  Daryn Rubensms 6/25-7/2/2019 (10/2/2019):  Community Acquired Pneumonia f/u. Patient remains at [facility]. NN spoke with Caretaker Daughter Vero parikh Cardiology appointment requested by patient on 9/16 for f/u appt with Dr. Kathy Lee as Dr. Graham Giles office cancelled patient's last appointment per daughter, and she has not received a rescheduled appt call-agrees to f/u today with a call to Cardiology's office for rescheduled date. Patient was scheduled f/u chest xray by Pulmonologist;  had radiology studies done by Dr. Antonietta España on yesterday for pleural effusion (results:  PNA resolved). Green zone:  States patient is doing so much better now; free of cough; less stressed--more active--sounding less depressed (as son & daughter in law live out of town). States patient say very little swell in ankles.    EW.          goal completion:  10/2/2019

## 2019-09-24 ENCOUNTER — OFFICE VISIT (OUTPATIENT)
Dept: INTERNAL MEDICINE CLINIC | Age: 84
End: 2019-09-24

## 2019-09-24 VITALS
OXYGEN SATURATION: 94 % | BODY MASS INDEX: 23.11 KG/M2 | DIASTOLIC BLOOD PRESSURE: 69 MMHG | WEIGHT: 122.4 LBS | HEART RATE: 73 BPM | HEIGHT: 61 IN | TEMPERATURE: 97.6 F | RESPIRATION RATE: 14 BRPM | SYSTOLIC BLOOD PRESSURE: 108 MMHG

## 2019-09-24 DIAGNOSIS — N18.30 CKD (CHRONIC KIDNEY DISEASE) STAGE 3, GFR 30-59 ML/MIN (HCC): Chronic | ICD-10-CM

## 2019-09-24 DIAGNOSIS — I50.22 CHRONIC SYSTOLIC CONGESTIVE HEART FAILURE (HCC): ICD-10-CM

## 2019-09-24 DIAGNOSIS — C85.90 NON-HODGKIN'S LYMPHOMA, UNSPECIFIED BODY REGION, UNSPECIFIED NON-HODGKIN LYMPHOMA TYPE (HCC): ICD-10-CM

## 2019-09-24 DIAGNOSIS — E03.9 ACQUIRED HYPOTHYROIDISM: ICD-10-CM

## 2019-09-24 DIAGNOSIS — I42.0 CARDIOMYOPATHY, DILATED, NONISCHEMIC (HCC): Primary | Chronic | ICD-10-CM

## 2019-09-24 DIAGNOSIS — D64.9 ANEMIA, UNSPECIFIED TYPE: ICD-10-CM

## 2019-09-24 DIAGNOSIS — R32 URINARY INCONTINENCE, UNSPECIFIED TYPE: ICD-10-CM

## 2019-09-24 NOTE — PROGRESS NOTES
[address] [facility]  [facility]  [address] [facility] [address]  Phone:  [phone]  Fax: [phone]       Chief Complaint   Patient presents with    CHF     1 month follow up    . SUBJECTIVE:    Patience Rae is a 80 y.o. female Comes in for return visit stating that she has done reasonably well. She continues to have intermittent dyspnea on exertion, but this is no worse and if anything is actually better. She has lost weight, indicating that she has had an adequate diuresis. For the first time she complains of difficulty with her short term memory. This was observed previously and I felt as if she was developing mild dementia. She has a past history of dyslipidemia, gout, and hypothyroidism. Current Outpatient Medications   Medication Sig Dispense Refill    carvedilol (COREG) 3.125 mg tablet Take 1 Tab by mouth two (2) times daily (with meals). Indications: chronic heart failure 60 Tab 11    potassium chloride SR (K-TAB) 20 mEq tablet Take 1 Tab by mouth two (2) times a day. 60 Tab 11    mirtazapine (REMERON) 15 mg tablet Take 1 Tab by mouth nightly. 30 Tab 11    atorvastatin (LIPITOR) 10 mg tablet Take 10 mg by mouth nightly.  midodrine (PROAMITINE) 5 mg tablet Take 5 mg by mouth three (3) times daily.  ibrutinib (IMBRUVICA) 140 mg capsule Take 140 mg by mouth daily.  furosemide (LASIX) 40 mg tablet Take 40 mg by mouth two (2) times a day. Hold of SBP <110      polyethylene glycol (MIRALAX) 17 gram/dose powder Take 17 g by mouth every fourty-eight (48) hours.  allopurinol (ZYLOPRIM) 100 mg tablet TAKE ONE (1) TABLET(S) DAILY 30 Tab 11    dextromethorphan-guaiFENesin (ROBITUSSIN-DM)  mg/5 mL syrup Take 10 mL by mouth every six (6) hours as needed for Cough.  albuterol (PROVENTIL VENTOLIN) 2.5 mg /3 mL (0.083 %) nebulizer solution 2.5 mg by Nebulization route four (4) times daily.       levothyroxine (SYNTHROID) 25 mcg tablet Take 1 Tab by mouth Daily (before breakfast). 90 Tab 3    apixaban (ELIQUIS) 2.5 mg tablet Take 1 Tab by mouth two (2) times a day. Indications: PREVENT THROMBOEMBOLISM IN CHRONIC ATRIAL FIBRILLATION 180 Tab 3    fluticasone-salmeterol (ADVAIR) 250-50 mcg/dose diskus inhaler Take 1 Puff by inhalation two (2) times a day. 3 Inhaler 3    magnesium oxide 400 mg cap Take 1 Cap by mouth nightly.  cholecalciferol (VITAMIN D3) 1,000 unit cap Take 1,000 Units by mouth daily.  loratadine (CLARITIN) 10 mg tablet Take 10 mg by mouth nightly.  co-enzyme Q-10 (CO Q-10) 100 mg capsule Take 100 mg by mouth daily.        Past Medical History:   Diagnosis Date    Aortic insufficiency     Asthma     Cancer ([facility]     lymphoma    CKD (chronic kidney disease) stage 3, GFR 30-59 ml/min ([facility]) 1/10/2018    Congestive heart failure, NYHA class II, chronic, systolic ([facility])     Heart failure ([facility]     Hypertension     Mitral valvular regurgitation 1/22/2016    ECHO 2/3/17: LV dilated, EF 20-25%, mild LVH, RV mod dilated, mild- mod MELANIA, mod-severe MR, mod AI ECHO 7/29/17: EF 15%, severe DHK, reduced RVEF, RVH, RVSP 35 mmHg  Mild LAE, mod-marked MA fibrosis, mod-severe MR (2+), AO root dilated,      Nonrheumatic aortic valve insufficiency 10/16/2018    Presence of biventricular automatic cardioverter/defibrillator (AICD) 7/2/2015    [employer] biventricular AICD implant    Stomach ulcer      Past Surgical History:   Procedure Laterality Date    HX BREAST BIOPSY Bilateral     40 years ago    HX COLONOSCOPY      HX HEENT      cataracts removed    HX HYSTERECTOMY      HX OTHER SURGICAL      lymph node surgery    HX TONSILLECTOMY      TN EGD TRANSORAL BIOPSY SINGLE/MULTIPLE  5/23/2012         SINUS SURGERY PROC UNLISTED      Nasal polyps removed     Allergies   Allergen Reactions    Codeine Other (comments)     \"made me disoriented\" per pt         REVIEW OF SYSTEMS:  General: negative for - chills or fever  ENT: negative for - headaches, nasal congestion or tinnitus  Respiratory: negative for - cough, hemoptysis, shortness of breath or wheezing  Cardiovascular : negative for - chest pain, edema, palpitations or shortness of breath  Gastrointestinal: negative for - abdominal pain, blood in stools, heartburn or nausea/vomiting  Genito-Urinary: no dysuria, trouble voiding, or hematuria  Musculoskeletal: negative for - gait disturbance, joint pain, joint stiffness or joint swelling  Neurological: no TIA or stroke symptoms  Hematologic: no bruises, no bleeding, no swollen glands  Integument: no lumps, mole changes, nail changes or rash  Endocrine: no malaise/lethargy or unexpected weight changes      Social History     Socioeconomic History    Marital status:      Spouse name: Not on file    Number of children: 1    Years of education: 16+    Highest education level: Master's degree (e.g., MA, MS, [place], MEd, MSW, LUKE)   Occupational History    Occupation: retired teacher   Tobacco Use    Smoking status: Never Smoker    Smokeless tobacco: Never Used   Substance and Sexual Activity    Alcohol use: No     Alcohol/week: 0.0 standard drinks    Drug use: No    Sexual activity: Never     Family History   Problem Relation Age of Onset    Heart Disease Mother     Stroke Father        OBJECTIVE:    Visit Vitals  /69   Pulse 73   Temp 97.6 °F (36.4 °C) (Oral)   Resp 14   Ht 5' 1\" (1.549 m)   Wt 122 lb 6.4 oz (55.5 kg)   SpO2 94%   BMI 23.13 kg/m²     CONSTITUTIONAL: well , well nourished, appears age appropriate  EYES: perrla, eom intact  ENMT:moist mucous membranes, pharynx clear  NECK: supple.  Thyroid normal  RESPIRATORY: Chest: clear to ascultation and percussion   CARDIOVASCULAR: Heart: regular rate and rhythm  GASTROINTESTINAL: Abdomen: soft, bowel sounds active  HEMATOLOGIC: no pathological lymph nodes palpated  MUSCULOSKELETAL: Extremities: no edema, pulse 1+   INTEGUMENT: No unusual rashes or suspicious skin lesions noted. Nails appear normal.  NEUROLOGIC: non-focal exam   MENTAL STATUS: alert and oriented, appropriate affect      ASSESSMENT:  1. 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 Chronic systolic congestive heart failure ([address]    3. CKD (chronic kidney disease) stage 3, GFR 30-59 ml/min ([facility])    4. Non-Hodgkin's lymphoma, unspecified body region, unspecified non-Hodgkin lymphoma type ([address]    5. Anemia, unspecified type    6. Urinary incontinence, unspecified type    7. Acquired hypothyroidism          PLAN:    1. The patient appears to have a reasonably compensated cardiomyopathy. There are no overt signs of cardiac decompensation other than her neck vein distention, which is chronic. 2. She does have mild renal dysfunction, but this is predominantly prerenal in origin. 3. She follows up with her oncologist, Dr. Yadi Bhatia, for non Hodgkin's lymphoma and possible leukemic transformation. 4. She does have occasional urinary incontinence and UA will be obtained to rule out an infection. 5. Her anemia is secondary to her lymphoma. This again is in the domain of Dr. Cali Camargo. 6.   The patient does have a history of hypothyroidism and TSH will be assessed for efficacy and compliance. .  Orders Placed This Encounter    CBC WITH AUTOMATED DIFF    METABOLIC PANEL, BASIC    URINALYSIS W/ RFLX MICROSCOPIC    TSH 3RD GENERATION         Follow-up and Dispositions    · Return in about 6 weeks (around 11/5/2019).            Rebecca Priest MD

## 2019-09-24 NOTE — PROGRESS NOTES
Chief Complaint   Patient presents with    CHF     1 month follow up      1. Have you been to the ER, urgent care clinic since your last visit? Hospitalized since your last visit? No    2. Have you seen or consulted any other health care providers outside of the [address] [place] [address] since your last visit? Include any pap smears or colon screening.  No

## 2019-09-25 LAB
BASOPHILS # BLD AUTO: 0 X10E3/UL (ref 0–0.2)
BASOPHILS NFR BLD AUTO: 1 %
BUN SERPL-MCNC: 61 MG/DL (ref 10–36)
BUN/CREAT SERPL: 45 (ref 12–28)
CALCIUM SERPL-MCNC: 9.5 MG/DL (ref 8.7–10.3)
CHLORIDE SERPL-SCNC: 93 MMOL/L (ref 96–106)
CO2 SERPL-SCNC: 32 MMOL/L (ref 20–29)
CREAT SERPL-MCNC: 1.37 MG/DL (ref 0.57–1)
EOSINOPHIL # BLD AUTO: 0.1 X10E3/UL (ref 0–0.4)
EOSINOPHIL NFR BLD AUTO: 3 %
ERYTHROCYTE [DISTWIDTH] IN BLOOD BY AUTOMATED COUNT: 15.3 % (ref 12.3–15.4)
GLUCOSE SERPL-MCNC: 76 MG/DL (ref 65–99)
HCT VFR BLD AUTO: 34.1 % (ref 34–46.6)
HGB BLD-MCNC: 11.2 G/DL (ref 11.1–15.9)
IMM GRANULOCYTES # BLD AUTO: 0 X10E3/UL (ref 0–0.1)
IMM GRANULOCYTES NFR BLD AUTO: 1 %
LYMPHOCYTES # BLD AUTO: 1.6 X10E3/UL (ref 0.7–3.1)
LYMPHOCYTES NFR BLD AUTO: 45 %
MCH RBC QN AUTO: 30.6 PG (ref 26.6–33)
MCHC RBC AUTO-ENTMCNC: 32.8 G/DL (ref 31.5–35.7)
MCV RBC AUTO: 93 FL (ref 79–97)
MONOCYTES # BLD AUTO: 0.2 X10E3/UL (ref 0.1–0.9)
MONOCYTES NFR BLD AUTO: 4 %
NEUTROPHILS # BLD AUTO: 1.7 X10E3/UL (ref 1.4–7)
NEUTROPHILS NFR BLD AUTO: 46 %
PLATELET # BLD AUTO: 216 X10E3/UL (ref 150–450)
POTASSIUM SERPL-SCNC: 3.1 MMOL/L (ref 3.5–5.2)
RBC # BLD AUTO: 3.66 X10E6/UL (ref 3.77–5.28)
SODIUM SERPL-SCNC: 143 MMOL/L (ref 134–144)
WBC # BLD AUTO: 3.7 X10E3/UL (ref 3.4–10.8)

## 2019-10-01 ENCOUNTER — TELEPHONE (OUTPATIENT)
Dept: INTERNAL MEDICINE CLINIC | Age: 84
End: 2019-10-01

## 2019-10-01 NOTE — TELEPHONE ENCOUNTER
SPOKE WITH NURSE LOPEZ AT [facility] . THEIR NUMBER -[id number]. patient get bumex 2mg daily and on mon-wed-fri she gets 4mg along with zaroxlyn 2.5mg daily. Per Dr. Nataliia Beebe we ask the nurse to have the physican decrease the meds because patient is dehydrated. The nurse states she will call us back.

## 2019-10-04 ENCOUNTER — TELEPHONE (OUTPATIENT)
Dept: INTERNAL MEDICINE CLINIC | Age: 84
End: 2019-10-04

## 2019-10-04 NOTE — TELEPHONE ENCOUNTER
We get a call from caregiver at [address] [place] [address] that patient BUN 70 AND CREAT 1.4 so they are decreasing her zaroxlyn to Tuesday ,Thursday and Saturday but they are skipping this Saturday and they will call us on wed. They state she is a lot better.

## 2019-10-09 LAB — SPECIMEN STATUS REPORT, ROLRST: NORMAL

## 2019-10-17 LAB
SPECIMEN STATUS REPORT, ROLRST: NORMAL
TSH SERPL DL<=0.005 MIU/L-ACNC: 5.51 UIU/ML (ref 0.45–4.5)

## 2019-10-23 ENCOUNTER — APPOINTMENT (OUTPATIENT)
Dept: CT IMAGING | Age: 84
DRG: 871 | End: 2019-10-23
Attending: EMERGENCY MEDICINE
Payer: MEDICARE

## 2019-10-23 ENCOUNTER — HOSPITAL ENCOUNTER (INPATIENT)
Age: 84
LOS: 14 days | Discharge: NURSING, REHAB OR LONG-TERM CARE | DRG: 871 | End: 2019-11-06
Attending: EMERGENCY MEDICINE | Admitting: HOSPITALIST
Payer: MEDICARE

## 2019-10-23 ENCOUNTER — APPOINTMENT (OUTPATIENT)
Dept: GENERAL RADIOLOGY | Age: 84
DRG: 871 | End: 2019-10-23
Attending: EMERGENCY MEDICINE
Payer: MEDICARE

## 2019-10-23 ENCOUNTER — PATIENT OUTREACH (OUTPATIENT)
Dept: INTERNAL MEDICINE CLINIC | Age: 84
End: 2019-10-23

## 2019-10-23 DIAGNOSIS — G93.41 METABOLIC ENCEPHALOPATHY: ICD-10-CM

## 2019-10-23 DIAGNOSIS — R41.0 CONFUSION: ICD-10-CM

## 2019-10-23 DIAGNOSIS — R50.9 FEVER, UNSPECIFIED FEVER CAUSE: Primary | ICD-10-CM

## 2019-10-23 DIAGNOSIS — I35.8 ENDOCARDITIS OF AORTIC VALVE: ICD-10-CM

## 2019-10-23 DIAGNOSIS — B95.2 BACTEREMIA DUE TO ENTEROCOCCUS: ICD-10-CM

## 2019-10-23 DIAGNOSIS — R78.81 BACTEREMIA DUE TO ENTEROCOCCUS: ICD-10-CM

## 2019-10-23 DIAGNOSIS — R53.1 GENERALIZED WEAKNESS: ICD-10-CM

## 2019-10-23 DIAGNOSIS — Z71.89 GOALS OF CARE, COUNSELING/DISCUSSION: ICD-10-CM

## 2019-10-23 DIAGNOSIS — E86.0 DEHYDRATION: ICD-10-CM

## 2019-10-23 LAB
ALBUMIN SERPL-MCNC: 2.5 G/DL (ref 3.5–5)
ALBUMIN/GLOB SERPL: 0.6 {RATIO} (ref 1.1–2.2)
ALP SERPL-CCNC: 110 U/L (ref 45–117)
ALT SERPL-CCNC: 17 U/L (ref 12–78)
ANION GAP SERPL CALC-SCNC: 10 MMOL/L (ref 5–15)
APPEARANCE UR: CLEAR
AST SERPL-CCNC: 26 U/L (ref 15–37)
ATRIAL RATE: 72 BPM
BACTERIA URNS QL MICRO: NEGATIVE /HPF
BASOPHILS # BLD: 0 K/UL (ref 0–0.1)
BASOPHILS NFR BLD: 0 % (ref 0–1)
BILIRUB SERPL-MCNC: 0.9 MG/DL (ref 0.2–1)
BILIRUB UR QL: NEGATIVE
BUN SERPL-MCNC: 63 MG/DL (ref 6–20)
BUN/CREAT SERPL: 41 (ref 12–20)
CALCIUM SERPL-MCNC: 8.9 MG/DL (ref 8.5–10.1)
CALCULATED R AXIS, ECG10: -165 DEGREES
CALCULATED T AXIS, ECG11: -7 DEGREES
CHLORIDE SERPL-SCNC: 100 MMOL/L (ref 97–108)
CO2 SERPL-SCNC: 30 MMOL/L (ref 21–32)
COLOR UR: ABNORMAL
CREAT SERPL-MCNC: 1.53 MG/DL (ref 0.55–1.02)
DIAGNOSIS, 93000: NORMAL
DIFFERENTIAL METHOD BLD: ABNORMAL
EOSINOPHIL # BLD: 0 K/UL (ref 0–0.4)
EOSINOPHIL NFR BLD: 0 % (ref 0–7)
EPITH CASTS URNS QL MICRO: ABNORMAL /LPF
ERYTHROCYTE [DISTWIDTH] IN BLOOD BY AUTOMATED COUNT: 16.7 % (ref 11.5–14.5)
FLUAV AG NPH QL IA: NEGATIVE
FLUBV AG NOSE QL IA: NEGATIVE
GLOBULIN SER CALC-MCNC: 4.2 G/DL (ref 2–4)
GLUCOSE SERPL-MCNC: 116 MG/DL (ref 65–100)
GLUCOSE UR STRIP.AUTO-MCNC: NEGATIVE MG/DL
HCT VFR BLD AUTO: 31.4 % (ref 35–47)
HEMOCCULT STL QL: NEGATIVE
HGB BLD-MCNC: 10 G/DL (ref 11.5–16)
HGB UR QL STRIP: ABNORMAL
HYALINE CASTS URNS QL MICRO: ABNORMAL /LPF (ref 0–5)
IMM GRANULOCYTES # BLD AUTO: 0 K/UL (ref 0–0.04)
IMM GRANULOCYTES NFR BLD AUTO: 0 % (ref 0–0.5)
KETONES UR QL STRIP.AUTO: NEGATIVE MG/DL
LACTATE BLD-SCNC: 0.73 MMOL/L (ref 0.4–2)
LEUKOCYTE ESTERASE UR QL STRIP.AUTO: NEGATIVE
LYMPHOCYTES # BLD: 1.3 K/UL (ref 0.8–3.5)
LYMPHOCYTES NFR BLD: 43 % (ref 12–49)
MAGNESIUM SERPL-MCNC: 2.3 MG/DL (ref 1.6–2.4)
MCH RBC QN AUTO: 28.7 PG (ref 26–34)
MCHC RBC AUTO-ENTMCNC: 31.8 G/DL (ref 30–36.5)
MCV RBC AUTO: 90.2 FL (ref 80–99)
MONOCYTES # BLD: 0.2 K/UL (ref 0–1)
MONOCYTES NFR BLD: 5 % (ref 5–13)
NEUTS BAND NFR BLD MANUAL: 9 %
NEUTS SEG # BLD: 1.5 K/UL (ref 1.8–8)
NEUTS SEG NFR BLD: 43 % (ref 32–75)
NITRITE UR QL STRIP.AUTO: NEGATIVE
NRBC # BLD: 0 K/UL (ref 0–0.01)
NRBC BLD-RTO: 0 PER 100 WBC
P-R INTERVAL, ECG05: 186 MS
PH UR STRIP: 7 [PH] (ref 5–8)
PLATELET # BLD AUTO: 125 K/UL (ref 150–400)
PMV BLD AUTO: 11.4 FL (ref 8.9–12.9)
POTASSIUM SERPL-SCNC: 3.2 MMOL/L (ref 3.5–5.1)
PROT SERPL-MCNC: 6.7 G/DL (ref 6.4–8.2)
PROT UR STRIP-MCNC: NEGATIVE MG/DL
Q-T INTERVAL, ECG07: 512 MS
QRS DURATION, ECG06: 182 MS
QTC CALCULATION (BEZET), ECG08: 560 MS
RBC # BLD AUTO: 3.48 M/UL (ref 3.8–5.2)
RBC #/AREA URNS HPF: ABNORMAL /HPF (ref 0–5)
RBC MORPH BLD: ABNORMAL
SODIUM SERPL-SCNC: 140 MMOL/L (ref 136–145)
SP GR UR REFRACTOMETRY: 1.01 (ref 1–1.03)
TROPONIN I SERPL-MCNC: 0.09 NG/ML
TROPONIN I SERPL-MCNC: 0.11 NG/ML
UA: UC IF INDICATED,UAUC: ABNORMAL
UROBILINOGEN UR QL STRIP.AUTO: 0.2 EU/DL (ref 0.2–1)
VENTRICULAR RATE, ECG03: 72 BPM
WBC # BLD AUTO: 3 K/UL (ref 3.6–11)
WBC MORPH BLD: ABNORMAL
WBC URNS QL MICRO: ABNORMAL /HPF (ref 0–4)

## 2019-10-23 PROCEDURE — 74011250636 HC RX REV CODE- 250/636: Performed by: EMERGENCY MEDICINE

## 2019-10-23 PROCEDURE — 71045 X-RAY EXAM CHEST 1 VIEW: CPT

## 2019-10-23 PROCEDURE — 87186 SC STD MICRODIL/AGAR DIL: CPT

## 2019-10-23 PROCEDURE — 74011250637 HC RX REV CODE- 250/637: Performed by: HOSPITALIST

## 2019-10-23 PROCEDURE — 82272 OCCULT BLD FECES 1-3 TESTS: CPT

## 2019-10-23 PROCEDURE — 85025 COMPLETE CBC W/AUTO DIFF WBC: CPT

## 2019-10-23 PROCEDURE — 83735 ASSAY OF MAGNESIUM: CPT

## 2019-10-23 PROCEDURE — 93005 ELECTROCARDIOGRAM TRACING: CPT

## 2019-10-23 PROCEDURE — 81001 URINALYSIS AUTO W/SCOPE: CPT

## 2019-10-23 PROCEDURE — 74011250637 HC RX REV CODE- 250/637: Performed by: EMERGENCY MEDICINE

## 2019-10-23 PROCEDURE — 80053 COMPREHEN METABOLIC PANEL: CPT

## 2019-10-23 PROCEDURE — 87077 CULTURE AEROBIC IDENTIFY: CPT

## 2019-10-23 PROCEDURE — 96360 HYDRATION IV INFUSION INIT: CPT

## 2019-10-23 PROCEDURE — 51701 INSERT BLADDER CATHETER: CPT

## 2019-10-23 PROCEDURE — 36415 COLL VENOUS BLD VENIPUNCTURE: CPT

## 2019-10-23 PROCEDURE — 99285 EMERGENCY DEPT VISIT HI MDM: CPT

## 2019-10-23 PROCEDURE — 96361 HYDRATE IV INFUSION ADD-ON: CPT

## 2019-10-23 PROCEDURE — 87804 INFLUENZA ASSAY W/OPTIC: CPT

## 2019-10-23 PROCEDURE — 84484 ASSAY OF TROPONIN QUANT: CPT

## 2019-10-23 PROCEDURE — 83605 ASSAY OF LACTIC ACID: CPT

## 2019-10-23 PROCEDURE — 87040 BLOOD CULTURE FOR BACTERIA: CPT

## 2019-10-23 PROCEDURE — 65660000000 HC RM CCU STEPDOWN

## 2019-10-23 PROCEDURE — 70450 CT HEAD/BRAIN W/O DYE: CPT

## 2019-10-23 RX ORDER — SODIUM CHLORIDE 0.9 % (FLUSH) 0.9 %
5-40 SYRINGE (ML) INJECTION AS NEEDED
Status: DISCONTINUED | OUTPATIENT
Start: 2019-10-23 | End: 2019-10-26 | Stop reason: SDUPTHER

## 2019-10-23 RX ORDER — METOLAZONE 2.5 MG/1
2.5 TABLET ORAL
COMMUNITY
End: 2019-11-06

## 2019-10-23 RX ORDER — MIRTAZAPINE 15 MG/1
15 TABLET, FILM COATED ORAL
Status: DISCONTINUED | OUTPATIENT
Start: 2019-10-23 | End: 2019-11-06 | Stop reason: HOSPADM

## 2019-10-23 RX ORDER — DICLOFENAC SODIUM 10 MG/G
2 GEL TOPICAL
COMMUNITY
End: 2019-12-29 | Stop reason: ALTCHOICE

## 2019-10-23 RX ORDER — SODIUM CHLORIDE 0.9 % (FLUSH) 0.9 %
5-40 SYRINGE (ML) INJECTION EVERY 8 HOURS
Status: DISCONTINUED | OUTPATIENT
Start: 2019-10-23 | End: 2019-11-06 | Stop reason: HOSPADM

## 2019-10-23 RX ORDER — ACETAMINOPHEN 325 MG/1
650 TABLET ORAL
COMMUNITY

## 2019-10-23 RX ORDER — ACETAMINOPHEN 325 MG/1
650 TABLET ORAL
Status: DISCONTINUED | OUTPATIENT
Start: 2019-10-23 | End: 2019-11-06 | Stop reason: HOSPADM

## 2019-10-23 RX ORDER — ACETAMINOPHEN 650 MG/1
650 SUPPOSITORY RECTAL
Status: COMPLETED | OUTPATIENT
Start: 2019-10-23 | End: 2019-10-23

## 2019-10-23 RX ORDER — BUMETANIDE 2 MG/1
2 TABLET ORAL DAILY
COMMUNITY

## 2019-10-23 RX ORDER — METOLAZONE 2.5 MG/1
2.5 TABLET ORAL
Status: CANCELLED | OUTPATIENT
Start: 2019-10-23

## 2019-10-23 RX ORDER — SODIUM CHLORIDE 9 MG/ML
150 INJECTION, SOLUTION INTRAVENOUS ONCE
Status: COMPLETED | OUTPATIENT
Start: 2019-10-23 | End: 2019-10-23

## 2019-10-23 RX ORDER — CYCLOBENZAPRINE HCL 10 MG
10 TABLET ORAL
COMMUNITY

## 2019-10-23 RX ORDER — CARVEDILOL 3.12 MG/1
3.12 TABLET ORAL 2 TIMES DAILY WITH MEALS
Status: CANCELLED | OUTPATIENT
Start: 2019-10-23

## 2019-10-23 RX ORDER — POLYETHYLENE GLYCOL 3350 17 G/17G
17 POWDER, FOR SOLUTION ORAL EVERY OTHER DAY
Status: DISCONTINUED | OUTPATIENT
Start: 2019-10-23 | End: 2019-10-25 | Stop reason: SDUPTHER

## 2019-10-23 RX ORDER — POTASSIUM CHLORIDE 20 MEQ/1
20 TABLET, EXTENDED RELEASE ORAL EVERY EVENING
COMMUNITY
End: 2019-11-06

## 2019-10-23 RX ORDER — SODIUM CHLORIDE 0.9 % (FLUSH) 0.9 %
5-10 SYRINGE (ML) INJECTION AS NEEDED
Status: DISCONTINUED | OUTPATIENT
Start: 2019-10-23 | End: 2019-11-06 | Stop reason: HOSPADM

## 2019-10-23 RX ORDER — MIDODRINE HYDROCHLORIDE 5 MG/1
5 TABLET ORAL 3 TIMES DAILY
Status: DISCONTINUED | OUTPATIENT
Start: 2019-10-23 | End: 2019-11-06 | Stop reason: HOSPADM

## 2019-10-23 RX ORDER — BUMETANIDE 2 MG/1
2 TABLET ORAL
COMMUNITY
End: 2019-11-06

## 2019-10-23 RX ORDER — POTASSIUM CHLORIDE 20 MEQ/1
40 TABLET, EXTENDED RELEASE ORAL DAILY
COMMUNITY

## 2019-10-23 RX ORDER — BUMETANIDE 1 MG/1
2 TABLET ORAL DAILY
Status: CANCELLED | OUTPATIENT
Start: 2019-10-24

## 2019-10-23 RX ORDER — ALBUTEROL SULFATE 0.83 MG/ML
2.5 SOLUTION RESPIRATORY (INHALATION)
Status: DISCONTINUED | OUTPATIENT
Start: 2019-10-23 | End: 2019-11-06 | Stop reason: HOSPADM

## 2019-10-23 RX ADMIN — ACETAMINOPHEN 650 MG: 650 SUPPOSITORY RECTAL at 09:38

## 2019-10-23 RX ADMIN — Medication 10 ML: at 21:10

## 2019-10-23 RX ADMIN — MIRTAZAPINE 15 MG: 15 TABLET, FILM COATED ORAL at 21:10

## 2019-10-23 RX ADMIN — SODIUM CHLORIDE 150 ML/HR: 900 INJECTION, SOLUTION INTRAVENOUS at 13:20

## 2019-10-23 RX ADMIN — SODIUM CHLORIDE 695 ML: 900 INJECTION, SOLUTION INTRAVENOUS at 10:50

## 2019-10-23 RX ADMIN — ACETAMINOPHEN 650 MG: 325 TABLET ORAL at 18:33

## 2019-10-23 RX ADMIN — APIXABAN 2.5 MG: 2.5 TABLET, FILM COATED ORAL at 21:10

## 2019-10-23 RX ADMIN — SODIUM CHLORIDE 1000 ML: 900 INJECTION, SOLUTION INTRAVENOUS at 09:50

## 2019-10-23 RX ADMIN — MIDODRINE HYDROCHLORIDE 5 MG: 5 TABLET ORAL at 21:10

## 2019-10-23 NOTE — PROGRESS NOTES
Pharmacy Clarification of Prior to Admission Medication Regimen     The patient was not interviewed regarding clarification of the prior to admission medication regimen. Patient was transferred from [facility] , to [address] [place] [address], with a current med list.     Information Obtained From: Transfer Papers    Pertinent Pharmacy Findings:  Updated patients preferred outpatient pharmacy to: [facility] did not update the outpatient pharmacy due to the patient living at a SNF   Identified High Alert Medication Information  Current Anticoagulants:  Name: Eliquis    PTA medication list was corrected to the following:     Prior to Admission Medications   Prescriptions Last Dose Informant Patient Reported? Taking?   acetaminophen (TYLENOL) 325 mg tablet 10/23/2019 at Unknown time Transfer Papers Yes Yes   Sig: Take 650 mg by mouth every six (6) hours as needed for Pain. albuterol (PROVENTIL VENTOLIN) 2.5 mg /3 mL (0.083 %) nebulizer solution 10/23/2019 at Unknown time Transfer Papers Yes Yes   Si.5 mg by Nebulization route two (2) times a day. apixaban (ELIQUIS) 2.5 mg tablet 10/23/2019 at Unknown time Transfer Papers No Yes   Sig: Take 1 Tab by mouth two (2) times a day. Indications: PREVENT THROMBOEMBOLISM IN CHRONIC ATRIAL FIBRILLATION   bumetanide (BUMEX) 2 mg tablet 10/23/2019 at Unknown time Transfer Papers Yes Yes   Sig: Take 2 mg by mouth daily. bumetanide (BUMEX) 2 mg tablet 10/21/2019 at Unknown time Transfer Papers Yes Yes   Sig: Take 2 mg by mouth every Monday, Wednesday, Friday. carvedilol (COREG) 3.125 mg tablet 10/23/2019 at Unknown time Transfer Papers No Yes   Sig: Take 1 Tab by mouth two (2) times daily (with meals). Indications: chronic heart failure   cholecalciferol (VITAMIN D3) 1,000 unit cap 10/22/2019 at Unknown time Transfer Papers Yes Yes   Sig: Take 1,000 Units by mouth daily.    co-enzyme Q-10 (CO Q-10) 100 mg capsule 10/22/2019 at Unknown time Transfer Papers Yes Yes   Sig: Take 100 mg by mouth daily. cyclobenzaprine (FLEXERIL) 10 mg tablet 10/16/2019 at Unknown time Transfer Papers Yes Yes   Sig: Take 10 mg by mouth nightly. dextromethorphan-guaiFENesin (ROBITUSSIN-DM)  mg/5 mL syrup Not Taking at Unknown time Transfer Papers Yes No   Sig: Take 10 mL by mouth every six (6) hours as needed for Cough. diclofenac (VOLTAREN) 1 % gel 10/20/2019 at Unknown time Transfer Papers Yes Yes   Sig: Apply 2 g to affected area every six (6) hours as needed. ibrutinib (IMBRUVICA) 140 mg capsule 10/23/2019 at Unknown time Transfer Papers Yes Yes   Sig: Take 140 mg by mouth every other day. levothyroxine (SYNTHROID) 25 mcg tablet 10/23/2019 at Unknown time Transfer Papers No Yes   Sig: Take 1 Tab by mouth Daily (before breakfast). loratadine (CLARITIN) 10 mg tablet 10/22/2019 at Unknown time Transfer Papers Yes Yes   Sig: Take 10 mg by mouth nightly.   magnesium oxide 400 mg cap 10/22/2019 at Unknown time Transfer Papers Yes Yes   Sig: Take 1 Cap by mouth nightly. metOLazone (ZAROXOLYN) 2.5 mg tablet 10/22/2019 at Unknown time Transfer Papers Yes Yes   Sig: Take 2.5 mg by mouth every Monday, Wednesday, Friday. midodrine (PROAMITINE) 5 mg tablet 10/23/2019 at Unknown time Transfer Papers Yes Yes   Sig: Take 5 mg by mouth three (3) times daily. mirtazapine (REMERON) 15 mg tablet 10/21/2019 at Unknown time Transfer Papers No Yes   Sig: Take 1 Tab by mouth nightly. polyethylene glycol (MIRALAX) 17 gram/dose powder 10/22/2019 at Unknown time Transfer Papers Yes Yes   Sig: Take 17 g by mouth every fourty-eight (48) hours. potassium chloride (K-DUR, KLOR-CON) 20 mEq tablet 10/23/2019 at Unknown time Transfer Papers Yes Yes   Sig: Take 40 mEq by mouth daily. potassium chloride (K-DUR, KLOR-CON) 20 mEq tablet 10/22/2019 at Unknown time Transfer Papers Yes Yes   Sig: Take 20 mEq by mouth daily.       Facility-Administered Medications: None          Thank you,  Elizabeth Abdullahi,[facility]  Medication History Pharmacy Technician

## 2019-10-23 NOTE — H&P
Hospitalist Admission Note    NAME: Bill Moser   :  3/17/1925   MRN:  [id number]     Date/Time:  10/23/2019 3:51 PM    Patient PCP: Carlton Bentley MD  ______________________________________________________________________  Given the patient's current clinical presentation, I have a high level of concern for decompensation if discharged from the emergency department. Complex decision making was performed, which includes reviewing the patient's available past medical records, laboratory results, and x-ray films. My assessment of this patient's clinical condition and my plan of care is as follows. Assessment / Plan:    Fever of unknown origin  Hypotension, resolved  -blood and urine cx sent  -CXR neg  -family refused LP  -holding on abx and follow cx and fever trend  -palliative consulted to discuss goals of care, if family doesn't want to pursue further work up, might be appropriate for comfort/hospice care    CKD  Hypokalemia  -gently replace potassium and monitor renal function     Chronic Systolic Heart Failure EF 25% / AICD    HTN / Paroxysmal atrial fibrillation  -BP on lower side on admission  -on s/s fluid overload, infact looked volume depleted on exam, has recieved 2 liters in ER with BP improvement  -hold BB, diurectics and cont Eliquis    Hypothyroidism . 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       Code Status: DDNR from NH   Surrogate Decision MakeRl Alves son [phone] , [phone]      DVT Prophylaxis: lovenox   GI Prophylaxis: not indicated     Baseline: resident of [facility], ambulating with rolator      Admission was done for Dr. Sherry Babcock as a tuck in service.  He will assume care of this pt in am.     *      Subjective:   CHIEF COMPLAINT: fever, chills    HISTORY OF PRESENT ILLNESS:   Unable to provide history, information obtained from ER note  Jaclyn Kumar is a 80 y.o. female presents to the ED with cc of lethargy and generalized weakness. Patient lives in a nursing home and was referred here today due to elevated temperature of 100.9 at nursing home, generalized weakness and lethargy and concern for being intermittently confused. Patient denies any pain and says she is just feeling cold. She denies any sore throat, neck pain, chest pain, shortness of breath, abdominal pain or any sores on her skin. She denies any bleeding though her ability to answer questions is limited as she seems to get a little confused with more complex questioning. She is at the [address] [place] [address]. We were asked to admit for work up and evaluation of the above problems.      Past Medical History:   Diagnosis Date    Aortic insufficiency     Asthma     Cancer ([facility]     lymphoma    CKD (chronic kidney disease) stage 3, GFR 30-59 ml/min ([facility]) 1/10/2018    Congestive heart failure, NYHA class II, chronic, systolic ([facility])     Heart failure ([facility]     Hypertension     Mitral valvular regurgitation 1/22/2016    ECHO 2/3/17: LV dilated, EF 20-25%, mild LVH, RV mod dilated, mild- mod MELANIA, mod-severe MR, mod AI ECHO 7/29/17: EF 15%, severe DHK, reduced RVEF, RVH, RVSP 35 mmHg  Mild LAE, mod-marked MA fibrosis, mod-severe MR (2+), AO root dilated,      Nonrheumatic aortic valve insufficiency 10/16/2018    Presence of biventricular automatic cardioverter/defibrillator (AICD) 7/2/2015    [employer] biventricular AICD implant    Stomach ulcer         Past Surgical History:   Procedure Laterality Date    HX BREAST BIOPSY Bilateral     40 years ago    HX COLONOSCOPY      HX HEENT      cataracts removed    HX HYSTERECTOMY      HX OTHER SURGICAL      lymph node surgery    HX TONSILLECTOMY      NH EGD TRANSORAL BIOPSY SINGLE/MULTIPLE  5/23/2012         SINUS SURGERY PROC UNLISTED      Nasal polyps removed       Social History     Tobacco Use    Smoking status: Never Smoker    Smokeless tobacco: Never Used Substance Use Topics    Alcohol use: No     Alcohol/week: 0.0 standard drinks        Family History   Problem Relation Age of Onset    Heart Disease Mother     Stroke Father      Allergies   Allergen Reactions    Codeine Other (comments)     \"made me disoriented\" per pt        Prior to Admission medications    Medication Sig Start Date End Date Taking? Authorizing Provider   potassium chloride (K-DUR, KLOR-CON) 20 mEq tablet Take 40 mEq by mouth daily. 40 meq qam, 20 meq in evening   Yes Provider, Historical   potassium chloride (K-DUR, KLOR-CON) 20 mEq tablet Take 20 mEq by mouth every evening. 40meq in AM, 20 meq in evening   Yes Provider, Historical   bumetanide (BUMEX) 2 mg tablet Take 2 mg by mouth daily. Yes Provider, Historical   acetaminophen (TYLENOL) 325 mg tablet Take 650 mg by mouth every six (6) hours as needed for Pain. Yes Provider, Historical   bumetanide (BUMEX) 2 mg tablet Take 2 mg by mouth every Monday, Wednesday, Friday. 2mg every day, takes an additional 2mg on Mon-Wed-Fri in the afternoon   Yes Provider, Historical   metOLazone (ZAROXOLYN) 2.5 mg tablet Take 2.5 mg by mouth every Monday, Wednesday, Friday. Yes Provider, Historical   cyclobenzaprine (FLEXERIL) 10 mg tablet Take 10 mg by mouth nightly. Yes Provider, Historical   diclofenac (VOLTAREN) 1 % gel Apply 2 g to affected area every six (6) hours as needed. Yes Provider, Historical   carvedilol (COREG) 3.125 mg tablet Take 1 Tab by mouth two (2) times daily (with meals). Indications: chronic heart failure 7/2/19  Yes Bismark Reyes MD   mirtazapine (REMERON) 15 mg tablet Take 1 Tab by mouth nightly. 7/2/19  Yes Bismark Reyes MD   midodrine (PROAMITINE) 5 mg tablet Take 5 mg by mouth three (3) times daily. Yes Provider, Historical   ibrutinib (IMBRUVICA) 140 mg capsule Take 140 mg by mouth every other day.    Yes Provider, Historical   polyethylene glycol (MIRALAX) 17 gram/dose powder Take 17 g by mouth every fourty-eight (48) hours. Yes Provider, Historical   albuterol (PROVENTIL VENTOLIN) 2.5 mg /3 mL (0.083 %) nebulizer solution 2.5 mg by Nebulization route two (2) times a day. Yes Provider, Historical   levothyroxine (SYNTHROID) 25 mcg tablet Take 1 Tab by mouth Daily (before breakfast). 12/11/18  Yes Hoda Demarco MD   apixaban (ELIQUIS) 2.5 mg tablet Take 1 Tab by mouth two (2) times a day. Indications: PREVENT THROMBOEMBOLISM IN CHRONIC ATRIAL FIBRILLATION 6/1/18  Yes Hoda Demarco MD   magnesium oxide 400 mg cap Take 1 Cap by mouth nightly. Yes Provider, Historical   cholecalciferol (VITAMIN D3) 1,000 unit cap Take 1,000 Units by mouth daily. Yes Provider, Historical   loratadine (CLARITIN) 10 mg tablet Take 10 mg by mouth nightly. Yes Provider, Historical   co-enzyme Q-10 (CO Q-10) 100 mg capsule Take 100 mg by mouth daily. Yes Provider, Historical   dextromethorphan-guaiFENesin (ROBITUSSIN-DM)  mg/5 mL syrup Take 10 mL by mouth every six (6) hours as needed for Cough. Provider, Historical       REVIEW OF SYSTEMS:     I am not able to complete the review of systems because:    The patient is intubated and sedated    The patient has altered mental status due to his acute medical problems    The patient has baseline aphasia from prior stroke(s)   x The patient has baseline dementia and is not reliable historian    The patient is in acute medical distress and unable to provide information           Total of 12 systems reviewed as follows:       POSITIVE= underlined text  Negative = text not underlined  General:  fever, chills, sweats, generalized weakness, weight loss/gain,      loss of appetite   Eyes:    blurred vision, eye pain, loss of vision, double vision  ENT:    rhinorrhea, pharyngitis   Respiratory:   cough, sputum production, SOB, JENKINS, wheezing, pleuritic pain   Cardiology:   chest pain, palpitations, orthopnea, PND, edema, syncope   Gastrointestinal:  abdominal pain , N/V, diarrhea, dysphagia, constipation, bleeding   Genitourinary:  frequency, urgency, dysuria, hematuria, incontinence   Muskuloskeletal :  arthralgia, myalgia, back pain  Hematology:  easy bruising, nose or gum bleeding, lymphadenopathy   Dermatological: rash, ulceration, pruritis, color change / jaundice  Endocrine:   hot flashes or polydipsia   Neurological:  headache, dizziness, confusion, focal weakness, paresthesia,     Speech difficulties, memory loss, gait difficulty  Psychological: Feelings of anxiety, depression, agitation    Objective:   VITALS:    Visit Vitals  /62   Pulse 74   Temp 99.4 °F (37.4 °C)   Resp 22   Ht 5' 1\" (1.549 m)   Wt 56.5 kg (124 lb 9 oz)   SpO2 94%   BMI 23.54 kg/m²       PHYSICAL EXAM:    General:    Confused, dementia, Alert, cooperative, no distress, appears stated age. HEENT: Atraumatic, anicteric sclerae, pink conjunctivae     No oral ulcers, mucosa dry, throat clear, dentition fair  Lungs:   Clear to auscultation bilaterally. Chest wall:  No tenderness  No Accessory muscle use. Heart:   Regular  rhythm,  No  murmur   No edema  Abdomen:   Soft, non-tender. Not distended. Bowel sounds normal  Extremities: No cyanosis. No clubbing,      Skin turgor normal, Capillary refill normal, Radial dial pulse 2+  Skin:     Not pale. Not Jaundiced  No rashes   Psych:  Not depressed. Not anxious or agitated. Neurologic: Dementia, moving all extremities equally.  oriented to self.     _______________________________________________________________________  Care Plan discussed with:    Comments   Patient     Family      RN x    Care Manager                    Consultant:      _______________________________________________________________________  Expected  Disposition:   Home with Family    HH/PT/OT/RN    SNF/LTC    RACHELLE    ________________________________________________________________________  TOTAL TIME:   Minutes    Critical Care Provided     Minutes non procedure based Comments     Reviewed previous records   >50% of visit spent in counseling and coordination of care  Discussion with patient and/or family and questions answered       ________________________________________________________________________  Signed: Pa Kim MD    Procedures: see electronic medical records for all procedures/Xrays and details which were not copied into this note but were reviewed prior to creation of Plan. LAB DATA REVIEWED:    Recent Results (from the past 24 hour(s))   METABOLIC PANEL, COMPREHENSIVE    Collection Time: 10/23/19  9:45 AM   Result Value Ref Range    Sodium 140 136 - 145 mmol/L    Potassium 3.2 (L) 3.5 - 5.1 mmol/L    Chloride 100 97 - 108 mmol/L    CO2 30 21 - 32 mmol/L    Anion gap 10 5 - 15 mmol/L    Glucose 116 (H) 65 - 100 mg/dL    BUN 63 (H) 6 - 20 MG/DL    Creatinine 1.53 (H) 0.55 - 1.02 MG/DL    BUN/Creatinine ratio 41 (H) 12 - 20      GFR est AA 38 (L) >60 ml/min/1.73m2    GFR est non-AA 32 (L) >60 ml/min/1.73m2    Calcium 8.9 8.5 - 10.1 MG/DL    Bilirubin, total 0.9 0.2 - 1.0 MG/DL    ALT (SGPT) 17 12 - 78 U/L    AST (SGOT) 26 15 - 37 U/L    Alk. phosphatase 110 45 - 117 U/L    Protein, total 6.7 6.4 - 8.2 g/dL    Albumin 2.5 (L) 3.5 - 5.0 g/dL    Globulin 4.2 (H) 2.0 - 4.0 g/dL    A-G Ratio 0.6 (L) 1.1 - 2.2     CBC WITH AUTOMATED DIFF    Collection Time: 10/23/19  9:45 AM   Result Value Ref Range    WBC 3.0 (L) 3.6 - 11.0 K/uL    RBC 3.48 (L) 3.80 - 5.20 M/uL    HGB 10.0 (L) 11.5 - 16.0 g/dL    HCT 31.4 (L) 35.0 - 47.0 %    MCV 90.2 80.0 - 99.0 FL    MCH 28.7 26.0 - 34.0 PG    MCHC 31.8 30.0 - 36.5 g/dL    RDW 16.7 (H) 11.5 - 14.5 %    PLATELET 804 (L) 851 - 400 K/uL    MPV 11.4 8.9 - 12.9 FL    NRBC 0.0 0  WBC    ABSOLUTE NRBC 0.00 0.00 - 0.01 K/uL    NEUTROPHILS 43 32 - 75 %    BAND NEUTROPHILS 9 %    LYMPHOCYTES 43 12 - 49 %    MONOCYTES 5 5 - 13 %    EOSINOPHILS 0 0 - 7 %    BASOPHILS 0 0 - 1 %    IMMATURE GRANULOCYTES 0 0.0 - 0.5 %    ABS.  NEUTROPHILS 1.5 (L) 1.8 - 8.0 K/UL    ABS. LYMPHOCYTES 1.3 0.8 - 3.5 K/UL    ABS. MONOCYTES 0.2 0.0 - 1.0 K/UL    ABS. EOSINOPHILS 0.0 0.0 - 0.4 K/UL    ABS. BASOPHILS 0.0 0.0 - 0.1 K/UL    ABS. IMM. GRANS. 0.0 0.00 - 0.04 K/UL    DF MANUAL      RBC COMMENTS ANISOCYTOSIS  1+        WBC COMMENTS TOXIC GRANULATION     TROPONIN I    Collection Time: 10/23/19  9:45 AM   Result Value Ref Range    Troponin-I, Qt. 0.11 (H) <0.05 ng/mL   POC LACTIC ACID    Collection Time: 10/23/19  9:47 AM   Result Value Ref Range    Lactic Acid (POC) 0.73 0.40 - 2.00 mmol/L   EKG, 12 LEAD, INITIAL    Collection Time: 10/23/19  9:52 AM   Result Value Ref Range    Ventricular Rate 72 BPM    Atrial Rate 72 BPM    P-R Interval 186 ms    QRS Duration 182 ms    Q-T Interval 512 ms    QTC Calculation (Bezet) 560 ms    Calculated R Axis -165 degrees    Calculated T Axis -7 degrees    Diagnosis       AV dual-paced rhythm with occasional premature ventricular complexes  Biventricular pacemaker detected  When compared with ECG of 25-JUN-2019 09:01,  premature ventricular complexes are now present  Vent.  rate has decreased BY  21 BPM  Confirmed by Judah Nassar ([id number]) on 10/23/2019 2:39:53 PM     URINALYSIS W/ REFLEX CULTURE    Collection Time: 10/23/19 10:51 AM   Result Value Ref Range    Color YELLOW/STRAW      Appearance CLEAR CLEAR      Specific gravity 1.010 1.003 - 1.030      pH (UA) 7.0 5.0 - 8.0      Protein NEGATIVE  NEG mg/dL    Glucose NEGATIVE  NEG mg/dL    Ketone NEGATIVE  NEG mg/dL    Bilirubin NEGATIVE  NEG      Blood SMALL (A) NEG      Urobilinogen 0.2 0.2 - 1.0 EU/dL    Nitrites NEGATIVE  NEG      Leukocyte Esterase NEGATIVE  NEG      WBC 0-4 0 - 4 /hpf    RBC 5-10 0 - 5 /hpf    Epithelial cells FEW FEW /lpf    Bacteria NEGATIVE  NEG /hpf    UA:UC IF INDICATED CULTURE NOT INDICATED BY UA RESULT CNI      Hyaline cast 0-2 0 - 5 /lpf   INFLUENZA A & B AG (RAPID TEST)    Collection Time: 10/23/19 10:51 AM   Result Value Ref Range    Influenza A Antigen NEGATIVE  NEG      Influenza B Antigen NEGATIVE  NEG     TROPONIN I    Collection Time: 10/23/19  1:04 PM   Result Value Ref Range    Troponin-I, Qt. 0.09 (H) <0.05 ng/mL   OCCULT BLOOD, STOOL    Collection Time: 10/23/19  1:04 PM   Result Value Ref Range    Occult blood, stool NEGATIVE  NEG     MAGNESIUM    Collection Time: 10/23/19  1:04 PM   Result Value Ref Range    Magnesium 2.3 1.6 - 2.4 mg/dL

## 2019-10-23 NOTE — ED TRIAGE NOTES
Pt arrived to ED via EMS from nursing home with c/o fever and AMS. Pt is normally A&Ox4, able to ambulate. Pt arrives today lethargic, oriented to person and place. Per report, pt does have dementia and is intermittently confused. Pt with fever of 100.9 at nursing home, was given 650 tylenol crushed up PTA. Pt was recently seen with ENT regarding a nodule on her neck. Pt placed on continuous cardiac monitor and pulse ox. Side rails up for safety, call light within reach. Denies any needs at this time.

## 2019-10-23 NOTE — ED PROVIDER NOTES
EMERGENCY DEPARTMENT HISTORY AND PHYSICAL EXAM      Date: 10/23/2019  Patient Name: Bill Moser    History of Presenting Illness     Chief Complaint   Patient presents with    Fever       History Provided By: Patient    HPI: Bill Moser, 80 y.o. female presents to the ED with cc of lethargy and generalized weakness. Patient lives in a nursing home and was referred here today due to elevated temperature of 100.9 at nursing home, generalized weakness and lethargy and concern for being intermittently confused. Patient denies any pain and says she is just feeling cold. She denies any sore throat, neck pain, chest pain, shortness of breath, abdominal pain or any sores on her skin. She denies any bleeding though her ability to answer questions is limited as she seems to get a little confused with more complex questioning. She is at the [address] [place] [address]. There are no other complaints, changes, or physical findings at this time. PCP: Carlton Bentley MD    No current facility-administered medications on file prior to encounter. Current Outpatient Medications on File Prior to Encounter   Medication Sig Dispense Refill    carvedilol (COREG) 3.125 mg tablet Take 1 Tab by mouth two (2) times daily (with meals). Indications: chronic heart failure 60 Tab 11    potassium chloride SR (K-TAB) 20 mEq tablet Take 1 Tab by mouth two (2) times a day. 60 Tab 11    mirtazapine (REMERON) 15 mg tablet Take 1 Tab by mouth nightly. 30 Tab 11    atorvastatin (LIPITOR) 10 mg tablet Take 10 mg by mouth nightly.  midodrine (PROAMITINE) 5 mg tablet Take 5 mg by mouth three (3) times daily.  ibrutinib (IMBRUVICA) 140 mg capsule Take 140 mg by mouth daily.  furosemide (LASIX) 40 mg tablet Take 40 mg by mouth two (2) times a day. Hold of SBP <110      polyethylene glycol (MIRALAX) 17 gram/dose powder Take 17 g by mouth every fourty-eight (48) hours.       allopurinol (ZYLOPRIM) 100 mg tablet TAKE ONE (1) TABLET(S) DAILY 30 Tab 11    dextromethorphan-guaiFENesin (ROBITUSSIN-DM)  mg/5 mL syrup Take 10 mL by mouth every six (6) hours as needed for Cough.  albuterol (PROVENTIL VENTOLIN) 2.5 mg /3 mL (0.083 %) nebulizer solution 2.5 mg by Nebulization route four (4) times daily.  levothyroxine (SYNTHROID) 25 mcg tablet Take 1 Tab by mouth Daily (before breakfast). 90 Tab 3    apixaban (ELIQUIS) 2.5 mg tablet Take 1 Tab by mouth two (2) times a day. Indications: PREVENT THROMBOEMBOLISM IN CHRONIC ATRIAL FIBRILLATION 180 Tab 3    fluticasone-salmeterol (ADVAIR) 250-50 mcg/dose diskus inhaler Take 1 Puff by inhalation two (2) times a day. 3 Inhaler 3    magnesium oxide 400 mg cap Take 1 Cap by mouth nightly.  cholecalciferol (VITAMIN D3) 1,000 unit cap Take 1,000 Units by mouth daily.  loratadine (CLARITIN) 10 mg tablet Take 10 mg by mouth nightly.  co-enzyme Q-10 (CO Q-10) 100 mg capsule Take 100 mg by mouth daily.          Past History     Past Medical History:  Past Medical History:   Diagnosis Date    Aortic insufficiency     Asthma     Cancer ([facility] [address]     lymphoma    CKD (chronic kidney disease) stage 3, GFR 30-59 ml/min ([facility]) 1/10/2018    Congestive heart failure, NYHA class II, chronic, systolic (HCC)     Heart failure (HCC)     Hypertension     Mitral valvular regurgitation 1/22/2016    ECHO 2/3/17: LV dilated, EF 20-25%, mild LVH, RV mod dilated, mild- mod MELANIA, mod-severe MR, mod AI ECHO 7/29/17: EF 15%, severe DHK, reduced RVEF, RVH, RVSP 35 mmHg  Mild LAE, mod-marked MA fibrosis, mod-severe MR (2+), AO root dilated,      Nonrheumatic aortic valve insufficiency 10/16/2018    Presence of biventricular automatic cardioverter/defibrillator (AICD) 7/2/2015    [employer] biventricular AICD implant    Stomach ulcer        Past Surgical History:  Past Surgical History:   Procedure Laterality Date    HX BREAST BIOPSY Bilateral     40 years ago    HX COLONOSCOPY      HX HEENT      cataracts removed    HX HYSTERECTOMY      HX OTHER SURGICAL      lymph node surgery    HX TONSILLECTOMY      DE EGD TRANSORAL BIOPSY SINGLE/MULTIPLE  5/23/2012         SINUS SURGERY PROC UNLISTED      Nasal polyps removed       Family History:  Family History   Problem Relation Age of Onset    Heart Disease Mother     Stroke Father        Social History:  Social History     Tobacco Use    Smoking status: Never Smoker    Smokeless tobacco: Never Used   Substance Use Topics    Alcohol use: No     Alcohol/week: 0.0 standard drinks    Drug use: No       Allergies: Allergies   Allergen Reactions    Codeine Other (comments)     \"made me disoriented\" per pt         Review of Systems   Review of Systems   Unable to perform ROS: Mental status change       Physical Exam   Physical Exam   Vital signs and nursing notes reviewed    CONSTITUTIONAL: Alert, in mild distress; thin, small framed build, warm to the touch. HEAD:  Normocephalic, atraumatic  EYES: PERRL; EOM's intact. ENTM: Nose: no rhinorrhea; Throat: no erythema or exudate, mucous membranes dry  Neck:  Supple. trachea is midline. No focal skin abscesses or lymphadenopathy present on exam  RESP: Chest clear, equal breath sounds. - W/R/R  CV: S1 and S2 WNL; No murmurs, gallops or rubs. 2+ radial and DP pulses bilaterally. Bedside ultrasound performed shows no pericardial effusion present both on subxiphoid and parasternal views. GI: non-distended, normal bowel sounds, abdomen soft and non-tender. No masses or organomegaly. Hemoccult testing shows no enlarged hemorrhoids or sacral skin breakdown with brown stool. : No costo-vertebral angle tenderness. BACK:  Non-tender, normal appearance  UPPER EXT:  Normal inspection. no joint or soft tissue swelling  LOWER EXT: No edema, no calf tenderness.   NEURO: Alert and oriented to person and place, moving all extremities, intact light touch sensation, no dysarthria no facial droop no neglect. SKIN: No rashes; Warm and dry  PSYCH: Normal mood, normal affect    Diagnostic Study Results     Labs -     Recent Results (from the past 12 hour(s))   METABOLIC PANEL, COMPREHENSIVE    Collection Time: 10/23/19  9:45 AM   Result Value Ref Range    Sodium 140 136 - 145 mmol/L    Potassium 3.2 (L) 3.5 - 5.1 mmol/L    Chloride 100 97 - 108 mmol/L    CO2 30 21 - 32 mmol/L    Anion gap 10 5 - 15 mmol/L    Glucose 116 (H) 65 - 100 mg/dL    BUN 63 (H) 6 - 20 MG/DL    Creatinine 1.53 (H) 0.55 - 1.02 MG/DL    BUN/Creatinine ratio 41 (H) 12 - 20      GFR est AA 38 (L) >60 ml/min/1.73m2    GFR est non-AA 32 (L) >60 ml/min/1.73m2    Calcium 8.9 8.5 - 10.1 MG/DL    Bilirubin, total 0.9 0.2 - 1.0 MG/DL    ALT (SGPT) 17 12 - 78 U/L    AST (SGOT) 26 15 - 37 U/L    Alk. phosphatase 110 45 - 117 U/L    Protein, total 6.7 6.4 - 8.2 g/dL    Albumin 2.5 (L) 3.5 - 5.0 g/dL    Globulin 4.2 (H) 2.0 - 4.0 g/dL    A-G Ratio 0.6 (L) 1.1 - 2.2     CBC WITH AUTOMATED DIFF    Collection Time: 10/23/19  9:45 AM   Result Value Ref Range    WBC 3.0 (L) 3.6 - 11.0 K/uL    RBC 3.48 (L) 3.80 - 5.20 M/uL    HGB 10.0 (L) 11.5 - 16.0 g/dL    HCT 31.4 (L) 35.0 - 47.0 %    MCV 90.2 80.0 - 99.0 FL    MCH 28.7 26.0 - 34.0 PG    MCHC 31.8 30.0 - 36.5 g/dL    RDW 16.7 (H) 11.5 - 14.5 %    PLATELET 313 (L) 166 - 400 K/uL    MPV 11.4 8.9 - 12.9 FL    NRBC 0.0 0  WBC    ABSOLUTE NRBC 0.00 0.00 - 0.01 K/uL    NEUTROPHILS 43 32 - 75 %    BAND NEUTROPHILS 9 %    LYMPHOCYTES 43 12 - 49 %    MONOCYTES 5 5 - 13 %    EOSINOPHILS 0 0 - 7 %    BASOPHILS 0 0 - 1 %    IMMATURE GRANULOCYTES 0 0.0 - 0.5 %    ABS. NEUTROPHILS 1.5 (L) 1.8 - 8.0 K/UL    ABS. LYMPHOCYTES 1.3 0.8 - 3.5 K/UL    ABS. MONOCYTES 0.2 0.0 - 1.0 K/UL    ABS. EOSINOPHILS 0.0 0.0 - 0.4 K/UL    ABS. BASOPHILS 0.0 0.0 - 0.1 K/UL    ABS. IMM.  GRANS. 0.0 0.00 - 0.04 K/UL    DF MANUAL      RBC COMMENTS ANISOCYTOSIS  1+        WBC COMMENTS TOXIC GRANULATION     TROPONIN I    Collection Time: 10/23/19  9:45 AM   Result Value Ref Range    Troponin-I, Qt. 0.11 (H) <0.05 ng/mL   POC LACTIC ACID    Collection Time: 10/23/19  9:47 AM   Result Value Ref Range    Lactic Acid (POC) 0.73 0.40 - 2.00 mmol/L   EKG, 12 LEAD, INITIAL    Collection Time: 10/23/19  9:52 AM   Result Value Ref Range    Ventricular Rate 72 BPM    Atrial Rate 72 BPM    P-R Interval 186 ms    QRS Duration 182 ms    Q-T Interval 512 ms    QTC Calculation (Bezet) 560 ms    Calculated R Axis -165 degrees    Calculated T Axis -7 degrees    Diagnosis       AV dual-paced rhythm with occasional premature ventricular complexes  Biventricular pacemaker detected  When compared with ECG of 25-JUN-2019 09:01,  premature ventricular complexes are now present  Vent. rate has decreased BY  21 BPM     URINALYSIS W/ REFLEX CULTURE    Collection Time: 10/23/19 10:51 AM   Result Value Ref Range    Color YELLOW/STRAW      Appearance CLEAR CLEAR      Specific gravity 1.010 1.003 - 1.030      pH (UA) 7.0 5.0 - 8.0      Protein NEGATIVE  NEG mg/dL    Glucose NEGATIVE  NEG mg/dL    Ketone NEGATIVE  NEG mg/dL    Bilirubin NEGATIVE  NEG      Blood SMALL (A) NEG      Urobilinogen 0.2 0.2 - 1.0 EU/dL    Nitrites NEGATIVE  NEG      Leukocyte Esterase NEGATIVE  NEG      WBC 0-4 0 - 4 /hpf    RBC 5-10 0 - 5 /hpf    Epithelial cells FEW FEW /lpf    Bacteria NEGATIVE  NEG /hpf    UA:UC IF INDICATED CULTURE NOT INDICATED BY UA RESULT CNI      Hyaline cast 0-2 0 - 5 /lpf   INFLUENZA A & B AG (RAPID TEST)    Collection Time: 10/23/19 10:51 AM   Result Value Ref Range    Influenza A Antigen NEGATIVE  NEG      Influenza B Antigen NEGATIVE  NEG     OCCULT BLOOD, STOOL    Collection Time: 10/23/19  1:04 PM   Result Value Ref Range    Occult blood, stool NEGATIVE  NEG         Radiologic Studies -   XR CHEST PORT   Final Result   Impression:   1.  Enlarged cardiac silhouette, otherwise no acute disease         CT HEAD WO CONT    (Results Pending)     CT Results  (Last 48 hours)    None        CXR Results  (Last 48 hours)               10/23/19 0948  XR CHEST PORT Final result    Impression:  Impression:   1. Enlarged cardiac silhouette, otherwise no acute disease           Narrative:  INDICATION:  meets SIRS criteria        EXAM: Single portable view of chest 0 928 . Comparison:9/17/2019       Findings: Cardiac silhouette is enlarged. Aorta is ectatic and enlarged. AICD   unchanged. Pulmonary vasculature is not engorged. There are no focal parenchymal   opacities, effusions, or pneumothorax. Medical Decision Making   I am the first provider for this patient. I reviewed the vital signs, available nursing notes, past medical history, past surgical history, family history and social history. Vital Signs-Reviewed the patient's vital signs. Patient Vitals for the past 12 hrs:   Temp Pulse Resp BP SpO2   10/23/19 1230  71 26 91/60 93 %   10/23/19 1030  71 23 (!) 81/50 95 %   10/23/19 1000  72 26 (!) 89/61 95 %   10/23/19 0928 99.4 °F (37.4 °C) 70 22 90/62 94 %       EKG interpretation: (Preliminary)  EKG performed at 9:52 AM shows a AV dual paced rhythm with occasional PVCs, via ventricularly paced. Records Reviewed: Nursing Notes and Old Medical Records    Provider Notes (Medical Decision Making):   80year-old female, on file as DNR with low-grade temperature soft blood pressure with mild baseline leukopenia, reassuring lactate, no evidence of urinary tract infection, influenza or pneumonia. Patient's abdomen is soft nonsurgical and without pain on exam, very low suspicion for surgical abdomen processes that could be causing patient's change in mental status. Patient's mental status has improved with some hydration so this may be contributing somewhat. Consider other viral process that could be causing patient's low-grade temperature.   There was a historical complaints of neck pain from the patient though she does not exhibit signs of nuchal rigidity today. Will perform LP to rule out definitive viral versus bacterial meningitis. ED Course:   Initial assessment performed. The patients presenting problems have been discussed, and they are in agreement with the care plan formulated and outlined with them. I have encouraged them to ask questions as they arise throughout their visit. ED Course as of Oct 23 1356   Wed Oct 23, 2019   1354 Spoke to patient's daughter, who is medical power of  and explained patient's presentation. The daughter face time with the patient says she is at her normal baseline status and the waxing and waning elements of her mental status is quite normal for her. We discussed doing a lumbar puncture and she is reluctant to put her mother through that. She would rather be conservative and watch her mother. She states that her mom's been complaining with neck pain for quite a while recently saw both ENT and her oncologist with her most recent evaluation without findings. This is consistent with my exam findings today which shows no nuchal rigidity no lymphadenopathy trachea is midline and no concern for infection. Will admit to the hospitalist for ops. [TL]      ED Course User Index  [TL] Satish Padilla MD         Disposition:  Admit    Admit Note:  1:57 PM  Pt is being admitted by Dr. Rubin Yañez. The results of their tests and reason(s) for their admission have been discussed with pt and/or available family. They convey agreement and understanding for the need to be admitted and for admission diagnosis. PLAN:  1. Current Discharge Medication List        2. Follow-up Information    None       Return to ED if worse     Diagnosis     Clinical Impression:   1. Fever, unspecified fever cause    2. Dehydration    3. Generalized weakness        Attestations:    Francesca Comer MD    Please note that this dictation was completed with [employer], the [employer] voice recognition software.   Quite often unanticipated grammatical, syntax, homophones, and other interpretive errors are inadvertently transcribed by the computer software. Please disregard these errors. Please excuse any errors that have escaped final proofreading. Thank you.

## 2019-10-23 NOTE — PROGRESS NOTES
Primary Nurse Glen Flower RN and brett RN performed a dual skin assessment on this patient No impairment noted  Kimani score is 16

## 2019-10-23 NOTE — PROGRESS NOTES
TRANSFER - IN REPORT:    Verbal report received from adrián (name) on Sami Tolbert  being received from ED (unit) for routine progression of care      Report consisted of patients Situation, Background, Assessment and   Recommendations(SBAR). Information from the following report(s) SBAR, Kardex, ED Summary, Procedure Summary, Intake/Output, MAR and Recent Results was reviewed with the receiving nurse. Opportunity for questions and clarification was provided. Assessment completed upon patients arrival to unit and care assumed.

## 2019-10-23 NOTE — PROGRESS NOTES
Bedside and Verbal shift change report GIVEN TO janak marrero RN. Report included the following information SBAR, Kardex, ED Summary, Procedure Summary, Intake/Output, MAR and Recent Results. 627: rn completed as much of admission db as pt's present fam mmbr, cousin bc chauhan was able to speak to.      Unable to educate pt d/t confusion

## 2019-10-24 ENCOUNTER — APPOINTMENT (OUTPATIENT)
Dept: CT IMAGING | Age: 84
DRG: 871 | End: 2019-10-24
Attending: INTERNAL MEDICINE
Payer: MEDICARE

## 2019-10-24 LAB
ALBUMIN SERPL-MCNC: 2.5 G/DL (ref 3.5–5)
ALBUMIN/GLOB SERPL: 0.5 {RATIO} (ref 1.1–2.2)
ALP SERPL-CCNC: 112 U/L (ref 45–117)
ALT SERPL-CCNC: 16 U/L (ref 12–78)
ANION GAP SERPL CALC-SCNC: 11 MMOL/L (ref 5–15)
AST SERPL-CCNC: 31 U/L (ref 15–37)
BASOPHILS # BLD: 0 K/UL (ref 0–0.1)
BASOPHILS NFR BLD: 0 % (ref 0–1)
BILIRUB SERPL-MCNC: 1.3 MG/DL (ref 0.2–1)
BUN SERPL-MCNC: 48 MG/DL (ref 6–20)
BUN/CREAT SERPL: 34 (ref 12–20)
CALCIUM SERPL-MCNC: 9 MG/DL (ref 8.5–10.1)
CHLORIDE SERPL-SCNC: 106 MMOL/L (ref 97–108)
CO2 SERPL-SCNC: 25 MMOL/L (ref 21–32)
CREAT SERPL-MCNC: 1.43 MG/DL (ref 0.55–1.02)
DIFFERENTIAL METHOD BLD: ABNORMAL
EOSINOPHIL # BLD: 0 K/UL (ref 0–0.4)
EOSINOPHIL NFR BLD: 0 % (ref 0–7)
ERYTHROCYTE [DISTWIDTH] IN BLOOD BY AUTOMATED COUNT: 17.1 % (ref 11.5–14.5)
GLOBULIN SER CALC-MCNC: 4.6 G/DL (ref 2–4)
GLUCOSE SERPL-MCNC: 117 MG/DL (ref 65–100)
HCT VFR BLD AUTO: 34.1 % (ref 35–47)
HGB BLD-MCNC: 10.6 G/DL (ref 11.5–16)
IMM GRANULOCYTES # BLD AUTO: 0 K/UL (ref 0–0.04)
IMM GRANULOCYTES NFR BLD AUTO: 0 % (ref 0–0.5)
LYMPHOCYTES # BLD: 0.9 K/UL (ref 0.8–3.5)
LYMPHOCYTES NFR BLD: 25 % (ref 12–49)
MCH RBC QN AUTO: 29.4 PG (ref 26–34)
MCHC RBC AUTO-ENTMCNC: 31.1 G/DL (ref 30–36.5)
MCV RBC AUTO: 94.5 FL (ref 80–99)
MONOCYTES # BLD: 0.4 K/UL (ref 0–1)
MONOCYTES NFR BLD: 11 % (ref 5–13)
NEUTS BAND NFR BLD MANUAL: 4 %
NEUTS SEG # BLD: 2.3 K/UL (ref 1.8–8)
NEUTS SEG NFR BLD: 60 % (ref 32–75)
NRBC # BLD: 0 K/UL (ref 0–0.01)
NRBC BLD-RTO: 0 PER 100 WBC
PLATELET # BLD AUTO: 108 K/UL (ref 150–400)
PMV BLD AUTO: 11.3 FL (ref 8.9–12.9)
POTASSIUM SERPL-SCNC: 2.8 MMOL/L (ref 3.5–5.1)
PROT SERPL-MCNC: 7.1 G/DL (ref 6.4–8.2)
RBC # BLD AUTO: 3.61 M/UL (ref 3.8–5.2)
RBC MORPH BLD: ABNORMAL
SODIUM SERPL-SCNC: 142 MMOL/L (ref 136–145)
WBC # BLD AUTO: 3.6 K/UL (ref 3.6–11)
WBC MORPH BLD: ABNORMAL

## 2019-10-24 PROCEDURE — 74176 CT ABD & PELVIS W/O CONTRAST: CPT

## 2019-10-24 PROCEDURE — 74011250637 HC RX REV CODE- 250/637: Performed by: HOSPITALIST

## 2019-10-24 PROCEDURE — 36415 COLL VENOUS BLD VENIPUNCTURE: CPT

## 2019-10-24 PROCEDURE — 74011250637 HC RX REV CODE- 250/637: Performed by: INTERNAL MEDICINE

## 2019-10-24 PROCEDURE — 74011250636 HC RX REV CODE- 250/636: Performed by: INTERNAL MEDICINE

## 2019-10-24 PROCEDURE — 85025 COMPLETE CBC W/AUTO DIFF WBC: CPT

## 2019-10-24 PROCEDURE — 80053 COMPREHEN METABOLIC PANEL: CPT

## 2019-10-24 PROCEDURE — 65660000000 HC RM CCU STEPDOWN

## 2019-10-24 PROCEDURE — 94760 N-INVAS EAR/PLS OXIMETRY 1: CPT

## 2019-10-24 PROCEDURE — 74011000258 HC RX REV CODE- 258: Performed by: INTERNAL MEDICINE

## 2019-10-24 PROCEDURE — 74011000255 HC RX REV CODE- 255: Performed by: INTERNAL MEDICINE

## 2019-10-24 RX ORDER — BARIUM SULFATE 20 MG/ML
900 SUSPENSION ORAL
Status: COMPLETED | OUTPATIENT
Start: 2019-10-24 | End: 2019-10-24

## 2019-10-24 RX ORDER — SODIUM CHLORIDE 0.9 % (FLUSH) 0.9 %
10 SYRINGE (ML) INJECTION
Status: DISCONTINUED | OUTPATIENT
Start: 2019-10-24 | End: 2019-10-24 | Stop reason: CLARIF

## 2019-10-24 RX ORDER — VANCOMYCIN HYDROCHLORIDE
1250
Status: COMPLETED | OUTPATIENT
Start: 2019-10-24 | End: 2019-10-24

## 2019-10-24 RX ADMIN — APIXABAN 2.5 MG: 2.5 TABLET, FILM COATED ORAL at 22:50

## 2019-10-24 RX ADMIN — MIDODRINE HYDROCHLORIDE 5 MG: 5 TABLET ORAL at 15:49

## 2019-10-24 RX ADMIN — CEFTRIAXONE 1 G: 1 INJECTION, POWDER, FOR SOLUTION INTRAMUSCULAR; INTRAVENOUS at 09:44

## 2019-10-24 RX ADMIN — Medication 10 ML: at 22:00

## 2019-10-24 RX ADMIN — APIXABAN 2.5 MG: 2.5 TABLET, FILM COATED ORAL at 08:41

## 2019-10-24 RX ADMIN — POTASSIUM BICARBONATE 20 MEQ: 782 TABLET, EFFERVESCENT ORAL at 09:44

## 2019-10-24 RX ADMIN — MIDODRINE HYDROCHLORIDE 5 MG: 5 TABLET ORAL at 08:41

## 2019-10-24 RX ADMIN — POTASSIUM BICARBONATE 20 MEQ: 782 TABLET, EFFERVESCENT ORAL at 14:40

## 2019-10-24 RX ADMIN — ACETAMINOPHEN 650 MG: 325 TABLET ORAL at 20:13

## 2019-10-24 RX ADMIN — ACETAMINOPHEN 650 MG: 325 TABLET ORAL at 00:43

## 2019-10-24 RX ADMIN — POTASSIUM BICARBONATE 20 MEQ: 782 TABLET, EFFERVESCENT ORAL at 22:54

## 2019-10-24 RX ADMIN — BARIUM SULFATE 900 ML: 20 SUSPENSION ORAL at 11:30

## 2019-10-24 RX ADMIN — Medication 10 ML: at 06:28

## 2019-10-24 RX ADMIN — Medication 10 ML: at 15:49

## 2019-10-24 RX ADMIN — ACETAMINOPHEN 650 MG: 325 TABLET ORAL at 14:40

## 2019-10-24 RX ADMIN — ACETAMINOPHEN 650 MG: 325 TABLET ORAL at 08:41

## 2019-10-24 RX ADMIN — VANCOMYCIN HYDROCHLORIDE 1250 MG: 10 INJECTION, POWDER, LYOPHILIZED, FOR SOLUTION INTRAVENOUS at 01:53

## 2019-10-24 RX ADMIN — MIRTAZAPINE 15 MG: 15 TABLET, FILM COATED ORAL at 22:50

## 2019-10-24 RX ADMIN — MIDODRINE HYDROCHLORIDE 5 MG: 5 TABLET ORAL at 22:50

## 2019-10-24 NOTE — PROGRESS NOTES
Telemedicine crosscover:    RN paged for patient admitted earlier in the evening with positive blood cultures and fever - stat consult for pharmacy to dose Vancomycin

## 2019-10-24 NOTE — PROGRESS NOTES
General Daily Progress Note    Admit Date: 10/23/2019    Subjective:     Patient has no complaint . Current Facility-Administered Medications   Medication Dose Route Frequency    [START ON 10/25/2019] vancomycin (VANCOCIN) 750 mg in 0.9% sodium chloride (MBP/ADV) 250 mL  750 mg IntraVENous Q24H    cefTRIAXone (ROCEPHIN) 1 g in 0.9% sodium chloride (MBP/ADV) 50 mL  1 g IntraVENous Q24H    sodium chloride (NS) flush 5-10 mL  5-10 mL IntraVENous PRN    acetaminophen (TYLENOL) tablet 650 mg  650 mg Oral Q6H PRN    acetaminophen (TYLENOL) tablet 650 mg  650 mg Oral Q6H PRN    albuterol (PROVENTIL VENTOLIN) nebulizer solution 2.5 mg  2.5 mg Nebulization Q6H PRN    apixaban (ELIQUIS) tablet 2.5 mg  2.5 mg Oral BID    midodrine (PROAMITINE) tablet 5 mg  5 mg Oral TID    mirtazapine (REMERON) tablet 15 mg  15 mg Oral QHS    polyethylene glycol (MIRALAX) packet 17 g  17 g Oral EVERY OTHER DAY    sodium chloride (NS) flush 5-40 mL  5-40 mL IntraVENous Q8H    sodium chloride (NS) flush 5-40 mL  5-40 mL IntraVENous PRN        Review of Systems  A comprehensive review of systems was negative.     Objective:     Patient Vitals for the past 24 hrs:   BP Temp Pulse Resp SpO2 Height Weight   10/24/19 0725 116/87 100 °F (37.8 °C) 84 24 90 %     10/24/19 0600  99.9 °F (37.7 °C)        10/24/19 0339 149/67 98.2 °F (36.8 °C) 77 18 99 %     10/24/19 0328       125 lb (56.7 kg)   10/24/19 0030  (!) 100.9 °F (38.3 °C)        10/23/19 2253 122/64 (!) 101.1 °F (38.4 °C) 76 18 99 %     10/23/19 2200  99.3 °F (37.4 °C)        10/23/19 1950 113/57 100.4 °F (38 °C) 72 18 96 %     10/23/19 1810 119/83 (!) 100.8 °F (38.2 °C) 74 22 96 %     10/23/19 1630 107/63 97.9 °F (36.6 °C) 70 21      10/23/19 1500 105/62  74 22 94 %     10/23/19 1445 107/69  71 21      10/23/19 1230 91/60  71 26 93 %     10/23/19 1030 (!) 81/50  71 23 95 %     10/23/19 1000 (!) 89/61  72 26 95 %     10/23/19 0928 90/62 99.4 °F (37.4 °C) 70 22 94 % 5' 1\" (1.549 m) 124 lb 9 oz (56.5 kg)     No intake/output data recorded. 10/22 1901 - 10/24 0700  In: 1995 [P.O.:300; I.V.:1695]  Out: 1700 [Urine:1700]    Physical Exam:   Visit Vitals  /87 (BP 1 Location: Left arm, BP Patient Position: At rest)   Pulse 84   Temp 100 °F (37.8 °C)   Resp 24   Ht 5' 1\" (1.549 m)   Wt 125 lb (56.7 kg)   SpO2 90%   BMI 23.62 kg/m²     General appearance: alert, cooperative, no distress, appears stated age  Neck: supple, symmetrical, trachea midline, no adenopathy, thyroid: not enlarged, symmetric, no tenderness/mass/nodules, no carotid bruit and no JVD  Lungs: clear to auscultation bilaterally  Heart: regular rate and rhythm, S1, S2 normal, no murmur, click, rub or gallop  Abdomen: No distention, tenderness to palpation left flank and upper quadrant, no rebound or guarding  Extremities: extremities normal, atraumatic, no cyanosis or edema        Data Review   Recent Results (from the past 24 hour(s))   CULTURE, BLOOD    Collection Time: 10/23/19  9:45 AM   Result Value Ref Range    Special Requests: NO SPECIAL REQUESTS      Culture result: (A)       GRAM POSITIVE COCCI IN PAIRS GROWING IN BOTH BOTTLES DRAWN (R FA SITE)    Culture result: (A)       PRELIMINARY REPORT OF GRAM POSITIVE COCCI IN PAIRS GROWING IN BOTH BOTTLES DRAWN CALLED TO AND READ BACK BY Lucía Lopez RN ON 10/23/19 AT [address] [place] [address], [facility] 3135).  MS   METABOLIC PANEL, COMPREHENSIVE    Collection Time: 10/23/19  9:45 AM   Result Value Ref Range    Sodium 140 136 - 145 mmol/L    Potassium 3.2 (L) 3.5 - 5.1 mmol/L    Chloride 100 97 - 108 mmol/L    CO2 30 21 - 32 mmol/L    Anion gap 10 5 - 15 mmol/L    Glucose 116 (H) 65 - 100 mg/dL    BUN 63 (H) 6 - 20 MG/DL    Creatinine 1.53 (H) 0.55 - 1.02 MG/DL    BUN/Creatinine ratio 41 (H) 12 - 20      GFR est AA 38 (L) >60 ml/min/1.73m2    GFR est non-AA 32 (L) >60 ml/min/1.73m2    Calcium 8.9 8.5 - 10.1 MG/DL    Bilirubin, total 0.9 0.2 - 1.0 MG/DL    ALT (SGPT) 17 12 - 78 U/L    AST (SGOT) 26 15 - 37 U/L    Alk. phosphatase 110 45 - 117 U/L    Protein, total 6.7 6.4 - 8.2 g/dL    Albumin 2.5 (L) 3.5 - 5.0 g/dL    Globulin 4.2 (H) 2.0 - 4.0 g/dL    A-G Ratio 0.6 (L) 1.1 - 2.2     CBC WITH AUTOMATED DIFF    Collection Time: 10/23/19  9:45 AM   Result Value Ref Range    WBC 3.0 (L) 3.6 - 11.0 K/uL    RBC 3.48 (L) 3.80 - 5.20 M/uL    HGB 10.0 (L) 11.5 - 16.0 g/dL    HCT 31.4 (L) 35.0 - 47.0 %    MCV 90.2 80.0 - 99.0 FL    MCH 28.7 26.0 - 34.0 PG    MCHC 31.8 30.0 - 36.5 g/dL    RDW 16.7 (H) 11.5 - 14.5 %    PLATELET 554 (L) 654 - 400 K/uL    MPV 11.4 8.9 - 12.9 FL    NRBC 0.0 0  WBC    ABSOLUTE NRBC 0.00 0.00 - 0.01 K/uL    NEUTROPHILS 43 32 - 75 %    BAND NEUTROPHILS 9 %    LYMPHOCYTES 43 12 - 49 %    MONOCYTES 5 5 - 13 %    EOSINOPHILS 0 0 - 7 %    BASOPHILS 0 0 - 1 %    IMMATURE GRANULOCYTES 0 0.0 - 0.5 %    ABS. NEUTROPHILS 1.5 (L) 1.8 - 8.0 K/UL    ABS. LYMPHOCYTES 1.3 0.8 - 3.5 K/UL    ABS. MONOCYTES 0.2 0.0 - 1.0 K/UL    ABS. EOSINOPHILS 0.0 0.0 - 0.4 K/UL    ABS. BASOPHILS 0.0 0.0 - 0.1 K/UL    ABS. IMM.  GRANS. 0.0 0.00 - 0.04 K/UL    DF MANUAL      RBC COMMENTS ANISOCYTOSIS  1+        WBC COMMENTS TOXIC GRANULATION     TROPONIN I    Collection Time: 10/23/19  9:45 AM   Result Value Ref Range    Troponin-I, Qt. 0.11 (H) <0.05 ng/mL   POC LACTIC ACID    Collection Time: 10/23/19  9:47 AM   Result Value Ref Range    Lactic Acid (POC) 0.73 0.40 - 2.00 mmol/L   EKG, 12 LEAD, INITIAL    Collection Time: 10/23/19  9:52 AM   Result Value Ref Range    Ventricular Rate 72 BPM    Atrial Rate 72 BPM    P-R Interval 186 ms    QRS Duration 182 ms    Q-T Interval 512 ms    QTC Calculation (Bezet) 560 ms    Calculated R Axis -165 degrees    Calculated T Axis -7 degrees    Diagnosis       AV dual-paced rhythm with occasional premature ventricular complexes  Biventricular pacemaker detected  When compared with ECG of 25-JUN-2019 09:01,  premature ventricular complexes are now present  Vent. rate has decreased BY  21 BPM  Confirmed by Jeremías Carrillo ([id number]) on 10/23/2019 2:39:53 PM     CULTURE, BLOOD    Collection Time: 10/23/19 10:50 AM   Result Value Ref Range    Special Requests: NO SPECIAL REQUESTS      Culture result: (A)       GRAM POSITIVE COCCI IN PAIRS GROWING IN BOTH BOTTLES DRAWN (R WRIST SITE)    Culture result: (A)       PRELIMINARY REPORT OF GRAM POSITIVE COCCI IN PAIRS GROWING IN BOTH BOTTLES DRAWN CALLED TO AND READ BACK BY Silvana Drake RN ON 10/23/19 AT [address] [place] [address], [facility] 2211).  MS   URINALYSIS W/ REFLEX CULTURE    Collection Time: 10/23/19 10:51 AM   Result Value Ref Range    Color YELLOW/STRAW      Appearance CLEAR CLEAR      Specific gravity 1.010 1.003 - 1.030      pH (UA) 7.0 5.0 - 8.0      Protein NEGATIVE  NEG mg/dL    Glucose NEGATIVE  NEG mg/dL    Ketone NEGATIVE  NEG mg/dL    Bilirubin NEGATIVE  NEG      Blood SMALL (A) NEG      Urobilinogen 0.2 0.2 - 1.0 EU/dL    Nitrites NEGATIVE  NEG      Leukocyte Esterase NEGATIVE  NEG      WBC 0-4 0 - 4 /hpf    RBC 5-10 0 - 5 /hpf    Epithelial cells FEW FEW /lpf    Bacteria NEGATIVE  NEG /hpf    UA:UC IF INDICATED CULTURE NOT INDICATED BY UA RESULT CNI      Hyaline cast 0-2 0 - 5 /lpf   INFLUENZA A & B AG (RAPID TEST)    Collection Time: 10/23/19 10:51 AM   Result Value Ref Range    Influenza A Antigen NEGATIVE  NEG      Influenza B Antigen NEGATIVE  NEG     TROPONIN I    Collection Time: 10/23/19  1:04 PM   Result Value Ref Range    Troponin-I, Qt. 0.09 (H) <0.05 ng/mL   OCCULT BLOOD, STOOL    Collection Time: 10/23/19  1:04 PM   Result Value Ref Range    Occult blood, stool NEGATIVE  NEG     MAGNESIUM    Collection Time: 10/23/19  1:04 PM   Result Value Ref Range    Magnesium 2.3 1.6 - 2.4 mg/dL   METABOLIC PANEL, COMPREHENSIVE    Collection Time: 10/24/19  3:45 AM   Result Value Ref Range    Sodium 142 136 - 145 mmol/L    Potassium 2.8 (L) 3.5 - 5.1 mmol/L    Chloride 106 97 - 108 mmol/L    CO2 25 21 - 32 mmol/L    Anion gap 11 5 - 15 mmol/L    Glucose 117 (H) 65 - 100 mg/dL    BUN 48 (H) 6 - 20 MG/DL    Creatinine 1.43 (H) 0.55 - 1.02 MG/DL    BUN/Creatinine ratio 34 (H) 12 - 20      GFR est AA 41 (L) >60 ml/min/1.73m2    GFR est non-AA 34 (L) >60 ml/min/1.73m2    Calcium 9.0 8.5 - 10.1 MG/DL    Bilirubin, total 1.3 (H) 0.2 - 1.0 MG/DL    ALT (SGPT) 16 12 - 78 U/L    AST (SGOT) 31 15 - 37 U/L    Alk. phosphatase 112 45 - 117 U/L    Protein, total 7.1 6.4 - 8.2 g/dL    Albumin 2.5 (L) 3.5 - 5.0 g/dL    Globulin 4.6 (H) 2.0 - 4.0 g/dL    A-G Ratio 0.5 (L) 1.1 - 2.2     CBC WITH AUTOMATED DIFF    Collection Time: 10/24/19  3:45 AM   Result Value Ref Range    WBC 3.6 3.6 - 11.0 K/uL    RBC 3.61 (L) 3.80 - 5.20 M/uL    HGB 10.6 (L) 11.5 - 16.0 g/dL    HCT 34.1 (L) 35.0 - 47.0 %    MCV 94.5 80.0 - 99.0 FL    MCH 29.4 26.0 - 34.0 PG    MCHC 31.1 30.0 - 36.5 g/dL    RDW 17.1 (H) 11.5 - 14.5 %    PLATELET 181 (L) 659 - 400 K/uL    MPV 11.3 8.9 - 12.9 FL    NRBC 0.0 0  WBC    ABSOLUTE NRBC 0.00 0.00 - 0.01 K/uL    NEUTROPHILS 60 32 - 75 %    BAND NEUTROPHILS 4 %    LYMPHOCYTES 25 12 - 49 %    MONOCYTES 11 5 - 13 %    EOSINOPHILS 0 0 - 7 %    BASOPHILS 0 0 - 1 %    IMMATURE GRANULOCYTES 0 0.0 - 0.5 %    ABS. NEUTROPHILS 2.3 1.8 - 8.0 K/UL    ABS. LYMPHOCYTES 0.9 0.8 - 3.5 K/UL    ABS. MONOCYTES 0.4 0.0 - 1.0 K/UL    ABS. EOSINOPHILS 0.0 0.0 - 0.4 K/UL    ABS. BASOPHILS 0.0 0.0 - 0.1 K/UL    ABS. IMM. GRANS. 0.0 0.00 - 0.04 K/UL    DF MANUAL      RBC COMMENTS ANISOCYTOSIS  1+        WBC COMMENTS REACTIVE LYMPHS             Assessment:     Active Problems:    Fever (10/23/2019)        Plan:     1. Patient developed a temperature with no obvious source. Antibiotic coverage broadened. She is relatively immunosuppressed through existing lymphoma. The only abnormal finding today was abdominal pain. CT scan pending. 2.  No overt sign of congestive heart failure but will continue vigilance.

## 2019-10-24 NOTE — PROGRESS NOTES
Problem: Falls - Risk of  Goal: *Absence of Falls  Description  Document Dalton Burciagaener Fall Risk and appropriate interventions in the flowsheet. Outcome: Progressing Towards Goal  Note:   Fall Risk Interventions:  Mobility Interventions: Bed/chair exit alarm, Patient to call before getting OOB, PT Consult for assist device competence    Mentation Interventions: Bed/chair exit alarm, Adequate sleep, hydration, pain control, Door open when patient unattended, Evaluate medications/consider consulting pharmacy, Familiar objects from home, Family/sitter at bedside, Increase mobility, More frequent rounding, Reorient patient, Room close to nurse's station, Update white board, Toileting rounds         Elimination Interventions: Call light in reach, Bed/chair exit alarm, Toilet paper/wipes in reach, Toileting schedule/hourly rounds    History of Falls Interventions: Bed/chair exit alarm         Problem: Falls - Risk of  Goal: *Absence of Falls  Description  Document Dalton Burciagaener Fall Risk and appropriate interventions in the flowsheet.   Outcome: Progressing Towards Goal  Note:   Fall Risk Interventions:  Mobility Interventions: Bed/chair exit alarm, Patient to call before getting OOB, PT Consult for assist device competence    Mentation Interventions: Bed/chair exit alarm, Adequate sleep, hydration, pain control, Door open when patient unattended, Evaluate medications/consider consulting pharmacy, Familiar objects from home, Family/sitter at bedside, Increase mobility, More frequent rounding, Reorient patient, Room close to nurse's station, Update white board, Toileting rounds         Elimination Interventions: Call light in reach, Bed/chair exit alarm, Toilet paper/wipes in reach, Toileting schedule/hourly rounds    History of Falls Interventions: Bed/chair exit alarm         Problem: Patient Education: Go to Patient Education Activity  Goal: Patient/Family Education  Outcome: Progressing Towards Goal     Problem: Patient Education: Go to Patient Education Activity  Goal: Patient/Family Education  Outcome: Progressing Towards Goal     Problem: Pressure Injury - Risk of  Goal: *Prevention of pressure injury  Description  Document Kimani Scale and appropriate interventions in the flowsheet. Outcome: Progressing Towards Goal  Note:   Pressure Injury Interventions:  Sensory Interventions: Assess need for specialty bed, Assess changes in LOC, Float heels, Discuss PT/OT consult with provider, Check visual cues for pain, Chair cushion, Keep linens dry and wrinkle-free, Maintain/enhance activity level, Minimize linen layers, Monitor skin under medical devices, Pad between skin to skin, Pressure redistribution bed/mattress (bed type), Turn and reposition approx. every two hours (pillows and wedges if needed)    Moisture Interventions: Apply protective barrier, creams and emollients, Absorbent underpads, Check for incontinence Q2 hours and as needed, Contain wound drainage, Internal/External urinary devices, Limit adult briefs, Maintain skin hydration (lotion/cream), Minimize layers, Moisture barrier    Activity Interventions: Chair cushion, Increase time out of bed, Pressure redistribution bed/mattress(bed type), PT/OT evaluation    Mobility Interventions: Chair cushion, Assess need for specialty bed, HOB 30 degrees or less, Pressure redistribution bed/mattress (bed type), PT/OT evaluation, Turn and reposition approx.  every two hours(pillow and wedges)    Nutrition Interventions: Document food/fluid/supplement intake    Friction and Shear Interventions: Feet elevated on foot rest, Apply protective barrier, creams and emollients, HOB 30 degrees or less, Lift sheet, Lift team/patient mobility team                Problem: Patient Education: Go to Patient Education Activity  Goal: Patient/Family Education  Outcome: Progressing Towards Goal

## 2019-10-24 NOTE — PROGRESS NOTES
Reason for Admission:   Fever               RRAT Score:    34 High risk              Resources/supports as identified by patient/family:  Pt has supportive family to include sn and daughter-in-law                 Top Challenges facing patient (as identified by patient/family and CM): Finances/Medication cost?    No issues with finances/medication cost. Pt has [facility] Medicare A/B                Transportation? Pt does not drive. Pt utilizes  [employer] [facility] [employer] system or lack thereof? Pt has a good support system                     Living arrangements? Pt is LTC at [facility]. Pt receives nursing care. Self-care/ADLs/Cognition? Pt needs full assistance with ADL's and IADL's          Current Advanced Directive/Advance Care Plan:  Pt has DNR code status. Pt does not have an ACP on file. Plan for utilizing home health: Pt has had home health in the past. Pt will not be utilizing home health at d/c. Transition of Care Plan:    [facility] Appointments  Medical transport  2nd IM Letter    CM met with pt at bedside to discuss d/c plan. Pt was confused. CM called pt's son via phone. No answer. Pt's daughter-in-law called CM via phone and provided information for initial assessment. CM verified pt's demographics, insurance and PCP. Pt is a 79 y/o -American female admitted to [address] [place] [address] on 10/23/19 for a fever. Pt's PCP is Dr. Gladys Garcia. Pt also sees PCP Dr. Antelmo Roche at [facility]. [facility] [address] [facility] for Rx. Pt is LTC at [facility]. Pt has a long-term care policy and is private pay. Pt does not meet the qualifications for Medicaid. Pt needs full assistance with ADL's and IADL's. Pt does not drive. Pt uses  [employer] for transportation.  Pt has a walker, nebulizer and oxygen at night (2lnc) provided by the facility. Pt had home health and been in a SNF in the past. No acute inpatient rehab in the past. Pt has DNR code status. Pt does have an ACP on file. Pt will need medical transport at d/c. Care Management Interventions  PCP Verified by CM: Yes(Pt's PCP is Dr. Gladys Garcia. Pt also sees PCP Dr. Antelmo Roche at [facility]  Mode of Transport at Discharge: BLS(Pt will need medical transport at d/c. )  Transition of Care Consult (CM Consult): Discharge Planning  Discharge Durable Medical Equipment: No(Pt has a walker, nebulizer and uses oxygen at night. )  Physical Therapy Consult: No  Occupational Therapy Consult: No  Speech Therapy Consult: No  Current Support Network: Nursing Facility(Pt is LTC at [facility]. )  Confirm Follow Up Transport: Other (see comment)  Plan discussed with Pt/Family/Caregiver: Yes  Discharge Location  [facility]    CM will continue to follow patient for discharge planning needs and arrange for services as deemed necessary.     Nancy Russell [address]  [phone]

## 2019-10-24 NOTE — PROGRESS NOTES
Bedside and Verbal shift change report GIVEN TO , RN. Report included the following information Kardex. SIGNIFICANT CHANGES DURING SHIFT:    Mews 3 Due to fever ,Md is aware and tylenol is ordered PRN and I will give   2337 Call from [address] with Gram + cocci from blood cultures drawn in ED 12/23   MDA Dr Prince Ramirez ordered Jerene Balm Iv and it has been started       [address] [facility] [address] MD:            [facility] NURSING NOTE   Admission Date 10/23/2019   Admission Diagnosis Fever [R50.9]   Consults IP CONSULT TO HOSPITALIST  IP CONSULT TO PRIMARY CARE PROVIDER  IP CONSULT TO PALLIATIVE CARE - PROVIDER      Cardiac Monitoring [x] Yes [] No      Purposeful Hourly Rounding [x] Yes    Milvia Score Total Score: 4   Milvia score 3 or > [x] Bed Alarm [] Avasys [] 1:1 sitter [] Patient refused (Signed refusal form in chart)   Kimani Score Kimani Score: 16   Kimani score 14 or < [] PMT consult [] Wound Care consult    []  Specialty bed  [] Nutrition consult      Influenza Vaccine Received Flu Vaccine for Current Season (usually Sept-March): Unsure           Oxygen needs? [x] Room air Oxygen @  []1L    []2L    []3L   []4L    []5L   []6L via  NC   Chronic home O2 use?  [] Yes [] No  Perform O2 challenge test and document in progress note using smartphrase (.Homeoxygen)      Last bowel movement        Urinary Catheter             LDAs               Peripheral IV 10/23/19 Left Wrist (Active)   Site Assessment Clean, dry, & intact 10/23/2019  7:20 PM   Phlebitis Assessment 0 10/23/2019  7:20 PM   Infiltration Assessment 0 10/23/2019  7:20 PM   Dressing Status Clean, dry, & intact 10/23/2019  7:20 PM   Dressing Type Tape;Transparent 10/23/2019  7:20 PM   Hub Color/Line Status Pink;Capped;Flushed;Patent 10/23/2019  7:20 PM       Peripheral IV 10/23/19 Right Forearm (Active)   Site Assessment Clean, dry, & intact 10/23/2019  7:20 PM   Phlebitis Assessment 0 10/23/2019  7:20 PM   Infiltration Assessment 0 10/23/2019 7:20 PM   Dressing Status Clean, dry, & intact 10/23/2019  7:20 PM   Dressing Type Tape;Transparent 10/23/2019  7:20 PM   Hub Color/Line Status Pink; Infusing;Flushed;Patent 10/23/2019  7:20 PM          External Female Catheter 10/23/19 (Active)   Site Assessment Clean, dry, & intact 10/23/2019 11:01 AM   Perineal Care Yes 10/23/2019 11:01 AM                   Readmission Risk Assessment Tool Score High Risk            34       Total Score        3 Has Seen PCP in Last 6 Months (Yes=3, No=0)    2 . Living with Significant Other. Assisted Living. LTAC. SNF. or   Rehab    4 IP Visits Last 12 Months (1-3=4, 4=9, >4=11)    5 Pt.  Coverage (Medicare=5 , Medicaid, or Self-Pay=4)    20 Charlson Comorbidity Score (Age + Comorbid Conditions)        Criteria that do not apply:    Patient Length of Stay (>5 days = 3)       Expected Length of Stay - - -   Actual Length of Stay 0

## 2019-10-24 NOTE — PROGRESS NOTES
SPEECH THERAPY SCREENING:  SERVICES ARE NOT INDICATED AT THIS TIME    [facility] therapy based on results obtained during the nursing admission assessment. The patients chart was reviewed and the patient is not appropriate for a skilled therapy evaluation at this time. Please consult speech therapy if any therapy needs arise. Thank you.     Jeannie Nunez, SLP

## 2019-10-24 NOTE — PROGRESS NOTES
0700: Received bedside report from Catrachita Franklin, off going nurse. Assumed care of patient. 0900: Spoke w/ Dr. Osvaldo Prakash regarding potassium 2.8. Received verbal order for KCL packet 20 meq for 3 doses today. Spoke w/ Angela (Pharm) to clarify formulary alternative notice for potassium bicarb-citric acid. 1900: Bedside shift change report GIVEN TO MAXIMUS Turk. Report included the following information SBAR, Kardex, Intake/Output, MAR, Recent Results and Cardiac Rhythm paced. SIGNIFICANT CHANGES DURING SHIFT:  Continued to have low grade fever. BP stable. Abd/pelvic CT done. Palliative on board. CONCERNS TO ADDRESS WITH MD:  n/a          [address] NURSING NOTE   Admission Date 10/23/2019   Admission Diagnosis Fever [R50.9]   Consults IP CONSULT TO HOSPITALIST  IP CONSULT TO PRIMARY CARE PROVIDER  IP CONSULT TO PALLIATIVE CARE - PROVIDER  IP CONSULT TO HEMATOLOGY      Cardiac Monitoring [x] Yes [] No      Purposeful Hourly Rounding [x] Yes    Milvia Score Total Score: 4   Milvia score 3 or > [x] Bed Alarm [] Avasys [] 1:1 sitter [] Patient refused (Signed refusal form in chart)   Kimani Score Kimani Score: 15   Kimani score 14 or < [x] PMT consult [] Wound Care consult    []  Specialty bed  [] Nutrition consult      Influenza Vaccine Received Flu Vaccine for Current Season (usually Sept-March): Unsure           Oxygen needs? [x] Room air Oxygen @  []1L    []2L    []3L   []4L    []5L   []6L via  NC   Chronic home O2 use?  [] Yes [] No  Perform O2 challenge test and document in progress note using smartphrase (.Homeoxygen)      Last bowel movement        Urinary Catheter       External Female Catheter 10/23/19-Urine Output (mL): 600 ml     LDAs               Peripheral IV 10/23/19 Right Forearm (Active)   Site Assessment Clean, dry, & intact 10/24/2019  3:04 PM   Phlebitis Assessment 0 10/24/2019  3:04 PM   Infiltration Assessment 0 10/24/2019  3:04 PM   Dressing Status Clean, dry, & intact 10/24/2019  3:04 PM   Dressing Type Transparent 10/24/2019  3:04 PM   Hub Color/Line Status Pink;Flushed;Patent 10/24/2019  3:04 PM          External Female Catheter 10/23/19 (Active)   Site Assessment Clean, dry, & intact 10/23/2019 11:01 AM   Perineal Care Yes 10/23/2019 11:01 AM       External Female Catheter 10/23/19 (Active)   Site Assessment Clean, dry, & intact 10/24/2019  3:35 AM   Repositioned Yes 10/24/2019  3:35 AM   Perineal Care Yes 10/24/2019  3:35 AM   Wick Changed Yes 10/24/2019  3:35 AM   Suction Canister/Tubing Changed No 10/24/2019  3:35 AM   Urine Output (mL) 600 ml 10/24/2019  3:35 AM                   Readmission Risk Assessment Tool Score High Risk            34       Total Score        3 Has Seen PCP in Last 6 Months (Yes=3, No=0)    2 . Living with Significant Other. Assisted Living. LTAC. SNF. or   Rehab    4 IP Visits Last 12 Months (1-3=4, 4=9, >4=11)    5 Pt.  Coverage (Medicare=5 , Medicaid, or Self-Pay=4)    20 Charlson Comorbidity Score (Age + Comorbid Conditions)        Criteria that do not apply:    Patient Length of Stay (>5 days = 3)       Expected Length of Stay - - -   Actual Length of Stay 1

## 2019-10-24 NOTE — PROGRESS NOTES
Problem: Falls - Risk of  Goal: *Absence of Falls  Description  Document Gloria Graf Fall Risk and appropriate interventions in the flowsheet. Outcome: Progressing Towards Goal  Note:   Fall Risk Interventions:  Mobility Interventions: Bed/chair exit alarm, OT consult for ADLs, PT Consult for mobility concerns, PT Consult for assist device competence, Utilize walker, cane, or other assistive device    Mentation Interventions: Bed/chair exit alarm, Increase mobility, More frequent rounding, Reorient patient         Elimination Interventions: Bed/chair exit alarm, Call light in reach, Patient to call for help with toileting needs, Toileting schedule/hourly rounds    History of Falls Interventions: Bed/chair exit alarm, Room close to nurse's station, Investigate reason for fall         Problem: General Medical Care Plan  Goal: *Vital signs within specified parameters  Outcome: Progressing Towards Goal  Goal: *Labs within defined limits  Outcome: Progressing Towards Goal     Problem: Pressure Injury - Risk of  Goal: *Prevention of pressure injury  Description  Document Kimani Scale and appropriate interventions in the flowsheet. Outcome: Progressing Towards Goal  Note:   Pressure Injury Interventions:  Sensory Interventions: Keep linens dry and wrinkle-free, Float heels, Discuss PT/OT consult with provider, Minimize linen layers, Pressure redistribution bed/mattress (bed type), Turn and reposition approx. every two hours (pillows and wedges if needed)    Moisture Interventions: Absorbent underpads, Apply protective barrier, creams and emollients, Check for incontinence Q2 hours and as needed, Internal/External urinary devices, Minimize layers, Limit adult briefs    Activity Interventions: Increase time out of bed, Pressure redistribution bed/mattress(bed type), PT/OT evaluation    Mobility Interventions: Float heels, Pressure redistribution bed/mattress (bed type), PT/OT evaluation, Turn and reposition approx.  every two hours(pillow and wedges)    Nutrition Interventions: Document food/fluid/supplement intake, Discuss nutritional consult with provider, Offer support with meals,snacks and hydration    Friction and Shear Interventions: Apply protective barrier, creams and emollients, Lift sheet, Lift team/patient mobility team, Minimize layers

## 2019-10-24 NOTE — PROGRESS NOTES
Spiritual Care Assessment/Progress Note  [facility]      NAME: Sarah Marie      MRN: [id number]  AGE: 80 y.o.  SEX: female  [employer]ian Affiliation: Gnosticism   Language: English     10/24/2019     Total Time (in minutes): 11     Spiritual Assessment begun in [facility] through conversation with:         [x]Patient        [] Family    [] Friend(s)        Reason for Consult: Palliative Care, Initial/Spiritual Assessment     Spiritual beliefs: (Please include comment if needed)     [x] Identifies with a kellie tradition:         [x] Supported by a kellie community:            [] Claims no spiritual orientation:           [] Seeking spiritual identity:                [] Adheres to an individual form of spirituality:           [] Not able to assess:                           Identified resources for coping:      [x] Prayer                               [] Music                  [] Guided Imagery     [x] Family/friends                 [] Pet visits     [] Devotional reading                         [] Unknown     [] Other:                                              Interventions offered during this visit: (See comments for more details)    Patient Interventions: Affirmation of kellie, Affirmation of emotions/emotional suffering, Prayer (assurance of), Iconic (affirming the presence of God/Higher Power)           Plan of Care:     [] Support spiritual and/or cultural needs    [] Support AMD and/or advance care planning process      [] Support grieving process   [] Coordinate Rites and/or Rituals    [] Coordination with community clergy   [x] No spiritual needs identified at this time   [] Detailed Plan of Care below (See Comments)  [] Make referral to Music Therapy  [] Make referral to Pet Therapy     [] Make referral to Addiction services  [] Make referral to [facility]  [] Make referral to Spiritual Care Partner  [] No future visits requested        [x] Follow up visits as needed Comments: The patient was visited on the [facility] Pulmonary unit for the purpose of making a spiritual assessment. The patient was alone during the visit. The patient was unable to communicate clearly. The patient recalled that we had spoken, more than once, during a previous hospitalization. Provided assurance to patience that she will be kept in prayer . Advised of  availability. Chaplains will follow as able and/or needed. Rev.  Edson Alamo EdD MDiv     For  Assistance Page [phone])

## 2019-10-24 NOTE — CONSULTS
Palliative Medicine Consult  Bhanu: [phone])    Patient Name: Rubia Musa  YOB: 1925    Date of Initial Consult: 10/24/19  Reason for Consult: care decisions  Requesting Provider: Srinivasan Llanos MD  Primary Care Physician: Renata Landrum MD     SUMMARY:   Rubia Musa is a 80 y.o. Female with a past history of heart failure / ischemic cardiomyopathy EF of 15% (3/13/19 ),  HTN , CKD  CLL, ibrutinib ,  who was admitted on 10/23/2019 from home /nursing home  with a diagnosis of fever with bacteremia. and lethargy. Current medical issues leading to Palliative Medicine involvement include: ischemic cardiomyopathy , immunocompromised , gram positive sepsis ,  advance elderly , high risk of decompensation , care decisions. PALLIATIVE DIAGNOSES:   1. Fever   2. Confusion   3. Ischemic cardiomyopathy   4. Debility  5. CLL        PLAN:   1. Prior to visit , I did chart review, including lab work, echo results. 2. Patient is currently alert, oriented x2 , anxious mildly  confused , does not engages in conversation, unable to tell me why she is in hospital.  3. I spoke to  bed side Rn who notes , patient confusion gets worse with fever . 4. Advance medical directives is in place , reviewed , spoke to her daughter in law /primary [facility] June (per the advance medical directives on the chart ). 5. I educated her on role of palliative care team , to reduce the distress of chronic and advance illness , to provide extra layer of support and to assist with care decisions . 6. We reviewed current medical issues , blood culture results , and her change in mental status /confusion related to infection . We also talked about  she is at high risk of decline , given her immunity is low due to cancer and its treatment . 7. dtr in law notes her  /patient son Yash Musa is on his way to visit patient this evening from out of town.   8. We scheduled a family meeting with patient son tomorrow at 11 am , to discuss goals of treatment . 9. Initial consult note routed to primary continuity provider and/or primary health care team members  10. Communicated plan of care with: Palliative Joe THOMSON 192 Team     GOALS OF CARE / TREATMENT PREFERENCES:     GOALS OF CARE:  Patient/Health Care Proxy Stated Goals: (to be discussed )    TREATMENT PREFERENCES:   Code Status: DNR    Advance Care Planning:  [x] [facility] Interdisciplinary Team has updated the ACP Navigator with Health Care Decision Maker and Patient Capacity      Primary Decision Maker: Sriram Culver primary - Son - [phone]    Secondary Decision Maker: ElaineSeema(Daughter In Sherrell) mpoa second - Other Relative - [phone]  Advance Care Planning 10/23/2019   Patient's [address] is: -   Primary Decision Maker Name -   Primary Decision Maker Phone Number -   Primary Decision Maker Relationship to Patient -   Confirm Advance Directive Yes, on file   Does the patient have other document types -       Medical Interventions: (DNR )     Other Instructions: Other:    As far as possible, the palliative care team has discussed with patient / health care proxy about goals of care / treatment preferences for patient. HISTORY:     History obtained from: chart , bed side Rn .    CHIEF COMPLAINT: fever and confusion . HPI/SUBJECTIVE:    The patient is:   [] Verbal and minimal participation due to confusion. She denies any pain,  nausea or vomiting . She is currently anxious . Per bed side Rn , her confusion gets worse with fever .     Clinical Pain Assessment (nonverbal scale for severity on nonverbal patients):   Clinical Pain Assessment  Severity: 0          Duration: for how long has pt been experiencing pain (e.g., 2 days, 1 month, years)  Frequency: how often pain is an issue (e.g., several times per day, once every few days, constant)     FUNCTIONAL ASSESSMENT:     Palliative Performance Scale (PPS):          PSYCHOSOCIAL/SPIRITUAL SCREENING:     Palliative IDT has assessed this patient for cultural preferences / practices and a referral made as appropriate to needs (Cultural Services, Patient Advocacy, Ethics, etc.)    Any spiritual / Roman Catholic concerns:  [] Yes /  [x] No    Caregiver Burnout:  [] Yes /  [x] No /  [] No Caregiver Present      Anticipatory grief assessment:   [x] Normal  / [] Maladaptive       ESAS Anxiety: Anxiety: 0    ESAS Depression: Depression: 0        REVIEW OF SYSTEMS:     Positive and pertinent negative findings in ROS are noted above in HPI. The following systems were [x] reviewed / [] unable to be reviewed as noted in HPI  Other findings are noted below. Systems: constitutional, ears/nose/mouth/throat, respiratory, gastrointestinal, genitourinary, musculoskeletal, integumentary, neurologic, psychiatric, endocrine. Positive findings noted below. Modified ESAS Completed by: provider   Fatigue: 6 Drowsiness: 0   Depression: 0 Pain: 0   Anxiety: 0 Nausea: 0   Anorexia: 5 Dyspnea: 0                    PHYSICAL EXAM:     From RN flowsheet:  Wt Readings from Last 3 Encounters:   10/24/19 125 lb (56.7 kg)   09/24/19 122 lb 6.4 oz (55.5 kg)   08/20/19 125 lb 9.6 oz (57 kg)     Blood pressure 110/64, pulse 74, temperature 98.9 °F (37.2 °C), resp. rate 24, height 5' 1\" (1.549 m), weight 125 lb (56.7 kg), SpO2 93 %. Pain Scale 1: Numeric (0 - 10)  Pain Intensity 1: 0                 Last bowel movement, if known:     Constitutional: alert oriented x2, place and person , confused. Eyes: pupils equal, anicteric  ENMT: no nasal discharge, moist mucous membranes  Cardiovascular: regular rhythm, distal pulses intact  Respiratory: breathing not labored, symmetric  Gastrointestinal: soft non-tender, +bowel sounds  Musculoskeletal: muscular wasting generalized.   Skin: warm, dry  Neurologic: following commands, moving all extremities, does not engages in conversation .  Psychiatric: anxious . Other:       HISTORY:     Active Problems:    Fever (10/23/2019)      Past Medical History:   Diagnosis Date    Aortic insufficiency     Asthma     CKD (chronic kidney disease) stage 3, GFR 30-59 ml/min ([facility]) 1/10/2018    CLL (chronic lymphocytic leukemia) ([facility])     Congestive heart failure, NYHA class II, chronic, systolic ([facility])     Heart failure ([address]     Hypertension     Mitral valvular regurgitation 1/22/2016    ECHO 2/3/17: LV dilated, EF 20-25%, mild LVH, RV mod dilated, mild- mod MELANIA, mod-severe MR, mod AI ECHO 7/29/17: EF 15%, severe DHK, reduced RVEF, RVH, RVSP 35 mmHg  Mild LAE, mod-marked MA fibrosis, mod-severe MR (2+), AO root dilated,      Nonrheumatic aortic valve insufficiency 10/16/2018    Presence of biventricular automatic cardioverter/defibrillator (AICD) 7/2/2015    [employer] biventricular AICD implant    Stomach ulcer       Past Surgical History:   Procedure Laterality Date    HX BREAST BIOPSY Bilateral     40 years ago    HX COLONOSCOPY      HX HEENT      cataracts removed    HX HYSTERECTOMY      HX OTHER SURGICAL      lymph node surgery    HX TONSILLECTOMY      MI EGD TRANSORAL BIOPSY SINGLE/MULTIPLE  5/23/2012         SINUS SURGERY PROC UNLISTED      Nasal polyps removed      Family History   Problem Relation Age of Onset    Heart Disease Mother     Stroke Father       History reviewed, no pertinent family history.   Social History     Tobacco Use    Smoking status: Never Smoker    Smokeless tobacco: Never Used   Substance Use Topics    Alcohol use: No     Alcohol/week: 0.0 standard drinks     Allergies   Allergen Reactions    Codeine Other (comments)     \"made me disoriented\" per pt      Current Facility-Administered Medications   Medication Dose Route Frequency    [START ON 10/25/2019] vancomycin (VANCOCIN) 750 mg in 0.9% sodium chloride (MBP/ADV) 250 mL  750 mg IntraVENous Q24H    cefTRIAXone (ROCEPHIN) 1 g in 0.9% sodium chloride (MBP/ADV) 50 mL  1 g IntraVENous Q24H    potassium bicarb-citric acid (EFFER-K) tablet 20 mEq  20 mEq Oral Q8H    sodium chloride (NS) flush 5-10 mL  5-10 mL IntraVENous PRN    acetaminophen (TYLENOL) tablet 650 mg  650 mg Oral Q6H PRN    acetaminophen (TYLENOL) tablet 650 mg  650 mg Oral Q6H PRN    albuterol (PROVENTIL VENTOLIN) nebulizer solution 2.5 mg  2.5 mg Nebulization Q6H PRN    apixaban (ELIQUIS) tablet 2.5 mg  2.5 mg Oral BID    midodrine (PROAMITINE) tablet 5 mg  5 mg Oral TID    mirtazapine (REMERON) tablet 15 mg  15 mg Oral QHS    polyethylene glycol (MIRALAX) packet 17 g  17 g Oral EVERY OTHER DAY    sodium chloride (NS) flush 5-40 mL  5-40 mL IntraVENous Q8H    sodium chloride (NS) flush 5-40 mL  5-40 mL IntraVENous PRN          LAB AND IMAGING FINDINGS:     Lab Results   Component Value Date/Time    WBC 3.6 10/24/2019 03:45 AM    HGB 10.6 (L) 10/24/2019 03:45 AM    PLATELET 072 (L) 70/78/3952 03:45 AM     Lab Results   Component Value Date/Time    Sodium 142 10/24/2019 03:45 AM    Potassium 2.8 (L) 10/24/2019 03:45 AM    Chloride 106 10/24/2019 03:45 AM    CO2 25 10/24/2019 03:45 AM    BUN 48 (H) 10/24/2019 03:45 AM    Creatinine 1.43 (H) 10/24/2019 03:45 AM    Calcium 9.0 10/24/2019 03:45 AM    Magnesium 2.3 10/23/2019 01:04 PM    Phosphorus 4.4 06/26/2019 02:45 AM      Lab Results   Component Value Date/Time    AST (SGOT) 31 10/24/2019 03:45 AM    Alk.  phosphatase 112 10/24/2019 03:45 AM    Protein, total 7.1 10/24/2019 03:45 AM    Albumin 2.5 (L) 10/24/2019 03:45 AM    Globulin 4.6 (H) 10/24/2019 03:45 AM     Lab Results   Component Value Date/Time    INR 1.2 (H) 05/22/2012 10:54 PM    Prothrombin time 12.2 (H) 05/22/2012 10:54 PM    aPTT 26.0 05/22/2012 10:54 PM      Lab Results   Component Value Date/Time    Iron 12 (L) 06/26/2019 02:05 PM    TIBC 138 (L) 06/26/2019 02:05 PM    Iron % saturation 9 (L) 06/26/2019 02:05 PM    Ferritin 801 (H) 06/26/2019 02:05 PM      No results found for: PH, PCO2, PO2  No components found for: [place]   Lab Results   Component Value Date/Time    CK 53 03/11/2019 04:24 PM    CK - MB <1.0 02/01/2017 07:35 PM                Total time:   Counseling / coordination time, spent as noted above:   > 50% counseling / coordination?:     Prolonged service was provided for  []30 min   []75 min in face to face time in the presence of the patient, spent as noted above. Time Start:   Time End:   Note: this can only be billed with 54612 (initial) or 66113 (follow up). If multiple start / stop times, list each separately.

## 2019-10-25 ENCOUNTER — TELEPHONE (OUTPATIENT)
Dept: PALLATIVE CARE | Age: 84
End: 2019-10-25

## 2019-10-25 LAB
ALBUMIN SERPL-MCNC: 2.2 G/DL (ref 3.5–5)
ALBUMIN/GLOB SERPL: 0.5 {RATIO} (ref 1.1–2.2)
ALP SERPL-CCNC: 93 U/L (ref 45–117)
ALT SERPL-CCNC: 14 U/L (ref 12–78)
ANION GAP SERPL CALC-SCNC: 6 MMOL/L (ref 5–15)
AST SERPL-CCNC: 28 U/L (ref 15–37)
BASOPHILS # BLD: 0 K/UL (ref 0–0.1)
BASOPHILS NFR BLD: 0 % (ref 0–1)
BILIRUB SERPL-MCNC: 0.8 MG/DL (ref 0.2–1)
BLASTS NFR BLD MANUAL: 0 %
BNP SERPL-MCNC: ABNORMAL PG/ML
BUN SERPL-MCNC: 52 MG/DL (ref 6–20)
BUN/CREAT SERPL: 36 (ref 12–20)
CALCIUM SERPL-MCNC: 8.5 MG/DL (ref 8.5–10.1)
CHLORIDE SERPL-SCNC: 104 MMOL/L (ref 97–108)
CO2 SERPL-SCNC: 31 MMOL/L (ref 21–32)
CREAT SERPL-MCNC: 1.43 MG/DL (ref 0.55–1.02)
DIFFERENTIAL METHOD BLD: ABNORMAL
EOSINOPHIL # BLD: 0 K/UL (ref 0–0.4)
EOSINOPHIL NFR BLD: 0 % (ref 0–7)
ERYTHROCYTE [DISTWIDTH] IN BLOOD BY AUTOMATED COUNT: 17 % (ref 11.5–14.5)
GLOBULIN SER CALC-MCNC: 4.1 G/DL (ref 2–4)
GLUCOSE SERPL-MCNC: 100 MG/DL (ref 65–100)
HCT VFR BLD AUTO: 31.4 % (ref 35–47)
HGB BLD-MCNC: 9.9 G/DL (ref 11.5–16)
IMM GRANULOCYTES # BLD AUTO: 0 K/UL
IMM GRANULOCYTES NFR BLD AUTO: 0 %
LYMPHOCYTES # BLD: 1.4 K/UL (ref 0.8–3.5)
LYMPHOCYTES NFR BLD: 38 % (ref 12–49)
MCH RBC QN AUTO: 29.2 PG (ref 26–34)
MCHC RBC AUTO-ENTMCNC: 31.5 G/DL (ref 30–36.5)
MCV RBC AUTO: 92.6 FL (ref 80–99)
METAMYELOCYTES NFR BLD MANUAL: 0 %
MONOCYTES # BLD: 0.1 K/UL (ref 0–1)
MONOCYTES NFR BLD: 3 % (ref 5–13)
MYELOCYTES NFR BLD MANUAL: 0 %
NEUTS BAND NFR BLD MANUAL: 0 % (ref 0–6)
NEUTS SEG # BLD: 2.2 K/UL (ref 1.8–8)
NEUTS SEG NFR BLD: 59 % (ref 32–75)
NRBC # BLD: 0 K/UL (ref 0–0.01)
NRBC BLD-RTO: 0 PER 100 WBC
OTHER CELLS NFR BLD MANUAL: 0 %
PLATELET # BLD AUTO: 98 K/UL (ref 150–400)
PMV BLD AUTO: 10.8 FL (ref 8.9–12.9)
POTASSIUM SERPL-SCNC: 3.3 MMOL/L (ref 3.5–5.1)
PROMYELOCYTES NFR BLD MANUAL: 0 %
PROT SERPL-MCNC: 6.3 G/DL (ref 6.4–8.2)
RBC # BLD AUTO: 3.39 M/UL (ref 3.8–5.2)
RBC MORPH BLD: ABNORMAL
SODIUM SERPL-SCNC: 141 MMOL/L (ref 136–145)
WBC # BLD AUTO: 3.7 K/UL (ref 3.6–11)
WBC MORPH BLD: ABNORMAL

## 2019-10-25 PROCEDURE — 74011250637 HC RX REV CODE- 250/637: Performed by: INTERNAL MEDICINE

## 2019-10-25 PROCEDURE — 83880 ASSAY OF NATRIURETIC PEPTIDE: CPT

## 2019-10-25 PROCEDURE — 80053 COMPREHEN METABOLIC PANEL: CPT

## 2019-10-25 PROCEDURE — 74011250637 HC RX REV CODE- 250/637: Performed by: HOSPITALIST

## 2019-10-25 PROCEDURE — 74011250636 HC RX REV CODE- 250/636: Performed by: INTERNAL MEDICINE

## 2019-10-25 PROCEDURE — 74011250637 HC RX REV CODE- 250/637: Performed by: EMERGENCY MEDICINE

## 2019-10-25 PROCEDURE — 85027 COMPLETE CBC AUTOMATED: CPT

## 2019-10-25 PROCEDURE — 94760 N-INVAS EAR/PLS OXIMETRY 1: CPT

## 2019-10-25 PROCEDURE — 74011000258 HC RX REV CODE- 258: Performed by: INTERNAL MEDICINE

## 2019-10-25 PROCEDURE — 65660000000 HC RM CCU STEPDOWN

## 2019-10-25 PROCEDURE — 36415 COLL VENOUS BLD VENIPUNCTURE: CPT

## 2019-10-25 RX ORDER — LEVOTHYROXINE SODIUM 25 UG/1
25 TABLET ORAL
Status: DISCONTINUED | OUTPATIENT
Start: 2019-10-25 | End: 2019-11-06 | Stop reason: HOSPADM

## 2019-10-25 RX ORDER — CARVEDILOL 3.12 MG/1
3.12 TABLET ORAL 2 TIMES DAILY WITH MEALS
Status: DISCONTINUED | OUTPATIENT
Start: 2019-10-25 | End: 2019-10-29

## 2019-10-25 RX ORDER — POTASSIUM CHLORIDE 750 MG/1
40 TABLET, FILM COATED, EXTENDED RELEASE ORAL
Status: COMPLETED | OUTPATIENT
Start: 2019-10-25 | End: 2019-10-25

## 2019-10-25 RX ORDER — POLYETHYLENE GLYCOL 3350 17 G/17G
17 POWDER, FOR SOLUTION ORAL EVERY OTHER DAY
Status: DISCONTINUED | OUTPATIENT
Start: 2019-10-25 | End: 2019-11-06 | Stop reason: HOSPADM

## 2019-10-25 RX ADMIN — MIDODRINE HYDROCHLORIDE 5 MG: 5 TABLET ORAL at 23:16

## 2019-10-25 RX ADMIN — LEVOTHYROXINE SODIUM 25 MCG: 25 TABLET ORAL at 11:40

## 2019-10-25 RX ADMIN — ACETAMINOPHEN 650 MG: 325 TABLET ORAL at 01:18

## 2019-10-25 RX ADMIN — Medication 10 ML: at 23:18

## 2019-10-25 RX ADMIN — APIXABAN 2.5 MG: 2.5 TABLET, FILM COATED ORAL at 23:16

## 2019-10-25 RX ADMIN — CARVEDILOL 3.12 MG: 3.12 TABLET, FILM COATED ORAL at 16:13

## 2019-10-25 RX ADMIN — APIXABAN 2.5 MG: 2.5 TABLET, FILM COATED ORAL at 08:37

## 2019-10-25 RX ADMIN — POTASSIUM CHLORIDE 40 MEQ: 750 TABLET, FILM COATED, EXTENDED RELEASE ORAL at 11:40

## 2019-10-25 RX ADMIN — ACETAMINOPHEN 650 MG: 325 TABLET ORAL at 16:13

## 2019-10-25 RX ADMIN — ACETAMINOPHEN 650 MG: 325 TABLET ORAL at 23:17

## 2019-10-25 RX ADMIN — CEFTRIAXONE 1 G: 1 INJECTION, POWDER, FOR SOLUTION INTRAMUSCULAR; INTRAVENOUS at 08:38

## 2019-10-25 RX ADMIN — VANCOMYCIN HYDROCHLORIDE 750 MG: 750 INJECTION, POWDER, LYOPHILIZED, FOR SOLUTION INTRAVENOUS at 01:26

## 2019-10-25 RX ADMIN — MIDODRINE HYDROCHLORIDE 5 MG: 5 TABLET ORAL at 08:37

## 2019-10-25 RX ADMIN — Medication 10 ML: at 06:00

## 2019-10-25 RX ADMIN — Medication 10 ML: at 16:13

## 2019-10-25 RX ADMIN — ACETAMINOPHEN 650 MG: 325 TABLET ORAL at 08:37

## 2019-10-25 RX ADMIN — MIRTAZAPINE 15 MG: 15 TABLET, FILM COATED ORAL at 23:16

## 2019-10-25 RX ADMIN — CARVEDILOL 3.12 MG: 3.12 TABLET, FILM COATED ORAL at 11:40

## 2019-10-25 RX ADMIN — MIDODRINE HYDROCHLORIDE 5 MG: 5 TABLET ORAL at 16:17

## 2019-10-25 NOTE — PROGRESS NOTES
General Daily Progress Note    Admit Date: 10/23/2019    Subjective:     Patient has no complaint. Current Facility-Administered Medications   Medication Dose Route Frequency    carvedilol (COREG) tablet 3.125 mg  3.125 mg Oral BID WITH MEALS    levothyroxine (SYNTHROID) tablet 25 mcg  25 mcg Oral 6am    polyethylene glycol (MIRALAX) packet 17 g  17 g Oral EVERY OTHER DAY    vancomycin (VANCOCIN) 750 mg in 0.9% sodium chloride (MBP/ADV) 250 mL  750 mg IntraVENous Q24H    cefTRIAXone (ROCEPHIN) 1 g in 0.9% sodium chloride (MBP/ADV) 50 mL  1 g IntraVENous Q24H    sodium chloride (NS) flush 5-10 mL  5-10 mL IntraVENous PRN    acetaminophen (TYLENOL) tablet 650 mg  650 mg Oral Q6H PRN    acetaminophen (TYLENOL) tablet 650 mg  650 mg Oral Q6H PRN    albuterol (PROVENTIL VENTOLIN) nebulizer solution 2.5 mg  2.5 mg Nebulization Q6H PRN    apixaban (ELIQUIS) tablet 2.5 mg  2.5 mg Oral BID    midodrine (PROAMITINE) tablet 5 mg  5 mg Oral TID    mirtazapine (REMERON) tablet 15 mg  15 mg Oral QHS    polyethylene glycol (MIRALAX) packet 17 g  17 g Oral EVERY OTHER DAY    sodium chloride (NS) flush 5-40 mL  5-40 mL IntraVENous Q8H    sodium chloride (NS) flush 5-40 mL  5-40 mL IntraVENous PRN        Review of Systems  A comprehensive review of systems was negative.     Objective:     Patient Vitals for the past 24 hrs:   BP Temp Pulse Resp SpO2 Weight   10/25/19 0731 128/75 (!) 102.1 °F (38.9 °C) 78 20 97 %    10/25/19 0604 106/48 98.9 °F (37.2 °C) 79 20 96 %    10/25/19 0204  98.8 °F (37.1 °C)    121 lb 4.8 oz (55 kg)   10/25/19 0115  (!) 100.8 °F (38.2 °C)       10/24/19 2324 118/77 98.4 °F (36.9 °C) 78 22 100 %    10/24/19 2250 118/68  65      10/24/19 2005 115/77 (!) 102 °F (38.9 °C) 62 24 95 %    10/24/19 1546  98.9 °F (37.2 °C)       10/24/19 1434 110/64 (!) 100.6 °F (38.1 °C) 74 24 93 %    10/24/19 1357 116/76 100 °F (37.8 °C) 81 20 93 %    10/24/19 1107 114/66 98.3 °F (36.8 °C) 69 22 93 %      No intake/output data recorded. 10/23 1901 - 10/25 0700  In: 1130 [P.O.:1080; I.V.:50]  Out: 1200 [Urine:1200]    Physical Exam:   Visit Vitals  /75 (BP 1 Location: Left arm, BP Patient Position: At rest)   Pulse 78   Temp (!) 102.1 °F (38.9 °C)   Resp 20   Ht 5' 1\" (1.549 m)   Wt 121 lb 4.8 oz (55 kg)   SpO2 97%   BMI 22.92 kg/m²     General appearance: alert, cooperative, no distress, appears stated age  Neck: supple, symmetrical, trachea midline, no adenopathy, thyroid: not enlarged, symmetric, no tenderness/mass/nodules, no carotid bruit and JVD - 4 cm above sternal notch  Lungs: clear to auscultation bilaterally  Heart: regular rate and rhythm, S1, S2 normal, no murmur, click, rub or gallop  Abdomen: soft, non-tender. Bowel sounds normal. No masses,  no organomegaly  Extremities: extremities normal, atraumatic, no cyanosis or edema        Data Review   Recent Results (from the past 24 hour(s))   CBC WITH MANUAL DIFF    Collection Time: 10/25/19  1:54 AM   Result Value Ref Range    WBC 3.7 3.6 - 11.0 K/uL    RBC 3.39 (L) 3.80 - 5.20 M/uL    HGB 9.9 (L) 11.5 - 16.0 g/dL    HCT 31.4 (L) 35.0 - 47.0 %    MCV 92.6 80.0 - 99.0 FL    MCH 29.2 26.0 - 34.0 PG    MCHC 31.5 30.0 - 36.5 g/dL    RDW 17.0 (H) 11.5 - 14.5 %    PLATELET 98 (L) 940 - 400 K/uL    MPV 10.8 8.9 - 12.9 FL    NRBC 0.0 0  WBC    ABSOLUTE NRBC 0.00 0.00 - 0.01 K/uL    NEUTROPHILS 59 32 - 75 %    BAND NEUTROPHILS 0 0 - 6 %    LYMPHOCYTES 38 12 - 49 %    MONOCYTES 3 (L) 5 - 13 %    EOSINOPHILS 0 0 - 7 %    BASOPHILS 0 0 - 1 %    METAMYELOCYTES 0 0 %    MYELOCYTES 0 0 %    PROMYELOCYTES 0 0 %    BLASTS 0 0 %    OTHER CELL 0 0      IMMATURE GRANULOCYTES 0 %    ABS. NEUTROPHILS 2.2 1.8 - 8.0 K/UL    ABS. LYMPHOCYTES 1.4 0.8 - 3.5 K/UL    ABS. MONOCYTES 0.1 0.0 - 1.0 K/UL    ABS. EOSINOPHILS 0.0 0.0 - 0.4 K/UL    ABS. BASOPHILS 0.0 0.0 - 0.1 K/UL    ABS. IMM.  GRANS. 0.0 K/UL    DF MANUAL      RBC COMMENTS NORMOCYTIC, NORMOCHROMIC WBC COMMENTS REACTIVE LYMPHS     METABOLIC PANEL, COMPREHENSIVE    Collection Time: 10/25/19  1:54 AM   Result Value Ref Range    Sodium 141 136 - 145 mmol/L    Potassium 3.3 (L) 3.5 - 5.1 mmol/L    Chloride 104 97 - 108 mmol/L    CO2 31 21 - 32 mmol/L    Anion gap 6 5 - 15 mmol/L    Glucose 100 65 - 100 mg/dL    BUN 52 (H) 6 - 20 MG/DL    Creatinine 1.43 (H) 0.55 - 1.02 MG/DL    BUN/Creatinine ratio 36 (H) 12 - 20      GFR est AA 41 (L) >60 ml/min/1.73m2    GFR est non-AA 34 (L) >60 ml/min/1.73m2    Calcium 8.5 8.5 - 10.1 MG/DL    Bilirubin, total 0.8 0.2 - 1.0 MG/DL    ALT (SGPT) 14 12 - 78 U/L    AST (SGOT) 28 15 - 37 U/L    Alk. phosphatase 93 45 - 117 U/L    Protein, total 6.3 (L) 6.4 - 8.2 g/dL    Albumin 2.2 (L) 3.5 - 5.0 g/dL    Globulin 4.1 (H) 2.0 - 4.0 g/dL    A-G Ratio 0.5 (L) 1.1 - 2.2     NT-PRO BNP    Collection Time: 10/25/19  1:54 AM   Result Value Ref Range    NT pro-BNP >35,000 (H) <450 PG/ML           Assessment:     Active Problems:    Fever (10/23/2019)        Plan:     1. Patient feels better today. 2.  She has gram-positive sepsis. Continue vancomycin and ceftriaxone for now. 3.  She is relatively immunosuppressed. 4.  No overt sign of congestive heart failure but will continue to monitor closely particularly in view of her marked elevation of her proBNP.

## 2019-10-25 NOTE — PROGRESS NOTES
Report received from [facility]. Introduced myself as primary RN. Assumed care of patient. Instructed patient to call for assistance prior to getting up. Pt verbalized understanding. Call bell within reach. 7:30 AM Temperature elevated 102.1. RN to administer Tylenol and notify attending. 8:00 AM Attending at bedside. Informed of Temp of 102. 1. No new orders received at this time. PO Tylenol administered. 3:48 PM Temperature elevated 102.8. RN to administer Tylenol and notify attending.    4:01 PM Attending paged, awaiting telephone call back. No new orders received at this time. 4:15 PM Attending returned page. No new orders received at this time. PO Tylenol administered. Bedside and Verbal shift change report given to Zeke (oncoming nurse) by Diana Owen (offgoing nurse). Report included the following information SBAR, Kardex, MAR, Recent Results and Med Rec Status.

## 2019-10-25 NOTE — PROGRESS NOTES
Bedside shift change report GIVEN TO Kadeem Pierce RN. Report included the following information SBAR, Kardex and MAR. SIGNIFICANT CHANGES DURING SHIFT:  MEWS of 4 - temperature of 102 treated with tylenol, charge nurse notified. CONCERNS TO ADDRESS WITH MD:            [facility] NURSING NOTE   Admission Date 10/23/2019   Admission Diagnosis Fever [R50.9]   Consults IP CONSULT TO HOSPITALIST  IP CONSULT TO PRIMARY CARE PROVIDER  IP CONSULT TO PALLIATIVE CARE - PROVIDER  IP CONSULT TO HEMATOLOGY      Cardiac Monitoring [x] Yes [] No      Purposeful Hourly Rounding [x] Yes    Milvia Score Total Score: 4   Milvia score 3 or > [x] Bed Alarm [] Avasys [] 1:1 sitter [] Patient refused (Signed refusal form in chart)   Kimani Score Kimani Score: 15   Kimani score 14 or < [] PMT consult [] Wound Care consult    []  Specialty bed  [] Nutrition consult      Influenza Vaccine Received Flu Vaccine for Current Season (usually Sept-March): Unsure           Oxygen needs? [x] Room air Oxygen @  []1L    []2L    []3L   []4L    []5L   []6L via  NC   Chronic home O2 use?  [] Yes [] No  Perform O2 challenge test and document in progress note using smartphrase (.Homeoxygen)      Last bowel movement        Urinary Catheter       External Female Catheter 10/23/19-Urine Output (mL): 600 ml     LDAs               Peripheral IV 10/23/19 Right Forearm (Active)   Site Assessment Clean, dry, & intact 10/24/2019  3:04 PM   Phlebitis Assessment 0 10/24/2019  3:04 PM   Infiltration Assessment 0 10/24/2019  3:04 PM   Dressing Status Clean, dry, & intact 10/24/2019  3:04 PM   Dressing Type Transparent 10/24/2019  3:04 PM   Hub Color/Line Status Pink;Flushed;Patent 10/24/2019  3:04 PM          External Female Catheter 10/23/19 (Active)   Site Assessment Clean, dry, & intact 10/23/2019 11:01 AM   Perineal Care Yes 10/23/2019 11:01 AM       External Female Catheter 10/23/19 (Active)   Site Assessment Clean, dry, & intact 10/24/2019  8:00 AM Repositioned Yes 10/24/2019  8:00 AM   Perineal Care Yes 10/24/2019  8:00 AM   Wick Changed Yes 10/24/2019  8:00 AM   Suction Canister/Tubing Changed No 10/24/2019  8:00 AM   Urine Output (mL) 600 ml 10/24/2019  6:45 PM                   Readmission Risk Assessment Tool Score High Risk            34       Total Score        3 Has Seen PCP in Last 6 Months (Yes=3, No=0)    2 . Living with Significant Other. Assisted Living. LTAC. SNF. or   Rehab    4 IP Visits Last 12 Months (1-3=4, 4=9, >4=11)    5 Pt.  Coverage (Medicare=5 , Medicaid, or Self-Pay=4)    20 Charlson Comorbidity Score (Age + Comorbid Conditions)        Criteria that do not apply:    Patient Length of Stay (>5 days = 3)       Expected Length of Stay - - -   Actual Length of Stay 1

## 2019-10-25 NOTE — CONSULTS
Palliative Medicine Consult  [place]: [phone] ([phone])    Patient Name: Emilia Charlton  YOB: 1925    Date of Initial Consult: 10/24/19  Reason for Consult: care decisions  Requesting Provider: Heather Miranda MD  Primary Care Physician: Frankie Awan MD     SUMMARY:   Emilia Charlton is a 80 y.o. Female with a past history of heart failure / ischemic cardiomyopathy EF of 15% (3/13/19 ),  HTN , CKD  CLL, ibrutinib ,  who was admitted on 10/23/2019 from home /nursing home  with a diagnosis of fever with bacteremia. and lethargy. Current medical issues leading to Palliative Medicine involvement include: ischemic cardiomyopathy , immunocompromised , gram positive sepsis ,  advance elderly , high risk of decompensation , care decisions. Patient known to palliative care . Patient lives in long term care , is independent for activities of daily living , her only child Pedro Mile Charlyn Gilford and daughter in law Ramu Catalan lives out of town . PALLIATIVE DIAGNOSES:   1. Fever   2. Goals of care  3. Sepsis   4. Metabolic encephalopathy   5. Ischemic cardiomyopathy   6. Hypotension   7. Debility  8. CLL        PLAN:   1. Prior to visit , I did chart review, including lab work, echo results. 2. Patient is currently alert, oriented x2 , intermittently confused , does not engages in conversation, does not understand her diagnosis or treatment plan . 3. I met with her son Rhoda NEGRETE, her  daughter in law Nohelia NEGRETE, who I spoke to yesterday,  is deferring decisions to her  Mr Ayo Hassan , couple lives out of town in [facility] . 4. We reviewed her active medical issues , sepsis , decline in kidney function and high risk of heart failure, given  already weak heart with stress due to sepsis . 5. We talked about given her age and low immune status , she is  high risk of decline and multi organ failure and ICU transfer .     6. We talked about curious level of medical intervention , son would not want her to have a breathing tube , CPAP /BIPAP mask is ok , he is not sure about pressors , wants to think over , pink sheet paced in chart , patient has active DNR . 7. We talked about option of focusing on comfort and hospice if she decompensate , he wants to think through and discuss with his wife , patient primary medical POA. 8. Advance medical directives is in place. 9. Initial consult note routed to primary continuity provider and/or primary health care team members  10. Communicated plan of care with: Palliative IDT, [facility] Team     GOALS OF CARE / TREATMENT PREFERENCES:     GOALS OF CARE:  Patient/Health Care Proxy Stated Goals: (treatment of active medical issues .)    TREATMENT PREFERENCES:   Code Status: DNR    Advance Care Planning:  [x] [facility] Interdisciplinary Team has updated the ACP Navigator with Health Care Decision Maker and Patient Capacity      Primary Decision Maker: Monroe Kussmaul primary - Son - [phone]    Secondary Decision Maker: Seema Henry(Daughter In Sherrell) mpoa second - Other Relative - [phone]  Advance Care Planning 10/23/2019   Patient's Healthcare Decision Maker is: -   Primary Decision Maker Name -   Primary Decision Maker Phone Number -   Primary Decision Maker Relationship to Patient -   Confirm Advance Directive Yes, on file   Does the patient have other document types -       Medical Interventions: Limited additional interventions     Other Instructions: Other:    As far as possible, the palliative care team has discussed with patient / health care proxy about goals of care / treatment preferences for patient. HISTORY:     History obtained from: chart , bed side Rn .    CHIEF COMPLAINT: fever and confusion . HPI/SUBJECTIVE:    The patient is:   [] Verbal and minimal participation due to confusion. She denies any pain , nausea , vomiting or sob .     Clinical Pain Assessment (nonverbal scale for severity on nonverbal patients):   Clinical Pain Assessment  Severity: 0          Duration: for how long has pt been experiencing pain (e.g., 2 days, 1 month, years)  Frequency: how often pain is an issue (e.g., several times per day, once every few days, constant)     FUNCTIONAL ASSESSMENT:     Palliative Performance Scale (PPS):          PSYCHOSOCIAL/SPIRITUAL SCREENING:     Palliative IDT has assessed this patient for cultural preferences / practices and a referral made as appropriate to needs (Cultural Services, Patient Advocacy, Ethics, etc.)    Any spiritual / Zoroastrianism concerns:  [] Yes /  [x] No    Caregiver Burnout:  [] Yes /  [x] No /  [] No Caregiver Present      Anticipatory grief assessment:   [x] Normal  / [] Maladaptive       ESAS Anxiety: Anxiety: 2    ESAS Depression: Depression: 0        REVIEW OF SYSTEMS:     Positive and pertinent negative findings in ROS are noted above in HPI. The following systems were [x] reviewed / [] unable to be reviewed as noted in HPI  Other findings are noted below. Systems: constitutional, ears/nose/mouth/throat, respiratory, gastrointestinal, genitourinary, musculoskeletal, integumentary, neurologic, psychiatric, endocrine. Positive findings noted below. Modified ESAS Completed by: provider   Fatigue: 7 Drowsiness: 0   Depression: 0 Pain: 0   Anxiety: 2 Nausea: 0   Anorexia: 4 Dyspnea: 0     Constipation: No              PHYSICAL EXAM:     From RN flowsheet:  Wt Readings from Last 3 Encounters:   10/25/19 121 lb 4.8 oz (55 kg)   09/24/19 122 lb 6.4 oz (55.5 kg)   08/20/19 125 lb 9.6 oz (57 kg)     Blood pressure 90/54, pulse 70, temperature 98.5 °F (36.9 °C), resp. rate 20, height 5' 1\" (1.549 m), weight 121 lb 4.8 oz (55 kg), SpO2 93 %. Pain Scale 1: Numeric (0 - 10)  Pain Intensity 1: 0                 Last bowel movement, if known:     Constitutional: alert oriented x2, place and person , intermittently confused.   Eyes: pupils equal, anicteric  ENMT: no nasal discharge, moist mucous membranes  Cardiovascular: regular rhythm, distal pulses intact  Respiratory: breathing not labored, symmetric, no rhales or rhonchi. Gastrointestinal: soft non-tender, +bowel sounds  Musculoskeletal: muscular wasting generalized. Skin: warm, dry  Neurologic: following commands, moving all extremities, does not engages in conversation . Psychiatric: anxious . Other:       HISTORY:     Active Problems:    Fever (10/23/2019)      Past Medical History:   Diagnosis Date    Aortic insufficiency     Asthma     CKD (chronic kidney disease) stage 3, GFR 30-59 ml/min ([facility]) 1/10/2018    CLL (chronic lymphocytic leukemia) ([facility])     Congestive heart failure, NYHA class II, chronic, systolic ([facility])     [facility] failure [facility] [address]     Hypertension     Mitral valvular regurgitation 1/22/2016    ECHO 2/3/17: LV dilated, EF 20-25%, mild LVH, RV mod dilated, mild- mod MELANIA, mod-severe MR, mod AI ECHO 7/29/17: EF 15%, severe DHK, reduced RVEF, RVH, RVSP 35 mmHg  Mild LAE, mod-marked MA fibrosis, mod-severe MR (2+), AO root dilated,      Nonrheumatic aortic valve insufficiency 10/16/2018    Presence of biventricular automatic cardioverter/defibrillator (AICD) 7/2/2015    [employer] biventricular AICD implant    Stomach ulcer       Past Surgical History:   Procedure Laterality Date    HX BREAST BIOPSY Bilateral     40 years ago    HX COLONOSCOPY      HX HEENT      cataracts removed    HX HYSTERECTOMY      HX OTHER SURGICAL      lymph node surgery    HX TONSILLECTOMY      IL EGD TRANSORAL BIOPSY SINGLE/MULTIPLE  5/23/2012         SINUS SURGERY PROC UNLISTED      Nasal polyps removed      Family History   Problem Relation Age of Onset    Heart Disease Mother     Stroke Father       History reviewed, no pertinent family history. Social History     Tobacco Use    Smoking status: Never Smoker    Smokeless tobacco: Never Used   Substance Use Topics    Alcohol use:  No Alcohol/week: 0.0 standard drinks     Allergies   Allergen Reactions    Codeine Other (comments)     \"made me disoriented\" per pt      Current Facility-Administered Medications   Medication Dose Route Frequency    carvedilol (COREG) tablet 3.125 mg  3.125 mg Oral BID WITH MEALS    levothyroxine (SYNTHROID) tablet 25 mcg  25 mcg Oral 6am    polyethylene glycol (MIRALAX) packet 17 g  17 g Oral EVERY OTHER DAY    potassium chloride SR (KLOR-CON 10) tablet 40 mEq  40 mEq Oral NOW    [START ON 10/26/2019] Vancomycin Trough- Please draw prior to 0200 dose on 10/26/19  1 Each Other ONCE    vancomycin (VANCOCIN) 750 mg in 0.9% sodium chloride (MBP/ADV) 250 mL  750 mg IntraVENous Q24H    cefTRIAXone (ROCEPHIN) 1 g in 0.9% sodium chloride (MBP/ADV) 50 mL  1 g IntraVENous Q24H    sodium chloride (NS) flush 5-10 mL  5-10 mL IntraVENous PRN    acetaminophen (TYLENOL) tablet 650 mg  650 mg Oral Q6H PRN    acetaminophen (TYLENOL) tablet 650 mg  650 mg Oral Q6H PRN    albuterol (PROVENTIL VENTOLIN) nebulizer solution 2.5 mg  2.5 mg Nebulization Q6H PRN    apixaban (ELIQUIS) tablet 2.5 mg  2.5 mg Oral BID    midodrine (PROAMITINE) tablet 5 mg  5 mg Oral TID    mirtazapine (REMERON) tablet 15 mg  15 mg Oral QHS    sodium chloride (NS) flush 5-40 mL  5-40 mL IntraVENous Q8H    sodium chloride (NS) flush 5-40 mL  5-40 mL IntraVENous PRN          LAB AND IMAGING FINDINGS:     Lab Results   Component Value Date/Time    WBC 3.7 10/25/2019 01:54 AM    HGB 9.9 (L) 10/25/2019 01:54 AM    PLATELET 98 (L) 89/88/1184 01:54 AM     Lab Results   Component Value Date/Time    Sodium 141 10/25/2019 01:54 AM    Potassium 3.3 (L) 10/25/2019 01:54 AM    Chloride 104 10/25/2019 01:54 AM    CO2 31 10/25/2019 01:54 AM    BUN 52 (H) 10/25/2019 01:54 AM    Creatinine 1.43 (H) 10/25/2019 01:54 AM    Calcium 8.5 10/25/2019 01:54 AM    Magnesium 2.3 10/23/2019 01:04 PM    Phosphorus 4.4 06/26/2019 02:45 AM      Lab Results   Component Value Date/Time    AST (SGOT) 28 10/25/2019 01:54 AM    Alk. phosphatase 93 10/25/2019 01:54 AM    Protein, total 6.3 (L) 10/25/2019 01:54 AM    Albumin 2.2 (L) 10/25/2019 01:54 AM    Globulin 4.1 (H) 10/25/2019 01:54 AM     Lab Results   Component Value Date/Time    INR 1.2 (H) 05/22/2012 10:54 PM    Prothrombin time 12.2 (H) 05/22/2012 10:54 PM    aPTT 26.0 05/22/2012 10:54 PM      Lab Results   Component Value Date/Time    Iron 12 (L) 06/26/2019 02:05 PM    TIBC 138 (L) 06/26/2019 02:05 PM    Iron % saturation 9 (L) 06/26/2019 02:05 PM    Ferritin 801 (H) 06/26/2019 02:05 PM      No results found for: PH, PCO2, PO2  No components found for: [place]   Lab Results   Component Value Date/Time    CK 53 03/11/2019 04:24 PM    CK - MB <1.0 02/01/2017 07:35 PM                Total time:   Counseling / coordination time, spent as noted above:   > 50% counseling / coordination?:     Prolonged service was provided for  []30 min   []75 min in face to face time in the presence of the patient, spent as noted above. Time Start:   Time End:   Note: this can only be billed with 08618 (initial) or 39764 (follow up). If multiple start / stop times, list each separately.

## 2019-10-25 NOTE — PROGRESS NOTES
Problem: Falls - Risk of  Goal: *Absence of Falls  Description  Document Saumya Spencer Fall Risk and appropriate interventions in the flowsheet.   Outcome: Progressing Towards Goal  Note:   Fall Risk Interventions:  Mobility Interventions: Bed/chair exit alarm, Patient to call before getting OOB    Mentation Interventions: Bed/chair exit alarm, Door open when patient unattended    Medication Interventions: Assess postural VS orthostatic hypotension, Bed/chair exit alarm    Elimination Interventions: Bed/chair exit alarm, Call light in reach    History of Falls Interventions: Bed/chair exit alarm, Door open when patient unattended         Problem: Patient Education: Go to Patient Education Activity  Goal: Patient/Family Education  Outcome: Progressing Towards Goal

## 2019-10-25 NOTE — PROGRESS NOTES
Hematology Oncology Progress Note         Follow up for: CLL    Chart notes reviewed since last visit. Case discussed with following: son at bedside. Patient complains of the following: much more alert today but mildly confused which she tries to cover up with her answers. Remembered I was her blood doctor but suggested her son and I introduce ourselves since she did not remember names. Additional concerns noted by the staff:     Patient Vitals for the past 24 hrs:   BP Temp Pulse Resp SpO2 Weight   10/25/19 1058 90/54 98.5 °F (36.9 °C) 70 20 93 %    10/25/19 0731 128/75 (!) 102.1 °F (38.9 °C) 78 20 97 %    10/25/19 0604 106/48 98.9 °F (37.2 °C) 79 20 96 %    10/25/19 0204  98.8 °F (37.1 °C)    55 kg (121 lb 4.8 oz)   10/25/19 0115  (!) 100.8 °F (38.2 °C)       10/24/19 2324 118/77 98.4 °F (36.9 °C) 78 22 100 %    10/24/19 2250 118/68  65      10/24/19 2005 115/77 (!) 102 °F (38.9 °C) 62 24 95 %    10/24/19 1546  98.9 °F (37.2 °C)       10/24/19 1434 110/64 (!) 100.6 °F (38.1 °C) 74 24 93 %    10/24/19 1357 116/76 100 °F (37.8 °C) 81 20 93 %        ROS negative for 11 organ systems (but patient mildly confused and this may not be accurate). Physical Examination:  Constitutional Alert, cooperative, oriented. Mood and affect appropriate. Appears close to chronological age. Well nourished. Well developed. Head Normocephalic; no scars   Eyes Conjunctivae and sclerae are clear and without icterus. Pupils are round   ENMT Sinuses are nontender. No oral exudates, ulcers, masses, thrush or mucositis. Oropharynx clear. Tongue normal.   Neck Supple without masses or thyromegaly. No jugular venous distension. Hematologic/Lymphatic No petechiae or purpura. No tender or palpable lymph nodes noted. Her submandibular nodes earlier had been on concern to Dr. Liz Musa - seem reasonable at present. Respiratory Lungs are clear to auscultation without rhonchi or wheezing. Cardiovascular Regular rate and rhythm of heart with systolic murmur   Chest / Line Site Chest is symmetric with no chest wall deformities. Abdomen Non-tender, non-distended, no masses, ascites or hepatosplenomegaly. Good bowel sounds. Musculoskeletal No tenderness or swelling, normal range of motion without obvious weakness. Extremities No visible deformities, no cyanosis, clubbing or edema. Skin No rashes, scars, or lesions suggestive of malignancy. No petechiae, purpura, or ecchymoses. No excoriations. Neurologic No sensory or motor deficits but not tested. Psychiatric Alert. Mostly coherent speech. Verbalizes understanding of our discussions today. Labs:  Recent Results (from the past 24 hour(s))   CBC WITH MANUAL DIFF    Collection Time: 10/25/19  1:54 AM   Result Value Ref Range    WBC 3.7 3.6 - 11.0 K/uL    RBC 3.39 (L) 3.80 - 5.20 M/uL    HGB 9.9 (L) 11.5 - 16.0 g/dL    HCT 31.4 (L) 35.0 - 47.0 %    MCV 92.6 80.0 - 99.0 FL    MCH 29.2 26.0 - 34.0 PG    MCHC 31.5 30.0 - 36.5 g/dL    RDW 17.0 (H) 11.5 - 14.5 %    PLATELET 98 (L) 715 - 400 K/uL    MPV 10.8 8.9 - 12.9 FL    NRBC 0.0 0  WBC    ABSOLUTE NRBC 0.00 0.00 - 0.01 K/uL    NEUTROPHILS 59 32 - 75 %    BAND NEUTROPHILS 0 0 - 6 %    LYMPHOCYTES 38 12 - 49 %    MONOCYTES 3 (L) 5 - 13 %    EOSINOPHILS 0 0 - 7 %    BASOPHILS 0 0 - 1 %    METAMYELOCYTES 0 0 %    MYELOCYTES 0 0 %    PROMYELOCYTES 0 0 %    BLASTS 0 0 %    OTHER CELL 0 0      IMMATURE GRANULOCYTES 0 %    ABS. NEUTROPHILS 2.2 1.8 - 8.0 K/UL    ABS. LYMPHOCYTES 1.4 0.8 - 3.5 K/UL    ABS. MONOCYTES 0.1 0.0 - 1.0 K/UL    ABS. EOSINOPHILS 0.0 0.0 - 0.4 K/UL    ABS. BASOPHILS 0.0 0.0 - 0.1 K/UL    ABS. IMM.  GRANS. 0.0 K/UL    DF MANUAL      RBC COMMENTS NORMOCYTIC, NORMOCHROMIC      WBC COMMENTS REACTIVE LYMPHS     METABOLIC PANEL, COMPREHENSIVE    Collection Time: 10/25/19  1:54 AM   Result Value Ref Range    Sodium 141 136 - 145 mmol/L    Potassium 3.3 (L) 3.5 - 5.1 mmol/L    Chloride 104 97 - 108 mmol/L    CO2 31 21 - 32 mmol/L    Anion gap 6 5 - 15 mmol/L    Glucose 100 65 - 100 mg/dL    BUN 52 (H) 6 - 20 MG/DL    Creatinine 1.43 (H) 0.55 - 1.02 MG/DL    BUN/Creatinine ratio 36 (H) 12 - 20      GFR est AA 41 (L) >60 ml/min/1.73m2    GFR est non-AA 34 (L) >60 ml/min/1.73m2    Calcium 8.5 8.5 - 10.1 MG/DL    Bilirubin, total 0.8 0.2 - 1.0 MG/DL    ALT (SGPT) 14 12 - 78 U/L    AST (SGOT) 28 15 - 37 U/L    Alk. phosphatase 93 45 - 117 U/L    Protein, total 6.3 (L) 6.4 - 8.2 g/dL    Albumin 2.2 (L) 3.5 - 5.0 g/dL    Globulin 4.1 (H) 2.0 - 4.0 g/dL    A-G Ratio 0.5 (L) 1.1 - 2.2     NT-PRO BNP    Collection Time: 10/25/19  1:54 AM   Result Value Ref Range    NT pro-BNP >35,000 (H) <450 PG/ML       Assessment and Plan:   CLL - counts and adenopathy very acceptable at present so can hold ibruitinib for now. Will not resume until clear cut signs of progression. 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 Will check level since has not been checked since 2014. Gram positive bacteremia/ sepsis - possible enterococcus. On rocephin and vanc.      Hx HTN    Hx CKD    Hx CHF - on coreg    [address] MD [facility] office  [address] [facility], [facility] [address]  Phone [phone]  Fax [phone]

## 2019-10-25 NOTE — PROGRESS NOTES
VANESA: [facility]     3:00pm- CM called [facility] via phone. CM spoke to [place] ([phone]) in admission. CM inquired with Mukesh Manley if pt is able to return to their facility when d/c. Mukesh Manley stated that pt is able to returned when medically stable. Referral sent to [facility] via [employer]. 2:45pm.- CM met with pt's son about d/c plan. Pt's son stated that the d/c plan for pt is to return to [facility]. [facility] document signed by pt's son due to pt unable to sign. [facility] document placed in pt bedside chart. CM met with pt at bedside. No new needs at this time. CM will continue to follow patient for discharge planning needs and arrange for services as deemed necessary.     Nancy Borrero [address]  [phone]

## 2019-10-25 NOTE — PROGRESS NOTES
Supportive follow up visit with patient's son on [facility] unit. Patient appeared to be sleeping during visit. She called out at one point, but what was said couldn't be understood. Patient's son shared a little about patient's current condition. He expressed no concerns at this time. Provided pastoral presence and assurance of prayer. Advised him of  availability.       SOURAV Good, [facility], [address] [place] [address] Avenue    [address] Paging Service  [phone]

## 2019-10-26 LAB
ANION GAP SERPL CALC-SCNC: 6 MMOL/L (ref 5–15)
BACTERIA SPEC CULT: ABNORMAL
BACTERIA SPEC CULT: ABNORMAL
BASOPHILS # BLD: 0.1 K/UL (ref 0–0.1)
BASOPHILS NFR BLD: 2 % (ref 0–1)
BLASTS NFR BLD MANUAL: 0 %
BUN SERPL-MCNC: 44 MG/DL (ref 6–20)
BUN/CREAT SERPL: 35 (ref 12–20)
CALCIUM SERPL-MCNC: 8.8 MG/DL (ref 8.5–10.1)
CHLORIDE SERPL-SCNC: 107 MMOL/L (ref 97–108)
CO2 SERPL-SCNC: 29 MMOL/L (ref 21–32)
CREAT SERPL-MCNC: 1.27 MG/DL (ref 0.55–1.02)
DATE LAST DOSE: NORMAL
DIFFERENTIAL METHOD BLD: ABNORMAL
EOSINOPHIL # BLD: 0.1 K/UL (ref 0–0.4)
EOSINOPHIL NFR BLD: 2 % (ref 0–7)
ERYTHROCYTE [DISTWIDTH] IN BLOOD BY AUTOMATED COUNT: 17.1 % (ref 11.5–14.5)
GLUCOSE SERPL-MCNC: 98 MG/DL (ref 65–100)
HCT VFR BLD AUTO: 33.2 % (ref 35–47)
HGB BLD-MCNC: 10.3 G/DL (ref 11.5–16)
IGA SERPL-MCNC: 159 MG/DL (ref 70–400)
IGG SERPL-MCNC: 622 MG/DL (ref 700–1600)
IGM SERPL-MCNC: 83 MG/DL (ref 40–230)
IMM GRANULOCYTES # BLD AUTO: 0 K/UL
IMM GRANULOCYTES NFR BLD AUTO: 0 %
LYMPHOCYTES # BLD: 1.1 K/UL (ref 0.8–3.5)
LYMPHOCYTES NFR BLD: 34 % (ref 12–49)
MCH RBC QN AUTO: 29.2 PG (ref 26–34)
MCHC RBC AUTO-ENTMCNC: 31 G/DL (ref 30–36.5)
MCV RBC AUTO: 94.1 FL (ref 80–99)
METAMYELOCYTES NFR BLD MANUAL: 0 %
MONOCYTES # BLD: 0 K/UL (ref 0–1)
MONOCYTES NFR BLD: 1 % (ref 5–13)
MYELOCYTES NFR BLD MANUAL: 0 %
NEUTS BAND NFR BLD MANUAL: 0 % (ref 0–6)
NEUTS SEG # BLD: 2 K/UL (ref 1.8–8)
NEUTS SEG NFR BLD: 61 % (ref 32–75)
NRBC # BLD: 0 K/UL (ref 0–0.01)
NRBC BLD-RTO: 0 PER 100 WBC
OTHER CELLS NFR BLD MANUAL: 0 %
PLATELET # BLD AUTO: 110 K/UL (ref 150–400)
PMV BLD AUTO: 11.6 FL (ref 8.9–12.9)
POTASSIUM SERPL-SCNC: 3.2 MMOL/L (ref 3.5–5.1)
PROMYELOCYTES NFR BLD MANUAL: 0 %
RBC # BLD AUTO: 3.53 M/UL (ref 3.8–5.2)
RBC MORPH BLD: ABNORMAL
REPORTED DOSE,DOSE: NORMAL UNITS
REPORTED DOSE/TIME,TMG: NORMAL
SERVICE CMNT-IMP: ABNORMAL
SODIUM SERPL-SCNC: 142 MMOL/L (ref 136–145)
VANCOMYCIN TROUGH SERPL-MCNC: 8.6 UG/ML (ref 5–10)
WBC # BLD AUTO: 3.3 K/UL (ref 3.6–11)

## 2019-10-26 PROCEDURE — 36415 COLL VENOUS BLD VENIPUNCTURE: CPT

## 2019-10-26 PROCEDURE — 80048 BASIC METABOLIC PNL TOTAL CA: CPT

## 2019-10-26 PROCEDURE — 65660000000 HC RM CCU STEPDOWN

## 2019-10-26 PROCEDURE — 74011000258 HC RX REV CODE- 258: Performed by: INTERNAL MEDICINE

## 2019-10-26 PROCEDURE — 80202 ASSAY OF VANCOMYCIN: CPT

## 2019-10-26 PROCEDURE — 74011250636 HC RX REV CODE- 250/636: Performed by: INTERNAL MEDICINE

## 2019-10-26 PROCEDURE — 85027 COMPLETE CBC AUTOMATED: CPT

## 2019-10-26 PROCEDURE — 82784 ASSAY IGA/IGD/IGG/IGM EACH: CPT

## 2019-10-26 PROCEDURE — 94760 N-INVAS EAR/PLS OXIMETRY 1: CPT

## 2019-10-26 PROCEDURE — 74011250637 HC RX REV CODE- 250/637: Performed by: INTERNAL MEDICINE

## 2019-10-26 PROCEDURE — 74011250637 HC RX REV CODE- 250/637: Performed by: HOSPITALIST

## 2019-10-26 RX ORDER — POTASSIUM CHLORIDE 750 MG/1
20 TABLET, FILM COATED, EXTENDED RELEASE ORAL DAILY
Status: DISCONTINUED | OUTPATIENT
Start: 2019-10-26 | End: 2019-10-27 | Stop reason: SDUPTHER

## 2019-10-26 RX ADMIN — LEVOTHYROXINE SODIUM 25 MCG: 25 TABLET ORAL at 06:49

## 2019-10-26 RX ADMIN — CARVEDILOL 3.12 MG: 3.12 TABLET, FILM COATED ORAL at 09:25

## 2019-10-26 RX ADMIN — Medication 10 ML: at 18:08

## 2019-10-26 RX ADMIN — APIXABAN 2.5 MG: 2.5 TABLET, FILM COATED ORAL at 09:25

## 2019-10-26 RX ADMIN — Medication 10 ML: at 03:19

## 2019-10-26 RX ADMIN — POTASSIUM CHLORIDE 20 MEQ: 750 TABLET, FILM COATED, EXTENDED RELEASE ORAL at 09:26

## 2019-10-26 RX ADMIN — APIXABAN 2.5 MG: 2.5 TABLET, FILM COATED ORAL at 21:22

## 2019-10-26 RX ADMIN — VANCOMYCIN HYDROCHLORIDE 750 MG: 750 INJECTION, POWDER, LYOPHILIZED, FOR SOLUTION INTRAVENOUS at 20:45

## 2019-10-26 RX ADMIN — VANCOMYCIN HYDROCHLORIDE 750 MG: 750 INJECTION, POWDER, LYOPHILIZED, FOR SOLUTION INTRAVENOUS at 03:18

## 2019-10-26 RX ADMIN — MIDODRINE HYDROCHLORIDE 5 MG: 5 TABLET ORAL at 18:06

## 2019-10-26 RX ADMIN — CEFTRIAXONE 1 G: 1 INJECTION, POWDER, FOR SOLUTION INTRAMUSCULAR; INTRAVENOUS at 09:26

## 2019-10-26 RX ADMIN — CARVEDILOL 3.12 MG: 3.12 TABLET, FILM COATED ORAL at 18:06

## 2019-10-26 RX ADMIN — Medication 5 ML: at 21:22

## 2019-10-26 RX ADMIN — MIDODRINE HYDROCHLORIDE 5 MG: 5 TABLET ORAL at 09:25

## 2019-10-26 RX ADMIN — MIDODRINE HYDROCHLORIDE 5 MG: 5 TABLET ORAL at 21:22

## 2019-10-26 RX ADMIN — MIRTAZAPINE 15 MG: 15 TABLET, FILM COATED ORAL at 21:22

## 2019-10-26 NOTE — PROGRESS NOTES
General Daily Progress Note    Admit Date: 10/23/2019    Subjective:     Patient has no complaint . Adan Davidson Current Facility-Administered Medications   Medication Dose Route Frequency    potassium chloride SR (KLOR-CON 10) tablet 20 mEq  20 mEq Oral DAILY    vancomycin (VANCOCIN) 750 mg in 0.9% sodium chloride (MBP/ADV) 250 mL  750 mg IntraVENous Q16H    carvedilol (COREG) tablet 3.125 mg  3.125 mg Oral BID WITH MEALS    levothyroxine (SYNTHROID) tablet 25 mcg  25 mcg Oral 6am    polyethylene glycol (MIRALAX) packet 17 g  17 g Oral EVERY OTHER DAY    cefTRIAXone (ROCEPHIN) 1 g in 0.9% sodium chloride (MBP/ADV) 50 mL  1 g IntraVENous Q24H    sodium chloride (NS) flush 5-10 mL  5-10 mL IntraVENous PRN    acetaminophen (TYLENOL) tablet 650 mg  650 mg Oral Q6H PRN    acetaminophen (TYLENOL) tablet 650 mg  650 mg Oral Q6H PRN    albuterol (PROVENTIL VENTOLIN) nebulizer solution 2.5 mg  2.5 mg Nebulization Q6H PRN    apixaban (ELIQUIS) tablet 2.5 mg  2.5 mg Oral BID    midodrine (PROAMITINE) tablet 5 mg  5 mg Oral TID    mirtazapine (REMERON) tablet 15 mg  15 mg Oral QHS    sodium chloride (NS) flush 5-40 mL  5-40 mL IntraVENous Q8H        Review of Systems  A comprehensive review of systems was negative. Objective:     Patient Vitals for the past 24 hrs:   BP Temp Pulse Resp SpO2   10/26/19 0925 99/56  84     10/26/19 0709 104/58 98.1 °F (36.7 °C) 83 18 97 %   10/26/19 0245 99/55 97.4 °F (36.3 °C) 64 20 95 %   10/25/19 2310 111/65 99.6 °F (37.6 °C) 63 20 99 %   10/25/19 1926 105/56 97.8 °F (36.6 °C) 68 20 93 %   10/25/19 1543 110/70 (!) 102.8 °F (39.3 °C) 75 20 99 %   10/25/19 1058 90/54 98.5 °F (36.9 °C) 70 20 93 %     No intake/output data recorded.   10/24 1901 - 10/26 0700  In: 600 [P.O.:600]  Out: 750 [Urine:750]    Physical Exam:   Visit Vitals  BP 99/56   Pulse 84   Temp 98.1 °F (36.7 °C)   Resp 18   Ht 5' 1\" (1.549 m)   Wt 121 lb 4.8 oz (55 kg)   SpO2 97%   BMI 22.92 kg/m²     General appearance: alert, cooperative, no distress, appears stated age  Neck: supple, symmetrical, trachea midline, no adenopathy, thyroid: not enlarged, symmetric, no tenderness/mass/nodules, no carotid bruit and no JVD  Lungs: clear to auscultation bilaterally  Heart: regular rate and rhythm, S1, S2 normal, no murmur, click, rub or gallop  Abdomen: soft, non-tender. Bowel sounds normal. No masses,  no organomegaly  Extremities: extremities normal, atraumatic, no cyanosis or edema    Assessment:     Active Problems:    Fever (10/23/2019)        Plan:     1. Patient has enterococcal septicemia. Current antibiotics for now. Presumed source is probably . Unfortunately no urine culture was obtained. 2.  No overt sign of congestive heart failure. Continue vigilance. 3.  She is reasonably stable mentally. 4.  Unfortunately she is getting progressively deconditioned.

## 2019-10-26 NOTE — PROGRESS NOTES
Problem: Falls - Risk of  Goal: *Absence of Falls  Description  Document [place]petra Powell Fall Risk and appropriate interventions in the flowsheet.   Outcome: Progressing Towards Goal  Note:   Fall Risk Interventions:  Mobility Interventions: Bed/chair exit alarm    Mentation Interventions: Adequate sleep, hydration, pain control, Bed/chair exit alarm, Toileting rounds, Door open when patient unattended    Medication Interventions: Bed/chair exit alarm    Elimination Interventions: Call light in reach, Bed/chair exit alarm    History of Falls Interventions: Bed/chair exit alarm         Problem: General Medical Care Plan  Goal: *Optimal pain control at patient's stated goal  Outcome: Progressing Towards Goal  Goal: *Skin integrity maintained  Outcome: Progressing Towards Goal

## 2019-10-26 NOTE — PROGRESS NOTES
Pharmacy Automatic Renal Dosing Protocol - Antimicrobials    Indication for Antimicrobials:  Bacteremia    Current Regimen of Each Antimicrobial:  Ceftriaxone 1 g IV every 24 hours (Start Date 10/24; Day #3)  Vancomycin 750 mg IV every 24 hours (Start Date 10/24; Day #3)    Previous Antimicrobial Therapy:  None    Goal Level: VANCOMYCIN TROUGH GOAL RANGE  Vancomycin Trough: 15 - 20 mcg/mL  (AUC: 400 - 600 mg/hr/Liter/day)     Date Dose & Interval Measured (mcg/mL) Extrapolated (mcg/mL)   10/26 0142 750 mg every 24 hours 8.6 8.7                 Date & time of next level: 10/26 @ 0200    Significant Cultures:   10/23 Blood: Probable enterococcus in  bottles- pending    Paralysis, amputations, malnutrition: None documented    Labs:  Recent Labs     10/26/19  0142 10/25/19  0154 10/24/19  0345   CREA 1.27* 1.43* 1.43*   BUN 44* 52* 48*   WBC 3.3* 3.7 3.6     Temp (24hrs), Av °F (37.2 °C), Min:97.4 °F (36.3 °C), Max:102.8 °F (39.3 °C)    Creatinine Clearance (mL/min) or Dialysis: 20    Impression/Plan:   Vancomycin trough resulted as subtherapeutic at 8.7 mcg/mL. Will change to 750 mg IV every 16 hours for an estimated trough of 16.7 mcg/mL. Unclear source at this time  Antimicrobial stop date      Pharmacy will follow daily and adjust medications as appropriate for renal function and/or serum levels. Thank you,  MICHELLE WelshD    Recommended duration of therapy  [email or web address]/Pharmacy/Clinical%[email or web address]. docx    Renal Dosing  [email or web address]/[place]/[facility]/Pharmacy/Clinical%20Companion/Renal%[email or web address]. pdf

## 2019-10-27 ENCOUNTER — APPOINTMENT (OUTPATIENT)
Dept: NON INVASIVE DIAGNOSTICS | Age: 84
DRG: 871 | End: 2019-10-27
Attending: INTERNAL MEDICINE
Payer: MEDICARE

## 2019-10-27 LAB
ANION GAP SERPL CALC-SCNC: 8 MMOL/L (ref 5–15)
AV PEAK GRADIENT: 113.94 MMHG
AV VELOCITY RATIO: 0.62
AV VTI RATIO: 0.7
BACTERIA SPEC CULT: ABNORMAL
BACTERIA SPEC CULT: ABNORMAL
BASOPHILS # BLD: 0 K/UL (ref 0–0.1)
BASOPHILS NFR BLD: 1 % (ref 0–1)
BLASTS NFR BLD MANUAL: 0 %
BUN SERPL-MCNC: 35 MG/DL (ref 6–20)
BUN/CREAT SERPL: 35 (ref 12–20)
CALCIUM SERPL-MCNC: 8.3 MG/DL (ref 8.5–10.1)
CHLORIDE SERPL-SCNC: 104 MMOL/L (ref 97–108)
CO2 SERPL-SCNC: 28 MMOL/L (ref 21–32)
CREAT SERPL-MCNC: 1.01 MG/DL (ref 0.55–1.02)
DIFFERENTIAL METHOD BLD: ABNORMAL
ECHO AV AREA PEAK VELOCITY: 2.2 CM2
ECHO AV AREA VTI: 2.3 CM2
ECHO AV AREA/BSA PEAK VELOCITY: 1.4 CM2/M2
ECHO AV AREA/BSA VTI: 1.5 CM2/M2
ECHO AV MEAN GRADIENT: 12.7 MMHG
ECHO AV MEAN VELOCITY: 1.67 M/S
ECHO AV PEAK GRADIENT: 24.4 MMHG
ECHO AV PEAK VELOCITY: 247.23 CM/S
ECHO AV REGURGITANT PHT: 425.1 CM
ECHO AV VTI: 37.51 CM
ECHO EST RA PRESSURE: 10 MMHG
ECHO LA AREA 4C: 26.2 CM2
ECHO LA MAJOR AXIS: 2.49 CM
ECHO LA VOL 2C: 44.61 ML (ref 22–52)
ECHO LA VOL 4C: 79.07 ML (ref 22–52)
ECHO LA VOL BP: 64.92 ML (ref 22–52)
ECHO LA VOL/BSA BIPLANE: 42.85 ML/M2 (ref 16–28)
ECHO LA VOLUME INDEX A2C: 29.44 ML/M2 (ref 16–28)
ECHO LA VOLUME INDEX A4C: 52.19 ML/M2 (ref 16–28)
ECHO LV E' SEPTAL VELOCITY: 3.7 CM/S
ECHO LV EDV A4C: 118.2 ML
ECHO LV EDV INDEX A4C: 78 ML/M2
ECHO LV EDV TEICHHOLZ: 1.12 ML
ECHO LV EJECTION FRACTION A4C: 15 %
ECHO LV ESV A4C: 101 ML
ECHO LV ESV INDEX A4C: 66.7 ML/M2
ECHO LV ESV TEICHHOLZ: 0.87 ML
ECHO LV INTERNAL DIMENSION DIASTOLIC: 5.9 CM (ref 3.9–5.3)
ECHO LV INTERNAL DIMENSION SYSTOLIC: 5.3 CM
ECHO LV IVSD: 1.41 CM (ref 0.6–0.9)
ECHO LV MASS 2D: 448.1 G (ref 67–162)
ECHO LV MASS INDEX 2D: 295.8 G/M2 (ref 43–95)
ECHO LV POSTERIOR WALL DIASTOLIC: 1.35 CM (ref 0.6–0.9)
ECHO LVOT CARDIAC OUTPUT: 5.5 L/MIN
ECHO LVOT DIAM: 2.12 CM
ECHO LVOT PEAK GRADIENT: 9.5 MMHG
ECHO LVOT PEAK VELOCITY: 154.32 CM/S
ECHO LVOT SV: 86.2 ML
ECHO LVOT VTI: 24.43 CM
ECHO MV A VELOCITY: 150.77 CM/S
ECHO MV AREA PISA: 2.1 CM2
ECHO MV E DECELERATION TIME (DT): 515 MS
ECHO MV E VELOCITY: 92.16 CM/S
ECHO MV E/A RATIO: 0.61
ECHO MV E/E' SEPTAL: 24.91
ECHO MV EROA PISA: 2.1 CM2
ECHO MV REGURGITANT PEAK GRADIENT: 118.7 MMHG
ECHO MV REGURGITANT PEAK VELOCITY: 544.75 CM/S
ECHO MV REGURGITANT RADIUS PISA: 1.65 CM
ECHO PULMONARY ARTERY SYSTOLIC PRESSURE (PASP): 36.4 MMHG
ECHO RIGHT VENTRICULAR SYSTOLIC PRESSURE (RVSP): 36.4 MMHG
ECHO TV REGURGITANT MAX VELOCITY: 257.12 CM/S
ECHO TV REGURGITANT PEAK GRADIENT: 26.4 MMHG
EOSINOPHIL # BLD: 0.2 K/UL (ref 0–0.4)
EOSINOPHIL NFR BLD: 6 % (ref 0–7)
ERYTHROCYTE [DISTWIDTH] IN BLOOD BY AUTOMATED COUNT: 16.8 % (ref 11.5–14.5)
GLUCOSE SERPL-MCNC: 95 MG/DL (ref 65–100)
HCT VFR BLD AUTO: 29.7 % (ref 35–47)
HGB BLD-MCNC: 9.4 G/DL (ref 11.5–16)
IMM GRANULOCYTES # BLD AUTO: 0 K/UL
IMM GRANULOCYTES NFR BLD AUTO: 0 %
LACTATE SERPL-SCNC: 0.8 MMOL/L (ref 0.4–2)
LVFS 2D: 10.1 %
LVOT MG: 4.48 MMHG
LVOT MV: 0.94 CM/S
LVSV (MOD SINGLE 4C): 11.3 ML
LVSV (TEICH): 24.68 ML
LYMPHOCYTES # BLD: 0.9 K/UL (ref 0.8–3.5)
LYMPHOCYTES NFR BLD: 30 % (ref 12–49)
MCH RBC QN AUTO: 29.2 PG (ref 26–34)
MCHC RBC AUTO-ENTMCNC: 31.6 G/DL (ref 30–36.5)
MCV RBC AUTO: 92.2 FL (ref 80–99)
METAMYELOCYTES NFR BLD MANUAL: 0 %
MONOCYTES # BLD: 0.1 K/UL (ref 0–1)
MONOCYTES NFR BLD: 2 % (ref 5–13)
MV DEC SLOPE: 1.79
MYELOCYTES NFR BLD MANUAL: 0 %
NEUTS BAND NFR BLD MANUAL: 1 % (ref 0–6)
NEUTS SEG # BLD: 1.9 K/UL (ref 1.8–8)
NEUTS SEG NFR BLD: 60 % (ref 32–75)
NRBC # BLD: 0 K/UL (ref 0–0.01)
NRBC BLD-RTO: 0 PER 100 WBC
OTHER CELLS NFR BLD MANUAL: 0 %
PISA AR MAX VEL: 533.71 CM/S
PLATELET # BLD AUTO: 104 K/UL (ref 150–400)
PMV BLD AUTO: 11.2 FL (ref 8.9–12.9)
POTASSIUM SERPL-SCNC: 3.2 MMOL/L (ref 3.5–5.1)
PROMYELOCYTES NFR BLD MANUAL: 0 %
RBC # BLD AUTO: 3.22 M/UL (ref 3.8–5.2)
RBC MORPH BLD: ABNORMAL
SERVICE CMNT-IMP: ABNORMAL
SODIUM SERPL-SCNC: 140 MMOL/L (ref 136–145)
WBC # BLD AUTO: 3.1 K/UL (ref 3.6–11)

## 2019-10-27 PROCEDURE — 36415 COLL VENOUS BLD VENIPUNCTURE: CPT

## 2019-10-27 PROCEDURE — 85027 COMPLETE CBC AUTOMATED: CPT

## 2019-10-27 PROCEDURE — 93306 TTE W/DOPPLER COMPLETE: CPT

## 2019-10-27 PROCEDURE — 74011000258 HC RX REV CODE- 258: Performed by: INTERNAL MEDICINE

## 2019-10-27 PROCEDURE — 65660000000 HC RM CCU STEPDOWN

## 2019-10-27 PROCEDURE — 99222 1ST HOSP IP/OBS MODERATE 55: CPT | Performed by: INTERNAL MEDICINE

## 2019-10-27 PROCEDURE — 87040 BLOOD CULTURE FOR BACTERIA: CPT

## 2019-10-27 PROCEDURE — 74011250636 HC RX REV CODE- 250/636: Performed by: INTERNAL MEDICINE

## 2019-10-27 PROCEDURE — 74011250637 HC RX REV CODE- 250/637: Performed by: INTERNAL MEDICINE

## 2019-10-27 PROCEDURE — 80048 BASIC METABOLIC PNL TOTAL CA: CPT

## 2019-10-27 PROCEDURE — 83605 ASSAY OF LACTIC ACID: CPT

## 2019-10-27 PROCEDURE — 74011250637 HC RX REV CODE- 250/637: Performed by: HOSPITALIST

## 2019-10-27 PROCEDURE — 94760 N-INVAS EAR/PLS OXIMETRY 1: CPT

## 2019-10-27 PROCEDURE — 77030038269 HC DRN EXT URIN PURWCK BARD -A

## 2019-10-27 RX ADMIN — Medication 10 ML: at 21:25

## 2019-10-27 RX ADMIN — LEVOTHYROXINE SODIUM 25 MCG: 25 TABLET ORAL at 05:59

## 2019-10-27 RX ADMIN — MIDODRINE HYDROCHLORIDE 5 MG: 5 TABLET ORAL at 21:26

## 2019-10-27 RX ADMIN — ACETAMINOPHEN 650 MG: 325 TABLET ORAL at 15:35

## 2019-10-27 RX ADMIN — ACETAMINOPHEN 650 MG: 325 TABLET ORAL at 08:14

## 2019-10-27 RX ADMIN — AMPICILLIN SODIUM 2 G: 2 INJECTION, POWDER, FOR SOLUTION INTRAMUSCULAR; INTRAVENOUS at 22:26

## 2019-10-27 RX ADMIN — MIDODRINE HYDROCHLORIDE 5 MG: 5 TABLET ORAL at 08:14

## 2019-10-27 RX ADMIN — POTASSIUM BICARBONATE 20 MEQ: 782 TABLET, EFFERVESCENT ORAL at 09:04

## 2019-10-27 RX ADMIN — APIXABAN 2.5 MG: 2.5 TABLET, FILM COATED ORAL at 08:14

## 2019-10-27 RX ADMIN — CARVEDILOL 3.12 MG: 3.12 TABLET, FILM COATED ORAL at 17:51

## 2019-10-27 RX ADMIN — POLYETHYLENE GLYCOL 3350 17 G: 17 POWDER, FOR SOLUTION ORAL at 08:14

## 2019-10-27 RX ADMIN — CEFTRIAXONE SODIUM 2 G: 2 INJECTION, POWDER, FOR SOLUTION INTRAMUSCULAR; INTRAVENOUS at 21:16

## 2019-10-27 RX ADMIN — VANCOMYCIN HYDROCHLORIDE 750 MG: 750 INJECTION, POWDER, LYOPHILIZED, FOR SOLUTION INTRAVENOUS at 13:06

## 2019-10-27 RX ADMIN — MIDODRINE HYDROCHLORIDE 5 MG: 5 TABLET ORAL at 15:35

## 2019-10-27 RX ADMIN — Medication 10 ML: at 13:07

## 2019-10-27 RX ADMIN — CEFTRIAXONE 1 G: 1 INJECTION, POWDER, FOR SOLUTION INTRAMUSCULAR; INTRAVENOUS at 08:14

## 2019-10-27 RX ADMIN — ACETAMINOPHEN 650 MG: 325 TABLET ORAL at 21:50

## 2019-10-27 RX ADMIN — CARVEDILOL 3.12 MG: 3.12 TABLET, FILM COATED ORAL at 08:14

## 2019-10-27 RX ADMIN — AMPICILLIN SODIUM 2 G: 2 INJECTION, POWDER, FOR SOLUTION INTRAMUSCULAR; INTRAVENOUS at 15:35

## 2019-10-27 RX ADMIN — APIXABAN 2.5 MG: 2.5 TABLET, FILM COATED ORAL at 21:26

## 2019-10-27 RX ADMIN — MIRTAZAPINE 15 MG: 15 TABLET, FILM COATED ORAL at 21:26

## 2019-10-27 RX ADMIN — Medication 10 ML: at 05:59

## 2019-10-27 NOTE — PROGRESS NOTES
Problem: Falls - Risk of  Goal: *Absence of Falls  Description  Document [facility] Fall Risk and appropriate interventions in the flowsheet.   Outcome: Progressing Towards Goal  Note:   Fall Risk Interventions:  Mobility Interventions: Bed/chair exit alarm    Mentation Interventions: Adequate sleep, hydration, pain control, Bed/chair exit alarm    Medication Interventions: Bed/chair exit alarm    Elimination Interventions: Bed/chair exit alarm, Call light in reach    History of Falls Interventions: Bed/chair exit alarm

## 2019-10-27 NOTE — PROGRESS NOTES
Problem: Falls - Risk of  Goal: *Absence of Falls  Description  Document Zuri Da Silva Fall Risk and appropriate interventions in the flowsheet.   Outcome: Progressing Towards Goal  Note:   Fall Risk Interventions:  Mobility Interventions: Bed/chair exit alarm, Strengthening exercises (ROM-active/passive), Patient to call before getting OOB    Mentation Interventions: Adequate sleep, hydration, pain control, Bed/chair exit alarm, Increase mobility, Reorient patient    Medication Interventions: Bed/chair exit alarm, Patient to call before getting OOB    Elimination Interventions: Bed/chair exit alarm, Call light in reach, Toileting schedule/hourly rounds    History of Falls Interventions: Bed/chair exit alarm         Problem: Patient Education: Go to Patient Education Activity  Goal: Patient/Family Education  Outcome: Progressing Towards Goal

## 2019-10-27 NOTE — PROGRESS NOTES
Bedside and Verbal shift change report GIVEN TO Audrey/MAXIMUS Hsu. Report included the following information SBAR, Kardex, ED Summary, Procedure Summary, Intake/Output, MAR, Recent Results and Cardiac Rhythm (Paced with BBB). SIGNIFICANT CHANGES DURING SHIFT:  4571- Called consult for ID  1220- Paged Dr. Martha Pompa to ask for lactic acid added to blood cx, orders received. Addressed concern of patient's lethargy and confusion, Dr. Martha Pompa stated that she has some underlying dementia and that she is aware of what is going on. Patient currently A&O x2, consistent for this shift and prior shift. MD denied request for ABG's.  1240- primary RN 2 unsuccessful attempts for peripheral blood draw, English RN 2 unsuccessful attempts at peripheral blood draw  1250- Notified Lalitha Catherine RN to attempt peripheral blood draw, RN just received admission will attempt shortly  [place], unsuccessful attempt at lab draw, paged PCU, Lesvia Yuen said he will come obtain labs  1430- Paged PCU, Lesvia Yuen is at lunch. Mary will relay message to Lesvia Yuen to obtain labs for 2211.      10/27/19 1434   Vitals   Temp (!) 103.2 °F (39.6 °C)   Temp Source Oral   Pulse (Heart Rate) 75   Heart Rate Source Monitor   Resp Rate 18   O2 Sat (%) 95 %   Level of Consciousness Responds to Voice   /60   MAP (Calculated) 75   BP 1 Location Right arm   BP 1 Method Automatic   BP Patient Position At rest   Cardiac Rhythm Paced;BBB   MEWS Score 4   Oxygen Therapy   O2 Device Room air     Dr. Martha Pompa and Dr. Ej Gomez aware of reoccurring fever. Administered Tylenol. Placed ice packs at groin and bilateral axillary. Will continue to monitor. Stephen Bob RN obtained lactic, unsuccessful with blood cx. Notified by Dr. Ej Gomez of echo results, notified Dr. Martha Pompa that echo results are in the EMR and gave him Dr. Jennifer Nevarez phone number to f/u, no orders received at this time. [id number] [facility] RRT RN inserted IV and obtained blood cx.  Attempted to feed patient lunch, appetite poor. Patient appears to be in pain when eating, denies tooth/mouth pain. Will continue to monitor. [address] with Dr. Leonel Cronin, he expressed his concern that he has not heard back from Dr. Bethany Alva yet. Shared with him that the results of the echo has been relayed and that I have given Dr. Bethany Alva his number. 1700- Paged Dr. Bethany Alva to express Dr. Giancarlo Krishnamurthy concern, no orders received at this time. Dr. Bethany Alva states there is nothing else we can do at this point. He said he will call Dr. Leonel Cronin later, he does not have time at the moment to do so. [address] NURSING NOTE   Admission Date 10/23/2019   Admission Diagnosis Fever [R50.9]   Consults IP CONSULT TO HOSPITALIST  IP CONSULT TO PRIMARY CARE PROVIDER  IP CONSULT TO PALLIATIVE CARE - PROVIDER  IP CONSULT TO HEMATOLOGY  IP CONSULT TO INFECTIOUS DISEASES      Cardiac Monitoring [x] Yes [] No      Purposeful Hourly Rounding [x] Yes    Milvia Score Total Score: 5   Milvia score 3 or > [x] Bed Alarm [] Avasys [] 1:1 sitter [] Patient refused (Signed refusal form in chart)   Kimani Score Kimani Score: 14   Kimani score 14 or < [] PMT consult [] Wound Care consult    []  Specialty bed  [] Nutrition consult      Influenza Vaccine Received Flu Vaccine for Current Season (usually Sept-March): Unsure           Oxygen needs? [x] Room air Oxygen @  []1L    []2L    []3L   []4L    []5L   []6L via  NC   Chronic home O2 use?  [] Yes [x] No  Perform O2 challenge test and document in progress note using smartphrase (.Homeoxygen)      Last bowel movement        Urinary Catheter       External Female Catheter 10/23/19-Urine Output (mL): 350 ml  External Female Catheter 10/23/19-Urine Output (mL): 200 ml     LDAs               Peripheral IV 10/26/19 Left Forearm (Active)   Site Assessment Clean, dry, & intact 10/27/2019  3:34 PM   Phlebitis Assessment 0 10/27/2019  3:34 PM   Infiltration Assessment 0 10/27/2019  3:34 PM   Dressing Status Clean, dry, & intact 10/27/2019  3:34 PM   Dressing Type Transparent 10/27/2019  3:34 PM   Hub Color/Line Status Blue;Flushed 10/27/2019  3:34 PM       Peripheral IV 10/27/19 Right Antecubital (Active)   Site Assessment Clean, dry, & intact 10/27/2019  3:34 PM   Phlebitis Assessment 0 10/27/2019  3:34 PM   Infiltration Assessment 0 10/27/2019  3:34 PM   Dressing Status Clean, dry, & intact 10/27/2019  3:34 PM   Dressing Type Transparent 10/27/2019  3:34 PM   Hub Color/Line Status Pink;Flushed 10/27/2019  3:34 PM          External Female Catheter 10/23/19 (Active)   Site Assessment Clean, dry, & intact 10/23/2019 11:01 AM   Perineal Care Yes 10/23/2019 11:01 AM   Urine Output (mL) 350 ml 10/26/2019  6:14 AM       External Female Catheter 10/23/19 (Active)   Site Assessment Clean, dry, & intact 10/27/2019 12:44 PM   Repositioned Yes 10/27/2019 12:44 PM   Perineal Care Yes 10/27/2019 12:44 PM   Wick Changed Yes 10/27/2019  3:56 PM   Suction Canister/Tubing Changed No 10/27/2019 12:44 PM   Urine Output (mL) 200 ml 10/27/2019  6:04 AM                   Readmission Risk Assessment Tool Score High Risk            34       Total Score        3 Has Seen PCP in Last 6 Months (Yes=3, No=0)    2 . Living with Significant Other. Assisted Living. LTAC. SNF. or   Rehab    4 IP Visits Last 12 Months (1-3=4, 4=9, >4=11)    5 Pt.  Coverage (Medicare=5 , Medicaid, or Self-Pay=4)    20 Charlson Comorbidity Score (Age + Comorbid Conditions)        Criteria that do not apply:    Patient Length of Stay (>5 days = 3)       Expected Length of Stay - - -   Actual Length of Stay 4

## 2019-10-27 NOTE — PROGRESS NOTES
General Daily Progress Note    Admit Date: 10/23/2019    Subjective:     Patient has no complaint--confused. .     Current Facility-Administered Medications   Medication Dose Route Frequency    vancomycin (VANCOCIN) 750 mg in 0.9% sodium chloride (MBP/ADV) 250 mL  750 mg IntraVENous Q16H    carvedilol (COREG) tablet 3.125 mg  3.125 mg Oral BID WITH MEALS    levothyroxine (SYNTHROID) tablet 25 mcg  25 mcg Oral 6am    polyethylene glycol (MIRALAX) packet 17 g  17 g Oral EVERY OTHER DAY    cefTRIAXone (ROCEPHIN) 1 g in 0.9% sodium chloride (MBP/ADV) 50 mL  1 g IntraVENous Q24H    sodium chloride (NS) flush 5-10 mL  5-10 mL IntraVENous PRN    acetaminophen (TYLENOL) tablet 650 mg  650 mg Oral Q6H PRN    acetaminophen (TYLENOL) tablet 650 mg  650 mg Oral Q6H PRN    albuterol (PROVENTIL VENTOLIN) nebulizer solution 2.5 mg  2.5 mg Nebulization Q6H PRN    apixaban (ELIQUIS) tablet 2.5 mg  2.5 mg Oral BID    midodrine (PROAMITINE) tablet 5 mg  5 mg Oral TID    mirtazapine (REMERON) tablet 15 mg  15 mg Oral QHS    sodium chloride (NS) flush 5-40 mL  5-40 mL IntraVENous Q8H        Review of Systems  Review of systems not obtained due to patient factors. Objective:     Patient Vitals for the past 24 hrs:   BP Temp Pulse Resp SpO2 Weight   10/27/19 0839  (!) 102.4 °F (39.1 °C)       10/27/19 0702 113/66 100 °F (37.8 °C) 81 18 91 %    10/27/19 0217      119 lb 12.8 oz (54.3 kg)   10/27/19 0213 101/64 99.1 °F (37.3 °C) 70 18 96 %    10/26/19 2229 93/68 98.9 °F (37.2 °C) 72 18 96 %    10/26/19 1917 98/62 98.2 °F (36.8 °C) 70 18 96 %    10/26/19 1806 96/56  72      10/26/19 1501 99/56 98.2 °F (36.8 °C) 79 18 96 %    10/26/19 1029 96/63 98.6 °F (37 °C) 82 18 95 %      No intake/output data recorded.   10/25 1901 - 10/27 0700  In: 360 [P.O.:360]  Out: 950 [Urine:950]    Physical Exam:   Visit Vitals  /66 (BP 1 Location: Left arm, BP Patient Position: At rest)   Pulse 81   Temp (!) 102.4 °F (39.1 °C)   Resp 18   Ht 5' 1\" (1.549 m)   Wt 119 lb 12.8 oz (54.3 kg)   SpO2 91%   BMI 22.64 kg/m²     General appearance: alert, cooperative, no distress, appears stated age, confused  Neck: supple, symmetrical, trachea midline, no adenopathy, thyroid: not enlarged, symmetric, no tenderness/mass/nodules, no carotid bruit and no JVD  Lungs: clear to auscultation bilaterally  Heart: systolic murmur: early systolic 2/6, crescendo at 2nd left intercostal space  Abdomen: soft, non-tender. Bowel sounds normal. No masses,  no organomegaly  Extremities: extremities normal, atraumatic, no cyanosis or edema    Assessment:     Active Problems:    Fever (10/23/2019)        Plan:     1. Persistent fever spikes is bothersome. Enterococcus faecalis has a high incidence of seeding. May need a cidal antibiotic. We will ask infectious disease for antibiotic recommendations. 2.  Cardiac status appears to be stable no overt signs of decompensation. I have not restarted her diuretic.   3.  Intermittent confusion persist.

## 2019-10-27 NOTE — CONSULTS
ID conmsult-weekend coverage    NAME:  Robert Brizuela                      :   3/17/1925                       MRN:   [id number]   Date/Time:  10/27/2019 5:26 PM    Subjective:   REASON FOR CONSULT:   Fever , enterococcus bacteremia    No hisotory available from patient, chart reviewed, spoke to Alysia Majo  is a 80 y.o. 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 She was admitted from NH,  She had CT abdomen and pelvis which showed fecal stasis and no other findings. CXR showed ICD and cardiomegaly. She was afebrile on admission but has been spiking upto 103 since then. See fever curve below  She was started on vanco and ceftriaxone. Bld culture is positive for enterococcus.  She continues to have fever and ID is consulted-       Past Medical History:   Diagnosis Date    Aortic insufficiency     Asthma     CKD (chronic kidney disease) stage 3, GFR 30-59 ml/min ([facility]) 1/10/2018    CLL (chronic lymphocytic leukemia) ([facility])     Congestive heart failure, NYHA class II, chronic, systolic ([facility])     Heart failure ([facility] [address]     Hypertension     Mitral valvular regurgitation 2016    ECHO 2/3/17: LV dilated, EF 20-25%, mild LVH, RV mod dilated, mild- mod MELANIA, mod-severe MR, mod AI ECHO 17: EF 15%, severe DHK, reduced RVEF, RVH, RVSP 35 mmHg  Mild LAE, mod-marked MA fibrosis, mod-severe MR (2+), AO root dilated,      Nonrheumatic aortic valve insufficiency 10/16/2018    Presence of biventricular automatic cardioverter/defibrillator (AICD) 2015    [employer] biventricular AICD implant    Stomach ulcer       Past Surgical History:   Procedure Laterality Date    HX BREAST BIOPSY Bilateral     40 years ago    HX COLONOSCOPY      HX HEENT      cataracts removed    HX HYSTERECTOMY      HX OTHER SURGICAL      lymph node surgery    HX TONSILLECTOMY      MA EGD TRANSORAL BIOPSY SINGLE/MULTIPLE  5/23/2012         SINUS SURGERY PROC UNLISTED      Nasal polyps removed         Family History   Problem Relation Age of Onset    Heart Disease Mother     Stroke Father      Allergies   Allergen Reactions    Codeine Other (comments)     \"made me disoriented\" per pt         Current Facility-Administered Medications   Medication Dose Route Frequency Provider Last Rate Last Dose    cefTRIAXone (ROCEPHIN) 2 g in 0.9% sodium chloride (MBP/ADV) 50 mL  2 g IntraVENous Q12H Malick Nair MD        ampicillin (OMNIPEN) 2 g in 0.9% sodium chloride (MBP/ADV) 100 mL  2 g IntraVENous Q6H Stefan OBRIEN  mL/hr at 10/27/19 1535 2 g at 10/27/19 1535    carvedilol (COREG) tablet 3.125 mg  3.125 mg Oral BID WITH MEALS Malick Nair MD   3.125 mg at 10/27/19 3983    levothyroxine (SYNTHROID) tablet 25 mcg  25 mcg Oral Quin Gowers, MD   25 mcg at 10/27/19 0559    polyethylene glycol (MIRALAX) packet 17 g  17 g Oral EVERY OTHER DAY Malick Nair MD   17 g at 10/27/19 0814    sodium chloride (NS) flush 5-10 mL  5-10 mL IntraVENous PRN Hailee Mcwilliams MD        acetaminophen (TYLENOL) tablet 650 mg  650 mg Oral Q6H PRN Hailee Mcwillimas MD        acetaminophen (TYLENOL) tablet 650 mg  650 mg Oral Q6H PRN Kelvin Perez MD   650 mg at 10/27/19 1535    albuterol (PROVENTIL VENTOLIN) nebulizer solution 2.5 mg  2.5 mg Nebulization Q6H PRN Kelvin Perez MD        apixaban (ELIQUIS) tablet 2.5 mg  2.5 mg Oral BID Kelvin Perez MD   2.5 mg at 10/27/19 0814    midodrine (PROAMITINE) tablet 5 mg  5 mg Oral TID Kelvin Perez MD   5 mg at 10/27/19 1535    mirtazapine (REMERON) tablet 15 mg  15 mg Oral QHS Kelvin Perez MD   15 mg at 10/26/19 2122    sodium chloride (NS) flush 5-40 mL  5-40 mL IntraVENous Q8H Kelvin Perez MD   10 mL at 10/27/19 1307        REVIEW OF SYSTEMS:     NA    Objective:   VITALS:    Visit Vitals  /60 (BP 1 Location: Right arm, BP Patient Position: At rest)   Pulse 75   Temp (!) 103.2 °F (39.6 °C)   Resp 18   Ht 5' 1\" (1.549 m)   Wt 119 lb (54 kg)   SpO2 95%   BMI 22.48 kg/m²       PHYSICAL EXAM:   General:    Obtunded, moans to calling her name, cringes her eyelids    Head:   Normocephalic, without obvious abnormality, atraumatic. Eyes:   Tightly shut- cannot examine  ENT  Nares normal. No drainage or sinus tenderness. Cannot examine oral cavity  Neck:  Unable to check for suppleness  no carotid bruit and no JVD. Back:    No sacral decub  Lungs:   B/l air entry- no rhonchi or crepts  Heart:   [id number] murmur  Abdomen:   Soft, non-tender,not distended. Bowel sounds normal. No masses  Extremities: Rt arm- there is a soft swelling of 3 cm near the antecubital fossa  atraumatic, no cyanosis. No edema. No clubbing  Skin:     No rashes or lesions. Or bruising  Lymph: Cervical, supraclavicular normal.  Neurologic: Cannot be examined  Pertinent Labs    Recent Labs     10/27/19  0212 10/26/19  0142 10/25/19  0154   WBC 3.1* 3.3* 3.7   HGB 9.4* 10.3* 9.9*   HCT 29.7* 33.2* 31.4*   * 110* 98*     Recent Labs     10/27/19  0212 10/26/19  0142 10/25/19  0154    142 141   K 3.2* 3.2* 3.3*    107 104   CO2 28 29 31   BUN 35* 44* 52*   CREA 1.01 1.27* 1.43*   GLU 95 98 100   CA 8.3* 8.8 8.5     Recent Labs     10/25/19  0154   SGOT 28   ALT 14   AP 93   TBILI 0.8   TP 6.3*   ALB 2.2*   GLOB 4.1*     UA 0-4 wbc, leuco esterase, nitrites neg  Blood culture 10/23 enterococcus fecalis- sensitive to ampicillin  IMAGING RESULTS:  CT head without contrast  No subdural hemorrhage or other acute abnormality. Atherosclerosis  with microvascular disease and age-related volume loss unchanged. 2 d echo  EF 15-20%  · Aortic Valve: Probably trileaflet aortic valve. Aortic valve sclerosis. Aortic valve mean gradient is 14.6 mmHg. Mild aortic valve stenosis is present. Moderate aortic valve regurgitation is present.  Possible vegetation is present on the aortic valve. Impression/Recommendation  80 y.o. 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 She was admitted from NH,  She had CT abdomen and pelvis which showed fecal stasis and no other findings. CXR showed ICD and cardiomegaly. She was afebrile on admission but has been spiking upto 103 since then. See fever curve below  She was started on vanco and ceftriaxone.      Fever and obtunded mental status- with enterococcus fecalis bacteremia  Has AICD, and spikeing fever- concern for endocarditis or AICD wire infected thrombus  Concern for septic emboli to the brain  CT head without contrast on admission did not reveal anything unusual  No evidence of UTI    Currently patient on vanco + ceftriaxone  Change to ampicillin ( 2 grams IV q4) and ceftriaxone( 2 grams Q 12)  endocarditis dose adjusted to her crcl    Spoke to - 2 d echo has been done and it shows aortic valve possible vegetation  Ideally the AICD will have to be removed and she will need 6 weeks of IV antibiotics    Anemia  Low WBC and low platelet    H/o Lymphoma    Palliative in conversation with family-    CHF with ef of 20%- on coreg    Paroxysmal afib- on eliquis  ( ?? Stop if concern for septic emboli to the brain)    Hypothyroidism on synthroid- recommend checking TSH    Discussed with  and her nurse   will follow her from tomorrow

## 2019-10-27 NOTE — PROGRESS NOTES
Pharmacy Automatic Renal Dosing Protocol - Antimicrobials  Indication for Antimicrobials:  Bacteremia/ R/o Endocarditis    Current Regimen of Each Antimicrobial:  Ceftriaxone (dose increase 10-27)) to 2 g IV every 24 hours (Start Date 10/27; Day 4)    Previous Antimicrobial Therapy:  Ceftriaxone 1 g IV every 24 hours (Start Date 10/24; completed 4 days)  Vancomycin 750 mg IV every 24 hours (Start Date 10/24; completed 4 days)    Goal Level: VANCOMYCIN TROUGH GOAL RANGE  Vancomycin Trough: 15 - 20 mcg/mL  (AUC: 400 - 600 mg/hr/Liter/day)     Date Dose & Interval Measured (mcg/mL) Extrapolated (mcg/mL)   10/26 0142 750 mg every 24 hours 8.6 8.7                 Date & time of next level:     Significant Cultures/Imaging:   10/23 Blood: Enterococcus in / bottles- final  10-27-19 ECHO = pending    Paralysis, amputations, malnutrition: None documented    Labs:  Recent Labs     10/27/19  0212 10/26/19  0142 10/25/19  0154   CREA 1.01 1.27* 1.43*   BUN 35* 44* 52*   WBC 3.1* 3.3* 3.7     Temp (24hrs), Av.6 °F (37.6 °C), Min:98.2 °F (36.8 °C), Max:102.4 °F (39.1 °C)    Creatinine Clearance (mL/min) or Dialysis: 26 mL/min     Impression/Plan:   · Ceftriaxone dose increased to 2 gm IV q 12h, Vancomycin DCD  · Ampicillin 2gm IV q 4h ordered, will change to q 6h for Crcl 10-50 ml/min  per protocol  · Will continue current regimens as appropriate for indication and renal function. · ID has been consulted. · Antimicrobial stop date      Pharmacy will follow daily and adjust medications as appropriate for renal function and/or serum levels.     Thank you,  Susana Montano, [facility]

## 2019-10-28 ENCOUNTER — APPOINTMENT (OUTPATIENT)
Dept: NON INVASIVE DIAGNOSTICS | Age: 84
DRG: 871 | End: 2019-10-28
Attending: INTERNAL MEDICINE
Payer: MEDICARE

## 2019-10-28 ENCOUNTER — TELEPHONE (OUTPATIENT)
Dept: FAMILY MEDICINE CLINIC | Age: 84
End: 2019-10-28

## 2019-10-28 PROBLEM — I38 ENDOCARDITIS: Status: ACTIVE | Noted: 2019-10-28

## 2019-10-28 PROBLEM — I48.20 CHRONIC A-FIB (HCC): Status: ACTIVE | Noted: 2019-10-28

## 2019-10-28 LAB
ANION GAP SERPL CALC-SCNC: 8 MMOL/L (ref 5–15)
BUN SERPL-MCNC: 27 MG/DL (ref 6–20)
BUN/CREAT SERPL: 23 (ref 12–20)
CALCIUM SERPL-MCNC: 8.7 MG/DL (ref 8.5–10.1)
CHLORIDE SERPL-SCNC: 106 MMOL/L (ref 97–108)
CO2 SERPL-SCNC: 28 MMOL/L (ref 21–32)
CREAT SERPL-MCNC: 1.16 MG/DL (ref 0.55–1.02)
GLUCOSE SERPL-MCNC: 92 MG/DL (ref 65–100)
POTASSIUM SERPL-SCNC: 3.3 MMOL/L (ref 3.5–5.1)
SODIUM SERPL-SCNC: 142 MMOL/L (ref 136–145)

## 2019-10-28 PROCEDURE — 77010033678 HC OXYGEN DAILY

## 2019-10-28 PROCEDURE — 74011250637 HC RX REV CODE- 250/637: Performed by: INTERNAL MEDICINE

## 2019-10-28 PROCEDURE — 93308 TTE F-UP OR LMTD: CPT

## 2019-10-28 PROCEDURE — 74011250636 HC RX REV CODE- 250/636: Performed by: INTERNAL MEDICINE

## 2019-10-28 PROCEDURE — 80048 BASIC METABOLIC PNL TOTAL CA: CPT

## 2019-10-28 PROCEDURE — 65660000000 HC RM CCU STEPDOWN

## 2019-10-28 PROCEDURE — 94760 N-INVAS EAR/PLS OXIMETRY 1: CPT

## 2019-10-28 PROCEDURE — 74011250637 HC RX REV CODE- 250/637: Performed by: HOSPITALIST

## 2019-10-28 PROCEDURE — 36415 COLL VENOUS BLD VENIPUNCTURE: CPT

## 2019-10-28 PROCEDURE — 74011000258 HC RX REV CODE- 258: Performed by: INTERNAL MEDICINE

## 2019-10-28 PROCEDURE — 77030038269 HC DRN EXT URIN PURWCK BARD -A

## 2019-10-28 RX ORDER — POTASSIUM CHLORIDE 750 MG/1
20 TABLET, FILM COATED, EXTENDED RELEASE ORAL 3 TIMES DAILY
Status: DISCONTINUED | OUTPATIENT
Start: 2019-10-28 | End: 2019-10-29

## 2019-10-28 RX ADMIN — Medication 10 ML: at 15:04

## 2019-10-28 RX ADMIN — AMPICILLIN SODIUM 2 G: 2 INJECTION, POWDER, FOR SOLUTION INTRAMUSCULAR; INTRAVENOUS at 23:55

## 2019-10-28 RX ADMIN — MIRTAZAPINE 15 MG: 15 TABLET, FILM COATED ORAL at 22:43

## 2019-10-28 RX ADMIN — AMPICILLIN SODIUM 2 G: 2 INJECTION, POWDER, FOR SOLUTION INTRAMUSCULAR; INTRAVENOUS at 15:45

## 2019-10-28 RX ADMIN — CARVEDILOL 3.12 MG: 3.12 TABLET, FILM COATED ORAL at 17:07

## 2019-10-28 RX ADMIN — CARVEDILOL 3.12 MG: 3.12 TABLET, FILM COATED ORAL at 08:36

## 2019-10-28 RX ADMIN — Medication 10 ML: at 03:49

## 2019-10-28 RX ADMIN — POTASSIUM CHLORIDE 20 MEQ: 750 TABLET, FILM COATED, EXTENDED RELEASE ORAL at 15:45

## 2019-10-28 RX ADMIN — CEFTRIAXONE SODIUM 2 G: 2 INJECTION, POWDER, FOR SOLUTION INTRAMUSCULAR; INTRAVENOUS at 08:37

## 2019-10-28 RX ADMIN — AMPICILLIN SODIUM 2 G: 2 INJECTION, POWDER, FOR SOLUTION INTRAMUSCULAR; INTRAVENOUS at 10:20

## 2019-10-28 RX ADMIN — CEFTRIAXONE SODIUM 2 G: 2 INJECTION, POWDER, FOR SOLUTION INTRAMUSCULAR; INTRAVENOUS at 22:44

## 2019-10-28 RX ADMIN — MIDODRINE HYDROCHLORIDE 5 MG: 5 TABLET ORAL at 15:45

## 2019-10-28 RX ADMIN — MIDODRINE HYDROCHLORIDE 5 MG: 5 TABLET ORAL at 08:36

## 2019-10-28 RX ADMIN — APIXABAN 2.5 MG: 2.5 TABLET, FILM COATED ORAL at 22:52

## 2019-10-28 RX ADMIN — Medication 10 ML: at 22:44

## 2019-10-28 RX ADMIN — POTASSIUM CHLORIDE 20 MEQ: 750 TABLET, FILM COATED, EXTENDED RELEASE ORAL at 22:43

## 2019-10-28 RX ADMIN — LEVOTHYROXINE SODIUM 25 MCG: 25 TABLET ORAL at 06:21

## 2019-10-28 RX ADMIN — APIXABAN 2.5 MG: 2.5 TABLET, FILM COATED ORAL at 08:36

## 2019-10-28 RX ADMIN — MIDODRINE HYDROCHLORIDE 5 MG: 5 TABLET ORAL at 22:43

## 2019-10-28 RX ADMIN — AMPICILLIN SODIUM 2 G: 2 INJECTION, POWDER, FOR SOLUTION INTRAMUSCULAR; INTRAVENOUS at 03:46

## 2019-10-28 NOTE — PROGRESS NOTES
Hematology Oncology Progress Note         Follow up for: CLL    Chart notes reviewed since last visit. Case discussed with following:     Patient complains of the following: No complaints  Additional concerns noted by the staff:     Patient Vitals for the past 24 hrs:   BP Temp Pulse Resp SpO2 Weight   10/28/19 0829 120/74 99.2 °F (37.3 °C) 70 18 97 %    10/28/19 0309 116/64 99.8 °F (37.7 °C) 68 20 97 % 55.1 kg (121 lb 8 oz)   10/27/19 2304 99/55 98.4 °F (36.9 °C) 71 20 93 %    10/27/19 1941 112/65 99.5 °F (37.5 °C) 72 18 100 %    10/27/19 1755  99.6 °F (37.6 °C)       10/27/19 1434 106/60 (!) 103.2 °F (39.6 °C) 75 18 95 %        ROS negative for 11 organ systems (but patient mildly confused and this may not be accurate). Physical Examination:  Gen NAD  CV reg  Lungs clear  abd benign    Labs:  Recent Results (from the past 24 hour(s))   LACTIC ACID    Collection Time: 10/27/19  3:11 PM   Result Value Ref Range    Lactic acid 0.8 0.4 - 2.0 MMOL/L   CULTURE, BLOOD    Collection Time: 10/27/19  3:33 PM   Result Value Ref Range    Special Requests: NO SPECIAL REQUESTS      Culture result: NO GROWTH AFTER 15 HOURS     METABOLIC PANEL, BASIC    Collection Time: 10/28/19  3:51 AM   Result Value Ref Range    Sodium 142 136 - 145 mmol/L    Potassium 3.3 (L) 3.5 - 5.1 mmol/L    Chloride 106 97 - 108 mmol/L    CO2 28 21 - 32 mmol/L    Anion gap 8 5 - 15 mmol/L    Glucose 92 65 - 100 mg/dL    BUN 27 (H) 6 - 20 MG/DL    Creatinine 1.16 (H) 0.55 - 1.02 MG/DL    BUN/Creatinine ratio 23 (H) 12 - 20      GFR est AA 53 (L) >60 ml/min/1.73m2    GFR est non-AA 44 (L) >60 ml/min/1.73m2    Calcium 8.7 8.5 - 10.1 MG/DL       Assessment and Plan:   CLL - counts and adenopathy very acceptable at present so can hold ibruitinib for now. Will not resume until clear cut signs of progression. 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  IgG ok     Gram positive bacteremia/enterococcus.  On abx per primary team    Hx HTN    Hx CKD    Hx CHF - on coreg    AICD

## 2019-10-28 NOTE — PROGRESS NOTES
Problem: Falls - Risk of  Goal: *Absence of Falls  Description  Document Quinn Brannon Fall Risk and appropriate interventions in the flowsheet. Outcome: Progressing Towards Goal  Note:   Fall Risk Interventions:  Mobility Interventions: Bed/chair exit alarm, Patient to call before getting OOB, PT Consult for assist device competence, Utilize walker, cane, or other assistive device, Utilize gait belt for transfers/ambulation    Mentation Interventions: Bed/chair exit alarm, Adequate sleep, hydration, pain control, Door open when patient unattended, Reorient patient, Update white board    Medication Interventions: Bed/chair exit alarm, Patient to call before getting OOB, Teach patient to arise slowly, Utilize gait belt for transfers/ambulation    Elimination Interventions: Bed/chair exit alarm, Call light in reach    History of Falls Interventions: Bed/chair exit alarm, Evaluate medications/consider consulting pharmacy, Investigate reason for fall         Problem: Patient Education: Go to Patient Education Activity  Goal: Patient/Family Education  Outcome: Progressing Towards Goal     Problem: Pressure Injury - Risk of  Goal: *Prevention of pressure injury  Description  Document Kimani Scale and appropriate interventions in the flowsheet.   Outcome: Progressing Towards Goal  Note:   Pressure Injury Interventions:  Sensory Interventions: Keep linens dry and wrinkle-free, Maintain/enhance activity level, Minimize linen layers    Moisture Interventions: Absorbent underpads, Apply protective barrier, creams and emollients, Internal/External urinary devices    Activity Interventions: Increase time out of bed, PT/OT evaluation    Mobility Interventions: HOB 30 degrees or less, PT/OT evaluation    Nutrition Interventions: Document food/fluid/supplement intake, Offer support with meals,snacks and hydration    Friction and Shear Interventions: HOB 30 degrees or less, Minimize layers                Problem: Breathing Pattern - Ineffective  Goal: *Absence of hypoxia  Outcome: Progressing Towards Goal

## 2019-10-28 NOTE — PROGRESS NOTES
General Daily Progress Note    Admit Date: 10/23/2019    Subjective:     Patient has no complaint . Jannie Daly Current Facility-Administered Medications   Medication Dose Route Frequency    cefTRIAXone (ROCEPHIN) 2 g in 0.9% sodium chloride (MBP/ADV) 50 mL  2 g IntraVENous Q12H    ampicillin (OMNIPEN) 2 g in 0.9% sodium chloride (MBP/ADV) 100 mL  2 g IntraVENous Q6H    carvedilol (COREG) tablet 3.125 mg  3.125 mg Oral BID WITH MEALS    levothyroxine (SYNTHROID) tablet 25 mcg  25 mcg Oral 6am    polyethylene glycol (MIRALAX) packet 17 g  17 g Oral EVERY OTHER DAY    sodium chloride (NS) flush 5-10 mL  5-10 mL IntraVENous PRN    acetaminophen (TYLENOL) tablet 650 mg  650 mg Oral Q6H PRN    acetaminophen (TYLENOL) tablet 650 mg  650 mg Oral Q6H PRN    albuterol (PROVENTIL VENTOLIN) nebulizer solution 2.5 mg  2.5 mg Nebulization Q6H PRN    apixaban (ELIQUIS) tablet 2.5 mg  2.5 mg Oral BID    midodrine (PROAMITINE) tablet 5 mg  5 mg Oral TID    mirtazapine (REMERON) tablet 15 mg  15 mg Oral QHS    sodium chloride (NS) flush 5-40 mL  5-40 mL IntraVENous Q8H        Review of Systems  A comprehensive review of systems was negative. Objective:     Patient Vitals for the past 24 hrs:   BP Temp Pulse Resp SpO2 Height Weight   10/28/19 0829 120/74 99.2 °F (37.3 °C) 70 18 97 %     10/28/19 0309 116/64 99.8 °F (37.7 °C) 68 20 97 %  121 lb 8 oz (55.1 kg)   10/27/19 2304 99/55 98.4 °F (36.9 °C) 71 20 93 %     10/27/19 1941 112/65 99.5 °F (37.5 °C) 72 18 100 %     10/27/19 1755  99.6 °F (37.6 °C)        10/27/19 1434 106/60 (!) 103.2 °F (39.6 °C) 75 18 95 %     10/27/19 1031 108/61 100.1 °F (37.8 °C) 70 18 97 %     10/27/19 0951 113/66     5' 1\" (1.549 m) 119 lb (54 kg)     No intake/output data recorded. 10/26 1901 - 10/28 0700  In: 470 [P.O.:320;  I.V.:150]  Out: 1200 [Urine:1200]    Physical Exam:   Visit Vitals  /74 (BP 1 Location: Left arm, BP Patient Position: At rest)   Pulse 70   Temp 99.2 °F (37.3 °C)   Resp 18   Ht 5' 1\" (1.549 m)   Wt 121 lb 8 oz (55.1 kg)   SpO2 97%   BMI 22.96 kg/m²     General appearance: alert, cooperative, no distress, appears stated age  Neck: supple, symmetrical, trachea midline, no adenopathy, thyroid: not enlarged, symmetric, no tenderness/mass/nodules, no carotid bruit and no JVD  Lungs: Basilar rales  Heart: systolic murmur: early systolic 2/6, crescendo at 2nd left intercostal space  Abdomen: soft, non-tender. Bowel sounds normal. No masses,  no organomegaly  Extremities: extremities normal, atraumatic, no cyanosis or edema    Assessment:     Active Problems:    Fever (10/23/2019)        Plan:     1. 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 Antibiotics changed to accommodate. Not sure there is a reason for WYATT. Needless to say she is not a surgical candidate. 2.  No overt congestive heart failure currently. Will resume diuretic tomorrow. 3.  Will mobilize.

## 2019-10-28 NOTE — CDMP QUERY
Pt admitted with Fever and hypotension/Pt noted to have \" Gram-positive sepsis\". If possible, please document in progress notes and d/c summary if \"Gram-positive sepsis\" was POA: 
 
? Yes, \" Gram-positive sepsis\" was present at the time of the order to admit to the hospital 
? No, \" Gram-positive sepsis\" was not present on admission and developed during the inpatient stay ? Clinically you are unable to determine if \" Gram-positive sepsis\" was present on admission The medical record reflects the following: 
  Risk Factors: C/o Lethargy and generalized weakness Clinical Indicators:  81/50 ^ 89/61. ..... Celi Nims RR: 22-26. ........ WBC: 3.0... Celi Nims Celi Nims Bands: 1.5..... Celi Nims Celi Nims K+: 3.2... Celi Nims Celi Nims Tnl: 0.11.... Celi Nims BCx: ENTEROCOCCUS FAECALIS GROUP D GROWING IN BOTH BOTTLES DRAWN ; GRAM POSITIVE COCCI IN PAIRS AND CHAINS GROWING IN 1 OF 2 BOTTLES DRAWN . Celi Nims ...noted: Patient lives in a nursing home and was referred here today due to elevated temperature of 100.9 at nursing home, generalized weakness and lethargy and concern for being intermittently confused Treatment: IVF NS bolus 1,000 ml; IVF NS bolus 695ml; IVF NS [email or web address]; Tylenol 650mg rectal  
 
Thank you, Geeta Cordova CDI

## 2019-10-28 NOTE — TELEPHONE ENCOUNTER
Pls call Dina at [facility]     Blood culture results     Best number to reach her is [phone]

## 2019-10-28 NOTE — CONSULTS
[address]  [phone]        Date of  Admission: 10/23/2019  9:27 AM     Admission type:Emergency    Consult for: Fever [R50.9] poss removal of BIVICD implanted 7/2/15  Consult by: Dr Jayashree Vo, NP     Subjective:     Shawanda Bobo is a 80 y.o. female admitted for Fever [R50.9]. Patient complains of  none. Previous treatment/evaluation includes echocardiogram . Cardiac risk factors: sedentary life style, hypertension, post-menopausal.    Shawanda Bobo  is a 80 y.o. 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 Bld culture is positive for enterococcus.  WYATT with endocarditis based on the echocardiogram.      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 08/04/2012     Priority: 1 - One    Weight loss 05/22/2012     Priority: 1 - One     Class: Acute    DILAN (obstructive sleep apnea) 08/04/2012     Priority: 2 - Two    Anemia 11/08/2014     Priority: 3 - Three    Fever 10/23/2019    Poor short-term memory 08/21/2019    CAP (community acquired pneumonia) 06/25/2019    Counseling regarding advanced care planning and goals of care 02/07/2019    UTI (urinary tract infection) 02/04/2019    Nonrheumatic aortic valve insufficiency 10/16/2018    Acquired hypothyroidism 03/18/2018    CKD (chronic kidney disease) stage 3, GFR 30-59 ml/min ([address] 01/10/2018    Xerosis of skin 12/21/2017    Dyslipidemia 12/21/2017    Paroxysmal atrial fibrillation ([address] 10/21/2017    Gastroesophageal reflux disease with esophagitis 02/02/2017    Thoracic aortic aneurysm without rupture ([address] 02/02/2017    Physical deconditioning 01/29/2017    Mitral valvular regurgitation 01/22/2016    Lymphoma ([address] 11/17/2015    Chronic systolic congestive heart failure ([address] 10/08/2015    Presence of biventricular automatic cardioverter/defibrillator (AICD) 07/02/2015    LBBB (left bundle branch block) 07/01/2015    HTN (hypertension) 06/29/2015    Gout 06/29/2015    Reactive airway disease 03/23/2015    Rhinitis 09/04/2014      Binu Quintero MD  Past Medical History:   Diagnosis Date    Aortic insufficiency     Asthma     CKD (chronic kidney disease) stage 3, GFR 30-59 ml/min ([facility] 1/10/2018    CLL (chronic lymphocytic leukemia) ([facility])     Congestive heart failure, NYHA class II, chronic, systolic ([facility])     Heart failure ([address]     Hypertension     Mitral valvular regurgitation 1/22/2016    ECHO 2/3/17: LV dilated, EF 20-25%, mild LVH, RV mod dilated, mild- mod MELANIA, mod-severe MR, mod AI ECHO 7/29/17: EF 15%, severe DHK, reduced RVEF, RVH, RVSP 35 mmHg  Mild LAE, mod-marked MA fibrosis, mod-severe MR (2+), AO root dilated,      Nonrheumatic aortic valve insufficiency 10/16/2018    Presence of biventricular automatic cardioverter/defibrillator (AICD) 7/2/2015    [employer] biventricular AICD implant    Stomach ulcer       Social History     Socioeconomic History    Marital status:      Spouse name: Not on file    Number of children: 1    Years of education: 16+    Highest education level: Master's degree (e.g., MA, MS, [place], MEd, MSW, [employer])   Occupational History    Occupation: retired teacher   Tobacco Use    Smoking status: Never Smoker    Smokeless tobacco: Never Used   Substance and Sexual Activity    Alcohol use: No     Alcohol/week: 0.0 standard drinks    Drug use: No    Sexual activity: Never     Allergies   Allergen Reactions    Codeine Other (comments)     \"made me disoriented\" per pt      Family History   Problem Relation Age of Onset    Heart Disease Mother     Stroke Father       Current Facility-Administered Medications   Medication Dose Route Frequency    cefTRIAXone (ROCEPHIN) 2 g in 0.9% sodium chloride (MBP/ADV) 50 mL  2 g IntraVENous Q12H  ampicillin (OMNIPEN) 2 g in 0.9% sodium chloride (MBP/ADV) 100 mL  2 g IntraVENous Q6H    carvedilol (COREG) tablet 3.125 mg  3.125 mg Oral BID WITH MEALS    levothyroxine (SYNTHROID) tablet 25 mcg  25 mcg Oral 6am    polyethylene glycol (MIRALAX) packet 17 g  17 g Oral EVERY OTHER DAY    sodium chloride (NS) flush 5-10 mL  5-10 mL IntraVENous PRN    acetaminophen (TYLENOL) tablet 650 mg  650 mg Oral Q6H PRN    acetaminophen (TYLENOL) tablet 650 mg  650 mg Oral Q6H PRN    albuterol (PROVENTIL VENTOLIN) nebulizer solution 2.5 mg  2.5 mg Nebulization Q6H PRN    apixaban (ELIQUIS) tablet 2.5 mg  2.5 mg Oral BID    midodrine (PROAMITINE) tablet 5 mg  5 mg Oral TID    mirtazapine (REMERON) tablet 15 mg  15 mg Oral QHS    sodium chloride (NS) flush 5-40 mL  5-40 mL IntraVENous Q8H         Review of Symptoms:  Constitutional: negative  Eyes: negative  Ears, nose, mouth, throat, and face: negative  Respiratory: negative  Cardiovascular: negative  Gastrointestinal: negative  Genitourinary:negative  Musculoskeletal:negative  Neurological: negative  Endocrine: negative     Subjective:      Visit Vitals  /74 (BP 1 Location: Left arm, BP Patient Position: At rest)   Pulse 70   Temp 99.2 °F (37.3 °C)   Resp 18   Ht 5' 1\" (1.549 m)   Wt 55.1 kg (121 lb 8 oz)   SpO2 97%   BMI 22.96 kg/m²       Physical:    General: WD, WN. Alert, cooperative, no acute distress  Heart: Regular, S1 WNL and S2 WNL.  No S3 or S4, no m/S3/JVD, no carotid bruits   Lungs: clear   Abdomen: Soft, +BS, NTND   Extremities: LE rogerio +DP/PT, no edema   Neurologic: Grossly normal    Data Review:   Recent Labs     10/27/19  0212 10/26/19  0142   WBC 3.1* 3.3*   HGB 9.4* 10.3*   HCT 29.7* 33.2*   * 110*     Recent Labs     10/28/19  0351 10/27/19  0212 10/26/19  0142    140 142   K 3.3* 3.2* 3.2*    104 107   CO2 28 28 29   GLU 92 95 98   BUN 27* 35* 44*   CREA 1.16* 1.01 1.27*   CA 8.7 8.3* 8.8       No results for input(s): TROIQ, CPK, CKMB in the last 72 hours. Intake/Output Summary (Last 24 hours) at 10/28/2019 1024  Last data filed at 10/28/2019 0948  Gross per 24 hour   Intake 780 ml   Output 600 ml   Net 180 ml        Cardiographics    Telemetry:   ECG:   Echocardiogram:   · Left Ventricle: Normal cavity size. Mild concentric hypertrophy. Severe systolic dysfunction. Estimated left ventricular ejection fraction is 16 - 20%. Left ventricular global hypokinesis. · Left Atrium: Moderately dilated left atrium. · Right Ventricle: Pacing wire present. Pacer wires not seen well enough to evaluate for possible vegetation. · Aortic Valve: Probably trileaflet aortic valve. Aortic valve sclerosis. Aortic valve mean gradient is 14.6 mmHg. Mild aortic valve stenosis is present. Moderate aortic valve regurgitation is present. Possible vegetation is present on the aortic valve. · There are small, mobile echodense structures seen in some views on the aortic valve, suspicious for possible vegetation. · Mitral Valve: Severe mitral valve regurgitation is present. · Tricuspid Valve: Mild tricuspid valve regurgitation is present. · IVC/Hepatic Veins: Severely elevated central venous pressure (15+ mmHg); IVC diameter is larger than 21 mm and collapses less than 50% with respiration. · Normal aortic root. Dilated ascending aorta; diameter is 5 cm. There are mobile, somewhat linear appearing echodense structures in the ascending aorta-one cannot rule out aortic dissection. Alternatively, this could represent mobile atherosclerotic plaque. Consider aortic CT angiogram if clinically indicated. 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  Per ID: 2 d echo has been done and it shows aortic valve possible vegetation  Ideally the AICD will have to be removed. Given her age and multiple risk factors, device extraction would be of high risk. Discussed with patient. If ID feels this is necessary, would recommend transfer to [facility] for high risk device extraction. Thank you for this interesting consultation. Angela Lara ANP    Patient seen and examined by me with nurse practitioner. I personally performed all components of the history, physical, and medical decision making and agree with the assessment and plan with minor modifications as noted. 79 yo female with cardiomyopathy, biv icd implanted in 2015 with lymphoma, bacteremia and endocarditis. Given her age and co-morbidities, she is at high risk for mortality with lead extraction. Explained to her that, if felt to be absolutely needed, would favor transfer to [facility] for high  extraction. Pt understands. Would favor conservative treatment with iv abx and palliative team input.     Genevieve Win MD, Alannah Pandey

## 2019-10-28 NOTE — PROGRESS NOTES
Bedside shift change report GIVEN TO Yuliet Arora RN. Report included the following information SBAR and Kardex. SIGNIFICANT CHANGES DURING SHIFT:        CONCERNS TO ADDRESS WITH MD:            [facility] NURSING NOTE   Admission Date 10/23/2019   Admission Diagnosis Fever [R50.9]   Consults IP CONSULT TO HOSPITALIST  IP CONSULT TO PRIMARY CARE PROVIDER  IP CONSULT TO PALLIATIVE CARE - PROVIDER  IP CONSULT TO HEMATOLOGY  IP CONSULT TO INFECTIOUS DISEASES      Cardiac Monitoring [x] Yes [] No      Purposeful Hourly Rounding [x] Yes    Milvia Score Total Score: 5   Milvia score 3 or > [x] Bed Alarm [] Avasys [] 1:1 sitter [] Patient refused (Signed refusal form in chart)   Kimani Score Kimani Score: 14   Kimani score 14 or < [x] PMT consult [] Wound Care consult    []  Specialty bed  [] Nutrition consult      Influenza Vaccine Received Flu Vaccine for Current Season (usually Sept-March): Unsure           Oxygen needs? [x] Room air Oxygen @  []1L    []2L    []3L   []4L    []5L   []6L via  NC   Chronic home O2 use? [] Yes [x] No  Perform O2 challenge test and document in progress note using smart[employer] (.Homeoxygen)      Last bowel movement        Urinary Catheter       External Female Catheter 10/23/19-Urine Output (mL): 350 ml  External Female Catheter 10/23/19-Urine Output (mL): 300 ml     LDAs               Peripheral IV 10/26/19 Left Forearm (Active)   Site Assessment Clean, dry, & intact 10/27/2019  8:00 PM   Phlebitis Assessment 0 10/27/2019  8:00 PM   Infiltration Assessment 0 10/27/2019  8:00 PM   Dressing Status Clean, dry, & intact 10/27/2019  8:00 PM   Dressing Type Transparent;Tape 10/27/2019  8:00 PM   Hub Color/Line Status Blue; Infusing 10/27/2019  8:00 PM       Peripheral IV 10/27/19 Right Antecubital (Active)   Site Assessment Clean, dry, & intact 10/27/2019  8:00 PM   Phlebitis Assessment 0 10/27/2019  8:00 PM   Infiltration Assessment 0 10/27/2019  8:00 PM   Dressing Status Clean, dry, & intact 10/27/2019  8:00 PM   Dressing Type Transparent;Tape 10/27/2019  8:00 PM   Hub Color/Line Status Pink;Flushed 10/27/2019  8:00 PM          External Female Catheter 10/23/19 (Active)   Site Assessment Clean, dry, & intact 10/23/2019 11:01 AM   Perineal Care Yes 10/23/2019 11:01 AM   Urine Output (mL) 350 ml 10/26/2019  6:14 AM       External Female Catheter 10/23/19 (Active)   Site Assessment Clean, dry, & intact 10/27/2019  8:00 PM   Repositioned Yes 10/27/2019  8:00 PM   Perineal Care Yes 10/27/2019  8:00 PM   Wick Changed No 10/27/2019  8:00 PM   Suction Canister/Tubing Changed No 10/27/2019 12:44 PM   Urine Output (mL) 300 ml 10/27/2019  8:03 PM                   Readmission Risk Assessment Tool Score High Risk            34       Total Score        3 Has Seen PCP in Last 6 Months (Yes=3, No=0)    2 . Living with Significant Other. Assisted Living. LTAC. SNF. or   Rehab    4 IP Visits Last 12 Months (1-3=4, 4=9, >4=11)    5 Pt.  Coverage (Medicare=5 , Medicaid, or Self-Pay=4)    20 Charlson Comorbidity Score (Age + Comorbid Conditions)        Criteria that do not apply:    Patient Length of Stay (>5 days = 3)       Expected Length of Stay - - -   Actual Length of Stay 4

## 2019-10-28 NOTE — CONSULTS
[address] [facility]     Date of  Admission: 10/23/2019  9:27 AM     Admission type:Emergency    Consult for: possible endocarditis  Consult by: hospitalist     Subjective:     Dread Stoner is a 80 y.o. female admitted for Fever [R50.9]. Admitted with c/o lethargy, weakness and confusion, fever. Blood cx ENTEROCOCCUS FAECALIS GROUP D, echo suspicious of possible vegetation. Currently patient has no complaints of SOB, CP, A&O x 3.     PMH:  CM with systolic HF EF 11% with BIVICD, HTN, CKD     Previous treatment/evaluation includes echocardiogram . Cardiac risk factors: sedentary life style, hypertension, post-menopausal.      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 08/04/2012     Priority: 1 - One    Weight loss 05/22/2012     Priority: 1 - One     Class: Acute    DILAN (obstructive sleep apnea) 08/04/2012     Priority: 2 - Two    Anemia 11/08/2014     Priority: 3 - Three    Endocarditis 10/28/2019    Chronic a-fib 10/28/2019    Fever 10/23/2019    Poor short-term memory 08/21/2019    CAP (community acquired pneumonia) 06/25/2019    Counseling regarding advanced care planning and goals of care 02/07/2019    UTI (urinary tract infection) 02/04/2019    Nonrheumatic aortic valve insufficiency 10/16/2018    Acquired hypothyroidism 03/18/2018    CKD (chronic kidney disease) stage 3, GFR 30-59 ml/min ([address] 01/10/2018    Xerosis of skin 12/21/2017    Dyslipidemia 12/21/2017    Paroxysmal atrial fibrillation ([address] 10/21/2017    Gastroesophageal reflux disease with esophagitis 02/02/2017    Thoracic aortic aneurysm without rupture ([address] 02/02/2017    Physical deconditioning 01/29/2017    Mitral valvular regurgitation 01/22/2016    Lymphoma ([address] 11/17/2015    Chronic systolic congestive heart failure ([address] 10/08/2015  Presence of biventricular automatic cardioverter/defibrillator (AICD) 07/02/2015    LBBB (left bundle branch block) 07/01/2015    HTN (hypertension) 06/29/2015    Gout 06/29/2015    Reactive airway disease 03/23/2015    Rhinitis 09/04/2014      Lacey Peraza MD  Past Medical History:   Diagnosis Date    Aortic insufficiency     Asthma     Chronic a-fib 10/28/2019    CKD (chronic kidney disease) stage 3, GFR 30-59 ml/min ([facility] 1/10/2018    CLL (chronic lymphocytic leukemia) ([facility])     Congestive heart failure, NYHA class II, chronic, systolic ([facility]     Endocarditis 10/28/2019    Heart failure ([facility]     Hypertension     Mitral valvular regurgitation 1/22/2016    ECHO 2/3/17: LV dilated, EF 20-25%, mild LVH, RV mod dilated, mild- mod MELANIA, mod-severe MR, mod AI ECHO 7/29/17: EF 15%, severe DHK, reduced RVEF, RVH, RVSP 35 mmHg  Mild LAE, mod-marked MA fibrosis, mod-severe MR (2+), AO root dilated,      Nonrheumatic aortic valve insufficiency 10/16/2018    Presence of biventricular automatic cardioverter/defibrillator (AICD) 7/2/2015    [employer] biventricular AICD implant    Stomach ulcer       Social History     Socioeconomic History    Marital status:      Spouse name: Not on file    Number of children: 1    Years of education: 16+    Highest education level: Master's degree (e.g., MA, MS, [place], MEd, MSW, [employer])   Occupational History    Occupation: retired teacher   Tobacco Use    Smoking status: Never Smoker    Smokeless tobacco: Never Used   Substance and Sexual Activity    Alcohol use: No     Alcohol/week: 0.0 standard drinks    Drug use: No    Sexual activity: Never     Allergies   Allergen Reactions    Codeine Other (comments)     \"made me disoriented\" per pt      Family History   Problem Relation Age of Onset    Heart Disease Mother     Stroke Father       Current Facility-Administered Medications   Medication Dose Route Frequency    cefTRIAXone (ROCEPHIN) 2 g in 0.9% sodium chloride (MBP/ADV) 50 mL  2 g IntraVENous Q12H    ampicillin (OMNIPEN) 2 g in 0.9% sodium chloride (MBP/ADV) 100 mL  2 g IntraVENous Q6H    carvedilol (COREG) tablet 3.125 mg  3.125 mg Oral BID WITH MEALS    levothyroxine (SYNTHROID) tablet 25 mcg  25 mcg Oral 6am    polyethylene glycol (MIRALAX) packet 17 g  17 g Oral EVERY OTHER DAY    sodium chloride (NS) flush 5-10 mL  5-10 mL IntraVENous PRN    acetaminophen (TYLENOL) tablet 650 mg  650 mg Oral Q6H PRN    acetaminophen (TYLENOL) tablet 650 mg  650 mg Oral Q6H PRN    albuterol (PROVENTIL VENTOLIN) nebulizer solution 2.5 mg  2.5 mg Nebulization Q6H PRN    apixaban (ELIQUIS) tablet 2.5 mg  2.5 mg Oral BID    midodrine (PROAMITINE) tablet 5 mg  5 mg Oral TID    mirtazapine (REMERON) tablet 15 mg  15 mg Oral QHS    sodium chloride (NS) flush 5-40 mL  5-40 mL IntraVENous Q8H        Review of Symptoms:   11 systems reviewed, negative other than as stated in the HPI        Objective:      Visit Vitals  /73   Pulse 77   Temp 98.6 °F (37 °C)   Resp 18   Ht 5' 1\" (1.549 m)   Wt 54.9 kg (121 lb)   SpO2 97%   BMI 22.86 kg/m²       Physical:   General: elderly thin AAF in no acute distress  Heart: paced, 2/6 m,  No S3/JVD, no carotid bruits   Lungs: clear   Abdomen: Soft, +BS, NTND   Extremities: LE rogerio +DP/PT, no edema   Neurologic: Grossly normal    Data Review:   Recent Labs     10/27/19  0212 10/26/19  0142   WBC 3.1* 3.3*   HGB 9.4* 10.3*   HCT 29.7* 33.2*   * 110*     Recent Labs     10/28/19  0351 10/27/19  0212 10/26/19  0142    140 142   K 3.3* 3.2* 3.2*    104 107   CO2 28 28 29   GLU 92 95 98   BUN 27* 35* 44*   CREA 1.16* 1.01 1.27*   CA 8.7 8.3* 8.8       No results for input(s): TROIQ, CPK, CKMB in the last 72 hours.       Intake/Output Summary (Last 24 hours) at 10/28/2019 1238  Last data filed at 10/28/2019 1143  Gross per 24 hour   Intake 1180 ml   Output 1000 ml   Net 180 ml Cardiographics    Telemetry: AV paced  ECG: AV paced     Echocardiogram: 10/26/19  · FINDINGS SUSPICIOUS FOR ENDOCARDITIS AND CANNOT RULE OUT AORTIC DISSECTION. · LV 16-20%, global hypokinesis; LAE  · Left Atrium: Moderately dilated left atrium. · Right Ventricle: Pacing wire present. Pacer wires not seen well enough to evaluate for possible vegetation. · Aortic Valve: Probably trileaflet aortic valve. Aortic valve sclerosis. Aortic valve mean gradient is 14.6 mmHg. Mild aortic valve stenosis is present. Moderate aortic valve regurgitation is present. Possible vegetation is present on the aortic valve. · There are small, mobile echodense structures seen in some views on the aortic valve, suspicious for possible vegetation. · Mitral Valve: Severe mitral valve regurgitation is present. · Tricuspid Valve: Mild tricuspid valve regurgitation is present. · IVC/Hepatic Veins: Severely elevated central venous pressure (15+ mmHg); IVC diameter is larger than 21 mm and collapses less than 50% with respiration. · Normal aortic root. Dilated ascending aorta; diameter is 5 cm. There are mobile, somewhat linear appearing echodense structures in the ascending aorta-one cannot rule out aortic dissection. Alternatively, this could represent mobile atherosclerotic plaque. Consider aortic CT angiogram if clinically indicated.       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 Dr. Daphnie Rg discussed with Dr. Shari Carreno the possibility of endocarditis and if it was confirmed, could the BIVICD be removed. Dr. Shari Carreno stated that the [facility] cannot be removed. At this time, there is no rationale for performing a WYATT to confirm endocarditis. Dr. Daphnie Rg discussed with Infectious Disease. The patient will likely receive at least 6 weeks of IV antibiotics, and may require long term PO antibiotics. If ID does want the WYATT, will plan for tomorrow. DCM with Chronic systolic HF: (EF 13-10%)  Compensated, not volume overloaded. Continue on BB. No ACEI/ARB with hx of hypotension and CKD. Patient continues on Midodrine for BP support. Diuretics on hold  Monitior I/Os, daily weights, labs. Chronic Afib:  Continues on Eliquis and BB    Thank you for consulting ERROL aPt NP     Patient seen and examined by me with the above nurse practitioner. I personally performed all components of the history, physical, and medical decision making and agree with the assessment and plan with minor modifications as noted. Echo suspicious for some mobile components of the aortic valve seen in some views, but not in others. Repeat limited echo today to see if definitive evidence of endocarditis. If not, proceed with WYATT for further evaluation. I discussed the risks and benefits with the patient who expressed understanding and wishes to proceed. Cannot absolutely rule out aortic dissection on transthoracic echo, but she is too elderly and frail to ever consider surgery for same. Discussed with Dr. Patricia Pruitt. Thanks for the consult. We will follow with you.

## 2019-10-28 NOTE — PROGRESS NOTES
0700: Bedside shift change report given to Brooke Bergeron RN (oncoming nurse) by Shannan Jackson RN (offgoing nurse). Report included the following information SBAR and Kardex. 0700: Primary Nurse Raudel Valverde and Shannan Jackson RN performed a dual skin assessment on this patient No impairment noted  Kimani score is 13    1500: Paged Dr. Bossman Couch in regards to low potassium of 3.3. Agreed to start patient on PO potassium 20meq three times a day starting now. Made Dr. Bossman Couch aware of new blood culture results growing gram positive cocci in clusters and pairs. Paged ID in regards to this as well.  Awaiting call back from Dr. Komal Lopez

## 2019-10-29 ENCOUNTER — APPOINTMENT (OUTPATIENT)
Dept: NON INVASIVE DIAGNOSTICS | Age: 84
DRG: 871 | End: 2019-10-29
Attending: INTERNAL MEDICINE
Payer: MEDICARE

## 2019-10-29 LAB
ANION GAP SERPL CALC-SCNC: 8 MMOL/L (ref 5–15)
BUN SERPL-MCNC: 24 MG/DL (ref 6–20)
BUN/CREAT SERPL: 27 (ref 12–20)
CALCIUM SERPL-MCNC: 8.6 MG/DL (ref 8.5–10.1)
CHLORIDE SERPL-SCNC: 104 MMOL/L (ref 97–108)
CO2 SERPL-SCNC: 27 MMOL/L (ref 21–32)
CREAT SERPL-MCNC: 0.88 MG/DL (ref 0.55–1.02)
CRP SERPL-MCNC: 4.81 MG/DL (ref 0–0.6)
ERYTHROCYTE [SEDIMENTATION RATE] IN BLOOD: 129 MM/HR (ref 0–30)
GLUCOSE SERPL-MCNC: 87 MG/DL (ref 65–100)
POTASSIUM SERPL-SCNC: 3.4 MMOL/L (ref 3.5–5.1)
SODIUM SERPL-SCNC: 139 MMOL/L (ref 136–145)

## 2019-10-29 PROCEDURE — 74011250637 HC RX REV CODE- 250/637: Performed by: INTERNAL MEDICINE

## 2019-10-29 PROCEDURE — 80048 BASIC METABOLIC PNL TOTAL CA: CPT

## 2019-10-29 PROCEDURE — 74011250636 HC RX REV CODE- 250/636: Performed by: INTERNAL MEDICINE

## 2019-10-29 PROCEDURE — 77010033678 HC OXYGEN DAILY

## 2019-10-29 PROCEDURE — 86140 C-REACTIVE PROTEIN: CPT

## 2019-10-29 PROCEDURE — 65660000000 HC RM CCU STEPDOWN

## 2019-10-29 PROCEDURE — 99152 MOD SED SAME PHYS/QHP 5/>YRS: CPT

## 2019-10-29 PROCEDURE — 74011000250 HC RX REV CODE- 250: Performed by: INTERNAL MEDICINE

## 2019-10-29 PROCEDURE — 94760 N-INVAS EAR/PLS OXIMETRY 1: CPT

## 2019-10-29 PROCEDURE — 36415 COLL VENOUS BLD VENIPUNCTURE: CPT

## 2019-10-29 PROCEDURE — 93312 ECHO TRANSESOPHAGEAL: CPT

## 2019-10-29 PROCEDURE — 99153 MOD SED SAME PHYS/QHP EA: CPT

## 2019-10-29 PROCEDURE — 74011250637 HC RX REV CODE- 250/637: Performed by: HOSPITALIST

## 2019-10-29 PROCEDURE — 85652 RBC SED RATE AUTOMATED: CPT

## 2019-10-29 PROCEDURE — 74011000258 HC RX REV CODE- 258: Performed by: INTERNAL MEDICINE

## 2019-10-29 PROCEDURE — B24BZZ4 ULTRASONOGRAPHY OF HEART WITH AORTA, TRANSESOPHAGEAL: ICD-10-PCS | Performed by: INTERNAL MEDICINE

## 2019-10-29 RX ORDER — POTASSIUM CHLORIDE 750 MG/1
20 TABLET, FILM COATED, EXTENDED RELEASE ORAL 2 TIMES DAILY
Status: DISCONTINUED | OUTPATIENT
Start: 2019-10-29 | End: 2019-10-31

## 2019-10-29 RX ORDER — FUROSEMIDE 40 MG/1
40 TABLET ORAL DAILY
Status: DISCONTINUED | OUTPATIENT
Start: 2019-10-29 | End: 2019-11-06 | Stop reason: HOSPADM

## 2019-10-29 RX ORDER — LIDOCAINE HYDROCHLORIDE 20 MG/ML
15 SOLUTION OROPHARYNGEAL ONCE
Status: COMPLETED | OUTPATIENT
Start: 2019-10-29 | End: 2019-10-29

## 2019-10-29 RX ORDER — FENTANYL CITRATE 50 UG/ML
25-50 INJECTION, SOLUTION INTRAMUSCULAR; INTRAVENOUS
Status: DISCONTINUED | OUTPATIENT
Start: 2019-10-29 | End: 2019-10-29

## 2019-10-29 RX ORDER — MIDAZOLAM HYDROCHLORIDE 1 MG/ML
.5-1 INJECTION, SOLUTION INTRAMUSCULAR; INTRAVENOUS
Status: DISCONTINUED | OUTPATIENT
Start: 2019-10-29 | End: 2019-10-29

## 2019-10-29 RX ADMIN — Medication 1 CAPSULE: at 15:34

## 2019-10-29 RX ADMIN — LIDOCAINE HYDROCHLORIDE 15 ML: 20 SOLUTION ORAL; TOPICAL at 13:17

## 2019-10-29 RX ADMIN — MIDAZOLAM 0.5 MG: 1 INJECTION INTRAMUSCULAR; INTRAVENOUS at 13:35

## 2019-10-29 RX ADMIN — AMPICILLIN SODIUM 2 G: 2 INJECTION, POWDER, FOR SOLUTION INTRAMUSCULAR; INTRAVENOUS at 22:14

## 2019-10-29 RX ADMIN — LEVOTHYROXINE SODIUM 25 MCG: 25 TABLET ORAL at 06:21

## 2019-10-29 RX ADMIN — APIXABAN 2.5 MG: 2.5 TABLET, FILM COATED ORAL at 21:53

## 2019-10-29 RX ADMIN — CEFTRIAXONE SODIUM 2 G: 2 INJECTION, POWDER, FOR SOLUTION INTRAMUSCULAR; INTRAVENOUS at 16:35

## 2019-10-29 RX ADMIN — AMPICILLIN SODIUM 2 G: 2 INJECTION, POWDER, FOR SOLUTION INTRAMUSCULAR; INTRAVENOUS at 15:25

## 2019-10-29 RX ADMIN — MIDODRINE HYDROCHLORIDE 5 MG: 5 TABLET ORAL at 15:34

## 2019-10-29 RX ADMIN — Medication 10 ML: at 15:23

## 2019-10-29 RX ADMIN — BENZOCAINE, BUTAMBEN, AND TETRACAINE HYDROCHLORIDE 1 SPRAY: .028; .004; .004 AEROSOL, SPRAY TOPICAL at 13:17

## 2019-10-29 RX ADMIN — AMPICILLIN SODIUM 2 G: 2 INJECTION, POWDER, FOR SOLUTION INTRAMUSCULAR; INTRAVENOUS at 05:19

## 2019-10-29 RX ADMIN — MIRTAZAPINE 15 MG: 15 TABLET, FILM COATED ORAL at 21:53

## 2019-10-29 RX ADMIN — FUROSEMIDE 40 MG: 40 TABLET ORAL at 15:35

## 2019-10-29 RX ADMIN — FENTANYL CITRATE 25 MCG: 50 INJECTION, SOLUTION INTRAMUSCULAR; INTRAVENOUS at 13:19

## 2019-10-29 RX ADMIN — POLYETHYLENE GLYCOL 3350 17 G: 17 POWDER, FOR SOLUTION ORAL at 15:33

## 2019-10-29 RX ADMIN — MIDODRINE HYDROCHLORIDE 5 MG: 5 TABLET ORAL at 21:53

## 2019-10-29 RX ADMIN — POTASSIUM CHLORIDE 20 MEQ: 750 TABLET, FILM COATED, EXTENDED RELEASE ORAL at 17:53

## 2019-10-29 RX ADMIN — Medication 10 ML: at 21:53

## 2019-10-29 RX ADMIN — MIDAZOLAM 1 MG: 1 INJECTION INTRAMUSCULAR; INTRAVENOUS at 13:19

## 2019-10-29 RX ADMIN — Medication 10 ML: at 05:19

## 2019-10-29 RX ADMIN — Medication 10 ML: at 06:21

## 2019-10-29 RX ADMIN — POTASSIUM CHLORIDE 20 MEQ: 750 TABLET, FILM COATED, EXTENDED RELEASE ORAL at 15:35

## 2019-10-29 NOTE — PROGRESS NOTES
VANESA: Serafin Billy [facility]    10:00am- CM met with pt to discuss d/c plan. No new needs at this time. CM will continue to follow pt for d/c planning needs.      Nancy Caballero [address] [place] [address], [facility]  [phone]

## 2019-10-29 NOTE — PROGRESS NOTES
TRANSFER - OUT REPORT:    Verbal report given to Urszula STROUD(name) [place] [facility]  [place] [facility]/pt room(unit) for routine progression of care   (post WYATT)    Report consisted of patients Situation, Background, Assessment and   Recommendations(SBAR). Information from the following report(s) SBAR, Procedure Summary, MAR and Cardiac Rhythm AV paced was reviewed with the receiving nurse. Lines:   Peripheral IV 10/27/19 Right Antecubital (Active)   Site Assessment Clean, dry, & intact 10/29/2019  3:48 AM   Phlebitis Assessment 0 10/29/2019  3:48 AM   Infiltration Assessment 0 10/29/2019  3:48 AM   Dressing Status Clean, dry, & intact 10/29/2019  3:48 AM   Dressing Type Transparent 10/29/2019  3:48 AM   Hub Color/Line Status Pink;Flushed;Patent 10/29/2019  3:48 AM   Alcohol Cap Used No 10/29/2019  3:48 AM        Opportunity for questions and clarification was provided.       Patient transported with:  Tele- Monitor

## 2019-10-29 NOTE — PROGRESS NOTES
Hematology Oncology Progress Note         Follow up for: CLL    Chart notes reviewed since last visit. Case discussed with following:     Patient complains of the following: No complaints  Additional concerns noted by the staff:     Patient Vitals for the past 24 hrs:   BP Temp Pulse Resp SpO2 Height Weight   10/29/19 0729 94/60 97.4 °F (36.3 °C) 71 22 99 %     10/29/19 0348 98/58 97.5 °F (36.4 °C) 69 18 97 %     10/28/19 2339 116/68 98.5 °F (36.9 °C) 71 18 100 %     10/28/19 2031 108/64 98.5 °F (36.9 °C)  28      10/28/19 1703 112/75  80       10/28/19 1532 118/73 99.6 °F (37.6 °C) 79 18 99 %     10/28/19 1213 109/73     5' 1\" (1.549 m) 54.9 kg (121 lb)   10/28/19 1156 109/73 98.6 °F (37 °C) 77 18 97 %         ROS negative for 11 organ systems (but patient mildly confused and this may not be accurate). Physical Examination:  Gen NAD  CV reg  Lungs clear  abd benign    Labs:  Recent Results (from the past 24 hour(s))   METABOLIC PANEL, BASIC    Collection Time: 10/29/19  4:59 AM   Result Value Ref Range    Sodium 139 136 - 145 mmol/L    Potassium 3.4 (L) 3.5 - 5.1 mmol/L    Chloride 104 97 - 108 mmol/L    CO2 27 21 - 32 mmol/L    Anion gap 8 5 - 15 mmol/L    Glucose 87 65 - 100 mg/dL    BUN 24 (H) 6 - 20 MG/DL    Creatinine 0.88 0.55 - 1.02 MG/DL    BUN/Creatinine ratio 27 (H) 12 - 20      GFR est AA >60 >60 ml/min/1.73m2    GFR est non-AA 60 (L) >60 ml/min/1.73m2    Calcium 8.6 8.5 - 10.1 MG/DL   C REACTIVE PROTEIN, QT    Collection Time: 10/29/19  4:59 AM   Result Value Ref Range    C-Reactive protein 4.81 (H) 0.00 - 0.60 mg/dL   SED RATE (ESR)    Collection Time: 10/29/19  4:59 AM   Result Value Ref Range    Sed rate, automated 129 (H) 0 - 30 mm/hr       Assessment and Plan:   CLL - counts and adenopathy very acceptable at present so can hold ibruitinib for now. Will not resume until clear cut signs of progression.  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 IgG ok     Gram positive bacteremia/enterococcus.  On abx per primary team    Hx HTN    Hx CKD    Hx CHF - on coreg    AICD

## 2019-10-29 NOTE — PROGRESS NOTES
Vascular Access Team called to assist with obtaining labs. Upon arrival to room, patient asked not to be bothered, stating she has had enough done already and did not want anything more done at this time. Informed nurse of patients wishes and to call vascular access team if patient willing to have labs drawn later.     Joel Francis RN [facility]  Vascular Access Team

## 2019-10-29 NOTE — CDMP QUERY
Patient admitted with Fever, noted to have AMS and Confusion. If possible, please document in progress notes and d/c summary if you are evaluating and/or treating any of the following: ? Metabolic encephalopathy ? Septic Encephalopathy 
? Encephalopathy, unspecified ? Other Explanation of clinical findings ? Clinically Undetermined (no explanation for clinical findings) The medical record reflects the following: 
 
   Risk Factors: C/o Fever/lethargy/weakness Clinical Indicators: AMS; intermittent confusion; lethargy; . ......T: 100.8 ^ 100.9 ^ 101. ... Mynor Martinez BCx: ENTEROCOCCUS SPECIES GROWING IN BOTH BOTTLES DRAWN . ...... noted:  Mental status continues to improve. ...CT head:  No subdural hemorrhage or other acute abnormality. .... Mynor Martinez CT abd/pelv: No acute findings Treatment: IVF; IV Abx; CT head; CT abd/pelv; Increased safety precautions: High-fall risk; Up with assistance; WC/Stretcher transport; Fall prevention device Thank you, Olivia Andrade [facility]

## 2019-10-29 NOTE — PROGRESS NOTES
General Daily Progress Note    Admit Date: 10/23/2019    Subjective:     Patient has no complaint . Kimo Gray Current Facility-Administered Medications   Medication Dose Route Frequency    lactobac ac& pc-s.therm-b.anim (DAWN Q/RISAQUAD)  1 Cap Oral DAILY    potassium chloride SR (KLOR-CON 10) tablet 20 mEq  20 mEq Oral TID    cefTRIAXone (ROCEPHIN) 2 g in 0.9% sodium chloride (MBP/ADV) 50 mL  2 g IntraVENous Q12H    ampicillin (OMNIPEN) 2 g in 0.9% sodium chloride (MBP/ADV) 100 mL  2 g IntraVENous Q6H    carvedilol (COREG) tablet 3.125 mg  3.125 mg Oral BID WITH MEALS    levothyroxine (SYNTHROID) tablet 25 mcg  25 mcg Oral 6am    polyethylene glycol (MIRALAX) packet 17 g  17 g Oral EVERY OTHER DAY    sodium chloride (NS) flush 5-10 mL  5-10 mL IntraVENous PRN    acetaminophen (TYLENOL) tablet 650 mg  650 mg Oral Q6H PRN    acetaminophen (TYLENOL) tablet 650 mg  650 mg Oral Q6H PRN    albuterol (PROVENTIL VENTOLIN) nebulizer solution 2.5 mg  2.5 mg Nebulization Q6H PRN    apixaban (ELIQUIS) tablet 2.5 mg  2.5 mg Oral BID    midodrine (PROAMITINE) tablet 5 mg  5 mg Oral TID    mirtazapine (REMERON) tablet 15 mg  15 mg Oral QHS    sodium chloride (NS) flush 5-40 mL  5-40 mL IntraVENous Q8H        Review of Systems  A comprehensive review of systems was negative. Objective:     Patient Vitals for the past 24 hrs:   BP Temp Pulse Resp SpO2 Height Weight   10/29/19 0729 94/60 97.4 °F (36.3 °C) 71 22 99 %     10/29/19 0348 98/58 97.5 °F (36.4 °C) 69 18 97 %     10/28/19 2339 116/68 98.5 °F (36.9 °C) 71 18 100 %     10/28/19 2031 108/64 98.5 °F (36.9 °C)  28      10/28/19 1703 112/75  80       10/28/19 1532 118/73 99.6 °F (37.6 °C) 79 18 99 %     10/28/19 1213 109/73     5' 1\" (1.549 m) 121 lb (54.9 kg)   10/28/19 1156 109/73 98.6 °F (37 °C) 77 18 97 %       No intake/output data recorded.   10/27 1901 - 10/29 0700  In: 1390 [P.O.:990; I.V.:400]  Out: 1000 [Urine:1000]    Physical Exam: Visit Vitals  BP 94/60 (BP 1 Location: Left arm, BP Patient Position: Sitting)   Pulse 71   Temp 97.4 °F (36.3 °C)   Resp 22   Ht 5' 1\" (1.549 m)   Wt 121 lb (54.9 kg)   SpO2 99%   BMI 22.86 kg/m²     General appearance: alert, cooperative, no distress, appears stated age  Neck: supple, symmetrical, trachea midline, no adenopathy, thyroid: not enlarged, symmetric, no tenderness/mass/nodules, no carotid bruit and no JVD  Lungs: clear to auscultation bilaterally  Heart: regular rate and rhythm, S1, S2 normal, no murmur, click, rub or gallop  Abdomen: soft, non-tender. Bowel sounds normal. 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 Continue treatment of endocarditis. Is a WYATT necessary in view of the findings on the transthoracic echocardiogram revealing vegetations on the aortic valve. Would any additional information be obtained? 2. No evidence of cardiac decompensation but will need to restart furosemide. 3.  Mental status continues to improve.

## 2019-10-29 NOTE — PROGRESS NOTES
Physical Therapy Screening:    An [facility] screening referral was triggered for physical therapy based on results obtained during the nursing admission assessment. The patients chart was reviewed and the patient is appropriate for a skilled therapy evaluation if there is a decline in functional mobility from baseline. Please order a consult for physical therapy if you are in agreement and would like an evaluation to be completed. Thank you.     Anuhsa Cantrell, PT, DPT

## 2019-10-29 NOTE — PROGRESS NOTES
Problem: Falls - Risk of  Goal: *Absence of Falls  Description  Document Ivbaltazar Denton Fall Risk and appropriate interventions in the flowsheet.   Outcome: Progressing Towards Goal  Note:   Fall Risk Interventions:  Mobility Interventions: Assess mobility with egress test, Bed/chair exit alarm, Communicate number of staff needed for ambulation/transfer, Patient to call before getting OOB    Mentation Interventions: Bed/chair exit alarm, Door open when patient unattended, Increase mobility, More frequent rounding    Medication Interventions: Bed/chair exit alarm, Evaluate medications/consider consulting pharmacy, Patient to call before getting OOB, Teach patient to arise slowly    Elimination Interventions: Bed/chair exit alarm, Call light in reach, Patient to call for help with toileting needs, Stay With Me (per policy)    History of Falls Interventions: Bed/chair exit alarm, Door open when patient unattended, Room close to nurse's station         Problem: Patient Education: Go to Patient Education Activity  Goal: Patient/Family Education  Outcome: Progressing Towards Goal     Problem: Patient Education: Go to Patient Education Activity  Goal: Patient/Family Education  Outcome: Progressing Towards Goal     Problem: Patient Education: Go to Patient Education Activity  Goal: Patient/Family Education  Outcome: Progressing Towards Goal     Problem: Heart Failure: Discharge Outcomes  Goal: *Demonstrates ability to perform prescribed activity without shortness of breath or discomfort  Outcome: Progressing Towards Goal  Goal: *Left ventricular function assessment completed prior to or during stay, or planned for post-discharge  Outcome: Progressing Towards Goal  Goal: *ACEI prescribed if LVEF less than 40% and no contraindications or ARB prescribed  Outcome: Progressing Towards Goal  Goal: *Verbalizes understanding and describes prescribed diet  Outcome: Progressing Towards Goal  Goal: *Verbalizes understanding/describes prescribed medications  Outcome: Progressing Towards Goal  Goal: *Describes available resources and support systems  Description  (eg: Home Health, Palliative Care, Advanced Medical Directive)  Outcome: Progressing Towards Goal  Goal: *Describes smoking cessation resources  Outcome: Progressing Towards Goal  Goal: *Understands and describes signs and symptoms to report to providers(Stroke Metric)  Outcome: Progressing Towards Goal  Goal: *Describes/verbalizes understanding of follow-up/return appt  Description  (eg: to physicians, diabetes treatment coordinator, and other resources  Outcome: Progressing Towards Goal  Goal: *Describes importance of continuing daily weights and changes to report to physician  Outcome: Progressing Towards Goal

## 2019-10-29 NOTE — PROGRESS NOTES
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 Agree with Cardiology that taking out AICD is high risk . It would be nice to know for sure if pt has a vegetation or not , as  long courses of antibiotics in the elderly could cause numerous side effects that are inclusive of but not limited to antibiotic associated diarrhea, colitis , bone marrow suppression, renal failure   · Recommend WYATT if patient can tolerate procedure & treat accordingly-6 weeks if Endocarditis + ,If negative for endocarditis  2 weeks from negative culture . If pt  cannot tolerate WYATT she would require  6 weeks of empiric Ampicillin/ Ceftriaxone and follow up ECHO . If  After 6 weeks of IV antibiotic therapy   vegetation still+ , then  Recommendation would be  transfer to [facility] for  removal of AICD. · Plan of care D/w pt - she says she does not want removal of AICD at this time. · D/w Dr Harley Morris       Subjective:     Pt seen . Denies complaints . Feels better    Review of Systems:  A comprehensive review of systems was negative except for that written in the History of Present Illness. 10 point ROS obtained . All other systems negative . Objective:     Blood pressure (P) 108/64, pulse 80, temperature (P) 98.5 °F (36.9 °C), resp. rate (P) 28, height 5' 1\" (1.549 m), weight 121 lb (54.9 kg), SpO2 99 %.   Temp (24hrs), Av °F (37.2 °C), Min:98.4 °F (36.9 °C), Max:99.8 °F (37.7 °C)      Patient Vitals for the past 24 hrs:   Temp Pulse Resp BP SpO2 10/28/19 2031 (P) 98.5 °F (36.9 °C)  (P) 28 (P) 108/64    10/28/19 1703  80  112/75    10/28/19 1532 99.6 °F (37.6 °C) 79 18 118/73 99 %   10/28/19 1213    109/73    10/28/19 1156 98.6 °F (37 °C) 77 18 109/73 97 %   10/28/19 0829 99.2 °F (37.3 °C) 70 18 120/74 97 %   10/28/19 0309 99.8 °F (37.7 °C) 68 20 116/64 97 %   10/27/19 2304 98.4 °F (36.9 °C) 71 20 99/55 93 %         Lines:  Peripheral IV:       Physical Exam:   General:  Awake cooperative,    Eyes:  Sclera anicteric. Pupils equally round and reactive to light. Mouth/Throat: Mucous membranes normal, oral pharynx clear   Neck: Supple   Lungs:   Reduced  auscultation bases  Chest - pacemaker pocket intact  no swelling +    CV:  Regular rate and rhythm,no murmur, click, rub or gallop   Abdomen:   Soft, non-tender.  bowel sounds normal. non-distended   Extremities: No  edema   Skin: Skin color, texture, turgor normal. no acute rash or lesions   Lymph nodes: Cervical and supraclavicular normal   Musculoskeletal: No swelling or deformity   Lines/Devices:  Intact, no erythema, drainage or tenderness   Psych: Alert and oriented, normal mood        Data Review:   CBC:   Recent Labs     10/27/19  0212 10/26/19  0142   WBC 3.1* 3.3*   RBC 3.22* 3.53*   HGB 9.4* 10.3*   HCT 29.7* 33.2*   * 110*   GRANS 60 61   LYMPH 30 34   EOS 6 2     CMP:   Recent Labs     10/28/19  0351 10/27/19  0212 10/26/19  0142   GLU 92 95 98    140 142   K 3.3* 3.2* 3.2*    104 107   CO2 28 28 29   BUN 27* 35* 44*   CREA 1.16* 1.01 1.27*   CA 8.7 8.3* 8.8   AGAP 8 8 6   BUCR 23* 35* 35*       Studies:      Lab Results   Component Value Date/Time    Culture result: (A) 10/27/2019 03:33 PM     GRAM POSITIVE COCCI IN PAIRS AND CHAINS GROWING IN 1 OF 2 BOTTLES DRAWN (SITE = [facility])    Culture result: REMAINING BOTTLE(S) HAS/HAVE NO GROWTH SO FAR 10/27/2019 03:33 PM    Culture result: (A) 10/23/2019 10:50 AM     ENTEROCOCCUS FAECALIS GROUP D GROWING IN BOTH BOTTLES DRAWN SITE= RT WRIST GENTAMICIN SYNERGY SCREEN IS SENSITIVE @ < OR = 500 mcg/ml STREPTOMYCIN SYNERGY SCREEN IS SENSITIVE @ < OR =1000 mcg/ml    Culture result: (A) 10/23/2019 10:50 AM     PRELIMINARY REPORT OF GRAM POSITIVE COCCI IN PAIRS GROWING IN BOTH BOTTLES DRAWN CALLED TO AND READ BACK BY Tasha Viveros RN ON 10/23/19 AT [address] [facility]). [place]    Culture result: (A) 10/23/2019 09:45 AM     ENTEROCOCCUS SPECIES GROWING IN BOTH BOTTLES DRAWN (R FA SITE) . Hi Weiss REFER TO [id number] COLLECTED ON 10/23/19 FOR FINAL ID AND SENSITIVITIES    Culture result: (A) 10/23/2019 09:45 AM     PRELIMINARY REPORT OF GRAM POSITIVE COCCI IN PAIRS GROWING IN BOTH BOTTLES DRAWN CALLED TO AND READ BACK BY Tasha Viveros RN ON 10/23/19 AT [address] [facility], [address]). MS        XR Results (most recent):  Results from Hospital Encounter encounter on 10/23/19   XR CHEST PORT    Narrative INDICATION:  meets SIRS criteria     EXAM: Single portable view of chest 0 928 . Comparison:9/17/2019    Findings: Cardiac silhouette is enlarged. Aorta is ectatic and enlarged. AICD  unchanged. Pulmonary vasculature is not engorged. There are no focal parenchymal  opacities, effusions, or pneumothorax. Impression Impression:  1.  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    DILAN (obstructive sleep apnea) G47.33    Rhinitis J31.0    Anemia D64.9    Reactive airway disease J45.909    HTN (hypertension) I10    Gout M10.9    LBBB (left bundle branch block) I44.7    Presence of biventricular automatic cardioverter/defibrillator (AICD) Z95.810    Chronic systolic congestive heart failure (HCC) I50.22    Lymphoma (HCC) C85.90    Mitral valvular regurgitation I34.0    Physical deconditioning R53.81    Gastroesophageal reflux disease with esophagitis K21.0    Thoracic aortic aneurysm without rupture (HCC) I71.2    Paroxysmal atrial fibrillation (HCC) I48.0    Xerosis of skin L85.3    Dyslipidemia E78.5    CKD (chronic kidney disease) stage 3, GFR 30-59 ml/min (HCC) N18.3    Acquired hypothyroidism E03.9    Nonrheumatic aortic valve insufficiency I35.1    UTI (urinary tract infection) N39.0    Counseling regarding advanced care planning and goals of care Z71.89    CAP (community acquired pneumonia) J18.9    Poor short-term memory R41.3    Fever R50.9    Endocarditis I38    Chronic a-fib I48.20         ICD-10-CM ICD-9-CM    1. Fever, unspecified fever cause R50.9 780.60    2. Dehydration E86.0 276.51    3. Generalized weakness R53.1 780.79    4. Confusion R41.0 298.9    5. Goals of care, counseling/discussion Z71.89 V65.49    6. Metabolic encephalopathy A07.13 348.31    7. Bacteremia due to Enterococcus R78.81 790.7     B95.2 041.04    8. 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 I have discussed medication side effects and warnings with the patient as well.     Reviewed test results at length with patient    Anti-infectives:     Ceftriaxone / Ampicillin - 10/27  S/p Ceftriaxone /Vancomycin - 10/24-10/26     Dennise Rinne MD FACP

## 2019-10-29 NOTE — PROGRESS NOTES
Patient arrived to Non-Invasive Cardiology Lab for Inpatient WYATT Procedure. Staff introduced to patient. Patient identifiers verified with Name and Date of Birth. Procedure verified with patient. Consent forms reviewed and signed by patient or authorized representative and verified. Allergies verified. Patient informed of procedure and plan of care. Questions answered with review. Patient on cardiac monitor, non-invasive blood pressure, SPO2 monitor. On room air. Patient is Alert. Patient reports no complaints. Patient on stretcher, in low position, with side rails up. Patient instructed to call for assistance as needed.

## 2019-10-29 NOTE — PROGRESS NOTES
0730  Report received from Ramirez Stoner, Urgent Group. SBAR, Kardex, ED Summary, Procedure Summary, Intake/Output, MAR, Accordion, Recent Results, Med Rec Status and Cardiac Rhythm paced were discussed. Urszula Stewart     0830  Nurse and CCL attempted to draw paired BC. Unable to obtain. Called PICC team.  When PICC team came to do blood draw. Patient refused and said she may do it later but did not want to be bothered. PICC team will try later once patient completes WYATT and is able to eat and drink. 46  Dr. Staples Necessary on phone with patients DIL Harry Mcfadden. Consent received and signed on chart for WYATT.    1505  Patient arrived back on unit. No complaints of pain. Vitals stable - will watch BP.     Patient would benefit from OT and PT consults while in the hospital.

## 2019-10-29 NOTE — PROGRESS NOTES
Bedside shift change report GIVEN TO Carley Mcdonald RN. Report included the following information SBAR, Kardex, Procedure Summary, MAR, Cardiac Rhythm AV Paced and Procedure Verification. SIGNIFICANT CHANGES DURING SHIFT:  Pt able to maintain WNL and high SaO2 on room air. Pt continues to have periodic, although infrequent, episodes of labored breathing. CONCERNS TO ADDRESS WITH MD: Follow up with results of WYATT. [address] NURSING NOTE   Admission Date 10/23/2019   Admission Diagnosis Fever [R50.9]   Consults IP CONSULT TO HOSPITALIST  IP CONSULT TO PRIMARY CARE PROVIDER  IP CONSULT TO PALLIATIVE CARE - PROVIDER  IP CONSULT TO HEMATOLOGY  IP CONSULT TO INFECTIOUS DISEASES      Cardiac Monitoring [x] Yes [] No      Purposeful Hourly Rounding [x] Yes    Milvia Score Total Score: 5   Milvia score 3 or > [x] Bed Alarm [] Avasys [] 1:1 sitter [] Patient refused (Signed refusal form in chart)   Kimani Score Kimani Score: 17   Kimani score 14 or < [] PMT consult [] Wound Care consult    []  Specialty bed  [] Nutrition consult      Influenza Vaccine Received Flu Vaccine for Current Season (usually Sept-March): Yes(Per patient received first week of october.)           Oxygen needs? [x] Room air Oxygen @  []1L    []2L    []3L   []4L    []5L   []6L via  NC   Chronic home O2 use?  [] Yes [x] No  Perform O2 challenge test and document in progress note using smartphrase (.Homeoxygen)      Last bowel movement Last Bowel Movement Date: 10/28/19      Urinary Catheter       External Female Catheter 10/23/19-Urine Output (mL): 200 ml  External Female Catheter 10/23/19-Urine Output (mL): 200 ml     LDAs               Peripheral IV 10/27/19 Right Antecubital (Active)   Site Assessment Clean, dry, & intact 10/29/2019  3:48 AM   Phlebitis Assessment 0 10/29/2019  3:48 AM   Infiltration Assessment 0 10/29/2019  3:48 AM   Dressing Status Clean, dry, & intact 10/29/2019  3:48 AM   Dressing Type Transparent 10/29/2019  3:48 AM   Hub Color/Line Status Pink;Flushed;Patent 10/29/2019  3:48 AM   Alcohol Cap Used No 10/29/2019  3:48 AM          External Female Catheter 10/23/19 (Active)   Site Assessment Clean, dry, & intact 10/23/2019 11:01 AM   Perineal Care Yes 10/23/2019 11:01 AM   Urine Output (mL) 200 ml 10/28/2019 11:43 AM       External Female Catheter 10/23/19 (Active)   Site Assessment Clean, dry, & intact 10/28/2019  2:00 PM   Repositioned Yes 10/28/2019  3:58 PM   Perineal Care Yes 10/28/2019  3:58 PM   Wick Changed No 10/28/2019  3:58 PM   Suction Canister/Tubing Changed No 10/28/2019  3:58 PM   Urine Output (mL) 200 ml 10/28/2019 11:43 AM                   Readmission Risk Assessment Tool Score High Risk            37       Total Score        3 Has Seen PCP in Last 6 Months (Yes=3, No=0)    2 . Living with Significant Other. Assisted Living. LTAC. SNF. or   Rehab    3 Patient Length of Stay (>5 days = 3)    4 IP Visits Last 12 Months (1-3=4, 4=9, >4=11)    5 Pt.  Coverage (Medicare=5 , Medicaid, or Self-Pay=4)    20 Charlson Comorbidity Score (Age + Comorbid Conditions)       Expected Length of Stay 4d 19h   Actual Length of Stay 6

## 2019-10-29 NOTE — PROGRESS NOTES
[address] INTERDISCIPLINARY ROUNDS    Cardiopulmonary Care Interdisciplinary Rounds were held today to discuss patient's plan of care and outcomes. The following members were present: Pharmacy, Nursing and Case Management.   Expected Length of Stay:  4d 19h    PLAN OF CARE:   Continue current treatment plan

## 2019-10-29 NOTE — PROGRESS NOTES
10/29/2019 4:49 PM    Admit Date: 10/23/2019    Admit Diagnosis:   Fever [R50.9]    Subjective:     Purnima Munson denies chest pain or shortness of breath. Current Facility-Administered Medications   Medication Dose Route Frequency    lactobac ac& pc-s.therm-b.anim (DAWN Q/RISAQUAD)  1 Cap Oral DAILY    furosemide (LASIX) tablet 40 mg  40 mg Oral DAILY    potassium chloride SR (KLOR-CON 10) tablet 20 mEq  20 mEq Oral BID    cefTRIAXone (ROCEPHIN) 2 g in 0.9% sodium chloride (MBP/ADV) 50 mL  2 g IntraVENous Q12H    ampicillin (OMNIPEN) 2 g in 0.9% sodium chloride (MBP/ADV) 100 mL  2 g IntraVENous Q6H    carvedilol (COREG) tablet 3.125 mg  3.125 mg Oral BID WITH MEALS    levothyroxine (SYNTHROID) tablet 25 mcg  25 mcg Oral 6am    polyethylene glycol (MIRALAX) packet 17 g  17 g Oral EVERY OTHER DAY    sodium chloride (NS) flush 5-10 mL  5-10 mL IntraVENous PRN    acetaminophen (TYLENOL) tablet 650 mg  650 mg Oral Q6H PRN    acetaminophen (TYLENOL) tablet 650 mg  650 mg Oral Q6H PRN    albuterol (PROVENTIL VENTOLIN) nebulizer solution 2.5 mg  2.5 mg Nebulization Q6H PRN    apixaban (ELIQUIS) tablet 2.5 mg  2.5 mg Oral BID    midodrine (PROAMITINE) tablet 5 mg  5 mg Oral TID    mirtazapine (REMERON) tablet 15 mg  15 mg Oral QHS    sodium chloride (NS) flush 5-40 mL  5-40 mL IntraVENous Q8H         Objective:      Physical Exam:    Visit Vitals  BP 95/60 (BP 1 Location: Left arm, BP Patient Position: At rest)   Pulse 71   Temp 97.5 °F (36.4 °C)   Resp 22   Ht 5' 1\" (1.549 m)   Wt 121 lb (54.9 kg)   SpO2 96%   BMI 22.86 kg/m²     Gen:  NAD  Mental Status - Alert. General Appearance - Not in acute distress. Chest and Lung Exam   Inspection: Accessory muscles - No use of accessory muscles in breathing.    Auscultation:   Breath sounds: - Normal.   Cardiovascular   Inspection: Jugular vein - Bilateral - Inspection Normal.   Palpation/Percussion:   Apical Impulse: - Normal.   Auscultation: Rhythm - Regular. Heart Sounds - S1 WNL and S2 WNL. No S3 or S4. Murmurs & Other Heart Sounds: Auscultation of the heart reveals - No Murmurs. Peripheral Vascular   Upper Extremity: Inspection - Bilateral - No Cyanotic nailbeds or Digital clubbing. Lower Extremity:   Palpation: Edema - Bilateral - No edema. Abdomen:   Soft, non-tender, bowel sounds are active. Neuro: A&O times 3, CN and motor grossly WNL    Data Review:   Recent Labs     10/27/19  0212   WBC 3.1*   HGB 9.4*   HCT 29.7*   *     Recent Labs     10/29/19  0459 10/28/19  0351 10/27/19  0212    142 140   K 3.4* 3.3* 3.2*    106 104   CO2 27 28 28   GLU 87 92 95   BUN 24* 27* 35*   CREA 0.88 1.16* 1.01   CA 8.6 8.7 8.3*       No results for input(s): TROIQ, CPK, CKMB in the last 72 hours. 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 Repeat limited echo 10/28 did not see any concern for aortic valve vegetation, but questioned possible pacemaker associated vegetation, transesophageal echocardiogram recommended. Transesophageal echo today actually shows concern for possible sessile mitral valve vegetation.   Discussed with  Nishant. She will likely recommend 6 weeks of antibiotics. I discussed with Dr. Mahad Pisano the possibility of endocarditis and if it was confirmed, could the BIVICD be removed. Dr. Mahad Pisano stated that the [facility] cannot be removed. Severely dilated a sending aorta on transesophageal echo:  · 5.6 cm, although I think she is not a candidate for such a major surgery as aortic repair. I will discuss both the concerns of endocarditis and the aortic pathology further with the patient and her family. I have left a message for her daughter-in-law and power of  Walker Wilson, as her son Fabiola Tellez was busy in meetings today per Walker Wilson. Advised for her to call back at my office number and have me paged, that I am available for the next hour or 2, or we can discuss further tomorrow. · Main treatment for this will be blood pressure control including beta-blocker if tolerated in future in this frail 80year-old with severe systolic heart failure. DCM with Chronic systolic HF: (EF 67-43%)  Compensated, not volume overloaded. Discontinued BB with need for midodrine. No ACEI/ARB with hx of hypotension and CKD. Patient continues on Midodrine for BP support. Diuretics on hold  Monitior I/Os, daily weights, labs.     Chronic Afib:  Continues on Eliquis and BB

## 2019-10-30 ENCOUNTER — APPOINTMENT (OUTPATIENT)
Dept: ULTRASOUND IMAGING | Age: 84
DRG: 871 | End: 2019-10-30
Attending: INTERNAL MEDICINE
Payer: MEDICARE

## 2019-10-30 PROBLEM — I05.9 ENDOCARDITIS OF MITRAL VALVE: Status: ACTIVE | Noted: 2019-10-28

## 2019-10-30 LAB
ANION GAP SERPL CALC-SCNC: 7 MMOL/L (ref 5–15)
BUN SERPL-MCNC: 26 MG/DL (ref 6–20)
BUN/CREAT SERPL: 27 (ref 12–20)
CALCIUM SERPL-MCNC: 8.3 MG/DL (ref 8.5–10.1)
CHLORIDE SERPL-SCNC: 108 MMOL/L (ref 97–108)
CO2 SERPL-SCNC: 26 MMOL/L (ref 21–32)
CREAT SERPL-MCNC: 0.98 MG/DL (ref 0.55–1.02)
GLUCOSE SERPL-MCNC: 91 MG/DL (ref 65–100)
POTASSIUM SERPL-SCNC: 3.9 MMOL/L (ref 3.5–5.1)
SODIUM SERPL-SCNC: 141 MMOL/L (ref 136–145)

## 2019-10-30 PROCEDURE — 74011250637 HC RX REV CODE- 250/637: Performed by: HOSPITALIST

## 2019-10-30 PROCEDURE — 36415 COLL VENOUS BLD VENIPUNCTURE: CPT

## 2019-10-30 PROCEDURE — 74011250636 HC RX REV CODE- 250/636: Performed by: INTERNAL MEDICINE

## 2019-10-30 PROCEDURE — 74011000258 HC RX REV CODE- 258: Performed by: INTERNAL MEDICINE

## 2019-10-30 PROCEDURE — 77010033678 HC OXYGEN DAILY

## 2019-10-30 PROCEDURE — 87040 BLOOD CULTURE FOR BACTERIA: CPT

## 2019-10-30 PROCEDURE — 74011250637 HC RX REV CODE- 250/637: Performed by: INTERNAL MEDICINE

## 2019-10-30 PROCEDURE — 65660000000 HC RM CCU STEPDOWN

## 2019-10-30 PROCEDURE — 76604 US EXAM CHEST: CPT

## 2019-10-30 PROCEDURE — 94760 N-INVAS EAR/PLS OXIMETRY 1: CPT

## 2019-10-30 PROCEDURE — 80048 BASIC METABOLIC PNL TOTAL CA: CPT

## 2019-10-30 RX ADMIN — CEFTRIAXONE SODIUM 2 G: 2 INJECTION, POWDER, FOR SOLUTION INTRAMUSCULAR; INTRAVENOUS at 05:09

## 2019-10-30 RX ADMIN — LEVOTHYROXINE SODIUM 25 MCG: 25 TABLET ORAL at 06:41

## 2019-10-30 RX ADMIN — MIRTAZAPINE 15 MG: 15 TABLET, FILM COATED ORAL at 21:19

## 2019-10-30 RX ADMIN — Medication 10 ML: at 21:20

## 2019-10-30 RX ADMIN — APIXABAN 2.5 MG: 2.5 TABLET, FILM COATED ORAL at 08:44

## 2019-10-30 RX ADMIN — Medication 10 ML: at 18:20

## 2019-10-30 RX ADMIN — CEFTRIAXONE SODIUM 2 G: 2 INJECTION, POWDER, FOR SOLUTION INTRAMUSCULAR; INTRAVENOUS at 19:02

## 2019-10-30 RX ADMIN — AMPICILLIN SODIUM 2 G: 2 INJECTION, POWDER, FOR SOLUTION INTRAMUSCULAR; INTRAVENOUS at 19:02

## 2019-10-30 RX ADMIN — AMPICILLIN SODIUM 2 G: 2 INJECTION, POWDER, FOR SOLUTION INTRAMUSCULAR; INTRAVENOUS at 04:13

## 2019-10-30 RX ADMIN — MIDODRINE HYDROCHLORIDE 5 MG: 5 TABLET ORAL at 21:19

## 2019-10-30 RX ADMIN — POTASSIUM CHLORIDE 20 MEQ: 750 TABLET, FILM COATED, EXTENDED RELEASE ORAL at 18:20

## 2019-10-30 RX ADMIN — Medication 10 ML: at 05:12

## 2019-10-30 RX ADMIN — MIDODRINE HYDROCHLORIDE 5 MG: 5 TABLET ORAL at 18:20

## 2019-10-30 RX ADMIN — Medication 1 CAPSULE: at 08:44

## 2019-10-30 RX ADMIN — POTASSIUM CHLORIDE 20 MEQ: 750 TABLET, FILM COATED, EXTENDED RELEASE ORAL at 08:44

## 2019-10-30 RX ADMIN — FUROSEMIDE 40 MG: 40 TABLET ORAL at 08:44

## 2019-10-30 RX ADMIN — AMPICILLIN SODIUM 2 G: 2 INJECTION, POWDER, FOR SOLUTION INTRAMUSCULAR; INTRAVENOUS at 11:10

## 2019-10-30 RX ADMIN — MIDODRINE HYDROCHLORIDE 5 MG: 5 TABLET ORAL at 08:44

## 2019-10-30 RX ADMIN — APIXABAN 2.5 MG: 2.5 TABLET, FILM COATED ORAL at 21:19

## 2019-10-30 NOTE — PROGRESS NOTES
Pt. Sitting in chair upon assessment, BP low with 7p vitals (84/69 while sitting in chair) but bp has been running low typically (Mid to high 90s)    2040: Pt. Placed in bed, will recheck BP.   2049: BP stable, 94/59. Pt. Has no complaints. AM labs and blood cultures sent down to lab. Bedside shift change report GIVEN TO Juan Groves RN. Report included the following information SBAR, Kardex, Procedure Summary, Intake/Output, MAR, Recent Results and Cardiac Rhythm paced. SIGNIFICANT CHANGES DURING SHIFT:  Na       CONCERNS TO ADDRESS WITH MD:  N/a          [facility] NURSING NOTE   Admission Date 10/23/2019   Admission Diagnosis Fever [R50.9]   Consults IP CONSULT TO HOSPITALIST  IP CONSULT TO PRIMARY CARE PROVIDER  IP CONSULT TO PALLIATIVE CARE - PROVIDER  IP CONSULT TO HEMATOLOGY  IP CONSULT TO INFECTIOUS DISEASES      Cardiac Monitoring [x] Yes [] No      Purposeful Hourly Rounding [x] Yes    Milvia Score Total Score: 5   Milvia score 3 or > [x] Bed Alarm [] Avasys [] 1:1 sitter [] Patient refused (Signed refusal form in chart)   Kimani Score Kimani Score: 17   Kimani score 14 or < [x] PMT consult [] Wound Care consult    []  Specialty bed  [] Nutrition consult      Influenza Vaccine Received Flu Vaccine for Current Season (usually Sept-March): Yes(Per patient received first week of october.)           Oxygen needs? [x] Room air Oxygen @  []1L    []2L    []3L   []4L    []5L   []6L via  NC   Chronic home O2 use?  [] Yes [x] No  Perform O2 challenge test and document in progress note using smartphrase (.Homeoxygen)      Last bowel movement Last Bowel Movement Date: 10/28/19      Urinary Catheter       External Female Catheter 10/23/19-Urine Output (mL): 200 ml  External Female Catheter 10/23/19-Urine Output (mL): 200 ml     LDAs               Peripheral IV 10/27/19 Right Antecubital (Active)   Site Assessment Clean, dry, & intact 10/30/2019  3:24 AM   Phlebitis Assessment 0 10/30/2019  3:24 AM Infiltration Assessment 0 10/30/2019  3:24 AM   Dressing Status Clean, dry, & intact 10/30/2019  3:24 AM   Dressing Type Transparent;Tape 10/30/2019  3:24 AM   Hub Color/Line Status Pink 10/30/2019  3:24 AM   Alcohol Cap Used Yes 10/30/2019  3:24 AM          External Female Catheter 10/23/19 (Active)   Site Assessment Clean, dry, & intact 10/23/2019 11:01 AM   Perineal Care Yes 10/23/2019 11:01 AM   Urine Output (mL) 200 ml 10/28/2019 11:43 AM       External Female Catheter 10/23/19 (Active)   Site Assessment Clean, dry, & intact 10/28/2019  2:00 PM   Repositioned Yes 10/28/2019  3:58 PM   Perineal Care Yes 10/28/2019  3:58 PM   Wick Changed No 10/28/2019  3:58 PM   Suction Canister/Tubing Changed No 10/28/2019  3:58 PM   Urine Output (mL) 200 ml 10/28/2019 11:43 AM                   Readmission Risk Assessment Tool Score High Risk            37       Total Score        3 Has Seen PCP in Last 6 Months (Yes=3, No=0)    2 . Living with Significant Other. Assisted Living. LTAC. SNF. or   Rehab    3 Patient Length of Stay (>5 days = 3)    4 IP Visits Last 12 Months (1-3=4, 4=9, >4=11)    5 Pt.  Coverage (Medicare=5 , Medicaid, or Self-Pay=4)    20 Charlson Comorbidity Score (Age + Comorbid Conditions)       Expected Length of Stay 3d 16h   Actual Length of Stay 7

## 2019-10-30 NOTE — PROGRESS NOTES
Report received from [place], [facility]. Introduced myself as primary RN. Assumed care of patient. Instructed patient to call for assistance prior to getting up. Pt verbalized understanding. Call bell within reach. 10:27 AM Patient off fl for testing. (Chest US)    11:11 AM Patient back on. Bedside and Verbal shift change report given to Ruslan (oncoming nurse) by Brook Pandya (offgoing nurse). Report included the following information SBAR, Kardex, MAR, Recent Results, Med Rec Status and Cardiac Rhythm Paced.

## 2019-10-30 NOTE — PROGRESS NOTES
10/30/2019 2:00PM  Admit Date: 10/23/2019    Admit Diagnosis:   Fever [R50.9]    Subjective:     Denece Riddles denies chest pain or shortness of breath. Current Facility-Administered Medications   Medication Dose Route Frequency    lactobac ac& pc-s.therm-b.anim (DAWN Q/RISAQUAD)  1 Cap Oral DAILY    furosemide (LASIX) tablet 40 mg  40 mg Oral DAILY    potassium chloride SR (KLOR-CON 10) tablet 20 mEq  20 mEq Oral BID    cefTRIAXone (ROCEPHIN) 2 g in 0.9% sodium chloride (MBP/ADV) 50 mL  2 g IntraVENous Q12H    ampicillin (OMNIPEN) 2 g in 0.9% sodium chloride (MBP/ADV) 100 mL  2 g IntraVENous Q6H    levothyroxine (SYNTHROID) tablet 25 mcg  25 mcg Oral 6am    polyethylene glycol (MIRALAX) packet 17 g  17 g Oral EVERY OTHER DAY    sodium chloride (NS) flush 5-10 mL  5-10 mL IntraVENous PRN    acetaminophen (TYLENOL) tablet 650 mg  650 mg Oral Q6H PRN    acetaminophen (TYLENOL) tablet 650 mg  650 mg Oral Q6H PRN    albuterol (PROVENTIL VENTOLIN) nebulizer solution 2.5 mg  2.5 mg Nebulization Q6H PRN    apixaban (ELIQUIS) tablet 2.5 mg  2.5 mg Oral BID    midodrine (PROAMITINE) tablet 5 mg  5 mg Oral TID    mirtazapine (REMERON) tablet 15 mg  15 mg Oral QHS    sodium chloride (NS) flush 5-40 mL  5-40 mL IntraVENous Q8H         Objective:      Physical Exam:    Visit Vitals  /65 (BP 1 Location: Left arm, BP Patient Position: At rest)   Pulse 70   Temp 97.5 °F (36.4 °C)   Resp 17   Ht 5' 1\" (1.549 m)   Wt 55.4 kg (122 lb 2.2 oz)   SpO2 95%   BMI 23.08 kg/m²       Physical:   General: elderly thin AAF in no acute distress  Heart: paced, 1/6 m,  No S3/JVD, no carotid bruits   Lungs: clear   Abdomen: Soft, +BS, NTND   Extremities: LE rogerio +DP/PT, no edema   Neurologic: Grossly normal      Data Review:   No results for input(s): WBC, HGB, HCT, PLT, HGBEXT, HCTEXT, PLTEXT, HGBEXT, HCTEXT, PLTEXT in the last 72 hours.   Recent Labs     10/30/19  0312 10/29/19  0459 10/28/19  0351    139 142 K 3.9 3.4* 3.3*    104 106   CO2 26 27 28   GLU 91 87 92   BUN 26* 24* 27*   CREA 0.98 0.88 1.16*   CA 8.3* 8.6 8.7       No results for input(s): TROIQ, CPK, CKMB in the last 72 hours. 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 (8/4/2012)      HTN (hypertension) (6/29/2015)      Presence of biventricular automatic cardioverter/defibrillator (AICD) (7/2/2015)      Overview: [employer] biventricular AICD implant      Chronic systolic congestive heart failure ([facility] [address] (10/8/2015)      CKD (chronic kidney disease) stage 3, GFR 30-59 ml/min ([facility]) (1/10/2018)      Fever (10/23/2019)      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 Repeat limited echo 10/28 did not see any concern for aortic valve vegetation, but questioned possible pacemaker associated vegetation, transesophageal echocardiogram recommended. Transesophageal echo actually shows concern for possible sessile mitral valve vegetation. Discussed with Dr. Jordan Carter. She will likely recommend 6 weeks of antibiotics. I discussed with Dr. Harvey Mendez the possibility of endocarditis and if it was confirmed, could the BIVICD be removed. Dr. Harvey Mendez stated that the [facility] cannot be removed. Severely dilated a sending aorta on transesophageal echo:  · 5.6 cm, although I think she is not a candidate for such a major surgery as aortic repair. I will discuss both the concerns of endocarditis and the aortic pathology further with the patient and her family.   I discussed all of the above with her daughter-in-law and power of  Archana Atkins, as her son Willi Serrato was busy in meetings today per [facility] on 10/29/2019. · Main treatment for this will be blood pressure control including beta-blocker if tolerated in future in this frail 80year-old with severe systolic heart failure. DCM with Chronic systolic HF: (EF 79-81%)  Compensated, not volume overloaded. Discontinued BB with need for midodrine. No ACEI/ARB with hx of hypotension and CKD. Patient continues on Midodrine for BP support. Remains hypotensive  Diuretics on hold  Monitior I/Os, daily weights, labs. Chronic Afib:  Continues on Eliquis. BB held with hypotension    Patient seen and examined by me with nurse practitioner Yulisa Lara. I personally performed all components of the history, physical, and medical decision making and agree with the assessment and plan with minor modifications as noted. Continue treatment for endocarditis. Cardiomyopathy is well compensated. Thanks for the consult. We will sign off for now, recommend follow-up with Dr. Christ Gutierrez in 1 to 2 weeks, or if she wishes for simplicity's sake she may follow-up with Dr. Francois Douglas and Dr. Francois Douglas could call us at any time if he needs help with management of cardiomyopathy.

## 2019-10-30 NOTE — PROGRESS NOTES
Problem: Falls - Risk of  Goal: *Absence of Falls  Description  Document Steven Brady Fall Risk and appropriate interventions in the flowsheet.   Outcome: Progressing Towards Goal  Note:   Fall Risk Interventions:  Mobility Interventions: Bed/chair exit alarm, Communicate number of staff needed for ambulation/transfer, Patient to call before getting OOB, Utilize walker, cane, or other assistive device    Mentation Interventions: Adequate sleep, hydration, pain control, Bed/chair exit alarm, Door open when patient unattended, Evaluate medications/consider consulting pharmacy, Family/sitter at bedside, More frequent rounding, Toileting rounds    Medication Interventions: Bed/chair exit alarm, Evaluate medications/consider consulting pharmacy, Patient to call before getting OOB, Teach patient to arise slowly    Elimination Interventions: Bed/chair exit alarm, Call light in reach, Patient to call for help with toileting needs, Stay With Me (per policy), Toileting schedule/hourly rounds    History of Falls Interventions: Bed/chair exit alarm, Consult care management for discharge planning, Door open when patient unattended, Evaluate medications/consider consulting pharmacy         Problem: Heart Failure: Discharge Outcomes  Goal: *Demonstrates ability to perform prescribed activity without shortness of breath or discomfort  Outcome: Progressing Towards Goal     Problem: General Medical Care Plan  Goal: *Vital signs within specified parameters  Outcome: Progressing Towards Goal  Goal: *Labs within defined limits  Outcome: Progressing Towards Goal  Goal: *Absence of infection signs and symptoms  Outcome: Progressing Towards Goal  Goal: *Optimal pain control at patient's stated goal  Outcome: Progressing Towards Goal  Goal: *Skin integrity maintained  Outcome: Progressing Towards Goal  Goal: *Fluid volume balance  Outcome: Progressing Towards Goal  Goal: *Optimize nutritional status  Outcome: Progressing Towards Goal  Goal: *Anxiety reduced or absent  Outcome: Progressing Towards Goal     Problem: Pressure Injury - Risk of  Goal: *Prevention of pressure injury  Description  Document Kimani Scale and appropriate interventions in the flowsheet. Outcome: Progressing Towards Goal  Note:   Pressure Injury Interventions:  Sensory Interventions: Assess changes in LOC, Assess need for specialty bed, Check visual cues for pain, Discuss PT/OT consult with provider, Float heels, Pressure redistribution bed/mattress (bed type), Turn and reposition approx.  every two hours (pillows and wedges if needed)    Moisture Interventions: Absorbent underpads, Assess need for specialty bed, Check for incontinence Q2 hours and as needed, Internal/External urinary devices, Limit adult briefs, Maintain skin hydration (lotion/cream), Offer toileting Q_hr    Activity Interventions: Assess need for specialty bed, Increase time out of bed, Pressure redistribution bed/mattress(bed type), PT/OT evaluation    Mobility Interventions: Assess need for specialty bed, Float heels, HOB 30 degrees or less, Pressure redistribution bed/mattress (bed type), PT/OT evaluation    Nutrition Interventions: Document food/fluid/supplement intake    Friction and Shear Interventions: HOB 30 degrees or less, Lift sheet, Lift team/patient mobility team, Apply protective barrier, creams and emollients                Problem: Breathing Pattern - Ineffective  Goal: *Absence of hypoxia  Outcome: Progressing Towards Goal

## 2019-10-30 NOTE — PROGRESS NOTES
Infectious Disease Progress Note    IMPRESSION:     - Sepsis s/p fever,change in mental status,fever spikes since admission  last temp 103.2 on 10/27    -E.faecalis Gp D  bacteremia with BC+  - 10/23, BC - 10/27- E.faecalis spp 1/2 bottles    -Mitral valve endocarditis . WYATT - 10/29- vegetation on MV 10 x 3 mm flat, non mobile , pacer wires appear normal    -Pt has AICD , US done - no fluid in pocket     -ECHO-10/27-possible vegetation on AV/acer wires not seen well enough to evaluate for vegetation    -ECHO 10/28- cannot rule out vegetation on pacer tip     - H/o lymphoma       PLAN:      · Repeat BC until negative BC obtained . · Ampicillin 2 g IV q6h &  Ceftriaxone 2 G IV q12 x 6 weeks from negative BC   · Weekly CBC , CMP,fortnightly  ESR/ CRP fax reports to [phone], call with abnormal results at [phone]  · Plan of care D/w pt - she says she does not want any invasive procedure        Subjective:     Pt seen . Denies complaints . Feels better    Review of Systems:  A comprehensive review of systems was negative except for that written in the History of Present Illness. 10 point ROS obtained . All other systems negative . Objective:     Blood pressure 95/62, pulse 70, temperature 97.8 °F (36.6 °C), resp. rate 15, height 5' 1\" (1.549 m), weight 122 lb 2.2 oz (55.4 kg), SpO2 96 %. Temp (24hrs), Av.6 °F (36.4 °C), Min:97.3 °F (36.3 °C), Max:98.2 °F (36.8 °C)      Patient Vitals for the past 24 hrs:   Temp Pulse Resp BP SpO2   10/30/19 1518 97.8 °F (36.6 °C) 70 15 95/62 96 %   10/30/19 1111 97.5 °F (36.4 °C) 70 17 100/65 95 %   10/30/19 0755 97.3 °F (36.3 °C) 73 18 92/63 99 %   10/30/19 0324 98.2 °F (36.8 °C) 73 18 90/65 97 %   10/29/19 2229 97.4 °F (36.3 °C) 74 18 94/63 94 %   10/29/19 2049    94/59    10/29/19 2018    (!) 84/69    10/29/19 1921 97.4 °F (36.3 °C) 76 22 (!) 88/55 98 %         Lines:  Peripheral IV:       Physical Exam:   General:  Awake cooperative,    Eyes:  Sclera anicteric. Pupils equally round and reactive to light. Mouth/Throat: Mucous membranes normal, oral pharynx clear   Neck: Supple   Lungs:   Reduced  auscultation bases  Chest - pacemaker pocket intact  no swelling +    CV:  Regular rate and rhythm,no murmur, click, rub or gallop   Abdomen:   Soft, non-tender. bowel sounds normal. non-distended   Extremities: No  edema   Skin: Skin color, texture, turgor normal. no acute rash or lesions   Lymph nodes: Cervical and supraclavicular normal   Musculoskeletal: No swelling or deformity   Lines/Devices:  Intact, no erythema, drainage or tenderness   Psych: Alert and oriented, normal mood        Data Review:   CBC:   No results for input(s): WBC, RBC, HGB, HCT, PLT, GRANS, LYMPH, EOS, HGBEXT, HCTEXT, PLTEXT in the last 72 hours. CMP:   Recent Labs     10/30/19  0312 10/29/19  0459 10/28/19  0351   GLU 91 87 92    139 142   K 3.9 3.4* 3.3*    104 106   CO2 26 27 28   BUN 26* 24* 27*   CREA 0.98 0.88 1.16*   CA 8.3* 8.6 8.7   AGAP 7 8 8   BUCR 27* 27* 23*       Studies:      Lab Results   Component Value Date/Time    Culture result: (A) 10/27/2019 03:33 PM     ENTEROCOCCUS SPECIES GROWING IN 1 OF 2 BOTTLES DRAWN (SITE = RAC)    Culture result:  10/27/2019 03:33 PM     PLEASE REFER TO PREVIOUS BLOOD CULTURE  [id number] COLLECTED 10/23/19 FOR ID/SENSITIVITIES      Culture result: REMAINING BOTTLE(S) HAS/HAVE NO GROWTH SO FAR 10/27/2019 03:33 PM    Culture result: (A) 10/23/2019 10:50 AM     ENTEROCOCCUS FAECALIS GROUP D GROWING IN BOTH BOTTLES DRAWN SITE= RT WRIST GENTAMICIN SYNERGY SCREEN IS SENSITIVE @ < OR = 500 mcg/ml STREPTOMYCIN SYNERGY SCREEN IS SENSITIVE @ < OR =1000 mcg/ml    Culture result: (A) 10/23/2019 10:50 AM     PRELIMINARY REPORT OF GRAM POSITIVE COCCI IN PAIRS GROWING IN BOTH BOTTLES DRAWN CALLED TO AND READ BACK BY Derick Lovell RN ON 10/23/19 AT [address] [place] [address], P 2211).  MS        XR Results (most recent):  Results from Hospital Encounter encounter on 10/23/19   XR CHEST PORT    Narrative INDICATION:  meets SIRS criteria     EXAM: Single portable view of chest 0 928 . Comparison:9/17/2019    Findings: Cardiac silhouette is enlarged. Aorta is ectatic and enlarged. AICD  unchanged. Pulmonary vasculature is not engorged. There are no focal parenchymal  opacities, effusions, or pneumothorax. Impression Impression:  1. 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    DILAN (obstructive sleep apnea) G47.33    Rhinitis J31.0    Anemia D64.9    Reactive airway disease J45.909    HTN (hypertension) I10    Gout M10.9    LBBB (left bundle branch block) I44.7    Presence of biventricular automatic cardioverter/defibrillator (AICD) Z95.810    Chronic systolic congestive heart failure (HCC) I50.22    Lymphoma ([facility]) C85.90    Mitral valvular regurgitation I34.0    Physical deconditioning R53.81    Gastroesophageal reflux disease with esophagitis K21.0    Thoracic aortic aneurysm without rupture ([facility]) I71.2    Paroxysmal atrial fibrillation (HCC) I48.0    Xerosis of skin L85.3    Dyslipidemia E78.5    CKD (chronic kidney disease) stage 3, GFR 30-59 ml/min ([facility]) N18.3    Acquired hypothyroidism E03.9    Nonrheumatic aortic valve insufficiency I35.1    UTI (urinary tract infection) N39.0    Counseling regarding advanced care planning and goals of care Z71.89    CAP (community acquired pneumonia) J18.9    Poor short-term memory R41.3    Fever R50.9    Endocarditis of mitral valve I05.8    Chronic a-fib I48.20         ICD-10-CM ICD-9-CM    1. Fever, unspecified fever cause R50.9 780.60    2. Dehydration E86.0 276.51    3. Generalized weakness R53.1 780.79    4. Confusion R41.0 298.9    5. Goals of care, counseling/discussion Z71.89 V65.49    6. Metabolic encephalopathy M44.69 348.31    7.  Bacteremia due to Enterococcus R78.81 790.7     B95.2 041.04    8. 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 I have discussed medication side effects and warnings with the patient as well.     Reviewed test results at length with patient    Anti-infectives:     Ceftriaxone / Ampicillin - 10/27  S/p Ceftriaxone /Vancomycin - 10/24-10/26     Lisa Chamberlain MD FACP

## 2019-10-30 NOTE — PROGRESS NOTES
Initial Nutrition Assessment:    INTERVENTIONS/RECOMMENDATIONS:   · Continue regular diet  · Ensure BID    ASSESSMENT:   Chart reviewed, medically noted for nonischemic cardiomyopathy, endocarditis and PMH shown below. Assessing pt due to LOS. Pt known to nutrition team from past admissions. Pt was being taken to ultrasound during visit attempt. Flowsheet documents <25% of meals consumed. On past admission pt wast consuming ensure BID, will add. Weight history no significant weight loss over the past 6 months. As pt was going off floor, mild temporal muscle wasting was observed, may be chronic. Will try to follow-up with pt later.      Past Medical History:   Diagnosis Date    Aortic insufficiency     Asthma     Chronic a-fib 10/28/2019    CKD (chronic kidney disease) stage 3, GFR 30-59 ml/min ([facility]) 1/10/2018    CLL (chronic lymphocytic leukemia) ([facility])     Congestive heart failure, NYHA class II, chronic, systolic ([facility])     Endocarditis 10/28/2019    Heart failure ([facility] [address]     Hypertension     Mitral valvular regurgitation 1/22/2016    ECHO 2/3/17: LV dilated, EF 20-25%, mild LVH, RV mod dilated, mild- mod MELANIA, mod-severe MR, mod AI ECHO 7/29/17: EF 15%, severe DHK, reduced RVEF, RVH, RVSP 35 mmHg  Mild LAE, mod-marked MA fibrosis, mod-severe MR (2+), AO root dilated,      Nonrheumatic aortic valve insufficiency 10/16/2018    Presence of biventricular automatic cardioverter/defibrillator (AICD) 7/2/2015    [employer] biventricular AICD implant    Stomach ulcer        Diet Order: Regular  % Eaten:    Patient Vitals for the past 72 hrs:   % Diet Eaten   10/28/19 1823 20 %   10/28/19 1400 10 %   10/28/19 0948 20 %   10/27/19 1534 5 %     Pertinent Medications: [x]Reviewed:   Pertinent Labs: [x]Reviewed: lasix, lactobac, remeron, KCl,   Food Allergies: [x]NKFA  []Other   Last BM: 10/28  Edema:   n/a     []RUE   []LUE   []RLE   []LLE      Pressure Injury:   n/a   [] Stage I   [] Stage II   [] Stage III [] Stage IV      Wt Readings from Last 30 Encounters:   10/30/19 55.4 kg (122 lb 2.2 oz)   09/24/19 55.5 kg (122 lb 6.4 oz)   08/20/19 57 kg (125 lb 9.6 oz)   07/22/19 55.5 kg (122 lb 6.4 oz)   07/01/19 52.1 kg (114 lb 13.8 oz)   05/09/19 50.5 kg (111 lb 4.8 oz)   04/23/19 51.3 kg (113 lb 1.6 oz)   04/16/19 51.7 kg (114 lb)   03/18/19 60.8 kg (134 lb 0.6 oz)   02/18/19 71 kg (156 lb 8.4 oz)   11/30/18 59.2 kg (130 lb 8 oz)   10/16/18 58 kg (127 lb 14.4 oz)   10/02/18 52.6 kg (116 lb)   09/28/18 58.5 kg (128 lb 14.4 oz)   07/19/18 58.8 kg (129 lb 9.6 oz)   07/02/18 58.9 kg (129 lb 14.4 oz)   05/22/18 61.6 kg (135 lb 12.8 oz)   05/02/18 58.3 kg (128 lb 9.6 oz)   04/03/18 59 kg (130 lb)   03/01/18 59.5 kg (131 lb 1.6 oz)   01/09/18 60 kg (132 lb 3.2 oz)   12/21/17 59.5 kg (131 lb 1.6 oz)   12/14/17 62.4 kg (137 lb 9.6 oz)   11/07/17 59.3 kg (130 lb 12.8 oz)   10/20/17 58.9 kg (129 lb 14.4 oz)   10/10/17 59.3 kg (130 lb 12.8 oz)   10/03/17 59.9 kg (132 lb)   09/26/17 58.7 kg (129 lb 4.8 oz)   07/11/17 58.5 kg (129 lb)   06/27/17 61.9 kg (136 lb 8 oz)       Anthropometrics:   Height: 5' 1\" (154.9 cm) Weight: 55.4 kg (122 lb 2.2 oz)   IBW (%IBW):   ( ) UBW (%UBW):   (  %)   Last Weight Metrics:  Weight Loss Metrics 10/30/2019 9/24/2019 8/20/2019 7/22/2019 7/1/2019 5/9/2019 4/23/2019   Today's Wt 122 lb 2.2 oz 122 lb 6.4 oz 125 lb 9.6 oz 122 lb 6.4 oz 114 lb 13.8 oz 111 lb 4.8 oz 113 lb 1.6 oz   BMI 23.08 kg/m2 23.13 kg/m2 23.73 kg/m2 23.13 kg/m2 21.7 kg/m2 20.36 kg/m2 20.69 kg/m2       BMI: Body mass index is 23.08 kg/m². This BMI is indicative of:   []Underweight    [x]Normal    []Overweight    [] Obesity   [] Extreme Obesity (BMI>40)     Estimated Nutrition Needs (Based on):   1150 Kcals/day(BMR: 900 x 1.3) , 55 g(1 g/kg) Protein  Carbohydrate:  At Least 130 g/day  Fluids: 1150 mL/day (1ml/kcal) or per primary team    NUTRITION DIAGNOSES:   Problem:  Inadequate protein-energy intake      Etiology: related to undetermined etiology at this time     Signs/Symptoms: as evidenced by pt consuming <25% of meals      NUTRITION INTERVENTIONS:  Meals/Snacks: General/healthful diet   Supplements: Commercial supplement              GOAL:   cosume >50% of meals and ONS in 2-4 days    LEARNING NEEDS (Diet, Food/Nutrient-Drug Interaction):    [x] None Identified   [] Identified and Education Provided/Documented   [] Identified and Pt declined/was not appropriate     Cultureal, Adventism, OR Ethnic Dietary Needs:    [x] None Identified   [] Identified and Addressed     [x] Interdisciplinary Care Plan Reviewed/Documented    [x] Discharge Planning:  General healthy diet with kcal and protein dense foods. Oral nutrition supplements as needed     MONITORING /EVALUATION:      Food/Nutrient Intake Outcomes:  Total energy intake  Physical Signs/Symptoms Outcomes: Weight/weight change, Electrolyte and renal profile, Glucose profile, GI    NUTRITION RISK:    [x] High     to         [x] Moderate           []  Low  []  Minimal/Uncompromised    PT SEEN FOR:    []  MD Consult: []Calorie Count      []Diabetic Diet Education        []Diet Education     []Electrolyte Management     []General Nutrition Management and Supplements     []Management of Tube Feeding     []TPN Recommendations    []  RN Referral:  []MST score >=2     []Enteral/Parenteral Nutrition PTA     []Pregnant: Gestational DM or Multigestation     []Pressure Ulcer/Wound Care needs        []  Low BMI  [x]  LOS Referral       Gregoria Moody RDN  Pager [phone]  Weekend Pager [phone]

## 2019-10-30 NOTE — PROGRESS NOTES
Hematology Oncology Progress Note         Follow up for: CLL    Chart notes reviewed since last visit. Case discussed with following:     Patient complains of the following: No complaints  Additional concerns noted by the staff:     Patient Vitals for the past 24 hrs:   BP Temp Pulse Resp SpO2 Weight   10/30/19 0755 92/63 97.3 °F (36.3 °C) 73 18 99 %    10/30/19 0512      55.4 kg (122 lb 2.2 oz)   10/30/19 0324 90/65 98.2 °F (36.8 °C) 73 18 97 % 58.2 kg (128 lb 6.4 oz)   10/29/19 2229 94/63 97.4 °F (36.3 °C) 74 18 94 %    10/29/19 2049 94/59        10/29/19 2018 (!) 84/69        10/29/19 1921 (!) 88/55 97.4 °F (36.3 °C) 76 22 98 %    10/29/19 1502 95/60 97.5 °F (36.4 °C) 71 22 96 %    10/29/19 1445 98/57  70 24 91 %    10/29/19 1440 96/58  79 17 91 %    10/29/19 1435 90/56  83 20 97 %    10/29/19 1430 91/55  79 26 95 %    10/29/19 1425 99/55  78 24 96 %    10/29/19 1420 92/60  77 22 94 %    10/29/19 1415 90/58  70 22 94 %    10/29/19 1410 92/56  70 21 99 %    10/29/19 1405 93/53  81 25 91 %    10/29/19 1400 94/58  77 17 97 %    10/29/19 1355   72 28 96 %    10/29/19 1350 90/49  70 (!) 31 96 %    10/29/19 1345 92/60  70 21 98 %    10/29/19 1340 104/51  77 26 99 %    10/29/19 1335 100/56  70 26 100 %    10/29/19 1330 105/66  78 25 99 %    10/29/19 1325 104/75  76 27 100 %    10/29/19 1320 101/73  79 20 94 %    10/29/19 1310 101/62  77 26 96 %    10/29/19 1307 105/61  74 25 97 %    10/29/19 1108 97/61 97.6 °F (36.4 °C) 67 18 100 %        ROS negative for 11 organ systems (but patient mildly confused and this may not be accurate).      Physical Examination:  Gen NAD      Labs:  Recent Results (from the past 24 hour(s))   METABOLIC PANEL, BASIC    Collection Time: 10/30/19  3:12 AM   Result Value Ref Range    Sodium 141 136 - 145 mmol/L    Potassium 3.9 3.5 - 5.1 mmol/L    Chloride 108 97 - 108 mmol/L    CO2 26 21 - 32 mmol/L    Anion gap 7 5 - 15 mmol/L    Glucose 91 65 - 100 mg/dL    BUN 26 (H) 6 - 20 MG/DL    Creatinine 0.98 0.55 - 1.02 MG/DL    BUN/Creatinine ratio 27 (H) 12 - 20      GFR est AA >60 >60 ml/min/1.73m2    GFR est non-AA 53 (L) >60 ml/min/1.73m2    Calcium 8.3 (L) 8.5 - 10.1 MG/DL       Assessment and Plan:   CLL - counts and adenopathy very acceptable at present so can hold ibruitinib for now. Will not resume until clear cut signs of progression. 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 IgG ok     Gram positive bacteremia/enterococcus.  On abx per primary team    Hx HTN    Hx CKD    Hx CHF - on coreg    AICD

## 2019-10-31 ENCOUNTER — APPOINTMENT (OUTPATIENT)
Dept: GENERAL RADIOLOGY | Age: 84
DRG: 871 | End: 2019-10-31
Attending: INTERNAL MEDICINE
Payer: MEDICARE

## 2019-10-31 LAB
ANION GAP SERPL CALC-SCNC: 7 MMOL/L (ref 5–15)
ARTERIAL PATENCY WRIST A: YES
BASE EXCESS BLD CALC-SCNC: 1 MMOL/L
BASOPHILS # BLD: 0 K/UL (ref 0–0.1)
BASOPHILS NFR BLD: 0 % (ref 0–1)
BDY SITE: ABNORMAL
BLASTS NFR BLD MANUAL: 0 %
BUN SERPL-MCNC: 25 MG/DL (ref 6–20)
BUN/CREAT SERPL: 23 (ref 12–20)
CALCIUM SERPL-MCNC: 8.2 MG/DL (ref 8.5–10.1)
CHLORIDE SERPL-SCNC: 108 MMOL/L (ref 97–108)
CK MB CFR SERPL CALC: 5.2 % (ref 0–2.5)
CK MB SERPL-MCNC: 1.3 NG/ML (ref 5–25)
CK SERPL-CCNC: 25 U/L (ref 26–192)
CO2 SERPL-SCNC: 26 MMOL/L (ref 21–32)
CREAT SERPL-MCNC: 1.08 MG/DL (ref 0.55–1.02)
DIFFERENTIAL METHOD BLD: ABNORMAL
EOSINOPHIL # BLD: 0.1 K/UL (ref 0–0.4)
EOSINOPHIL NFR BLD: 3 % (ref 0–7)
ERYTHROCYTE [DISTWIDTH] IN BLOOD BY AUTOMATED COUNT: 17.2 % (ref 11.5–14.5)
ERYTHROCYTE [DISTWIDTH] IN BLOOD BY AUTOMATED COUNT: 17.6 % (ref 11.5–14.5)
GAS FLOW.O2 O2 DELIVERY SYS: ABNORMAL L/MIN
GAS FLOW.O2 SETTING OXYMISER: 1.5 L/M
GLUCOSE SERPL-MCNC: 85 MG/DL (ref 65–100)
HCO3 BLD-SCNC: 25.1 MMOL/L (ref 22–26)
HCT VFR BLD AUTO: 30.4 % (ref 35–47)
HCT VFR BLD AUTO: 31.8 % (ref 35–47)
HGB BLD-MCNC: 9.3 G/DL (ref 11.5–16)
HGB BLD-MCNC: 9.8 G/DL (ref 11.5–16)
IMM GRANULOCYTES # BLD AUTO: 0 K/UL
IMM GRANULOCYTES NFR BLD AUTO: 0 %
LYMPHOCYTES # BLD: 0.5 K/UL (ref 0.8–3.5)
LYMPHOCYTES NFR BLD: 25 % (ref 12–49)
MAGNESIUM SERPL-MCNC: 2 MG/DL (ref 1.6–2.4)
MCH RBC QN AUTO: 29 PG (ref 26–34)
MCH RBC QN AUTO: 29.2 PG (ref 26–34)
MCHC RBC AUTO-ENTMCNC: 30.6 G/DL (ref 30–36.5)
MCHC RBC AUTO-ENTMCNC: 30.8 G/DL (ref 30–36.5)
MCV RBC AUTO: 94.6 FL (ref 80–99)
MCV RBC AUTO: 94.7 FL (ref 80–99)
METAMYELOCYTES NFR BLD MANUAL: 0 %
MONOCYTES # BLD: 0.1 K/UL (ref 0–1)
MONOCYTES NFR BLD: 4 % (ref 5–13)
MYELOCYTES NFR BLD MANUAL: 0 %
NEUTS BAND NFR BLD MANUAL: 6 % (ref 0–6)
NEUTS SEG # BLD: 1.2 K/UL (ref 1.8–8)
NEUTS SEG NFR BLD: 62 % (ref 32–75)
NRBC # BLD: 0 K/UL (ref 0–0.01)
NRBC # BLD: 0 K/UL (ref 0–0.01)
NRBC BLD-RTO: 0 PER 100 WBC
NRBC BLD-RTO: 0 PER 100 WBC
OTHER CELLS NFR BLD MANUAL: 0 %
PCO2 BLD: 36.4 MMHG (ref 35–45)
PH BLD: 7.45 [PH] (ref 7.35–7.45)
PHOSPHATE SERPL-MCNC: 2.6 MG/DL (ref 2.6–4.7)
PLATELET # BLD AUTO: 121 K/UL (ref 150–400)
PLATELET # BLD AUTO: 130 K/UL (ref 150–400)
PMV BLD AUTO: 11.4 FL (ref 8.9–12.9)
PMV BLD AUTO: 11.6 FL (ref 8.9–12.9)
PO2 BLD: 124 MMHG (ref 80–100)
POTASSIUM SERPL-SCNC: 3.7 MMOL/L (ref 3.5–5.1)
PROMYELOCYTES NFR BLD MANUAL: 0 %
RBC # BLD AUTO: 3.21 M/UL (ref 3.8–5.2)
RBC # BLD AUTO: 3.36 M/UL (ref 3.8–5.2)
RBC MORPH BLD: ABNORMAL
SAO2 % BLD: 99 % (ref 92–97)
SODIUM SERPL-SCNC: 141 MMOL/L (ref 136–145)
SPECIMEN TYPE: ABNORMAL
TOTAL RESP. RATE, ITRR: 16
TROPONIN I SERPL-MCNC: <0.05 NG/ML
WBC # BLD AUTO: 1.9 K/UL (ref 3.6–11)
WBC # BLD AUTO: 1.9 K/UL (ref 3.6–11)

## 2019-10-31 PROCEDURE — 82803 BLOOD GASES ANY COMBINATION: CPT

## 2019-10-31 PROCEDURE — 74011000258 HC RX REV CODE- 258: Performed by: INTERNAL MEDICINE

## 2019-10-31 PROCEDURE — 36600 WITHDRAWAL OF ARTERIAL BLOOD: CPT

## 2019-10-31 PROCEDURE — 74011250637 HC RX REV CODE- 250/637: Performed by: HOSPITALIST

## 2019-10-31 PROCEDURE — 36415 COLL VENOUS BLD VENIPUNCTURE: CPT

## 2019-10-31 PROCEDURE — 74011250636 HC RX REV CODE- 250/636: Performed by: INTERNAL MEDICINE

## 2019-10-31 PROCEDURE — 97116 GAIT TRAINING THERAPY: CPT

## 2019-10-31 PROCEDURE — 83735 ASSAY OF MAGNESIUM: CPT

## 2019-10-31 PROCEDURE — 97161 PT EVAL LOW COMPLEX 20 MIN: CPT

## 2019-10-31 PROCEDURE — 80048 BASIC METABOLIC PNL TOTAL CA: CPT

## 2019-10-31 PROCEDURE — 84484 ASSAY OF TROPONIN QUANT: CPT

## 2019-10-31 PROCEDURE — 94760 N-INVAS EAR/PLS OXIMETRY 1: CPT

## 2019-10-31 PROCEDURE — 82550 ASSAY OF CK (CPK): CPT

## 2019-10-31 PROCEDURE — 85027 COMPLETE CBC AUTOMATED: CPT

## 2019-10-31 PROCEDURE — 74011250637 HC RX REV CODE- 250/637: Performed by: INTERNAL MEDICINE

## 2019-10-31 PROCEDURE — 71045 X-RAY EXAM CHEST 1 VIEW: CPT

## 2019-10-31 PROCEDURE — 84100 ASSAY OF PHOSPHORUS: CPT

## 2019-10-31 PROCEDURE — 65660000000 HC RM CCU STEPDOWN

## 2019-10-31 RX ORDER — POTASSIUM CHLORIDE 750 MG/1
20 TABLET, FILM COATED, EXTENDED RELEASE ORAL 3 TIMES DAILY
Status: DISCONTINUED | OUTPATIENT
Start: 2019-10-31 | End: 2019-11-06 | Stop reason: HOSPADM

## 2019-10-31 RX ORDER — SODIUM CHLORIDE 9 MG/ML
50 INJECTION, SOLUTION INTRAVENOUS CONTINUOUS
Status: DISCONTINUED | OUTPATIENT
Start: 2019-10-31 | End: 2019-11-02

## 2019-10-31 RX ADMIN — POTASSIUM CHLORIDE 20 MEQ: 750 TABLET, FILM COATED, EXTENDED RELEASE ORAL at 09:51

## 2019-10-31 RX ADMIN — MIRTAZAPINE 15 MG: 15 TABLET, FILM COATED ORAL at 21:58

## 2019-10-31 RX ADMIN — POLYETHYLENE GLYCOL 3350 17 G: 17 POWDER, FOR SOLUTION ORAL at 09:50

## 2019-10-31 RX ADMIN — Medication 1 CAPSULE: at 09:52

## 2019-10-31 RX ADMIN — APIXABAN 2.5 MG: 2.5 TABLET, FILM COATED ORAL at 09:54

## 2019-10-31 RX ADMIN — AMPICILLIN SODIUM 2 G: 2 INJECTION, POWDER, FOR SOLUTION INTRAMUSCULAR; INTRAVENOUS at 01:12

## 2019-10-31 RX ADMIN — MIDODRINE HYDROCHLORIDE 5 MG: 5 TABLET ORAL at 21:56

## 2019-10-31 RX ADMIN — APIXABAN 2.5 MG: 2.5 TABLET, FILM COATED ORAL at 21:58

## 2019-10-31 RX ADMIN — SODIUM CHLORIDE 50 ML/HR: 900 INJECTION, SOLUTION INTRAVENOUS at 23:13

## 2019-10-31 RX ADMIN — LEVOTHYROXINE SODIUM 25 MCG: 25 TABLET ORAL at 06:17

## 2019-10-31 RX ADMIN — MIDODRINE HYDROCHLORIDE 5 MG: 5 TABLET ORAL at 16:19

## 2019-10-31 RX ADMIN — Medication 10 ML: at 05:10

## 2019-10-31 RX ADMIN — AMPICILLIN SODIUM 2 G: 2 INJECTION, POWDER, FOR SOLUTION INTRAMUSCULAR; INTRAVENOUS at 12:58

## 2019-10-31 RX ADMIN — MIDODRINE HYDROCHLORIDE 5 MG: 5 TABLET ORAL at 09:52

## 2019-10-31 RX ADMIN — CEFTRIAXONE SODIUM 2 G: 2 INJECTION, POWDER, FOR SOLUTION INTRAMUSCULAR; INTRAVENOUS at 16:19

## 2019-10-31 RX ADMIN — Medication 10 ML: at 22:00

## 2019-10-31 RX ADMIN — AMPICILLIN SODIUM 2 G: 2 INJECTION, POWDER, FOR SOLUTION INTRAMUSCULAR; INTRAVENOUS at 18:12

## 2019-10-31 RX ADMIN — POTASSIUM CHLORIDE 20 MEQ: 750 TABLET, FILM COATED, EXTENDED RELEASE ORAL at 16:19

## 2019-10-31 RX ADMIN — POTASSIUM CHLORIDE 20 MEQ: 750 TABLET, FILM COATED, EXTENDED RELEASE ORAL at 21:58

## 2019-10-31 RX ADMIN — CEFTRIAXONE SODIUM 2 G: 2 INJECTION, POWDER, FOR SOLUTION INTRAMUSCULAR; INTRAVENOUS at 05:02

## 2019-10-31 RX ADMIN — FUROSEMIDE 40 MG: 40 TABLET ORAL at 09:53

## 2019-10-31 RX ADMIN — AMPICILLIN SODIUM 2 G: 2 INJECTION, POWDER, FOR SOLUTION INTRAMUSCULAR; INTRAVENOUS at 06:18

## 2019-10-31 NOTE — PROGRESS NOTES
Problem: Falls - Risk of  Goal: *Absence of Falls  Description  Document Karin Segovia Fall Risk and appropriate interventions in the flowsheet.   Outcome: Progressing Towards Goal  Note:   Fall Risk Interventions:  Mobility Interventions: Bed/chair exit alarm, Communicate number of staff needed for ambulation/transfer, OT consult for ADLs, Patient to call before getting OOB, PT Consult for mobility concerns, PT Consult for assist device competence, Utilize walker, cane, or other assistive device    Mentation Interventions: Adequate sleep, hydration, pain control, Bed/chair exit alarm, Door open when patient unattended, Evaluate medications/consider consulting pharmacy, Eyeglasses and hearing aids, Familiar objects from home, Family/sitter at bedside, Increase mobility, More frequent rounding, Reorient patient, Room close to nurse's station, Toileting rounds, Update white board, Self-releasing belt    Medication Interventions: Bed/chair exit alarm, Evaluate medications/consider consulting pharmacy, Patient to call before getting OOB, Teach patient to arise slowly, Utilize gait belt for transfers/ambulation    Elimination Interventions: Bed/chair exit alarm, Call light in reach, Elevated toilet seat, Patient to call for help with toileting needs, Toilet paper/wipes in reach, Toileting schedule/hourly rounds, Urinal in reach    History of Falls Interventions: Bed/chair exit alarm, Consult care management for discharge planning, Door open when patient unattended, Evaluate medications/consider consulting pharmacy, Investigate reason for fall, Room close to nurse's station, Utilize gait belt for transfer/ambulation, Assess for delayed presentation/identification of injury for 48 hrs (comment for end date)

## 2019-10-31 NOTE — PROGRESS NOTES
VANESA: [facility] [place]    Shae Martins ([phone]) with [facility] called CM via phone. Gorge Brewster wanted an update on pt's d/c. CM provided Piper with an update. CM will continue to follow patient for discharge planning needs and arrange for services as deemed necessary.     Nancy Diego [address] [place] [address]  [phone]

## 2019-10-31 NOTE — PROGRESS NOTES
Bedside shift change report GIVEN TO MAXIMUS Turk. Report included the following information SBAR. SIGNIFICANT CHANGES DURING SHIFT:  None      CONCERNS TO ADDRESS WITH MD:  None          [facility] NURSING NOTE   Admission Date 10/23/2019   Admission Diagnosis Fever [R50.9]   Consults IP CONSULT TO HOSPITALIST  IP CONSULT TO PRIMARY CARE PROVIDER  IP CONSULT TO PALLIATIVE CARE - PROVIDER  IP CONSULT TO HEMATOLOGY  IP CONSULT TO INFECTIOUS DISEASES      Cardiac Monitoring [x] Yes [] No      Purposeful Hourly Rounding [x] Yes    Milvia Score Total Score: 5   Milvia score 3 or > [x] Bed Alarm [] Avasys [] 1:1 sitter [] Patient refused (Signed refusal form in chart)   Kimani Score Kimani Score: 17   Kimani score 14 or < [x] PMT consult [] Wound Care consult    []  Specialty bed  [] Nutrition consult      Influenza Vaccine Received Flu Vaccine for Current Season (usually Sept-March): Yes(Per patient received first week of october.)           Oxygen needs? [x] Room air Oxygen @  []1L    []2L    []3L   []4L    []5L   []6L via  NC   Chronic home O2 use?  [] Yes [] No  Perform O2 challenge test and document in progress note using [employer] (.Homeoxygen)      Last bowel movement Last Bowel Movement Date: 10/30/19      Urinary Catheter       External Female Catheter 10/23/19-Urine Output (mL): 200 ml  External Female Catheter 10/23/19-Urine Output (mL): 200 ml     LDAs               Peripheral IV 10/31/19 Left Wrist (Active)   Site Assessment Clean, dry, & intact 10/31/2019 12:36 PM   Phlebitis Assessment 0 10/31/2019 12:36 PM   Infiltration Assessment 0 10/31/2019 12:36 PM   Dressing Status Clean, dry, & intact 10/31/2019 12:36 PM   Dressing Type Transparent 10/31/2019 12:36 PM   Hub Color/Line Status Blue 10/31/2019 12:36 PM   Alcohol Cap Used Yes 10/31/2019 12:32 PM          External Female Catheter 10/23/19 (Active)   Site Assessment Clean, dry, & intact 10/23/2019 11:01 AM   Perineal Care Yes 10/23/2019 11:01 AM   Urine Output (mL) 200 ml 10/28/2019 11:43 AM       External Female Catheter 10/23/19 (Active)   Site Assessment Clean, dry, & intact 10/28/2019  2:00 PM   Repositioned Yes 10/28/2019  3:58 PM   Perineal Care Yes 10/28/2019  3:58 PM   Wick Changed No 10/28/2019  3:58 PM   Suction Canister/Tubing Changed No 10/28/2019  3:58 PM   Urine Output (mL) 200 ml 10/28/2019 11:43 AM                   Readmission Risk Assessment Tool Score High Risk            37       Total Score        3 Has Seen PCP in Last 6 Months (Yes=3, No=0)    2 . Living with Significant Other. Assisted Living. LTAC. SNF. or   Rehab    3 Patient Length of Stay (>5 days = 3)    4 IP Visits Last 12 Months (1-3=4, 4=9, >4=11)    5 Pt.  Coverage (Medicare=5 , Medicaid, or Self-Pay=4)    20 Charlson Comorbidity Score (Age + Comorbid Conditions)       Expected Length of Stay 3d 16h   Actual Length of Stay 8

## 2019-10-31 NOTE — PROGRESS NOTES
0700: Bedside shift change report GIVEN TO MAXIMUS Berman. Report included the following information SBAR, Kardex, Procedure Summary, Intake/Output, MAR, Recent Results and Cardiac Rhythm Paced. SIGNIFICANT CHANGES DURING SHIFT:  Pt. Needing a line if going to be on long term antibiotics and limited access. CONCERNS TO ADDRESS WITH MD:  Line for long term antibiotics          [address] NURSING NOTE   Admission Date 10/23/2019   Admission Diagnosis Fever [R50.9]   Consults IP CONSULT TO HOSPITALIST  IP CONSULT TO PRIMARY CARE PROVIDER  IP CONSULT TO PALLIATIVE CARE - PROVIDER  IP CONSULT TO HEMATOLOGY  IP CONSULT TO INFECTIOUS DISEASES      Cardiac Monitoring [x] Yes [] No      Purposeful Hourly Rounding [x] Yes    Milvia Score Total Score: 5   Milvia score 3 or > [x] Bed Alarm [] Avasys [] 1:1 sitter [] Patient refused (Signed refusal form in chart)   Kimani Score Kimani Score: 17   Kimani score 14 or < [] PMT consult [] Wound Care consult    []  Specialty bed  [] Nutrition consult      Influenza Vaccine Received Flu Vaccine for Current Season (usually Sept-March): Yes(Per patient received first week of october.)           Oxygen needs? [x] Room air Oxygen @  []1L    []2L    []3L   []4L    []5L   []6L via  NC   Chronic home O2 use? [] Yes [x] No  Perform O2 challenge test and document in progress note using [employer] (.Homeoxygen)      Last bowel movement Last Bowel Movement Date: 10/30/19      Urinary Catheter       External Female Catheter 10/23/19-Urine Output (mL): 200 ml  External Female Catheter 10/23/19-Urine Output (mL): 200 ml     LDAs               Peripheral IV 10/31/19 Left Wrist (Active)   Site Assessment Clean, dry, & intact 10/31/2019  5:11 AM   Phlebitis Assessment 0 10/31/2019  5:11 AM   Infiltration Assessment 0 10/31/2019  5:11 AM   Dressing Status Clean, dry, & intact 10/31/2019  5:11 AM   Dressing Type Transparent;Tape 10/31/2019  5:11 AM   Hub Color/Line Status Blue;Flushed; Infusing 10/31/2019  5:11 AM   Alcohol Cap Used Yes 10/31/2019  5:11 AM          External Female Catheter 10/23/19 (Active)   Site Assessment Clean, dry, & intact 10/23/2019 11:01 AM   Perineal Care Yes 10/23/2019 11:01 AM   Urine Output (mL) 200 ml 10/28/2019 11:43 AM       External Female Catheter 10/23/19 (Active)   Site Assessment Clean, dry, & intact 10/28/2019  2:00 PM   Repositioned Yes 10/28/2019  3:58 PM   Perineal Care Yes 10/28/2019  3:58 PM   Wick Changed No 10/28/2019  3:58 PM   Suction Canister/Tubing Changed No 10/28/2019  3:58 PM   Urine Output (mL) 200 ml 10/28/2019 11:43 AM                   Readmission Risk Assessment Tool Score High Risk            37       Total Score        3 Has Seen PCP in Last 6 Months (Yes=3, No=0)    2 . Living with Significant Other. Assisted Living. LTAC. SNF. or   Rehab    3 Patient Length of Stay (>5 days = 3)    4 IP Visits Last 12 Months (1-3=4, 4=9, >4=11)    5 Pt.  Coverage (Medicare=5 , Medicaid, or Self-Pay=4)    20 Charlson Comorbidity Score (Age + Comorbid Conditions)       Expected Length of Stay 3d 16h   Actual Length of Stay 8

## 2019-10-31 NOTE — PROGRESS NOTES
General Daily Progress Note    Admit Date: 10/23/2019    Subjective:     Patient has no complaint . Current Facility-Administered Medications   Medication Dose Route Frequency    lactobac ac& pc-s.therm-b.anim (DAWN Q/RISAQUAD)  1 Cap Oral DAILY    furosemide (LASIX) tablet 40 mg  40 mg Oral DAILY    potassium chloride SR (KLOR-CON 10) tablet 20 mEq  20 mEq Oral BID    cefTRIAXone (ROCEPHIN) 2 g in 0.9% sodium chloride (MBP/ADV) 50 mL  2 g IntraVENous Q12H    ampicillin (OMNIPEN) 2 g in 0.9% sodium chloride (MBP/ADV) 100 mL  2 g IntraVENous Q6H    levothyroxine (SYNTHROID) tablet 25 mcg  25 mcg Oral 6am    polyethylene glycol (MIRALAX) packet 17 g  17 g Oral EVERY OTHER DAY    sodium chloride (NS) flush 5-10 mL  5-10 mL IntraVENous PRN    acetaminophen (TYLENOL) tablet 650 mg  650 mg Oral Q6H PRN    acetaminophen (TYLENOL) tablet 650 mg  650 mg Oral Q6H PRN    albuterol (PROVENTIL VENTOLIN) nebulizer solution 2.5 mg  2.5 mg Nebulization Q6H PRN    apixaban (ELIQUIS) tablet 2.5 mg  2.5 mg Oral BID    midodrine (PROAMITINE) tablet 5 mg  5 mg Oral TID    mirtazapine (REMERON) tablet 15 mg  15 mg Oral QHS    sodium chloride (NS) flush 5-40 mL  5-40 mL IntraVENous Q8H        Review of Systems  A comprehensive review of systems was negative. Objective:     Patient Vitals for the past 24 hrs:   BP Temp Pulse Resp SpO2 Weight   10/30/19 1940 95/61 97.4 °F (36.3 °C) 70 20 100 %    10/30/19 1518 95/62 97.8 °F (36.6 °C) 70 15 96 %    10/30/19 1111 100/65 97.5 °F (36.4 °C) 70 17 95 %    10/30/19 0755 92/63 97.3 °F (36.3 °C) 73 18 99 %    10/30/19 0512      122 lb 2.2 oz (55.4 kg)   10/30/19 0324 90/65 98.2 °F (36.8 °C) 73 18 97 % 128 lb 6.4 oz (58.2 kg)   10/29/19 2229 94/63 97.4 °F (36.3 °C) 74 18 94 %      No intake/output data recorded.   10/29 0701 - 10/30 1900  In: 200 [I.V.:200]  Out: -     Physical Exam:   Visit Vitals  BP 95/61 (BP 1 Location: Left arm, BP Patient Position: At rest) Pulse 70   Temp 97.4 °F (36.3 °C)   Resp 20   Ht 5' 1\" (1.549 m)   Wt 122 lb 2.2 oz (55.4 kg)   SpO2 100%   BMI 23.08 kg/m²     General appearance: alert, cooperative, no distress, appears stated age  Neck: supple, symmetrical, trachea midline, no adenopathy, thyroid: not enlarged, symmetric, no tenderness/mass/nodules, no carotid bruit and no JVD  Lungs: clear to auscultation bilaterally  Heart: regular rate and rhythm, S1, S2 normal, no murmur, click, rub or gallop  Abdomen: soft, non-tender. Bowel sounds normal. No masses,  no organomegaly  Extremities: extremities normal, atraumatic, no cyanosis or edema        Data Review   Recent Results (from the past 24 hour(s))   METABOLIC PANEL, BASIC    Collection Time: 10/30/19  3:12 AM   Result Value Ref Range    Sodium 141 136 - 145 mmol/L    Potassium 3.9 3.5 - 5.1 mmol/L    Chloride 108 97 - 108 mmol/L    CO2 26 21 - 32 mmol/L    Anion gap 7 5 - 15 mmol/L    Glucose 91 65 - 100 mg/dL    BUN 26 (H) 6 - 20 MG/DL    Creatinine 0.98 0.55 - 1.02 MG/DL    BUN/Creatinine ratio 27 (H) 12 - 20      GFR est AA >60 >60 ml/min/1.73m2    GFR est non-AA 53 (L) >60 ml/min/1.73m2    Calcium 8.3 (L) 8.5 - 10.1 MG/DL           Assessment:     Active Problems:    Cardiomyopathy, dilated, 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 Cont parenteral Abs for seeding. Not sure what is infected--? mitrial valve,pacemaker wires. 2.Pt feels better. 3.Will mobilize. 4.Cont diuretic for CHF. Well compensated.

## 2019-10-31 NOTE — PROGRESS NOTES
Problem: Mobility Impaired (Adult and Pediatric)  Goal: *Acute Goals and Plan of Care (Insert Text)  Description  FUNCTIONAL STATUS PRIOR TO ADMISSION: Pt with baseline dementia therefore unable to get fully accurate report however pt reports ambulating with use of rollator walker and denies history of falls. HOME SUPPORT PRIOR TO ADMISSION: Pt lives at [facility] (memory care unit?) [facility] and receives assist as needed from staff. Physical Therapy Goals  Initiated 10/31/2019  1. Patient will move from supine to sit and sit to supine , scoot up and down and roll side to side in bed with supervision/set-up within 7 day(s). 2.  Patient will transfer from bed to chair and chair to bed with supervision/set-up using the least restrictive device within 7 day(s). 3.  Patient will perform sit to stand with supervision/set-up within 7 day(s). 4.  Patient will ambulate with supervision/set-up for 100 feet with the least restrictive device within 7 day(s). 10/31/2019 1004 by Franco Garay, PT  Outcome: Progressing Towards Goal   PHYSICAL THERAPY EVALUATION  Patient: Dandy Carlos (41 y.o. female)  Date: 10/31/2019  Primary Diagnosis: Fever [R50.9]        Precautions:          ASSESSMENT  Based on the objective data described below, the patient presents with baseline dementia/confusion, generalized weakness, decreased endurance/activity tolerance, and overall impaired functional mobility. Pt likely close to her baseline level however required CGAx1 during ambulation trial of 40ft w/ RW support secondary to mild increase in trunk sway. No overt LOB however fair gait stability overall. Pt fatigued after limited distance mobility, declining further participation with therapy. Anticipate pt will continue to progress well with therapy in acute care setting and be most appropriate to return to [facility] with HHPT/therapy at facility at discharge.  .    Current Level of Function Impacting Discharge (mobility/balance): supervision for sit<>stand transfers, CGA during ambulation w/ RW support    Functional Outcome Measure: The patient scored 45/100 on the Barthel Index outcome measure which is indicative of moderate impairment in ADLs and functional mobility. Other factors to consider for discharge: baseline dementia - will likely function best in familiar environment of [facility]     Patient will benefit from skilled therapy intervention to address the above noted impairments. PLAN :  Recommendations and Planned Interventions: bed mobility training, transfer training, gait training, therapeutic exercises, patient and family training/education and therapeutic activities      Frequency/Duration: Patient will be followed by physical therapy:  3 times a week to address goals. Recommendation for discharge: (in order for the patient to meet his/her long term goals)  Return to [facility] with [facility] PT at facility     This discharge recommendation:  Has been made in collaboration with the attending provider and/or case management    IF patient discharges home will need the following DME: patient owns DME required for discharge         SUBJECTIVE:   Patient stated Oh, I can't remember how long I've lived there.     OBJECTIVE DATA SUMMARY:   HISTORY:    Past Medical History:   Diagnosis Date    Aortic insufficiency     Asthma     Chronic a-fib 10/28/2019    CKD (chronic kidney disease) stage 3, GFR 30-59 ml/min ([facility]) 1/10/2018    CLL (chronic lymphocytic leukemia) ([facility])     Congestive heart failure, NYHA class II, chronic, systolic ([facility])     Endocarditis 10/28/2019    Heart failure ([facility]     Hypertension     Mitral valvular regurgitation 1/22/2016    ECHO 2/3/17: LV dilated, EF 20-25%, mild LVH, RV mod dilated, mild- mod MELANIA, mod-severe MR, mod AI ECHO 7/29/17: EF 15%, severe DHK, reduced RVEF, RVH, RVSP 35 mmHg  Mild LAE, mod-marked MA fibrosis, mod-severe MR (2+), AO root dilated,      Nonrheumatic aortic valve insufficiency 10/16/2018    Presence of biventricular automatic cardioverter/defibrillator (AICD) 7/2/2015    [employer] biventricular AICD implant    Stomach ulcer      Past Surgical History:   Procedure Laterality Date    HX BREAST BIOPSY Bilateral     40 years ago    HX COLONOSCOPY      HX HEENT      cataracts removed    HX HYSTERECTOMY      HX OTHER SURGICAL      lymph node surgery    HX TONSILLECTOMY      CO EGD TRANSORAL BIOPSY SINGLE/MULTIPLE  5/23/2012         SINUS SURGERY PROC UNLISTED      Nasal polyps removed       Personal factors and/or comorbidities impacting plan of care: dementia    Home Situation  Home Environment: Assisted living  [address] [place] [address] Jaime Name: Bernice Spencer  # Steps to Enter: 0  One/Two Story Residence: One story  Living Alone: No  Support Systems: Assisted living, Family member(s)  Patient Expects to be Discharged to[de-identified] Assisted living  Current DME Used/Available at Home: hazel Galvan    EXAMINATION/PRESENTATION/DECISION MAKING:   Critical Behavior:  Neurologic State: Alert, Appropriate for age  Orientation Level: Disoriented to person, Disoriented to place, Disoriented to situation, Disoriented to time  Cognition: Follows commands     Hearing: Auditory  Auditory Impairment: Hard of hearing, bilateral  Skin:  intact  Edema: none noted   Range Of Motion:  AROM: Generally decreased, functional                       Strength:    Strength: Generally decreased, functional                    Tone & Sensation:   Tone: Normal                              Coordination:  Coordination: Within functional limits  Vision:      Functional Mobility:  Bed Mobility:  Rolling: Other (comment)(seated in bedside chair upon arrival )           Transfers:  Sit to Stand: Supervision  Stand to Sit: Supervision                       Balance:   Sitting: Intact  Standing: Impaired; With support(rolling walker)  Standing - Static: Good;Constant support  Standing - Dynamic : Fair;Constant support  Ambulation/Gait Training:  Distance (ft): 40 Feet (ft)  Assistive Device: Walker, rolling;Gait belt  Ambulation - Level of Assistance: Contact guard assistance        Gait Abnormalities: Decreased step clearance;Trunk sway increased        Base of Support: Narrowed     Speed/Lawanda: Pace decreased (<100 feet/min)  Step Length: Left shortened;Right shortened                   Functional Measure:  Barthel Index:    Bathin  Bladder: 5  Bowels: 5  Groomin  Dressin  Feeding: 10  Mobility: 0  Stairs: 0  Toilet Use: 5  Transfer (Bed to Chair and Back): 10  Total: 45/100       The Barthel ADL Index: Guidelines  1. The index should be used as a record of what a patient does, not as a record of what a patient could do. 2. The main aim is to establish degree of independence from any help, physical or verbal, however minor and for whatever reason. 3. The need for supervision renders the patient not independent. 4. A patient's performance should be established using the best available evidence. Asking the patient, friends/relatives and nurses are the usual sources, but direct observation and common sense are also important. However direct testing is not needed. 5. Usually the patient's performance over the preceding 24-48 hours is important, but occasionally longer periods will be relevant. 6. Middle categories imply that the patient supplies over 50 per cent of the effort. 7. Use of aids to be independent is allowed. Cara Daly., Barthel, DBillWBill ([id number]). Functional evaluation: the Barthel Index. [facility] (14)2. TAYLER Auguste, Maximo Paige., [facility], [place], [address] [place] [address] (). Measuring the change indisability after inpatient rehabilitation; comparison of the responsiveness of the Barthel Index and Functional [place] Measure. Journal of Neurology, Neurosurgery, and Psychiatry, 66(4), 886-914.   PIEDAD Guillaume, SMILEY Moseley, Albina Ruiz MBillA. (2004.) Assessment of post-stroke quality of life in cost-effectiveness studies: The usefulness of the Barthel Index and the EuroQoL-5D. Quality of Life Research, 15, 096-76           Physical Therapy Evaluation Charge Determination   History Examination Presentation Decision-Making   MEDIUM  Complexity : 1-2 comorbidities / personal factors will impact the outcome/ POC  MEDIUM Complexity : 3 Standardized tests and measures addressing body structure, function, activity limitation and / or participation in recreation  MEDIUM Complexity : Evolving with changing characteristics  MEDIUM Complexity : FOTO score of 26-74      Based on the above components, the patient evaluation is determined to be of the following complexity level: LOW     Pain Rating:  Denied complaints of pain    Activity Tolerance:   VSS on Ra however pt fatigued quickly   Please refer to the flowsheet for vital signs taken during this treatment. After treatment patient left in no apparent distress:   Sitting in chair, Call bell within reach and Bed / chair alarm activated    COMMUNICATION/EDUCATION:   The patients plan of care was discussed with: Registered Nurse. Fall prevention education was provided and the patient/caregiver indicated understanding., Patient/family have participated as able in goal setting and plan of care. and Patient/family agree to work toward stated goals and plan of care.     Thank you for this referral.  Pastor Davila, PT, DPT   Time Calculation: 15 mins

## 2019-10-31 NOTE — PROGRESS NOTES
Hematology Oncology Progress Note         Follow up for: CLL    Chart notes reviewed since last visit. Case discussed with following:     Patient complains of the following: No complaints, she feels good  Additional concerns noted by the staff:     Patient Vitals for the past 24 hrs:   BP Temp Pulse Resp SpO2 Weight   10/31/19 0736 101/63 97.2 °F (36.2 °C) 67 16 97 %    10/31/19 0627      55.4 kg (122 lb 3.2 oz)   10/31/19 0314 92/61 97.5 °F (36.4 °C) 70 18 95 %    10/31/19 0001  97.3 °F (36.3 °C)       10/30/19 2307 102/63 (!) 100.5 °F (38.1 °C) 74 18 97 %    10/30/19 1940 95/61 97.4 °F (36.3 °C) 70 20 100 %    10/30/19 1518 95/62 97.8 °F (36.6 °C) 70 15 96 %        ROS negative for 11 organ systems (but patient mildly confused and this may not be accurate).      Physical Examination:  Gen NAD  CV reg  Lungs clear  abd benign      Labs:  Recent Results (from the past 24 hour(s))   METABOLIC PANEL, BASIC    Collection Time: 10/31/19  1:17 AM   Result Value Ref Range    Sodium 141 136 - 145 mmol/L    Potassium 3.7 3.5 - 5.1 mmol/L    Chloride 108 97 - 108 mmol/L    CO2 26 21 - 32 mmol/L    Anion gap 7 5 - 15 mmol/L    Glucose 85 65 - 100 mg/dL    BUN 25 (H) 6 - 20 MG/DL    Creatinine 1.08 (H) 0.55 - 1.02 MG/DL    BUN/Creatinine ratio 23 (H) 12 - 20      GFR est AA 57 (L) >60 ml/min/1.73m2    GFR est non-AA 47 (L) >60 ml/min/1.73m2    Calcium 8.2 (L) 8.5 - 10.1 MG/DL   CBC WITH MANUAL DIFF    Collection Time: 10/31/19  1:17 AM   Result Value Ref Range    WBC 1.9 (L) 3.6 - 11.0 K/uL    RBC 3.21 (L) 3.80 - 5.20 M/uL    HGB 9.3 (L) 11.5 - 16.0 g/dL    HCT 30.4 (L) 35.0 - 47.0 %    MCV 94.7 80.0 - 99.0 FL    MCH 29.0 26.0 - 34.0 PG    MCHC 30.6 30.0 - 36.5 g/dL    RDW 17.2 (H) 11.5 - 14.5 %    PLATELET 487 (L) 748 - 400 K/uL    MPV 11.6 8.9 - 12.9 FL    NRBC 0.0 0  WBC    ABSOLUTE NRBC 0.00 0.00 - 0.01 K/uL    NEUTROPHILS 62 32 - 75 %    BAND NEUTROPHILS 6 0 - 6 %    LYMPHOCYTES 25 12 - 49 % MONOCYTES 4 (L) 5 - 13 %    EOSINOPHILS 3 0 - 7 %    BASOPHILS 0 0 - 1 %    METAMYELOCYTES 0 0 %    MYELOCYTES 0 0 %    PROMYELOCYTES 0 0 %    BLASTS 0 0 %    OTHER CELL 0 0      IMMATURE GRANULOCYTES 0 %    ABS. NEUTROPHILS 1.2 (L) 1.8 - 8.0 K/UL    ABS. LYMPHOCYTES 0.5 (L) 0.8 - 3.5 K/UL    ABS. MONOCYTES 0.1 0.0 - 1.0 K/UL    ABS. EOSINOPHILS 0.1 0.0 - 0.4 K/UL    ABS. BASOPHILS 0.0 0.0 - 0.1 K/UL    ABS. IMM. GRANS. 0.0 K/UL    DF MANUAL      RBC COMMENTS ANISOCYTOSIS  1+           Assessment and Plan:   CLL - counts and adenopathy very acceptable at present so can hold ibruitinib for now. Will not resume until clear cut signs of progression. 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 IgG ok, WBC down slightly most likely due to abx, monitor    Gram positive bacteremia/enterococcus.  On abx per primary team    Hx HTN    Hx CKD    Hx CHF - on coreg    AICD

## 2019-11-01 ENCOUNTER — APPOINTMENT (OUTPATIENT)
Dept: GENERAL RADIOLOGY | Age: 84
DRG: 871 | End: 2019-11-01
Attending: INTERNAL MEDICINE
Payer: MEDICARE

## 2019-11-01 LAB
ANION GAP SERPL CALC-SCNC: 7 MMOL/L (ref 5–15)
BASOPHILS # BLD: 0 K/UL (ref 0–0.1)
BASOPHILS NFR BLD: 0 % (ref 0–1)
BLASTS NFR BLD MANUAL: 0 %
BUN SERPL-MCNC: 23 MG/DL (ref 6–20)
BUN/CREAT SERPL: 22 (ref 12–20)
CALCIUM SERPL-MCNC: 8.6 MG/DL (ref 8.5–10.1)
CHLORIDE SERPL-SCNC: 110 MMOL/L (ref 97–108)
CO2 SERPL-SCNC: 26 MMOL/L (ref 21–32)
CREAT SERPL-MCNC: 1.04 MG/DL (ref 0.55–1.02)
DIFFERENTIAL METHOD BLD: ABNORMAL
EOSINOPHIL # BLD: 0 K/UL (ref 0–0.4)
EOSINOPHIL NFR BLD: 1 % (ref 0–7)
ERYTHROCYTE [DISTWIDTH] IN BLOOD BY AUTOMATED COUNT: 17.9 % (ref 11.5–14.5)
FOLATE SERPL-MCNC: 16.2 NG/ML (ref 5–21)
GLUCOSE BLD STRIP.AUTO-MCNC: 86 MG/DL (ref 65–100)
GLUCOSE SERPL-MCNC: 88 MG/DL (ref 65–100)
HCT VFR BLD AUTO: 30.8 % (ref 35–47)
HGB BLD-MCNC: 9.4 G/DL (ref 11.5–16)
IMM GRANULOCYTES # BLD AUTO: 0 K/UL
IMM GRANULOCYTES NFR BLD AUTO: 0 %
LYMPHOCYTES # BLD: 0.6 K/UL (ref 0.8–3.5)
LYMPHOCYTES NFR BLD: 41 % (ref 12–49)
MCH RBC QN AUTO: 28.8 PG (ref 26–34)
MCHC RBC AUTO-ENTMCNC: 30.5 G/DL (ref 30–36.5)
MCV RBC AUTO: 94.5 FL (ref 80–99)
METAMYELOCYTES NFR BLD MANUAL: 0 %
MONOCYTES # BLD: 0.2 K/UL (ref 0–1)
MONOCYTES NFR BLD: 11 % (ref 5–13)
MYELOCYTES NFR BLD MANUAL: 0 %
NEUTS BAND NFR BLD MANUAL: 0 % (ref 0–6)
NEUTS SEG # BLD: 0.7 K/UL (ref 1.8–8)
NEUTS SEG NFR BLD: 47 % (ref 32–75)
NRBC # BLD: 0 K/UL (ref 0–0.01)
NRBC BLD-RTO: 0 PER 100 WBC
OTHER CELLS NFR BLD MANUAL: 0 %
PLATELET # BLD AUTO: 119 K/UL (ref 150–400)
PMV BLD AUTO: 11.1 FL (ref 8.9–12.9)
POTASSIUM SERPL-SCNC: 3.9 MMOL/L (ref 3.5–5.1)
PROMYELOCYTES NFR BLD MANUAL: 0 %
RBC # BLD AUTO: 3.26 M/UL (ref 3.8–5.2)
RBC MORPH BLD: ABNORMAL
SERVICE CMNT-IMP: NORMAL
SODIUM SERPL-SCNC: 143 MMOL/L (ref 136–145)
VIT B12 SERPL-MCNC: 1907 PG/ML (ref 193–986)
WBC # BLD AUTO: 1.5 K/UL (ref 3.6–11)

## 2019-11-01 PROCEDURE — C1751 CATH, INF, PER/CENT/MIDLINE: HCPCS

## 2019-11-01 PROCEDURE — 74011250637 HC RX REV CODE- 250/637: Performed by: HOSPITALIST

## 2019-11-01 PROCEDURE — 76937 US GUIDE VASCULAR ACCESS: CPT

## 2019-11-01 PROCEDURE — 74011250636 HC RX REV CODE- 250/636: Performed by: INTERNAL MEDICINE

## 2019-11-01 PROCEDURE — 36573 INSJ PICC RS&I 5 YR+: CPT | Performed by: INTERNAL MEDICINE

## 2019-11-01 PROCEDURE — 82962 GLUCOSE BLOOD TEST: CPT

## 2019-11-01 PROCEDURE — 65660000000 HC RM CCU STEPDOWN

## 2019-11-01 PROCEDURE — 80048 BASIC METABOLIC PNL TOTAL CA: CPT

## 2019-11-01 PROCEDURE — 85027 COMPLETE CBC AUTOMATED: CPT

## 2019-11-01 PROCEDURE — 82525 ASSAY OF COPPER: CPT

## 2019-11-01 PROCEDURE — 84630 ASSAY OF ZINC: CPT

## 2019-11-01 PROCEDURE — 97110 THERAPEUTIC EXERCISES: CPT

## 2019-11-01 PROCEDURE — 97116 GAIT TRAINING THERAPY: CPT

## 2019-11-01 PROCEDURE — 82746 ASSAY OF FOLIC ACID SERUM: CPT

## 2019-11-01 PROCEDURE — 74011250637 HC RX REV CODE- 250/637: Performed by: INTERNAL MEDICINE

## 2019-11-01 PROCEDURE — 82607 VITAMIN B-12: CPT

## 2019-11-01 PROCEDURE — 94760 N-INVAS EAR/PLS OXIMETRY 1: CPT

## 2019-11-01 PROCEDURE — 71045 X-RAY EXAM CHEST 1 VIEW: CPT

## 2019-11-01 PROCEDURE — 02HV33Z INSERTION OF INFUSION DEVICE INTO SUPERIOR VENA CAVA, PERCUTANEOUS APPROACH: ICD-10-PCS | Performed by: RADIOLOGY

## 2019-11-01 PROCEDURE — 77030018786 HC NDL GD F/USND BARD -B

## 2019-11-01 PROCEDURE — 36415 COLL VENOUS BLD VENIPUNCTURE: CPT

## 2019-11-01 PROCEDURE — 74011000258 HC RX REV CODE- 258: Performed by: INTERNAL MEDICINE

## 2019-11-01 RX ORDER — BACITRACIN 500 UNIT/G
1 PACKET (EA) TOPICAL AS NEEDED
Status: DISCONTINUED | OUTPATIENT
Start: 2019-11-01 | End: 2019-11-06 | Stop reason: HOSPADM

## 2019-11-01 RX ORDER — LORAZEPAM 0.5 MG/1
0.5 TABLET ORAL ONCE
Status: ACTIVE | OUTPATIENT
Start: 2019-11-01 | End: 2019-11-02

## 2019-11-01 RX ORDER — HEPARIN 100 UNIT/ML
300 SYRINGE INTRAVENOUS AS NEEDED
Status: DISCONTINUED | OUTPATIENT
Start: 2019-11-01 | End: 2019-11-06 | Stop reason: HOSPADM

## 2019-11-01 RX ADMIN — AMPICILLIN SODIUM 2 G: 2 INJECTION, POWDER, FOR SOLUTION INTRAMUSCULAR; INTRAVENOUS at 00:48

## 2019-11-01 RX ADMIN — MIDODRINE HYDROCHLORIDE 5 MG: 5 TABLET ORAL at 16:34

## 2019-11-01 RX ADMIN — POTASSIUM CHLORIDE 20 MEQ: 750 TABLET, FILM COATED, EXTENDED RELEASE ORAL at 22:20

## 2019-11-01 RX ADMIN — Medication 10 ML: at 06:00

## 2019-11-01 RX ADMIN — POTASSIUM CHLORIDE 20 MEQ: 750 TABLET, FILM COATED, EXTENDED RELEASE ORAL at 09:37

## 2019-11-01 RX ADMIN — APIXABAN 2.5 MG: 2.5 TABLET, FILM COATED ORAL at 09:36

## 2019-11-01 RX ADMIN — MIDODRINE HYDROCHLORIDE 5 MG: 5 TABLET ORAL at 22:24

## 2019-11-01 RX ADMIN — LEVOTHYROXINE SODIUM 25 MCG: 25 TABLET ORAL at 06:25

## 2019-11-01 RX ADMIN — AMPICILLIN SODIUM 2 G: 2 INJECTION, POWDER, FOR SOLUTION INTRAMUSCULAR; INTRAVENOUS at 16:33

## 2019-11-01 RX ADMIN — FUROSEMIDE 40 MG: 40 TABLET ORAL at 09:36

## 2019-11-01 RX ADMIN — MIDODRINE HYDROCHLORIDE 5 MG: 5 TABLET ORAL at 09:36

## 2019-11-01 RX ADMIN — SODIUM CHLORIDE 50 ML/HR: 900 INJECTION, SOLUTION INTRAVENOUS at 09:38

## 2019-11-01 RX ADMIN — CEFTRIAXONE SODIUM 2 G: 2 INJECTION, POWDER, FOR SOLUTION INTRAMUSCULAR; INTRAVENOUS at 04:20

## 2019-11-01 RX ADMIN — Medication 10 ML: at 22:20

## 2019-11-01 RX ADMIN — AMPICILLIN SODIUM 2 G: 2 INJECTION, POWDER, FOR SOLUTION INTRAMUSCULAR; INTRAVENOUS at 22:54

## 2019-11-01 RX ADMIN — Medication 1 CAPSULE: at 09:36

## 2019-11-01 RX ADMIN — CEFTRIAXONE SODIUM 2 G: 2 INJECTION, POWDER, FOR SOLUTION INTRAMUSCULAR; INTRAVENOUS at 18:11

## 2019-11-01 RX ADMIN — MIRTAZAPINE 15 MG: 15 TABLET, FILM COATED ORAL at 22:20

## 2019-11-01 RX ADMIN — AMPICILLIN SODIUM 2 G: 2 INJECTION, POWDER, FOR SOLUTION INTRAMUSCULAR; INTRAVENOUS at 06:26

## 2019-11-01 RX ADMIN — APIXABAN 2.5 MG: 2.5 TABLET, FILM COATED ORAL at 22:20

## 2019-11-01 RX ADMIN — POTASSIUM CHLORIDE 20 MEQ: 750 TABLET, FILM COATED, EXTENDED RELEASE ORAL at 16:34

## 2019-11-01 NOTE — PROGRESS NOTES
Infectious Disease Progress Note    IMPRESSION:     - Sepsis s/p fever,change in mental status,fever spikes since admission  last temp 103.2 on 10/27    -E.faecalis Gp D  bacteremia with BC+ / - 10/23, BC - 10/27- E.faecalis spp 1/2 bottles. BC - 10/30 - NG    -Mitral valve endocarditis . WYATT - 10/29- vegetation on MV 10 x 3 mm flat, non mobile , pacer wires appear normal    -Pt has AICD , US done - no fluid in pocket     -ECHO-10/27-possible vegetation on AV/acer wires not seen well enough to evaluate for vegetation    -ECHO 10/28- cannot rule out vegetation on pacer tip     - H/o lymphoma    - Leucopenia WBC 1.5 , antimicrobials probably contributory, D/w DR Aster Ortiz       PLAN:      · Ampicillin 2 g IV q6h &  Ceftriaxone 2 G IV q12 x 6 weeks  end date 12/10, remove picc at end of therapy  · Weekly CBC , CMP,fortnightly  ESR/ CRP fax reports to [phone], call with abnormal results at [phone]  · Call with antibiotic related adverse effects, monitor CBC. · Take probiotic yogurt  · Out pt follow up on  at 930 in ID clinic   · Plan of care D/w pt         Subjective:     Pt seen . Denies  New complaints . No fever, chills    Review of Systems:  A comprehensive review of systems was negative except for that written in the History of Present Illness. 10 point ROS obtained . All other systems negative . Objective:     Blood pressure 107/61, pulse 70, temperature 96.8 °F (36 °C), resp. rate 20, height 5' 1\" (1.549 m), weight 125 lb 12.8 oz (57.1 kg), SpO2 98 %.   Temp (24hrs), Av.3 °F (36.3 °C), Min:96.8 °F (36 °C), Max:97.5 °F (36.4 °C)      Patient Vitals for the past 24 hrs:   Temp Pulse Resp BP SpO2   19 1034 96.8 °F (36 °C) 70 20 107/61 98 %   19 0710 97.5 °F (36.4 °C) 71 18 107/67 94 %   19 0309 97.3 °F (36.3 °C) 84 18 107/67 99 %   10/31/19 2343 97.5 °F (36.4 °C) 69 18 100/65 99 %   10/31/19 2153    90/57 99 %   10/31/19 2136  75  94/56 (!) 89 %   10/31/19 1944 97.3 °F (36.3 °C) 78 18 (!) 86/56 94 %   10/31/19 1502 97.4 °F (36.3 °C) 76 16 101/58 94 %         Lines:  Peripheral IV:       Physical Exam:   General:  Awake cooperative,    Eyes:  Sclera anicteric. Pupils equally round and reactive to light. Mouth/Throat: Mucous membranes normal, oral pharynx clear   Neck: Supple   Lungs:   Reduced  auscultation bases  Chest - pacemaker pocket intact  no swelling +    CV:  Regular rate and rhythm,no murmur, click, rub or gallop   Abdomen:   Soft, non-tender.  bowel sounds normal. non-distended   Extremities: No  edema   Skin: Skin color, texture, turgor normal. no acute rash or lesions   Lymph nodes: Cervical and supraclavicular normal   Musculoskeletal: No swelling or deformity   Lines/Devices:  Intact, no erythema, drainage or tenderness   Psych: Alert and oriented, normal mood        Data Review:   CBC:   Recent Labs     11/01/19  0445 10/31/19  2120 10/31/19  0117   WBC 1.5* 1.9* 1.9*   RBC 3.26* 3.36* 3.21*   HGB 9.4* 9.8* 9.3*   HCT 30.8* 31.8* 30.4*   * 121* 130*   GRANS 47  --  62   LYMPH 41  --  25   EOS 1  --  3     CMP:   Recent Labs     11/01/19 0445 10/31/19  0117 10/30/19  0312   GLU 88 85 91    141 141   K 3.9 3.7 3.9   * 108 108   CO2 26 26 26   BUN 23* 25* 26*   CREA 1.04* 1.08* 0.98   CA 8.6 8.2* 8.3*   AGAP 7 7 7   BUCR 22* 23* 27*       Studies:      Lab Results   Component Value Date/Time    Culture result: NO GROWTH 2 DAYS 10/30/2019 03:21 AM    Culture result: (A) 10/27/2019 03:33 PM     ENTEROCOCCUS SPECIES GROWING IN 1 OF 2 BOTTLES DRAWN (SITE = [facility])    Culture result:  10/27/2019 03:33 PM     PLEASE REFER TO PREVIOUS BLOOD CULTURE  [id number] COLLECTED 10/23/19 FOR ID/SENSITIVITIES      Culture result: REMAINING BOTTLE(S) HAS/HAVE NO GROWTH SO FAR 10/27/2019 03:33 PM    Culture result: (A) 10/23/2019 10:50 AM     ENTEROCOCCUS FAECALIS GROUP D GROWING IN BOTH BOTTLES DRAWN SITE= RT WRIST GENTAMICIN SYNERGY SCREEN IS SENSITIVE @ < OR = 500 mcg/ml STREPTOMYCIN SYNERGY SCREEN IS SENSITIVE @ < OR =1000 mcg/ml    Culture result: (A) 10/23/2019 10:50 AM     PRELIMINARY REPORT OF GRAM POSITIVE COCCI IN PAIRS GROWING IN BOTH BOTTLES DRAWN CALLED TO AND READ BACK BY Derick Lovell [facility] ON 10/23/19 AT [address] [facility], [facility] [address]). MS        XR Results (most recent):  Results from Hospital Encounter encounter on 10/23/19   XR CHEST PORT    Narrative INDICATION: . arrythmia  Additional history:  COMPARISON: Previous chest xray, 10/23/2019. LIMITATIONS: Portable technique. Brad Reese FINDINGS: Single frontal view of the chest.   .  Lines/tubes/surgical: Cardiac assist device appears unchanged. Heart/mediastinum: Unremarkable. Lungs/pleura: Low lung volumes with bibasilar opacities and no definite acute  process. No visible pneumothorax. Additional Comments: None. .    Impression IMPRESSION:  1. No radiographic evidence of acute cardiopulmonary disease.               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    DILAN (obstructive sleep apnea) G47.33    Rhinitis J31.0    Anemia D64.9    Reactive airway disease J45.909    HTN (hypertension) I10    Gout M10.9    LBBB (left bundle branch block) I44.7    Presence of biventricular automatic cardioverter/defibrillator (AICD) Z95.810    Chronic systolic congestive heart failure (HCC) I50.22    Lymphoma (HCC) C85.90    Mitral valvular regurgitation I34.0    Physical deconditioning R53.81    Gastroesophageal reflux disease with esophagitis K21.0    Thoracic aortic aneurysm without rupture (HCC) I71.2    Paroxysmal atrial fibrillation (HCC) I48.0    Xerosis of skin L85.3    Dyslipidemia E78.5    CKD (chronic kidney disease) stage 3, GFR 30-59 ml/min (HCC) N18.3    Acquired hypothyroidism E03.9    Nonrheumatic aortic valve insufficiency I35.1    UTI (urinary tract infection) N39.0    Counseling regarding advanced care planning and goals of care Z71.89    CAP (community acquired pneumonia) J18.9    Poor short-term memory R41.3    Fever R50.9    Endocarditis of mitral valve I05.8    Chronic a-fib I48.20         ICD-10-CM ICD-9-CM    1. Fever, unspecified fever cause R50.9 780.60    2. Dehydration E86.0 276.51    3. Generalized weakness R53.1 780.79    4. Confusion R41.0 298.9    5. Goals of care, counseling/discussion Z71.89 V65.49    6. Metabolic encephalopathy G16.06 348.31    7. Bacteremia due to Enterococcus R78.81 790.7     B95.2 041.04    8. 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 I have discussed medication side effects and warnings with the patient as well.     Reviewed test results at length with patient    Anti-infectives:     Ceftriaxone / Ampicillin - 10/27  S/p Ceftriaxone /Vancomycin - 10/24-10/26     Alvis Dakin MD FACP

## 2019-11-01 NOTE — PROGRESS NOTES
Rapid response called because of NSVT with VR > 180 which resolved after a few seconds. Pt admitted and treated for mitral valve endocarditis . Patient is being treated by IV antibiotics, and refused to have any valve surgery. Patient has history of severe CHF status post AICD, baseline dementia and CLL. When seen, patient was lethargic but arousable, which  is her baseline per her RN . She denies any chest pain or shortness of breath. On physical exam  Systolic blood pressure in the low 90s ( baseline per RN) , heart rate 80  Saturations in the low 90%  EKG: Paced rhythm, intermittent vtach , no acute st changes seen     Assessment and plan  Nonsustained V. tach in settings of mitral valve endocarditis  Acute respiratory failure with hypoxia   Altered mental status  Systolic CHF status post AICD  We will check CBC, BMP Mag phos, troponin  We will check ABG, chest x-ray  If V. tach became sustained, will give a bolus of amiodarone.  Pt has AICD which immediately kicks in when pt goes into Vtach   Continue with oxygen  Labs and ABG reviewed and chest xray : no hypercapnia , no CHF   Mg/phos wnl   Mild ANDREW  Started on IVF       Critical time : 40mn

## 2019-11-01 NOTE — PROGRESS NOTES
Problem: Mobility Impaired (Adult and Pediatric)  Goal: *Acute Goals and Plan of Care (Insert Text)  Description  FUNCTIONAL STATUS PRIOR TO ADMISSION: Pt with baseline dementia therefore unable to get fully accurate report however pt reports ambulating with use of rollator walker and denies history of falls. HOME SUPPORT PRIOR TO ADMISSION: Pt lives at [facility] (memory care unit?) [facility] and receives assist as needed from staff. Physical Therapy Goals  Initiated 10/31/2019  1. Patient will move from supine to sit and sit to supine , scoot up and down and roll side to side in bed with supervision/set-up within 7 day(s). 2.  Patient will transfer from bed to chair and chair to bed with supervision/set-up using the least restrictive device within 7 day(s). 3.  Patient will perform sit to stand with supervision/set-up within 7 day(s). 4.  Patient will ambulate with supervision/set-up for 100 feet with the least restrictive device within 7 day(s). Note:   PHYSICAL THERAPY TREATMENT  Patient: Mati Rg (80 y.o. female)  Date: 11/1/2019  Diagnosis: Fever [R50.9]        Precautions:  falls  Chart, physical therapy assessment, plan of care and goals were reviewed. ASSESSMENT  Patient continues with skilled PT services and is progressing towards goals, pt did very well today, has some confusion, no LOB,min SOB, does fair with transfers and good with ther-ex, vc's for safety and proper RW use. Current Level of Function Impacting Discharge (mobility/balance): supervision/CGA         PLAN :  Patient continues to benefit from skilled intervention to address the above impairments. Continue treatment per established plan of care. to address goals.     Recommendation for discharge: (in order for the patient to meet his/her long term goals) Return to [facility] with [facility] PT at facility       This discharge recommendation:  Has been made in collaboration with the attending provider and/or case management    IF patient discharges home will need the following DME: patient owns DME required for discharge     OBJECTIVE DATA SUMMARY:     Critical Behavior:  Neurologic State: Alert  Orientation Level: Disoriented to place, Disoriented to situation, Disoriented to time, Oriented to person  Cognition: Follows commands, Impulsive, Memory loss, Poor safety awareness     Functional Mobility Training:  Bed Mobility:  Rolling: Supervision  Supine to Sit: Supervision  Scooting: Supervision  Level of Assistance: Supervision  Interventions: Verbal cues    Transfers:  Sit to Stand: Supervision  Stand to Sit: Supervision  Bed to Chair: Contact guard assistance  Interventions: Tactile cues; Verbal cues  Level of Assistance: Contact guard assistance    Balance:  Standing - Static: Fair    Ambulation/Gait Training:  Distance (ft): 80 Feet (ft)  Assistive Device: Walker, rolling;Gait belt  Ambulation - Level of Assistance: Contact guard assistance  Gait Abnormalities: Decreased step clearance  Right Side Weight Bearing: Full  Left Side Weight Bearing: Full  Base of Support: Narrowed  Speed/Lawanda: Pace decreased (<100 feet/min)  Step Length: Left shortened;Right shortened    Therapeutic Exercises:   sitting  EXERCISE   Sets   Reps   Active Active Assist   Passive   Comments   Ankle pumps 1 10 ? ? ? bilat   Heel raises 1 10 ? ? ? \"   Toe tap 1 10 ? ? ? \"   Knee ext 1 10 ? ? ? \"   Hip flex 1 10 ? ? ? \"     Pain Rating: see flow sheet    Activity Tolerance: Fair    After treatment patient left in no apparent distress: Sitting in chair    COMMUNICATION/COLLABORATION:   The patients plan of care was discussed with: Registered Nurse    Aliyah Elias PTA   Time Calculation: 25 mins

## 2019-11-01 NOTE — PROGRESS NOTES
Attempted to see pt this am and pt is on [facility] and about to get meds, continue to follow.

## 2019-11-01 NOTE — PROGRESS NOTES
Bedside shift change report GIVEN TO MAXIMUS Zavala. Report included the following information SBAR, Kardex and MAR. SIGNIFICANT CHANGES DURING SHIFT:  2130 - Rapid response called for vtach, patient asymptomatic, vitals stable. Orders were received. CONCERNS TO ADDRESS WITH MD:            [facility] NURSING NOTE   Admission Date 10/23/2019   Admission Diagnosis Fever [R50.9]   Consults IP CONSULT TO HOSPITALIST  IP CONSULT TO PRIMARY CARE PROVIDER  IP CONSULT TO PALLIATIVE CARE - PROVIDER  IP CONSULT TO HEMATOLOGY  IP CONSULT TO INFECTIOUS DISEASES      Cardiac Monitoring [x] Yes [] No      Purposeful Hourly Rounding [x] Yes    Milvia Score Total Score: 5   Milvia score 3 or > [] Bed Alarm [] Avasys [] 1:1 sitter [] Patient refused (Signed refusal form in chart)   Kimani Score Kimani Score: 17   Kimani score 14 or < [] PMT consult [] Wound Care consult    []  Specialty bed  [] Nutrition consult      Influenza Vaccine Received Flu Vaccine for Current Season (usually Sept-March): Yes(Per patient received first week of october.)           Oxygen needs? [x] Room air Oxygen @  []1L    []2L    []3L   []4L    []5L   []6L via  NC   Chronic home O2 use?  [] Yes [] No  Perform O2 challenge test and document in progress note using smart[employer] (.Homeoxygen)      Last bowel movement Last Bowel Movement Date: 10/30/19      Urinary Catheter       External Female Catheter 10/23/19-Urine Output (mL): 200 ml  External Female Catheter 10/23/19-Urine Output (mL): 200 ml     LDAs               Peripheral IV 10/31/19 Left Wrist (Active)   Site Assessment Clean, dry, & intact 11/1/2019  4:57 AM   Phlebitis Assessment 0 11/1/2019  4:57 AM   Infiltration Assessment 0 11/1/2019  4:57 AM   Dressing Status Clean, dry, & intact 11/1/2019  4:57 AM   Dressing Type Transparent 11/1/2019  4:57 AM   Hub Color/Line Status Blue 11/1/2019  4:57 AM   Alcohol Cap Used Yes 10/31/2019 12:32 PM          External Female Catheter 10/23/19 (Active)   Site Assessment Clean, dry, & intact 10/23/2019 11:01 AM   Perineal Care Yes 10/23/2019 11:01 AM   Urine Output (mL) 200 ml 10/28/2019 11:43 AM       External Female Catheter 10/23/19 (Active)   Site Assessment Clean, dry, & intact 10/28/2019  2:00 PM   Repositioned Yes 10/28/2019  3:58 PM   Perineal Care Yes 10/28/2019  3:58 PM   Wick Changed No 10/28/2019  3:58 PM   Suction Canister/Tubing Changed No 10/28/2019  3:58 PM   Urine Output (mL) 200 ml 10/28/2019 11:43 AM                   Readmission Risk Assessment Tool Score High Risk            37       Total Score        3 Has Seen PCP in Last 6 Months (Yes=3, No=0)    2 . Living with Significant Other. Assisted Living. LTAC. SNF. or   Rehab    3 Patient Length of Stay (>5 days = 3)    4 IP Visits Last 12 Months (1-3=4, 4=9, >4=11)    5 Pt.  Coverage (Medicare=5 , Medicaid, or Self-Pay=4)    20 Charlson Comorbidity Score (Age + Comorbid Conditions)       Expected Length of Stay 3d 16h   Actual Length of Stay 9

## 2019-11-01 NOTE — PROGRESS NOTES
General Daily Progress Note    Admit Date: 10/23/2019    Subjective:     Patient has no complaint . Current Facility-Administered Medications   Medication Dose Route Frequency    potassium chloride SR (KLOR-CON 10) tablet 20 mEq  20 mEq Oral TID    lactobac ac& pc-s.therm-b.anim (DAWN Q/RISAQUAD)  1 Cap Oral DAILY    furosemide (LASIX) tablet 40 mg  40 mg Oral DAILY    cefTRIAXone (ROCEPHIN) 2 g in 0.9% sodium chloride (MBP/ADV) 50 mL  2 g IntraVENous Q12H    ampicillin (OMNIPEN) 2 g in 0.9% sodium chloride (MBP/ADV) 100 mL  2 g IntraVENous Q6H    levothyroxine (SYNTHROID) tablet 25 mcg  25 mcg Oral 6am    polyethylene glycol (MIRALAX) packet 17 g  17 g Oral EVERY OTHER DAY    sodium chloride (NS) flush 5-10 mL  5-10 mL IntraVENous PRN    acetaminophen (TYLENOL) tablet 650 mg  650 mg Oral Q6H PRN    acetaminophen (TYLENOL) tablet 650 mg  650 mg Oral Q6H PRN    albuterol (PROVENTIL VENTOLIN) nebulizer solution 2.5 mg  2.5 mg Nebulization Q6H PRN    apixaban (ELIQUIS) tablet 2.5 mg  2.5 mg Oral BID    midodrine (PROAMITINE) tablet 5 mg  5 mg Oral TID    mirtazapine (REMERON) tablet 15 mg  15 mg Oral QHS    sodium chloride (NS) flush 5-40 mL  5-40 mL IntraVENous Q8H        Review of Systems  A comprehensive review of systems was negative. Objective:     Patient Vitals for the past 24 hrs:   BP Temp Pulse Resp SpO2 Weight   10/31/19 1944 (!) 86/56 97.3 °F (36.3 °C) 78 18 94 %    10/31/19 1502 101/58 97.4 °F (36.3 °C) 76 16 94 %    10/31/19 1137 94/55 97.3 °F (36.3 °C) 76 18 99 %    10/31/19 1115 (!) 87/57 97 °F (36.1 °C) 68 16 97 %    10/31/19 0736 101/63 97.2 °F (36.2 °C) 67 16 97 %    10/31/19 0627      122 lb 3.2 oz (55.4 kg)   10/31/19 0314 92/61 97.5 °F (36.4 °C) 70 18 95 %    10/31/19 0001  97.3 °F (36.3 °C)       10/30/19 2307 102/63 (!) 100.5 °F (38.1 °C) 74 18 97 %      No intake/output data recorded. 10/30 0701 - 10/31 1900  In: 6148 [P.O.:627;  I.V.:400]  Out: 300 [Urine:300]    Physical Exam:   Visit Vitals  BP (!) 86/56 (BP 1 Location: Left arm, BP Patient Position: At rest)   Pulse 75   Temp 97.3 °F (36.3 °C)   Resp 18   Ht 5' 1\" (1.549 m)   Wt 122 lb 3.2 oz (55.4 kg)   SpO2 (!) 89%   BMI 23.09 kg/m²     General appearance: alert, cooperative, no distress, appears stated age  Neck: supple, symmetrical, trachea midline, no adenopathy, thyroid: not enlarged, symmetric, no tenderness/mass/nodules, no carotid bruit and no JVD  Lungs: clear to auscultation bilaterally  Heart: systolic murmur: early systolic 2/6, crescendo at 2nd left intercostal space  Abdomen: soft, non-tender.  Bowel sounds normal. No masses,  no organomegaly  Extremities: extremities normal, atraumatic, no cyanosis or edema        Data Review   Recent Results (from the past 24 hour(s))   METABOLIC PANEL, BASIC    Collection Time: 10/31/19  1:17 AM   Result Value Ref Range    Sodium 141 136 - 145 mmol/L    Potassium 3.7 3.5 - 5.1 mmol/L    Chloride 108 97 - 108 mmol/L    CO2 26 21 - 32 mmol/L    Anion gap 7 5 - 15 mmol/L    Glucose 85 65 - 100 mg/dL    BUN 25 (H) 6 - 20 MG/DL    Creatinine 1.08 (H) 0.55 - 1.02 MG/DL    BUN/Creatinine ratio 23 (H) 12 - 20      GFR est AA 57 (L) >60 ml/min/1.73m2    GFR est non-AA 47 (L) >60 ml/min/1.73m2    Calcium 8.2 (L) 8.5 - 10.1 MG/DL   CBC WITH MANUAL DIFF    Collection Time: 10/31/19  1:17 AM   Result Value Ref Range    WBC 1.9 (L) 3.6 - 11.0 K/uL    RBC 3.21 (L) 3.80 - 5.20 M/uL    HGB 9.3 (L) 11.5 - 16.0 g/dL    HCT 30.4 (L) 35.0 - 47.0 %    MCV 94.7 80.0 - 99.0 FL    MCH 29.0 26.0 - 34.0 PG    MCHC 30.6 30.0 - 36.5 g/dL    RDW 17.2 (H) 11.5 - 14.5 %    PLATELET 465 (L) 388 - 400 K/uL    MPV 11.6 8.9 - 12.9 FL    NRBC 0.0 0  WBC    ABSOLUTE NRBC 0.00 0.00 - 0.01 K/uL    NEUTROPHILS 62 32 - 75 %    BAND NEUTROPHILS 6 0 - 6 %    LYMPHOCYTES 25 12 - 49 %    MONOCYTES 4 (L) 5 - 13 %    EOSINOPHILS 3 0 - 7 %    BASOPHILS 0 0 - 1 %    METAMYELOCYTES 0 0 % MYELOCYTES 0 0 %    PROMYELOCYTES 0 0 %    BLASTS 0 0 %    OTHER CELL 0 0      IMMATURE GRANULOCYTES 0 %    ABS. NEUTROPHILS 1.2 (L) 1.8 - 8.0 K/UL    ABS. LYMPHOCYTES 0.5 (L) 0.8 - 3.5 K/UL    ABS. MONOCYTES 0.1 0.0 - 1.0 K/UL    ABS. EOSINOPHILS 0.1 0.0 - 0.4 K/UL    ABS. BASOPHILS 0.0 0.0 - 0.1 K/UL    ABS. IMM. GRANS. 0.0 K/UL    DF MANUAL      RBC COMMENTS ANISOCYTOSIS  1+       CBC W/O DIFF    Collection Time: 10/31/19  9:20 PM   Result Value Ref Range    WBC 1.9 (L) 3.6 - 11.0 K/uL    RBC 3.36 (L) 3.80 - 5.20 M/uL    HGB 9.8 (L) 11.5 - 16.0 g/dL    HCT 31.8 (L) 35.0 - 47.0 %    MCV 94.6 80.0 - 99.0 FL    MCH 29.2 26.0 - 34.0 PG    MCHC 30.8 30.0 - 36.5 g/dL    RDW 17.6 (H) 11.5 - 14.5 %    PLATELET 047 (L) 784 - 400 K/uL    MPV 11.4 8.9 - 12.9 FL    NRBC 0.0 0  WBC    ABSOLUTE NRBC 0.00 0.00 - 0.01 K/uL   POC G3 - PUL    Collection Time: 10/31/19  9:28 PM   Result Value Ref Range    pH (POC) 7.446 7.35 - 7.45      pCO2 (POC) 36.4 35.0 - 45.0 MMHG    pO2 (POC) 124 (H) 80 - 100 MMHG    HCO3 (POC) 25.1 22 - 26 MMOL/L    sO2 (POC) 99 (H) 92 - 97 %    Base excess (POC) 1 mmol/L    Site RIGHT RADIAL      Device: NASAL CANNULA      Flow rate (POC) 1.5 L/M    Allens test (POC) YES      Specimen type (POC) ARTERIAL      Total resp. 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 Continue parenteral antibiotics for seeding. 2.  No evidence of CHF. 3.  Continue rehab.

## 2019-11-01 NOTE — PROGRESS NOTES
General Daily Progress Note    Admit Date: 10/23/2019    Subjective:     Patient has no complaint . Current Facility-Administered Medications   Medication Dose Route Frequency    potassium chloride SR (KLOR-CON 10) tablet 20 mEq  20 mEq Oral TID    0.9% sodium chloride infusion  50 mL/hr IntraVENous CONTINUOUS    lactobac ac& pc-s.therm-b.anim (DAWN Q/RISAQUAD)  1 Cap Oral DAILY    furosemide (LASIX) tablet 40 mg  40 mg Oral DAILY    cefTRIAXone (ROCEPHIN) 2 g in 0.9% sodium chloride (MBP/ADV) 50 mL  2 g IntraVENous Q12H    ampicillin (OMNIPEN) 2 g in 0.9% sodium chloride (MBP/ADV) 100 mL  2 g IntraVENous Q6H    levothyroxine (SYNTHROID) tablet 25 mcg  25 mcg Oral 6am    polyethylene glycol (MIRALAX) packet 17 g  17 g Oral EVERY OTHER DAY    sodium chloride (NS) flush 5-10 mL  5-10 mL IntraVENous PRN    acetaminophen (TYLENOL) tablet 650 mg  650 mg Oral Q6H PRN    acetaminophen (TYLENOL) tablet 650 mg  650 mg Oral Q6H PRN    albuterol (PROVENTIL VENTOLIN) nebulizer solution 2.5 mg  2.5 mg Nebulization Q6H PRN    apixaban (ELIQUIS) tablet 2.5 mg  2.5 mg Oral BID    midodrine (PROAMITINE) tablet 5 mg  5 mg Oral TID    mirtazapine (REMERON) tablet 15 mg  15 mg Oral QHS    sodium chloride (NS) flush 5-40 mL  5-40 mL IntraVENous Q8H        Review of Systems  A comprehensive review of systems was negative.     Objective:     Patient Vitals for the past 24 hrs:   BP Temp Pulse Resp SpO2 Weight   11/01/19 0710 107/67 97.5 °F (36.4 °C) 71 18 94 %    11/01/19 0632      125 lb 12.8 oz (57.1 kg)   11/01/19 0309 107/67 97.3 °F (36.3 °C) 84 18 99 %    10/31/19 2343 100/65 97.5 °F (36.4 °C) 69 18 99 %    10/31/19 2153 90/57    99 %    10/31/19 2136 94/56  75  (!) 89 %    10/31/19 1944 (!) 86/56 97.3 °F (36.3 °C) 78 18 94 %    10/31/19 1502 101/58 97.4 °F (36.3 °C) 76 16 94 %    10/31/19 1137 94/55 97.3 °F (36.3 °C) 76 18 99 %    10/31/19 1115 (!) 87/57 97 °F (36.1 °C) 68 16 97 %      11/01 0229 - 11/01 1900  In: 240 [P.O.:240]  Out: -   10/30 1901 - 11/01 0700  In: 8300 [P.O.:747; I.V.:400]  Out: 300 [Urine:300]    Physical Exam:   Visit Vitals  /67 (BP 1 Location: Right arm, BP Patient Position: At rest)   Pulse 71   Temp 97.5 °F (36.4 °C)   Resp 18   Ht 5' 1\" (1.549 m)   Wt 125 lb 12.8 oz (57.1 kg)   SpO2 94%   BMI 23.77 kg/m²     General appearance: alert, cooperative, no distress, appears stated age  Neck: supple, symmetrical, trachea midline, no adenopathy, thyroid: not enlarged, symmetric, no tenderness/mass/nodules, no carotid bruit and no JVD  Lungs: clear to auscultation bilaterally  Heart: regular rate and rhythm, S1, S2 normal, no murmur, click, rub or gallop  Abdomen: soft, non-tender.  Bowel sounds normal. No masses,  no organomegaly  Extremities: extremities normal, atraumatic, no cyanosis or edema        Data Review   Recent Results (from the past 24 hour(s))   CBC W/O DIFF    Collection Time: 10/31/19  9:20 PM   Result Value Ref Range    WBC 1.9 (L) 3.6 - 11.0 K/uL    RBC 3.36 (L) 3.80 - 5.20 M/uL    HGB 9.8 (L) 11.5 - 16.0 g/dL    HCT 31.8 (L) 35.0 - 47.0 %    MCV 94.6 80.0 - 99.0 FL    MCH 29.2 26.0 - 34.0 PG    MCHC 30.8 30.0 - 36.5 g/dL    RDW 17.6 (H) 11.5 - 14.5 %    PLATELET 521 (L) 213 - 400 K/uL    MPV 11.4 8.9 - 12.9 FL    NRBC 0.0 0  WBC    ABSOLUTE NRBC 0.00 0.00 - 0.01 K/uL   TROPONIN I    Collection Time: 10/31/19  9:20 PM   Result Value Ref Range    Troponin-I, Qt. <0.05 <0.05 ng/mL   CK W/ CKMB & INDEX    Collection Time: 10/31/19  9:20 PM   Result Value Ref Range    CK 25 (L) 26 - 192 U/L    CK - MB 1.3 <3.6 NG/ML    CK-MB Index 5.2 (H) 0.0 - 2.5     MAGNESIUM    Collection Time: 10/31/19  9:20 PM   Result Value Ref Range    Magnesium 2.0 1.6 - 2.4 mg/dL   PHOSPHORUS    Collection Time: 10/31/19  9:20 PM   Result Value Ref Range    Phosphorus 2.6 2.6 - 4.7 MG/DL   POC G3 - PUL    Collection Time: 10/31/19  9:28 PM   Result Value Ref Range    pH (POC) 7.446 7.35 - 7.45      pCO2 (POC) 36.4 35.0 - 45.0 MMHG    pO2 (POC) 124 (H) 80 - 100 MMHG    HCO3 (POC) 25.1 22 - 26 MMOL/L    sO2 (POC) 99 (H) 92 - 97 %    Base excess (POC) 1 mmol/L    Site RIGHT RADIAL      Device: NASAL CANNULA      Flow rate (POC) 1.5 L/M    Allens test (POC) YES      Specimen type (POC) ARTERIAL      Total resp. rate 16     METABOLIC PANEL, BASIC    Collection Time: 11/01/19  4:45 AM   Result Value Ref Range    Sodium 143 136 - 145 mmol/L    Potassium 3.9 3.5 - 5.1 mmol/L    Chloride 110 (H) 97 - 108 mmol/L    CO2 26 21 - 32 mmol/L    Anion gap 7 5 - 15 mmol/L    Glucose 88 65 - 100 mg/dL    BUN 23 (H) 6 - 20 MG/DL    Creatinine 1.04 (H) 0.55 - 1.02 MG/DL    BUN/Creatinine ratio 22 (H) 12 - 20      GFR est AA 60 (L) >60 ml/min/1.73m2    GFR est non-AA 49 (L) >60 ml/min/1.73m2    Calcium 8.6 8.5 - 10.1 MG/DL   CBC WITH MANUAL DIFF    Collection Time: 11/01/19  4:45 AM   Result Value Ref Range    WBC 1.5 (L) 3.6 - 11.0 K/uL    RBC 3.26 (L) 3.80 - 5.20 M/uL    HGB 9.4 (L) 11.5 - 16.0 g/dL    HCT 30.8 (L) 35.0 - 47.0 %    MCV 94.5 80.0 - 99.0 FL    MCH 28.8 26.0 - 34.0 PG    MCHC 30.5 30.0 - 36.5 g/dL    RDW 17.9 (H) 11.5 - 14.5 %    PLATELET 800 (L) 706 - 400 K/uL    MPV 11.1 8.9 - 12.9 FL    NRBC 0.0 0  WBC    ABSOLUTE NRBC 0.00 0.00 - 0.01 K/uL    NEUTROPHILS PENDING %    LYMPHOCYTES PENDING %    MONOCYTES PENDING %    EOSINOPHILS PENDING %    BASOPHILS PENDING %    IMMATURE GRANULOCYTES PENDING %    BAND NEUTROPHILS PENDING %    PROMYELOCYTES PENDING %    MYELOCYTES PENDING %    METAMYELOCYTES PENDING %    BLASTS PENDING %    OTHER CELL PENDING     ABS. NEUTROPHILS PENDING K/UL    ABS. LYMPHOCYTES PENDING K/UL    ABS. MONOCYTES PENDING K/UL    ABS. EOSINOPHILS PENDING K/UL    ABS. BASOPHILS PENDING K/UL    ABS. IMM. GRANS.  PENDING K/UL    RBC COMMENTS PENDING     DF PENDING            Assessment:     Active Problems:    Cardiomyopathy, dilated, 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 (8/4/2012)      HTN (hypertension) (6/29/2015)      Presence of biventricular automatic cardioverter/defibrillator (AICD) (7/2/2015)      Overview: [employer] biventricular AICD implant      Chronic systolic congestive heart failure ([facility] [address] (10/8/2015)      CKD (chronic kidney disease) stage 3, GFR 30-59 ml/min ([facility] [address] (1/10/2018)      Fever (10/23/2019)      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 No evidence of congestive heart failure. 2.  Continue parenteral antibiotics for apparent endocarditis. Will determine coverage as an outpatient. 3.  Rehab continues.

## 2019-11-01 NOTE — PROGRESS NOTES
1600  - Dr. Jane Portillo is covering for [facility] for Cardiology and was informed of pt's episode of vtach last night, no new orders were received. just monitor pt condition. 1900 - Bedside shift report given to Hilda Constantino RN.

## 2019-11-01 NOTE — PROGRESS NOTES
PICC (Peripherally Inserted Central Catheter) line insertion  procedure note     1330 : Procedure explained to patient  's daughter in law  along with risks and benefits and  patient  's daughter in law  agreed to proceed. Informed consent obtained from patient  's daughter in law who is POA. Patient teaching completed. Timeout completed. Pre-procedure assessment done. Maximum sterile barrier precautions observed throughout procedure. Lidocaine 1%  2.0    ml SQ given prior to cannulation. Cannulated   basilic  vein using ultrasound guidance and modified seldinger technique. Inserted   5  [place]  double  lumen PICC to   right  arm using [employer] Tip Location system. Blood return verified and flushed with 20 ml normal saline in each port. Sterile dressing applied with Biopatch, StatLock and occlusive dressing as per protocol. Curos caps applied to each port. Patient tolerated procedure well with minimal blood loss. Patient education material provided. PICC procedure performed by  :  Bassam Reeves RN. PICC nurse  Assisted by  : Emmanuel Etienne RN. PICC nurse  Reason for access : MD order / Reliable access /Long term IV medication  / Discharge with PICC line   Complications related to insertion  : none  Total Trimmed Length    39  cm   External Length :    0   cm     PICC line site arm circumference:     27  cm   PICC catheter occupies    17  %  of vein  Type of PICC: [employer] PICC SOLO  Ref # :   [id number]  Lot # :  [id number]  Expiration Date :  2020-08-31  Portable Chest X-Ray ordered. Pt has  paced  rhythm. Primary nurse   Sally  RN aware not use until  PICC Tip placement  Is confirmed by  Radiologist    1440 :  PICC line placement : tip of PICC line lies over the  cava atrial junction per Dr Elba Colin  radiologist.    Notified to primary nurse  Sally   RN that PICC line is in satisfactory position per radiologist and  it   can be used now. Bassam Reeves RN. BSN. CRNI,CMSRN.  PICC Nurse, Vascular Access Team     ;

## 2019-11-01 NOTE — PROGRESS NOTES
Hematology Oncology Progress Note         Follow up for: CLL    Chart notes reviewed since last visit. Case discussed with following:     Patient complains of the following: No complaints, she feels good  Additional concerns noted by the staff:     Patient Vitals for the past 24 hrs:   BP Temp Pulse Resp SpO2 Weight   11/01/19 1034 107/61 96.8 °F (36 °C) 70 20 98 %    11/01/19 0710 107/67 97.5 °F (36.4 °C) 71 18 94 %    11/01/19 0632      57.1 kg (125 lb 12.8 oz)   11/01/19 0309 107/67 97.3 °F (36.3 °C) 84 18 99 %    10/31/19 2343 100/65 97.5 °F (36.4 °C) 69 18 99 %    10/31/19 2153 90/57    99 %    10/31/19 2136 94/56  75  (!) 89 %    10/31/19 1944 (!) 86/56 97.3 °F (36.3 °C) 78 18 94 %    10/31/19 1502 101/58 97.4 °F (36.3 °C) 76 16 94 %    10/31/19 1137 94/55 97.3 °F (36.3 °C) 76 18 99 %    10/31/19 1115 (!) 87/57 97 °F (36.1 °C) 68 16 97 %        ROS negative for 11 organ systems (but patient mildly confused and this may not be accurate).      Physical Examination:  Gen NAD  CV reg  Lungs clear  abd benign      Labs:  Recent Results (from the past 24 hour(s))   CBC W/O DIFF    Collection Time: 10/31/19  9:20 PM   Result Value Ref Range    WBC 1.9 (L) 3.6 - 11.0 K/uL    RBC 3.36 (L) 3.80 - 5.20 M/uL    HGB 9.8 (L) 11.5 - 16.0 g/dL    HCT 31.8 (L) 35.0 - 47.0 %    MCV 94.6 80.0 - 99.0 FL    MCH 29.2 26.0 - 34.0 PG    MCHC 30.8 30.0 - 36.5 g/dL    RDW 17.6 (H) 11.5 - 14.5 %    PLATELET 219 (L) 164 - 400 K/uL    MPV 11.4 8.9 - 12.9 FL    NRBC 0.0 0  WBC    ABSOLUTE NRBC 0.00 0.00 - 0.01 K/uL   TROPONIN I    Collection Time: 10/31/19  9:20 PM   Result Value Ref Range    Troponin-I, Qt. <0.05 <0.05 ng/mL   CK W/ CKMB & INDEX    Collection Time: 10/31/19  9:20 PM   Result Value Ref Range    CK 25 (L) 26 - 192 U/L    CK - MB 1.3 <3.6 NG/ML    CK-MB Index 5.2 (H) 0.0 - 2.5     MAGNESIUM    Collection Time: 10/31/19  9:20 PM   Result Value Ref Range    Magnesium 2.0 1.6 - 2.4 mg/dL PHOSPHORUS    Collection Time: 10/31/19  9:20 PM   Result Value Ref Range    Phosphorus 2.6 2.6 - 4.7 MG/DL   POC G3 - PUL    Collection Time: 10/31/19  9:28 PM   Result Value Ref Range    pH (POC) 7.446 7.35 - 7.45      pCO2 (POC) 36.4 35.0 - 45.0 MMHG    pO2 (POC) 124 (H) 80 - 100 MMHG    HCO3 (POC) 25.1 22 - 26 MMOL/L    sO2 (POC) 99 (H) 92 - 97 %    Base excess (POC) 1 mmol/L    Site RIGHT RADIAL      Device: NASAL CANNULA      Flow rate (POC) 1.5 L/M    Allens test (POC) YES      Specimen type (POC) ARTERIAL      Total resp. rate 16     METABOLIC PANEL, BASIC    Collection Time: 11/01/19  4:45 AM   Result Value Ref Range    Sodium 143 136 - 145 mmol/L    Potassium 3.9 3.5 - 5.1 mmol/L    Chloride 110 (H) 97 - 108 mmol/L    CO2 26 21 - 32 mmol/L    Anion gap 7 5 - 15 mmol/L    Glucose 88 65 - 100 mg/dL    BUN 23 (H) 6 - 20 MG/DL    Creatinine 1.04 (H) 0.55 - 1.02 MG/DL    BUN/Creatinine ratio 22 (H) 12 - 20      GFR est AA 60 (L) >60 ml/min/1.73m2    GFR est non-AA 49 (L) >60 ml/min/1.73m2    Calcium 8.6 8.5 - 10.1 MG/DL   CBC WITH MANUAL DIFF    Collection Time: 11/01/19  4:45 AM   Result Value Ref Range    WBC 1.5 (L) 3.6 - 11.0 K/uL    RBC 3.26 (L) 3.80 - 5.20 M/uL    HGB 9.4 (L) 11.5 - 16.0 g/dL    HCT 30.8 (L) 35.0 - 47.0 %    MCV 94.5 80.0 - 99.0 FL    MCH 28.8 26.0 - 34.0 PG    MCHC 30.5 30.0 - 36.5 g/dL    RDW 17.9 (H) 11.5 - 14.5 %    PLATELET 469 (L) 482 - 400 K/uL    MPV 11.1 8.9 - 12.9 FL    NRBC 0.0 0  WBC    ABSOLUTE NRBC 0.00 0.00 - 0.01 K/uL    NEUTROPHILS 47 32 - 75 %    BAND NEUTROPHILS 0 0 - 6 %    LYMPHOCYTES 41 12 - 49 %    MONOCYTES 11 5 - 13 %    EOSINOPHILS 1 0 - 7 %    BASOPHILS 0 0 - 1 %    METAMYELOCYTES 0 0 %    MYELOCYTES 0 0 %    PROMYELOCYTES 0 0 %    BLASTS 0 0 %    OTHER CELL 0 0      IMMATURE GRANULOCYTES 0 %    ABS. NEUTROPHILS 0.7 (L) 1.8 - 8.0 K/UL    ABS. LYMPHOCYTES 0.6 (L) 0.8 - 3.5 K/UL    ABS. MONOCYTES 0.2 0.0 - 1.0 K/UL    ABS.  EOSINOPHILS 0.0 0.0 - 0.4 K/UL ABS. BASOPHILS 0.0 0.0 - 0.1 K/UL    ABS. IMM. GRANS. 0.0 K/UL    DF MANUAL      RBC COMMENTS ANISOCYTOSIS  1+       VITAMIN B12    Collection Time: 11/01/19  4:45 AM   Result Value Ref Range    Vitamin B12 1,907 (H) 193 - 986 pg/mL   FOLATE    Collection Time: 11/01/19  4:45 AM   Result Value Ref Range    Folate 16.2 5.0 - 21.0 ng/mL       Assessment and Plan:   CLL - counts and adenopathy very acceptable at present so can hold ibruitinib for now. Will not resume until clear cut signs of progression. 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 IgG ok, WBC down slightly most likely due to abx, monitor, b12/folate ok, checking copper and zinc  Discussed with Dr. Williams Gutierrez from ID, most likely from abx, she will montior it while she is on abx    Gram positive bacteremia/enterococcus. On abx per primary team    Hx HTN    Hx CKD    Hx CHF - on coreg    AICD    Please call over the weekend with any questions.   Service will f/u on Monday

## 2019-11-01 NOTE — PROGRESS NOTES
RAPID RESPONSE TEAM-FOLLOW UP  Rounding on patient due to rapid response last night. She is currently sleeping. Respirations are even and unlabored. D/W Sarita Shepard RN. Cardiology is being re-consulted. No RRT interventions are currently indicated. Please call back if needed. Eliecer Moody RN  Ext. [phone]    Results for Shandra Aragon (MRN [id number]) as of 11/1/2019 16:05   Ref. Range 10/31/2019 21:20 11/1/2019 04:45   Sodium Latest Ref Range: 136 - 145 mmol/L  143   Potassium Latest Ref Range: 3.5 - 5.1 mmol/L  3.9   Chloride Latest Ref Range: 97 - 108 mmol/L  110 (H)   CO2 Latest Ref Range: 21 - 32 mmol/L  26   Anion gap Latest Ref Range: 5 - 15 mmol/L  7   Glucose Latest Ref Range: 65 - 100 mg/dL  88   BUN Latest Ref Range: 6 - 20 MG/DL  23 (H)   Creatinine Latest Ref Range: 0.55 - 1.02 MG/DL  1.04 (H)   BUN/Creatinine ratio Latest Ref Range: 12 - 20    22 (H)   Calcium Latest Ref Range: 8.5 - 10.1 MG/DL  8.6   Phosphorus Latest Ref Range: 2.6 - 4.7 MG/DL 2.6    Magnesium Latest Ref Range: 1.6 - 2.4 mg/dL 2.0    GFR est non-AA Latest Ref Range: >60 ml/min/1.73m2  49 (L)   GFR est AA Latest Ref Range: >60 ml/min/1.73m2  60 (L)   CK Latest Ref Range: 26 - 192 U/L 25 (L)    CK - MB Latest Ref Range: <3.6 NG/ML 1.3    CK-MB Index Latest Ref Range: 0.0 - 2.5   5.2 (H)    Troponin-I, Qt.  Latest Ref Range: <0.05 ng/mL <0.05    Vitamin B12 Latest Ref Range: 193 - 986 pg/mL  1,907 (H)   Folate Latest Ref Range: 5.0 - 21.0 ng/mL  16.2

## 2019-11-01 NOTE — PROGRESS NOTES
Rapid Response Team    2101 Rapid Response called for chest pain. Upon arriving to patient room staff conducting STAT EKG. Patient had 1.5 minutes of V-tach, asymptomatic. VSS. Patient's mental status is baseline. 2115 patient had another bout of sustained V-tach. Asymptomatic. Per Dr. Marbin Rausch CBC, CK, Trop, Mag and phos labs to be drawn. CXR and ABG to be completed as well. Will continue to monitor patient. 2300 Rounded on patient. Labs checked all WNL. Per primary nurse Burke, no more episodes of V-tach. VSS.

## 2019-11-01 NOTE — PROGRESS NOTES
Problem: Falls - Risk of  Goal: *Absence of Falls  Description  Document Juma Brayden Fall Risk and appropriate interventions in the flowsheet.   Outcome: Progressing Towards Goal  Note:   Fall Risk Interventions:  Mobility Interventions: Assess mobility with egress test, Bed/chair exit alarm, Communicate number of staff needed for ambulation/transfer, OT consult for ADLs, Patient to call before getting OOB, PT Consult for mobility concerns, Utilize walker, cane, or other assistive device    Mentation Interventions: Adequate sleep, hydration, pain control, Bed/chair exit alarm, Door open when patient unattended, Increase mobility, Reorient patient, Toileting rounds    Medication Interventions: Bed/chair exit alarm, Patient to call before getting OOB, Teach patient to arise slowly    Elimination Interventions: Bed/chair exit alarm, Call light in reach, Patient to call for help with toileting needs, Toileting schedule/hourly rounds    History of Falls Interventions: Bed/chair exit alarm, Consult care management for discharge planning, Door open when patient unattended, Room close to nurse's station

## 2019-11-01 NOTE — PROGRESS NOTES
Problem: Falls - Risk of  Goal: *Absence of Falls  Description  Document Gume Kip Fall Risk and appropriate interventions in the flowsheet.   Outcome: Progressing Towards Goal  Note:   Fall Risk Interventions:  Mobility Interventions: Bed/chair exit alarm, Patient to call before getting OOB    Mentation Interventions: Bed/chair exit alarm, Door open when patient unattended    Medication Interventions: Bed/chair exit alarm, Patient to call before getting OOB    Elimination Interventions: Bed/chair exit alarm, Call light in reach    History of Falls Interventions: Bed/chair exit alarm, Door open when patient unattended         Problem: Patient Education: Go to Patient Education Activity  Goal: Patient/Family Education  Outcome: Progressing Towards Goal

## 2019-11-01 NOTE — PROGRESS NOTES
Nutrition Assessment:    INTERVENTIONS/RECOMMENDATIONS:   Continue regular diet  Ensure TID    ASSESSMENT:   Chart reviewed. PICC team was with pt during visit attempt. PCT reports pt ate well yesterday and consumed 100% of ensure. PCT reports pt was sleepy today and did not eat as much. Will increase ensure to TID. Diet Order: Regular  % Eaten:    Patient Vitals for the past 72 hrs:   % Diet Eaten   11/01/19 0858 5 %   10/31/19 1849 50 %   10/31/19 1008 70 %   10/31/19 0845 100 %     Pertinent Medications: [x]Reviewed: NS, lasix, lactoback, remeron, KCl,   Pertinent Labs: [x]Reviewed:   Food Allergies: [x]NKFA  []Other   Last BM:  10/30  Edema:  n/a    []RUE   []LUE   []RLE   []LLE      Pressure Ulcer:   n/a   [] Stage I   [] Stage II   [] Stage III   [] Stage IV      Anthropometrics: Height: 5' 1\" (154.9 cm) Weight: 57.1 kg (125 lb 12.8 oz)    IBW (%IBW):   ( ) UBW (%UBW):   (  %)    BMI: Body mass index is 23.77 kg/m². This BMI is indicative of:  []Underweight   [x]Normal   []Overweight   [] Obesity   [] Extreme Obesity (BMI>40)  Last Weight Metrics:  Weight Loss Metrics 11/1/2019 9/24/2019 8/20/2019 7/22/2019 7/1/2019 5/9/2019 4/23/2019   Today's Wt 125 lb 12.8 oz 122 lb 6.4 oz 125 lb 9.6 oz 122 lb 6.4 oz 114 lb 13.8 oz 111 lb 4.8 oz 113 lb 1.6 oz   BMI 23.77 kg/m2 23.13 kg/m2 23.73 kg/m2 23.13 kg/m2 21.7 kg/m2 20.36 kg/m2 20.69 kg/m2       Estimated Nutrition Needs (Based on): 1150 Kcals/day(BMR: 900 x 1.3) , 55 g(1 g/kg) Protein  Carbohydrate:  At Least 130 g/day  Fluids: 1150 mL/day or per primary team    NUTRITION DIAGNOSES:   Problem:  Inadequate protein-energy intake      Etiology: related to undetermined etiology at this time     Signs/Symptoms: as evidenced by pt consuming <25% of meals    Previous Nutrition Dx:  [x] Resolved  [] Unresolved           [] Progressing    NUTRITION INTERVENTIONS:  Meals/Snacks: General/healthful diet   Supplements: Commercial supplement              GOAL:   cosume >50% of meals and ONS in 2-4 days    NUTRITION MONITORING AND EVALUATION      Food/Nutrient Intake Outcomes:  Total energy intake  Physical Signs/Symptoms Outcomes: Weight/weight change, Electrolyte and renal profile, Glucose profile, GI    Previous Goal Met:   [x] Met              [] Progressing Towards Goal              [] Not Progressing Towards Goal   Previous Recommendations:   [x] Implemented          [] Not Implemented          [] Not Applicable    LEARNING NEEDS (Diet, Food/Nutrient-Drug Interaction):    [x] None Identified   [] Identified and Education Provided/Documented   [] Identified and Pt declined/was not appropriate     Cultural, Voodoo, OR Ethnic Dietary Needs:    [x] None Identified   [] Identified and Addressed     [x] Interdisciplinary Care Plan Reviewed/Documented    [x] Discharge Planning: General healthy diet   [] Participated in Interdisciplinary Rounds    NUTRITION RISK:    [] High              [x] Moderate           []  Low  []  Minimal/Uncompromised      Jody Conteh RDN  Pager [phone]  Weekend Pager [phone]

## 2019-11-02 PROBLEM — G93.41 METABOLIC ENCEPHALOPATHY: Status: ACTIVE | Noted: 2019-11-02

## 2019-11-02 PROBLEM — A41.9 SEPSIS (HCC): Status: ACTIVE | Noted: 2019-11-02

## 2019-11-02 LAB
ANION GAP SERPL CALC-SCNC: 5 MMOL/L (ref 5–15)
BNP SERPL-MCNC: ABNORMAL PG/ML
BUN SERPL-MCNC: 20 MG/DL (ref 6–20)
BUN/CREAT SERPL: 20 (ref 12–20)
CALCIUM SERPL-MCNC: 8.7 MG/DL (ref 8.5–10.1)
CHLORIDE SERPL-SCNC: 112 MMOL/L (ref 97–108)
CO2 SERPL-SCNC: 29 MMOL/L (ref 21–32)
COPPER SERPL-MCNC: 156 UG/DL (ref 72–166)
CREAT SERPL-MCNC: 1.01 MG/DL (ref 0.55–1.02)
GLUCOSE SERPL-MCNC: 86 MG/DL (ref 65–100)
POTASSIUM SERPL-SCNC: 3.9 MMOL/L (ref 3.5–5.1)
SODIUM SERPL-SCNC: 146 MMOL/L (ref 136–145)
ZINC SERPL-MCNC: 92 UG/DL (ref 56–134)

## 2019-11-02 PROCEDURE — 74011250636 HC RX REV CODE- 250/636: Performed by: INTERNAL MEDICINE

## 2019-11-02 PROCEDURE — 65660000000 HC RM CCU STEPDOWN

## 2019-11-02 PROCEDURE — 94760 N-INVAS EAR/PLS OXIMETRY 1: CPT

## 2019-11-02 PROCEDURE — 80048 BASIC METABOLIC PNL TOTAL CA: CPT

## 2019-11-02 PROCEDURE — 36415 COLL VENOUS BLD VENIPUNCTURE: CPT

## 2019-11-02 PROCEDURE — 83880 ASSAY OF NATRIURETIC PEPTIDE: CPT

## 2019-11-02 PROCEDURE — 74011000258 HC RX REV CODE- 258: Performed by: INTERNAL MEDICINE

## 2019-11-02 PROCEDURE — 74011250637 HC RX REV CODE- 250/637: Performed by: INTERNAL MEDICINE

## 2019-11-02 PROCEDURE — 74011250637 HC RX REV CODE- 250/637: Performed by: HOSPITALIST

## 2019-11-02 RX ADMIN — MIDODRINE HYDROCHLORIDE 5 MG: 5 TABLET ORAL at 21:34

## 2019-11-02 RX ADMIN — POTASSIUM CHLORIDE 20 MEQ: 750 TABLET, FILM COATED, EXTENDED RELEASE ORAL at 21:35

## 2019-11-02 RX ADMIN — AMPICILLIN SODIUM 2 G: 2 INJECTION, POWDER, FOR SOLUTION INTRAMUSCULAR; INTRAVENOUS at 22:59

## 2019-11-02 RX ADMIN — AMPICILLIN SODIUM 2 G: 2 INJECTION, POWDER, FOR SOLUTION INTRAMUSCULAR; INTRAVENOUS at 13:32

## 2019-11-02 RX ADMIN — Medication 10 ML: at 05:17

## 2019-11-02 RX ADMIN — LEVOTHYROXINE SODIUM 25 MCG: 25 TABLET ORAL at 05:16

## 2019-11-02 RX ADMIN — AMPICILLIN SODIUM 2 G: 2 INJECTION, POWDER, FOR SOLUTION INTRAMUSCULAR; INTRAVENOUS at 05:17

## 2019-11-02 RX ADMIN — POTASSIUM CHLORIDE 20 MEQ: 750 TABLET, FILM COATED, EXTENDED RELEASE ORAL at 15:18

## 2019-11-02 RX ADMIN — AMPICILLIN SODIUM 2 G: 2 INJECTION, POWDER, FOR SOLUTION INTRAMUSCULAR; INTRAVENOUS at 18:14

## 2019-11-02 RX ADMIN — APIXABAN 2.5 MG: 2.5 TABLET, FILM COATED ORAL at 21:34

## 2019-11-02 RX ADMIN — FUROSEMIDE 40 MG: 40 TABLET ORAL at 09:21

## 2019-11-02 RX ADMIN — APIXABAN 2.5 MG: 2.5 TABLET, FILM COATED ORAL at 09:21

## 2019-11-02 RX ADMIN — POLYETHYLENE GLYCOL 3350 17 G: 17 POWDER, FOR SOLUTION ORAL at 09:21

## 2019-11-02 RX ADMIN — MIDODRINE HYDROCHLORIDE 5 MG: 5 TABLET ORAL at 09:21

## 2019-11-02 RX ADMIN — Medication 10 ML: at 13:33

## 2019-11-02 RX ADMIN — Medication 10 ML: at 22:59

## 2019-11-02 RX ADMIN — MIRTAZAPINE 15 MG: 15 TABLET, FILM COATED ORAL at 21:35

## 2019-11-02 RX ADMIN — Medication 1 CAPSULE: at 09:21

## 2019-11-02 RX ADMIN — POTASSIUM CHLORIDE 20 MEQ: 750 TABLET, FILM COATED, EXTENDED RELEASE ORAL at 09:21

## 2019-11-02 RX ADMIN — MIDODRINE HYDROCHLORIDE 5 MG: 5 TABLET ORAL at 15:18

## 2019-11-02 RX ADMIN — CEFTRIAXONE SODIUM 2 G: 2 INJECTION, POWDER, FOR SOLUTION INTRAMUSCULAR; INTRAVENOUS at 16:53

## 2019-11-02 RX ADMIN — CEFTRIAXONE SODIUM 2 G: 2 INJECTION, POWDER, FOR SOLUTION INTRAMUSCULAR; INTRAVENOUS at 04:20

## 2019-11-02 NOTE — PROGRESS NOTES
Problem: Falls - Risk of  Goal: *Absence of Falls  Description  Document Marian Radha Fall Risk and appropriate interventions in the flowsheet. Outcome: Progressing Towards Goal  Note:   Fall Risk Interventions:  Mobility Interventions: Assess mobility with egress test, Bed/chair exit alarm, Patient to call before getting OOB    Mentation Interventions: Adequate sleep, hydration, pain control, Bed/chair exit alarm, Door open when patient unattended, Increase mobility, More frequent rounding, Reorient patient, Update white board    Medication Interventions: Bed/chair exit alarm, Patient to call before getting OOB, Teach patient to arise slowly    Elimination Interventions: Bed/chair exit alarm, Call light in reach, Patient to call for help with toileting needs    History of Falls Interventions: Bed/chair exit alarm, Door open when patient unattended, Room close to nurse's station         Problem: Heart Failure: Day 5  Goal: Activity/Safety  Outcome: Progressing Towards Goal  Goal: Medications  Outcome: Progressing Towards Goal     Problem: General Medical Care Plan  Goal: *Anxiety reduced or absent  Outcome: Progressing Towards Goal     Problem: Pressure Injury - Risk of  Goal: *Prevention of pressure injury  Description  Document Ikmani Scale and appropriate interventions in the flowsheet.   Outcome: Progressing Towards Goal  Note:   Pressure Injury Interventions:  Sensory Interventions: Assess changes in LOC, Assess need for specialty bed, Avoid rigorous massage over bony prominences, Maintain/enhance activity level, Keep linens dry and wrinkle-free, Minimize linen layers    Moisture Interventions: Absorbent underpads, Apply protective barrier, creams and emollients, Internal/External urinary devices, Maintain skin hydration (lotion/cream), Minimize layers    Activity Interventions: Assess need for specialty bed, Increase time out of bed    Mobility Interventions: Assess need for specialty bed, Chair cushion, Float heels    Nutrition Interventions: Document food/fluid/supplement intake    Friction and Shear Interventions: Apply protective barrier, creams and emollients, HOB 30 degrees or less, Minimize layers                Problem: Infection - Risk of, Central Venous Catheter-Associated Bloodstream Infection  Goal: *Absence of infection signs and symptoms  Outcome: Progressing Towards Goal

## 2019-11-02 NOTE — PROGRESS NOTES
Hospital Progress Note    NAME:  Lea Plasencia   :   3/17/1925   MRN:  [id number]     Date/Time:  2019 1:01 PM    Plan:   1. D/c iv flluids  2. Continue iv ab  3. Encourage activity  Risk of Deterioration: Low  []           Moderate  []           High  [x]                 Assessment:   Principal Problem:    Sepsis ([facility] (2019)       ENTEROCOCCUS FAECALIS GROUP D bacteremia    Active Problems:    Cardiomyopathy, dilated, 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% since  (7090)      Metabolic encephalopathy ()      HTN (hypertension) (2015)      Presence of biventricular automatic cardioverter/defibrillator (AICD) (2015)      Overview: [employer] biventricular AICD implant      Chronic systolic congestive heart failure ([facility] (10/8/2015)      CKD (chronic kidney disease) stage 3, GFR 30-59 ml/min ([facility] (1/10/2018)      Fever (10/23/2019)      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 female presents to the ED with cc of lethargy and generalized weakness. Patient lives in a nursing home and was referred here today due to elevated temperature of 100.9 at nursing home, generalized weakness and lethargy and concern for being intermittently confused. Patient denies any pain and says she is just feeling cold. She denies any sore throat, neck pain, chest pain, shortness of breath, abdominal pain or any sores on her skin. She denies any bleeding though her ability to answer questions is limited as she seems to get a little confused with more complex questioning.   She is at the [address] [place] [address] Se:     Voices no new c/o  11 Point Review of Systems:   Negative except poorly obtained but denies sob/cp    []            Unable to obtain ROS due to:       []            mental status change []            sedated []            intubated     Social History     Tobacco Use    Smoking status: Never Smoker    Smokeless tobacco: Never Used   Substance Use Topics    Alcohol use: No     Alcohol/week: 0.0 standard drinks     Medications reviewed:  Current Facility-Administered Medications   Medication Dose Route Frequency    bacitracin 500 unit/gram packet 1 Packet  1 Packet Topical PRN    heparin (porcine) pf 300 Units  300 Units InterCATHeter PRN    potassium chloride SR (KLOR-CON 10) tablet 20 mEq  20 mEq Oral TID    0.9% sodium chloride infusion  50 mL/hr IntraVENous CONTINUOUS    lactobac ac& pc-s.therm-b.anim (DAWN Q/RISAQUAD)  1 Cap Oral DAILY    furosemide (LASIX) tablet 40 mg  40 mg Oral DAILY    cefTRIAXone (ROCEPHIN) 2 g in 0.9% sodium chloride (MBP/ADV) 50 mL  2 g IntraVENous Q12H    ampicillin (OMNIPEN) 2 g in 0.9% sodium chloride (MBP/ADV) 100 mL  2 g IntraVENous Q6H    levothyroxine (SYNTHROID) tablet 25 mcg  25 mcg Oral 6am    polyethylene glycol (MIRALAX) packet 17 g  17 g Oral EVERY OTHER DAY    sodium chloride (NS) flush 5-10 mL  5-10 mL IntraVENous PRN    acetaminophen (TYLENOL) tablet 650 mg  650 mg Oral Q6H PRN    acetaminophen (TYLENOL) tablet 650 mg  650 mg Oral Q6H PRN    albuterol (PROVENTIL VENTOLIN) nebulizer solution 2.5 mg  2.5 mg Nebulization Q6H PRN    apixaban (ELIQUIS) tablet 2.5 mg  2.5 mg Oral BID    midodrine (PROAMITINE) tablet 5 mg  5 mg Oral TID    mirtazapine (REMERON) tablet 15 mg  15 mg Oral QHS    sodium chloride (NS) flush 5-40 mL  5-40 mL IntraVENous Q8H        Objective:   Vitals:  Visit Vitals  BP 97/56 (BP 1 Location: Left arm, BP Patient Position: At rest;Supine)   Pulse 75   Temp 97.6 °F (36.4 °C)   Resp 20   Ht 5' 1\" (1.549 m)   Wt 129 lb 6.4 oz (58.7 kg)   SpO2 98%   BMI 24.45 kg/m²     Temp (24hrs), Av.7 °F (36.5 °C), Min:97.4 °F (36.3 °C), Max:98.3 °F (36.8 °C)    O2 Flow Rate (L/min): 1 l/min O2 Device: Room air    Last 24hr Input/Output:    Intake/Output Summary (Last 24 hours) at 2019 1301  Last data filed at 11/2/2019 0309  Gross per 24 hour   Intake 680 ml   Output 400 ml   Net 280 ml        PHYSICAL EXAM:  General:    Alert, cooperative, no distress, appears stated age. Head:   Normocephalic, without obvious abnormality, atraumatic. Eyes:   Conjunctivae/corneas clear. PERRLA  Nose:  Nares normal. No drainage or sinus tenderness. Throat:    Lips, mucosa, and tongue normal.  No Thrush  Neck:  Supple, symmetrical,  no adenopathy, thyroid: non tender    no carotid bruit and no JVD. Back:    Symmetric,  No CVA tenderness. Lungs:   Decreased bs  Heart:   Regular rate and rhythm,  no murmur, rub or gallop. Abdomen:   Soft, non-tender. Not distended. Bowel sounds normal. No masses        Lab Data Reviewed:    Recent Labs     11/01/19  0445 10/31/19  2120 10/31/19  0117   WBC 1.5* 1.9* 1.9*   HGB 9.4* 9.8* 9.3*   HCT 30.8* 31.8* 30.4*   * 121* 130*     Recent Labs     11/02/19  0218 11/01/19  0445 10/31/19  2120 10/31/19  0117   * 143  --  141   K 3.9 3.9  --  3.7   * 110*  --  108   CO2 29 26  --  26   GLU 86 88  --  85   BUN 20 23*  --  25*   CREA 1.01 1.04*  --  1.08*   CA 8.7 8.6  --  8.2*   MG  --   --  2.0  --    PHOS  --   --  2.6  --      Lab Results   Component Value Date/Time    Glucose (POC) 86 11/01/2019 05:30 PM    Glucose (POC) 167 (H) 02/05/2019 08:53 PM     No results for input(s): PH, PCO2, PO2, HCO3, FIO2 in the last 72 hours. No results for input(s): INR, INREXT in the last 72 hours.   ___________________________________________________  ___________________________________________________    Attending Physician: Ken Matos MD

## 2019-11-02 NOTE — PROGRESS NOTES
Problem: Falls - Risk of  Goal: *Absence of Falls  Description  Document Trinidad Mckeon Fall Risk and appropriate interventions in the flowsheet.   Outcome: Progressing Towards Goal  Note:   Fall Risk Interventions:  Mobility Interventions: Bed/chair exit alarm, Patient to call before getting OOB, PT Consult for mobility concerns    Mentation Interventions: Bed/chair exit alarm, Familiar objects from home, Reorient patient, Toileting rounds    Medication Interventions: Bed/chair exit alarm, Patient to call before getting OOB, Teach patient to arise slowly    Elimination Interventions: Call light in reach, Bed/chair exit alarm, Stay With Me (per policy), Toileting schedule/hourly rounds    History of Falls Interventions: Bed/chair exit alarm

## 2019-11-02 NOTE — PROGRESS NOTES
1900: Bedside shift change report given to Luz Maria Rondon (oncoming nurse) by [employer] nurse). Report included the following information SBAR and Kardex. 0700:   Bedside shift change report GIVEN TO Marylen Loan, RN. Report included the following information SBAR and Kardex. SIGNIFICANT CHANGES DURING SHIFT:        CONCERNS TO ADDRESS WITH MD:            [facility] NURSING NOTE   Admission Date 10/23/2019   Admission Diagnosis Fever [R50.9]   Consults IP CONSULT TO HOSPITALIST  IP CONSULT TO PRIMARY CARE PROVIDER  IP CONSULT TO PALLIATIVE CARE - PROVIDER  IP CONSULT TO HEMATOLOGY  IP CONSULT TO INFECTIOUS DISEASES      Cardiac Monitoring [x] Yes [] No      Purposeful Hourly Rounding [x] Yes    Milvia Score Total Score: 5   Milvia score 3 or > [x] Bed Alarm [] Avasys [] 1:1 sitter [] Patient refused (Signed refusal form in chart)   Kimani Score Kimani Score: 16   Kimani score 14 or < [] PMT consult [] Wound Care consult    []  Specialty bed  [] Nutrition consult      Influenza Vaccine Received Flu Vaccine for Current Season (usually Sept-March): Yes(Per patient received first week of october.)           Oxygen needs? [x] Room air Oxygen @  []1L    []2L    []3L   []4L    []5L   []6L via  NC   Chronic home O2 use?  [] Yes [] No  Perform O2 challenge test and document in progress note using smartphrase (.Homeoxygen)      Last bowel movement Last Bowel Movement Date: 11/01/19      Urinary Catheter       External Female Catheter 10/23/19-Urine Output (mL): 200 ml  External Female Catheter 10/23/19-Urine Output (mL): 200 ml     LDAs         PICC Double Lumen 81/01/89 Right;Basilic (Active)   Central Line Being Utilized Yes 11/2/2019  3:09 AM   Criteria for Appropriate Use Long term IV/antibiotic administration 11/2/2019  3:09 AM   Site Assessment Clean, dry, & intact 11/2/2019  3:09 AM   Phlebitis Assessment 0 11/2/2019  3:09 AM   Infiltration Assessment 0 11/2/2019  3:09 AM   Arm Circumference (cm) 27 cm 11/1/2019  1:26 PM   Date of Last Dressing Change 11/01/19 11/1/2019  7:15 PM   Dressing Status Clean, dry, & intact 11/2/2019  3:09 AM   Action Taken Blood drawn 11/2/2019  3:09 AM   External Catheter Length (cm) 0 centimeters 11/1/2019  1:26 PM   Dressing Type Tape;Transparent;Disk with Chlorhexadine gluconate (CHG) 11/2/2019  3:09 AM   Hub Color/Line Status Purple;Flushed; Infusing 11/2/2019  3:09 AM   Positive Blood Return (Site #1) Yes 11/2/2019  3:09 AM   Hub Color/Line Status Red;Flushed 11/2/2019  3:09 AM   Positive Blood Return (Site #2) Yes 11/2/2019  3:09 AM   Alcohol Cap Used Yes 11/1/2019  1:26 PM                External Female Catheter 10/23/19 (Active)   Site Assessment Clean, dry, & intact 10/23/2019 11:01 AM   Perineal Care Yes 10/23/2019 11:01 AM   Urine Output (mL) 200 ml 10/28/2019 11:43 AM       External Female Catheter 10/23/19 (Active)   Site Assessment Clean, dry, & intact 11/2/2019  3:09 AM   Repositioned Yes 11/2/2019  3:09 AM   Perineal Care Yes 11/2/2019  3:09 AM   Wick Changed No 11/2/2019  3:09 AM   Suction Canister/Tubing Changed No 11/2/2019  3:09 AM   Urine Output (mL) 200 ml 10/28/2019 11:43 AM                   Readmission Risk Assessment Tool Score High Risk            37       Total Score        3 Has Seen PCP in Last 6 Months (Yes=3, No=0)    2 . Living with Significant Other. Assisted Living. LTAC. SNF. or   Rehab    3 Patient Length of Stay (>5 days = 3)    4 IP Visits Last 12 Months (1-3=4, 4=9, >4=11)    5 Pt.  Coverage (Medicare=5 , Medicaid, or Self-Pay=4)    20 Charlson Comorbidity Score (Age + Comorbid Conditions)       Expected Length of Stay 3d 16h   Actual Length of Stay 10

## 2019-11-03 LAB
ANION GAP SERPL CALC-SCNC: 8 MMOL/L (ref 5–15)
BACTERIA SPEC CULT: ABNORMAL
BASOPHILS # BLD: 0 K/UL (ref 0–0.1)
BASOPHILS NFR BLD: 0 % (ref 0–1)
BUN SERPL-MCNC: 16 MG/DL (ref 6–20)
BUN/CREAT SERPL: 17 (ref 12–20)
CALCIUM SERPL-MCNC: 9.1 MG/DL (ref 8.5–10.1)
CHLORIDE SERPL-SCNC: 112 MMOL/L (ref 97–108)
CO2 SERPL-SCNC: 28 MMOL/L (ref 21–32)
CREAT SERPL-MCNC: 0.94 MG/DL (ref 0.55–1.02)
DIFFERENTIAL METHOD BLD: ABNORMAL
EOSINOPHIL # BLD: 0.1 K/UL (ref 0–0.4)
EOSINOPHIL NFR BLD: 5 % (ref 0–7)
ERYTHROCYTE [DISTWIDTH] IN BLOOD BY AUTOMATED COUNT: 18.8 % (ref 11.5–14.5)
GLUCOSE SERPL-MCNC: 80 MG/DL (ref 65–100)
HCT VFR BLD AUTO: 30.5 % (ref 35–47)
HGB BLD-MCNC: 9.2 G/DL (ref 11.5–16)
IMM GRANULOCYTES # BLD AUTO: 0 K/UL (ref 0–0.04)
IMM GRANULOCYTES NFR BLD AUTO: 0 % (ref 0–0.5)
LYMPHOCYTES # BLD: 0.7 K/UL (ref 0.8–3.5)
LYMPHOCYTES NFR BLD: 34 % (ref 12–49)
MCH RBC QN AUTO: 28.9 PG (ref 26–34)
MCHC RBC AUTO-ENTMCNC: 30.2 G/DL (ref 30–36.5)
MCV RBC AUTO: 95.9 FL (ref 80–99)
MONOCYTES # BLD: 0.1 K/UL (ref 0–1)
MONOCYTES NFR BLD: 6 % (ref 5–13)
NEUTS BAND NFR BLD MANUAL: 2 %
NEUTS SEG # BLD: 1.1 K/UL (ref 1.8–8)
NEUTS SEG NFR BLD: 53 % (ref 32–75)
NRBC # BLD: 0 K/UL (ref 0–0.01)
NRBC BLD-RTO: 0 PER 100 WBC
PLATELET # BLD AUTO: 115 K/UL (ref 150–400)
PMV BLD AUTO: 11.4 FL (ref 8.9–12.9)
POTASSIUM SERPL-SCNC: 3.9 MMOL/L (ref 3.5–5.1)
RBC # BLD AUTO: 3.18 M/UL (ref 3.8–5.2)
RBC MORPH BLD: ABNORMAL
SERVICE CMNT-IMP: ABNORMAL
SODIUM SERPL-SCNC: 148 MMOL/L (ref 136–145)
WBC # BLD AUTO: 2 K/UL (ref 3.6–11)
WBC MORPH BLD: ABNORMAL

## 2019-11-03 PROCEDURE — 85025 COMPLETE CBC W/AUTO DIFF WBC: CPT

## 2019-11-03 PROCEDURE — 74011250637 HC RX REV CODE- 250/637: Performed by: HOSPITALIST

## 2019-11-03 PROCEDURE — 77030038269 HC DRN EXT URIN PURWCK BARD -A

## 2019-11-03 PROCEDURE — 74011000258 HC RX REV CODE- 258: Performed by: INTERNAL MEDICINE

## 2019-11-03 PROCEDURE — 80048 BASIC METABOLIC PNL TOTAL CA: CPT

## 2019-11-03 PROCEDURE — 74011250636 HC RX REV CODE- 250/636: Performed by: INTERNAL MEDICINE

## 2019-11-03 PROCEDURE — 94760 N-INVAS EAR/PLS OXIMETRY 1: CPT

## 2019-11-03 PROCEDURE — 65660000000 HC RM CCU STEPDOWN

## 2019-11-03 PROCEDURE — 74011250637 HC RX REV CODE- 250/637: Performed by: INTERNAL MEDICINE

## 2019-11-03 PROCEDURE — 36415 COLL VENOUS BLD VENIPUNCTURE: CPT

## 2019-11-03 RX ADMIN — AMPICILLIN SODIUM 2 G: 2 INJECTION, POWDER, FOR SOLUTION INTRAMUSCULAR; INTRAVENOUS at 05:47

## 2019-11-03 RX ADMIN — AMPICILLIN SODIUM 2 G: 2 INJECTION, POWDER, FOR SOLUTION INTRAMUSCULAR; INTRAVENOUS at 17:06

## 2019-11-03 RX ADMIN — MIDODRINE HYDROCHLORIDE 5 MG: 5 TABLET ORAL at 09:10

## 2019-11-03 RX ADMIN — POTASSIUM CHLORIDE 20 MEQ: 750 TABLET, FILM COATED, EXTENDED RELEASE ORAL at 16:13

## 2019-11-03 RX ADMIN — MIDODRINE HYDROCHLORIDE 5 MG: 5 TABLET ORAL at 20:21

## 2019-11-03 RX ADMIN — CEFTRIAXONE SODIUM 2 G: 2 INJECTION, POWDER, FOR SOLUTION INTRAMUSCULAR; INTRAVENOUS at 04:34

## 2019-11-03 RX ADMIN — APIXABAN 2.5 MG: 2.5 TABLET, FILM COATED ORAL at 20:21

## 2019-11-03 RX ADMIN — LEVOTHYROXINE SODIUM 25 MCG: 25 TABLET ORAL at 05:50

## 2019-11-03 RX ADMIN — FUROSEMIDE 40 MG: 40 TABLET ORAL at 09:10

## 2019-11-03 RX ADMIN — Medication 10 ML: at 05:50

## 2019-11-03 RX ADMIN — MIDODRINE HYDROCHLORIDE 5 MG: 5 TABLET ORAL at 16:13

## 2019-11-03 RX ADMIN — AMPICILLIN SODIUM 2 G: 2 INJECTION, POWDER, FOR SOLUTION INTRAMUSCULAR; INTRAVENOUS at 23:48

## 2019-11-03 RX ADMIN — POTASSIUM CHLORIDE 20 MEQ: 750 TABLET, FILM COATED, EXTENDED RELEASE ORAL at 09:10

## 2019-11-03 RX ADMIN — CEFTRIAXONE SODIUM 2 G: 2 INJECTION, POWDER, FOR SOLUTION INTRAMUSCULAR; INTRAVENOUS at 16:13

## 2019-11-03 RX ADMIN — AMPICILLIN SODIUM 2 G: 2 INJECTION, POWDER, FOR SOLUTION INTRAMUSCULAR; INTRAVENOUS at 11:58

## 2019-11-03 RX ADMIN — Medication 10 ML: at 16:13

## 2019-11-03 RX ADMIN — POTASSIUM CHLORIDE 20 MEQ: 750 TABLET, FILM COATED, EXTENDED RELEASE ORAL at 20:23

## 2019-11-03 RX ADMIN — Medication 1 CAPSULE: at 09:10

## 2019-11-03 RX ADMIN — MIRTAZAPINE 15 MG: 15 TABLET, FILM COATED ORAL at 20:22

## 2019-11-03 RX ADMIN — APIXABAN 2.5 MG: 2.5 TABLET, FILM COATED ORAL at 09:10

## 2019-11-03 RX ADMIN — Medication 10 ML: at 20:24

## 2019-11-03 NOTE — PROGRESS NOTES
1900: Bedside shift change report given to Oni Euceda (oncoming nurse) by Krystal Mejia (offgoing nurse). Report included the following information SBAR.     0700:  Bedside shift change report GIVEN TO Krystal Mejia RN. Report included the following information SBAR. SIGNIFICANT CHANGES DURING SHIFT:        CONCERNS TO ADDRESS WITH MD:            [facility] NURSING NOTE   Admission Date 10/23/2019   Admission Diagnosis Fever [R50.9]   Consults IP CONSULT TO HOSPITALIST  IP CONSULT TO PRIMARY CARE PROVIDER  IP CONSULT TO PALLIATIVE CARE - PROVIDER  IP CONSULT TO HEMATOLOGY  IP CONSULT TO INFECTIOUS DISEASES      Cardiac Monitoring [x] Yes [] No      Purposeful Hourly Rounding [x] Yes    Milvia Score Total Score: 5   Milvia score 3 or > [x] Bed Alarm [] Avasys [] 1:1 sitter [] Patient refused (Signed refusal form in chart)   Kimani Score Kimani Score: 16   Kimani score 14 or < [x] PMT consult [] Wound Care consult    []  Specialty bed  [] Nutrition consult      Influenza Vaccine Received Flu Vaccine for Current Season (usually Sept-March): Yes(Per patient received first week of october.)           Oxygen needs? [x] Room air Oxygen @  []1L    []2L    []3L   []4L    []5L   []6L via  NC   Chronic home O2 use?  [] Yes [] No  Perform O2 challenge test and document in progress note using smartDinglepharbe (.Homeoxygen)      Last bowel movement Last Bowel Movement Date: 11/01/19      Urinary Catheter       External Female Catheter 10/23/19-Urine Output (mL): 200 ml  External Female Catheter 10/23/19-Urine Output (mL): 200 ml     LDAs         PICC Double Lumen 50/01/90 Right;Basilic (Active)   Central Line Being Utilized Yes 11/3/2019  3:00 AM   Criteria for Appropriate Use Long term IV/antibiotic administration 11/3/2019  3:00 AM   Site Assessment Clean, dry, & intact 11/3/2019  3:00 AM   Phlebitis Assessment 0 11/3/2019  3:00 AM   Infiltration Assessment 0 11/3/2019  3:00 AM   Arm Circumference (cm) 27 cm 11/1/2019  1:26 PM   Date of Last Dressing Change 11/01/19 11/2/2019 12:52 PM   Dressing Status Clean, dry, & intact 11/3/2019  3:00 AM   Action Taken Blood drawn;Open ports on tubing capped 11/3/2019  3:00 AM   External Catheter Length (cm) 0 centimeters 11/2/2019 12:52 PM   Dressing Type Tape;Transparent 11/2/2019  8:02 PM   Hub Color/Line Status Purple;Flushed 11/3/2019  3:00 AM   Positive Blood Return (Site #1) Yes 11/3/2019  3:00 AM   Hub Color/Line Status Red;Flushed 11/3/2019  3:00 AM   Positive Blood Return (Site #2) Yes 11/3/2019  3:00 AM   Alcohol Cap Used Yes 11/3/2019  3:00 AM                External Female Catheter 10/23/19 (Active)   Site Assessment Clean, dry, & intact 10/23/2019 11:01 AM   Perineal Care Yes 10/23/2019 11:01 AM   Urine Output (mL) 200 ml 10/28/2019 11:43 AM       External Female Catheter 10/23/19 (Active)   Site Assessment Clean, dry, & intact 11/3/2019  3:00 AM   Repositioned Yes 11/3/2019  3:00 AM   Perineal Care Yes 11/3/2019  3:00 AM   Wick Changed No 11/3/2019  3:00 AM   Suction Canister/Tubing Changed No 11/3/2019  3:00 AM   Urine Output (mL) 200 ml 10/28/2019 11:43 AM                   Readmission Risk Assessment Tool Score High Risk            37       Total Score        3 Has Seen PCP in Last 6 Months (Yes=3, No=0)    2 . Living with Significant Other. Assisted Living. LTAC. SNF. or   Rehab    3 Patient Length of Stay (>5 days = 3)    4 IP Visits Last 12 Months (1-3=4, 4=9, >4=11)    5 Pt.  Coverage (Medicare=5 , Medicaid, or Self-Pay=4)    20 Charlson Comorbidity Score (Age + Comorbid Conditions)       Expected Length of Stay 3d 16h   Actual Length of Stay 11

## 2019-11-03 NOTE — PROGRESS NOTES
7:30 AM Received bedside verbal report from [facility] HEENA BUSTILLO. Bedside and Verbal shift change report GIVEN TO MAXIMUS Parks. Report included the following information SBAR, Kardex, Intake/Output, Recent Results and Cardiac Rhythm paced. SIGNIFICANT CHANGES DURING SHIFT:  Patient had a quiet day. CONCERNS TO ADDRESS WITH MD:  Patient's intake is very poor. Needs encouragement even to take few sips of ensure. [address] NURSING NOTE   Admission Date 10/23/2019   Admission Diagnosis Fever [R50.9]   Consults IP CONSULT TO HOSPITALIST  IP CONSULT TO PRIMARY CARE PROVIDER  IP CONSULT TO PALLIATIVE CARE - PROVIDER  IP CONSULT TO HEMATOLOGY  IP CONSULT TO INFECTIOUS DISEASES      Cardiac Monitoring [x] Yes [] No      Purposeful Hourly Rounding [x] Yes    Milvia Score Total Score: 5   Milvia score 3 or > [x] Bed Alarm [] Avasys [] 1:1 sitter [] Patient refused (Signed refusal form in chart)   Kimani Score Kimani Score: 16   Kimani score 14 or < [x] PMT consult [] Wound Care consult    []  Specialty bed  [x] Nutrition consult      Influenza Vaccine Received Flu Vaccine for Current Season (usually Sept-March): Yes(Per patient received first week of october.)           Oxygen needs? [x] Room air Oxygen @  []1L    []2L    []3L   []4L    []5L   []6L via  NC   Chronic home O2 use?  [] Yes [] No  Perform O2 challenge test and document in progress note using smartphrase (.Homeoxygen)      Last bowel movement Last Bowel Movement Date: 11/01/19      Urinary Catheter       External Female Catheter 10/23/19-Urine Output (mL): 200 ml  External Female Catheter 10/23/19-Urine Output (mL): 200 ml     LDAs         PICC Double Lumen 98/22/90 Right;Basilic (Active)   Central Line Being Utilized Yes 11/3/2019  3:30 PM   Criteria for Appropriate Use Long term IV/antibiotic administration 11/3/2019  3:30 PM   Site Assessment Clean, dry, & intact 11/3/2019  3:30 PM   Phlebitis Assessment 0 11/3/2019  3:30 PM   Infiltration Assessment 0 11/3/2019  3:30 PM   Arm Circumference (cm) 27 cm 11/1/2019  1:26 PM   Date of Last Dressing Change 11/01/19 11/3/2019  3:30 PM   Dressing Status Clean, dry, & intact 11/3/2019  3:30 PM   Action Taken Blood drawn;Open ports on tubing capped 11/3/2019  3:00 AM   External Catheter Length (cm) 0 centimeters 11/2/2019 12:52 PM   Dressing Type Transparent;Tape 11/3/2019  3:30 PM   Hub Color/Line Status Infiltrated; Purple 11/3/2019  3:30 PM   Positive Blood Return (Site #1) Yes 11/3/2019  3:30 PM   Hub Color/Line Status Red;Flushed 11/3/2019  3:30 PM   Positive Blood Return (Site #2) Yes 11/3/2019  3:30 PM   Alcohol Cap Used Yes 11/3/2019  3:30 PM                External Female Catheter 10/23/19 (Active)   Site Assessment Clean, dry, & intact 10/23/2019 11:01 AM   Perineal Care Yes 10/23/2019 11:01 AM   Urine Output (mL) 200 ml 10/28/2019 11:43 AM       External Female Catheter 10/23/19 (Active)   Site Assessment Clean, dry, & intact 11/3/2019  3:00 AM   Repositioned Yes 11/3/2019  3:00 AM   Perineal Care Yes 11/3/2019  3:00 AM   Wick Changed No 11/3/2019  3:00 AM   Suction Canister/Tubing Changed No 11/3/2019  3:00 AM   Urine Output (mL) 200 ml 10/28/2019 11:43 AM                   Readmission Risk Assessment Tool Score High Risk            37       Total Score        3 Has Seen PCP in Last 6 Months (Yes=3, No=0)    2 . Living with Significant Other. Assisted Living. LTAC. SNF. or   Rehab    3 Patient Length of Stay (>5 days = 3)    4 IP Visits Last 12 Months (1-3=4, 4=9, >4=11)    5 Pt.  Coverage (Medicare=5 , Medicaid, or Self-Pay=4)    20 Charlson Comorbidity Score (Age + Comorbid Conditions)       Expected Length of Stay 3d 16h   Actual Length of Stay 11

## 2019-11-03 NOTE — PROGRESS NOTES
Problem: Falls - Risk of  Goal: *Absence of Falls  Description  Document Ebenezer Powell Fall Risk and appropriate interventions in the flowsheet. Outcome: Progressing Towards Goal  Note:   Fall Risk Interventions:  Mobility Interventions: Bed/chair exit alarm, Patient to call before getting OOB, PT Consult for mobility concerns    Mentation Interventions: Bed/chair exit alarm, Adequate sleep, hydration, pain control, Door open when patient unattended, Increase mobility, More frequent rounding, Reorient patient, Update white board, Room close to nurse's station    Medication Interventions: Bed/chair exit alarm, Patient to call before getting OOB, Teach patient to arise slowly    Elimination Interventions: Bed/chair exit alarm, Call light in reach, Patient to call for help with toileting needs    History of Falls Interventions: Bed/chair exit alarm, Door open when patient unattended, Room close to nurse's station         Problem: Heart Failure: Day 5  Goal: Activity/Safety  Outcome: Progressing Towards Goal     Problem: Pressure Injury - Risk of  Goal: *Prevention of pressure injury  Description  Document Kimani Scale and appropriate interventions in the flowsheet. Outcome: Progressing Towards Goal  Note:   Pressure Injury Interventions:  Sensory Interventions: Assess changes in LOC, Assess need for specialty bed, Avoid rigorous massage over bony prominences, Keep linens dry and wrinkle-free, Float heels, Minimize linen layers, Turn and reposition approx.  every two hours (pillows and wedges if needed)    Moisture Interventions: Absorbent underpads, Internal/External urinary devices, Limit adult briefs, Maintain skin hydration (lotion/cream), Minimize layers, Moisture barrier    Activity Interventions: Assess need for specialty bed, Chair cushion, Increase time out of bed    Mobility Interventions: Assess need for specialty bed, Chair cushion, Float heels, HOB 30 degrees or less    Nutrition Interventions: Document food/fluid/supplement intake    Friction and Shear Interventions: Apply protective barrier, creams and emollients, Minimize layers, Lift team/patient mobility team

## 2019-11-03 NOTE — PROGRESS NOTES
Hospital Progress Note    NAME:  Vidhi Singleton   :   3/17/1925   MRN:  [id number]     Date/Time:  11/3/2019 1:01 PM    Plan:     1. Continue iv ab  2. Encourage activity  Risk of Deterioration: Low  []           Moderate  []           High  [x]                 Assessment:   Principal Problem:    Sepsis ([facility] (2019)       ENTEROCOCCUS FAECALIS GROUP D bacteremia    Active Problems:    Cardiomyopathy, dilated, 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% since  (3882)      Metabolic encephalopathy ()      HTN (hypertension) (2015)      Presence of biventricular automatic cardioverter/defibrillator (AICD) (2015)      Overview: [employer] biventricular AICD implant      Chronic systolic congestive heart failure ([facility] (10/8/2015)      CKD (chronic kidney disease) stage 3, GFR 30-59 ml/min ([facility] (1/10/2018)      Fever (10/23/2019)      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 female presents to the ED with cc of lethargy and generalized weakness. Patient lives in a nursing home and was referred here today due to elevated temperature of 100.9 at nursing home, generalized weakness and lethargy and concern for being intermittently confused. Patient denies any pain and says she is just feeling cold. She denies any sore throat, neck pain, chest pain, shortness of breath, abdominal pain or any sores on her skin. She denies any bleeding though her ability to answer questions is limited as she seems to get a little confused with more complex questioning.   She is at the [address] [place] [address] Se:     Voices no new c/o  11 Point Review of Systems:   Negative except poorly obtained but denies sob/cp    []            Unable to obtain ROS due to:       [x]            mental status change []            sedated []            intubated     Social History     Tobacco Use    Smoking status: Never Smoker    Smokeless tobacco: Never Used   Substance Use Topics    Alcohol use: No     Alcohol/week: 0.0 standard drinks     Medications reviewed:  Current Facility-Administered Medications   Medication Dose Route Frequency    bacitracin 500 unit/gram packet 1 Packet  1 Packet Topical PRN    heparin (porcine) pf 300 Units  300 Units InterCATHeter PRN    potassium chloride SR (KLOR-CON 10) tablet 20 mEq  20 mEq Oral TID    lactobac ac& pc-s.therm-b.anim (DAWN Q/RISAQUAD)  1 Cap Oral DAILY    furosemide (LASIX) tablet 40 mg  40 mg Oral DAILY    cefTRIAXone (ROCEPHIN) 2 g in 0.9% sodium chloride (MBP/ADV) 50 mL  2 g IntraVENous Q12H    ampicillin (OMNIPEN) 2 g in 0.9% sodium chloride (MBP/ADV) 100 mL  2 g IntraVENous Q6H    levothyroxine (SYNTHROID) tablet 25 mcg  25 mcg Oral 6am    polyethylene glycol (MIRALAX) packet 17 g  17 g Oral EVERY OTHER DAY    sodium chloride (NS) flush 5-10 mL  5-10 mL IntraVENous PRN    acetaminophen (TYLENOL) tablet 650 mg  650 mg Oral Q6H PRN    acetaminophen (TYLENOL) tablet 650 mg  650 mg Oral Q6H PRN    albuterol (PROVENTIL VENTOLIN) nebulizer solution 2.5 mg  2.5 mg Nebulization Q6H PRN    apixaban (ELIQUIS) tablet 2.5 mg  2.5 mg Oral BID    midodrine (PROAMITINE) tablet 5 mg  5 mg Oral TID    mirtazapine (REMERON) tablet 15 mg  15 mg Oral QHS    sodium chloride (NS) flush 5-40 mL  5-40 mL IntraVENous Q8H        Objective:   Vitals:  Visit Vitals  /70 (BP 1 Location: Left arm, BP Patient Position: At rest)   Pulse 75   Temp 97.3 °F (36.3 °C)   Resp 18   Ht 5' 1\" (1.549 m)   Wt 124 lb 12.8 oz (56.6 kg)   SpO2 94%   BMI 23.58 kg/m²     Temp (24hrs), Av.6 °F (36.4 °C), Min:97.3 °F (36.3 °C), Max:98.2 °F (36.8 °C)    O2 Flow Rate (L/min): 1 l/min O2 Device: Room air    Last 24hr Input/Output:    Intake/Output Summary (Last 24 hours) at 11/3/2019 0718  Last data filed at 11/3/2019 0434  Gross per 24 hour   Intake 1010 ml   Output 200 ml   Net 810 ml PHYSICAL EXAM:  General:    Alert, cooperative, no distress, appears stated age. Head:   Normocephalic, without obvious abnormality, atraumatic. Lungs:   Decreased bs  Heart:   Regular rate and rhythm,  no murmur, rub or gallop. Abdomen:   Soft, non-tender. Not distended. Bowel sounds normal. No masses        Lab Data Reviewed:    Recent Labs     11/03/19  0234 11/01/19  0445 10/31/19  2120   WBC 2.0* 1.5* 1.9*   HGB 9.2* 9.4* 9.8*   HCT 30.5* 30.8* 31.8*   * 119* 121*     Recent Labs     11/03/19  0234 11/02/19  0218 11/01/19  0445 10/31/19  2120   * 146* 143  --    K 3.9 3.9 3.9  --    * 112* 110*  --    CO2 28 29 26  --    GLU 80 86 88  --    BUN 16 20 23*  --    CREA 0.94 1.01 1.04*  --    CA 9.1 8.7 8.6  --    MG  --   --   --  2.0   PHOS  --   --   --  2.6     Lab Results   Component Value Date/Time    Glucose (POC) 86 11/01/2019 05:30 PM    Glucose (POC) 167 (H) 02/05/2019 08:53 PM     No results for input(s): PH, PCO2, PO2, HCO3, FIO2 in the last 72 hours. No results for input(s): INR, INREXT, INREXT in the last 72 hours.   ___________________________________________________  ___________________________________________________    Attending Physician: Chantel Varner MD

## 2019-11-03 NOTE — PROGRESS NOTES
Problem: Falls - Risk of  Goal: *Absence of Falls  Description  Document Jude Alonzo Fall Risk and appropriate interventions in the flowsheet. Outcome: Progressing Towards Goal  Note:   Fall Risk Interventions:  Mobility Interventions: Bed/chair exit alarm, Patient to call before getting OOB, PT Consult for mobility concerns    Mentation Interventions: Adequate sleep, hydration, pain control, Door open when patient unattended, Reorient patient, More frequent rounding    Medication Interventions: Bed/chair exit alarm, Patient to call before getting OOB, Teach patient to arise slowly    Elimination Interventions: Bed/chair exit alarm, Call light in reach, Stay With Me (per policy), Toileting schedule/hourly rounds    History of Falls Interventions: Bed/chair exit alarm, Door open when patient unattended         Problem: Pressure Injury - Risk of  Goal: *Prevention of pressure injury  Description  Document Kimani Scale and appropriate interventions in the flowsheet. Outcome: Progressing Towards Goal  Note:   Pressure Injury Interventions:  Sensory Interventions: Assess changes in LOC, Assess need for specialty bed, Avoid rigorous massage over bony prominences, Keep linens dry and wrinkle-free, Float heels, Minimize linen layers, Turn and reposition approx.  every two hours (pillows and wedges if needed)    Moisture Interventions: Absorbent underpads, Internal/External urinary devices, Limit adult briefs, Maintain skin hydration (lotion/cream), Minimize layers, Moisture barrier    Activity Interventions: Assess need for specialty bed, Chair cushion, Increase time out of bed    Mobility Interventions: Assess need for specialty bed, Chair cushion, Float heels, HOB 30 degrees or less    Nutrition Interventions: Document food/fluid/supplement intake    Friction and Shear Interventions: Apply protective barrier, creams and emollients, Minimize layers, Lift team/patient mobility team

## 2019-11-04 LAB
BACTERIA SPEC CULT: NORMAL
SERVICE CMNT-IMP: NORMAL

## 2019-11-04 PROCEDURE — 65660000000 HC RM CCU STEPDOWN

## 2019-11-04 PROCEDURE — 94760 N-INVAS EAR/PLS OXIMETRY 1: CPT

## 2019-11-04 PROCEDURE — 74011250637 HC RX REV CODE- 250/637: Performed by: INTERNAL MEDICINE

## 2019-11-04 PROCEDURE — 74011250637 HC RX REV CODE- 250/637: Performed by: HOSPITALIST

## 2019-11-04 PROCEDURE — 74011000258 HC RX REV CODE- 258: Performed by: INTERNAL MEDICINE

## 2019-11-04 PROCEDURE — 74011250636 HC RX REV CODE- 250/636: Performed by: INTERNAL MEDICINE

## 2019-11-04 PROCEDURE — 97116 GAIT TRAINING THERAPY: CPT

## 2019-11-04 RX ADMIN — POTASSIUM CHLORIDE 20 MEQ: 750 TABLET, FILM COATED, EXTENDED RELEASE ORAL at 10:28

## 2019-11-04 RX ADMIN — FUROSEMIDE 40 MG: 40 TABLET ORAL at 10:28

## 2019-11-04 RX ADMIN — AMPICILLIN SODIUM 2 G: 2 INJECTION, POWDER, FOR SOLUTION INTRAMUSCULAR; INTRAVENOUS at 05:26

## 2019-11-04 RX ADMIN — Medication 10 ML: at 02:33

## 2019-11-04 RX ADMIN — POLYETHYLENE GLYCOL 3350 17 G: 17 POWDER, FOR SOLUTION ORAL at 10:28

## 2019-11-04 RX ADMIN — MIDODRINE HYDROCHLORIDE 5 MG: 5 TABLET ORAL at 10:28

## 2019-11-04 RX ADMIN — Medication 1 CAPSULE: at 10:28

## 2019-11-04 RX ADMIN — POTASSIUM CHLORIDE 20 MEQ: 750 TABLET, FILM COATED, EXTENDED RELEASE ORAL at 16:51

## 2019-11-04 RX ADMIN — CEFTRIAXONE SODIUM 2 G: 2 INJECTION, POWDER, FOR SOLUTION INTRAMUSCULAR; INTRAVENOUS at 02:31

## 2019-11-04 RX ADMIN — APIXABAN 2.5 MG: 2.5 TABLET, FILM COATED ORAL at 21:45

## 2019-11-04 RX ADMIN — LEVOTHYROXINE SODIUM 25 MCG: 25 TABLET ORAL at 05:25

## 2019-11-04 RX ADMIN — Medication 10 ML: at 21:46

## 2019-11-04 RX ADMIN — MIRTAZAPINE 15 MG: 15 TABLET, FILM COATED ORAL at 21:45

## 2019-11-04 RX ADMIN — POTASSIUM CHLORIDE 20 MEQ: 750 TABLET, FILM COATED, EXTENDED RELEASE ORAL at 21:45

## 2019-11-04 RX ADMIN — CEFTRIAXONE SODIUM 2 G: 2 INJECTION, POWDER, FOR SOLUTION INTRAMUSCULAR; INTRAVENOUS at 16:51

## 2019-11-04 RX ADMIN — AMPICILLIN SODIUM 2 G: 2 INJECTION, POWDER, FOR SOLUTION INTRAMUSCULAR; INTRAVENOUS at 13:22

## 2019-11-04 RX ADMIN — MIDODRINE HYDROCHLORIDE 5 MG: 5 TABLET ORAL at 16:51

## 2019-11-04 RX ADMIN — MIDODRINE HYDROCHLORIDE 5 MG: 5 TABLET ORAL at 21:45

## 2019-11-04 RX ADMIN — Medication 10 ML: at 13:22

## 2019-11-04 RX ADMIN — APIXABAN 2.5 MG: 2.5 TABLET, FILM COATED ORAL at 10:28

## 2019-11-04 RX ADMIN — AMPICILLIN SODIUM 2 G: 2 INJECTION, POWDER, FOR SOLUTION INTRAMUSCULAR; INTRAVENOUS at 18:09

## 2019-11-04 NOTE — PROGRESS NOTES
Problem: Falls - Risk of  Goal: *Absence of Falls  Description  Document Donata Carrel Fall Risk and appropriate interventions in the flowsheet.   Outcome: Progressing Towards Goal  Note:   Fall Risk Interventions:  Mobility Interventions: Bed/chair exit alarm, Mechanical lift, OT consult for ADLs, Patient to call before getting OOB, PT Consult for mobility concerns    Mentation Interventions: Adequate sleep, hydration, pain control, Bed/chair exit alarm, Door open when patient unattended, Evaluate medications/consider consulting pharmacy    Medication Interventions: Assess postural VS orthostatic hypotension, Bed/chair exit alarm, Patient to call before getting OOB, Teach patient to arise slowly    Elimination Interventions: Bed/chair exit alarm, Call light in reach, Patient to call for help with toileting needs    History of Falls Interventions: Bed/chair exit alarm, Investigate reason for fall, Room close to nurse's station         Problem: Patient Education: Go to Patient Education Activity  Goal: Patient/Family Education  Outcome: Progressing Towards Goal     Problem: Patient Education: Go to Patient Education Activity  Goal: Patient/Family Education  Outcome: Progressing Towards Goal     Problem: Heart Failure: Day 1  Goal: Off Pathway (Use only if patient is Off Pathway)  Outcome: Progressing Towards Goal  Goal: Activity/Safety  Outcome: Progressing Towards Goal  Goal: Consults, if ordered  Outcome: Progressing Towards Goal  Goal: Diagnostic Test/Procedures  Outcome: Progressing Towards Goal  Goal: Nutrition/Diet  Outcome: Progressing Towards Goal  Goal: Discharge Planning  Outcome: Progressing Towards Goal  Goal: Medications  Outcome: Progressing Towards Goal  Goal: Respiratory  Outcome: Progressing Towards Goal  Goal: Treatments/Interventions/Procedures  Outcome: Progressing Towards Goal  Goal: Psychosocial  Outcome: Progressing Towards Goal  Goal: *Oxygen saturation within defined limits  Outcome: Progressing Towards Goal  Goal: *Hemodynamically stable  Outcome: Progressing Towards Goal  Goal: *Optimal pain control at patient's stated goal  Outcome: Progressing Towards Goal  Goal: *Anxiety reduced or absent  Outcome: Progressing Towards Goal     Problem: Heart Failure: Day 2  Goal: Off Pathway (Use only if patient is Off Pathway)  Outcome: Progressing Towards Goal  Goal: Activity/Safety  Outcome: Progressing Towards Goal  Goal: Consults, if ordered  Outcome: Progressing Towards Goal  Goal: Diagnostic Test/Procedures  Outcome: Progressing Towards Goal  Goal: Nutrition/Diet  Outcome: Progressing Towards Goal  Goal: Discharge Planning  Outcome: Progressing Towards Goal  Goal: Medications  Outcome: Progressing Towards Goal  Goal: Respiratory  Outcome: Progressing Towards Goal  Goal: Treatments/Interventions/Procedures  Outcome: Progressing Towards Goal  Goal: Psychosocial  Outcome: Progressing Towards Goal  Goal: *Oxygen saturation within defined limits  Outcome: Progressing Towards Goal  Goal: *Hemodynamically stable  Outcome: Progressing Towards Goal  Goal: *Optimal pain control at patient's stated goal  Outcome: Progressing Towards Goal  Goal: *Anxiety reduced or absent  Outcome: Progressing Towards Goal  Goal: *Demonstrates progressive activity  Outcome: Progressing Towards Goal     Problem: Heart Failure: Day 3  Goal: Off Pathway (Use only if patient is Off Pathway)  Outcome: Progressing Towards Goal  Goal: Activity/Safety  Outcome: Progressing Towards Goal  Goal: Diagnostic Test/Procedures  Outcome: Progressing Towards Goal  Goal: Nutrition/Diet  Outcome: Progressing Towards Goal  Goal: Discharge Planning  Outcome: Progressing Towards Goal  Goal: Medications  Outcome: Progressing Towards Goal  Goal: Respiratory  Outcome: Progressing Towards Goal  Goal: Treatments/Interventions/Procedures  Outcome: Progressing Towards Goal  Goal: Psychosocial  Outcome: Progressing Towards Goal  Goal: *Oxygen saturation within defined limits  Outcome: Progressing Towards Goal  Goal: *Hemodynamically stable  Outcome: Progressing Towards Goal  Goal: *Optimal pain control at patient's stated goal  Outcome: Progressing Towards Goal  Goal: *Anxiety reduced or absent  Outcome: Progressing Towards Goal  Goal: *Demonstrates progressive activity  Outcome: Progressing Towards Goal     Problem: Heart Failure: Day 4  Goal: Off Pathway (Use only if patient is Off Pathway)  Outcome: Progressing Towards Goal  Goal: Activity/Safety  Outcome: Progressing Towards Goal  Goal: Diagnostic Test/Procedures  Outcome: Progressing Towards Goal  Goal: Nutrition/Diet  Outcome: Progressing Towards Goal  Goal: Discharge Planning  Outcome: Progressing Towards Goal  Goal: Medications  Outcome: Progressing Towards Goal  Goal: Respiratory  Outcome: Progressing Towards Goal  Goal: Treatments/Interventions/Procedures  Outcome: Progressing Towards Goal  Goal: Psychosocial  Outcome: Progressing Towards Goal  Goal: *Oxygen saturation within defined limits  Outcome: Progressing Towards Goal  Goal: *Hemodynamically stable  Outcome: Progressing Towards Goal  Goal: *Optimal pain control at patient's stated goal  Outcome: Progressing Towards Goal  Goal: *Anxiety reduced or absent  Outcome: Progressing Towards Goal  Goal: *Demonstrates progressive activity  Outcome: Progressing Towards Goal     Problem: Heart Failure: Day 5  Goal: Off Pathway (Use only if patient is Off Pathway)  Outcome: Progressing Towards Goal  Goal: Activity/Safety  Outcome: Progressing Towards Goal  Goal: Diagnostic Test/Procedures  Outcome: Progressing Towards Goal  Goal: Nutrition/Diet  Outcome: Progressing Towards Goal  Goal: Discharge Planning  Outcome: Progressing Towards Goal  Goal: Medications  Outcome: Progressing Towards Goal  Goal: Respiratory  Outcome: Progressing Towards Goal  Goal: Treatments/Interventions/Procedures  Outcome: Progressing Towards Goal  Goal: Psychosocial  Outcome: Progressing Towards Goal     Problem: Heart Failure: Discharge Outcomes  Goal: *Demonstrates ability to perform prescribed activity without shortness of breath or discomfort  Outcome: Progressing Towards Goal  Goal: *Left ventricular function assessment completed prior to or during stay, or planned for post-discharge  Outcome: Progressing Towards Goal  Goal: *ACEI prescribed if LVEF less than 40% and no contraindications or ARB prescribed  Outcome: Progressing Towards Goal  Goal: *Verbalizes understanding and describes prescribed diet  Outcome: Progressing Towards Goal  Goal: *Verbalizes understanding/describes prescribed medications  Outcome: Progressing Towards Goal  Goal: *Describes available resources and support systems  Description  (eg: Home Health, Palliative Care, Advanced Medical Directive)  Outcome: Progressing Towards Goal  Goal: *Describes smoking cessation resources  Outcome: Progressing Towards Goal  Goal: *Understands and describes signs and symptoms to report to providers(Stroke Metric)  Outcome: Progressing Towards Goal  Goal: *Describes/verbalizes understanding of follow-up/return appt  Description  (eg: to physicians, diabetes treatment coordinator, and other resources  Outcome: Progressing Towards Goal  Goal: *Describes importance of continuing daily weights and changes to report to physician  Outcome: Progressing Towards Goal

## 2019-11-04 NOTE — PROGRESS NOTES
General Daily Progress Note    Admit Date: 10/23/2019    Subjective:     Patient has no complaint . Current Facility-Administered Medications   Medication Dose Route Frequency    bacitracin 500 unit/gram packet 1 Packet  1 Packet Topical PRN    heparin (porcine) pf 300 Units  300 Units InterCATHeter PRN    potassium chloride SR (KLOR-CON 10) tablet 20 mEq  20 mEq Oral TID    lactobac ac& pc-s.therm-b.anim (DAWN Q/RISAQUAD)  1 Cap Oral DAILY    furosemide (LASIX) tablet 40 mg  40 mg Oral DAILY    cefTRIAXone (ROCEPHIN) 2 g in 0.9% sodium chloride (MBP/ADV) 50 mL  2 g IntraVENous Q12H    ampicillin (OMNIPEN) 2 g in 0.9% sodium chloride (MBP/ADV) 100 mL  2 g IntraVENous Q6H    levothyroxine (SYNTHROID) tablet 25 mcg  25 mcg Oral 6am    polyethylene glycol (MIRALAX) packet 17 g  17 g Oral EVERY OTHER DAY    sodium chloride (NS) flush 5-10 mL  5-10 mL IntraVENous PRN    acetaminophen (TYLENOL) tablet 650 mg  650 mg Oral Q6H PRN    acetaminophen (TYLENOL) tablet 650 mg  650 mg Oral Q6H PRN    albuterol (PROVENTIL VENTOLIN) nebulizer solution 2.5 mg  2.5 mg Nebulization Q6H PRN    apixaban (ELIQUIS) tablet 2.5 mg  2.5 mg Oral BID    midodrine (PROAMITINE) tablet 5 mg  5 mg Oral TID    mirtazapine (REMERON) tablet 15 mg  15 mg Oral QHS    sodium chloride (NS) flush 5-40 mL  5-40 mL IntraVENous Q8H        Review of Systems  A comprehensive review of systems was negative.     Objective:     Patient Vitals for the past 24 hrs:   BP Temp Pulse Resp SpO2 Weight   11/04/19 1046 110/66 97.6 °F (36.4 °C) 88 18 91 %    11/04/19 0739 120/75 97.2 °F (36.2 °C) 79 17 93 %    11/04/19 0333 114/76 97.3 °F (36.3 °C) 70 18 94 %    11/04/19 0230      120 lb 13 oz (54.8 kg)   11/03/19 2259 122/74 97.3 °F (36.3 °C) 69 18 91 %    11/03/19 1945     98 %    11/03/19 1911 117/68 97.4 °F (36.3 °C) 78 18 98 %    11/03/19 1555 117/77 97.2 °F (36.2 °C) 69 18 100 %      11/04 0701 - 11/04 1900  In: 480 [P.O.:480]  Out: -   11/02 1901 - 11/04 0700  In: 80 [P.O.:350; I.V.:460]  Out: 300 [Urine:300]    Physical Exam:   Visit Vitals  /66 (BP 1 Location: Left arm, BP Patient Position: Sitting)   Pulse 88   Temp 97.6 °F (36.4 °C)   Resp 18   Ht 5' 1\" (1.549 m)   Wt 120 lb 13 oz (54.8 kg)   SpO2 91%   BMI 22.83 kg/m²     General appearance: alert, cooperative, no distress, appears stated age  Neck: supple, symmetrical, trachea midline, no adenopathy, thyroid: not enlarged, symmetric, no tenderness/mass/nodules, no carotid bruit and no JVD  Lungs: clear to auscultation bilaterally  Heart: regular rate and rhythm, S1, S2 normal, no murmur, click, rub or gallop  Abdomen: soft, non-tender. Bowel sounds normal. No masses,  no organomegaly  Extremities: extremities normal, atraumatic, no cyanosis or edema        Data Review No results found for this or any previous visit (from the past 24 hour(s)). 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 Awaiting social service comments regarding home parenteral antibiotic coverage. 2.  No evidence of cardiac decompensation. 3.  Per infectious disease regarding antibiotics and need for repeat culture.

## 2019-11-04 NOTE — PROGRESS NOTES
Problem: Mobility Impaired (Adult and Pediatric)  Goal: *Acute Goals and Plan of Care (Insert Text)  Description  FUNCTIONAL STATUS PRIOR TO ADMISSION: Pt with baseline dementia therefore unable to get fully accurate report however pt reports ambulating with use of rollator walker and denies history of falls. HOME SUPPORT PRIOR TO ADMISSION: Pt lives at [facility] (memory care unit[facility] and receives assist as needed from staff. Physical Therapy Goals  Initiated 10/31/2019   1. Patient will move from supine to sit and sit to supine , scoot up and down and roll side to side in bed with supervision/set-up within 7 day(s). 2.  Patient will transfer from bed to chair and chair to bed with supervision/set-up using the least restrictive device within 7 day(s). 3.  Patient will perform sit to stand with supervision/set-up within 7 day(s). 4.  Patient will ambulate with supervision/set-up for 100 feet with the least restrictive device within 7 day(s). Outcome: Progressing Towards Goal   PHYSICAL THERAPY TREATMENT  Patient: Dread Stoner (11 y.o. female)  Date: 11/4/2019  Diagnosis: Fever [R50.9] Sepsis ([facility]       Precautions: Bed Alarm, DNR  Chart, physical therapy assessment, plan of care and goals were reviewed. ASSESSMENT  Patient continues with skilled PT services and is progressing towards goals. Patient required encouragement to ambulate in hallway. She is making progress and with improved balance with use of RW. Required verbal cues for RW management in small spaces. Current Level of Function Impacting Discharge (mobility/balance): CGA-SBA with RW    Other factors to consider for discharge: fatigue         PLAN :  Patient continues to benefit from skilled intervention to address the above impairments. Continue treatment per established plan of care. to address goals.     Recommendation for discharge: (in order for the patient to meet his/her long term goals)  Physical therapy at least 2 days/week in the home at [facility] skilled nursing    This discharge recommendation:  Has been made in collaboration with the attending provider and/or case management    IF patient discharges home will need the following DME: patient owns DME required for discharge       SUBJECTIVE:   Patient stated Can I go back to the chair yet?     OBJECTIVE DATA SUMMARY:   Critical Behavior:  Neurologic State: Alert, Confused  Orientation Level: Oriented to person  Cognition: Follows commands     Functional Mobility Training:  Bed Mobility:                    Transfers:  Sit to Stand: Supervision  Stand to Sit: Supervision        Bed to Chair: Stand-by assistance;Contact guard assistance                    Balance:  Sitting: Intact; Without support  Standing: Impaired; With support  Standing - Static: Good  Standing - Dynamic : Fair  Ambulation/Gait Training:  Distance (ft): 75 Feet (ft)  Assistive Device: Gait belt;Walker, rolling  Ambulation - Level of Assistance: Stand-by assistance;Contact guard assistance        Gait Abnormalities: Decreased step clearance;Shuffling gait        Base of Support: Narrowed     Speed/Lawanda: Pace decreased (<100 feet/min)  Step Length: Right shortened;Left shortened             Activity Tolerance:   Good and Fair  Please refer to the flowsheet for vital signs taken during this treatment.     After treatment patient left in no apparent distress:   Sitting in chair, Call bell within reach and Bed / chair alarm activated    COMMUNICATION/COLLABORATION:   The patients plan of care was discussed with: Registered Nurse and     Shari Shen PT, DPT   Time Calculation: 14 mins

## 2019-11-04 NOTE — PROGRESS NOTES
Hematology Oncology Progress Note         Follow up for: CLL    Chart notes reviewed since last visit. Case discussed with following:   CC: \" I feel good. \"    Patient complains of the following: No complaints, she feels good, no pain, no fever or chills. Additional concerns noted by the staff:     Patient Vitals for the past 24 hrs:   BP Temp Pulse Resp SpO2 Weight   11/04/19 0739 120/75 97.2 °F (36.2 °C) 79 17 93 %    11/04/19 0333 114/76 97.3 °F (36.3 °C) 70 18 94 %    11/04/19 0230      54.8 kg (120 lb 13 oz)   11/03/19 2259 122/74 97.3 °F (36.3 °C) 69 18 91 %    11/03/19 1945     98 %    11/03/19 1911 117/68 97.4 °F (36.3 °C) 78 18 98 %    11/03/19 1555 117/77 97.2 °F (36.2 °C) 69 18 100 %    11/03/19 1223 109/59 97.2 °F (36.2 °C) 70 19 95 %        ROS negative for 11 organ systems (but patient mildly confused and this may not be accurate). Physical Examination:  Gen NAD  CV reg  Lungs clear  abd benign      Labs:  No results found for this or any previous visit (from the past 24 hour(s)). Assessment and Plan:   CLL - counts and adenopathy very acceptable at present so can hold ibruitinib for now. Will not resume until clear cut signs of progression. 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 IgG ok, WBC down slightly most likely due to abx, monitor, b12/folate ok, CU and zinc WNL  Dr Isaac Gomez discussed with Dr. Sola Contreras from ID, most likely from abx, she will montior it while she is on abx    Gram positive bacteremia/enterococcus.  On abx per primary team    Hx HTN    Hx CKD    Hx CHF - on coreg    AICD

## 2019-11-04 NOTE — PROGRESS NOTES
Bedside shift change report GIVEN TO Kerwin Arceo RN. Report included the following information SBAR. SIGNIFICANT CHANGES DURING SHIFT:          CONCERNS TO ADDRESS WITH MD:            [address] NURSING NOTE   Admission Date 10/23/2019   Admission Diagnosis Fever [R50.9]   Consults IP CONSULT TO HOSPITALIST  IP CONSULT TO PRIMARY CARE PROVIDER  IP CONSULT TO PALLIATIVE CARE - PROVIDER  IP CONSULT TO HEMATOLOGY  IP CONSULT TO INFECTIOUS DISEASES      Cardiac Monitoring [x] Yes [] No      Purposeful Hourly Rounding [x] Yes    Milvia Score Total Score: 5   Milvia score 3 or > [x] Bed Alarm [] Avasys [] 1:1 sitter [] Patient refused (Signed refusal form in chart)   Kimani Score Kimnai Score: 16   Kimani score 14 or < [] PMT consult [] Wound Care consult    []  Specialty bed  [] Nutrition consult      Influenza Vaccine Received Flu Vaccine for Current Season (usually Sept-March): Yes(Per patient received first week of october.)           Oxygen needs? [x] Room air Oxygen @  []1L    []2L    []3L   []4L    []5L   []6L via  NC   Chronic home O2 use?  [] Yes [] No  Perform O2 challenge test and document in progress note using smartphrase (.Homeoxygen)      Last bowel movement Last Bowel Movement Date: 11/04/19      Urinary Catheter       External Female Catheter 10/23/19-Urine Output (mL): 200 ml  External Female Catheter 10/23/19-Urine Output (mL): 200 ml     LDAs         PICC Double Lumen 96/16/80 Right;Basilic (Active)   Central Line Being Utilized Yes 11/4/2019  1:00 PM   Criteria for Appropriate Use Long term IV/antibiotic administration 11/4/2019  1:00 PM   Site Assessment Clean, dry, & intact 11/4/2019  1:00 PM   Phlebitis Assessment 0 11/4/2019  1:00 PM   Infiltration Assessment 0 11/4/2019  1:00 PM   Arm Circumference (cm) 27 cm 11/1/2019  1:26 PM   Date of Last Dressing Change 11/01/19 11/4/2019  1:00 PM   Dressing Status Clean, dry, & intact 11/4/2019  1:00 PM   Action Taken Blood drawn;Open ports on tubing capped 11/3/2019  3:00 AM   External Catheter Length (cm) 0 centimeters 11/2/2019 12:52 PM   Dressing Type Disk with Chlorhexadine gluconate (CHG) 11/4/2019  1:00 PM   Hub Color/Line Status Flushed 11/4/2019  1:00 PM   Positive Blood Return (Site #1) Yes 11/4/2019  1:00 PM   Hub Color/Line Status Flushed 11/4/2019  1:00 PM   Positive Blood Return (Site #2) Yes 11/4/2019  1:00 PM   Alcohol Cap Used Yes 11/4/2019  1:00 PM                External Female Catheter 10/23/19 (Active)   Site Assessment Clean, dry, & intact 10/23/2019 11:01 AM   Perineal Care Yes 10/23/2019 11:01 AM   Urine Output (mL) 200 ml 10/28/2019 11:43 AM       External Female Catheter 10/23/19 (Active)   Site Assessment Clean, dry, & intact 11/3/2019  3:00 AM   Repositioned Yes 11/3/2019  3:00 AM   Perineal Care Yes 11/3/2019  3:00 AM   Wick Changed No 11/3/2019  3:00 AM   Suction Canister/Tubing Changed No 11/3/2019  3:00 AM   Urine Output (mL) 200 ml 10/28/2019 11:43 AM                   Readmission Risk Assessment Tool Score High Risk            37       Total Score        3 Has Seen PCP in Last 6 Months (Yes=3, No=0)    2 . Living with Significant Other. Assisted Living. LTAC. SNF. or   Rehab    3 Patient Length of Stay (>5 days = 3)    4 IP Visits Last 12 Months (1-3=4, 4=9, >4=11)    5 Pt.  Coverage (Medicare=5 , Medicaid, or Self-Pay=4)    20 Charlson Comorbidity Score (Age + Comorbid Conditions)       Expected Length of Stay 3d 16h   Actual Length of Stay 12

## 2019-11-04 NOTE — PROGRESS NOTES
Infectious Disease Progress Note    IMPRESSION:     - Sepsis s/p fever,change in mental status,fever spikes since admission  last temp 103.2 on 10/27    -E.faecalis Gp D  bacteremia with BC+ / - 10/23, BC - 10/27- E.faecalis spp 1/2 bottles. BC - 10/30 - NG    -Mitral valve endocarditis . WYATT - 10/29- vegetation on MV 10 x 3 mm flat, non mobile , pacer wires appear normal    -Pt has AICD , US done - no fluid in pocket     -ECHO-10/27-possible vegetation on AV/acer wires not seen well enough to evaluate for vegetation    -ECHO 10/28- cannot rule out vegetation on pacer tip     - H/o lymphoma    - Leucopenia WBC 1.5 , antimicrobials probably contributory, D/w DR Vianca Billy       PLAN:      · Ampicillin 2 g IV q6h &  Ceftriaxone 2 G IV q12 x 6 weeks  end date 12/10, remove picc at end of therapy  · Weekly CBC , CMP,fortnightly  ESR/ CRP fax reports to [phone], call with abnormal results at [phone]  · Call with antibiotic related adverse effects, monitor CBC. · Take probiotic yogurt  · Out pt follow up on  at 930 in ID clinic   · Plan of care D/w pt         Subjective:     Pt seen . Denies new complaints . \" I feel tired. \"    Review of Systems:  A comprehensive review of systems was negative except for that written in the History of Present Illness. 10 point ROS obtained . All other systems negative . Objective:     Blood pressure 110/66, pulse 88, temperature 97.6 °F (36.4 °C), resp. rate 18, height 5' 1\" (1.549 m), weight 120 lb 13 oz (54.8 kg), SpO2 91 %.   Temp (24hrs), Av.3 °F (36.3 °C), Min:97.2 °F (36.2 °C), Max:97.6 °F (36.4 °C)      Patient Vitals for the past 24 hrs:   Temp Pulse Resp BP SpO2   19 1046 97.6 °F (36.4 °C) 88 18 110/66 91 %   19 0739 97.2 °F (36.2 °C) 79 17 120/75 93 %   19 0333 97.3 °F (36.3 °C) 70 18 114/76 94 %   19 2259 97.3 °F (36.3 °C) 69 18 122/74 91 %   19 1945     98 %   19 1911 97.4 °F (36.3 °C) 78 18 117/68 98 %   19 1555 97.2 °F (36.2 °C) 69 18 117/77 100 %         Lines:  Peripheral IV:       Physical Exam:   General:  Awake cooperative,    Eyes:  Sclera anicteric. Pupils equally round and reactive to light. Mouth/Throat: Mucous membranes normal, oral pharynx clear   Neck: Supple   Lungs:   Reduced  auscultation bases  Chest - pacemaker pocket intact  no swelling +    CV:  Regular rate and rhythm,no murmur, click, rub or gallop   Abdomen:   Soft, non-tender.  bowel sounds normal. non-distended   Extremities: No  edema   Skin: Skin color, texture, turgor normal. no acute rash or lesions   Lymph nodes: Cervical and supraclavicular normal   Musculoskeletal: No swelling or deformity   Lines/Devices:  Intact, no erythema, drainage or tenderness   Psych: Alert and oriented, normal mood        Data Review:   CBC:   Recent Labs     11/03/19  0234   WBC 2.0*   RBC 3.18*   HGB 9.2*   HCT 30.5*   *   GRANS 53   LYMPH 34   EOS 5     CMP:   Recent Labs     11/03/19  0234 11/02/19  0218   GLU 80 86   * 146*   K 3.9 3.9   * 112*   CO2 28 29   BUN 16 20   CREA 0.94 1.01   CA 9.1 8.7   AGAP 8 5   BUCR 17 20       Studies:      Lab Results   Component Value Date/Time    Culture result: NO GROWTH 5 DAYS 10/30/2019 02:21 AM    Culture result: (A) 10/27/2019 02:33 PM     ENTEROCOCCUS SPECIES GROWING IN 1 OF 2 BOTTLES DRAWN (SITE = RAC)    Culture result:  10/27/2019 02:33 PM     PLEASE REFER TO PREVIOUS BLOOD CULTURE  [id number] COLLECTED 10/23/19 FOR ID/SENSITIVITIES      Culture result: REMAINING BOTTLE(S) HAS/HAVE NO GROWTH IN 5 DAYS 10/27/2019 02:33 PM    Culture result: (A) 10/23/2019 10:50 AM     ENTEROCOCCUS FAECALIS GROUP D GROWING IN BOTH BOTTLES DRAWN SITE= RT WRIST GENTAMICIN SYNERGY SCREEN IS SENSITIVE @ < OR = 500 mcg/ml STREPTOMYCIN SYNERGY SCREEN IS SENSITIVE @ < OR =1000 mcg/ml    Culture result: (A) 10/23/2019 10:50 AM     PRELIMINARY REPORT OF GRAM POSITIVE COCCI IN PAIRS GROWING IN BOTH BOTTLES DRAWN CALLED TO AND READ BACK BY Dominik Solomon RN ON 10/23/19 AT [address] [facility], [facility] [address]). MS        XR Results (most recent):  Results from Hospital Encounter encounter on 10/23/19   XR CHEST PORT    Narrative EXAM: XR CHEST PORT    INDICATION: PICC Tip Placement confirmation    COMPARISON: 10/31/2019    FINDINGS: A portable AP radiograph of the chest was obtained at 1326 hours. The  patient is on a cardiac monitor. The PICC line terminates at the caval atrial  junction. A dual-lead pacemaker is in place. There is cardiomegaly with the  persistent right hilar prominence and mild central congestion. The left  hemidiaphragm remains obscured.       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    DILAN (obstructive sleep apnea) G47.33    Rhinitis J31.0    Anemia D64.9    Reactive airway disease J45.909    HTN (hypertension) I10    Gout M10.9    LBBB (left bundle branch block) I44.7    Presence of biventricular automatic cardioverter/defibrillator (AICD) Z95.810    Chronic systolic congestive heart failure (HCC) I50.22    Lymphoma (HCC) C85.90    Mitral valvular regurgitation I34.0    Physical deconditioning R53.81    Gastroesophageal reflux disease with esophagitis K21.0    Thoracic aortic aneurysm without rupture (HCC) I71.2    Paroxysmal atrial fibrillation (HCC) I48.0    Xerosis of skin L85.3    Dyslipidemia E78.5    CKD (chronic kidney disease) stage 3, GFR 30-59 ml/min (HCC) N18.3    Acquired hypothyroidism E03.9    Nonrheumatic aortic valve insufficiency I35.1    UTI (urinary tract infection) N39.0    Counseling regarding advanced care planning and goals of care Z71.89    CAP (community acquired pneumonia) J18.9    Poor short-term memory R41.3    Fever R50.9    Endocarditis of mitral valve I05.8    Chronic a-fib I48.20    Sepsis (HCC) C67.4    Metabolic encephalopathy G93.41         ICD-10-CM ICD-9-CM    1. Fever, unspecified fever cause R50.9 780.60    2. Dehydration E86.0 276.51    3. Generalized weakness R53.1 780.79    4. Confusion R41.0 298.9    5. Goals of care, counseling/discussion Z71.89 V65.49    6. Metabolic encephalopathy B21.01 348.31    7. Bacteremia due to Enterococcus R78.81 790.7     B95.2 041.04    8. 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 I have discussed medication side effects and warnings with the patient as well.     Reviewed test results at length with patient    Anti-infectives:     Ceftriaxone / Ampicillin - 10/27  S/p Ceftriaxone /Vancomycin - 10/24-10/26     Bree Yañez MD FACP

## 2019-11-04 NOTE — PROGRESS NOTES
VANESA: [facility]    4:10pm- Kelsy Sosa from [facility] called CM via phone. Terrie Friend stated that pt's IV antibiotics will not cost her anything because she will be skilled when she returns to the facility.      4:00pm- CM met with pt and pt's friend at bedside. No new needs at this time. CM will continue to follow pt for d/c planning needs. 3:30pm- CM faxed Kelsy Sosa all necessary clinical documentation. 10:00am-CM called Kelsy Sosa at [facility] via phone. CM informed Piper that pt will need IV antibiotics when d/c and CM inquired if pt is able to return to facility at d/c. Terrie Friend stated that pt is able to return to facility at d/c and that she would need D/C summary, labs, physician order, doctor notes and ID consult to ([phone]). Terrie Friend provided call to report number [phone]. CM will continue to follow patient for discharge planning needs and arrange for services as deemed necessary.     Nancy Gutierres [address] [place] [address]  [phone]

## 2019-11-05 LAB
ANION GAP SERPL CALC-SCNC: 5 MMOL/L (ref 5–15)
BUN SERPL-MCNC: 17 MG/DL (ref 6–20)
BUN/CREAT SERPL: 17 (ref 12–20)
CALCIUM SERPL-MCNC: 9.2 MG/DL (ref 8.5–10.1)
CHLORIDE SERPL-SCNC: 112 MMOL/L (ref 97–108)
CO2 SERPL-SCNC: 29 MMOL/L (ref 21–32)
CREAT SERPL-MCNC: 1.03 MG/DL (ref 0.55–1.02)
ERYTHROCYTE [DISTWIDTH] IN BLOOD BY AUTOMATED COUNT: 19.7 % (ref 11.5–14.5)
GLUCOSE SERPL-MCNC: 75 MG/DL (ref 65–100)
HCT VFR BLD AUTO: 31.6 % (ref 35–47)
HGB BLD-MCNC: 9.4 G/DL (ref 11.5–16)
MCH RBC QN AUTO: 28.1 PG (ref 26–34)
MCHC RBC AUTO-ENTMCNC: 29.7 G/DL (ref 30–36.5)
MCV RBC AUTO: 94.6 FL (ref 80–99)
NRBC # BLD: 0 K/UL (ref 0–0.01)
NRBC BLD-RTO: 0 PER 100 WBC
PLATELET # BLD AUTO: 128 K/UL (ref 150–400)
PMV BLD AUTO: 11.4 FL (ref 8.9–12.9)
POTASSIUM SERPL-SCNC: 4.4 MMOL/L (ref 3.5–5.1)
RBC # BLD AUTO: 3.34 M/UL (ref 3.8–5.2)
SODIUM SERPL-SCNC: 146 MMOL/L (ref 136–145)
WBC # BLD AUTO: 2.4 K/UL (ref 3.6–11)

## 2019-11-05 PROCEDURE — 65660000000 HC RM CCU STEPDOWN

## 2019-11-05 PROCEDURE — 74011000258 HC RX REV CODE- 258: Performed by: INTERNAL MEDICINE

## 2019-11-05 PROCEDURE — 74011250637 HC RX REV CODE- 250/637: Performed by: INTERNAL MEDICINE

## 2019-11-05 PROCEDURE — 94760 N-INVAS EAR/PLS OXIMETRY 1: CPT

## 2019-11-05 PROCEDURE — 36415 COLL VENOUS BLD VENIPUNCTURE: CPT

## 2019-11-05 PROCEDURE — 74011250636 HC RX REV CODE- 250/636: Performed by: INTERNAL MEDICINE

## 2019-11-05 PROCEDURE — 74011250637 HC RX REV CODE- 250/637: Performed by: HOSPITALIST

## 2019-11-05 PROCEDURE — 85027 COMPLETE CBC AUTOMATED: CPT

## 2019-11-05 PROCEDURE — 80048 BASIC METABOLIC PNL TOTAL CA: CPT

## 2019-11-05 RX ADMIN — AMPICILLIN SODIUM 2 G: 2 INJECTION, POWDER, FOR SOLUTION INTRAMUSCULAR; INTRAVENOUS at 11:40

## 2019-11-05 RX ADMIN — Medication 1 CAPSULE: at 10:01

## 2019-11-05 RX ADMIN — Medication 40 ML: at 17:11

## 2019-11-05 RX ADMIN — MIRTAZAPINE 15 MG: 15 TABLET, FILM COATED ORAL at 21:45

## 2019-11-05 RX ADMIN — AMPICILLIN SODIUM 2 G: 2 INJECTION, POWDER, FOR SOLUTION INTRAMUSCULAR; INTRAVENOUS at 17:10

## 2019-11-05 RX ADMIN — FUROSEMIDE 40 MG: 40 TABLET ORAL at 10:01

## 2019-11-05 RX ADMIN — Medication 10 ML: at 06:54

## 2019-11-05 RX ADMIN — Medication 10 ML: at 21:46

## 2019-11-05 RX ADMIN — POTASSIUM CHLORIDE 20 MEQ: 750 TABLET, FILM COATED, EXTENDED RELEASE ORAL at 17:10

## 2019-11-05 RX ADMIN — MIDODRINE HYDROCHLORIDE 5 MG: 5 TABLET ORAL at 10:01

## 2019-11-05 RX ADMIN — CEFTRIAXONE SODIUM 2 G: 2 INJECTION, POWDER, FOR SOLUTION INTRAMUSCULAR; INTRAVENOUS at 05:00

## 2019-11-05 RX ADMIN — APIXABAN 2.5 MG: 2.5 TABLET, FILM COATED ORAL at 21:45

## 2019-11-05 RX ADMIN — CEFTRIAXONE SODIUM 2 G: 2 INJECTION, POWDER, FOR SOLUTION INTRAMUSCULAR; INTRAVENOUS at 18:38

## 2019-11-05 RX ADMIN — AMPICILLIN SODIUM 2 G: 2 INJECTION, POWDER, FOR SOLUTION INTRAMUSCULAR; INTRAVENOUS at 05:30

## 2019-11-05 RX ADMIN — APIXABAN 2.5 MG: 2.5 TABLET, FILM COATED ORAL at 10:01

## 2019-11-05 RX ADMIN — MIDODRINE HYDROCHLORIDE 5 MG: 5 TABLET ORAL at 21:45

## 2019-11-05 RX ADMIN — POTASSIUM CHLORIDE 20 MEQ: 750 TABLET, FILM COATED, EXTENDED RELEASE ORAL at 10:01

## 2019-11-05 RX ADMIN — POTASSIUM CHLORIDE 20 MEQ: 750 TABLET, FILM COATED, EXTENDED RELEASE ORAL at 21:45

## 2019-11-05 RX ADMIN — MIDODRINE HYDROCHLORIDE 5 MG: 5 TABLET ORAL at 17:10

## 2019-11-05 RX ADMIN — AMPICILLIN SODIUM 2 G: 2 INJECTION, POWDER, FOR SOLUTION INTRAMUSCULAR; INTRAVENOUS at 22:30

## 2019-11-05 RX ADMIN — LEVOTHYROXINE SODIUM 25 MCG: 25 TABLET ORAL at 06:54

## 2019-11-05 RX ADMIN — AMPICILLIN SODIUM 2 G: 2 INJECTION, POWDER, FOR SOLUTION INTRAMUSCULAR; INTRAVENOUS at 00:02

## 2019-11-05 NOTE — PROGRESS NOTES
Hematology Oncology Progress Note         Follow up for: CLL    Chart notes reviewed since last visit. Case discussed with following:   CC: \" I feel ok\"    Patient complains of the following: No new complaints    Additional concerns noted by the staff:     Patient Vitals for the past 24 hrs:   BP Temp Pulse Resp SpO2 Weight   11/05/19 0718 117/70 96.5 °F (35.8 °C) 87 18 96 %    11/05/19 0717      51.8 kg (114 lb 3.2 oz)   11/05/19 0359 124/83 97.3 °F (36.3 °C) 79 18 97 %    11/05/19 0010 109/69 97.4 °F (36.3 °C) 80 18 100 %    11/04/19 2046 100/70 97.3 °F (36.3 °C) 78 16 97 %    11/04/19 1445 107/67 97.4 °F (36.3 °C)  16 93 %    11/04/19 1046 110/66 97.6 °F (36.4 °C) 88 18 91 %        ROS negative for 11 organ systems (but patient mildly confused and this may not be accurate).      Physical Examination:  Gen NAD  CV reg  Lungs clear  abd benign      Labs:  Recent Results (from the past 24 hour(s))   METABOLIC PANEL, BASIC    Collection Time: 11/05/19  4:48 AM   Result Value Ref Range    Sodium 146 (H) 136 - 145 mmol/L    Potassium 4.4 3.5 - 5.1 mmol/L    Chloride 112 (H) 97 - 108 mmol/L    CO2 29 21 - 32 mmol/L    Anion gap 5 5 - 15 mmol/L    Glucose 75 65 - 100 mg/dL    BUN 17 6 - 20 MG/DL    Creatinine 1.03 (H) 0.55 - 1.02 MG/DL    BUN/Creatinine ratio 17 12 - 20      GFR est AA >60 >60 ml/min/1.73m2    GFR est non-AA 50 (L) >60 ml/min/1.73m2    Calcium 9.2 8.5 - 10.1 MG/DL   CBC W/O DIFF    Collection Time: 11/05/19  4:48 AM   Result Value Ref Range    WBC 2.4 (L) 3.6 - 11.0 K/uL    RBC 3.34 (L) 3.80 - 5.20 M/uL    HGB 9.4 (L) 11.5 - 16.0 g/dL    HCT 31.6 (L) 35.0 - 47.0 %    MCV 94.6 80.0 - 99.0 FL    MCH 28.1 26.0 - 34.0 PG    MCHC 29.7 (L) 30.0 - 36.5 g/dL    RDW 19.7 (H) 11.5 - 14.5 %    PLATELET 535 (L) 774 - 400 K/uL    MPV 11.4 8.9 - 12.9 FL    NRBC 0.0 0  WBC    ABSOLUTE NRBC 0.00 0.00 - 0.01 K/uL       Assessment and Plan:   CLL - counts and adenopathy very acceptable at present so can hold ibruitinib for now. Will not resume until clear cut signs of progression. 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 IgG ok, WBC down slightly most likely due to abx, monitor, b12/folate ok, CU and zinc WNL  Dr Vianca Billy discussed with Dr. David Valverde from ID, most likely from abx, she will montior it while she is on abx  - FU with Dr Ashish Patel after DC    Gram positive bacteremia/enterococcus.  On abx per primary team    Hx HTN    Hx CKD    Hx CHF - on coreg    AICD

## 2019-11-05 NOTE — PROGRESS NOTES
[facility]: [facility]    4:30pm-  met with pt and pt's daughter-in-law at bedside. CM informed pt and pt's daughter-in-law of transportation time tomorrow. CM discussed with pt's daughter-in-law about pt's Medicare rights and their right to appeal the discharge. Pt's DIL understood pt's Medicare rights. Pt unable to sign due to confusion. Pt's DIL signed 2nd IM letter. Copy of 2nd IM letter was placed in pt bedside chart. 4:15pm-  set up medical transport with [facility] via [employer].  time 9:30am on 11/6/19. [facility] paperwork placed in pt's chart. 9:40am-  spoke to [facility] at [facility] via phone. CM informed Piper that pt will be d/c tomorrow. 9:30am-  called Dr. Orvil Cogan office via phone. CM informed Dr. Orvil Cogan that pt is ready for d/c and that IV antibiotics have been set up with [facility]. CM inquired with Dr. Orvil Cogan if pt is able to be d/c today. Dr. Orvil Cogan stated that he could not d/c pt until this afternoon. CM inquired with Dr. Orvil Cogan if pt could be d/c early tomorrow. Dr. Orvil Cogan agreed to pt being d/c early tomorrow    9:20am- spoke to [facility] at [facility] via phone. Piper inquired with CM if pt was returning to facility today. CM explained to Piper that CM is still waiting on doctor to d/c pt. Transition of Care Plan to SNF/Rehab    SNF/Rehab Transition:  Patient has been accepted to [facility] and meets criteria for admission. Patient will transported by [facility] and expected to leave at 9:30am.    Communication to Patient/Family:  Met with patient and pt's daughter-in-law (identified care giver) and they are agreeable to the transition plan. Communication to SNF/Rehab:  Bedside RN, Tania Epperson, has been notified to update the transition plan to the facility and call report (phone number [phone]).   Discharge information has been updated on the AVS.     Discharge instructions to be fax'd to facility at [facility] # [phone]). SNF/Rehab Transition:  Patient to follow-up with Home Health: N/A  PCP/Specialist: Dr. Cristin Schofield Management: Estefania Saenz RN    Reviewed and confirmed with facility, [facility] if they can manage the patient care needs for the following:     [employer] with (X) only those applicable:    Medication:  []  Medications will be available at the facility  [x]  IV Antibiotics  []  Controlled Substance - hard copy to be sent with patient   []  Weekly Labs   Documents:  [] Hard RX  [x] MAR  [] Kardex  [x] AVS  []Transfer Summary  [x]Discharge   Equipment:  []  CPAP/BiPAP  []  Wound Vacuum  []  Galindo or Urinary Device  [x]  PICC/Central Line  []  Nebulizer  []  Ventilator   Treatment:  []Isolation (for MRSA, VRE, etc.)  []Surgical Drain Management  []Tracheostomy Care  []Dressing Changes  []Dialysis with transportation and chair time   []PEG Care  []Oxygen  [x]Daily Weights for Heart Failure   Dietary:  []Any diet limitations  []Tube Feedings   []Total Parenteral Management (TPN)   Eligible for Medicaid Long Term Services and Supports  Yes:  [] Eligible for medical assistance or will become eligible within 180 days and UAI completed. [] Provider/Patient and/or support system has requested screening. [] UAI copy provided to patient or responsible party,  [] UAI unavailable at discharge will send once processed to SNF provider. [] UAI unavailable at discharged mailed to patient  No:   [x] Private pay and is not financially eligible for Medicaid within the next 180 days. [] Reside out-of-state.   [] A residents of a state owned/operated facility that is licensed  by [place] [address] [email or web address] or [facility]  [] Enrollment in [facility] hospice services  [] [address]  [] Patient /Family declines to have screening completed or provide financial information for screening     Financial Resources:  Medicaid    [] Initiated and application pending   [] Full coverage     Advanced Care Plan:  []Surrogate Decision Maker of Care  [x]POA  []Communicated Code Status DNR   Other     CM will continue to follow patient for discharge planning needs and arrange for services as deemed necessary.     Nancy Bustillos [address]  [phone]

## 2019-11-05 NOTE — PROGRESS NOTES
Problem: Falls - Risk of  Goal: *Absence of Falls  Description  Document Donata Carrel Fall Risk and appropriate interventions in the flowsheet.   Outcome: Progressing Towards Goal  Note:   Fall Risk Interventions:  Mobility Interventions: Assess mobility with egress test, Bed/chair exit alarm, OT consult for ADLs, Patient to call before getting OOB    Mentation Interventions: Adequate sleep, hydration, pain control, Bed/chair exit alarm, Door open when patient unattended    Medication Interventions: Assess postural VS orthostatic hypotension, Bed/chair exit alarm, Patient to call before getting OOB, Teach patient to arise slowly    Elimination Interventions: Bed/chair exit alarm, Call light in reach    History of Falls Interventions: Bed/chair exit alarm, Investigate reason for fall, Room close to nurse's station         Problem: Patient Education: Go to Patient Education Activity  Goal: Patient/Family Education  Outcome: Progressing Towards Goal     Problem: Patient Education: Go to Patient Education Activity  Goal: Patient/Family Education  Outcome: Progressing Towards Goal     Problem: Heart Failure: Day 1  Goal: Off Pathway (Use only if patient is Off Pathway)  Outcome: Progressing Towards Goal  Goal: Activity/Safety  Outcome: Progressing Towards Goal  Goal: Consults, if ordered  Outcome: Progressing Towards Goal  Goal: Diagnostic Test/Procedures  Outcome: Progressing Towards Goal  Goal: Nutrition/Diet  Outcome: Progressing Towards Goal  Goal: Discharge Planning  Outcome: Progressing Towards Goal  Goal: Medications  Outcome: Progressing Towards Goal  Goal: Respiratory  Outcome: Progressing Towards Goal  Goal: Treatments/Interventions/Procedures  Outcome: Progressing Towards Goal  Goal: Psychosocial  Outcome: Progressing Towards Goal  Goal: *Oxygen saturation within defined limits  Outcome: Progressing Towards Goal  Goal: *Hemodynamically stable  Outcome: Progressing Towards Goal  Goal: *Optimal pain control at patient's stated goal  Outcome: Progressing Towards Goal  Goal: *Anxiety reduced or absent  Outcome: Progressing Towards Goal     Problem: Heart Failure: Day 2  Goal: Off Pathway (Use only if patient is Off Pathway)  Outcome: Progressing Towards Goal  Goal: Activity/Safety  Outcome: Progressing Towards Goal  Goal: Consults, if ordered  Outcome: Progressing Towards Goal  Goal: Diagnostic Test/Procedures  Outcome: Progressing Towards Goal  Goal: Nutrition/Diet  Outcome: Progressing Towards Goal  Goal: Discharge Planning  Outcome: Progressing Towards Goal  Goal: Medications  Outcome: Progressing Towards Goal  Goal: Respiratory  Outcome: Progressing Towards Goal  Goal: Treatments/Interventions/Procedures  Outcome: Progressing Towards Goal  Goal: Psychosocial  Outcome: Progressing Towards Goal  Goal: *Oxygen saturation within defined limits  Outcome: Progressing Towards Goal  Goal: *Hemodynamically stable  Outcome: Progressing Towards Goal  Goal: *Optimal pain control at patient's stated goal  Outcome: Progressing Towards Goal  Goal: *Anxiety reduced or absent  Outcome: Progressing Towards Goal  Goal: *Demonstrates progressive activity  Outcome: Progressing Towards Goal     Problem: Heart Failure: Day 3  Goal: Off Pathway (Use only if patient is Off Pathway)  Outcome: Progressing Towards Goal  Goal: Activity/Safety  Outcome: Progressing Towards Goal  Goal: Diagnostic Test/Procedures  Outcome: Progressing Towards Goal  Goal: Nutrition/Diet  Outcome: Progressing Towards Goal  Goal: Discharge Planning  Outcome: Progressing Towards Goal  Goal: Medications  Outcome: Progressing Towards Goal  Goal: Respiratory  Outcome: Progressing Towards Goal  Goal: Treatments/Interventions/Procedures  Outcome: Progressing Towards Goal  Goal: Psychosocial  Outcome: Progressing Towards Goal  Goal: *Oxygen saturation within defined limits  Outcome: Progressing Towards Goal  Goal: *Hemodynamically stable  Outcome: Progressing Towards Goal  Goal: *Optimal pain control at patient's stated goal  Outcome: Progressing Towards Goal  Goal: *Anxiety reduced or absent  Outcome: Progressing Towards Goal  Goal: *Demonstrates progressive activity  Outcome: Progressing Towards Goal     Problem: Heart Failure: Day 4  Goal: Off Pathway (Use only if patient is Off Pathway)  Outcome: Progressing Towards Goal  Goal: Activity/Safety  Outcome: Progressing Towards Goal  Goal: Diagnostic Test/Procedures  Outcome: Progressing Towards Goal  Goal: Nutrition/Diet  Outcome: Progressing Towards Goal  Goal: Discharge Planning  Outcome: Progressing Towards Goal  Goal: Medications  Outcome: Progressing Towards Goal  Goal: Respiratory  Outcome: Progressing Towards Goal  Goal: Treatments/Interventions/Procedures  Outcome: Progressing Towards Goal  Goal: Psychosocial  Outcome: Progressing Towards Goal  Goal: *Oxygen saturation within defined limits  Outcome: Progressing Towards Goal  Goal: *Hemodynamically stable  Outcome: Progressing Towards Goal  Goal: *Optimal pain control at patient's stated goal  Outcome: Progressing Towards Goal  Goal: *Anxiety reduced or absent  Outcome: Progressing Towards Goal  Goal: *Demonstrates progressive activity  Outcome: Progressing Towards Goal     Problem: Heart Failure: Day 5  Goal: Off Pathway (Use only if patient is Off Pathway)  Outcome: Progressing Towards Goal  Goal: Activity/Safety  Outcome: Progressing Towards Goal  Goal: Diagnostic Test/Procedures  Outcome: Progressing Towards Goal  Goal: Nutrition/Diet  Outcome: Progressing Towards Goal  Goal: Discharge Planning  Outcome: Progressing Towards Goal  Goal: Medications  Outcome: Progressing Towards Goal  Goal: Respiratory  Outcome: Progressing Towards Goal  Goal: Treatments/Interventions/Procedures  Outcome: Progressing Towards Goal  Goal: Psychosocial  Outcome: Progressing Towards Goal     Problem: Heart Failure: Discharge Outcomes  Goal: *Demonstrates ability to perform prescribed activity without shortness of breath or discomfort  Outcome: Progressing Towards Goal  Goal: *Left ventricular function assessment completed prior to or during stay, or planned for post-discharge  Outcome: Progressing Towards Goal  Goal: *ACEI prescribed if LVEF less than 40% and no contraindications or ARB prescribed  Outcome: Progressing Towards Goal  Goal: *Verbalizes understanding and describes prescribed diet  Outcome: Progressing Towards Goal  Goal: *Verbalizes understanding/describes prescribed medications  Outcome: Progressing Towards Goal  Goal: *Describes available resources and support systems  Description  (eg: Home Health, Palliative Care, Advanced Medical Directive)  Outcome: Progressing Towards Goal  Goal: *Describes smoking cessation resources  Outcome: Progressing Towards Goal  Goal: *Understands and describes signs and symptoms to report to providers(Stroke Metric)  Outcome: Progressing Towards Goal  Goal: *Describes/verbalizes understanding of follow-up/return appt  Description  (eg: to physicians, diabetes treatment coordinator, and other resources  Outcome: Progressing Towards Goal  Goal: *Describes importance of continuing daily weights and changes to report to physician  Outcome: Progressing Towards Goal

## 2019-11-05 NOTE — PROGRESS NOTES
Nutrition Assessment:    INTERVENTIONS/RECOMMENDATIONS:   · Continue regular diet  · Ensure TID  · Please document % meals and supplements consumed in flowsheet I/O's under intake      ASSESSMENT:   Chart reviewed. Pt reports fair appetite and PO intake. Flowsheet documents 0-50% meals consumed in the past 72 hrs. She reports she is consuming ensure. Will continue to monitor PO intake. Diet Order: Regular  % Eaten:    Patient Vitals for the past 72 hrs:   % Diet Eaten   11/05/19 0915 50 %   11/04/19 1838 0 %   11/04/19 1300 0 %   11/04/19 0844 25 %   11/03/19 0848 5 %   11/02/19 1653 0 %   11/02/19 1252 10 %     Pertinent Medications: [x]Reviewed: lasix, remeron, KCl,   Pertinent Labs: [x]Reviewed: Na 146,   Food Allergies: [x]NKFA  []Other   Last BM:  11/4  Edema:  n/a    []RUE   []LUE   []RLE   []LLE      Pressure Ulcer:   n/a   [] Stage I   [] Stage II   [] Stage III   [] Stage IV      Anthropometrics: Height: 5' 1\" (154.9 cm) Weight: 51.8 kg (114 lb 3.2 oz)    IBW (%IBW):   ( ) UBW (%UBW):   (  %)    BMI: Body mass index is 21.58 kg/m². This BMI is indicative of:  []Underweight   [x]Normal   []Overweight   [] Obesity   [] Extreme Obesity (BMI>40)  Last Weight Metrics:  Weight Loss Metrics 11/5/2019 9/24/2019 8/20/2019 7/22/2019 7/1/2019 5/9/2019 4/23/2019   Today's Wt 114 lb 3.2 oz 122 lb 6.4 oz 125 lb 9.6 oz 122 lb 6.4 oz 114 lb 13.8 oz 111 lb 4.8 oz 113 lb 1.6 oz   BMI 21.58 kg/m2 23.13 kg/m2 23.73 kg/m2 23.13 kg/m2 21.7 kg/m2 20.36 kg/m2 20.69 kg/m2       Estimated Nutrition Needs (Based on): 1150 Kcals/day(BMR: 900 x 1.3) , 55 g(1 g/kg) Protein  Carbohydrate:  At Least 130 g/day  Fluids: 1150 mL/day or per primary team    NUTRITION DIAGNOSES:   Problem:  Inadequate protein-energy intake      Etiology: related to undetermined etiology at this time     Signs/Symptoms: as evidenced by pt consuming <25% of meals    Previous Nutrition Dx:  [] Resolved  [] Unresolved           [x] Progressing    NUTRITION INTERVENTIONS:  Meals/Snacks: General/healthful diet   Supplements: Commercial supplement              GOAL:   cosume >50% of meals and ONS in 2-4 days    NUTRITION MONITORING AND EVALUATION      Food/Nutrient Intake Outcomes:  Total energy intake  Physical Signs/Symptoms Outcomes: Weight/weight change, Electrolyte and renal profile, Glucose profile, GI    Previous Goal Met:   [] Met              [x] Progressing Towards Goal              [] Not Progressing Towards Goal   Previous Recommendations:   [x] Implemented          [] Not Implemented          [] Not Applicable    LEARNING NEEDS (Diet, Food/Nutrient-Drug Interaction):    [x] None Identified   [] Identified and Education Provided/Documented   [] Identified and Pt declined/was not appropriate     Cultural, Pentecostal, OR Ethnic Dietary Needs:    [x] None Identified   [] Identified and Addressed     [x] Interdisciplinary Care Plan Reviewed/Documented    [x] Discharge Planning: General healthy diet    [] Participated in Interdisciplinary Rounds    NUTRITION RISK:    [] High              [x] Moderate           []  Low  []  Minimal/Uncompromised      Kassy Roy RDN  Pager [phone]  Weekend Pager [phone]

## 2019-11-05 NOTE — PROGRESS NOTES
CM verified patient has a qualifying hospital stay for [facility].      Darrin Hope RN, CHPN, [facility]

## 2019-11-05 NOTE — PROGRESS NOTES
Bedside shift change report GIVEN TO Dodie Hooper RN. Report included the following information SBAR, Kardex, MAR, Recent Results and Cardiac Rhythm paced. SIGNIFICANT CHANGES DURING SHIFT:  Pt with one episode of more agitated confusion--was able to use charge RN to help pt feel more oriented and more compliant with IV med administration      CONCERNS TO ADDRESS WITH MD:  Details associated with potential discharge. [facility] NURSING NOTE   Admission Date 10/23/2019   Admission Diagnosis Fever [R50.9]   Consults IP CONSULT TO HOSPITALIST  IP CONSULT TO PRIMARY CARE PROVIDER  IP CONSULT TO PALLIATIVE CARE - PROVIDER  IP CONSULT TO HEMATOLOGY  IP CONSULT TO INFECTIOUS DISEASES      Cardiac Monitoring [x] Yes [] No      Purposeful Hourly Rounding [x] Yes    Milvia Score Total Score: 5   Milvia score 3 or > [x] Bed Alarm [] Avasys [] 1:1 sitter [] Patient refused (Signed refusal form in chart)   Kimani Score Kimani Score: 17   Kimani score 14 or < [] PMT consult [] Wound Care consult    []  Specialty bed  [] Nutrition consult      Influenza Vaccine Received Flu Vaccine for Current Season (usually Sept-March): Yes(Per patient received first week of october.)           Oxygen needs? [x] Room air Oxygen @  []1L    []2L    []3L   []4L    []5L   []6L via  NC   Chronic home O2 use?  [] Yes [x] No  Perform O2 challenge test and document in progress note using smartphrase (.Homeoxygen)      Last bowel movement Last Bowel Movement Date: 11/04/19      Urinary Catheter       External Female Catheter 10/23/19-Urine Output (mL): 200 ml  External Female Catheter 10/23/19-Urine Output (mL): 200 ml     LDAs         PICC Double Lumen 08/64/54 Right;Basilic (Active)   Central Line Being Utilized Yes 11/4/2019  1:00 PM   Criteria for Appropriate Use Long term IV/antibiotic administration 11/4/2019  1:00 PM   Site Assessment Clean, dry, & intact 11/4/2019  1:00 PM   Phlebitis Assessment 0 11/4/2019  1:00 PM Infiltration Assessment 0 11/4/2019  1:00 PM   Arm Circumference (cm) 27 cm 11/1/2019  1:26 PM   Date of Last Dressing Change 11/01/19 11/4/2019  1:00 PM   Dressing Status Clean, dry, & intact 11/4/2019  1:00 PM   Action Taken Blood drawn;Open ports on tubing capped 11/3/2019  3:00 AM   External Catheter Length (cm) 0 centimeters 11/2/2019 12:52 PM   Dressing Type Disk with Chlorhexadine gluconate (CHG) 11/4/2019  1:00 PM   Hub Color/Line Status Flushed 11/4/2019  1:00 PM   Positive Blood Return (Site #1) Yes 11/4/2019  1:00 PM   Hub Color/Line Status Flushed 11/4/2019  1:00 PM   Positive Blood Return (Site #2) Yes 11/4/2019  1:00 PM   Alcohol Cap Used Yes 11/4/2019  1:00 PM                External Female Catheter 10/23/19 (Active)   Site Assessment Clean, dry, & intact 10/23/2019 11:01 AM   Perineal Care Yes 10/23/2019 11:01 AM   Urine Output (mL) 200 ml 10/28/2019 11:43 AM       External Female Catheter 10/23/19 (Active)   Site Assessment Clean, dry, & intact 11/3/2019  3:00 AM   Repositioned Yes 11/3/2019  3:00 AM   Perineal Care Yes 11/3/2019  3:00 AM   Wick Changed No 11/3/2019  3:00 AM   Suction Canister/Tubing Changed No 11/3/2019  3:00 AM   Urine Output (mL) 200 ml 10/28/2019 11:43 AM                   Readmission Risk Assessment Tool Score High Risk            37       Total Score        3 Has Seen PCP in Last 6 Months (Yes=3, No=0)    2 . Living with Significant Other. Assisted Living. LTAC. SNF. or   Rehab    3 Patient Length of Stay (>5 days = 3)    4 IP Visits Last 12 Months (1-3=4, 4=9, >4=11)    5 Pt.  Coverage (Medicare=5 , Medicaid, or Self-Pay=4)    20 Charlson Comorbidity Score (Age + Comorbid Conditions)       Expected Length of Stay 3d 16h   Actual Length of Stay 13

## 2019-11-05 NOTE — PROGRESS NOTES
Problem: Falls - Risk of  Goal: *Absence of Falls  Description  Document Ebenezer Powell Fall Risk and appropriate interventions in the flowsheet.   Outcome: Progressing Towards Goal  Note:   Fall Risk Interventions:  Mobility Interventions: Bed/chair exit alarm, Communicate number of staff needed for ambulation/transfer, Patient to call before getting OOB    Mentation Interventions: Adequate sleep, hydration, pain control, Bed/chair exit alarm, Door open when patient unattended, Increase mobility, More frequent rounding, Toileting rounds    Medication Interventions: Assess postural VS orthostatic hypotension, Bed/chair exit alarm, Patient to call before getting OOB    Elimination Interventions: Bed/chair exit alarm, Call light in reach, Patient to call for help with toileting needs, Toilet paper/wipes in reach    History of Falls Interventions: Consult care management for discharge planning, Door open when patient unattended         Problem: Patient Education: Go to Patient Education Activity  Goal: Patient/Family Education  Outcome: Progressing Towards Goal     Problem: Patient Education: Go to Patient Education Activity  Goal: Patient/Family Education  Outcome: Progressing Towards Goal     Problem: Heart Failure: Discharge Outcomes  Goal: *Demonstrates ability to perform prescribed activity without shortness of breath or discomfort  Outcome: Progressing Towards Goal  Goal: *Left ventricular function assessment completed prior to or during stay, or planned for post-discharge  Outcome: Progressing Towards Goal  Goal: *ACEI prescribed if LVEF less than 40% and no contraindications or ARB prescribed  Outcome: Progressing Towards Goal  Goal: *Verbalizes understanding and describes prescribed diet  Outcome: Progressing Towards Goal  Goal: *Verbalizes understanding/describes prescribed medications  Outcome: Progressing Towards Goal  Goal: *Describes available resources and support systems  Description  (eg: Home Health, Palliative Care, Advanced Medical Directive)  Outcome: Progressing Towards Goal  Goal: *Describes smoking cessation resources  Outcome: Progressing Towards Goal  Goal: *Understands and describes signs and symptoms to report to providers(Stroke Metric)  Outcome: Progressing Towards Goal  Goal: *Describes/verbalizes understanding of follow-up/return appt  Description  (eg: to physicians, diabetes treatment coordinator, and other resources  Outcome: Progressing Towards Goal  Goal: *Describes importance of continuing daily weights and changes to report to physician  Outcome: Progressing Towards Goal

## 2019-11-06 ENCOUNTER — PATIENT OUTREACH (OUTPATIENT)
Dept: INTERNAL MEDICINE CLINIC | Age: 84
End: 2019-11-06

## 2019-11-06 VITALS
OXYGEN SATURATION: 97 % | HEART RATE: 79 BPM | SYSTOLIC BLOOD PRESSURE: 121 MMHG | BODY MASS INDEX: 23.23 KG/M2 | TEMPERATURE: 97.4 F | HEIGHT: 61 IN | DIASTOLIC BLOOD PRESSURE: 77 MMHG | WEIGHT: 123.02 LBS | RESPIRATION RATE: 18 BRPM

## 2019-11-06 LAB
ANION GAP SERPL CALC-SCNC: 5 MMOL/L (ref 5–15)
BUN SERPL-MCNC: 20 MG/DL (ref 6–20)
BUN/CREAT SERPL: 19 (ref 12–20)
CALCIUM SERPL-MCNC: 9 MG/DL (ref 8.5–10.1)
CHLORIDE SERPL-SCNC: 110 MMOL/L (ref 97–108)
CO2 SERPL-SCNC: 29 MMOL/L (ref 21–32)
CREAT SERPL-MCNC: 1.07 MG/DL (ref 0.55–1.02)
GLUCOSE SERPL-MCNC: 79 MG/DL (ref 65–100)
POTASSIUM SERPL-SCNC: 4 MMOL/L (ref 3.5–5.1)
SODIUM SERPL-SCNC: 144 MMOL/L (ref 136–145)

## 2019-11-06 PROCEDURE — 74011000258 HC RX REV CODE- 258: Performed by: INTERNAL MEDICINE

## 2019-11-06 PROCEDURE — 36415 COLL VENOUS BLD VENIPUNCTURE: CPT

## 2019-11-06 PROCEDURE — 80048 BASIC METABOLIC PNL TOTAL CA: CPT

## 2019-11-06 PROCEDURE — 74011250636 HC RX REV CODE- 250/636: Performed by: INTERNAL MEDICINE

## 2019-11-06 PROCEDURE — 74011250637 HC RX REV CODE- 250/637: Performed by: INTERNAL MEDICINE

## 2019-11-06 PROCEDURE — 74011250637 HC RX REV CODE- 250/637: Performed by: HOSPITALIST

## 2019-11-06 PROCEDURE — 94760 N-INVAS EAR/PLS OXIMETRY 1: CPT

## 2019-11-06 RX ADMIN — AMPICILLIN SODIUM 2 G: 2 INJECTION, POWDER, FOR SOLUTION INTRAMUSCULAR; INTRAVENOUS at 05:39

## 2019-11-06 RX ADMIN — CEFTRIAXONE SODIUM 2 G: 2 INJECTION, POWDER, FOR SOLUTION INTRAMUSCULAR; INTRAVENOUS at 05:09

## 2019-11-06 RX ADMIN — MIDODRINE HYDROCHLORIDE 5 MG: 5 TABLET ORAL at 08:57

## 2019-11-06 RX ADMIN — APIXABAN 2.5 MG: 2.5 TABLET, FILM COATED ORAL at 08:57

## 2019-11-06 RX ADMIN — Medication 1 CAPSULE: at 08:57

## 2019-11-06 RX ADMIN — FUROSEMIDE 40 MG: 40 TABLET ORAL at 08:57

## 2019-11-06 RX ADMIN — POTASSIUM CHLORIDE 20 MEQ: 750 TABLET, FILM COATED, EXTENDED RELEASE ORAL at 08:57

## 2019-11-06 RX ADMIN — Medication 10 ML: at 05:08

## 2019-11-06 RX ADMIN — LEVOTHYROXINE SODIUM 25 MCG: 25 TABLET ORAL at 05:08

## 2019-11-06 NOTE — PROGRESS NOTES
Bedside shift change report GIVEN TO Kathy Reyes RN. Report included the following information SBAR, Kardex, MAR, Recent Results and Cardiac Rhythm AV paced. SIGNIFICANT CHANGES DURING SHIFT:  None      CONCERNS TO ADDRESS WITH MD:  Anticipate discharge at [address] [place] [address]   Admission Date 10/23/2019   Admission Diagnosis Fever [R50.9]   Consults IP CONSULT TO HOSPITALIST  IP CONSULT TO PRIMARY CARE PROVIDER  IP CONSULT TO PALLIATIVE CARE - PROVIDER  IP CONSULT TO HEMATOLOGY  IP CONSULT TO INFECTIOUS DISEASES      Cardiac Monitoring [x] Yes [] No      Purposeful Hourly Rounding [x] Yes    Milvia Score Total Score: 5   Milvia score 3 or > [x] Bed Alarm [] Avasys [] 1:1 sitter [] Patient refused (Signed refusal form in chart)   Kimani Score Kimani Score: 16   Kimani score 14 or < [] PMT consult [] Wound Care consult    []  Specialty bed  [] Nutrition consult      Influenza Vaccine Received Flu Vaccine for Current Season (usually Sept-March): Yes(Per patient received first week of october.)           Oxygen needs? [x] Room air Oxygen @  []1L    []2L    []3L   []4L    []5L   []6L via  NC   Chronic home O2 use?  [] Yes [x] No  Perform O2 challenge test and document in progress note using smartphFize (.Homeoxygen)      Last bowel movement Last Bowel Movement Date: 11/05/19      Urinary Catheter       External Female Catheter 10/23/19-Urine Output (mL): 100 ml  External Female Catheter 10/23/19-Urine Output (mL): 200 ml     LDAs         PICC Double Lumen 80/00/95 Right;Basilic (Active)   Central Line Being Utilized Yes 11/5/2019  8:45 PM   Criteria for Appropriate Use Long term IV/antibiotic administration 11/5/2019  8:45 PM   Site Assessment Clean, dry, & intact 11/5/2019  8:45 PM   Phlebitis Assessment 0 11/5/2019  8:45 PM   Infiltration Assessment 0 11/5/2019  8:45 PM   Arm Circumference (cm) 27 cm 11/1/2019  1:26 PM   Date of Last Dressing Change 11/01/19 11/5/2019  3:00 PM   Dressing Status Clean, dry, & intact 11/5/2019  8:45 PM   Action Taken Blood drawn 11/5/2019  3:00 PM   External Catheter Length (cm) 0 centimeters 11/2/2019 12:52 PM   Dressing Type Disk with Chlorhexadine gluconate (CHG); Transparent 11/5/2019  8:45 PM   Hub Color/Line Status Purple;Capped;Flushed 11/5/2019  8:45 PM   Positive Blood Return (Site #1) Yes 11/5/2019  8:45 PM   Hub Color/Line Status Red;Capped;Flushed;Patent 11/5/2019  8:45 PM   Positive Blood Return (Site #2) Yes 11/5/2019  8:45 PM   Alcohol Cap Used Yes 11/5/2019  3:00 PM                External Female Catheter 10/23/19 (Active)   Site Assessment Clean, dry, & intact 10/23/2019 11:01 AM   Perineal Care Yes 10/23/2019 11:01 AM   Urine Output (mL) 100 ml 11/5/2019  9:18 PM       External Female Catheter 10/23/19 (Active)   Site Assessment Clean, dry, & intact 11/3/2019  3:00 AM   Repositioned Yes 11/3/2019  3:00 AM   Perineal Care Yes 11/3/2019  3:00 AM   Wick Changed No 11/3/2019  3:00 AM   Suction Canister/Tubing Changed No 11/3/2019  3:00 AM   Urine Output (mL) 200 ml 10/28/2019 11:43 AM                   Readmission Risk Assessment Tool Score High Risk            37       Total Score        3 Has Seen PCP in Last 6 Months (Yes=3, No=0)    2 . Living with Significant Other. Assisted Living. LTAC. SNF. or   Rehab    3 Patient Length of Stay (>5 days = 3)    4 IP Visits Last 12 Months (1-3=4, 4=9, >4=11)    5 Pt.  Coverage (Medicare=5 , Medicaid, or Self-Pay=4)    20 Charlson Comorbidity Score (Age + Comorbid Conditions)       Expected Length of Stay 3d 16h   Actual Length of Stay 14

## 2019-11-06 NOTE — PROGRESS NOTES
Hematology Oncology Progress Note         Follow up for: CLL    Chart notes reviewed since last visit. Case discussed with following:   CC: \" I am ok\"    Patient complains of the following: No new complaints, talking with her DILStefanie    Additional concerns noted by the staff:     Patient Vitals for the past 24 hrs:   BP Temp Pulse Resp SpO2 Weight   11/06/19 0723 121/77 97.4 °F (36.3 °C) 79 18 97 %    11/06/19 0348 109/65 97.7 °F (36.5 °C) 85 18 96 % 55.8 kg (123 lb 0.3 oz)   11/05/19 2353 126/75 97.4 °F (36.3 °C) 88 18 100 %    11/05/19 2002 118/74 97.9 °F (36.6 °C) 73 18 97 %    11/05/19 1710 94/52  92      11/05/19 1510 96/56 97.6 °F (36.4 °C) 85 18 98 %    11/05/19 1050 114/50 97.4 °F (36.3 °C) 87 20 95 %        ROS negative for 11 organ systems (but patient mildly confused and this may not be accurate). Physical Examination:  Gen NAD  CV reg  Lungs clear  abd benign      Labs:  Recent Results (from the past 24 hour(s))   METABOLIC PANEL, BASIC    Collection Time: 11/06/19  5:11 AM   Result Value Ref Range    Sodium 144 136 - 145 mmol/L    Potassium 4.0 3.5 - 5.1 mmol/L    Chloride 110 (H) 97 - 108 mmol/L    CO2 29 21 - 32 mmol/L    Anion gap 5 5 - 15 mmol/L    Glucose 79 65 - 100 mg/dL    BUN 20 6 - 20 MG/DL    Creatinine 1.07 (H) 0.55 - 1.02 MG/DL    BUN/Creatinine ratio 19 12 - 20      GFR est AA 58 (L) >60 ml/min/1.73m2    GFR est non-AA 48 (L) >60 ml/min/1.73m2    Calcium 9.0 8.5 - 10.1 MG/DL       Assessment and Plan:   CLL - counts and adenopathy very acceptable at present so can hold ibruitinib for now. Will not resume until clear cut signs of progression. 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 IgG ok, WBC down slightly most likely due to abx, monitor, b12/folate ok, CU and zinc WNL  - FU with Dr Juan Freeman after DC- asked Dr Marce Alberts nurse to call with appt in ~6 weeks when abx are done.  Discussed with Eron Peng at bedside who stated it is harder and harder to bring her in for appts, discussed that if the goal is to keep her on therapy for her CLL then she has to come for appts, however, if they are at the point where they want to stop treatment they could get by with much less frequent appt. - Keep Ibrutinib off until discussed with Dr Arielle Collins as outpt after she complete abx    Gram positive bacteremia/enterococcus.  On abx per primary team    Hx HTN    Hx CKD    Hx CHF - on coreg    AICD

## 2019-11-06 NOTE — DISCHARGE INSTRUCTIONS
General Discharge Instructions    Patient ID:  Saqib Sinclair  [id number]  80 y.o.  3/17/1925    Patient Instructions          The following personal items were collected during your admission and were returned to you. Take Home Medications             What to do at Home    Recommended diet: Regular Diet    Recommended activity: Activity as tolerated    Follow-up with Bethany Houston MD  in 4 days. Information obtained by :  I understand that if any problems occur once I am at home I am to contact my physician. I understand and acknowledge receipt of the instructions indicated above.                                                                                                                                            Physician's or R.N.'s Signature                                                                  Date/Time                                                                                                                                              Patient or Representative Signature                                                          Date/Time

## 2019-11-06 NOTE — PROGRESS NOTES
VANESA: [facility]    10:24am- No further CM needs identified. CM notified pt's nurse of d/c.    10:22am- CM called Dr. Viraj Villa office via phone. CM informed Dr. Viraj Villa that CM is in need of discharge summary to fax to [facility]     10:20am- CM faxed AVS to Fernanda Rodríguez at [facility]    9:25am- CM sent an email to Trey Braswell RN, NN to provide an update on pt and d/c plan. Care Management Interventions  PCP Verified by CM:  Yes  Palliative Care Criteria Met (RRAT>21 & CHF Dx)?: Yes  Palliative Consult Recommended?: No  Mode of Transport at Discharge: BLS(Pt will be transported by medical transport)  Transition of Care Consult (CM Consult): SNF([facility])  Partner SNF: No  Reason Why Partner SNF Not Chosen: Friend/family recommendation  Discharge Durable Medical Equipment: No  Physical Therapy Consult: Yes  Occupational Therapy Consult: No  Speech Therapy Consult: No  Current Support Network: [address] [place] [address]  Confirm Follow Up Transport: Family  Plan discussed with Pt/Family/Caregiver: Yes  Freedom of Choice Offered: Yes  Discharge Location  Discharge Placement: Skilled nursing facility([facility] [address] [facility])    Nancy Huston [address] [place] [address]  [phone]

## 2019-11-06 NOTE — PROGRESS NOTES
11/6/2019:  PCP consult re SNF d/c medication verification-that IMBRUVICA removed from d/c med list.  Spoke with Charge Nurse Dianne Panchal RN; verified medication listed as stop med  On d/c med list to SNF.   EW

## 2019-11-06 NOTE — PROGRESS NOTES
Problem: Falls - Risk of  Goal: *Absence of Falls  Description  Document Gume Shin Fall Risk and appropriate interventions in the flowsheet.   Outcome: Progressing Towards Goal  Note:   Fall Risk Interventions:  Mobility Interventions: Assess mobility with egress test, Bed/chair exit alarm, Communicate number of staff needed for ambulation/transfer, Utilize walker, cane, or other assistive device    Mentation Interventions: Adequate sleep, hydration, pain control, Bed/chair exit alarm, Door open when patient unattended, Increase mobility, More frequent rounding, Reorient patient, Toileting rounds    Medication Interventions: Assess postural VS orthostatic hypotension, Evaluate medications/consider consulting pharmacy, Teach patient to arise slowly    Elimination Interventions: Bed/chair exit alarm, Call light in reach, Elevated toilet seat, Stay With Me (per policy), Toileting schedule/hourly rounds    History of Falls Interventions: Bed/chair exit alarm, Consult care management for discharge planning, Room close to nurse's station         Problem: Patient Education: Go to Patient Education Activity  Goal: Patient/Family Education  Outcome: Progressing Towards Goal     Problem: Patient Education: Go to Patient Education Activity  Goal: Patient/Family Education  Outcome: Progressing Towards Goal     Problem: Patient Education: Go to Patient Education Activity  Goal: Patient/Family Education  Outcome: Progressing Towards Goal     Problem: Heart Failure: Discharge Outcomes  Goal: *Demonstrates ability to perform prescribed activity without shortness of breath or discomfort  Outcome: Progressing Towards Goal  Goal: *Left ventricular function assessment completed prior to or during stay, or planned for post-discharge  Outcome: Progressing Towards Goal  Goal: *ACEI prescribed if LVEF less than 40% and no contraindications or ARB prescribed  Outcome: Progressing Towards Goal  Goal: *Verbalizes understanding and describes prescribed diet  Outcome: Progressing Towards Goal  Goal: *Verbalizes understanding/describes prescribed medications  Outcome: Progressing Towards Goal  Goal: *Describes available resources and support systems  Description  (eg: Home Health, Palliative Care, Advanced Medical Directive)  Outcome: Progressing Towards Goal  Goal: *Describes smoking cessation resources  Outcome: Progressing Towards Goal  Goal: *Understands and describes signs and symptoms to report to providers(Stroke Metric)  Outcome: Progressing Towards Goal  Goal: *Describes/verbalizes understanding of follow-up/return appt  Description  (eg: to physicians, diabetes treatment coordinator, and other resources  Outcome: Progressing Towards Goal  Goal: *Describes importance of continuing daily weights and changes to report to physician  Outcome: Progressing Towards Goal

## 2019-11-06 NOTE — PROGRESS NOTES
Problem: Falls - Risk of  Goal: *Absence of Falls  Description  Document Rudolph Sahni Fall Risk and appropriate interventions in the flowsheet.   Outcome: Progressing Towards Goal  Note:   Fall Risk Interventions:  Mobility Interventions: Bed/chair exit alarm, OT consult for ADLs, Patient to call before getting OOB, PT Consult for mobility concerns, PT Consult for assist device competence, Strengthening exercises (ROM-active/passive), Utilize walker, cane, or other assistive device    Mentation Interventions: Bed/chair exit alarm, Door open when patient unattended, Increase mobility, More frequent rounding, Reorient patient, Room close to nurse's station, Toileting rounds, Update white board    Medication Interventions: Bed/chair exit alarm, Patient to call before getting OOB, Teach patient to arise slowly    Elimination Interventions: Bed/chair exit alarm, Call light in reach, Patient to call for help with toileting needs, Stay With Me (per policy), Toilet paper/wipes in reach, Toileting schedule/hourly rounds    History of Falls Interventions: Bed/chair exit alarm, Door open when patient unattended, Room close to nurse's station, Assess for delayed presentation/identification of injury for 48 hrs (comment for end date), Utilize gait belt for transfer/ambulation, Vital signs minimum Q4HRs X 24 hrs (comment for end date)         Problem: Patient Education: Go to Patient Education Activity  Goal: Patient/Family Education  Outcome: Progressing Towards Goal     Problem: Patient Education: Go to Patient Education Activity  Goal: Patient/Family Education  Outcome: Progressing Towards Goal     Problem: Heart Failure: Day 5  Goal: Off Pathway (Use only if patient is Off Pathway)  Outcome: Progressing Towards Goal  Goal: Activity/Safety  Outcome: Progressing Towards Goal  Goal: Diagnostic Test/Procedures  Outcome: Progressing Towards Goal  Goal: Nutrition/Diet  Outcome: Progressing Towards Goal  Goal: Discharge Planning  Outcome: Progressing Towards Goal  Goal: Medications  Outcome: Progressing Towards Goal  Goal: Respiratory  Outcome: Progressing Towards Goal  Goal: Treatments/Interventions/Procedures  Outcome: Progressing Towards Goal  Goal: Psychosocial  Outcome: Progressing Towards Goal     Problem: General Medical Care Plan  Goal: *Vital signs within specified parameters  Outcome: Progressing Towards Goal  Goal: *Labs within defined limits  Outcome: Progressing Towards Goal  Goal: *Absence of infection signs and symptoms  Outcome: Progressing Towards Goal  Goal: *Optimal pain control at patient's stated goal  Outcome: Progressing Towards Goal  Goal: *Skin integrity maintained  Outcome: Progressing Towards Goal  Goal: *Fluid volume balance  Outcome: Progressing Towards Goal  Goal: *Optimize nutritional status  Outcome: Progressing Towards Goal  Goal: *Anxiety reduced or absent  Outcome: Progressing Towards Goal  Goal: *Progressive mobility and function (eg: ADL's)  Outcome: Progressing Towards Goal     Problem: Patient Education: Go to Patient Education Activity  Goal: Patient/Family Education  Outcome: Progressing Towards Goal     Problem: Pressure Injury - Risk of  Goal: *Prevention of pressure injury  Description  Document Kimani Scale and appropriate interventions in the flowsheet. Outcome: Progressing Towards Goal  Note:   Pressure Injury Interventions:  Sensory Interventions: Keep linens dry and wrinkle-free, Maintain/enhance activity level, Minimize linen layers, Pressure redistribution bed/mattress (bed type), Turn and reposition approx.  every two hours (pillows and wedges if needed), Use 30-degree side-lying position    Moisture Interventions: Absorbent underpads, Internal/External urinary devices, Minimize layers, Moisture barrier, Offer toileting Q_hr    Activity Interventions: Increase time out of bed, PT/OT evaluation, Pressure redistribution bed/mattress(bed type)    Mobility Interventions: HOB 30 degrees or less, PT/OT evaluation, Pressure redistribution bed/mattress (bed type), Turn and reposition approx.  every two hours(pillow and wedges)    Nutrition Interventions: Document food/fluid/supplement intake, Discuss nutritional consult with provider, Offer support with meals,snacks and hydration    Friction and Shear Interventions: Foam dressings/transparent film/skin sealants, HOB 30 degrees or less, Lift sheet, Minimize layers, Sit at 90-degree angle                Problem: Patient Education: Go to Patient Education Activity  Goal: Patient/Family Education  Outcome: Progressing Towards Goal     Problem: Breathing Pattern - Ineffective  Goal: *Absence of hypoxia  Outcome: Progressing Towards Goal     Problem: Infection - Risk of, Central Venous Catheter-Associated Bloodstream Infection  Goal: *Absence of infection signs and symptoms  Outcome: Progressing Towards Goal     Problem: Patient Education: Go to Patient Education Activity  Goal: Patient/Family Education  Outcome: Progressing Towards Goal

## 2019-11-06 NOTE — PROGRESS NOTES
0700: Shawna RN (oncoming nurse) received bedside report from Byers, [address] [place] [address] (offgoing nurse). 2553:   Hospital to [address] [place] [address] B Campbell                                                                        80 y.o.   female    [facility]   Room: 2211/01    [facility] 2 CARDIOPULMONARY CARE  Unit Phone# :  [phone]      [facility] [place]  [address]  Dept: [phone]  Loc: [phone]                    SITUATION     Admitted:  10/23/2019         Attending Provider:  Hiren Cunningham MD       Consultations:  IP CONSULT TO HOSPITALIST  IP CONSULT TO PRIMARY CARE PROVIDER  IP CONSULT TO PALLIATIVE CARE - PROVIDER  IP CONSULT TO HEMATOLOGY  IP CONSULT TO INFECTIOUS DISEASES    PCP:  Hiren Cunningham MD   [phone]    Treatment Team: Attending Provider: Hiren Cunningham MD; Consulting Provider: Hiren Cunningham MD; Consulting Provider: Akbar Cervantes MD; Utilization Review: Mariano Schneider RN; Consulting Provider: Richard Celestin MD; Care Manager: Macie Ramirez; Consulting Provider: Mumtaz Deal MD    Admitting Dx: Fever [R50.9]       Principal Problem: Sepsis ([facility] [address]    * No surgery found * of      BY: * Surgery not found *             ON: * No surgery found *                  Code Status: DNR                Advance Directives:   Advance Care Planning 10/23/2019   Patient's Healthcare Decision Maker is: -   Primary Decision Maker Name -   Primary Decision Maker Phone Number -   Primary Decision Maker Relationship to Patient -   Confirm Advance Directive Yes, on file   Does the patient have other document types -    (Send w/patient)   Received       Isolation:  There are currently no Active Isolations       MDRO: No current active infections    Pain Medications given:  n/a    Last dose: n/a    Special Equipment needed: No  Type of equipment: n/a           BACKGROUND     Allergies:   Allergies Allergen Reactions    Codeine Other (comments)     \"made me disoriented\" per pt       Past Medical History:   Diagnosis Date    Aortic insufficiency     Asthma     Chronic a-fib 10/28/2019    CKD (chronic kidney disease) stage 3, GFR 30-59 ml/min ([facility]) 1/10/2018    CLL (chronic lymphocytic leukemia) ([facility])     Congestive heart failure, NYHA class II, chronic, systolic ([facility] [facility]     Endocarditis 10/28/2019    Heart failure ([facility] [address] [facility]     Hypertension     Mitral valvular regurgitation 1/22/2016    ECHO 2/3/17: LV dilated, EF 20-25%, mild LVH, RV mod dilated, mild- mod MELANIA, mod-severe MR, mod AI ECHO 7/29/17: EF 15%, severe DHK, reduced RVEF, RVH, RVSP 35 mmHg  Mild LAE, mod-marked MA fibrosis, mod-severe MR (2+), AO root dilated,      Nonrheumatic aortic valve insufficiency 10/16/2018    Presence of biventricular automatic cardioverter/defibrillator (AICD) 7/2/2015    Howe Think[employer]ine biventricular AICD implant    Stomach ulcer        Past Surgical History:   Procedure Laterality Date    HX BREAST BIOPSY Bilateral     40 years ago    HX COLONOSCOPY      HX HEENT      cataracts removed    HX HYSTERECTOMY      HX OTHER SURGICAL      lymph node surgery    HX TONSILLECTOMY      LA EGD TRANSORAL BIOPSY SINGLE/MULTIPLE  5/23/2012         SINUS SURGERY PROC UNLISTED      Nasal polyps removed       Medications Prior to Admission   Medication Sig    potassium chloride (K-DUR, KLOR-CON) 20 mEq tablet Take 40 mEq by mouth daily. 40 meq qam, 20 meq in evening    potassium chloride (K-DUR, KLOR-CON) 20 mEq tablet Take 20 mEq by mouth every evening. 40meq in AM, 20 meq in evening    bumetanide (BUMEX) 2 mg tablet Take 2 mg by mouth daily.  acetaminophen (TYLENOL) 325 mg tablet Take 650 mg by mouth every six (6) hours as needed for Pain.  bumetanide (BUMEX) 2 mg tablet Take 2 mg by mouth every Monday, Wednesday, Friday.  2mg every day, takes an additional 2mg on Mon-Wed-Fri in the afternoon    metOLazone (ZAROXOLYN) 2.5 mg tablet Take 2.5 mg by mouth every Monday, Wednesday, Friday.  cyclobenzaprine (FLEXERIL) 10 mg tablet Take 10 mg by mouth nightly.  diclofenac (VOLTAREN) 1 % gel Apply 2 g to affected area every six (6) hours as needed.  carvedilol (COREG) 3.125 mg tablet Take 1 Tab by mouth two (2) times daily (with meals). Indications: chronic heart failure    mirtazapine (REMERON) 15 mg tablet Take 1 Tab by mouth nightly.  midodrine (PROAMITINE) 5 mg tablet Take 5 mg by mouth three (3) times daily.  ibrutinib (IMBRUVICA) 140 mg capsule Take 140 mg by mouth every other day.  polyethylene glycol (MIRALAX) 17 gram/dose powder Take 17 g by mouth every fourty-eight (48) hours.  albuterol (PROVENTIL VENTOLIN) 2.5 mg /3 mL (0.083 %) nebulizer solution 2.5 mg by Nebulization route two (2) times a day.  levothyroxine (SYNTHROID) 25 mcg tablet Take 1 Tab by mouth Daily (before breakfast).  apixaban (ELIQUIS) 2.5 mg tablet Take 1 Tab by mouth two (2) times a day. Indications: PREVENT THROMBOEMBOLISM IN CHRONIC ATRIAL FIBRILLATION    magnesium oxide 400 mg cap Take 1 Cap by mouth nightly.  cholecalciferol (VITAMIN D3) 1,000 unit cap Take 1,000 Units by mouth daily.  loratadine (CLARITIN) 10 mg tablet Take 10 mg by mouth nightly.  co-enzyme Q-10 (CO Q-10) 100 mg capsule Take 100 mg by mouth daily.  dextromethorphan-guaiFENesin (ROBITUSSIN-DM)  mg/5 mL syrup Take 10 mL by mouth every six (6) hours as needed for Cough.        Hard scripts included in transfer packet no    Vaccinations:    Immunization History   Administered Date(s) Administered    Influenza High Dose Vaccine PF 09/27/2017, 10/09/2018    Influenza Vaccine 10/04/2014, 10/01/2016    Influenza Vaccine Split 10/22/2011    Pneumococcal Polysaccharide (PPSV-23) 05/22/2007       Readmission Risks:    Known Risks: CHF        The Charlson CoMorbitiy Index tool is an evidenced based tool that has more automatic generated information. The tool looks at many different items such as the age of the patient, how many times they were admitted in the last calendar year, current length of stay in the hospital and their diagnosis. All of these items are pulled automatically from information documented in the chart from various places and will generate a score that predicts whether a patient is at low (less than 13), medium (13-20) or high (21 or greater) risk of being readmitted.         ASSESSMENT                Temp: 97.4 °F (36.3 °C) (11/06/19 0723) Pulse (Heart Rate): 79 (11/06/19 0723)     Resp Rate: 18 (11/06/19 0723)           BP: 121/77 (11/06/19 0723)     O2 Sat (%): 97 % (11/06/19 0723)     Weight: 55.8 kg (123 lb 0.3 oz)    Height: 5' 1\" (154.9 cm) (10/28/19 1213)       If above not within 1 hour of discharge:    BP:_____  P:____  R:____ T:_____ O2 Sat: ___%  O2: ______    Active Orders   Diet    DIET REGULAR         Orientation: only aware of  place and person     Active Behaviors: None                                   Active Lines/Drains:  (Peg Tube / Galindo / CL or S/L?): yes: Double Lumen PICC    Urinary Status: Incontinent, External catheter     Last BM: Last Bowel Movement Date: 11/05/19     Skin Integrity: Intact, Cracked             Mobility: Slightly limited   Weight Bearing Status: WBAT (Weight Bearing as Tolerated)      Gait Training  Assistive Device: Gait belt, Walker, rolling  Ambulation - Level of Assistance: Stand-by assistance, Contact guard assistance  Distance (ft): 75 Feet (ft)         Lab Results   Component Value Date/Time    Glucose 79 11/06/2019 05:11 AM    Hemoglobin A1c 5.1 02/05/2019 04:05 AM    INR 1.2 (H) 05/22/2012 10:54 PM    HGB 9.4 (L) 11/05/2019 04:48 AM    HGB 9.2 (L) 11/03/2019 02:34 AM        RECOMMENDATION     See After Visit Summary (AVS) for:  · Discharge instructions  · After [address]   · Special equipment needed (entered pre-discharge by Care Management)  · Medication Reconciliation    · Follow up Appointment(s)         Report given/sent by:  Tarun Osorio RN                    Verbal report given to: [employer], RN  FAXED to:  [phone]         Estimated discharge time:  11/6/2019 at 1000          1000:  [address]    The patient is stable for discharge. I have reviewed the discharge instructions with the patient and caregiver. The patient and caregiver verbalized understanding. All questions were fully answered. The  patient and caregiver denies any complaints. There were no personal belongings, valuables or home medications left at patients bedside,  or safe. Hard scripts and medication handouts were given and reviewed with the patient and caregiver. Appropriate educational materials and medication side effects teaching were provided. Cardiac monitor and IV line(s) were removed.

## 2019-11-07 ENCOUNTER — PATIENT OUTREACH (OUTPATIENT)
Dept: CASE MANAGEMENT | Age: 84
End: 2019-11-07

## 2019-11-07 NOTE — PROGRESS NOTES
Transition of care outreach postponed for 21 days due to patient's discharge to non-preferred network SNF.

## 2019-11-07 NOTE — DISCHARGE SUMMARY
[address] [place] [address] SUMMARY    Name:  Delia Nelson  MR#:  [id number]  :  1925  ACCOUNT #:  [de-identified]  ADMIT DATE:  10/23/2019  DISCHARGE DATE:  2019      HISTORY OF PRESENT ILLNESS:  The patient is a 60-year-old lady, who was doing well up until the day of admission in the nursing home, where she resides, noted she had an elevated temperature and a little bit lethargic. She visited the emergency room and was found indeed to have a fever with a normal white count, but this was because she was immunosuppressed from her chemotherapy for her lymphoma. Blood cultures were obtained, although no urine culture was obtained and she was subsequently admitted with possibility of the sepsis. No obvious source was evident on admission. Past medical history, social history, review of systems, family history, and physical examination are as in the admitting H and P.    LABORATORY VALUES:  Initial hemoglobin was 10.0, white count was 3.0, MCV was 90.2, platelet count was 352,217. At the time of discharge, platelet count was 356,040 with white count of 2.4, and hemoglobin of 9.4. Urinalysis with 5 to 10 RBCs per high power field. Abnormalities on the comprehensive profile on admission revealed a potassium of 2.8, BUN and creatinine of 48 and 1.43, bilirubin of 1.3.  proBNP was greater than 35,000 on 10/25/2019. Repeat proBNP on 2019 was 11,797. BUN and creatinine on 2019 were 20 and 1.07 with a potassium of 4.0. Blood cultures were positive with strep fecalis with pan sensitivities. The last positive blood cultures was on 10/27/2019 and the final one on 10/30/2019 was negative. RADIOGRAPHS:  CT scan of the head, there are microvascular changes and of the abdomen and pelvis, gaseous distention of the colon and the fecal spaces. Initial chest x-ray with cardiomegaly only. Repeat chest x-ray on 2019 with interval placement of a PICC line.   Echocardiogram done on 10/27/2019, transthoracic, revealed an ejection fraction of between 16% to 20%. There were possible vegetations on the aortic valve noted. Transesophageal echocardiogram revealed normal aortic valve, severe mitral regurge with a suggestion of vegetation on the anterior leaflet. CONSULTATIONS:  Consultation obtained with Dr. Josias Glaser of Hematology/Oncology as well as Cardiology, Dr. Sully Hendrickson, and finally Infectious Disease with Dr. Bree Yañez. Their comments will be elaborated on the hospital course. HOSPITAL COURSE:  The patient was admitted and placed on, initially vancomycin and Rocephin. Unfortunately, she continued to have temperature elevations. Her white count was not a gauge because of the relative degree of immunosuppression created by the Imbruvica. At this point, consultation with Infectious Disease occurred and antibiotic was changed to ampicillin 2 g q. 4 hours as well as Rocephin 2 g q. 12 hours. The assumption was that she probably had seeded something, possibly the valves of her heart. Consultation with Cardiology occurred and then transthoracic echocardiogram suggested the possibility of vegetations on her aortic valve, but eventually a transesophageal echocardiogram demonstrated no vegetation on the aortic valve, but a suggestion of vegetation of a mitral valve. 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 She was then relegated to the current antibiotic regimen for the next six weeks. Needless to say, she was not a surgical candidate in view of her severe cardiomyopathy with reduced systolic function and ejection fraction between 16% to 20%. The patient's overall condition gradually improved to the point where she no longer had any fever spikes and the final blood culture done on 10/30/2019 was negative.   She was never in overt congestive heart failure and I started her diuretic back with a progressive decrease in her proBNP from greater than 35,000 to 17,000. FINAL DIAGNOSES:  1. Acute Streptococcus faecalis endocarditis, presumably of the mitral valve. 2.  Streptococcus faecalis septicemia, origin unknown. 3.  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 4.  Probable mild neurovascular dementia. 5.  History of primary hypertension. 6.  Dyslipidemia. 7.  Complicated hypothyroidism. 8.  Relative leukopenia secondary to Imbruvica. DISPOSITION:  1. The patient will be discharged back to her nursing home  at least for the next 6 weeks until the antibiotic course will end. .  2.  She is indeed a DNR. 3.  Her diet will be a regular diet. 4.  Discharge medications includes the following:  Bumex 2 mg daily, carvedilol 3.125 mg daily, cyclobenzaprine 10 mg at bedtime, Robitussin DM one teaspoon q. 6 hours p.r.n., diclofenac gel apply locally b.i.d., loratadine 10 mg daily, Mag-Ox 400 mg daily, Eliquis 2.5 mg b.i.d., Remeron 15 mg at bedtime, albuterol nebulizer q.4 hours p.r.n., Levoxyl 0.025 mg daily, midodrine 5 mg t.i.d., MiraLax 17 g in a glass of water daily, potassium chloride 20 mEq t.i.d., ampicillin 2 g IV q. 6 hours and Rocephin 2 g IV q. 12 hours along with Jenise-Q 1 tablet daily. 5.  The patient's medication will be adjusted as relates to her cardiac status, primarily her diuretic. 6.  The patient will take antibiotics for a total of 6 weeks.         MD NISH [id number]  D:  11/06/2019 9:46  T:  11/07/2019 8:10  JOB #:  [id number]  CC:  MD BENITA Cronin MD Faythe Ober, MD

## 2019-11-21 NOTE — PROGRESS NOTES
First attempt left message regarding smoking cessation quit 2 episode.     Spiritual Care Assessment/Progress Note  [facility]      NAME: Chel Riggins      MRN: [id number]  AGE: 80 y.o.  SEX: female  Yazidism Affiliation: Yazidism   Language: English     6/29/2019     Total Time (in minutes): 12     Spiritual Assessment begun in [facility] through conversation with:         []Patient        [] Family    [] Friend(s)        Reason for Consult: Initial visit, Initial/Spiritual assessment, patient floor     Spiritual beliefs: (Please include comment if needed)     [x] Identifies with a kellie tradition:         [] Supported by a kellie community:            [] Claims no spiritual orientation:           [] Seeking spiritual identity:                [] Adheres to an individual form of spirituality:           [] Not able to assess:                           Identified resources for coping:      [] Prayer                               [] Music                  [] Guided Imagery     [x] Family/friends                 [] Pet visits     [] Devotional reading                         [] Unknown     [] Other:                                               Interventions offered during this visit: (See comments for more details)    Patient Interventions: Initial/Spiritual assessment, patient floor, Initial visit, Affirmation of kellie, Affirmation of emotions/emotional suffering, Catharsis/review of pertinent events in supportive environment, Normalization of emotional/spiritual concerns, Prayer (assurance of)           Plan of Care:     [x] Support spiritual and/or cultural needs    [] Support AMD and/or advance care planning process      [] Support grieving process   [] Coordinate Rites and/or Rituals    [] Coordination with community clergy   [] No spiritual needs identified at this time   [] Detailed Plan of Care below (See Comments)  [] Make referral to Music Therapy  [] Make referral to Pet Therapy     [] Make referral to Addiction services  [] Make referral to [facility] Passages  [] Make referral to Spiritual Care Partner  [] No future visits requested        [x] Follow up visits as needed     Comments:  made initial visit to patients room in Ortho for spiritual assessment. Patient was lying in bed during visit. Patient visited with  about he medical conditions and how she was coping with them. Patient lives at [facility] and wants to go back there for rehab and then move back into her room. Patient has a good support system of family and friends. Patient identifies as Yazidism and goes to Zoroastrianism services at the nursing home when able. Spiritual care will follow up as needed and able.      Sam Dutton MDiv  Pager: [phone]

## 2019-11-22 ENCOUNTER — DOCUMENTATION ONLY (OUTPATIENT)
Dept: FAMILY MEDICINE CLINIC | Age: 84
End: 2019-11-22

## 2019-11-22 ENCOUNTER — TELEPHONE (OUTPATIENT)
Dept: FAMILY MEDICINE CLINIC | Age: 84
End: 2019-11-22

## 2019-11-22 NOTE — TELEPHONE ENCOUNTER
----- Message from Yevgeniy Puri MD sent at 11/22/2019  2:23 PM EST -----  I need labs on pt . Pt did not show for appointment on 11/21. Please schedule on 12/3  at 230 . Thank you      This nurse reached out to this patient @ [phone]. Patients daughter n law Fox Hopkins) answered the phone. Patients son was not in to talk to. Aditya Johnson stated patient is in   [place] in [place]. She is having difficulty leaving the facility due to her health problems. This nurse advised Aditya Johnson that we need labs & any daily notes on this patient that can be faxed to us at our fax number  Fax number was supplied to Aditya Johnson. Also it is important for Dr. Malu Taylor to reach out to Dr. Erasmo Zaragoza about this patient so they can come together to determine what needs to be done with this patient. Aditya Johnson stated she will be getting with the doctor & facility to have this information faxed to Dr Erasmo Zaragoza. Patient is having lots of confusion. All of the above was verbal reported to Dr. Erasmo Zaragoza.     Tila Torres LPN

## 2019-11-22 NOTE — PROGRESS NOTES
Patient has an appt. On:  12/3/19 @ 2:30 pm  Cherelle Coleman, daughter n  advised.     Gamal Llamas LPN

## 2019-12-02 ENCOUNTER — PATIENT OUTREACH (OUTPATIENT)
Dept: INTERNAL MEDICINE CLINIC | Age: 84
End: 2019-12-02

## 2019-12-02 NOTE — PROGRESS NOTES
Outgoing call placed to [facility]. LU[facility] for update on patient's status, spoke to Elizabeth. Patient identified utilizing 2 identifiers introduced self and reason for the call. Elizabeth states patient remains in skilled care.  Will continue to follow patient's progress

## 2019-12-03 ENCOUNTER — DOCUMENTATION ONLY (OUTPATIENT)
Dept: FAMILY MEDICINE CLINIC | Age: 84
End: 2019-12-03

## 2019-12-05 ENCOUNTER — HOSPITAL ENCOUNTER (OUTPATIENT)
Dept: INTERVENTIONAL RADIOLOGY/VASCULAR | Age: 84
Discharge: HOME | End: 2019-12-05
Attending: STUDENT IN AN ORGANIZED HEALTH CARE EDUCATION/TRAINING PROGRAM | Admitting: STUDENT IN AN ORGANIZED HEALTH CARE EDUCATION/TRAINING PROGRAM
Payer: MEDICARE

## 2019-12-05 ENCOUNTER — HOSPITAL ENCOUNTER (OUTPATIENT)
Dept: ULTRASOUND IMAGING | Age: 84
Discharge: HOME | End: 2019-12-05
Attending: OTOLARYNGOLOGY
Payer: MEDICARE

## 2019-12-05 ENCOUNTER — DOCUMENTATION ONLY (OUTPATIENT)
Dept: FAMILY MEDICINE CLINIC | Age: 84
End: 2019-12-05

## 2019-12-05 VITALS
HEIGHT: 61 IN | BODY MASS INDEX: 23.41 KG/M2 | SYSTOLIC BLOOD PRESSURE: 120 MMHG | OXYGEN SATURATION: 98 % | RESPIRATION RATE: 22 BRPM | DIASTOLIC BLOOD PRESSURE: 64 MMHG | HEART RATE: 80 BPM | TEMPERATURE: 98.2 F | WEIGHT: 124 LBS

## 2019-12-05 DIAGNOSIS — R22.1 NECK MASS: ICD-10-CM

## 2019-12-05 DIAGNOSIS — I38 ENDOCARDITIS: ICD-10-CM

## 2019-12-05 PROCEDURE — 76536 US EXAM OF HEAD AND NECK: CPT

## 2019-12-05 PROCEDURE — 76000 FLUOROSCOPY <1 HR PHYS/QHP: CPT

## 2019-12-05 PROCEDURE — C1751 CATH, INF, PER/CENT/MIDLINE: HCPCS

## 2019-12-05 NOTE — PROGRESS NOTES
Rcvd. In angio prior to PICC line exchange. Good blood return and easy flush with current PICC with intact dressing. The PICC has been pulled out of the skin approx  1/2 inch. PICC line to be used for 6 more days per nursing facility. Will replace line per order. VSS. Patient verbalizes understanding of same.

## 2019-12-05 NOTE — H&P
Radiology History and Physical    Patient: Mati Rg 80 y.o. female       Chief Complaint: No chief complaint on file. History of Present Illness: Endocarditis.     History:    Past Medical History:   Diagnosis Date    Aortic insufficiency     Asthma     Chronic a-fib 10/28/2019    CKD (chronic kidney disease) stage 3, GFR 30-59 ml/min ([facility]) 1/10/2018    CLL (chronic lymphocytic leukemia) ([facility])     Congestive heart failure, NYHA class II, chronic, systolic ([facility])     Endocarditis 10/28/2019    Heart failure ([address]     Hypertension     Mitral valvular regurgitation 1/22/2016    ECHO 2/3/17: LV dilated, EF 20-25%, mild LVH, RV mod dilated, mild- mod MELANIA, mod-severe MR, mod AI ECHO 7/29/17: EF 15%, severe DHK, reduced RVEF, RVH, RVSP 35 mmHg  Mild LAE, mod-marked MA fibrosis, mod-severe MR (2+), AO root dilated,      Nonrheumatic aortic valve insufficiency 10/16/2018    Presence of biventricular automatic cardioverter/defibrillator (AICD) 7/2/2015    [place] Social[employer]5 biventricular AICD implant    Stomach ulcer      Family History   Problem Relation Age of Onset    Heart Disease Mother     Stroke Father      Social History     Socioeconomic History    Marital status:      Spouse name: Not on file    Number of children: 1    Years of education: 16+    Highest education level: Master's degree (e.g., MA, MS, [place], MEd, MSW, LUKE)   Occupational History    Occupation: retired teacher   Social Needs    Financial resource strain: Not on file    Food insecurity:     Worry: Not on file     Inability: Not on file   Sky [employer] needs:     Medical: Not on file     Non-medical: Not on file   Tobacco Use    Smoking status: Never Smoker    Smokeless tobacco: Never Used   Substance and Sexual Activity    Alcohol use: No     Alcohol/week: 0.0 standard drinks    Drug use: No    Sexual activity: Never   Lifestyle    Physical activity:     Days per week: Not on file     Minutes per session: Not on file    Stress: Not on file   Relationships    Social connections:     Talks on phone: Not on file     Gets together: Not on file     Attends Holiness service: Not on file     Active member of club or organization: Not on file     Attends meetings of clubs or organizations: Not on file     Relationship status: Not on file    Intimate partner violence:     Fear of current or ex partner: Not on file     Emotionally abused: Not on file     Physically abused: Not on file     Forced sexual activity: Not on file   Other Topics Concern    Not on file   Social History Narrative    Not on file       Allergies: Allergies   Allergen Reactions    Codeine Other (comments)     \"made me disoriented\" per pt       Current Medications:  No current facility-administered medications for this encounter. Physical Exam:  There were no vitals taken for this visit. GENERAL: alert, cooperative, no distress, appears stated age  LUNG: clear to auscultation bilaterally  HEART: regular rate and rhythm  ABD: Non tender, non distended. Alerts:    Hospital Problems  Date Reviewed: 10/28/2019    None          Laboratory:    No results for input(s): HGB, HCT, WBC, PLT, INR, BUN, CREA, K, CRCLT, HGBEXT, HCTEXT, PLTEXT, INREXT in the last 72 hours. No lab exists for component: PTT, PT      Plan of Care/Planned Procedure:  Risks, benefits, and alternatives reviewed with patient and she agrees to proceed with the procedure.        Zach Bush MD

## 2019-12-05 NOTE — PROGRESS NOTES
1533 pm- Patient taken to car via wheelchair for discharge with nurse at side. Patient discharged stable.

## 2019-12-12 ENCOUNTER — DOCUMENTATION ONLY (OUTPATIENT)
Dept: FAMILY MEDICINE CLINIC | Age: 84
End: 2019-12-12

## 2019-12-17 ENCOUNTER — TELEPHONE (OUTPATIENT)
Dept: FAMILY MEDICINE CLINIC | Age: 84
End: 2019-12-17

## 2019-12-17 ENCOUNTER — PATIENT OUTREACH (OUTPATIENT)
Dept: INTERNAL MEDICINE CLINIC | Age: 84
End: 2019-12-17

## 2019-12-17 NOTE — TELEPHONE ENCOUNTER
Per Dr. Zainab Wolff  Repeat WBC since antibiotic is done  This nurse spoke with Ana Curiel  @ [facility]  [phone] fax  And gave the above orders.   Patient has an appt with Dr. Zainab Wolff on 12/19/19

## 2019-12-17 NOTE — PROGRESS NOTES
[facility]. Patient remains in skilled nursing with goal of returning to LTC at facility. Per patient EMR patient has not has any hospital or ED admission within last 30 days. Episode of care closed.

## 2019-12-18 ENCOUNTER — TELEPHONE (OUTPATIENT)
Dept: CARDIOLOGY CLINIC | Age: 84
End: 2019-12-18

## 2019-12-18 ENCOUNTER — DOCUMENTATION ONLY (OUTPATIENT)
Dept: FAMILY MEDICINE CLINIC | Age: 84
End: 2019-12-18

## 2019-12-18 NOTE — TELEPHONE ENCOUNTER
Please give Joyce Louise (the pts nurse) from [facility] called concern about the flashing lights that's on the pts monitor, please give her a call back at [phone]    thanks

## 2019-12-18 NOTE — PROGRESS NOTES
[facility] dated 12/18/19

## 2019-12-19 ENCOUNTER — OFFICE VISIT (OUTPATIENT)
Dept: FAMILY MEDICINE CLINIC | Age: 84
End: 2019-12-19

## 2019-12-19 VITALS
WEIGHT: 126.2 LBS | DIASTOLIC BLOOD PRESSURE: 60 MMHG | TEMPERATURE: 97.7 F | HEIGHT: 61 IN | SYSTOLIC BLOOD PRESSURE: 98 MMHG | RESPIRATION RATE: 19 BRPM | BODY MASS INDEX: 23.83 KG/M2

## 2019-12-19 DIAGNOSIS — N18.30 CKD (CHRONIC KIDNEY DISEASE) STAGE 3, GFR 30-59 ML/MIN (HCC): Chronic | ICD-10-CM

## 2019-12-19 DIAGNOSIS — D72.819 LEUKOPENIA, UNSPECIFIED TYPE: ICD-10-CM

## 2019-12-19 DIAGNOSIS — Z95.810 HISTORY OF AUTOMATIC INTERNAL CARDIAC DEFIBRILLATOR (AICD): ICD-10-CM

## 2019-12-19 DIAGNOSIS — I48.20 CHRONIC A-FIB (HCC): ICD-10-CM

## 2019-12-19 DIAGNOSIS — Z85.6 PERSONAL HISTORY OF CLL (CHRONIC LYMPHOCYTIC LEUKEMIA): ICD-10-CM

## 2019-12-19 DIAGNOSIS — B95.2 ENTEROCOCCAL BACTEREMIA: ICD-10-CM

## 2019-12-19 DIAGNOSIS — I05.9 ENDOCARDITIS OF MITRAL VALVE: Primary | ICD-10-CM

## 2019-12-19 DIAGNOSIS — R78.81 ENTEROCOCCAL BACTEREMIA: ICD-10-CM

## 2019-12-19 DIAGNOSIS — Z95.0 PACEMAKER: ICD-10-CM

## 2019-12-19 RX ORDER — ONDANSETRON 4 MG/1
4 TABLET, ORALLY DISINTEGRATING ORAL
COMMUNITY

## 2019-12-19 RX ORDER — DIPHENHYDRAMINE HCL 25 MG
25 CAPSULE ORAL
COMMUNITY

## 2019-12-19 RX ORDER — METOLAZONE 10 MG/1
TABLET ORAL DAILY
COMMUNITY

## 2019-12-19 NOTE — PATIENT INSTRUCTIONS
[employer] Activation    Thank you for requesting access to INXPO. Please follow the instructions below to securely access and download your online medical record. INXPO allows you to send messages to your doctor, view your test results, renew your prescriptions, schedule appointments, and more. How Do I Sign Up? 1. In your internet browser, go to [email or web address]. 2. Click on the First Time User? Click Here link in the Sign In box. You will see the New Member Sign Up page. 3. Enter your INXPO Access Code exactly as it appears below. You will not need to use this code after youve completed the sign-up process. If you do not sign up before the expiration date, you must request a new code. INXPO Access Code: Activation code not generated  Current INXPO Status: Active (This is the date your INXPO access code will )    4. Enter the last four digits of your Social Security Number (xxxx) and Date of Birth (mm/dd/yyyy) as indicated and click Submit. You will be taken to the next sign-up page. 5. Create a INXPO ID. This will be your INXPO login ID and cannot be changed, so think of one that is secure and easy to remember. 6. Create a INXPO password. You can change your password at any time. 7. Enter your Password Reset Question and Answer. This can be used at a later time if you forget your password. 8. Enter your e-mail address. You will receive e-mail notification when new information is available in [address] 9. Click Sign Up. You can now view and download portions of your medical record. 10. Click the Download Summary menu link to download a portable copy of your medical information. Additional Information    If you have questions, please visit the Frequently Asked Questions section of the INXPO website at [email or web address] Remember, INXPO is NOT to be used for urgent needs. For medical emergencies, dial 911.

## 2019-12-19 NOTE — PROGRESS NOTES
[facility] Disease [facility]    IMPRESSION:    - Pt doing well today . No fever , chills, completed antibiotic therapy     -E.faecalis Gp D bacteremia with BC+ / - 10/23, BC - 10/27- E.faecalis spp 1/2 bottles. BC - 10/30 - NG    -Mitral valve endocarditis . WYATT - 10/29- vegetation on MV 10 x 3 mm flat, non mobile , pacer wires appear normal    -Pt has AICD , US done - no fluid in pocket     -ECHO-10/27-possible vegetation on AV/acer wires not seen well enough to evaluate for vegetation    -ECHO 10/28- cannot rule out vegetation on pacer tip     - H/o lymphoma    - Leucopenia WBC 1.5 , antimicrobials probably contributory, Dr Darren Bean aware  Latest wbc 2.0 ( )    - H/o CLL         PLAN:      ·  Completed  Ampicillin 2 g IV q6h & Ceftriaxone 2 G IV q12 x 6 weeks  end date 12/10, s/p removal of picc at end of therapy  ·  Refer to Cardiology for follow up ECHO / WYATT  ·  Refer to Hematology Dr Darren Bean for low WBC leucopenia, h/o CLL       Subjective:     Pt seen on hospital follow up . Denies new complaints. 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 Pt has AICD pacer. Please refer to Consult & progress notes for details      Saul Argueta was seen in hospital for Enterococcal  Bacteremia & Endocarditis , MV vegetation    Please refer to in patient Consult & hospital progress notes for further details    Review of Systems:  A comprehensive review of systems was negative except for that written in the History of Present Illness. 10 point ROS obtained . All other systems negative . Objective: There were no vitals taken for this visit. Temp (24hrs), Av.3 °F (36.3 °C), Min:97.2 °F (36.2 °C), Max:97.6 °F (36.4 °C)          Physical Exam:   General:  Alert cooperative,    Eyes:  Sclera anicteric. Pupils equally round and reactive to light.    Mouth/Throat: Mucous membranes normal, oral pharynx clear   Neck: Supple   Lungs:   Reduced  auscultation bases  Chest - pacemaker pocket intact  no swelling +    CV:  Regular rate and rhythm,no murmur, click, rub or gallop   Abdomen:   Soft, non-tender. bowel sounds normal. non-distended   Extremities: No  edema   Skin: Skin color, texture, turgor normal. no acute rash or lesions   Lymph nodes: Cervical and supraclavicular normal   Musculoskeletal: No swelling or deformity   Lines/Devices:  Intact, no erythema, drainage or tenderness   Psych: Alert and oriented, normal mood        Data Review:   Studies:      Lab Results   Component Value Date/Time    Culture result: NO GROWTH 5 DAYS 10/30/2019 02:21 AM    Culture result: (A) 10/27/2019 02:33 PM     ENTEROCOCCUS SPECIES GROWING IN 1 OF 2 BOTTLES DRAWN (SITE = RAC)    Culture result:  10/27/2019 02:33 PM     PLEASE REFER TO PREVIOUS BLOOD CULTURE  [id number] COLLECTED 10/23/19 FOR ID/SENSITIVITIES      Culture result: REMAINING BOTTLE(S) HAS/HAVE NO GROWTH IN 5 DAYS 10/27/2019 02:33 PM    Culture result: (A) 10/23/2019 10:50 AM     ENTEROCOCCUS FAECALIS GROUP D GROWING IN BOTH BOTTLES DRAWN SITE= RT WRIST GENTAMICIN SYNERGY SCREEN IS SENSITIVE @ < OR = 500 mcg/ml STREPTOMYCIN SYNERGY SCREEN IS SENSITIVE @ < OR =1000 mcg/ml    Culture result: (A) 10/23/2019 10:50 AM     PRELIMINARY REPORT OF GRAM POSITIVE COCCI IN PAIRS GROWING IN BOTH BOTTLES DRAWN CALLED TO AND READ BACK BY Oni Garrett RN ON 10/23/19 AT [address] [facility] [address]). MS        XR Results (most recent):  Results from Hospital Encounter encounter on 10/23/19   XR CHEST PORT    Narrative EXAM: XR CHEST PORT    INDICATION: PICC Tip Placement confirmation    COMPARISON: 10/31/2019    FINDINGS: A portable AP radiograph of the chest was obtained at 1326 hours. The  patient is on a cardiac monitor. The PICC line terminates at the caval atrial  junction. A dual-lead pacemaker is in place. There is cardiomegaly with the  persistent right hilar prominence and mild central congestion. The left  hemidiaphragm remains obscured.       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    DILAN (obstructive sleep apnea) G47.33    Rhinitis J31.0    Anemia D64.9    Reactive airway disease J45.909    HTN (hypertension) I10    Gout M10.9    LBBB (left bundle branch block) I44.7    Presence of biventricular automatic cardioverter/defibrillator (AICD) Z95.810    Chronic systolic congestive heart failure (HCC) I50.22    Lymphoma ([facility]) C85.90    Mitral valvular regurgitation I34.0    Physical deconditioning R53.81    Gastroesophageal reflux disease with esophagitis K21.0    Thoracic aortic aneurysm without rupture ([facility]) I71.2    Paroxysmal atrial fibrillation ([facility]) I48.0    Xerosis of skin L85.3    Dyslipidemia E78.5    CKD (chronic kidney disease) stage 3, GFR 30-59 ml/min ([facility]) N18.3    Acquired hypothyroidism E03.9    Nonrheumatic aortic valve insufficiency I35.1    UTI (urinary tract infection) N39.0    Counseling regarding advanced care planning and goals of care Z71.89    CAP (community acquired pneumonia) J18.9    Poor short-term memory R41.3    Fever R50.9    Endocarditis of mitral valve I05.8    Chronic a-fib I48.20    Sepsis ([facility]) E39.8    Metabolic encephalopathy A44.74     I have discussed the diagnosis with the patient and the intended plan as seen in the above orders. I have discussed medication side effects and warnings with the patient as well.     Reviewed test results at length with patient    Anti-infectives:     S/p Ceftriaxone / Ampicillin - 10/27- 12/10  S/p Ceftriaxone /Vancomycin - 10/24-10/26     Gurjit Medina MD FACP

## 2019-12-20 ENCOUNTER — HOSPITAL ENCOUNTER (OUTPATIENT)
Dept: ULTRASOUND IMAGING | Age: 84
Discharge: HOME | End: 2019-12-20
Attending: OTOLARYNGOLOGY
Payer: MEDICARE

## 2019-12-20 DIAGNOSIS — R22.1 NECK MASS: ICD-10-CM

## 2019-12-20 PROCEDURE — 88307 TISSUE EXAM BY PATHOLOGIST: CPT

## 2019-12-20 PROCEDURE — 88342 IMHCHEM/IMCYTCHM 1ST ANTB: CPT

## 2019-12-20 PROCEDURE — 88312 SPECIAL STAINS GROUP 1: CPT

## 2019-12-20 PROCEDURE — 88184 FLOWCYTOMETRY/ TC 1 MARKER: CPT

## 2019-12-20 PROCEDURE — 88341 IMHCHEM/IMCYTCHM EA ADD ANTB: CPT

## 2019-12-20 PROCEDURE — 88305 TISSUE EXAM BY PATHOLOGIST: CPT

## 2019-12-20 PROCEDURE — 10005 FNA BX W/US GDN 1ST LES: CPT

## 2019-12-20 PROCEDURE — 38505 NEEDLE BIOPSY LYMPH NODES: CPT

## 2019-12-20 PROCEDURE — 88185 FLOWCYTOMETRY/TC ADD-ON: CPT

## 2020-01-08 ENCOUNTER — TELEPHONE (OUTPATIENT)
Dept: CARDIOLOGY CLINIC | Age: 85
End: 2020-01-08

## 2020-01-08 NOTE — TELEPHONE ENCOUNTER
Please call Dr. Diana Zhou @ [place] regarding patient's ICD being turned off/deactivated. Patient is now on \"comfort services\". Thanks!

## 2020-01-09 NOTE — TELEPHONE ENCOUNTER
Spoke with . He will have nurse supervisor call and get number for [facility] rep and coordinate turning off ICD therapies. He is aware that they will need doctors order and nurse present during device programming.

## 2020-01-18 ENCOUNTER — PATIENT OUTREACH (OUTPATIENT)
Dept: INTERNAL MEDICINE CLINIC | Age: 85
End: 2020-01-18

## 2020-01-18 NOTE — LETTER
1/19/2020 1:39 PM 
 
Mr. & . Vivian Ho of: Ms. Escobar Justice [address] Dear Mr. & Mrs. Yari Ascencio, The [address] [place] [address] Rd wishes to acknowledge the loss of your vivek mother and our patient who was very dear to us all. We are extremely grateful to have known and been allowed to assist in the health, care and concerns of Ms. Yari Ascencio. Our condolences to you at this sad time. May you find peace and the strength to cope. May God bless and comfort you and your family. In His Service, Shahid Decker MD., PCP Radha Masterson MD. Moustapha Gold LPN, Practice Manager Yasmine Diego. Shweta Hutchins RN, Nurse Navigator

## 2020-01-18 NOTE — PROGRESS NOTES
Goals Addressed                 This Visit's Progress     COMPLETED: Facilitate transitions of care (ie. palliative care, assisted living, nursing home, changes in living arrangements). 10/23/2019:  Call received from [facility] stating patient is rushed to ED for temp 101.6 this AM.  Reports previous patient c/o neck pain. Notification given to PCP. EW     2019:  PCP consult re SNF d/c medication verification-that IMBRUVICA removed from d/c med list.  Spoke with Charge Nurse Javi Baumann RN; verified medication listed as stop med  On d/c med list to SNF. EW        Facilitate transitions of care (ie. palliative care, assisted living, nursing home, changes in living arrangements). 2020:  Call received that patient had . Consult/notification done w/ PCP. EW  2020:  Spoke with Caretaker daughter Gloria Colvin; states patient under care of [facility] physician; consult between Cardiology made to deactivate ICD for End- of- LIfe care. EW       COMPLETED: Prevent relapse of pneumonia symptoms. 2019:  [facility] -2019 (10/2/2019):  Community Acquired Pneumonia f/u. [place] at [place]. NN spoke with Caretaker Daughter Stacie Braun re Cardiology appointment requested by patient on  for f/u appt with Dr. Reji Lanier as Dr. Darien Youngblood office cancelled patient's last appointment per daughter, and she has not received a rescheduled appt call-agrees to f/u today with a call to Cardiology's office for rescheduled date. Patient was scheduled f/u chest xray by Pulmonologist;  had radiology studies done by Dr. Gustavo Munguia on yesterday for pleural effusion (results:  PNA resolved). [place]:  States patient is doing so much better now; free of cough; less stressed--more active--sounding less depressed (as son & daughter in law live out of town). States patient say very little swell in ankles.    EW.

## 2020-02-17 ENCOUNTER — PATIENT OUTREACH (OUTPATIENT)
Dept: INTERNAL MEDICINE CLINIC | Age: 85
End: 2020-02-17

## 2020-02-17 NOTE — PROGRESS NOTES
NNTOCIP [facility] -2019:  Community Acquired Pneumonia  continued f/u  ACM Complex Case - /Case Closed     Episode resolved  Goals completed   Case closed

## 2020-08-23 NOTE — ED NOTES
Call completed to dietary and meal tray requested.
Hospitalist at bedside evaluating pt.
Patient has an increased effort by respiratory rate of 30 on room air with stats at 96. Patient placed on 2L via nasal cannula.
Patient presents to the ED via EMS complaining of SOB & low Hgb. [facility] reports labs yesterday that showed that she had a hemoglobin of 6.7. EMS reports patients stats were in mid 90 and EMS started patient on 2L via nasal cannula due to labored breathing. Patient ONLY wears oxygen at night per EMS. A&O x2 is baseline. EMS reports a dry cough. Code purple called. Deniz Waterman MD at bedside. Patient placed on the monitor x3. Call bell provided. Patient's caregiver at bedside.
Patient repositioned in bed. Purewick placed due to periodic incontinence and an effort to obtained an ordered ua.
TRANSFER - OUT REPORT:    Verbal report given to [address] [address] [facility] [address], RN (name) on Nadean   being transferred to Ortho(unit) for routine progression of care       Report consisted of patients Situation, Background, Assessment and   Recommendations(SBAR). Information from the following report(s) SBAR, Intake/Output, Recent Results and Cardiac Rhythm Paced with PVCs was reviewed with the receiving nurse. Lines:   Peripheral IV 06/25/19 Left Antecubital (Active)   Site Assessment Clean, dry, & intact 6/25/2019  8:49 AM   Phlebitis Assessment 0 6/25/2019  8:49 AM   Infiltration Assessment 0 6/25/2019  8:49 AM   Dressing Status Clean, dry, & intact 6/25/2019  8:49 AM   Dressing Type Transparent;Tape 6/25/2019  8:49 AM   Hub Color/Line Status Pink 6/25/2019  8:49 AM       Peripheral IV 06/25/19 Right Antecubital (Active)   Site Assessment Clean, dry, & intact 6/25/2019  8:59 AM   Phlebitis Assessment 0 6/25/2019  8:59 AM   Infiltration Assessment 0 6/25/2019  8:59 AM   Dressing Status Clean, dry, & intact 6/25/2019  8:59 AM   Dressing Type Transparent;Tape 6/25/2019  8:59 AM   Hub Color/Line Status Pink 6/25/2019  8:59 AM        Opportunity for questions and clarification was provided.       Patient transported with:   [place]
X ray at bedside
Stable

## 2021-03-03 NOTE — MR AVS SNAPSHOT
Visit Information Date & Time Provider Department Dept. Phone Encounter #  
 5/9/2017 10:45 AM Marcus Lacey MD [facility] at [place] [address] [id number] Your Appointments 5/16/2017 10:00 AM  
Follow Up with Ezequiel Johnson MD  
[address] [facility] ([address]) [facility] [address]  
[phone]  
  
   
 [address] [facility] [address] [address]  
  
    
 7/11/2017 11:00 AM  
Any with Lucia Ackerman MD  
[address] [place] [address] and Primary Care [address] [place] [address]) Appt Note: 3 month f/u  
 [address] [facility]  
  
   
 [address] [id number]  
  
    
  
 7/26/2017  8:00 AM  
REMOTE OFFICE VISIT with [facility] [address] [place] [address]) Appt Note: NOT AN OFFICE VISIT - [facility] [place] S[place]  
 [address] [place] [address]  
[phone] [address] [place] [address] Upcoming Health Maintenance Date Due INFLUENZA AGE 9 TO ADULT 8/1/2017 GLAUCOMA SCREENING Q2Y 12/18/2017 MEDICARE YEARLY EXAM 4/28/2018 DTaP/Tdap/Td series (2 - Td) 2/13/2027 Allergies as of 5/9/2017  Review Complete On: 5/9/2017 By: Ben Navarro Severity Noted Reaction Type Reactions Codeine  05/21/2012   Side Effect Other (comments) \"made me disoriented\" per pt Current Immunizations  Reviewed on 6/29/2015 Name Date Influenza Vaccine 10/1/2016, 10/4/2014 Influenza Vaccine Split 10/22/2011 Pneumococcal Vaccine (Unspecified Type) 5/22/2007 Not reviewed this visit You Were Diagnosed With   
  
 Codes Comments Systolic CHF, acute on chronic (HCC)    -  Primary ICD-10-CM: L87.69 ICD-9-CM: 428.23, 428.0 Essential hypertension     ICD-10-CM: I10 
ICD-9-CM: 401.9 Mild mitral regurgitation     ICD-10-CM: I34.0 ICD-9-CM: 424.0 Cardiomyopathy, dilated, nonischemic (HCC)     ICD-10-CM: I42.9 ICD-9-CM: 425.4 Atrial fibrillation, unspecified type ([facility]     ICD-10-CM: I48.91 
ICD-9-CM: 427.31 Vitals BP Pulse Height(growth percentile) Weight(growth percentile) SpO2 BMI  
 108/69 (!) 55 5' 2\" (1.575 m) 136 lb (61.7 kg) 96% 24.87 kg/m2 OB Status Smoking Status Postmenopausal Never Smoker Vitals History BMI and BSA Data Body Mass Index Body Surface Area  
 24.87 kg/m 2 1.64 m 2 Preferred Pharmacy Pharmacy Name Phone [address] [facility] [phone] Your Updated Medication List  
  
   
This list is accurate as of: 5/9/17 12:04 PM.  Always use your most recent med list.  
  
  
  
  
 albuterol 2.5 mg /3 mL (0.083 %) nebulizer solution Commonly known as:  PROVENTIL VENTOLIN  
3 mL by Nebulization route every four (4) hours as needed for Wheezing. allopurinol 300 mg tablet Commonly known as:  ZYLOPRIM  
TAKE ONE (1) TABLET(S) DAILY  
  
 apixaban 2.5 mg tablet Commonly known as:  Mara Antu Take 1 Tab by mouth two (2) times a day. Indications: PREVENT THROMBOEMBOLISM IN CHRONIC ATRIAL FIBRILLATION  
  
 atorvastatin 10 mg tablet Commonly known as:  LIPITOR  
TAKE ONE (1) TABLET(S) DAILY  
  
 carvedilol 3.125 mg tablet Commonly known as:  COREG  
TAKE ONE (1) TABLET(S) TWIC E DAILY WITH FOOD  Indications: Chronic Heart Failure  
  
 co-enzyme Q-10 100 mg capsule Commonly known as:  CO Q-10 Take 100 mg by mouth daily. fluticasone-salmeterol 250-50 mcg/dose diskus inhaler Commonly known as:  ADVAIR Take 1 Puff by inhalation two (2) times a day. furosemide 20 mg tablet Commonly known as:  LASIX Take 1 Tab by mouth daily. IMBRUVICA 140 mg capsule Generic drug:  ibrutinib Take 420 mg by mouth five (5) days a week. 140 mg cap, takes 3 caps five days a week (none on Sunday or Wednesday) loratadine 10 mg tablet Commonly known as:  Lendon An Take 10 mg by mouth daily as needed. magnesium oxide 400 mg Cap Take 1 Cap by mouth daily. multivitamin tablet Commonly known as:  ONE A DAY Take 1 Tab by mouth daily. OXYGEN-AIR DELIVERY SYSTEMS  
2 L by Does Not Apply route nightly. potassium chloride SR 10 mEq tablet Commonly known as:  KLOR-CON 10 Take 1 Tab by mouth daily. sacubitril-valsartan 49-51 mg Tab tablet Commonly known as:  ENTRESTO Take 1 Tab by mouth two (2) times a day. TYLENOL EXTRA STRENGTH 500 mg tablet Generic drug:  acetaminophen Take 500 mg by mouth every six (6) hours as needed for Pain. VITAMIN D3 1,000 unit Cap Generic drug:  cholecalciferol Take 1,000 Units by mouth daily. Prescriptions Sent to Pharmacy Refills  
 apixaban (ELIQUIS) 2.5 mg tablet 6 Sig: Take 1 Tab by mouth two (2) times a day. Indications: PREVENT THROMBOEMBOLISM IN CHRONIC ATRIAL FIBRILLATION Class: Normal  
 Pharmacy: [facility] [id number] Ph #: [phone] Route: Oral  
  
We Performed the Following AMB POC EKG ROUTINE W/ 12 LEADS, INTER & REP [28284 CPT(R)] Patient Instructions   
-- We are going to start you on a medication called Eliquis -- This is a blood thinner 
-- Your heart has an irregular rhythm called A-fib 
-- This can lead to blood clots, which is why it is important to protect you with a blood thinner -- Please make a follow up visit in 3 months Atrial Fibrillation: Care Instructions Your Care Instructions Atrial fibrillation is an irregular and often fast heartbeat. Treating this condition is important for several reasons. It can cause blood clots, which can travel from your heart to your brain and cause a stroke. If you have a fast heartbeat, you may feel lightheaded, dizzy, and weak.  An irregular heartbeat can also increase your risk for heart failure. Atrial fibrillation is often the result of another heart condition, such as high blood pressure or coronary artery disease. Making changes to improve your heart condition will help you stay healthy and active. Follow-up care is a key part of your treatment and safety. Be sure to make and go to all appointments, and call your doctor if you are having problems. It's also a good idea to know your test results and keep a list of the medicines you take. How can you care for yourself at home? Medicines · Take your medicines exactly as prescribed. Call your doctor if you think you are having a problem with your medicine. You will get more details on the specific medicines your doctor prescribes. · If your doctor has given you a blood thinner to prevent a stroke, be sure you get instructions about how to take your medicine safely. Blood thinners can cause serious bleeding problems. · Do not take any vitamins, over-the-counter drugs, or herbal products without talking to your doctor first. 
Lifestyle changes · Do not smoke. Smoking can increase your chance of a stroke and heart attack. If you need help quitting, talk to your doctor about stop-smoking programs and medicines. These can increase your chances of quitting for good. · Eat a heart-healthy diet. · Stay at a healthy weight. Lose weight if you need to. · Limit alcohol to 2 drinks a day for men and 1 drink a day for women. Too much alcohol can cause health problems. · Avoid colds and flu. Get a pneumococcal vaccine shot. If you have had one before, ask your doctor whether you need another dose. Get a flu shot every year. If you must be around people with colds or flu, wash your hands often. Activity · If your doctor recommends it, get more exercise. Walking is a good choice. Bit by bit, increase the amount you walk every day.  Try for at least 30 minutes on most days of the week. You also may want to swim, bike, or do other activities. Your doctor may suggest that you join a cardiac rehabilitation program so that you can have help increasing your physical activity safely. · Start light exercise if your doctor says it is okay. Even a small amount will help you get stronger, have more energy, and manage stress. Walking is an easy way to get exercise. Start out by walking a little more than you did in the hospital. Gradually increase the amount you walk. · When you exercise, watch for signs that your heart is working too hard. You are pushing too hard if you cannot talk while you are exercising. If you become short of breath or dizzy or have chest pain, sit down and rest immediately. · Check your pulse regularly. Place two fingers on the artery at the palm side of your wrist, in line with your thumb. If your heartbeat seems uneven or fast, talk to your doctor. When should you call for help? Call 911 anytime you think you may need emergency care. For example, call if: 
· You have symptoms of a heart attack. These may include: ¨ Chest pain or pressure, or a strange feeling in the chest. 
¨ Sweating. ¨ Shortness of breath. ¨ Nausea or vomiting. ¨ Pain, pressure, or a strange feeling in the back, neck, jaw, or upper belly or in one or both shoulders or arms. ¨ Lightheadedness or sudden weakness. ¨ A fast or irregular heartbeat. After you call 911, the  may tell you to chew 1 adult-strength or 2 to 4 low-dose aspirin. Wait for an ambulance. Do not try to drive yourself. · You have symptoms of a stroke. These may include: 
¨ Sudden numbness, tingling, weakness, or loss of movement in your face, arm, or leg, especially on only one side of your body. ¨ Sudden vision changes. ¨ Sudden trouble speaking. ¨ Sudden confusion or trouble understanding simple statements. ¨ Sudden problems with walking or balance. ¨ A sudden, severe headache that is different from past headaches. · You passed out (lost consciousness). Call your doctor now or seek immediate medical care if: 
· You have new or increased shortness of breath. · You feel dizzy or lightheaded, or you feel like you may faint. · Your heart rate becomes irregular. · You can feel your heart flutter in your chest or skip heartbeats. Tell your doctor if these symptoms are new or worse. Watch closely for changes in your health, and be sure to contact your doctor if you have any problems. Where can you learn more? Go to http://camelia-bar.info/. Enter U020 in the search box to learn more about \"Atrial Fibrillation: Care Instructions. \" Current as of: January 27, 2016 Content Version: 11.2 © 4954-7961 [employer]. Care instructions adapted under license by [employer] (which disclaims liability or warranty for this information). If you have questions about a medical condition or this instruction, always ask your healthcare professional. [place] [address] any warranty or liability for your use of this information. Introducing [facility]! Dear Vanessa Yeager: Thank you for requesting a OneFold account. Our records indicate that you already have an active OneFold account. You can access your account anytime at [email or web address] Did you know that you can access your hospital and ER discharge instructions at any time in OneFold? You can also review all of your test results from your hospital stay or ER visit. Additional Information If you have questions, please visit the Frequently Asked Questions section of the OneFold website at [email or web address]. Remember, OneFold is NOT to be used for urgent needs. For medical emergencies, dial 911. Now available from your iPhone and Android! Please provide this summary of care documentation to your next provider. Your primary care clinician is listed as Gail Dey. If you have any questions after today's visit, please call [phone]. change in mental status/other (specify)/difficulty chewing/as per HHA, pt usually throws up after eating, dentures were taken home by family, pt dislikes puree diet Muscle Hinge Flap Text: The defect edges were debeveled with a #15 scalpel blade.  Given the size, depth and location of the defect and the proximity to free margins a muscle hinge flap was deemed most appropriate.  Using a sterile surgical marker, an appropriate hinge flap was drawn incorporating the defect. The area thus outlined was incised with a #15 scalpel blade.  The skin margins were undermined to an appropriate distance in all directions utilizing iris scissors.

## 2021-10-08 NOTE — PROGRESS NOTES
Chief Complaint   Patient presents with    Follow-up     6 mo     1. Have you been to the ER, urgent care clinic since your last visit? Hospitalized since your last visit? Yes When: March Where: [facility] Reason for visit: CHF. 2. Have you seen or consulted any other health care providers outside of the [facility] since your last visit? Include any pap smears or colon screening.  No No

## 2023-04-20 NOTE — PROGRESS NOTES
Chief Complaint   Patient presents with   [facility] Follow Up      Follow for CHF 2/1/17     1. Have you been to the ER, urgent care clinic since your last visit? Hospitalized since your last visit? Yes Reason for visit: CHF on 2/1/17    2. Have you seen or consulted any other health care providers outside of the [address] [place] [address] since your last visit? Include any pap smears or colon screening.  No 2

## 2023-07-01 NOTE — PROGRESS NOTES
General Daily Progress Note    Admit Date: 10/23/2019    Subjective:     Patient has no complaint . Current Facility-Administered Medications   Medication Dose Route Frequency    bacitracin 500 unit/gram packet 1 Packet  1 Packet Topical PRN    heparin (porcine) pf 300 Units  300 Units InterCATHeter PRN    potassium chloride SR (KLOR-CON 10) tablet 20 mEq  20 mEq Oral TID    lactobac ac& pc-s.therm-b.anim (DAWN Q/RISAQUAD)  1 Cap Oral DAILY    furosemide (LASIX) tablet 40 mg  40 mg Oral DAILY    cefTRIAXone (ROCEPHIN) 2 g in 0.9% sodium chloride (MBP/ADV) 50 mL  2 g IntraVENous Q12H    ampicillin (OMNIPEN) 2 g in 0.9% sodium chloride (MBP/ADV) 100 mL  2 g IntraVENous Q6H    levothyroxine (SYNTHROID) tablet 25 mcg  25 mcg Oral 6am    polyethylene glycol (MIRALAX) packet 17 g  17 g Oral EVERY OTHER DAY    sodium chloride (NS) flush 5-10 mL  5-10 mL IntraVENous PRN    acetaminophen (TYLENOL) tablet 650 mg  650 mg Oral Q6H PRN    acetaminophen (TYLENOL) tablet 650 mg  650 mg Oral Q6H PRN    albuterol (PROVENTIL VENTOLIN) nebulizer solution 2.5 mg  2.5 mg Nebulization Q6H PRN    apixaban (ELIQUIS) tablet 2.5 mg  2.5 mg Oral BID    midodrine (PROAMITINE) tablet 5 mg  5 mg Oral TID    mirtazapine (REMERON) tablet 15 mg  15 mg Oral QHS    sodium chloride (NS) flush 5-40 mL  5-40 mL IntraVENous Q8H        Review of Systems  A comprehensive review of systems was negative. Objective:     Patient Vitals for the past 24 hrs:   BP Temp Pulse Resp SpO2 Weight   11/05/19 0718 117/70 96.5 °F (35.8 °C) 87 18 96 %    11/05/19 0717      114 lb 3.2 oz (51.8 kg)   11/05/19 0359 124/83 97.3 °F (36.3 °C) 79 18 97 %    11/05/19 0010 109/69 97.4 °F (36.3 °C) 80 18 100 %    11/04/19 2046 100/70 97.3 °F (36.3 °C) 78 16 97 %    11/04/19 1445 107/67 97.4 °F (36.3 °C)  16 93 %    11/04/19 1046 110/66 97.6 °F (36.4 °C) 88 18 91 %      No intake/output data recorded.   11/03 1901 - 11/05 0700  In: 6840 [P.O.:7710; Notified by primary that Na 124. He had LE edema this AM and was hypertensive. Denies gi bleeding/surgeries/cirrhosis. Recommend 7.5mg tolvaptan x1. He can respond to thirst.  Encourage fluid intake to thirst.BMP q 8. Cannot be dc until tomorrow. Goal Na 127-128 for DC. Salt tablet not ideal with edema+ HTN. Side effect profile reviewed with pt earlier. I.V.:260]  Out: 800 [Urine:800]    Physical Exam:   Visit Vitals  /70 (BP 1 Location: Right arm, BP Patient Position: Sitting)   Pulse 87   Temp 96.5 °F (35.8 °C)   Resp 18   Ht 5' 1\" (1.549 m)   Wt 114 lb 3.2 oz (51.8 kg)   SpO2 96%   BMI 21.58 kg/m²     General appearance: alert, cooperative, no distress, appears stated age  Neck: supple, symmetrical, trachea midline, no adenopathy, thyroid: not enlarged, symmetric, no tenderness/mass/nodules, no carotid bruit and JVD - 4 cm above sternal notch  Lungs: clear to auscultation bilaterally  Heart: regular rate and rhythm, S1, S2 normal, no murmur, click, rub or gallop  Abdomen: soft, non-tender.  Bowel sounds normal. No masses,  no organomegaly  Extremities: extremities normal, atraumatic, no cyanosis or edema        Data Review   Recent Results (from the past 24 hour(s))   METABOLIC PANEL, BASIC    Collection Time: 11/05/19  4:48 AM   Result Value Ref Range    Sodium 146 (H) 136 - 145 mmol/L    Potassium 4.4 3.5 - 5.1 mmol/L    Chloride 112 (H) 97 - 108 mmol/L    CO2 29 21 - 32 mmol/L    Anion gap 5 5 - 15 mmol/L    Glucose 75 65 - 100 mg/dL    BUN 17 6 - 20 MG/DL    Creatinine 1.03 (H) 0.55 - 1.02 MG/DL    BUN/Creatinine ratio 17 12 - 20      GFR est AA >60 >60 ml/min/1.73m2    GFR est non-AA 50 (L) >60 ml/min/1.73m2    Calcium 9.2 8.5 - 10.1 MG/DL   CBC W/O DIFF    Collection Time: 11/05/19  4:48 AM   Result Value Ref Range    WBC 2.4 (L) 3.6 - 11.0 K/uL    RBC 3.34 (L) 3.80 - 5.20 M/uL    HGB 9.4 (L) 11.5 - 16.0 g/dL    HCT 31.6 (L) 35.0 - 47.0 %    MCV 94.6 80.0 - 99.0 FL    MCH 28.1 26.0 - 34.0 PG    MCHC 29.7 (L) 30.0 - 36.5 g/dL    RDW 19.7 (H) 11.5 - 14.5 %    PLATELET 326 (L) 236 - 400 K/uL    MPV 11.4 8.9 - 12.9 FL    NRBC 0.0 0  WBC    ABSOLUTE NRBC 0.00 0.00 - 0.01 K/uL           Assessment:     Principal Problem:    Sepsis ([facility] (11/2/2019)    Active Problems:    Cardiomyopathy, dilated, 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 (8/4/2012) Metabolic encephalopathy (23/0/1922)      HTN (hypertension) (6/29/2015)      Presence of biventricular automatic cardioverter/defibrillator (AICD) (7/2/2015)      Overview: [employer] biventricular AICD implant      Chronic systolic congestive heart failure ([facility] (10/8/2015)      CKD (chronic kidney disease) stage 3, GFR 30-59 ml/min ([facility] (1/10/2018)      Fever (10/23/2019)      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 Patient is not ready for discharge. Antibiotic regimen needs to be prepped for outpatient status. 2.  Cardiac status appears to be stable. 3.  Intermittent confusion secondary most likely to neurovascular dementia which is mild.